# Patient Record
Sex: FEMALE | Race: BLACK OR AFRICAN AMERICAN | Employment: UNEMPLOYED | URBAN - METROPOLITAN AREA
[De-identification: names, ages, dates, MRNs, and addresses within clinical notes are randomized per-mention and may not be internally consistent; named-entity substitution may affect disease eponyms.]

---

## 2017-02-08 ENCOUNTER — OP HISTORICAL/CONVERTED ENCOUNTER (OUTPATIENT)
Dept: OTHER | Age: 47
End: 2017-02-08

## 2020-06-04 ENCOUNTER — ED HISTORICAL/CONVERTED ENCOUNTER (OUTPATIENT)
Dept: OTHER | Age: 50
End: 2020-06-04

## 2020-06-04 ENCOUNTER — APPOINTMENT (OUTPATIENT)
Dept: GENERAL RADIOLOGY | Age: 50
DRG: 003 | End: 2020-06-04
Attending: EMERGENCY MEDICINE
Payer: COMMERCIAL

## 2020-06-04 ENCOUNTER — APPOINTMENT (OUTPATIENT)
Dept: CT IMAGING | Age: 50
DRG: 003 | End: 2020-06-04
Attending: EMERGENCY MEDICINE
Payer: COMMERCIAL

## 2020-06-04 ENCOUNTER — HOSPITAL ENCOUNTER (INPATIENT)
Age: 50
LOS: 36 days | Discharge: NURSING, REHAB OR LONG-TERM CARE | DRG: 003 | End: 2020-07-10
Attending: EMERGENCY MEDICINE | Admitting: INTERNAL MEDICINE
Payer: COMMERCIAL

## 2020-06-04 DIAGNOSIS — R09.02 CEREBRAL EDEMA DUE TO ANOXIA (HCC): ICD-10-CM

## 2020-06-04 DIAGNOSIS — R53.81 DEBILITY: ICD-10-CM

## 2020-06-04 DIAGNOSIS — I67.1 MIDDLE CEREBRAL ARTERY ANEURYSM: ICD-10-CM

## 2020-06-04 DIAGNOSIS — R56.9 SEIZURE (HCC): ICD-10-CM

## 2020-06-04 DIAGNOSIS — Z71.89 ADVANCED CARE PLANNING/COUNSELING DISCUSSION: ICD-10-CM

## 2020-06-04 DIAGNOSIS — I67.848 CEREBRAL VASOSPASM: ICD-10-CM

## 2020-06-04 DIAGNOSIS — G93.6 CEREBRAL EDEMA DUE TO ANOXIA (HCC): ICD-10-CM

## 2020-06-04 DIAGNOSIS — Z71.89 GOALS OF CARE, COUNSELING/DISCUSSION: ICD-10-CM

## 2020-06-04 DIAGNOSIS — H49.02 LEFT OCULOMOTOR NERVE PALSY: ICD-10-CM

## 2020-06-04 DIAGNOSIS — I60.9 SAH (SUBARACHNOID HEMORRHAGE) (HCC): Primary | ICD-10-CM

## 2020-06-04 DIAGNOSIS — I67.1 ANTERIOR COMMUNICATING ARTERY ANEURYSM: ICD-10-CM

## 2020-06-04 DIAGNOSIS — I60.12: ICD-10-CM

## 2020-06-04 LAB
ALBUMIN SERPL-MCNC: 3.2 G/DL (ref 3.5–5)
ALBUMIN/GLOB SERPL: 0.8 {RATIO} (ref 1.1–2.2)
ALP SERPL-CCNC: 75 U/L (ref 45–117)
ALT SERPL-CCNC: 39 U/L (ref 12–78)
ANION GAP SERPL CALC-SCNC: 8 MMOL/L (ref 5–15)
ARTERIAL PATENCY WRIST A: YES
AST SERPL-CCNC: 58 U/L (ref 15–37)
BASE DEFICIT BLD-SCNC: 2 MMOL/L
BASOPHILS # BLD: 0 K/UL (ref 0–0.1)
BASOPHILS NFR BLD: 0 % (ref 0–1)
BDY SITE: ABNORMAL
BILIRUB SERPL-MCNC: 0.4 MG/DL (ref 0.2–1)
BUN SERPL-MCNC: 12 MG/DL (ref 6–20)
BUN/CREAT SERPL: 13 (ref 12–20)
CA-I BLD-SCNC: 1.22 MMOL/L (ref 1.12–1.32)
CALCIUM SERPL-MCNC: 8.4 MG/DL (ref 8.5–10.1)
CHLORIDE SERPL-SCNC: 107 MMOL/L (ref 97–108)
CO2 SERPL-SCNC: 23 MMOL/L (ref 21–32)
COMMENT, HOLDF: NORMAL
COVID-19 RAPID TEST, COVR: NOT DETECTED
CREAT SERPL-MCNC: 0.9 MG/DL (ref 0.55–1.02)
DIFFERENTIAL METHOD BLD: ABNORMAL
EOSINOPHIL # BLD: 0 K/UL (ref 0–0.4)
EOSINOPHIL NFR BLD: 0 % (ref 0–7)
ERYTHROCYTE [DISTWIDTH] IN BLOOD BY AUTOMATED COUNT: 19 % (ref 11.5–14.5)
GAS FLOW.O2 O2 DELIVERY SYS: ABNORMAL L/MIN
GAS FLOW.O2 SETTING OXYMISER: 16 BPM
GLOBULIN SER CALC-MCNC: 3.8 G/DL (ref 2–4)
GLUCOSE SERPL-MCNC: 218 MG/DL (ref 65–100)
HCO3 BLD-SCNC: 24.6 MMOL/L (ref 22–26)
HCT VFR BLD AUTO: 34.3 % (ref 35–47)
HGB BLD-MCNC: 9.7 G/DL (ref 11.5–16)
IMM GRANULOCYTES # BLD AUTO: 0 K/UL (ref 0–0.04)
IMM GRANULOCYTES NFR BLD AUTO: 0 % (ref 0–0.5)
LYMPHOCYTES # BLD: 1.4 K/UL (ref 0.8–3.5)
LYMPHOCYTES NFR BLD: 15 % (ref 12–49)
MCH RBC QN AUTO: 19.5 PG (ref 26–34)
MCHC RBC AUTO-ENTMCNC: 28.3 G/DL (ref 30–36.5)
MCV RBC AUTO: 68.9 FL (ref 80–99)
MONOCYTES # BLD: 0.6 K/UL (ref 0–1)
MONOCYTES NFR BLD: 6 % (ref 5–13)
NEUTS SEG # BLD: 7.6 K/UL (ref 1.8–8)
NEUTS SEG NFR BLD: 79 % (ref 32–75)
NRBC # BLD: 0 K/UL (ref 0–0.01)
NRBC BLD-RTO: 0 PER 100 WBC
O2/TOTAL GAS SETTING VFR VENT: 100 %
PCO2 BLD: 51.3 MMHG (ref 35–45)
PEEP RESPIRATORY: 5 CMH2O
PH BLD: 7.29 [PH] (ref 7.35–7.45)
PIP ISTAT,IPIP: 32
PLATELET # BLD AUTO: 381 K/UL (ref 150–400)
PMV BLD AUTO: 9.7 FL (ref 8.9–12.9)
PO2 BLD: 91 MMHG (ref 80–100)
POTASSIUM SERPL-SCNC: 3.4 MMOL/L (ref 3.5–5.1)
PROT SERPL-MCNC: 7 G/DL (ref 6.4–8.2)
RBC # BLD AUTO: 4.98 M/UL (ref 3.8–5.2)
RBC MORPH BLD: ABNORMAL
SAMPLES BEING HELD,HOLD: NORMAL
SAO2 % BLD: 96 % (ref 92–97)
SODIUM SERPL-SCNC: 138 MMOL/L (ref 136–145)
SOURCE, COVRS: NORMAL
SPECIMEN SOURCE, FCOV2M: NORMAL
SPECIMEN TYPE: ABNORMAL
TOTAL RESP. RATE, ITRR: 17
VENTILATION MODE VENT: ABNORMAL
VOLUME CONTROL IVLC: YES
VT SETTING VENT: 420 ML
WBC # BLD AUTO: 9.6 K/UL (ref 3.6–11)

## 2020-06-04 PROCEDURE — 94762 N-INVAS EAR/PLS OXIMTRY CONT: CPT

## 2020-06-04 PROCEDURE — 70496 CT ANGIOGRAPHY HEAD: CPT

## 2020-06-04 PROCEDURE — 80053 COMPREHEN METABOLIC PANEL: CPT

## 2020-06-04 PROCEDURE — 74011636320 HC RX REV CODE- 636/320: Performed by: RADIOLOGY

## 2020-06-04 PROCEDURE — 74011000258 HC RX REV CODE- 258: Performed by: RADIOLOGY

## 2020-06-04 PROCEDURE — 36600 WITHDRAWAL OF ARTERIAL BLOOD: CPT

## 2020-06-04 PROCEDURE — 36415 COLL VENOUS BLD VENIPUNCTURE: CPT

## 2020-06-04 PROCEDURE — 93005 ELECTROCARDIOGRAM TRACING: CPT

## 2020-06-04 PROCEDURE — 99284 EMERGENCY DEPT VISIT MOD MDM: CPT

## 2020-06-04 PROCEDURE — 70450 CT HEAD/BRAIN W/O DYE: CPT

## 2020-06-04 PROCEDURE — 65610000006 HC RM INTENSIVE CARE

## 2020-06-04 PROCEDURE — 85025 COMPLETE CBC W/AUTO DIFF WBC: CPT

## 2020-06-04 PROCEDURE — 74011000258 HC RX REV CODE- 258: Performed by: NURSE PRACTITIONER

## 2020-06-04 PROCEDURE — 77030005513 HC CATH URETH FOL11 MDII -B

## 2020-06-04 PROCEDURE — 71045 X-RAY EXAM CHEST 1 VIEW: CPT

## 2020-06-04 PROCEDURE — 74011000250 HC RX REV CODE- 250: Performed by: NURSE PRACTITIONER

## 2020-06-04 PROCEDURE — 82803 BLOOD GASES ANY COMBINATION: CPT

## 2020-06-04 PROCEDURE — 74011250636 HC RX REV CODE- 250/636: Performed by: NURSE PRACTITIONER

## 2020-06-04 PROCEDURE — 85576 BLOOD PLATELET AGGREGATION: CPT

## 2020-06-04 PROCEDURE — 5A1955Z RESPIRATORY VENTILATION, GREATER THAN 96 CONSECUTIVE HOURS: ICD-10-PCS | Performed by: INTERNAL MEDICINE

## 2020-06-04 PROCEDURE — 84484 ASSAY OF TROPONIN QUANT: CPT

## 2020-06-04 PROCEDURE — 74011250637 HC RX REV CODE- 250/637: Performed by: NURSE PRACTITIONER

## 2020-06-04 PROCEDURE — 87635 SARS-COV-2 COVID-19 AMP PRB: CPT

## 2020-06-04 PROCEDURE — 74018 RADEX ABDOMEN 1 VIEW: CPT

## 2020-06-04 RX ORDER — NIMODIPINE 30 MG/1
60 CAPSULE, LIQUID FILLED ORAL EVERY 4 HOURS
Status: DISCONTINUED | OUTPATIENT
Start: 2020-06-04 | End: 2020-06-04

## 2020-06-04 RX ORDER — ONDANSETRON 2 MG/ML
4 INJECTION INTRAMUSCULAR; INTRAVENOUS
Status: DISCONTINUED | OUTPATIENT
Start: 2020-06-04 | End: 2020-07-10 | Stop reason: HOSPADM

## 2020-06-04 RX ORDER — CHLORHEXIDINE GLUCONATE 0.12 MG/ML
15 RINSE ORAL EVERY 12 HOURS
Status: DISCONTINUED | OUTPATIENT
Start: 2020-06-04 | End: 2020-06-18

## 2020-06-04 RX ORDER — SODIUM CHLORIDE 0.9 % (FLUSH) 0.9 %
10 SYRINGE (ML) INJECTION
Status: COMPLETED | OUTPATIENT
Start: 2020-06-04 | End: 2020-06-04

## 2020-06-04 RX ORDER — MANNITOL 20 G/100ML
25 INJECTION, SOLUTION INTRAVENOUS ONCE
Status: DISCONTINUED | OUTPATIENT
Start: 2020-06-04 | End: 2020-06-04

## 2020-06-04 RX ORDER — SODIUM CHLORIDE, SODIUM LACTATE, POTASSIUM CHLORIDE, CALCIUM CHLORIDE 600; 310; 30; 20 MG/100ML; MG/100ML; MG/100ML; MG/100ML
50 INJECTION, SOLUTION INTRAVENOUS CONTINUOUS
Status: DISCONTINUED | OUTPATIENT
Start: 2020-06-04 | End: 2020-06-07

## 2020-06-04 RX ORDER — PROPOFOL 10 MG/ML
0-50 VIAL (ML) INTRAVENOUS
Status: DISCONTINUED | OUTPATIENT
Start: 2020-06-04 | End: 2020-06-15

## 2020-06-04 RX ORDER — LABETALOL HYDROCHLORIDE 5 MG/ML
20 INJECTION, SOLUTION INTRAVENOUS
Status: DISCONTINUED | OUTPATIENT
Start: 2020-06-04 | End: 2020-06-06

## 2020-06-04 RX ORDER — DOCUSATE SODIUM 50 MG/5ML
100 LIQUID ORAL
Status: DISCONTINUED | OUTPATIENT
Start: 2020-06-04 | End: 2020-07-10 | Stop reason: HOSPADM

## 2020-06-04 RX ADMIN — SODIUM CHLORIDE 100 ML: 900 INJECTION, SOLUTION INTRAVENOUS at 20:16

## 2020-06-04 RX ADMIN — SODIUM CHLORIDE, SODIUM LACTATE, POTASSIUM CHLORIDE, AND CALCIUM CHLORIDE 100 ML/HR: 600; 310; 30; 20 INJECTION, SOLUTION INTRAVENOUS at 22:01

## 2020-06-04 RX ADMIN — SODIUM CHLORIDE 5 MG/HR: 900 INJECTION, SOLUTION INTRAVENOUS at 22:12

## 2020-06-04 RX ADMIN — PROPOFOL INJECTABLE EMULSION 25 MCG/KG/MIN: 10 INJECTION, EMULSION INTRAVENOUS at 22:06

## 2020-06-04 RX ADMIN — LABETALOL HYDROCHLORIDE 20 MG: 5 INJECTION INTRAVENOUS at 23:38

## 2020-06-04 RX ADMIN — NIMODIPINE 60 MG: 30 SOLUTION ORAL at 23:34

## 2020-06-04 RX ADMIN — Medication 10 ML: at 20:16

## 2020-06-04 RX ADMIN — IOPAMIDOL 100 ML: 755 INJECTION, SOLUTION INTRAVENOUS at 20:16

## 2020-06-04 RX ADMIN — SODIUM CHLORIDE 1000 MG: 900 INJECTION, SOLUTION INTRAVENOUS at 23:48

## 2020-06-05 ENCOUNTER — ANESTHESIA (OUTPATIENT)
Dept: INTERVENTIONAL RADIOLOGY/VASCULAR | Age: 50
DRG: 003 | End: 2020-06-05
Payer: COMMERCIAL

## 2020-06-05 ENCOUNTER — APPOINTMENT (OUTPATIENT)
Dept: INTERVENTIONAL RADIOLOGY/VASCULAR | Age: 50
DRG: 003 | End: 2020-06-05
Attending: RADIOLOGY
Payer: COMMERCIAL

## 2020-06-05 ENCOUNTER — APPOINTMENT (OUTPATIENT)
Dept: CT IMAGING | Age: 50
DRG: 003 | End: 2020-06-05
Attending: NEUROLOGICAL SURGERY
Payer: COMMERCIAL

## 2020-06-05 ENCOUNTER — APPOINTMENT (OUTPATIENT)
Dept: NON INVASIVE DIAGNOSTICS | Age: 50
DRG: 003 | End: 2020-06-05
Attending: NURSE PRACTITIONER
Payer: COMMERCIAL

## 2020-06-05 ENCOUNTER — ANESTHESIA EVENT (OUTPATIENT)
Dept: INTERVENTIONAL RADIOLOGY/VASCULAR | Age: 50
DRG: 003 | End: 2020-06-05
Payer: COMMERCIAL

## 2020-06-05 LAB
ALBUMIN SERPL-MCNC: 2.9 G/DL (ref 3.5–5)
ALBUMIN/GLOB SERPL: 0.8 {RATIO} (ref 1.1–2.2)
ALP SERPL-CCNC: 65 U/L (ref 45–117)
ALT SERPL-CCNC: 32 U/L (ref 12–78)
ANION GAP SERPL CALC-SCNC: 11 MMOL/L (ref 5–15)
ARTERIAL PATENCY WRIST A: ABNORMAL
ASPIRIN TEST, ASPIRN: 518 ARU
AST SERPL-CCNC: 29 U/L (ref 15–37)
ATRIAL RATE: 75 BPM
BASE DEFICIT BLD-SCNC: 3 MMOL/L
BASOPHILS # BLD: 0 K/UL (ref 0–0.1)
BASOPHILS NFR BLD: 0 % (ref 0–1)
BDY SITE: ABNORMAL
BILIRUB SERPL-MCNC: 0.3 MG/DL (ref 0.2–1)
BUN SERPL-MCNC: 11 MG/DL (ref 6–20)
BUN/CREAT SERPL: 14 (ref 12–20)
CA-I BLD-SCNC: 1.19 MMOL/L (ref 1.12–1.32)
CALCIUM SERPL-MCNC: 8 MG/DL (ref 8.5–10.1)
CALCULATED P AXIS, ECG09: 62 DEGREES
CALCULATED R AXIS, ECG10: 9 DEGREES
CALCULATED T AXIS, ECG11: 86 DEGREES
CHLORIDE SERPL-SCNC: 109 MMOL/L (ref 97–108)
CHOLEST SERPL-MCNC: 90 MG/DL
CO2 SERPL-SCNC: 20 MMOL/L (ref 21–32)
CREAT SERPL-MCNC: 0.81 MG/DL (ref 0.55–1.02)
DIAGNOSIS, 93000: NORMAL
DIFFERENTIAL METHOD BLD: ABNORMAL
EOSINOPHIL # BLD: 0 K/UL (ref 0–0.4)
EOSINOPHIL NFR BLD: 0 % (ref 0–7)
ERYTHROCYTE [DISTWIDTH] IN BLOOD BY AUTOMATED COUNT: 18.4 % (ref 11.5–14.5)
EST. AVERAGE GLUCOSE BLD GHB EST-MCNC: 117 MG/DL
GAS FLOW.O2 O2 DELIVERY SYS: ABNORMAL L/MIN
GAS FLOW.O2 SETTING OXYMISER: 18 BPM
GLOBULIN SER CALC-MCNC: 3.6 G/DL (ref 2–4)
GLUCOSE BLD STRIP.AUTO-MCNC: 129 MG/DL (ref 65–100)
GLUCOSE BLD STRIP.AUTO-MCNC: 132 MG/DL (ref 65–100)
GLUCOSE BLD STRIP.AUTO-MCNC: 168 MG/DL (ref 65–100)
GLUCOSE SERPL-MCNC: 156 MG/DL (ref 65–100)
HBA1C MFR BLD: 5.7 % (ref 4–5.6)
HCO3 BLD-SCNC: 22.2 MMOL/L (ref 22–26)
HCT VFR BLD AUTO: 31.6 % (ref 35–47)
HDLC SERPL-MCNC: 64 MG/DL
HDLC SERPL: 1.4 {RATIO} (ref 0–5)
HGB BLD-MCNC: 9.2 G/DL (ref 11.5–16)
IMM GRANULOCYTES # BLD AUTO: 0 K/UL (ref 0–0.04)
IMM GRANULOCYTES NFR BLD AUTO: 0 % (ref 0–0.5)
LDLC SERPL CALC-MCNC: 7.8 MG/DL (ref 0–100)
LIPID PROFILE,FLP: NORMAL
LYMPHOCYTES # BLD: 0.9 K/UL (ref 0.8–3.5)
LYMPHOCYTES NFR BLD: 7 % (ref 12–49)
MAGNESIUM SERPL-MCNC: 1.8 MG/DL (ref 1.6–2.4)
MCH RBC QN AUTO: 19.7 PG (ref 26–34)
MCHC RBC AUTO-ENTMCNC: 29.1 G/DL (ref 30–36.5)
MCV RBC AUTO: 67.7 FL (ref 80–99)
MONOCYTES # BLD: 0.9 K/UL (ref 0–1)
MONOCYTES NFR BLD: 7 % (ref 5–13)
NEUTS SEG # BLD: 11.7 K/UL (ref 1.8–8)
NEUTS SEG NFR BLD: 86 % (ref 32–75)
NRBC # BLD: 0 K/UL (ref 0–0.01)
NRBC BLD-RTO: 0 PER 100 WBC
O2/TOTAL GAS SETTING VFR VENT: 80 %
P-R INTERVAL, ECG05: 172 MS
PCO2 BLD: 37.4 MMHG (ref 35–45)
PEEP RESPIRATORY: 5 CMH2O
PH BLD: 7.38 [PH] (ref 7.35–7.45)
PHOSPHATE SERPL-MCNC: 2 MG/DL (ref 2.6–4.7)
PIP ISTAT,IPIP: 31
PLATELET # BLD AUTO: 302 K/UL (ref 150–400)
PLATELET COMMENTS,PCOM: ABNORMAL
PMV BLD AUTO: 9.7 FL (ref 8.9–12.9)
PO2 BLD: 145 MMHG (ref 80–100)
POTASSIUM SERPL-SCNC: 3.4 MMOL/L (ref 3.5–5.1)
PROT SERPL-MCNC: 6.5 G/DL (ref 6.4–8.2)
Q-T INTERVAL, ECG07: 476 MS
QRS DURATION, ECG06: 102 MS
QTC CALCULATION (BEZET), ECG08: 531 MS
RBC # BLD AUTO: 4.67 M/UL (ref 3.8–5.2)
RBC MORPH BLD: ABNORMAL
SAO2 % BLD: 99 % (ref 92–97)
SARS-COV-2, COV2: NOT DETECTED
SERVICE CMNT-IMP: ABNORMAL
SODIUM SERPL-SCNC: 140 MMOL/L (ref 136–145)
SPECIMEN SOURCE, FCOV2M: NORMAL
SPECIMEN TYPE: ABNORMAL
TOTAL RESP. RATE, ITRR: 18
TRIGL SERPL-MCNC: 91 MG/DL (ref ?–150)
TROPONIN I SERPL-MCNC: 0.76 NG/ML
VENTILATION MODE VENT: ABNORMAL
VENTRICULAR RATE, ECG03: 75 BPM
VLDLC SERPL CALC-MCNC: 18.2 MG/DL
VT SETTING VENT: 450 ML
WBC # BLD AUTO: 13.5 K/UL (ref 3.6–11)

## 2020-06-05 PROCEDURE — 75894 X-RAYS TRANSCATH THERAPY: CPT

## 2020-06-05 PROCEDURE — 94003 VENT MGMT INPAT SUBQ DAY: CPT

## 2020-06-05 PROCEDURE — 76060000035 HC ANESTHESIA 2 TO 2.5 HR

## 2020-06-05 PROCEDURE — C1887 CATHETER, GUIDING: HCPCS

## 2020-06-05 PROCEDURE — 36415 COLL VENOUS BLD VENIPUNCTURE: CPT

## 2020-06-05 PROCEDURE — 74011250636 HC RX REV CODE- 250/636: Performed by: RADIOLOGY

## 2020-06-05 PROCEDURE — 82962 GLUCOSE BLOOD TEST: CPT

## 2020-06-05 PROCEDURE — B31R1ZZ FLUOROSCOPY OF INTRACRANIAL ARTERIES USING LOW OSMOLAR CONTRAST: ICD-10-PCS | Performed by: RADIOLOGY

## 2020-06-05 PROCEDURE — 85025 COMPLETE CBC W/AUTO DIFF WBC: CPT

## 2020-06-05 PROCEDURE — 80061 LIPID PANEL: CPT

## 2020-06-05 PROCEDURE — 84100 ASSAY OF PHOSPHORUS: CPT

## 2020-06-05 PROCEDURE — 77030035304 HC CATH ANGI BENCHMARK PENU -G

## 2020-06-05 PROCEDURE — 03VG3DZ RESTRICTION OF INTRACRANIAL ARTERY WITH INTRALUMINAL DEVICE, PERCUTANEOUS APPROACH: ICD-10-PCS | Performed by: RADIOLOGY

## 2020-06-05 PROCEDURE — 82803 BLOOD GASES ANY COMBINATION: CPT

## 2020-06-05 PROCEDURE — 03HY32Z INSERTION OF MONITORING DEVICE INTO UPPER ARTERY, PERCUTANEOUS APPROACH: ICD-10-PCS | Performed by: NURSE PRACTITIONER

## 2020-06-05 PROCEDURE — C1769 GUIDE WIRE: HCPCS

## 2020-06-05 PROCEDURE — 74011000258 HC RX REV CODE- 258: Performed by: NURSE PRACTITIONER

## 2020-06-05 PROCEDURE — 3E0G76Z INTRODUCTION OF NUTRITIONAL SUBSTANCE INTO UPPER GI, VIA NATURAL OR ARTIFICIAL OPENING: ICD-10-PCS | Performed by: INTERNAL MEDICINE

## 2020-06-05 PROCEDURE — 74011250636 HC RX REV CODE- 250/636: Performed by: NURSE PRACTITIONER

## 2020-06-05 PROCEDURE — 77030029286 HC COIL EMB DTCH ULT STRY -H

## 2020-06-05 PROCEDURE — 77030008638 HC TU CONN COOK -A

## 2020-06-05 PROCEDURE — 74011250637 HC RX REV CODE- 250/637: Performed by: NURSE PRACTITIONER

## 2020-06-05 PROCEDURE — 77030021532 HC CATH ANGI DX IMPRS MRTM -B

## 2020-06-05 PROCEDURE — 70450 CT HEAD/BRAIN W/O DYE: CPT

## 2020-06-05 PROCEDURE — 74011636320 HC RX REV CODE- 636/320: Performed by: RADIOLOGY

## 2020-06-05 PROCEDURE — C1894 INTRO/SHEATH, NON-LASER: HCPCS

## 2020-06-05 PROCEDURE — 74011250637 HC RX REV CODE- 250/637: Performed by: RADIOLOGY

## 2020-06-05 PROCEDURE — 74011000250 HC RX REV CODE- 250: Performed by: NURSE PRACTITIONER

## 2020-06-05 PROCEDURE — 77030008584 HC TOOL GDWRE DEV TERU -A

## 2020-06-05 PROCEDURE — 77030029288 HC HNDL INZONE DTCH STRY -C

## 2020-06-05 PROCEDURE — C1760 CLOSURE DEV, VASC: HCPCS

## 2020-06-05 PROCEDURE — 77030040361 HC SLV COMPR DVT MDII -B

## 2020-06-05 PROCEDURE — 83036 HEMOGLOBIN GLYCOSYLATED A1C: CPT

## 2020-06-05 PROCEDURE — 83735 ASSAY OF MAGNESIUM: CPT

## 2020-06-05 PROCEDURE — 74011636637 HC RX REV CODE- 636/637: Performed by: NURSE PRACTITIONER

## 2020-06-05 PROCEDURE — 80053 COMPREHEN METABOLIC PANEL: CPT

## 2020-06-05 PROCEDURE — 77030012468 HC VLV BLEEDBK CNTRL ABBT -B

## 2020-06-05 PROCEDURE — 65610000006 HC RM INTENSIVE CARE

## 2020-06-05 RX ORDER — MAGNESIUM SULFATE 100 %
4 CRYSTALS MISCELLANEOUS AS NEEDED
Status: DISCONTINUED | OUTPATIENT
Start: 2020-06-05 | End: 2020-07-10 | Stop reason: HOSPADM

## 2020-06-05 RX ORDER — DOCUSATE SODIUM 50 MG/5ML
100 LIQUID ORAL 2 TIMES DAILY
Status: DISCONTINUED | OUTPATIENT
Start: 2020-06-05 | End: 2020-06-20

## 2020-06-05 RX ORDER — ALBUTEROL SULFATE 0.83 MG/ML
2.5 SOLUTION RESPIRATORY (INHALATION)
Status: DISCONTINUED | OUTPATIENT
Start: 2020-06-05 | End: 2020-07-10 | Stop reason: HOSPADM

## 2020-06-05 RX ORDER — FENTANYL CITRATE 50 UG/ML
50-100 INJECTION, SOLUTION INTRAMUSCULAR; INTRAVENOUS
Status: DISCONTINUED | OUTPATIENT
Start: 2020-06-05 | End: 2020-06-18

## 2020-06-05 RX ORDER — MAGNESIUM SULFATE 1 G/100ML
1 INJECTION INTRAVENOUS ONCE
Status: COMPLETED | OUTPATIENT
Start: 2020-06-05 | End: 2020-06-05

## 2020-06-05 RX ORDER — HEPARIN SODIUM 1000 [USP'U]/ML
INJECTION, SOLUTION INTRAVENOUS; SUBCUTANEOUS AS NEEDED
Status: DISCONTINUED | OUTPATIENT
Start: 2020-06-05 | End: 2020-06-05 | Stop reason: HOSPADM

## 2020-06-05 RX ORDER — FACIAL-BODY WIPES
10 EACH TOPICAL DAILY PRN
Status: DISCONTINUED | OUTPATIENT
Start: 2020-06-05 | End: 2020-07-10 | Stop reason: HOSPADM

## 2020-06-05 RX ORDER — LIDOCAINE HYDROCHLORIDE 20 MG/ML
INJECTION, SOLUTION INFILTRATION; PERINEURAL
Status: DISPENSED
Start: 2020-06-05 | End: 2020-06-05

## 2020-06-05 RX ORDER — ROCURONIUM BROMIDE 10 MG/ML
INJECTION, SOLUTION INTRAVENOUS AS NEEDED
Status: DISCONTINUED | OUTPATIENT
Start: 2020-06-05 | End: 2020-06-05 | Stop reason: HOSPADM

## 2020-06-05 RX ORDER — LIDOCAINE HYDROCHLORIDE 20 MG/ML
20 INJECTION, SOLUTION INFILTRATION; PERINEURAL ONCE
Status: DISCONTINUED | OUTPATIENT
Start: 2020-06-05 | End: 2020-06-05 | Stop reason: HOSPADM

## 2020-06-05 RX ORDER — INSULIN LISPRO 100 [IU]/ML
INJECTION, SOLUTION INTRAVENOUS; SUBCUTANEOUS EVERY 6 HOURS
Status: DISCONTINUED | OUTPATIENT
Start: 2020-06-05 | End: 2020-06-15

## 2020-06-05 RX ORDER — ASPIRIN 300 MG/1
SUPPOSITORY RECTAL
Status: DISPENSED
Start: 2020-06-05 | End: 2020-06-05

## 2020-06-05 RX ORDER — ASPIRIN 300 MG/1
600 SUPPOSITORY RECTAL DAILY
Status: DISCONTINUED | OUTPATIENT
Start: 2020-06-05 | End: 2020-06-05

## 2020-06-05 RX ORDER — SODIUM CHLORIDE 9 MG/ML
INJECTION, SOLUTION INTRAVENOUS
Status: DISCONTINUED | OUTPATIENT
Start: 2020-06-05 | End: 2020-06-05 | Stop reason: HOSPADM

## 2020-06-05 RX ORDER — DEXTROSE MONOHYDRATE 100 MG/ML
0-250 INJECTION, SOLUTION INTRAVENOUS AS NEEDED
Status: DISCONTINUED | OUTPATIENT
Start: 2020-06-05 | End: 2020-07-10 | Stop reason: HOSPADM

## 2020-06-05 RX ORDER — VERAPAMIL HYDROCHLORIDE 2.5 MG/ML
INJECTION, SOLUTION INTRAVENOUS
Status: DISCONTINUED
Start: 2020-06-05 | End: 2020-06-05 | Stop reason: WASHOUT

## 2020-06-05 RX ORDER — SODIUM,POTASSIUM PHOSPHATES 280-250MG
1 POWDER IN PACKET (EA) ORAL 2 TIMES DAILY
Status: COMPLETED | OUTPATIENT
Start: 2020-06-05 | End: 2020-06-05

## 2020-06-05 RX ORDER — SODIUM CHLORIDE, SODIUM LACTATE, POTASSIUM CHLORIDE, CALCIUM CHLORIDE 600; 310; 30; 20 MG/100ML; MG/100ML; MG/100ML; MG/100ML
INJECTION, SOLUTION INTRAVENOUS
Status: DISCONTINUED | OUTPATIENT
Start: 2020-06-05 | End: 2020-06-05

## 2020-06-05 RX ORDER — POTASSIUM CHLORIDE 7.45 MG/ML
10 INJECTION INTRAVENOUS
Status: DISCONTINUED | OUTPATIENT
Start: 2020-06-05 | End: 2020-06-05

## 2020-06-05 RX ORDER — SODIUM CHLORIDE 0.9 % (FLUSH) 0.9 %
SYRINGE (ML) INJECTION
Status: COMPLETED
Start: 2020-06-05 | End: 2020-06-05

## 2020-06-05 RX ORDER — VERAPAMIL HYDROCHLORIDE 2.5 MG/ML
20-40 INJECTION, SOLUTION INTRAVENOUS ONCE
Status: DISCONTINUED | OUTPATIENT
Start: 2020-06-05 | End: 2020-06-05

## 2020-06-05 RX ADMIN — IOPAMIDOL 190 ML: 612 INJECTION, SOLUTION INTRAVENOUS at 10:32

## 2020-06-05 RX ADMIN — PROPOFOL INJECTABLE EMULSION 45 MCG/KG/MIN: 10 INJECTION, EMULSION INTRAVENOUS at 11:50

## 2020-06-05 RX ADMIN — MAGNESIUM SULFATE HEPTAHYDRATE 1 G: 1 INJECTION, SOLUTION INTRAVENOUS at 11:55

## 2020-06-05 RX ADMIN — CHLORHEXIDINE GLUCONATE 15 ML: 0.12 RINSE ORAL at 20:46

## 2020-06-05 RX ADMIN — SODIUM CHLORIDE, SODIUM LACTATE, POTASSIUM CHLORIDE, AND CALCIUM CHLORIDE 100 ML/HR: 600; 310; 30; 20 INJECTION, SOLUTION INTRAVENOUS at 11:30

## 2020-06-05 RX ADMIN — SODIUM CHLORIDE 1000 MG: 900 INJECTION, SOLUTION INTRAVENOUS at 23:03

## 2020-06-05 RX ADMIN — SODIUM CHLORIDE 7.5 MG/HR: 900 INJECTION, SOLUTION INTRAVENOUS at 15:40

## 2020-06-05 RX ADMIN — FAMOTIDINE 20 MG: 10 INJECTION, SOLUTION INTRAVENOUS at 20:38

## 2020-06-05 RX ADMIN — SODIUM CHLORIDE 10 MG/HR: 900 INJECTION, SOLUTION INTRAVENOUS at 23:59

## 2020-06-05 RX ADMIN — ROCURONIUM BROMIDE 20 MG: 10 INJECTION, SOLUTION INTRAVENOUS at 09:20

## 2020-06-05 RX ADMIN — LABETALOL HYDROCHLORIDE 20 MG: 5 INJECTION INTRAVENOUS at 22:38

## 2020-06-05 RX ADMIN — HEPARIN SODIUM 2000 UNITS: 1000 INJECTION, SOLUTION INTRAVENOUS; SUBCUTANEOUS at 09:48

## 2020-06-05 RX ADMIN — INSULIN LISPRO 2 UNITS: 100 INJECTION, SOLUTION INTRAVENOUS; SUBCUTANEOUS at 06:02

## 2020-06-05 RX ADMIN — Medication 40 ML: at 16:00

## 2020-06-05 RX ADMIN — LABETALOL HYDROCHLORIDE 20 MG: 5 INJECTION INTRAVENOUS at 03:29

## 2020-06-05 RX ADMIN — ROCURONIUM BROMIDE 30 MG: 10 INJECTION, SOLUTION INTRAVENOUS at 08:57

## 2020-06-05 RX ADMIN — SODIUM CHLORIDE 1000 MG: 900 INJECTION, SOLUTION INTRAVENOUS at 11:55

## 2020-06-05 RX ADMIN — SODIUM CHLORIDE 10 MG/HR: 900 INJECTION, SOLUTION INTRAVENOUS at 10:00

## 2020-06-05 RX ADMIN — SODIUM CHLORIDE 10 MG/HR: 900 INJECTION, SOLUTION INTRAVENOUS at 12:45

## 2020-06-05 RX ADMIN — Medication 2 DROP: at 15:39

## 2020-06-05 RX ADMIN — HEPARIN SODIUM 4000 UNITS: 1000 INJECTION INTRAVENOUS; SUBCUTANEOUS at 07:15

## 2020-06-05 RX ADMIN — SODIUM CHLORIDE 10 MG/HR: 900 INJECTION, SOLUTION INTRAVENOUS at 18:59

## 2020-06-05 RX ADMIN — PROPOFOL INJECTABLE EMULSION 35 MCG/KG/MIN: 10 INJECTION, EMULSION INTRAVENOUS at 15:39

## 2020-06-05 RX ADMIN — FENTANYL CITRATE 50 MCG: 50 INJECTION INTRAMUSCULAR; INTRAVENOUS at 19:21

## 2020-06-05 RX ADMIN — POTASSIUM & SODIUM PHOSPHATES POWDER PACK 280-160-250 MG 1 PACKET: 280-160-250 PACK at 17:18

## 2020-06-05 RX ADMIN — SODIUM CHLORIDE 10 MG/HR: 900 INJECTION, SOLUTION INTRAVENOUS at 21:15

## 2020-06-05 RX ADMIN — PROPOFOL INJECTABLE EMULSION 15 MCG/KG/MIN: 10 INJECTION, EMULSION INTRAVENOUS at 03:17

## 2020-06-05 RX ADMIN — SODIUM CHLORIDE, SODIUM LACTATE, POTASSIUM CHLORIDE, AND CALCIUM CHLORIDE 100 ML/HR: 600; 310; 30; 20 INJECTION, SOLUTION INTRAVENOUS at 21:51

## 2020-06-05 RX ADMIN — ASPIRIN 600 MG: 300 SUPPOSITORY RECTAL at 08:49

## 2020-06-05 RX ADMIN — CHLORHEXIDINE GLUCONATE 15 ML: 0.12 RINSE ORAL at 01:00

## 2020-06-05 RX ADMIN — SODIUM CHLORIDE 10 MG/HR: 900 INJECTION, SOLUTION INTRAVENOUS at 04:21

## 2020-06-05 RX ADMIN — NIMODIPINE 60 MG: 30 SOLUTION ORAL at 04:20

## 2020-06-05 RX ADMIN — FAMOTIDINE 20 MG: 10 INJECTION, SOLUTION INTRAVENOUS at 11:56

## 2020-06-05 RX ADMIN — NIMODIPINE 60 MG: 30 SOLUTION ORAL at 20:14

## 2020-06-05 RX ADMIN — POTASSIUM BICARBONATE 40 MEQ: 782 TABLET, EFFERVESCENT ORAL at 05:52

## 2020-06-05 RX ADMIN — LABETALOL HYDROCHLORIDE 20 MG: 5 INJECTION INTRAVENOUS at 01:21

## 2020-06-05 RX ADMIN — DOCUSATE SODIUM 100 MG: 50 LIQUID ORAL at 17:18

## 2020-06-05 RX ADMIN — NIMODIPINE 60 MG: 30 SOLUTION ORAL at 16:01

## 2020-06-05 RX ADMIN — SODIUM CHLORIDE: 9 INJECTION, SOLUTION INTRAVENOUS at 08:38

## 2020-06-05 RX ADMIN — CHLORHEXIDINE GLUCONATE 15 ML: 0.12 RINSE ORAL at 12:00

## 2020-06-05 RX ADMIN — SODIUM CHLORIDE 10 MG/HR: 900 INJECTION, SOLUTION INTRAVENOUS at 01:28

## 2020-06-05 RX ADMIN — NIMODIPINE 60 MG: 30 SOLUTION ORAL at 11:56

## 2020-06-05 RX ADMIN — PROPOFOL INJECTABLE EMULSION 35 MCG/KG/MIN: 10 INJECTION, EMULSION INTRAVENOUS at 20:09

## 2020-06-05 RX ADMIN — Medication 2 DROP: at 22:40

## 2020-06-05 RX ADMIN — POTASSIUM & SODIUM PHOSPHATES POWDER PACK 280-160-250 MG 1 PACKET: 280-160-250 PACK at 11:57

## 2020-06-05 NOTE — PROGRESS NOTES
Neurointerventional Surgery Progress Note  Silvia Aguirre NP  Cell: [phone]          Admit Date: 6/4/2020        Daily Progress Note: 6/5/2020   LOS: 1 day      POD:* No surgery found *  S/P:  Cerebral angiogram with coil embolization of left MCA bifurcation aneurysm     ** Delayed Presentation ** PB Day 5     Interval History/Subjective:     Patient intubated and sedated. No acute events overnight. Angio today for coil embolization of left MCA bifurcation aneurysm. COVID negative X 2. 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                              Additional, patient has 5X3 right MCA bifurcation aneurysm with very wide neck which will               need to be treated with stents vs. clipping              - Day 1/21 of Nimotop to prevent delayed cerebral ischemia               - holding daily baby aspirin until decision regarding EVD is made               - LR at 100 ml/hr to maintain euvolemia              - start daily TCDs tomorrow 6/6               - Strict I&O              - ECHO pending              - q 1 neuro checks              - SBP goal less than 140 ( still has unsecured aneurysms) ,                                          Cardene/Hydralazine/Labetalol PRN              - PT/OT/SLP evals when appropriate              - repeat head CT in the am to evaluate for hydro               - NSGY following     2.) Seizure              - Due to #1, no prior hx of seizure              - Keppra 1000 mg BID              - Seizure precautions - Ativan PRN              - Propofol while intubated    3.) Acute hypoxic respiratory failure in the setting of likely neurogenic pulmonary edema              - pulmonary infiltrates noted in bilateral lower lobes on CT              - COVID 19 test negative x 2             - wean vent settings as able, currently on 70% FiO2 with PEEP 5              - avoid diuresis if possible d/t risk of vasospasm             - intensivist managing     4.) Electrolyte Derangements               - Potassium, magnesium, phosphorus all low today               - replace as needed               - intensivist managing                      Plan d/w Dr. Berenice Avila, Lacey Espinoza Neurosurgery NP, bedside RN, patients mother      Admission Summary:     Vladimir Taylor is a 48 y.o. 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 On arrival to ED, pt was unresponsive and noted to be seizing. A stat CT of her head was performed there, which showed extensive SAH. Pt was then intubated emergently. She was also extremely hypertensive with SBPs in 240s. She was given Keppra and Ativan, and placed on cardene drip for BP control. NSGY and NIS were then consulted and pt was transferred to [facility] for higher level of care. En route, paramedics administered 7.5 mg of Versed, 200mcg of Fentanyl, and 10 mg of labetalol for BP control. On arrival, CTA of her head and neck was obtained, which showed a 4.4 x 4.1 mm right MCA trifurcation aneurysm, a 2.2 x 3.1 mm left MCA trifurcation aneurysm, and possible < 2mm ACOM aneurysms. Repeat CT head of her head showed stable SAH, no evidence of hydrocephalus. Of note, CTA head/neck also showing pulmonary infiltrates suspicious for COVID-19 infection, so was placed on contact precautions with test pending. 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  History obtained from pt's mother. Mother reports pt does not smoke, does not drink alcohol or use street drugs. Mother reports pt began complaining of a headache on 5/31/20, has been taking BC Powder (aspirin) for the headache. She also reports that the pt has a hx of HTN and is supposed to be on BP medications but recently stopped taking her medications. Unable to perform meaningful ROS due to intubation.      Current Facility-Administered Medications   Medication Dose Route Frequency Provider Last Rate Last Dose    glucose chewable tablet 16 g  4 Tab Oral PRN Rodolph Roque, NP-C        glucagon (GLUCAGEN) injection 1 mg  1 mg IntraMUSCular PRN Rodolph Roque, NP-C        dextrose 10% infusion 0-250 mL  0-250 mL IntraVENous PRN Rodolph Roque, NP-C        insulin lispro (HUMALOG) injection   SubCUTAneous Q6H Winnie RIVAS, NP-C   Stopped at 06/05/20 1200    albuterol (PROVENTIL VENTOLIN) nebulizer solution 2.5 mg  2.5 mg Nebulization Q6H PRN Rodolph Roque, NP-C        potassium, sodium phosphates (NEUTRA-PHOS) packet 1 Packet  1 Packet Oral BID Rodolph Roque, NP-C   1 Packet at 06/05/20 1157    lidocaine (XYLOCAINE) 20 mg/mL (2 %) injection             aspirin (ASA) 300 mg suppository             heparin 4000 units/1000 mL (4 units/mL) in NS 4000 units/1000ml NS infusion **FOR ANGIO USE ONLY**             famotidine (PF) (PEPCID) 20 mg in 0.9% sodium chloride 10 mL injection  20 mg IntraVENous Q12H Harvey Go, ACNP   20 mg at 06/05/20 1156    fentaNYL citrate (PF) injection  mcg   mcg IntraVENous Q1H PRN Harvey Go, ACNP        polyvinyl alcohol-povidone (NATURAL TEARS) 0.5-0.6 % ophthalmic solution 2 Drop  2 Drop Both Eyes Q8H Harvey Go, ACNP   2 Drop at 06/05/20 1539    docusate (COLACE) 50 mg/5 mL oral liquid 100 mg  100 mg Oral BID Harvey Go, ACNP        bisacodyL (DULCOLAX) suppository 10 mg  10 mg Rectal DAILY PRN Harvey Go, ACNP  levETIRAcetam (KEPPRA) 1,000 mg in 0.9% sodium chloride 100 mL IVPB  1,000 mg IntraVENous Q12H Eva Domínguez, NP   1,000 mg at 20 1155    lactated Ringers infusion  100 mL/hr IntraVENous CONTINUOUS Eva Domínguez Backbone,  mL/hr at 20 1130 100 mL/hr at 20 1130    niMODipine (NYMALIZE) 6 mg/mL oral solution 60 mg  60 mg Per NG tube Q4H Eva Domínguez Backbone, NP   60 mg at 20 1601    niCARdipine (CARDENE) 25 mg in 0.9% sodium chloride 250 mL infusion  0-15 mg/hr IntraVENous TITRATE Eva Domínguez, NP 75 mL/hr at 20 1540 7.5 mg/hr at 20 1540    labetaloL (NORMODYNE;TRANDATE) injection 20 mg  20 mg IntraVENous Q2H PRN Eva Domínguez, NP   20 mg at 20 0329    propofol (DIPRIVAN) 10 mg/mL infusion  0-50 mcg/kg/min IntraVENous TITRATE Eva Domínguez, NP 21.2 mL/hr at 20 1539 35 mcg/kg/min at 20 1539    chlorhexidine (ORAL CARE KIT) 0.12 % mouthwash 15 mL  15 mL Oral Q12H FABIAN Lane   15 mL at 20 1200    ondansetron (ZOFRAN) injection 4 mg  4 mg IntraVENous Q4H PRN FABIAN Mendez        docusate (COLACE) 50 mg/5 mL oral liquid 100 mg  100 mg Oral BID PRN FABIAN Lane            No Known Allergies    Review of Systems:  Review of systems not obtained due to patient factors.       Objective:     Vital signs  Temp (24hrs), Av.9 °F (36.6 °C), Min:97.5 °F (36.4 °C), Max:98.7 °F (37.1 °C)   701 - 1900  In: 2324.7 [I.V.:2224.7]  Out: 625 [Urine:625]  1901 -  07  In: 2005.6 [I.V.:1915.6]  Out: 100 [Urine:100]    Visit Vitals  /63 (BP 1 Location: Right arm, BP Patient Position: At rest)   Pulse 77   Temp 98.1 °F (36.7 °C)   Resp 26   Ht 5' 4\" (1.626 m)   Wt 228 lb 11.2 oz (103.7 kg)   SpO2 96%   BMI 39.26 kg/m²      O2 Device: Endotracheal tube, Ventilator   Vitals:    20 1530 20 1545 20 1547 20 1600   BP:    125/63   Pulse: 77 77  77   Resp: 25  Temp:    98.1 °F (36.7 °C)   SpO2: 97% 96% 100% 96%   Weight:       Height:          Physical Exam:  GENERAL: Ill appearing, morbidly obese adult AA female  SKIN: Warm, dry, color appropriate for ethnicity. Right groin puncture site is C/D/I with no hematoma , bruising or bleeding noted  NEURO: Intubated and sedated. Withdrawals to pain in all four extremity. Pupils 2 mm bilaterally, sluggishly reactive. No blink to threat. Gaze is equal and midline. Face symmetric.+cough +gag. Imaging:    CT Head WO 6/5/20 0826     IMPRESSION:   1. Diffuse subarachnoid hemorrhage most concentrated in the basal cisterns and  left sylvian fissure. 2.  Interval ventricular enlargement suspicious for developing hydrocephalus.     CTA Head 6/4/20 2017     IMPRESSION:  1. Diffuse subarachnoid hemorrhage in the basilar cisterns and sylvian  fissures. 2.  Bilateral MCA bifurcation aneurysms. 3.  Infundibular origins to the posterior communicating arteries bilaterally. 4.  Symmetric moderately severe airspace disease in the upper lobes bilaterally  which may represent edema or sequela of aspiration. CT Head WO Contrast 6/4/2020 2012    IMPRESSION: Extensive subarachnoid hemorrhage concerning for leaking/ruptured  aneurysm.        24 hour results:    Recent Results (from the past 24 hour(s))   POC EG7    Collection Time: 06/04/20  8:44 PM   Result Value Ref Range    Calcium, ionized (POC) 1.22 1.12 - 1.32 mmol/L    FIO2 (POC) 100 %    pH (POC) 7.289 (L) 7.35 - 7.45      pCO2 (POC) 51.3 (H) 35.0 - 45.0 MMHG    pO2 (POC) 91 80 - 100 MMHG    HCO3 (POC) 24.6 22 - 26 MMOL/L    Base deficit (POC) 2 mmol/L    sO2 (POC) 96 92 - 97 %    Site LEFT RADIAL      Device: VENT      Mode ASSIST CONTROL      Tidal volume 420 ml    Set Rate 16 bpm    PEEP/CPAP (POC) 5 cmH2O    PIP (POC) 32      Allens test (POC) YES      Specimen type (POC) ARTERIAL      Total resp.  rate 17      Volume control YES     EKG, 12 LEAD, INITIAL    Collection Time: 06/04/20  8:51 PM   Result Value Ref Range    Ventricular Rate 75 BPM    Atrial Rate 75 BPM    P-R Interval 172 ms    QRS Duration 102 ms    Q-T Interval 476 ms    QTC Calculation (Bezet) 531 ms    Calculated P Axis 62 degrees    Calculated R Axis 9 degrees    Calculated T Axis 86 degrees    Diagnosis       Normal sinus rhythm  Prolonged QT  No previous ECGs available  Confirmed by Griselda Mu MD. ([id number]) on 6/5/2020 10:55:26 AM     CBC WITH AUTOMATED DIFF    Collection Time: 06/04/20  8:57 PM   Result Value Ref Range    WBC 9.6 3.6 - 11.0 K/uL    RBC 4.98 3.80 - 5.20 M/uL    HGB 9.7 (L) 11.5 - 16.0 g/dL    HCT 34.3 (L) 35.0 - 47.0 %    MCV 68.9 (L) 80.0 - 99.0 FL    MCH 19.5 (L) 26.0 - 34.0 PG    MCHC 28.3 (L) 30.0 - 36.5 g/dL    RDW 19.0 (H) 11.5 - 14.5 %    PLATELET 671 981 - 901 K/uL    MPV 9.7 8.9 - 12.9 FL    NRBC 0.0 0  WBC    ABSOLUTE NRBC 0.00 0.00 - 0.01 K/uL    NEUTROPHILS 79 (H) 32 - 75 %    LYMPHOCYTES 15 12 - 49 %    MONOCYTES 6 5 - 13 %    EOSINOPHILS 0 0 - 7 %    BASOPHILS 0 0 - 1 %    IMMATURE GRANULOCYTES 0 0.0 - 0.5 %    ABS. NEUTROPHILS 7.6 1.8 - 8.0 K/UL    ABS. LYMPHOCYTES 1.4 0.8 - 3.5 K/UL    ABS. MONOCYTES 0.6 0.0 - 1.0 K/UL    ABS. EOSINOPHILS 0.0 0.0 - 0.4 K/UL    ABS. BASOPHILS 0.0 0.0 - 0.1 K/UL    ABS. IMM.  GRANS. 0.0 0.00 - 0.04 K/UL    DF SMEAR SCANNED      RBC COMMENTS ANISOCYTOSIS  1+        RBC COMMENTS HYPOCHROMIA  3+        RBC COMMENTS MICROCYTOSIS  2+       METABOLIC PANEL, COMPREHENSIVE    Collection Time: 06/04/20  8:57 PM   Result Value Ref Range    Sodium 138 136 - 145 mmol/L    Potassium 3.4 (L) 3.5 - 5.1 mmol/L    Chloride 107 97 - 108 mmol/L    CO2 23 21 - 32 mmol/L    Anion gap 8 5 - 15 mmol/L    Glucose 218 (H) 65 - 100 mg/dL    BUN 12 6 - 20 MG/DL    Creatinine 0.90 0.55 - 1.02 MG/DL    BUN/Creatinine ratio 13 12 - 20      GFR est AA >60 >60 ml/min/1.73m2    GFR est non-AA >60 >60 ml/min/1.73m2    Calcium 8.4 (L) 8.5 - 10.1 MG/DL    Bilirubin, total 0.4 0.2 - 1.0 MG/DL    ALT (SGPT) 39 12 - 78 U/L    AST (SGOT) 58 (H) 15 - 37 U/L    Alk. phosphatase 75 45 - 117 U/L    Protein, total 7.0 6.4 - 8.2 g/dL    Albumin 3.2 (L) 3.5 - 5.0 g/dL    Globulin 3.8 2.0 - 4.0 g/dL    A-G Ratio 0.8 (L) 1.1 - 2.2     SAMPLES BEING HELD    Collection Time: 06/04/20  8:58 PM   Result Value Ref Range    SAMPLES BEING HELD 1BLU 1RED     COMMENT        Add-on orders for these samples will be processed based on acceptable specimen integrity and analyte stability, which may vary by analyte. SARS-COV-2, PCR    Collection Time: 06/04/20  9:17 PM   Result Value Ref Range    Specimen source Nasopharyngeal      SARS-CoV-2 Not detected NOTD     SARS-COV-2    Collection Time: 06/04/20  9:22 PM   Result Value Ref Range    Specimen source Nasopharyngeal      Specimen source Nasopharyngeal      COVID-19 rapid test Not detected NOTD     ASPIRIN TEST    Collection Time: 06/04/20 11:30 PM   Result Value Ref Range    Aspirin test 518 ARU   TROPONIN I    Collection Time: 06/04/20 11:30 PM   Result Value Ref Range    Troponin-I, Qt. 0.76 (H) <0.05 ng/mL   CBC WITH AUTOMATED DIFF    Collection Time: 06/05/20  3:34 AM   Result Value Ref Range    WBC 13.5 (H) 3.6 - 11.0 K/uL    RBC 4.67 3.80 - 5.20 M/uL    HGB 9.2 (L) 11.5 - 16.0 g/dL    HCT 31.6 (L) 35.0 - 47.0 %    MCV 67.7 (L) 80.0 - 99.0 FL    MCH 19.7 (L) 26.0 - 34.0 PG    MCHC 29.1 (L) 30.0 - 36.5 g/dL    RDW 18.4 (H) 11.5 - 14.5 %    PLATELET 495 192 - 329 K/uL    MPV 9.7 8.9 - 12.9 FL    NRBC 0.0 0  WBC    ABSOLUTE NRBC 0.00 0.00 - 0.01 K/uL    NEUTROPHILS 86 (H) 32 - 75 %    LYMPHOCYTES 7 (L) 12 - 49 %    MONOCYTES 7 5 - 13 %    EOSINOPHILS 0 0 - 7 %    BASOPHILS 0 0 - 1 %    IMMATURE GRANULOCYTES 0 0.0 - 0.5 %    ABS. NEUTROPHILS 11.7 (H) 1.8 - 8.0 K/UL    ABS. LYMPHOCYTES 0.9 0.8 - 3.5 K/UL    ABS. MONOCYTES 0.9 0.0 - 1.0 K/UL    ABS. EOSINOPHILS 0.0 0.0 - 0.4 K/UL    ABS. BASOPHILS 0.0 0.0 - 0.1 K/UL    ABS. IMM.  GRANS. 0.0 0.00 - 0.04 K/UL    DF SMEAR SCANNED      PLATELET COMMENTS Large Platelets      RBC COMMENTS POLYCHROMASIA  1+        RBC COMMENTS ANISOCYTOSIS  1+        RBC COMMENTS JAJA CELLS  PRESENT       METABOLIC PANEL, COMPREHENSIVE    Collection Time: 06/05/20  3:34 AM   Result Value Ref Range    Sodium 140 136 - 145 mmol/L    Potassium 3.4 (L) 3.5 - 5.1 mmol/L    Chloride 109 (H) 97 - 108 mmol/L    CO2 20 (L) 21 - 32 mmol/L    Anion gap 11 5 - 15 mmol/L    Glucose 156 (H) 65 - 100 mg/dL    BUN 11 6 - 20 MG/DL    Creatinine 0.81 0.55 - 1.02 MG/DL    BUN/Creatinine ratio 14 12 - 20      GFR est AA >60 >60 ml/min/1.73m2    GFR est non-AA >60 >60 ml/min/1.73m2    Calcium 8.0 (L) 8.5 - 10.1 MG/DL    Bilirubin, total 0.3 0.2 - 1.0 MG/DL    ALT (SGPT) 32 12 - 78 U/L    AST (SGOT) 29 15 - 37 U/L    Alk.  phosphatase 65 45 - 117 U/L    Protein, total 6.5 6.4 - 8.2 g/dL    Albumin 2.9 (L) 3.5 - 5.0 g/dL    Globulin 3.6 2.0 - 4.0 g/dL    A-G Ratio 0.8 (L) 1.1 - 2.2     MAGNESIUM    Collection Time: 06/05/20  3:34 AM   Result Value Ref Range    Magnesium 1.8 1.6 - 2.4 mg/dL   PHOSPHORUS    Collection Time: 06/05/20  3:34 AM   Result Value Ref Range    Phosphorus 2.0 (L) 2.6 - 4.7 MG/DL   HEMOGLOBIN A1C WITH EAG    Collection Time: 06/05/20  3:34 AM   Result Value Ref Range    Hemoglobin A1c 5.7 (H) 4.0 - 5.6 %    Est. average glucose 117 mg/dL   LIPID PANEL    Collection Time: 06/05/20  3:34 AM   Result Value Ref Range    LIPID PROFILE          Cholesterol, total 90 <200 MG/DL    Triglyceride 91 <150 MG/DL    HDL Cholesterol 64 MG/DL    LDL, calculated 7.8 0 - 100 MG/DL    VLDL, calculated 18.2 MG/DL    CHOL/HDL Ratio 1.4 0.0 - 5.0     POC EG7    Collection Time: 06/05/20  4:46 AM   Result Value Ref Range    Calcium, ionized (POC) 1.19 1.12 - 1.32 mmol/L    FIO2 (POC) 80 %    pH (POC) 7.382 7.35 - 7.45      pCO2 (POC) 37.4 35.0 - 45.0 MMHG    pO2 (POC) 145 (H) 80 - 100 MMHG    HCO3 (POC) 22.2 22 - 26 MMOL/L    Base deficit (POC) 3 mmol/L    sO2 (POC) 99 (H) 92 - 97 %    Site DRAWN FROM ARTERIAL LINE      Device: VENT      Mode ASSIST CONTROL      Tidal volume 450 ml    Set Rate 18 bpm    PEEP/CPAP (POC) 5 cmH2O    PIP (POC) 31      Allens test (POC) N/A      Specimen type (POC) ARTERIAL      Total resp.  rate 18     GLUCOSE, POC    Collection Time: 06/05/20  5:59 AM   Result Value Ref Range    Glucose (POC) 168 (H) 65 - 100 mg/dL    Performed by Eddie Stephens, POC    Collection Time: 06/05/20 11:53 AM   Result Value Ref Range    Glucose (POC) 129 (H) 65 - 100 mg/dL    Performed by Ailyn Sanders NP

## 2020-06-05 NOTE — ANESTHESIA PREPROCEDURE EVALUATION
Relevant Problems   No relevant active problems       Anesthetic History   No history of anesthetic complications            Review of Systems / Medical History  Patient summary reviewed, nursing notes reviewed and pertinent labs reviewed    Pulmonary  Within defined limits                 Neuro/Psych   Within defined limits    CVA       Cardiovascular  Within defined limits  Hypertension                   GI/Hepatic/Renal  Within defined limits              Endo/Other  Within defined limits           Other Findings              Physical Exam    Airway          Intubated   Cardiovascular               Dental         Pulmonary  Breath sounds clear to auscultation               Abdominal         Other Findings            Anesthetic Plan    ASA: 4, emergent  Anesthesia type: general          Induction: Intravenous  Anesthetic plan and risks discussed with: Patient

## 2020-06-05 NOTE — PROGRESS NOTES
[facility]    ICU TEAM Progress Note    Name: Vladimir Taylor   : 1970   MRN: [id number]   Date: 2020        Subjective:     Reason for ICU Admission:   SAH, vent management    Patient is a 77-year-old female who presented to the ED as a transfer from 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 At the [facility] a CT was done and she was found to have a [facility]  and transferred to [facility] for further neuro evaluation after being intubated by sending facility. Repeat CTH/CTA here showed an extensive subarachnoid hemorrhage concerning for leaking/ruptured aneurysm without hydrocephalus. 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 Both neurosurgery and neuro IR consulted and she was taken to IR for coiling of a Left MCA aneurysm this am by Dr. Berenice Avila. Plan for Rt aneurysmal clipping at a later time. Remains at risk for hydrocephalus, continue to re-eval for hydrocephalus. Remains intubated at this time. Overnight Events: To IR this am for coiling of left MCA aneurysm, remains stable on vent, requiring nicardipine for BP control. POD:* No surgery found *    S/P: Day 0 for L MCA aneurysm coiling    Objective:   Vital Signs:  Visit Vitals  BP (!) 124/94   Pulse 73   Temp 97.7 °F (36.5 °C)   Resp 26   Wt 103.7 kg (228 lb 11.2 oz)   SpO2 98%      O2 Device: Endotracheal tube, Ventilator Temp (24hrs), Av.9 °F (36.6 °C), Min:97.5 °F (36.4 °C), Max:98.7 °F (37.1 °C)         Intake/Output:     Intake/Output Summary (Last 24 hours) at 2020 1200  Last data filed at 2020 1100  Gross per 24 hour   Intake 2405.62 ml   Output 400 ml   Net 2005.62 ml       Physical Exam:    General:  Sedated and on the ventilator. No acute distress. Eyes:  Sclera anicteric. Pupils equal, round, reactive to light.  Eyes midline   Mouth/Throat: Orotracheal tube in place., OGT in place   Neck: Supple. Lungs:   Clear/coarse to auscultation bilaterally, good effort. Vent supported with equal chest rise/ fall   Cardiovascular:  Regular rate and rhythm, no murmur, click, rub, or gallop. Abdomen:   Soft, obese, non-tender, bowel sounds normal, non-distended. Extremities: No cyanosis or edema. Skin: No acute rash or lesions. Lymph Nodes: Cervical and supraclavicular normal.   Musculoskeletal:  No swelling or deformity. Lines/Devices:  Intact, no erythema, drainage, or tenderness. Psychiatric: Sedated and appears comfortable on ventilator. T/L/D  Tubes: ETT and Orogastric Tube  Lines: Peripheral IV arterial line  Drains: Martinez Catheter    LABS AND  DATA: Personally reviewed  Recent Labs     06/05/20 0334 06/04/20 2057   WBC 13.5* 9.6   HGB 9.2* 9.7*   HCT 31.6* 34.3*    381     Recent Labs     06/05/20 0334 06/04/20 2057    138   K 3.4* 3.4*   * 107   CO2 20* 23   BUN 11 12   CREA 0.81 0.90   * 218*   CA 8.0* 8.4*   MG 1.8  --    PHOS 2.0*  --      Recent Labs     06/05/20 0334 06/04/20 2057   AP 65 75   TP 6.5 7.0   ALB 2.9* 3.2*   GLOB 3.6 3.8     No results for input(s): INR, PTP, APTT, INREXT in the last 72 hours. Recent Labs     06/05/20  0446 06/04/20 2044   PHI 7.382 7.289*   PCO2I 37.4 51.3*   PO2I 145* 91   FIO2I 80 100     Recent Labs     06/04/20  2330   TROIQ 0.76*     Ventilator Settings:  Mode Rate Tidal Volume Pressure FiO2 PEEP   Pressure control, Assist control   450 ml    100 % 5 cm H20     Peak airway pressure: 31 cm H2O    Minute ventilation: 9.38 l/min        MEDS: Reviewed    Chest X-Ray:  CXR Results  (Last 48 hours)               06/04/20 2047  XR CHEST PORT Final result    Impression:  IMPRESSION:        Diffuse bilateral airspace disease more suggestive of pulmonary edema than   diffuse pneumonia. Clinical correlation needed. Marked gastric distention. NG   tube not present.        Narrative:  EXAM: XR CHEST PORT       INDICATION: Chest Pain       COMPARISON: None. FINDINGS: A portable AP radiograph of the chest was obtained at 2036 hours   hours. The patient is on a cardiac monitor. The cardiomediastinal silhouette is   normal. The endotracheal tube terminates 2.5 cm above the matt. Diffuse   bilateral airspace disease is asymmetric to the left. The left base is obscured. The stomach is markedly distended. ABCDEF Bundle/Checklist Completed:  YES-See Plan    SPECIAL EQUIPMENT  None    Active Problem List:     Problem List  Date Reviewed: 6/4/2020          Codes Class    SAH (subarachnoid hemorrhage) ([facility] [address] ICD-10-CM: I60.9  ICD-9-CM: 430         Subarachnoid hemorrhage from middle cerebral artery aneurysm, left (HCC) ICD-10-CM: Q47.51  ICD-9-CM: 200         RIGHT middle cerebral artery aneurysm ICD-10-CM: I67.1  ICD-9-CM: 437.3         Cerebral vasospasm ICD-10-CM: I67.848  ICD-9-CM: 435.9         Anterior communicating artery aneurysm ICD-10-CM: I67.1  ICD-9-CM: 437.3             ICU Assessment/ Comprehensive Plan of Care:       NEURO  1. Subarrachnoid hemorrhage 2/2 ruptured aneurym  2. Right MCA trifurcation aneurysm  3. Left MCA trifurcation aneurysm s/p coiling  -Keep euvolemic, eunatremic, euglycemic  -SBP goal < 140  -Nimotipine 60mg via OGT Q4hr  -Neuro IR following, Dr. Fatimah Chew  -Neurosurgery following-Dr. Noah Aguilar, Rt MCA will need clipping at later time  -Hold ASA and AC until plan for EVD per Dr. Rizwana Daniels note  -Check TCDs   -Q1hr Neuro checks  -Keppra 1GM IV Q12h  -Check CT Head in am    Pain Medications: Fentanyl  Target RASS: -2 - Light Sedation - Briefly awakens to voice (eyes open & contact <10 sec)  Sedation Medications: Propofol, change to precedex  CAM-ICU:  FABIAN  Restraints: Soft wrist restraints    CARDIAC  1.  Hypertension   Cardiac Gtts: Nicardipine (Cardene)  SBP Goal of: < 140 mmHg  MAP Goal of: > 65 mmHg  Transfusion Trigger (Hgb): <7 g/dL    RESPIRATORY  1.Pulmonary infiltrates, probable pulmonary edema  -Provide conservative fluid management to prevent vasospams  -Hold off for diuresis now  -Remains intubated, titrate down Fio2 as tolerated to maintain sats    Respiratory Goals: Chlorhexidine   Optimize PEEP/Ventilation/Oxygenation  Goal Tidal Volume 6 cc/kg based on IBW  Aim for lung protective ventilation  Head of bed > 30 degrees  Plan to Extubate: attempt to provide weaning trials in am  SPO2 Goal: > 92%  Pulmonary toilet: NA   DVT Prophylaxis (if no, list reason): SCD's or Sequential Compression Device     RENAL  1. No acute kidney injury  -Stable BUN/creat 11/0.81  -Maintain euvolemia  -Strict I/O    2. Hypokalemia  Potassium 3.4, replace to keep K > 4  Neutra phos packets ordered today    3. Hypomagnesemia  -Mag 1.8 this am, replace 1GM IV x1  -Goal to keep Mag > 2    4. Hypophosphatemia  -Neutra phos as above    Martinez Catheter Present: Yes  IVFs: Lactate ringers @ 100ml/hr    GASTROINTESTINAL  1. No acute concerns    GI Prophylaxis: Pepcid (famotidine)   Nutrition: Yes Dietary consulted  Bowel Movement: Yes  Bowel Regimen: Docusate (Colace) and Bisacodyl (Dulcolax)    HEMATOLOGIC  1. Mild anemia, unknown origin  -Keep Hgb > 7.0  -No evidence of blood loss    DVT Prophylaxis (if no, list reason): SCD's or Sequential Compression Device     ID  1. No concerns for infection  -Trend WBC/ fevers  -Check procalcitonin    2. NEG COVID  06/04 NEG x2    ENDOCRINE  1. Hyperglycemia  -SSI Q6hr  -HgbA1c 5.7    Glycemic Control - Insulin: Yes    MUSCULOSKELETAL  1. Risk for muscle deconditioning  Mobility: Fair and Bedrest  PT/OT: Not appropraite at this time, re-eval at extubation     DISPOSITION/COMMUNICATION  Discussed Plan of Care/Code Status: Full Code  Stay in ICU    CRITICAL CARE CONSULTANT NOTE  I had a face to face encounter with the patient, reviewed and interpreted patient data including clinical events, labs, images, vital signs, I/O's, and examined patient.   I have discussed the case and the plan and management of the patient's care with the consulting services, the bedside nurses and the respiratory therapist.      NOTE OF PERSONAL INVOLVEMENT IN CARE    I participated in the decision-making and personally managed or directed the management of the following life and organ supporting interventions that required my frequent assessment to treat or prevent imminent deterioration. I personally spent 50 minutes of critical care time. This is time spent at this critically ill patient's bedside actively involved in patient care as well as the coordination of care and discussions with the patient's family. This does not include any procedural time which has been billed separately.     Alis Osorio [facility]  Intensivist Nurse Practitioner  [facility] Critical [facility]  6/5/2020

## 2020-06-05 NOTE — ED NOTES
Patient to ER room. Placed on Droplet plus precautions. Patient transferred to facility's ventilator.

## 2020-06-05 NOTE — ED NOTES
2212: Patient pressured spiked to 625'N systolic. Cardene drip started per order. 2220: Xray at bedside to verify NG placement. Attempted to call report to ICU.

## 2020-06-05 NOTE — ROUTINE PROCESS
TRANSFER - OUT REPORT: 
 
Verbal report given to [facility] (name) on Mohsen Vaughn  being transferred to ICU(unit) for routine progression of care Report consisted of patients Situation, Background, Assessment and  
Recommendations(SBAR). Information from the following report(s) SBAR, ED Summary, Intake/Output, MAR and Recent Results was reviewed with the receiving nurse. Lines:  
Peripheral IV 06/04/20 (Active) Site Assessment Clean, dry, & intact 6/4/2020  9:30 PM  
Phlebitis Assessment 0 6/4/2020  9:30 PM  
Infiltration Assessment 0 6/4/2020  9:30 PM  
Dressing Status Clean, dry, & intact 6/4/2020  9:30 PM  
   
Peripheral IV 06/04/20 Right Hand (Active) Site Assessment Clean, dry, & intact 6/4/2020  9:30 PM  
Phlebitis Assessment 0 6/4/2020  9:30 PM  
Infiltration Assessment 0 6/4/2020  9:30 PM  
Dressing Status Clean, dry, & intact 6/4/2020  9:30 PM  
   
Peripheral IV 06/04/20 Left Forearm (Active) Site Assessment Clean, dry, & intact 6/4/2020  9:31 PM  
Phlebitis Assessment 0 6/4/2020  9:31 PM  
Infiltration Assessment 0 6/4/2020  9:31 PM  
Dressing Status Clean, dry, & intact 6/4/2020  9:31 PM  
  
 
Opportunity for questions and clarification was provided. Patient transported with: 
 Monitor Registered Nurse

## 2020-06-05 NOTE — OP NOTES
NEUROINTERVENTIONAL SURGERY POST-PROCEDURE NOTE    PROCEDURE:  Transarterial embolization, CNS:  Ruptured left MCA bifurcation aneurysm  Cerebral angiogram  US guided arterial acces  Angioseal right femoral    VESSEL(S) STUDIED:  1. LCCA  2. LICA + 3D  3. RCCA  4. ALFONSO + 3D  5. LVA VESSEL(S) TREATED:  1. Left MCA aneurysm embolization      PRELIMINARY REPORT & DISPOSITION:     2.9 x 2.4 mm left MCA birfurcation aneurysm (prox superior division M2 origin) coiled primarily. Irregular anterior excrescence, probable source of SAH. Intracranial implants are MRI compatible. 5 x 3 mm right MCA bifurcation aneurysm with very wide neck incorporating both M2s. Cannot be treated endovascular without stents. Small M3 branch laying on the dome. No other aneurysms. RVA not injected but appears normal on CTA. No spasm. Patient with pulmonary instability at the beginning of the case. COMPLICATIONS:  None immediate    FOLLOW-UP:  ICU - pulmonary status labile. Euvolemia - day 5 so high risk for spasm and deterioration if she becomes hypovolemic. Spasm watch DATE OF SERVICE:  6/5/2020 10:36 AM     ATTENDING SURGEON(S):  MD Hardeep Torres MD    ANESTHESIA:   General    MEDICATIONS:   See nursing record  300 mg ASA OH  2000U heparin IV  190 cc Isovue 200    PUNCTURE SITE:  Right common femoral artery. Arteriotomy closed with 6F Angioseal.  Right leg straight x 2 hours 12:30PM.  HOB 15-30 degrees please (mild hydro).

## 2020-06-05 NOTE — PROGRESS NOTES
TRANSFER - IN REPORT:    Verbal report received from [facility] (name) on Loralee August  being received from ED (unit) for change in patient condition(SAH)      Report consisted of patients Situation, Background, Assessment and   Recommendations(SBAR). Information from the following report(s) SBAR, Kardex, ED Summary, Intake/Output, MAR, Accordion, Recent Results, Med Rec Status, Cardiac Rhythm NSR and Alarm Parameters  was reviewed with the receiving nurse. Opportunity for questions and clarification was provided. Assessment completed upon patients arrival to unit and care assumed. Shift Summary: Patient sedated and intubated. Q 1 hour neuro checks. Withdraws on all extremities to pain. SBP goal < 140. Remains on Propofol 15 and Cardene 10. Labetalol PRN.       Primary Nurse Evgeny Murdock and Nohemi Medina, RN performed a dual skin assessment on this patient No impairment noted  Sriram score is 15

## 2020-06-05 NOTE — ANESTHESIA POSTPROCEDURE EVALUATION
* No procedures listed *.    general    Anesthesia Post Evaluation        Patient location during evaluation: PACU  Patient participation: complete - patient participated  Level of consciousness: awake and alert  Pain management: adequate  Airway patency: patent  Anesthetic complications: no  Cardiovascular status: acceptable  Respiratory status: acceptable  Hydration status: acceptable  Comments: I have seen and evaluated the patient and is ready for discharge. Shirley Crawford MD    Post anesthesia nausea and vomiting:  none      INITIAL Post-op Vital signs:   Vitals Value Taken Time   /74 6/5/2020 12:00 PM   Temp 36.6 °C (97.8 °F) 6/5/2020 12:00 PM   Pulse 77 6/5/2020 12:10 PM   Resp 25 6/5/2020 12:10 PM   SpO2 99 % 6/5/2020 12:10 PM   Vitals shown include unvalidated device data.

## 2020-06-05 NOTE — H&P
[facility]    ICU Team H&P    Name: Kenneth Grayson   : 1970   MRN: [id number]   Date: 2020      Subjective:   Progress Note: 2020      Patient is asked to be seen by Dr. Karyn Coles for [facility] necessitating the need for possible ICU care. Reason for ICU Admission: SAH, Vent management    HPI: Patient is a 71-year-old female who presented to the ED as a transfer from [facility]. 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 She was then transferred to [facility] for further neuro evaluation. Patient was intubated at [facility] prior to transfer. Upon arrival to [facility] patient went for CT and CTA of the head. Findings showed an extensive subarachnoid hemorrhage concerning for leaking/ruptured aneurysm. No hydrocephalus. 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 Both neurosurgery and neuro IR consulted. Plan for possible intervention in the a.m. Patient to remain intubated and transferred to ICU for continued support. Keep euvolemic and sedated. Goal blood pressure less than 140 mmHg. Cardene as needed. Patient to be sedated with propofol. Will place a arterial line for close monitoring of blood pressures. Per neuro NP who spoke with patient's mother patient started complaining of a headache on 2020 and has been taking BC powder for the headache. She has a history of hypertension but was not taking her blood pressure medications recently. Per mother's report patient does not smoke drink alcohol or use street drugs. On the CTA of her head and neck it showed pulmonary infiltrates suspicious for COVID-19 infection so patient was tested in the ED which is now pending and placed on droplet plus precautions.     POD:* No surgery found *    S/P: NA    Active Problem List: Patient Active Problem List   Diagnosis Code   [facility] (subarachnoid hemorrhage) (HCC) I60.9    Subarachnoid hemorrhage from middle cerebral artery aneurysm, left (HCC) I60.12    RIGHT middle cerebral artery aneurysm I67.1    Cerebral vasospasm I67.848    Anterior communicating artery aneurysm I67.1     Problem List  Date Reviewed: 2020          Codes Class    SAH (subarachnoid hemorrhage) ([facility] [address] ICD-10-CM: I60.9  ICD-9-CM: 430         Subarachnoid hemorrhage from middle cerebral artery aneurysm, left (HCC) ICD-10-CM: Q33.92  ICD-9-CM: 200         RIGHT middle cerebral artery aneurysm ICD-10-CM: I67.1  ICD-9-CM: 437.3         Cerebral vasospasm ICD-10-CM: I67.848  ICD-9-CM: 435.9         Anterior communicating artery aneurysm ICD-10-CM: I67.1  ICD-9-CM: 437.3             Past Medical History:      has a past medical history of HTN (hypertension). Past Surgical History:      has no past surgical history on file. Home Medications:     Prior to Admission medications    Not on File       Allergies/Social/Family History:     No Known Allergies   Social History     Tobacco Use    Smoking status: Never Smoker    Smokeless tobacco: Never Used   Substance Use Topics    Alcohol use: Not Currently      No family history on file. Review of Systems:     Review of systems not obtained due to patient factors. Intubated    Objective:   Vital Signs:  Visit Vitals  /78 (BP 1 Location: Left arm, BP Patient Position: At rest)   Pulse 82   Temp 97.6 °F (36.4 °C)   Resp 21   Wt 100.8 kg (222 lb 3.6 oz)   SpO2 100%      O2 Device: Endotracheal tube Temp (24hrs), Av.6 °F (36.4 °C), Min:97.5 °F (36.4 °C), Max:97.6 °F (36.4 °C)           Intake/Output:   No intake or output data in the 24 hours ending 20 5760    Physical Exam:  General:  appears stated age, Intubated and sedated  Eye:  conjunctivae/corneas clear. PERRL, EOM's intact.    Neurologic: Pupils are round and reactive bilaterally, withdraws to pain in upper and lower extremities. Lymphatic:  Cervical, supraclavicular, and axillary nodes normal.   Neck:  normal and no erythema or exudates noted. Lungs:  Diminished bilaterally, equal chest excursion  Heart:  regular rate and rhythm, S1, S2 normal, no murmur, click, rub or gallop  Abdomen:  soft, non-tender. Bowel sounds normal. No masses,  no organomegaly  Cardiovascular:  Regular rate and rhythm, S1S2 present, without murmur or extra heart sounds, pedal pulses normal and no edema  Skin:  Normal. and no rash or abnormalities    LABS AND  DATA: Personally reviewed  Recent Labs     06/04/20 2057   WBC 9.6   HGB 9.7*   HCT 34.3*        Recent Labs     06/04/20 2057      K 3.4*      CO2 23   BUN 12   CREA 0.90   *   CA 8.4*     Recent Labs     06/04/20 2057   AP 75   TP 7.0   ALB 3.2*   GLOB 3.8     No results for input(s): INR, PTP, APTT, INREXT, INREXT in the last 72 hours. Recent Labs     06/04/20 2044   PHI 7.289*   PCO2I 51.3*   PO2I 91   FIO2I 100     No results for input(s): CPK, CKMB, TROIQ, BNPP in the last 72 hours. Hemodynamics:   PAP:   CO:     Wedge:   CI:     CVP:    SVR:       PVR:       Ventilator Settings:  Mode Rate Tidal Volume Pressure FiO2 PEEP       420 ml    80 % 5 cm H20     Peak airway pressure: 31 cm H2O    Minute ventilation: 7.5 l/min        MEDS: Reviewed    Chest X-Ray: personally reviewed and report checked      Assessment:     ICU Problems:  Subarachnoid hemorrhage  Acute respiratory failure secondary to the above patient intubated on mechanical ventilation  Hypertension  Obesity      ICU Comprehensive Plan of Care:   Plans for this Shift:   1. Continue mechanical ventilation. Repeat ABG in a.m. rate increased to 18.  2. Sedate patient with propofol  3. To keep euvolemic. Monitor I's and O's closely. 4. Place Martinez catheter  5. Will place arterial line  6. Neurosurgery and neuro IR consulted.   Planning possible intervention in the a.m.  7. Goal blood pressure less than 140 mmHg  8. Cardene PRN for the above  9. PRN Zofran  10. Multidisciplinary Rounds Completed:  No    ABCDEF Bundle/Checklist  Pain Medications: Fentanyl  Target RASS: -3 - Moderate Sedation - Movement or eye opening to voice (no eye contact)  Sedation Medications: Propofol  CAM-ICU:  NA  Mobility: Bedrest  PT/OT: Will consult once appropriate   Restraints: Soft wrist restraints  Discussed Plan of Care (goals of care): Yes  Addressed Code Status: Full Code    CARDIOVASCULAR  Cardiac Gtts: Nicardipine (Cardene)  SBP Goal of: < 140 mmHg  MAP Goal of: > 65 mmHg  Transfusion Trigger (Hgb): <7 g/dL    RESPIRATORY  Vent Goals:   Chlorhexidine   Optimize PEEP/Ventilation/Oxygenation  Goal Tidal Volume 6 cc/kg based on IBW  Aim for lung protective ventilation  Head of bed > 30 degrees  DVT Prophylaxis (if no, list reason): SCD's or Sequential Compression Device   SPO2 Goal: > 92%  Pulmonary toilet: Duo-Nebs     GI/  Martinez Catheter Present: Yes  GI Prophylaxis: Not at this time   Nutrition: No Possible surgery in am  IVFs:  LR @ 100 ml/hr  Bowel Movement: Pending  Bowel Regimen: Docusate (Colace) PRN  Insulin: NA    ANTIBIOTICS  Antibiotics:  None    T/L/D  Tubes: ETT and Nasogastric Tube  Lines: Peripheral IV and Arterial Line  Drains: Martinez Catheter    SPECIAL EQUIPMENT  None    DISPOSITION  Stay in ICU    CRITICAL CARE CONSULTANT NOTE  I had a face to face encounter with the patient, reviewed and interpreted patient data including clinical events, labs, images, vital signs, I/O's, and examined patient. I have discussed the case and the plan and management of the patient's care with the consulting services, the bedside nurses and the respiratory therapist.      NOTE OF PERSONAL INVOLVEMENT IN CARE   This patient has a high probability of imminent, clinically significant deterioration, which requires the highest level of preparedness to intervene urgently.  I participated in the decision-making and personally managed or directed the management of the following life and organ supporting interventions that required my frequent assessment to treat or prevent imminent deterioration. I personally spent 45 minutes of critical care time. This is time spent at this critically ill patient's bedside actively involved in patient care as well as the coordination of care and discussions with the patient's family. This does not include any procedural time which has been billed separately.       Kaia Jade [facility]     [address] [facility]

## 2020-06-05 NOTE — PROGRESS NOTES
Withdraws all extremities overnight. No other change  CT shows minimal hydro. Reviewed with Dr. Eduard Schmid. OK to proceed to angio.

## 2020-06-05 NOTE — PROGRESS NOTES
Bedside, Verbal and Written shift change report given to Fani Garibay RN (oncoming nurse) by Crystal Orellana RN and Arnie Leyva RN (offgoing nurse). Report included the following information SBAR, Kardex, OR Summary, Procedure Summary, Intake/Output, MAR, Accordion, Recent Results, Med Rec Status, Cardiac Rhythm NSR and Alarm Parameters . 0445: U/O low, approximately 50 mL/hr. Patient I/O is positive. Received orders to decrease LR to 50 mL/hr.    0700: R pupil 3 L pupil 2. RUE no movement to any stimulus. Paged and spoke to Dr. Billie Alvarado and intensivist.    0800: Withdraws on all extremities. Shift Summary: Patient intubated and sedated on Propofol. Q 1 hour neuro checks. Remains on Cardene and PRN Labetalol for SBP goal of <140.

## 2020-06-05 NOTE — CONSULTS
Films reviewed and spoke with dr Harmeet Pradhan. Plan for angio tomorrow am. No hydrocephalus at this point. Repeat ct in am on way to angio.   Surgical intervention as needed

## 2020-06-05 NOTE — ED TRIAGE NOTES
Patient arrives via helicopter from [facility].  Patient was Mario Barrett found down at her place of employment with seizure like activity. Patient was found to have a brain bleed at [facility]. Patient received 7.5mg of Versed and 200mcg of Fentanyl and 10mg of labetalol en route. Patient arrives intubated with pinpoint pupils.   Patient directly to CT upon arrival.

## 2020-06-05 NOTE — ED PROVIDER NOTES
HPI the patient is a transfer from [facility] with a subarachnoid hemorrhage. She was intubated in the emergency department there and was flown here to be evaluated by neuro interventional and neurosurgery. On arrival here the patient was met by Dr. Olaf Cabrera and was immediately taken to CT for a CTA of the head and neck. No past medical history on file. No past surgical history on file. No family history on file. Social History     Socioeconomic History    Marital status: Not on file     Spouse name: Not on file    Number of children: Not on file    Years of education: Not on file    Highest education level: Not on file   Occupational History    Not on file   Social Needs    Financial resource strain: Not on file    Food insecurity     Worry: Not on file     Inability: Not on file    Transportation needs     Medical: Not on file     Non-medical: Not on file   Tobacco Use    Smoking status: Not on file   Substance and Sexual Activity    Alcohol use: Not on file    Drug use: Not on file    Sexual activity: Not on file   Lifestyle    Physical activity     Days per week: Not on file     Minutes per session: Not on file    Stress: Not on file   Relationships    Social connections     Talks on phone: Not on file     Gets together: Not on file     Attends Rastafari service: Not on file     Active member of club or organization: Not on file     Attends meetings of clubs or organizations: Not on file     Relationship status: Not on file    Intimate partner violence     Fear of current or ex partner: Not on file     Emotionally abused: Not on file     Physically abused: Not on file     Forced sexual activity: Not on file   Other Topics Concern    Not on file   Social History Narrative    Not on file         ALLERGIES: Patient has no allergy information on record. Review of Systems   Unable to perform ROS: Intubated       There were no vitals filed for this visit.          Physical Exam  Constitutional:       General: She is not in acute distress. Appearance: She is well-developed. She is ill-appearing. Comments: intubated   HENT:      Head: Normocephalic. Neck:      Musculoskeletal: Normal range of motion. Cardiovascular:      Rate and Rhythm: Normal rate. Heart sounds: No murmur. Pulmonary:      Breath sounds: Normal breath sounds. Abdominal:      Palpations: Abdomen is soft. Skin:     General: Skin is warm and dry. Capillary Refill: Capillary refill takes less than 2 seconds.    Neurological:      Comments: Patient is intubated and unresponsive          MDM  Number of Diagnoses or Management Options  SAH (subarachnoid hemorrhage) ([facility] [address]):      Amount and/or Complexity of Data Reviewed  Clinical lab tests: ordered and reviewed  Obtain history from someone other than the patient: yes  Review and summarize past medical records: yes  Discuss the patient with other providers: yes  Independent visualization of images, tracings, or specimens: yes    Risk of Complications, Morbidity, and/or Mortality  Presenting problems: high  Diagnostic procedures: high  Management options: high    Critical Care  Total time providing critical care: (45 min)         Procedures

## 2020-06-05 NOTE — PROGRESS NOTES
1230- SARS-COV2 negative, droplet plus precautions discontinued per Shital Mooney NP.    1240- Belongings given to patient's mother (Rasheeda Wisdom). Belongings include, one cell phone, one wallet with credit card & $37, one drivers license.

## 2020-06-05 NOTE — PROGRESS NOTES
Unable to assess pt neuro status. Pt transferred to unit intubated and sedated.   Anesthesia assumed care of pt from ICU team

## 2020-06-05 NOTE — PROGRESS NOTES
Neurosurgery    Reviewed 3pm CT head with Dr. Sandrine Alcaraz. Ventricle size looks about the same. Will cont to monitor at this point. Recommend repeating CT head in am. Discussed with Dr. Laura Garcia and Jai Bustamante.      Vish Esparza NP

## 2020-06-05 NOTE — PROGRESS NOTES
Pt seen and examined. Intubated and heavily sedated. Will follow for hydrocephalus. Right mca will need to be clipped but would not due urgently.   Would prefer to hold asa and other blood thinners until need for evd is decided

## 2020-06-05 NOTE — ED NOTES
Patient transported with RN and RT on cardiac monitor x3 and ventilator. VSS in route to ICU. Cardene drip and propofol continue to infuse.

## 2020-06-05 NOTE — PROCEDURES
[facility]      Procedure Note - Arterial Access:   Performed by FABIAN Lino . Immediately prior to the procedure, the patient was reevaluated and found suitable for the planned procedure and any planned medications. Immediately prior to the procedure a time out was called to verify the correct patient, procedure, equipment, staff, and marking as appropriate. Insertion Date: 006/05/2020 Time: 0130   Procedure Location:  ICU. Condition: Emergency. Consent:  YES. Method: Seldinger technique. Site Prep: ChloraPrep and Sterile draping. Procedure: Arterial Catheter Insertion in Left, Radial Artery   Catheter inserted into a new site. Number of Attempts:  1  Indication: Monitoring and Blood Drawing. Complication None. Performed By:performed the above procedure myself. The procedure was tolerated well.     Efe Parker AGAP-BC     [facility]

## 2020-06-05 NOTE — ACP (ADVANCE CARE PLANNING)
Advance Care Planning     Advance Care Planning Activator (Inpatient)  Conversation Note      Date of ACP Conversation: 06/05/20     Conversation Conducted with:  Patient's mother Vika Richards [phone]  ACP Activator: Kena Flor RN    *When Decision Maker makes decisions on behalf of the incapacitated patient: Decision Maker is asked to consider and make decisions based on patient values, known preferences, or best interests. Health Care Decision Maker:    Current Designated Health Care Decision Maker:   Primary Decision Maker: Lauri Mensah - Mother, Hannah Flores - [phone]  (If there is a [address] named in the [address] Makers\" box in the ACP activity, but it is not visible above, be sure to open that field and then select the health care decision maker relationship (ie \"primary\") in the blank space to the right of the name.) Validate  this information as still accurate & up-to-date; edit VSoft 8 field as needed.)    Note: Assess and validate information in current ACP documents, as indicated. If no Decision Maker listed above or available through scanned documents, then:    INote: If the relationship of these Decision-Makers to the patient does NOT follow your state's Next of Kin hierarchy, recommend that patient complete ACP document that meets state-specific requirements to allow them to act on the patient's behalf when appropriate. Care Preferences    Ventilation: \"If you were in your present state of health and suddenly became very ill and were unable to breathe on your own, what would your preference be about the use of a ventilator (breathing machine) if it were available to you? \"  Yes    Resuscitation  \"CPR works best to restart the heart when there is a sudden event, like a heart attack, in someone who is otherwise healthy.  Unfortunately, CPR does not typically restart the heart for people who have serious health conditions or who are very sick. \"    \"In the event your heart stopped as a result of an underlying serious health condition, would you want attempts to be made to restart your heart Yes  [] Yes  [] No   Educated Patient / Decision Maker regarding differences between Advance Directives and portable DNR orders.     Length of ACP Conversation in minutes:  10 min    Conversation Outcomes:  [x] ACP discussion completed  [] Existing advance directive reviewed with patient; no changes to patient's previously recorded wishes     [] New Advance Directive completed   [] Portable Do Not Resuscitate prepared for Provider review and signature  [] POLST/POST/MOLST/MOST prepared for Provider review and signature      Follow-up plan:    [x] Schedule follow-up conversation to continue planning  [] Referred individual to Provider for additional questions/concerns   [] Advised patient/agent/surrogate to review completed ACP document and update if needed with changes in condition, patient preferences or care setting     [] This note routed to one or more involved healthcare providers

## 2020-06-05 NOTE — PROGRESS NOTES
0730: Bedside and Verbal shift change report given to Елена Russell RN (oncoming nurse) by Silvia Cunningham RN (offgoing nurse). Report included the following information SBAR, Kardex, Procedure Summary, Intake/Output, Recent Results, Med Rec Status, Cardiac Rhythm NSR, Alarm Parameters  and Dual Neuro Assessment. 0800: Patient en route to CT and angio with this RN. Patient connected to transport monitor and transferred with the assistance of tech, RT and a second RN. 1045: Arrived at angio to  patient. First two neuro/vascular checks completed en route (1045 and 1100). Vital signs stable during transfer. 1115: Patient returned to ICU room 1 post procedure with this RN, second RN, RT, and tech. Patient transported on transport monitor. 1500: Patient transported to CT for a timed head CT. MD Olivarez at bedside following CT. Results reviewed. No new orders at this time. MD Olivarez plans to call patient's mother, Camille Dunn, regarding CT and to give an update. 1900: Attempted to call patient's mother to give an update as she had been asking NP about visitor's policy. Did not answer. Shift Summary: Patient withdraws in all four extremities. Pupils equil and reactive. SBP kept below 140 with cardene gtt. Bolus tube feeds initiated at 1800.     1930: Bedside and Verbal shift change report given to Silvia Cunningham RN (oncoming nurse) by Елена Russell RN (offgoing nurse). Report included the following information SBAR, Kardex, Procedure Summary, Intake/Output, Recent Results, Med Rec Status, Cardiac Rhythm NSR, Alarm Parameters  and Dual Neuro Assessment.

## 2020-06-05 NOTE — PROGRESS NOTES
MEERA  Patient taken to [facility] ED after co workers found patient with seizure like activity. CT/CTA: Extensive SAH. Transferred to [facility] for Neurosurgery evaluation. RUR: 11 %  Plan: Patient to IR today for coiling procedure. RPlan for utilizing home health:   Patient will likely need rehab       PCP: First and Last name:  Dr Karen Ndiaye   Name of Practice:    Are you a current patient: Yes/No: Yes   Approximate date of last visit: Unknown   Can you participate in a virtual visit with your PCP: Unsure                    Current Advanced Directive/Advance Care Plan: Not on file                        Patient currently to Angio for coiling procedure. Care manager spoke with patient's mother Mayo Kelley [phone] to introduce self and explain role. Per mother patient is independent works full time at [employer]. Patient lives with her 15 yo son and a friend. She has no previous home health or equipment needs. Per mother she will be at the hospital later today and bring patient's insurance card. Care management will follow transitions of care. Opal Grimes RN,CRM  Care Management Interventions  PCP Verified by CM: Yes(Dr Mondragon Sic)  MyChart Signup: No  Discharge Durable Medical Equipment: No  Physical Therapy Consult: No  Occupational Therapy Consult: No  Speech Therapy Consult: No  Current Support Network: Own Home(Mother Mayo Kelley [phone])                 Faxed copy of patient's insurance card to patient registration.    Opal Grimes RN,CRM

## 2020-06-05 NOTE — CONSULTS
47 yo female found down seizing at work. Posturing at [facility] before transfer. Seizure treated with Keppra and ativan. Sedation in route. 's, responded to labetalol, not cardene. Severe HA since Sunday, taking \"BC powders\"  HTN but recently stopped taking meds  [employer] grade 5, An 3/4  CT on arrival stable compared to [employer]. Diffuse SAH, asymmetric into left sylvian fissure. No hydro  CTA shows 4.5 mm left MCA bifurcation, 2.6 mm right MCA bifurcation and possible < 2mm ACOM aneurysms  Extensive lung infiltrates. Works at group home. No known exposures. No fever/cough per mother. Never smoked. Stabilize this evening in ICU. Pulm per ICU team.    [facility] protocol. Minimal left distal MCA spasm vs motion. ASA platelet testing please    Plan angio in AM.  Coil vs clips. Discussed with Dr. Jason Guerrier.       Mother Piotr Timmons ([phone] cell, [phone] home)    Full consult to follow (Lack)    Bekah Santana  [phone]

## 2020-06-05 NOTE — PROGRESS NOTES
NUTRITION COMPLETE ASSESSMENT    RECOMMENDATIONS:   Discontinue bowel regimen if develops loose stools on Nimodipine     Interventions/Plan:   Food/Nutrient Delivery:   Initiate enteral nutrition      200 ml Vital AF 1.2 q 4 hr x 4 feedings with 3 packets Prosource daily and 50 ml water flush before and after each feeding    Assessment:   Reason for Assessment: Provider Consult-Tube Feeding management    Diet: NPO  Nutritionally Significant Medications: [x] Reviewed & Includes: Coalce, correction scale insulin, Nimodipine, Neutra Phos, LR @ 100 ml/hr, Diprivan    Subjective: Pt visited in room  Patient had good appetite PTA per mom (via phone). Objective:  Ms Staci Grove is a 48year old female admitted with SAH. PMHx: HTN. Noted: SAH d/t ruptured aneurysm; Intubated at OSH prior to transfer.  Cerebral angiogram and left MCA aneurysm embolization; (R) MCA needs to be clipped (not urgent). Patient appears well-nourished; no nutritional concerns PTA per pt's mother. Discussed plan to start enteral nutrition with mom. Potassium and phosphorus BNL-Neutra Phos ordered. Continue to monitor lytes and replace prn. A1c very slightly elevated; may be prediabetic. Diprivan ordered for sedation and at current rate of 24.2 ml/hr will provide ~640 lipid calories per day. Suggested tube feedin ml Vital AF 1.2 q 4 hr x 4 feedings with 3 packets Prosource daily and 50 ml water flush before and after each feeding. This will provide 800 ml, 1140 calories (1780 including Diprivan), 105 gm protein and 1230 ml free water (tube feeding/flush) per day to meet estimated needs. Bowel regimen ordered but suspect may need to discontinue. Nimodipine typically causes diarrhea d/t high sorbitol content.      Estimated Nutrition Needs:   Kcals/day: 6780 Kcals/day  Protein: 108 g(2g/kg IBW)  Fluid: (1 ml/kcal)  Based On: [place] (2003b)  Weight Used: Actual wt(101 kg)    Pt expected to meet estimated nutrient needs:  [x]   Yes via tube feeding     []  No  [] Unable to predict at this time  Nutrition Diagnosis:   1. Inadequate oral intake related to AMS/SAH as evidenced by NPO d/t intubation for airway protection. Goals: Tolerate tube feeding at goal in next 1-2 days. Monitoring & Evaluation:    - Enteral/parenteral nutrition intake   - Glucose profile, Weight/weight change, CV-pulmonary, Electrolyte and renal profile, GI     Previous Nutrition Goals Met: N/A  Previous Recommendations: N/A    Education & Discharge Needs:   [x] None Identified   [] Identified and addressed    [x] Participated in care plan, discharge planning, and/or interdisciplinary rounds        Cultural, Nondenominational and ethnic food preferences identified:  None    Skin Integrity: [x]Intact  []Other  Edema: [x]None []Other  Last BM: PTA  Food Allergies: [x]None []Other    Anthropometrics:    Weight Loss Metrics 6/5/2020 6/4/2020   Today's Wt 228 lb 11.2 oz -   BMI - 39.26 kg/m2      Last 3 Recorded Weights in this Encounter    06/04/20 2100 06/05/20 0100 06/05/20 1254   Weight: 100.8 kg (222 lb 3.6 oz) 103.7 kg (228 lb 11.2 oz) 103.7 kg (228 lb 11.2 oz)      Weight Source: Bed  Height: 5' 4\" (162.6 cm), Height Source: Stated by family member  Body mass index is 39.26 kg/m².      IBW : 54.4 kg (120 lb), % IBW (Calculated): 190.58 %   ,      Labs:    Lab Results   Component Value Date/Time    Sodium 140 06/05/2020 03:34 AM    Potassium 3.4 (L) 06/05/2020 03:34 AM    Chloride 109 (H) 06/05/2020 03:34 AM    CO2 20 (L) 06/05/2020 03:34 AM    Glucose 156 (H) 06/05/2020 03:34 AM    BUN 11 06/05/2020 03:34 AM    Creatinine 0.81 06/05/2020 03:34 AM    Calcium 8.0 (L) 06/05/2020 03:34 AM    Magnesium 1.8 06/05/2020 03:34 AM    Phosphorus 2.0 (L) 06/05/2020 03:34 AM    Albumin 2.9 (L) 06/05/2020 03:34 AM     Lab Results   Component Value Date/Time    Hemoglobin A1c 5.7 (H) 06/05/2020 03:34 AM     Lab Results   Component Value Date/Time    Glucose (POC) 129 (H) 06/05/2020 11:53 AM      Lab Results   Component Value Date/Time    ALT (SGPT) 32 06/05/2020 03:34 AM    Alk.  phosphatase 65 06/05/2020 03:34 AM    Bilirubin, total 0.3 06/05/2020 03:34 AM        Yudi Mack RD [facility]

## 2020-06-05 NOTE — ED NOTES
NG tube inserted. Waiting on placement verification prior to administering Nimodipine. Attempted to call report to floor. Will call back.

## 2020-06-05 NOTE — PROGRESS NOTES
Procedure complete, coils placed to left MCA, CRNA report off to ICU team.  Pt remains intubated and sedated, unable to assess.

## 2020-06-05 NOTE — CONSULTS
Neurointerventional [address] [place] Sand[place], NP  Neurocritical Care Practitioner  [phone]    Patient: Xavi Valdez MRN: [id number]  SSN: [id number]    YOB: 1970  Age: 48 y.o. Sex: female      Chief Complaint: Unresponsive    Subjective:      Xavi Valdez is a 48 y.o. 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 On arrival to ED, pt was unresponsive and noted to be seizing. A stat CT of her head was performed there, which showed extensive SAH. Pt was then intubated emergently. She was also extremely hypertensive with SBPs in 240s. She was given Keppra and Ativan, and placed on cardene drip for BP control. NSGY and NIS were then consulted and pt was transferred to [facility] for higher level of care. En route, paramedics administered 7.5 mg of Versed, 200mcg of Fentanyl, and 10 mg of labetalol for BP control. On arrival, CTA of her head and neck was obtained, which showed a 4.4 x 4.1 mm right MCA trifurcation aneurysm, a 2.2 x 3.1 mm left MCA trifurcation aneurysm, and possible < 2mm ACOM aneurysms. Repeat CT head of her head showed stable SAH, no evidence of hydrocephalus. Of note, CTA head/neck also showing pulmonary infiltrates suspicious for COVID-19 infection, so was placed on contact precautions with test pending. 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 History obtained from pt's mother. Mother reports pt does not smoke, does not drink alcohol or use street drugs. Mother reports pt began complaining of a headache on 5/31/20, has been taking BC Powder (aspirin) for the headache. She also reports that the pt has a hx of HTN and is supposed to be on BP medications but recently stopped taking her medications. Unable to perform meaningful ROS due to intubation.      Past Medical History:   Diagnosis Date    HTN (hypertension)      No family history on file. Social History     Tobacco Use   Smoking Status Never Smoker   Smokeless Tobacco Never Used     Social History     Substance and Sexual Activity   Alcohol Use Not Currently     Social History     Substance and Sexual Activity   Drug Use Never     No Known Allergies    Review of Systems:  Review of systems not obtained due to patient factors. Objective:     Vitals:    06/04/20 2017   BP: 136/47   Pulse: 66   Resp: 14   SpO2: 96%      Physical Exam:  GENERAL: Ill appearing adult AA female  SKIN: Warm, dry, color appropriate for ethnicity. NEURO: Intubated, no current sedation running. Unresponsive to deep painful stimuli. Pupils 2 mm bilaterally, sluggishly reactive. No blink to threat. Disconjugate gaze present. Face symmetric. No withdraw to painful stimuli in all 4 ext. +cough +gag. Labs:  No results found for: WBC, WBCLT, HGBPOC, HGB, HGBP, HCTPOC, HCT, PHCT, RBCH, PLT, MCV, HGBEXT, HCTEXT, PLTEXT   No results found for: NA, K, CL, CO2, AGAP, GLU, BUN, CREA, BUCR, GFRAA, GFRNA, CA, GFRAA  No results found for: CPK, RCK1, RCK2, RCK3, RCK4, CKMB, CKNDX, CKND1, TROPT, TROIQ, BNPP, BNP    Imaging:  CT Results (maximum last 3): Results from [place] encounter on 06/04/20   CT HEAD WO CONT    Narrative EXAM: CT HEAD WO CONT    INDICATION: CVA    COMPARISON: None. CONTRAST: None. TECHNIQUE: Unenhanced CT of the head was performed using 5 mm images. Brain and  bone windows were generated. Coronal and sagittal reformats. CT dose reduction  was achieved through use of a standardized protocol tailored for this  examination and automatic exposure control for dose modulation. FINDINGS: There is extensive subarachnoid hemorrhage, slightly worse on the  left. The ventricles and sulci are normal in size, shape and configuration. .  There is no significant white matter disease. There is no parenchymal or  subdural hemorrhage or mass effect.  The basilar cisterns are open. No CT  evidence of acute infarct. The bone windows demonstrate no abnormalities. The visualized portions of the  paranasal sinuses and mastoid air cells are clear. Impression IMPRESSION: Extensive subarachnoid hemorrhage concerning for leaking/ruptured  aneurysm. NOTE: Known subarachnoid hemorrhage patient transfer from outside hospital who  is currently receiving CTA evaluation. Findings were relayed to Dr. Eugenio Nunez. CTA CODE NEURO HEAD AND NECK W CONT    Narrative *PRELIMINARY REPORT    4.4 x 4.1 mm right MCA trifurcation aneurysm and 2.2 x 3.1 mm left MCA  trifurcation aneurysm. Prominent infundibula at the origins of the posterior  communicate arteries bilaterally. Biapical airspace infiltrates suspicious for  pulmonary edema. Endotracheal tube in place. Nurse practitioner Lack was informed of results by myself. Preliminary report was provided by Dr. Humberto Dumont, the on-call radiologist, at  20:35    Final report to follow.     *END PRELIMINARY REPORT*     Assessment:     Hospital Problems  Never Reviewed          Codes Class Noted POA    [facility] ICD-10-CM: I60.9  ICD-9-CM: 929  6/4/2020 Unknown            Plan:     1.) SAH due to ruptured cerebral aneurysm, [employer] 5, An Grade 3/4   - Plans for cerebral angiogram tomorrow am by Dr. Jeana Blizzard and possible clipping OR with Dr. Raheel Pond   - Day 0/21 of Nimotop to prevent delayed cerebral ischemia   - LR at 100 ml/hr to maintain euvolemia   - Strict I&O   - ECHO pending   - q 1 neuro checks   - SBP goal less than 140,  Cardene/Hydralazine/Labetalol PRN   - PT/OT/SLP evals when appropriate   - Repeat CT head in am prior to angio   - NSGY following    2.) Seizure   - Due to #1, no prior hx of seizure   - Keppra 1000 mg BID   - Seizure precautions   - Ativan PRN   - Propofol while intubated    I have discussed the diagnosis and the intended plan as seen in the above orders with Dr. Ariana Shaffer NP Sindy Polanco and Dr. Carissa Tidwell. Thank you for this consult and participating in the care of this patient.   Signed By: Shaw Delgadillo NP     June 4, 2020

## 2020-06-06 ENCOUNTER — APPOINTMENT (OUTPATIENT)
Dept: VASCULAR SURGERY | Age: 50
DRG: 003 | End: 2020-06-06
Attending: NURSE PRACTITIONER
Payer: COMMERCIAL

## 2020-06-06 ENCOUNTER — APPOINTMENT (OUTPATIENT)
Dept: GENERAL RADIOLOGY | Age: 50
DRG: 003 | End: 2020-06-06
Attending: NURSE PRACTITIONER
Payer: COMMERCIAL

## 2020-06-06 ENCOUNTER — APPOINTMENT (OUTPATIENT)
Dept: CT IMAGING | Age: 50
DRG: 003 | End: 2020-06-06
Attending: NURSE PRACTITIONER
Payer: COMMERCIAL

## 2020-06-06 ENCOUNTER — APPOINTMENT (OUTPATIENT)
Dept: MRI IMAGING | Age: 50
DRG: 003 | End: 2020-06-06
Attending: NURSE PRACTITIONER
Payer: COMMERCIAL

## 2020-06-06 ENCOUNTER — APPOINTMENT (OUTPATIENT)
Dept: NON INVASIVE DIAGNOSTICS | Age: 50
DRG: 003 | End: 2020-06-06
Attending: NURSE PRACTITIONER
Payer: COMMERCIAL

## 2020-06-06 LAB
ANION GAP SERPL CALC-SCNC: 11 MMOL/L (ref 5–15)
ARTERIAL PATENCY WRIST A: YES
AV VELOCITY RATIO: 0.71
BASE DEFICIT BLD-SCNC: 8 MMOL/L
BASOPHILS # BLD: 0 K/UL (ref 0–0.1)
BASOPHILS NFR BLD: 0 % (ref 0–1)
BDY SITE: ABNORMAL
BUN SERPL-MCNC: 14 MG/DL (ref 6–20)
BUN/CREAT SERPL: 15 (ref 12–20)
CA-I BLD-SCNC: 1.14 MMOL/L (ref 1.12–1.32)
CALCIUM SERPL-MCNC: 8.1 MG/DL (ref 8.5–10.1)
CHLORIDE SERPL-SCNC: 113 MMOL/L (ref 97–108)
CO2 SERPL-SCNC: 18 MMOL/L (ref 21–32)
CREAT SERPL-MCNC: 0.93 MG/DL (ref 0.55–1.02)
DIFFERENTIAL METHOD BLD: ABNORMAL
ECHO AO ROOT DIAM: 2.68 CM
ECHO AV AREA PEAK VELOCITY: 1.9 CM2
ECHO AV PEAK GRADIENT: 16.2 MMHG
ECHO AV PEAK VELOCITY: 201.42 CM/S
ECHO EST RA PRESSURE: 5 MMHG
ECHO LA AREA 4C: 25 CM2
ECHO LA MAJOR AXIS: 4.17 CM
ECHO LA TO AORTIC ROOT RATIO: 1.55
ECHO LA VOL 2C: 75.49 ML (ref 22–52)
ECHO LA VOL 4C: 79.55 ML (ref 22–52)
ECHO LA VOL BP: 85.55 ML (ref 22–52)
ECHO LA VOL/BSA BIPLANE: 41.37 ML/M2 (ref 16–28)
ECHO LA VOLUME INDEX A2C: 36.5 ML/M2 (ref 16–28)
ECHO LA VOLUME INDEX A4C: 38.47 ML/M2 (ref 16–28)
ECHO LV E' LATERAL VELOCITY: 8.81 CM/S
ECHO LV E' SEPTAL VELOCITY: 8.01 CM/S
ECHO LV INTERNAL DIMENSION DIASTOLIC: 4.49 CM (ref 3.9–5.3)
ECHO LV INTERNAL DIMENSION SYSTOLIC: 3.07 CM
ECHO LV IVSD: 1.21 CM (ref 0.6–0.9)
ECHO LV MASS 2D: 238.3 G (ref 67–162)
ECHO LV MASS INDEX 2D: 115.2 G/M2 (ref 43–95)
ECHO LV POSTERIOR WALL DIASTOLIC: 1.23 CM (ref 0.6–0.9)
ECHO LVOT DIAM: 1.84 CM
ECHO LVOT PEAK GRADIENT: 8.2 MMHG
ECHO LVOT PEAK VELOCITY: 143.47 CM/S
ECHO MV A VELOCITY: 90.02 CM/S
ECHO MV AREA PHT: 4.3 CM2
ECHO MV E DECELERATION TIME (DT): 175.7 MS
ECHO MV E VELOCITY: 100.16 CM/S
ECHO MV E/A RATIO: 1.11
ECHO MV E/E' LATERAL: 11.37
ECHO MV E/E' RATIO (AVERAGED): 11.94
ECHO MV E/E' SEPTAL: 12.5
ECHO MV PRESSURE HALF TIME (PHT): 51 MS
ECHO PULMONARY ARTERY SYSTOLIC PRESSURE (PASP): 32.4 MMHG
ECHO PV MAX VELOCITY: 89.11 CM/S
ECHO PV PEAK GRADIENT: 3.2 MMHG
ECHO RIGHT VENTRICULAR SYSTOLIC PRESSURE (RVSP): 32.4 MMHG
ECHO RV INTERNAL DIMENSION: 3.91 CM
ECHO RV TAPSE: 2.14 CM (ref 1.5–2)
ECHO TV REGURGITANT MAX VELOCITY: 261.54 CM/S
ECHO TV REGURGITANT PEAK GRADIENT: 27.4 MMHG
EOSINOPHIL # BLD: 0 K/UL (ref 0–0.4)
EOSINOPHIL NFR BLD: 0 % (ref 0–7)
ERYTHROCYTE [DISTWIDTH] IN BLOOD BY AUTOMATED COUNT: 18.7 % (ref 11.5–14.5)
GAS FLOW.O2 O2 DELIVERY SYS: ABNORMAL L/MIN
GAS FLOW.O2 SETTING OXYMISER: 16 BPM
GLUCOSE BLD STRIP.AUTO-MCNC: 132 MG/DL (ref 65–100)
GLUCOSE BLD STRIP.AUTO-MCNC: 132 MG/DL (ref 65–100)
GLUCOSE BLD STRIP.AUTO-MCNC: 149 MG/DL (ref 65–100)
GLUCOSE BLD STRIP.AUTO-MCNC: 152 MG/DL (ref 65–100)
GLUCOSE BLD STRIP.AUTO-MCNC: 173 MG/DL (ref 65–100)
GLUCOSE SERPL-MCNC: 158 MG/DL (ref 65–100)
HCG UR QL: NEGATIVE
HCO3 BLD-SCNC: 17.7 MMOL/L (ref 22–26)
HCT VFR BLD AUTO: 26.6 % (ref 35–47)
HGB BLD-MCNC: 7.7 G/DL (ref 11.5–16)
IMM GRANULOCYTES # BLD AUTO: 0.1 K/UL (ref 0–0.04)
IMM GRANULOCYTES NFR BLD AUTO: 1 % (ref 0–0.5)
INSPIRATION.DURATION SETTING TIME VENT: 1.25 SEC
IRON SATN MFR SERPL: 3 % (ref 20–50)
IRON SERPL-MCNC: 10 UG/DL (ref 35–150)
LVFS 2D: 31.56 %
LYMPHOCYTES # BLD: 0.9 K/UL (ref 0.8–3.5)
LYMPHOCYTES NFR BLD: 8 % (ref 12–49)
MAGNESIUM SERPL-MCNC: 2.3 MG/DL (ref 1.6–2.4)
MCH RBC QN AUTO: 19.8 PG (ref 26–34)
MCHC RBC AUTO-ENTMCNC: 28.9 G/DL (ref 30–36.5)
MCV RBC AUTO: 68.6 FL (ref 80–99)
MONOCYTES # BLD: 0.8 K/UL (ref 0–1)
MONOCYTES NFR BLD: 7 % (ref 5–13)
MV DEC SLOPE: 5.7
NEUTS SEG # BLD: 9 K/UL (ref 1.8–8)
NEUTS SEG NFR BLD: 84 % (ref 32–75)
NRBC # BLD: 0 K/UL (ref 0–0.01)
NRBC BLD-RTO: 0 PER 100 WBC
O2/TOTAL GAS SETTING VFR VENT: 100 %
PCO2 BLD: 31.9 MMHG (ref 35–45)
PEEP RESPIRATORY: 12 CMH2O
PH BLD: 7.35 [PH] (ref 7.35–7.45)
PHOSPHATE SERPL-MCNC: 2.7 MG/DL (ref 2.6–4.7)
PLATELET # BLD AUTO: 252 K/UL (ref 150–400)
PLATELET COMMENTS,PCOM: ABNORMAL
PMV BLD AUTO: 9.9 FL (ref 8.9–12.9)
PO2 BLD: 115 MMHG (ref 80–100)
POTASSIUM SERPL-SCNC: 3.6 MMOL/L (ref 3.5–5.1)
PRESSURE CONTROL, IPC: YES
PROCALCITONIN SERPL-MCNC: 0.49 NG/ML
RBC # BLD AUTO: 3.88 M/UL (ref 3.8–5.2)
RBC MORPH BLD: ABNORMAL
SAO2 % BLD: 98 % (ref 92–97)
SERVICE CMNT-IMP: ABNORMAL
SODIUM SERPL-SCNC: 142 MMOL/L (ref 136–145)
SPECIMEN TYPE: ABNORMAL
TIBC SERPL-MCNC: 376 UG/DL (ref 250–450)
TOTAL RESP. RATE, ITRR: 22
TROPONIN I SERPL-MCNC: 0.45 NG/ML
TROPONIN I SERPL-MCNC: 0.5 NG/ML
TROPONIN I SERPL-MCNC: 0.56 NG/ML
VENTILATION MODE VENT: ABNORMAL
WBC # BLD AUTO: 10.8 K/UL (ref 3.6–11)

## 2020-06-06 PROCEDURE — 74011636637 HC RX REV CODE- 636/637: Performed by: NURSE PRACTITIONER

## 2020-06-06 PROCEDURE — 93886 INTRACRANIAL COMPLETE STUDY: CPT

## 2020-06-06 PROCEDURE — 94003 VENT MGMT INPAT SUBQ DAY: CPT

## 2020-06-06 PROCEDURE — 85025 COMPLETE CBC W/AUTO DIFF WBC: CPT

## 2020-06-06 PROCEDURE — 70551 MRI BRAIN STEM W/O DYE: CPT

## 2020-06-06 PROCEDURE — 36600 WITHDRAWAL OF ARTERIAL BLOOD: CPT

## 2020-06-06 PROCEDURE — 65610000006 HC RM INTENSIVE CARE

## 2020-06-06 PROCEDURE — 74011250636 HC RX REV CODE- 250/636: Performed by: NURSE PRACTITIONER

## 2020-06-06 PROCEDURE — 74011000250 HC RX REV CODE- 250: Performed by: NURSE PRACTITIONER

## 2020-06-06 PROCEDURE — 74011000258 HC RX REV CODE- 258: Performed by: RADIOLOGY

## 2020-06-06 PROCEDURE — 71045 X-RAY EXAM CHEST 1 VIEW: CPT

## 2020-06-06 PROCEDURE — 84484 ASSAY OF TROPONIN QUANT: CPT

## 2020-06-06 PROCEDURE — 84145 PROCALCITONIN (PCT): CPT

## 2020-06-06 PROCEDURE — 74011636320 HC RX REV CODE- 636/320: Performed by: RADIOLOGY

## 2020-06-06 PROCEDURE — 70450 CT HEAD/BRAIN W/O DYE: CPT

## 2020-06-06 PROCEDURE — 74011250637 HC RX REV CODE- 250/637: Performed by: NURSE PRACTITIONER

## 2020-06-06 PROCEDURE — C9254 INJECTION, LACOSAMIDE: HCPCS | Performed by: NURSE PRACTITIONER

## 2020-06-06 PROCEDURE — 93306 TTE W/DOPPLER COMPLETE: CPT

## 2020-06-06 PROCEDURE — 82803 BLOOD GASES ANY COMBINATION: CPT

## 2020-06-06 PROCEDURE — 81025 URINE PREGNANCY TEST: CPT

## 2020-06-06 PROCEDURE — 95816 EEG AWAKE AND DROWSY: CPT | Performed by: NURSE PRACTITIONER

## 2020-06-06 PROCEDURE — 83540 ASSAY OF IRON: CPT

## 2020-06-06 PROCEDURE — 74011250636 HC RX REV CODE- 250/636: Performed by: INTERNAL MEDICINE

## 2020-06-06 PROCEDURE — 74011000258 HC RX REV CODE- 258: Performed by: NURSE PRACTITIONER

## 2020-06-06 PROCEDURE — 82962 GLUCOSE BLOOD TEST: CPT

## 2020-06-06 PROCEDURE — 94640 AIRWAY INHALATION TREATMENT: CPT

## 2020-06-06 PROCEDURE — 74011250636 HC RX REV CODE- 250/636

## 2020-06-06 PROCEDURE — 84100 ASSAY OF PHOSPHORUS: CPT

## 2020-06-06 PROCEDURE — 94664 DEMO&/EVAL PT USE INHALER: CPT

## 2020-06-06 PROCEDURE — 02HV33Z INSERTION OF INFUSION DEVICE INTO SUPERIOR VENA CAVA, PERCUTANEOUS APPROACH: ICD-10-PCS | Performed by: NURSE PRACTITIONER

## 2020-06-06 PROCEDURE — 83735 ASSAY OF MAGNESIUM: CPT

## 2020-06-06 PROCEDURE — 36415 COLL VENOUS BLD VENIPUNCTURE: CPT

## 2020-06-06 PROCEDURE — 87635 SARS-COV-2 COVID-19 AMP PRB: CPT

## 2020-06-06 PROCEDURE — 70496 CT ANGIOGRAPHY HEAD: CPT

## 2020-06-06 PROCEDURE — 77030013140 HC MSK NEB VYRM -A

## 2020-06-06 PROCEDURE — 93005 ELECTROCARDIOGRAM TRACING: CPT

## 2020-06-06 PROCEDURE — 80048 BASIC METABOLIC PNL TOTAL CA: CPT

## 2020-06-06 RX ORDER — LABETALOL HYDROCHLORIDE 5 MG/ML
20 INJECTION, SOLUTION INTRAVENOUS
Status: DISCONTINUED | OUTPATIENT
Start: 2020-06-06 | End: 2020-06-07

## 2020-06-06 RX ORDER — MANNITOL 20 G/100ML
25 INJECTION, SOLUTION INTRAVENOUS ONCE
Status: COMPLETED | OUTPATIENT
Start: 2020-06-06 | End: 2020-06-06

## 2020-06-06 RX ORDER — POTASSIUM CHLORIDE 7.45 MG/ML
10 INJECTION INTRAVENOUS
Status: COMPLETED | OUTPATIENT
Start: 2020-06-06 | End: 2020-06-06

## 2020-06-06 RX ORDER — FUROSEMIDE 10 MG/ML
INJECTION INTRAMUSCULAR; INTRAVENOUS
Status: DISPENSED
Start: 2020-06-06 | End: 2020-06-06

## 2020-06-06 RX ORDER — VANCOMYCIN 2 GRAM/500 ML IN 0.9 % SODIUM CHLORIDE INTRAVENOUS
2000 ONCE
Status: COMPLETED | OUTPATIENT
Start: 2020-06-06 | End: 2020-06-06

## 2020-06-06 RX ORDER — MIDAZOLAM HYDROCHLORIDE 1 MG/ML
2 INJECTION, SOLUTION INTRAMUSCULAR; INTRAVENOUS ONCE
Status: COMPLETED | OUTPATIENT
Start: 2020-06-06 | End: 2020-06-06

## 2020-06-06 RX ORDER — MIDAZOLAM HYDROCHLORIDE 1 MG/ML
INJECTION, SOLUTION INTRAMUSCULAR; INTRAVENOUS
Status: COMPLETED
Start: 2020-06-06 | End: 2020-06-06

## 2020-06-06 RX ORDER — FUROSEMIDE 10 MG/ML
60 INJECTION INTRAMUSCULAR; INTRAVENOUS ONCE
Status: DISCONTINUED | OUTPATIENT
Start: 2020-06-06 | End: 2020-06-06

## 2020-06-06 RX ORDER — ASPIRIN 300 MG/1
300 SUPPOSITORY RECTAL DAILY
Status: DISCONTINUED | OUTPATIENT
Start: 2020-06-06 | End: 2020-06-10

## 2020-06-06 RX ORDER — FUROSEMIDE 10 MG/ML
40 INJECTION INTRAMUSCULAR; INTRAVENOUS ONCE
Status: COMPLETED | OUTPATIENT
Start: 2020-06-06 | End: 2020-06-06

## 2020-06-06 RX ORDER — MANNITOL 20 G/100ML
25 INJECTION, SOLUTION INTRAVENOUS EVERY 6 HOURS
Status: DISCONTINUED | OUTPATIENT
Start: 2020-06-06 | End: 2020-06-06

## 2020-06-06 RX ORDER — IPRATROPIUM BROMIDE AND ALBUTEROL SULFATE 2.5; .5 MG/3ML; MG/3ML
3 SOLUTION RESPIRATORY (INHALATION)
Status: DISCONTINUED | OUTPATIENT
Start: 2020-06-06 | End: 2020-06-24

## 2020-06-06 RX ORDER — VANCOMYCIN 1.75 GRAM/500 ML IN 0.9 % SODIUM CHLORIDE INTRAVENOUS
1750
Status: DISCONTINUED | OUTPATIENT
Start: 2020-06-07 | End: 2020-06-08

## 2020-06-06 RX ORDER — SODIUM CHLORIDE 0.9 % (FLUSH) 0.9 %
10 SYRINGE (ML) INJECTION
Status: COMPLETED | OUTPATIENT
Start: 2020-06-06 | End: 2020-06-06

## 2020-06-06 RX ADMIN — NIMODIPINE 60 MG: 30 SOLUTION ORAL at 04:07

## 2020-06-06 RX ADMIN — NIMODIPINE 60 MG: 30 SOLUTION ORAL at 20:35

## 2020-06-06 RX ADMIN — NIMODIPINE 60 MG: 30 SOLUTION ORAL at 09:14

## 2020-06-06 RX ADMIN — SODIUM CHLORIDE 10 MG/HR: 900 INJECTION, SOLUTION INTRAVENOUS at 08:08

## 2020-06-06 RX ADMIN — Medication 10 ML: at 08:39

## 2020-06-06 RX ADMIN — INSULIN LISPRO 2 UNITS: 100 INJECTION, SOLUTION INTRAVENOUS; SUBCUTANEOUS at 06:41

## 2020-06-06 RX ADMIN — ASPIRIN 300 MG: 300 SUPPOSITORY RECTAL at 19:16

## 2020-06-06 RX ADMIN — SODIUM CHLORIDE 7.5 MG/HR: 900 INJECTION, SOLUTION INTRAVENOUS at 10:41

## 2020-06-06 RX ADMIN — Medication 2 DROP: at 15:52

## 2020-06-06 RX ADMIN — Medication 2 DROP: at 06:30

## 2020-06-06 RX ADMIN — DEXMEDETOMIDINE HYDROCHLORIDE 0.2 MCG/KG/HR: 100 INJECTION, SOLUTION, CONCENTRATE INTRAVENOUS at 09:20

## 2020-06-06 RX ADMIN — LABETALOL HYDROCHLORIDE 20 MG: 5 INJECTION INTRAVENOUS at 14:31

## 2020-06-06 RX ADMIN — POTASSIUM CHLORIDE 10 MEQ: 7.46 INJECTION, SOLUTION INTRAVENOUS at 09:17

## 2020-06-06 RX ADMIN — FAMOTIDINE 20 MG: 10 INJECTION, SOLUTION INTRAVENOUS at 21:17

## 2020-06-06 RX ADMIN — LABETALOL HYDROCHLORIDE 20 MG: 5 INJECTION INTRAVENOUS at 07:02

## 2020-06-06 RX ADMIN — MIDAZOLAM HYDROCHLORIDE 2 MG: 1 INJECTION, SOLUTION INTRAMUSCULAR; INTRAVENOUS at 15:49

## 2020-06-06 RX ADMIN — INSULIN LISPRO 2 UNITS: 100 INJECTION, SOLUTION INTRAVENOUS; SUBCUTANEOUS at 12:55

## 2020-06-06 RX ADMIN — FENTANYL CITRATE 100 MCG: 50 INJECTION INTRAMUSCULAR; INTRAVENOUS at 16:03

## 2020-06-06 RX ADMIN — DOCUSATE SODIUM 100 MG: 50 LIQUID ORAL at 17:48

## 2020-06-06 RX ADMIN — DOCUSATE SODIUM 100 MG: 50 LIQUID ORAL at 09:19

## 2020-06-06 RX ADMIN — NIMODIPINE 60 MG: 30 SOLUTION ORAL at 12:30

## 2020-06-06 RX ADMIN — SODIUM CHLORIDE 100 MG: 900 INJECTION, SOLUTION INTRAVENOUS at 21:10

## 2020-06-06 RX ADMIN — FAMOTIDINE 20 MG: 10 INJECTION, SOLUTION INTRAVENOUS at 09:12

## 2020-06-06 RX ADMIN — PROPOFOL INJECTABLE EMULSION 25 MCG/KG/MIN: 10 INJECTION, EMULSION INTRAVENOUS at 21:09

## 2020-06-06 RX ADMIN — CHLORHEXIDINE GLUCONATE 15 ML: 0.12 RINSE ORAL at 09:19

## 2020-06-06 RX ADMIN — NIMODIPINE 60 MG: 30 SOLUTION ORAL at 17:47

## 2020-06-06 RX ADMIN — PROPOFOL INJECTABLE EMULSION 25 MCG/KG/MIN: 10 INJECTION, EMULSION INTRAVENOUS at 16:09

## 2020-06-06 RX ADMIN — PIPERACILLIN AND TAZOBACTAM 3.38 G: 3; .375 INJECTION, POWDER, LYOPHILIZED, FOR SOLUTION INTRAVENOUS at 21:13

## 2020-06-06 RX ADMIN — NIMODIPINE 60 MG: 30 SOLUTION ORAL at 23:13

## 2020-06-06 RX ADMIN — PROPOFOL INJECTABLE EMULSION 25 MCG/KG/MIN: 10 INJECTION, EMULSION INTRAVENOUS at 06:29

## 2020-06-06 RX ADMIN — SODIUM CHLORIDE 7.5 MG/HR: 900 INJECTION, SOLUTION INTRAVENOUS at 05:09

## 2020-06-06 RX ADMIN — SODIUM CHLORIDE, SODIUM LACTATE, POTASSIUM CHLORIDE, AND CALCIUM CHLORIDE 50 ML/HR: 600; 310; 30; 20 INJECTION, SOLUTION INTRAVENOUS at 09:32

## 2020-06-06 RX ADMIN — NIMODIPINE 60 MG: 30 SOLUTION ORAL at 00:02

## 2020-06-06 RX ADMIN — IOPAMIDOL 100 ML: 755 INJECTION, SOLUTION INTRAVENOUS at 08:40

## 2020-06-06 RX ADMIN — PROPOFOL INJECTABLE EMULSION 30 MCG/KG/MIN: 10 INJECTION, EMULSION INTRAVENOUS at 00:38

## 2020-06-06 RX ADMIN — CHLORHEXIDINE GLUCONATE 15 ML: 0.12 RINSE ORAL at 21:29

## 2020-06-06 RX ADMIN — IPRATROPIUM BROMIDE AND ALBUTEROL SULFATE 3 ML: .5; 3 SOLUTION RESPIRATORY (INHALATION) at 14:44

## 2020-06-06 RX ADMIN — FENTANYL CITRATE 100 MCG: 50 INJECTION INTRAMUSCULAR; INTRAVENOUS at 11:05

## 2020-06-06 RX ADMIN — FUROSEMIDE 40 MG: 10 INJECTION, SOLUTION INTRAMUSCULAR; INTRAVENOUS at 21:21

## 2020-06-06 RX ADMIN — SODIUM CHLORIDE 1000 MG: 900 INJECTION, SOLUTION INTRAVENOUS at 10:45

## 2020-06-06 RX ADMIN — INSULIN LISPRO 2 UNITS: 100 INJECTION, SOLUTION INTRAVENOUS; SUBCUTANEOUS at 23:22

## 2020-06-06 RX ADMIN — POTASSIUM CHLORIDE 10 MEQ: 7.46 INJECTION, SOLUTION INTRAVENOUS at 06:42

## 2020-06-06 RX ADMIN — Medication 2 DROP: at 21:29

## 2020-06-06 RX ADMIN — MIDAZOLAM HYDROCHLORIDE 2 MG: 2 INJECTION, SOLUTION INTRAMUSCULAR; INTRAVENOUS at 15:49

## 2020-06-06 RX ADMIN — PIPERACILLIN AND TAZOBACTAM 3.38 G: 3; .375 INJECTION, POWDER, LYOPHILIZED, FOR SOLUTION INTRAVENOUS at 12:43

## 2020-06-06 RX ADMIN — IPRATROPIUM BROMIDE AND ALBUTEROL SULFATE 3 ML: .5; 3 SOLUTION RESPIRATORY (INHALATION) at 09:00

## 2020-06-06 RX ADMIN — SODIUM CHLORIDE 100 ML: 900 INJECTION, SOLUTION INTRAVENOUS at 08:40

## 2020-06-06 RX ADMIN — MANNITOL 25 G: 20 INJECTION, SOLUTION INTRAVENOUS at 11:49

## 2020-06-06 RX ADMIN — VANCOMYCIN HYDROCHLORIDE 2000 MG: 10 INJECTION, POWDER, LYOPHILIZED, FOR SOLUTION INTRAVENOUS at 12:42

## 2020-06-06 RX ADMIN — SODIUM CHLORIDE 1000 MG: 900 INJECTION, SOLUTION INTRAVENOUS at 23:17

## 2020-06-06 RX ADMIN — SODIUM CHLORIDE 10 MG/HR: 900 INJECTION, SOLUTION INTRAVENOUS at 03:06

## 2020-06-06 NOTE — PROGRESS NOTES
Pt transported to CT at 0230. Ambu bag/mask at bedside. Pt returned to room and placed back on vent. Vent settings adjusted due to desat during the trip back. Pt is no longer in respiratory distress. RN at bedside. MD notified.

## 2020-06-06 NOTE — PROGRESS NOTES
Pharmacist Note - Vancomycin Dosing    Consult provided for this 48 y.o. female for indication of Aspiration PNA. Antibiotic regimen(s): Vancomycin and Zosyn  Patient on vancomycin PTA? NO     Recent Labs     20  0422 204 20   WBC 10.8 13.5* 9.6   CREA 0.93 0.81 0.90   BUN 14 11 12     Frequency of BMP: Daily  Height: 162.6 cm  Weight: 103.4 kg  Est CrCl: 84.8 ml/min; Temp (24hrs), Av.5 °F (36.9 °C), Min:97.8 °F (36.6 °C), Max:99.1 °F (37.3 °C)    Goal trough = 15 - 20 mcg/mL    Therapy will be initiated with a loading dose of 2000 mg IV x 1 to be followed by a maintenance dose of 1750 mg IV every 16 hours. Pharmacy to follow patient daily and order levels / make dose adjustments as appropriate.

## 2020-06-06 NOTE — PROCEDURES
[address]  EEG    Name:  Pili Felix  MR#:  [id number]  :  1970  ACCOUNT #:  [de-identified]  DATE OF SERVICE:  2020      REQUESTING PROVIDER:  Skyler Brewer NP    HISTORY:  The patient is a 30-year-old female with subarachnoid hemorrhage related to a ruptured aneurysm who is being evaluated for seizure-like activity. DESCRIPTION:  This is an 18-channel EEG performed on intubated and sedated patient. The dominant posterior background rhythm consists of medium voltage rhythms in the 6-7 Hz frequency range out of the posterior head region. Frontal head regions show slower delta rhythms which at times becomes rhythmic for brief periods. An occasional sharp wave discharge was also seen in the left frontal area. Drowsiness and sleep architecture were not seen. Photic stimulation and hyperventilation were not performed. EEG SUMMARY:  1. Abnormal EEG due to slowing of the background rhythms with intermittent rhythmic delta activity seen in both frontal head regions. 2.  Occasional sharp wave discharge in the left frontal area. CLINICAL INTERPRETATION:  This EEG is suggestive of mild-to-moderate generalized encephalopathic process, nonspecific in type. In addition, there was occasional epileptiform disturbance seen in the left frontal area which may represent a partial onset seizure focus. Please correlate with imaging studies.         Alda Vásquez MD      [id number]  D:  2020 14:12  T:  2020 14:51  JOB #:  [id number]

## 2020-06-06 NOTE — PROGRESS NOTES
0730: Bedside shift change report given to Mirtha Brandon RN (oncoming nurse) by Deedee Mendez RN (offgoing nurse). Report included the following information SBAR, Kardex, Intake/Output, MAR, Accordion and Recent Results. 0800: Primary Nurse Francheska White and Yessi Keen RN performed a dual skin assessment on this patient No impairment noted  Sriram score is 13    8002: Pupillary changes this morning at shift change: L 3, R 2, sluggish. Withdraws in all four extremities. FLAKITA Avery notified. Order received for STAT head CTA. CT notified. 0845: Pt returned from [id number]: STAT EEG called to technician. 1130:   -L pupil fixed, oval, 5mm; R pupil fixed, round, 2mm. Pt withdraws all four extremities. Talon Flores MD and Jeralene Holstein, NP at bedside. Order received for mannitol.   -Precedex and Cardene held d/t bradycardia. -O2 dropped to 80s. FiO2 increased to 100%. ETT suction. -EKG completed per order. Shows junctional rhythm. Shital Mooney NP aware. 1420: MRI and head CT completed. 1610: Propofol restarted d/t hypoxia and decreased lung compliance. Versed and PRN fentanyl administered per Shital Mooney NP order. Per Shital Mooney NP, continue with sedation. 1640: Arterial line replaced and L femoral line placed. 1930: Bedside shift change report given to Barry Dobbs RN (oncoming nurse) by Mirtha Brandon RN (offgoing nurse). Report included the following information SBAR, Kardex, Intake/Output, MAR, Accordion and Recent Results.

## 2020-06-06 NOTE — PROGRESS NOTES
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 At the [facility] a CT was done and she was found to have a [facility]  and transferred to [facility] for further neuro evaluation after being intubated by sending facility. Repeat CTH/CTA here showed an extensive subarachnoid hemorrhage concerning for leaking/ruptured aneurysm without hydrocephalus. 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 Both neurosurgery and neuro IR consulted and she was taken to IR for coiling of a Left MCA aneurysm this am by Dr. Brian Broderick. Plan for Rt aneurysmal clipping at a later time. Remains at risk for hydrocephalus, continue to re-eval for hydrocephalus. Remains intubated at this time. Overnight Events: Stable overnight, AM CT appears improved with less blood and no hydrocephalus. This am RN reported pupillary changes with Left eye 4mm sluggish, Rt eye 3mm sluggish, reactive bilaterally. Will change to precedex gtt for better neuro exam. WD x4 with RUE weaker. Increased PEEP this am from 5 to 8, still on 70% Fio2. Will recheck PCXR. Hemodynamics stable. BP < 140. Hgb down to 7.7 from 9.2, likely dilutional. K. 3.6, will replace for goal > 4.     POD:* No surgery found *    S/P: Day 0 for L MCA aneurysm coiling    Objective:   Vital Signs:  Visit Vitals  /59   Pulse 81   Temp 99.1 °F (37.3 °C)   Resp 24   Ht 5' 4\" (1.626 m)   Wt 107.6 kg (237 lb 3.2 oz)   SpO2 99%   BMI 40.72 kg/m²      O2 Device: Endotracheal tube, Ventilator Temp (24hrs), Av.4 °F (36.9 °C), Min:97.7 °F (36.5 °C), Max:99.1 °F (37.3 °C)         Intake/Output:     Intake/Output Summary (Last 24 hours) at 6/6/2020 0753  Last data filed at 6/6/2020 0700  Gross per 24 hour   Intake 6447.04 ml   Output 1425 ml   Net 5022.04 ml       Physical Exam:    General:  Sedated and on the ventilator. No acute distress. Eyes:  Sclera anicteric, + scleral edema, Pupils equal, round, reactive to light. Eyes midline, Right eye 3 mm sluggish, left eye 4mm sluggish   Mouth/Throat: Orotracheal tube in place., OGT in place   Neck: Supple. Lungs:   coarse to auscultation bilaterally, Ins. whz, good effort. Vent supported with equal chest rise/ fall   Cardiovascular:  Regular rate and rhythm, no murmur, click, rub, or gallop. Abdomen:   Soft, obese, non-tender, bowel sounds normal, non-distended. Extremities: No cyanosis or generalized edema. Skin: No acute rash or lesions. Lymph Nodes: Cervical and supraclavicular normal.   Musculoskeletal:  No swelling or deformity. WD x 4 ext, RUE weaker   Lines/Devices:  Intact, no erythema, drainage, or tenderness. Psychiatric: Sedated and appears comfortable on ventilator. T/L/D  Tubes: ETT and Orogastric Tube  Lines: Peripheral IV arterial line  Drains: Martinez Catheter    LABS AND  DATA: Personally reviewed  Recent Labs     06/06/20 0422 06/05/20 0334   WBC 10.8 13.5*   HGB 7.7* 9.2*   HCT 26.6* 31.6*    302     Recent Labs     06/06/20  0422 06/05/20  0334    140   K 3.6 3.4*   * 109*   CO2 18* 20*   BUN 14 11   CREA 0.93 0.81   * 156*   CA 8.1* 8.0*   MG 2.3 1.8   PHOS 2.7 2.0*     Recent Labs     06/05/20  0334 06/04/20 2057   AP 65 75   TP 6.5 7.0   ALB 2.9* 3.2*   GLOB 3.6 3.8     No results for input(s): INR, PTP, APTT, INREXT, INREXT in the last 72 hours.    Recent Labs     06/05/20  0446 06/04/20 2044   PHI 7.382 7.289*   PCO2I 37.4 51.3*   PO2I 145* 91   FIO2I 80 100     Recent Labs     06/04/20  2330   TROIQ 0.76*     Ventilator Settings:  Mode Rate Tidal Volume Pressure FiO2 PEEP   Assist control, Volume control   450 ml 70 % 8 cm H20(increased due to desat)     Peak airway pressure: 36 cm H2O    Minute ventilation: 9.83 l/min        MEDS: Reviewed    Chest X-Ray:  CXR Results  (Last 48 hours)               06/04/20 2047  XR CHEST PORT Final result    Impression:  IMPRESSION:        Diffuse bilateral airspace disease more suggestive of pulmonary edema than   diffuse pneumonia. Clinical correlation needed. Marked gastric distention. NG   tube not present. Narrative:  EXAM: XR CHEST PORT       INDICATION: Chest Pain       COMPARISON: None. FINDINGS: A portable AP radiograph of the chest was obtained at 2036 hours   hours. The patient is on a cardiac monitor. The cardiomediastinal silhouette is   normal. The endotracheal tube terminates 2.5 cm above the matt. Diffuse   bilateral airspace disease is asymmetric to the left. The left base is obscured. The stomach is markedly distended. ABCDEF Bundle/Checklist Completed:  YES-See Plan    SPECIAL EQUIPMENT  None    Active Problem List:     Problem List  Date Reviewed: 6/4/2020          Codes Class    SAH (subarachnoid hemorrhage) ([facility] ICD-10-CM: I60.9  ICD-9-CM: 430         Subarachnoid hemorrhage from middle cerebral artery aneurysm, left (HCC) ICD-10-CM: P49.06  ICD-9-CM: 200         RIGHT middle cerebral artery aneurysm ICD-10-CM: I67.1  ICD-9-CM: 437.3         Cerebral vasospasm ICD-10-CM: I67.848  ICD-9-CM: 435.9         Anterior communicating artery aneurysm ICD-10-CM: I67.1  ICD-9-CM: 437.3             ICU Assessment/ Comprehensive Plan of Care:       NEURO  1. Subarrachnoid hemorrhage 2/2 ruptured aneurym  2. Right MCA trifurcation aneurysm  3. Left MCA trifurcation aneurysm s/p coiling  -Keep euvolemic, eunatremic, euglycemic  -SBP goal < 140  -Nimotipine 60mg via OGT Q4hr  -Neuro IR following, Dr. José Serve  -Neurosurgery following-Dr. Kelsey Jones, Rt MCA will need clipping at later time  -Hold ASA and AC until plan for EVD per Dr. Kiana Forman note  -Check TCDs   -Q1hr Neuro checks  -Keppra 1GM IV Q12h  - CT Head this am appears improved    Pain Medications: Fentanyl  Target RASS: -2 - Light Sedation - Briefly awakens to voice (eyes open & contact <10 sec)  Sedation Medications: Propofol, change to precedex  CAM-ICU:  FABIAN  Restraints: Soft wrist restraints    CARDIAC  1. Hypertension   -Nicardipine gtt at 10mg/hr this am.  -ECHO pending    Cardiac Gtts: Nicardipine (Cardene)  SBP Goal of: < 140 mmHg  MAP Goal of: > 65 mmHg  Transfusion Trigger (Hgb): <7 g/dL    RESPIRATORY  1.Pulmonary infiltrates, probable pulmonary edema  -Provide conservative fluid management to prevent vasospams  -Hold off for diuresis now  -Remains intubated, titrate down Fio2 as tolerated to maintain sats  -Check PCXR  -Increasing PEEP overnight    Respiratory Goals: Chlorhexidine   Optimize PEEP/Ventilation/Oxygenation  Goal Tidal Volume 6 cc/kg based on IBW  Aim for lung protective ventilation  Head of bed > 30 degrees  Plan to Extubate: attempt to provide weaning trials in am  SPO2 Goal: > 92%  Pulmonary toilet: NA   DVT Prophylaxis (if no, list reason): SCD's or Sequential Compression Device     RENAL  1. No acute kidney injury  -Stable BUN/creat 14/0.93  -Maintain euvolemia, IVF decreased this am for decreasing UO  -Strict I/O    2. Hypokalemia  Potassium 3.6, replace to keep K > 4    Martinez Catheter Present: Yes  IVFs: Lactate ringers @ 50 ml/hr    GASTROINTESTINAL  1. No acute concerns    GI Prophylaxis: Pepcid (famotidine)   Nutrition: Yes Dietary consulted  Bowel Movement: Yes  Bowel Regimen: Docusate (Colace) and Bisacodyl (Dulcolax)    HEMATOLOGIC  1. Microcytic, hypochromic anemia, unknown origin  -Keep Hgb > 7.0  -No evidence of blood loss  -Check Iron profile    DVT Prophylaxis (if no, list reason): SCD's or Sequential Compression Device     ID  1. No concerns for infection  -Trend WBC/ fevers  -Check procalcitonin    2. NEG COVID  06/04 NEG x2    ENDOCRINE  1.  Hyperglycemia  -SSI Q6hr  -HgbA1c 5.7    Glycemic Control - Insulin: Yes    MUSCULOSKELETAL  1. Risk for muscle deconditioning  Mobility: Fair and Bedrest  PT/OT: Not appropraite at this time, re-eval at extubation     DISPOSITION/COMMUNICATION  Discussed Plan of Care/Code Status: Full Code  Stay in ICU    CRITICAL CARE CONSULTANT NOTE  I had a face to face encounter with the patient, reviewed and interpreted patient data including clinical events, labs, images, vital signs, I/O's, and examined patient. I have discussed the case and the plan and management of the patient's care with the consulting services, the bedside nurses and the respiratory therapist.      NOTE OF PERSONAL INVOLVEMENT IN CARE    I participated in the decision-making and personally managed or directed the management of the following life and organ supporting interventions that required my frequent assessment to treat or prevent imminent deterioration. I personally spent 50 minutes of critical care time. This is time spent at this critically ill patient's bedside actively involved in patient care as well as the coordination of care and discussions with the patient's family. This does not include any procedural time which has been billed separately.     USAMA Joseph-BC  Intensivist Nurse Practitioner  [facility]  6/6/2020

## 2020-06-06 NOTE — PROGRESS NOTES
Neurointerventional Surgery Progress Note    Patient: Geoff Jovel MRN: [id number]  SSN: [id number]    YOB: 1970  Age: 48 y.o. Sex: female      Admit Date: 6/4/2020    LOS: 2 days     Subjective:      Intubated, off sedation  No spontaneous movement      Objective:     Patient Vitals for the past 24 hrs:   Temp Pulse Resp BP SpO2   06/06/20 1425  83 21 186/62 99 %   06/06/20 1417  80 18  99 %   06/06/20 1300  69 19 167/65 99 %   06/06/20 1232  67 21  98 %   06/06/20 1230  68  160/75    06/06/20 1200 100 °F (37.8 °C) (!) 58 19 144/64 96 %   06/06/20 1150  (!) 54 18  90 %   06/06/20 1118  (!) 57 20  91 %   06/06/20 1113  (!) 57  109/57    06/06/20 1100  64 27 132/56 92 %   06/06/20 1041  66  134/69    06/06/20 1000  69 30 137/62 94 %   06/06/20 0949    146/65    06/06/20 0933    141/64    06/06/20 0912  76  130/54    06/06/20 0900  77 27 132/56 100 %   06/06/20 0851  74 30  96 %   06/06/20 0808  67  123/60    06/06/20 0800 98.7 °F (37.1 °C) 68 22 123/60 98 %   06/06/20 0700  81 24 140/59 99 %   06/06/20 0600  83 25 146/65 100 %   06/06/20 0500  76 23 136/65 97 %   06/06/20 0400 99.1 °F (37.3 °C) 72 18 134/65 97 %   06/06/20 0300  70 26 131/64 94 %   06/06/20 0200  66 23 123/56 100 %   06/06/20 0100  69 23 118/57 100 %   06/06/20 0000 98.7 °F (37.1 °C) 74 21 128/62 99 %   06/05/20 2334  74 22  100 %   06/05/20 2300  72 20 124/60 100 %   06/05/20 2200  86 21 142/64 100 %   06/05/20 2100  85 23 132/58 97 %   06/05/20 2000 98.7 °F (37.1 °C) 85 25 138/71 100 %   06/05/20 1905  81 23  99 %   06/05/20 1830  80 27  99 %   06/05/20 1800  74 28 131/68 99 %   06/05/20 1700  75 26 122/64 97 %   06/05/20 1600 98.1 °F (36.7 °C) 77 26 125/63 96 %   06/05/20 1547   26  100 %   06/05/20 1545  77 26  96 %   06/05/20 1530  77 25  97 %   06/05/20 1445  79 25  98 %        Intake and Output:  Current Shift: 06/06 0701 - 06/06 1900  In: 1386.2 [I.V.:1001.2]  Out: 303 [Urine:445]  Last three shifts: 06/04 1901 - 06/06 0700  In: 8452.7 [I.V.:7452.7]  Out: 8432 [Urine:1525]    Neurological Exam:   Left pupil 5 mm, ovoid, sluggishly reactive; right pupil 3 mm, sluggishly reactive  Left eye midline, right eye slightly down and to the right  Cough/gag +, corneal +  Withdraws all fours to painful stimulus, UE>LE       Lab/Data Review: All lab results for the last 24 hours reviewed. 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 Day 1 post coiling of left MCA aneurysm, H/H 5, An 3  47 yo female found down seizing at work on 6/4/20. Taken to  [facility] where she was evaluated and intubated. Diffuse SAH w/IVH noted. Pt posturing before transfer. Seizure treated with Keppra and ativan and pt sedated for transport. At that time,  SBP in the 200's, responsive to labetelol but not cardene. Mother states pt had severe headache since May 31t and she had bee self treating with BC powders. Pt has history of HTN but was not taking her medication. Significant events/findings:    1. Cerebral angiogram on 6/5/20 showing bilateral MCA aneurysms, right slightly larger than left. However, the [facility] was asymmetric to the left, and the left MCA aneurysm was coiled by Dr. Brian Broderick. Of note, there was no evidence of vasospasm at the time of the coiling. 2.  Clinical exam has been poor during hospitalization. Exam after coiling showed withdrawal to painful stimulus, pupils equal and sluggishly reactive, with cough and gag noted. Early this morning, pt was noted to have unequal pupils, R>L, and was no longer withdrawing RUE.   CT scan showed no hydrocephalus and no new SAH. After turning off sedation this morning, pt was withdrawing to pain in all fours, UEs more briskly than LEs. However, her pupils were now unequal, R>L and sluggishly responsive, and right eye was pointed down and laterally. In the early afternoon, pt went into a junctional rhythm with concern for brainstem dysfunction. Pt given mannitol,25g. Stat EEG showed focal sharp wave discharge in left frontal area but pt was not actively seizing. EEG was also suggestive of mild to moderate generalized encephalopathic process. 3. Imaging:  Pt's CT scan from 6/4 showed diffuse SAH worst in the left sylvian fissure with IVH and no ventricular enlargement. Scans from 6/5 showed less SAH, and otherwise was unchanged. Scan from 2:30 am on 6/6/20 showed minimal increase in frontal horn size, no new hemorrhage, and evolving left frontal cortical edema/infarct as evidenced by loss of G-W junction. Other subtle areas of GW loss in the [address] also seen. CTA done on 6/6/ at 8:20 am showed no evidence of vasospasm, secured left MCA aneurysm, unchanged right MCA aneurysm. TCDs done at 9:25 am showed no evidence of vasospasm. MRI done at 1:35 pm shows extensive cortical infarction involving left MCA, bilateral PANCHITO, right MCA middle insula and perirolandic region, and left parieto-occipital region. There are punctate areas of restricted diffusion in right centrum semiovale and right cerebellar hemisphere. This imaging picture is consistent with hypoxic-ischemic encephalopathy (HIE). Discussed case with Dr. Zach Gleason from neurology. Will plan on continuous EEG in the a.m., and added second antiepileptic. Some of the MRI findings may be secondary to seizures. CT scan in the a.m. Discontinue all sedation for best neurological evaluation. Will check echo for EF. Have discussed gravity of situation with the patient's mother. Shon Dewitt M.D.   [facility]ian/[facility]        Signed By: Solo Shrestha MD     June 6, 2020

## 2020-06-06 NOTE — PROGRESS NOTES
Neurointerventional Surgery Progress Note  Meli Ortiz NP   Neurocritical Care Nurse Practitioner  [phone]          Admit Date: 6/4/2020        Daily Progress Note: 6/6/2020   LOS: 2 days      S/P:  Cerebral angiogram with coil embolization of ruptured left MCA bifurcation aneurysm     ** Delayed Presentation ** PB Day 6    Interval History/Subjective:   Patient with neuro changes this morning consisting of no withdrawal of pain to right arm and pupillary changes. Propofol was stopped and patient was then switched to Precedex for a better neuro exam. CTA of the Head was completed post neuro changes which showed slightly increased IVH and SAH, however no CT without contrast images were obtained. This slight increase could be enhanced with contrast. Stat CT of Head ordered as well as stat MRI of Brain. Case discussed with Dr. Cammy Mejía. Mannitol ordered for concerns for increased ICP. On my exam, patient withdrawing to pain BUE > BLE. No command following. Anisocoria noted on exam with Left pupil 4 mm and R pupil 3 mm, sluggish response. Right pupil deviated downwad. Patient also went into a junctional rhythm briefly after evaluating the patient. EEG ordered stat to rule out subclinical seizures. Unable to perform a ROS due to intubation and sedation. 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 clipping - Continue Nimotop Day 2 of 21 to prevent delayed cerebral ischemia              - Maintenance IV Fluids on hold due to worsening edema on CXR this morning per Intensivist (CXR showing persistent although increased interstitial and parenchymal opacities, particularly in the right perihilar region). Patient +880 ml of fluid this morning. +7800 ml since admission              - TCDs daily started today, no evidence of spasm on TCDs today              - Strict I&O's              - ECHO shows EF 60-65%, mild concentric hypertrophy, mildly dilated left atrium              - every hour neuro checks              - change SBP goal to 140-180 to increase CPP due to concern for increased ICP clinically, Cardene/Fabien/Hydralazine/Labetalol PRN              - PT/OT/SLP evals when appropriate              - CTA of Head this AM showed no evidence of vasospasm              - stat CT of Head WO and MRI of Brain ordered to assess for any interval changes and assess for any underlying ischemia. Repeat CT of Head showed stable SAH and IVH. Left MCA territory infarction with smaller right MCA territory infarction. Sulcal effacement/gyral edema left MCA territory left frontal, temporal and parietal lobes better appreciated on recent MR. Sulcal effacement at the right insular cortex. Intraventricular and subarachnoid hemorrhage not significantly changed.              - NSGY following              - NIS following      2.) Seizure              - Due to #1, no prior hx of seizure              - Continue Keppra 1000 mg BID for seizure PPX              - Seizure precautions              - stat EEG ordered due to neuro changes, assess for any subclinical seizures, EEG read by Neurology which showed an abnormal EEG due to slowing of the background rhythms with intermittent rhythmic delta activity seen in both frontal head regions. Occasional sharp wave discharges in the left frontal area.  EEG is suggestive of mild-to-moderate generalized encephalopathic process, nonspecific in type. In addition, there was occasional epileptiform disturbance seen in the left frontal area which may represent a partial onset seizure focus.              - Neurology consulted for additional recommendations on seizure management               - start Vimpat 100 mg BID for additional seizure ppx per Neurology recommendation               3.) Acute hypoxic respiratory failure in the setting of likely neurogenic pulmonary edema              - Symmetric moderately severe airspace disease in the upper lobes bilaterally  which may represent edema or sequela of aspiration seen on CTA. CXR on 6/4 showed Diffuse bilateral airspace disease more suggestive of pulmonary edema than diffuse pneumonia. Repeat CXR on 6/6/2020 showed persistent although increased interstitial and parenchymal opacities  particularly in the right perihilar region.             - COVID 19 test negative x 1             - vent settings per Intensivist              - wean sedation as tolerated              - patient may potentially need to be diuresed              - Intensivist managing     4.) Multiple Acute Ischemic Infarctions              - MRI completed and shows a large left MCA territory ischemia/infarction,  left temporal, parietal and frontal lobes, cortically based, no superimposed  increased parenchymal hemorrhage or midline shift. Right and left PANCHITO territory  cortical diffusion restriction consistent with infarction. Infarction in the right insular cortex and right temporal lobe.  Scattered small infarctions right and left corona radiata centrum semiovale, right cerebellum                - Imaging pattern is consistent with Hypoxic-Ischemic Encephalopathy (HIE)                - start aspirin 300 MO daily for stroke prevention                - ECHO as stated above                - Lipid panel on 6/5, LDL 7.8, goal < 70, statin not indicated                - Hgb A1C ok at 5.7                - Neurology consulted for further evaluation     5.) Cerebral edema                 - sulcal effacement/gyral edema in left MCA territory, left frontal, temporal, and parietal lobes. No significant shift or transtentorial herniation                 - 25 grams of Mannitol given one time initially prior to imaging due to concern for increased ICP clinically                 - continue to monitor clinically                 Plan d/w Dr. Clement Pereyra, Irvin Hays (NP Intensivist), Dr. Eli Hitchcock and RN. Dr. Clement Pereyra will update patient's mother. Admission Summary:     Yeni Osei is a 48 y.o. 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 On arrival to ED, pt was unresponsive and noted to be seizing. A stat CT of her head was performed there, which showed extensive SAH. Pt was then intubated emergently. She was also extremely hypertensive with SBPs in 240s. She was given Keppra and Ativan, and placed on cardene drip for BP control. NSGY and NIS were then consulted and the patient was transferred to [facility] for higher level of care. En route, paramedics administered 7.5 mg of Versed, 200mcg of Fentanyl, and 10 mg of labetalol for BP control. On arrival, CTA of her head and neck was obtained, which showed a 4.4 x 4.1 mm right MCA trifurcation aneurysm and  a 2.2 x 3.1 mm left MCA trifurcation aneurysm. Of note, CTA head/neck also showed pulmonary infiltrates suspicious for COVID-19 infection and the patient has been tested for COVID which was negative. 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 Mother reported pt does not smoke, does not drink alcohol or use street drugs. The mother also reported that the patient began complaining of a headache on 5/31/2020 and has been taking BC Powder (aspirin) for the headache.  She also reported that the pt has a hx of HTN and is supposed to be on BP medications, but recently stopped taking her medications. The patient underwent a cerebral angiogram on 6/5 by Dr. Lafrances Eisenmenger for coil embolization of a ruptured left MCA bifurcation aneurysm.     Current Facility-Administered Medications   Medication Dose Route Frequency Provider Last Rate Last Dose    albuterol-ipratropium (DUO-NEB) 2.5 MG-0.5 MG/3 ML  3 mL Nebulization Q6H RT [facility], ACNP   3 mL at 06/06/20 1444    [Held by provider] furosemide (LASIX) injection 60 mg  60 mg IntraVENous ONCE Maryuri Ash ACNP        furosemide (LASIX) 10 mg/mL injection             piperacillin-tazobactam (ZOSYN) 3.375 g in 0.9% sodium chloride (MBP/ADV) 100 mL  3.375 g IntraVENous Q8H [facility], ACNP 25 mL/hr at 06/06/20 1243 3.375 g at 06/06/20 1243    Vancomycin Per Pharmacy Dosing   Other Rx Dosing/Monitoring Javier Orellana DO        [START ON 6/7/2020] vancomycin (VANCOCIN) 1750 mg in  ml infusion  1,750 mg IntraVENous Q16H Javier Orellana DO        PHENYLephrine (ELKIN-SYNEPHRINE) 30 mg in 0.9% sodium chloride 250 mL infusion   mcg/min IntraVENous TITRATE Anaid Avery NP        glucose chewable tablet 16 g  4 Tab Oral PRN Ar Nava NP-JOVANY        glucagon (GLUCAGEN) injection 1 mg  1 mg IntraMUSCular PRN Ar Nava NP-JOVANY        dextrose 10% infusion 0-250 mL  0-250 mL IntraVENous PRN Ar Nava NP-JOVANY        insulin lispro (HUMALOG) injection   SubCUTAneous Q6H FABIAN Owens   2 Units at 06/06/20 1255    albuterol (PROVENTIL VENTOLIN) nebulizer solution 2.5 mg  2.5 mg Nebulization Q6H PRN Ar Nava NP-JOVANY        famotidine (PF) (PEPCID) 20 mg in 0.9% sodium chloride 10 mL injection  20 mg IntraVENous Q12H [facility], ACNP   20 mg at 06/06/20 0912    fentaNYL citrate (PF) injection  mcg   mcg IntraVENous Q1H PRN Hilda Bazan, ACNP   100 mcg at 06/06/20 1102    polyvinyl alcohol-povidone (NATURAL TEARS) 0.5-0.6 % ophthalmic solution 2 Drop  2 Drop Both Eyes Q8H Opal People, ACNP   2 Drop at 20 0630    docusate (COLACE) 50 mg/5 mL oral liquid 100 mg  100 mg Oral BID Opal People, ACNP   100 mg at 20 5099    bisacodyL (DULCOLAX) suppository 10 mg  10 mg Rectal DAILY PRN Opal People, ACNP        levETIRAcetam (KEPPRA) 1,000 mg in 0.9% sodium chloride 100 mL IVPB  1,000 mg IntraVENous Q12H Ana Domínguez NP   1,000 mg at 20 1045    [Held by provider] lactated Ringers infusion  50 mL/hr IntraVENous CONTINUOUS Linward Pew R, NP-C   Stopped at 20 1137    niMODipine (NYMALIZE) 6 mg/mL oral solution 60 mg  60 mg Per NG tube Q4H Ana Domínguez NP   60 mg at 20 1230    niCARdipine (CARDENE) 25 mg in 0.9% sodium chloride 250 mL infusion  0-15 mg/hr IntraVENous TITRATE Ana Domínguez NP   Stopped at 20 1134    labetaloL (NORMODYNE;TRANDATE) injection 20 mg  20 mg IntraVENous Q2H PRN Ana Domínguez NP   20 mg at 20 1431    propofol (DIPRIVAN) 10 mg/mL infusion  0-50 mcg/kg/min IntraVENous TITRATE Ana Domínguez NP   Stopped at 20 0920    chlorhexidine (ORAL CARE KIT) 0.12 % mouthwash 15 mL  15 mL Oral Q12H Linward Pew R, NP-C   15 mL at 20 0919    ondansetron (ZOFRAN) injection 4 mg  4 mg IntraVENous Q4H PRN FABIAN Bae        docusate (COLACE) 50 mg/5 mL oral liquid 100 mg  100 mg Oral BID PRN Linward Pew R, NP-C            No Known Allergies    Review of Systems:  Review of systems not obtained due to patient factors. Objective:     Vital signs  Temp (24hrs), Av.9 °F (37.2 °C), Min:98.1 °F (36.7 °C), Max:100 °F (37.8 °C)    07 -  1900  In: 1386.2 [I.V.:1001.2]  Out: 346 [Urine:445]  1901 -  0700  In: 8452.7 [I.V.:7452.7]  Out: 5684 [Urine:1525]    Visit Vitals  /62 (BP 1 Location: Right arm, BP Patient Position:  At rest)   Pulse 83   Temp 100 °F (37.8 °C)   Resp 21   Ht 5' 4\" (1.626 m)   Wt 228 lb (103.4 kg)   SpO2 98%   BMI 39.14 kg/m²      O2 Device: Endotracheal tube   Vitals:    06/06/20 1300 06/06/20 1417 06/06/20 1425 06/06/20 1444   BP: 167/65  186/62    Pulse: 69 80 83    Resp: 19 18 21    Temp:       SpO2: 99% 99% 99% 98%   Weight:       Height:          Physical Exam:  GENERAL: NAD, intubated and sedated on low dose precedex   EYE: conjunctivae/corneas clear. Left pupil 4 mm sluggish, Right pupil 3 m sluggish, Right eye deviates downward slightly, scleral edema noted bilaterally   LUNG: lungs coarse bilaterally   HEART: regular rate and rhythm, S1, S2 normal, no murmur, click, rub or gallop  EXTREMITIES:  extremities normal, atraumatic, no cyanosis, non-pitting peripheral edema, distal pulses 2+ bilaterally   SKIN: Skin warm to touch. Right groin site clean, dry, and intact. No hematoma, bruising, or bleeding noted. NEUROLOGIC: Intubated and sedated on Precedex. Eyes do not open to stimulus. No command following. Left pupil 4 mm sluggish, Right pupil 3 mm sluggish, Right eye deviates downward slightly. No tracking with eyes. + cough/gag, + corneal reflex. Negative Doll's eye. No blink to threat. Withdraws to painful stimuli (BUE > BLE). Toes mute bilaterally. No involuntary movements. Gait deferred. Unable to assess language, sensation, and coordination. Imaging:  MRI of Brain on 6/6/2020 shows  IMPRESSION:   Imaging findings consistent with large left MCA territory ischemia/infarction,  left temporal, parietal and frontal lobes, cortically based, no superimposed  increased parenchymal hemorrhage or midline shift. Right and left PANCHITO territory  cortical diffusion restriction consistent with infarction. Infarction in the right insular cortex and right temporal lobe. Scattered small  infarctions right and left corona radiata centrum semiovale, right cerebellum.   Allowing for differences in technique likely stable subarachnoid and  intraventricular hemorrhage. CT of Head on 6/6/2020 at 1411 shows  IMPRESSION:   Left MCA territory infarction with smaller right MCA territory infarction.     Stable subarachnoid and intraventricular hemorrhage. CTA of Head on 6/6/2020 at 0903 shows  IMPRESSION:  Slightly increased intraventricular and subarachnoid hemorrhage compared to the  prior exam.     Status post coiling left MCA bifurcation aneurysm. No significant residual  aneurysm filling. CT of Head on 6/62020 at 0239 shows  IMPRESSION:  Slightly diminished subarachnoid and intraventricular hemorrhage. Stable ventricular system. CT of Head WO on 6/5/2020 at 1512 shows  IMPRESSION: Stable acute subarachnoid and intraventricular hemorrhage. Stable  ventricular system. CT Head WO 6/5/20 0826     IMPRESSION:   1. Diffuse subarachnoid hemorrhage most concentrated in the basal cisterns and  left sylvian fissure. 2.  Interval ventricular enlargement suspicious for developing hydrocephalus.     CTA Head 6/4/20 2017     IMPRESSION:  1. Diffuse subarachnoid hemorrhage in the basilar cisterns and sylvian  fissures. 2.  Bilateral MCA bifurcation aneurysms. 3.  Infundibular origins to the posterior communicating arteries bilaterally. 4.  Symmetric moderately severe airspace disease in the upper lobes bilaterally  which may represent edema or sequela of aspiration.     CT Head WO Contrast 6/4/2020 2012    IMPRESSION: Extensive subarachnoid hemorrhage concerning for leaking/ruptured  aneurysm.        24 hour results:    Recent Results (from the past 24 hour(s))   GLUCOSE, POC    Collection Time: 06/05/20  5:03 PM   Result Value Ref Range    Glucose (POC) 132 (H) 65 - 100 mg/dL    Performed by [address] [place] [address], POC    Collection Time: 06/06/20 12:07 AM   Result Value Ref Range    Glucose (POC) 132 (H) 65 - 100 mg/dL    Performed by [facility] JOSE RAMON    IRON PROFILE    Collection Time: 06/06/20  4:21 AM   Result Value Ref Range Iron 10 (L) 35 - 150 ug/dL    TIBC 376 250 - 450 ug/dL    Iron % saturation 3 (L) 20 - 50 %   CBC WITH AUTOMATED DIFF    Collection Time: 06/06/20  4:22 AM   Result Value Ref Range    WBC 10.8 3.6 - 11.0 K/uL    RBC 3.88 3.80 - 5.20 M/uL    HGB 7.7 (L) 11.5 - 16.0 g/dL    HCT 26.6 (L) 35.0 - 47.0 %    MCV 68.6 (L) 80.0 - 99.0 FL    MCH 19.8 (L) 26.0 - 34.0 PG    MCHC 28.9 (L) 30.0 - 36.5 g/dL    RDW 18.7 (H) 11.5 - 14.5 %    PLATELET 062 090 - 087 K/uL    MPV 9.9 8.9 - 12.9 FL    NRBC 0.0 0  WBC    ABSOLUTE NRBC 0.00 0.00 - 0.01 K/uL    NEUTROPHILS 84 (H) 32 - 75 %    LYMPHOCYTES 8 (L) 12 - 49 %    MONOCYTES 7 5 - 13 %    EOSINOPHILS 0 0 - 7 %    BASOPHILS 0 0 - 1 %    IMMATURE GRANULOCYTES 1 (H) 0.0 - 0.5 %    ABS. NEUTROPHILS 9.0 (H) 1.8 - 8.0 K/UL    ABS. LYMPHOCYTES 0.9 0.8 - 3.5 K/UL    ABS. MONOCYTES 0.8 0.0 - 1.0 K/UL    ABS. EOSINOPHILS 0.0 0.0 - 0.4 K/UL    ABS. BASOPHILS 0.0 0.0 - 0.1 K/UL    ABS. IMM.  GRANS. 0.1 (H) 0.00 - 0.04 K/UL    DF SMEAR SCANNED      PLATELET COMMENTS Large Platelets      RBC COMMENTS MICROCYTOSIS  2+        RBC COMMENTS HYPOCHROMIA  3+        RBC COMMENTS ANISOCYTOSIS  1+        RBC COMMENTS OVALOCYTES  1+        RBC COMMENTS SCHISTOCYTES  1+       METABOLIC PANEL, BASIC    Collection Time: 06/06/20  4:22 AM   Result Value Ref Range    Sodium 142 136 - 145 mmol/L    Potassium 3.6 3.5 - 5.1 mmol/L    Chloride 113 (H) 97 - 108 mmol/L    CO2 18 (L) 21 - 32 mmol/L    Anion gap 11 5 - 15 mmol/L    Glucose 158 (H) 65 - 100 mg/dL    BUN 14 6 - 20 MG/DL    Creatinine 0.93 0.55 - 1.02 MG/DL    BUN/Creatinine ratio 15 12 - 20      GFR est AA >60 >60 ml/min/1.73m2    GFR est non-AA >60 >60 ml/min/1.73m2    Calcium 8.1 (L) 8.5 - 10.1 MG/DL   MAGNESIUM    Collection Time: 06/06/20  4:22 AM   Result Value Ref Range    Magnesium 2.3 1.6 - 2.4 mg/dL   PHOSPHORUS    Collection Time: 06/06/20  4:22 AM   Result Value Ref Range    Phosphorus 2.7 2.6 - 4.7 MG/DL   GLUCOSE, POC    Collection Time: 06/06/20  6:36 AM   Result Value Ref Range    Glucose (POC) 149 (H) 65 - 100 mg/dL    Performed by Sera Watson    TROPONIN I    Collection Time: 06/06/20  9:37 AM   Result Value Ref Range    Troponin-I, Qt. 0.45 (H) <0.05 ng/mL   ECHO ADULT COMPLETE    Collection Time: 06/06/20  9:49 AM   Result Value Ref Range    LA Volume 85.55 22 - 52 mL    LV E' Lateral Velocity 8.81 cm/s    LV E' Septal Velocity 8.01 cm/s    Tapse 2.14 (A) 1.5 - 2.0 cm    Ao Root D 2.68 cm    Aortic Valve Systolic Peak Velocity 998.69 cm/s    Aortic Valve Area by Continuity of Peak Velocity 1.9 cm2    AoV PG 16.2 mmHg    LVIDd 4.49 3.9 - 5.3 cm    LVPWd 1.23 (A) 0.6 - 0.9 cm    LVIDs 3.07 cm    IVSd 1.21 (A) 0.6 - 0.9 cm    LVOT d 1.84 cm    LVOT Peak Velocity 143.47 cm/s    LVOT Peak Gradient 8.2 mmHg    MVA (PHT) 4.3 cm2    MV A Angel 90.02 cm/s    MV E Angel 100.16 cm/s    MV E/A 1.10     Left Atrium to Aortic Root Ratio 1.55     RVIDd 3.91 cm    LA Vol 4C 79.55 (A) 22 - 52 mL    LA Vol 2C 75.49 (A) 22 - 52 mL    LA Area 4C 25.0 cm2    LV Mass .3 (A) 67 - 162 g    LV Mass AL Index 115.2 43 - 95 g/m2    E/E' lateral 11.37     E/E' septal 12.50     RVSP 32.4 mmHg    E/E' ratio (averaged) 11.94     Est. RA Pressure 5.0 mmHg    Mitral Valve E Wave Deceleration Time 175.7 ms    Mitral Valve Pressure Half-time 51.0 ms    Left Atrium Major Axis 4.17 cm    Triscuspid Valve Regurgitation Peak Gradient 27.4 mmHg    Pulmonic Valve Max Velocity 89.11 cm/s    TR Max Velocity 261.54 cm/s    LA Vol Index 41.37 16 - 28 ml/m2    PASP 32.4 mmHg    LA Vol Index 36.50 16 - 28 ml/m2    LA Vol Index 38.47 16 - 28 ml/m2    Left Ventricular Fractional Shortening by 2D [id number] %    Mitral Valve Deceleration Rains [id number]     AV Velocity Ratio 0.71     PV peak gradient 3.2 mmHg   TCD INTRACRANIAL ARTERIES COMPLETE    Collection Time: 06/06/20 10:13 AM   Result Value Ref Range    Right [place]ard Ratio 2.4     Right MCA 1  cm/s    Right MCA 1 EDV 29 cm/s    Right MCA 1 Mean Velocity 56 cm/s    Right PANCHITO PSV 91 cm/s    Right PANCHITO EDV 25 cm/s    Right PANCHITO Mean Velocity 47 cm/s    Right ICA PSV 78 cm/s    Right ICA EDV 19 cm/s    Right ICA Mean Velocity 39 cm/s    Right PCA 1 PSV 80 cm/s    Right PCA 1 EDV 19 cm/s    Right PCA 1 Mean Velocity 39 cm/s    Right External ICA PSV 48 cm/s    Right External ICA EDV 11 cm/s    Right External ICA Mean Velocity 23 cm/s    Left MCA 1  cm/s    Left MCA 1 EDV 30 cm/s    Left MCA 1 Mean Velocity 65 cm/s    Left PANCHITO PSV 95 cm/s    Left PANCHITO EDV 23 cm/s    Left PANCHITO Mean Velocity 47 cm/s    Left ICA PSV 95 cm/s    Left External ICA PSV 81 cm/s    Left ICA EDV 30 cm/s    Left External ICA EDV 25 cm/s    Left ICA Mean Velocity 52 cm/s    Left External ICA Mean Velocity 44 cm/s    Left PCA 1  cm/s    Left PCA 1 EDV 27 cm/s    Left PCA 1 Mean Velocity 52 cm/s    Basilar Artery PSV 51 cm/s    Basilar Artery EDV 14 cm/s    Basilar Artery Mean Angel 26 cm/s    Right Vertebral Mean Velocity 24 cm/s    Left Vertebral Mean Velocity 23 cm/s    Right Vertebral PSV 45 cm/s    Right Vertebral EDV 14 cm/s    Left Vertebral PSV 43 cm/s    Left Vertebral EDV 13 cm/s   EKG, 12 LEAD, INITIAL    Collection Time: 06/06/20 11:24 AM   Result Value Ref Range    Ventricular Rate 55 BPM    Atrial Rate 52 BPM    QRS Duration 102 ms    Q-T Interval 492 ms    QTC Calculation (Bezet) 470 ms    Calculated R Axis 19 degrees    Calculated T Axis 76 degrees    Diagnosis       Junctional rhythm  When compared with ECG of 04-JUN-2020 20:51,  Junctional rhythm has replaced Sinus rhythm  QT has shortened     TROPONIN I    Collection Time: 06/06/20 12:07 PM   Result Value Ref Range    Troponin-I, Qt. 0.50 (H) <0.05 ng/mL   PROCALCITONIN    Collection Time: 06/06/20 12:07 PM   Result Value Ref Range    Procalcitonin 0.49 ng/mL   GLUCOSE, POC    Collection Time: 06/06/20 12:23 PM   Result Value Ref Range    Glucose (POC) 173 (H) 65 - 100 mg/dL Performed by Suhail Cruz    POC EG7    Collection Time: 06/06/20 12:25 PM   Result Value Ref Range    Calcium, ionized (POC) 1.14 1.12 - 1.32 mmol/L    FIO2 (POC) 100 %    pH (POC) 7.351 7.35 - 7.45      pCO2 (POC) 31.9 (L) 35.0 - 45.0 MMHG    pO2 (POC) 115 (H) 80 - 100 MMHG    HCO3 (POC) 17.7 (L) 22 - 26 MMOL/L    Base deficit (POC) 8 mmol/L    sO2 (POC) 98 (H) 92 - 97 %    Site RIGHT RADIAL      Device: VENT      Mode ASSIST CONTROL      Set Rate 16 bpm    PEEP/CPAP (POC) 12 cmH2O    Allens test (POC) YES      Inspiratory Time 1.25 sec    Specimen type (POC) ARTERIAL      Total resp.  rate 22      Pressure control YES          Yanet Rollins, NP

## 2020-06-06 NOTE — CONSULTS
Consult dictated. 79-year-old admitted after she was found down seizing, has extensive subarachnoid hemorrhage, related to left MCA aneurysm rupture, status post coiling. MRI shows extensive areas of cortical edema/restricted diffusion in both hemispheres. It is unclear if this is related to subarachnoid hemorrhage itself, prolonged seizure activity or hypoxemia. Continue with supportive care. Add Vimpat to Keppra. Try to lighten sedation.    Dakota Hollins MD

## 2020-06-06 NOTE — PROGRESS NOTES
Addendum:  Patient going to MRI after CT Head showing increased 1 [address] blood present once stable. Dr. Cruzito Horner aware of neuro changes and recent CT head changes. Spot EEG in progress. Mannitol 25GM IV Q6hr x 4     Desaturating on current vent support. Will check PCXR. Zosyn/ vanco started for possible pneumonia, Mannitol given in lieu of furosemide given acute change in pupil size Left 5mm ovoid and RT 2mm round. Vent requirements increased and settings changed to P 12 Fio2 100% on PC 16 PC 20 Ti 1.25 with approx. 1:2 ratio. Will check ABG 20 mins after vent changes. Bedside monitor shows possible Accelerated junctional rhythm, no P waves noted in multiple leads, rhythm regular. Checking EKG with confirmed Junctional rhythm.  Will check troponin Q6hr x 3 given am level 0.45    DARREN Ross-BC

## 2020-06-06 NOTE — PROCEDURES
[facility]      Procedure Note - Arterial Access:   Performed by DARREN Adamson . Immediately prior to the procedure, the patient was reevaluated and found suitable for the planned procedure and any planned medications. Immediately prior to the procedure a time out was called to verify the correct patient, procedure, equipment, staff, and marking as appropriate. Insertion Date: 06/06/2020 Time:16:45   Procedure Location:  ICU. Condition: Emergency. Consent:  NO.     Method: Seldinger technique. Site Prep: ChloraPrep. Procedure: Arterial Catheter Insertion in Left, Brachial Artery   Catheter inserted into a new site. Number of Attempts:  01 Indication: Monitoring and Blood Drawing. Complication None. Performed By:performed the above procedure myself. The procedure was tolerated well.

## 2020-06-06 NOTE — PROGRESS NOTES
Asked to re eval by dr Alondra Russo, re: some loss of grey/white junction differentiation on new CT. They will treat with some mannitol but need to be cautious not to dehydrate her. CT shows no acute hydrocephalus, no new hemorrhage, so will hold off on placing EVD unless there is desire to monitor ICP. Deferring care to Neuro IR team as she has been coiled with a remaining opposite side MCA aneurysm present.  Will follow as needed

## 2020-06-07 ENCOUNTER — APPOINTMENT (OUTPATIENT)
Dept: VASCULAR SURGERY | Age: 50
DRG: 003 | End: 2020-06-07
Attending: NURSE PRACTITIONER
Payer: COMMERCIAL

## 2020-06-07 ENCOUNTER — APPOINTMENT (OUTPATIENT)
Dept: CT IMAGING | Age: 50
DRG: 003 | End: 2020-06-07
Attending: NURSE PRACTITIONER
Payer: COMMERCIAL

## 2020-06-07 LAB
ANION GAP SERPL CALC-SCNC: 10 MMOL/L (ref 5–15)
ATRIAL RATE: 52 BPM
BACTERIA SPEC CULT: NORMAL
BACTERIA SPEC CULT: NORMAL
BASOPHILS # BLD: 0 K/UL (ref 0–0.1)
BASOPHILS NFR BLD: 0 % (ref 0–1)
BUN SERPL-MCNC: 21 MG/DL (ref 6–20)
BUN/CREAT SERPL: 20 (ref 12–20)
CALCIUM SERPL-MCNC: 7.6 MG/DL (ref 8.5–10.1)
CALCULATED R AXIS, ECG10: 19 DEGREES
CALCULATED T AXIS, ECG11: 76 DEGREES
CHLORIDE SERPL-SCNC: 113 MMOL/L (ref 97–108)
CO2 SERPL-SCNC: 19 MMOL/L (ref 21–32)
CREAT SERPL-MCNC: 1.04 MG/DL (ref 0.55–1.02)
CRP SERPL-MCNC: 47.9 MG/DL (ref 0–0.6)
D DIMER PPP FEU-MCNC: 8.63 MG/L FEU (ref 0–0.65)
DIAGNOSIS, 93000: NORMAL
DIFFERENTIAL METHOD BLD: ABNORMAL
EOSINOPHIL # BLD: 0 K/UL (ref 0–0.4)
EOSINOPHIL NFR BLD: 0 % (ref 0–7)
ERYTHROCYTE [DISTWIDTH] IN BLOOD BY AUTOMATED COUNT: 19.1 % (ref 11.5–14.5)
FERRITIN SERPL-MCNC: 30 NG/ML (ref 8–252)
FERRITIN SERPL-MCNC: 30 NG/ML (ref 8–252)
GLUCOSE BLD STRIP.AUTO-MCNC: 120 MG/DL (ref 65–100)
GLUCOSE BLD STRIP.AUTO-MCNC: 152 MG/DL (ref 65–100)
GLUCOSE BLD STRIP.AUTO-MCNC: 169 MG/DL (ref 65–100)
GLUCOSE BLD STRIP.AUTO-MCNC: 178 MG/DL (ref 65–100)
GLUCOSE SERPL-MCNC: 165 MG/DL (ref 65–100)
HCT VFR BLD AUTO: 24 % (ref 35–47)
HCT VFR BLD AUTO: 26.3 % (ref 35–47)
HGB BLD-MCNC: 6.9 G/DL (ref 11.5–16)
HGB BLD-MCNC: 7.7 G/DL (ref 11.5–16)
IMM GRANULOCYTES # BLD AUTO: 0.1 K/UL (ref 0–0.04)
IMM GRANULOCYTES NFR BLD AUTO: 1 % (ref 0–0.5)
LDH SERPL L TO P-CCNC: 211 U/L (ref 81–246)
LYMPHOCYTES # BLD: 1 K/UL (ref 0.8–3.5)
LYMPHOCYTES NFR BLD: 11 % (ref 12–49)
MAGNESIUM SERPL-MCNC: 2.3 MG/DL (ref 1.6–2.4)
MCH RBC QN AUTO: 19.9 PG (ref 26–34)
MCHC RBC AUTO-ENTMCNC: 28.8 G/DL (ref 30–36.5)
MCV RBC AUTO: 69.4 FL (ref 80–99)
MONOCYTES # BLD: 0.6 K/UL (ref 0–1)
MONOCYTES NFR BLD: 7 % (ref 5–13)
NEUTS SEG # BLD: 7.4 K/UL (ref 1.8–8)
NEUTS SEG NFR BLD: 81 % (ref 32–75)
NRBC # BLD: 0.02 K/UL (ref 0–0.01)
NRBC BLD-RTO: 0.2 PER 100 WBC
PHOSPHATE SERPL-MCNC: 3.7 MG/DL (ref 2.6–4.7)
PLATELET # BLD AUTO: 226 K/UL (ref 150–400)
PMV BLD AUTO: 10.3 FL (ref 8.9–12.9)
POTASSIUM SERPL-SCNC: 3.6 MMOL/L (ref 3.5–5.1)
Q-T INTERVAL, ECG07: 492 MS
QRS DURATION, ECG06: 102 MS
QTC CALCULATION (BEZET), ECG08: 470 MS
RBC # BLD AUTO: 3.46 M/UL (ref 3.8–5.2)
RBC MORPH BLD: ABNORMAL
RBC MORPH BLD: ABNORMAL
SARS-COV-2, COV2: NOT DETECTED
SERVICE CMNT-IMP: ABNORMAL
SERVICE CMNT-IMP: NORMAL
SODIUM SERPL-SCNC: 142 MMOL/L (ref 136–145)
SPECIMEN SOURCE, FCOV2M: NORMAL
TROPONIN I SERPL-MCNC: 0.41 NG/ML
VENTRICULAR RATE, ECG03: 55 BPM
WBC # BLD AUTO: 9.1 K/UL (ref 3.6–11)

## 2020-06-07 PROCEDURE — 70450 CT HEAD/BRAIN W/O DYE: CPT

## 2020-06-07 PROCEDURE — 74011250637 HC RX REV CODE- 250/637: Performed by: NURSE PRACTITIONER

## 2020-06-07 PROCEDURE — 30233N1 TRANSFUSION OF NONAUTOLOGOUS RED BLOOD CELLS INTO PERIPHERAL VEIN, PERCUTANEOUS APPROACH: ICD-10-PCS | Performed by: INTERNAL MEDICINE

## 2020-06-07 PROCEDURE — 36430 TRANSFUSION BLD/BLD COMPNT: CPT

## 2020-06-07 PROCEDURE — 86923 COMPATIBILITY TEST ELECTRIC: CPT

## 2020-06-07 PROCEDURE — 74011000258 HC RX REV CODE- 258: Performed by: NURSE PRACTITIONER

## 2020-06-07 PROCEDURE — 74011250636 HC RX REV CODE- 250/636: Performed by: NURSE PRACTITIONER

## 2020-06-07 PROCEDURE — 80048 BASIC METABOLIC PNL TOTAL CA: CPT

## 2020-06-07 PROCEDURE — 74011000250 HC RX REV CODE- 250: Performed by: NURSE PRACTITIONER

## 2020-06-07 PROCEDURE — 74011250636 HC RX REV CODE- 250/636: Performed by: INTERNAL MEDICINE

## 2020-06-07 PROCEDURE — 82962 GLUCOSE BLOOD TEST: CPT

## 2020-06-07 PROCEDURE — 86900 BLOOD TYPING SEROLOGIC ABO: CPT

## 2020-06-07 PROCEDURE — 74011636637 HC RX REV CODE- 636/637: Performed by: NURSE PRACTITIONER

## 2020-06-07 PROCEDURE — C9254 INJECTION, LACOSAMIDE: HCPCS | Performed by: NURSE PRACTITIONER

## 2020-06-07 PROCEDURE — 74011636320 HC RX REV CODE- 636/320: Performed by: RADIOLOGY

## 2020-06-07 PROCEDURE — 77030018798 HC PMP KT ENTRL FED COVD -A

## 2020-06-07 PROCEDURE — 36415 COLL VENOUS BLD VENIPUNCTURE: CPT

## 2020-06-07 PROCEDURE — 83615 LACTATE (LD) (LDH) ENZYME: CPT

## 2020-06-07 PROCEDURE — 86140 C-REACTIVE PROTEIN: CPT

## 2020-06-07 PROCEDURE — 94640 AIRWAY INHALATION TREATMENT: CPT

## 2020-06-07 PROCEDURE — 74011000258 HC RX REV CODE- 258: Performed by: RADIOLOGY

## 2020-06-07 PROCEDURE — 94003 VENT MGMT INPAT SUBQ DAY: CPT

## 2020-06-07 PROCEDURE — 95714 VEEG EA 12-26 HR UNMNTR: CPT | Performed by: PSYCHIATRY & NEUROLOGY

## 2020-06-07 PROCEDURE — 84484 ASSAY OF TROPONIN QUANT: CPT

## 2020-06-07 PROCEDURE — 93886 INTRACRANIAL COMPLETE STUDY: CPT

## 2020-06-07 PROCEDURE — 85018 HEMOGLOBIN: CPT

## 2020-06-07 PROCEDURE — 71275 CT ANGIOGRAPHY CHEST: CPT

## 2020-06-07 PROCEDURE — 65610000006 HC RM INTENSIVE CARE

## 2020-06-07 PROCEDURE — 84100 ASSAY OF PHOSPHORUS: CPT

## 2020-06-07 PROCEDURE — P9047 ALBUMIN (HUMAN), 25%, 50ML: HCPCS | Performed by: NURSE PRACTITIONER

## 2020-06-07 PROCEDURE — 85025 COMPLETE CBC W/AUTO DIFF WBC: CPT

## 2020-06-07 PROCEDURE — 83735 ASSAY OF MAGNESIUM: CPT

## 2020-06-07 PROCEDURE — 85379 FIBRIN DEGRADATION QUANT: CPT

## 2020-06-07 PROCEDURE — P9016 RBC LEUKOCYTES REDUCED: HCPCS

## 2020-06-07 PROCEDURE — 82728 ASSAY OF FERRITIN: CPT

## 2020-06-07 RX ORDER — ALBUMIN HUMAN 250 G/1000ML
12.5 SOLUTION INTRAVENOUS ONCE
Status: COMPLETED | OUTPATIENT
Start: 2020-06-07 | End: 2020-06-07

## 2020-06-07 RX ORDER — LABETALOL HYDROCHLORIDE 5 MG/ML
20 INJECTION, SOLUTION INTRAVENOUS
Status: DISCONTINUED | OUTPATIENT
Start: 2020-06-07 | End: 2020-06-10

## 2020-06-07 RX ORDER — FUROSEMIDE 10 MG/ML
20 INJECTION INTRAMUSCULAR; INTRAVENOUS EVERY 12 HOURS
Status: DISCONTINUED | OUTPATIENT
Start: 2020-06-07 | End: 2020-06-08

## 2020-06-07 RX ORDER — SODIUM CHLORIDE 0.9 % (FLUSH) 0.9 %
10 SYRINGE (ML) INJECTION
Status: COMPLETED | OUTPATIENT
Start: 2020-06-07 | End: 2020-06-07

## 2020-06-07 RX ORDER — FUROSEMIDE 10 MG/ML
40 INJECTION INTRAMUSCULAR; INTRAVENOUS EVERY 12 HOURS
Status: DISCONTINUED | OUTPATIENT
Start: 2020-06-07 | End: 2020-06-07

## 2020-06-07 RX ORDER — SODIUM CHLORIDE 9 MG/ML
250 INJECTION, SOLUTION INTRAVENOUS AS NEEDED
Status: DISCONTINUED | OUTPATIENT
Start: 2020-06-07 | End: 2020-06-21

## 2020-06-07 RX ADMIN — SODIUM CHLORIDE 15 MG/HR: 900 INJECTION, SOLUTION INTRAVENOUS at 23:54

## 2020-06-07 RX ADMIN — FENTANYL CITRATE 100 MCG: 50 INJECTION INTRAMUSCULAR; INTRAVENOUS at 17:09

## 2020-06-07 RX ADMIN — NIMODIPINE 60 MG: 60 SOLUTION ORAL at 22:38

## 2020-06-07 RX ADMIN — PROPOFOL INJECTABLE EMULSION 25 MCG/KG/MIN: 10 INJECTION, EMULSION INTRAVENOUS at 03:33

## 2020-06-07 RX ADMIN — PIPERACILLIN AND TAZOBACTAM 3.38 G: 3; .375 INJECTION, POWDER, LYOPHILIZED, FOR SOLUTION INTRAVENOUS at 20:48

## 2020-06-07 RX ADMIN — LABETALOL HYDROCHLORIDE 20 MG: 5 INJECTION INTRAVENOUS at 10:05

## 2020-06-07 RX ADMIN — INSULIN LISPRO 2 UNITS: 100 INJECTION, SOLUTION INTRAVENOUS; SUBCUTANEOUS at 23:28

## 2020-06-07 RX ADMIN — NIMODIPINE 60 MG: 30 SOLUTION ORAL at 05:49

## 2020-06-07 RX ADMIN — IPRATROPIUM BROMIDE AND ALBUTEROL SULFATE 3 ML: .5; 3 SOLUTION RESPIRATORY (INHALATION) at 15:24

## 2020-06-07 RX ADMIN — INSULIN LISPRO 2 UNITS: 100 INJECTION, SOLUTION INTRAVENOUS; SUBCUTANEOUS at 17:54

## 2020-06-07 RX ADMIN — FUROSEMIDE 20 MG: 10 INJECTION, SOLUTION INTRAMUSCULAR; INTRAVENOUS at 20:49

## 2020-06-07 RX ADMIN — VANCOMYCIN HYDROCHLORIDE 1750 MG: 10 INJECTION, POWDER, LYOPHILIZED, FOR SOLUTION INTRAVENOUS at 01:23

## 2020-06-07 RX ADMIN — SODIUM CHLORIDE 7.5 MG/HR: 900 INJECTION, SOLUTION INTRAVENOUS at 21:07

## 2020-06-07 RX ADMIN — Medication 2 DROP: at 21:02

## 2020-06-07 RX ADMIN — IPRATROPIUM BROMIDE AND ALBUTEROL SULFATE 3 ML: .5; 3 SOLUTION RESPIRATORY (INHALATION) at 19:56

## 2020-06-07 RX ADMIN — FUROSEMIDE 40 MG: 10 INJECTION, SOLUTION INTRAMUSCULAR; INTRAVENOUS at 09:23

## 2020-06-07 RX ADMIN — FAMOTIDINE 20 MG: 10 INJECTION, SOLUTION INTRAVENOUS at 09:27

## 2020-06-07 RX ADMIN — VANCOMYCIN HYDROCHLORIDE 1750 MG: 10 INJECTION, POWDER, LYOPHILIZED, FOR SOLUTION INTRAVENOUS at 17:54

## 2020-06-07 RX ADMIN — POTASSIUM BICARBONATE 20 MEQ: 782 TABLET, EFFERVESCENT ORAL at 09:48

## 2020-06-07 RX ADMIN — FENTANYL CITRATE 100 MCG: 50 INJECTION INTRAMUSCULAR; INTRAVENOUS at 03:46

## 2020-06-07 RX ADMIN — FENTANYL CITRATE 100 MCG: 50 INJECTION INTRAMUSCULAR; INTRAVENOUS at 20:53

## 2020-06-07 RX ADMIN — FENTANYL CITRATE 100 MCG: 50 INJECTION INTRAMUSCULAR; INTRAVENOUS at 22:56

## 2020-06-07 RX ADMIN — SODIUM CHLORIDE 100 MG: 900 INJECTION, SOLUTION INTRAVENOUS at 18:07

## 2020-06-07 RX ADMIN — SODIUM CHLORIDE 1000 MG: 900 INJECTION, SOLUTION INTRAVENOUS at 22:38

## 2020-06-07 RX ADMIN — LABETALOL HYDROCHLORIDE 20 MG: 5 INJECTION INTRAVENOUS at 05:35

## 2020-06-07 RX ADMIN — IPRATROPIUM BROMIDE AND ALBUTEROL SULFATE 3 ML: .5; 3 SOLUTION RESPIRATORY (INHALATION) at 07:48

## 2020-06-07 RX ADMIN — NIMODIPINE 60 MG: 60 SOLUTION ORAL at 09:47

## 2020-06-07 RX ADMIN — PIPERACILLIN AND TAZOBACTAM 3.38 G: 3; .375 INJECTION, POWDER, LYOPHILIZED, FOR SOLUTION INTRAVENOUS at 03:33

## 2020-06-07 RX ADMIN — NIMODIPINE 60 MG: 60 SOLUTION ORAL at 15:07

## 2020-06-07 RX ADMIN — NIMODIPINE 60 MG: 60 SOLUTION ORAL at 17:54

## 2020-06-07 RX ADMIN — SODIUM CHLORIDE 100 ML: 900 INJECTION, SOLUTION INTRAVENOUS at 10:55

## 2020-06-07 RX ADMIN — SODIUM CHLORIDE 5 MG/HR: 900 INJECTION, SOLUTION INTRAVENOUS at 17:27

## 2020-06-07 RX ADMIN — PROPOFOL INJECTABLE EMULSION 30 MCG/KG/MIN: 10 INJECTION, EMULSION INTRAVENOUS at 07:09

## 2020-06-07 RX ADMIN — CHLORHEXIDINE GLUCONATE 15 ML: 0.12 RINSE ORAL at 09:47

## 2020-06-07 RX ADMIN — ALBUMIN (HUMAN) 12.5 G: 0.25 INJECTION, SOLUTION INTRAVENOUS at 14:32

## 2020-06-07 RX ADMIN — SODIUM CHLORIDE 5 MG/HR: 900 INJECTION, SOLUTION INTRAVENOUS at 14:58

## 2020-06-07 RX ADMIN — IOPAMIDOL 85 ML: 755 INJECTION, SOLUTION INTRAVENOUS at 10:54

## 2020-06-07 RX ADMIN — PROPOFOL INJECTABLE EMULSION 35 MCG/KG/MIN: 10 INJECTION, EMULSION INTRAVENOUS at 12:35

## 2020-06-07 RX ADMIN — FAMOTIDINE 20 MG: 10 INJECTION, SOLUTION INTRAVENOUS at 20:49

## 2020-06-07 RX ADMIN — FENTANYL CITRATE 100 MCG: 50 INJECTION INTRAMUSCULAR; INTRAVENOUS at 23:49

## 2020-06-07 RX ADMIN — SODIUM CHLORIDE 1000 MG: 900 INJECTION, SOLUTION INTRAVENOUS at 11:17

## 2020-06-07 RX ADMIN — Medication 10 ML: at 10:54

## 2020-06-07 RX ADMIN — INSULIN LISPRO 2 UNITS: 100 INJECTION, SOLUTION INTRAVENOUS; SUBCUTANEOUS at 05:48

## 2020-06-07 RX ADMIN — IPRATROPIUM BROMIDE AND ALBUTEROL SULFATE 3 ML: .5; 3 SOLUTION RESPIRATORY (INHALATION) at 02:14

## 2020-06-07 RX ADMIN — DOCUSATE SODIUM 100 MG: 50 LIQUID ORAL at 09:48

## 2020-06-07 RX ADMIN — Medication 2 DROP: at 05:46

## 2020-06-07 RX ADMIN — ASPIRIN 300 MG: 300 SUPPOSITORY RECTAL at 09:48

## 2020-06-07 RX ADMIN — FENTANYL CITRATE 100 MCG: 50 INJECTION INTRAMUSCULAR; INTRAVENOUS at 12:41

## 2020-06-07 RX ADMIN — Medication 2 DROP: at 13:24

## 2020-06-07 RX ADMIN — CHLORHEXIDINE GLUCONATE 15 ML: 0.12 RINSE ORAL at 21:08

## 2020-06-07 RX ADMIN — FENTANYL CITRATE 100 MCG: 50 INJECTION INTRAMUSCULAR; INTRAVENOUS at 18:54

## 2020-06-07 RX ADMIN — SODIUM CHLORIDE 100 MG: 900 INJECTION, SOLUTION INTRAVENOUS at 07:06

## 2020-06-07 RX ADMIN — NIMODIPINE 60 MG: 30 SOLUTION ORAL at 01:23

## 2020-06-07 RX ADMIN — PIPERACILLIN AND TAZOBACTAM 3.38 G: 3; .375 INJECTION, POWDER, LYOPHILIZED, FOR SOLUTION INTRAVENOUS at 11:26

## 2020-06-07 NOTE — PROGRESS NOTES
Spiritual Care Assessment/Progress Note  [facility]      NAME: Yon Wang      MRN: [id number]  AGE: 48 y.o. SEX: female  Confucianist Affiliation: Latter-day   Language: English     6/7/2020     Total Time (in minutes): 11     Spiritual Assessment begun in [address] [facility] through conversation with:         []Patient        [x] Family    [] Friend(s)        Reason for Consult: Initial visit     Spiritual beliefs: (Please include comment if needed)     [x] Identifies with a vane tradition:         [] Supported by a vane community:            [] Claims no spiritual orientation:           [] Seeking spiritual identity:                [] Adheres to an individual form of spirituality:           [] Not able to assess:                           Identified resources for coping:      [x] Prayer                               [] Music                  [] Guided Imagery     [x] Family/friends                 [] Pet visits     [] Devotional reading                         [] Unknown     [] Other:                                               Interventions offered during this visit: (See comments for more details)          Family/Friend(s):  Affirmation of emotions/emotional suffering, Affirmation of vane, Normalization of emotional/spiritual concerns, Prayer (assurance of)     Plan of Care:     [x] Support spiritual and/or cultural needs    [] Support AMD and/or advance care planning process      [] Support grieving process   [] Coordinate Rites and/or Rituals    [] Coordination with community clergy   [] No spiritual needs identified at this time   [] Detailed Plan of Care below (See Comments)  [] Make referral to Music Therapy  [] Make referral to Pet Therapy     [] Make referral to Addiction services  [] Make referral to [facility]  [] Make referral to Spiritual Care Partner  [] No future visits requested        [x] Follow up visits as needed     Comments:  visit as pt's mother Amelie English was in the ICU waiting area. She shared that she was able to visit pt a little yesterday and was just waiting until she was able to go back and visit with pt today.  talked with pt's RN and walked pt's mother back to ICU room. Let her know of  support and availability.  provided pastoral listening, support and assurance of prayer. Please contact [phone] [facility] for further support.  follow up as needed.      [address] TEVIN BuschDiv, MACE   [phone])

## 2020-06-07 NOTE — PROGRESS NOTES
0730: Bedside shift change report given to Fela Simmons RN (oncoming nurse) by Glory Monroe RN (offgoing nurse). Report included the following information Intake/Output, MAR, Accordion and Recent Results. 5983: Blood transfusion started. 1419: HR dropped to low 50s, sinus. Other VSS. Mily Starks NP notified. No new orders received. 1520: Propofol paused. Pt moving R leg and head spontaneously. Pt's mother at the bedside. 1710: Eyes opening spontaneously. Continues to move R leg and head spontaneously. PRN fentanyl given for comfort. Pt remains off sedation. RT weaning vent settings as tolerable to pt.      1930: Bedside shift change report given to [address], RN (oncoming nurse) by Fela Simmons RN (offgoing nurse). Report included the following information SBAR, Kardex, Intake/Output, MAR, Accordion and Recent Results.

## 2020-06-07 NOTE — CONSULTS
[address]    Name:  Melba Yoder  MR#:  [id number]  :  1970  ACCOUNT #:  [de-identified]  DATE OF SERVICE:  2020    REQUESTING PHYSICIAN:  Dr. Bowen Chamberlain. REASON FOR EVALUATION:  Stroke on MRI. HISTORY:  The patient is a 28-year-old [facility] American female with past medical history of hypertension. 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 She was taken to [facility] where she had a CT brain, and was found to have subarachnoid hemorrhage. She was transferred to [facility] and had CTA of the head, which was concerning for a leaking/ruptured aneurysm. 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 The patient remained intubated and was admitted to the ICU. Neurosurgery has also been on the case, but her CT brain has remained stable without any evidence of hydrocephalus. She had MRI scan done earlier today, which showed extensive areas of restricted diffusion in the cortex of both cerebral hemispheres, more prominently on the left. EEG showed focal epileptiform disturbance in the left frontal region. There is concern that she may have been seizing for an extended period of time or may have been hypoxic during the event. PAST MEDICAL HISTORY:  As mentioned above. HOME MEDICATIONS:  None. ALLERGIES:  NONE. SOCIAL HISTORY:  No history of smoking or alcohol use. REVIEW OF SYSTEMS:  Difficult to obtain. PHYSICAL EXAMINATION:  GENERAL:  On examination, the patient is intubated and sedated. She recently received fentanyl and Versed for central line placement and has been on propofol drip. NEUROLOGIC:  Pupils are 3-4 mm and do not appear to be reactive. No cough or gag reflex was noted. All extremities are flaccid. Toes are mute. Sensory, cerebellar, gait exam were deferred.   HEART: Regular rate and rhythm. CHEST:  Clear. ABDOMEN:  Soft, nontender. Positive bowel sounds. EXTREMITIES:  No edema. LABORATORY DATA:  CBC with WBC 10.8, hemoglobin 7.7, hematocrit 26.6, platelets 845. Chemistry:  Sodium 142, potassium 3.6, BUN 14, creatinine 0.93. CT scan of the brain from earlier today showed areas of infarction, with stable subarachnoid and intraventricular hemorrhage. There is no midline shift or transtentorial herniation. MRI scan of the brain shows extensive areas of restricted diffusion/ischemia/infarction in the left temporal, parietal, and frontal lobes. This is cortically based. Similar findings are noted in the insular cortex and right temporal lobe and in both PANCHITO territories. EEG showed focal epileptiform disturbance in the left frontal area, with intermittent rhythmic delta activity in both frontal head regions. Transcranial Dopplers did not reveal any vasospasm. Echocardiogram shows mildly dilated left atrium without any atrial septal defect. Ejection fraction is 60% to 65%. ASSESSMENT AND PLAN:  A 80-year-old female with history of hypertension, who was admitted after she was found down seizing. She has sustained extensive subarachnoid and intraventricular hemorrhage related to left middle cerebral artery aneurysm rupture, which has been coiled. MRI brain shows extensive areas of cortical edema/restricted diffusion in both hemispheres. It is unclear if this is related to subarachnoid hemorrhage itself, prolonged seizure activity, hypoxemia, or cardio-embolism. Continue with supportive care. We will add Vimpat to Keppra. Try to lighten sedation so as to obtain neurological assessment. Overall prognosis is guarded and case was discussed at length with Dr. Kia Good and the patient's mother, Ms. Rosa Franz. We will follow along. Thank you for this consultation.       Zac Mcdaniel MD      [id number]  D:  06/06/2020 17:31  T:  06/06/2020 21:39  JOB #:  [id number]

## 2020-06-07 NOTE — PROGRESS NOTES
Problem: Ventilator Management  Goal: *Absence of infection signs and symptoms  Outcome: Progressing Towards Goal     Problem: Falls - Risk of  Goal: *Absence of Falls  Description: Document Beverley Fall Risk and appropriate interventions in the flowsheet. Outcome: Progressing Towards Goal  Note: Fall Risk Interventions:       Mentation Interventions: Evaluate medications/consider consulting pharmacy, More frequent rounding, Room close to nurse's station, Update white board    Medication Interventions: Bed/chair exit alarm, Evaluate medications/consider consulting pharmacy    Elimination Interventions: Call light in reach, Toileting schedule/hourly rounds    History of Falls Interventions: Consult care management for discharge planning, Evaluate medications/consider consulting pharmacy, Investigate reason for fall, Room close to nurse's station, Vital signs minimum Q4HRs X 24 hrs (comment for end date)         Problem: Pressure Injury - Risk of  Goal: *Prevention of pressure injury  Description: Document Sriram Scale and appropriate interventions in the flowsheet. Outcome: Progressing Towards Goal  Note: Pressure Injury Interventions:  Sensory Interventions: Assess need for specialty bed, Assess changes in LOC, Avoid rigorous massage over bony prominences, Check visual cues for pain, Discuss PT/OT consult with provider, Float heels, Keep linens dry and wrinkle-free, Maintain/enhance activity level, Minimize linen layers, Monitor skin under medical devices, Pad between skin to skin, Pressure redistribution bed/mattress (bed type), Turn and reposition approx.  every two hours (pillows and wedges if needed), Use 30-degree side-lying position    Moisture Interventions: Absorbent underpads, Apply protective barrier, creams and emollients, Assess need for specialty bed, Check for incontinence Q2 hours and as needed, Internal/External fecal devices, Limit adult briefs, Maintain skin hydration (lotion/cream), Minimize layers    Activity Interventions: Assess need for specialty bed, Pressure redistribution bed/mattress(bed type), PT/OT evaluation    Mobility Interventions: Assess need for specialty bed, Float heels, HOB 30 degrees or less, Pressure redistribution bed/mattress (bed type), PT/OT evaluation, Turn and reposition approx.  every two hours(pillow and wedges)    Nutrition Interventions: Document food/fluid/supplement intake    Friction and Shear Interventions: Apply protective barrier, creams and emollients, Feet elevated on foot rest, Foam dressings/transparent film/skin sealants, HOB 30 degrees or less, Minimize layers, Transferring/repositioning devices                Problem: Nutrition Deficit  Goal: *Optimize nutritional status  Outcome: Progressing Towards Goal     Problem: Hemorrhagic Stroke: Admission Day (0-3 hours)  Goal: Off Pathway (Use only if patient is Off Pathway)  Outcome: Progressing Towards Goal  Goal: Activity/Safety  Outcome: Progressing Towards Goal  Goal: Consults, if ordered  Outcome: Progressing Towards Goal  Goal: Diagnostic Test/Procedures  Outcome: Progressing Towards Goal  Goal: Nutrition/Diet  Outcome: Progressing Towards Goal  Goal: Medications  Outcome: Progressing Towards Goal  Goal: Respiratory  Outcome: Progressing Towards Goal  Goal: Treatments/Interventions/Procedures  Outcome: Progressing Towards Goal  Goal: Psychosocial  Outcome: Progressing Towards Goal  Goal: *Establish/diagnose type of stroke  Outcome: Progressing Towards Goal  Goal: *Patient maintains clear airway/free of aspiration  Outcome: Progressing Towards Goal  Goal: *Hemodynamically stable  Outcome: Progressing Towards Goal     Problem: Hemorrhagic Stroke:  3-24 hours  Goal: Off Pathway (Use only if patient is Off Pathway)  Outcome: Progressing Towards Goal  Goal: Activity/Safety  Outcome: Progressing Towards Goal  Goal: Consults, if ordered  Outcome: Progressing Towards Goal  Goal: Diagnostic Test/Procedures  Outcome: Progressing Towards Goal  Goal: Nutrition/Diet  Outcome: Progressing Towards Goal  Goal: Medications  Outcome: Progressing Towards Goal  Goal: Respiratory  Outcome: Progressing Towards Goal  Goal: Treatments/Interventions/Procedures  Outcome: Progressing Towards Goal  Goal: Psychosocial  Outcome: Progressing Towards Goal  Goal: *Hemodynamically stable  Outcome: Progressing Towards Goal  Goal: *Verbalizes anxiety and depression are reduced or absent  Outcome: Progressing Towards Goal  Goal: *Absence of aspiration  Outcome: Progressing Towards Goal  Goal: *Absence of signs and symptoms of DVT  Outcome: Progressing Towards Goal  Goal: *Optimal pain control at patient's stated goal  Outcome: Progressing Towards Goal  Goal: *Tolerating diet  Outcome: Progressing Towards Goal  Goal: *Progressive mobility and function (eg: ADL's)  Outcome: Progressing Towards Goal  Goal: *Rehabilitation readiness  Outcome: Progressing Towards Goal     Problem: Hemorrhagic Stroke: Day 2  Goal: Off Pathway (Use only if patient is Off Pathway)  Outcome: Progressing Towards Goal  Goal: Activity/Safety  Outcome: Progressing Towards Goal  Goal: Consults, if ordered  Outcome: Progressing Towards Goal  Goal: Diagnostic Test/Procedures  Outcome: Progressing Towards Goal  Goal: Nutrition/Diet  Outcome: Progressing Towards Goal  Goal: Medications  Outcome: Progressing Towards Goal  Goal: Respiratory  Outcome: Progressing Towards Goal  Goal: Treatments/Interventions/Procedures  Outcome: Progressing Towards Goal  Goal: Psychosocial  Outcome: Progressing Towards Goal  Goal: *Hemodynamically stable  Outcome: Progressing Towards Goal  Goal: *Verbalizes anxiety and depression are reduced or absent  Outcome: Progressing Towards Goal  Goal: *Absence of aspiration  Outcome: Progressing Towards Goal  Goal: *Absence of signs and symptoms of DVT  Outcome: Progressing Towards Goal  Goal: *Optimal pain control at patient's stated goal  Outcome: Progressing Towards Goal  Goal: *Tolerating diet  Outcome: Progressing Towards Goal  Goal: *Progressive mobility and function  Outcome: Progressing Towards Goal  Goal: *Rehabilitation readiness  Outcome: Progressing Towards Goal     Problem: Hemorrhagic Stroke: Day 3  Goal: Off Pathway (Use only if patient is Off Pathway)  Outcome: Progressing Towards Goal  Goal: Activity/Safety  Outcome: Progressing Towards Goal  Goal: Consults, if ordered  Outcome: Progressing Towards Goal  Goal: Diagnostic Test/Procedures  Outcome: Progressing Towards Goal  Goal: Nutrition/Diet  Outcome: Progressing Towards Goal  Goal: Medications  Outcome: Progressing Towards Goal  Goal: Respiratory  Outcome: Progressing Towards Goal  Goal: Treatments/Interventions/Procedures  Outcome: Progressing Towards Goal  Goal: Psychosocial  Outcome: Progressing Towards Goal  Goal: *Hemodynamically stable  Outcome: Progressing Towards Goal  Goal: *Verbalizes anxiety and depression are reduced or absent  Outcome: Progressing Towards Goal  Goal: *Absence of aspiration  Outcome: Progressing Towards Goal  Goal: *Absence of signs and symptoms of DVT  Outcome: Progressing Towards Goal  Goal: *Optimal pain control at patient's stated goal  Outcome: Progressing Towards Goal  Goal: *Tolerating diet  Outcome: Progressing Towards Goal  Goal: *Progressive mobility and function  Outcome: Progressing Towards Goal  Goal: *Rehabilitation readiness  Outcome: Progressing Towards Goal     Problem: Hemorrhagic Stroke: Day 4  Goal: Off Pathway (Use only if patient is Off Pathway)  Outcome: Progressing Towards Goal  Goal: Activity/Safety  Outcome: Progressing Towards Goal  Goal: Consults, if ordered  Outcome: Progressing Towards Goal  Goal: Diagnostic Test/Procedures  Outcome: Progressing Towards Goal  Goal: Nutrition/Diet  Outcome: Progressing Towards Goal  Goal: Medications  Outcome: Progressing Towards Goal  Goal: Respiratory  Outcome: Progressing Towards Goal  Goal: Treatments/Interventions/Procedures  Outcome: Progressing Towards Goal  Goal: Psychosocial  Outcome: Progressing Towards Goal  Goal: *Hemodynamically stable  Outcome: Progressing Towards Goal  Goal: *Verbalizes anxiety and depression are reduced or absent  Outcome: Progressing Towards Goal  Goal: *Absence of aspiration  Outcome: Progressing Towards Goal  Goal: *Absence of signs and symptoms of DVT  Outcome: Progressing Towards Goal  Goal: *Optimal pain control at patient's stated goal  Outcome: Progressing Towards Goal  Goal: *Tolerating diet  Outcome: Progressing Towards Goal  Goal: *Progressive mobility and function  Outcome: Progressing Towards Goal  Goal: *Rehabilitation readiness  Outcome: Progressing Towards Goal     Problem: Hemorrhagic Stroke: Day 5 through Discharge  Goal: Off Pathway (Use only if patient is Off Pathway)  Outcome: Progressing Towards Goal  Goal: Activity/Safety  Outcome: Progressing Towards Goal  Goal: Consults, if ordered  Outcome: Progressing Towards Goal  Goal: Diagnostic Test/Procedures  Outcome: Progressing Towards Goal  Goal: Nutrition/Diet  Outcome: Progressing Towards Goal  Goal: Medications  Outcome: Progressing Towards Goal  Goal: Respiratory  Outcome: Progressing Towards Goal  Goal: Treatments/Interventions/Procedures  Outcome: Progressing Towards Goal  Goal: Psychosocial  Outcome: Progressing Towards Goal  Goal: *Hemodynamically stable  Outcome: Progressing Towards Goal  Goal: *Verbalizes anxiety and depression are reduced or absent  Outcome: Progressing Towards Goal  Goal: *Absence of aspiration  Outcome: Progressing Towards Goal  Goal: *Absence of signs and symptoms of DVT  Outcome: Progressing Towards Goal  Goal: *Optimal pain control at patient's stated goal  Outcome: Progressing Towards Goal  Goal: *Tolerating diet  Outcome: Progressing Towards Goal  Goal: *Progressive mobility and function  Outcome: Progressing Towards Goal  Goal: *Rehabilitation readiness  Outcome: Progressing Towards Goal     Problem: Hemorrhagic Stroke: Discharge Outcomes  Goal: *Verbalizes anxiety and depression are reduced or absent  Outcome: Progressing Towards Goal  Goal: *Verbalize understanding of risk factor modification(Stroke Metric)  Outcome: Progressing Towards Goal  Goal: *Optimal pain control at patient's stated goal  Outcome: Progressing Towards Goal  Goal: *Hemodynamically stable  Outcome: Progressing Towards Goal  Goal: *Absence of aspiration pneumonia  Outcome: Progressing Towards Goal  Goal: *Aware of needed dietary changes  Outcome: Progressing Towards Goal  Goal: *Verbalizes understanding and describes medication purposes and frequencies  Outcome: Progressing Towards Goal  Goal: *Tolerating diet  Outcome: Progressing Towards Goal  Goal: *Absence of signs and symptoms of DVT  Outcome: Progressing Towards Goal  Goal: *Absence of aspiration  Outcome: Progressing Towards Goal  Goal: *Progressive mobility and function  Outcome: Progressing Towards Goal  Goal: *Home safety concerns addressed  Outcome: Progressing Towards Goal     Problem: Ventilator Management  Goal: *Adequate oxygenation and ventilation  Outcome: Not Progressing Towards Goal  Goal: *Patient maintains clear airway/free of aspiration  Outcome: Not Progressing Towards Goal  Goal: *Normal spontaneous ventilation  Outcome: Not Progressing Towards Goal     Problem: Patient Education: Go to Patient Education Activity  Goal: Patient/Family Education  Outcome: Not Progressing Towards Goal     Problem: Patient Education: Go to Patient Education Activity  Goal: Patient/Family Education  Outcome: Not Progressing Towards Goal     Problem: Patient Education: Go to Patient Education Activity  Goal: Patient/Family Education  Outcome: Not Progressing Towards Goal     Problem: Patient Education: Go to Patient Education Activity  Goal: Patient/Family Education  Outcome: Not Progressing Towards Goal     Problem: Hemorrhagic Stroke:  3-24 hours  Goal: Discharge Planning  Outcome: Not Progressing Towards Goal     Problem: Non-Violent Restraints  Goal: *Removal from restraints as soon as assessed to be safe  Outcome: Resolved/Met  Goal: *No harm/injury to patient while restraints in use  Outcome: Resolved/Met  Goal: *Patient's dignity will be maintained  Outcome: Resolved/Met  Goal: *Patient Specific Goal (EDIT GOAL, INSERT TEXT)  Outcome: Resolved/Met  Goal: Non-violent Restaints:Standard Interventions  Outcome: Resolved/Met  Goal: Non-violent Restraints:Patient Interventions  Outcome: Resolved/Met  Goal: Patient/Family Education  Outcome: Resolved/Met

## 2020-06-07 NOTE — PROGRESS NOTES
Neurointerventional Surgery Progress Note  Marco A Oliver NP   Neurocritical Care Nurse Practitioner  [phone]          Admit Date: 6/4/2020        Daily Progress Note: 6/7/2020   LOS: 3 days      S/P:  POD 2 Cerebral angiogram with coil embolization of ruptured left MCA bifurcation aneurysm on 6/5/2020    ** Delayed Presentation ** PB Day 7    Interval History/Subjective:   Patient tested again for COVID-19 in the setting of multiple acute strokes and abnormal chest x-ray findings. Second COVID-19 test came nack negative. Patient remains intubated and sedated on Propofol. Propofol stopped for exam. Patient not following commands, eyes do not open to stimulus. Withdraws to pain on lower legs and LUE. No movement to pain on RUE. She is being diuresed with lasix due to pulmonary edema. She received one unit of PRBCs for low hemoglobin of 6.9. Unable to perform a ROS due to intubation and sedation. 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 Clipping             - Suspect RUE paralysis most likely related to acute infarction in left MCA territory in combination with sedation              - Continue [place] Day 3 of 21 to prevent delayed cerebral ischemia              - Maintenance IV Fluids on hold due to worsening edema on CXR on 6/6 per Intensivist (CXR showing persistent although increased interstitial and parenchymal opacities, particularly in the right perihilar region). Patient +8,200L since admission. On scheduled Lasix per Intensivist.              - TCDs daily, no evidence of spasm on TCDs yesterday, TCDs today show possible left MCA, PANCHITO, and terminal ICA spasm. Consistent with left PCA vasospasm. No evidence of right hemispheric vasospasm. Will check CTA of Head in AM to assess for vasospasm and correlate with TCD findings. - Strict I&O's              - ECHO shows EF 60-65%, mild concentric hypertrophy, mildly dilated left atrium              - continue every hour neuro checks              - SBP goal 120-180 Cardene/Fabien/Hydralazine/Labetalol PRN              - PT/OT/SLP evals when appropriate              - CTA of Head on 6/6 showed no evidence of vasospasm              -  Repeat CT of Head today showed no further progression of SAH and it has slightly improved. Subtle areas of gray-white differentiation loss throughout the cerebral  hemispheres correlating with areas of restricted diffusion on previous CT compatible with ischemic changes.              - NSGY following              - NIS following      2.) Seizure              - Due to #1, no prior hx of seizure              - Continue Keppra 1000 mg BID for seizure PPX              - continue Vimpat 100 mg BID              - Seizure precautions              -  EEG on 6/6 showed an abnormal EEG due to slowing of the background rhythms with intermittent rhythmic delta activity seen in both frontal head regions. Occasional sharp wave discharges in the left frontal area. EEG is suggestive of mild-to-moderate generalized encephalopathic process, nonspecific in type.   In addition, there was occasional epileptiform disturbance seen in the left frontal area which may represent a partial onset seizure focus.              - start continuous 24 hour EEG today              - Neurology following                3.) Acute hypoxic respiratory failure in the setting of likely neurogenic pulmonary edema              - Symmetric moderately severe airspace disease in the upper lobes bilaterally  which may represent edema or sequela of aspiration seen on CTA. CXR on 6/4 showed Diffuse bilateral airspace disease more suggestive of pulmonary edema than diffuse pneumonia. Repeat CXR on 6/6/2020 showed persistent although increased interstitial and parenchymal opacities  particularly in the right perihilar region.            - CTA of Chest today negative for PE, showed dependent atelectasis with diffuse groundglass opacifications bilaterally             - patient on scheduled Lasix per Intensivist, albumin also ordered for intravascular volume expansion             - COVID 19 test negative x 2             - vent settings per Intensivist              - wean sedation as tolerated              - patient may potentially need to be diuresed              - Intensivist managing     4.) Multiple Acute Ischemic Infarctions              - MRI completed and shows a large left MCA territory ischemia/infarction,  left temporal, parietal and frontal lobes, cortically based, no superimposed  increased parenchymal hemorrhage or midline shift. Right and left PANCHITO territory  cortical diffusion restriction consistent with infarction. Infarction in the right insular cortex and right temporal lobe. Scattered small infarctions right and left corona radiata centrum semiovale, right cerebellum                - Imaging pattern is consistent with Hypoxic-Ischemic Encephalopathy (HIE)                - Continue aspirin 300 IA daily for stroke prevention                - ECHO as stated above                - Lipid panel on 6/5, LDL 7.8, goal < 70, statin not indicated                - Hgb A1C ok at 5.7                - Neurology following    5.  Possible Pneumonia                - started on broad spectrum antibiotics per Intensivist                - CTA of chest as stated above                - Intensivist following     6. Anemia                 - no evidence of blood loss                 - anemia etiology work up ordered per Intensivist                  - Goal Hgb >7.0                 - Intensivist following    Activity: Bed rest  DVT ppx: SCDs  Disposition: TBD    Plan d/w Dr. Adelaida Jaime, Iliana Rob (NP Intensivist), Dr. Mcihael Keen and RN. Admission Summary:     Marion Corona is a 48 y.o. 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 On arrival to ED, pt was unresponsive and noted to be seizing. A stat CT of her head was performed there, which showed extensive SAH. Pt was then intubated emergently. She was also extremely hypertensive with SBPs in 240s. She was given Keppra and Ativan, and placed on cardene drip for BP control. NSGY and NIS were then consulted and the patient was transferred to [facility] for higher level of care. En route, paramedics administered 7.5 mg of Versed, 200mcg of Fentanyl, and 10 mg of labetalol for BP control. On arrival, CTA of her head and neck was obtained, which showed a 4.4 x 4.1 mm right MCA trifurcation aneurysm and  a 2.2 x 3.1 mm left MCA trifurcation aneurysm. Of note, CTA head/neck also showed pulmonary infiltrates suspicious for COVID-19 infection and the patient has been tested for COVID which was negative. 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 Mother reported pt does not smoke, does not drink alcohol or use street drugs. The mother also reported that the patient began complaining of a headache on 5/31/2020 and has been taking BC Powder (aspirin) for the headache. She also reported that the pt has a hx of HTN and is supposed to be on BP medications, but recently stopped taking her medications.  The patient underwent a cerebral angiogram on 6/5 by Dr. Katerin Silverman for coil embolization of a ruptured left MCA bifurcation aneurysm. Unable to obtain a ROS due intubation and sedation.   Current Facility-Administered Medications   Medication Dose Route Frequency Provider Last Rate Last Dose    0.9% sodium chloride infusion 250 mL  250 mL IntraVENous PRN FABIAN Clark        furosemide (LASIX) injection 40 mg  40 mg IntraVENous Q12H Julito Hoop, ACNP   40 mg at 06/07/20 4396    albuterol-ipratropium (DUO-NEB) 2.5 MG-0.5 MG/3 ML  3 mL Nebulization Q6H RT Julito Hoop, ACNP   3 mL at 06/07/20 0748    piperacillin-tazobactam (ZOSYN) 3.375 g in 0.9% sodium chloride (MBP/ADV) 100 mL  3.375 g IntraVENous Q8H Julito Hoop, ACNP 25 mL/hr at 06/07/20 1126 3.375 g at 06/07/20 1126    Vancomycin Per Pharmacy Dosing   Other Rx Dosing/Monitoring Michele Opitz, DO        vancomycin (VANCOCIN) 1750 mg in  ml infusion  1,750 mg IntraVENous Q16H Michele Opitz,  mL/hr at 06/07/20 0123 1,750 mg at 06/07/20 0123    PHENYLephrine (ELKIN-SYNEPHRINE) 30 mg in 0.9% sodium chloride 250 mL infusion   mcg/min IntraVENous TITRATE Fatuma Avery NP   Stopped at 06/06/20 1300    labetaloL (NORMODYNE;TRANDATE) injection 20 mg  20 mg IntraVENous Q4H PRN Fatuma Avery NP   20 mg at 06/07/20 1005    niCARdipine (CARDENE) 25 mg in 0.9% sodium chloride 250 mL infusion  0-15 mg/hr IntraVENous TITRATE Fatuma Avery NP   Stopped at 06/06/20 1612    lacosamide (VIMPAT) 100 mg in 0.9% sodium chloride 100 mL IVPB  100 mg IntraVENous Q12H Fatuma Avery NP   100 mg at 06/07/20 0706    aspirin (ASA) suppository 300 mg  300 mg Rectal DAILY Fatuma Avery NP   300 mg at 06/07/20 0948    niMODipine (NYMALIZE) 60 mg/20 mL oral solution 60 mg  60 mg Per NG tube Q4H Antonina Domínguez NP   60 mg at 06/07/20 0947    glucose chewable tablet 16 g  4 Tab Oral PRN Marcos Ballesteros, NP-C        glucagon (GLUCAGEN) injection 1 mg  1 mg IntraMUSCular PRN Marcos Ballesteros, NP-C  dextrose 10% infusion 0-250 mL  0-250 mL IntraVENous PRN FABIAN Godfrey        insulin lispro (HUMALOG) injection   SubCUTAneous Q6H FABIAN Galaviz   Stopped at 20 1200    albuterol (PROVENTIL VENTOLIN) nebulizer solution 2.5 mg  2.5 mg Nebulization Q6H PRN FABIAN Godfrey        famotidine (PF) (PEPCID) 20 mg in 0.9% sodium chloride 10 mL injection  20 mg IntraVENous Q12H Mohamud Areas, ACNP   20 mg at 20 4662    fentaNYL citrate (PF) injection  mcg   mcg IntraVENous Q1H PRN Mohamud Areas, ACNP   100 mcg at 20 1241    polyvinyl alcohol-povidone (NATURAL TEARS) 0.5-0.6 % ophthalmic solution 2 Drop  2 Drop Both Eyes Q8H Mohamud Areas, ACNP   2 Drop at 20 1324    docusate (COLACE) 50 mg/5 mL oral liquid 100 mg  100 mg Oral BID Mohamud Areas, ACNP   100 mg at 20 2395    bisacodyL (DULCOLAX) suppository 10 mg  10 mg Rectal DAILY PRN Mohamud Areas, ACNP        levETIRAcetam (KEPPRA) 1,000 mg in 0.9% sodium chloride 100 mL IVPB  1,000 mg IntraVENous Q12H Andrea Domínguez NP   1,000 mg at 20 1117    propofol (DIPRIVAN) 10 mg/mL infusion  0-50 mcg/kg/min IntraVENous TITRATE Andrea Domínguez NP   Stopped at 20 1343    chlorhexidine (ORAL CARE KIT) 0.12 % mouthwash 15 mL  15 mL Oral Q12H FABIAN Galaviz   15 mL at 20 0947    ondansetron (ZOFRAN) injection 4 mg  4 mg IntraVENous Q4H PRN FABIAN Godfrey        docusate (COLACE) 50 mg/5 mL oral liquid 100 mg  100 mg Oral BID PRN FABIAN Galaviz            No Known Allergies    Review of Systems:  Review of systems not obtained due to patient factors.       Objective:     Vital signs  Temp (24hrs), Av.6 °F (37 °C), Min:97.8 °F (36.6 °C), Max:99.2 °F (37.3 °C)   701 - 1900  In: 975.3 [I.V.:438.2]  Out: 0114 [Urine:1560]  1901 -  07  In: 7401.6 [I.V.:5566.6]  Out: 2700 [Urine:2700]    Visit Vitals  /81   Pulse 69   Temp 97.8 °F (36.6 °C)   Resp 20   Ht 5' 4\" (1.626 m)   Wt 234 lb 2.1 oz (106.2 kg)   SpO2 100%   BMI 40.19 kg/m²      O2 Device: Endotracheal tube   Vitals:    06/07/20 1140 06/07/20 1142 06/07/20 1200 06/07/20 1233   BP:  174/80 (!) 173/94 184/81   Pulse: 70 70 69 69   Resp: 20 20 20 20   Temp:  97.9 °F (36.6 °C) 97.8 °F (36.6 °C) 97.8 °F (36.6 °C)   SpO2: 100% 100% 100% 100%   Weight:       Height:          Physical Exam:  GENERAL: NAD, intubated and sedated on propofol. Propofol stopped for exam  EYE: conjunctivae/corneas clear. PERRL 3 mm bilaterally, sluggish response. Eyes midline. Scleral edema noted bilaterally   LUNG: lungs coarse bilaterally   HEART: regular rate and rhythm, S1, S2 normal, no murmur, click, rub or gallop  EXTREMITIES:  extremities normal, atraumatic, no cyanosis, non-pitting peripheral edema, distal pulses 2+ bilaterally   SKIN: Skin warm to touch. Right groin site clean, dry, and intact. No hematoma, bruising, or bleeding noted. NEUROLOGIC: Intubated and sedated on Propofol. Eyes do not open to stimulus. No command following. PERRL 3 mm bilaterally, sluggish response. Eyes midline. No tracking with eyes. No blink to threat. Withdraws to painful stimuli on LUE, LLE, RLE. No withdrawal to pain on RUE. Toes mute bilaterally. No involuntary movements. Gait deferred. Unable to assess language, sensation, and coordination. Imaging:  CTA of Chest on 6/7/2020 shows  IMPRESSION:  1. No acute pulmonary embolus  2. Dependent atelectasis with diffuse groundglass opacifications bilaterally. CT of Head on 6/7/2020 shows  IMPRESSION:   1. No further progression of subarachnoid hemorrhage. The overall degree of  subarachnoid hemorrhage is slightly improved. 2. Subtle areas of gray-white differentiation loss throughout the cerebral  hemispheres correlating with areas of restricted diffusion on previous CT  compatible with ischemic changes.     MRI of Brain on 6/6/2020 shows  IMPRESSION:   Imaging findings consistent with large left MCA territory ischemia/infarction,  left temporal, parietal and frontal lobes, cortically based, no superimposed  increased parenchymal hemorrhage or midline shift. Right and left PANCHITO territory  cortical diffusion restriction consistent with infarction. Infarction in the right insular cortex and right temporal lobe. Scattered small  infarctions right and left corona radiata centrum semiovale, right cerebellum. Allowing for differences in technique likely stable subarachnoid and  intraventricular hemorrhage. CT of Head on 6/6/2020 at 1411 shows  IMPRESSION:   Left MCA territory infarction with smaller right MCA territory infarction.     Stable subarachnoid and intraventricular hemorrhage. CTA of Head on 6/6/2020 at 0903 shows  IMPRESSION:  Slightly increased intraventricular and subarachnoid hemorrhage compared to the  prior exam.     Status post coiling left MCA bifurcation aneurysm. No significant residual  aneurysm filling. CT of Head on 6/62020 at 0239 shows  IMPRESSION:  Slightly diminished subarachnoid and intraventricular hemorrhage. Stable ventricular system. CT of Head WO on 6/5/2020 at 1512 shows  IMPRESSION: Stable acute subarachnoid and intraventricular hemorrhage. Stable  ventricular system. CT Head WO 6/5/20 0826     IMPRESSION:   1. Diffuse subarachnoid hemorrhage most concentrated in the basal cisterns and  left sylvian fissure. 2.  Interval ventricular enlargement suspicious for developing hydrocephalus.     CTA Head 6/4/20 2017     IMPRESSION:  1. Diffuse subarachnoid hemorrhage in the basilar cisterns and sylvian  fissures. 2.  Bilateral MCA bifurcation aneurysms. 3.  Infundibular origins to the posterior communicating arteries bilaterally. 4.  Symmetric moderately severe airspace disease in the upper lobes bilaterally  which may represent edema or sequela of aspiration.     CT Head WO Contrast 6/4/2020 2012    IMPRESSION: Extensive subarachnoid hemorrhage concerning for leaking/ruptured  aneurysm.        24 hour results:    Recent Results (from the past 24 hour(s))   HCG URINE, QL    Collection Time: 06/06/20  4:50 PM   Result Value Ref Range    HCG urine, QL Negative NEG     GLUCOSE, POC    Collection Time: 06/06/20  5:52 PM   Result Value Ref Range    Glucose (POC) 132 (H) 65 - 100 mg/dL    Performed by [facility]    TROPONIN I    Collection Time: 06/06/20  5:55 PM   Result Value Ref Range    Troponin-I, Qt. 0.56 (H) <0.05 ng/mL   SARS-COV-2    Collection Time: 06/06/20  9:15 PM   Result Value Ref Range    Specimen source Nasopharyngeal      SARS-CoV-2 Not detected NOTD     GLUCOSE, POC    Collection Time: 06/06/20 11:21 PM   Result Value Ref Range    Glucose (POC) 152 (H) 65 - 100 mg/dL    Performed by Nasrin Ramirez    TROPONIN I    Collection Time: 06/07/20  1:19 AM   Result Value Ref Range    Troponin-I, Qt. 0.41 (H) <0.05 ng/mL   GLUCOSE, POC    Collection Time: 06/07/20  5:40 AM   Result Value Ref Range    Glucose (POC) 178 (H) 65 - 100 mg/dL    Performed by [employer]    METABOLIC PANEL, BASIC    Collection Time: 06/07/20  5:41 AM   Result Value Ref Range    Sodium 142 136 - 145 mmol/L    Potassium 3.6 3.5 - 5.1 mmol/L    Chloride 113 (H) 97 - 108 mmol/L    CO2 19 (L) 21 - 32 mmol/L    Anion gap 10 5 - 15 mmol/L    Glucose 165 (H) 65 - 100 mg/dL    BUN 21 (H) 6 - 20 MG/DL    Creatinine 1.04 (H) 0.55 - 1.02 MG/DL    BUN/Creatinine ratio 20 12 - 20      GFR est AA >60 >60 ml/min/1.73m2    GFR est non-AA 56 (L) >60 ml/min/1.73m2    Calcium 7.6 (L) 8.5 - 10.1 MG/DL   MAGNESIUM    Collection Time: 06/07/20  5:41 AM   Result Value Ref Range    Magnesium 2.3 1.6 - 2.4 mg/dL   PHOSPHORUS    Collection Time: 06/07/20  5:41 AM   Result Value Ref Range    Phosphorus 3.7 2.6 - 4.7 MG/DL   CBC WITH AUTOMATED DIFF    Collection Time: 06/07/20  5:45 AM   Result Value Ref Range    WBC 9.1 3.6 - 11.0 K/uL RBC 3.46 (L) 3.80 - 5.20 M/uL    HGB 6.9 (L) 11.5 - 16.0 g/dL    HCT 24.0 (L) 35.0 - 47.0 %    MCV 69.4 (L) 80.0 - 99.0 FL    MCH 19.9 (L) 26.0 - 34.0 PG    MCHC 28.8 (L) 30.0 - 36.5 g/dL    RDW 19.1 (H) 11.5 - 14.5 %    PLATELET 088 892 - 490 K/uL    MPV 10.3 8.9 - 12.9 FL    NRBC 0.2 (H) 0  WBC    ABSOLUTE NRBC 0.02 (H) 0.00 - 0.01 K/uL    NEUTROPHILS 81 (H) 32 - 75 %    LYMPHOCYTES 11 (L) 12 - 49 %    MONOCYTES 7 5 - 13 %    EOSINOPHILS 0 0 - 7 %    BASOPHILS 0 0 - 1 %    IMMATURE GRANULOCYTES 1 (H) 0.0 - 0.5 %    ABS. NEUTROPHILS 7.4 1.8 - 8.0 K/UL    ABS. LYMPHOCYTES 1.0 0.8 - 3.5 K/UL    ABS. MONOCYTES 0.6 0.0 - 1.0 K/UL    ABS. EOSINOPHILS 0.0 0.0 - 0.4 K/UL    ABS. BASOPHILS 0.0 0.0 - 0.1 K/UL    ABS. IMM.  GRANS. 0.1 (H) 0.00 - 0.04 K/UL    DF SMEAR SCANNED      RBC COMMENTS MICROCYTOSIS  2+        RBC COMMENTS HYPOCHROMIA  2+       TYPE + CROSSMATCH    Collection Time: 06/07/20  7:17 AM   Result Value Ref Range    Crossmatch Expiration 06/10/2020     ABO/Rh(D) O POSITIVE     Antibody screen NEG     Unit number [id number]     Blood component type RC LR,1     Unit division 00     Status of unit ISSUED     Crossmatch result Compatible    GLUCOSE, POC    Collection Time: 06/07/20 11:15 AM   Result Value Ref Range    Glucose (POC) 120 (H) 65 - 100 mg/dL    Performed by Luma Avila NP

## 2020-06-07 NOTE — PROGRESS NOTES
Neurology Progress Note  Nic Hu NP  Neurocritical Care Nurse Practitioner  [phone]    Admit Date: 6/4/2020   LOS: 3 days      Daily Progress Note: 6/7/2020    POD:* No surgery found *    S/P: Cerebral angiogram with coil embolization of the left MCA aneurysm by Dr. Dharmesh Stevens on 6/5/20. HPI: Brent Acuna is a 77-year-old female with a past medical history of HTN. According to patient's mother, the patient began having a headache on 5/31/20 and had been taking BC Powder with aspirin in it for the headache. She recently stopped taking her BP medications. According to her mother she does not smoke, drink alcohol or use recreational drugs. The patient was brought to the [facility] ER via EMS on 6/4/420  after her coworkers found her unresponsive outside of her place of work. 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 On arrival to the ER she was found to be seizing. A stat CT of her head was performed which showed extensive subarachnoid hemorrhage. She was extremely hypertensive in the 240's and was given labetalol and started on a Cardene drip. She was emergently intubated and given Keppra and Ativan as well. Neurosurgery and Neurointerventional Surgery were consulted and the patient was transferred to the [facility] for a higher level of care. 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 Extensive lung infiltrates were also noted. Patient was taken for cerebral angiogram the morning of 06/05/20 and coil embolization of the left MCA aneurysm was completed. The right MCA aneurysm had a very wide neck incorporating both M2s. It could not be treated endovascularly without stents. May need to be clipped by Neurosurgery. Subjective:   No acute neuro changes reported overnight. Patient is withdrawing to pain x 4 though weaker response with RUE. MRI was completed yesterday showing large left MCA territory infarction, as well as multiple scattered infarctions in the bilateral hemispheres and right cerebellum. No Known Allergies    Review of Systems:  Review of systems not obtained due to patient factors. Objective:   Vital signs  Temp (24hrs), Av.2 °F (37.3 °C), Min:98.7 °F (37.1 °C), Max:100 °F (37.8 °C)   1901 -  0700  In: 625.5 [I.V.:385.5]  Out: 700 [Urine:700]  701 -  190  In: 8669.6 [I.V.:7184.6]  Out: 2220 [Urine:2220]  Visit Vitals  /67 (BP 1 Location: Left arm, BP Patient Position: At rest)   Pulse 78   Temp 99.1 °F (37.3 °C)   Resp 20   Ht 5' 4\" (1.626 m)   Wt 103.4 kg (228 lb)   SpO2 97%   BMI 39.14 kg/m²      O2 Device: Endotracheal tube, Ventilator   Vitals:    20 2330 20 2345 20 0000 20 0054   BP:   154/67    Pulse: 82 77 77 78   Resp: 20 20 20 20   Temp:   99.1 °F (37.3 °C)    SpO2: 100% 97% 97% 97%   Weight:       Height:            Physical Exam:  GENERAL: Sedated with propofol, intubated and ventilated. SKIN: Warm, dry, color appropriate for ethnicity. Right groin site soft, with no odor, bleeding or drainage noted. Mild ecchymosis present. Pedal pulses positive bilaterally. Neurologic Exam:  Mental Status:  Sedated, intubated on ventilator. No command following. No eye opening. Language:    Unable to assess due to sedation and intubation. Cranial Nerves:   Pupils 3 mm, equal, round and sluggishly reactive to light. Blink to threat bilaterally. Motor:         Bulk and tone normal.      No involuntary movements. Sensation:    Withdraws to pain x 4 extremities. Reflexes:    [employer]. Corneal reflexes present. Coordination & Gait: Unable to assess due to patient factors.      Labs:  Lab Results   Component Value Date/Time    WBC 10.8 2020 04:22 AM    HGB 7.7 (L) 2020 04:22 AM    HCT 26.6 (L) 2020 04:22 AM    PLATELET 252 06/06/2020 04:22 AM    MCV 68.6 (L) 06/06/2020 04:22 AM     Lab Results   Component Value Date/Time    Sodium 142 06/06/2020 04:22 AM    Potassium 3.6 06/06/2020 04:22 AM    Chloride 113 (H) 06/06/2020 04:22 AM    CO2 18 (L) 06/06/2020 04:22 AM    Anion gap 11 06/06/2020 04:22 AM    Glucose 158 (H) 06/06/2020 04:22 AM    BUN 14 06/06/2020 04:22 AM    Creatinine 0.93 06/06/2020 04:22 AM    BUN/Creatinine ratio 15 06/06/2020 04:22 AM    GFR est AA >60 06/06/2020 04:22 AM    GFR est non-AA >60 06/06/2020 04:22 AM    Calcium 8.1 (L) 06/06/2020 04:22 AM       Imaging:  MRI completed 6/06/20 at 13:57 - Impression: Imaging findings consistent with large left MCA territory ischemia/infarction, left temporal, parietal and frontal lobes, cortically based, no superimposed  increased parenchymal hemorrhage or midline shift. Right and left PANCHITO territory  cortical diffusion restriction consistent with infarction. Infarction in the right insular cortex and right temporal lobe. Scattered small  infarctions right and left corona radiata centrum semiovale, right cerebellum.     Allowing for differences in technique likely stable subarachnoid and  intraventricular hemorrhage. CT Results (maximum last 3): Results from [place] encounter on 06/04/20   CT HEAD WO CONT    Narrative EXAM: CT HEAD WO CONT  Pelvis: Neuro changes, SAH. INDICATION: neuro changes in the setting of [address]    COMPARISON: 6/6/2020 at 2:29 AM.    CONTRAST: None. TECHNIQUE: Unenhanced CT of the head was performed using 5 mm images. Brain and  bone windows were generated. Coronal and sagittal reformats. CT dose reduction  was achieved through use of a standardized protocol tailored for this  examination and automatic exposure control for dose modulation. FINDINGS:  Sulcal effacement/gyral edema left MCA territory left frontal, temporal and  parietal lobes better appreciated on recent MR. Sulcal effacement at the right  insular cortex. Intraventricular and subarachnoid hemorrhage not significantly  changed. Prior left MCA coiling. . Fourth ventricular hemorrhage unchanged. No  significant midline shift or transtentorial herniation. . . The bone windows  demonstrate no abnormalities. The visualized portions of the paranasal sinuses  and mastoid air cells are clear. Impression IMPRESSION:   Left MCA territory infarction with smaller right MCA territory infarction. Stable subarachnoid and intraventricular hemorrhage. CTA HEAD    Narrative EXAM:  CTA HEAD  History: Pupillary changes  INDICATION:   Pupillary changes    COMPARISON:  6/6/2020    CONTRAST: mL of [id number]. TECHNIQUE:   Multislice helical CT angiography of the head was performed during uneventful  rapid bolus intravenous administration of contrast. Coronal and sagittal  reformations and MIP images and 3D shaded surface display renderings were  generated. CT dose reduction was achieved through use of a standardized  protocol tailored for this examination and automatic exposure control for dose  modulation. FINDINGS:  Subarachnoid hemorrhage filling the basilar cisterns extending into the sylvian  fissures bilaterally left greater than right. Slight increased. Intraventricular  hemorrhage slightly increased. Right cavernous internal carotid artery: Patent    Left cavernous internal carotid artery: Patent    Anterior cerebral arteries: Patent    Anterior communicating artery: Infundibular origin to the right anterior  communicating artery. Right middle cerebral artery: 4 x 4 millimeter anteriorly projecting right MCA  bifurcation aneurysm. 2-2 59. Mild multifocal stenoses M2 segments on the right  more distally. Left middle cerebral artery: Status post endovascular coiling left MCA  bifurcation aneurysm. No significant residual filling is demonstrated.     Posterior communicating arteries: Infundibular origin to the posterior  communicating arteries bilaterally    Posterior cerebral arteries: Intracranial atherosclerosis affecting the right P1  segment of the posterior cerebral artery with mild stenosis. Basilar artery: Patent    Distal vertebral arteries: Patent          Impression IMPRESSION:  Slightly increased intraventricular and subarachnoid hemorrhage compared to the  prior exam.    Status post coiling left MCA bifurcation aneurysm. No significant residual  aneurysm filling. CT HEAD WO CONT    Narrative EXAM: CT HEAD WO CONT  Clinical history: Evaluate for hydrocephalus  INDICATION: Eval for hydrocephalus    COMPARISON: 6/5/2020. CONTRAST: None. TECHNIQUE: Unenhanced CT of the head was performed using 5 mm images. Brain and  bone windows were generated. Coronal and sagittal reformats. CT dose reduction  was achieved through use of a standardized protocol tailored for this  examination and automatic exposure control for dose modulation. FINDINGS:  There are coils in the expected location of the distal left MCA. There is  interest no extensive persistent acute subarachnoid hemorrhage overlying both  cerebral hemispheres in the basilar cisterns and in the ventricular system. Ventricular system is stable. There is no herniation. The mastoid air cells are  well pneumatized. ET tube and NG tube. Impression IMPRESSION:    Slightly diminished subarachnoid and intraventricular hemorrhage. Stable ventricular system. 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 S/P cerebral angiogram with coil embolization of left MCA bifurcation aneurysm on 6/5.  Patient also has 5X3 right MCA bifurcation aneurysm with very wide neck which will  need to be treated with stents vs. clipping             - Day 3/21 of Nimotop to prevent delayed cerebral ischemia              - LR at 100 ml/hr to maintain euvolemia              - Strict I&O              - ECHO with EF of 60-65%. No regional wall motion abnormality or atrial septal defect. Mildly  dilated left atrium.               - q 1 neuro checks   -Daily TCD's              - PT/OT/SLP evals when appropriate   -SBP goal 140-180 to prevent cerebral vasospasm. ELKIN/Cardene prn. -CT head afternoon of 6/6 showed stable SAH/IVH. - NSGY following    2.) Multiple scattered infarcts per MRI. MRI completed and shows a large left MCA territory ischemia/infarction, left temporal, parietal and frontal lobes, cortically based, no superimposed  increased parenchymal hemorrhage or midline shift. Right and left PANCHITO territory  cortical diffusion restriction consistent with infarction. Infarction in the right insular cortex and right temporal lobe. Scattered small infarctions right and left corona radiata centrum semiovale, right cerebellum    - Imaging pattern is consistent with Hypoxic-Ischemic Encephalopathy (HIE)   -Was started on aspirin 300 MT yesterday for stroke prevention   -SBP goal 140-180.      - Lipid panel on 6/5, LDL 7.8, goal < 70, statin not indicated                - Hgb A1C ok at 5.7     3.) Seizure              - Due to [facility], no prior hx of seizure              - Continue Keppra 1000 mg BID, Vimpat 100 BID              - Seizure precautions              - Ativan PRN for seizures greater than 2 minutes. - Propofol while intubated    4.) Cerebral edema                 - sulcal effacement/gyral edema in left MCA territory, left frontal, temporal, and parietal  lobes.  No significant shift or transtentorial herniation                 - 25 grams of Mannitol given one time initially prior to imaging due to concern for increased ICP clinically    Plan discussed with Dr. Ya Huizar, NP Intensivist, the primary RN. Will await further recommendations from Dr. Nancy Aldrich.      Murray Coffey NP  Neurocritical Care Nurse Practitioner

## 2020-06-07 NOTE — PROGRESS NOTES
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 At the [facility] a CT was done and she was found to have a [facility]  and transferred to [facility] for further neuro evaluation after being intubated by sending facility. Repeat CTH/CTA here showed an extensive subarachnoid hemorrhage concerning for leaking/ruptured aneurysm without hydrocephalus. 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 Both neurosurgery and neuro IR consulted and she was taken to IR for coiling of a Left MCA aneurysm this am by Dr. Brian Broderick. Plan for Rt aneurysmal clipping at a later time. Remains at risk for hydrocephalus, continue to re-eval for hydrocephalus. Remains intubated at this time. Overnight Events: Stable overnight, remains sedated on Propofol infusion. Remains on high Peep 14 and 100% Fio2. Will check CT chest w/ contrast to eval for PE given Head CT pending as well. Potassium replaced this am for K 3.6, 1 units of PRBC for Hgb 6.9. No evidence of bleeding assessed. +8.5 liters NET positive with +2-3 generalized edema, will start 40mg IV Furosemide Q12. Pending repeat COVID test #2.     POD:* No surgery found *    S/P: Day 2 for L MCA aneurysm coiling    Objective:   Vital Signs:  Visit Vitals  /68   Pulse 69   Temp 99.1 °F (37.3 °C)   Resp 20   Ht 5' 4\" (1.626 m)   Wt 106.2 kg (234 lb 2.1 oz)   SpO2 96%   BMI 40.19 kg/m²      O2 Device: Endotracheal tube Temp (24hrs), Av.3 °F (37.4 °C), Min:99 °F (37.2 °C), Max:100 °F (37.8 °C)         Intake/Output:     Intake/Output Summary (Last 24 hours) at 2020 0817  Last data filed at 2020 3092  Gross per 24 hour   Intake 3704.33 ml   Output 2195 ml   Net 1509.33 ml       Physical Exam:    General:  Sedated and on the ventilator. No acute distress. Eyes:  Sclera anicteric, + scleral edema, Pupils equal, round, reactive to light. Eyes midline, Right eye 2 mm sluggish, left eye 2.5mm sluggish   Mouth/Throat: Orotracheal tube in place., OGT in place   Neck: Supple. Lungs:   coarse to auscultation bilaterally, Ins. whz, good effort. Vent supported with equal chest rise/ fall   Cardiovascular:  Regular rate and rhythm, no murmur, click, rub, or gallop. Abdomen:   Soft, obese, non-tender, bowel sounds normal, non-distended. Extremities: No cyanosis, + 2-3 generalized edema. Skin: No acute rash or lesions. Lymph Nodes: Cervical and supraclavicular normal.   Musculoskeletal:  No swelling or deformity. WD x 4 ext   Lines/Devices:  Intact, no erythema, drainage, or tenderness. Psychiatric: Sedated and appears comfortable on ventilator. T/L/D  Tubes: ETT and Orogastric Tube  Lines: Peripheral IV arterial line  Drains: Martinez Catheter    LABS AND  DATA: Personally reviewed  Recent Labs     06/07/20  0545 06/06/20  0422   WBC 9.1 10.8   HGB 6.9* 7.7*   HCT 24.0* 26.6*    252     Recent Labs     06/07/20  0541 06/06/20  0422    142   K 3.6 3.6   * 113*   CO2 19* 18*   BUN 21* 14   CREA 1.04* 0.93   * 158*   CA 7.6* 8.1*   MG 2.3 2.3   PHOS 3.7 2.7     Recent Labs     06/05/20  0334 06/04/20 2057   AP 65 75   TP 6.5 7.0   ALB 2.9* 3.2*   GLOB 3.6 3.8     No results for input(s): INR, PTP, APTT, INREXT, INREXT in the last 72 hours.    Recent Labs     06/06/20  1225 06/05/20  0446   PHI 7.351 7.382   PCO2I 31.9* 37.4   PO2I 115* 145*   FIO2I 100 80     Recent Labs     06/07/20  0119 06/06/20  1755   TROIQ 0.41* 0.56*     Ventilator Settings:  Mode Rate Tidal Volume Pressure FiO2 PEEP   Assist control, Pressure control   450 ml    100 % 14 cm H20     Peak airway pressure: 39 cm H2O    Minute ventilation: 9.23 l/min        MEDS: Reviewed    Chest X-Ray:  CXR Results  (Last 48 hours)               06/06/20 1039  XR CHEST PORT Final result    Impression:  IMPRESSION:   Persistent although increased interstitial and parenchymal opacities   particularly in the right perihilar region. Narrative:  PORTABLE CHEST RADIOGRAPH/S: 6/6/2020 10:39 AM       Clinical history: Rule out PNA   INDICATION:   Rule out PNA   COMPARISON: 6/4/2020       FINDINGS:   AP portable upright view of the chest demonstrates a stable  cardiopericardial   silhouette. The lungs are adequately expanded. Persistent interstitial and   parenchymal opacities increased in the right perihilar region. . Visualized lines   and tubes are stable in appearance. . Patient is on a cardiac monitor. 06/06/20 MRI:  IMPRESSION:   Imaging findings consistent with large left MCA territory ischemia/infarction,  left temporal, parietal and frontal lobes, cortically based, no superimposed  increased parenchymal hemorrhage or midline shift. Right and left PANCHITO territory  cortical diffusion restriction consistent with infarction. Infarction in the right insular cortex and right temporal lobe. Scattered small  infarctions right and left corona radiata centrum semiovale, right cerebellum.     Allowing for differences in technique likely stable subarachnoid and  intraventricular hemorrhage.     06/06/20 ECHO:  Result status: Final result   · Normal cavity size and systolic function (ejection fraction normal). Mild concentric hypertrophy. Estimated left ventricular ejection fraction is 60 - 65%. No regional wall motion abnormality noted. · Mildly dilated left atrium.           ABCDEF Bundle/Checklist Completed:  YES-See Plan    SPECIAL EQUIPMENT  None    Active Problem List:     Problem List  Date Reviewed: 6/4/2020          Codes Class    [facility]) [facility] ICD-10-CM: I60.9  ICD-9-CM: 499 Subarachnoid hemorrhage from middle cerebral artery aneurysm, left (HCC) ICD-10-CM: Q86.66  ICD-9-CM: 430         RIGHT middle cerebral artery aneurysm ICD-10-CM: I67.1  ICD-9-CM: 437.3         Cerebral vasospasm ICD-10-CM: I67.848  ICD-9-CM: 435.9         Anterior communicating artery aneurysm ICD-10-CM: I67.1  ICD-9-CM: 437.3             ICU Assessment/ Comprehensive Plan of Care:       NEURO  1. Subarrachnoid hemorrhage 2/2 ruptured aneurym  2. Right MCA trifurcation aneurysm  3. Left MCA trifurcation aneurysm s/p coiling  4. Multiple scattered infarcts per MRI, consistent with hypoxic event  5. Seizure   -Keep euvolemic, eunatremic, euglycemic   -SBP goal 140-180 to prevent cerebral vasospasm. ELKIN/Cardene prn.   -Nimotipine 60mg via OGT Q4hr  -Neuro IR following, Dr. Mnauel Nino  -Neurosurgery following-Dr. Joseph Ellis, Rt MCA will need clipping at later time  -Hold [facility] until plan for EVD per Dr. Millie Abdul note  -ASA 81mg po daily per neuro IR 06/06  -Check TCDs   -Q1hr Neuro checks  -Keppra 1GM IV Q12h  - CT Head this am   - Continue Keppra 1000 mg BID, Vimpat 100 BID    Pain Medications: Fentanyl  Target RASS: -2 - Light Sedation - Briefly awakens to voice (eyes open & contact <10 sec)  Sedation Medications: Propofol, intolerant of precedex ( Junctional bradycardia)  CAM-ICU:  FABIAN  Restraints: Soft wrist restraints    CARDIAC  1. Hypertension   -Nicardipine gtt at 10mg/hr this am.  -ECHO pending    Cardiac Gtts: Nicardipine (Cardene)  SBP Goal of: < 140 mmHg  MAP Goal of: > 65 mmHg  Transfusion Trigger (Hgb): <7 g/dL    RESPIRATORY  1. Acute severe hypoxic respiratory failure  -Provide conservative fluid management to prevent vasospams  -Hold off for diuresis now  -Remains intubated, titrate down Fio2 as tolerated to maintain sats  -Check PCXR in am  -Currently on PEEP 14 Fio2 100%  -Recheching COVID test, first NEG  -Will get CT Chest to r/o PE today, unfortunately cannot anticoagulate if +.    2. Pulmonary edema  -Furosemide 40mg IV BID  -DC IVF    3. Possible RUL pneumonia  - Broad spectrum antibiotics  -Check procalcitonin   -Supportive care      Respiratory Goals: Chlorhexidine   Optimize PEEP/Ventilation/Oxygenation  Goal Tidal Volume 6 cc/kg based on IBW  Aim for lung protective ventilation  Head of bed > 30 degrees  Plan to Extubate: attempt to provide weaning trials in am  SPO2 Goal: > 92%  Pulmonary toilet: NA   DVT Prophylaxis (if no, list reason): SCD's or Sequential Compression Device     RENAL  1. No acute kidney injury  -Stable BUN/creat   -Maintain euvolemia, IVF decreased this am for decreasing UO  -Strict I/O  -Furosemide 40mg IV Q12hr    2. Hypokalemia  Potassium 3.6, replace to keep K > 4  -Potassium 40mEq replaced this am    Martinez Catheter Present: Yes  IVFs: SL    GASTROINTESTINAL  1. No acute concerns    GI Prophylaxis: Pepcid (famotidine)   Nutrition: Yes Dietary consulted  Bowel Movement: Yes  Bowel Regimen: Docusate (Colace) and Bisacodyl (Dulcolax)    HEMATOLOGIC  1. Microcytic, hypochromic anemia, unknown origin  -Keep Hgb > 7.0  -No evidence of blood loss  -Check Iron profile, Iron low, TIBC WNL, check ferritin  -Transfuse 1 unit this am for Hgb 6.9    DVT Prophylaxis (if no, list reason): SCD's or Sequential Compression Device , ASA    ID  1. Possible RUL pneumonia on PCXR  -Trend WBC/ fevers  -Check procalcitonin 0.49  Atbx Zosyn, Vancomycin    2. NEG COVID  06/04 NEG x2  06/06 Retest for COVID 2/2 High oxygen demands    ENDOCRINE  1. Hyperglycemia  -SSI Q6hr  -HgbA1c 5.7    Glycemic Control - Insulin: Yes    MUSCULOSKELETAL  1.  Risk for physical deconditioning deconditioning  Mobility: Fair and Bedrest  PT/OT: Not appropraite at this time, re-eval at extubation     DISPOSITION/COMMUNICATION  Discussed Plan of Care/Code Status: Full Code  Stay in ICU    CRITICAL CARE CONSULTANT NOTE  I had a face to face encounter with the patient, reviewed and interpreted patient data including clinical events, labs, images, vital signs, I/O's, and examined patient. I have discussed the case and the plan and management of the patient's care with the consulting services, the bedside nurses and the respiratory therapist.      NOTE OF PERSONAL INVOLVEMENT IN CARE    I participated in the decision-making and personally managed or directed the management of the following life and organ supporting interventions that required my frequent assessment to treat or prevent imminent deterioration. I personally spent 50 minutes of critical care time. This is time spent at this critically ill patient's bedside actively involved in patient care as well as the coordination of care and discussions with the patient's family. This does not include any procedural time which has been billed separately.     Irvin Hays, AGACNP-BC  Intensivist Nurse Practitioner  [facility]  6/7/2020

## 2020-06-07 NOTE — PROGRESS NOTES
Notified PERFECTO Dang NP of the pt's H/H results and new orders noted. No signs of active bleeding noted or any instability with the pt's hemodynamics. No neuro changes noted. Will continue to monitor.

## 2020-06-08 ENCOUNTER — APPOINTMENT (OUTPATIENT)
Dept: VASCULAR SURGERY | Age: 50
DRG: 003 | End: 2020-06-08
Attending: NURSE PRACTITIONER
Payer: COMMERCIAL

## 2020-06-08 ENCOUNTER — APPOINTMENT (OUTPATIENT)
Dept: CT IMAGING | Age: 50
DRG: 003 | End: 2020-06-08
Attending: EMERGENCY MEDICINE
Payer: COMMERCIAL

## 2020-06-08 LAB
ALBUMIN SERPL-MCNC: 2.4 G/DL (ref 3.5–5)
ALBUMIN/GLOB SERPL: 0.6 {RATIO} (ref 1.1–2.2)
ALP SERPL-CCNC: 71 U/L (ref 45–117)
ALT SERPL-CCNC: 24 U/L (ref 12–78)
ANION GAP SERPL CALC-SCNC: 10 MMOL/L (ref 5–15)
ANION GAP SERPL CALC-SCNC: 5 MMOL/L (ref 5–15)
ANION GAP SERPL CALC-SCNC: 6 MMOL/L (ref 5–15)
ANION GAP SERPL CALC-SCNC: 6 MMOL/L (ref 5–15)
ANION GAP SERPL CALC-SCNC: 7 MMOL/L (ref 5–15)
ARTERIAL PATENCY WRIST A: ABNORMAL
AST SERPL-CCNC: 29 U/L (ref 15–37)
BASE DEFICIT BLD-SCNC: 2 MMOL/L
BASILAR ARTERY EDV: 14 CM/S
BASILAR ARTERY EDV: 38 CM/S
BASILAR ARTERY MEAN VEL: 26 CM/S
BASILAR ARTERY MEAN VEL: 58 CM/S
BASILAR ARTERY PSV: 51 CM/S
BASILAR ARTERY PSV: 98 CM/S
BASOPHILS # BLD: 0 K/UL (ref 0–0.1)
BASOPHILS NFR BLD: 0 % (ref 0–1)
BDY SITE: ABNORMAL
BILIRUB SERPL-MCNC: 0.4 MG/DL (ref 0.2–1)
BUN SERPL-MCNC: 22 MG/DL (ref 6–20)
BUN SERPL-MCNC: 23 MG/DL (ref 6–20)
BUN SERPL-MCNC: 24 MG/DL (ref 6–20)
BUN SERPL-MCNC: 24 MG/DL (ref 6–20)
BUN/CREAT SERPL: 25 (ref 12–20)
BUN/CREAT SERPL: 26 (ref 12–20)
BUN/CREAT SERPL: 27 (ref 12–20)
BUN/CREAT SERPL: 30 (ref 12–20)
BUN/CREAT SERPL: 30 (ref 12–20)
CA-I BLD-SCNC: 1.17 MMOL/L (ref 1.12–1.32)
CALCIUM SERPL-MCNC: 7.7 MG/DL (ref 8.5–10.1)
CALCIUM SERPL-MCNC: 7.8 MG/DL (ref 8.5–10.1)
CALCIUM SERPL-MCNC: 7.9 MG/DL (ref 8.5–10.1)
CALCIUM SERPL-MCNC: 7.9 MG/DL (ref 8.5–10.1)
CALCIUM SERPL-MCNC: 8 MG/DL (ref 8.5–10.1)
CALCIUM SERPL-MCNC: 8.1 MG/DL (ref 8.5–10.1)
CALCIUM SERPL-MCNC: 8.2 MG/DL (ref 8.5–10.1)
CHLORIDE SERPL-SCNC: 115 MMOL/L (ref 97–108)
CHLORIDE SERPL-SCNC: 115 MMOL/L (ref 97–108)
CHLORIDE SERPL-SCNC: 119 MMOL/L (ref 97–108)
CHLORIDE SERPL-SCNC: 120 MMOL/L (ref 97–108)
CHLORIDE SERPL-SCNC: 122 MMOL/L (ref 97–108)
CHLORIDE SERPL-SCNC: 123 MMOL/L (ref 97–108)
CHLORIDE SERPL-SCNC: 124 MMOL/L (ref 97–108)
CO2 SERPL-SCNC: 21 MMOL/L (ref 21–32)
CO2 SERPL-SCNC: 22 MMOL/L (ref 21–32)
CO2 SERPL-SCNC: 23 MMOL/L (ref 21–32)
CO2 SERPL-SCNC: 24 MMOL/L (ref 21–32)
CREAT SERPL-MCNC: 0.76 MG/DL (ref 0.55–1.02)
CREAT SERPL-MCNC: 0.8 MG/DL (ref 0.55–1.02)
CREAT SERPL-MCNC: 0.82 MG/DL (ref 0.55–1.02)
CREAT SERPL-MCNC: 0.85 MG/DL (ref 0.55–1.02)
CREAT SERPL-MCNC: 0.86 MG/DL (ref 0.55–1.02)
CREAT SERPL-MCNC: 0.87 MG/DL (ref 0.55–1.02)
CREAT SERPL-MCNC: 0.96 MG/DL (ref 0.55–1.02)
DATE LAST DOSE: NORMAL
DIFFERENTIAL METHOD BLD: ABNORMAL
EOSINOPHIL # BLD: 0 K/UL (ref 0–0.4)
EOSINOPHIL NFR BLD: 0 % (ref 0–7)
ERYTHROCYTE [DISTWIDTH] IN BLOOD BY AUTOMATED COUNT: 20.8 % (ref 11.5–14.5)
GAS FLOW.O2 O2 DELIVERY SYS: ABNORMAL L/MIN
GAS FLOW.O2 SETTING OXYMISER: 20 BPM
GLOBULIN SER CALC-MCNC: 3.8 G/DL (ref 2–4)
GLUCOSE BLD STRIP.AUTO-MCNC: 115 MG/DL (ref 65–100)
GLUCOSE BLD STRIP.AUTO-MCNC: 121 MG/DL (ref 65–100)
GLUCOSE BLD STRIP.AUTO-MCNC: 137 MG/DL (ref 65–100)
GLUCOSE SERPL-MCNC: 112 MG/DL (ref 65–100)
GLUCOSE SERPL-MCNC: 113 MG/DL (ref 65–100)
GLUCOSE SERPL-MCNC: 114 MG/DL (ref 65–100)
GLUCOSE SERPL-MCNC: 119 MG/DL (ref 65–100)
GLUCOSE SERPL-MCNC: 148 MG/DL (ref 65–100)
GLUCOSE SERPL-MCNC: 156 MG/DL (ref 65–100)
GLUCOSE SERPL-MCNC: 164 MG/DL (ref 65–100)
HCO3 BLD-SCNC: 22.6 MMOL/L (ref 22–26)
HCT VFR BLD AUTO: 28.3 % (ref 35–47)
HGB BLD-MCNC: 8 G/DL (ref 11.5–16)
IMM GRANULOCYTES # BLD AUTO: 0.1 K/UL (ref 0–0.04)
IMM GRANULOCYTES NFR BLD AUTO: 1 % (ref 0–0.5)
INR PPP: 1.1 (ref 0.9–1.1)
INSPIRATION.DURATION SETTING TIME VENT: 1.2 SEC
LEFT ACA EDV: 23 CM/S
LEFT ACA EDV: 61 CM/S
LEFT ACA MEAN VEL: 47 CM/S
LEFT ACA MEAN VEL: 91 CM/S
LEFT ACA PSV: 150 CM/S
LEFT ACA PSV: 95 CM/S
LEFT EX ICA EDV: 18 CM/S
LEFT EX ICA EDV: 25 CM/S
LEFT EX ICA MEAN VEL: 29 CM/S
LEFT EX ICA MEAN VEL: 44 CM/S
LEFT EX ICA PSV: 51 CM/S
LEFT EX ICA PSV: 81 CM/S
LEFT ICA EDV: 30 CM/S
LEFT ICA EDV: 54 CM/S
LEFT ICA MEAN VEL: 52 CM/S
LEFT ICA MEAN VEL: 84 CM/S
LEFT ICA PSV: 145 CM/S
LEFT ICA PSV: 95 CM/S
LEFT LINDEGAARD RATIO: 1.5
LEFT LINDEGAARD RATIO: 3.6
LEFT MCA 1 EDV: 30 CM/S
LEFT MCA 1 EDV: 66 CM/S
LEFT MCA 1 MEAN VEL: 105 CM/S
LEFT MCA 1 MEAN VEL: 65 CM/S
LEFT MCA 1 PSV: 136 CM/S
LEFT MCA 1 PSV: 183 CM/S
LEFT PCA 1 EDV: 27 CM/S
LEFT PCA 1 EDV: 58 CM/S
LEFT PCA 1 MEAN VEL: 52 CM/S
LEFT PCA 1 MEAN VEL: 89 CM/S
LEFT PCA 1 PSV: 101 CM/S
LEFT PCA 1 PSV: 152 CM/S
LEFT VERTEBRAL EDV TCD: 13 CM/S
LEFT VERTEBRAL EDV TCD: 15 CM/S
LEFT VERTEBRAL MEAN VEL: 23 CM/S
LEFT VERTEBRAL MEAN VEL: 26 CM/S
LEFT VERTEBRAL PSV TCD: 43 CM/S
LEFT VERTEBRAL PSV TCD: 47 CM/S
LYMPHOCYTES # BLD: 1 K/UL (ref 0.8–3.5)
LYMPHOCYTES NFR BLD: 7 % (ref 12–49)
MAGNESIUM SERPL-MCNC: 2.1 MG/DL (ref 1.6–2.4)
MCH RBC QN AUTO: 20.4 PG (ref 26–34)
MCHC RBC AUTO-ENTMCNC: 28.3 G/DL (ref 30–36.5)
MCV RBC AUTO: 72 FL (ref 80–99)
MONOCYTES # BLD: 1.2 K/UL (ref 0–1)
MONOCYTES NFR BLD: 9 % (ref 5–13)
NEUTS SEG # BLD: 11.3 K/UL (ref 1.8–8)
NEUTS SEG NFR BLD: 83 % (ref 32–75)
NRBC # BLD: 0.02 K/UL (ref 0–0.01)
NRBC BLD-RTO: 0.1 PER 100 WBC
O2/TOTAL GAS SETTING VFR VENT: 0.4 %
OSMOLALITY SERPL: NORMAL MOSM/KG H2O
OSMOLALITY SERPL: NORMAL MOSM/KG H2O
PCO2 BLD: 33.8 MMHG (ref 35–45)
PEEP RESPIRATORY: 8 CMH2O
PH BLD: 7.43 [PH] (ref 7.35–7.45)
PIP ISTAT,IPIP: 33
PLATELET # BLD AUTO: 241 K/UL (ref 150–400)
PLATELET COMMENTS,PCOM: ABNORMAL
PMV BLD AUTO: 10 FL (ref 8.9–12.9)
PO2 BLD: 93 MMHG (ref 80–100)
POTASSIUM SERPL-SCNC: 3.4 MMOL/L (ref 3.5–5.1)
POTASSIUM SERPL-SCNC: 3.6 MMOL/L (ref 3.5–5.1)
POTASSIUM SERPL-SCNC: 3.7 MMOL/L (ref 3.5–5.1)
POTASSIUM SERPL-SCNC: 3.7 MMOL/L (ref 3.5–5.1)
POTASSIUM SERPL-SCNC: 3.8 MMOL/L (ref 3.5–5.1)
POTASSIUM SERPL-SCNC: 3.9 MMOL/L (ref 3.5–5.1)
POTASSIUM SERPL-SCNC: 3.9 MMOL/L (ref 3.5–5.1)
PRESSURE CONTROL, IPC: YES
PROCALCITONIN SERPL-MCNC: 0.16 NG/ML
PROT SERPL-MCNC: 6.2 G/DL (ref 6.4–8.2)
PROTHROMBIN TIME: 11 SEC (ref 9–11.1)
RBC # BLD AUTO: 3.93 M/UL (ref 3.8–5.2)
RBC MORPH BLD: ABNORMAL
RBC MORPH BLD: ABNORMAL
REPORTED DOSE,DOSE: NORMAL UNITS
REPORTED DOSE/TIME,TMG: NORMAL
RIGHT ACA EDV: 25 CM/S
RIGHT ACA EDV: 51 CM/S
RIGHT ACA MEAN VEL: 47 CM/S
RIGHT ACA MEAN VEL: 80 CM/S
RIGHT ACA PSV: 138 CM/S
RIGHT ACA PSV: 91 CM/S
RIGHT EX ICA EDV: 11 CM/S
RIGHT EX ICA EDV: 29 CM/S
RIGHT EX ICA MEAN VEL: 23 CM/S
RIGHT EX ICA MEAN VEL: 46 CM/S
RIGHT EX ICA PSV: 48 CM/S
RIGHT EX ICA PSV: 79 CM/S
RIGHT ICA EDV: 19 CM/S
RIGHT ICA EDV: 32 CM/S
RIGHT ICA MEAN VEL: 39 CM/S
RIGHT ICA MEAN VEL: 54 CM/S
RIGHT ICA PSV: 78 CM/S
RIGHT ICA PSV: 98 CM/S
RIGHT LINDEGAARD RATIO: 1.4
RIGHT LINDEGAARD RATIO: 2.4
RIGHT MCA 1 EDV: 29 CM/S
RIGHT MCA 1 EDV: 38 CM/S
RIGHT MCA 1 MEAN VEL: 56 CM/S
RIGHT MCA 1 MEAN VEL: 63 CM/S
RIGHT MCA 1 PSV: 111 CM/S
RIGHT MCA 1 PSV: 114 CM/S
RIGHT PCA 1 EDV: 19 CM/S
RIGHT PCA 1 EDV: 27 CM/S
RIGHT PCA 1 MEAN VEL: 39 CM/S
RIGHT PCA 1 MEAN VEL: 45 CM/S
RIGHT PCA 1 PSV: 80 CM/S
RIGHT PCA 1 PSV: 81 CM/S
RIGHT VERTEBRAL EDV TCD: 14 CM/S
RIGHT VERTEBRAL EDV TCD: 24 CM/S
RIGHT VERTEBRAL MEAN VEL: 24 CM/S
RIGHT VERTEBRAL MEAN VEL: 37 CM/S
RIGHT VERTEBRAL PSV TCD: 45 CM/S
RIGHT VERTEBRAL PSV TCD: 63 CM/S
SAO2 % BLD: 98 % (ref 92–97)
SERVICE CMNT-IMP: ABNORMAL
SODIUM SERPL-SCNC: 144 MMOL/L (ref 136–145)
SODIUM SERPL-SCNC: 148 MMOL/L (ref 136–145)
SODIUM SERPL-SCNC: 148 MMOL/L (ref 136–145)
SODIUM SERPL-SCNC: 150 MMOL/L (ref 136–145)
SODIUM SERPL-SCNC: 150 MMOL/L (ref 136–145)
SODIUM SERPL-SCNC: 152 MMOL/L (ref 136–145)
SODIUM SERPL-SCNC: 153 MMOL/L (ref 136–145)
SPECIMEN TYPE: ABNORMAL
TOTAL RESP. RATE, ITRR: 21
VANCOMYCIN TROUGH SERPL-MCNC: 9.5 UG/ML (ref 5–10)
VENTILATION MODE VENT: ABNORMAL
VT SETTING VENT: 486 ML
WBC # BLD AUTO: 13.6 K/UL (ref 3.6–11)

## 2020-06-08 PROCEDURE — 65610000006 HC RM INTENSIVE CARE

## 2020-06-08 PROCEDURE — 80202 ASSAY OF VANCOMYCIN: CPT

## 2020-06-08 PROCEDURE — 82803 BLOOD GASES ANY COMBINATION: CPT

## 2020-06-08 PROCEDURE — 76010000379 HC BRONCHOSCOPY DIAG/THERAPEUTIC

## 2020-06-08 PROCEDURE — 70496 CT ANGIOGRAPHY HEAD: CPT

## 2020-06-08 PROCEDURE — 74011000250 HC RX REV CODE- 250: Performed by: NURSE PRACTITIONER

## 2020-06-08 PROCEDURE — 82962 GLUCOSE BLOOD TEST: CPT

## 2020-06-08 PROCEDURE — 74011250636 HC RX REV CODE- 250/636: Performed by: INTERNAL MEDICINE

## 2020-06-08 PROCEDURE — 74011000258 HC RX REV CODE- 258: Performed by: NURSE PRACTITIONER

## 2020-06-08 PROCEDURE — 83930 ASSAY OF BLOOD OSMOLALITY: CPT

## 2020-06-08 PROCEDURE — 80048 BASIC METABOLIC PNL TOTAL CA: CPT

## 2020-06-08 PROCEDURE — 94003 VENT MGMT INPAT SUBQ DAY: CPT

## 2020-06-08 PROCEDURE — 70450 CT HEAD/BRAIN W/O DYE: CPT

## 2020-06-08 PROCEDURE — 74011250636 HC RX REV CODE- 250/636: Performed by: NURSE PRACTITIONER

## 2020-06-08 PROCEDURE — 74011250637 HC RX REV CODE- 250/637: Performed by: NURSE PRACTITIONER

## 2020-06-08 PROCEDURE — 80053 COMPREHEN METABOLIC PANEL: CPT

## 2020-06-08 PROCEDURE — 84145 PROCALCITONIN (PCT): CPT

## 2020-06-08 PROCEDURE — 74011258636 HC RX REV CODE- 258/636: Performed by: NURSE PRACTITIONER

## 2020-06-08 PROCEDURE — C9254 INJECTION, LACOSAMIDE: HCPCS | Performed by: NURSE PRACTITIONER

## 2020-06-08 PROCEDURE — 85610 PROTHROMBIN TIME: CPT

## 2020-06-08 PROCEDURE — 74011636320 HC RX REV CODE- 636/320: Performed by: RADIOLOGY

## 2020-06-08 PROCEDURE — 36591 DRAW BLOOD OFF VENOUS DEVICE: CPT

## 2020-06-08 PROCEDURE — 94640 AIRWAY INHALATION TREATMENT: CPT

## 2020-06-08 PROCEDURE — 93886 INTRACRANIAL COMPLETE STUDY: CPT

## 2020-06-08 PROCEDURE — 83735 ASSAY OF MAGNESIUM: CPT

## 2020-06-08 PROCEDURE — 85025 COMPLETE CBC W/AUTO DIFF WBC: CPT

## 2020-06-08 PROCEDURE — 0BJ08ZZ INSPECTION OF TRACHEOBRONCHIAL TREE, VIA NATURAL OR ARTIFICIAL OPENING ENDOSCOPIC: ICD-10-PCS | Performed by: INTERNAL MEDICINE

## 2020-06-08 PROCEDURE — 0042T CT CODE NEURO PERF W CBF: CPT

## 2020-06-08 PROCEDURE — 77030020291 HC FLEXSEAL FMS BMS -C

## 2020-06-08 RX ORDER — AMLODIPINE BESYLATE 5 MG/1
5 TABLET ORAL DAILY
Status: DISCONTINUED | OUTPATIENT
Start: 2020-06-09 | End: 2020-06-20

## 2020-06-08 RX ORDER — SODIUM CHLORIDE 0.9 % (FLUSH) 0.9 %
10 SYRINGE (ML) INJECTION
Status: DISPENSED | OUTPATIENT
Start: 2020-06-08 | End: 2020-06-08

## 2020-06-08 RX ORDER — GUAIFENESIN 100 MG/5ML
100 SOLUTION ORAL EVERY 6 HOURS
Status: DISCONTINUED | OUTPATIENT
Start: 2020-06-08 | End: 2020-06-10

## 2020-06-08 RX ORDER — 3% SODIUM CHLORIDE 3 G/100ML
30 INJECTION, SOLUTION INTRAVENOUS CONTINUOUS
Status: DISCONTINUED | OUTPATIENT
Start: 2020-06-08 | End: 2020-06-08

## 2020-06-08 RX ORDER — ACETAMINOPHEN 10 MG/ML
1000 INJECTION, SOLUTION INTRAVENOUS ONCE
Status: COMPLETED | OUTPATIENT
Start: 2020-06-08 | End: 2020-06-08

## 2020-06-08 RX ORDER — SODIUM CHLORIDE 3 G/100ML
250 INJECTION, SOLUTION INTRAVENOUS ONCE
Status: COMPLETED | OUTPATIENT
Start: 2020-06-08 | End: 2020-06-08

## 2020-06-08 RX ORDER — MANNITOL 20 G/100ML
25 INJECTION, SOLUTION INTRAVENOUS EVERY 6 HOURS
Status: DISCONTINUED | OUTPATIENT
Start: 2020-06-08 | End: 2020-06-08

## 2020-06-08 RX ORDER — 3% SODIUM CHLORIDE 3 G/100ML
30 INJECTION, SOLUTION INTRAVENOUS CONTINUOUS
Status: DISCONTINUED | OUTPATIENT
Start: 2020-06-08 | End: 2020-06-10

## 2020-06-08 RX ORDER — VANCOMYCIN 1.75 GRAM/500 ML IN 0.9 % SODIUM CHLORIDE INTRAVENOUS
1750 EVERY 12 HOURS
Status: DISCONTINUED | OUTPATIENT
Start: 2020-06-08 | End: 2020-06-08

## 2020-06-08 RX ADMIN — IPRATROPIUM BROMIDE AND ALBUTEROL SULFATE 3 ML: .5; 3 SOLUTION RESPIRATORY (INHALATION) at 07:07

## 2020-06-08 RX ADMIN — SODIUM CHLORIDE 30 ML/HR: 3 INJECTION, SOLUTION INTRAVENOUS at 16:48

## 2020-06-08 RX ADMIN — POTASSIUM BICARBONATE 20 MEQ: 782 TABLET, EFFERVESCENT ORAL at 08:28

## 2020-06-08 RX ADMIN — NIMODIPINE 60 MG: 60 SOLUTION ORAL at 15:22

## 2020-06-08 RX ADMIN — GUAIFENESIN 100 MG: 100 SOLUTION ORAL at 15:00

## 2020-06-08 RX ADMIN — NIMODIPINE 60 MG: 60 SOLUTION ORAL at 10:05

## 2020-06-08 RX ADMIN — PROPOFOL INJECTABLE EMULSION 25 MCG/KG/MIN: 10 INJECTION, EMULSION INTRAVENOUS at 06:46

## 2020-06-08 RX ADMIN — Medication 2 DROP: at 21:11

## 2020-06-08 RX ADMIN — MANNITOL 25 G: 20 INJECTION, SOLUTION INTRAVENOUS at 02:30

## 2020-06-08 RX ADMIN — IPRATROPIUM BROMIDE AND ALBUTEROL SULFATE 3 ML: .5; 3 SOLUTION RESPIRATORY (INHALATION) at 19:34

## 2020-06-08 RX ADMIN — POTASSIUM BICARBONATE 40 MEQ: 782 TABLET, EFFERVESCENT ORAL at 10:05

## 2020-06-08 RX ADMIN — GUAIFENESIN 100 MG: 100 SOLUTION ORAL at 02:14

## 2020-06-08 RX ADMIN — SODIUM CHLORIDE 15 MG/HR: 9 INJECTION, SOLUTION INTRAVENOUS at 05:44

## 2020-06-08 RX ADMIN — NIMODIPINE 60 MG: 60 SOLUTION ORAL at 18:53

## 2020-06-08 RX ADMIN — PIPERACILLIN AND TAZOBACTAM 3.38 G: 3; .375 INJECTION, POWDER, LYOPHILIZED, FOR SOLUTION INTRAVENOUS at 12:22

## 2020-06-08 RX ADMIN — ASPIRIN 300 MG: 300 SUPPOSITORY RECTAL at 08:28

## 2020-06-08 RX ADMIN — SODIUM CHLORIDE 1000 MG: 900 INJECTION, SOLUTION INTRAVENOUS at 22:28

## 2020-06-08 RX ADMIN — GUAIFENESIN 100 MG: 100 SOLUTION ORAL at 08:28

## 2020-06-08 RX ADMIN — ACETAMINOPHEN 1000 MG: 10 INJECTION, SOLUTION INTRAVENOUS at 20:48

## 2020-06-08 RX ADMIN — SODIUM CHLORIDE 15 MG/HR: 9 INJECTION, SOLUTION INTRAVENOUS at 13:17

## 2020-06-08 RX ADMIN — Medication 2 DROP: at 06:45

## 2020-06-08 RX ADMIN — PIPERACILLIN AND TAZOBACTAM 3.38 G: 3; .375 INJECTION, POWDER, LYOPHILIZED, FOR SOLUTION INTRAVENOUS at 04:22

## 2020-06-08 RX ADMIN — SODIUM CHLORIDE 250 ML: 3 INJECTION, SOLUTION INTRAVENOUS at 11:18

## 2020-06-08 RX ADMIN — NIMODIPINE 60 MG: 60 SOLUTION ORAL at 02:02

## 2020-06-08 RX ADMIN — FENTANYL CITRATE 100 MCG: 50 INJECTION INTRAMUSCULAR; INTRAVENOUS at 20:27

## 2020-06-08 RX ADMIN — SODIUM CHLORIDE 100 MG: 900 INJECTION, SOLUTION INTRAVENOUS at 06:08

## 2020-06-08 RX ADMIN — FENTANYL CITRATE 100 MCG: 50 INJECTION INTRAMUSCULAR; INTRAVENOUS at 18:07

## 2020-06-08 RX ADMIN — IPRATROPIUM BROMIDE AND ALBUTEROL SULFATE 3 ML: .5; 3 SOLUTION RESPIRATORY (INHALATION) at 03:01

## 2020-06-08 RX ADMIN — GUAIFENESIN 100 MG: 100 SOLUTION ORAL at 20:50

## 2020-06-08 RX ADMIN — SODIUM CHLORIDE 15 MG/HR: 900 INJECTION, SOLUTION INTRAVENOUS at 03:32

## 2020-06-08 RX ADMIN — PROPOFOL INJECTABLE EMULSION 30 MCG/KG/MIN: 10 INJECTION, EMULSION INTRAVENOUS at 18:51

## 2020-06-08 RX ADMIN — CHLORHEXIDINE GLUCONATE 15 ML: 0.12 RINSE ORAL at 08:30

## 2020-06-08 RX ADMIN — PROPOFOL INJECTABLE EMULSION 20 MCG/KG/MIN: 10 INJECTION, EMULSION INTRAVENOUS at 02:23

## 2020-06-08 RX ADMIN — SODIUM CHLORIDE 15 MG/HR: 9 INJECTION, SOLUTION INTRAVENOUS at 16:35

## 2020-06-08 RX ADMIN — SODIUM CHLORIDE 1000 MG: 900 INJECTION, SOLUTION INTRAVENOUS at 11:00

## 2020-06-08 RX ADMIN — FENTANYL CITRATE 100 MCG: 50 INJECTION INTRAMUSCULAR; INTRAVENOUS at 01:06

## 2020-06-08 RX ADMIN — IPRATROPIUM BROMIDE AND ALBUTEROL SULFATE 3 ML: .5; 3 SOLUTION RESPIRATORY (INHALATION) at 13:53

## 2020-06-08 RX ADMIN — FAMOTIDINE 20 MG: 10 INJECTION, SOLUTION INTRAVENOUS at 20:28

## 2020-06-08 RX ADMIN — Medication 2 DROP: at 15:22

## 2020-06-08 RX ADMIN — VANCOMYCIN HYDROCHLORIDE 1750 MG: 10 INJECTION, POWDER, LYOPHILIZED, FOR SOLUTION INTRAVENOUS at 10:00

## 2020-06-08 RX ADMIN — FENTANYL CITRATE 100 MCG: 50 INJECTION INTRAMUSCULAR; INTRAVENOUS at 06:46

## 2020-06-08 RX ADMIN — MANNITOL 25 G: 20 INJECTION, SOLUTION INTRAVENOUS at 08:31

## 2020-06-08 RX ADMIN — PIPERACILLIN AND TAZOBACTAM 3.38 G: 3; .375 INJECTION, POWDER, LYOPHILIZED, FOR SOLUTION INTRAVENOUS at 22:48

## 2020-06-08 RX ADMIN — SODIUM CHLORIDE 100 MG: 900 INJECTION, SOLUTION INTRAVENOUS at 18:53

## 2020-06-08 RX ADMIN — IOPAMIDOL 100 ML: 755 INJECTION, SOLUTION INTRAVENOUS at 01:00

## 2020-06-08 RX ADMIN — CHLORHEXIDINE GLUCONATE 15 ML: 0.12 RINSE ORAL at 21:00

## 2020-06-08 RX ADMIN — NIMODIPINE 60 MG: 60 SOLUTION ORAL at 22:50

## 2020-06-08 RX ADMIN — NIMODIPINE 60 MG: 60 SOLUTION ORAL at 06:41

## 2020-06-08 RX ADMIN — SODIUM CHLORIDE 15 MG/HR: 9 INJECTION, SOLUTION INTRAVENOUS at 08:59

## 2020-06-08 RX ADMIN — FAMOTIDINE 20 MG: 10 INJECTION, SOLUTION INTRAVENOUS at 08:30

## 2020-06-08 RX ADMIN — PROPOFOL INJECTABLE EMULSION 30 MCG/KG/MIN: 10 INJECTION, EMULSION INTRAVENOUS at 13:38

## 2020-06-08 RX ADMIN — SODIUM CHLORIDE 15 MG/HR: 9 INJECTION, SOLUTION INTRAVENOUS at 20:03

## 2020-06-08 RX ADMIN — SODIUM CHLORIDE 15 MG/HR: 900 INJECTION, SOLUTION INTRAVENOUS at 01:45

## 2020-06-08 NOTE — CDMP QUERY
Pt admitted with SAH due to ruptured cerebral aneurysm, noted to have pulmonary edema. If possible, please document in the progress notes and discharge summary if you are evaluating and/or treating any of the following:  Acute pulmonary edema  Chronic pulmonary edema  Other, please specify  Clinically unable to determine The medical record reflects the following: 
   Risk Factors: SAH, respiratory failure Clinical Indicators: PN with Acute hypoxic respiratory failure in the setting of likely neurogenic pulmonary edema- started on Lasix 40 mg IV twice daily Treatment: Lasix 40 mg IV, respiratory support, ICU monitoring Thank you, Rocio Aguilar RN 
[facility] 
[phone]

## 2020-06-08 NOTE — PROGRESS NOTES
Neurocritical Care Code Stroke Documentation    Symptoms:   left pupil enlarged and fixed. Less responsive   Last Known Well: 18   Medical hx: Active Problems:    SAH (subarachnoid hemorrhage) ([address] (2020)      Subarachnoid hemorrhage from middle cerebral artery aneurysm, left (HCC) (2020)      RIGHT middle cerebral artery aneurysm (2020)      Cerebral vasospasm (2020)      Anterior communicating artery aneurysm (2020)       Anticoagulation: aspirin   VAN: Positive   NIHSS:   1a-LOC:3    1b-Month/Age:2    1c-Open/Close Hand:2    2-Best Gaze:0    3-Visual Fields:0    4-Facial Palsy:0    5a-Left Arm:4    5b-Right Arm:4    6a-Left Le    6b-Right Le    7-Limb Ataxia:0    8-Sensory:1    9-Best Language:3    10-Dysarthria:U    11-Extinction/Inattention:0  TOTAL SCORE:27   Imaging:   CT-Global edema    CTA-No LVO. No spasm    CTP-No perfusion defect  Scans read by Dr. Elsie Rai in [facility]   Plan:   TPA Candidate: NO    Mechanical thrombectomy Candidate: NO  Will give 25 g of Mannitol q 6 hours x 3. Restart sedation. Ensure HOB is elevated to 30 degrees. SBP goal is 120-180.        Discussed with: Dr. Trina Soares, Dr. Chirag Jones [place] [id number] [address] [facility] Sukumar Garcia NP  Neurocritical Care Nurse Practitioner  [phone]

## 2020-06-08 NOTE — PROGRESS NOTES
Neurointerventional Surgery Progress Note  Aneta Wetzel NP   Neurocritical Care Nurse Practitioner  [phone]          Admit Date: 6/4/2020        Daily Progress Note: 6/8/2020   LOS: 4 days      S/P:  POD 3 Cerebral angiogram with coil embolization of ruptured left MCA bifurcation aneurysm on 6/5/2020    ** Delayed Presentation ** PB Day 8    Interval History/Subjective:   Patient had an acute event overnight where her Left pupil became fixed and dilated and patient was less responsive. Code stroke was called. CT of Head revealed global edema. She received a dose of mannitol. CTA did not show any significant vasospasm or LVO. No perfusion abnormality on CTP. Patient remains intubated and sedated on Propofol. Propofol was restarted due to patient become agitated with increased blood pressure. Patient not following commands, eyes do not open to stimulus. She will move her head at time. Withdraws to pain on lower legs. Minimal withdrawal to pain on upper extremities. Left pupil remains fixed and dilated. Plan to start 3% saline this morning for cerebral edema. Unable to perform a ROS due to intubation and sedation. 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 Clipping              - Continue Nimotop Day 4 of 21 to prevent delayed cerebral ischemia              - Maintenance IV Fluids on hold due to worsening edema on CXR on 6/6 per Intensivist (CXR showing persistent although increased interstitial and parenchymal opacities, particularly in the right perihilar region). Patient has been diuresed with lasix. She is +10L since admission.              - TCDs daily, TCDs today show possible right PANCHITO spasm, left PCA spasm, possible left MCA and PANCHITO spasm. Intracranial flow is resistive, consistent with increasing intracranial pressure. CTA of Head completed earlier this morning showed no evidence of significant spasm              - Strict I&O's              - ECHO shows EF 60-65%, mild concentric hypertrophy, mildly dilated left atrium              - continue every hour neuro checks              - Keep SBP goal <200 Cardene/Fabien/Hydralazine/Labetalol PRN              - PT/OT/SLP evals when appropriate              -  Repeat CT of Head today showed no significant change.               - NSGY following              - NIS following      2.) Seizure              - Due to #1, no prior hx of seizure              - Continue Keppra 1000 mg BID for seizure PPX              - continue Vimpat 100 mg BID              - Seizure precautions              -  EEG on 6/6 showed an abnormal EEG due to slowing of the background rhythms with intermittent rhythmic delta activity seen in both frontal head regions. Occasional sharp wave discharges in the left frontal area. EEG is suggestive of mild-to-moderate generalized encephalopathic process, nonspecific in type. In addition, there was occasional epileptiform disturbance seen in the left frontal area which may represent a partial onset seizure focus.              - completed 24 hour EEG today which shows moderate to severe generalized encephalopathic process, nonspecific in type. This may be related to underlying structural brain injury or effects of sedation. No convincingly lateralizing or epileptiform features were noted. No seizure was recorded.               - Neurology following 3.) Cerebral Edema              - received a dose of Mannitol overnight, start 3% saline today. Give 250 ml bolus of 3% now, then check sodium level after. Start 3% saline infusion at 30 ml/hr. Check BMP every 2 hours. Sodium goal 150-160.                - Neurosurgery informed and no EVD recommended at this time per Neurosurgery    4.) Acute hypoxic respiratory failure in the setting of likely neurogenic pulmonary edema              - Symmetric moderately severe airspace disease in the upper lobes bilaterally  which may represent edema or sequela of aspiration seen on CTA. CXR on 6/4 showed Diffuse bilateral airspace disease more suggestive of pulmonary edema than diffuse pneumonia. Repeat CXR on 6/6/2020 showed persistent although increased interstitial and parenchymal opacities  particularly in the right perihilar region.            - CTA of Chest negative for PE, showed dependent atelectasis with diffuse groundglass opacifications bilaterally             - patient diuresed with Lasix             - COVID 19 test negative x 2             - vent settings per Intensivist              - wean sedation as tolerated              - patient may potentially need to be diuresed              - Intensivist managing     5.) Multiple Acute Ischemic Infarctions              - MRI completed and shows a large left MCA territory ischemia/infarction,  left temporal, parietal and frontal lobes, cortically based, no superimposed  increased parenchymal hemorrhage or midline shift. Right and left PANCHITO territory  cortical diffusion restriction consistent with infarction. Infarction in the right insular cortex and right temporal lobe.  Scattered small infarctions right and left corona radiata centrum semiovale, right cerebellum                - Imaging pattern is consistent with Hypoxic-Ischemic Encephalopathy (HIE)                - Continue aspirin 300 [place] daily for stroke prevention                - ECHO as stated above - Lipid panel on 6/5, LDL 7.8, goal < 70, statin not indicated                - Hgb A1C ok at 5.7                - Neurology following    6.) Possible Pneumonia                - started on broad spectrum antibiotics per Intensivist                - CTA of chest as stated above                - Intensivist following     7.) Anemia                - Hgb 8.0 improving                 - no evidence of blood loss                 - anemia etiology work up ordered per Intensivist                  - Goal Hgb >7.0                 - Intensivist following    Activity: Bed rest  DVT ppx: SCDs  Disposition: TBD    Plan d/w Dr. Jeana Blizzard, Irvin Hays (NP Intensivist), Dr. Shae Tucker, and patient's mother at bedside. Admission Summary:     Yeni Osei is a 48 y.o. 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 On arrival to ED, pt was unresponsive and noted to be seizing. A stat CT of her head was performed there, which showed extensive SAH. Pt was then intubated emergently. She was also extremely hypertensive with SBPs in 240s. She was given Keppra and Ativan, and placed on cardene drip for BP control. NSGY and NIS were then consulted and the patient was transferred to [facility] for higher level of care. En route, paramedics administered 7.5 mg of Versed, 200mcg of Fentanyl, and 10 mg of labetalol for BP control. On arrival, CTA of her head and neck was obtained, which showed a 4.4 x 4.1 mm right MCA trifurcation aneurysm and  a 2.2 x 3.1 mm left MCA trifurcation aneurysm. Of note, CTA head/neck also showed pulmonary infiltrates suspicious for COVID-19 infection and the patient has been tested for COVID which was negative. 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 Mother reported pt does not smoke, does not drink alcohol or use street drugs.  The mother also reported that the patient began complaining of a headache on 5/31/2020 and has been taking BC Powder (aspirin) for the headache. She also reported that the pt has a hx of HTN and is supposed to be on BP medications, but recently stopped taking her medications. The patient underwent a cerebral angiogram on 6/5 by Dr. Violet Sawyer for coil embolization of a ruptured left MCA bifurcation aneurysm. Unable to obtain a ROS due intubation and sedation.   Current Facility-Administered Medications   Medication Dose Route Frequency Provider Last Rate Last Dose    guaiFENesin (ROBITUSSIN) 100 mg/5 mL oral liquid 100 mg  100 mg Oral Q6H Loni Ramey NP-C   100 mg at 06/08/20 0828    niCARdipine (CARDENE) 50 mg in 0.9% sodium chloride 100 mL infusion  0-15 mg/hr IntraVENous TITRATE FABIAN Avalos 30 mL/hr at 06/08/20 1317 15 mg/hr at 06/08/20 1317    [START ON 6/9/2020] amLODIPine (NORVASC) tablet 5 mg  5 mg Per NG tube DAILY DARREN Spencer        3% sodium chloride (*HIGH ALERT*CONCENTRATED IV*) infusion  30 mL/hr IntraVENous CONTINUOUS Leo Avery NP        0.9% sodium chloride infusion 250 mL  250 mL IntraVENous PRN FABIAN Ambrocio        PHENYLephrine (ELKIN-SYNEPHRINE) 30 mg in 0.9% sodium chloride 250 mL infusion   mcg/min IntraVENous TITRATE Leo Avery NP   Stopped at 06/07/20 1600    labetaloL (NORMODYNE;TRANDATE) injection 20 mg  20 mg IntraVENous Q4H PRN Leo Avery NP        albuterol-ipratropium (DUO-NEB) 2.5 MG-0.5 MG/3 ML  3 mL Nebulization Q6H RT Connor Luevano ACNP   3 mL at 06/08/20 1353    piperacillin-tazobactam (ZOSYN) 3.375 g in 0.9% sodium chloride (MBP/ADV) 100 mL  3.375 g IntraVENous Q8H Connor Luevano ACNP 25 mL/hr at 06/08/20 1222 3.375 g at 06/08/20 1222    lacosamide (VIMPAT) 100 mg in 0.9% sodium chloride 100 mL IVPB  100 mg IntraVENous Q12H Leo Avery NP   100 mg at 06/08/20 0608    aspirin (ASA) suppository 300 mg  300 mg Rectal DAILY GreenvilleFatuma, NP   300 mg at 06/08/20 0828    niMODipine (NYMALIZE) 60 mg/20 mL oral solution 60 mg  60 mg Per NG tube Q4H Antonina Domínguez NP   60 mg at 06/08/20 1005    glucose chewable tablet 16 g  4 Tab Oral PRN Marcos Spare, NP-C        glucagon (GLUCAGEN) injection 1 mg  1 mg IntraMUSCular PRN Marcos Spare, NP-C        dextrose 10% infusion 0-250 mL  0-250 mL IntraVENous PRN Marcos Spare, NP-C        insulin lispro (HUMALOG) injection   SubCUTAneous Q6H Glee Kiss R, NP-C   Stopped at 06/08/20 0600    albuterol (PROVENTIL VENTOLIN) nebulizer solution 2.5 mg  2.5 mg Nebulization Q6H PRN Marcos Spare, NP-C        famotidine (PF) (PEPCID) 20 mg in 0.9% sodium chloride 10 mL injection  20 mg IntraVENous Q12H Julito Hoop, ACNP   20 mg at 06/08/20 0830    fentaNYL citrate (PF) injection  mcg   mcg IntraVENous Q1H PRN Julito Hoop, ACNP   100 mcg at 06/08/20 8465    polyvinyl alcohol-povidone (NATURAL TEARS) 0.5-0.6 % ophthalmic solution 2 Drop  2 Drop Both Eyes Q8H Julito Hoop, ACNP   2 Drop at 06/08/20 0645    docusate (COLACE) 50 mg/5 mL oral liquid 100 mg  100 mg Oral BID Julito Hoop, ACNP   Stopped at 06/07/20 1800    bisacodyL (DULCOLAX) suppository 10 mg  10 mg Rectal DAILY PRN Julito Hoop, ACNP        levETIRAcetam (KEPPRA) 1,000 mg in 0.9% sodium chloride 100 mL IVPB  1,000 mg IntraVENous Q12H Antonina Domínguez NP   1,000 mg at 06/08/20 1100    propofol (DIPRIVAN) 10 mg/mL infusion  0-50 mcg/kg/min IntraVENous TITRATE Antonina Domínguez NP 18.1 mL/hr at 06/08/20 1338 30 mcg/kg/min at 06/08/20 1338    chlorhexidine (ORAL CARE KIT) 0.12 % mouthwash 15 mL  15 mL Oral Q12H FABIAN Herrmann   15 mL at 06/08/20 0830    ondansetron (ZOFRAN) injection 4 mg  4 mg IntraVENous Q4H PRN FABIAN Clark        docusate (COLACE) 50 mg/5 mL oral liquid 100 mg  100 mg Oral BID HAZELN Mikie Srinivasan R, NP-C            No Known Allergies    Review of Systems:  Review of systems not obtained due to patient factors. Objective:     Vital signs  Temp (24hrs), Av.3 °F (37.4 °C), Min:97.8 °F (36.6 °C), Max:99.9 °F (37.7 °C)   701 -  190  In: 1721.8 [I.V.:1401.8]  Out: 900 [Urine:900]  1901 -  07  In: 6423.7 [I.V.:4286.6]  Out: 9818 [Urine:5755]    Visit Vitals  /66   Pulse 98   Temp 99.9 °F (37.7 °C)   Resp 27   Ht 5' 4\" (1.626 m)   Wt 246 lb 7.6 oz (111.8 kg)   SpO2 100%   BMI 42.31 kg/m²      O2 Device: Ventilator   Vitals:    20 1330 20 1345 20 1353 20 1400   BP:       Pulse: 87 91  98   Resp:    Temp:       SpO2: 98% 99% 98% 100%   Weight:       Height:          Physical Exam:  GENERAL: NAD, intubated and sedated on propofol. EYE: conjunctivae/corneas clear. Right pupil 3 mm, brisk response to light. Left pupil 5 mm fixed and dilated. Eyes midline. Scleral edema noted bilaterally   LUNG: lungs coarse bilaterally   HEART: regular rate and rhythm, S1, S2 normal, no murmur, click, rub or gallop  EXTREMITIES:  extremities normal, atraumatic, no cyanosis, 3+ pitting peripheral edema, distal pulses 2+ bilaterally   SKIN: Skin warm to touch. Right groin site clean, dry, and intact. No hematoma, bruising, or bleeding noted. NEUROLOGIC: Intubated and sedated on Propofol. Eyes do not open to stimulus. No command following. Moves head intermittently. Right pupil 3 mm, brisk response to light. Left pupil 5 mm fixed and dilated. Eyes midline. No tracking with eyes. No blink to threat. Withdraws to painful stimuli on all extremities BLE >BUE. Toes mute bilaterally. No involuntary movements. Gait deferred. Unable to assess language, sensation, and coordination.      Imaging:    CT of Head on 2020 shows  IMPRESSION:   Global edema, otherwise no significant change    CTA of Head and Neck on 2020 per radiology shows  Impression:  Mild to moderate vasospasm in the left middle cerebral artery status post  bifurcation aneurysm coil embolization. Stable 4 mm right MCA aneurysm. Endotracheal tube terminates just above the matt. 1 cm low-density left thyroid nodule. Mildly enlarged left axillary lymph node measuring 11 mm in short axis  Patchy bilateral airspace disease. Nonspecific asymmetric enhancement of the right superior ophthalmic vein   (However, no significant vasospasm was seen when personally reviewed by NIS)    CT Perfusion on 6/8/2020 shows  IMPRESSION:  No significant cerebral perfusion abnormality    CTA of Chest on 6/7/2020 shows  IMPRESSION:  1. No acute pulmonary embolus  2. Dependent atelectasis with diffuse groundglass opacifications bilaterally. CT of Head on 6/7/2020 shows  IMPRESSION:   1. No further progression of subarachnoid hemorrhage. The overall degree of  subarachnoid hemorrhage is slightly improved. 2. Subtle areas of gray-white differentiation loss throughout the cerebral  hemispheres correlating with areas of restricted diffusion on previous CT  compatible with ischemic changes. MRI of Brain on 6/6/2020 shows  IMPRESSION:   Imaging findings consistent with large left MCA territory ischemia/infarction,  left temporal, parietal and frontal lobes, cortically based, no superimposed  increased parenchymal hemorrhage or midline shift. Right and left PANCHITO territory  cortical diffusion restriction consistent with infarction. Infarction in the right insular cortex and right temporal lobe. Scattered small  infarctions right and left corona radiata centrum semiovale, right cerebellum. Allowing for differences in technique likely stable subarachnoid and  intraventricular hemorrhage. CT of Head on 6/6/2020 at 1411 shows  IMPRESSION:   Left MCA territory infarction with smaller right MCA territory infarction.     Stable subarachnoid and intraventricular hemorrhage.     CTA of Head on 6/6/2020 at [phone] shows  IMPRESSION:  Slightly increased intraventricular and subarachnoid hemorrhage compared to the  prior exam.     Status post coiling left MCA bifurcation aneurysm. No significant residual  aneurysm filling. CT of Head on 6/62020 at 0239 shows  IMPRESSION:  Slightly diminished subarachnoid and intraventricular hemorrhage. Stable ventricular system. CT of Head WO on 6/5/2020 at 1512 shows  IMPRESSION: Stable acute subarachnoid and intraventricular hemorrhage. Stable  ventricular system. CT Head WO 6/5/20 0826     IMPRESSION:   1. Diffuse subarachnoid hemorrhage most concentrated in the basal cisterns and  left sylvian fissure. 2.  Interval ventricular enlargement suspicious for developing hydrocephalus.     CTA Head 6/4/20 2017     IMPRESSION:  1. Diffuse subarachnoid hemorrhage in the basilar cisterns and sylvian  fissures. 2.  Bilateral MCA bifurcation aneurysms. 3.  Infundibular origins to the posterior communicating arteries bilaterally. 4.  Symmetric moderately severe airspace disease in the upper lobes bilaterally  which may represent edema or sequela of aspiration.     CT Head WO Contrast 6/4/2020 2012    IMPRESSION: Extensive subarachnoid hemorrhage concerning for leaking/ruptured  aneurysm.        24 hour results:    Recent Results (from the past 24 hour(s))   HGB & HCT    Collection Time: 06/07/20  2:37 PM   Result Value Ref Range    HGB 7.7 (L) 11.5 - 16.0 g/dL    HCT 26.3 (L) 35.0 - 47.0 %   LD    Collection Time: 06/07/20  2:37 PM   Result Value Ref Range     81 - 246 U/L   C REACTIVE PROTEIN, QT    Collection Time: 06/07/20  2:37 PM   Result Value Ref Range    C-Reactive protein 47.90 (H) 0.00 - 0.60 mg/dL   FERRITIN    Collection Time: 06/07/20  2:37 PM   Result Value Ref Range    Ferritin 30 8 - 252 NG/ML   D DIMER    Collection Time: 06/07/20  2:37 PM   Result Value Ref Range    D-dimer 8.63 (H) 0.00 - 0.65 mg/L FEU   TCD INTRACRANIAL ARTERIES COMPLETE    Collection Time: 06/07/20  2:57 PM   Result Value Ref Range    Right Lindegaard Ratio 1.4     Right MCA 1  cm/s    Right MCA 1 EDV 38 cm/s    Right MCA 1 Mean Velocity 63 cm/s    Right PANCHITO  cm/s    Right PANCHITO EDV 51 cm/s    Right PANCHITO Mean Velocity 80 cm/s    Right ICA PSV 98 cm/s    Right ICA EDV 32 cm/s    Right ICA Mean Velocity 54 cm/s    Right PCA 1 PSV 81 cm/s    Right PCA 1 EDV 27 cm/s    Right PCA 1 Mean Velocity 45 cm/s    Right External ICA PSV 79 cm/s    Right External ICA EDV 29 cm/s    Right External ICA Mean Velocity 46 cm/s    Left MCA 1  cm/s    Left MCA 1 EDV 66 cm/s    Left MCA 1 Mean Velocity 105 cm/s    Left PANCHITO  cm/s    Left PANCHITO EDV 61 cm/s    Left PANCHITO Mean Velocity 91 cm/s    Left ICA  cm/s    Left External ICA PSV 51 cm/s    Left ICA EDV 54 cm/s    Left External ICA EDV 18 cm/s    Left ICA Mean Velocity 84 cm/s    Left External ICA Mean Velocity 29 cm/s    Left PCA 1  cm/s    Left PCA 1 EDV 58 cm/s    Left PCA 1 Mean Velocity 89 cm/s    Left Lindegaard Ratio 3.6     Basilar Artery PSV 98 cm/s    Basilar Artery EDV 38 cm/s    Basilar Artery Mean Angel 58 cm/s    Right Vertebral Mean Velocity 37 cm/s    Left Vertebral Mean Velocity 26 cm/s    Right Vertebral PSV 63 cm/s    Right Vertebral EDV 24 cm/s    Left Vertebral PSV 47 cm/s    Left Vertebral EDV 15 cm/s   GLUCOSE, POC    Collection Time: 06/07/20  5:49 PM   Result Value Ref Range    Glucose (POC) 169 (H) 65 - 100 mg/dL    Performed by Frantz Orellana, POC    Collection Time: 06/07/20 11:23 PM   Result Value Ref Range    Glucose (POC) 152 (H) 65 - 100 mg/dL    Performed by Amber Millan    CBC WITH AUTOMATED DIFF    Collection Time: 06/08/20 12:44 AM   Result Value Ref Range    WBC 13.6 (H) 3.6 - 11.0 K/uL    RBC 3.93 3.80 - 5.20 M/uL    HGB 8.0 (L) 11.5 - 16.0 g/dL    HCT 28.3 (L) 35.0 - 47.0 %    MCV 72.0 (L) 80.0 - 99.0 FL    MCH 20.4 (L) 26.0 - 34.0 PG    MCHC 28.3 (L) 30.0 - 36.5 g/dL    RDW 20. 8 (H) 11.5 - 14.5 %    PLATELET 056 489 - 315 K/uL    MPV 10.0 8.9 - 12.9 FL    NRBC 0.1 (H) 0  WBC    ABSOLUTE NRBC 0.02 (H) 0.00 - 0.01 K/uL    NEUTROPHILS 83 (H) 32 - 75 %    LYMPHOCYTES 7 (L) 12 - 49 %    MONOCYTES 9 5 - 13 %    EOSINOPHILS 0 0 - 7 %    BASOPHILS 0 0 - 1 %    IMMATURE GRANULOCYTES 1 (H) 0.0 - 0.5 %    ABS. NEUTROPHILS 11.3 (H) 1.8 - 8.0 K/UL    ABS. LYMPHOCYTES 1.0 0.8 - 3.5 K/UL    ABS. MONOCYTES 1.2 (H) 0.0 - 1.0 K/UL    ABS. EOSINOPHILS 0.0 0.0 - 0.4 K/UL    ABS. BASOPHILS 0.0 0.0 - 0.1 K/UL    ABS. IMM.  GRANS. 0.1 (H) 0.00 - 0.04 K/UL    DF SMEAR SCANNED      PLATELET COMMENTS Large Platelets      RBC COMMENTS ANISOCYTOSIS  2+        RBC COMMENTS OVALOCYTES  PRESENT       METABOLIC PANEL, BASIC    Collection Time: 06/08/20 12:44 AM   Result Value Ref Range    Sodium 144 136 - 145 mmol/L    Potassium 3.7 3.5 - 5.1 mmol/L    Chloride 115 (H) 97 - 108 mmol/L    CO2 22 21 - 32 mmol/L    Anion gap 7 5 - 15 mmol/L    Glucose 156 (H) 65 - 100 mg/dL    BUN 24 (H) 6 - 20 MG/DL    Creatinine 0.96 0.55 - 1.02 MG/DL    BUN/Creatinine ratio 25 (H) 12 - 20      GFR est AA >60 >60 ml/min/1.73m2    GFR est non-AA >60 >60 ml/min/1.73m2    Calcium 7.8 (L) 8.5 - 10.1 MG/DL   PROTHROMBIN TIME + INR    Collection Time: 06/08/20 12:44 AM   Result Value Ref Range    INR 1.1 0.9 - 1.1      Prothrombin time 11.0 9.0 - 11.1 sec   GLUCOSE, POC    Collection Time: 06/08/20  6:43 AM   Result Value Ref Range    Glucose (POC) 115 (H) 65 - 100 mg/dL    Performed by [facility], BASIC    Collection Time: 06/08/20  6:48 AM   Result Value Ref Range    Sodium 148 (H) 136 - 145 mmol/L    Potassium 3.4 (L) 3.5 - 5.1 mmol/L    Chloride 115 (H) 97 - 108 mmol/L    CO2 23 21 - 32 mmol/L    Anion gap 10 5 - 15 mmol/L    Glucose 112 (H) 65 - 100 mg/dL    BUN 23 (H) 6 - 20 MG/DL    Creatinine 0.86 0.55 - 1.02 MG/DL    BUN/Creatinine ratio 27 (H) 12 - 20      GFR est AA >60 >60 ml/min/1.73m2    GFR est non-AA >60 >60 ml/min/1.73m2    Calcium 7.9 (L) 8.5 - 10.1 MG/DL   MAGNESIUM    Collection Time: 06/08/20  6:48 AM   Result Value Ref Range    Magnesium 2.1 1.6 - 2.4 mg/dL   TCD INTRACRANIAL ARTERIES COMPLETE    Collection Time: 06/08/20 10:06 AM   Result Value Ref Range    Right Lindegaard Ratio 1.8     Right MCA 1  cm/s    Right MCA 1 EDV 31 cm/s    Right MCA 1 Mean Velocity 67 cm/s    Right MCA 1 Pulsatility Index 1.6     Right PANCHITO  cm/s    Right PANCHITO EDV 66 cm/s    Right PANCHITO Mean Velocity 104 cm/s    Right ICA PSV 87 cm/s    Right ICA EDV 23 cm/s    Right ICA Mean Velocity 44 cm/s    Right PCA 1 PSV 95 cm/s    Right PCA 1 EDV 27 cm/s    Right PCA 1 Mean Velocity 50 cm/s    Right External ICA PSV 77 cm/s    Right External ICA EDV 18 cm/s    Right External ICA Mean Velocity 38 cm/s    Left MCA 1  cm/s    Left MCA 1 EDV 54 cm/s    Left MCA 1 Mean Velocity 104 cm/s    Left MCA 1 Pulsatility Index 1.4     Left PANCHITO  cm/s    Left PANCHITO EDV 58 cm/s    Left PANCHITO Mean Velocity 97 cm/s    Left ICA  cm/s    Left External ICA PSV 67 cm/s    Left ICA EDV 38 cm/s    Left External ICA EDV 15 cm/s    Left ICA Mean Velocity 80 cm/s    Left External ICA Mean Velocity 32 cm/s    Left PCA 1  cm/s    Left PCA 1 EDV 55 cm/s    Left PCA 1 Mean Velocity 91 cm/s    Left Lindegaard Ratio 3.3     Basilar Artery PSV 85 cm/s    Basilar Artery EDV 27 cm/s    Basilar Artery Mean Angel 46 cm/s    Right Vertebral Mean Velocity 28 cm/s    Right Vertebral PSV 53 cm/s    Right Vertebral EDV 15 cm/s    Left Vertebral PSV 73 cm/s    Left Vertebral EDV 13 cm/s   VANCOMYCIN, TROUGH    Collection Time: 06/08/20 10:15 AM   Result Value Ref Range    Vancomycin,trough 9.5 5.0 - 10.0 ug/mL    Reported dose date: NOT PROVIDED      Reported dose time: NOT PROVIDED      Reported dose: NOT PROVIDED UNITS   METABOLIC PANEL, COMPREHENSIVE    Collection Time: 06/08/20 10:49 AM   Result Value Ref Range    Sodium 148 (H) 136 - 145 mmol/L    Potassium 3.9 3.5 - 5.1 mmol/L    Chloride 119 (H) 97 - 108 mmol/L    CO2 23 21 - 32 mmol/L    Anion gap 6 5 - 15 mmol/L    Glucose 148 (H) 65 - 100 mg/dL    BUN 23 (H) 6 - 20 MG/DL    Creatinine 0.87 0.55 - 1.02 MG/DL    BUN/Creatinine ratio 26 (H) 12 - 20      GFR est AA >60 >60 ml/min/1.73m2    GFR est non-AA >60 >60 ml/min/1.73m2    Calcium 8.1 (L) 8.5 - 10.1 MG/DL    Bilirubin, total 0.4 0.2 - 1.0 MG/DL    ALT (SGPT) 24 12 - 78 U/L    AST (SGOT) 29 15 - 37 U/L    Alk.  phosphatase 71 45 - 117 U/L    Protein, total 6.2 (L) 6.4 - 8.2 g/dL    Albumin 2.4 (L) 3.5 - 5.0 g/dL    Globulin 3.8 2.0 - 4.0 g/dL    A-G Ratio 0.6 (L) 1.1 - 2.2     GLUCOSE, POC    Collection Time: 06/08/20 11:50 AM   Result Value Ref Range    Glucose (POC) 121 (H) 65 - 100 mg/dL    Performed by Marcos Shah    PROCALCITONIN    Collection Time: 06/08/20 12:11 PM   Result Value Ref Range    Procalcitonin 9.64 ng/mL   METABOLIC PANEL, BASIC    Collection Time: 06/08/20  1:21 PM   Result Value Ref Range    Sodium 150 (H) 136 - 145 mmol/L    Potassium 3.8 3.5 - 5.1 mmol/L    Chloride 120 (H) 97 - 108 mmol/L    CO2 24 21 - 32 mmol/L    Anion gap 6 5 - 15 mmol/L    Glucose 114 (H) 65 - 100 mg/dL    BUN 22 (H) 6 - 20 MG/DL    Creatinine 0.82 0.55 - 1.02 MG/DL    BUN/Creatinine ratio 27 (H) 12 - 20      GFR est AA >60 >60 ml/min/1.73m2    GFR est non-AA >60 >60 ml/min/1.73m2    Calcium 7.9 (L) 8.5 - 10.1 MG/DL   POC EG7    Collection Time: 06/08/20  2:02 PM   Result Value Ref Range    Calcium, ionized (POC) 1.17 1.12 - 1.32 mmol/L    FIO2 (POC) 0.40 %    pH (POC) 7.433 7.35 - 7.45      pCO2 (POC) 33.8 (L) 35.0 - 45.0 MMHG    pO2 (POC) 93 80 - 100 MMHG    HCO3 (POC) 22.6 22 - 26 MMOL/L    Base deficit (POC) 2 mmol/L    sO2 (POC) 98 (H) 92 - 97 %    Site DRAWN FROM ARTERIAL LINE      Device: VENT      Mode ASSIST CONTROL      Tidal volume 486 ml    Set Rate 20 bpm    PEEP/CPAP (POC) 8 cmH2O    PIP (POC) 33      Allens test (POC) N/A Inspiratory Time 1.20 sec    Specimen type (POC) ARTERIAL      Total resp.  rate 21      Pressure control YES          Christina Ricci NP

## 2020-06-08 NOTE — PROGRESS NOTES
Ventricles remain stable on cta. No radiographic signs of developing hydrocephalus. I think risks of evd at this point outweigh potential benefits. Will follow.

## 2020-06-08 NOTE — PROGRESS NOTES
1930: SBAR received from Maintenance Assistant. 2000: Patient assessed. Drip rates verified. Neuro check completed with offgoing nurse. Bolus tube feed given - will wait two hours to give nimodipine. 2050: Patient grimacing, biting ETT - will give PRN fentanyl. 2345: Patient agitated and biting tube - SBP in 200s. Prn fentanyl given and increased cardene. 2330: Bedside and Verbal shift change report given to [facility] Yamel Monzon  (oncoming nurse) by Magi Fall (offgoing nurse). Report included the following information SBAR, Kardex, Intake/Output, MAR and Recent Results.

## 2020-06-08 NOTE — PROGRESS NOTES
RN responds to Code S, Rm. 1 in ICU. Patient has SAH. Currently intubated. Patient given IV fentanyl at 2350 on 6/7. LKW 0001. Patient newly presenting with Left eye fixed pupil 4-5mm. No longer reactive. Patient left side not withdrawing to noxious stimuli. Madai Norton, FLAKITA assessing and managing patient. NIH = 27. NeuroIR Dr. Rosalio Westbrook contacted. Declines teleneurology at this time. Patient is not a TPA candidate due to [facility]. Code Neuro CT/CTA/CT Perfusion ordered. NSTU RN will follow if any additional assistance needed.

## 2020-06-08 NOTE — PROGRESS NOTES
Pharmacist Note - Vancomycin Dosing  Therapy day 3  Indication: PNA  Current regimen: 1750mg q16h    A Trough Level resulted at 9.5 mcg/mL (@1015) which was obtained 16.25 hrs post-dose. Goal trough: 15 - 20 mcg/mL     Plan: Change to 1750 mg IV q12h . Pharmacy will continue to monitor this patient daily for changes in clinical status and renal function.

## 2020-06-08 NOTE — PROGRESS NOTES
0730: Bedside and Verbal shift change report given to Zuleima Horta RN (oncoming nurse) by Jordana Estrada RN (offgoing nurse). Report included the following information SBAR, Kardex, ED Summary, Intake/Output, MAR, Accordion, Recent Results, Med Rec Status, Cardiac Rhythm NSR and Alarm Parameters Dual Neuro Assessment.

## 2020-06-08 NOTE — PROCEDURES
[address]   EEG    Name:  Latha Burt  MR#:  [id number]  :  1970  ACCOUNT #:  [de-identified]  DATE OF SERVICE:  2020      REQUESTING PHYSICIAN:  Jenni Banegas MD    HISTORY:  The patient is a 51-year-old female who is being evaluated for altered mental status. She has subarachnoid hemorrhage on her imaging studies. DESCRIPTION:  This is a prolonged EEG with video monitoring performed on 2020. The recording starts at 04:22 p.m. and continues until 07:26 a.m. on 2020. The dominant posterior background rhythm consists of medium voltage rhythms in the 5-6 Hz frequency range out of the posterior head region. There are slower delta frequencies seen in the frontal areas which at times appear rhythmic intermittently. Drowsiness is characterized by slowing and vertex waves in the central head regions. Clinically, the patient remains intubated and unresponsive throughout the recording. She was on sedation. EKG artifact was noted frequently. SUMMARY:  Abnormal EEG due to moderate slowing of the background rhythms with frontally dominant intermittent rhythmic delta activity. CLINICAL INTERPRETATION:  This EEG shows moderate to severe generalized encephalopathic process, nonspecific in type. This may be related to underlying structural brain injury or effects of sedation. No convincingly lateralizing or epileptiform features were noted. No seizure was recorded.         Kait Dave MD      [id number]  D:  2020 11:11  T:  2020 12:42  JOB #:  [id number]

## 2020-06-08 NOTE — PROGRESS NOTES
Neurology Progress Note  Mai Malagon NP  Neurocritical Care Nurse Practitioner  [phone]    Admit Date: 6/4/2020   LOS: 4 days      Daily Progress Note: 6/8/2020    S/P: Cerebral angiogram with coil embolization of the left MCA aneurysm by Dr. Ekaterina Solano on 6/5/20. Juliet Toledo is a 51-year-old female with a past medical history of HTN. According to patient's mother, the patient began having a headache on 5/31/20 and had been taking BC Powder with aspirin in it for the headache. She recently stopped taking her BP medications. According to her mother she does not smoke, drink alcohol or use recreational drugs. The patient was brought to the [facility] ER via EMS on 6/4/420  after her coworkers found her unresponsive outside of her place of work. 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 On arrival to the ER she was found to be seizing. A stat CT of her head was performed which showed extensive subarachnoid hemorrhage. She was extremely hypertensive in the 240's and was given labetalol and started on a Cardene drip. She was emergently intubated and given Keppra and Ativan as well. Neurosurgery and Neurointerventional Surgery were consulted and the patient was transferred to the [facility] for a higher level of care. On arrival to [facility] a CTA of her head and neck was obtained which showed a 4.4 x 4.1 mm right MCA trifurcation aneurysm and a 2.2 x 3.1 mm left MCA trifurcation aneurysm and possible less than 2 mm ACOM aneurysms. Extensive lung infiltrates were also noted. Patient was taken for cerebral angiogram the morning of 06/05/20 and coil embolization of the left MCA aneurysm was completed. The right MCA aneurysm had a very wide neck incorporating both M2s. It could not be treated endovascularly without stents. May need to be clipped by Neurosurgery. Subjective:     Code S called on patient last night due to left pupil enlargement and fixation.  Pupils went from a 2 on the right and 3 on the left reactive to a 2 on the right and a 5 fixed on the left. Patient had been withdrawing x 4 (intermittently withdrawing RUE) and became less responsive on the left upper extremity. Patient had had an episode of HTN with SBP in the 220's before the neuro changes were noted. She remains on the Cardene gtt. Patient taken for CT head which showed no significant change. However, Dr. Veronica Shen in [facility] felt patient had global edema. She stated that there was no significant vasospasm. There was no perfusion defect. Patient was started on Mannitol 25 g q 6 hours. SBP goals remain 120-180. Patient has been off of sedation due to continuous EEG. EEG had to be discontinued for now due to going to CT. Will have it restarted in the am. Restarted sedation to help decrease intracranial pressure. Also, patient's BP elevates significantly when being suctioned off of sedation. No Known Allergies    Review of Systems:  Review of systems not obtained due to patient factors. Objective:   Vital signs  Temp (24hrs), Av.5 °F (36.9 °C), Min:97.8 °F (36.6 °C), Max:99.8 °F (37.7 °C)   1901 -  0700  In: 958.3 [I.V.:508.3]  Out: 1450 [Urine:1450]   07 - 1900  In: 6544.9 [I.V.:4132.8]  Out: 4344 [Urine:4225]  Visit Vitals  /67   Pulse 91   Temp 99.8 °F (37.7 °C)   Resp 20   Ht 5' 4\" (1.626 m)   Wt 106.2 kg (234 lb 2.1 oz)   SpO2 100%   BMI 40.19 kg/m²      O2 Device: Endotracheal tube, Ventilator   Vitals:    20 0200 20 0300 20 0301 20 0302   BP:       Pulse: 96 96  91   Resp:    Temp:       SpO2: 100%  98% 100%   Weight:       Height:          Physical Exam:  GENERAL: Sedated with propofol, intubated and ventilated. SKIN: Warm, dry, color appropriate for ethnicity. Right groin site soft, with no odor, bleeding or drainage noted. Mild ecchymosis present. Pedal pulses positive bilaterally.      Neurologic Exam:  Mental Status:             Sedated, intubated on ventilator.  No command following. No eye opening.      Language:                   Unable to assess due to sedation and intubation.      Cranial Nerves:          Right pupil is 2 mm and mildly reactive. Left pupil 5 mm and nonreactive. Blinks to threat bilaterally.                                         Motor:                                                                Bulk and tone normal.                                       No involuntary movements.     Sensation:                   Withdraws to pain in bilateral lower extremities but no withdrawal in bilateral upper extremities. (Had withdrawn x 4 at 2100 last night for me).    Reflexes:                     Mute Babinskis. Corneal reflexes present. Cough and gag reflexes present.      Coordination & Gait:   Unable to assess due to patient factors. Labs:  Lab Results   Component Value Date/Time    WBC 13.6 (H) 2020 12:44 AM    HGB 8.0 (L) 2020 12:44 AM    HCT 28.3 (L) 2020 12:44 AM    PLATELET 218  12:44 AM    MCV 72.0 (L) 2020 12:44 AM     Lab Results   Component Value Date/Time    Sodium 144 2020 12:44 AM    Potassium 3.7 2020 12:44 AM    Chloride 115 (H) 2020 12:44 AM    CO2 22 2020 12:44 AM    Anion gap 7 2020 12:44 AM    Glucose 156 (H) 2020 12:44 AM    BUN 24 (H) 2020 12:44 AM    Creatinine 0.96 2020 12:44 AM    BUN/Creatinine ratio 25 (H) 2020 12:44 AM    GFR est AA >60 2020 12:44 AM    GFR est non-AA >60 2020 12:44 AM    Calcium 7.8 (L) 2020 12:44 AM       Imagin/8/20 CT head 1:08  There is mild ventricular dilatation which is stable. There is no significant  white matter disease. The pattern of subarachnoid hemorrhage status post  aneurysm clipping is not significantly changed. There is loss of gray-white  matter differentiation which is not significantly changed.  No CT evidence of  acute infarct.     The bone windows demonstrate no abnormalities. There is mucosal thickening of  the sphenoid and ethmoid sinuses.     IIMPRESSION:   No significant change. 6/8/20 CT Perfusion Scan of Brain: Shows no perfusion abnormality. 6/8/20 CTA head and neck     CT examination of the head and neck demonstrates spasm of the left MCA but no  significant spasm of the remaining vessels.     (Of note studies were examined [facility] who noted Global edema in the brain and no significant vasospasm),     CT Results (maximum last 3): Results from [place] encounter on 06/04/20   CTA CODE NEURO HEAD AND NECK W CONT    Narrative *PRELIMINARY REPORT*    CT examination of the head and neck demonstrates spasm of the left MCA but no  significant spasm of the remaining vessels. Preliminary report was provided by Dr. Karishma Araujo, the on-call radiologist, at  2:56 AM    Final report to follow. *END PRELIMINARY REPORT*               CT CODE NEURO PERF W CBF    Narrative *PRELIMINARY REPORT*    CT perfusion demonstrates no perfusion abnormality. Preliminary report was provided by Dr. Karishma Araujo, the on-call radiologist, at  2:54 AM    Final report to follow. *END PRELIMINARY REPORT*               CT CODE NEURO HEAD WO CONTRAST    Narrative EXAM: CT CODE NEURO HEAD WO CONTRAST    INDICATION: Change in neuro status, SAH seizure, unresponsive    COMPARISON: June 7. CONTRAST: None. TECHNIQUE: Unenhanced CT of the head was performed using 5 mm images. Brain and  bone windows were generated. Coronal and sagittal reformats. CT dose reduction  was achieved through use of a standardized protocol tailored for this  examination and automatic exposure control for dose modulation. FINDINGS:  There is mild ventricular dilatation which is stable. There is no significant  white matter disease. The pattern of subarachnoid hemorrhage status post  aneurysm clipping is not significantly changed.  There is loss of gray-white  matter differentiation which is not significantly changed. No CT evidence of  acute infarct. The bone windows demonstrate no abnormalities. There is mucosal thickening of  the sphenoid and ethmoid sinuses. Impression IMPRESSION:   No significant change. 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 S/P cerebral angiogram with coil embolization of left MCA bifurcation aneurysm on 6/5. Patient also has 5X3 right MCA bifurcation aneurysm with very wide neck which will  need to be treated with stents vs. clipping             - Day 4/21 of Nimotop to prevent delayed cerebral ischemia              - Maintain euvolemia. Fluids held at present due to fluid overload.              - Strict I&O              - ECHO with EF of 60-65%. No regional wall motion abnormality or atrial septal  defect. Mildly  dilated left atrium.             - q 1 neuro checks              -Daily TCD's              - PT/OT/SLP evals when appropriate              -SBP goal 120-180 to prevent cerebral vasospasm. ELKIN/Cardene prn. -CT head 6/8 am showed stable SAH/IVH, Global edema. CTA with no  significant vasospasm.                - NSGY also following     2.) Multiple scattered infarcts per MRI. MRI completed and shows a large left MCA territory ischemia/infarction, left temporal, parietal and frontal lobes, cortically based, no superimposed increased parenchymal hemorrhage or midline shift. Right and left PANCHITO territory cortical diffusion restriction consistent with infarction. Infarction in the right insular cortex and right temporal lobe.  Scattered small infarctions right and left corona radiata centrum semiovale, right cerebellum               - Imaging pattern is consistent with Hypoxic-Ischemic Encephalopathy (HIE)              -Was started on aspirin 300 FL yesterday for stroke prevention              -SBP goal 120-180.                 - Lipid panel on 6/5, LDL 7.8, goal < 70, statin not indicated                - Hgb A1C ok at 5.7     3. ) Seizure              - Due to [facility], no prior hx of seizure              - Continue Keppra 1000 mg BID, Vimpat 100 BID              - Seizure precautions              - Ativan PRN for seizures greater than 2 minutes.               - Propofol while intubated   -Patient was on continuous EEG but had to be discontinued around MN due to  Code S and patient going to CT scan. Hopefully can restart monitoring this am.      4.) Cerebral edema                 - Global edema on CT this am per Dr. Bernardino Childs. - Mannitol 25 g q 6 hours x 3 doses. If no improvement consider changing to  3% Saline gtt. - Elevate HOB 30%   -Sedation restarted.          Plan discussed with Dr. Junaid Yoder, Dr. Bernardino Childs, the Intensivist Team and the primary RN. The patient verbalized understanding of the current plan of care and is in agreement. Will await further recommendations by Dr. Junaid Yoder.      Phillip Borges NP  Neurocritical Care Nurse Practitioner

## 2020-06-08 NOTE — PROGRESS NOTES
Orders obtained and verified for PICC placement. Order for PICC verified. Consent obtained from patient's mother  after risk,benefits and procedure explained and questions answered. 1045:  Arrived to patients room to evaluate site for placement. Bilateral upper extremities evaluated; pt very edematous. Spoke with Barber Luna NP regarding appropriateness for PICC placement at this time.   Instructed to hold at this time and reevaluate in am.

## 2020-06-08 NOTE — PROGRESS NOTES
Patient inline suction catheter meets resistance. MD and NP has been made aware. Patient is able to maintain VT at this time    I lavage patient a few times, was not able to get back any sputum.

## 2020-06-08 NOTE — PROCEDURES
[facility]      Procedure Note - Arterial Access:   Performed by FABIAN Bassett . Immediately prior to the procedure, the patient was reevaluated and found suitable for the planned procedure and any planned medications. Immediately prior to the procedure a time out was called to verify the correct patient, procedure, equipment, staff, and marking as appropriate. Insertion Date: 06/08/20 Time: 0610   Procedure Location:  ICU. Condition: Emergency. Consent:  No, Placed emergently for hemodynamic monitoring, loss of previous line, patient with neuro changes, SAH and unsecured aneurysm. Method: Seldinger technique. Site Prep: ChloraPrep and Sterile draping. Procedure: Arterial Catheter Insertion in Left, Radial Artery   Catheter inserted into a new site. Number of Attempts:  2 Indication: Monitoring. Complication None. Performed By:performed the above procedure myself. The procedure was tolerated well.     Iker Dinh AGACNP-BC     [address] [facility]

## 2020-06-08 NOTE — PROGRESS NOTES
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 At the [facility] a CT was done and she was found to have a [facility]  and transferred to [facility] for further neuro evaluation after being intubated by sending facility. Repeat CTH/CTA here showed an extensive subarachnoid hemorrhage concerning for leaking/ruptured aneurysm without hydrocephalus. 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 Both neurosurgery and neuro IR consulted and she was taken to IR for coiling of a Left MCA aneurysm this am by Dr. Reynaldo Vasquez. Plan for Rt aneurysmal clipping at a later time. Remains at risk for hydrocephalus, continue to re-eval for hydrocephalus. Remains intubated at this time. Overnight Events: Overnight patient with pupillary changes with left pupil 5mm fixed and dilated. Code S called on patient and patient went to [address] [place] [address] with evidence of increased cerebral edema and mild to moderate L MCA vasospasm post aneurysm coil embolization. The aneurysm was stable to the right MCA. Dr. Elsie Rai was consulted and a dose of mannitol was given. She was restarted on Propofol infusion. This am, Neurosurgery was called to re-eval for EVD placement for ICP monitoring. A 3 % saline gtt is being started with Q2hr BMP. Patient is +4 anasarca and required arterial line replacement due to dislodgement. Unable to place PICC today 2/2 Fluid overload and peripheral edema. Hgb stable, K replaced 60mEq for K 3.4.      POD:* No surgery found *    S/P: Day 2 for L MCA aneurysm coiling    Objective:   Vital Signs:  Visit Vitals  /57   Pulse 90   Temp 99.9 °F (37.7 °C)   Resp 21   Ht 5' 4\" (1.626 m)   Wt 111.8 kg (246 lb 7.6 oz)   SpO2 99%   BMI 42.31 kg/m²      O2 Device: Endotracheal tube, Ventilator Temp (24hrs), Av.7 °F (37.1 °C), Min:97.8 °F (36.6 °C), Max:99.9 °F (37.7 °C)         Intake/Output:     Intake/Output Summary (Last 24 hours) at 2020 1126  Last data filed at 2020 1100  Gross per 24 hour   Intake 4700.52 ml   Output 3205 ml   Net 1495.52 ml       Physical Exam:    General:  Sedated and on the ventilator. No acute distress. Eyes:  Sclera anicteric, + scleral edema, Pupils equal, round, reactive to light. Eyes midline, Right eye 2 mm sluggish, left eye 5 mm irreg. NR   Mouth/Throat: Orotracheal tube in place., OGT in place   Neck: Supple. Lungs:   Bronchial/ coarse to auscultation bilaterally,  good effort. Vent supported with equal chest rise/ fall, lower oxygenation support requirements   Cardiovascular:  Regular rate and rhythm, no murmur, click, rub, or gallop. Abdomen:   Soft, obese, non-tender, bowel sounds normal, non-distended. Extremities: No cyanosis, +3-4 generalized edema. Skin: No acute rash or lesions. Lymph Nodes: Cervical and supraclavicular normal.   Musculoskeletal:  +3-4 swelling no deformity. WD x 3 ext, no WD to RUE   Lines/Devices:  Intact, no erythema, drainage, or tenderness.    Psychiatric: Sedated and appears comfortable on ventilator, moves head spontaneously     T/L/D  Tubes: ETT and Orogastric Tube  Lines: Peripheral IV arterial line , Femoral CVL   Drains: Martinez Catheter    LABS AND  DATA: Personally reviewed  Recent Labs     20  0044 20  1437 20  0545   WBC 13.6*  --  9.1   HGB 8.0* 7.7* 6.9*   HCT 28.3* 26.3* 24.0*     --  226     Recent Labs     20  0648 20  0044 20  0541 20  0422   * 144 142 142   K 3.4* 3.7 3.6 3.6   * 115* 113* 113*   CO2 23 22 19* 18*   BUN 23* 24* 21* 14   CREA 0.86 0.96 1.04* 0.93   * 156* 165* 158*   CA 7.9* 7.8* 7.6* 8.1*   MG 2.1  --  2.3 2.3   PHOS  --   --  3.7 2.7     No results for input(s): AP, TBIL, TP, ALB, GLOB, AML, LPSE in the last 72 hours. No lab exists for component: SGOT, GPT, AMYP  Recent Labs     06/08/20  0044   INR 1.1   PTP 11.0      Recent Labs     06/06/20  1225   PHI 7.351   PCO2I 31.9*   PO2I 115*   FIO2I 100     Recent Labs     06/07/20  0119 06/06/20  1755   TROIQ 0.41* 0.56*     Ventilator Settings:  Mode Rate Tidal Volume Pressure FiO2 PEEP   Assist control, Pressure control   450 ml    50 % 8 cm H20     Peak airway pressure: 32 cm H2O    Minute ventilation: 11.4 l/min        MEDS: Reviewed    Chest X-Ray:  CXR Results  (Last 48 hours)    None        06/08/20 CTH/ CTA Brain:  IMPRESSION  Impression:     Mild to moderate vasospasm in the left middle cerebral artery status post  bifurcation aneurysm coil embolization. Stable 4 mm right MCA aneurysm.     Endotracheal tube terminates just above the matt.     1 cm low-density left thyroid nodule.     Mildly enlarged left axillary lymph node measuring 11 mm in short axis.     Patchy bilateral airspace disease.     Nonspecific asymmetric enhancement of the right superior ophthalmic vein        06/06/20 MRI:  IMPRESSION:   Imaging findings consistent with large left MCA territory ischemia/infarction,  left temporal, parietal and frontal lobes, cortically based, no superimposed  increased parenchymal hemorrhage or midline shift. Right and left PANCHITO territory  cortical diffusion restriction consistent with infarction. Infarction in the right insular cortex and right temporal lobe. Scattered small  infarctions right and left corona radiata centrum semiovale, right cerebellum.     Allowing for differences in technique likely stable subarachnoid and  intraventricular hemorrhage.     06/06/20 ECHO:  Result status: Final result   · Normal cavity size and systolic function (ejection fraction normal). Mild concentric hypertrophy.  Estimated left ventricular ejection fraction is 60 - 65%. No regional wall motion abnormality noted. · Mildly dilated left atrium. ABCDEF Bundle/Checklist Completed:  YES-See Plan    SPECIAL EQUIPMENT  None    Active Problem List:     Problem List  Date Reviewed: 6/4/2020          Codes Class    SAH (subarachnoid hemorrhage) ([facility] [address] ICD-10-CM: I60.9  ICD-9-CM: 430         Subarachnoid hemorrhage from middle cerebral artery aneurysm, left (HCC) ICD-10-CM: F20.29  ICD-9-CM: 200         RIGHT middle cerebral artery aneurysm ICD-10-CM: I67.1  ICD-9-CM: 437.3         Cerebral vasospasm ICD-10-CM: I67.848  ICD-9-CM: 435.9         Anterior communicating artery aneurysm ICD-10-CM: I67.1  ICD-9-CM: 437.3             ICU Assessment/ Comprehensive Plan of Care:       NEURO  1. Subarrachnoid hemorrhage 2/2 ruptured aneurym  2. Right MCA trifurcation aneurysm  3. Left MCA trifurcation aneurysm s/p coiling  4. Multiple scattered infarcts per MRI, consistent with hypoxic event  5. Seizure   -Keep euvolemic, eunatremic, euglycemic   -SBP goal 120-180 to prevent cerebral vasospasm. ELKIN/Cardene prn.   -Nimotipine 60mg via OGT Q4hr  -Neuro IR following, Dr. Rios Marker  -Neurosurgery following-Dr. Gamal Bray, Rt MCA will need clipping at later time  -ASA 81mg po daily per neuro IR 06/06  -Check TCDs   -Q1hr Neuro checks  - CT Head this am   - Continue Keppra 1000 mg BID, Vimpat 100 BID  -Neurology following for seizure work up  -cEEG in place    Pain Medications: Fentanyl  Target RASS: -2 - Light Sedation - Briefly awakens to voice (eyes open & contact <10 sec)  Sedation Medications: Propofol, intolerant of precedex (Junctional bradycardia)  CAM-ICU:  FABIAN  Restraints: Soft wrist restraints    CARDIAC  1.  Hypertension   -Nicardipine gtt at 15 mg/hr this am.  Start amlodipine 5 mg via NGT today to help decrease nicardipine demands  -ECHO pending, completed 06/08    Cardiac Gtts: Nicardipine (Cardene)  SBP Goal of: 120-180  MAP Goal of: > 65 mmHg  Transfusion Trigger (Hgb): <7 g/dL    RESPIRATORY  1. Acute severe hypoxic respiratory failure, improving  -Provide conservative fluid management to prevent vasospasms  -Hold off for diuresis now  -Remains intubated, titrate down Fio2 as tolerated to maintain sats  -Check PCXR in am  -Currently on PEEP 8 Fio2 40%  -COVID NEG x2  - CT Chest NEG PE 06/07    2. Pulmonary edema, improving  -Minimal PEEP, Fio2 today  -Furosemide 40mg IV BID--> DCd 2/2 vasospasms and mannitol. Now sodium elevated and 3% gtt in use. -DC IVF    3. Possible RUL pneumonia  - Broad spectrum antibiotics  -Check procalcitonin, trend x 3    -Supportive care  -Unable to obtain sputum 2/2 no secretions prior to antibiotics  -MRSA NEG, stop Vanco 06/08    Respiratory Goals: Chlorhexidine   Optimize PEEP/Ventilation/Oxygenation  Goal Tidal Volume 6 cc/kg based on IBW  Aim for lung protective ventilation  Head of bed > 30 degrees  Plan to Extubate: attempt to provide weaning trials in am  SPO2 Goal: > 92%  Pulmonary toilet: NA   DVT Prophylaxis (if no, list reason): SCD's or Sequential Compression Device     RENAL  1. No acute kidney injury  -Stable BUN/creat   -Maintain euvolemia,NET + 9.7 liters since admit  -Strict I/O  -Bumex 1mg IV Q12h    2. Hypokalemia  Potassium 3.6, replace to keep K > 4  -Potassium 40mEq replaced this am    Martinez Catheter Present: Yes  IVFs: SL    GASTROINTESTINAL  1. No acute concerns    GI Prophylaxis: Pepcid (famotidine)   Nutrition: Yes Dietary consulted  Bowel Movement: Yes  Bowel Regimen: Docusate (Colace) and Bisacodyl (Dulcolax)    HEMATOLOGIC  1. Microcytic, hypochromic anemia, unknown origin  -Keep Hgb > 7.0  -No evidence of blood loss  -Check Iron profile, (Iron low 10, TIBC WNL), ferritin (30)  -Transfuse 1 unit 06/07 for Hgb 6.9    DVT Prophylaxis (if no, list reason): SCD's or Sequential Compression Device , ASA    ID  1.  Possible RUL pneumonia on PCXR  -Trend WBC/ fevers  -Check procalcitonin 0.49  Atbx Zosyn  DC Vanco MRSA NEG 06/08    2. NEG COVID  06/04 NEG x2  06/06 Retest for COVID 2/2 High oxygen demands    ENDOCRINE  1. Hyperglycemia  -SSI Q6hr  -HgbA1c 5.7    Glycemic Control - Insulin: Yes    MUSCULOSKELETAL  1. Risk for physical deconditioning deconditioning  Mobility: Fair and Bedrest  PT/OT: Not appropraite at this time, re-eval at extubation     DISPOSITION/COMMUNICATION  Discussed Plan of Care/Code Status: Full Code  Stay in ICU    CRITICAL CARE CONSULTANT NOTE  I had a face to face encounter with the patient, reviewed and interpreted patient data including clinical events, labs, images, vital signs, I/O's, and examined patient. I have discussed the case and the plan and management of the patient's care with the consulting services, the bedside nurses and the respiratory therapist.      NOTE OF PERSONAL INVOLVEMENT IN CARE    I participated in the decision-making and personally managed or directed the management of the following life and organ supporting interventions that required my frequent assessment to treat or prevent imminent deterioration. I personally spent 55 minutes of critical care time. This is time spent at this critically ill patient's bedside actively involved in patient care as well as the coordination of care and discussions with the patient's family. This does not include any procedural time which has been billed separately.     USAMA Kowalski-BC  Intensivist Nurse Practitioner  [facility]  6/8/2020

## 2020-06-08 NOTE — PROGRESS NOTES
Critical Care Update    Neuro exam on patient with changes. Pupils have been unequal over the last day however the size difference is now larger. Right pupil 2mm left 5mm, both non reactive. Patient does withdraw to pain in bilateral extremities and does have a cough and a gag. Neurology notified and assessed as well. Called Code stroke and patient to go to CT for CT head, CTA head, and CT perfusion.     Nataliia Martin [facility]-BC     [id number] [employer]

## 2020-06-08 NOTE — PROGRESS NOTES
Opened eyes spontaneously yesterday  Events reviewed overnight  Left pupil 5 mm minimally responsive, Right 2 mm   25 g mannitol after scan this morning, no change  CT shows increased cortical edema and more sulcal effacement  Minimal left MCA vasospasm, no perfusion deficits  Na 146  3% bolus now, goal Na 155  Neurosurg Romi Andrews) contacted for possible ICP monitoring  Discussed with Dr. Tiny Serra

## 2020-06-08 NOTE — PROGRESS NOTES
MEERA  Patient taken to [facility] ED after co workers found patient with seizure like activity. CT/CTA: Extensive SAH. Transferred to [facility] for Neurosurgery evaluation. Patient is s/p coiling procedure  RUR: 11 %  Plan: Pending therapy notes, will likely need rehab. Patient remains in ICU. Code S called r/t pupil changes CT shows increased cortical edema and more sulcal effacement  Minimal left MCA vasospasm, no perfusion deficits. Care management  Is continuing to follow.  Emani Hernandez RN,CRM

## 2020-06-08 NOTE — PROCEDURES
[facility]  Bronchoscopy    Procedure: Therapeutic bronchoscopy. Indication: Mucus Plugging    Consent/Treatment: Implied consent invoked d/t emergent situation. Timeout verified the correct patient and correct procedure. Anesthesia:   Patient on ventilator and receiving  see MAR      Procedure Details:   -- The bronchoscope was introduced through an endotracheal tube. -- ETT was subtotal occluded with hardened secretions. Washed, irrigated. Forceps used to clear large concretions. Therapeutic suctioning in the area. -- The trachea and matt were completely inspected and were found to be normal after clearance of obstructions/secretions. -- The right-sided endobronchial anatomy was completely inspected and was found to be normal after clearance of obstructions/secretions. -- The left-sided endobronchial anatomy was completely inspected and was found to be normal after clearance of obstructions/secretions.      Specimens:   none    Complications: none    Estimated Blood Loss: Minimal    Rome Lagunas DO

## 2020-06-09 ENCOUNTER — APPOINTMENT (OUTPATIENT)
Dept: CT IMAGING | Age: 50
DRG: 003 | End: 2020-06-09
Attending: RADIOLOGY
Payer: COMMERCIAL

## 2020-06-09 ENCOUNTER — APPOINTMENT (OUTPATIENT)
Dept: INTERVENTIONAL RADIOLOGY/VASCULAR | Age: 50
DRG: 003 | End: 2020-06-09
Attending: RADIOLOGY
Payer: COMMERCIAL

## 2020-06-09 ENCOUNTER — APPOINTMENT (OUTPATIENT)
Dept: VASCULAR SURGERY | Age: 50
DRG: 003 | End: 2020-06-09
Attending: NURSE PRACTITIONER
Payer: COMMERCIAL

## 2020-06-09 ENCOUNTER — APPOINTMENT (OUTPATIENT)
Dept: MRI IMAGING | Age: 50
DRG: 003 | End: 2020-06-09
Attending: PSYCHIATRY & NEUROLOGY
Payer: COMMERCIAL

## 2020-06-09 ENCOUNTER — ANESTHESIA EVENT (OUTPATIENT)
Dept: INTERVENTIONAL RADIOLOGY/VASCULAR | Age: 50
DRG: 003 | End: 2020-06-09
Payer: COMMERCIAL

## 2020-06-09 ENCOUNTER — APPOINTMENT (OUTPATIENT)
Dept: CT IMAGING | Age: 50
DRG: 003 | End: 2020-06-09
Attending: NURSE PRACTITIONER
Payer: COMMERCIAL

## 2020-06-09 ENCOUNTER — APPOINTMENT (OUTPATIENT)
Dept: CT IMAGING | Age: 50
DRG: 003 | End: 2020-06-09
Attending: NEUROLOGICAL SURGERY
Payer: COMMERCIAL

## 2020-06-09 ENCOUNTER — ANESTHESIA (OUTPATIENT)
Dept: INTERVENTIONAL RADIOLOGY/VASCULAR | Age: 50
DRG: 003 | End: 2020-06-09
Payer: COMMERCIAL

## 2020-06-09 LAB
ANION GAP SERPL CALC-SCNC: 7 MMOL/L (ref 5–15)
ANION GAP SERPL CALC-SCNC: 8 MMOL/L (ref 5–15)
ANION GAP SERPL CALC-SCNC: 8 MMOL/L (ref 5–15)
ANION GAP SERPL CALC-SCNC: 9 MMOL/L (ref 5–15)
ARTERIAL PATENCY WRIST A: ABNORMAL
ARTERIAL PATENCY WRIST A: ABNORMAL
BASE DEFICIT BLD-SCNC: 4 MMOL/L
BASE EXCESS BLD CALC-SCNC: 0 MMOL/L
BASILAR ARTERY EDV: 27 CM/S
BASILAR ARTERY MEAN VEL: 46 CM/S
BASILAR ARTERY PSV: 85 CM/S
BASOPHILS # BLD: 0 K/UL (ref 0–0.1)
BASOPHILS NFR BLD: 0 % (ref 0–1)
BDY SITE: ABNORMAL
BDY SITE: ABNORMAL
BUN SERPL-MCNC: 18 MG/DL (ref 6–20)
BUN SERPL-MCNC: 19 MG/DL (ref 6–20)
BUN SERPL-MCNC: 20 MG/DL (ref 6–20)
BUN SERPL-MCNC: 20 MG/DL (ref 6–20)
BUN SERPL-MCNC: 21 MG/DL (ref 6–20)
BUN SERPL-MCNC: 21 MG/DL (ref 6–20)
BUN SERPL-MCNC: 22 MG/DL (ref 6–20)
BUN/CREAT SERPL: 22 (ref 12–20)
BUN/CREAT SERPL: 23 (ref 12–20)
BUN/CREAT SERPL: 25 (ref 12–20)
BUN/CREAT SERPL: 26 (ref 12–20)
BUN/CREAT SERPL: 26 (ref 12–20)
BUN/CREAT SERPL: 29 (ref 12–20)
BUN/CREAT SERPL: 29 (ref 12–20)
CA-I BLD-SCNC: 1.15 MMOL/L (ref 1.12–1.32)
CA-I BLD-SCNC: 1.17 MMOL/L (ref 1.12–1.32)
CALCIUM SERPL-MCNC: 7.6 MG/DL (ref 8.5–10.1)
CALCIUM SERPL-MCNC: 7.6 MG/DL (ref 8.5–10.1)
CALCIUM SERPL-MCNC: 7.8 MG/DL (ref 8.5–10.1)
CALCIUM SERPL-MCNC: 7.9 MG/DL (ref 8.5–10.1)
CALCIUM SERPL-MCNC: 8 MG/DL (ref 8.5–10.1)
CALCIUM SERPL-MCNC: 8.2 MG/DL (ref 8.5–10.1)
CALCIUM SERPL-MCNC: 8.3 MG/DL (ref 8.5–10.1)
CHLORIDE SERPL-SCNC: 123 MMOL/L (ref 97–108)
CHLORIDE SERPL-SCNC: 123 MMOL/L (ref 97–108)
CHLORIDE SERPL-SCNC: 124 MMOL/L (ref 97–108)
CHLORIDE SERPL-SCNC: 126 MMOL/L (ref 97–108)
CHLORIDE SERPL-SCNC: 126 MMOL/L (ref 97–108)
CO2 SERPL-SCNC: 21 MMOL/L (ref 21–32)
CO2 SERPL-SCNC: 22 MMOL/L (ref 21–32)
CO2 SERPL-SCNC: 23 MMOL/L (ref 21–32)
CREAT SERPL-MCNC: 0.73 MG/DL (ref 0.55–1.02)
CREAT SERPL-MCNC: 0.76 MG/DL (ref 0.55–1.02)
CREAT SERPL-MCNC: 0.78 MG/DL (ref 0.55–1.02)
CREAT SERPL-MCNC: 0.81 MG/DL (ref 0.55–1.02)
CREAT SERPL-MCNC: 0.81 MG/DL (ref 0.55–1.02)
CREAT SERPL-MCNC: 0.83 MG/DL (ref 0.55–1.02)
CREAT SERPL-MCNC: 0.83 MG/DL (ref 0.55–1.02)
DIFFERENTIAL METHOD BLD: ABNORMAL
EOSINOPHIL # BLD: 0.1 K/UL (ref 0–0.4)
EOSINOPHIL NFR BLD: 1 % (ref 0–7)
ERYTHROCYTE [DISTWIDTH] IN BLOOD BY AUTOMATED COUNT: 20.9 % (ref 11.5–14.5)
GAS FLOW.O2 O2 DELIVERY SYS: ABNORMAL L/MIN
GAS FLOW.O2 O2 DELIVERY SYS: ABNORMAL L/MIN
GAS FLOW.O2 SETTING OXYMISER: 16 BPM
GAS FLOW.O2 SETTING OXYMISER: 20 BPM
GLUCOSE BLD STRIP.AUTO-MCNC: 110 MG/DL (ref 65–100)
GLUCOSE BLD STRIP.AUTO-MCNC: 112 MG/DL (ref 65–100)
GLUCOSE BLD STRIP.AUTO-MCNC: 133 MG/DL (ref 65–100)
GLUCOSE BLD STRIP.AUTO-MCNC: 147 MG/DL (ref 65–100)
GLUCOSE SERPL-MCNC: 115 MG/DL (ref 65–100)
GLUCOSE SERPL-MCNC: 119 MG/DL (ref 65–100)
GLUCOSE SERPL-MCNC: 119 MG/DL (ref 65–100)
GLUCOSE SERPL-MCNC: 122 MG/DL (ref 65–100)
GLUCOSE SERPL-MCNC: 125 MG/DL (ref 65–100)
GLUCOSE SERPL-MCNC: 138 MG/DL (ref 65–100)
GLUCOSE SERPL-MCNC: 142 MG/DL (ref 65–100)
HCO3 BLD-SCNC: 19.8 MMOL/L (ref 22–26)
HCO3 BLD-SCNC: 23 MMOL/L (ref 22–26)
HCT VFR BLD AUTO: 25.4 % (ref 35–47)
HGB BLD-MCNC: 7.4 G/DL (ref 11.5–16)
IMM GRANULOCYTES # BLD AUTO: 0.1 K/UL (ref 0–0.04)
IMM GRANULOCYTES NFR BLD AUTO: 1 % (ref 0–0.5)
INSPIRATION.DURATION SETTING TIME VENT: 1.2 SEC
LEFT ACA EDV: 58 CM/S
LEFT ACA MEAN VEL: 97 CM/S
LEFT ACA PSV: 176 CM/S
LEFT EX ICA EDV: 15 CM/S
LEFT EX ICA MEAN VEL: 32 CM/S
LEFT EX ICA PSV: 67 CM/S
LEFT ICA EDV: 38 CM/S
LEFT ICA MEAN VEL: 80 CM/S
LEFT ICA PSV: 165 CM/S
LEFT LINDEGAARD RATIO: 3.3
LEFT MCA 1 EDV: 54 CM/S
LEFT MCA 1 MEAN VEL: 104 CM/S
LEFT MCA 1 PSV: 203 CM/S
LEFT MCA 1 PULSATILITY INDEX: 1.4
LEFT PCA 1 EDV: 55 CM/S
LEFT PCA 1 MEAN VEL: 91 CM/S
LEFT PCA 1 PSV: 164 CM/S
LEFT VERTEBRAL EDV TCD: 13 CM/S
LEFT VERTEBRAL PSV TCD: 73 CM/S
LYMPHOCYTES # BLD: 1.1 K/UL (ref 0.8–3.5)
LYMPHOCYTES NFR BLD: 13 % (ref 12–49)
Lab: NORMAL
Lab: NORMAL
MCH RBC QN AUTO: 20.6 PG (ref 26–34)
MCHC RBC AUTO-ENTMCNC: 29.1 G/DL (ref 30–36.5)
MCV RBC AUTO: 70.8 FL (ref 80–99)
MONOCYTES # BLD: 1 K/UL (ref 0–1)
MONOCYTES NFR BLD: 11 % (ref 5–13)
NEUTS SEG # BLD: 6.4 K/UL (ref 1.8–8)
NEUTS SEG NFR BLD: 74 % (ref 32–75)
NRBC # BLD: 0.03 K/UL (ref 0–0.01)
NRBC BLD-RTO: 0.3 PER 100 WBC
O2/TOTAL GAS SETTING VFR VENT: 35 %
O2/TOTAL GAS SETTING VFR VENT: 35 %
PCO2 BLD: 26.6 MMHG (ref 35–45)
PCO2 BLD: 28.7 MMHG (ref 35–45)
PEEP RESPIRATORY: 8 CMH2O
PEEP RESPIRATORY: 8 CMH2O
PH BLD: 7.48 [PH] (ref 7.35–7.45)
PH BLD: 7.51 [PH] (ref 7.35–7.45)
PLATELET # BLD AUTO: 231 K/UL (ref 150–400)
PMV BLD AUTO: 10.3 FL (ref 8.9–12.9)
PO2 BLD: 69 MMHG (ref 80–100)
PO2 BLD: 76 MMHG (ref 80–100)
POTASSIUM SERPL-SCNC: 3.4 MMOL/L (ref 3.5–5.1)
POTASSIUM SERPL-SCNC: 3.5 MMOL/L (ref 3.5–5.1)
POTASSIUM SERPL-SCNC: 3.6 MMOL/L (ref 3.5–5.1)
POTASSIUM SERPL-SCNC: 3.7 MMOL/L (ref 3.5–5.1)
POTASSIUM SERPL-SCNC: 3.7 MMOL/L (ref 3.5–5.1)
PROCALCITONIN SERPL-MCNC: 0.06 NG/ML
RBC # BLD AUTO: 3.59 M/UL (ref 3.8–5.2)
RBC MORPH BLD: ABNORMAL
REFERENCE LAB,REFLB: NORMAL
REFERENCE LAB,REFLB: NORMAL
RIGHT ACA EDV: 66 CM/S
RIGHT ACA MEAN VEL: 104 CM/S
RIGHT ACA PSV: 180 CM/S
RIGHT EX ICA EDV: 18 CM/S
RIGHT EX ICA MEAN VEL: 38 CM/S
RIGHT EX ICA PSV: 77 CM/S
RIGHT ICA EDV: 23 CM/S
RIGHT ICA MEAN VEL: 44 CM/S
RIGHT ICA PSV: 87 CM/S
RIGHT LINDEGAARD RATIO: 1.8
RIGHT MCA 1 EDV: 31 CM/S
RIGHT MCA 1 MEAN VEL: 67 CM/S
RIGHT MCA 1 PSV: 140 CM/S
RIGHT MCA 1 PULSATILITY INDEX: 1.6
RIGHT PCA 1 EDV: 27 CM/S
RIGHT PCA 1 MEAN VEL: 50 CM/S
RIGHT PCA 1 PSV: 95 CM/S
RIGHT VERTEBRAL EDV TCD: 15 CM/S
RIGHT VERTEBRAL MEAN VEL: 28 CM/S
RIGHT VERTEBRAL PSV TCD: 53 CM/S
SAO2 % BLD: 95 % (ref 92–97)
SAO2 % BLD: 97 % (ref 92–97)
SERVICE CMNT-IMP: ABNORMAL
SODIUM SERPL-SCNC: 153 MMOL/L (ref 136–145)
SODIUM SERPL-SCNC: 153 MMOL/L (ref 136–145)
SODIUM SERPL-SCNC: 154 MMOL/L (ref 136–145)
SODIUM SERPL-SCNC: 155 MMOL/L (ref 136–145)
SODIUM SERPL-SCNC: 156 MMOL/L (ref 136–145)
SPECIMEN TYPE: ABNORMAL
SPECIMEN TYPE: ABNORMAL
TEST DESCRIPTION:,ATST: NORMAL
TEST DESCRIPTION:,ATST: NORMAL
VENTILATION MODE VENT: ABNORMAL
VENTILATION MODE VENT: ABNORMAL
VT SETTING VENT: 450 ML
VT SETTING VENT: 450 ML
WBC # BLD AUTO: 8.7 K/UL (ref 3.6–11)

## 2020-06-09 PROCEDURE — 94640 AIRWAY INHALATION TREATMENT: CPT

## 2020-06-09 PROCEDURE — 80048 BASIC METABOLIC PNL TOTAL CA: CPT

## 2020-06-09 PROCEDURE — 77030012468 HC VLV BLEEDBK CNTRL ABBT -B

## 2020-06-09 PROCEDURE — 74011250636 HC RX REV CODE- 250/636

## 2020-06-09 PROCEDURE — 74011000250 HC RX REV CODE- 250

## 2020-06-09 PROCEDURE — 77030005402 HC CATH RAD ART LN KT TELE -B

## 2020-06-09 PROCEDURE — 85025 COMPLETE CBC W/AUTO DIFF WBC: CPT

## 2020-06-09 PROCEDURE — 74011250637 HC RX REV CODE- 250/637: Performed by: NURSE PRACTITIONER

## 2020-06-09 PROCEDURE — 0042T CT PERF W CBF: CPT

## 2020-06-09 PROCEDURE — 77030013797 HC KT TRNSDUC PRSSR EDWD -A

## 2020-06-09 PROCEDURE — 77030008638 HC TU CONN COOK -A

## 2020-06-09 PROCEDURE — 36415 COLL VENOUS BLD VENIPUNCTURE: CPT

## 2020-06-09 PROCEDURE — 65610000006 HC RM INTENSIVE CARE

## 2020-06-09 PROCEDURE — 77030008584 HC TOOL GDWRE DEV TERU -A

## 2020-06-09 PROCEDURE — 74011000258 HC RX REV CODE- 258: Performed by: NURSE PRACTITIONER

## 2020-06-09 PROCEDURE — 70496 CT ANGIOGRAPHY HEAD: CPT

## 2020-06-09 PROCEDURE — C1769 GUIDE WIRE: HCPCS

## 2020-06-09 PROCEDURE — C9254 INJECTION, LACOSAMIDE: HCPCS | Performed by: NURSE PRACTITIONER

## 2020-06-09 PROCEDURE — C1894 INTRO/SHEATH, NON-LASER: HCPCS

## 2020-06-09 PROCEDURE — 74011250636 HC RX REV CODE- 250/636: Performed by: INTERNAL MEDICINE

## 2020-06-09 PROCEDURE — 77030020340 HC CATH ANGI NEURN PENU -F

## 2020-06-09 PROCEDURE — 76937 US GUIDE VASCULAR ACCESS: CPT

## 2020-06-09 PROCEDURE — 74011250637 HC RX REV CODE- 250/637: Performed by: ANESTHESIOLOGY

## 2020-06-09 PROCEDURE — 74011250636 HC RX REV CODE- 250/636: Performed by: NURSE PRACTITIONER

## 2020-06-09 PROCEDURE — C2628 CATHETER, OCCLUSION: HCPCS

## 2020-06-09 PROCEDURE — 74011636637 HC RX REV CODE- 636/637: Performed by: NURSE PRACTITIONER

## 2020-06-09 PROCEDURE — C1760 CLOSURE DEV, VASC: HCPCS

## 2020-06-09 PROCEDURE — 74011000250 HC RX REV CODE- 250: Performed by: RADIOLOGY

## 2020-06-09 PROCEDURE — 76060000034 HC ANESTHESIA 1.5 TO 2 HR

## 2020-06-09 PROCEDURE — 70450 CT HEAD/BRAIN W/O DYE: CPT

## 2020-06-09 PROCEDURE — 74011000250 HC RX REV CODE- 250: Performed by: NURSE PRACTITIONER

## 2020-06-09 PROCEDURE — 77030021532 HC CATH ANGI DX IMPRS MRTM -B

## 2020-06-09 PROCEDURE — 93886 INTRACRANIAL COMPLETE STUDY: CPT

## 2020-06-09 PROCEDURE — 74011636320 HC RX REV CODE- 636/320

## 2020-06-09 PROCEDURE — 82803 BLOOD GASES ANY COMBINATION: CPT

## 2020-06-09 PROCEDURE — 03HY32Z INSERTION OF MONITORING DEVICE INTO UPPER ARTERY, PERCUTANEOUS APPROACH: ICD-10-PCS | Performed by: NURSE PRACTITIONER

## 2020-06-09 PROCEDURE — C1887 CATHETER, GUIDING: HCPCS

## 2020-06-09 PROCEDURE — 94003 VENT MGMT INPAT SUBQ DAY: CPT

## 2020-06-09 PROCEDURE — 77030002996 HC SUT SLK J&J -A

## 2020-06-09 PROCEDURE — 82962 GLUCOSE BLOOD TEST: CPT

## 2020-06-09 PROCEDURE — 84145 PROCALCITONIN (PCT): CPT

## 2020-06-09 PROCEDURE — 74011250636 HC RX REV CODE- 250/636: Performed by: RADIOLOGY

## 2020-06-09 RX ORDER — VERAPAMIL HYDROCHLORIDE 2.5 MG/ML
INJECTION, SOLUTION INTRAVENOUS
Status: COMPLETED
Start: 2020-06-09 | End: 2020-06-09

## 2020-06-09 RX ORDER — ACETAMINOPHEN 10 MG/ML
1000 INJECTION, SOLUTION INTRAVENOUS ONCE
Status: COMPLETED | OUTPATIENT
Start: 2020-06-09 | End: 2020-06-10

## 2020-06-09 RX ORDER — ROCURONIUM BROMIDE 10 MG/ML
INJECTION, SOLUTION INTRAVENOUS AS NEEDED
Status: DISCONTINUED | OUTPATIENT
Start: 2020-06-09 | End: 2020-06-09 | Stop reason: HOSPADM

## 2020-06-09 RX ORDER — LIDOCAINE HYDROCHLORIDE 20 MG/ML
20 INJECTION, SOLUTION INFILTRATION; PERINEURAL ONCE
Status: DISCONTINUED | OUTPATIENT
Start: 2020-06-09 | End: 2020-06-09 | Stop reason: HOSPADM

## 2020-06-09 RX ORDER — HEPARIN SODIUM 1000 [USP'U]/ML
INJECTION, SOLUTION INTRAVENOUS; SUBCUTANEOUS AS NEEDED
Status: DISCONTINUED | OUTPATIENT
Start: 2020-06-09 | End: 2020-06-09 | Stop reason: HOSPADM

## 2020-06-09 RX ORDER — LIDOCAINE HYDROCHLORIDE 20 MG/ML
INJECTION, SOLUTION EPIDURAL; INFILTRATION; INTRACAUDAL; PERINEURAL
Status: COMPLETED
Start: 2020-06-09 | End: 2020-06-09

## 2020-06-09 RX ORDER — VERAPAMIL HYDROCHLORIDE 2.5 MG/ML
40 INJECTION, SOLUTION INTRAVENOUS
Status: DISCONTINUED | OUTPATIENT
Start: 2020-06-09 | End: 2020-06-09

## 2020-06-09 RX ADMIN — ROCURONIUM BROMIDE 50 MG: 10 INJECTION, SOLUTION INTRAVENOUS at 13:59

## 2020-06-09 RX ADMIN — PROPOFOL INJECTABLE EMULSION 30 MCG/KG/MIN: 10 INJECTION, EMULSION INTRAVENOUS at 19:00

## 2020-06-09 RX ADMIN — GUAIFENESIN 100 MG: 100 SOLUTION ORAL at 20:22

## 2020-06-09 RX ADMIN — HEPARIN SODIUM 4000 UNITS: 1000 INJECTION, SOLUTION INTRAVENOUS; SUBCUTANEOUS at 14:32

## 2020-06-09 RX ADMIN — POTASSIUM BICARBONATE 40 MEQ: 782 TABLET, EFFERVESCENT ORAL at 11:08

## 2020-06-09 RX ADMIN — SODIUM CHLORIDE 12.5 MG/HR: 9 INJECTION, SOLUTION INTRAVENOUS at 11:52

## 2020-06-09 RX ADMIN — NIMODIPINE 60 MG: 60 SOLUTION ORAL at 11:08

## 2020-06-09 RX ADMIN — PROPOFOL INJECTABLE EMULSION 30 MCG/KG/MIN: 10 INJECTION, EMULSION INTRAVENOUS at 05:42

## 2020-06-09 RX ADMIN — SODIUM CHLORIDE 10 MG/HR: 9 INJECTION, SOLUTION INTRAVENOUS at 08:04

## 2020-06-09 RX ADMIN — IOPAMIDOL 143 ML: 612 INJECTION, SOLUTION INTRAVENOUS at 15:29

## 2020-06-09 RX ADMIN — NIMODIPINE 60 MG: 30 SOLUTION ORAL at 17:58

## 2020-06-09 RX ADMIN — PIPERACILLIN AND TAZOBACTAM 3.38 G: 3; .375 INJECTION, POWDER, LYOPHILIZED, FOR SOLUTION INTRAVENOUS at 19:01

## 2020-06-09 RX ADMIN — NIMODIPINE 60 MG: 60 SOLUTION ORAL at 02:59

## 2020-06-09 RX ADMIN — HEPARIN SODIUM 4000 UNITS: 1000 INJECTION INTRAVENOUS; SUBCUTANEOUS at 14:38

## 2020-06-09 RX ADMIN — VERAPAMIL HYDROCHLORIDE 10 MG: 2.5 INJECTION, SOLUTION INTRAVENOUS at 15:16

## 2020-06-09 RX ADMIN — PHENYLEPHRINE HYDROCHLORIDE 10 MCG/MIN: 10 INJECTION INTRAVENOUS at 22:11

## 2020-06-09 RX ADMIN — ASPIRIN 300 MG: 300 SUPPOSITORY RECTAL at 08:17

## 2020-06-09 RX ADMIN — SODIUM CHLORIDE 1000 MG: 900 INJECTION, SOLUTION INTRAVENOUS at 11:08

## 2020-06-09 RX ADMIN — ACETAMINOPHEN 1000 MG: 10 INJECTION, SOLUTION INTRAVENOUS at 20:28

## 2020-06-09 RX ADMIN — Medication 2 DROP: at 21:05

## 2020-06-09 RX ADMIN — HEPARIN SODIUM 4000 UNITS: 1000 INJECTION INTRAVENOUS; SUBCUTANEOUS at 14:40

## 2020-06-09 RX ADMIN — NIMODIPINE 60 MG: 30 SOLUTION ORAL at 22:05

## 2020-06-09 RX ADMIN — Medication 2 DROP: at 18:05

## 2020-06-09 RX ADMIN — SODIUM CHLORIDE 40 ML/HR: 3 INJECTION, SOLUTION INTRAVENOUS at 23:05

## 2020-06-09 RX ADMIN — LIDOCAINE HYDROCHLORIDE 10 ML: 20 INJECTION, SOLUTION INTRAVENOUS at 14:37

## 2020-06-09 RX ADMIN — GUAIFENESIN 100 MG: 100 SOLUTION ORAL at 08:17

## 2020-06-09 RX ADMIN — VERAPAMIL HYDROCHLORIDE 10 MG: 2.5 INJECTION, SOLUTION INTRAVENOUS at 15:25

## 2020-06-09 RX ADMIN — FAMOTIDINE 20 MG: 10 INJECTION, SOLUTION INTRAVENOUS at 08:17

## 2020-06-09 RX ADMIN — NIMODIPINE 60 MG: 60 SOLUTION ORAL at 07:01

## 2020-06-09 RX ADMIN — HEPARIN SODIUM 4000 UNITS: 1000 INJECTION INTRAVENOUS; SUBCUTANEOUS at 14:39

## 2020-06-09 RX ADMIN — LABETALOL HYDROCHLORIDE 20 MG: 5 INJECTION INTRAVENOUS at 04:19

## 2020-06-09 RX ADMIN — SODIUM CHLORIDE 12.5 MG/HR: 9 INJECTION, SOLUTION INTRAVENOUS at 09:19

## 2020-06-09 RX ADMIN — PIPERACILLIN AND TAZOBACTAM 3.38 G: 3; .375 INJECTION, POWDER, LYOPHILIZED, FOR SOLUTION INTRAVENOUS at 06:59

## 2020-06-09 RX ADMIN — HEPARIN SODIUM 4000 UNITS: 1000 INJECTION INTRAVENOUS; SUBCUTANEOUS at 14:41

## 2020-06-09 RX ADMIN — AMLODIPINE BESYLATE 5 MG: 5 TABLET ORAL at 08:17

## 2020-06-09 RX ADMIN — FENTANYL CITRATE 100 MCG: 50 INJECTION INTRAMUSCULAR; INTRAVENOUS at 04:28

## 2020-06-09 RX ADMIN — SODIUM CHLORIDE 1000 MG: 900 INJECTION, SOLUTION INTRAVENOUS at 23:02

## 2020-06-09 RX ADMIN — FAMOTIDINE 20 MG: 10 INJECTION, SOLUTION INTRAVENOUS at 20:31

## 2020-06-09 RX ADMIN — INSULIN LISPRO 2 UNITS: 100 INJECTION, SOLUTION INTRAVENOUS; SUBCUTANEOUS at 05:50

## 2020-06-09 RX ADMIN — CHLORHEXIDINE GLUCONATE 15 ML: 0.12 RINSE ORAL at 08:06

## 2020-06-09 RX ADMIN — Medication 2 DROP: at 05:51

## 2020-06-09 RX ADMIN — IPRATROPIUM BROMIDE AND ALBUTEROL SULFATE 3 ML: .5; 3 SOLUTION RESPIRATORY (INHALATION) at 01:35

## 2020-06-09 RX ADMIN — IPRATROPIUM BROMIDE AND ALBUTEROL SULFATE 3 ML: .5; 3 SOLUTION RESPIRATORY (INHALATION) at 07:08

## 2020-06-09 RX ADMIN — ACETAMINOPHEN 650 MG: 650 SUSPENSION ORAL at 05:16

## 2020-06-09 RX ADMIN — GUAIFENESIN 100 MG: 100 SOLUTION ORAL at 02:59

## 2020-06-09 RX ADMIN — LABETALOL HYDROCHLORIDE 20 MG: 5 INJECTION INTRAVENOUS at 19:59

## 2020-06-09 RX ADMIN — HEPARIN SODIUM 4000 UNITS: 1000 INJECTION INTRAVENOUS; SUBCUTANEOUS at 14:20

## 2020-06-09 RX ADMIN — CHLORHEXIDINE GLUCONATE 15 ML: 0.12 RINSE ORAL at 20:22

## 2020-06-09 RX ADMIN — PROPOFOL INJECTABLE EMULSION 30 MCG/KG/MIN: 10 INJECTION, EMULSION INTRAVENOUS at 10:59

## 2020-06-09 RX ADMIN — SODIUM CHLORIDE 10 MG/HR: 9 INJECTION, SOLUTION INTRAVENOUS at 04:40

## 2020-06-09 RX ADMIN — SODIUM CHLORIDE 100 MG: 900 INJECTION, SOLUTION INTRAVENOUS at 19:18

## 2020-06-09 RX ADMIN — SODIUM CHLORIDE 100 MG: 900 INJECTION, SOLUTION INTRAVENOUS at 06:00

## 2020-06-09 RX ADMIN — IPRATROPIUM BROMIDE AND ALBUTEROL SULFATE 3 ML: .5; 3 SOLUTION RESPIRATORY (INHALATION) at 20:15

## 2020-06-09 RX ADMIN — PHENYLEPHRINE HYDROCHLORIDE 30 MCG/MIN: 10 INJECTION INTRAVENOUS at 17:52

## 2020-06-09 RX ADMIN — PROPOFOL INJECTABLE EMULSION 30 MCG/KG/MIN: 10 INJECTION, EMULSION INTRAVENOUS at 01:23

## 2020-06-09 RX ADMIN — IOPAMIDOL 120 ML: 755 INJECTION, SOLUTION INTRAVENOUS at 13:36

## 2020-06-09 NOTE — ROUTINE PROCESS
TRANSFER - OUT REPORT: 
 
Verbal report given to Marcos(name) on [employer]  being transferred to ICU bed #1(unit) for routine progression of care Report consisted of patients Situation, Background, Assessment and  
Recommendations(SBAR). Information from the following report(s) SBAR, Procedure Summary and MAR was reviewed with the receiving nurse. Lines:  
Triple Lumen 06/06/20 Left;Proximal Femoral (Active) Central Line Being Utilized Yes 6/9/2020 12:00 PM  
Criteria for Appropriate Use Limited/no vessel suitable for conventional peripheral access 6/9/2020 12:00 PM  
Site Assessment Clean, dry, & intact 6/9/2020 12:00 PM  
Infiltration Assessment 0 6/9/2020 12:00 PM  
Affected Extremity/Extremities Color distal to insertion site pink (or appropriate for race) 6/9/2020 12:00 PM  
Date of Last Dressing Change 06/07/20 6/9/2020 12:00 PM  
Dressing Status Clean, dry, & intact 6/9/2020 12:00 PM  
Dressing Type Disk with Chlorhexadine gluconate (CHG) 6/9/2020 12:00 PM  
Action Taken Open ports on tubing capped 6/9/2020 12:00 PM  
Proximal Hub Color/Line Status White; Infusing 6/9/2020 12:00 PM  
Positive Blood Return (Medial Site) Yes 6/9/2020  4:00 AM  
Medial Hub Color/Line Status Blue; Infusing 6/9/2020 12:00 PM  
Positive Blood Return (Lateral Site) Yes 6/9/2020  4:00 AM  
Distal Hub Color/Line Status Brown; Infusing 6/9/2020 12:00 PM  
Positive Blood Return (Site #3) Yes 6/9/2020  4:00 AM  
Alcohol Cap Used Yes 6/9/2020 12:00 PM  
   
Arterial Line 06/06/20 Right Other (comment) (Active) Site Assessment Clean, dry, & intact 6/9/2020 12:00 PM  
Dressing Status Clean, dry, & intact 6/9/2020 12:00 PM  
Dressing Type Disk with Chlorhexadine gluconate (CHG) 6/9/2020 12:00 PM  
Line Status Intact and in place 6/9/2020 12:00 PM  
Treatment Arm board on;Zeroed or re-zeroed 6/9/2020 12:00 PM  
Affected Extremity/Extremities Color distal to insertion site pink (or appropriate for race) 6/9/2020 12:00 PM  
  
 
Opportunity for questions and clarification was provided.    
 
Patient transported with: 
 Registered Nurse x 2, respiratory therapy, Dr.Loy david and Jennyfer Lane NP

## 2020-06-09 NOTE — ANESTHESIA PREPROCEDURE EVALUATION
Relevant Problems   No relevant active problems       Anesthetic History   No history of anesthetic complications            Review of Systems / Medical History  Patient summary reviewed, nursing notes reviewed and pertinent labs reviewed    Pulmonary  Within defined limits                 Neuro/Psych   Within defined limits    CVA      Comments: SAH secondary ruptured MCA aneurysm Cardiovascular  Within defined limits  Hypertension                   GI/Hepatic/Renal  Within defined limits              Endo/Other  Within defined limits           Other Findings              Physical Exam    Airway  Mallampati: II  TM Distance: > 6 cm  Neck ROM: normal range of motion   Mouth opening: Normal  Intubated   Cardiovascular  Regular rate and rhythm,  S1 and S2 normal,  no murmur, click, rub, or gallop             Dental  No notable dental hx       Pulmonary  Breath sounds clear to auscultation               Abdominal  GI exam deferred       Other Findings            Anesthetic Plan    ASA: 4, emergent  Anesthesia type: general    Monitoring Plan: Arterial line      Induction: Intravenous    Per radiologist

## 2020-06-09 NOTE — PROGRESS NOTES
Neurology Progress Note  Vale Jay NP  Neurocritical Care Nurse Practitioner  [phone]    Admit Date: 6/4/2020   LOS: 5 days      Daily Progress Note: 6/9/2020    S/P: Cerebral angiogram with coil embolization of the left MCA aneurysm by Dr. Marlena Contreras on 6/5/20. HPI:  Lauren Diaz is a 55-year-old female with a past medical history of HTN. According to patient's mother, the patient began having a headache on 5/31/20 and had been taking BC Powder with aspirin in it for the headache. She recently stopped taking her BP medications. According to her mother she does not smoke, drink alcohol or use recreational drugs. The patient was brought to the [facility] ER via EMS on 6/4/420  after her coworkers found her unresponsive outside of her place of work. 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 On arrival to the ER she was found to be seizing. A stat CT of her head was performed which showed extensive subarachnoid hemorrhage. She was extremely hypertensive in the 240's and was given labetalol and started on a Cardene drip. She was emergently intubated and given Keppra and Ativan as well. Neurosurgery and Neurointerventional Surgery were consulted and the patient was transferred to the [facility] for a higher level of care. On arrival to [facility] a CTA of her head and neck was obtained which showed a 4.4 x 4.1 mm right MCA trifurcation aneurysm and a 2.2 x 3.1 mm left MCA trifurcation aneurysm and possible less than 2 mm ACOM aneurysms. Extensive lung infiltrates were also noted. Patient was taken for cerebral angiogram the morning of 06/05/20 and coil embolization of the left MCA aneurysm was completed. The right MCA aneurysm had a very wide neck incorporating both M2s. It could not be treated endovascularly without stents. May need to be clipped by Neurosurgery.   Subjective:     Patient with no acute neuro events or significant changes overnight.  She is sedated to help her brain rest. She still has mild withdrawal x 4. Left pupil still 5 mm fixed. Right pupil 2 mm reactive. Patient's blood pressure is goal is <319 systolic. She has been maintaining primarily in the 170s on the cardene gtt. She still remains on the 3% sodium gtt. Last sodium level was 153. No Known Allergies    Review of Systems:  Review of systems not obtained due to patient factors. Objective:   Vital signs  Temp (24hrs), Av °F (37.8 °C), Min:98.4 °F (36.9 °C), Max:102.6 °F (39.2 °C)   1901 - 700  In: 753.1 [I.V.:643.1]  Out: 450 [Urine:450]  701 - 1900  In: 7580.8 [I.V.:5003.7]  Out: 6105 [Urine:6105]  Visit Vitals  /81   Pulse 85   Temp (!) 100.9 °F (38.3 °C)   Resp 20   Ht 5' 4\" (1.626 m)   Wt 111.8 kg (246 lb 7.6 oz)   SpO2 98%   BMI 42.31 kg/m²      O2 Device: Endotracheal tube, Ventilator   Vitals:    20 2306 20 0000 20 0001 20 0018   BP: 129/69   170/81   Pulse:   85    Resp:   20    Temp:  (!) 100.9 °F (38.3 °C)     SpO2:   98%    Weight:       Height:          Physical Exam:  GENERAL: Sedated with propofol, intubated and ventilated.   SKIN: Warm, dry, color appropriate for ethnicity. Right groin site soft, with no odor, bleeding or drainage noted. Mild ecchymosis present. Pedal pulses positive bilaterally.      Neurologic Exam:  Mental Status:             Sedated, intubated on ventilator. No command following. No eye opening.      Language:                   Unable to assess due to sedation and intubation.      Cranial Nerves:          Right pupil is 2 mm and mildly reactive. Left pupil 5 mm and nonreactive.                                       Blinks to threat bilaterally.        Motor:                                                                Bulk and tone normal.                                       No involuntary movements.     Sensation:                   Withdraws to pain in bilateral lower extremities but no withdrawal in bilateral upper extremities.  (Had withdrawn x 4 at 2100 last night for me).       Reflexes:                     Mute Bab[place]. Corneal reflexes present. Cough and gag reflexes present.      Coordination & Gait:   Unable to assess due to patient factors.      Labs:  Lab Results   Component Value Date/Time    WBC 13.6 (H) 2020 12:44 AM    HGB 8.0 (L) 2020 12:44 AM    HCT 28.3 (L) 2020 12:44 AM    PLATELET 648  12:44 AM    MCV 72.0 (L) 2020 12:44 AM     Lab Results   Component Value Date/Time    Sodium 153 (H) 2020 10:44 PM    Potassium 3.6 2020 10:44 PM    Chloride 123 (H) 2020 10:44 PM    CO2 23 2020 10:44 PM    Anion gap 7 2020 10:44 PM    Glucose 113 (H) 2020 10:44 PM    BUN 23 (H) 2020 10:44 PM    Creatinine 0.76 2020 10:44 PM    BUN/Creatinine ratio 30 (H) 2020 10:44 PM    GFR est AA >60 2020 10:44 PM    GFR est non-AA >60 2020 10:44 PM    Calcium 7.7 (L) 2020 10:44 PM       Imagin/8/20 CT head 1:08  There is mild ventricular dilatation which is stable. There is no significant  white matter disease. The pattern of subarachnoid hemorrhage status post  aneurysm clipping is not significantly changed. There is loss of gray-white  matter differentiation which is not significantly changed. No CT evidence of  acute infarct.     The bone windows demonstrate no abnormalities. There is mucosal thickening of  the sphenoid and ethmoid sinuses.     IIMPRESSION:   No significant change.     20 CT Perfusion Scan of Brain: Shows no perfusion abnormality.      20 CTA head and neck     CT examination of the head and neck demonstrates spasm of the left MCA but no  significant spasm of the remaining vessels.     (Of note studies were examined by Dr. Wing Syed in [facility] who noted Global edema in the brain and no significant vasospasm),      CT Results (maximum last 3):   Results from [place] encounter on 20   CTA CODE NEURO HEAD AND NECK W CONT Narrative *PRELIMINARY REPORT*    CT examination of the head and neck demonstrates spasm of the left MCA but no  significant spasm of the remaining vessels. Preliminary report was provided by Dr. Fito Hernandez, the on-call radiologist, at  2:56 AM    Final report to follow. *END PRELIMINARY REPORT*    CLINICAL HISTORY: Subarachnoid hemorrhage    EXAMINATION:  CT ANGIOGRAPHY HEAD AND NECK     COMPARISON: CT head 6/8/2020, CTA head 6/6/2020    TECHNIQUE:  Following the uneventful administration of iodinated contrast  material, axial CT angiography of the head and neck was performed. Delayed axial  images through the head were also obtained. Coronal and sagittal reconstructions  were obtained. Manual postprocessing of images was performed. 3-D  Sagittal  maximal intensity projection images were obtained. 3-D Coronal maximal  intensity projections were obtained. CT dose reduction was achieved through use  of a standardized protocol tailored for this examination and automatic exposure  control for dose modulation. FINDINGS:    Delayed contrast-enhanced head CT:    Diffuse subarachnoid hemorrhage is relatively unchanged. No midline shift. Stable mild prominence of the ventricles. No regional loss of gray-white matter  differentiation. Mild bilateral subcortical and periventricular areas of patchy  low attenuation is nonspecific but likely related to changes of chronic small  vessel ischemic disease. Sequelae of left MCA aneurysm coil embolization. CTA NECK:    Great vessels: Patent    Right subclavian artery: Patent    Left subclavian artery: Patent    Right common carotid artery: Patent    Left common carotid artery: Patent    Cervical right internal carotid artery: Patent with no significant stenosis by  NASCET criteria. Cervical left internal carotid artery: Patent with no significant stenosis by  NASCET criteria.     Right vertebral artery: Patent    Left vertebral artery: Patent    Endotracheal tube terminates just above the matt. 1 cm low-density left thyroid nodule. Mildly enlarged left axillary lymph node measuring 11 mm in short axis. Patchy bilateral airspace disease. Nonspecific asymmetric enhancement of the right superior ophthalmic vein    CTA HEAD:    Right cavernous internal carotid artery: Patent    Left cavernous internal carotid artery: Patent    Anterior cerebral arteries: Patent    Anterior communicating artery: Patent    Right middle cerebral artery: Patent with 4 mm bifurcation aneurysm    Left middle cerebral artery: Patent with mild to moderate vasospasm in the  distal M1 segment. Sequelae of bifurcation aneurysm coil embolization is seen. Posterior communicating arteries: Patent with unchanged 3 to 4 mm left-sided  infundibulum versus aneurysm    Posterior cerebral arteries: Patent with mild bilateral vasospasm    Basilar artery: Patent    Distal vertebral arteries: Patent      Impression Impression:    Mild to moderate vasospasm in the left middle cerebral artery status post  bifurcation aneurysm coil embolization. Stable 4 mm right MCA aneurysm. Endotracheal tube terminates just above the matt. 1 cm low-density left thyroid nodule. Mildly enlarged left axillary lymph node measuring 11 mm in short axis. Patchy bilateral airspace disease. Nonspecific asymmetric enhancement of the right superior ophthalmic vein             CT CODE NEURO PERF W CBF    Narrative *PRELIMINARY REPORT*    CT perfusion demonstrates no perfusion abnormality. Preliminary report was provided by Dr. Houston Rodríguez, the on-call radiologist, at  2:54 AM    Final report to follow. *END PRELIMINARY REPORT*    CT dose reduction was achieved through use of a standardized protocol tailored  for this examination and automatic exposure control for dose modulation.     CT perfusion analysis was performed using CT with contrast administration,  including postprocessing of parametric maps with the determination of cerebral  blood flow, cerebral blood volume, and mean transit time. FINDINGS:    No significant cerebral perfusion abnormality. Impression IMPRESSION:    No significant cerebral perfusion abnormality           CT CODE NEURO HEAD WO CONTRAST    Narrative EXAM: CT CODE NEURO HEAD WO CONTRAST    INDICATION: Change in neuro status, SAH seizure, unresponsive    COMPARISON: June 7. CONTRAST: None. TECHNIQUE: Unenhanced CT of the head was performed using 5 mm images. Brain and  bone windows were generated. Coronal and sagittal reformats. CT dose reduction  was achieved through use of a standardized protocol tailored for this  examination and automatic exposure control for dose modulation. FINDINGS:  There is mild ventricular dilatation which is stable. There is no significant  white matter disease. The pattern of subarachnoid hemorrhage status post  aneurysm clipping is not significantly changed. There is loss of gray-white  matter differentiation which is not significantly changed. No CT evidence of  acute infarct. The bone windows demonstrate no abnormalities. There is mucosal thickening of  the sphenoid and ethmoid sinuses. Impression IMPRESSION:   No significant change. 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 S/P cerebral angiogram with coil embolization of left MCA bifurcation aneurysm on 6/5.  Patient also has 5X3 right MCA bifurcation aneurysm with very wide neck which will  need to be treated with stents vs. clipping             - Day 4/21 of Nimotop to prevent delayed cerebral ischemia              - Maintain euvolemia. Fluids held at present due to fluid overload.              - Strict I&O              - ECHO with EF of 60-65%. No regional wall motion abnormality or atrial septal  defect. Mildly  dilated left atrium.             - q 1 neuro checks              -Daily TCD's, TCD completed 6/8 showed Possible mild right PANCHITO vasospasm. The MCA, terminal ICA, PCA, and vertebral are patent and without evidence of vasospasm. MCA pulsatility index has increased from 1.2 yesterday to 1.6 today, indicating increasing flow resistance and intracranial pressure. Possible mild left MCA and PANCHITO vasospasm. PCA velocity is consistent with vasospasm. The terminal ICA and vertebral are patent and without evidence of vasospasm. MCA pulsatility index has increased from 1.1 yesterday to 1.4 today, indicating increasing flow resistance and intracranial pressure.              - PT/OT/SLP evals when appropriate              -SBP goal less than 200 to prevent cerebral vasospasm. ELKIN/Cardene prn.               -CT head 6/8 am showed stable SAH/IVH, Global edema. CTA Mild to moderate vasospasm in the left middle cerebral artery status post bifurcation aneurysm coil embolization. Stable 4 mm right MCA aneurysm.               - NSGY also following     2.) Multiple scattered infarcts per MRI. MRI completed and shows a large left MCA territory ischemia/infarction, left temporal, parietal and frontal lobes, cortically based, no superimposed increased parenchymal hemorrhage or midline shift. Right and left PANCHITO territory cortical diffusion restriction consistent with infarction. Infarction in the right insular cortex and right temporal lobe.  Scattered small infarctions right and left corona radiata centrum semiovale, right cerebellum               - Imaging pattern is consistent with Hypoxic-Ischemic Encephalopathy (HIE)              -Aspirin 300 KS for stroke prevention              -SBP goal less than 200.                 - Lipid panel on 6/5, LDL 7.8, goal < 70, statin not indicated                - Hgb A1C ok at 5.7     3. ) Seizure              - Due to SAH, no prior hx of seizure              - Continue Keppra 1000 mg BID, Vimpat 100 BID              - Seizure precautions              - Ativan PRN for seizures greater than 2 minutes.               - Propofol while intubated              -Patient was on continuous EEG but had to be discontinued around MN yesterday due to Code S and patient going to CT scan. This EEG showed moderate to severe generalized encephalopathic process, nonspecific in type. This may be related to underlying structural brain injury or effects of sedation. No convincingly lateralizing or epileptiform features were noted. No seizure was recorded.       4.) Cerebral edema                 - Global edema on CTyesterday am per Dr. Ileana Price. - Mannitol changed to 3% sodium chloride gtt going at 30cc/hour with goal sodium of 150-160              - Elevate HOB 30%              -Continue sedation.       Plan discussed with Dr. Leidy Leon, Dr. Cindi Briones, the Intensivist Team and the primary RN. The patient verbalized understanding of the current plan of care and is in agreement. Will await further recommendations by Dr. Leidy Leon.       Vimal Torres NP  Neurocritical Care Nurse Practitioner

## 2020-06-09 NOTE — PROGRESS NOTES
TRANSFER - IN REPORT:    Verbal report received from Marcos(name) on Kailyn Esqueda  being received from ICU(unit) for ordered procedure( Vasospasm tx)      Report consisted of patients Situation, Background, Assessment and   Recommendations(SBAR). Information from the following report(s) SBAR was reviewed with the receiving nurse. Opportunity for questions and clarification was provided. Assessment completed upon patients arrival to unit and care assumed.      CRNA, [place] to manage airway , meds and vitals

## 2020-06-09 NOTE — PROGRESS NOTES
Primary Nurse Jace Mayes RN and Dallas Eastman RN performed a dual skin assessment on this patient No impairment noted  Sriram score is 12

## 2020-06-09 NOTE — ADVANCED PRACTICE NURSE
Patient seen and examined this morning. She remains intubated and sedated. Examined on sedation this morning. Per staff patient becomes tachypneic and restless when sedation is paused. Left pupil remains 5 mm and fixed. Right pupil 2 mm reactive. Withdrawal in all four to noxious stimuli. SBP goal < 200, cardene gtt running now. She is currently on 3% normal saline --> sodium this morning at 0809 is 154. ABG shows partially compensated primary respiratory alkalosis with pH of 7.48, CO2 26, HCO3 20, pO2 69. Plan:   - increase 3% to 40 ml/hr , recheck BMP in 2 hours ( not a candidate for EVD at this time)  - consider bicarb , will discuss with intensivist   - MRI per neurology today to r/o additional areas of ischemia or cerebral cortical injury   - continue Keppra and Vimpat for seizure prophylaxis     Full note to follow     CHINO SamsP

## 2020-06-09 NOTE — PROGRESS NOTES
1930: Bedside and Verbal shift change report given to [address] (oncoming nurse) by Judi Silva (offgoing nurse). Report included the following information SBAR, Kardex, Procedure Summary, Intake/Output, MAR, Recent Results, Cardiac Rhythm NSR, Alarm Parameters  and Dual Neuro Assessment. 2000: Dual neuro check completed with Valdemar Pichardo RN. Pt is intubated and sedated on Propofol gtt. Pt does not open eyes to stimulus and does not follow commands. Pt does not withdraw in BUE to painful stimuli, instead she turns head side to side and withdraws in BLE. Pt withdraws in BLE to painful stimuli. Temp 102.6, reported to [facility] NP, received orders for one dose of IV Tylenol now and Q4H PRN PO Tylenol, ice packs applied to axilla and groin. 2035: Called pharmacy to discuss Zosyn compatibility; pt's drips are not compatible. Pharmacist recommended placing new line. Vannie Cabot, NP. NP to evaluate pt and place line. This RN will administer Zosyn when able and retime as necessary. 2130: Dr. Remy Maguire in room evaluating pt. Decreased pressure setting from 24 to 20.    2150: Dr. Remy Maguire in room placing midline. 0030: Dr. Remy Maguire in room adjust vent settings based on ABG. Changed vent to V/C+ and decreased RR from 20 to 16.    0220: Transported to CT with RN, RT, PCT, on monitor. 0240: Returned to ICU.    0500: Temp 101.4, PRN Tylenol administered. 0730: Bedside and Verbal shift change report given to Judi Silva (oncoming nurse) by Celia Rodriguez RN (offgoing nurse). Report included the following information SBAR, Kardex, Procedure Summary, Intake/Output, MAR, Recent Results, Cardiac Rhythm NSR, Alarm Parameters  and Dual Neuro Assessment.

## 2020-06-09 NOTE — OP NOTES
NEUROINTERVENTIONAL SURGERY POST-PROCEDURE NOTE    PROCEDURE:  Right PANCHITO A1 angioplasty for vasospasm  Transarterial infusion of nonlytic agent, CNS:  LICA, ALFONSO   Cerebral angiography  US guided arterial access  Angioseal:  Right femoral    VESSEL(S) STUDIED:  1. LCCA  2. LICA  3. RCCA  4. ALFONSO  5. LVA VESSEL(S) TREATED:  1. Right PANCHITO A1 angioplasty  2. ALFONSO verapamil infusion (10 mg)  3. LICA verapamil infusion (10 mg)        PRELIMINARY REPORT & DISPOSITION:     Severe right A1 vasospasm resolved after angioplasty and ALFONSO verapamil infusion. Left A1 moderate spasm, more focal distally. Responded IA verapamil. Left A1 could not be accessed with a microwire in a timely fashion due to tortuosity in this patient with possible increased ICP. 10 mg verapamil administered, no flow delay after. Stable coiled left MCA aneurysm. Unsecure right MCA aneurysm is unchanged. COMPLICATIONS:  None immediate    FOLLOW-UP:  ICU  MRI to eval structure/3rd nerve. DATE OF SERVICE:  6/9/2020 3:32 PM     ATTENDING SURGEON(S):  MD Binh Sahu MD      ANESTHESIA:   General    MEDICATIONS:   See nursing record  4000U heparin IV  20 mg IA verapamil. PUNCTURE SITE:  Right common femoral artery. Arteriotomy closed with 6F Angioseal.  Right leg straight if possible until 15:30 (2 hours). HOB 30 degrees.

## 2020-06-09 NOTE — PROGRESS NOTES
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 At the [facility] a CT was done and she was found to have a [facility]  and transferred to [facility] for further neuro evaluation after being intubated by sending facility. Repeat CTH/CTA here showed an extensive subarachnoid hemorrhage concerning for leaking/ruptured aneurysm without hydrocephalus. 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 Both neurosurgery and neuro IR consulted and she was taken to IR for coiling of a Left MCA aneurysm this am by Dr. Billie Alvarado. Plan for Rt aneurysmal clipping at a later time. Remains at risk for hydrocephalus, continue to re-eval for hydrocephalus. Remains intubated at this time. Overnight Events:  2020  Overnight patient with pupillary changes with left pupil 5mm fixed and dilated. Code S called on patient and patient went to [facility] with evidence of increased cerebral edema and mild to moderate L MCA vasospasm post aneurysm coil embolization. The aneurysm was stable to the right MCA. Dr. Cuba Asencio was consulted and a dose of mannitol was given. She was restarted on Propofol infusion. This am, Neurosurgery was called to re-eval for EVD placement for ICP monitoring. A 3 % saline gtt is being started with Q2hr BMP. Patient is +4 anasarca and required arterial line replacement due to dislodgement. Unable to place PICC today 2/2 Fluid overload and peripheral edema. 2020  Remains on 3%, increased secretions  Severe right A1 vasospasm resolved after angioplasty and ALFONSO verapamil infusion.     POD:* No surgery found *    S/P: Day 2 for L MCA aneurysm coiling    Objective:   Vital Signs:  Visit Vitals  /62   Pulse 98   Temp 100.3 °F (37.9 °C)   Resp 23   Ht 5' 4\" (1.626 m)   Wt 108.6 kg (239 lb 6.7 oz)   SpO2 97%   BMI 41.10 kg/m²      O2 Device: Ventilator, Endotracheal tube Temp (24hrs), Av.2 °F (37.9 °C), Min:98.1 °F (36.7 °C), Max:102.6 °F (39.2 °C)         Intake/Output:     Intake/Output Summary (Last 24 hours) at 2020 1018  Last data filed at 2020 0900  Gross per 24 hour   Intake 4541 ml   Output 3975 ml   Net 566 ml       Physical Exam:    General:  Sedated and on the ventilator. No acute distress. Eyes:  Sclera anicteric, + scleral edema, Pupils equal, round, reactive to light. Eyes midline, Right eye 2 mm sluggish, left eye 5 mm irreg. NR   Mouth/Throat: Orotracheal tube in place., OGT in place   Neck: Supple. Lungs:   Bronchial/ coarse to auscultation bilaterally,  good effort. Vent supported with equal chest rise/ fall, lower oxygenation support requirements   Cardiovascular:  Regular rate and rhythm, no murmur, click, rub, or gallop. Abdomen:   Soft, obese, non-tender, bowel sounds normal, non-distended. Extremities: No cyanosis, +3-4 generalized edema. Skin: No acute rash or lesions. Lymph Nodes: Cervical and supraclavicular normal.   Musculoskeletal:  +3-4 swelling no deformity. WD x 3 ext, no WD to RUE   Lines/Devices:  Intact, no erythema, drainage, or tenderness.    Psychiatric: Sedated and appears comfortable on ventilator, moves head spontaneously     T/L/D  Tubes: ETT and Orogastric Tube  Lines: Peripheral IV arterial line , Femoral CVL   Drains: Martinez Catheter    LABS AND  DATA: Personally reviewed  Recent Labs     20  0402 20  0044   WBC 8.7 13.6*   HGB 7.4* 8.0*   HCT 25.4* 28.3*    241     Recent Labs     20  0809 20  0402  20  0648  20  0541   * 153*   < > 148*   < > 142   K 3.4* 3.5   < > 3.4*   < > 3.6   * 123*   < > 115*   < > 113*   CO2 22 23   < > 23   < > 19*   BUN 21* 20   < > 23*   < > 21*   CREA 0.81 0.81   < > 0.86   < > 1.04*   * 142*   < > 112*   < > 165*   CA 7.9* 8.2*   < > 7.9*   < > 7.6*   MG  --   --   --  2.1  --  2.3   PHOS  --   --   --   --   --  3.7    < > = values in this interval not displayed. Recent Labs     06/08/20  1049   AP 71   TP 6.2*   ALB 2.4*   GLOB 3.8     Recent Labs     06/08/20  0044   INR 1.1   PTP 11.0      Recent Labs     06/09/20  0724 06/09/20  0014   PHI 7.481* 7.511*   PCO2I 26.6* 28.7*   PO2I 69* 76*   FIO2I 35 35     Recent Labs     06/07/20  0119 06/06/20  1755   TROIQ 0.41* 0.56*     Ventilator Settings:  Mode Rate Tidal Volume Pressure FiO2 PEEP   VC+   450 ml    35 % 24 cm H20     Peak airway pressure: 31 cm H2O    Minute ventilation: 11.4 l/min        MEDS: Reviewed    Chest X-Ray:  CXR Results  (Last 48 hours)    None        06/08/20 CTH/ CTA Brain:  IMPRESSION  Impression:     Mild to moderate vasospasm in the left middle cerebral artery status post  bifurcation aneurysm coil embolization. Stable 4 mm right MCA aneurysm.     Endotracheal tube terminates just above the matt.     1 cm low-density left thyroid nodule.     Mildly enlarged left axillary lymph node measuring 11 mm in short axis.     Patchy bilateral airspace disease.     Nonspecific asymmetric enhancement of the right superior ophthalmic vein        06/06/20 MRI:  IMPRESSION:   Imaging findings consistent with large left MCA territory ischemia/infarction,  left temporal, parietal and frontal lobes, cortically based, no superimposed  increased parenchymal hemorrhage or midline shift. Right and left PANCHITO territory  cortical diffusion restriction consistent with infarction. Infarction in the right insular cortex and right temporal lobe.  Scattered small  infarctions right and left corona radiata centrum semiovale, right cerebellum.     Allowing for differences in technique likely stable subarachnoid and  intraventricular hemorrhage.     06/06/20 ECHO:  Result status: Final result   · Normal cavity size and systolic function (ejection fraction normal). Mild concentric hypertrophy. Estimated left ventricular ejection fraction is 60 - 65%. No regional wall motion abnormality noted. · Mildly dilated left atrium. ABCDEF Bundle/Checklist Completed:  YES-See Plan    SPECIAL EQUIPMENT  None    Active Problem List:     Problem List  Date Reviewed: 6/4/2020          Codes Class    SAH (subarachnoid hemorrhage) ([address] ICD-10-CM: I60.9  ICD-9-CM: 430         Subarachnoid hemorrhage from middle cerebral artery aneurysm, left (HCC) ICD-10-CM: I60.12  ICD-9-CM: 200         RIGHT middle cerebral artery aneurysm ICD-10-CM: I67.1  ICD-9-CM: 437.3         Cerebral vasospasm ICD-10-CM: I67.848  ICD-9-CM: 435.9         Anterior communicating artery aneurysm ICD-10-CM: I67.1  ICD-9-CM: 437.3             ICU Assessment/ Comprehensive Plan of Care:       Subarrachnoid hemorrhage 2/2 ruptured aneurym  Right MCA trifurcation aneurysm  Left MCA trifurcation aneurysm s/p coiling  Multiple scattered infarcts per MRI, consistent with hypoxic event  Seizure   Severe right A1 vasospasm resolved after angioplasty and ALFONSO verapamil infusion (6/9/2020)    -MRI pending   -SBP goal < 200 mmHg to prevent cerebral vasospasm.  ELKIN/Cardene prn.   --120 mmHg  -Nimotipine 60 mg via OGT Q4hr  -Neuro IR following, Dr. Ty Garcia  -Neurosurgery following-Dr. Franco Oakes, Rt MCA will need clipping at later time  -Neurology following, Dr. Peter Gonzalez  -ASA 81mg  -TCDs   -Q1hr Neuro checks  -Keppra 1000 mg BID, Vimpat 100 BID  -Propofol for sedation    Acute severe hypoxic respiratory failure, improving  -Provide conservative fluid management to prevent vasospasms  -Hold off for diuresis now  -Remains intubated, titrate down Fio2 as tolerated to maintain sats  -Currently on PEEP 8 Fio2 40%  -COVID NEG x2  -CT Chest NEG PE 06/07    CAP  -Zosyn   -Supportive care  -Unable to obtain sputum 2/2 no secretions prior to antibiotics  -MRSA NEG, stop Vanco 06/08  Chlorhexidine   Optimize PEEP/Ventilation/Oxygenation  Goal Tidal Volume 6 cc/kg based on IBW  Aim for lung protective ventilation  Head of bed > 30 degrees  Plan to Extubate: attempt to provide weaning trials in am  SPO2 Goal: > 92%    DVT Prophylaxis (if no, list reason): SCD's or Sequential Compression Device   GI Prophylaxis: Pepcid (famotidine)   Nutrition: Yes TFs  Bowel Movement: Yes  Bowel Regimen: Docusate (Colace) and Bisacodyl (Dulcolax)  DVT Prophylaxis (if no, list reason): SCD's or Sequential Compression Device , ASA  Mobility: Fair and Bedrest  PT/OT: Not appropraite at this time, re-eval at extubation     DISPOSITION/COMMUNICATION  Discussed Plan of Care/Code Status: Full Code  Stay in ICU    CRITICAL CARE CONSULTANT NOTE  I had a face to face encounter with the patient, reviewed and interpreted patient data including clinical events, labs, images, vital signs, I/O's, and examined patient. I have discussed the case and the plan and management of the patient's care with the consulting services, the bedside nurses and the respiratory therapist.      NOTE OF PERSONAL INVOLVEMENT IN CARE   I participated in the decision-making and personally managed or directed the management of the following life and organ supporting interventions that required my frequent assessment to treat or prevent imminent deterioration. I personally spent 80 minutes of critical care time. This is time spent at this critically ill patient's bedside actively involved in patient care as well as the coordination of care and discussions with the patient's family. This does not include any procedural time which has been billed separately.     Robb Acevedo Dr  6/9/2020

## 2020-06-09 NOTE — PROGRESS NOTES
0730: Bedside and Verbal shift change report given to Sendy No RN (oncoming nurse) by Kade Layton RN (offgoing nurse). Report included the following information SBAR, Kardex, ED Summary, Intake/Output, MAR, Accordion, Recent Results, Med Rec Status, Cardiac Rhythm NSR, Alarm Parameters  and Dual Neuro Assessment.

## 2020-06-09 NOTE — PROGRESS NOTES
Repeat sodium level 152. Continue with 3% saline. Sodium goal 155 per Dr. Laura Garcia. Left pupil still remains fixed and dilated 5 mm. Right pupil 3 mm and reactive to light. Head CT without contrast ordered in AM. Night shift Neuro NP will re-evaluate and further manage overnight.     Trina Landis NP  Neurocritical Care Nurse Practitioner  [phone]

## 2020-06-09 NOTE — ADVANCED PRACTICE NURSE
Patient taken down for stat CTA/CTP head after TCDs showed increasing velocities in the right and left PANCHITO as well as terminal ICA and PCA bilaterally. CTA confirms severe vasospasm, particularly in the bilateral ACAs. Patient will need to come to angio urgently for IA verapamil treatment and possible balloon angioplasty. Discussed with patients mother (Omar) Keerthi Chen to obtain informed consent via telephone. Patient to have MRI hopefully after angiogram completed.      Kamar Herrera, AGAFRANCISCO JP  NIS

## 2020-06-09 NOTE — PROGRESS NOTES
CTA/P shows severe bilateral PANCHITO vasospasm with flow limitation. This is new compared to previous studies. Will take to angio for IA verapamil and possible angioplasty.

## 2020-06-09 NOTE — PROGRESS NOTES
Neurointerventional Surgery Progress Note  Carter Medina NP   Neurocritical Care Nurse Practitioner  [phone]          Admit Date: 6/4/2020        Daily Progress Note: 6/9/2020   LOS: 5 days      S/P:  POD 4 Cerebral angiogram with coil embolization of ruptured left MCA bifurcation aneurysm on 6/5/2020    ** Delayed Presentation ** PB Day 9    Interval History/Subjective:     No acute events overnight. Remains intubated and sedated. Per staff patient becomes tachypneic and restless when sedation is paused. Left pupil remains 5 mm and fixed. Right pupil 2 mm reactive. Withdrawal in four to noxious stimuli. TCDs today show increasing velocities in the right and left PANCHITO as well as terminal ICA and PCA bilaterally. CTA confirms severe vasospasm, particularly in the bilateral ACAs. CTA/P shows severe bilateral PANCHITO vasospasm with flow limitation. She will go to angio for IA verapamil and possible angioplasty. Plan:   - increase 3% to 40 ml/hr , recheck BMP in 2 hours ( not a candidate for EVD at this time)  - consider bicarb , will discuss with intensivist   - MRI per neurology today to r/o additional areas of ischemia or cerebral cortical injury   - continue Keppra and Vimpat for seizure prophylaxis   - MAP goal today 100-110    Unable to perform a ROS due to intubation and sedation. 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 Clipping              - Continue Nimotop Day 5 of 21 to prevent delayed cerebral ischemia              - Maintenance IV Fluids on hold due to worsening edema on CXR on 6/6 per Intensivist (CXR showing persistent although increased interstitial and parenchymal opacities, particularly in the right perihilar region). Patient has been diuresed with lasix. She is +10L since admission.              - TCDs daily, TCDs today show possible right PANCHITO spasm, left PCA spasm, possible left MCA and PANCHITO spasm. Intracranial flow is resistive, consistent with increasing intracranial pressure. CTA of Head completed earlier this morning showed no evidence of significant spasm              - Strict I&O's              - ECHO shows EF 60-65%, mild concentric hypertrophy, mildly dilated left atrium              - continue every hour neuro checks              - Keep SBP goal <200, MAP goal 100-110 Cardene/Fabien/Hydralazine/Labetalol PRN              - PT/OT/SLP evals when appropriate              -  Repeat CT of Head today showed no significant change.               - NSGY following              - NIS following      2.) Seizure              - Due to #1, no prior hx of seizure              - Continue Keppra 1000 mg BID for seizure PPX              - continue Vimpat 100 mg BID              - Seizure precautions              -  EEG on 6/6 showed occasional epileptiform disturbance seen in the left frontal area which may represent a partial onset seizure focus.              - completed 24 hour EEG today which shows No convincingly lateralizing or epileptiform features were noted. No seizure was recorded.               - Neurology following                3.) Cerebral Edema              - received a dose of Mannitol overnight, start 3% saline 6/8.               - continue 3% sodium gtt at 40 cc/hr , sodium goal 155-160, discussed with intensivist and nursing               - continue q4 hour BMP checks     4.) Acute hypoxic respiratory failure in the setting of likely neurogenic pulmonary edema               - patient diuresed with Lasix             - COVID 19 test negative x 2             - vent settings per Intensivist              - wean sedation as tolerated              - patient may potentially need to be diuresed              - Intensivist managing     5.) Multiple Acute Ischemic Infarctions                - Imaging pattern is consistent with Hypoxic-Ischemic Encephalopathy (HIE)                - Continue aspirin 300 AL daily for stroke prevention                - ECHO as stated above                - Lipid panel on 6/5, LDL 7.8, goal < 70, statin not indicated                - Hgb A1C ok at 5.7                - Neurology following          Activity: Bed rest  DVT ppx: SCDs  Disposition: TBD    Plan d/w Dr. Ariel Zhou, Dr. Ivon Gandhi ( intensivist) , Dr. Charles Chan, RN, and patient's mother at bedside. Admission Summary:     Samir Steen is a 48 y.o. 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 On arrival to ED, pt was unresponsive and noted to be seizing. A stat CT of her head was performed there, which showed extensive SAH. Pt was then intubated emergently. She was also extremely hypertensive with SBPs in 240s. She was given Keppra and Ativan, and placed on cardene drip for BP control. NSGY and NIS were then consulted and the patient was transferred to [facility] for higher level of care. En route, paramedics administered 7.5 mg of Versed, 200mcg of Fentanyl, and 10 mg of labetalol for BP control. On arrival, CTA of her head and neck was obtained, which showed a 4.4 x 4.1 mm right MCA trifurcation aneurysm and  a 2.2 x 3.1 mm left MCA trifurcation aneurysm.  Of note, CTA head/neck also showed pulmonary infiltrates suspicious for COVID-19 infection and the patient has been tested for COVID which was negative. 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 Mother reported pt does not smoke, does not drink alcohol or use street drugs. The mother also reported that the patient began complaining of a headache on 5/31/2020 and has been taking BC Powder (aspirin) for the headache. She also reported that the pt has a hx of HTN and is supposed to be on BP medications, but recently stopped taking her medications. The patient underwent a cerebral angiogram on 6/5 by Dr. Gloria Espino for coil embolization of a ruptured left MCA bifurcation aneurysm. Unable to obtain a ROS due intubation and sedation.   Current Facility-Administered Medications   Medication Dose Route Frequency Provider Last Rate Last Dose    iopamidoL (ISOVUE-370) 76 % injection             verapamiL (ISOPTIN) 2.5 mg/mL injection 40 mg  40 mg IntraarTERial Rad Multiple Zia Farfan MD   10 mg at 06/09/20 1525    niMODipine (NYMALIZE) 6 mg/mL oral solution 60 mg  60 mg Per NG tube Q4H Alanis Domínguez NP        guaiFENesin (ROBITUSSIN) 100 mg/5 mL oral liquid 100 mg  100 mg Oral Q6H Loni Ramey NP-C   100 mg at 06/09/20 0817    niCARdipine (CARDENE) 50 mg in 0.9% sodium chloride 100 mL infusion  0-15 mg/hr IntraVENous TITRATE Lulu RIVAS NP-C 30 mL/hr at 06/09/20 1548 15 mg/hr at 06/09/20 1548    amLODIPine (NORVASC) tablet 5 mg  5 mg Per NG tube DAILY Lavone Nissen, ACNP   5 mg at 06/09/20 0018    3% sodium chloride (*HIGH ALERT*CONCENTRATED IV*) infusion  40 mL/hr IntraVENous CONTINUOUS Orvil FLAKITA Norris 40 mL/hr at 06/09/20 1354 40 mL/hr at 06/09/20 1354    acetaminophen (TYLENOL) solution 650 mg  650 mg Per NG tube Q4H PRN Yimi Templeton NP   650 mg at 06/09/20 0516    0.9% sodium chloride infusion 250 mL  250 mL IntraVENous PRN FABIAN Head        PHENYLephrine (ELKIN-SYNEPHRINE) 30 mg in 0.9% sodium chloride 250 mL infusion   mcg/min IntraVENous TITRATE Sue Pineda NP   Stopped at 06/07/20 1600    labetaloL (NORMODYNE;TRANDATE) injection 20 mg  20 mg IntraVENous Q4H PRN Alejandra Avery NP   20 mg at 06/09/20 0419    albuterol-ipratropium (DUO-NEB) 2.5 MG-0.5 MG/3 ML  3 mL Nebulization Q6H RT DARREN Tobar   Stopped at 06/09/20 1400    piperacillin-tazobactam (ZOSYN) 3.375 g in 0.9% sodium chloride (MBP/ADV) 100 mL  3.375 g IntraVENous Q8H AYDEE TobarP 25 mL/hr at 06/09/20 0659 3.375 g at 06/09/20 0659    lacosamide (VIMPAT) 100 mg in 0.9% sodium chloride 100 mL IVPB  100 mg IntraVENous Q12H Alejandra Avery NP   100 mg at 06/09/20 0600    aspirin (ASA) suppository 300 mg  300 mg Rectal DAILY Alejandra Avery NP   300 mg at 06/09/20 0817    glucose chewable tablet 16 g  4 Tab Oral PRN FABIAN Meza        glucagon (GLUCAGEN) injection 1 mg  1 mg IntraMUSCular PRN FABIAN Meza        dextrose 10% infusion 0-250 mL  0-250 mL IntraVENous PRN FABIAN Meza        insulin lispro (HUMALOG) injection   SubCUTAneous Q6H FABIAN Germain   Stopped at 06/09/20 1200    albuterol (PROVENTIL VENTOLIN) nebulizer solution 2.5 mg  2.5 mg Nebulization Q6H PRN FABIAN Meza        famotidine (PF) (PEPCID) 20 mg in 0.9% sodium chloride 10 mL injection  20 mg IntraVENous Q12H AYDEE TobarP   20 mg at 06/09/20 3899    fentaNYL citrate (PF) injection  mcg   mcg IntraVENous Q1H PRN AYDEE TobarP   100 mcg at 06/09/20 0575    polyvinyl alcohol-povidone (NATURAL TEARS) 0.5-0.6 % ophthalmic solution 2 Drop  2 Drop Both Eyes Q8H AYDEE TobarP   2 Drop at 06/09/20 0551    docusate (COLACE) 50 mg/5 mL oral liquid 100 mg  100 mg Oral BID DARREN Tobar   Stopped at 06/07/20 1800    bisacodyL (DULCOLAX) suppository 10 mg  10 mg Rectal DAILY PRN DARREN Tobar        levETIRAcetam (KEPPRA) 1,000 mg in 0.9% sodium chloride 100 mL IVPB  1,000 mg IntraVENous Q12H Antonina Domínguez NP   1,000 mg at 20 1108    propofol (DIPRIVAN) 10 mg/mL infusion  0-50 mcg/kg/min IntraVENous TITRATE Antonina Domínguez NP 18.1 mL/hr at 20 1539 30 mcg/kg/min at 20 1539    chlorhexidine (ORAL CARE KIT) 0.12 % mouthwash 15 mL  15 mL Oral Q12H FABIAN Herrmann   15 mL at 20 0806    ondansetron (ZOFRAN) injection 4 mg  4 mg IntraVENous Q4H PRN FABIAN Clark        docusate (COLACE) 50 mg/5 mL oral liquid 100 mg  100 mg Oral BID PRN FABIAN Herrmann            No Known Allergies    Review of Systems:  Review of systems not obtained due to patient factors. Objective:     Vital signs  Temp (24hrs), Av.2 °F (37.9 °C), Min:97.7 °F (36.5 °C), Max:102.6 °F (39.2 °C)    07 -  1900  In: 453.7 [I.V.:453.7]  Out: 1150 [Urine:1000; Drains:150]  1901 -  0700  In: 7118.2 [I.V.:5013.2]  Out: 5326 [Urine:6150]    Visit Vitals  /87 (BP Patient Position: Reverse Trendelenburg)   Pulse 77   Temp 97.7 °F (36.5 °C)   Resp 18   Ht 5' 4\" (1.626 m)   Wt 239 lb 6.7 oz (108.6 kg)   SpO2 91%   BMI 41.10 kg/m²      O2 Device: Endotracheal tube   Vitals:    20 1545 20 1551 20 1600 20 1635   BP: (!) 208/84 (!) 204/82 111/87    Pulse: 92 82 77    Resp: 16 16 16 18   Temp:  97.7 °F (36.5 °C)     SpO2: 100% 93% 91%    Weight:       Height:          Physical Exam:  GENERAL: NAD, intubated and sedated on propofol. EYE: conjunctivae/corneas clear. Right pupil 3 mm, brisk response to light. Left pupil 5 mm fixed and dilated. Eyes midline. Scleral edema noted bilaterally   LUNG: lungs coarse bilaterally   HEART: regular rate and rhythm, S1, S2 normal, no murmur, click, rub or gallop  EXTREMITIES:  extremities normal, atraumatic, no cyanosis, 3+ pitting peripheral edema, distal pulses 2+ bilaterally   SKIN: Skin warm to touch. Right groin site clean, dry, and intact.  No hematoma, bruising, or bleeding noted. NEUROLOGIC: Intubated and sedated on Propofol. Eyes do not open to stimulus. No command following. Moves head intermittently. Right pupil 3 mm, brisk response to light. Left pupil 5 mm fixed and dilated. Eyes midline. No tracking with eyes. No blink to threat. Withdraws to painful stimuli on all extremities BLE >BUE. Toes mute bilaterally. No involuntary movements. Gait deferred. Unable to assess language, sensation, and coordination. Imaging:    CT of Head on 6/8/2020 shows  IMPRESSION:   Global edema, otherwise no significant change    CTA of Head and Neck on 6/8/2020 per radiology shows  Impression:  Mild to moderate vasospasm in the left middle cerebral artery status post  bifurcation aneurysm coil embolization. Stable 4 mm right MCA aneurysm. Endotracheal tube terminates just above the matt. 1 cm low-density left thyroid nodule. Mildly enlarged left axillary lymph node measuring 11 mm in short axis  Patchy bilateral airspace disease. Nonspecific asymmetric enhancement of the right superior ophthalmic vein   (However, no significant vasospasm was seen when personally reviewed by NIS)    CT Perfusion on 6/8/2020 shows  IMPRESSION:  No significant cerebral perfusion abnormality    CTA of Chest on 6/7/2020 shows  IMPRESSION:  1. No acute pulmonary embolus  2. Dependent atelectasis with diffuse groundglass opacifications bilaterally. CT of Head on 6/7/2020 shows  IMPRESSION:   1. No further progression of subarachnoid hemorrhage. The overall degree of  subarachnoid hemorrhage is slightly improved. 2. Subtle areas of gray-white differentiation loss throughout the cerebral  hemispheres correlating with areas of restricted diffusion on previous CT  compatible with ischemic changes.     MRI of Brain on 6/6/2020 shows  IMPRESSION:   Imaging findings consistent with large left MCA territory ischemia/infarction,  left temporal, parietal and frontal lobes, cortically based, no superimposed  increased parenchymal hemorrhage or midline shift. Right and left PANCHITO territory  cortical diffusion restriction consistent with infarction. Infarction in the right insular cortex and right temporal lobe. Scattered small  infarctions right and left corona radiata centrum semiovale, right cerebellum. Allowing for differences in technique likely stable subarachnoid and  intraventricular hemorrhage. CT of Head on 6/6/2020 at 1411 shows  IMPRESSION:   Left MCA territory infarction with smaller right MCA territory infarction.     Stable subarachnoid and intraventricular hemorrhage. CTA of Head on 6/6/2020 at 0903 shows  IMPRESSION:  Slightly increased intraventricular and subarachnoid hemorrhage compared to the  prior exam.     Status post coiling left MCA bifurcation aneurysm. No significant residual  aneurysm filling. CT of Head on 6/62020 at 0239 shows  IMPRESSION:  Slightly diminished subarachnoid and intraventricular hemorrhage. Stable ventricular system. CT of Head WO on 6/5/2020 at 1512 shows  IMPRESSION: Stable acute subarachnoid and intraventricular hemorrhage. Stable  ventricular system. CT Head WO 6/5/20 0826     IMPRESSION:   1. Diffuse subarachnoid hemorrhage most concentrated in the basal cisterns and  left sylvian fissure. 2.  Interval ventricular enlargement suspicious for developing hydrocephalus.     CTA Head 6/4/20 2017     IMPRESSION:  1. Diffuse subarachnoid hemorrhage in the basilar cisterns and sylvian  fissures. 2.  Bilateral MCA bifurcation aneurysms. 3.  Infundibular origins to the posterior communicating arteries bilaterally. 4.  Symmetric moderately severe airspace disease in the upper lobes bilaterally  which may represent edema or sequela of aspiration.     CT Head WO Contrast 6/4/2020 2012    IMPRESSION: Extensive subarachnoid hemorrhage concerning for leaking/ruptured  aneurysm.        24 hour results:    Recent Results (from the past 24 hour(s)) GLUCOSE, POC    Collection Time: 06/08/20  6:01 PM   Result Value Ref Range    Glucose (POC) 137 (H) 65 - 100 mg/dL    Performed by Ethan Ly, BASIC    Collection Time: 06/08/20  6:13 PM   Result Value Ref Range    Sodium 152 (H) 136 - 145 mmol/L    Potassium 3.7 3.5 - 5.1 mmol/L    Chloride 122 (H) 97 - 108 mmol/L    CO2 23 21 - 32 mmol/L    Anion gap 7 5 - 15 mmol/L    Glucose 119 (H) 65 - 100 mg/dL    BUN 23 (H) 6 - 20 MG/DL    Creatinine 0.85 0.55 - 1.02 MG/DL    BUN/Creatinine ratio 27 (H) 12 - 20      GFR est AA >60 >60 ml/min/1.73m2    GFR est non-AA >60 >60 ml/min/1.73m2    Calcium 8.2 (L) 8.5 - 78.7 MG/DL   METABOLIC PANEL, BASIC    Collection Time: 06/08/20  8:17 PM   Result Value Ref Range    Sodium 150 (H) 136 - 145 mmol/L    Potassium 3.9 3.5 - 5.1 mmol/L    Chloride 124 (H) 97 - 108 mmol/L    CO2 21 21 - 32 mmol/L    Anion gap 5 5 - 15 mmol/L    Glucose 164 (H) 65 - 100 mg/dL    BUN 24 (H) 6 - 20 MG/DL    Creatinine 0.80 0.55 - 1.02 MG/DL    BUN/Creatinine ratio 30 (H) 12 - 20      GFR est AA >60 >60 ml/min/1.73m2    GFR est non-AA >60 >60 ml/min/1.73m2    Calcium 8.0 (L) 8.5 - 86.9 MG/DL   METABOLIC PANEL, BASIC    Collection Time: 06/08/20 10:44 PM   Result Value Ref Range    Sodium 153 (H) 136 - 145 mmol/L    Potassium 3.6 3.5 - 5.1 mmol/L    Chloride 123 (H) 97 - 108 mmol/L    CO2 23 21 - 32 mmol/L    Anion gap 7 5 - 15 mmol/L    Glucose 113 (H) 65 - 100 mg/dL    BUN 23 (H) 6 - 20 MG/DL    Creatinine 0.76 0.55 - 1.02 MG/DL    BUN/Creatinine ratio 30 (H) 12 - 20      GFR est AA >60 >60 ml/min/1.73m2    GFR est non-AA >60 >60 ml/min/1.73m2    Calcium 7.7 (L) 8.5 - 10.1 MG/DL   GLUCOSE, POC    Collection Time: 06/09/20 12:07 AM   Result Value Ref Range    Glucose (POC) 112 (H) 65 - 100 mg/dL    Performed by Lashawn Presley    POC EG7    Collection Time: 06/09/20 12:14 AM   Result Value Ref Range    Calcium, ionized (POC) 1.15 1.12 - 1.32 mmol/L    FIO2 (POC) 35 %    pH (POC) 7.511 (H) 7.35 - 7.45      pCO2 (POC) 28.7 (L) 35.0 - 45.0 MMHG    pO2 (POC) 76 (L) 80 - 100 MMHG    HCO3 (POC) 23.0 22 - 26 MMOL/L    Base excess (POC) 0 mmol/L    sO2 (POC) 97 92 - 97 %    Site DRAWN FROM ARTERIAL LINE      Device: VENT      Mode ASSIST CONTROL      Tidal volume 450 ml    Set Rate 20 bpm    PEEP/CPAP (POC) 8 cmH2O    Allens test (POC) N/A      Specimen type (POC) ARTERIAL     METABOLIC PANEL, BASIC    Collection Time: 06/09/20 12:24 AM   Result Value Ref Range    Sodium 154 (H) 136 - 145 mmol/L    Potassium 3.6 3.5 - 5.1 mmol/L    Chloride 124 (H) 97 - 108 mmol/L    CO2 21 21 - 32 mmol/L    Anion gap 9 5 - 15 mmol/L    Glucose 119 (H) 65 - 100 mg/dL    BUN 22 (H) 6 - 20 MG/DL    Creatinine 0.76 0.55 - 1.02 MG/DL    BUN/Creatinine ratio 29 (H) 12 - 20      GFR est AA >60 >60 ml/min/1.73m2    GFR est non-AA >60 >60 ml/min/1.73m2    Calcium 7.8 (L) 8.5 - 65.2 MG/DL   METABOLIC PANEL, BASIC    Collection Time: 06/09/20  2:05 AM   Result Value Ref Range    Sodium 153 (H) 136 - 145 mmol/L    Potassium 3.7 3.5 - 5.1 mmol/L    Chloride 123 (H) 97 - 108 mmol/L    CO2 22 21 - 32 mmol/L    Anion gap 8 5 - 15 mmol/L    Glucose 119 (H) 65 - 100 mg/dL    BUN 21 (H) 6 - 20 MG/DL    Creatinine 0.73 0.55 - 1.02 MG/DL    BUN/Creatinine ratio 29 (H) 12 - 20      GFR est AA >60 >60 ml/min/1.73m2    GFR est non-AA >60 >60 ml/min/1.73m2    Calcium 8.0 (L) 8.5 - 10.1 MG/DL   CBC WITH AUTOMATED DIFF    Collection Time: 06/09/20  4:02 AM   Result Value Ref Range    WBC 8.7 3.6 - 11.0 K/uL    RBC 3.59 (L) 3.80 - 5.20 M/uL    HGB 7.4 (L) 11.5 - 16.0 g/dL    HCT 25.4 (L) 35.0 - 47.0 %    MCV 70.8 (L) 80.0 - 99.0 FL    MCH 20.6 (L) 26.0 - 34.0 PG    MCHC 29.1 (L) 30.0 - 36.5 g/dL    RDW 20.9 (H) 11.5 - 14.5 %    PLATELET 279 461 - 710 K/uL    MPV 10.3 8.9 - 12.9 FL    NRBC 0.3 (H) 0  WBC    ABSOLUTE NRBC 0.03 (H) 0.00 - 0.01 K/uL    NEUTROPHILS 74 32 - 75 %    LYMPHOCYTES 13 12 - 49 %    MONOCYTES 11 5 - 13 % EOSINOPHILS 1 0 - 7 %    BASOPHILS 0 0 - 1 %    IMMATURE GRANULOCYTES 1 (H) 0.0 - 0.5 %    ABS. NEUTROPHILS 6.4 1.8 - 8.0 K/UL    ABS. LYMPHOCYTES 1.1 0.8 - 3.5 K/UL    ABS. MONOCYTES 1.0 0.0 - 1.0 K/UL    ABS. EOSINOPHILS 0.1 0.0 - 0.4 K/UL    ABS. BASOPHILS 0.0 0.0 - 0.1 K/UL    ABS. IMM.  GRANS. 0.1 (H) 0.00 - 0.04 K/UL    DF SMEAR SCANNED      RBC COMMENTS OVALOCYTES  PRESENT        RBC COMMENTS POLYCHROMASIA  1+        RBC COMMENTS ANISOCYTOSIS  2+        RBC COMMENTS MICROCYTOSIS  2+       METABOLIC PANEL, BASIC    Collection Time: 06/09/20  4:02 AM   Result Value Ref Range    Sodium 153 (H) 136 - 145 mmol/L    Potassium 3.5 3.5 - 5.1 mmol/L    Chloride 123 (H) 97 - 108 mmol/L    CO2 23 21 - 32 mmol/L    Anion gap 7 5 - 15 mmol/L    Glucose 142 (H) 65 - 100 mg/dL    BUN 20 6 - 20 MG/DL    Creatinine 0.81 0.55 - 1.02 MG/DL    BUN/Creatinine ratio 25 (H) 12 - 20      GFR est AA >60 >60 ml/min/1.73m2    GFR est non-AA >60 >60 ml/min/1.73m2    Calcium 8.2 (L) 8.5 - 10.1 MG/DL   PROCALCITONIN    Collection Time: 06/09/20  4:02 AM   Result Value Ref Range    Procalcitonin 0.06 ng/mL   GLUCOSE, POC    Collection Time: 06/09/20  5:44 AM   Result Value Ref Range    Glucose (POC) 147 (H) 65 - 100 mg/dL    Performed by Teja Vazquez    POC EG7    Collection Time: 06/09/20  7:24 AM   Result Value Ref Range    Calcium, ionized (POC) 1.17 1.12 - 1.32 mmol/L    FIO2 (POC) 35 %    pH (POC) 7.481 (H) 7.35 - 7.45      pCO2 (POC) 26.6 (L) 35.0 - 45.0 MMHG    pO2 (POC) 69 (L) 80 - 100 MMHG    HCO3 (POC) 19.8 (L) 22 - 26 MMOL/L    Base deficit (POC) 4 mmol/L    sO2 (POC) 95 92 - 97 %    Site DRAWN FROM ARTERIAL LINE      Device: VENT      Mode ASSIST CONTROL      Tidal volume 450 ml    Set Rate 16 bpm    PEEP/CPAP (POC) 8 cmH2O    Allens test (POC) N/A      Inspiratory Time 1.2 sec    Specimen type (POC) ARTERIAL     METABOLIC PANEL, BASIC    Collection Time: 06/09/20  8:09 AM   Result Value Ref Range    Sodium 154 (H) 136 - 145 mmol/L    Potassium 3.4 (L) 3.5 - 5.1 mmol/L    Chloride 124 (H) 97 - 108 mmol/L    CO2 22 21 - 32 mmol/L    Anion gap 8 5 - 15 mmol/L    Glucose 125 (H) 65 - 100 mg/dL    BUN 21 (H) 6 - 20 MG/DL    Creatinine 0.81 0.55 - 1.02 MG/DL    BUN/Creatinine ratio 26 (H) 12 - 20      GFR est AA >60 >60 ml/min/1.73m2    GFR est non-AA >60 >60 ml/min/1.73m2    Calcium 7.9 (L) 8.5 - 10.1 MG/DL   TCD INTRACRANIAL ARTERIES COMPLETE    Collection Time: 06/09/20  9:51 AM   Result Value Ref Range    Right Lindegaard Ratio 2.9     Right MCA 1  cm/s    Right MCA 1 EDV 40 cm/s    Right MCA 1 Mean Velocity 91 cm/s    Right MCA 1 Pulsatility Index 1.7     Right PANCHITO  cm/s    Right PANCHITO EDV 92 cm/s    Right PANCHITO Mean Velocity 148 cm/s    Right ICA  cm/s    Right ICA EDV 32 cm/s    Right ICA Mean Velocity 64 cm/s    Right PCA 1  cm/s    Right PCA 1 EDV 34 cm/s    Right PCA 1 Mean Velocity 66 cm/s    Right External ICA PSV 64 cm/s    Right External ICA EDV 15 cm/s    Right External ICA Mean Velocity 31 cm/s    Left MCA 1  cm/s    Left MCA 1 EDV 73 cm/s    Left MCA 1 Mean Velocity 132 cm/s    Left MCA 1 Pulsatility Index 1.3     Left PANCHITO  cm/s    Left PANCHITO EDV 92 cm/s    Left PANCHITO Mean Velocity 146 cm/s    Left ICA  cm/s    Left External ICA PSV 76 cm/s    Left ICA EDV 62 cm/s    Left External ICA EDV 18 cm/s    Left ICA Mean Velocity 111 cm/s    Left External ICA Mean Velocity 37 cm/s    Left PCA 1  cm/s    Left PCA 1 EDV 64 cm/s    Left PCA 1 Mean Velocity 116 cm/s    Left Lindegaard Ratio 3.7     Basilar Artery  cm/s    Basilar Artery EDV 32 cm/s    Basilar Artery Mean Angel 59 cm/s    Right Vertebral Mean Velocity 30 cm/s    Left Vertebral Mean Velocity 27 cm/s    Right Vertebral PSV 56 cm/s    Right Vertebral EDV 17 cm/s    Left Vertebral PSV 50 cm/s    Left Vertebral EDV 16 cm/s   GLUCOSE, POC    Collection Time: 06/09/20 11:51 AM   Result Value Ref Range    Glucose (POC) 133 (H) 65 - 100 mg/dL    Performed by VIRGIE Gudino    Collection Time: 06/09/20 12:18 PM   Result Value Ref Range    Sodium 155 (H) 136 - 145 mmol/L    Potassium 3.7 3.5 - 5.1 mmol/L    Chloride 126 (H) 97 - 108 mmol/L    CO2 22 21 - 32 mmol/L    Anion gap 7 5 - 15 mmol/L    Glucose 138 (H) 65 - 100 mg/dL    BUN 20 6 - 20 MG/DL    Creatinine 0.78 0.55 - 1.02 MG/DL    BUN/Creatinine ratio 26 (H) 12 - 20      GFR est AA >60 >60 ml/min/1.73m2    GFR est non-AA >60 >60 ml/min/1.73m2    Calcium 8.3 (L) 8.5 - 10.1 MG/DL        Jenna Briscoe NP

## 2020-06-09 NOTE — PROCEDURES
[facility]: Critical Care Medicine  Midline Procedure Note With Ultrasound Guidance    Indication: Inadequate venous access    Midline Bundle:  Full sterile barrier precautions used. Pt placed in supine position. 7-Step Sterility Protocol followed. (cap, mask sterile gown, sterile gloves, sterile towels, hand hygiene, 2% chlorhexidine for cutaneous antisepsis)    Torniquet applied to arm. Using ultrasound guidance,   Right Basilic vein cannulated x 1 attempt(s) utilizing the modified Seldinger technique. Guidewire advanced with forward motion into target vein. Position of guidewire confirmed in vein using ultrasound. 20g x 10cm Power Glide Pro Midline Catheter was threaded over guidewire without difficulty. No immediate complications encountered. Good blood return aspirated on single port. Catheter secured & Biopatch applied. Sterile Tegaderm placed.       Procedure completed by MD Karly [facility]ant [facility]     [address] [facility]

## 2020-06-10 ENCOUNTER — APPOINTMENT (OUTPATIENT)
Dept: MRI IMAGING | Age: 50
DRG: 003 | End: 2020-06-10
Attending: NURSE PRACTITIONER
Payer: COMMERCIAL

## 2020-06-10 ENCOUNTER — APPOINTMENT (OUTPATIENT)
Dept: VASCULAR SURGERY | Age: 50
DRG: 003 | End: 2020-06-10
Attending: NURSE PRACTITIONER
Payer: COMMERCIAL

## 2020-06-10 ENCOUNTER — ANESTHESIA (OUTPATIENT)
Dept: INTERVENTIONAL RADIOLOGY/VASCULAR | Age: 50
DRG: 003 | End: 2020-06-10
Payer: COMMERCIAL

## 2020-06-10 ENCOUNTER — APPOINTMENT (OUTPATIENT)
Dept: INTERVENTIONAL RADIOLOGY/VASCULAR | Age: 50
DRG: 003 | End: 2020-06-10
Attending: RADIOLOGY
Payer: COMMERCIAL

## 2020-06-10 ENCOUNTER — ANESTHESIA EVENT (OUTPATIENT)
Dept: INTERVENTIONAL RADIOLOGY/VASCULAR | Age: 50
DRG: 003 | End: 2020-06-10
Payer: COMMERCIAL

## 2020-06-10 ENCOUNTER — APPOINTMENT (OUTPATIENT)
Dept: GENERAL RADIOLOGY | Age: 50
DRG: 003 | End: 2020-06-10
Attending: ANESTHESIOLOGY
Payer: COMMERCIAL

## 2020-06-10 LAB
ABO + RH BLD: NORMAL
ANION GAP SERPL CALC-SCNC: 10 MMOL/L (ref 5–15)
ANION GAP SERPL CALC-SCNC: 6 MMOL/L (ref 5–15)
ANION GAP SERPL CALC-SCNC: 7 MMOL/L (ref 5–15)
BASOPHILS # BLD: 0 K/UL (ref 0–0.1)
BASOPHILS NFR BLD: 0 % (ref 0–1)
BLD PROD TYP BPU: NORMAL
BLD PROD TYP BPU: NORMAL
BLOOD GROUP ANTIBODIES SERPL: NORMAL
BPU ID: NORMAL
BPU ID: NORMAL
BUN SERPL-MCNC: 15 MG/DL (ref 6–20)
BUN SERPL-MCNC: 16 MG/DL (ref 6–20)
BUN SERPL-MCNC: 18 MG/DL (ref 6–20)
BUN SERPL-MCNC: 20 MG/DL (ref 6–20)
BUN SERPL-MCNC: 22 MG/DL (ref 6–20)
BUN/CREAT SERPL: 23 (ref 12–20)
BUN/CREAT SERPL: 25 (ref 12–20)
BUN/CREAT SERPL: 26 (ref 12–20)
BUN/CREAT SERPL: 27 (ref 12–20)
BUN/CREAT SERPL: 29 (ref 12–20)
CALCIUM SERPL-MCNC: 7.5 MG/DL (ref 8.5–10.1)
CALCIUM SERPL-MCNC: 7.6 MG/DL (ref 8.5–10.1)
CALCIUM SERPL-MCNC: 7.6 MG/DL (ref 8.5–10.1)
CALCIUM SERPL-MCNC: 7.7 MG/DL (ref 8.5–10.1)
CALCIUM SERPL-MCNC: 7.7 MG/DL (ref 8.5–10.1)
CHLORIDE SERPL-SCNC: 125 MMOL/L (ref 97–108)
CHLORIDE SERPL-SCNC: 125 MMOL/L (ref 97–108)
CHLORIDE SERPL-SCNC: 126 MMOL/L (ref 97–108)
CHLORIDE SERPL-SCNC: 127 MMOL/L (ref 97–108)
CHLORIDE SERPL-SCNC: 130 MMOL/L (ref 97–108)
CO2 SERPL-SCNC: 20 MMOL/L (ref 21–32)
CO2 SERPL-SCNC: 21 MMOL/L (ref 21–32)
CO2 SERPL-SCNC: 21 MMOL/L (ref 21–32)
CO2 SERPL-SCNC: 22 MMOL/L (ref 21–32)
CO2 SERPL-SCNC: 23 MMOL/L (ref 21–32)
CREAT SERPL-MCNC: 0.64 MG/DL (ref 0.55–1.02)
CREAT SERPL-MCNC: 0.66 MG/DL (ref 0.55–1.02)
CREAT SERPL-MCNC: 0.68 MG/DL (ref 0.55–1.02)
CREAT SERPL-MCNC: 0.69 MG/DL (ref 0.55–1.02)
CREAT SERPL-MCNC: 0.81 MG/DL (ref 0.55–1.02)
CROSSMATCH RESULT,%XM: NORMAL
CROSSMATCH RESULT,%XM: NORMAL
CRP SERPL-MCNC: 8.29 MG/DL (ref 0–0.6)
D DIMER PPP FEU-MCNC: 10.21 MG/L FEU (ref 0–0.65)
DIFFERENTIAL METHOD BLD: ABNORMAL
EOSINOPHIL # BLD: 0.1 K/UL (ref 0–0.4)
EOSINOPHIL NFR BLD: 1 % (ref 0–7)
ERYTHROCYTE [DISTWIDTH] IN BLOOD BY AUTOMATED COUNT: 21.5 % (ref 11.5–14.5)
ERYTHROCYTE [DISTWIDTH] IN BLOOD BY AUTOMATED COUNT: 23.7 % (ref 11.5–14.5)
FERRITIN SERPL-MCNC: 49 NG/ML (ref 8–252)
GLUCOSE BLD STRIP.AUTO-MCNC: 121 MG/DL (ref 65–100)
GLUCOSE BLD STRIP.AUTO-MCNC: 123 MG/DL (ref 65–100)
GLUCOSE BLD STRIP.AUTO-MCNC: 124 MG/DL (ref 65–100)
GLUCOSE BLD STRIP.AUTO-MCNC: 130 MG/DL (ref 65–100)
GLUCOSE BLD STRIP.AUTO-MCNC: 137 MG/DL (ref 65–100)
GLUCOSE SERPL-MCNC: 121 MG/DL (ref 65–100)
GLUCOSE SERPL-MCNC: 123 MG/DL (ref 65–100)
GLUCOSE SERPL-MCNC: 125 MG/DL (ref 65–100)
GLUCOSE SERPL-MCNC: 127 MG/DL (ref 65–100)
GLUCOSE SERPL-MCNC: 137 MG/DL (ref 65–100)
HCT VFR BLD AUTO: 24.8 % (ref 35–47)
HCT VFR BLD AUTO: 27.4 % (ref 35–47)
HGB BLD-MCNC: 7 G/DL (ref 11.5–16)
HGB BLD-MCNC: 8 G/DL (ref 11.5–16)
IMM GRANULOCYTES # BLD AUTO: 0.1 K/UL (ref 0–0.04)
IMM GRANULOCYTES NFR BLD AUTO: 1 % (ref 0–0.5)
LACTATE SERPL-SCNC: 1 MMOL/L (ref 0.4–2)
LDH SERPL L TO P-CCNC: 292 U/L (ref 81–246)
LYMPHOCYTES # BLD: 1.2 K/UL (ref 0.8–3.5)
LYMPHOCYTES NFR BLD: 18 % (ref 12–49)
MCH RBC QN AUTO: 20.3 PG (ref 26–34)
MCH RBC QN AUTO: 21.6 PG (ref 26–34)
MCHC RBC AUTO-ENTMCNC: 28.2 G/DL (ref 30–36.5)
MCHC RBC AUTO-ENTMCNC: 29.2 G/DL (ref 30–36.5)
MCV RBC AUTO: 72.1 FL (ref 80–99)
MCV RBC AUTO: 73.9 FL (ref 80–99)
MONOCYTES # BLD: 0.9 K/UL (ref 0–1)
MONOCYTES NFR BLD: 13 % (ref 5–13)
NEUTS SEG # BLD: 4.4 K/UL (ref 1.8–8)
NEUTS SEG NFR BLD: 67 % (ref 32–75)
NRBC # BLD: 0.04 K/UL (ref 0–0.01)
NRBC # BLD: 0.04 K/UL (ref 0–0.01)
NRBC BLD-RTO: 0.5 PER 100 WBC
NRBC BLD-RTO: 0.6 PER 100 WBC
PLATELET # BLD AUTO: 225 K/UL (ref 150–400)
PLATELET # BLD AUTO: 238 K/UL (ref 150–400)
PLATELET COMMENTS,PCOM: ABNORMAL
PMV BLD AUTO: 10.3 FL (ref 8.9–12.9)
PMV BLD AUTO: 10.5 FL (ref 8.9–12.9)
POTASSIUM SERPL-SCNC: 3.6 MMOL/L (ref 3.5–5.1)
POTASSIUM SERPL-SCNC: 3.6 MMOL/L (ref 3.5–5.1)
POTASSIUM SERPL-SCNC: 3.7 MMOL/L (ref 3.5–5.1)
PROCALCITONIN SERPL-MCNC: 0.07 NG/ML
PROCALCITONIN SERPL-MCNC: <0.05 NG/ML
RBC # BLD AUTO: 3.44 M/UL (ref 3.8–5.2)
RBC # BLD AUTO: 3.71 M/UL (ref 3.8–5.2)
RBC MORPH BLD: ABNORMAL
SERVICE CMNT-IMP: ABNORMAL
SODIUM SERPL-SCNC: 153 MMOL/L (ref 136–145)
SODIUM SERPL-SCNC: 154 MMOL/L (ref 136–145)
SODIUM SERPL-SCNC: 155 MMOL/L (ref 136–145)
SODIUM SERPL-SCNC: 156 MMOL/L (ref 136–145)
SODIUM SERPL-SCNC: 157 MMOL/L (ref 136–145)
SPECIMEN EXP DATE BLD: NORMAL
STATUS OF UNIT,%ST: NORMAL
STATUS OF UNIT,%ST: NORMAL
UNIT DIVISION, %UDIV: 0
UNIT DIVISION, %UDIV: 0
WBC # BLD AUTO: 6.7 K/UL (ref 3.6–11)
WBC # BLD AUTO: 7.9 K/UL (ref 3.6–11)

## 2020-06-10 PROCEDURE — 77030035304 HC CATH ANGI BENCHMARK PENU -G

## 2020-06-10 PROCEDURE — 74011000258 HC RX REV CODE- 258: Performed by: NURSE PRACTITIONER

## 2020-06-10 PROCEDURE — 80048 BASIC METABOLIC PNL TOTAL CA: CPT

## 2020-06-10 PROCEDURE — C1769 GUIDE WIRE: HCPCS

## 2020-06-10 PROCEDURE — 36224 PLACE CATH CAROTD ART: CPT

## 2020-06-10 PROCEDURE — P9016 RBC LEUKOCYTES REDUCED: HCPCS

## 2020-06-10 PROCEDURE — 77030004565 HC CATH ANGI DX TMPO CARD -B

## 2020-06-10 PROCEDURE — 74011250637 HC RX REV CODE- 250/637: Performed by: NURSE PRACTITIONER

## 2020-06-10 PROCEDURE — 82728 ASSAY OF FERRITIN: CPT

## 2020-06-10 PROCEDURE — 83605 ASSAY OF LACTIC ACID: CPT

## 2020-06-10 PROCEDURE — C1894 INTRO/SHEATH, NON-LASER: HCPCS

## 2020-06-10 PROCEDURE — C2628 CATHETER, OCCLUSION: HCPCS

## 2020-06-10 PROCEDURE — 74011250636 HC RX REV CODE- 250/636: Performed by: NURSE PRACTITIONER

## 2020-06-10 PROCEDURE — 83615 LACTATE (LD) (LDH) ENZYME: CPT

## 2020-06-10 PROCEDURE — 74011636320 HC RX REV CODE- 636/320: Performed by: RADIOLOGY

## 2020-06-10 PROCEDURE — 77030020340 HC CATH ANGI NEURN PENU -F

## 2020-06-10 PROCEDURE — 36415 COLL VENOUS BLD VENIPUNCTURE: CPT

## 2020-06-10 PROCEDURE — 85027 COMPLETE CBC AUTOMATED: CPT

## 2020-06-10 PROCEDURE — 74011000250 HC RX REV CODE- 250: Performed by: NURSE PRACTITIONER

## 2020-06-10 PROCEDURE — 84145 PROCALCITONIN (PCT): CPT

## 2020-06-10 PROCEDURE — 74011000250 HC RX REV CODE- 250: Performed by: RADIOLOGY

## 2020-06-10 PROCEDURE — 93886 INTRACRANIAL COMPLETE STUDY: CPT

## 2020-06-10 PROCEDURE — 74011000250 HC RX REV CODE- 250

## 2020-06-10 PROCEDURE — 74011250637 HC RX REV CODE- 250/637: Performed by: PSYCHIATRY & NEUROLOGY

## 2020-06-10 PROCEDURE — 74011250636 HC RX REV CODE- 250/636: Performed by: ANESTHESIOLOGY

## 2020-06-10 PROCEDURE — 02HV33Z INSERTION OF INFUSION DEVICE INTO SUPERIOR VENA CAVA, PERCUTANEOUS APPROACH: ICD-10-PCS | Performed by: ANESTHESIOLOGY

## 2020-06-10 PROCEDURE — 76060000036 HC ANESTHESIA 2.5 TO 3 HR

## 2020-06-10 PROCEDURE — 71045 X-RAY EXAM CHEST 1 VIEW: CPT

## 2020-06-10 PROCEDURE — 74011250637 HC RX REV CODE- 250/637: Performed by: ANESTHESIOLOGY

## 2020-06-10 PROCEDURE — 65610000006 HC RM INTENSIVE CARE

## 2020-06-10 PROCEDURE — 94640 AIRWAY INHALATION TREATMENT: CPT

## 2020-06-10 PROCEDURE — 94003 VENT MGMT INPAT SUBQ DAY: CPT

## 2020-06-10 PROCEDURE — 36591 DRAW BLOOD OFF VENOUS DEVICE: CPT

## 2020-06-10 PROCEDURE — 85379 FIBRIN DEGRADATION QUANT: CPT

## 2020-06-10 PROCEDURE — C9254 INJECTION, LACOSAMIDE: HCPCS | Performed by: NURSE PRACTITIONER

## 2020-06-10 PROCEDURE — 86140 C-REACTIVE PROTEIN: CPT

## 2020-06-10 PROCEDURE — 86923 COMPATIBILITY TEST ELECTRIC: CPT

## 2020-06-10 PROCEDURE — 77030008584 HC TOOL GDWRE DEV TERU -A

## 2020-06-10 PROCEDURE — 36430 TRANSFUSION BLD/BLD COMPNT: CPT

## 2020-06-10 PROCEDURE — 70551 MRI BRAIN STEM W/O DYE: CPT

## 2020-06-10 PROCEDURE — C1751 CATH, INF, PER/CENT/MIDLINE: HCPCS

## 2020-06-10 PROCEDURE — 037G3ZZ DILATION OF INTRACRANIAL ARTERY, PERCUTANEOUS APPROACH: ICD-10-PCS | Performed by: RADIOLOGY

## 2020-06-10 PROCEDURE — 77030012468 HC VLV BLEEDBK CNTRL ABBT -B

## 2020-06-10 PROCEDURE — 77030021532 HC CATH ANGI DX IMPRS MRTM -B

## 2020-06-10 PROCEDURE — 85025 COMPLETE CBC W/AUTO DIFF WBC: CPT

## 2020-06-10 PROCEDURE — 86900 BLOOD TYPING SEROLOGIC ABO: CPT

## 2020-06-10 PROCEDURE — 77030008638 HC TU CONN COOK -A

## 2020-06-10 PROCEDURE — 82962 GLUCOSE BLOOD TEST: CPT

## 2020-06-10 RX ORDER — SODIUM CHLORIDE, SODIUM LACTATE, POTASSIUM CHLORIDE, CALCIUM CHLORIDE 600; 310; 30; 20 MG/100ML; MG/100ML; MG/100ML; MG/100ML
1000 INJECTION, SOLUTION INTRAVENOUS ONCE
Status: COMPLETED | OUTPATIENT
Start: 2020-06-10 | End: 2020-06-10

## 2020-06-10 RX ORDER — ROCURONIUM BROMIDE 10 MG/ML
INJECTION, SOLUTION INTRAVENOUS AS NEEDED
Status: DISCONTINUED | OUTPATIENT
Start: 2020-06-10 | End: 2020-06-10 | Stop reason: HOSPADM

## 2020-06-10 RX ORDER — SODIUM CHLORIDE 9 MG/ML
250 INJECTION, SOLUTION INTRAVENOUS AS NEEDED
Status: DISCONTINUED | OUTPATIENT
Start: 2020-06-10 | End: 2020-06-21

## 2020-06-10 RX ORDER — ENOXAPARIN SODIUM 100 MG/ML
40 INJECTION SUBCUTANEOUS EVERY 24 HOURS
Status: DISCONTINUED | OUTPATIENT
Start: 2020-06-10 | End: 2020-07-10 | Stop reason: HOSPADM

## 2020-06-10 RX ORDER — HEPARIN SODIUM 1000 [USP'U]/ML
INJECTION, SOLUTION INTRAVENOUS; SUBCUTANEOUS AS NEEDED
Status: DISCONTINUED | OUTPATIENT
Start: 2020-06-10 | End: 2020-06-10 | Stop reason: HOSPADM

## 2020-06-10 RX ORDER — LIDOCAINE HYDROCHLORIDE 20 MG/ML
20 INJECTION, SOLUTION INFILTRATION; PERINEURAL ONCE
Status: DISCONTINUED | OUTPATIENT
Start: 2020-06-10 | End: 2020-06-10 | Stop reason: HOSPADM

## 2020-06-10 RX ORDER — FAMOTIDINE 40 MG/5ML
20 POWDER, FOR SUSPENSION ORAL EVERY 12 HOURS
Status: DISCONTINUED | OUTPATIENT
Start: 2020-06-10 | End: 2020-06-18

## 2020-06-10 RX ORDER — LABETALOL HYDROCHLORIDE 5 MG/ML
20 INJECTION, SOLUTION INTRAVENOUS
Status: DISCONTINUED | OUTPATIENT
Start: 2020-06-10 | End: 2020-06-20

## 2020-06-10 RX ORDER — GUAIFENESIN 100 MG/5ML
81 LIQUID (ML) ORAL DAILY
Status: DISCONTINUED | OUTPATIENT
Start: 2020-06-10 | End: 2020-07-10 | Stop reason: HOSPADM

## 2020-06-10 RX ORDER — VERAPAMIL HYDROCHLORIDE 2.5 MG/ML
INJECTION, SOLUTION INTRAVENOUS
Status: COMPLETED
Start: 2020-06-10 | End: 2020-06-10

## 2020-06-10 RX ORDER — HYDRALAZINE HYDROCHLORIDE 20 MG/ML
INJECTION INTRAMUSCULAR; INTRAVENOUS
Status: DISPENSED
Start: 2020-06-10 | End: 2020-06-11

## 2020-06-10 RX ORDER — EPHEDRINE SULFATE/0.9% NACL/PF 50 MG/5 ML
SYRINGE (ML) INTRAVENOUS
Status: DISPENSED
Start: 2020-06-10 | End: 2020-06-11

## 2020-06-10 RX ORDER — 3% SODIUM CHLORIDE 3 G/100ML
10 INJECTION, SOLUTION INTRAVENOUS CONTINUOUS
Status: DISPENSED | OUTPATIENT
Start: 2020-06-10 | End: 2020-06-12

## 2020-06-10 RX ORDER — VERAPAMIL HYDROCHLORIDE 2.5 MG/ML
20 INJECTION, SOLUTION INTRAVENOUS
Status: DISCONTINUED | OUTPATIENT
Start: 2020-06-10 | End: 2020-06-10

## 2020-06-10 RX ORDER — PROPOFOL 10 MG/ML
INJECTION, EMULSION INTRAVENOUS AS NEEDED
Status: DISCONTINUED | OUTPATIENT
Start: 2020-06-10 | End: 2020-06-10 | Stop reason: HOSPADM

## 2020-06-10 RX ORDER — LACOSAMIDE 50 MG/1
100 TABLET ORAL EVERY 12 HOURS
Status: DISCONTINUED | OUTPATIENT
Start: 2020-06-10 | End: 2020-06-17

## 2020-06-10 RX ORDER — LIDOCAINE HYDROCHLORIDE 20 MG/ML
INJECTION, SOLUTION EPIDURAL; INFILTRATION; INTRACAUDAL; PERINEURAL
Status: COMPLETED
Start: 2020-06-10 | End: 2020-06-10

## 2020-06-10 RX ORDER — GLYCOPYRROLATE 0.2 MG/ML
0.2 INJECTION INTRAMUSCULAR; INTRAVENOUS
Status: DISCONTINUED | OUTPATIENT
Start: 2020-06-10 | End: 2020-07-10 | Stop reason: HOSPADM

## 2020-06-10 RX ADMIN — Medication 2 DROP: at 06:22

## 2020-06-10 RX ADMIN — LABETALOL HYDROCHLORIDE 20 MG: 5 INJECTION INTRAVENOUS at 00:16

## 2020-06-10 RX ADMIN — ENOXAPARIN SODIUM 40 MG: 40 INJECTION SUBCUTANEOUS at 17:20

## 2020-06-10 RX ADMIN — PROPOFOL 50 MG: 10 INJECTION, EMULSION INTRAVENOUS at 14:36

## 2020-06-10 RX ADMIN — NIMODIPINE 60 MG: 30 SOLUTION ORAL at 06:51

## 2020-06-10 RX ADMIN — LIDOCAINE HYDROCHLORIDE 5 ML: 20 INJECTION, SOLUTION INTRAVENOUS at 16:06

## 2020-06-10 RX ADMIN — SODIUM CHLORIDE 100 MG: 900 INJECTION, SOLUTION INTRAVENOUS at 06:43

## 2020-06-10 RX ADMIN — ACETAMINOPHEN 650 MG: 650 SUSPENSION ORAL at 17:31

## 2020-06-10 RX ADMIN — ROCURONIUM BROMIDE 50 MG: 10 INJECTION, SOLUTION INTRAVENOUS at 15:50

## 2020-06-10 RX ADMIN — PIPERACILLIN AND TAZOBACTAM 3.38 G: 3; .375 INJECTION, POWDER, LYOPHILIZED, FOR SOLUTION INTRAVENOUS at 20:16

## 2020-06-10 RX ADMIN — NIMODIPINE 60 MG: 30 SOLUTION ORAL at 21:52

## 2020-06-10 RX ADMIN — VERAPAMIL HYDROCHLORIDE 20 MG: 2.5 INJECTION, SOLUTION INTRAVENOUS at 15:48

## 2020-06-10 RX ADMIN — FAMOTIDINE 20 MG: 40 POWDER, FOR SUSPENSION ORAL at 20:17

## 2020-06-10 RX ADMIN — IOPAMIDOL 168 ML: 612 INJECTION, SOLUTION INTRAVENOUS at 16:06

## 2020-06-10 RX ADMIN — ASPIRIN 81 MG CHEWABLE TABLET 81 MG: 81 TABLET CHEWABLE at 11:10

## 2020-06-10 RX ADMIN — NIMODIPINE 60 MG: 30 SOLUTION ORAL at 13:57

## 2020-06-10 RX ADMIN — Medication 2 DROP: at 21:54

## 2020-06-10 RX ADMIN — LABETALOL HYDROCHLORIDE 20 MG: 5 INJECTION INTRAVENOUS at 22:29

## 2020-06-10 RX ADMIN — SODIUM CHLORIDE 30 ML/HR: 3 INJECTION, SOLUTION INTRAVENOUS at 12:47

## 2020-06-10 RX ADMIN — CHLORHEXIDINE GLUCONATE 15 ML: 0.12 RINSE ORAL at 20:17

## 2020-06-10 RX ADMIN — AMLODIPINE BESYLATE 5 MG: 5 TABLET ORAL at 08:53

## 2020-06-10 RX ADMIN — LACOSAMIDE 100 MG: 50 TABLET, FILM COATED ORAL at 20:16

## 2020-06-10 RX ADMIN — PHENYLEPHRINE HYDROCHLORIDE 60 MCG/MIN: 10 INJECTION INTRAVENOUS at 16:47

## 2020-06-10 RX ADMIN — ROCURONIUM BROMIDE 50 MG: 10 INJECTION, SOLUTION INTRAVENOUS at 13:48

## 2020-06-10 RX ADMIN — PROPOFOL INJECTABLE EMULSION 30 MCG/KG/MIN: 10 INJECTION, EMULSION INTRAVENOUS at 16:54

## 2020-06-10 RX ADMIN — NIMODIPINE 60 MG: 30 SOLUTION ORAL at 18:00

## 2020-06-10 RX ADMIN — SODIUM CHLORIDE 1000 MG: 900 INJECTION, SOLUTION INTRAVENOUS at 11:22

## 2020-06-10 RX ADMIN — Medication 2 DROP: at 17:51

## 2020-06-10 RX ADMIN — ROCURONIUM BROMIDE 50 MG: 10 INJECTION, SOLUTION INTRAVENOUS at 14:15

## 2020-06-10 RX ADMIN — LEVETIRACETAM 1000 MG: 100 SOLUTION ORAL at 20:16

## 2020-06-10 RX ADMIN — PROPOFOL INJECTABLE EMULSION 30 MCG/KG/MIN: 10 INJECTION, EMULSION INTRAVENOUS at 00:57

## 2020-06-10 RX ADMIN — IPRATROPIUM BROMIDE AND ALBUTEROL SULFATE 3 ML: .5; 3 SOLUTION RESPIRATORY (INHALATION) at 06:36

## 2020-06-10 RX ADMIN — GUAIFENESIN 100 MG: 100 SOLUTION ORAL at 08:49

## 2020-06-10 RX ADMIN — SODIUM CHLORIDE 1000 ML: 900 INJECTION, SOLUTION INTRAVENOUS at 21:50

## 2020-06-10 RX ADMIN — FAMOTIDINE 20 MG: 10 INJECTION, SOLUTION INTRAVENOUS at 08:47

## 2020-06-10 RX ADMIN — LABETALOL HYDROCHLORIDE 20 MG: 5 INJECTION INTRAVENOUS at 20:50

## 2020-06-10 RX ADMIN — PIPERACILLIN AND TAZOBACTAM 3.38 G: 3; .375 INJECTION, POWDER, LYOPHILIZED, FOR SOLUTION INTRAVENOUS at 11:28

## 2020-06-10 RX ADMIN — SODIUM CHLORIDE 5 MG/HR: 9 INJECTION, SOLUTION INTRAVENOUS at 22:12

## 2020-06-10 RX ADMIN — NIMODIPINE 60 MG: 30 SOLUTION ORAL at 04:27

## 2020-06-10 RX ADMIN — LABETALOL HYDROCHLORIDE 20 MG: 5 INJECTION INTRAVENOUS at 03:23

## 2020-06-10 RX ADMIN — NIMODIPINE 60 MG: 30 SOLUTION ORAL at 11:11

## 2020-06-10 RX ADMIN — SODIUM CHLORIDE 5 MG/HR: 9 INJECTION, SOLUTION INTRAVENOUS at 03:20

## 2020-06-10 RX ADMIN — ACETAMINOPHEN 650 MG: 650 SUSPENSION ORAL at 04:14

## 2020-06-10 RX ADMIN — PROPOFOL INJECTABLE EMULSION 30 MCG/KG/MIN: 10 INJECTION, EMULSION INTRAVENOUS at 11:05

## 2020-06-10 RX ADMIN — SODIUM CHLORIDE, SODIUM LACTATE, POTASSIUM CHLORIDE, AND CALCIUM CHLORIDE 1000 ML: 600; 310; 30; 20 INJECTION, SOLUTION INTRAVENOUS at 12:54

## 2020-06-10 RX ADMIN — HEPARIN SODIUM 3000 UNITS: 1000 INJECTION, SOLUTION INTRAVENOUS; SUBCUTANEOUS at 14:26

## 2020-06-10 RX ADMIN — FENTANYL CITRATE 100 MCG: 50 INJECTION INTRAMUSCULAR; INTRAVENOUS at 01:26

## 2020-06-10 RX ADMIN — VERAPAMIL HYDROCHLORIDE 20 MG: 2.5 INJECTION, SOLUTION INTRAVENOUS at 15:58

## 2020-06-10 RX ADMIN — HEPARIN SODIUM 2000 UNITS: 1000 INJECTION, SOLUTION INTRAVENOUS; SUBCUTANEOUS at 14:19

## 2020-06-10 RX ADMIN — CHLORHEXIDINE GLUCONATE 15 ML: 0.12 RINSE ORAL at 08:55

## 2020-06-10 RX ADMIN — IPRATROPIUM BROMIDE AND ALBUTEROL SULFATE 3 ML: .5; 3 SOLUTION RESPIRATORY (INHALATION) at 19:55

## 2020-06-10 RX ADMIN — POTASSIUM BICARBONATE 20 MEQ: 782 TABLET, EFFERVESCENT ORAL at 06:42

## 2020-06-10 RX ADMIN — PIPERACILLIN AND TAZOBACTAM 3.38 G: 3; .375 INJECTION, POWDER, LYOPHILIZED, FOR SOLUTION INTRAVENOUS at 04:00

## 2020-06-10 RX ADMIN — ACETAMINOPHEN 650 MG: 650 SUSPENSION ORAL at 08:53

## 2020-06-10 RX ADMIN — IPRATROPIUM BROMIDE AND ALBUTEROL SULFATE 3 ML: .5; 3 SOLUTION RESPIRATORY (INHALATION) at 13:16

## 2020-06-10 RX ADMIN — PROPOFOL INJECTABLE EMULSION 30 MCG/KG/MIN: 10 INJECTION, EMULSION INTRAVENOUS at 05:19

## 2020-06-10 RX ADMIN — GUAIFENESIN 100 MG: 100 SOLUTION ORAL at 04:14

## 2020-06-10 NOTE — PROGRESS NOTES
0730: Bedside shift change report given to Samanta Albright RN (oncoming nurse) by Dyan Crawford RN (offgoing nurse). Report included the following information SBAR, Kardex, ED Summary, Procedure Summary, Intake/Output, MAR, Accordion, Recent Results and Dual Neuro Assessment. 1100: TCDs at bedside  1330: Patient down to Angio for verapamil.

## 2020-06-10 NOTE — PROGRESS NOTES
Neurology Progress Note  Rosa Elena Mendez NP  Neurocritical Care Nurse Practitioner  [phone]    Admit Date: 6/4/2020   LOS: 6 days      Daily Progress Note: 6/10/2020    S/P: Cerebral angiogram with coil embolization of the left MCA aneurysm by Dr. Beryle Bob on 6/5/20. Tye Roland is a 48 y.o. 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 On arrival to ED, pt was unresponsive and noted to be seizing. A stat CT of her head was performed there, which showed extensive SAH. Pt was then intubated emergently. She was also extremely hypertensive with SBPs in 240s. She was given Keppra and Ativan, and placed on cardene drip for BP control. NSGY and NIS were then consulted and pt was transferred to [facility] for higher level of care. On arrival, CTA of her head and neck was obtained, which showed a 4.4 x 4.1 mm right MCA trifurcation aneurysm, a 2.2 x 3.1 mm left MCA trifurcation aneurysm, and possible < 2mm ACOM aneurysms. Repeat CT head of her head showed stable SAH, no evidence of hydrocephalus. COVID-19 infection testing was negative. History obtained from pt's mother, who reports pt does not smoke, does not drink alcohol or use street drugs. Mother reports pt began complaining of a headache on 5/31/20, has been taking BC Powder (aspirin) for the headache. She also reports that the pt has a hx of HTN and is supposed to be on BP medications but recently stopped taking her medications. Patient was taken for cerebral angiogram and coil embolization of the left MCA aneurysm by Dr. Beryle Bob 6/5/20. The right MCA aneurysm had a very wide neck incorporating both M2s and not be treated endovascularly without stents. May need to be clipped by Neurosurgery.     Subjective:     Patient with no acute neuro events overnight. A-line placed by intensivist for BP management.      No Known Allergies    Review of Systems:  Review of systems not obtained due to patient factors. Objective:   Vital signs  Temp (24hrs), Av.3 °F (37.9 °C), Min:97.7 °F (36.5 °C), Max:103.2 °F (39.6 °C)   1901 - 06/10 0700  In: 821.6 [I.V.:741.6]  Out: 8815 [Urine:1575; Drains:1000]  701 - 1900  In: 6321.2 [I.V.:4116.2]  Out:  [Urine:4825; Drains:150]  Visit Vitals  /89   Pulse 80   Temp 99.1 °F (37.3 °C)   Resp 18   Ht 5' 4\" (1.626 m)   Wt 239 lb 6.7 oz (108.6 kg)   SpO2 100%   BMI 41.10 kg/m²      O2 Device: Endotracheal tube, Ventilator   Vitals:    20 2300 06/10/20 0000 06/10/20 0016 06/10/20 0029   BP: 151/69 159/74 189/89    Pulse: 77 77  80   Resp: 18 17  18   Temp: (!) 101.2 °F (38.4 °C) 99.1 °F (37.3 °C)     SpO2: 99% 100%  100%   Weight:       Height:          Physical Exam:  GENERAL: Sedated with propofol, intubated and ventilated.   SKIN: Warm, dry, color appropriate for ethnicity. Right groin site soft, with no odor, bleeding or drainage noted. Mild ecchymosis present.   NEURO: Intubated with propofol 30mcg/kg/min running. Unresponsive to deep painful stimuli. Left pupil 5 mm and fixed, right pupil 2 mm sluggishly reactive. Blink to threat. Disconjugate gaze present. Face symmetric.  Weak withdraw to painful stimuli in all 4 ext. +cough +gag.      Labs:  Lab Results   Component Value Date/Time    WBC 8.7 2020 04:02 AM    HGB 7.4 (L) 2020 04:02 AM    HCT 25.4 (L) 2020 04:02 AM    PLATELET 503  04:02 AM    MCV 70.8 (L) 2020 04:02 AM     Lab Results   Component Value Date/Time    Sodium 156 (H) 2020 10:19 PM    Potassium 3.6 2020 10:19 PM    Chloride 126 (H) 2020 10:19 PM    CO2 23 2020 10:19 PM    Anion gap 7 2020 10:19 PM    Glucose 115 (H) 2020 10:19 PM    BUN 19 2020 10:19 PM    Creatinine 0.83 2020 10:19 PM    BUN/Creatinine ratio 23 (H) 2020 10:19 PM    GFR est AA >60 2020 10:19 PM    GFR est non-AA >60 06/09/2020 10:19 PM    Calcium 7.6 (L) 06/09/2020 10:19 PM     Imaging:  MRI Results (most recent):  Results from Hospital Encounter encounter on 06/04/20   MRI BRAIN WO CONT    Narrative EXAM: MRI BRAIN WO CONT  Clinical history: Hypoxic ischemic encephalopathy. INDICATION: assess for underlying ischemia, concern for hypoxic ischemic  encephalopathy, focal neuro changes    COMPARISON: CTA 6/6/2020, CTA 6/4/2020. CONTRAST: None. TECHNIQUE:    Multiplanar multisequence acquisition without contrast of the brain. FINDINGS:  Diffuse subarachnoid and hemorrhage. Intraventricular hemorrhage as well. There  are small scattered foci of acute infarction in the corona radiata and centrum  semiovale. There are diffuse areas of left temporal, left frontal and left  parietal cortical diffusion restriction with right insular cortex right temporal  and posterior right frontal/parietal cortical diffusion restriction abnormality  as well. Small acute foci of acute infarction in the right cerebellum, left  vermis. Mild cortical ischemia in the right and left PANCHITO territories. Minimal  ischemia cortically based left occipital lobe. The orbital contents are within  normal limits. No significant osseous or scalp lesions are identified. Impression IMPRESSION:   Imaging findings consistent with large left MCA territory ischemia/infarction,  left temporal, parietal and frontal lobes, cortically based, no superimposed  increased parenchymal hemorrhage or midline shift. Right and left PANCHITO territory  cortical diffusion restriction consistent with infarction. Infarction in the right insular cortex and right temporal lobe. Scattered small  infarctions right and left corona radiata centrum semiovale, right cerebellum. Allowing for differences in technique likely stable subarachnoid and  intraventricular hemorrhage. The findings were called to Dr. Clement Pereyra on 6/6/2020 at 2:10 PM by Dr. Perfecto Leahy.   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    - s/p cerebral angiogram with coil embolization of left MCA bifurcation aneurysm on 6/5 by Dr. Emily Hernández   - Day 6/21 of [facility] to prevent delayed cerebral ischemia              - Strict I&O, maintain euvolemnia              - ECHO with EF of 60-65%. No regional wall motion abnormality or atrial septal  defect. Mildly  dilated left atrium.             - q 1 neuro checks   - Cont daily TCD's per NIS              - PT/OT/SLP evals when appropriate   - NSGY also following, unsecured right MCA aneurysm may need clipping     2.) Multiple scattered cerebral infarcts    - As seen on MRI brain, repeat MRI this morning pending              - Aspirin 300 HI for stroke prevention   - Lipid panel showing LDL 7.8, at goal < 70, statin not indicated   - Hgb A1C ok at 5.7   - SBP goals per NIS     3.) Seizure              - Due to #1, no prior hx of seizure              - Continue Keppra 1000 mg BID, Vimpat 100 BID              - Seizure precautions              - Ativan PRN for seizures greater than 2 minutes.               - Propofol while intubated   - EEG 6/8/20 showed moderate to severe generalized encephalopathic process, nonspecific in type. This may be related to underlying structural brain injury or effects of sedation. No convincingly lateralizing or epileptiform features were noted. No seizure was recorded. 4.) Cerebral edema   - Due to #1, #2   - 3% NA gtt running at 40cc/hour with goal sodium of 155-160              - HOB 30 degrese    Plan discussed with Dr. Azeb Bonilla, further recommendations to follow.      Adrienne Domínguez, NP  Neurocritical Care Nurse Practitioner

## 2020-06-10 NOTE — OP NOTES
NEUROINTERVENTIONAL SURGERY POST-PROCEDURE NOTE    PROCEDURE:  Left ICA/MCA angioplasty  Intra-arterial infusion of non-lytic agent, CNS:  LICA and ALFONSO  Cerebral angiogram  US guided access  Angioseal    VESSEL(S) STUDIED:  1. LCCA  2. LICA  3. RCCA  4. ALFONSO  5. LVA VESSEL(S) TREATED:  1. Left distal ICA proximal M1 angioplasty  2. LICA verapamil (20 mg)  3. ALFONSO verapamil (20 mg)      PRELIMINARY REPORT & DISPOSITION:     Severe left PANCHITO A1 spasm. Attempted angioplasty but balloons would not advance into the stenosis because of unfavorable geometry. Angioplasty of moderate LICA spasm into the left M1 to maximize collateral flow. 20 mg verapamil resulted in some moderate improvement in PANCHITO flow. Right A1 spasm markedly improved after plasty yesterday. Moderate proximal A2 spasm responded to IA verapamil. No significant posterior circulation spasm. Stable coiled left MCA bifurcation aneurysm. Stable unsecure right MCA aneurysm. COMPLICATIONS:  None immediate    FOLLOW-UP:  High risk for deterioration due to vasospasm. MAP goal 100-110, SBP <200 DATE OF SERVICE:  6/10/2020 4:14 PM     ATTENDING SURGEON(S):  MD Valeria Patel MD    ANESTHESIA:   General    MEDICATIONS:   See nursing record  40 mg IA verapamil  5000U heparin IV    PUNCTURE SITE:  Right common femoral artery. Arteriotomy closed with 6F Angioseal.  Right leg straight x 2 hours.   HOB 30 degrees please

## 2020-06-10 NOTE — PROCEDURES
[facility]      Procedure Note - Arterial Access:   Performed by Ceci Lugo MD .    Immediately prior to the procedure, the patient was reevaluated and found suitable for the planned procedure and any planned medications. Immediately prior to the procedure a time out was called to verify the correct patient, procedure, equipment, staff, and marking as appropriate. Insertion Date: 6/9/2020 Time:2200   Procedure Location:  ICU. Condition: elective  Consent:  YES. Method: Seldinger technique. Site Prep: ChloraPrep and Sterile draping. Procedure: Arterial Catheter Insertion in Left, Radial Artery   Catheter inserted into a new site. Number of Attempts:  1 Indication: Monitoring. Complication None. Performed By:performed the above procedure myself. The procedure was tolerated well.

## 2020-06-10 NOTE — PROGRESS NOTES
Pt arrives via hospital bed from ICU with staff to angio department received by anesthesia for management of ventilator and IV medications. Patient is scheduled today  for Diagnostic cerebral angiogram with possible IA verapamil infusion and possible balloon angioplasty, groin closure device procedure by Dr. José Kidd. All assessments completed and consent reviewed as time out completed with all staff present.

## 2020-06-10 NOTE — ADVANCED PRACTICE NURSE
Patient seen and examined this morning. Per Dr. Marcial Pena, MRI shows heavy amount of blood on left CN3 likely causing fixed pupil on the right. No structural signs of increased ICP. She is anemic this morning of hgb of 7.0 --> this is not an acute drop, she has been down trending throughout hospitalization. She will receive PRBCs today per intensivist.     Exam is unchanged, patient remains intubated and sedated. 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 Withdrawal in all four to noxious stimuli. Sodium at goal this morning (156), goal currently 155-160, chloride 127. ABG this morning shows pH 7.48, pCO2 26.6, Po2 69, HCO3 19.8 indicated again partially compensated primary respiratory alkalosis. D Dimer and CRP elevated 10/8.29 however CRP much lower than on the 7th (48). Plan:     - wean 3% to off, 10 cc per day, will lower to 30 cc/hr today , continue checking BMP with sodium goal of 150-155  - Lovenox today if able   - TCDs and to angio if needed for IA Verapamil for vasospasm  - continue with MAP goal 100-110 , -200  - wean sedation as able     Full note to follow     USAMA Tinoco  NIS

## 2020-06-10 NOTE — ROUTINE PROCESS
TRANSFER - OUT REPORT: 
 
Bedside Verbal report given to PHILIP MANZO RN(name) on Karmen Livers  being transferred to ICU 1(unit) for routine progression of care Report consisted of patients Situation, Background, Assessment and  
Recommendations(SBAR). Information from the following report(s) SBAR, Procedure Summary and Dual Neuro Assessment was reviewed with the receiving nurse as well as groin puncture site assessment right and peripheral pulses pedal 2+, next assessment due @ 16:45. Lines:  
Triple Lumen 06/06/20 Left;Proximal Femoral (Active) Central Line Being Utilized Yes 6/10/2020 12:00 PM  
Criteria for Appropriate Use Limited/no vessel suitable for conventional peripheral access 6/10/2020 12:00 PM  
Site Assessment Clean, dry, & intact 6/10/2020 12:00 PM  
Infiltration Assessment 0 6/10/2020 12:00 PM  
Affected Extremity/Extremities Color distal to insertion site pink (or appropriate for race) 6/10/2020 12:00 PM  
Date of Last Dressing Change 06/06/20 6/10/2020 12:00 PM  
Dressing Status Clean, dry, & intact 6/10/2020 12:00 PM  
Dressing Type Disk with Chlorhexadine gluconate (CHG); Transparent;Tape 6/10/2020 12:00 PM  
Action Taken Open ports on tubing capped 6/10/2020 12:00 PM  
Proximal Hub Color/Line Status White; Infusing 6/10/2020 12:00 PM  
Positive Blood Return (Medial Site) Yes 6/10/2020 12:00 PM  
Medial Hub Color/Line Status Blue; Infusing 6/10/2020 12:00 PM  
Positive Blood Return (Lateral Site) Yes 6/10/2020 12:00 PM  
Distal Hub Color/Line Status Brown; Infusing 6/10/2020 12:00 PM  
Positive Blood Return (Site #3) Yes 6/10/2020 12:00 PM  
Alcohol Cap Used Yes 6/10/2020 12:00 PM  
   
Quad Lumen Quad IJ 06/10/20 Anterior; Left Neck (Active) Arterial Line 06/09/20 Left Radial artery (Active) Site Assessment Clean, dry, & intact 6/10/2020 12:00 PM  
Dressing Status Clean, dry, & intact 6/10/2020 12:00 PM  
 Dressing Type Disk with Chlorhexadine gluconate (CHG); Transparent;Tape 6/10/2020 12:00 PM  
Line Status Intact and in place 6/10/2020 12:00 PM  
Treatment Arm board on 6/10/2020 12:00 PM  
Affected Extremity/Extremities Color distal to insertion site pink (or appropriate for race); Pulses palpable;Range of motion performed 6/10/2020 12:00 PM  
  
 
Opportunity for questions and clarification was provided. Patient transported with: 
 Monitor O2 @ 60% Fi 02 per RT via ETT vented liters Registered Nurse and MILAGRO Pugh

## 2020-06-10 NOTE — ANESTHESIA PREPROCEDURE EVALUATION
Relevant Problems   No relevant active problems       Anesthetic History   No history of anesthetic complications            Review of Systems / Medical History  Patient summary reviewed, nursing notes reviewed and pertinent labs reviewed    Pulmonary  Within defined limits                 Neuro/Psych   Within defined limits    CVA      Comments: SAH secondary ruptured MCA aneurysm  Vasospasm Cardiovascular  Within defined limits  Hypertension                   GI/Hepatic/Renal  Within defined limits              Endo/Other  Within defined limits           Other Findings              Physical Exam    Airway  Mallampati: II  TM Distance: > 6 cm  Neck ROM: normal range of motion   Mouth opening: Normal  Intubated   Cardiovascular  Regular rate and rhythm,  S1 and S2 normal,  no murmur, click, rub, or gallop             Dental  No notable dental hx       Pulmonary  Breath sounds clear to auscultation               Abdominal  GI exam deferred       Other Findings            Anesthetic Plan    ASA: 4, emergent  Anesthesia type: general    Monitoring Plan: Arterial line      Induction: Intravenous    Per radiologist

## 2020-06-10 NOTE — ANESTHESIA POSTPROCEDURE EVALUATION
Post-Anesthesia Evaluation and Assessment    Patient: Isaiah Brown MRN: [id number]  SSN: [id number]    YOB: 1970  Age: 48 y.o. Sex: female      I have evaluated the patient and they are stable and ready for discharge from the PACU. Cardiovascular Function/Vital Signs  Visit Vitals  BP (!) 213/83   Pulse 78   Temp (!) 39.2 °C (102.5 °F)   Resp 20   Ht 5' 4\" (1.626 m)   Wt 109.1 kg (240 lb 8.4 oz)   SpO2 99%   BMI 41.29 kg/m²       Patient is status post * No anesthesia type entered * anesthesia for * No procedures listed *. Nausea/Vomiting: None    Postoperative hydration reviewed and adequate. Pain:  Pain Scale 1: Adult Nonverbal Pain Scale (06/10/20 1645)  Pain Intensity 1: 0 (06/10/20 1645)   Managed    Neurological Status:   Neuro  Neurologic State: Eyes open to pain(right eye only) (06/10/20 1645)  Orientation Level: Unable to verbalize (06/10/20 1645)  Cognition: No command following (06/10/20 1645)  Speech: Intubated (06/10/20 1645)  Assessment L Pupil: Fixed (06/10/20 1645)  Size L Pupil (mm): 5 (06/10/20 1645)  Assessment R Pupil: Brisk (06/10/20 1645)  Size R Pupil (mm): 3 (06/10/20 1645)  LUE Motor Response: No movement to any stimulus (06/10/20 1645)  LLE Motor Response: No movement to any stimulus (06/10/20 1645)  RUE Motor Response: No movement to any stimulus (06/10/20 1645)  RLE Motor Response: No movement to any stimulus (06/10/20 1645)   At baseline    Mental Status, Level of Consciousness: Alert and  oriented to person, place, and time    Pulmonary Status:   O2 Device: Endotracheal tube (06/10/20 1645)   Adequate oxygenation and airway patent    Complications related to anesthesia: None    Post-anesthesia assessment completed.  No concerns    Signed By: Sunni Noble MD     Jessa 10, 2020              * No procedures listed *.    general    <BSHSIANPOST>    INITIAL Post-op Vital signs:   Vitals Value Taken Time   BP     Temp     Pulse 78 6/10/2020  5:34 PM   Resp 23 6/10/2020  5:34 PM   SpO2 100 % 6/10/2020  5:34 PM   Vitals shown include unvalidated device data.

## 2020-06-10 NOTE — PROGRESS NOTES
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 At the [facility] a CT was done and she was found to have a [facility]  and transferred to [facility] for further neuro evaluation after being intubated by sending facility. Repeat CTH/CTA here showed an extensive subarachnoid hemorrhage concerning for leaking/ruptured aneurysm without hydrocephalus. 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 Both neurosurgery and neuro IR consulted and she was taken to IR for coiling of a Left MCA aneurysm this am by Dr. Olaf Cabrera. Plan for Rt aneurysmal clipping at a later time. Remains at risk for hydrocephalus, continue to re-eval for hydrocephalus. Remains intubated at this time. Overnight Events:  2020  Overnight patient with pupillary changes with left pupil 5mm fixed and dilated. Code S called on patient and patient went to [facility] with evidence of increased cerebral edema and mild to moderate L MCA vasospasm post aneurysm coil embolization. The aneurysm was stable to the right MCA. Dr. Nisha Payton was consulted and a dose of mannitol was given. She was restarted on Propofol infusion. This am, Neurosurgery was called to re-eval for EVD placement for ICP monitoring. A 3 % saline gtt is being started with Q2hr BMP. Patient is +4 anasarca and required arterial line replacement due to dislodgement. Unable to place PICC today 2/2 Fluid overload and peripheral edema. 2020  Remains on 3%, increased secretions  Severe right A1 vasospasm resolved after angioplasty and ALFONSO verapamil infusion.     6/10/2020  MRI showing clot at midbrain/3rd nerve junction  1 PRBC ordred    POD:* No surgery found *    S/P: Day 2 for L MCA aneurysm coiling    Objective:   Vital Signs:  Visit Vitals  BP (!) 203/86   Pulse 77   Temp (!) 101.4 °F (38.6 °C)   Resp 23   Ht 5' 4\" (1.626 m)   Wt 108.6 kg (239 lb 6.7 oz)   SpO2 100%   BMI 41.10 kg/m²      O2 Device: Endotracheal tube Temp (24hrs), Av.8 °F (38.2 °C), Min:97.7 °F (36.5 °C), Max:103.2 °F (39.6 °C)         Intake/Output:     Intake/Output Summary (Last 24 hours) at 6/10/2020 0929  Last data filed at 6/10/2020 0800  Gross per 24 hour   Intake 3318.49 ml   Output 5600 ml   Net -2281.51 ml       Physical Exam:    General:  Sedated and on the ventilator. No acute distress. Eyes:  Sclera anicteric, + scleral edema, Pupils equal, round, reactive to light. Eyes midline, Right eye 2 mm sluggish, left eye 5 mm irreg. NR   Mouth/Throat: Orotracheal tube in place., OGT in place   Neck: Supple. Lungs:   Bronchial/ coarse to auscultation bilaterally,  good effort. Vent supported with equal chest rise/ fall, lower oxygenation support requirements   Cardiovascular:  Regular rate and rhythm, no murmur, click, rub, or gallop. Abdomen:   Soft, obese, non-tender, bowel sounds normal, non-distended. Extremities: No cyanosis, +3-4 generalized edema. Skin: No acute rash or lesions. Lymph Nodes: Cervical and supraclavicular normal.   Musculoskeletal:  +3-4 swelling no deformity. WD x 3 ext, no WD to RUE   Lines/Devices:  Intact, no erythema, drainage, or tenderness.    Psychiatric: Sedated and appears comfortable on ventilator, moves head spontaneously     T/L/D  Tubes: ETT and Orogastric Tube  Lines: Peripheral IV arterial line , Femoral CVL   Drains: Martinez Catheter    LABS AND  DATA: Personally reviewed  Recent Labs     06/10/20  0334 20  0402   WBC 6.7 8.7   HGB 7.0* 7.4*   HCT 24.8* 25.4*    231     Recent Labs     06/10/20  0831 06/10/20  0334  20  0648   * 157*   < > 148*   K 3.7 3.7   < > 3.4*   CL 127* 130*   < > 115*   CO2 22 21   < > 23   BUN 20 22*   < > 23*   CREA 0.69 0.81   < > 0.86   * 123*   < > 112*   CA 7.6* 7.7*   < > 7.9*   MG  --   --   --  2.1    < > = values in this interval not displayed. Recent Labs     06/08/20  1049   AP 71   TP 6.2*   ALB 2.4*   GLOB 3.8     Recent Labs     06/08/20  0044   INR 1.1   PTP 11.0      Recent Labs     06/09/20  0724 06/09/20  0014   PHI 7.481* 7.511*   PCO2I 26.6* 28.7*   PO2I 69* 76*   FIO2I 35 35     No results for input(s): CPK, CKMB, TROIQ, BNPP in the last 72 hours. Ventilator Settings:  Mode Rate Tidal Volume Pressure FiO2 PEEP   Assist control   450 ml    35 % 8 cm H20     Peak airway pressure: 27 cm H2O    Minute ventilation: 8.05 l/min        MEDS: Reviewed    Chest X-Ray:  CXR Results  (Last 48 hours)    None        06/08/20 CTH/ CTA Brain:  IMPRESSION  Impression:     Mild to moderate vasospasm in the left middle cerebral artery status post  bifurcation aneurysm coil embolization. Stable 4 mm right MCA aneurysm.     Endotracheal tube terminates just above the matt.     1 cm low-density left thyroid nodule.     Mildly enlarged left axillary lymph node measuring 11 mm in short axis.     Patchy bilateral airspace disease.     Nonspecific asymmetric enhancement of the right superior ophthalmic vein        06/06/20 MRI:  IMPRESSION:   Imaging findings consistent with large left MCA territory ischemia/infarction,  left temporal, parietal and frontal lobes, cortically based, no superimposed  increased parenchymal hemorrhage or midline shift. Right and left PANCHITO territory  cortical diffusion restriction consistent with infarction. Infarction in the right insular cortex and right temporal lobe.  Scattered small  infarctions right and left corona radiata centrum semiovale, right cerebellum.     Allowing for differences in technique likely stable subarachnoid and  intraventricular hemorrhage.     06/06/20 ECHO:  Result status: Final result · Normal cavity size and systolic function (ejection fraction normal). Mild concentric hypertrophy. Estimated left ventricular ejection fraction is 60 - 65%. No regional wall motion abnormality noted. · Mildly dilated left atrium. ABCDEF Bundle/Checklist Completed:  YES-See Plan    SPECIAL EQUIPMENT  None    Active Problem List:     Problem List  Date Reviewed: 6/4/2020          Codes Class    SAH (subarachnoid hemorrhage) ([facility] [address] ICD-10-CM: I60.9  ICD-9-CM: 430         Subarachnoid hemorrhage from middle cerebral artery aneurysm, left (HCC) ICD-10-CM: I60.12  ICD-9-CM: 200         RIGHT middle cerebral artery aneurysm ICD-10-CM: I67.1  ICD-9-CM: 437.3         Cerebral vasospasm ICD-10-CM: I67.848  ICD-9-CM: 435.9         Anterior communicating artery aneurysm ICD-10-CM: I67.1  ICD-9-CM: 437.3             ICU Assessment/ Comprehensive Plan of Care:       Subarrachnoid hemorrhage [facility])   -Secondary to ruptured aneurym  Right MCA trifurcation aneurysm   -Unsecured  Left MCA trifurcation aneurysm    -S/p coiling  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  ELKIN/Cardene prn.   --120 mmHg  -Nimotipine 60 mg via OGT Q4hr  -Neuro IR following, Dr. Emily Hernández  -Neurosurgery following - Dr. Norma Henderson, Rt MCA will need clipping at later time  -Neurology following, Dr. Azeb Bonilla  -ASA 81mg  -TCDs   -Q1hr Neuro checks  -Keppra 1000 mg BID, Vimpat 100 BID  -Propofol for sedation  -Will Add Lovenox if H/H stable after transfusion    Acute severe hypoxic respiratory failure, improving  CAP  -Lactate & Procal Now - normal  -Continue Zosyn (end date 6/13/2020)  -Provide conservative fluid management to prevent vasospasms  -COVID NEG x2  -CT Chest NEG PE 06/07  Chlorhexidine   Optimize PEEP/Ventilation/Oxygenation  Goal Tidal Volume 6 cc/kg based on IBW  Aim for lung protective ventilation  Head of bed > 30 degrees  Plan to Extubate: attempt to provide weaning trials in am  SPO2 Goal: > 92%    DVT Prophylaxis (if no, list reason): SCD's or Sequential Compression Device   GI Prophylaxis: Pepcid (famotidine)   Nutrition: Yes TFs  Bowel Movement: Yes  Bowel Regimen: Docusate (Colace) and Bisacodyl (Dulcolax)  DVT Prophylaxis (if no, list reason): SCD's or Sequential Compression Device , ASA  Mobility: Fair and Bedrest  PT/OT: Not appropraite at this time, re-eval at extubation     DISPOSITION/COMMUNICATION  Discussed Plan of Care/Code Status: Full Code  Stay in ICU    CRITICAL CARE CONSULTANT NOTE  I had a face to face encounter with the patient, reviewed and interpreted patient data including clinical events, labs, images, vital signs, I/O's, and examined patient. I have discussed the case and the plan and management of the patient's care with the consulting services, the bedside nurses and the respiratory therapist.      NOTE OF PERSONAL INVOLVEMENT IN CARE   I participated in the decision-making and personally managed or directed the management of the following life and organ supporting interventions that required my frequent assessment to treat or prevent imminent deterioration. I personally spent 75 minutes of critical care time. This is time spent at this critically ill patient's bedside actively involved in patient care as well as the coordination of care and discussions with the patient's family. This does not include any procedural time which has been billed separately.     Colin Garcia, [address] [facility] Critical Care  6/10/2020

## 2020-06-10 NOTE — PROCEDURES
[facility]      Procedure Note - Central Venous Access:   Performed by Aarti Mercado DO    Obtained informed Consent. Immediately prior to the procedure, the patient was reevaluated and found suitable for the planned procedure and any planned medications. Immediately prior to the procedure a time out was called to verify the correct patient, procedure, equipment, staff, and marking as appropriate. The site was prepped with ChloraPrep. Using Seldinger technique a Quad Lumen was placed in the Left, Internal Jugular Vein via direct cannulation with 1 number of attempts for Monitoring, Blood Drawing and IV Access. Ultrasound Guidance was utilized. There was good blood return. The following complications were encountered: None. A follow-up chest x-ray was ordered post procedure. The procedure was tolerated well.

## 2020-06-10 NOTE — ANESTHESIA POSTPROCEDURE EVALUATION
Post-Anesthesia Evaluation and Assessment    Patient: Naida Spence MRN: [id number]  SSN: [id number]    YOB: 1970  Age: 48 y.o. Sex: female       Cardiovascular Function/Vital Signs  Visit Vitals  /87   Pulse 82   Temp (!) 2.9 °C (37.3 °F)   Resp 16   Ht 5' 4\" (1.626 m)   Wt 109.1 kg (240 lb 8.4 oz)   SpO2 98%   BMI 41.29 kg/m²       Patient is status post * No anesthesia type entered * anesthesia for * No procedures listed *. Nausea/Vomiting: None    Postoperative hydration reviewed and adequate. Pain:  Pain Scale 1: Adult Nonverbal Pain Scale (06/10/20 1357)  Pain Intensity 1: 0 (06/10/20 1357)   Managed    Neurological Status:   Neuro  Neurologic State: Anesthetized (06/10/20 1630)  Orientation Level: Unable to verbalize (06/10/20 1630)  Cognition: Unable to assess (comment) (06/10/20 1630)  Speech: Intubated (06/10/20 1630)  Assessment L Pupil: Round (06/10/20 1630)  Size L Pupil (mm): 5 (06/10/20 1630)  Assessment R Pupil: Round (06/10/20 1630)  Size R Pupil (mm): 3 (06/10/20 1630)  LUE Motor Response: No movement to any stimulus (06/10/20 1630)  LLE Motor Response: No movement to any stimulus (06/10/20 1630)  RUE Motor Response: No movement to any stimulus (06/10/20 1630)  RLE Motor Response: No movement to any stimulus (06/10/20 1630)   At baseline    Mental Status and Level of Consciousness: Alert and oriented to person, place, and time    Pulmonary Status:   O2 Device: Endotracheal tube (06/10/20 1630)   Adequate oxygenation and airway patent    Complications related to anesthesia: None    Post-anesthesia assessment completed. No concerns    Signed By: Edgardo Moran MD     Jessa 10, 2020              * No procedures listed *.    general    <BSHSIANPOST>    INITIAL Post-op Vital signs:   Vitals Value Taken Time   BP     Temp     Pulse 82 6/10/2020  4:58 PM   Resp 19 6/10/2020  4:58 PM   SpO2 98 % 6/10/2020  4:58 PM   Vitals shown include unvalidated device data.

## 2020-06-10 NOTE — PROGRESS NOTES
1930: Bedside and Verbal shift change report given to [address] (oncoming nurse) by Mohamud Castle (offgoing nurse). Report included the following information SBAR, Kardex, Procedure Summary, Intake/Output, MAR, Cardiac Rhythm NSR, Alarm Parameters  and Dual Neuro Assessment. 2200: Dr. Shahzad Dawkins at bedside placing a-line. 2211: Fabien gtt restarted. Received orders from Dr. Shahzad Dawkins to titrate 5-15 mcg every 2-5 minutes. 0134: Transported pt to [facility] with RN, RT, PCT, on monitor    0315: Returned to ICU.    0630: Received orders for potassium replacement and 1 unit PRBCs. Type and screen sent. Shift summary: Titrated Fabien gtt and Cardene gtt to keep SBP<200 and -120. PRN Labetalol administered 3x and Fentanyl administered 1x. Pt remained sedated on Propofol; opened L eye spontaneously to painful stimulus. L pupil 5 and fixed, R pupil 3 and reactive. Intermittently withdraws in all four extremities.

## 2020-06-10 NOTE — PROGRESS NOTES
Neurointerventional Surgery Progress Note  Karmen Anderson NP   Neurocritical Care Nurse Practitioner  [phone]          Admit Date: 6/4/2020        Daily Progress Note: 6/10/2020   LOS: 6 days      S/P:  POD 5 Cerebral angiogram with coil embolization of ruptured left MCA bifurcation aneurysm on 6/5/2020    ** Delayed Presentation ** PB Day 10    Interval History/Subjective:     No acute events overnight. Remains intubated and sedated. Opening right eye spontaneously and moving head around. Left pupil remains 5 mm and fixed. Right pupil 2 mm reactive. Withdrawal in four to noxious stimuli. She has continued to spike fevers overnight, as high as 103.2. Left femoral line removed this AM by intensivist and Quad lumen placed in left IJ. She is currently on Zosyn. No BC pending? Per Dr. Brian Broderick, MRI shows heavy amount of blood on left CN3 likely causing fixed pupil on the right. No structural signs of increased ICP. She is anemic this morning of hgb of 7.0 --> this is not an acute drop, she has been down trending throughout hospitalization. TCDs today show increasing velocities in the left PANCHITO . She will go to angio for IA verapamil and possible angioplasty. Informed consent obtained from her mother. She remains net negative for I/O in lat 24 hours (-1,179). Will give 1L LR bolus slowly as to avoid any pulmonary edema. Will obtain NICOM     Plan:   -wean 3% to 30 ml/hr , recheck BMP q4 hours, Sodium goal 150-155   - continue Keppra and Vimpat for seizure prophylaxis   - Continue with MAP goal today 100-110  - angio for likely IA verapamil infusion     Unable to perform a ROS due to intubation and sedation. 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 Clipping              - Continue Nimotop Day 6 of 21 to prevent delayed cerebral ischemia              - Maintenance IV Fluids on hold due to worsening edema on CXR on 6/6 per Intensivist ,               will consider restarting after IVC US and NICOM              - TCDs daily              - Strict I&O's              - ECHO shows EF 60-65%, mild concentric hypertrophy, mildly dilated left atrium              - continue every hour neuro checks              - Keep SBP goal 160-200, MAP goal 100-110 Cardene/Fabien/Hydralazine/Labetalol PRN    2.) Seizure              - Due to #1, no prior hx of seizure              - Continue Keppra 1000 mg BID for seizure PPX              - continue Vimpat 100 mg BID              - Seizure precautions              -  EEG on 6/6 showed occasional epileptiform disturbance seen in the left frontal area which may represent a partial onset seizure focus.              - completed 24 hour EEG today which shows No convincingly lateralizing or epileptiform features were noted. No seizure was recorded.               - Neurology following                3.) Cerebral Edema              - received a dose of Mannitol overnight, start 3% saline 6/8.               - wean 3% sodium gtt at 30 cc/hr , sodium goal 150-155, discussed with intensivist and               nursing               - continue q4 hour BMP checks     4.) Acute hypoxic respiratory failure in the setting of likely neurogenic pulmonary edema               - patient diuresed with Lasix initially              - COVID 19 test negative x 2             - vent settings per Intensivist - now on 35% FiO2             - wean sedation as tolerated              - intensivist managing     5.) Multiple Acute Ischemic Infarctions                - Imaging pattern is consistent with Hypoxic-Ischemic Encephalopathy (HIE)                - Continue aspirin 300 CA daily for stroke prevention                - ECHO as stated above                - Lipid panel on 6/5, LDL 7.8, goal < 70, statin not indicated                - Hgb A1C ok at 5.7                - Neurology following          Activity: Bed rest  DVT ppx: SCDs- can consider starting Lovenox based on Hgb/platelet trends  Disposition: TBD    Plan d/w Dr. Violet Sawyer, Dr. Zach Rodriguez ( intensivist) , Dr. Lilia Reid, RN, and patient's mother      Admission Summary:     Presley Perez is a 48 y.o. 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 On arrival to ED, pt was unresponsive and noted to be seizing. A stat CT of her head was performed there, which showed extensive SAH. Pt was then intubated emergently. She was also extremely hypertensive with SBPs in 240s. She was given Keppra and Ativan, and placed on cardene drip for BP control. NSGY and NIS were then consulted and the patient was transferred to [address] [place] [address] for higher level of care. En route, paramedics administered 7.5 mg of Versed, 200mcg of Fentanyl, and 10 mg of labetalol for BP control. On arrival, CTA of her head and neck was obtained, which showed a 4.4 x 4.1 mm right MCA trifurcation aneurysm and  a 2.2 x 3.1 mm left MCA trifurcation aneurysm. Of note, CTA head/neck also showed pulmonary infiltrates suspicious for COVID-19 infection and the patient has been tested for COVID which was negative. 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 Mother reported pt does not smoke, does not drink alcohol or use street drugs.  The mother also reported that the patient began complaining of a headache on 5/31/2020 and has been taking BC Powder (aspirin) for the headache. She also reported that the pt has a hx of HTN and is supposed to be on BP medications, but recently stopped taking her medications. The patient underwent a cerebral angiogram on 6/5 by Dr. Pamela Holman for coil embolization of a ruptured left MCA bifurcation aneurysm. Unable to obtain a ROS due intubation and sedation.   Current Facility-Administered Medications   Medication Dose Route Frequency Provider Last Rate Last Dose    labetaloL (NORMODYNE;TRANDATE) injection 20 mg  20 mg IntraVENous Q2H PRN Tniy Domínguez NP   20 mg at 06/10/20 0323    0.9% sodium chloride infusion 250 mL  250 mL IntraVENous PRN FABIAN Parrish        aspirin chewable tablet 81 mg  81 mg Per NG tube DAILY Jacquelin Faith MD   81 mg at 06/10/20 1110    3% sodium chloride (*HIGH ALERT*CONCENTRATED IV*) infusion  30 mL/hr IntraVENous CONTINUOUS Leon Berry DO 30 mL/hr at 06/10/20 1247 30 mL/hr at 06/10/20 1247    lidocaine (XYLOCAINE) 20 mg/mL (2 %) injection 400 mg  20 mL SubCUTAneous ONCE Xiang Donis MD        iopamidoL (ISOVUE 300) 61 % contrast injection 50 mL  50 mL IntraCATHeter RAD ONCE Xiang Donis MD        iopamidoL (ISOVUE 300) 61 % contrast injection 50 mL  50 mL IntraCATHeter RAD ONCE Xiang Donis MD        iopamidoL (ISOVUE 300) 61 % contrast injection 50 mL  50 mL IntraCATHeter RAD ONCE Xiang Donis MD        heparin 4000 units/1000 mL (4 units/mL) in NS infusion **FOR ANGIO USE ONLY**   IntraVENous RAD PRN Xiang Donis MD        verapamiL (ISOPTIN) 2.5 mg/mL injection             niMODipine (NYMALIZE) 6 mg/mL oral solution 60 mg  60 mg Per NG tube Q4H Tiny Domínguez NP   60 mg at 06/10/20 1111    niCARdipine (CARDENE) 50 mg in 0.9% sodium chloride 100 mL infusion  0-15 mg/hr IntraVENous TITRATE FABIAN Parrish   Stopped at 06/10/20 1215    [Held by provider] amLODIPine (NORVASC) tablet 5 mg  5 mg Per NG tube DAILY Veleta Natividad, ACNP   5 mg at 06/10/20 0853    acetaminophen (TYLENOL) solution 650 mg  650 mg Per NG tube Q4H PRN Judge Sandhoff, NP   650 mg at 06/10/20 0853    0.9% sodium chloride infusion 250 mL  250 mL IntraVENous PRN Soumya Cameron NP-JOVANY        PHENYLephrine (ELKIN-SYNEPHRINE) 30 mg in 0.9% sodium chloride 250 mL infusion   mcg/min IntraVENous TITRATE Raman Avery NP 5 mL/hr at 06/10/20 1215 10 mcg/min at 06/10/20 1215    albuterol-ipratropium (DUO-NEB) 2.5 MG-0.5 MG/3 ML  3 mL Nebulization Q6H RT Veleta Natividad, ACNP   3 mL at 06/10/20 0636    piperacillin-tazobactam (ZOSYN) 3.375 g in 0.9% sodium chloride (MBP/ADV) 100 mL  3.375 g IntraVENous Q8H Veleta Natividad, ACNP 25 mL/hr at 06/10/20 1128 3.375 g at 06/10/20 1128    lacosamide (VIMPAT) 100 mg in 0.9% sodium chloride 100 mL IVPB  100 mg IntraVENous Q12H Raman Avery NP   100 mg at 06/10/20 1406    glucose chewable tablet 16 g  4 Tab Oral PRN Soumya Cameron NP-C        glucagon (GLUCAGEN) injection 1 mg  1 mg IntraMUSCular PRN Soumya Cameron NP-JOVANY        dextrose 10% infusion 0-250 mL  0-250 mL IntraVENous PRN Soumya Cameron NP-JOVANY        insulin lispro (HUMALOG) injection   SubCUTAneous Q6H Amber RIVAS NP-C   Stopped at 06/09/20 1200    albuterol (PROVENTIL VENTOLIN) nebulizer solution 2.5 mg  2.5 mg Nebulization Q6H PRN Soumya Cameron NP-C        famotidine (PF) (PEPCID) 20 mg in 0.9% sodium chloride 10 mL injection  20 mg IntraVENous Q12H Veleta Natividad, ACNP   20 mg at 06/10/20 0847    fentaNYL citrate (PF) injection  mcg   mcg IntraVENous Q1H PRN Veleta Natividad, ACNP   100 mcg at 06/10/20 0126    polyvinyl alcohol-povidone (NATURAL TEARS) 0.5-0.6 % ophthalmic solution 2 Drop  2 Drop Both Eyes Q8H Veleta Natividad, ACNP   2 Drop at 06/10/20 0622    docusate (COLACE) 50 mg/5 mL oral liquid 100 mg  100 mg Oral BID Antoine Fortune, ACNP   Stopped at 20 1800    bisacodyL (DULCOLAX) suppository 10 mg  10 mg Rectal DAILY PRN Antoine Fortune, ACNP        levETIRAcetam (KEPPRA) 1,000 mg in 0.9% sodium chloride 100 mL IVPB  1,000 mg IntraVENous Q12H Lauri Domínguez NP   1,000 mg at 06/10/20 1122    propofol (DIPRIVAN) 10 mg/mL infusion  0-50 mcg/kg/min IntraVENous TITRATE Lauri Domínguez NP 18.1 mL/hr at 06/10/20 1105 30 mcg/kg/min at 06/10/20 1105    chlorhexidine (ORAL CARE KIT) 0.12 % mouthwash 15 mL  15 mL Oral Q12H FABIAN Mcghee   15 mL at 06/10/20 0855    ondansetron (ZOFRAN) injection 4 mg  4 mg IntraVENous Q4H PRN FABIAN Parrish        docusate (COLACE) 50 mg/5 mL oral liquid 100 mg  100 mg Oral BID PRN FABIAN Mcghee            No Known Allergies    Review of Systems:  Review of systems not obtained due to patient factors. Objective:     Vital signs  Temp (24hrs), Av.7 °F (38.2 °C), Min:97.7 °F (36.5 °C), Max:103.2 °F (39.6 °C)   06/10 0701 - 06/10 1900  In: 1790.7 [I.V.:1469.4]  Out: 3264 [Urine:1275; Drains:200]  1901 - 06/10 07  In: 5633.3 [I.V.:3838.3]  Out: 7325 [Urine:6125; Drains:1200]    Visit Vitals  BP (!) 215/99 (BP 1 Location: Left arm, BP Patient Position: At rest)   Pulse 79   Temp (!) 101.2 °F (38.4 °C)   Resp 18   Ht 5' 4\" (1.626 m)   Wt 239 lb 6.7 oz (108.6 kg)   SpO2 100%   BMI 41.10 kg/m²      O2 Device: Endotracheal tube   Vitals:    06/10/20 1100 06/10/20 1200 06/10/20 1225 06/10/20 1300   BP: (!) 204/82 (!) 215/99     Pulse: 77 83  79   Resp: 18 17 19 18   Temp:  (!) 101.2 °F (38.4 °C)     SpO2: 100% 100%  100%   Weight:       Height:          Physical Exam:  GENERAL: NAD, intubated and sedated on propofol. EYE: conjunctivae/corneas clear. Right pupil 3 mm, brisk response to light. Left pupil 5 mm fixed and dilated. Eyes midline.  Scleral edema noted bilaterally   LUNG: lungs coarse bilaterally   HEART: regular rate and rhythm, S1, S2 normal, no murmur, click, rub or gallop  EXTREMITIES:  extremities normal, atraumatic, no cyanosis, 3+ pitting peripheral edema, distal pulses 2+ bilaterally   SKIN: Skin warm to touch. Right groin site clean, dry, and intact. No hematoma, bruising, or bleeding noted. NEUROLOGIC: Intubated and sedated on Propofol. Eyes do not open to stimulus. No command following. Moves head intermittently. Right pupil 3 mm, brisk response to light. Left pupil 5 mm fixed and dilated. Eyes midline. No tracking with eyes. No blink to threat. Withdraws to painful stimuli on all extremities BLE >BUE. Toes mute bilaterally. No involuntary movements. Gait deferred. Unable to assess language, sensation, and coordination. Imaging:    CT of Head on 6/8/2020 shows  IMPRESSION:   Global edema, otherwise no significant change    CTA of Head and Neck on 6/8/2020 per radiology shows  Impression:  Mild to moderate vasospasm in the left middle cerebral artery status post  bifurcation aneurysm coil embolization. Stable 4 mm right MCA aneurysm. Endotracheal tube terminates just above the matt. 1 cm low-density left thyroid nodule. Mildly enlarged left axillary lymph node measuring 11 mm in short axis  Patchy bilateral airspace disease. Nonspecific asymmetric enhancement of the right superior ophthalmic vein   (However, no significant vasospasm was seen when personally reviewed by NIS)    CT Perfusion on 6/8/2020 shows  IMPRESSION:  No significant cerebral perfusion abnormality    CTA of Chest on 6/7/2020 shows  IMPRESSION:  1. No acute pulmonary embolus  2. Dependent atelectasis with diffuse groundglass opacifications bilaterally. CT of Head on 6/7/2020 shows  IMPRESSION:   1. No further progression of subarachnoid hemorrhage. The overall degree of  subarachnoid hemorrhage is slightly improved.   2. Subtle areas of gray-white differentiation loss throughout the cerebral  hemispheres correlating with areas of restricted diffusion on previous CT  compatible with ischemic changes. MRI of Brain on 6/6/2020 shows  IMPRESSION:   Imaging findings consistent with large left MCA territory ischemia/infarction,  left temporal, parietal and frontal lobes, cortically based, no superimposed  increased parenchymal hemorrhage or midline shift. Right and left PANCHITO territory  cortical diffusion restriction consistent with infarction. Infarction in the right insular cortex and right temporal lobe. Scattered small  infarctions right and left corona radiata centrum semiovale, right cerebellum. Allowing for differences in technique likely stable subarachnoid and  intraventricular hemorrhage. CT of Head on 6/6/2020 at 1411 shows  IMPRESSION:   Left MCA territory infarction with smaller right MCA territory infarction.     Stable subarachnoid and intraventricular hemorrhage. CTA of Head on 6/6/2020 at 0903 shows  IMPRESSION:  Slightly increased intraventricular and subarachnoid hemorrhage compared to the  prior exam.     Status post coiling left MCA bifurcation aneurysm. No significant residual  aneurysm filling. CT of Head on 6/62020 at 0239 shows  IMPRESSION:  Slightly diminished subarachnoid and intraventricular hemorrhage. Stable ventricular system. CT of Head WO on 6/5/2020 at 1512 shows  IMPRESSION: Stable acute subarachnoid and intraventricular hemorrhage. Stable  ventricular system. CT Head WO 6/5/20 0826     IMPRESSION:   1. Diffuse subarachnoid hemorrhage most concentrated in the basal cisterns and  left sylvian fissure. 2.  Interval ventricular enlargement suspicious for developing hydrocephalus.     CTA Head 6/4/20 2017     IMPRESSION:  1. Diffuse subarachnoid hemorrhage in the basilar cisterns and sylvian  fissures. 2.  Bilateral MCA bifurcation aneurysms. 3.  Infundibular origins to the posterior communicating arteries bilaterally.   4.  Symmetric moderately severe airspace disease in the upper lobes bilaterally  which may represent edema or sequela of aspiration.     CT Head WO Contrast 6/4/2020 2012    IMPRESSION: Extensive subarachnoid hemorrhage concerning for leaking/ruptured  aneurysm.        24 hour results:    Recent Results (from the past 24 hour(s))   GLUCOSE, POC    Collection Time: 06/09/20  6:03 PM   Result Value Ref Range    Glucose (POC) 110 (H) 65 - 100 mg/dL    Performed by Ethan Ly, BASIC    Collection Time: 06/09/20  6:29 PM   Result Value Ref Range    Sodium 154 (H) 136 - 145 mmol/L    Potassium 3.6 3.5 - 5.1 mmol/L    Chloride 124 (H) 97 - 108 mmol/L    CO2 23 21 - 32 mmol/L    Anion gap 7 5 - 15 mmol/L    Glucose 122 (H) 65 - 100 mg/dL    BUN 18 6 - 20 MG/DL    Creatinine 0.83 0.55 - 1.02 MG/DL    BUN/Creatinine ratio 22 (H) 12 - 20      GFR est AA >60 >60 ml/min/1.73m2    GFR est non-AA >60 >60 ml/min/1.73m2    Calcium 7.6 (L) 8.5 - 33.1 MG/DL   METABOLIC PANEL, BASIC    Collection Time: 06/09/20 10:19 PM   Result Value Ref Range    Sodium 156 (H) 136 - 145 mmol/L    Potassium 3.6 3.5 - 5.1 mmol/L    Chloride 126 (H) 97 - 108 mmol/L    CO2 23 21 - 32 mmol/L    Anion gap 7 5 - 15 mmol/L    Glucose 115 (H) 65 - 100 mg/dL    BUN 19 6 - 20 MG/DL    Creatinine 0.83 0.55 - 1.02 MG/DL    BUN/Creatinine ratio 23 (H) 12 - 20      GFR est AA >60 >60 ml/min/1.73m2    GFR est non-AA >60 >60 ml/min/1.73m2    Calcium 7.6 (L) 8.5 - 10.1 MG/DL   GLUCOSE, POC    Collection Time: 06/10/20 12:19 AM   Result Value Ref Range    Glucose (POC) 121 (H) 65 - 100 mg/dL    Performed by Garrison RDZ    Collection Time: 06/10/20  3:34 AM   Result Value Ref Range     (H) 81 - 246 U/L   FERRITIN    Collection Time: 06/10/20  3:34 AM   Result Value Ref Range    Ferritin 49 8 - 252 NG/ML   D DIMER    Collection Time: 06/10/20  3:34 AM   Result Value Ref Range    D-dimer 10.21 (H) 0.00 - 0.65 mg/L FEU   C REACTIVE PROTEIN, QT    Collection Time: 06/10/20  3:34 AM   Result Value Ref Range C-Reactive protein 8.29 (H) 0.00 - 8.66 mg/dL   METABOLIC PANEL, BASIC    Collection Time: 06/10/20  3:34 AM   Result Value Ref Range    Sodium 157 (H) 136 - 145 mmol/L    Potassium 3.7 3.5 - 5.1 mmol/L    Chloride 130 (H) 97 - 108 mmol/L    CO2 21 21 - 32 mmol/L    Anion gap 6 5 - 15 mmol/L    Glucose 123 (H) 65 - 100 mg/dL    BUN 22 (H) 6 - 20 MG/DL    Creatinine 0.81 0.55 - 1.02 MG/DL    BUN/Creatinine ratio 27 (H) 12 - 20      GFR est AA >60 >60 ml/min/1.73m2    GFR est non-AA >60 >60 ml/min/1.73m2    Calcium 7.7 (L) 8.5 - 10.1 MG/DL   PROCALCITONIN    Collection Time: 06/10/20  3:34 AM   Result Value Ref Range    Procalcitonin <0.05 ng/mL   CBC WITH AUTOMATED DIFF    Collection Time: 06/10/20  3:34 AM   Result Value Ref Range    WBC 6.7 3.6 - 11.0 K/uL    RBC 3.44 (L) 3.80 - 5.20 M/uL    HGB 7.0 (L) 11.5 - 16.0 g/dL    HCT 24.8 (L) 35.0 - 47.0 %    MCV 72.1 (L) 80.0 - 99.0 FL    MCH 20.3 (L) 26.0 - 34.0 PG    MCHC 28.2 (L) 30.0 - 36.5 g/dL    RDW 21.5 (H) 11.5 - 14.5 %    PLATELET 497 758 - 934 K/uL    MPV 10.3 8.9 - 12.9 FL    NRBC 0.6 (H) 0  WBC    ABSOLUTE NRBC 0.04 (H) 0.00 - 0.01 K/uL    NEUTROPHILS 67 32 - 75 %    LYMPHOCYTES 18 12 - 49 %    MONOCYTES 13 5 - 13 %    EOSINOPHILS 1 0 - 7 %    BASOPHILS 0 0 - 1 %    IMMATURE GRANULOCYTES 1 (H) 0.0 - 0.5 %    ABS. NEUTROPHILS 4.4 1.8 - 8.0 K/UL    ABS. LYMPHOCYTES 1.2 0.8 - 3.5 K/UL    ABS. MONOCYTES 0.9 0.0 - 1.0 K/UL    ABS. EOSINOPHILS 0.1 0.0 - 0.4 K/UL    ABS. BASOPHILS 0.0 0.0 - 0.1 K/UL    ABS. IMM.  GRANS. 0.1 (H) 0.00 - 0.04 K/UL    DF SMEAR SCANNED      PLATELET COMMENTS Large Platelets      RBC COMMENTS ANISOCYTOSIS  2+        RBC COMMENTS MICROCYTOSIS  1+        RBC COMMENTS HYPOCHROMIA  3+        RBC COMMENTS POLYCHROMASIA  1+        RBC COMMENTS OVALOCYTES  1+       GLUCOSE, POC    Collection Time: 06/10/20  6:24 AM   Result Value Ref Range    Glucose (POC) 123 (H) 65 - 100 mg/dL    Performed by Jeannie Camilo    TYPE + CROSSMATCH Collection Time: 06/10/20  6:41 AM   Result Value Ref Range    Crossmatch Expiration 06/13/2020     ABO/Rh(D) O POSITIVE     Antibody screen NEG     Unit number [id number]     Blood component type RC LR     Unit division 00     Status of unit ISSUED     Crossmatch result Compatible    METABOLIC PANEL, BASIC    Collection Time: 06/10/20  8:31 AM   Result Value Ref Range    Sodium 156 (H) 136 - 145 mmol/L    Potassium 3.7 3.5 - 5.1 mmol/L    Chloride 127 (H) 97 - 108 mmol/L    CO2 22 21 - 32 mmol/L    Anion gap 7 5 - 15 mmol/L    Glucose 127 (H) 65 - 100 mg/dL    BUN 20 6 - 20 MG/DL    Creatinine 0.69 0.55 - 1.02 MG/DL    BUN/Creatinine ratio 29 (H) 12 - 20      GFR est AA >60 >60 ml/min/1.73m2    GFR est non-AA >60 >60 ml/min/1.73m2    Calcium 7.6 (L) 8.5 - 10.1 MG/DL   LACTIC ACID    Collection Time: 06/10/20  8:31 AM   Result Value Ref Range    Lactic acid 1.0 0.4 - 2.0 MMOL/L   PROCALCITONIN    Collection Time: 06/10/20  8:31 AM   Result Value Ref Range    Procalcitonin 0.07 ng/mL   GLUCOSE, POC    Collection Time: 06/10/20 11:53 AM   Result Value Ref Range    Glucose (POC) 137 (H) 65 - 100 mg/dL    Performed by Ernie Sida    TCD INTRACRANIAL ARTERIES COMPLETE    Collection Time: 06/10/20 12:10 PM   Result Value Ref Range    Right Lind[place]ard Ratio 2.3     Right MCA 1  cm/s    Right MCA 1 EDV 61 cm/s    Right MCA 1 Mean Velocity 109 cm/s    Right PANCHITO  cm/s    Right PANCHITO EDV 64 cm/s    Right PANCHITO Mean Velocity 109 cm/s    Right ICA  cm/s    Right ICA EDV 40 cm/s    Right ICA Mean Velocity 67 cm/s    Right PCA 1  cm/s    Right PCA 1 EDV 35 cm/s    Right PCA 1 Mean Velocity 60 cm/s    Right External ICA PSV 93 cm/s    Right External ICA EDV 26 cm/s    Right External ICA Mean Velocity 48 cm/s    Left MCA 1  cm/s    Left MCA 1 EDV 87 cm/s    Left MCA 1 Mean Velocity 134 cm/s    Left PANCHITO  cm/s    Left PANCHITO  cm/s    Left PANCHITO Mean Velocity 161 cm/s    Left ICA  cm/s    Left External ICA PSV 83 cm/s    Left ICA EDV 87 cm/s    Left External ICA EDV 36 cm/s    Left ICA Mean Velocity 131 cm/s    Left External ICA Mean Velocity 52 cm/s    Left PCA 1  cm/s    Left PCA 1 EDV 79 cm/s    Left PCA 1 Mean Velocity 123 cm/s    Left Lindegaard Ratio 2.6     Basilar Artery PSV 76 cm/s    Basilar Artery EDV 25 cm/s    Basilar Artery Mean Angel 42 cm/s    Right Vertebral Mean Velocity 40 cm/s    Left Vertebral Mean Velocity 44 cm/s    Right Vertebral PSV 67 cm/s    Right Vertebral EDV 27 cm/s    Left Vertebral PSV 79 cm/s    Left Vertebral EDV 27 cm/s        Gregorio Almonte NP

## 2020-06-10 NOTE — PROGRESS NOTES
MRI reviewed. 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 Ventricular system is normal with no transependymal CSF flow. No structural signs of increased ICP. Patchy regions of diffusion restriction are essentially unchanged and remain consistent with HIE +/- excitotoxic injury in this patient with unknown time down seizing in the field. Prognostication will be clinical as she recovers and emerges from sedation. Severe PANCHITO spasm recognized early yesterday does not appear to have caused additional infarct (s/p interventions). Discussed with Jason Hitchcock and Raheel Pond. Will start slow 3% wean today. DVT prophylaxis per Dr. Santa Duran after transfusion. Lovenox ok from neuro standpoint.      Jenni Carmona  [phone]

## 2020-06-11 ENCOUNTER — APPOINTMENT (OUTPATIENT)
Dept: INTERVENTIONAL RADIOLOGY/VASCULAR | Age: 50
DRG: 003 | End: 2020-06-11
Attending: RADIOLOGY
Payer: COMMERCIAL

## 2020-06-11 ENCOUNTER — ANESTHESIA (OUTPATIENT)
Dept: INTERVENTIONAL RADIOLOGY/VASCULAR | Age: 50
DRG: 003 | End: 2020-06-11
Payer: COMMERCIAL

## 2020-06-11 ENCOUNTER — ANESTHESIA EVENT (OUTPATIENT)
Dept: INTERVENTIONAL RADIOLOGY/VASCULAR | Age: 50
DRG: 003 | End: 2020-06-11
Payer: COMMERCIAL

## 2020-06-11 ENCOUNTER — APPOINTMENT (OUTPATIENT)
Dept: VASCULAR SURGERY | Age: 50
DRG: 003 | End: 2020-06-11
Attending: NURSE PRACTITIONER
Payer: COMMERCIAL

## 2020-06-11 LAB
ANION GAP SERPL CALC-SCNC: 3 MMOL/L (ref 5–15)
ANION GAP SERPL CALC-SCNC: 4 MMOL/L (ref 5–15)
ANION GAP SERPL CALC-SCNC: 5 MMOL/L (ref 5–15)
ANION GAP SERPL CALC-SCNC: 6 MMOL/L (ref 5–15)
ANION GAP SERPL CALC-SCNC: 6 MMOL/L (ref 5–15)
ASPIRIN TEST, ASPIRN: 461 ARU
BASILAR ARTERY EDV: 25 CM/S
BASILAR ARTERY EDV: 32 CM/S
BASILAR ARTERY MEAN VEL: 42 CM/S
BASILAR ARTERY MEAN VEL: 59 CM/S
BASILAR ARTERY PSV: 112 CM/S
BASILAR ARTERY PSV: 76 CM/S
BASOPHILS # BLD: 0 K/UL (ref 0–0.1)
BASOPHILS NFR BLD: 0 % (ref 0–1)
BUN SERPL-MCNC: 14 MG/DL (ref 6–20)
BUN SERPL-MCNC: 14 MG/DL (ref 6–20)
BUN SERPL-MCNC: 16 MG/DL (ref 6–20)
BUN/CREAT SERPL: 19 (ref 12–20)
BUN/CREAT SERPL: 23 (ref 12–20)
BUN/CREAT SERPL: 24 (ref 12–20)
BUN/CREAT SERPL: 25 (ref 12–20)
BUN/CREAT SERPL: 25 (ref 12–20)
CALCIUM SERPL-MCNC: 7.1 MG/DL (ref 8.5–10.1)
CALCIUM SERPL-MCNC: 7.3 MG/DL (ref 8.5–10.1)
CALCIUM SERPL-MCNC: 7.5 MG/DL (ref 8.5–10.1)
CALCIUM SERPL-MCNC: 7.5 MG/DL (ref 8.5–10.1)
CALCIUM SERPL-MCNC: 7.6 MG/DL (ref 8.5–10.1)
CHLORIDE SERPL-SCNC: 124 MMOL/L (ref 97–108)
CHLORIDE SERPL-SCNC: 126 MMOL/L (ref 97–108)
CHLORIDE SERPL-SCNC: 126 MMOL/L (ref 97–108)
CHLORIDE SERPL-SCNC: 127 MMOL/L (ref 97–108)
CHLORIDE SERPL-SCNC: 127 MMOL/L (ref 97–108)
CO2 SERPL-SCNC: 21 MMOL/L (ref 21–32)
CO2 SERPL-SCNC: 22 MMOL/L (ref 21–32)
CO2 SERPL-SCNC: 23 MMOL/L (ref 21–32)
CREAT SERPL-MCNC: 0.61 MG/DL (ref 0.55–1.02)
CREAT SERPL-MCNC: 0.63 MG/DL (ref 0.55–1.02)
CREAT SERPL-MCNC: 0.64 MG/DL (ref 0.55–1.02)
CREAT SERPL-MCNC: 0.67 MG/DL (ref 0.55–1.02)
CREAT SERPL-MCNC: 0.72 MG/DL (ref 0.55–1.02)
DIFFERENTIAL METHOD BLD: ABNORMAL
EOSINOPHIL # BLD: 0.1 K/UL (ref 0–0.4)
EOSINOPHIL NFR BLD: 1 % (ref 0–7)
ERYTHROCYTE [DISTWIDTH] IN BLOOD BY AUTOMATED COUNT: 23.2 % (ref 11.5–14.5)
GLUCOSE BLD STRIP.AUTO-MCNC: 111 MG/DL (ref 65–100)
GLUCOSE BLD STRIP.AUTO-MCNC: 135 MG/DL (ref 65–100)
GLUCOSE BLD STRIP.AUTO-MCNC: 141 MG/DL (ref 65–100)
GLUCOSE BLD STRIP.AUTO-MCNC: 96 MG/DL (ref 65–100)
GLUCOSE SERPL-MCNC: 113 MG/DL (ref 65–100)
GLUCOSE SERPL-MCNC: 123 MG/DL (ref 65–100)
GLUCOSE SERPL-MCNC: 123 MG/DL (ref 65–100)
GLUCOSE SERPL-MCNC: 135 MG/DL (ref 65–100)
GLUCOSE SERPL-MCNC: 136 MG/DL (ref 65–100)
HCT VFR BLD AUTO: 25.2 % (ref 35–47)
HGB BLD-MCNC: 7.3 G/DL (ref 11.5–16)
IMM GRANULOCYTES # BLD AUTO: 0.1 K/UL (ref 0–0.04)
IMM GRANULOCYTES NFR BLD AUTO: 2 % (ref 0–0.5)
LEFT ACA EDV: 118 CM/S
LEFT ACA EDV: 135 CM/S
LEFT ACA EDV: 92 CM/S
LEFT ACA MEAN VEL: 146 CM/S
LEFT ACA MEAN VEL: 161 CM/S
LEFT ACA MEAN VEL: 183 CM/S
LEFT ACA PSV: 247 CM/S
LEFT ACA PSV: 253 CM/S
LEFT ACA PSV: 279 CM/S
LEFT EX ICA EDV: 13 CM/S
LEFT EX ICA EDV: 18 CM/S
LEFT EX ICA EDV: 36 CM/S
LEFT EX ICA MEAN VEL: 21 CM/S
LEFT EX ICA MEAN VEL: 37 CM/S
LEFT EX ICA MEAN VEL: 52 CM/S
LEFT EX ICA PSV: 38 CM/S
LEFT EX ICA PSV: 76 CM/S
LEFT EX ICA PSV: 83 CM/S
LEFT ICA EDV: 44 CM/S
LEFT ICA EDV: 62 CM/S
LEFT ICA EDV: 87 CM/S
LEFT ICA MEAN VEL: 111 CM/S
LEFT ICA MEAN VEL: 131 CM/S
LEFT ICA MEAN VEL: 69 CM/S
LEFT ICA PSV: 120 CM/S
LEFT ICA PSV: 210 CM/S
LEFT ICA PSV: 218 CM/S
LEFT LINDEGAARD RATIO: 2.6
LEFT LINDEGAARD RATIO: 3.7
LEFT LINDEGAARD RATIO: 5.9
LEFT MCA 1 EDV: 73 CM/S
LEFT MCA 1 EDV: 82 CM/S
LEFT MCA 1 EDV: 87 CM/S
LEFT MCA 1 MEAN VEL: 124 CM/S
LEFT MCA 1 MEAN VEL: 132 CM/S
LEFT MCA 1 MEAN VEL: 134 CM/S
LEFT MCA 1 PSV: 209 CM/S
LEFT MCA 1 PSV: 228 CM/S
LEFT MCA 1 PSV: 250 CM/S
LEFT MCA 1 PULSATILITY INDEX: 1.3
LEFT PCA 1 EDV: 64 CM/S
LEFT PCA 1 EDV: 79 CM/S
LEFT PCA 1 EDV: 89 CM/S
LEFT PCA 1 MEAN VEL: 116 CM/S
LEFT PCA 1 MEAN VEL: 123 CM/S
LEFT PCA 1 MEAN VEL: 135 CM/S
LEFT PCA 1 PSV: 210 CM/S
LEFT PCA 1 PSV: 221 CM/S
LEFT PCA 1 PSV: 228 CM/S
LEFT VERTEBRAL EDV TCD: 16 CM/S
LEFT VERTEBRAL EDV TCD: 27 CM/S
LEFT VERTEBRAL MEAN VEL: 27 CM/S
LEFT VERTEBRAL MEAN VEL: 44 CM/S
LEFT VERTEBRAL PSV TCD: 50 CM/S
LEFT VERTEBRAL PSV TCD: 79 CM/S
LYMPHOCYTES # BLD: 1.3 K/UL (ref 0.8–3.5)
LYMPHOCYTES NFR BLD: 20 % (ref 12–49)
MCH RBC QN AUTO: 21.5 PG (ref 26–34)
MCHC RBC AUTO-ENTMCNC: 29 G/DL (ref 30–36.5)
MCV RBC AUTO: 74.3 FL (ref 80–99)
MONOCYTES # BLD: 0.7 K/UL (ref 0–1)
MONOCYTES NFR BLD: 10 % (ref 5–13)
NEUTS SEG # BLD: 4.3 K/UL (ref 1.8–8)
NEUTS SEG NFR BLD: 67 % (ref 32–75)
NRBC # BLD: 0.07 K/UL (ref 0–0.01)
NRBC BLD-RTO: 1.1 PER 100 WBC
PLATELET # BLD AUTO: 191 K/UL (ref 150–400)
PMV BLD AUTO: 10 FL (ref 8.9–12.9)
POTASSIUM SERPL-SCNC: 3.4 MMOL/L (ref 3.5–5.1)
POTASSIUM SERPL-SCNC: 3.5 MMOL/L (ref 3.5–5.1)
POTASSIUM SERPL-SCNC: 3.6 MMOL/L (ref 3.5–5.1)
POTASSIUM SERPL-SCNC: 3.7 MMOL/L (ref 3.5–5.1)
POTASSIUM SERPL-SCNC: 3.9 MMOL/L (ref 3.5–5.1)
PROCALCITONIN SERPL-MCNC: <0.05 NG/ML
RBC # BLD AUTO: 3.39 M/UL (ref 3.8–5.2)
RBC MORPH BLD: ABNORMAL
RIGHT ACA EDV: 64 CM/S
RIGHT ACA EDV: 82 CM/S
RIGHT ACA EDV: 92 CM/S
RIGHT ACA MEAN VEL: 109 CM/S
RIGHT ACA MEAN VEL: 116 CM/S
RIGHT ACA MEAN VEL: 148 CM/S
RIGHT ACA PSV: 185 CM/S
RIGHT ACA PSV: 199 CM/S
RIGHT ACA PSV: 259 CM/S
RIGHT EX ICA EDV: 15 CM/S
RIGHT EX ICA EDV: 23 CM/S
RIGHT EX ICA EDV: 26 CM/S
RIGHT EX ICA MEAN VEL: 31 CM/S
RIGHT EX ICA MEAN VEL: 37 CM/S
RIGHT EX ICA MEAN VEL: 48 CM/S
RIGHT EX ICA PSV: 64 CM/S
RIGHT EX ICA PSV: 64 CM/S
RIGHT EX ICA PSV: 93 CM/S
RIGHT ICA EDV: 32 CM/S
RIGHT ICA EDV: 37 CM/S
RIGHT ICA EDV: 40 CM/S
RIGHT ICA MEAN VEL: 59 CM/S
RIGHT ICA MEAN VEL: 64 CM/S
RIGHT ICA MEAN VEL: 67 CM/S
RIGHT ICA PSV: 103 CM/S
RIGHT ICA PSV: 121 CM/S
RIGHT ICA PSV: 127 CM/S
RIGHT LINDEGAARD RATIO: 2.3
RIGHT LINDEGAARD RATIO: 2.6
RIGHT LINDEGAARD RATIO: 2.9
RIGHT MCA 1 EDV: 40 CM/S
RIGHT MCA 1 EDV: 61 CM/S
RIGHT MCA 1 EDV: 62 CM/S
RIGHT MCA 1 MEAN VEL: 109 CM/S
RIGHT MCA 1 MEAN VEL: 91 CM/S
RIGHT MCA 1 MEAN VEL: 96 CM/S
RIGHT MCA 1 PSV: 164 CM/S
RIGHT MCA 1 PSV: 193 CM/S
RIGHT MCA 1 PSV: 205 CM/S
RIGHT MCA 1 PULSATILITY INDEX: 1.7
RIGHT PCA 1 EDV: 29 CM/S
RIGHT PCA 1 EDV: 34 CM/S
RIGHT PCA 1 EDV: 35 CM/S
RIGHT PCA 1 MEAN VEL: 47 CM/S
RIGHT PCA 1 MEAN VEL: 60 CM/S
RIGHT PCA 1 MEAN VEL: 66 CM/S
RIGHT PCA 1 PSV: 109 CM/S
RIGHT PCA 1 PSV: 130 CM/S
RIGHT PCA 1 PSV: 82 CM/S
RIGHT VERTEBRAL EDV TCD: 17 CM/S
RIGHT VERTEBRAL EDV TCD: 27 CM/S
RIGHT VERTEBRAL MEAN VEL: 30 CM/S
RIGHT VERTEBRAL MEAN VEL: 40 CM/S
RIGHT VERTEBRAL PSV TCD: 56 CM/S
RIGHT VERTEBRAL PSV TCD: 67 CM/S
SERVICE CMNT-IMP: ABNORMAL
SERVICE CMNT-IMP: NORMAL
SODIUM SERPL-SCNC: 152 MMOL/L (ref 136–145)
SODIUM SERPL-SCNC: 152 MMOL/L (ref 136–145)
SODIUM SERPL-SCNC: 153 MMOL/L (ref 136–145)
SODIUM SERPL-SCNC: 153 MMOL/L (ref 136–145)
SODIUM SERPL-SCNC: 156 MMOL/L (ref 136–145)
WBC # BLD AUTO: 6.5 K/UL (ref 3.6–11)

## 2020-06-11 PROCEDURE — 74011250636 HC RX REV CODE- 250/636: Performed by: NURSE PRACTITIONER

## 2020-06-11 PROCEDURE — 65610000006 HC RM INTENSIVE CARE

## 2020-06-11 PROCEDURE — C1769 GUIDE WIRE: HCPCS

## 2020-06-11 PROCEDURE — 74011000250 HC RX REV CODE- 250: Performed by: NURSE PRACTITIONER

## 2020-06-11 PROCEDURE — 77030012468 HC VLV BLEEDBK CNTRL ABBT -B

## 2020-06-11 PROCEDURE — 74011000258 HC RX REV CODE- 258: Performed by: NURSE PRACTITIONER

## 2020-06-11 PROCEDURE — 94640 AIRWAY INHALATION TREATMENT: CPT

## 2020-06-11 PROCEDURE — 77030008638 HC TU CONN COOK -A

## 2020-06-11 PROCEDURE — 74011000250 HC RX REV CODE- 250: Performed by: ANESTHESIOLOGY

## 2020-06-11 PROCEDURE — 85025 COMPLETE CBC W/AUTO DIFF WBC: CPT

## 2020-06-11 PROCEDURE — 74011000258 HC RX REV CODE- 258: Performed by: ANESTHESIOLOGY

## 2020-06-11 PROCEDURE — 77030008584 HC TOOL GDWRE DEV TERU -A

## 2020-06-11 PROCEDURE — 74011250637 HC RX REV CODE- 250/637: Performed by: NURSE PRACTITIONER

## 2020-06-11 PROCEDURE — C1894 INTRO/SHEATH, NON-LASER: HCPCS

## 2020-06-11 PROCEDURE — 80048 BASIC METABOLIC PNL TOTAL CA: CPT

## 2020-06-11 PROCEDURE — 93886 INTRACRANIAL COMPLETE STUDY: CPT

## 2020-06-11 PROCEDURE — 74011000250 HC RX REV CODE- 250

## 2020-06-11 PROCEDURE — 85576 BLOOD PLATELET AGGREGATION: CPT

## 2020-06-11 PROCEDURE — 84145 PROCALCITONIN (PCT): CPT

## 2020-06-11 PROCEDURE — 74011250637 HC RX REV CODE- 250/637: Performed by: ANESTHESIOLOGY

## 2020-06-11 PROCEDURE — 77030021532 HC CATH ANGI DX IMPRS MRTM -B

## 2020-06-11 PROCEDURE — 74011000250 HC RX REV CODE- 250: Performed by: RADIOLOGY

## 2020-06-11 PROCEDURE — C1760 CLOSURE DEV, VASC: HCPCS

## 2020-06-11 PROCEDURE — 36224 PLACE CATH CAROTD ART: CPT

## 2020-06-11 PROCEDURE — 76060000034 HC ANESTHESIA 1.5 TO 2 HR

## 2020-06-11 PROCEDURE — 74011250637 HC RX REV CODE- 250/637: Performed by: PSYCHIATRY & NEUROLOGY

## 2020-06-11 PROCEDURE — 94003 VENT MGMT INPAT SUBQ DAY: CPT

## 2020-06-11 PROCEDURE — C1788 PORT, INDWELLING, IMP: HCPCS

## 2020-06-11 PROCEDURE — 74011250636 HC RX REV CODE- 250/636: Performed by: ANESTHESIOLOGY

## 2020-06-11 PROCEDURE — 82962 GLUCOSE BLOOD TEST: CPT

## 2020-06-11 PROCEDURE — 74011636637 HC RX REV CODE- 636/637: Performed by: NURSE PRACTITIONER

## 2020-06-11 PROCEDURE — 36591 DRAW BLOOD OFF VENOUS DEVICE: CPT

## 2020-06-11 RX ORDER — ROCURONIUM BROMIDE 10 MG/ML
INJECTION, SOLUTION INTRAVENOUS AS NEEDED
Status: DISCONTINUED | OUTPATIENT
Start: 2020-06-11 | End: 2020-06-11 | Stop reason: HOSPADM

## 2020-06-11 RX ORDER — VERAPAMIL HYDROCHLORIDE 2.5 MG/ML
20 INJECTION, SOLUTION INTRAVENOUS ONCE
Status: COMPLETED | OUTPATIENT
Start: 2020-06-11 | End: 2020-06-11

## 2020-06-11 RX ORDER — VERAPAMIL HYDROCHLORIDE 2.5 MG/ML
INJECTION, SOLUTION INTRAVENOUS
Status: COMPLETED
Start: 2020-06-11 | End: 2020-06-11

## 2020-06-11 RX ORDER — SODIUM CHLORIDE, SODIUM LACTATE, POTASSIUM CHLORIDE, CALCIUM CHLORIDE 600; 310; 30; 20 MG/100ML; MG/100ML; MG/100ML; MG/100ML
150 INJECTION, SOLUTION INTRAVENOUS CONTINUOUS
Status: DISCONTINUED | OUTPATIENT
Start: 2020-06-11 | End: 2020-06-14

## 2020-06-11 RX ORDER — PHENYLEPHRINE HCL IN 0.9% NACL 0.4MG/10ML
SYRINGE (ML) INTRAVENOUS AS NEEDED
Status: DISCONTINUED | OUTPATIENT
Start: 2020-06-11 | End: 2020-06-11 | Stop reason: HOSPADM

## 2020-06-11 RX ORDER — LIDOCAINE HYDROCHLORIDE 20 MG/ML
20 INJECTION, SOLUTION INFILTRATION; PERINEURAL ONCE
Status: DISCONTINUED | OUTPATIENT
Start: 2020-06-11 | End: 2020-06-11 | Stop reason: HOSPADM

## 2020-06-11 RX ADMIN — HEPARIN SODIUM 4000 UNITS: 1000 INJECTION INTRAVENOUS; SUBCUTANEOUS at 12:46

## 2020-06-11 RX ADMIN — FENTANYL CITRATE 50 MCG: 50 INJECTION INTRAMUSCULAR; INTRAVENOUS at 22:15

## 2020-06-11 RX ADMIN — Medication 120 MCG: at 12:48

## 2020-06-11 RX ADMIN — POTASSIUM BICARBONATE 40 MEQ: 782 TABLET, EFFERVESCENT ORAL at 18:26

## 2020-06-11 RX ADMIN — INSULIN LISPRO 2 UNITS: 100 INJECTION, SOLUTION INTRAVENOUS; SUBCUTANEOUS at 05:30

## 2020-06-11 RX ADMIN — NIMODIPINE 60 MG: 30 SOLUTION ORAL at 22:15

## 2020-06-11 RX ADMIN — VERAPAMIL HYDROCHLORIDE 20 MG: 2.5 INJECTION, SOLUTION INTRAVENOUS at 12:51

## 2020-06-11 RX ADMIN — DEXMEDETOMIDINE HYDROCHLORIDE 0.4 MCG/KG/HR: 100 INJECTION, SOLUTION, CONCENTRATE INTRAVENOUS at 18:30

## 2020-06-11 RX ADMIN — LABETALOL HYDROCHLORIDE 20 MG: 5 INJECTION INTRAVENOUS at 08:14

## 2020-06-11 RX ADMIN — LEVETIRACETAM 1000 MG: 100 SOLUTION ORAL at 08:18

## 2020-06-11 RX ADMIN — SODIUM CHLORIDE, SODIUM LACTATE, POTASSIUM CHLORIDE, AND CALCIUM CHLORIDE 100 ML/HR: 600; 310; 30; 20 INJECTION, SOLUTION INTRAVENOUS at 10:56

## 2020-06-11 RX ADMIN — IPRATROPIUM BROMIDE AND ALBUTEROL SULFATE 3 ML: .5; 3 SOLUTION RESPIRATORY (INHALATION) at 07:23

## 2020-06-11 RX ADMIN — Medication 120 MCG: at 12:52

## 2020-06-11 RX ADMIN — LACOSAMIDE 100 MG: 50 TABLET, FILM COATED ORAL at 21:07

## 2020-06-11 RX ADMIN — Medication 80 MCG: at 12:34

## 2020-06-11 RX ADMIN — SODIUM CHLORIDE 2 MG/HR: 9 INJECTION, SOLUTION INTRAVENOUS at 23:08

## 2020-06-11 RX ADMIN — ACETAMINOPHEN 650 MG: 650 SUSPENSION ORAL at 04:11

## 2020-06-11 RX ADMIN — Medication 80 MCG: at 12:37

## 2020-06-11 RX ADMIN — FENTANYL CITRATE 50 MCG: 50 INJECTION INTRAMUSCULAR; INTRAVENOUS at 05:57

## 2020-06-11 RX ADMIN — ROCURONIUM BROMIDE 20 MG: 10 INJECTION, SOLUTION INTRAVENOUS at 12:28

## 2020-06-11 RX ADMIN — LABETALOL HYDROCHLORIDE 20 MG: 5 INJECTION INTRAVENOUS at 18:36

## 2020-06-11 RX ADMIN — FAMOTIDINE 20 MG: 40 POWDER, FOR SUSPENSION ORAL at 08:18

## 2020-06-11 RX ADMIN — Medication 120 MCG: at 12:39

## 2020-06-11 RX ADMIN — SODIUM CHLORIDE 1000 ML: 900 INJECTION, SOLUTION INTRAVENOUS at 07:17

## 2020-06-11 RX ADMIN — ACETAMINOPHEN 650 MG: 650 SUSPENSION ORAL at 22:14

## 2020-06-11 RX ADMIN — Medication 80 MCG: at 12:40

## 2020-06-11 RX ADMIN — SODIUM CHLORIDE, SODIUM LACTATE, POTASSIUM CHLORIDE, AND CALCIUM CHLORIDE 100 ML/HR: 600; 310; 30; 20 INJECTION, SOLUTION INTRAVENOUS at 20:45

## 2020-06-11 RX ADMIN — HEPARIN SODIUM 4000 UNITS: 1000 INJECTION INTRAVENOUS; SUBCUTANEOUS at 12:44

## 2020-06-11 RX ADMIN — HEPARIN SODIUM 4000 UNITS: 1000 INJECTION INTRAVENOUS; SUBCUTANEOUS at 12:47

## 2020-06-11 RX ADMIN — ENOXAPARIN SODIUM 40 MG: 40 INJECTION SUBCUTANEOUS at 13:59

## 2020-06-11 RX ADMIN — LACOSAMIDE 100 MG: 50 TABLET, FILM COATED ORAL at 08:19

## 2020-06-11 RX ADMIN — ROCURONIUM BROMIDE 50 MG: 10 INJECTION, SOLUTION INTRAVENOUS at 12:07

## 2020-06-11 RX ADMIN — PIPERACILLIN AND TAZOBACTAM 3.38 G: 3; .375 INJECTION, POWDER, LYOPHILIZED, FOR SOLUTION INTRAVENOUS at 03:18

## 2020-06-11 RX ADMIN — NIMODIPINE 60 MG: 30 SOLUTION ORAL at 18:27

## 2020-06-11 RX ADMIN — NIMODIPINE 60 MG: 30 SOLUTION ORAL at 09:54

## 2020-06-11 RX ADMIN — ASPIRIN 81 MG CHEWABLE TABLET 81 MG: 81 TABLET CHEWABLE at 08:18

## 2020-06-11 RX ADMIN — Medication 80 MCG: at 12:22

## 2020-06-11 RX ADMIN — IPRATROPIUM BROMIDE AND ALBUTEROL SULFATE 3 ML: .5; 3 SOLUTION RESPIRATORY (INHALATION) at 20:59

## 2020-06-11 RX ADMIN — HEPARIN SODIUM 4000 UNITS: 1000 INJECTION INTRAVENOUS; SUBCUTANEOUS at 12:49

## 2020-06-11 RX ADMIN — Medication 2 DROP: at 13:49

## 2020-06-11 RX ADMIN — SODIUM CHLORIDE 5 MG/HR: 9 INJECTION, SOLUTION INTRAVENOUS at 08:36

## 2020-06-11 RX ADMIN — LEVETIRACETAM 1000 MG: 100 SOLUTION ORAL at 21:08

## 2020-06-11 RX ADMIN — PIPERACILLIN AND TAZOBACTAM 3.38 G: 3; .375 INJECTION, POWDER, LYOPHILIZED, FOR SOLUTION INTRAVENOUS at 11:06

## 2020-06-11 RX ADMIN — Medication 80 MCG: at 12:28

## 2020-06-11 RX ADMIN — ROCURONIUM BROMIDE 20 MG: 10 INJECTION, SOLUTION INTRAVENOUS at 13:08

## 2020-06-11 RX ADMIN — Medication 80 MCG: at 12:46

## 2020-06-11 RX ADMIN — CHLORHEXIDINE GLUCONATE 15 ML: 0.12 RINSE ORAL at 09:00

## 2020-06-11 RX ADMIN — PIPERACILLIN AND TAZOBACTAM 3.38 G: 3; .375 INJECTION, POWDER, LYOPHILIZED, FOR SOLUTION INTRAVENOUS at 18:26

## 2020-06-11 RX ADMIN — Medication 120 MCG: at 12:49

## 2020-06-11 RX ADMIN — HEPARIN SODIUM 4000 UNITS: 1000 INJECTION INTRAVENOUS; SUBCUTANEOUS at 12:45

## 2020-06-11 RX ADMIN — NIMODIPINE 60 MG: 30 SOLUTION ORAL at 13:59

## 2020-06-11 RX ADMIN — FAMOTIDINE 20 MG: 40 POWDER, FOR SUSPENSION ORAL at 21:07

## 2020-06-11 RX ADMIN — NIMODIPINE 60 MG: 30 SOLUTION ORAL at 02:07

## 2020-06-11 RX ADMIN — PROPOFOL INJECTABLE EMULSION 10 MCG/KG/MIN: 10 INJECTION, EMULSION INTRAVENOUS at 04:13

## 2020-06-11 RX ADMIN — LABETALOL HYDROCHLORIDE 20 MG: 5 INJECTION INTRAVENOUS at 15:38

## 2020-06-11 RX ADMIN — Medication 80 MCG: at 12:50

## 2020-06-11 RX ADMIN — CHLORHEXIDINE GLUCONATE 15 ML: 0.12 RINSE ORAL at 21:22

## 2020-06-11 RX ADMIN — NIMODIPINE 60 MG: 30 SOLUTION ORAL at 06:34

## 2020-06-11 RX ADMIN — POTASSIUM BICARBONATE 40 MEQ: 782 TABLET, EFFERVESCENT ORAL at 08:19

## 2020-06-11 RX ADMIN — SODIUM CHLORIDE 30 ML/HR: 3 INJECTION, SOLUTION INTRAVENOUS at 06:00

## 2020-06-11 RX ADMIN — IPRATROPIUM BROMIDE AND ALBUTEROL SULFATE 3 ML: .5; 3 SOLUTION RESPIRATORY (INHALATION) at 13:55

## 2020-06-11 RX ADMIN — VERAPAMIL HYDROCHLORIDE 20 MG: 2.5 INJECTION, SOLUTION INTRAVENOUS at 13:01

## 2020-06-11 RX ADMIN — Medication 2 DROP: at 05:09

## 2020-06-11 RX ADMIN — Medication 80 MCG: at 12:31

## 2020-06-11 NOTE — PROGRESS NOTES
1930: Bedside and Verbal shift change report given to [address] (oncoming nurse) by McIntosh RN (offgoing nurse). Report included the following information SBAR, Kardex, Procedure Summary, Intake/Output, MAR, Recent Results, Cardiac Rhythm NSR, Alarm Parameters  and Dual Neuro Assessment. 2100: Lack NP at bedside evaluating pt and speaking with Dr. Shira Salmon via phone. Received orders to use Cheetah monitor and evaluate fluid status; per chart patient is -1.6 L. Received orders for 1 L NS bolus. 0715: 1 L NS bolus started per Dr. Yuvonne Opitz order. Shift summary: Pt remained sedated on Propofol gtt @ 10 mcg this shift. PRN Fentanyl administered 1x. Pt opens R spontaneously and to stimulus; L eyelid drooping and does not open spontaneously. R pupil 3 and brisk, L pupil 5 and sluggish. No command following. Intermittently withdraws in all four extremities. Titrated Cardene gtt and Fabien gtt to keep -110.     0730: Bedside and Verbal shift change report given to [address] (oncoming nurse) by Kade Layton RN (offgoing nurse). Report included the following information SBAR, Kardex, Intake/Output, MAR, Recent Results, Cardiac Rhythm NSR, Alarm Parameters  and Dual Neuro Assessment.

## 2020-06-11 NOTE — PROGRESS NOTES
[facility]    ICU TEAM Progress Note    Name: Anton May   : 1970   MRN: [id number]   Date: 2020        Subjective:     Reason for ICU Admission:   SAH, vent management    Patient is a 70-year-old female who presented to the ED as a transfer from 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 At the [facility] a CT was done and she was found to have a [facility]  and transferred to [facility] for further neuro evaluation after being intubated by sending facility. Repeat CTH/CTA here showed an extensive subarachnoid hemorrhage concerning for leaking/ruptured aneurysm without hydrocephalus. 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 Both neurosurgery and neuro IR consulted and she was taken to IR for coiling of a Left MCA aneurysm this am by Dr. Billie Alvarado. Plan for Rt aneurysmal clipping at a later time. Remains at risk for hydrocephalus, continue to re-eval for hydrocephalus. Remains intubated at this time. Overnight Events:  2020  Overnight patient with pupillary changes with left pupil 5mm fixed and dilated. Code S called on patient and patient went to [facility] with evidence of increased cerebral edema and mild to moderate L MCA vasospasm post aneurysm coil embolization. The aneurysm was stable to the right MCA. Dr. Cuba Asencio was consulted and a dose of mannitol was given. She was restarted on Propofol infusion. This am, Neurosurgery was called to re-eval for EVD placement for ICP monitoring. A 3 % saline gtt is being started with Q2hr BMP. Patient is +4 anasarca and required arterial line replacement due to dislodgement. Unable to place PICC today 2/2 Fluid overload and peripheral edema. 2020  Remains on 3%, increased secretions  Severe right A1 vasospasm resolved after angioplasty and ALFONSO verapamil infusion.     6/10/2020  MRI showing clot at midbrain/3rd nerve junction  1 PRBC ordred    2020  1 L of NS given by Neuro IR    POD:* No surgery found *    S/P: Day 2 for L MCA aneurysm coiling    Objective:   Vital Signs:  Visit Vitals  BP (!) 173/96   Pulse 77   Temp 98.4 °F (36.9 °C)   Resp 26   Ht 5' 4\" (1.626 m)   Wt 108.5 kg (239 lb 3.2 oz)   SpO2 100%   BMI 41.06 kg/m²      O2 Device: Endotracheal tube, Ventilator Temp (24hrs), Av.5 °F (33.6 °C), Min:37.3 °F (2.9 °C), Max:102.5 °F (39.2 °C)         Intake/Output:     Intake/Output Summary (Last 24 hours) at 2020 1110  Last data filed at 2020 1100  Gross per 24 hour   Intake 5833.45 ml   Output 5625 ml   Net 208.45 ml       Physical Exam:    General:  Sedated and on the ventilator. No acute distress. Eyes:  Sclera anicteric, + scleral edema, Pupils equal, round, reactive to light. Eyes midline, Right eye 2 mm sluggish, left eye 5 mm irreg. NR   Mouth/Throat: Orotracheal tube in place., OGT in place   Neck: Supple. Lungs:   Bronchial/ coarse to auscultation bilaterally,  good effort. Vent supported with equal chest rise/ fall, lower oxygenation support requirements   Cardiovascular:  Regular rate and rhythm, no murmur, click, rub, or gallop. Abdomen:   Soft, obese, non-tender, bowel sounds normal, non-distended. Extremities: No cyanosis, +3-4 generalized edema. Skin: No acute rash or lesions. Lymph Nodes: Cervical and supraclavicular normal.   Musculoskeletal:  +3-4 swelling no deformity. WD x 3 ext, no WD to RUE   Lines/Devices:  Intact, no erythema, drainage, or tenderness.    Psychiatric: Sedated and appears comfortable on ventilator, moves head spontaneously     T/L/D  Tubes: ETT and Orogastric Tube  Lines: Peripheral IV arterial line , Femoral CVL   Drains: Martinez Catheter    LABS AND  DATA: Personally reviewed  Recent Labs     20  0513 06/10/20  1256   WBC 6.5 7.9   HGB 7.3* 8.0*   HCT 25.2* 27.4*    238     Recent Labs     20  0807 20  0513   * 152*   K 3.5 3.4*   * 124*   CO2 23 23   BUN 16 14   CREA 0.67 0.72   * 136*   CA 7.1* 7.3*     No results for input(s): AP, TBIL, TP, ALB, GLOB, AML, LPSE in the last 72 hours. No lab exists for component: SGOT, GPT, AMYP  No results for input(s): INR, PTP, APTT, INREXT, INREXT in the last 72 hours. Recent Labs     06/09/20  0724 06/09/20  0014   PHI 7.481* 7.511*   PCO2I 26.6* 28.7*   PO2I 69* 76*   FIO2I 35 35     No results for input(s): CPK, CKMB, TROIQ, BNPP in the last 72 hours. Ventilator Settings:  Mode Rate Tidal Volume Pressure FiO2 PEEP   Spontaneous   450 ml  5 cm H2O 35 % 5 cm H20     Peak airway pressure: 15 cm H2O    Minute ventilation: 16.2 l/min        MEDS: Reviewed    Chest X-Ray:  CXR Results  (Last 48 hours)               06/10/20 1249  XR CHEST PORT Final result    Impression:  IMPRESSION:   1. Satisfactory CVL placement. 2. Residual right lower lung airspace disease. Narrative:  EXAM: Portable CXR. 1244 hours. COMPARISON: 6/6/2020. INDICATION: new central line Right IJ       FINDINGS:   Left IJ CVL has been placed with tip at the superior cavoatrial junction without   pneumothorax. ET tube remains satisfactory. NG tube remains in the stomach. Lungs show improved aeration with diminished airspace disease with some residual   in the right lower lung. Heart size is top normal. There is no pulmonary edema. There is no apparent pleural effusion. 06/08/20 CTH/ CTA Brain:  IMPRESSION  Impression:     Mild to moderate vasospasm in the left middle cerebral artery status post  bifurcation aneurysm coil embolization.   Stable 4 mm right MCA aneurysm.     Endotracheal tube terminates just above the matt.     1 cm low-density left thyroid nodule.     Mildly enlarged left axillary lymph node measuring 11 mm in short axis.     Patchy bilateral airspace disease.     Nonspecific asymmetric enhancement of the right superior ophthalmic vein        06/06/20 MRI:  IMPRESSION:   Imaging findings consistent with large left MCA territory ischemia/infarction,  left temporal, parietal and frontal lobes, cortically based, no superimposed  increased parenchymal hemorrhage or midline shift. Right and left PANCHITO territory  cortical diffusion restriction consistent with infarction. Infarction in the right insular cortex and right temporal lobe. Scattered small  infarctions right and left corona radiata centrum semiovale, right cerebellum.     Allowing for differences in technique likely stable subarachnoid and  intraventricular hemorrhage.     06/06/20 ECHO:  Result status: Final result   · Normal cavity size and systolic function (ejection fraction normal). Mild concentric hypertrophy. Estimated left ventricular ejection fraction is 60 - 65%. No regional wall motion abnormality noted. · Mildly dilated left atrium.           ABCDEF Bundle/Checklist Completed:  YES-See Plan    SPECIAL EQUIPMENT  None    Active Problem List:     Problem List  Date Reviewed: 6/10/2020          Codes Class    SAH (subarachnoid hemorrhage) ([facility] [address] ICD-10-CM: I60.9  ICD-9-CM: 430         Subarachnoid hemorrhage from middle cerebral artery aneurysm, left (HCC) ICD-10-CM: I60.12  ICD-9-CM: 200         RIGHT middle cerebral artery aneurysm ICD-10-CM: I67.1  ICD-9-CM: 437.3         Cerebral vasospasm ICD-10-CM: I67.848  ICD-9-CM: 435.9         Anterior communicating artery aneurysm ICD-10-CM: I67.1  ICD-9-CM: 437.3             ICU Assessment/ Comprehensive Plan of Care:       Subarrachnoid hemorrhage [facility])  Right MCA trifurcation aneurysm (unsecured)  Left MCA trifurcation aneurysm (s/p coiling)  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 Cindi Briones  -Neurosurgery following - Dr. Sona Jose, Rt MCA will need clipping at later time  -Neurology following, Dr. Leidy Leon  -ASA 81mg  -TCDs   -Q1hr Neuro checks  -Keppra 1000 mg BID, Vimpat 100 BID  -Propofol for sedation  -Continue Lovenox     Acute severe hypoxic respiratory failure  Neurogenic pulmonary edema  CAP  -Lactate & Procal Now - normal  -No cultures needed currently (lactate normal, wbc, procal WNL)  -Continue Zosyn (end date 6/13/2020)  -Provide conservative fluid management to prevent vasospasms  -COVID NEG x2  -CT Chest NEG PE 06/07  Chlorhexidine   Optimize PEEP/Ventilation/Oxygenation  Goal Tidal Volume 6 cc/kg based on IBW  Aim for lung protective ventilation  Head of bed > 30 degrees  Plan to Extubate: attempt to provide weaning trials in am  SPO2 Goal: > 92%    DVT Prophylaxis (if no, list reason): SCD's or Sequential Compression Device   GI Prophylaxis: Pepcid (famotidine)   Nutrition: Yes TFs  Bowel Movement: Yes  Bowel Regimen: Docusate (Colace) and Bisacodyl (Dulcolax)  DVT Prophylaxis (if no, list reason): SCD's or Sequential Compression Device , ASA  Mobility: Fair and Bedrest  PT/OT: Not appropraite at this time, re-eval at extubation     DISPOSITION/COMMUNICATION  Discussed Plan of Care/Code Status: Full Code  Stay in ICU    CRITICAL CARE CONSULTANT NOTE  I had a face to face encounter with the patient, reviewed and interpreted patient data including clinical events, labs, images, vital signs, I/O's, and examined patient. I have discussed the case and the plan and management of the patient's care with the consulting services, the bedside nurses and the respiratory therapist.      NOTE OF PERSONAL INVOLVEMENT IN CARE   I participated in the decision-making and personally managed or directed the management of the following life and organ supporting interventions that required my frequent assessment to treat or prevent imminent deterioration.     I personally spent 85 minutes of critical care time. This is time spent at this critically ill patient's bedside actively involved in patient care as well as the coordination of care and discussions with the patient's family. This does not include any procedural time which has been billed separately.     Lili Eddy [address] [place] [address]   6/11/2020

## 2020-06-11 NOTE — PROGRESS NOTES
Neurointerventional Surgery Progress Note  Katty Leigh NP   Neurocritical Care Nurse Practitioner  [phone]          Admit Date: 6/4/2020        Daily Progress Note: 6/11/2020   LOS: 7 days      S/P:  POD 6 Cerebral angiogram with coil embolization of ruptured left MCA bifurcation aneurysm on 6/5/2020    ** Delayed Presentation ** PB Day 11    Interval History/Subjective:     No acute events overnight. Remains intubated and sedated. Opening right eye spontaneously and moving head around- regards and raises eyebrows. Left pupil remains 5 mm and fixed. Right pupil 2 mm reactive. Moving right arm and leg spontaneously, moving left arm and leg to stimuli. No command following. Remains on Zosyn for fevers    TCDs today show increasing velocities in the left PANCHITO . She will go to angio for IA verapamil and possible angioplasty. Informed consent obtained from her mother. She remains net positive 1.3 L today. Received a 1L bolus overnight. NICOM 10% overnight. IVC shows no collapse on ultrasound today. Plan:   -wean 3% to 20 ml/hr , recheck BMP q6 hours, Sodium goal 145-150  - continue Keppra and Vimpat for seizure prophylaxis   - Continue with MAP goal today 100-120  - angio for likely IA verapamil infusion and possible angioplasty    Discussed plan of care with patients mPOA and mother, Caty Cortes. Unable to perform a ROS due to intubation. 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 Clipping              - Continue Nimotop Day 7 of 21 to prevent delayed cerebral ischemia              - Maintenance IV Fluids on hold due to worsening edema on CXR on 6/6 per Intensivist ,               will consider restarting after IVC US and NICOM              - TCDs daily              - Strict I&O's              - ECHO shows EF 60-65%, mild concentric hypertrophy, mildly dilated left atrium              - continue every hour neuro checks              - Keep SBP goal <200, MAP goal 100-110 Cardene/Fabien/Hydralazine/Labetalol PRN    2.) Seizure              - Due to #1, no prior hx of seizure              - Continue Keppra 1000 mg BID for seizure PPX              - continue Vimpat 100 mg BID              - Seizure precautions              -  EEG on 6/6 showed occasional epileptiform disturbance seen in the left frontal area which may represent a partial onset seizure focus.              - completed 24 hour EEG today which shows No convincingly lateralizing or epileptiform features were noted. No seizure was recorded.               - Neurology following                3.) Cerebral Edema              - received a dose of Mannitol overnight, start 3% saline 6/8.               - wean 3% sodium gtt at 30 cc/hr , sodium goal 150-155, discussed with intensivist and               nursing               - continue q4 hour BMP checks     4.) Acute hypoxic respiratory failure in the setting of likely neurogenic pulmonary edema               - patient diuresed with Lasix initially              - COVID 19 test negative x 2             - vent settings per Intensivist - now on 35% FiO2- trialing on SBT today             - sedation off              - intensivist managing     5.) Multiple Acute Ischemic Infarctions                - Imaging pattern is consistent with Hypoxic-Ischemic Encephalopathy (HIE)                - Continue aspirin 300 VT daily for stroke prevention                - ECHO as stated above                - Lipid panel on 6/5, LDL 7.8, goal < 70, statin not indicated                - Hgb A1C ok at 5.7                - Neurology following          Activity: Bed rest  DVT ppx: SCDs- Lovenox  Disposition: TBD    Plan d/w Dr. Raiza Viera, Dr. Katya Caraballo ( intensivist) ,  RN, and patient's mother      Admission Summary:     Edison Huitron is a 48 y.o. 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 On arrival to ED, pt was unresponsive and noted to be seizing. A stat CT of her head was performed there, which showed extensive SAH. Pt was then intubated emergently. She was also extremely hypertensive with SBPs in 240s. She was given Keppra and Ativan, and placed on cardene drip for BP control. NSGY and NIS were then consulted and the patient was transferred to [facility] for higher level of care. En route, paramedics administered 7.5 mg of Versed, 200mcg of Fentanyl, and 10 mg of labetalol for BP control. On arrival, CTA of her head and neck was obtained, which showed a 4.4 x 4.1 mm right MCA trifurcation aneurysm and  a 2.2 x 3.1 mm left MCA trifurcation aneurysm. Of note, CTA head/neck also showed pulmonary infiltrates suspicious for COVID-19 infection and the patient has been tested for COVID which was negative. 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 Mother reported pt does not smoke, does not drink alcohol or use street drugs. The mother also reported that the patient began complaining of a headache on 5/31/2020 and has been taking BC Powder (aspirin) for the headache. She also reported that the pt has a hx of HTN and is supposed to be on BP medications, but recently stopped taking her medications.  The patient underwent a cerebral angiogram on 6/5 by Dr. Raiza Viera for coil embolization of a ruptured left MCA bifurcation aneurysm. Unable to obtain a ROS due intubation and sedation.   Current Facility-Administered Medications   Medication Dose Route Frequency Provider Last Rate Last Dose    potassium bicarb-citric acid (EFFER-K) tablet 40 mEq  40 mEq Oral BID Leon Berry DO   40 mEq at 06/11/20 0819    lactated Ringers infusion  100 mL/hr IntraVENous CONTINUOUS Leon Berry  mL/hr at 06/11/20 1056 100 mL/hr at 06/11/20 1056    labetaloL (NORMODYNE;TRANDATE) injection 20 mg  20 mg IntraVENous Q2H PRN Adrienne Domínguez NP   20 mg at 06/11/20 3234    0.9% sodium chloride infusion 250 mL  250 mL IntraVENous PRN FABIAN Sanchez        aspirin chewable tablet 81 mg  81 mg Per NG tube DAILY Maicol Christensen MD   81 mg at 06/11/20 0818    3% sodium chloride (*HIGH ALERT*CONCENTRATED IV*) infusion  20 mL/hr IntraVENous CONTINUOUS Milla Schmidt NP 20 mL/hr at 06/11/20 0838 20 mL/hr at 06/11/20 0838    enoxaparin (LOVENOX) injection 40 mg  40 mg SubCUTAneous Q24H Leon Berry DO   40 mg at 06/10/20 1720    famotidine (PEPCID) 40 mg/5 mL (8 mg/mL) oral suspension 20 mg  20 mg Per NG tube Q12H Leon Berry DO   20 mg at 06/11/20 0818    lacosamide (VIMPAT) tablet 100 mg  100 mg Per NG tube Q12H Leon Berry DO   100 mg at 06/11/20 0819    levETIRAcetam (KEPPRA) oral solution 1,000 mg  1,000 mg Per NG tube Q12H Leon Berry DO   1,000 mg at 06/11/20 0818    glycopyrrolate (ROBINUL) injection 0.2 mg  0.2 mg IntraMUSCular TID PRN Sarah Saleh NP        niMODipine (NYMALIZE) 6 mg/mL oral solution 60 mg  60 mg Per NG tube Q4H Adrienne Domínguez NP   60 mg at 06/11/20 0954    niCARdipine (CARDENE) 50 mg in 0.9% sodium chloride 100 mL infusion  0-15 mg/hr IntraVENous TITRATE [facility] R, NP-C 10 mL/hr at 06/11/20 1106 5 mg/hr at 06/11/20 1106    [Held by provider] amLODIPine (NORVASC) tablet 5 mg  5 mg Per NG tube DAILY Jody Pedroza, Glorious Melony, ACNP   5 mg at 06/10/20 0853    acetaminophen (TYLENOL) solution 650 mg  650 mg Per NG tube Q4H PRN José Manuel Knight NP   650 mg at 06/11/20 0411    0.9% sodium chloride infusion 250 mL  250 mL IntraVENous PRN FABIAN Mendez        PHENYLephrine (ELKIN-SYNEPHRINE) 30 mg in 0.9% sodium chloride 250 mL infusion   mcg/min IntraVENous TITRATE Elizabeth Avery NP   Stopped at 06/11/20 0703    albuterol-ipratropium (DUO-NEB) 2.5 MG-0.5 MG/3 ML  3 mL Nebulization Q6H RT Felicia Ogren, ACNP   3 mL at 06/11/20 0723    piperacillin-tazobactam (ZOSYN) 3.375 g in 0.9% sodium chloride (MBP/ADV) 100 mL  3.375 g IntraVENous Q8H Felicia Ogren, ACNP 25 mL/hr at 06/11/20 1106 3.375 g at 06/11/20 1106    glucose chewable tablet 16 g  4 Tab Oral PRN FABIAN Mendez        glucagon (GLUCAGEN) injection 1 mg  1 mg IntraMUSCular PRN FABIAN Mendez        dextrose 10% infusion 0-250 mL  0-250 mL IntraVENous PRN FABIAN Mendez        insulin lispro (HUMALOG) injection   SubCUTAneous Q6H FABIAN Lane   2 Units at 06/11/20 0530    albuterol (PROVENTIL VENTOLIN) nebulizer solution 2.5 mg  2.5 mg Nebulization Q6H PRN FABAIN Mendez        fentaNYL citrate (PF) injection  mcg   mcg IntraVENous Q1H PRN Felicia Ogren, ACNP   50 mcg at 06/11/20 0557    polyvinyl alcohol-povidone (NATURAL TEARS) 0.5-0.6 % ophthalmic solution 2 Drop  2 Drop Both Eyes Q8H Felicia Ogren, ACNP   2 Drop at 06/11/20 9559    docusate (COLACE) 50 mg/5 mL oral liquid 100 mg  100 mg Oral BID Felicia Ogren, ACNP   Stopped at 06/07/20 1800    bisacodyL (DULCOLAX) suppository 10 mg  10 mg Rectal DAILY PRN Felicia Ogren, ACNP        propofol (DIPRIVAN) 10 mg/mL infusion  0-50 mcg/kg/min IntraVENous TITRATE Mirna Domínguez Glitierrase, NP 9.1 mL/hr at 06/11/20 1051 15 mcg/kg/min at 06/11/20 1051    chlorhexidine (ORAL CARE KIT) 0.12 % mouthwash 15 mL 15 mL Oral Q12H Amie RIVAS NP-C   15 mL at 20 0900    ondansetron (ZOFRAN) injection 4 mg  4 mg IntraVENous Q4H PRN FABIAN Funk        docusate (COLACE) 50 mg/5 mL oral liquid 100 mg  100 mg Oral BID PRN Amie RIVAS NP-JOVANY            No Known Allergies    Review of Systems:  Review of systems not obtained due to patient factors. Objective:     Vital signs  Temp (24hrs), Av.5 °F (33.6 °C), Min:37.3 °F (2.9 °C), Max:102.5 °F (39.2 °C)    07 -  190  In: 1679.9 [I.V.:1139.9]  Out: 50 [Drains:50]  1901 -  0700  In: 6862.1 [I.V.:5130.8]  Out: 77877 [SSCDI:7025; Drains:1500]    Visit Vitals  BP (!) 173/96   Pulse 77   Temp 98.4 °F (36.9 °C)   Resp 26   Ht 5' 4\" (1.626 m)   Wt 239 lb 3.2 oz (108.5 kg)   SpO2 100%   BMI 41.06 kg/m²      O2 Device: Endotracheal tube, Ventilator   Vitals:    20 1030 20 1045 20 1100 20 1106   BP:    (!) 173/96   Pulse: 83 81 78 77   Resp: 24 24 26    Temp:       SpO2: 99% 100% 100%    Weight:       Height:          Physical Exam:  GENERAL: NAD, intubated and sedated on propofol. EYE: conjunctivae/corneas clear. Right pupil 3 mm, brisk response to light. Left pupil 5 mm fixed and dilated. Eyes midline. Scleral edema noted bilaterally   LUNG: lungs coarse bilaterally   HEART: regular rate and rhythm, S1, S2 normal, no murmur, click, rub or gallop  EXTREMITIES:  extremities normal, atraumatic, no cyanosis, 3+ pitting peripheral edema, distal pulses 2+ bilaterally   SKIN: Skin warm to touch. Right groin site clean, dry, and intact. No hematoma, bruising, or bleeding noted. NEUROLOGIC: Intubated and sedated on Propofol. Eyes do not open to stimulus. No command following. Moves head intermittently. Right pupil 3 mm, brisk response to light. Left pupil 5 mm fixed and dilated. Eyes midline. No tracking with eyes. No blink to threat. Withdraws to painful stimuli on all extremities BLE >BUE.  Toes mute bilaterally. No involuntary movements. Gait deferred. Unable to assess language, sensation, and coordination. Imaging:    CT of Head on 6/8/2020 shows  IMPRESSION:   Global edema, otherwise no significant change    CTA of Head and Neck on 6/8/2020 per radiology shows  Impression:  Mild to moderate vasospasm in the left middle cerebral artery status post  bifurcation aneurysm coil embolization. Stable 4 mm right MCA aneurysm. Endotracheal tube terminates just above the matt. 1 cm low-density left thyroid nodule. Mildly enlarged left axillary lymph node measuring 11 mm in short axis  Patchy bilateral airspace disease. Nonspecific asymmetric enhancement of the right superior ophthalmic vein   (However, no significant vasospasm was seen when personally reviewed by NIS)    CT Perfusion on 6/8/2020 shows  IMPRESSION:  No significant cerebral perfusion abnormality    CTA of Chest on 6/7/2020 shows  IMPRESSION:  1. No acute pulmonary embolus  2. Dependent atelectasis with diffuse groundglass opacifications bilaterally. CT of Head on 6/7/2020 shows  IMPRESSION:   1. No further progression of subarachnoid hemorrhage. The overall degree of  subarachnoid hemorrhage is slightly improved. 2. Subtle areas of gray-white differentiation loss throughout the cerebral  hemispheres correlating with areas of restricted diffusion on previous CT  compatible with ischemic changes. MRI of Brain on 6/6/2020 shows  IMPRESSION:   Imaging findings consistent with large left MCA territory ischemia/infarction,  left temporal, parietal and frontal lobes, cortically based, no superimposed  increased parenchymal hemorrhage or midline shift. Right and left PANCHITO territory  cortical diffusion restriction consistent with infarction. Infarction in the right insular cortex and right temporal lobe. Scattered small  infarctions right and left corona radiata centrum semiovale, right cerebellum.   Allowing for differences in technique likely stable subarachnoid and  intraventricular hemorrhage. CT of Head on 6/6/2020 at 1411 shows  IMPRESSION:   Left MCA territory infarction with smaller right MCA territory infarction.     Stable subarachnoid and intraventricular hemorrhage. CTA of Head on 6/6/2020 at 0903 shows  IMPRESSION:  Slightly increased intraventricular and subarachnoid hemorrhage compared to the  prior exam.     Status post coiling left MCA bifurcation aneurysm. No significant residual  aneurysm filling. CT of Head on 6/62020 at 0239 shows  IMPRESSION:  Slightly diminished subarachnoid and intraventricular hemorrhage. Stable ventricular system. CT of Head WO on 6/5/2020 at 1512 shows  IMPRESSION: Stable acute subarachnoid and intraventricular hemorrhage. Stable  ventricular system. CT Head WO 6/5/20 0826     IMPRESSION:   1. Diffuse subarachnoid hemorrhage most concentrated in the basal cisterns and  left sylvian fissure. 2.  Interval ventricular enlargement suspicious for developing hydrocephalus.     CTA Head 6/4/20 2017     IMPRESSION:  1. Diffuse subarachnoid hemorrhage in the basilar cisterns and sylvian  fissures. 2.  Bilateral MCA bifurcation aneurysms. 3.  Infundibular origins to the posterior communicating arteries bilaterally. 4.  Symmetric moderately severe airspace disease in the upper lobes bilaterally  which may represent edema or sequela of aspiration.     CT Head WO Contrast 6/4/2020 2012    IMPRESSION: Extensive subarachnoid hemorrhage concerning for leaking/ruptured  aneurysm.        24 hour results:    Recent Results (from the past 24 hour(s))   GLUCOSE, POC    Collection Time: 06/10/20 11:53 AM   Result Value Ref Range    Glucose (POC) 137 (H) 65 - 100 mg/dL    Performed by Maite Martínez    TCD INTRACRANIAL ARTERIES COMPLETE    Collection Time: 06/10/20 12:10 PM   Result Value Ref Range    Right Lindegaard Ratio 2.3     Right MCA 1  cm/s    Right MCA 1 EDV 61 cm/s    Right MCA 1 Mean Velocity 109 cm/s    Right PANCHITO  cm/s    Right PANCHITO EDV 64 cm/s    Right PANCHITO Mean Velocity 109 cm/s    Right ICA  cm/s    Right ICA EDV 40 cm/s    Right ICA Mean Velocity 67 cm/s    Right PCA 1  cm/s    Right PCA 1 EDV 35 cm/s    Right PCA 1 Mean Velocity 60 cm/s    Right External ICA PSV 93 cm/s    Right External ICA EDV 26 cm/s    Right External ICA Mean Velocity 48 cm/s    Left MCA 1  cm/s    Left MCA 1 EDV 87 cm/s    Left MCA 1 Mean Velocity 134 cm/s    Left PANCHITO  cm/s    Left PANCHITO  cm/s    Left PANCHITO Mean Velocity 161 cm/s    Left ICA  cm/s    Left External ICA PSV 83 cm/s    Left ICA EDV 87 cm/s    Left External ICA EDV 36 cm/s    Left ICA Mean Velocity 131 cm/s    Left External ICA Mean Velocity 52 cm/s    Left PCA 1  cm/s    Left PCA 1 EDV 79 cm/s    Left PCA 1 Mean Velocity 123 cm/s    Left Lindegaard Ratio 2.6     Basilar Artery PSV 76 cm/s    Basilar Artery EDV 25 cm/s    Basilar Artery Mean Angel 42 cm/s    Right Vertebral Mean Velocity 40 cm/s    Left Vertebral Mean Velocity 44 cm/s    Right Vertebral PSV 67 cm/s    Right Vertebral EDV 27 cm/s    Left Vertebral PSV 79 cm/s    Left Vertebral EDV 27 cm/s   METABOLIC PANEL, BASIC    Collection Time: 06/10/20 12:56 PM   Result Value Ref Range    Sodium 154 (H) 136 - 145 mmol/L    Potassium 3.6 3.5 - 5.1 mmol/L    Chloride 125 (H) 97 - 108 mmol/L    CO2 23 21 - 32 mmol/L    Anion gap 6 5 - 15 mmol/L    Glucose 121 (H) 65 - 100 mg/dL    BUN 18 6 - 20 MG/DL    Creatinine 0.68 0.55 - 1.02 MG/DL    BUN/Creatinine ratio 26 (H) 12 - 20      GFR est AA >60 >60 ml/min/1.73m2    GFR est non-AA >60 >60 ml/min/1.73m2    Calcium 7.5 (L) 8.5 - 10.1 MG/DL   CBC W/O DIFF    Collection Time: 06/10/20 12:56 PM   Result Value Ref Range    WBC 7.9 3.6 - 11.0 K/uL    RBC 3.71 (L) 3.80 - 5.20 M/uL    HGB 8.0 (L) 11.5 - 16.0 g/dL    HCT 27.4 (L) 35.0 - 47.0 %    MCV 73.9 (L) 80.0 - 99.0 FL    MCH 21.6 (L) 26.0 - 34.0 PG    MCHC 29.2 (L) 30.0 - 36.5 g/dL    RDW 23.7 (H) 11.5 - 14.5 %    PLATELET 695 930 - 232 K/uL    MPV 10.5 8.9 - 12.9 FL    NRBC 0.5 (H) 0  WBC    ABSOLUTE NRBC 0.04 (H) 0.00 - 0.01 K/uL   GLUCOSE, POC    Collection Time: 06/10/20  5:59 PM   Result Value Ref Range    Glucose (POC) 130 (H) 65 - 100 mg/dL    Performed by Virginia Mack 10, BASIC    Collection Time: 06/10/20  6:31 PM   Result Value Ref Range    Sodium 155 (H) 136 - 145 mmol/L    Potassium 3.6 3.5 - 5.1 mmol/L    Chloride 125 (H) 97 - 108 mmol/L    CO2 20 (L) 21 - 32 mmol/L    Anion gap 10 5 - 15 mmol/L    Glucose 137 (H) 65 - 100 mg/dL    BUN 15 6 - 20 MG/DL    Creatinine 0.66 0.55 - 1.02 MG/DL    BUN/Creatinine ratio 23 (H) 12 - 20      GFR est AA >60 >60 ml/min/1.73m2    GFR est non-AA >60 >60 ml/min/1.73m2    Calcium 7.6 (L) 8.5 - 37.6 MG/DL   METABOLIC PANEL, BASIC    Collection Time: 06/10/20 10:05 PM   Result Value Ref Range    Sodium 153 (H) 136 - 145 mmol/L    Potassium 3.7 3.5 - 5.1 mmol/L    Chloride 126 (H) 97 - 108 mmol/L    CO2 21 21 - 32 mmol/L    Anion gap 6 5 - 15 mmol/L    Glucose 125 (H) 65 - 100 mg/dL    BUN 16 6 - 20 MG/DL    Creatinine 0.64 0.55 - 1.02 MG/DL    BUN/Creatinine ratio 25 (H) 12 - 20      GFR est AA >60 >60 ml/min/1.73m2    GFR est non-AA >60 >60 ml/min/1.73m2    Calcium 7.7 (L) 8.5 - 10.1 MG/DL   GLUCOSE, POC    Collection Time: 06/10/20 11:38 PM   Result Value Ref Range    Glucose (POC) 124 (H) 65 - 100 mg/dL    Performed by Jeannie Camilo    METABOLIC PANEL, BASIC    Collection Time: 06/11/20  2:03 AM   Result Value Ref Range    Sodium 156 (H) 136 - 145 mmol/L    Potassium 3.6 3.5 - 5.1 mmol/L    Chloride 127 (H) 97 - 108 mmol/L    CO2 23 21 - 32 mmol/L    Anion gap 6 5 - 15 mmol/L    Glucose 123 (H) 65 - 100 mg/dL    BUN 14 6 - 20 MG/DL    Creatinine 0.61 0.55 - 1.02 MG/DL    BUN/Creatinine ratio 23 (H) 12 - 20      GFR est AA >60 >60 ml/min/1.73m2    GFR est non-AA >60 >60 ml/min/1.73m2    Calcium 7.6 (L) 8.5 - 10.1 MG/DL   PROCALCITONIN    Collection Time: 06/11/20  5:13 AM   Result Value Ref Range    Procalcitonin <0.05 ng/mL   CBC WITH AUTOMATED DIFF    Collection Time: 06/11/20  5:13 AM   Result Value Ref Range    WBC 6.5 3.6 - 11.0 K/uL    RBC 3.39 (L) 3.80 - 5.20 M/uL    HGB 7.3 (L) 11.5 - 16.0 g/dL    HCT 25.2 (L) 35.0 - 47.0 %    MCV 74.3 (L) 80.0 - 99.0 FL    MCH 21.5 (L) 26.0 - 34.0 PG    MCHC 29.0 (L) 30.0 - 36.5 g/dL    RDW 23.2 (H) 11.5 - 14.5 %    PLATELET 911 304 - 211 K/uL    MPV 10.0 8.9 - 12.9 FL    NRBC 1.1 (H) 0  WBC    ABSOLUTE NRBC 0.07 (H) 0.00 - 0.01 K/uL    NEUTROPHILS 67 32 - 75 %    LYMPHOCYTES 20 12 - 49 %    MONOCYTES 10 5 - 13 %    EOSINOPHILS 1 0 - 7 %    BASOPHILS 0 0 - 1 %    IMMATURE GRANULOCYTES 2 (H) 0.0 - 0.5 %    ABS. NEUTROPHILS 4.3 1.8 - 8.0 K/UL    ABS. LYMPHOCYTES 1.3 0.8 - 3.5 K/UL    ABS. MONOCYTES 0.7 0.0 - 1.0 K/UL    ABS. EOSINOPHILS 0.1 0.0 - 0.4 K/UL    ABS. BASOPHILS 0.0 0.0 - 0.1 K/UL    ABS. IMM.  GRANS. 0.1 (H) 0.00 - 0.04 K/UL    DF SMEAR SCANNED      RBC COMMENTS ANISOCYTOSIS  3+        RBC COMMENTS MICROCYTOSIS  1+        RBC COMMENTS HYPOCHROMIA  2+        RBC COMMENTS OVALOCYTES  1+       METABOLIC PANEL, BASIC    Collection Time: 06/11/20  5:13 AM   Result Value Ref Range    Sodium 152 (H) 136 - 145 mmol/L    Potassium 3.4 (L) 3.5 - 5.1 mmol/L    Chloride 124 (H) 97 - 108 mmol/L    CO2 23 21 - 32 mmol/L    Anion gap 5 5 - 15 mmol/L    Glucose 136 (H) 65 - 100 mg/dL    BUN 14 6 - 20 MG/DL    Creatinine 0.72 0.55 - 1.02 MG/DL    BUN/Creatinine ratio 19 12 - 20      GFR est AA >60 >60 ml/min/1.73m2    GFR est non-AA >60 >60 ml/min/1.73m2    Calcium 7.3 (L) 8.5 - 10.1 MG/DL   ASPIRIN TEST    Collection Time: 06/11/20  5:13 AM   Result Value Ref Range    Aspirin test 461 ARU   GLUCOSE, POC    Collection Time: 06/11/20  5:19 AM   Result Value Ref Range    Glucose (POC) 141 (H) 65 - 100 mg/dL    Performed by VIRGIE Gusman    Collection Time: 06/11/20  8:07 AM   Result Value Ref Range    Sodium 152 (H) 136 - 145 mmol/L    Potassium 3.5 3.5 - 5.1 mmol/L    Chloride 126 (H) 97 - 108 mmol/L    CO2 23 21 - 32 mmol/L    Anion gap 3 (L) 5 - 15 mmol/L    Glucose 123 (H) 65 - 100 mg/dL    BUN 16 6 - 20 MG/DL    Creatinine 0.67 0.55 - 1.02 MG/DL    BUN/Creatinine ratio 24 (H) 12 - 20      GFR est AA >60 >60 ml/min/1.73m2    GFR est non-AA >60 >60 ml/min/1.73m2    Calcium 7.1 (L) 8.5 - 10.1 MG/DL   TCD INTRACRANIAL ARTERIES COMPLETE    Collection Time: 06/11/20 10:03 AM   Result Value Ref Range    Right Lindegaard Ratio 2.6     Right MCA 1  cm/s    Right MCA 1 EDV 62 cm/s    Right MCA 1 Mean Velocity 96 cm/s    Right PANCHITO  cm/s    Right PANCHITO EDV 82 cm/s    Right PANCHITO Mean Velocity 116 cm/s    Right ICA  cm/s    Right ICA EDV 37 cm/s    Right ICA Mean Velocity 59 cm/s    Right PCA 1 PSV 82 cm/s    Right PCA 1 EDV 29 cm/s    Right PCA 1 Mean Velocity 47 cm/s    Right External ICA PSV 64 cm/s    Right External ICA EDV 23 cm/s    Right External ICA Mean Velocity 37 cm/s    Left MCA 1  cm/s    Left MCA 1 EDV 82 cm/s    Left MCA 1 Mean Velocity 124 cm/s    Left PANCHITO  cm/s    Left PANCHITO  cm/s    Left PANCHITO Mean Velocity 183 cm/s    Left ICA  cm/s    Left External ICA PSV 38 cm/s    Left ICA EDV 44 cm/s    Left External ICA EDV 13 cm/s    Left ICA Mean Velocity 69 cm/s    Left External ICA Mean Velocity 21 cm/s    Left PCA 1  cm/s    Left PCA 1 EDV 89 cm/s    Left PCA 1 Mean Velocity 135 cm/s    Left Lindegaard Ratio 5.9         Justine Ortiz, NP

## 2020-06-11 NOTE — OP NOTES
NEUROINTERVENTIONAL SURGERY POST-PROCEDURE NOTE    PROCEDURE:  Transarterial infusion of non-lytic agent, CNS (verapamil)  Cerebral angiography  US guided arterial access:  Left femoral artery  Angioseal:  Left femoral artery    VESSEL(S) STUDIED:  1. LCCA  2. LICA  3. ALFONSO  4. LVA VESSEL(S) TREATED:  1. LICA (20 mg verapamil)  2. ALFONSO (10 mg verapamil)  3. LVA (10 mg verapamil)      PRELIMINARY REPORT & DISPOSITION:     Overall improved today. Left PANCHITO and left PCA most severely affected by spasm but responding better to verapamil      Left MCA and ICA spasm improved (mild). Plan to wean/extubate are ok from my standpoint if possible. COMPLICATIONS:    None immediate    FOLLOW-UP:  -633 (systolic < 119) DATE OF SERVICE:  6/11/2020 1:16 PM     ATTENDING SURGEON(S):  Marcia Holden MD      ANESTHESIA:   General    MEDICATIONS:   See nursing record  40 mg IA verapamil  2000U heparin IV  90 cc Isovue 300 contrast    PUNCTURE SITE:  LEFT common femoral artery. Arteriotomy closed with 6F Angioseal. Left leg straight x 2 hours. HOB >30 degrees.

## 2020-06-11 NOTE — PROGRESS NOTES
Neurocritical Care Brief Progress Note:    Rounded on pt post angio. Pt now opens her right eye spontaneously. Bilateral groin arteriotomy sites assessed, are soft palpation, dressings are C/D/I, with no active bleeding or drainage noted at this time. Pt on ASA 81 mg daily   - Check ARU in am    I/O record reviewed, noted to be net negative 1L fluids   - 1000 ml IVF bolus to maintain euvolemia    D/w Dr. Lis Barajas.     Rasheeda Glaser NP  Neurocritical Care Nurse Practitioner  [phone]

## 2020-06-11 NOTE — PROGRESS NOTES
MEERA  Patient taken to [facility] ED after co workers found patient with seizure like activity. CT/CTA: Extensive SAH. Transferred to [facility] for Neurosurgery evaluation. Patient is s/p coiling procedure  RUR: 15 %  Plan: Pending therapy notes, will likely need rehab. Patient remains in ICU vented on IV Cardene, Fabien and diprivan. Patient is s/p left ICA/MCA angioplasty. Care management is continuing to follow.  Shanell Concepcion RN,CRM

## 2020-06-11 NOTE — PROGRESS NOTES
TRANSFER - IN REPORT:    Verbal report received from Tere Yu RN(name) on Mirna Gear  being received from ICU(unit) for ordered procedure      Report consisted of patients Situation, Background, Assessment and   Recommendations(SBAR). Information from the following report(s) SBAR, Kardex, Intake/Output, MAR and Recent Results was reviewed with the receiving nurse. Opportunity for questions and clarification was provided. Assessment completed upon patients arrival to unit and care assumed.

## 2020-06-11 NOTE — PROGRESS NOTES
Pt to angio unit accompanied by ICU-RN Sophy Deluca), Dr Aarti Steele and MILAGRO Hobson. Pt arrives intubated, rectal tube, ART line, Martinez cath lines in place. Anesthesia to assume care , maintain airway, medications, pt status. Pt VSS at time of transfer. Pt moved to angio table without incident.

## 2020-06-11 NOTE — PROGRESS NOTES
Neurology Progress Note  Dallas Fair, NP  Neurocritical Care Nurse Practitioner  [phone]    Admit Date: 6/4/2020   LOS: 7 days      Daily Progress Note: 6/11/2020    S/P: Cerebral angiogram with coil embolization of the left MCA aneurysm by Dr. Blanca Degroot on 6/5/20. Jessica Roberts is a 48 y.o. 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 On arrival to ED, pt was unresponsive and noted to be seizing. A stat CT of her head was performed there, which showed extensive SAH. Pt was then intubated emergently. She was also extremely hypertensive with SBPs in 240s. She was given Keppra and Ativan, and placed on cardene drip for BP control. NSGY and NIS were then consulted and pt was transferred to [facility] for higher level of care. On arrival, CTA of her head and neck was obtained, which showed a 4.4 x 4.1 mm right MCA trifurcation aneurysm, a 2.2 x 3.1 mm left MCA trifurcation aneurysm, and possible < 2mm ACOM aneurysms. Repeat CT head of her head showed stable SAH, no evidence of hydrocephalus. COVID-19 infection testing was negative. History obtained from pt's mother, who reports pt does not smoke, does not drink alcohol or use street drugs. Mother reports pt began complaining of a headache on 5/31/20, has been taking BC Powder (aspirin) for the headache. She also reports that the pt has a hx of HTN and is supposed to be on BP medications but recently stopped taking her medications. Patient was taken for cerebral angiogram and coil embolization of the left MCA aneurysm by Dr. Blanca Degroot 6/5/20. The right MCA aneurysm had a very wide neck incorporating both M2s and not be treated endovascularly without stents. May need to be clipped by Neurosurgery.     Subjective:     Patient with no acute neuro events overnight.  Pt received 1000 ml IVF overnight due to net negative fluid status,  assessed with NICOM SVI (10.1%) post bolus. No Known Allergies    Review of Systems:  Review of systems not obtained due to patient factors. Objective:   Vital signs  Temp (24hrs), Av °F (33.9 °C), Min:37.3 °F (2.9 °C), Max:102.5 °F (39.2 °C)   06/10 1901 -  0700  In: 1442.8 [I.V.:1322.8]  Out: 975 [Urine:875; Drains:100]  701 - 06/10 1900  In: 6160.8 [I.V.:4309.5]  Out: 9125 [Urine:7625; Drains:1500]  Visit Vitals  /79   Pulse 76   Temp 100.3 °F (37.9 °C)   Resp 19   Ht 5' 4\" (1.626 m)   Wt 240 lb 8.4 oz (109.1 kg)   SpO2 100%   BMI 41.29 kg/m²      O2 Device: Endotracheal tube, Ventilator   Vitals:    06/10/20 2300 20 0000 20 0056 20 0100   BP: 177/76 191/86  195/79   Pulse:  76 77 76   Resp:  16 16 19   Temp:  100.3 °F (37.9 °C)     SpO2:  100% 100% 100%   Weight:       Height:          Physical Exam:  GENERAL: Intubated and sedated, NAD   SKIN: Warm, dry, color appropriate for ethnicity. Bilateral groin arteriotomy sites are soft, dressings are c/d/i, with no odor, bleeding or drainage noted.    NEURO: Intubated with propofol 10mcg/kg/min running. Right eye opens spontaneously with stimuli. Left pupil 5 mm and fixed, right pupil 2 mm sluggishly reactive. Blinks to threat. Disconjugate gaze present. Face symmetric.  Weak withdraw to painful stimuli in all 4 ext. +cough +gag.      Labs:  Lab Results   Component Value Date/Time    WBC 7.9 06/10/2020 12:56 PM    HGB 8.0 (L) 06/10/2020 12:56 PM    HCT 27.4 (L) 06/10/2020 12:56 PM    PLATELET 321  12:56 PM    MCV 73.9 (L) 06/10/2020 12:56 PM     Lab Results   Component Value Date/Time    Sodium 153 (H) 06/10/2020 10:05 PM    Potassium 3.7 06/10/2020 10:05 PM    Chloride 126 (H) 06/10/2020 10:05 PM    CO2 21 06/10/2020 10:05 PM    Anion gap 6 06/10/2020 10:05 PM    Glucose 125 (H) 06/10/2020 10:05 PM    BUN 16 06/10/2020 10:05 PM    Creatinine 0.64 06/10/2020 10:05 PM    BUN/Creatinine ratio 25 (H) 06/10/2020 10:05 PM    GFR est AA >60 06/10/2020 10:05 PM    GFR est non-AA >60 06/10/2020 10:05 PM    Calcium 7.7 (L) 06/10/2020 10:05 PM     Imaging:  MRI Results (most recent):  Results from [facility] encounter on 06/04/20   MRI BRAIN WO CONT    Narrative *PRELIMINARY REPOR*  MRI examination of the brain demonstrates multiple areas of restricted diffusion  acetabulum a study from June 6. Hemorrhage in the left sylvian fissure is again  seen. There is hemorrhage in the basilar cisterns. Preliminary report was provided by Dr. Sudha Elizabeth, the on-call radiologist, at  3:57 AM  Final report to follow. *END PRELIMINARY REPORT*    *FINAL REPORT BELOW*  EXAM:  MRI BRAIN WO CONT  INDICATION:  Cerebral edema, left 3rd nerve, coronal T2s please  TECHNIQUE:   Sagittal T1, axial FLAIR, T2,T1 and gradient echo images as well as coronal T2  weighted images and axial diffusion weighted images of the head were obtained. COMPARISON:  CTA head 6/9/20, MRI 6/6/20  FINDINGS:  Multiple areas of abnormal restricted diffusion associated T2 hyperintensity  involving left frontal, insular, temporal and parietal lobes. Punctate focus  left occipital lobe. Also again noted is bilateral parasagittal anterior  inferior frontal cortex involvement as well as also previously demonstrated  right insular and temporal lobe involvement. Foci in centrum semiovale. Punctate  foci of restricted diffusion right cerebellum. Again demonstrated is subarachnoid hemorrhage in the left sylvian fissure and  left suprasellar cistern. Some intraventricular blood without associated  hydrocephalus. Other than asymmetric increased subarachnoid blood in the left  suprasellar cistern, no finding to explain source of left 3rd nerve palsy. Increasing fluid and mucosal thickening sphenoid and paranasal sinuses. Also  effusions right greater than left mastoid/middle ear. Other of the skull base  are unremarkable.       Impression IMPRESSION:  1. Multiple areas of cortical acute ischemic injury as well as some subcortical  white matter areas and right cerebellar involvement similar to the prior MRI  6/6/20.  2. Subarachnoid hemorrhage and intraventricular blood again demonstrated without  hydrocephalus. 3. Increasing fluid paranasal sinuses and right greater than left mastoid/middle  ear cavities. 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    - s/p cerebral angiogram with coil embolization of left MCA bifurcation aneurysm on 6/5 by Dr. Valentine Holliday   - Day 7/21 of Nimotop to prevent delayed cerebral ischemia   - Cerebral vasospasm management per NIS, IA verapamil tx on 6/9/20 & 6/10/20               - Strict I&O, maintain euvolemnia              - ECHO with EF of 60-65%              - q 1 neuro checks   - Cont daily TCD's per NIS              - PT/OT/SLP evals when appropriate   - NSGY also following, unsecured right MCA aneurysm may need clipping     2.) Multiple scattered cerebral infarcts    - As seen on MRI brain              - Aspirin 300 MT for stroke prevention   - Lipid panel showing LDL 7.8, at goal < 70, statin not indicated   - Hgb A1C ok at 5.7   - SBP goals per NIS     3.) Seizure              - Due to #1, no prior hx of seizure              - Continue Keppra 1000 mg BID, Vimpat 100 BID              - Seizure precautions              - Ativan PRN for seizures greater than 2 minutes               - Propofol while intubated   - EEG 6/8/20 showed moderate to severe generalized encephalopathic process, nonspecific in type. This may be related to underlying structural brain injury or effects of sedation.  No convincingly lateralizing or epileptiform features were noted. No seizure was recorded. 4.) Cerebral edema   - Due to #1, #2   - 3% NA gtt weaned to 30cc/hour with goal sodium of 150-155              - HOB 30 degrees    Plan discussed with Dr. Rubén Dang and Dr. Pankaj Parra.      Odilon Webster, FLAKITA  Neurocritical Care Nurse Practitioner No

## 2020-06-11 NOTE — PROGRESS NOTES
Problem: Falls - Risk of  Goal: *Absence of Falls  Description: Document Vicente Castrejon Fall Risk and appropriate interventions in the flowsheet. Outcome: Progressing Towards Goal  Note: Fall Risk Interventions:       Mentation Interventions: Adequate sleep, hydration, pain control, Door open when patient unattended, Evaluate medications/consider consulting pharmacy, Reorient patient, More frequent rounding, Increase mobility, Room close to nurse's station    Medication Interventions: Evaluate medications/consider consulting pharmacy    Elimination Interventions: Toileting schedule/hourly rounds    History of Falls Interventions: Door open when patient unattended, Evaluate medications/consider consulting pharmacy, Investigate reason for fall, Room close to nurse's station         Problem: Patient Education: Go to Patient Education Activity  Goal: Patient/Family Education  Outcome: Progressing Towards Goal     Problem: Pressure Injury - Risk of  Goal: *Prevention of pressure injury  Description: Document Sriram Scale and appropriate interventions in the flowsheet. Outcome: Progressing Towards Goal  Note: Pressure Injury Interventions:  Sensory Interventions: Assess changes in LOC, Assess need for specialty bed, Float heels, Keep linens dry and wrinkle-free, Maintain/enhance activity level, Minimize linen layers, Monitor skin under medical devices, Pressure redistribution bed/mattress (bed type), Turn and reposition approx.  every two hours (pillows and wedges if needed)    Moisture Interventions: Absorbent underpads, Apply protective barrier, creams and emollients, Assess need for specialty bed, Check for incontinence Q2 hours and as needed, Internal/External urinary devices, Internal/External fecal devices, Maintain skin hydration (lotion/cream), Minimize layers    Activity Interventions: Assess need for specialty bed, Pressure redistribution bed/mattress(bed type)    Mobility Interventions: Assess need for specialty bed, Float heels, HOB 30 degrees or less, Pressure redistribution bed/mattress (bed type), Turn and reposition approx.  every two hours(pillow and wedges)    Nutrition Interventions: Document food/fluid/supplement intake    Friction and Shear Interventions: Apply protective barrier, creams and emollients, HOB 30 degrees or less, Lift sheet, Lift team/patient mobility team, Minimize layers                Problem: Patient Education: Go to Patient Education Activity  Goal: Patient/Family Education  Outcome: Progressing Towards Goal     Problem: Hemorrhagic Stroke: Day 5 through Discharge  Goal: Activity/Safety  Outcome: Progressing Towards Goal  Goal: Consults, if ordered  Outcome: Progressing Towards Goal  Goal: Diagnostic Test/Procedures  Outcome: Progressing Towards Goal  Goal: Nutrition/Diet  Outcome: Progressing Towards Goal  Goal: Medications  Outcome: Progressing Towards Goal  Goal: Respiratory  Outcome: Progressing Towards Goal  Goal: Treatments/Interventions/Procedures  Outcome: Progressing Towards Goal  Goal: Psychosocial  Outcome: Progressing Towards Goal  Goal: *Hemodynamically stable  Outcome: Progressing Towards Goal  Goal: *Absence of aspiration  Outcome: Progressing Towards Goal  Goal: *Absence of signs and symptoms of DVT  Outcome: Progressing Towards Goal  Goal: *Tolerating diet  Outcome: Progressing Towards Goal  Goal: *Progressive mobility and function  Outcome: Progressing Towards Goal

## 2020-06-11 NOTE — ANESTHESIA POSTPROCEDURE EVALUATION
* No procedures listed *.    general    Anesthesia Post Evaluation      Multimodal analgesia: multimodal analgesia used between 6 hours prior to anesthesia start to PACU discharge  Patient location during evaluation: PACU  Patient participation: complete - patient participated  Level of consciousness: awake  Pain score: 2  Pain management: adequate  Airway patency: patent  Anesthetic complications: no  Cardiovascular status: acceptable  Respiratory status: acceptable  Hydration status: acceptable  Comments: I have evaluated the patient and meets criteria for discharge from PACU. Shelia Zuleta MD  Post anesthesia nausea and vomiting:  controlled      INITIAL Post-op Vital signs:   Vitals Value Taken Time   BP     Temp     Pulse 75 6/11/2020  3:14 PM   Resp 21 6/11/2020  3:14 PM   SpO2 100 % 6/11/2020  3:14 PM   Vitals shown include unvalidated device data.

## 2020-06-11 NOTE — PROGRESS NOTES
TRANSFER - OUT REPORT:    Verbal report given to Franci Gaitan RN(name) on Loralee August  being transferred to ICU-1(unit) for routine progression of care       Report consisted of patients Situation, Background, Assessment and   Recommendations(SBAR). Information from the following report(s) SBAR, Kardex, Procedure Summary, Intake/Output, MAR and Recent Results was reviewed with the receiving nurse. Lines:   Quad Lumen Quad IJ 06/10/20 Anterior; Left Neck (Active)   Central Line Being Utilized Yes 6/11/2020  8:00 AM   Criteria for Appropriate Use Hemodynamically unstable, requiring monitoring lines, vasopressors, or volume resuscitation 6/11/2020  8:00 AM   Site Assessment Clean, dry, & intact 6/11/2020  8:00 AM   Infiltration Assessment 0 6/11/2020  8:00 AM   Affected Extremity/Extremities Color distal to insertion site pink (or appropriate for race) 6/11/2020  8:00 AM   Date of Last Dressing Change 06/10/20 6/11/2020  8:00 AM   Dressing Status Clean, dry, & intact 6/11/2020  8:00 AM   Dressing Type Disk with Chlorhexadine gluconate (CHG) 6/11/2020  8:00 AM   Action Taken Open ports on tubing capped 6/11/2020  8:00 AM   Proximal Hub Color/Line Status White; Infusing 6/11/2020  8:00 AM   Positive Blood Return (Medial Site) Yes 6/11/2020  4:00 AM   Medial 1 Hub Color/Line Status Gelene Gerardo; Infusing 6/11/2020  8:00 AM   Positive Blood Return (Lateral Site) Yes 6/11/2020  4:00 AM   Medial 2 Hub Color/Line Status Blue; Infusing 6/11/2020  8:00 AM   Positive Blood Return (Site #3) Yes 6/11/2020  4:00 AM   Distal Hub Color/Line Status Brown; Infusing 6/11/2020  8:00 AM   Positive Blood Return (Site #4) Yes 6/11/2020  4:00 AM   Alcohol Cap Used Yes 6/11/2020  8:00 AM       Arterial Line 06/09/20 Left Radial artery (Active)   Site Assessment Clean, dry, & intact 6/11/2020  8:00 AM   Dressing Status Clean, dry, & intact 6/11/2020  8:00 AM   Dressing Type Disk with Chlorhexadine gluconate (CHG) 6/11/2020  8:00 AM   Line Status Intact and in place 6/11/2020  8:00 AM   Treatment Arm board on;Zeroed or re-zeroed 6/11/2020  8:00 AM   Affected Extremity/Extremities Color distal to insertion site pink (or appropriate for race) 6/11/2020  8:00 AM        Opportunity for questions and clarification was provided.       Patient transported with:   Registered Nurse, CRNA and Angio tech

## 2020-06-11 NOTE — ANESTHESIA PREPROCEDURE EVALUATION
Relevant Problems   No relevant active problems       Anesthetic History   No history of anesthetic complications            Review of Systems / Medical History  Patient summary reviewed, nursing notes reviewed and pertinent labs reviewed    Pulmonary  Within defined limits                 Neuro/Psych   Within defined limits    CVA       Cardiovascular  Within defined limits  Hypertension                   GI/Hepatic/Renal  Within defined limits              Endo/Other  Within defined limits      Morbid obesity     Other Findings              Physical Exam    Airway  Mallampati: II  TM Distance: > 6 cm  Neck ROM: normal range of motion   Mouth opening: Normal  Intubated   Cardiovascular  Regular rate and rhythm,  S1 and S2 normal,  no murmur, click, rub, or gallop             Dental  No notable dental hx       Pulmonary  Breath sounds clear to auscultation               Abdominal  GI exam deferred       Other Findings            Anesthetic Plan    ASA: 4  Anesthesia type: general    Monitoring Plan: Arterial line      Induction: Intravenous  Anesthetic plan and risks discussed with: Patient

## 2020-06-12 ENCOUNTER — APPOINTMENT (OUTPATIENT)
Dept: VASCULAR SURGERY | Age: 50
DRG: 003 | End: 2020-06-12
Attending: NURSE PRACTITIONER
Payer: COMMERCIAL

## 2020-06-12 PROBLEM — I67.1 ANTERIOR COMMUNICATING ARTERY ANEURYSM: Status: RESOLVED | Noted: 2020-06-04 | Resolved: 2020-06-12

## 2020-06-12 LAB
ANION GAP SERPL CALC-SCNC: 5 MMOL/L (ref 5–15)
ANION GAP SERPL CALC-SCNC: 5 MMOL/L (ref 5–15)
ANION GAP SERPL CALC-SCNC: 7 MMOL/L (ref 5–15)
ANION GAP SERPL CALC-SCNC: 8 MMOL/L (ref 5–15)
APPEARANCE FLD: ABNORMAL
ARTERIAL PATENCY WRIST A: ABNORMAL
BASE EXCESS BLD CALC-SCNC: 0 MMOL/L
BASOPHILS # BLD: 0 K/UL (ref 0–0.1)
BASOPHILS NFR BLD: 0 % (ref 0–1)
BDY SITE: ABNORMAL
BUN SERPL-MCNC: 14 MG/DL (ref 6–20)
BUN SERPL-MCNC: 15 MG/DL (ref 6–20)
BUN SERPL-MCNC: 16 MG/DL (ref 6–20)
BUN SERPL-MCNC: 22 MG/DL (ref 6–20)
BUN/CREAT SERPL: 24 (ref 12–20)
BUN/CREAT SERPL: 25 (ref 12–20)
BUN/CREAT SERPL: 27 (ref 12–20)
BUN/CREAT SERPL: 27 (ref 12–20)
CA-I BLD-SCNC: 1.12 MMOL/L (ref 1.12–1.32)
CALCIUM SERPL-MCNC: 7.6 MG/DL (ref 8.5–10.1)
CALCIUM SERPL-MCNC: 7.8 MG/DL (ref 8.5–10.1)
CALCIUM SERPL-MCNC: 7.9 MG/DL (ref 8.5–10.1)
CALCIUM SERPL-MCNC: 7.9 MG/DL (ref 8.5–10.1)
CHLORIDE SERPL-SCNC: 117 MMOL/L (ref 97–108)
CHLORIDE SERPL-SCNC: 119 MMOL/L (ref 97–108)
CHLORIDE SERPL-SCNC: 121 MMOL/L (ref 97–108)
CHLORIDE SERPL-SCNC: 122 MMOL/L (ref 97–108)
CO2 SERPL-SCNC: 21 MMOL/L (ref 21–32)
CO2 SERPL-SCNC: 22 MMOL/L (ref 21–32)
COLOR FLD: ABNORMAL
CREAT SERPL-MCNC: 0.56 MG/DL (ref 0.55–1.02)
CREAT SERPL-MCNC: 0.57 MG/DL (ref 0.55–1.02)
CREAT SERPL-MCNC: 0.59 MG/DL (ref 0.55–1.02)
CREAT SERPL-MCNC: 0.9 MG/DL (ref 0.55–1.02)
DIFFERENTIAL METHOD BLD: ABNORMAL
EOSINOPHIL # BLD: 0.1 K/UL (ref 0–0.4)
EOSINOPHIL NFR BLD: 2 % (ref 0–7)
ERYTHROCYTE [DISTWIDTH] IN BLOOD BY AUTOMATED COUNT: 23.9 % (ref 11.5–14.5)
GAS FLOW.O2 O2 DELIVERY SYS: ABNORMAL L/MIN
GAS FLOW.O2 SETTING OXYMISER: 16 BPM
GLUCOSE BLD STRIP.AUTO-MCNC: 107 MG/DL (ref 65–100)
GLUCOSE BLD STRIP.AUTO-MCNC: 111 MG/DL (ref 65–100)
GLUCOSE BLD STRIP.AUTO-MCNC: 115 MG/DL (ref 65–100)
GLUCOSE BLD STRIP.AUTO-MCNC: 130 MG/DL (ref 65–100)
GLUCOSE SERPL-MCNC: 117 MG/DL (ref 65–100)
GLUCOSE SERPL-MCNC: 120 MG/DL (ref 65–100)
GLUCOSE SERPL-MCNC: 123 MG/DL (ref 65–100)
GLUCOSE SERPL-MCNC: 123 MG/DL (ref 65–100)
HCO3 BLD-SCNC: 23.2 MMOL/L (ref 22–26)
HCT VFR BLD AUTO: 24.4 % (ref 35–47)
HGB BLD-MCNC: 7.1 G/DL (ref 11.5–16)
IMM GRANULOCYTES # BLD AUTO: 0.1 K/UL (ref 0–0.04)
IMM GRANULOCYTES NFR BLD AUTO: 2 % (ref 0–0.5)
LYMPHOCYTES # BLD: 1.3 K/UL (ref 0.8–3.5)
LYMPHOCYTES NFR BLD: 18 % (ref 12–49)
MAGNESIUM SERPL-MCNC: 2.4 MG/DL (ref 1.6–2.4)
MCH RBC QN AUTO: 21.6 PG (ref 26–34)
MCHC RBC AUTO-ENTMCNC: 29.1 G/DL (ref 30–36.5)
MCV RBC AUTO: 74.2 FL (ref 80–99)
MONOCYTES # BLD: 0.6 K/UL (ref 0–1)
MONOCYTES NFR BLD: 8 % (ref 5–13)
MONOS+MACROS NFR FLD: 1 %
NEUTROPHILS NFR FLD: 99 %
NEUTS SEG # BLD: 5.3 K/UL (ref 1.8–8)
NEUTS SEG NFR BLD: 70 % (ref 32–75)
NRBC # BLD: 0.06 K/UL (ref 0–0.01)
NRBC BLD-RTO: 0.8 PER 100 WBC
NUC CELL # FLD: 3425 /CU MM
O2/TOTAL GAS SETTING VFR VENT: 35 %
PCO2 BLD: 28.8 MMHG (ref 35–45)
PEEP RESPIRATORY: 5 CMH2O
PH BLD: 7.51 [PH] (ref 7.35–7.45)
PHOSPHATE SERPL-MCNC: 2.8 MG/DL (ref 2.6–4.7)
PLATELET # BLD AUTO: 215 K/UL (ref 150–400)
PLATELET COMMENTS,PCOM: ABNORMAL
PMV BLD AUTO: 10.8 FL (ref 8.9–12.9)
PO2 BLD: 80 MMHG (ref 80–100)
POTASSIUM SERPL-SCNC: 3.3 MMOL/L (ref 3.5–5.1)
POTASSIUM SERPL-SCNC: 3.7 MMOL/L (ref 3.5–5.1)
POTASSIUM SERPL-SCNC: 3.8 MMOL/L (ref 3.5–5.1)
POTASSIUM SERPL-SCNC: 3.8 MMOL/L (ref 3.5–5.1)
PROCALCITONIN SERPL-MCNC: <0.05 NG/ML
RBC # BLD AUTO: 3.29 M/UL (ref 3.8–5.2)
RBC # FLD: >100 /CU MM
RBC MORPH BLD: ABNORMAL
SAO2 % BLD: 97 % (ref 92–97)
SERVICE CMNT-IMP: ABNORMAL
SODIUM SERPL-SCNC: 146 MMOL/L (ref 136–145)
SODIUM SERPL-SCNC: 146 MMOL/L (ref 136–145)
SODIUM SERPL-SCNC: 148 MMOL/L (ref 136–145)
SODIUM SERPL-SCNC: 149 MMOL/L (ref 136–145)
SPECIMEN SOURCE FLD: ABNORMAL
SPECIMEN TYPE: ABNORMAL
VENTILATION MODE VENT: ABNORMAL
VOLUME CONTROL PLUS IVLCP: YES
VT SETTING VENT: 400 ML
WBC # BLD AUTO: 7.4 K/UL (ref 3.6–11)

## 2020-06-12 PROCEDURE — 74011000250 HC RX REV CODE- 250: Performed by: ANESTHESIOLOGY

## 2020-06-12 PROCEDURE — 74011250637 HC RX REV CODE- 250/637: Performed by: NURSE PRACTITIONER

## 2020-06-12 PROCEDURE — 74011000250 HC RX REV CODE- 250: Performed by: NURSE PRACTITIONER

## 2020-06-12 PROCEDURE — 36600 WITHDRAWAL OF ARTERIAL BLOOD: CPT

## 2020-06-12 PROCEDURE — 83735 ASSAY OF MAGNESIUM: CPT

## 2020-06-12 PROCEDURE — 74011250636 HC RX REV CODE- 250/636: Performed by: ANESTHESIOLOGY

## 2020-06-12 PROCEDURE — 74011000258 HC RX REV CODE- 258: Performed by: NURSE PRACTITIONER

## 2020-06-12 PROCEDURE — P9045 ALBUMIN (HUMAN), 5%, 250 ML: HCPCS | Performed by: NURSE PRACTITIONER

## 2020-06-12 PROCEDURE — 84100 ASSAY OF PHOSPHORUS: CPT

## 2020-06-12 PROCEDURE — 84145 PROCALCITONIN (PCT): CPT

## 2020-06-12 PROCEDURE — 93886 INTRACRANIAL COMPLETE STUDY: CPT

## 2020-06-12 PROCEDURE — 74011250636 HC RX REV CODE- 250/636: Performed by: NURSE PRACTITIONER

## 2020-06-12 PROCEDURE — 82962 GLUCOSE BLOOD TEST: CPT

## 2020-06-12 PROCEDURE — 76010000379 HC BRONCHOSCOPY DIAG/THERAPEUTIC

## 2020-06-12 PROCEDURE — 80048 BASIC METABOLIC PNL TOTAL CA: CPT

## 2020-06-12 PROCEDURE — 77030018798 HC PMP KT ENTRL FED COVD -A

## 2020-06-12 PROCEDURE — 85025 COMPLETE CBC W/AUTO DIFF WBC: CPT

## 2020-06-12 PROCEDURE — 94640 AIRWAY INHALATION TREATMENT: CPT

## 2020-06-12 PROCEDURE — 94003 VENT MGMT INPAT SUBQ DAY: CPT

## 2020-06-12 PROCEDURE — 87186 SC STD MICRODIL/AGAR DIL: CPT

## 2020-06-12 PROCEDURE — 82803 BLOOD GASES ANY COMBINATION: CPT

## 2020-06-12 PROCEDURE — 87116 MYCOBACTERIA CULTURE: CPT

## 2020-06-12 PROCEDURE — 36592 COLLECT BLOOD FROM PICC: CPT

## 2020-06-12 PROCEDURE — 36415 COLL VENOUS BLD VENIPUNCTURE: CPT

## 2020-06-12 PROCEDURE — 74011000250 HC RX REV CODE- 250

## 2020-06-12 PROCEDURE — 65610000006 HC RM INTENSIVE CARE

## 2020-06-12 PROCEDURE — 74011250637 HC RX REV CODE- 250/637: Performed by: PSYCHIATRY & NEUROLOGY

## 2020-06-12 PROCEDURE — 89050 BODY FLUID CELL COUNT: CPT

## 2020-06-12 PROCEDURE — 87070 CULTURE OTHR SPECIMN AEROBIC: CPT

## 2020-06-12 PROCEDURE — 74011000258 HC RX REV CODE- 258: Performed by: ANESTHESIOLOGY

## 2020-06-12 PROCEDURE — 87077 CULTURE AEROBIC IDENTIFY: CPT

## 2020-06-12 PROCEDURE — 74011250637 HC RX REV CODE- 250/637: Performed by: ANESTHESIOLOGY

## 2020-06-12 RX ORDER — ROCURONIUM BROMIDE 10 MG/ML
50 INJECTION, SOLUTION INTRAVENOUS
Status: COMPLETED | OUTPATIENT
Start: 2020-06-12 | End: 2020-06-12

## 2020-06-12 RX ORDER — 3% SODIUM CHLORIDE 3 G/100ML
10 INJECTION, SOLUTION INTRAVENOUS CONTINUOUS
Status: DISPENSED | OUTPATIENT
Start: 2020-06-12 | End: 2020-06-13

## 2020-06-12 RX ORDER — ETOMIDATE 2 MG/ML
20 INJECTION INTRAVENOUS ONCE
Status: COMPLETED | OUTPATIENT
Start: 2020-06-12 | End: 2020-06-12

## 2020-06-12 RX ORDER — POTASSIUM CHLORIDE 29.8 MG/ML
20 INJECTION INTRAVENOUS
Status: COMPLETED | OUTPATIENT
Start: 2020-06-12 | End: 2020-06-13

## 2020-06-12 RX ORDER — ALBUMIN HUMAN 50 G/1000ML
25 SOLUTION INTRAVENOUS ONCE
Status: COMPLETED | OUTPATIENT
Start: 2020-06-12 | End: 2020-06-12

## 2020-06-12 RX ORDER — BALSAM PERU/CASTOR OIL
OINTMENT (GRAM) TOPICAL 2 TIMES DAILY
Status: DISCONTINUED | OUTPATIENT
Start: 2020-06-12 | End: 2020-07-10 | Stop reason: HOSPADM

## 2020-06-12 RX ORDER — ROCURONIUM BROMIDE 10 MG/ML
INJECTION, SOLUTION INTRAVENOUS
Status: COMPLETED
Start: 2020-06-12 | End: 2020-06-12

## 2020-06-12 RX ADMIN — LACOSAMIDE 100 MG: 50 TABLET, FILM COATED ORAL at 08:51

## 2020-06-12 RX ADMIN — NIMODIPINE 60 MG: 30 SOLUTION ORAL at 09:03

## 2020-06-12 RX ADMIN — CASTOR OIL AND BALSAM, PERU: 788; 87 OINTMENT TOPICAL at 17:56

## 2020-06-12 RX ADMIN — Medication 2 DROP: at 13:10

## 2020-06-12 RX ADMIN — ACETAMINOPHEN 650 MG: 650 SUSPENSION ORAL at 15:09

## 2020-06-12 RX ADMIN — DEXMEDETOMIDINE HYDROCHLORIDE 1.1 MCG/KG/HR: 100 INJECTION, SOLUTION, CONCENTRATE INTRAVENOUS at 18:08

## 2020-06-12 RX ADMIN — ALBUMIN (HUMAN) 25 G: 12.5 INJECTION, SOLUTION INTRAVENOUS at 22:03

## 2020-06-12 RX ADMIN — NIMODIPINE 60 MG: 30 SOLUTION ORAL at 22:05

## 2020-06-12 RX ADMIN — SODIUM CHLORIDE 8 MG/HR: 9 INJECTION, SOLUTION INTRAVENOUS at 05:21

## 2020-06-12 RX ADMIN — FAMOTIDINE 20 MG: 40 POWDER, FOR SUSPENSION ORAL at 21:02

## 2020-06-12 RX ADMIN — NIMODIPINE 60 MG: 30 SOLUTION ORAL at 19:02

## 2020-06-12 RX ADMIN — SODIUM CHLORIDE, SODIUM LACTATE, POTASSIUM CHLORIDE, AND CALCIUM CHLORIDE 100 ML/HR: 600; 310; 30; 20 INJECTION, SOLUTION INTRAVENOUS at 07:01

## 2020-06-12 RX ADMIN — LACOSAMIDE 100 MG: 50 TABLET, FILM COATED ORAL at 21:02

## 2020-06-12 RX ADMIN — FAMOTIDINE 20 MG: 40 POWDER, FOR SUSPENSION ORAL at 08:53

## 2020-06-12 RX ADMIN — DOCUSATE SODIUM 100 MG: 50 LIQUID ORAL at 08:52

## 2020-06-12 RX ADMIN — ASPIRIN 81 MG CHEWABLE TABLET 81 MG: 81 TABLET CHEWABLE at 08:52

## 2020-06-12 RX ADMIN — CHLORHEXIDINE GLUCONATE 15 ML: 0.12 RINSE ORAL at 08:56

## 2020-06-12 RX ADMIN — SODIUM CHLORIDE 20 ML/HR: 3 INJECTION, SOLUTION INTRAVENOUS at 07:02

## 2020-06-12 RX ADMIN — ENOXAPARIN SODIUM 40 MG: 40 INJECTION SUBCUTANEOUS at 13:10

## 2020-06-12 RX ADMIN — IPRATROPIUM BROMIDE AND ALBUTEROL SULFATE 3 ML: .5; 3 SOLUTION RESPIRATORY (INHALATION) at 19:18

## 2020-06-12 RX ADMIN — CALCIUM GLUCONATE 2 G: 98 INJECTION, SOLUTION INTRAVENOUS at 23:18

## 2020-06-12 RX ADMIN — LABETALOL HYDROCHLORIDE 20 MG: 5 INJECTION INTRAVENOUS at 14:53

## 2020-06-12 RX ADMIN — NIMODIPINE 60 MG: 30 SOLUTION ORAL at 13:10

## 2020-06-12 RX ADMIN — PIPERACILLIN AND TAZOBACTAM 3.38 G: 3; .375 INJECTION, POWDER, LYOPHILIZED, FOR SOLUTION INTRAVENOUS at 19:02

## 2020-06-12 RX ADMIN — SODIUM CHLORIDE, SODIUM LACTATE, POTASSIUM CHLORIDE, AND CALCIUM CHLORIDE 100 ML/HR: 600; 310; 30; 20 INJECTION, SOLUTION INTRAVENOUS at 23:00

## 2020-06-12 RX ADMIN — Medication 2 DROP: at 22:08

## 2020-06-12 RX ADMIN — DEXMEDETOMIDINE HYDROCHLORIDE 0.2 MCG/KG/HR: 100 INJECTION, SOLUTION, CONCENTRATE INTRAVENOUS at 15:09

## 2020-06-12 RX ADMIN — ETOMIDATE 20 MG: 2 INJECTION INTRAVENOUS at 14:53

## 2020-06-12 RX ADMIN — CHLORHEXIDINE GLUCONATE 15 ML: 0.12 RINSE ORAL at 21:02

## 2020-06-12 RX ADMIN — POTASSIUM CHLORIDE 20 MEQ: 400 INJECTION, SOLUTION INTRAVENOUS at 23:00

## 2020-06-12 RX ADMIN — PIPERACILLIN AND TAZOBACTAM 3.38 G: 3; .375 INJECTION, POWDER, LYOPHILIZED, FOR SOLUTION INTRAVENOUS at 02:49

## 2020-06-12 RX ADMIN — SODIUM CHLORIDE, SODIUM LACTATE, POTASSIUM CHLORIDE, AND CALCIUM CHLORIDE 100 ML/HR: 600; 310; 30; 20 INJECTION, SOLUTION INTRAVENOUS at 14:32

## 2020-06-12 RX ADMIN — ROCURONIUM BROMIDE 50 MG: 10 INJECTION, SOLUTION INTRAVENOUS at 14:56

## 2020-06-12 RX ADMIN — LABETALOL HYDROCHLORIDE 20 MG: 5 INJECTION INTRAVENOUS at 05:29

## 2020-06-12 RX ADMIN — NIMODIPINE 60 MG: 30 SOLUTION ORAL at 05:36

## 2020-06-12 RX ADMIN — DOCUSATE SODIUM 100 MG: 50 LIQUID ORAL at 19:01

## 2020-06-12 RX ADMIN — LEVETIRACETAM 1000 MG: 100 SOLUTION ORAL at 21:02

## 2020-06-12 RX ADMIN — FENTANYL CITRATE 50 MCG: 50 INJECTION INTRAMUSCULAR; INTRAVENOUS at 01:53

## 2020-06-12 RX ADMIN — ROCURONIUM BROMIDE 50 MG: 50 INJECTION INTRAVENOUS at 14:56

## 2020-06-12 RX ADMIN — NIMODIPINE 60 MG: 30 SOLUTION ORAL at 01:55

## 2020-06-12 RX ADMIN — SODIUM CHLORIDE 6 MG/HR: 9 INJECTION, SOLUTION INTRAVENOUS at 05:02

## 2020-06-12 RX ADMIN — LEVETIRACETAM 1000 MG: 100 SOLUTION ORAL at 08:53

## 2020-06-12 RX ADMIN — SODIUM CHLORIDE, SODIUM LACTATE, POTASSIUM CHLORIDE, AND CALCIUM CHLORIDE 500 ML: 600; 310; 30; 20 INJECTION, SOLUTION INTRAVENOUS at 12:00

## 2020-06-12 RX ADMIN — IPRATROPIUM BROMIDE AND ALBUTEROL SULFATE 3 ML: .5; 3 SOLUTION RESPIRATORY (INHALATION) at 07:19

## 2020-06-12 RX ADMIN — CASTOR OIL AND BALSAM, PERU: 788; 87 OINTMENT TOPICAL at 08:53

## 2020-06-12 RX ADMIN — PIPERACILLIN AND TAZOBACTAM 3.38 G: 3; .375 INJECTION, POWDER, LYOPHILIZED, FOR SOLUTION INTRAVENOUS at 11:48

## 2020-06-12 NOTE — PROGRESS NOTES
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 At the [facility] a CT was done and she was found to have a [address]  and transferred to [facility] for further neuro evaluation after being intubated by sending facility. Repeat CTH/CTA here showed an extensive subarachnoid hemorrhage concerning for leaking/ruptured aneurysm without hydrocephalus. 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 Both neurosurgery and neuro IR consulted and she was taken to IR for coiling of a Left MCA aneurysm this am by Dr. Ariel Zhou. Plan for Rt aneurysmal clipping at a later time. Remains at risk for hydrocephalus, continue to re-eval for hydrocephalus. Remains intubated at this time. Overnight Events:  2020  Overnight patient with pupillary changes with left pupil 5mm fixed and dilated. Code S called on patient and patient went to [facility] with evidence of increased cerebral edema and mild to moderate L MCA vasospasm post aneurysm coil embolization. The aneurysm was stable to the right MCA. Dr. Eloise Webster was consulted and a dose of mannitol was given. She was restarted on Propofol infusion. This am, Neurosurgery was called to re-eval for EVD placement for ICP monitoring. A 3 % saline gtt is being started with Q2hr BMP. Patient is +4 anasarca and required arterial line replacement due to dislodgement. Unable to place PICC today 2/2 Fluid overload and peripheral edema. 2020  Remains on 3%, increased secretions  Severe right A1 vasospasm resolved after angioplasty and ALFONSO verapamil infusion.     6/10/2020  MRI showing clot at midbrain/3rd nerve junction  1 PRBC ordred    2020  1 L of NS given by Neuro IR    2020  Remains on vent    POD:* No surgery found *    S/P: Day 2 for L MCA aneurysm coiling    Objective:   Vital Signs:  Visit Vitals  /78   Pulse 69   Temp 99.9 °F (37.7 °C)   Resp 23   Ht 5' 4\" (1.626 m)   Wt 109.7 kg (241 lb 13.5 oz)   SpO2 100%   BMI 41.51 kg/m²      O2 Device: Ventilator Temp (24hrs), Av.6 °F (37.6 °C), Min:99.2 °F (37.3 °C), Max:100.1 °F (37.8 °C)         Intake/Output:     Intake/Output Summary (Last 24 hours) at 2020 1050  Last data filed at 2020 0800  Gross per 24 hour   Intake 3625.83 ml   Output 6275 ml   Net -2649.17 ml       Physical Exam:    General:  Sedated and on the ventilator. No acute distress. Eyes:  Sclera anicteric, + scleral edema, Pupils equal, round, reactive to light. Eyes midline, Right eye 2 mm sluggish, left eye 5 mm irreg. NR   Mouth/Throat: Orotracheal tube in place., OGT in place   Neck: Supple. Lungs:   Bronchial/ coarse to auscultation bilaterally,  good effort. Vent supported with equal chest rise/ fall, lower oxygenation support requirements   Cardiovascular:  Regular rate and rhythm, no murmur, click, rub, or gallop. Abdomen:   Soft, obese, non-tender, bowel sounds normal, non-distended. Extremities: No cyanosis, +3-4 generalized edema. Skin: No acute rash or lesions. Lymph Nodes: Cervical and supraclavicular normal.   Musculoskeletal:  +3-4 swelling no deformity. WD x 3 ext, no WD to RUE   Lines/Devices:  Intact, no erythema, drainage, or tenderness.    Psychiatric: Sedated and appears comfortable on ventilator, moves head spontaneously     T/L/D  Tubes: ETT and Orogastric Tube  Lines: Peripheral IV arterial line , Femoral CVL   Drains: Martinez Catheter    LABS AND  DATA: Personally reviewed  Recent Labs     20  0333 20  0513   WBC 7.4 6.5   HGB 7.1* 7.3*   HCT 24.4* 25.2*    191     Recent Labs     20  0333 20  2202   * 153* K 3.8 3.9   * 126*   CO2 21 21   BUN 16 16   CREA 0.59 0.63   * 135*   CA 7.6* 7.5*     No results for input(s): AP, TBIL, TP, ALB, GLOB, AML, LPSE in the last 72 hours. No lab exists for component: SGOT, GPT, AMYP  No results for input(s): INR, PTP, APTT, INREXT, INREXT in the last 72 hours. Recent Labs     06/12/20  0802   PHI 7.514*   PCO2I 28.8*   PO2I 80   FIO2I 35     No results for input(s): CPK, CKMB, TROIQ, BNPP in the last 72 hours. Ventilator Settings:  Mode Rate Tidal Volume Pressure FiO2 PEEP   Spontaneous   400 ml  5 cm H2O 35 % 5 cm H20     Peak airway pressure: 24 cm H2O    Minute ventilation: 8.26 l/min        MEDS: Reviewed    Chest X-Ray:  CXR Results  (Last 48 hours)               06/10/20 1249  XR CHEST PORT Final result    Impression:  IMPRESSION:   1. Satisfactory CVL placement. 2. Residual right lower lung airspace disease. Narrative:  EXAM: Portable CXR. 1244 hours. COMPARISON: 6/6/2020. INDICATION: new central line Right IJ       FINDINGS:   Left IJ CVL has been placed with tip at the superior cavoatrial junction without   pneumothorax. ET tube remains satisfactory. NG tube remains in the stomach. Lungs show improved aeration with diminished airspace disease with some residual   in the right lower lung. Heart size is top normal. There is no pulmonary edema. There is no apparent pleural effusion. 06/08/20 CTH/ CTA Brain:  IMPRESSION  Impression:     Mild to moderate vasospasm in the left middle cerebral artery status post  bifurcation aneurysm coil embolization.   Stable 4 mm right MCA aneurysm.     Endotracheal tube terminates just above the matt.     1 cm low-density left thyroid nodule.     Mildly enlarged left axillary lymph node measuring 11 mm in short axis.     Patchy bilateral airspace disease.     Nonspecific asymmetric enhancement of the right superior ophthalmic vein        06/06/20 MRI:  IMPRESSION:   Imaging findings consistent with large left MCA territory ischemia/infarction,  left temporal, parietal and frontal lobes, cortically based, no superimposed  increased parenchymal hemorrhage or midline shift. Right and left PANCHITO territory  cortical diffusion restriction consistent with infarction. Infarction in the right insular cortex and right temporal lobe. Scattered small  infarctions right and left corona radiata centrum semiovale, right cerebellum.     Allowing for differences in technique likely stable subarachnoid and  intraventricular hemorrhage.     06/06/20 ECHO:  Result status: Final result   · Normal cavity size and systolic function (ejection fraction normal). Mild concentric hypertrophy. Estimated left ventricular ejection fraction is 60 - 65%. No regional wall motion abnormality noted. · Mildly dilated left atrium.           ABCDEF Bundle/Checklist Completed:  YES-See Plan    SPECIAL EQUIPMENT  None    Active Problem List:     Problem List  Date Reviewed: 6/10/2020          Codes Class    SAH (subarachnoid hemorrhage) ([facility] [address] ICD-10-CM: I60.9  ICD-9-CM: 430         Subarachnoid hemorrhage from middle cerebral artery aneurysm, left (HCC) ICD-10-CM: I60.12  ICD-9-CM: 430         RIGHT middle cerebral artery aneurysm ICD-10-CM: I67.1  ICD-9-CM: 437.3         Cerebral vasospasm ICD-10-CM: I67.848  ICD-9-CM: 435.9         Anterior communicating artery aneurysm ICD-10-CM: I67.1  ICD-9-CM: 437.3             ICU Assessment/ Comprehensive Plan of Care:     Subarrachnoid hemorrhage [facility])  Right MCA trifurcation aneurysm (unsecured)  Left MCA trifurcation aneurysm (s/p coiling)  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 Washington Purchase  --120 mmHg  -Nimotipine 60 mg via OGT Q4hr  -Neuro IR following,  Sher  -Neurosurgery following - Dr. Gomez Nine, Rt MCA will need clipping at later time  -Neurology following, Dr. Marisol Rubio  -ASA 81mg  -TCDs   -Q1hr Neuro checks  -Keppra 1000 mg BID, Vimpat 100 BID  -Precedex for sedation   -Continue Lovenox     Acute severe hypoxic respiratory failure  Neurogenic pulmonary edema  CAP  -Lactate & Procal Now - normal  -No cultures needed currently (lactate normal, wbc, procal WNL)  -Continue Zosyn (end date 6/13/2020)  -Plan for bronchoscopy today  -Provide conservative fluid management to prevent vasospasms  -COVID NEG x2  -CT Chest NEG PE 06/07  Chlorhexidine   Optimize PEEP/Ventilation/Oxygenation  Goal Tidal Volume 6 cc/kg based on IBW  Aim for lung protective ventilation  Head of bed > 30 degrees  Plan to Extubate: attempt to provide weaning trials in am  SPO2 Goal: > 92%    DVT Prophylaxis (if no, list reason): SCD's or Sequential Compression Device   GI Prophylaxis: Pepcid (famotidine)   Nutrition: Yes TFs  Bowel Movement: Yes  Bowel Regimen: Docusate (Colace) and Bisacodyl (Dulcolax)  DVT Prophylaxis (if no, list reason): SCD's or Sequential Compression Device , ASA  Mobility: Fair and Bedrest  PT/OT: Not appropraite at this time, re-eval at extubation     DISPOSITION/COMMUNICATION  Discussed Plan of Care/Code Status: Full Code  Stay in ICU    CRITICAL CARE CONSULTANT NOTE  I had a face to face encounter with the patient, reviewed and interpreted patient data including clinical events, labs, images, vital signs, I/O's, and examined patient. I have discussed the case and the plan and management of the patient's care with the consulting services, the bedside nurses and the respiratory therapist.      NOTE OF PERSONAL INVOLVEMENT IN CARE   I participated in the decision-making and personally managed or directed the management of the following life and organ supporting interventions that required my frequent assessment to treat or prevent imminent deterioration.     I personally spent 75 minutes of critical care time. This is time spent at this critically ill patient's bedside actively involved in patient care as well as the coordination of care and discussions with the patient's family. This does not include any procedural time which has been billed separately.     Debbie Lawrence, [address] [place] [address]   6/12/2020

## 2020-06-12 NOTE — PROGRESS NOTES
Bedside shift change report given to Rachel Naqvi (oncoming nurse) by Abran Kidd  (offgoing nurse). Report included the following information SBAR, Kardex, Intake/Output, Cardiac Rhythm NSR and Dual Neuro Assessment.     0800: Neuro exam: opens eyes spontaneously; BUE flaccid; BLE moves spontaneously; Pupils: L-2 brisk; R-3 sluggish    0830: Patient placed on SBT this am-tolerated well    1440: Pts mother Patrice Loomis visited and was updated regarding pts condition and plans for procedure and extubation     1445-Bronch with Dr. Santa Duran; swelling noted, will not extubate today; pt started on Precedex (see MAR) to wean down and stop this evening

## 2020-06-12 NOTE — PROGRESS NOTES
8322-3679: Sedation stopped. Pt slowed to awaken after sedation stopped. Spontaneous moving RLE and spontaneous moving LLE. No movement noted BUE with painful stimuli or spontaneous. L pupil is reactive and sluggish 3-4. R pupil is 2-3 and brisk. No command follow. No tracking. Pt spontaneously opens R eye. Palsy noted L eye.     6326-1401: One time dose of Tylenol and Fentanyl given to help with restlessness. Pt restarted on Cardene to help maintain MAP parameters of 100-110. Pt resting better after pain medication given. Still arousable to stimuli. 0400: During neuro check, patient more consistent in tracking. Lack NP at bedside and confirmed improvement in neuro status. 0600 Shift summary:  Noted pt tracking more consistantly. Continues to move RLE spontaneously, LLE weak and spontaneous. No movement in BUE noted. Pupils remain unchanged, no command following. Pt removed from all sedatives during night and tolerated well. Supplemented with PRN Fentanyl x2. Cardene titrated through night and one dose Labetalol given to maintain -110.

## 2020-06-12 NOTE — PROGRESS NOTES
NUTRITION COMPLETE ASSESSMENT    RECOMMENDATIONS:   Discontinue Colace     It tube feeding held during day for procedures-please give overnight @ 2200 and/or 0200  Interventions/Plan:   Food/Nutrient Delivery: Moderate rate, concentration, composition, and schedule:    360 ml Vital AF 1.2 q 4 hr x 4 feedings per day and 70 ml water flush before and after each feeding       Assessment:   Reason for Assessment: Reassessment    Tube Feedin ml Vital F 1.2 x 4 feedings per day with 1 packet Prosource tid and 50 ml water flush before and after each feeding  Diet: NPO  Nutritionally Significant Medications: [x] Reviewed & Includes: Colace, correction scale insulin, Nimodipine, 3% NS @ 10 ml/hr, LR @ 100 ml/hr    Subjective: Staff Interviewed    Objective:  Ms Craig Sullivan is a 48year old female admitted with SAH. PMHx: HTN. Noted: SAH d/t ruptured aneurysm; Intubated at OSH prior to transfer. S/P Cerebral angiogram x 5 and coiling (L) MCA aneurysm; (R) MCA needs to be clipped (not urgent);  severe vasospasm; acute hypoxic respiratory failure-neurogenic pulmonary edema; HIE per MRI; cerebral edema. Ms Craig Sullivan has tolerated intermittent tube feedings well; however does not always receive 4 feedings per day d/t feeding being held for procedures. Recommend \"making up\" missed feedings overnight since pt only receives 4 feeds total per day. Since patient no longer on Diprivan will need to adjust tube feeding to meet energy needs. New goal: 360 ml Vital AF 1.2 q 4 hr x 4 feedings per day and 70 ml water flush before and after each feeding. This will provide 1440 ml, 1730 calories, 108 gm protein and 1730 ml free water (tube feeding/flush) per day to meet % estimated energy and protein needs, respectively. Flexiseal in place d/t loose stools (related to Nimodipine). Recommend discontinuing stool softener. 3% NS ordered for cerebral edema; sodium at goal today (145-150 mg/dl). Weight stable.     Estimated Nutrition Needs:   Kcals/day: 1915 Kcals/day  Protein: 108 g(2g/kg IBW)  Fluid: (1 ml/kcal)  Based On: [employer] (2003b)  Weight Used: Actual wt(101 kg)    Pt expected to meet estimated nutrient needs:  [x]   Yes     []  No  [] Unable to predict at this time  Nutrition Diagnosis:   1. Inadequate oral intake related to AMS/SAH as evidenced by NPO d/t intubation for airway protection. 2.   related to   as evidenced by      3.   related to   as evidenced by      Goals:     Tube feeding to meet at least 85% estimated needs x 5-7 days. Monitoring & Evaluation:    - Enteral/parenteral nutrition intake   - Glucose profile, Weight/weight change, CV-pulmonary, Electrolyte and renal profile, GI     Previous Nutrition Goals Met: Yes  Previous Recommendations: Yes    Education & Discharge Needs:   [x] None Identified   [] Identified and addressed    [x] Participated in care plan, discharge planning, and/or interdisciplinary rounds        Cultural, Taoist and ethnic food preferences identified:  None    Skin Integrity: []Intact  [x] surgical incision  Edema: []None [x] 3+ generalized, 2+ NP B/L upper and lower extremities  Last BM: 6/12  Food Allergies: [x]None []Other    Anthropometrics:    Weight Loss Metrics 6/12/2020 6/4/2020   Today's Wt 241 lb 13.5 oz -   BMI - 41.51 kg/m2      Last 3 Recorded Weights in this Encounter    06/11/20 0229 06/12/20 0520 06/12/20 1058   Weight: 108.5 kg (239 lb 3.2 oz) 109.7 kg (241 lb 13.5 oz) 109.7 kg (241 lb 13.5 oz)      Weight Source: Bed  Height: 5' 4\" (162.6 cm), Height Source: Stated by family member  Body mass index is 41.51 kg/m².      IBW : 54.4 kg (120 lb), % IBW (Calculated): 201.54 %   ,      Labs:    Lab Results   Component Value Date/Time    Sodium 148 (H) 06/12/2020 03:33 AM    Potassium 3.8 06/12/2020 03:33 AM    Chloride 122 (H) 06/12/2020 03:33 AM    CO2 21 06/12/2020 03:33 AM    Glucose 117 (H) 06/12/2020 03:33 AM    BUN 16 06/12/2020 03:33 AM    Creatinine 0.59 06/12/2020 03:33 AM    Calcium 7.6 (L) 06/12/2020 03:33 AM    Magnesium 2.1 06/08/2020 06:48 AM    Phosphorus 3.7 06/07/2020 05:41 AM    Albumin 2.4 (L) 06/08/2020 10:49 AM     Lab Results   Component Value Date/Time    Hemoglobin A1c 5.7 (H) 06/05/2020 03:34 AM     Lab Results   Component Value Date/Time    Glucose (POC) 115 (H) 06/12/2020 05:39 AM      Lab Results   Component Value Date/Time    ALT (SGPT) 24 06/08/2020 10:49 AM    Alk.  phosphatase 71 06/08/2020 10:49 AM    Bilirubin, total 0.4 06/08/2020 10:49 AM        Robert Pritchard RD [facility]

## 2020-06-12 NOTE — PROCEDURES
[facility]  Bronchoscopy    Procedure: Diagnostic bronchoscopy. Indication: Mucus Plugging and Pneumonia    Consent/Treatment: Informed consent was obtained from the  patient after risks, benefits and alternatives were explained. Timeout verified the correct patient and correct procedure. Anesthesia:   Patient on ventilator and receiving  Etomidate, Rocuronium    Moderate ( conscious ) sedation was administered by the endoscopy nurse and supervised by the endoscopist. The following parameters were monitored: oxygen saturation, heart rate, blood pressure, respiratory rate, EKG, adequacy of pulmonary ventilation and response to care. Total physician intraservice time was 45 min. Procedure Details:   -- The bronchoscope was introduced through an endotracheal tube. -- The vocal cords were found to be normal.  -- The trachea and matt were completely inspected and were found to be normal.  -- The right-sided endobronchial anatomy was completely inspected and were found to be normal.  -- The left-sided endobronchial anatomy was completely inspected and LLL/JOAQUIN submucosal hemorrhage at the base of the LLL & JOAQUIN posteriorly. Mild to moderate secretions - thick, tan.      Specimens:   Bronchial washings were sent for  microbiology, cytology, AFB smear and culture and fungal culture    Rapid On-Site Evaluation: A preliminary diagnosis of Pneumonia    Complications: none    Estimated Blood Loss: Minimal    Leon Berry DO

## 2020-06-12 NOTE — PROGRESS NOTES
Neurointerventional Surgery Progress Note  Karmen Anderson NP   Neurocritical Care Nurse Practitioner  [phone]          Admit Date: 6/4/2020        Daily Progress Note: 6/12/2020   LOS: 8 days      S/P:  POD 7 Cerebral angiogram with coil embolization of ruptured left MCA bifurcation aneurysm on 6/5/2020    ** Delayed Presentation ** PB Day 12    Interval History/Subjective:     No acute events overnight. Remains intubated- no sedation. Opening right eye spontaneously and moving head around- regards and raises eyebrows. Left pupil remains 5 mm and fixed. Right pupil 2 mm reactive. Moving right arm and leg spontaneously, moving left arm and leg to stimuli. No command following. Remains on Zosyn for fevers    TCDs today show  velocities in the left PANCHITO . She is net negative on I's and O's today -1.4 however US of IVC shows almost no collapse and NICOM read is 9.4% this morning. Will give 500 cc bolus over two hours for now and continue to watch closely. Plan:   -wean 3% to 10 ml/hr , recheck BMP q6 hours, Sodium goal 140-145  - continue Keppra and Vimpat for seizure prophylaxis   - Continue with MAP goal today 100-120  - will not plan to go to angio today , TCDs overall mildly improved and patient remains clinically stable     Unable to perform a ROS due to intubation. 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 Clipping              - Continue Nimotop Day 8 of 21 to prevent delayed cerebral ischemia              - Maintenance IV Fluids on hold due to worsening edema on CXR on 6/6 per Intensivist ,               will consider restarting after IVC US and NICOM              - TCDs daily              - Strict I&O's              - ECHO shows EF 60-65%, mild concentric hypertrophy, mildly dilated left atrium              - continue every hour neuro checks              - Keep SBP goal <200, MAP goal 100-120 Cardene/Fabien/Hydralazine/Labetalol PRN    2.) Seizure              - Due to #1, no prior hx of seizure              - Continue Keppra 1000 mg BID for seizure PPX              - continue Vimpat 100 mg BID              - Seizure precautions              -  EEG on 6/6 showed occasional epileptiform disturbance seen in the left frontal area which may represent a partial onset seizure focus.              - completed 24 hour EEG today which shows No convincingly lateralizing or epileptiform features were noted. No seizure was recorded.               - Neurology following                3.) Cerebral Edema              - received a dose of Mannitol overnight, start 3% saline 6/8.               - wean 3% sodium gtt at 30 cc/hr , sodium goal 150-155, discussed with intensivist and               nursing               - continue q4 hour BMP checks     4.) Acute hypoxic respiratory failure in the setting of likely neurogenic pulmonary edema               - patient diuresed with Lasix initially              - COVID 19 test negative x 2             - vent settings per Intensivist - now on 35% FiO2- trialing on SBT today             - sedation off              - intensivist managing     5.) Multiple Acute Ischemic Infarctions                - Imaging pattern is consistent with Hypoxic-Ischemic Encephalopathy (HIE)                - Continue aspirin 81 mg daily for stroke prevention                - ECHO as stated above - Lipid panel on 6/5, LDL 7.8, goal < 70, statin not indicated                - Hgb A1C ok at 5.7                - Neurology following          Activity: Bed rest  DVT ppx: SCDs- Lovenox  Disposition: TBD    Plan d/w Dr. Alissa Arellano, Dr. Cheo Merino ( intensivist) ,  RN, and patient's mother      Admission Summary:     Hassell Hatchet is a 48 y.o. 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 On arrival to ED, pt was unresponsive and noted to be seizing. A stat CT of her head was performed there, which showed extensive SAH. Pt was then intubated emergently. She was also extremely hypertensive with SBPs in 240s. She was given Keppra and Ativan, and placed on cardene drip for BP control. NSGY and NIS were then consulted and the patient was transferred to [facility] for higher level of care. En route, paramedics administered 7.5 mg of Versed, 200mcg of Fentanyl, and 10 mg of labetalol for BP control. On arrival, CTA of her head and neck was obtained, which showed a 4.4 x 4.1 mm right MCA trifurcation aneurysm and  a 2.2 x 3.1 mm left MCA trifurcation aneurysm. Of note, CTA head/neck also showed pulmonary infiltrates suspicious for COVID-19 infection and the patient has been tested for COVID which was negative. 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 Mother reported pt does not smoke, does not drink alcohol or use street drugs. The mother also reported that the patient began complaining of a headache on 5/31/2020 and has been taking BC Powder (aspirin) for the headache. She also reported that the pt has a hx of HTN and is supposed to be on BP medications, but recently stopped taking her medications. The patient underwent a cerebral angiogram on 6/5 by Dr. Alissa Arellano for coil embolization of a ruptured left MCA bifurcation aneurysm.     Unable to obtain a ROS due intubation and sedation.   Current Facility-Administered Medications   Medication Dose Route Frequency Provider Last Rate Last Dose    balsam peru-castor oiL (VENELEX) ointment   Topical BID Leon Berry DO        lactated Ringers infusion  100 mL/hr IntraVENous CONTINUOUS Leon Berry  mL/hr at 06/12/20 0701 100 mL/hr at 06/12/20 0701    dexmedeTOMidine (PRECEDEX) 400 mcg in 0.9% sodium chloride 100 mL infusion  0.2-1.4 mcg/kg/hr IntraVENous TITRATE Leon Berry DO   Stopped at 06/11/20 2042    labetaloL (NORMODYNE;TRANDATE) injection 20 mg  20 mg IntraVENous Q2H PRN Trudi Domínguez NP   20 mg at 06/12/20 0529    0.9% sodium chloride infusion 250 mL  250 mL IntraVENous PRN FABIAN Crouch        aspirin chewable tablet 81 mg  81 mg Per NG tube DAILY Essence Decker MD   81 mg at 06/12/20 0852    3% sodium chloride (*HIGH ALERT*CONCENTRATED IV*) infusion  10 mL/hr IntraVENous CONTINUOUS Leon Berry DO 20 mL/hr at 06/12/20 0824 20 mL/hr at 06/12/20 0824    enoxaparin (LOVENOX) injection 40 mg  40 mg SubCUTAneous Q24H Leon Berry DO   40 mg at 06/11/20 1359    famotidine (PEPCID) 40 mg/5 mL (8 mg/mL) oral suspension 20 mg  20 mg Per NG tube Q12H Leon Berry DO   20 mg at 06/12/20 0853    lacosamide (VIMPAT) tablet 100 mg  100 mg Per NG tube Q12H Leon Berry DO   100 mg at 06/12/20 0851    levETIRAcetam (KEPPRA) oral solution 1,000 mg  1,000 mg Per NG tube Q12H Leon Berry DO   1,000 mg at 06/12/20 0853    glycopyrrolate (ROBINUL) injection 0.2 mg  0.2 mg IntraMUSCular TID PRN Madonna Caban NP        niMODipine (NYMALIZE) 6 mg/mL oral solution 60 mg  60 mg Per NG tube Q4H Trudi Domínguez NP   60 mg at 06/12/20 0903    niCARdipine (CARDENE) 50 mg in 0.9% sodium chloride 100 mL infusion  0-15 mg/hr IntraVENous TITRATE FABIAN Crouch   Stopped at 06/12/20 0734    [Held by provider] amLODIPine ([address]) tablet 5 mg  5 mg Per NG tube DAILY Figueroaoldine AYDEE HatfieldP   5 mg at 06/10/20 9138    acetaminophen (TYLENOL) solution 650 mg  650 mg Per NG tube Q4H PRN David Mejia NP   650 mg at 06/11/20 2214    0.9% sodium chloride infusion 250 mL  250 mL IntraVENous PRN FABIAN Jurado        PHENYLephrine (ELKIN-SYNEPHRINE) 30 mg in 0.9% sodium chloride 250 mL infusion   mcg/min IntraVENous TITRATE Tila Avery NP   Stopped at 06/11/20 2131    albuterol-ipratropium (DUO-NEB) 2.5 MG-0.5 MG/3 ML  3 mL Nebulization Q6H RT Figueroaoldine Liu ACNP   3 mL at 06/12/20 0719    piperacillin-tazobactam (ZOSYN) 3.375 g in 0.9% sodium chloride (MBP/ADV) 100 mL  3.375 g IntraVENous Q8H AYDEE OcampoP 25 mL/hr at 06/12/20 0249 3.375 g at 06/12/20 0249    glucose chewable tablet 16 g  4 Tab Oral PRN Wendie Hernandez NP-JOVANY        glucagon (GLUCAGEN) injection 1 mg  1 mg IntraMUSCular PRN FABIAN Jurado        dextrose 10% infusion 0-250 mL  0-250 mL IntraVENous PRN Wendie Hernandez NP-JOVANY        insulin lispro (HUMALOG) injection   SubCUTAneous Q6H Miah RIVAS NP-C   Stopped at 06/11/20 1200    albuterol (PROVENTIL VENTOLIN) nebulizer solution 2.5 mg  2.5 mg Nebulization Q6H PRN FABIAN Jurado        fentaNYL citrate (PF) injection  mcg   mcg IntraVENous Q1H PRN Figueroaoldine Liu ACNP   50 mcg at 06/12/20 0153    polyvinyl alcohol-povidone (NATURAL TEARS) 0.5-0.6 % ophthalmic solution 2 Drop  2 Drop Both Eyes Q8H Figueroaoldine Liu ACNP   Stopped at 06/11/20 2200    docusate (COLACE) 50 mg/5 mL oral liquid 100 mg  100 mg Oral BID Willieine Liu ACNP   100 mg at 06/12/20 3069    bisacodyL (DULCOLAX) suppository 10 mg  10 mg Rectal DAILY PRN DARREN Ocampo        propofol (DIPRIVAN) 10 mg/mL infusion  0-50 mcg/kg/min IntraVENous TITRATE David Domínguez, FLAKITA   Stopped at 06/11/20 2042    chlorhexidine (ORAL CARE KIT) 0.12 % mouthwash 15 mL  15 mL Oral Q12H FABIAN Mccauley   15 mL at 20 0856    ondansetron (ZOFRAN) injection 4 mg  4 mg IntraVENous Q4H PRN FABIAN Leonard        docusate (COLACE) 50 mg/5 mL oral liquid 100 mg  100 mg Oral BID PRN FABIAN Mccauley            No Known Allergies    Review of Systems:  Review of systems not obtained due to patient factors. Objective:     Vital signs  Temp (24hrs), Av.6 °F (37.6 °C), Min:99.2 °F (37.3 °C), Max:100.1 °F (37.8 °C)    07 -  1900  In: 772.1 [I.V.:622.1]  Out: 3076 [Urine:2225]  06/10 1901 -  0700  In: 7063.5 [I.V.:5693.5]  Out: 7100 [Urine:6200; Drains:900]    Visit Vitals  /75   Pulse 69   Temp 99.9 °F (37.7 °C)   Resp 25   Ht 5' 4\" (1.626 m)   Wt 241 lb 13.5 oz (109.7 kg)   SpO2 100%   BMI 41.51 kg/m²      O2 Device: Ventilator   Vitals:    20 0800 20 0900 20 1000 20 1058   BP: 172/82 166/78 163/75    Pulse: 65 69 69    Resp: 19 23 25    Temp: 99.9 °F (37.7 °C)      SpO2: 100% 100% 100%    Weight:    241 lb 13.5 oz (109.7 kg)   Height:    5' 4\" (1.626 m)      Physical Exam:  GENERAL: NAD, intubated and sedated on propofol. EYE: conjunctivae/corneas clear. Right pupil 3 mm, brisk response to light. Left pupil 5 mm fixed and dilated. Eyes midline. Scleral edema noted bilaterally   LUNG: lungs coarse bilaterally   HEART: regular rate and rhythm, S1, S2 normal, no murmur, click, rub or gallop  EXTREMITIES:  extremities normal, atraumatic, no cyanosis, 3+ pitting peripheral edema, distal pulses 2+ bilaterally   SKIN: Skin warm to touch. Right groin site clean, dry, and intact. No hematoma, bruising, or bleeding noted. NEUROLOGIC: Intubated and sedated on Propofol. Eyes do not open to stimulus. No command following. Moves head intermittently. Right pupil 3 mm, brisk response to light. Left pupil 5 mm fixed and dilated. Eyes midline. No tracking with eyes. No blink to threat.  Moving bilateral upper and lower extremities spontaneously. No involuntary movements. Gait deferred. Unable to assess language, sensation, and coordination. Imaging:    CT of Head on 6/8/2020 shows  IMPRESSION:   Global edema, otherwise no significant change    CTA of Head and Neck on 6/8/2020 per radiology shows  Impression:  Mild to moderate vasospasm in the left middle cerebral artery status post  bifurcation aneurysm coil embolization. Stable 4 mm right MCA aneurysm. Endotracheal tube terminates just above the matt. 1 cm low-density left thyroid nodule. Mildly enlarged left axillary lymph node measuring 11 mm in short axis  Patchy bilateral airspace disease. Nonspecific asymmetric enhancement of the right superior ophthalmic vein   (However, no significant vasospasm was seen when personally reviewed by NIS)    CT Perfusion on 6/8/2020 shows  IMPRESSION:  No significant cerebral perfusion abnormality    CTA of Chest on 6/7/2020 shows  IMPRESSION:  1. No acute pulmonary embolus  2. Dependent atelectasis with diffuse groundglass opacifications bilaterally. CT of Head on 6/7/2020 shows  IMPRESSION:   1. No further progression of subarachnoid hemorrhage. The overall degree of  subarachnoid hemorrhage is slightly improved. 2. Subtle areas of gray-white differentiation loss throughout the cerebral  hemispheres correlating with areas of restricted diffusion on previous CT  compatible with ischemic changes. MRI of Brain on 6/6/2020 shows  IMPRESSION:   Imaging findings consistent with large left MCA territory ischemia/infarction,  left temporal, parietal and frontal lobes, cortically based, no superimposed  increased parenchymal hemorrhage or midline shift. Right and left PANCHITO territory  cortical diffusion restriction consistent with infarction. Infarction in the right insular cortex and right temporal lobe. Scattered small  infarctions right and left corona radiata centrum semiovale, right cerebellum.   Allowing for differences in technique likely stable subarachnoid and  intraventricular hemorrhage. CT of Head on 6/6/2020 at 1411 shows  IMPRESSION:   Left MCA territory infarction with smaller right MCA territory infarction.     Stable subarachnoid and intraventricular hemorrhage. CTA of Head on 6/6/2020 at 0903 shows  IMPRESSION:  Slightly increased intraventricular and subarachnoid hemorrhage compared to the  prior exam.     Status post coiling left MCA bifurcation aneurysm. No significant residual  aneurysm filling. CT of Head on 6/62020 at 0239 shows  IMPRESSION:  Slightly diminished subarachnoid and intraventricular hemorrhage. Stable ventricular system. CT of Head WO on 6/5/2020 at 1512 shows  IMPRESSION: Stable acute subarachnoid and intraventricular hemorrhage. Stable  ventricular system. CT Head WO 6/5/20 0826     IMPRESSION:   1. Diffuse subarachnoid hemorrhage most concentrated in the basal cisterns and  left sylvian fissure. 2.  Interval ventricular enlargement suspicious for developing hydrocephalus.     CTA Head 6/4/20 2017     IMPRESSION:  1. Diffuse subarachnoid hemorrhage in the basilar cisterns and sylvian  fissures. 2.  Bilateral MCA bifurcation aneurysms. 3.  Infundibular origins to the posterior communicating arteries bilaterally. 4.  Symmetric moderately severe airspace disease in the upper lobes bilaterally  which may represent edema or sequela of aspiration.     CT Head WO Contrast 6/4/2020 2012    IMPRESSION: Extensive subarachnoid hemorrhage concerning for leaking/ruptured  aneurysm.        24 hour results:    Recent Results (from the past 24 hour(s))   GLUCOSE, POC    Collection Time: 06/11/20  1:52 PM   Result Value Ref Range    Glucose (POC) 111 (H) 65 - 100 mg/dL    Performed by Ethan Ly BASIC    Collection Time: 06/11/20  2:27 PM   Result Value Ref Range    Sodium 153 (H) 136 - 145 mmol/L    Potassium 3.7 3.5 - 5.1 mmol/L    Chloride 127 (H) 97 - 108 mmol/L    CO2 22 21 - 32 mmol/L    Anion gap 4 (L) 5 - 15 mmol/L    Glucose 113 (H) 65 - 100 mg/dL    BUN 16 6 - 20 MG/DL    Creatinine 0.64 0.55 - 1.02 MG/DL    BUN/Creatinine ratio 25 (H) 12 - 20      GFR est AA >60 >60 ml/min/1.73m2    GFR est non-AA >60 >60 ml/min/1.73m2    Calcium 7.5 (L) 8.5 - 10.1 MG/DL   GLUCOSE, POC    Collection Time: 06/11/20  5:48 PM   Result Value Ref Range    Glucose (POC) 96 65 - 100 mg/dL    Performed by Ethan Ly [facility]    Collection Time: 06/11/20 10:02 PM   Result Value Ref Range    Sodium 153 (H) 136 - 145 mmol/L    Potassium 3.9 3.5 - 5.1 mmol/L    Chloride 126 (H) 97 - 108 mmol/L    CO2 21 21 - 32 mmol/L    Anion gap 6 5 - 15 mmol/L    Glucose 135 (H) 65 - 100 mg/dL    BUN 16 6 - 20 MG/DL    Creatinine 0.63 0.55 - 1.02 MG/DL    BUN/Creatinine ratio 25 (H) 12 - 20      GFR est AA >60 >60 ml/min/1.73m2    GFR est non-AA >60 >60 ml/min/1.73m2    Calcium 7.5 (L) 8.5 - 10.1 MG/DL   GLUCOSE, POC    Collection Time: 06/11/20 11:22 PM   Result Value Ref Range    Glucose (POC) 135 (H) 65 - 100 mg/dL    Performed by Adriana Souza Esposito) [address] [place] [address]    PROCALCITONIN    Collection Time: 06/12/20  3:33 AM   Result Value Ref Range    Procalcitonin <0.05 ng/mL   CBC WITH AUTOMATED DIFF    Collection Time: 06/12/20  3:33 AM   Result Value Ref Range    WBC 7.4 3.6 - 11.0 K/uL    RBC 3.29 (L) 3.80 - 5.20 M/uL    HGB 7.1 (L) 11.5 - 16.0 g/dL    HCT 24.4 (L) 35.0 - 47.0 %    MCV 74.2 (L) 80.0 - 99.0 FL    MCH 21.6 (L) 26.0 - 34.0 PG    MCHC 29.1 (L) 30.0 - 36.5 g/dL    RDW 23.9 (H) 11.5 - 14.5 %    PLATELET 165 282 - 399 K/uL    MPV 10.8 8.9 - 12.9 FL    NRBC 0.8 (H) 0  WBC    ABSOLUTE NRBC 0.06 (H) 0.00 - 0.01 K/uL    NEUTROPHILS 70 32 - 75 %    LYMPHOCYTES 18 12 - 49 %    MONOCYTES 8 5 - 13 %    EOSINOPHILS 2 0 - 7 %    BASOPHILS 0 0 - 1 %    IMMATURE GRANULOCYTES 2 (H) 0.0 - 0.5 %    ABS. NEUTROPHILS 5.3 1.8 - 8.0 K/UL    ABS. LYMPHOCYTES 1.3 0.8 - 3.5 K/UL    ABS. MONOCYTES 0.6 0.0 - 1.0 K/UL    ABS. EOSINOPHILS 0.1 0.0 - 0.4 K/UL    ABS. BASOPHILS 0.0 0.0 - 0.1 K/UL    ABS. IMM.  GRANS. 0.1 (H) 0.00 - 0.04 K/UL    DF SMEAR SCANNED      PLATELET COMMENTS Large Platelets      RBC COMMENTS ANISOCYTOSIS  2+        RBC COMMENTS MICROCYTOSIS  1+        RBC COMMENTS HYPOCHROMIA  2+        RBC COMMENTS OVALOCYTES  1+       METABOLIC PANEL, BASIC    Collection Time: 06/12/20  3:33 AM   Result Value Ref Range    Sodium 148 (H) 136 - 145 mmol/L    Potassium 3.8 3.5 - 5.1 mmol/L    Chloride 122 (H) 97 - 108 mmol/L    CO2 21 21 - 32 mmol/L    Anion gap 5 5 - 15 mmol/L    Glucose 117 (H) 65 - 100 mg/dL    BUN 16 6 - 20 MG/DL    Creatinine 0.59 0.55 - 1.02 MG/DL    BUN/Creatinine ratio 27 (H) 12 - 20      GFR est AA >60 >60 ml/min/1.73m2    GFR est non-AA >60 >60 ml/min/1.73m2    Calcium 7.6 (L) 8.5 - 10.1 MG/DL   GLUCOSE, POC    Collection Time: 06/12/20  5:39 AM   Result Value Ref Range    Glucose (POC) 115 (H) 65 - 100 mg/dL    Performed by Micheal Bonilla [facility]) [address] [place] [address]    POC EG7    Collection Time: 06/12/20  8:02 AM   Result Value Ref Range    Calcium, ionized (POC) 1.12 1.12 - 1.32 mmol/L    FIO2 (POC) 35 %    pH (POC) 7.514 (H) 7.35 - 7.45      pCO2 (POC) 28.8 (L) 35.0 - 45.0 MMHG    pO2 (POC) 80 80 - 100 MMHG    HCO3 (POC) 23.2 22 - 26 MMOL/L    Base excess (POC) 0 mmol/L    sO2 (POC) 97 92 - 97 %    Site DRAWN FROM ARTERIAL LINE      Device: VENT      Mode ASSIST CONTROL      Tidal volume 400 ml    Set Rate 16 bpm    PEEP/CPAP (POC) 5 cmH2O    Allens test (POC) N/A      Specimen type (POC) ARTERIAL      Volume control plus YES     TCD INTRACRANIAL ARTERIES COMPLETE    Collection Time: 06/12/20  9:45 AM   Result Value Ref Range    Right Lindegaard Ratio 2     Right MCA 1  cm/s    Right MCA 1 EDV 40 cm/s    Right MCA 1 Mean Velocity 74 cm/s    Right PANCHITO  cm/s    Right PANCHITO EDV 74 cm/s    Right PANCHITO Mean Velocity 111 cm/s    Right ICA PSV 88 cm/s    Right ICA EDV 31 cm/s Right ICA Mean Velocity 50 cm/s    Right PCA 1 PSV 96 cm/s    Right PCA 1 EDV 32 cm/s    Right PCA 1 Mean Velocity 53 cm/s    Right External ICA PSV 65 cm/s    Right External ICA EDV 23 cm/s    Right External ICA Mean Velocity 37 cm/s    Left MCA 1  cm/s    Left MCA 1 EDV 82 cm/s    Left MCA 1 Mean Velocity 129 cm/s    Left PANCHITO  cm/s    Left PANCHITO  cm/s    Left PANCHITO Mean Velocity 176 cm/s    Left ICA  cm/s    Left External ICA PSV 45 cm/s    Left ICA EDV 45 cm/s    Left External ICA EDV 15 cm/s    Left ICA Mean Velocity 65 cm/s    Left External ICA Mean Velocity 25 cm/s    Left PCA 1  cm/s    Left PCA 1 EDV 76 cm/s    Left PCA 1 Mean Velocity 122 cm/s    Left Lindegaard Ratio 5.2     Basilar Artery PSV 83 cm/s    Basilar Artery EDV 30 cm/s    Basilar Artery Mean Angel 48 cm/s    Right Vertebral Mean Velocity 35 cm/s    Left Vertebral Mean Velocity 26 cm/s    Right Vertebral PSV 61 cm/s    Right Vertebral EDV 22 cm/s    Left Vertebral PSV 43 cm/s    Left Vertebral EDV 18 cm/s        Marian Cisse NP

## 2020-06-12 NOTE — PROGRESS NOTES
Problem: Falls - Risk of  Goal: *Absence of Falls  Description: Document Linda Denny Fall Risk and appropriate interventions in the flowsheet. Outcome: Progressing Towards Goal  Note: Fall Risk Interventions:       Mentation Interventions: Adequate sleep, hydration, pain control    Medication Interventions: Evaluate medications/consider consulting pharmacy    Elimination Interventions: Toileting schedule/hourly rounds    History of Falls Interventions: Door open when patient unattended         Problem: Pressure Injury - Risk of  Goal: *Prevention of pressure injury  Description: Document Sriram Scale and appropriate interventions in the flowsheet. Outcome: Progressing Towards Goal  Note: Pressure Injury Interventions:  Sensory Interventions: Assess changes in LOC, Assess need for specialty bed, Float heels, Keep linens dry and wrinkle-free, Minimize linen layers, Turn and reposition approx. every two hours (pillows and wedges if needed)    Moisture Interventions: Absorbent underpads, Apply protective barrier, creams and emollients, Check for incontinence Q2 hours and as needed, Internal/External fecal devices, Internal/External urinary devices    Activity Interventions: Pressure redistribution bed/mattress(bed type)    Mobility Interventions: Assess need for specialty bed, Pressure redistribution bed/mattress (bed type), Float heels, Turn and reposition approx.  every two hours(pillow and wedges)    Nutrition Interventions: Document food/fluid/supplement intake    Friction and Shear Interventions: Lift sheet, Minimize layers                Problem: Patient Education: Go to Patient Education Activity  Goal: Patient/Family Education  Outcome: Progressing Towards Goal     Problem: Nutrition Deficit  Goal: *Optimize nutritional status  Outcome: Progressing Towards Goal     Problem: Patient Education: Go to Patient Education Activity  Goal: Patient/Family Education  Outcome: Progressing Towards Goal     Problem: Hemorrhagic Stroke: Day 5 through Discharge  Goal: Nutrition/Diet  Outcome: Progressing Towards Goal  Goal: Medications  Outcome: Progressing Towards Goal  Goal: Respiratory  Outcome: Progressing Towards Goal  Goal: Treatments/Interventions/Procedures  Outcome: Progressing Towards Goal  Goal: *Absence of aspiration  Outcome: Progressing Towards Goal  Goal: *Absence of signs and symptoms of DVT  Outcome: Progressing Towards Goal

## 2020-06-12 NOTE — PROGRESS NOTES
Neurology Progress Note  Norbert Scott NP  Neurocritical Care Nurse Practitioner  [phone]    Admit Date: 6/4/2020   LOS: 8 days      Daily Progress Note: 6/12/2020    S/P: Cerebral angiogram with coil embolization of the left MCA aneurysm by Dr. Ty Garcia on 6/5/20. Elsie Barbour is a 48 y.o. 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 On arrival to ED, pt was unresponsive and noted to be seizing. A stat CT of her head was performed there, which showed extensive SAH. Pt was then intubated emergently. She was also extremely hypertensive with SBPs in 240s. She was given Keppra and Ativan, and placed on cardene drip for BP control. NSGY and NIS were then consulted and pt was transferred to [facility] for higher level of care. On arrival, CTA of her head and neck was obtained, which showed a 4.4 x 4.1 mm right MCA trifurcation aneurysm, a 2.2 x 3.1 mm left MCA trifurcation aneurysm, and possible < 2mm ACOM aneurysms. Repeat CT head of her head showed stable SAH, no evidence of hydrocephalus. COVID-19 infection testing was negative. History obtained from pt's mother, who reports pt does not smoke, does not drink alcohol or use street drugs. Mother reports pt began complaining of a headache on 5/31/20, has been taking BC Powder (aspirin) for the headache. She also reports that the pt has a hx of HTN and is supposed to be on BP medications but recently stopped taking her medications. Patient was taken for cerebral angiogram and coil embolization of the left MCA aneurysm by Dr. Ty Garcia 6/5/20. The right MCA aneurysm had a very wide neck incorporating both M2s and not be treated endovascularly without stents. May need to be clipped by Neurosurgery.     Subjective:     Patient with no acute neuro events overnight. Pt rassessed with NICOM SVI 14.8%, indicating she is fluid responsive.  I/O record reviewed and she is currently euvolemic so no fluid bolus given. No Known Allergies    Review of Systems:  Review of systems not obtained due to patient factors. Objective:   Vital signs  Temp (24hrs), Av.7 °F (37.6 °C), Min:98.4 °F (36.9 °C), Max:101.2 °F (38.4 °C)   1901 -  0700  In: 1021.1 [I.V.:671.1]  Out: 700 [Urine:700]  06/10 07 -  190  In: 7646.4 [I.V.:6005.1]  Out: 2589 [Urine:8325; Drains:1000]  Visit Vitals  /77   Pulse 65   Temp 99.2 °F (37.3 °C)   Resp 21   Ht 5' 4\" (1.626 m)   Wt 239 lb 3.2 oz (108.5 kg)   SpO2 99%   BMI 41.06 kg/m²      O2 Device: Ventilator   Vitals:    20 0000 20 0006 20 0015 20 0030   BP: 163/77      Pulse: 66 66 66 65   Resp: 16 16 23 21   Temp: 99.2 °F (37.3 °C)      SpO2: 100% 100% 99% 99%   Weight:       Height:          Physical Exam:  GENERAL: Intubated, NAD, calm   SKIN: Warm, dry, color appropriate for ethnicity. Bilateral groin arteriotomy sites are soft, dressings are c/d/i, with no odor, bleeding or drainage noted.    NEURO: Intubated with sedation off. Right eye opens spontaneously with stimuli. Left pupil 3 mm and sluggishly reactive, right pupil 2 mm briskly reactive. Blinks to threat. Disconjugate gaze present. Face symmetric. Weak withdraw to painful stimuli in right arm, does not withdraw to pain in left arm. Moves bilateral legs spontaneously.  +cough +gag.      Labs:  Lab Results   Component Value Date/Time    WBC 6.5 2020 05:13 AM    HGB 7.3 (L) 2020 05:13 AM    HCT 25.2 (L) 2020 05:13 AM    PLATELET 569  05:13 AM    MCV 74.3 (L) 2020 05:13 AM     Lab Results   Component Value Date/Time    Sodium 153 (H) 2020 10:02 PM    Potassium 3.9 2020 10:02 PM    Chloride 126 (H) 2020 10:02 PM    CO2 21 2020 10:02 PM    Anion gap 6 2020 10:02 PM    Glucose 135 (H) 2020 10:02 PM    BUN 16 2020 10:02 PM    Creatinine 0.63 2020 10:02 PM BUN/Creatinine ratio 25 (H) 06/11/2020 10:02 PM    GFR est AA >60 06/11/2020 10:02 PM    GFR est non-AA >60 06/11/2020 10:02 PM    Calcium 7.5 (L) 06/11/2020 10:02 PM     Imaging:  MRI Results (most recent):  Results from [place] encounter on 06/04/20   MRI BRAIN WO CONT    Narrative *PRELIMINARY REPOR*  MRI examination of the brain demonstrates multiple areas of restricted diffusion  acetabulum a study from June 6. Hemorrhage in the left sylvian fissure is again  seen. There is hemorrhage in the basilar cisterns. Preliminary report was provided by Dr. Chauncey Sheppard, the on-call radiologist, at  3:57 AM  Final report to follow. *END PRELIMINARY REPORT*    *FINAL REPORT BELOW*  EXAM:  MRI BRAIN WO CONT  INDICATION:  Cerebral edema, left 3rd nerve, coronal T2s please  TECHNIQUE:   Sagittal T1, axial FLAIR, T2,T1 and gradient echo images as well as coronal T2  weighted images and axial diffusion weighted images of the head were obtained. COMPARISON:  CTA head 6/9/20, MRI 6/6/20  FINDINGS:  Multiple areas of abnormal restricted diffusion associated T2 hyperintensity  involving left frontal, insular, temporal and parietal lobes. Punctate focus  left occipital lobe. Also again noted is bilateral parasagittal anterior  inferior frontal cortex involvement as well as also previously demonstrated  right insular and temporal lobe involvement. Foci in centrum semiovale. Punctate  foci of restricted diffusion right cerebellum. Again demonstrated is subarachnoid hemorrhage in the left sylvian fissure and  left suprasellar cistern. Some intraventricular blood without associated  hydrocephalus. Other than asymmetric increased subarachnoid blood in the left  suprasellar cistern, no finding to explain source of left 3rd nerve palsy. Increasing fluid and mucosal thickening sphenoid and paranasal sinuses. Also  effusions right greater than left mastoid/middle ear. Other of the skull base  are unremarkable.       Impression IMPRESSION:  1. Multiple areas of cortical acute ischemic injury as well as some subcortical  white matter areas and right cerebellar involvement similar to the prior MRI  6/6/20.  2. Subarachnoid hemorrhage and intraventricular blood again demonstrated without  hydrocephalus. 3. Increasing fluid paranasal sinuses and right greater than left mastoid/middle  ear cavities. 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    - s/p cerebral angiogram with coil embolization of left MCA bifurcation aneurysm on 6/5 by Dr. Ila Pride prevent delayed cerebral ischemia   - Cerebral vasospasm management per NIS, IA verapamil tx on 6/9/20 & 6/10/20               - Strict I&O, maintain euvolemnia              - ECHO with EF of 60-65%              - q 1 neuro checks   - Cont daily TCD's per NIS              - PT/OT/SLP evals when appropriate   - NSGY also following, unsecured right MCA aneurysm may need clipping     2.) Multiple scattered cerebral infarcts    - As seen on MRI brain              - Aspirin 81 mg daily for stroke prevention   - Lipid panel showing LDL 7.8, at goal < 70, statin not indicated   - Hgb A1C ok at 5.7   - SBP goals per NIS     3.) Seizure              - Due to #1, no prior hx of seizure              - Continue Keppra 1000 mg BID, Vimpat 100 BID              - Seizure precautions              - Ativan PRN for seizures greater than 2 minutes               - Propofol while intubated   - EEG 6/8/20 showed moderate to severe generalized encephalopathic process, nonspecific in type.  This may be related to underlying structural brain injury or effects of sedation. No convincingly lateralizing or epileptiform features were noted. No seizure was recorded. 4.) Cerebral edema   - Due to #1, #2   - 3% NA gtt weaned to 20cc/hour with goal sodium of 145-150              - HOB 30 degrees    Plan discussed with Dr. Ibeth Mejia and Dr. Berenice Avila.      Florence Shannon NP  Neurocritical Care Nurse Practitioner

## 2020-06-13 ENCOUNTER — ANESTHESIA (OUTPATIENT)
Dept: CT IMAGING | Age: 50
DRG: 003 | End: 2020-06-13
Payer: COMMERCIAL

## 2020-06-13 ENCOUNTER — ANESTHESIA EVENT (OUTPATIENT)
Dept: CT IMAGING | Age: 50
DRG: 003 | End: 2020-06-13
Payer: COMMERCIAL

## 2020-06-13 ENCOUNTER — ANESTHESIA (OUTPATIENT)
Dept: INTERVENTIONAL RADIOLOGY/VASCULAR | Age: 50
DRG: 003 | End: 2020-06-13
Payer: COMMERCIAL

## 2020-06-13 ENCOUNTER — APPOINTMENT (OUTPATIENT)
Dept: CT IMAGING | Age: 50
DRG: 003 | End: 2020-06-13
Attending: RADIOLOGY
Payer: COMMERCIAL

## 2020-06-13 ENCOUNTER — APPOINTMENT (OUTPATIENT)
Dept: INTERVENTIONAL RADIOLOGY/VASCULAR | Age: 50
DRG: 003 | End: 2020-06-13
Attending: NURSE PRACTITIONER
Payer: COMMERCIAL

## 2020-06-13 ENCOUNTER — APPOINTMENT (OUTPATIENT)
Dept: VASCULAR SURGERY | Age: 50
DRG: 003 | End: 2020-06-13
Attending: NURSE PRACTITIONER
Payer: COMMERCIAL

## 2020-06-13 ENCOUNTER — ANESTHESIA EVENT (OUTPATIENT)
Dept: INTERVENTIONAL RADIOLOGY/VASCULAR | Age: 50
DRG: 003 | End: 2020-06-13
Payer: COMMERCIAL

## 2020-06-13 LAB
ALBUMIN SERPL-MCNC: 2.4 G/DL (ref 3.5–5)
ANION GAP SERPL CALC-SCNC: 6 MMOL/L (ref 5–15)
ANION GAP SERPL CALC-SCNC: 6 MMOL/L (ref 5–15)
ANION GAP SERPL CALC-SCNC: 7 MMOL/L (ref 5–15)
ANION GAP SERPL CALC-SCNC: 8 MMOL/L (ref 5–15)
BASOPHILS # BLD: 0 K/UL (ref 0–0.1)
BASOPHILS NFR BLD: 0 % (ref 0–1)
BUN SERPL-MCNC: 13 MG/DL (ref 6–20)
BUN SERPL-MCNC: 14 MG/DL (ref 6–20)
BUN SERPL-MCNC: 15 MG/DL (ref 6–20)
BUN SERPL-MCNC: 19 MG/DL (ref 6–20)
BUN/CREAT SERPL: 22 (ref 12–20)
BUN/CREAT SERPL: 25 (ref 12–20)
BUN/CREAT SERPL: 25 (ref 12–20)
BUN/CREAT SERPL: 31 (ref 12–20)
CALCIUM SERPL-MCNC: 7.6 MG/DL (ref 8.5–10.1)
CALCIUM SERPL-MCNC: 8.1 MG/DL (ref 8.5–10.1)
CALCIUM SERPL-MCNC: 8.2 MG/DL (ref 8.5–10.1)
CALCIUM SERPL-MCNC: 8.3 MG/DL (ref 8.5–10.1)
CHLORIDE SERPL-SCNC: 111 MMOL/L (ref 97–108)
CHLORIDE SERPL-SCNC: 114 MMOL/L (ref 97–108)
CHLORIDE SERPL-SCNC: 115 MMOL/L (ref 97–108)
CHLORIDE SERPL-SCNC: 118 MMOL/L (ref 97–108)
CO2 SERPL-SCNC: 20 MMOL/L (ref 21–32)
CO2 SERPL-SCNC: 21 MMOL/L (ref 21–32)
CO2 SERPL-SCNC: 22 MMOL/L (ref 21–32)
CO2 SERPL-SCNC: 22 MMOL/L (ref 21–32)
CREAT SERPL-MCNC: 0.55 MG/DL (ref 0.55–1.02)
CREAT SERPL-MCNC: 0.59 MG/DL (ref 0.55–1.02)
CREAT SERPL-MCNC: 0.59 MG/DL (ref 0.55–1.02)
CREAT SERPL-MCNC: 0.61 MG/DL (ref 0.55–1.02)
CRP SERPL-MCNC: 3.49 MG/DL (ref 0–0.6)
D DIMER PPP FEU-MCNC: 6.4 MG/L FEU (ref 0–0.65)
DIFFERENTIAL METHOD BLD: ABNORMAL
EOSINOPHIL # BLD: 0 K/UL (ref 0–0.4)
EOSINOPHIL NFR BLD: 0 % (ref 0–7)
ERYTHROCYTE [DISTWIDTH] IN BLOOD BY AUTOMATED COUNT: 23.6 % (ref 11.5–14.5)
FERRITIN SERPL-MCNC: 44 NG/ML (ref 26–388)
GLUCOSE BLD STRIP.AUTO-MCNC: 113 MG/DL (ref 65–100)
GLUCOSE BLD STRIP.AUTO-MCNC: 126 MG/DL (ref 65–100)
GLUCOSE BLD STRIP.AUTO-MCNC: 129 MG/DL (ref 65–100)
GLUCOSE BLD STRIP.AUTO-MCNC: 97 MG/DL (ref 65–100)
GLUCOSE SERPL-MCNC: 104 MG/DL (ref 65–100)
GLUCOSE SERPL-MCNC: 116 MG/DL (ref 65–100)
GLUCOSE SERPL-MCNC: 119 MG/DL (ref 65–100)
GLUCOSE SERPL-MCNC: 122 MG/DL (ref 65–100)
HCT VFR BLD AUTO: 23.4 % (ref 35–47)
HGB BLD-MCNC: 6.9 G/DL (ref 11.5–16)
IMM GRANULOCYTES # BLD AUTO: 0 K/UL
IMM GRANULOCYTES NFR BLD AUTO: 0 %
LDH SERPL L TO P-CCNC: 611 U/L (ref 81–246)
LYMPHOCYTES # BLD: 1.4 K/UL (ref 0.8–3.5)
LYMPHOCYTES NFR BLD: 18 % (ref 12–49)
MAGNESIUM SERPL-MCNC: 2.3 MG/DL (ref 1.6–2.4)
MCH RBC QN AUTO: 21.9 PG (ref 26–34)
MCHC RBC AUTO-ENTMCNC: 29.5 G/DL (ref 30–36.5)
MCV RBC AUTO: 74.3 FL (ref 80–99)
MONOCYTES # BLD: 0.4 K/UL (ref 0–1)
MONOCYTES NFR BLD: 5 % (ref 5–13)
NEUTS BAND NFR BLD MANUAL: 2 % (ref 0–6)
NEUTS SEG # BLD: 6.1 K/UL (ref 1.8–8)
NEUTS SEG NFR BLD: 75 % (ref 32–75)
NRBC # BLD: 0.11 K/UL (ref 0–0.01)
NRBC BLD-RTO: 1.4 PER 100 WBC
PHOSPHATE SERPL-MCNC: 2.3 MG/DL (ref 2.6–4.7)
PLATELET # BLD AUTO: 203 K/UL (ref 150–400)
PMV BLD AUTO: 10.8 FL (ref 8.9–12.9)
POTASSIUM SERPL-SCNC: 3.4 MMOL/L (ref 3.5–5.1)
POTASSIUM SERPL-SCNC: 3.7 MMOL/L (ref 3.5–5.1)
POTASSIUM SERPL-SCNC: 3.9 MMOL/L (ref 3.5–5.1)
POTASSIUM SERPL-SCNC: 3.9 MMOL/L (ref 3.5–5.1)
PROCALCITONIN SERPL-MCNC: 0.05 NG/ML
RBC # BLD AUTO: 3.15 M/UL (ref 3.8–5.2)
RBC MORPH BLD: ABNORMAL
SERVICE CMNT-IMP: ABNORMAL
SERVICE CMNT-IMP: NORMAL
SODIUM SERPL-SCNC: 140 MMOL/L (ref 136–145)
SODIUM SERPL-SCNC: 142 MMOL/L (ref 136–145)
SODIUM SERPL-SCNC: 142 MMOL/L (ref 136–145)
SODIUM SERPL-SCNC: 146 MMOL/L (ref 136–145)
WBC # BLD AUTO: 7.9 K/UL (ref 3.6–11)

## 2020-06-13 PROCEDURE — 74011000250 HC RX REV CODE- 250: Performed by: RADIOLOGY

## 2020-06-13 PROCEDURE — 77030018798 HC PMP KT ENTRL FED COVD -A

## 2020-06-13 PROCEDURE — 87186 SC STD MICRODIL/AGAR DIL: CPT

## 2020-06-13 PROCEDURE — 74011636320 HC RX REV CODE- 636/320

## 2020-06-13 PROCEDURE — 74011250637 HC RX REV CODE- 250/637: Performed by: NURSE PRACTITIONER

## 2020-06-13 PROCEDURE — C1894 INTRO/SHEATH, NON-LASER: HCPCS

## 2020-06-13 PROCEDURE — 74011250637 HC RX REV CODE- 250/637: Performed by: ANESTHESIOLOGY

## 2020-06-13 PROCEDURE — C1769 GUIDE WIRE: HCPCS

## 2020-06-13 PROCEDURE — 82040 ASSAY OF SERUM ALBUMIN: CPT

## 2020-06-13 PROCEDURE — 70450 CT HEAD/BRAIN W/O DYE: CPT

## 2020-06-13 PROCEDURE — 74011000258 HC RX REV CODE- 258: Performed by: NURSE PRACTITIONER

## 2020-06-13 PROCEDURE — 74011000250 HC RX REV CODE- 250: Performed by: NURSE PRACTITIONER

## 2020-06-13 PROCEDURE — 36430 TRANSFUSION BLD/BLD COMPNT: CPT

## 2020-06-13 PROCEDURE — 94640 AIRWAY INHALATION TREATMENT: CPT

## 2020-06-13 PROCEDURE — 76060000035 HC ANESTHESIA 2 TO 2.5 HR

## 2020-06-13 PROCEDURE — 77030008584 HC TOOL GDWRE DEV TERU -A

## 2020-06-13 PROCEDURE — 77030012468 HC VLV BLEEDBK CNTRL ABBT -B

## 2020-06-13 PROCEDURE — P9016 RBC LEUKOCYTES REDUCED: HCPCS

## 2020-06-13 PROCEDURE — 77030021532 HC CATH ANGI DX IMPRS MRTM -B

## 2020-06-13 PROCEDURE — 80048 BASIC METABOLIC PNL TOTAL CA: CPT

## 2020-06-13 PROCEDURE — 87070 CULTURE OTHR SPECIMN AEROBIC: CPT

## 2020-06-13 PROCEDURE — 84145 PROCALCITONIN (PCT): CPT

## 2020-06-13 PROCEDURE — 36224 PLACE CATH CAROTD ART: CPT

## 2020-06-13 PROCEDURE — 74011250636 HC RX REV CODE- 250/636

## 2020-06-13 PROCEDURE — 70496 CT ANGIOGRAPHY HEAD: CPT

## 2020-06-13 PROCEDURE — 87040 BLOOD CULTURE FOR BACTERIA: CPT

## 2020-06-13 PROCEDURE — 65610000006 HC RM INTENSIVE CARE

## 2020-06-13 PROCEDURE — 85379 FIBRIN DEGRADATION QUANT: CPT

## 2020-06-13 PROCEDURE — C1760 CLOSURE DEV, VASC: HCPCS

## 2020-06-13 PROCEDURE — 74011250636 HC RX REV CODE- 250/636: Performed by: NURSE PRACTITIONER

## 2020-06-13 PROCEDURE — C1887 CATHETER, GUIDING: HCPCS

## 2020-06-13 PROCEDURE — 85025 COMPLETE CBC W/AUTO DIFF WBC: CPT

## 2020-06-13 PROCEDURE — 93886 INTRACRANIAL COMPLETE STUDY: CPT

## 2020-06-13 PROCEDURE — 77030008638 HC TU CONN COOK -A

## 2020-06-13 PROCEDURE — 74011000250 HC RX REV CODE- 250

## 2020-06-13 PROCEDURE — 74011636320 HC RX REV CODE- 636/320: Performed by: RADIOLOGY

## 2020-06-13 PROCEDURE — 87077 CULTURE AEROBIC IDENTIFY: CPT

## 2020-06-13 PROCEDURE — 74011250637 HC RX REV CODE- 250/637: Performed by: PHYSICIAN ASSISTANT

## 2020-06-13 PROCEDURE — 83735 ASSAY OF MAGNESIUM: CPT

## 2020-06-13 PROCEDURE — 0042T CT PERF W CBF: CPT

## 2020-06-13 PROCEDURE — 74011250636 HC RX REV CODE- 250/636: Performed by: RADIOLOGY

## 2020-06-13 PROCEDURE — 36415 COLL VENOUS BLD VENIPUNCTURE: CPT

## 2020-06-13 PROCEDURE — 82728 ASSAY OF FERRITIN: CPT

## 2020-06-13 PROCEDURE — 74011250636 HC RX REV CODE- 250/636: Performed by: ANESTHESIOLOGY

## 2020-06-13 PROCEDURE — 36592 COLLECT BLOOD FROM PICC: CPT

## 2020-06-13 PROCEDURE — 94003 VENT MGMT INPAT SUBQ DAY: CPT

## 2020-06-13 PROCEDURE — 83615 LACTATE (LD) (LDH) ENZYME: CPT

## 2020-06-13 PROCEDURE — 86140 C-REACTIVE PROTEIN: CPT

## 2020-06-13 PROCEDURE — 74011250637 HC RX REV CODE- 250/637: Performed by: PSYCHIATRY & NEUROLOGY

## 2020-06-13 PROCEDURE — 74011000258 HC RX REV CODE- 258: Performed by: INTERNAL MEDICINE

## 2020-06-13 PROCEDURE — 84100 ASSAY OF PHOSPHORUS: CPT

## 2020-06-13 PROCEDURE — 82962 GLUCOSE BLOOD TEST: CPT

## 2020-06-13 PROCEDURE — 94664 DEMO&/EVAL PT USE INHALER: CPT

## 2020-06-13 RX ORDER — VERAPAMIL HYDROCHLORIDE 2.5 MG/ML
60 INJECTION, SOLUTION INTRAVENOUS
Status: DISCONTINUED | OUTPATIENT
Start: 2020-06-13 | End: 2020-06-13

## 2020-06-13 RX ORDER — 3% SODIUM CHLORIDE 3 G/100ML
10 INJECTION, SOLUTION INTRAVENOUS CONTINUOUS
Status: DISCONTINUED | OUTPATIENT
Start: 2020-06-13 | End: 2020-06-14

## 2020-06-13 RX ORDER — SODIUM CHLORIDE, SODIUM LACTATE, POTASSIUM CHLORIDE, CALCIUM CHLORIDE 600; 310; 30; 20 MG/100ML; MG/100ML; MG/100ML; MG/100ML
INJECTION, SOLUTION INTRAVENOUS
Status: DISCONTINUED | OUTPATIENT
Start: 2020-06-13 | End: 2020-06-13 | Stop reason: HOSPADM

## 2020-06-13 RX ORDER — SODIUM CHLORIDE 9 MG/ML
250 INJECTION, SOLUTION INTRAVENOUS AS NEEDED
Status: DISCONTINUED | OUTPATIENT
Start: 2020-06-13 | End: 2020-06-21

## 2020-06-13 RX ORDER — LIDOCAINE HYDROCHLORIDE AND EPINEPHRINE 10; 10 MG/ML; UG/ML
1.5 INJECTION, SOLUTION INFILTRATION; PERINEURAL ONCE
Status: COMPLETED | OUTPATIENT
Start: 2020-06-13 | End: 2020-06-13

## 2020-06-13 RX ORDER — SODIUM CHLORIDE 0.9 % (FLUSH) 0.9 %
10 SYRINGE (ML) INJECTION
Status: ACTIVE | OUTPATIENT
Start: 2020-06-13 | End: 2020-06-13

## 2020-06-13 RX ORDER — LIDOCAINE HYDROCHLORIDE 20 MG/ML
20 INJECTION, SOLUTION INFILTRATION; PERINEURAL ONCE
Status: COMPLETED | OUTPATIENT
Start: 2020-06-13 | End: 2020-06-13

## 2020-06-13 RX ORDER — 3% SODIUM CHLORIDE 3 G/100ML
10 INJECTION, SOLUTION INTRAVENOUS CONTINUOUS
Status: DISCONTINUED | OUTPATIENT
Start: 2020-06-13 | End: 2020-06-13

## 2020-06-13 RX ORDER — POTASSIUM CHLORIDE 29.8 MG/ML
20 INJECTION INTRAVENOUS
Status: COMPLETED | OUTPATIENT
Start: 2020-06-13 | End: 2020-06-14

## 2020-06-13 RX ORDER — SODIUM CHLORIDE 9 MG/ML
50 INJECTION, SOLUTION INTRAVENOUS CONTINUOUS
Status: DISCONTINUED | OUTPATIENT
Start: 2020-06-13 | End: 2020-06-13 | Stop reason: HOSPADM

## 2020-06-13 RX ORDER — VERAPAMIL HYDROCHLORIDE 2.5 MG/ML
INJECTION, SOLUTION INTRAVENOUS
Status: COMPLETED
Start: 2020-06-13 | End: 2020-06-13

## 2020-06-13 RX ORDER — PHENYLEPHRINE HCL IN 0.9% NACL 0.4MG/10ML
SYRINGE (ML) INTRAVENOUS AS NEEDED
Status: DISCONTINUED | OUTPATIENT
Start: 2020-06-13 | End: 2020-06-13 | Stop reason: HOSPADM

## 2020-06-13 RX ORDER — HEPARIN SODIUM 1000 [USP'U]/ML
INJECTION, SOLUTION INTRAVENOUS; SUBCUTANEOUS
Status: COMPLETED
Start: 2020-06-13 | End: 2020-06-13

## 2020-06-13 RX ORDER — ROCURONIUM BROMIDE 10 MG/ML
INJECTION, SOLUTION INTRAVENOUS AS NEEDED
Status: DISCONTINUED | OUTPATIENT
Start: 2020-06-13 | End: 2020-06-13 | Stop reason: HOSPADM

## 2020-06-13 RX ORDER — LIDOCAINE HYDROCHLORIDE 20 MG/ML
INJECTION, SOLUTION INFILTRATION; PERINEURAL
Status: COMPLETED
Start: 2020-06-13 | End: 2020-06-13

## 2020-06-13 RX ORDER — LIDOCAINE HYDROCHLORIDE AND EPINEPHRINE 10; 10 MG/ML; UG/ML
INJECTION, SOLUTION INFILTRATION; PERINEURAL
Status: COMPLETED
Start: 2020-06-13 | End: 2020-06-13

## 2020-06-13 RX ORDER — HEPARIN SODIUM 1000 [USP'U]/ML
INJECTION, SOLUTION INTRAVENOUS; SUBCUTANEOUS AS NEEDED
Status: DISCONTINUED | OUTPATIENT
Start: 2020-06-13 | End: 2020-06-13 | Stop reason: HOSPADM

## 2020-06-13 RX ORDER — HEPARIN SODIUM 1000 [USP'U]/ML
10000 INJECTION, SOLUTION INTRAVENOUS; SUBCUTANEOUS
Status: DISCONTINUED | OUTPATIENT
Start: 2020-06-13 | End: 2020-06-13 | Stop reason: HOSPADM

## 2020-06-13 RX ORDER — FLUDROCORTISONE ACETATE 0.1 MG/1
0.1 TABLET ORAL 2 TIMES DAILY
Status: DISCONTINUED | OUTPATIENT
Start: 2020-06-13 | End: 2020-06-15

## 2020-06-13 RX ADMIN — Medication 120 MCG: at 12:37

## 2020-06-13 RX ADMIN — NIMODIPINE 60 MG: 30 SOLUTION ORAL at 18:34

## 2020-06-13 RX ADMIN — LABETALOL HYDROCHLORIDE 20 MG: 5 INJECTION INTRAVENOUS at 06:59

## 2020-06-13 RX ADMIN — DOCUSATE SODIUM 100 MG: 50 LIQUID ORAL at 09:18

## 2020-06-13 RX ADMIN — LACOSAMIDE 100 MG: 50 TABLET, FILM COATED ORAL at 20:21

## 2020-06-13 RX ADMIN — FAMOTIDINE 20 MG: 40 POWDER, FOR SUSPENSION ORAL at 09:18

## 2020-06-13 RX ADMIN — HEPARIN SODIUM 4000 UNITS: 1000 INJECTION INTRAVENOUS; SUBCUTANEOUS at 13:11

## 2020-06-13 RX ADMIN — PIPERACILLIN AND TAZOBACTAM 3.38 G: 3; .375 INJECTION, POWDER, LYOPHILIZED, FOR SOLUTION INTRAVENOUS at 03:28

## 2020-06-13 RX ADMIN — HEPARIN SODIUM 4000 UNITS: 1000 INJECTION INTRAVENOUS; SUBCUTANEOUS at 12:55

## 2020-06-13 RX ADMIN — NIMODIPINE 60 MG: 30 SOLUTION ORAL at 09:27

## 2020-06-13 RX ADMIN — VERAPAMIL HYDROCHLORIDE 20 MG: 2.5 INJECTION, SOLUTION INTRAVENOUS at 13:01

## 2020-06-13 RX ADMIN — LACOSAMIDE 100 MG: 50 TABLET, FILM COATED ORAL at 09:18

## 2020-06-13 RX ADMIN — NIMODIPINE 60 MG: 30 SOLUTION ORAL at 06:10

## 2020-06-13 RX ADMIN — POTASSIUM CHLORIDE 20 MEQ: 400 INJECTION, SOLUTION INTRAVENOUS at 22:57

## 2020-06-13 RX ADMIN — IOPAMIDOL 120 ML: 755 INJECTION, SOLUTION INTRAVENOUS at 10:57

## 2020-06-13 RX ADMIN — SODIUM CHLORIDE, SODIUM LACTATE, POTASSIUM CHLORIDE, AND CALCIUM CHLORIDE 100 ML/HR: 600; 310; 30; 20 INJECTION, SOLUTION INTRAVENOUS at 09:25

## 2020-06-13 RX ADMIN — SODIUM CHLORIDE, SODIUM LACTATE, POTASSIUM CHLORIDE, CALCIUM CHLORIDE: 600; 310; 30; 20 INJECTION, SOLUTION INTRAVENOUS at 12:05

## 2020-06-13 RX ADMIN — IOPAMIDOL 75 ML: 612 INJECTION, SOLUTION INTRAVENOUS at 13:15

## 2020-06-13 RX ADMIN — SODIUM CHLORIDE 1000 ML: 900 INJECTION, SOLUTION INTRAVENOUS at 20:17

## 2020-06-13 RX ADMIN — IPRATROPIUM BROMIDE AND ALBUTEROL SULFATE 3 ML: .5; 3 SOLUTION RESPIRATORY (INHALATION) at 03:58

## 2020-06-13 RX ADMIN — IPRATROPIUM BROMIDE AND ALBUTEROL SULFATE 3 ML: .5; 3 SOLUTION RESPIRATORY (INHALATION) at 07:21

## 2020-06-13 RX ADMIN — SODIUM CHLORIDE 5 MG/HR: 9 INJECTION, SOLUTION INTRAVENOUS at 11:36

## 2020-06-13 RX ADMIN — SODIUM CHLORIDE 10 ML/HR: 3 INJECTION, SOLUTION INTRAVENOUS at 20:28

## 2020-06-13 RX ADMIN — FLUDROCORTISONE ACETATE 0.1 MG: 0.1 TABLET ORAL at 21:55

## 2020-06-13 RX ADMIN — LEVETIRACETAM 1000 MG: 100 SOLUTION ORAL at 20:21

## 2020-06-13 RX ADMIN — Medication 2 DROP: at 14:29

## 2020-06-13 RX ADMIN — SODIUM CHLORIDE 1000 ML: 900 INJECTION, SOLUTION INTRAVENOUS at 18:16

## 2020-06-13 RX ADMIN — CASTOR OIL AND BALSAM, PERU: 788; 87 OINTMENT TOPICAL at 17:38

## 2020-06-13 RX ADMIN — HEPARIN SODIUM 2000 UNITS: 1000 INJECTION, SOLUTION INTRAVENOUS; SUBCUTANEOUS at 12:52

## 2020-06-13 RX ADMIN — VERAPAMIL HYDROCHLORIDE 10 MG: 2.5 INJECTION, SOLUTION INTRAVENOUS at 12:53

## 2020-06-13 RX ADMIN — NIMODIPINE 60 MG: 30 SOLUTION ORAL at 21:55

## 2020-06-13 RX ADMIN — LIDOCAINE HYDROCHLORIDE 10 ML: 20 INJECTION, SOLUTION INFILTRATION; PERINEURAL at 13:10

## 2020-06-13 RX ADMIN — SODIUM CHLORIDE 100 ML: 900 INJECTION, SOLUTION INTRAVENOUS at 10:57

## 2020-06-13 RX ADMIN — LIDOCAINE HYDROCHLORIDE AND EPINEPHRINE 10 ML: 10; 10 INJECTION, SOLUTION INFILTRATION; PERINEURAL at 13:12

## 2020-06-13 RX ADMIN — LEVETIRACETAM 1000 MG: 100 SOLUTION ORAL at 09:18

## 2020-06-13 RX ADMIN — POTASSIUM CHLORIDE 20 MEQ: 400 INJECTION, SOLUTION INTRAVENOUS at 00:59

## 2020-06-13 RX ADMIN — HEPARIN SODIUM 4000 UNITS: 1000 INJECTION INTRAVENOUS; SUBCUTANEOUS at 13:14

## 2020-06-13 RX ADMIN — ROCURONIUM BROMIDE 30 MG: 10 INJECTION, SOLUTION INTRAVENOUS at 13:31

## 2020-06-13 RX ADMIN — Medication 2 DROP: at 22:04

## 2020-06-13 RX ADMIN — Medication 120 MCG: at 13:09

## 2020-06-13 RX ADMIN — CHLORHEXIDINE GLUCONATE 15 ML: 0.12 RINSE ORAL at 20:21

## 2020-06-13 RX ADMIN — SODIUM CHLORIDE 10 ML/HR: 3 INJECTION, SOLUTION INTRAVENOUS at 09:15

## 2020-06-13 RX ADMIN — ASPIRIN 81 MG CHEWABLE TABLET 81 MG: 81 TABLET CHEWABLE at 09:18

## 2020-06-13 RX ADMIN — IPRATROPIUM BROMIDE AND ALBUTEROL SULFATE 3 ML: .5; 3 SOLUTION RESPIRATORY (INHALATION) at 19:14

## 2020-06-13 RX ADMIN — SODIUM CHLORIDE 50 ML/HR: 900 INJECTION, SOLUTION INTRAVENOUS at 12:53

## 2020-06-13 RX ADMIN — NIMODIPINE 60 MG: 30 SOLUTION ORAL at 01:52

## 2020-06-13 RX ADMIN — CASTOR OIL AND BALSAM, PERU: 788; 87 OINTMENT TOPICAL at 09:18

## 2020-06-13 RX ADMIN — IPRATROPIUM BROMIDE AND ALBUTEROL SULFATE 3 ML: .5; 3 SOLUTION RESPIRATORY (INHALATION) at 15:58

## 2020-06-13 RX ADMIN — PIPERACILLIN AND TAZOBACTAM 3.38 G: 3; .375 INJECTION, POWDER, LYOPHILIZED, FOR SOLUTION INTRAVENOUS at 11:29

## 2020-06-13 RX ADMIN — Medication 2 DROP: at 06:10

## 2020-06-13 RX ADMIN — NIMODIPINE 60 MG: 30 SOLUTION ORAL at 14:23

## 2020-06-13 RX ADMIN — ROCURONIUM BROMIDE 30 MG: 10 INJECTION, SOLUTION INTRAVENOUS at 12:30

## 2020-06-13 RX ADMIN — VERAPAMIL HYDROCHLORIDE 10 MG: 2.5 INJECTION, SOLUTION INTRAVENOUS at 13:06

## 2020-06-13 RX ADMIN — CHLORHEXIDINE GLUCONATE 15 ML: 0.12 RINSE ORAL at 09:26

## 2020-06-13 RX ADMIN — SODIUM CHLORIDE: 900 INJECTION, SOLUTION INTRAVENOUS at 06:24

## 2020-06-13 RX ADMIN — HEPARIN SODIUM 4000 UNITS: 1000 INJECTION INTRAVENOUS; SUBCUTANEOUS at 13:05

## 2020-06-13 RX ADMIN — FAMOTIDINE 20 MG: 40 POWDER, FOR SUSPENSION ORAL at 20:21

## 2020-06-13 RX ADMIN — LIDOCAINE HYDROCHLORIDE,EPINEPHRINE BITARTRATE 10 ML: 10; .01 INJECTION, SOLUTION INFILTRATION; PERINEURAL at 13:12

## 2020-06-13 RX ADMIN — ENOXAPARIN SODIUM 40 MG: 40 INJECTION SUBCUTANEOUS at 14:36

## 2020-06-13 RX ADMIN — POTASSIUM CHLORIDE 20 MEQ: 400 INJECTION, SOLUTION INTRAVENOUS at 23:58

## 2020-06-13 RX ADMIN — PHENYLEPHRINE HYDROCHLORIDE 10 MCG/MIN: 10 INJECTION INTRAVENOUS at 14:16

## 2020-06-13 RX ADMIN — SODIUM CHLORIDE, SODIUM LACTATE, POTASSIUM CHLORIDE, AND CALCIUM CHLORIDE 100 ML/HR: 600; 310; 30; 20 INJECTION, SOLUTION INTRAVENOUS at 18:22

## 2020-06-13 NOTE — OP NOTES
NEUROINTERVENTIONAL SURGERY POST-PROCEDURE NOTE    PROCEDURE:  Transarterial infusion of non-lytic agent:  LICA and ALFONSO for vasospasm  Cerebral angiogram  US guided arterial access  Angioseal:  Left femoral artery    VESSEL(S) STUDIED:  1. ALFONSO  2. LVA  3. LCCA  4. LICA VESSEL(S) TREATED:  1. ALFONSO (10 mg verapamil)  2. LVA (10 mg verapamil)  3. LICA (20 mg verapamil)      PRELIMINARY REPORT & DISPOSITION:     Moderate to severe left PANCHITO A1, moderate left distal MCA, severe left PCA/P1, moderate focal right PANCHITO spasm. Responded well to IA injections of verapamil. COMPLICATIONS:  None immediate    FOLLOW-UP:  -130, only treat SBP > 200  Euvolemia per ICU DATE OF SERVICE:  6/13/2020 1:17 PM     ATTENDING SURGEON(S):  Allen Edwards MD      ANESTHESIA:   General    MEDICATIONS:   See nursing record  75 mL Isovue 300 contrast  2000U heparin IV  2% lido without and with epi local    PUNCTURE SITE:  LEFT common femoral artery. Arteriotomy closed with 6F Angioseal.  Left leg straight x 2 hours. HOB 30 degrees.

## 2020-06-13 NOTE — PROGRESS NOTES
1930: Bedside and Verbal shift change report given to Omayra RN (oncoming nurse) by Brigette France and Phuong TRACY (offgoing nurse). Report included the following information SBAR, Kardex, OR Summary, Procedure Summary, Intake/Output, MAR, Recent Results, Cardiac Rhythm NSR-merlyn, Alarm Parameters  and Dual Neuro Assessment. 2000: Pt still lightly sedated from 50 mg rocuronium and 20 mg etomidate given at [phone] for bronch. Pt being weaned off precedex, bradycardic in the high 50s. Pt withdraw BLE and no movement in BUE, L pupil 3 mm and R pupil 2 mm, generalized edema. 2130: Stooped precedex. 2200: MAP in the low 90s, notified Derryl Rough NP, orders received for jaz if needed and albumin, given albumin. 2245: K 3.3, notified Derryl Rough NP, new orders received. 0430:Primary Nurse Suma Herring and Toshia Encinas, RN performed a dual skin assessment on this patient Impairment noted- see wound doc flow sheet  Sriram score is 13.    0500: Hgb 6.9, notified Derryl Rough NP, orders received to transfuse 1 unit of PRBCs, consent in chart. 0530: Started blood transfusion. 0700: SBP in 200s, admin labetalol. 0730: Bedside and Verbal shift change report given to Joey RN (oncoming nurse) by Mohinder Christie RN (offgoing nurse). Report included the following information SBAR, Kardex, Procedure Summary, Intake/Output, MAR, Recent Results, Cardiac Rhythm NSR, Alarm Parameters  and Dual Neuro Assessment.

## 2020-06-13 NOTE — PROGRESS NOTES
Neurointerventional Surgery Progress Note  Trina Landis NP   Neurocritical Care Nurse Practitioner  [phone]          Admit Date: 6/4/2020        Daily Progress Note: 6/13/2020   LOS: 9 days      S/P:  POD 8 Cerebral angiogram with coil embolization of ruptured left MCA bifurcation aneurysm on 6/5/2020    ** Delayed Presentation ** PB Day 13    Interval History/Subjective:   Patient received 1 unit of PRBCs overnight for low hemoglobin of 6.9 overnight. She remains intubated and has been off of sedation since 2100 last night per RN report. She is opening her eyes. No command following noted. Left eye ptosis noted. Moves RLE spontaneously and withdraws to pain on extremities. Stat CT/CTA/CTP ordered this morning to reassess for cerebral vasospasm. CTA shows left A1 vasospasm and mild spasm of the right PANCHITO, MCAs, and possibly distal vertebrals. CTP show decreased blood flow throughout the left cerebral hemisphere. Planning to take patient back to angio today for continued vasospasm treatment. Unable to perform a ROS due to intubation and altered mental status. Assessment & Plan:      Active Problems:    SAH (subarachnoid hemorrhage) ([address] (6/4/2020)      Subarachnoid hemorrhage from middle cerebral artery aneurysm, left ([address] (6/4/2020)      RIGHT middle cerebral artery aneurysm (6/4/2020)      Cerebral vasospasm (6/4/2020)      1.) SAH due to ruptured cerebral aneurysm, [place] 5, An Grade 3/4              - s/p cerebral angiogram with coil embolization of left MCA bifurcation aneurysm on 6/5       Additionally, patient has 5X3 right MCA bifurcation aneurysm with very wide neck which will               need to be treated with stents vs. Clipping              - Continue Nimotop Day 9 of 21 to prevent delayed cerebral ischemia              - continue maintenance IVF fluids with LR at 100 ml/hr               - TCDs daily, TCDs today show mild right PANCHITO and possible MCA spasm, probable severe left PANCHITO spasm, moderate PCA spasm, and mild MCA spasm. CTA Head today shows  left A1 vasospasm and mild spasm of the right PANCHITO, MCAs, and possibly distal vertebrals. - Strict I&O's              - Assess NICOM prn for fluid responsiveness               - ECHO shows EF 60-65%, mild concentric hypertrophy, mildly dilated left atrium              - continue every hour neuro checks              - MAP goal 100-120, Keep SBP <200 Cardene/Fabien/Hydralazine/Labetalol PRN              - PT/OT/SLP evals when appropriate              - Repeat CT of Head today shows questionable left inferior cerebellar infarction, new from 6/10, multiple evolving subacute infarctions throughout the cerebrum. Subacute SAH, improved hydrocephalus compared to 6/6.              - NSGY following              - NIS following      2.) Seizure              - Due to #1, no prior hx of seizure              - Continue Keppra 1000 mg BID for seizure PPX              - continue Vimpat 100 mg BID              - Seizure precautions              -  EEG on 6/6 showed an abnormal EEG due to slowing of the background rhythms with intermittent rhythmic delta activity seen in both frontal head regions. Occasional sharp wave discharges in the left frontal area. EEG is suggestive of mild-to-moderate generalized encephalopathic process, nonspecific in type. In addition, there was occasional epileptiform disturbance seen in the left frontal area which may represent a partial onset seizure focus.              - completed 24 hour EEG on 6/8 which shows moderate to severe generalized encephalopathic process, nonspecific in type. This may be related to underlying structural brain injury or effects of sedation. No convincingly lateralizing or epileptiform features were noted. No seizure was recorded. - Neurology following                3.) Cerebral Edema              - received a dose of Mannitol overnight, started 3% saline on 6/8.               - weaning 3% saline, currently at 10 ml/hr              - continue every 4 hour BMP checks      4.) Acute hypoxic respiratory failure in the setting of likely neurogenic pulmonary edema              - Symmetric moderately severe airspace disease in the upper lobes bilaterally  which may represent edema or sequela of aspiration seen on CTA. CXR on 6/4 showed Diffuse bilateral airspace disease more suggestive of pulmonary edema than diffuse pneumonia. Repeat CXR on 6/6/2020 showed persistent although increased interstitial and parenchymal opacities  particularly in the right perihilar region. CXR on 6/10 showed residual right lower lung airspace disease            - CTA of Chest negative for PE, showed dependent atelectasis with diffuse groundglass opacifications bilaterally             - patient diuresed with Lasix initially              - COVID 19 test negative x 2             - vent settings per Intensivist, SAT/SBT daily per Intensivist              - sedation remains off             - Intensivist managing     5.) Multiple Acute Ischemic Infarctions              - MRI completed and shows a large left MCA territory ischemia/infarction,  left temporal, parietal and frontal lobes, cortically based, no superimposed  increased parenchymal hemorrhage or midline shift. Right and left PANCHITO territory  cortical diffusion restriction consistent with infarction. Infarction in the right insular cortex and right temporal lobe. Scattered small infarctions right and left corona radiata centrum semiovale, right cerebellum                - CT of Head today shows questionable left inferior cerebellar infarction, new from 6/10, multiple evolving subacute infarctions throughout the cerebrum. Subacute SAH, improved hydrocephalus compared to 6/6.                 - Imaging pattern is consistent with Hypoxic-Ischemic Encephalopathy (HIE)                - Continue aspirin 81 mg daily for stroke prevention                 - ECHO as stated above                - Lipid panel on 6/5, LDL 7.8, goal < 70, statin not indicated                - Hgb A1C ok at 5.7                - Neurology following    6.) Cerebral Vasospasm                 - s/p cerebral angiogram for right PANCHITO A1 angioplasty for vasospasm, transarterial infusion of verapamil on 6/9,  S/p angiogram on 6/10 for left ICA/MCA angioplasty and transarterial infusion of verapamil for vasospasm treatment, s/p angiogram on 6/11 for transarterial infusion of verapamil for vasospasm treatment                  - planning for cerebral angiogram today for further spasm treatment                  - plans as stated in #1   7.) Fever                 - intermittent fevers, normal WBC                - on Zosyn since 6/6, CXR shows residual right lower lung airspace disease               - possibly febrile in the setting of SAH, paired blood cultures sent today, resp culture 6/12   NGTD                - CTA of chest 6/7 negative for PE                 - Intensivist following     8.) Anemia                - Hgb 6.9, received 1 unit of PRBCs                  - no active signs of blood loss                  - Goal Hgb >7.0                 - Monitor CBC                 - Intensivist following    Activity: Bed rest  DVT ppx: SCDs, Lovenox  Disposition: TBD    Plan d/w Dr. Peggy Hardwick, Dr. Zeynep Guerrero, and RN. Planning for angiogram this morning for vasospasm treatment. Called and spoke with the patient's mother and written informed consent was obtained for the procedure. Patient's mother is aware of the risks, benefits, and alternatives. Admission Summary:     Geoff Jovel is a 48 y.o. 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 On arrival to ED, pt was unresponsive and noted to be seizing. A stat CT of her head was performed there, which showed extensive SAH.  Pt was then intubated emergently. She was also extremely hypertensive with SBPs in 240s. She was given Keppra and Ativan, and placed on cardene drip for BP control. NSGY and NIS were then consulted and the patient was transferred [facility] for higher level of care. En route, paramedics administered 7.5 mg of Versed, 200mcg of Fentanyl, and 10 mg of labetalol for BP control. On arrival, CTA of her head and neck was obtained, which showed a 4.4 x 4.1 mm right MCA trifurcation aneurysm and  a 2.2 x 3.1 mm left MCA trifurcation aneurysm. Of note, CTA head/neck also showed pulmonary infiltrates suspicious for COVID-19 infection and the patient has been tested for COVID which was negative. 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 Mother reported pt does not smoke, does not drink alcohol or use street drugs. The mother also reported that the patient began complaining of a headache on 5/31/2020 and has been taking BC Powder (aspirin) for the headache. She also reported that the pt has a hx of HTN and is supposed to be on BP medications, but recently stopped taking her medications. The patient underwent a cerebral angiogram on 6/5 by Dr. Ariel Zhou for coil embolization of a ruptured left MCA bifurcation aneurysm.         Current Facility-Administered Medications   Medication Dose Route Frequency Provider Last Rate Last Dose    0.9% sodium chloride infusion 250 mL  250 mL IntraVENous PRN Carlos Brown, NP        sodium chloride 0.9 % bolus infusion 100 mL  100 mL IntraVENous RAD ONCE Mary Anne Ventura MD        iopamidoL (ISOVUE-370) 76 % injection 100 mL  100 mL IntraVENous RAD ONCE Jenel Sever, MD        iopamidoL (ISOVUE-370) 76 % injection 50 mL  50 mL IntraVENous RAD ONCE Mary Anne Ventura MD        sodium chloride (NS) flush 10 mL  10 mL IntraVENous RAD ONCE Mary Anne Ventura MD        sodium chloride 0.9 % bolus infusion 100 mL  100 mL IntraVENous RAD Zabrina Andersen MD  sodium chloride (NS) flush 10 mL  10 mL IntraVENous RAD Izella Sails, MD        balsam peru-castor oiL (VENELEX) ointment   Topical BID Leon Berry DO        3% sodium chloride (*HIGH ALERT*CONCENTRATED IV*) infusion  10 mL/hr IntraVENous CONTINUOUS Leon Berry DO 10 mL/hr at 06/13/20 0915 10 mL/hr at 06/13/20 0915    PHENYLephrine (ELKIN-SYNEPHRINE) 30 mg in 0.9% sodium chloride 250 mL infusion   mcg/min IntraVENous TITRATE Liv Redding NP   Stopped at 06/12/20 2200    lactated Ringers infusion  100 mL/hr IntraVENous CONTINUOUS Leon Berry  mL/hr at 06/13/20 0925 100 mL/hr at 06/13/20 0925    dexmedeTOMidine (PRECEDEX) 400 mcg in 0.9% sodium chloride 100 mL infusion  0.2-1.4 mcg/kg/hr IntraVENous TITRATE Leon Berry DO   Stopped at 06/12/20 2132    labetaloL (NORMODYNE;TRANDATE) injection 20 mg  20 mg IntraVENous Q2H PRN Lisa Domínguez NP   20 mg at 06/13/20 0659    0.9% sodium chloride infusion 250 mL  250 mL IntraVENous PRN FABIAN Rodriguez        aspirin chewable tablet 81 mg  81 mg Per NG tube DAILY Bertin Bassett MD   81 mg at 06/13/20 0918    enoxaparin (LOVENOX) injection 40 mg  40 mg SubCUTAneous Q24H Leon Berry DO   40 mg at 06/12/20 1310    famotidine (PEPCID) 40 mg/5 mL (8 mg/mL) oral suspension 20 mg  20 mg Per NG tube Q12H Leon Berry DO   20 mg at 06/13/20 0918    lacosamide (VIMPAT) tablet 100 mg  100 mg Per NG tube Q12H Leon Berry DO   100 mg at 06/13/20 0918    levETIRAcetam (KEPPRA) oral solution 1,000 mg  1,000 mg Per NG tube Q12H Leon Berry DO   1,000 mg at 06/13/20 0918    glycopyrrolate (ROBINUL) injection 0.2 mg  0.2 mg IntraMUSCular TID PRN Liv FLAKITA Redding        niMODipine (NYMALIZE) 6 mg/mL oral solution 60 mg  60 mg Per NG tube Q4H Lisa Domínguez NP   60 mg at 06/13/20 0927    niCARdipine (CARDENE) 50 mg in 0.9% sodium chloride 100 mL infusion  0-15 mg/hr IntraVENous TITRATE Dante Mcnally R, NP-C 10 mL/hr at 06/13/20 1003 5 mg/hr at 06/13/20 1003    [Held by provider] amLODIPine (NORVASC) tablet 5 mg  5 mg Per NG tube DAILY Erle Simpler, ACNP   5 mg at 06/10/20 2353    acetaminophen (TYLENOL) solution 650 mg  650 mg Per NG tube Q4H PRN Millicent Keaton NP   650 mg at 06/12/20 1509    0.9% sodium chloride infusion 250 mL  250 mL IntraVENous PRN Dorothye Mew, NP-C        albuterol-ipratropium (DUO-NEB) 2.5 MG-0.5 MG/3 ML  3 mL Nebulization Q6H RT Erle Simpler, ACNP   3 mL at 06/13/20 0721    piperacillin-tazobactam (ZOSYN) 3.375 g in 0.9% sodium chloride (MBP/ADV) 100 mL  3.375 g IntraVENous Q8H Erle Simpler, ACNP 25 mL/hr at 06/13/20 0328 3.375 g at 06/13/20 0328    glucose chewable tablet 16 g  4 Tab Oral PRN Dorothye Mew, NP-C        glucagon (GLUCAGEN) injection 1 mg  1 mg IntraMUSCular PRN Vilmae Mew, NP-C        dextrose 10% infusion 0-250 mL  0-250 mL IntraVENous PRN Dorothye Mew, NP-C        insulin lispro (HUMALOG) injection   SubCUTAneous Q6H Dante Mcnally R, NP-C   Stopped at 06/11/20 1200    albuterol (PROVENTIL VENTOLIN) nebulizer solution 2.5 mg  2.5 mg Nebulization Q6H PRN Dorothye Mew, NP-C        fentaNYL citrate (PF) injection  mcg   mcg IntraVENous Q1H PRN Erle Simpler, ACNP   50 mcg at 06/12/20 0153    polyvinyl alcohol-povidone (NATURAL TEARS) 0.5-0.6 % ophthalmic solution 2 Drop  2 Drop Both Eyes Q8H Erle Simpler, ACNP   2 Drop at 06/13/20 4504    docusate (COLACE) 50 mg/5 mL oral liquid 100 mg  100 mg Oral BID Erle Simpler, ACNP   100 mg at 06/13/20 2520    bisacodyL (DULCOLAX) suppository 10 mg  10 mg Rectal DAILY PRN Erle Simpler, ACNP        propofol (DIPRIVAN) 10 mg/mL infusion  0-50 mcg/kg/min IntraVENous TITRATE Tabitha Domínguez NP   Stopped at 06/11/20 2042    chlorhexidine (ORAL CARE KIT) 0.12 % mouthwash 15 mL  15 mL Oral Q12H FABIAN Espinal   15 mL at 20 0182    ondansetron (ZOFRAN) injection 4 mg  4 mg IntraVENous Q4H PRN FABIAN Hammond        docusate (COLACE) 50 mg/5 mL oral liquid 100 mg  100 mg Oral BID PRN FABIAN Hammond            No Known Allergies    Review of Systems:  Review of systems not obtained due to patient factors. Objective:     Vital signs  Temp (24hrs), Av.9 °F (37.7 °C), Min:98.7 °F (37.1 °C), Max:101.2 °F (38.4 °C)    07 -  1900  In: 1030.2 [I.V.:530.2]  Out: 900 [Urine:900]  1901 -  0700  In: 8040.9 [I.V.:6140.9]  Out: 8900 [Urine:7850; Drains:1050]    Visit Vitals  BP (!) 209/97 (BP 1 Location: Left arm, BP Patient Position: At rest)   Pulse 63   Temp 99.6 °F (37.6 °C)   Resp 19   Ht 5' 4\" (1.626 m)   Wt 243 lb 9.7 oz (110.5 kg)   SpO2 100%   BMI 41.82 kg/m²      O2 Device: Endotracheal tube, Ventilator   Vitals:    20 0724 20 0726 20 0800 20 0900   BP:   (!) 209/97    Pulse:   63 63   Resp: 19  22 19   Temp:   99.6 °F (37.6 °C)    SpO2: 100% 100% 100% 100%   Weight:       Height:          Physical Exam:  GENERAL: NAD, intubated  EYE: conjunctivae/corneas clear. Right pupil 3 mm, brisk response to light. Left pupil 4 mm, sluggish response to light. Left eye ptosis. LUNG: lungs coars, rhonchi bilaterally   HEART: regular rate and rhythm, S1, S2 normal, no murmur, click, rub or gallop  EXTREMITIES:  extremities normal, atraumatic, no cyanosis, 3+ pitting peripheral edema, distal pulses 2+ bilaterally   SKIN: Skin warm to touch. Right and left groin site clean, dry, and intact. No hematoma, bruising, or bleeding noted. NEUROLOGIC: Intubated. Eyes will open spontaneously. No command following . Right pupil 3 mm, brisk response to light. Left pupil 4 mm sluggish response to light. Left eye ptosis noted. No focusing with eyes or tracking with eyes on command. No blink to threat. Withdraws to painful stimuli on all extremities. Intermittent spontaneous movement on RLE. No involuntary movements. Gait deferred. Unable to assess language, sensation, and coordination. Imaging:  CT of Head on 6/13/2020 at 1103 shows  IMPRESSION:   1. Questionable left inferior cerebellar infarction, new from 6/10/2020.  2. Multiple, evolving, subacute infarctions throughout the cerebrum. 3. Subacute subarachnoid hemorrhage. Improved hydrocephalus compared to  6/6/2020    CTA of Head on 6/13/2020 at 1103 shows  IMPRESSION:   1. Left A1 vasospasm. 2. More mild spasm of the right PANCHITO, MCAs, and possibly the distal vertebrals. 3. Right M1 terminus aneurysm. 4. Large infundibula/small aneurysms of the bilateral P-comm origins. CT Perfusion on 6/13/2020 at 1103 shows  IMPRESSION:  1. Penumbra surrounding infarctions in most of the superior left MCA territory. 2. Bilateral PANCHITO infarctions. 3. Questionable left cerebellar infarction is inferior to the perfusion scan. CTA of Head on 6/9/2020 at 1350 shows  Progressive vasospasm involving the anterior cerebral arteries with minimal  associated decreased perfusion to the frontal lobes. No other significant  Change  . CT Perfusion on 6/9/2020 at 1336 shows  Impression:  CT perfusion brain: Very minimal decreased perfusion to the frontal lobes. CT of Head on 6/9/2020 at 0234 shows  IMPRESSION:   No significant change.       CT of Head on 6/8/2020 shows  IMPRESSION:   Global edema, otherwise no significant change    CTA of Head and Neck on 6/8/2020 per radiology shows  Impression:  Mild to moderate vasospasm in the left middle cerebral artery status post  bifurcation aneurysm coil embolization. Stable 4 mm right MCA aneurysm. Endotracheal tube terminates just above the matt. 1 cm low-density left thyroid nodule. Mildly enlarged left axillary lymph node measuring 11 mm in short axis  Patchy bilateral airspace disease.   Nonspecific asymmetric enhancement of the right superior ophthalmic vein   (However, no significant vasospasm was seen when personally reviewed by NIS)    CT Perfusion on 6/8/2020 shows  IMPRESSION:  No significant cerebral perfusion abnormality    CTA of Chest on 6/7/2020 shows  IMPRESSION:  1. No acute pulmonary embolus  2. Dependent atelectasis with diffuse groundglass opacifications bilaterally. CT of Head on 6/7/2020 shows  IMPRESSION:   1. No further progression of subarachnoid hemorrhage. The overall degree of  subarachnoid hemorrhage is slightly improved. 2. Subtle areas of gray-white differentiation loss throughout the cerebral  hemispheres correlating with areas of restricted diffusion on previous CT  compatible with ischemic changes. MRI of Brain on 6/6/2020 shows  IMPRESSION:   Imaging findings consistent with large left MCA territory ischemia/infarction,  left temporal, parietal and frontal lobes, cortically based, no superimposed  increased parenchymal hemorrhage or midline shift. Right and left PANCHITO territory  cortical diffusion restriction consistent with infarction. Infarction in the right insular cortex and right temporal lobe. Scattered small  infarctions right and left corona radiata centrum semiovale, right cerebellum. Allowing for differences in technique likely stable subarachnoid and  intraventricular hemorrhage. CT of Head on 6/6/2020 at 1411 shows  IMPRESSION:   Left MCA territory infarction with smaller right MCA territory infarction.     Stable subarachnoid and intraventricular hemorrhage. CTA of Head on 6/6/2020 at 0903 shows  IMPRESSION:  Slightly increased intraventricular and subarachnoid hemorrhage compared to the  prior exam.     Status post coiling left MCA bifurcation aneurysm. No significant residual  aneurysm filling. CT of Head on 6/62020 at 0239 shows  IMPRESSION:  Slightly diminished subarachnoid and intraventricular hemorrhage. Stable ventricular system.       CT of Head WO on 6/5/2020 at 1512 shows  IMPRESSION: Stable acute subarachnoid and intraventricular hemorrhage. Stable  ventricular system. CT Head WO 6/5/20 0826     IMPRESSION:   1. Diffuse subarachnoid hemorrhage most concentrated in the basal cisterns and  left sylvian fissure. 2.  Interval ventricular enlargement suspicious for developing hydrocephalus.     CTA Head 6/4/20 2017     IMPRESSION:  1. Diffuse subarachnoid hemorrhage in the basilar cisterns and sylvian  fissures. 2.  Bilateral MCA bifurcation aneurysms. 3.  Infundibular origins to the posterior communicating arteries bilaterally. 4.  Symmetric moderately severe airspace disease in the upper lobes bilaterally  which may represent edema or sequela of aspiration.     CT Head WO Contrast 6/4/2020 2012    IMPRESSION: Extensive subarachnoid hemorrhage concerning for leaking/ruptured  aneurysm.        24 hour results:    Recent Results (from the past 24 hour(s))   METABOLIC PANEL, BASIC    Collection Time: 06/12/20 10:35 AM   Result Value Ref Range    Sodium 149 (H) 136 - 145 mmol/L    Potassium 3.7 3.5 - 5.1 mmol/L    Chloride 121 (H) 97 - 108 mmol/L    CO2 21 21 - 32 mmol/L    Anion gap 7 5 - 15 mmol/L    Glucose 120 (H) 65 - 100 mg/dL    BUN 15 6 - 20 MG/DL    Creatinine 0.56 0.55 - 1.02 MG/DL    BUN/Creatinine ratio 27 (H) 12 - 20      GFR est AA >60 >60 ml/min/1.73m2    GFR est non-AA >60 >60 ml/min/1.73m2    Calcium 7.9 (L) 8.5 - 10.1 MG/DL   GLUCOSE, POC    Collection Time: 06/12/20 11:44 AM   Result Value Ref Range    Glucose (POC) 107 (H) 65 - 100 mg/dL    Performed by Carol Wahl, BODY FLUID    Collection Time: 06/12/20  3:35 PM   Result Value Ref Range    BODY FLUID TYPE BRONCHIAL LAVAGE      FLUID COLOR PINK      FLUID APPEARANCE TURBID      FLUID RBC CT. >100 (H) 0 /cu mm    FLUID NUCLEATED CELLS 3,425 /cu mm    FLD NEUTROPHILS 99 (A) NRRE %    FLD MONO/MACROPHAGES 1 (A) NRRE %   CULTURE, RESPIRATORY/SPUTUM/BRONCH W GRAM STAIN    Collection Time: 06/12/20  3:35 PM   Result Value Ref Range    Special Requests: NO SPECIAL REQUESTS      GRAM STAIN OCCASIONAL WBCS SEEN      GRAM STAIN RARE EPITHELIAL CELLS SEEN      GRAM STAIN NO ORGANISMS SEEN      Culture result: PENDING    METABOLIC PANEL, BASIC    Collection Time: 06/12/20  4:02 PM   Result Value Ref Range    Sodium 146 (H) 136 - 145 mmol/L    Potassium 3.8 3.5 - 5.1 mmol/L    Chloride 119 (H) 97 - 108 mmol/L    CO2 22 21 - 32 mmol/L    Anion gap 5 5 - 15 mmol/L    Glucose 123 (H) 65 - 100 mg/dL    BUN 14 6 - 20 MG/DL    Creatinine 0.57 0.55 - 1.02 MG/DL    BUN/Creatinine ratio 25 (H) 12 - 20      GFR est AA >60 >60 ml/min/1.73m2    GFR est non-AA >60 >60 ml/min/1.73m2    Calcium 7.9 (L) 8.5 - 10.1 MG/DL   GLUCOSE, POC    Collection Time: 06/12/20  5:53 PM   Result Value Ref Range    Glucose (POC) 130 (H) 65 - 100 mg/dL    Performed by [facility]    METABOLIC PANEL, BASIC    Collection Time: 06/12/20  9:48 PM   Result Value Ref Range    Sodium 146 (H) 136 - 145 mmol/L    Potassium 3.3 (L) 3.5 - 5.1 mmol/L    Chloride 117 (H) 97 - 108 mmol/L    CO2 21 21 - 32 mmol/L    Anion gap 8 5 - 15 mmol/L    Glucose 123 (H) 65 - 100 mg/dL    BUN 22 (H) 6 - 20 MG/DL    Creatinine 0.90 0.55 - 1.02 MG/DL    BUN/Creatinine ratio 24 (H) 12 - 20      GFR est AA >60 >60 ml/min/1.73m2    GFR est non-AA >60 >60 ml/min/1.73m2    Calcium 7.8 (L) 8.5 - 10.1 MG/DL   MAGNESIUM    Collection Time: 06/12/20  9:48 PM   Result Value Ref Range    Magnesium 2.4 1.6 - 2.4 mg/dL   PHOSPHORUS    Collection Time: 06/12/20  9:48 PM   Result Value Ref Range    Phosphorus 2.8 2.6 - 4.7 MG/DL   GLUCOSE, POC    Collection Time: 06/12/20 11:16 PM   Result Value Ref Range    Glucose (POC) 111 (H) 65 - 100 mg/dL    Performed by Jacqueline SHAW    PROCALCITONIN    Collection Time: 06/13/20  3:37 AM   Result Value Ref Range    Procalcitonin 0.05 ng/mL   CBC WITH AUTOMATED DIFF    Collection Time: 06/13/20  3:37 AM   Result Value Ref Range    WBC 7.9 3.6 - 11.0 K/uL    RBC 3.15 (L) 3.80 - 5.20 M/uL    HGB 6.9 (L) 11.5 - 16.0 g/dL    HCT 23.4 (L) 35.0 - 47.0 %    MCV 74.3 (L) 80.0 - 99.0 FL    MCH 21.9 (L) 26.0 - 34.0 PG    MCHC 29.5 (L) 30.0 - 36.5 g/dL    RDW 23.6 (H) 11.5 - 14.5 %    PLATELET 524 014 - 750 K/uL    MPV 10.8 8.9 - 12.9 FL    NRBC 1.4 (H) 0  WBC    ABSOLUTE NRBC 0.11 (H) 0.00 - 0.01 K/uL    NEUTROPHILS 75 32 - 75 %    BAND NEUTROPHILS 2 0 - 6 %    LYMPHOCYTES 18 12 - 49 %    MONOCYTES 5 5 - 13 %    EOSINOPHILS 0 0 - 7 %    BASOPHILS 0 0 - 1 %    IMMATURE GRANULOCYTES 0 %    ABS. NEUTROPHILS 6.1 1.8 - 8.0 K/UL    ABS. LYMPHOCYTES 1.4 0.8 - 3.5 K/UL    ABS. MONOCYTES 0.4 0.0 - 1.0 K/UL    ABS. EOSINOPHILS 0.0 0.0 - 0.4 K/UL    ABS. BASOPHILS 0.0 0.0 - 0.1 K/UL    ABS. IMM.  GRANS. 0.0 K/UL    DF MANUAL      RBC COMMENTS ANISOCYTOSIS  3+        RBC COMMENTS HYPOCHROMIA  2+        RBC COMMENTS MICROCYTOSIS  1+        RBC COMMENTS OVAL[employer]TES  PRESENT       METABOLIC PANEL, BASIC    Collection Time: 06/13/20  3:37 AM   Result Value Ref Range    Sodium 146 (H) 136 - 145 mmol/L    Potassium 3.7 3.5 - 5.1 mmol/L    Chloride 118 (H) 97 - 108 mmol/L    CO2 22 21 - 32 mmol/L    Anion gap 6 5 - 15 mmol/L    Glucose 122 (H) 65 - 100 mg/dL    BUN 19 6 - 20 MG/DL    Creatinine 0.61 0.55 - 1.02 MG/DL    BUN/Creatinine ratio 31 (H) 12 - 20      GFR est AA >60 >60 ml/min/1.73m2    GFR est non-AA >60 >60 ml/min/1.73m2    Calcium 8.1 (L) 8.5 - 10.1 MG/DL   ALBUMIN    Collection Time: 06/13/20  3:37 AM   Result Value Ref Range    Albumin 2.4 (L) 3.5 - 5.0 g/dL   MAGNESIUM    Collection Time: 06/13/20  3:37 AM   Result Value Ref Range    Magnesium 2.3 1.6 - 2.4 mg/dL   PHOSPHORUS    Collection Time: 06/13/20  3:37 AM   Result Value Ref Range    Phosphorus 2.3 (L) 2.6 - 4.7 MG/DL   GLUCOSE, POC    Collection Time: 06/13/20  5:53 AM   Result Value Ref Range    Glucose (POC) 129 (H) 65 - 100 mg/dL    Performed by [address] [place]    TCD INTRACRANIAL ARTERIES COMPLETE    Collection Time: 06/13/20  9:31 AM Result Value Ref Range    Right Lindegaard Ratio 2.4     Right MCA 1  cm/s    Right MCA 1 EDV 53 cm/s    Right MCA 1 Mean Velocity 92 cm/s    Right PANCHITO  cm/s    Right PANCHITO EDV 72 cm/s    Right PANCHITO Mean Velocity 108 cm/s    Right ICA PSV 85 cm/s    Right ICA EDV 23 cm/s    Right ICA Mean Velocity 44 cm/s    Right PCA 1  cm/s    Right PCA 1 EDV 34 cm/s    Right PCA 1 Mean Velocity 59 cm/s    Right External ICA PSV 71 cm/s    Right External ICA EDV 23 cm/s    Right External ICA Mean Velocity 39 cm/s    Left MCA 1  cm/s    Left MCA 1 EDV 90 cm/s    Left MCA 1 Mean Velocity 137 cm/s    Left PANCHITO  cm/s    Left PANCHITO  cm/s    Left PANCHITO Mean Velocity 196 cm/s    Left ICA  cm/s    Left External ICA PSV 62 cm/s    Left ICA EDV 35 cm/s    Left External ICA EDV 23 cm/s    Left ICA Mean Velocity 58 cm/s    Left External ICA Mean Velocity 36 cm/s    Left PCA 1  cm/s    Left PCA 1 EDV 75 cm/s    Left PCA 1 Mean Velocity 119 cm/s    Left Lindegaard Ratio 3.8     Basilar Artery  cm/s    Basilar Artery EDV 43 cm/s    Basilar Artery Mean Angel 73 cm/s    Right Vertebral Mean Velocity 32 cm/s    Left Vertebral Mean Velocity 24 cm/s    Right Vertebral PSV 59 cm/s    Right Vertebral EDV 19 cm/s    Left Vertebral PSV 43 cm/s    Left Vertebral EDV 14 cm/s        Magalie Vincent, FLAKITA

## 2020-06-13 NOTE — PROGRESS NOTES
NIS Brief Progress Note    Patient is s/p cerebral angiogram today for IA verapamil for vasospasm treatment. She remains intubated and off sedation. Eyes will open spontaneously. Tracks with right eye. Left eye with cranial nerve palsy. Left eye ptosis. No command following. Pupil 4 mm brisk to light, Right pupil 3 mm brisk to light. Mild blink to threat on left, no blink to threat on right. Moves RLE and LUE spontaneously. Withdraws to pain on all extremities. Unable to assess speech and coordination. LEFT groin site clean, dry, and intact. No hematoma, bruising, or bleeding noted. Distal pulses 2+ bilaterally. 3% saline was stopped around 1415 today, however, we would like to continue for another 24 hours. Sodium this morning at 0945 was 142. Continue 3% saline at 10 ml/hr. Check BMP stat and then every 4 hours. Sodium goal 140-145. Ideally would like serum sodium to stay within 140-145 range for at least 24 hours and then 3% saline can be weaned off. Patient is -2 liters on I's and O's. Discussed with Dr. Jeffrey Santiago (Erika Montoya) and he reported that the patient is dry based arterial line waveform variability. 1L NS bolus was ordered and being infused at this time. Will have Neuro NP on night shift assess NICOM after 1L bolus given for fluid responsiveness. MAP goal changed to 110-130 today, Keep SBP <200. Discussed with Dr. Jeffrey Santiago, Dr. Juany Holland, and and RN.     Helen Desai NP  Neurocritical Care Nurse Practitioner  [phone]

## 2020-06-13 NOTE — PROGRESS NOTES
SMITA Arora assessed at bedside with oncoming neuro DELROY Matt Pereira. 32.8 % change in SVI with PLR obtained indicating patient is fluid responsive. Patient received 1,000 ml bolus at 1815. Would ideally like patient +500 ml ahead in the setting of vasospasm. Additional 1L of NS bolus given to maintain euvolemia. Continue Maintenance IVF at 100 ml/hr. Repeat  before restarting 3% saline. Monitor BMP every 4 hours for now with Na goal 140-145. Discussed with Dr. Barry Salcido, NP, and RN.     Arnel Junior NP  Neurocritical Care Nurse Practitioner  [phone]

## 2020-06-13 NOTE — ROUTINE PROCESS
TRANSFER - OUT REPORT: 
 
Verbal report given to Se Holland RN(name) on Sachin Joseph  being transferred back to ICU#1(unit) for routine progression of care Report consisted of patients Situation, Background, Assessment and  
Recommendations(SBAR). Information from the following report(s) SBAR, Procedure Summary, Intake/Output and MAR was reviewed with the receiving nurse. Lines:  
Quad Lumen Quad IJ 06/10/20 Anterior; Left Neck (Active) Central Line Being Utilized Yes 6/13/2020  8:00 AM  
Criteria for Appropriate Use Hemodynamically unstable, requiring monitoring lines, vasopressors, or volume resuscitation 6/13/2020  8:00 AM  
Site Assessment Clean, dry, & intact 6/13/2020  8:00 AM  
Infiltration Assessment 0 6/13/2020  8:00 AM  
Affected Extremity/Extremities Color distal to insertion site pink (or appropriate for race) 6/13/2020  8:00 AM  
Date of Last Dressing Change 06/11/20 6/13/2020  8:00 AM  
Dressing Status Clean, dry, & intact 6/13/2020  8:00 AM  
Dressing Type Disk with Chlorhexadine gluconate (CHG) 6/13/2020  8:00 AM  
Action Taken Open ports on tubing capped 6/13/2020  8:00 AM  
Proximal Hub Color/Line Status White; Infusing 6/13/2020  8:00 AM  
Positive Blood Return (Medial Site) Yes 6/13/2020  8:00 AM  
Medial 1 Hub Color/Line Status Lajean Hilding; Infusing 6/13/2020  8:00 AM  
Positive Blood Return (Lateral Site) Yes 6/13/2020  8:00 AM  
Medial 2 Hub Color/Line Status Blue; Infusing 6/13/2020  8:00 AM  
Positive Blood Return (Site #3) Yes 6/13/2020  8:00 AM  
Distal Hub Color/Line Status Brown; Infusing 6/13/2020  8:00 AM  
Positive Blood Return (Site #4) Yes 6/13/2020  8:00 AM  
Alcohol Cap Used Yes 6/13/2020  8:00 AM  
   
Arterial Line 06/09/20 Left Radial artery (Active) Site Assessment Clean, dry, & intact 6/13/2020  8:00 AM  
Dressing Status Clean, dry, & intact 6/13/2020  8:00 AM  
Dressing Type Disk with Chlorhexadine gluconate (CHG) 6/13/2020  8:00 AM  
 Line Status Intact and in place 6/13/2020  8:00 AM  
Treatment Arm board on 6/13/2020  8:00 AM  
Affected Extremity/Extremities Color distal to insertion site pink (or appropriate for race) 6/13/2020  8:00 AM  
  
 
Opportunity for questions and clarification was provided. Patient transported with: 
 Registered Nurse, monitored, respiratory, CRNA Baylee Whyte, and ICU RN's Beverly Green

## 2020-06-13 NOTE — ANESTHESIA PREPROCEDURE EVALUATION
Relevant Problems   No relevant active problems     Relevant Problems   No relevant active problems       Anesthetic History   No history of anesthetic complications            Review of Systems / Medical History  Patient summary reviewed, nursing notes reviewed and pertinent labs reviewed    Pulmonary  Within defined limits                 Neuro/Psych   Within defined limits    CVA       Cardiovascular  Within defined limits  Hypertension                   GI/Hepatic/Renal  Within defined limits              Endo/Other  Within defined limits      Morbid obesity     Other Findings              Physical Exam    Airway  Mallampati: II  TM Distance: > 6 cm  Neck ROM: normal range of motion   Mouth opening: Normal  Intubated   Cardiovascular  Regular rate and rhythm,  S1 and S2 normal,  no murmur, click, rub, or gallop             Dental  No notable dental hx       Pulmonary  Breath sounds clear to auscultation               Abdominal  GI exam deferred       Other Findings            Anesthetic Plan    ASA: 4  Anesthesia type: general    Monitoring Plan: Arterial line      Induction: Intravenous  Anesthetic plan and risks discussed with: Patient          Relevant Problems   No relevant active problems       Anesthetic History   No history of anesthetic complications            Review of Systems / Medical History  Patient summary reviewed, nursing notes reviewed and pertinent labs reviewed    Pulmonary  Within defined limits                 Neuro/Psych   Within defined limits    CVA       Cardiovascular  Within defined limits  Hypertension                   GI/Hepatic/Renal  Within defined limits              Endo/Other  Within defined limits      Morbid obesity     Other Findings              Physical Exam    Airway  Mallampati: II  TM Distance: > 6 cm  Neck ROM: normal range of motion   Mouth opening: Normal  Intubated   Cardiovascular  Regular rate and rhythm,  S1 and S2 normal,  no murmur, click, rub, or gallop             Dental  No notable dental hx       Pulmonary  Breath sounds clear to auscultation               Abdominal  GI exam deferred       Other Findings            Anesthetic Plan    ASA: 4, emergent  Anesthesia type: general    Monitoring Plan: Arterial line      Induction: Intravenous  Anesthetic plan and risks discussed with: Patient and Healthcare power of               Anesthetic History   No history of anesthetic complications            Review of Systems / Medical History  Patient summary reviewed, nursing notes reviewed and pertinent labs reviewed    Pulmonary  Within defined limits                 Neuro/Psych   Within defined limits    CVA       Cardiovascular  Within defined limits  Hypertension                   GI/Hepatic/Renal  Within defined limits              Endo/Other  Within defined limits           Other Findings              Physical Exam    Airway  Mallampati: II  TM Distance: > 6 cm  Neck ROM: normal range of motion   Mouth opening: Normal  Intubated   Cardiovascular  Regular rate and rhythm,  S1 and S2 normal,  no murmur, click, rub, or gallop             Dental  No notable dental hx       Pulmonary  Breath sounds clear to auscultation               Abdominal  GI exam deferred       Other Findings            Anesthetic Plan    ASA: 4, emergent  Anesthesia type: general          Induction: Intravenous  Anesthetic plan and risks discussed with: Healthcare power of

## 2020-06-13 NOTE — PROGRESS NOTES
Angiogram procedure completed for vasospasm treatment. Post-angio orders placed. Called and updated patient's mother Ana Maria Burns. She stated she would be in soon to see patient.      Asha Moreno NP  Neurocritical Care Nurse Practitioner  [phone]

## 2020-06-13 NOTE — ANESTHESIA POSTPROCEDURE EVALUATION
* No procedures listed *.    general    Anesthesia Post Evaluation        Patient location during evaluation: PACU  Patient participation: complete - patient participated  Level of consciousness: awake and alert  Pain management: adequate  Airway patency: patent  Anesthetic complications: no  Cardiovascular status: acceptable  Respiratory status: acceptable  Hydration status: acceptable  Comments: I have seen and evaluated the patient and is ready for discharge. Tariq Steward MD    Post anesthesia nausea and vomiting:  none      INITIAL Post-op Vital signs:   Vitals Value Taken Time   /92 6/13/2020  3:15 PM   Temp     Pulse 61 6/13/2020  3:22 PM   Resp 20 6/13/2020  3:22 PM   SpO2 99 % 6/13/2020  3:22 PM   Vitals shown include unvalidated device data.

## 2020-06-13 NOTE — PROGRESS NOTES
Pt arrives via ICU bed to angio department accompanied by ICU RN, Joey for vasospasm tx procedure. All assessments completed and consent was reviewed. Education given was regarding procedure, anesthesia sedation, post-procedure care and  management/follow-up. Opportunity for questions was provided and all questions and concerns were addressed.

## 2020-06-13 NOTE — ROUTINE PROCESS
Primary Nurse Eliu Jones RN and Elijah Castle RN performed a dual skin assessment on this patient Impairment noted- see wound doc flow sheet Sriram score is 12

## 2020-06-13 NOTE — PROGRESS NOTES
Bedside and Verbal shift change report given to Joey RN (oncoming nurse) by Judy Richter RN (offgoing nurse). Report included the following information SBAR.     6799: Dr. Domonique Lozoya at bedside, placed pt on PS 10/5 . Plan to continue this for 3hrs TID.     0800: pt intubated on ventilator, on no sedation, does not follow commands, appears to track with right eye, right pupil round and brisk & left eye round and sluggish (this is not new change per dual neuro assessment), no movement in LUE, withdrawals/sponataneous X3 RUE/RLE/LLE, Q1hr neurochecks, SBP goal <200, MAP goal 100-120.     0845: TCDs at bedside    1000: Dr. Estefanía Cheatham orders new- MAP goal 110-130 (treat SBP > 200 by arterial line)    1009: Off floor to CT with RN, RT, PCT. 1027: Pt back from CT. Plan for verapamil in 1 hr, per Dr. Estefanía Cheatham    [facility]: Orders by Bennie to hold 1200 tube feed for angio procedure. 1155: Pt off floor to angio with RN, RT, transport tech. Bedside shift change report given to Rinku Marshall (oncoming nurse) by Lana Stockton (offgoing nurse). Report included the following information SBAR.

## 2020-06-13 NOTE — PROGRESS NOTES
Off sedation, opening eyes, ?regarding, JOLLY intermittently, maybe starting to localize  Left pupil 4 mm minimally reactive/minimal movement, right 2 mm reactive, blinks to threat and moves across midline  TCDs reviewed. Left PANCHITO accelerated c/w with yesterday suggesting severe spasm  CTA/P  Possible angio depending on results. MAP goal 110-130 (treat SBP > 200 by arterial line)  Na 146, plan to d/c 3% today.   Will review CT first  NP to alisha  Euvolemia per intensivist    KUMAR

## 2020-06-13 NOTE — PROGRESS NOTES
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 At the [facility] a CT was done and she was found to have a [facility]  and transferred to [facility] for further neuro evaluation after being intubated by sending facility. Repeat CTH/CTA here showed an extensive subarachnoid hemorrhage concerning for leaking/ruptured aneurysm without hydrocephalus. 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 Both neurosurgery and neuro IR consulted and she was taken to IR for coiling of a Left MCA aneurysm this am by Dr. Pamela Holman. Plan for Rt aneurysmal clipping at a later time. Remains at risk for hydrocephalus, continue to re-eval for hydrocephalus. Remains intubated at this time. Overnight Events:  2020  Overnight patient with pupillary changes with left pupil 5mm fixed and dilated. Code S called on patient and patient went to [facility] with evidence of increased cerebral edema and mild to moderate L MCA vasospasm post aneurysm coil embolization. The aneurysm was stable to the right MCA. Dr. Bernardino Childs was consulted and a dose of mannitol was given. She was restarted on Propofol infusion. This am, Neurosurgery was called to re-eval for EVD placement for ICP monitoring. A 3 % saline gtt is being started with Q2hr BMP. Patient is +4 anasarca and required arterial line replacement due to dislodgement. Unable to place PICC today 2/2 Fluid overload and peripheral edema. 2020  Remains on 3%, increased secretions  Severe right A1 vasospasm resolved after angioplasty and ALFONSO verapamil infusion.     6/10/2020  MRI showing clot at midbrain/3rd nerve junction  1 PRBC ordred    2020  1 L of NS given by Neuro IR    2020  Remains on vent    2020  Continue on MV, rate of 26 on pressure support 10/5, does not follow commands     POD:* No surgery found *    S/P: Day 2 for L MCA aneurysm coiling    Objective:   Vital Signs:  Visit Vitals  BP (!) 209/97 (BP 1 Location: Left arm, BP Patient Position: At rest)   Pulse 63   Temp 99.6 °F (37.6 °C)   Resp 22   Ht 5' 4\" (1.626 m)   Wt 110.5 kg (243 lb 9.7 oz)   SpO2 100%   BMI 41.82 kg/m²      O2 Device: Endotracheal tube, Ventilator Temp (24hrs), Av.9 °F (37.7 °C), Min:98.7 °F (37.1 °C), Max:101.2 °F (38.4 °C)         Intake/Output:     Intake/Output Summary (Last 24 hours) at 2020 0813  Last data filed at 2020 0700  Gross per 24 hour   Intake 5725.76 ml   Output 5775 ml   Net -49.24 ml       Physical Exam:    General:  Off sedation, on mechanical ventilation. Eyes:  Sclera anicteric, + scleral edema, Pupils equal, round, reactive to light. Eyes midline, Right eye 2 mm sluggish, left eye 5 mm irreg. NR, tracks with R eye to voice    Mouth/Throat: Orotracheal tube in place., OGT in place   Neck: Supple. Lungs:   Bronchial/ coarse to auscultation bilaterally   Cardiovascular:  NSR   Abdomen:   Soft, obese, non-tender, bowel sounds normal, non-distended. Extremities: No cyanosis, +3-4 generalized edema. Skin: No acute rash or lesions. Musculoskeletal:  +3-4 swelling no deformity. WD x 3 ext, no WD to RUE   Lines/Devices:  Intact, no erythema, drainage, or tenderness.    Psychiatric: Off sedation, minimally responsive, open R eye and tracks, does not withdraw with L Arm, withdraw to pain on the LLE     T/L/D  Tubes: ETT and Orogastric Tube  Lines: Peripheral IV arterial line , Femoral CVL   Drains: Martinez Catheter    LABS AND  DATA: Personally reviewed  Recent Labs     20  3791 20  0333   WBC 7.9 7.4   HGB 6.9* 7.1*   HCT 23.4* 24.4*    215     Recent Labs     20  0337 06/12/20  2148   * 146*   K 3.7 3.3*   * 117*   CO2 22 21   BUN 19 22*   CREA 0.61 0.90   * 123*   CA 8.1* 7.8*   MG 2.3 2.4   PHOS 2.3* 2.8     Recent Labs     06/13/20  0337   ALB 2.4*     No results for input(s): INR, PTP, APTT, INREXT, INREXT in the last 72 hours. Recent Labs     06/12/20  0802   PHI 7.514*   PCO2I 28.8*   PO2I 80   FIO2I 35     No results for input(s): CPK, CKMB, TROIQ, BNPP in the last 72 hours. Ventilator Settings:  Mode Rate Tidal Volume Pressure FiO2 PEEP   VC+   400 ml  5 cm H2O 35 % 5 cm H20     Peak airway pressure: 27 cm H2O    Minute ventilation: 8.9 l/min        MEDS: Reviewed    Chest X-Ray:  CXR Results  (Last 48 hours)    None        06/08/20 CTH/ CTA Brain:  IMPRESSION  Impression:     Mild to moderate vasospasm in the left middle cerebral artery status post  bifurcation aneurysm coil embolization. Stable 4 mm right MCA aneurysm.     Endotracheal tube terminates just above the matt.     1 cm low-density left thyroid nodule.     Mildly enlarged left axillary lymph node measuring 11 mm in short axis.     Patchy bilateral airspace disease.     Nonspecific asymmetric enhancement of the right superior ophthalmic vein        06/06/20 MRI:  IMPRESSION:   Imaging findings consistent with large left MCA territory ischemia/infarction,  left temporal, parietal and frontal lobes, cortically based, no superimposed  increased parenchymal hemorrhage or midline shift. Right and left PANCHITO territory  cortical diffusion restriction consistent with infarction. Infarction in the right insular cortex and right temporal lobe. Scattered small  infarctions right and left corona radiata centrum semiovale, right cerebellum.     Allowing for differences in technique likely stable subarachnoid and  intraventricular hemorrhage.     06/06/20 ECHO:  Result status: Final result   · Normal cavity size and systolic function (ejection fraction normal). Mild concentric hypertrophy.  Estimated left ventricular ejection fraction is 60 - 65%. No regional wall motion abnormality noted. · Mildly dilated left atrium.           ABCDEF Bundle/Checklist Completed:  YES-See Plan    SPECIAL EQUIPMENT  None    Active Problem List:     Problem List  Date Reviewed: 6/10/2020          Codes Class    SAH (subarachnoid hemorrhage) ([facility] [address] ICD-10-CM: I60.9  ICD-9-CM: 430         Subarachnoid hemorrhage from middle cerebral artery aneurysm, left (HCC) ICD-10-CM: I60.12  ICD-9-CM: 200         RIGHT middle cerebral artery aneurysm ICD-10-CM: I67.1  ICD-9-CM: 437.3         Cerebral vasospasm ICD-10-CM: I67.848  ICD-9-CM: 435.9             ICU Assessment/ Comprehensive Plan of Care:     Subarrachnoid hemorrhage [facility])  Right MCA trifurcation aneurysm (unsecured)  Left MCA trifurcation aneurysm (s/p coiling)  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 Beryle Bob, off 6/13/2020   --120 mmHg  -Nimotipine 60 mg via OGT Q4hr  -Neuro IR following, Dr. Beryle Bob  -Neurosurgery following - Dr. Tomas Robledo, Rt MCA will need clipping at later time  -Neurology following, Dr. Ilana Arroyo  -ASA 81mg  -TCDs   -Q1hr Neuro checks  -Keppra 1000 mg BID, Vimpat 100 BID  -Precedex for sedation   -Continue Lovenox   -Angio 6/13/2020     Acute severe hypoxic respiratory failure  Neurogenic pulmonary edema  CAP  -Lactate & Procal Now - normal  -No cultures needed currently (lactate normal, wbc, procal WNL)  -Continue Zosyn (end date 6/13/2020)  -Plan for bronchoscopy today  -Provide conservative fluid management to prevent vasospasms  -COVID NEG x2  -CT Chest NEG PE 06/07  Chlorhexidine   Optimize PEEP/Ventilation/Oxygenation  Goal Tidal Volume 6 cc/kg based on IBW  Aim for lung protective ventilation  Head of bed > 30 degrees  Plan to Extubate: attempt to provide weaning trials in am  SPO2 Goal: > 92%    DVT Prophylaxis (if no, list reason): SCD's or Sequential Compression Device   GI Prophylaxis: Pepcid (famotidine)   Nutrition: Yes TFs  Bowel Movement: Yes  Bowel Regimen: Docusate (Colace) and Bisacodyl (Dulcolax)  DVT Prophylaxis (if no, list reason): Lovenox , ASA  Mobility: Fair and Bedrest  PT/OT: Not appropraite at this time, re-eval at extubation     DISPOSITION/COMMUNICATION  Discussed Plan of Care/Code Status: Full Code  Stay in ICU    CRITICAL CARE CONSULTANT NOTE  I had a face to face encounter with the patient, reviewed and interpreted patient data including clinical events, labs, images, vital signs, I/O's, and examined patient. I have discussed the case and the plan and management of the patient's care with the consulting services, the bedside nurses and the respiratory therapist.      NOTE OF PERSONAL INVOLVEMENT IN CARE   I participated in the decision-making and personally managed or directed the management of the following life and organ supporting interventions that required my frequent assessment to treat or prevent imminent deterioration. I personally spent 50 minutes of critical care time. This is time spent at this critically ill patient's bedside actively involved in patient care as well as the coordination of care and discussions with the patient's family. This does not include any procedural time which has been billed separately.     Janes Gracia MD   [address] [place] [address],[address]  6/13/2020

## 2020-06-13 NOTE — PROGRESS NOTES
Neurology Progress Note    Patient ID:  Brent Acuna  [id number]  48 y.o.  1970    Subjective: This morning TCD's demonstrated concern for increasing velocities across anterior cerebral artery for which patient is undergoing stat repeat CTA with possible transport to the angios suite after that. Otherwise patient has not shown any meaningful progress after having been off sedation since 9 PM last night. No events concerning for seizure reported.   Current Facility-Administered Medications   Medication Dose Route Frequency    0.9% sodium chloride infusion 250 mL  250 mL IntraVENous PRN    sodium chloride 0.9 % bolus infusion 100 mL  100 mL IntraVENous RAD ONCE    iopamidoL (ISOVUE-370) 76 % injection 50 mL  50 mL IntraVENous RAD ONCE    sodium chloride (NS) flush 10 mL  10 mL IntraVENous RAD ONCE    sodium chloride (NS) flush 10 mL  10 mL IntraVENous RAD ONCE    lidocaine (XYLOCAINE) 20 mg/mL (2 %) injection 400 mg  20 mL IntraDERMal ONCE    lidocaine-EPINEPHrine (XYLOCAINE) 1 %-1:100,000 injection 15 mg  1.5 mL IntraDERMal ONCE    iopamidoL (ISOVUE-M 300) 61 % contrast solution 200 mL  200 mL IntraVENous RAD ONCE    heparin 4000 units/1000 mL (4 units/mL) in NS infusion **FOR ANGIO USE ONLY**   IntraVENous RAD PRN    balsam peru-castor oiL (VENELEX) ointment   Topical BID    3% sodium chloride (*HIGH ALERT*CONCENTRATED IV*) infusion  10 mL/hr IntraVENous CONTINUOUS    PHENYLephrine (ELKIN-SYNEPHRINE) 30 mg in 0.9% sodium chloride 250 mL infusion   mcg/min IntraVENous TITRATE    lactated Ringers infusion  100 mL/hr IntraVENous CONTINUOUS    dexmedeTOMidine (PRECEDEX) 400 mcg in 0.9% sodium chloride 100 mL infusion  0.2-1.4 mcg/kg/hr IntraVENous TITRATE    labetaloL (NORMODYNE;TRANDATE) injection 20 mg  20 mg IntraVENous Q2H PRN    0.9% sodium chloride infusion 250 mL  250 mL IntraVENous PRN    aspirin chewable tablet 81 mg  81 mg Per NG tube DAILY    enoxaparin (LOVENOX) injection 40 mg  40 mg SubCUTAneous Q24H    famotidine (PEPCID) 40 mg/5 mL (8 mg/mL) oral suspension 20 mg  20 mg Per NG tube Q12H    lacosamide (VIMPAT) tablet 100 mg  100 mg Per NG tube Q12H    levETIRAcetam (KEPPRA) oral solution 1,000 mg  1,000 mg Per NG tube Q12H    glycopyrrolate (ROBINUL) injection 0.2 mg  0.2 mg IntraMUSCular TID PRN    niMODipine (NYMALIZE) 6 mg/mL oral solution 60 mg  60 mg Per NG tube Q4H    niCARdipine (CARDENE) 50 mg in 0.9% sodium chloride 100 mL infusion  0-15 mg/hr IntraVENous TITRATE    [Held by provider] amLODIPine (NORVASC) tablet 5 mg  5 mg Per NG tube DAILY    acetaminophen (TYLENOL) solution 650 mg  650 mg Per NG tube Q4H PRN    0.9% sodium chloride infusion 250 mL  250 mL IntraVENous PRN    albuterol-ipratropium (DUO-NEB) 2.5 MG-0.5 MG/3 ML  3 mL Nebulization Q6H RT    piperacillin-tazobactam (ZOSYN) 3.375 g in 0.9% sodium chloride (MBP/ADV) 100 mL  3.375 g IntraVENous Q8H    glucose chewable tablet 16 g  4 Tab Oral PRN    glucagon (GLUCAGEN) injection 1 mg  1 mg IntraMUSCular PRN    dextrose 10% infusion 0-250 mL  0-250 mL IntraVENous PRN    insulin lispro (HUMALOG) injection   SubCUTAneous Q6H    albuterol (PROVENTIL VENTOLIN) nebulizer solution 2.5 mg  2.5 mg Nebulization Q6H PRN    fentaNYL citrate (PF) injection  mcg   mcg IntraVENous Q1H PRN    polyvinyl alcohol-povidone (NATURAL TEARS) 0.5-0.6 % ophthalmic solution 2 Drop  2 Drop Both Eyes Q8H    docusate (COLACE) 50 mg/5 mL oral liquid 100 mg  100 mg Oral BID    bisacodyL (DULCOLAX) suppository 10 mg  10 mg Rectal DAILY PRN    propofol (DIPRIVAN) 10 mg/mL infusion  0-50 mcg/kg/min IntraVENous TITRATE    chlorhexidine (ORAL CARE KIT) 0.12 % mouthwash 15 mL  15 mL Oral Q12H    ondansetron (ZOFRAN) injection 4 mg  4 mg IntraVENous Q4H PRN    docusate (COLACE) 50 mg/5 mL oral liquid 100 mg  100 mg Oral BID PRN          Objective:     Patient Vitals for the past 8 hrs:   BP Temp Pulse Resp SpO2 Weight 06/13/20 1130 175/87  70 22 99 %    06/13/20 1125    26 100 %    06/13/20 1110   75 25 99 %    06/13/20 1109 (!) 176/97    99 %    06/13/20 1000 (!) 197/105  67 29 100 %    06/13/20 0900   63 19 100 %    06/13/20 0800 (!) 209/97 99.6 °F (37.6 °C) 63 22 100 %    06/13/20 0747    22 100 %    06/13/20 0726     100 %    06/13/20 0724    19 100 %    06/13/20 0700 (!) 220/102 99.6 °F (37.6 °C) 78 21 100 %    06/13/20 0630 193/85 98.7 °F (37.1 °C) 79 21 99 %    06/13/20 0600  99.6 °F (37.6 °C) 70 20 100 %    06/13/20 0545  99.7 °F (37.6 °C) 77 23 100 %    06/13/20 0530  100 °F (37.8 °C) 76 19 100 %    06/13/20 0515   69 20 100 %    06/13/20 0505   69 21 100 %    06/13/20 0400 166/70 (!) 101.1 °F (38.4 °C) 71 16 100 % 110.5 kg (243 lb 9.7 oz)       06/13 0701 - 06/13 1900  In: 1213.8 [I.V.:713.8]  Out: 7128 [Urine:1350]  06/11 1901 - 06/13 0700  In: 8040.9 [I.V.:6140.9]  Out: 65 [Urine:7850; Drains:1050]    Lab Review   Recent Results (from the past 24 hour(s))   CELL COUNT, BODY FLUID    Collection Time: 06/12/20  3:35 PM   Result Value Ref Range    BODY FLUID TYPE BRONCHIAL LAVAGE      FLUID COLOR PINK      FLUID APPEARANCE TURBID      FLUID RBC CT. >100 (H) 0 /cu mm    FLUID NUCLEATED CELLS 3,425 /cu mm    FLD NEUTROPHILS 99 (A) NRRE %    FLD MONO/MACROPHAGES 1 (A) NRRE %   CULTURE, RESPIRATORY/SPUTUM/BRONCH W GRAM STAIN    Collection Time: 06/12/20  3:35 PM   Result Value Ref Range    Special Requests: NO SPECIAL REQUESTS      GRAM STAIN OCCASIONAL WBCS SEEN      GRAM STAIN RARE EPITHELIAL CELLS SEEN      GRAM STAIN NO ORGANISMS SEEN      Culture result: PENDING    METABOLIC PANEL, BASIC    Collection Time: 06/12/20  4:02 PM   Result Value Ref Range    Sodium 146 (H) 136 - 145 mmol/L    Potassium 3.8 3.5 - 5.1 mmol/L    Chloride 119 (H) 97 - 108 mmol/L    CO2 22 21 - 32 mmol/L    Anion gap 5 5 - 15 mmol/L    Glucose 123 (H) 65 - 100 mg/dL    BUN 14 6 - 20 MG/DL Creatinine 0.57 0.55 - 1.02 MG/DL    BUN/Creatinine ratio 25 (H) 12 - 20      GFR est AA >60 >60 ml/min/1.73m2    GFR est non-AA >60 >60 ml/min/1.73m2    Calcium 7.9 (L) 8.5 - 10.1 MG/DL   GLUCOSE, POC    Collection Time: 06/12/20  5:53 PM   Result Value Ref Range    Glucose (POC) 130 (H) 65 - 100 mg/dL    [facility]    METABOLIC PANEL, BASIC    Collection Time: 06/12/20  9:48 PM   Result Value Ref Range    Sodium 146 (H) 136 - 145 mmol/L    Potassium 3.3 (L) 3.5 - 5.1 mmol/L    Chloride 117 (H) 97 - 108 mmol/L    CO2 21 21 - 32 mmol/L    Anion gap 8 5 - 15 mmol/L    Glucose 123 (H) 65 - 100 mg/dL    BUN 22 (H) 6 - 20 MG/DL    Creatinine 0.90 0.55 - 1.02 MG/DL    BUN/Creatinine ratio 24 (H) 12 - 20      GFR est AA >60 >60 ml/min/1.73m2    GFR est non-AA >60 >60 ml/min/1.73m2    Calcium 7.8 (L) 8.5 - 10.1 MG/DL   MAGNESIUM    Collection Time: 06/12/20  9:48 PM   Result Value Ref Range    Magnesium 2.4 1.6 - 2.4 mg/dL   PHOSPHORUS    Collection Time: 06/12/20  9:48 PM   Result Value Ref Range    Phosphorus 2.8 2.6 - 4.7 MG/DL   GLUCOSE, POC    Collection Time: 06/12/20 11:16 PM   Result Value Ref Range    Glucose (POC) 111 (H) 65 - 100 mg/dL    Performed by Jacqueline SHAW    PROCALCITONIN    Collection Time: 06/13/20  3:37 AM   Result Value Ref Range    Procalcitonin 0.05 ng/mL   CBC WITH AUTOMATED DIFF    Collection Time: 06/13/20  3:37 AM   Result Value Ref Range    WBC 7.9 3.6 - 11.0 K/uL    RBC 3.15 (L) 3.80 - 5.20 M/uL    HGB 6.9 (L) 11.5 - 16.0 g/dL    HCT 23.4 (L) 35.0 - 47.0 %    MCV 74.3 (L) 80.0 - 99.0 FL    MCH 21.9 (L) 26.0 - 34.0 PG    MCHC 29.5 (L) 30.0 - 36.5 g/dL    RDW 23.6 (H) 11.5 - 14.5 %    PLATELET 909 270 - 878 K/uL    MPV 10.8 8.9 - 12.9 FL    NRBC 1.4 (H) 0  WBC    ABSOLUTE NRBC 0.11 (H) 0.00 - 0.01 K/uL    NEUTROPHILS 75 32 - 75 %    BAND NEUTROPHILS 2 0 - 6 %    LYMPHOCYTES 18 12 - 49 %    MONOCYTES 5 5 - 13 %    EOSINOPHILS 0 0 - 7 %    BASOPHILS 0 0 - 1 % IMMATURE GRANULOCYTES 0 %    ABS. NEUTROPHILS 6.1 1.8 - 8.0 K/UL    ABS. LYMPHOCYTES 1.4 0.8 - 3.5 K/UL    ABS. MONOCYTES 0.4 0.0 - 1.0 K/UL    ABS. EOSINOPHILS 0.0 0.0 - 0.4 K/UL    ABS. BASOPHILS 0.0 0.0 - 0.1 K/UL    ABS. IMM.  GRANS. 0.0 K/UL    DF MANUAL      RBC COMMENTS ANISOCYTOSIS  3+        RBC COMMENTS HYPOCHROMIA  2+        RBC COMMENTS MICROCYTOSIS  1+        RBC COMMENTS OVALOCYTES  PRESENT       METABOLIC PANEL, BASIC    Collection Time: 06/13/20  3:37 AM   Result Value Ref Range    Sodium 146 (H) 136 - 145 mmol/L    Potassium 3.7 3.5 - 5.1 mmol/L    Chloride 118 (H) 97 - 108 mmol/L    CO2 22 21 - 32 mmol/L    Anion gap 6 5 - 15 mmol/L    Glucose 122 (H) 65 - 100 mg/dL    BUN 19 6 - 20 MG/DL    Creatinine 0.61 0.55 - 1.02 MG/DL    BUN/Creatinine ratio 31 (H) 12 - 20      GFR est AA >60 >60 ml/min/1.73m2    GFR est non-AA >60 >60 ml/min/1.73m2    Calcium 8.1 (L) 8.5 - 10.1 MG/DL   ALBUMIN    Collection Time: 06/13/20  3:37 AM   Result Value Ref Range    Albumin 2.4 (L) 3.5 - 5.0 g/dL   MAGNESIUM    Collection Time: 06/13/20  3:37 AM   Result Value Ref Range    Magnesium 2.3 1.6 - 2.4 mg/dL   PHOSPHORUS    Collection Time: 06/13/20  3:37 AM   Result Value Ref Range    Phosphorus 2.3 (L) 2.6 - 4.7 MG/DL   GLUCOSE, POC    Collection Time: 06/13/20  5:53 AM   Result Value Ref Range    Glucose (POC) 129 (H) 65 - 100 mg/dL    Performed by Jacqueline SHAW    TCD INTRACRANIAL ARTERIES COMPLETE    Collection Time: 06/13/20  9:31 AM   Result Value Ref Range    Right Lindegaard Ratio 2.4     Right MCA 1  cm/s    Right MCA 1 EDV 53 cm/s    Right MCA 1 Mean Velocity 92 cm/s    Right PANCHITO  cm/s    Right PANCHITO EDV 72 cm/s    Right PANCHITO Mean Velocity 108 cm/s    Right ICA PSV 85 cm/s    Right ICA EDV 23 cm/s    Right ICA Mean Velocity 44 cm/s    Right PCA 1  cm/s    Right PCA 1 EDV 34 cm/s    Right PCA 1 Mean Velocity 59 cm/s    Right External ICA PSV 71 cm/s    Right External ICA EDV 23 cm/s    Right External ICA Mean Velocity 39 cm/s    Left MCA 1  cm/s    Left MCA 1 EDV 90 cm/s    Left MCA 1 Mean Velocity 137 cm/s    Left PANCHITO  cm/s    Left PANCHITO  cm/s    Left PANCHITO Mean Velocity 196 cm/s    Left ICA  cm/s    Left External ICA PSV 62 cm/s    Left ICA EDV 35 cm/s    Left External ICA EDV 23 cm/s    Left ICA Mean Velocity 58 cm/s    Left External ICA Mean Velocity 36 cm/s    Left PCA 1  cm/s    Left PCA 1 EDV 75 cm/s    Left PCA 1 Mean Velocity 119 cm/s    Left Lindegaard Ratio 3.8     Basilar Artery  cm/s    Basilar Artery EDV 43 cm/s    Basilar Artery Mean Angel 73 cm/s    Right Vertebral Mean Velocity 32 cm/s    Left Vertebral Mean Velocity 24 cm/s    Right Vertebral PSV 59 cm/s    Right Vertebral EDV 19 cm/s    Left Vertebral PSV 43 cm/s    Left Vertebral EDV 14 cm/s   METABOLIC PANEL, BASIC    Collection Time: 06/13/20  9:45 AM   Result Value Ref Range    Sodium 142 136 - 145 mmol/L    Potassium 3.9 3.5 - 5.1 mmol/L    Chloride 115 (H) 97 - 108 mmol/L    CO2 20 (L) 21 - 32 mmol/L    Anion gap 7 5 - 15 mmol/L    Glucose 119 (H) 65 - 100 mg/dL    BUN 15 6 - 20 MG/DL    Creatinine 0.59 0.55 - 1.02 MG/DL    BUN/Creatinine ratio 25 (H) 12 - 20      GFR est AA >60 >60 ml/min/1.73m2    GFR est non-AA >60 >60 ml/min/1.73m2    Calcium 8.3 (L) 8.5 - 10.1 MG/DL   GLUCOSE, POC    Collection Time: 06/13/20 11:45 AM   Result Value Ref Range    Glucose (POC) 97 65 - 100 mg/dL    Performed by Omar Cao        Physical examination:    Middle-age female laying in bed intubated off sedation (since 9 PM last night) HEENT is unremarkable other than instrumentation, cardiovascular, pulmonary abdominal exams are deferred. Extremities are warm/dry. Neurologically patient does not respond to spoken voice though she does appear to open right eyes spontaneously. Does not follow commands.   Regarding cranial nerves, and the sclerae is again noted with the left pupil 4 to 5 mm and reactive in the right 2 to 3 mm with superimposed roving eye movements noted. Cranial exam is otherwise deferred. Motorically patient has trace withdrawal in right arm and leg no clear spontaneous/volitional movements and no definitive responses to noxious stimuli on the left. Remainder of examination is deferred.       Assessment:     Nayan Carranza is an unfortunate 48year old female currently admitted to the NICU for ongoing management of SAH, complicated by MRI suggestive of hypoxic ischemic injuries    Plan:   Diffuse cortical ischemic injury:  Appears likely secondary to either prolonged seizure in setting of acute SAH +/- effects of SAH directly  Patient has made subtle progress with course remaining tenuous overall  On aspirin per NIS as well as nimodipine for vasospam  Has undergone routine EEG focal area of cortical irritability noted, repeat 24 hour was without ongoing periodic discharges, seizures  Remains on 1000mg levetiracetem BID, lacosamide 100mg BID  Hemodynamics per ICU, currently being evaluated regarding need for repeat angio  Will be available as needed for input on prognosis otherwise will not follow regularly    Please call with questions/concerns        Signed:  Adan Erickson MD  6/13/2020  11:58 AM

## 2020-06-13 NOTE — ANESTHESIA PREPROCEDURE EVALUATION
Relevant Problems   No relevant active problems       Anesthetic History   No history of anesthetic complications            Review of Systems / Medical History  Patient summary reviewed, nursing notes reviewed and pertinent labs reviewed    Pulmonary  Within defined limits                 Neuro/Psych   Within defined limits    CVA       Cardiovascular  Within defined limits  Hypertension                   GI/Hepatic/Renal  Within defined limits              Endo/Other  Within defined limits      Morbid obesity     Other Findings              Physical Exam    Airway  Mallampati: II  TM Distance: > 6 cm  Neck ROM: normal range of motion   Mouth opening: Normal  Intubated   Cardiovascular  Regular rate and rhythm,  S1 and S2 normal,  no murmur, click, rub, or gallop             Dental  No notable dental hx       Pulmonary  Breath sounds clear to auscultation               Abdominal  GI exam deferred       Other Findings            Anesthetic Plan    ASA: 4, emergent  Anesthesia type: general    Monitoring Plan: Arterial line      Induction: Intravenous  Anesthetic plan and risks discussed with: Patient and Healthcare power of

## 2020-06-14 ENCOUNTER — APPOINTMENT (OUTPATIENT)
Dept: VASCULAR SURGERY | Age: 50
DRG: 003 | End: 2020-06-14
Attending: NURSE PRACTITIONER
Payer: COMMERCIAL

## 2020-06-14 LAB
ABO + RH BLD: NORMAL
ALBUMIN SERPL-MCNC: 2.4 G/DL (ref 3.5–5)
ALBUMIN SERPL-MCNC: 2.5 G/DL (ref 3.5–5)
ALBUMIN/GLOB SERPL: 0.6 {RATIO} (ref 1.1–2.2)
ALP SERPL-CCNC: 74 U/L (ref 45–117)
ALT SERPL-CCNC: 38 U/L (ref 12–78)
ANION GAP SERPL CALC-SCNC: 6 MMOL/L (ref 5–15)
ANION GAP SERPL CALC-SCNC: 7 MMOL/L (ref 5–15)
AST SERPL-CCNC: 42 U/L (ref 15–37)
BACTERIA SPEC CULT: ABNORMAL
BACTERIA SPEC CULT: ABNORMAL
BASOPHILS # BLD: 0 K/UL (ref 0–0.1)
BASOPHILS NFR BLD: 0 % (ref 0–1)
BILIRUB SERPL-MCNC: 0.4 MG/DL (ref 0.2–1)
BLD PROD TYP BPU: NORMAL
BLD PROD TYP BPU: NORMAL
BLOOD GROUP ANTIBODIES SERPL: NORMAL
BPU ID: NORMAL
BPU ID: NORMAL
BUN SERPL-MCNC: 11 MG/DL (ref 6–20)
BUN SERPL-MCNC: 12 MG/DL (ref 6–20)
BUN SERPL-MCNC: 12 MG/DL (ref 6–20)
BUN SERPL-MCNC: 13 MG/DL (ref 6–20)
BUN SERPL-MCNC: 13 MG/DL (ref 6–20)
BUN SERPL-MCNC: 14 MG/DL (ref 6–20)
BUN/CREAT SERPL: 19 (ref 12–20)
BUN/CREAT SERPL: 20 (ref 12–20)
BUN/CREAT SERPL: 21 (ref 12–20)
BUN/CREAT SERPL: 22 (ref 12–20)
BUN/CREAT SERPL: 28 (ref 12–20)
BUN/CREAT SERPL: 30 (ref 12–20)
CALCIUM SERPL-MCNC: 7.6 MG/DL (ref 8.5–10.1)
CALCIUM SERPL-MCNC: 7.8 MG/DL (ref 8.5–10.1)
CALCIUM SERPL-MCNC: 7.8 MG/DL (ref 8.5–10.1)
CALCIUM SERPL-MCNC: 7.9 MG/DL (ref 8.5–10.1)
CALCIUM SERPL-MCNC: 8 MG/DL (ref 8.5–10.1)
CALCIUM SERPL-MCNC: 8 MG/DL (ref 8.5–10.1)
CHLORIDE SERPL-SCNC: 110 MMOL/L (ref 97–108)
CHLORIDE SERPL-SCNC: 111 MMOL/L (ref 97–108)
CHLORIDE SERPL-SCNC: 111 MMOL/L (ref 97–108)
CHLORIDE SERPL-SCNC: 112 MMOL/L (ref 97–108)
CHLORIDE SERPL-SCNC: 114 MMOL/L (ref 97–108)
CHLORIDE SERPL-SCNC: 114 MMOL/L (ref 97–108)
CO2 SERPL-SCNC: 21 MMOL/L (ref 21–32)
CO2 SERPL-SCNC: 21 MMOL/L (ref 21–32)
CO2 SERPL-SCNC: 22 MMOL/L (ref 21–32)
CO2 SERPL-SCNC: 23 MMOL/L (ref 21–32)
CREAT SERPL-MCNC: 0.44 MG/DL (ref 0.55–1.02)
CREAT SERPL-MCNC: 0.5 MG/DL (ref 0.55–1.02)
CREAT SERPL-MCNC: 0.58 MG/DL (ref 0.55–1.02)
CREAT SERPL-MCNC: 0.58 MG/DL (ref 0.55–1.02)
CREAT SERPL-MCNC: 0.59 MG/DL (ref 0.55–1.02)
CREAT SERPL-MCNC: 0.59 MG/DL (ref 0.55–1.02)
CROSSMATCH RESULT,%XM: NORMAL
CROSSMATCH RESULT,%XM: NORMAL
DIFFERENTIAL METHOD BLD: ABNORMAL
EOSINOPHIL # BLD: 0 K/UL (ref 0–0.4)
EOSINOPHIL NFR BLD: 0 % (ref 0–7)
ERYTHROCYTE [DISTWIDTH] IN BLOOD BY AUTOMATED COUNT: 24.3 % (ref 11.5–14.5)
GLOBULIN SER CALC-MCNC: 3.8 G/DL (ref 2–4)
GLUCOSE BLD STRIP.AUTO-MCNC: 109 MG/DL (ref 65–100)
GLUCOSE BLD STRIP.AUTO-MCNC: 114 MG/DL (ref 65–100)
GLUCOSE BLD STRIP.AUTO-MCNC: 118 MG/DL (ref 65–100)
GLUCOSE BLD STRIP.AUTO-MCNC: 121 MG/DL (ref 65–100)
GLUCOSE SERPL-MCNC: 109 MG/DL (ref 65–100)
GLUCOSE SERPL-MCNC: 109 MG/DL (ref 65–100)
GLUCOSE SERPL-MCNC: 110 MG/DL (ref 65–100)
GLUCOSE SERPL-MCNC: 112 MG/DL (ref 65–100)
GLUCOSE SERPL-MCNC: 114 MG/DL (ref 65–100)
GLUCOSE SERPL-MCNC: 131 MG/DL (ref 65–100)
GRAM STN SPEC: ABNORMAL
HCT VFR BLD AUTO: 26.9 % (ref 35–47)
HGB BLD-MCNC: 8 G/DL (ref 11.5–16)
IMM GRANULOCYTES # BLD AUTO: 0 K/UL
IMM GRANULOCYTES NFR BLD AUTO: 0 %
LYMPHOCYTES # BLD: 1.4 K/UL (ref 0.8–3.5)
LYMPHOCYTES NFR BLD: 15 % (ref 12–49)
MAGNESIUM SERPL-MCNC: 2.2 MG/DL (ref 1.6–2.4)
MCH RBC QN AUTO: 22.5 PG (ref 26–34)
MCHC RBC AUTO-ENTMCNC: 29.7 G/DL (ref 30–36.5)
MCV RBC AUTO: 75.8 FL (ref 80–99)
MONOCYTES # BLD: 0.7 K/UL (ref 0–1)
MONOCYTES NFR BLD: 8 % (ref 5–13)
NEUTS BAND NFR BLD MANUAL: 1 % (ref 0–6)
NEUTS SEG # BLD: 7.1 K/UL (ref 1.8–8)
NEUTS SEG NFR BLD: 76 % (ref 32–75)
NRBC # BLD: 0.05 K/UL (ref 0–0.01)
NRBC BLD-RTO: 0.5 PER 100 WBC
PHOSPHATE SERPL-MCNC: 2.1 MG/DL (ref 2.6–4.7)
PLATELET # BLD AUTO: 216 K/UL (ref 150–400)
PMV BLD AUTO: 10.5 FL (ref 8.9–12.9)
POTASSIUM SERPL-SCNC: 3.6 MMOL/L (ref 3.5–5.1)
POTASSIUM SERPL-SCNC: 3.7 MMOL/L (ref 3.5–5.1)
POTASSIUM SERPL-SCNC: 3.8 MMOL/L (ref 3.5–5.1)
POTASSIUM SERPL-SCNC: 4.1 MMOL/L (ref 3.5–5.1)
PROCALCITONIN SERPL-MCNC: 0.05 NG/ML
PROT SERPL-MCNC: 6.2 G/DL (ref 6.4–8.2)
RBC # BLD AUTO: 3.55 M/UL (ref 3.8–5.2)
RBC MORPH BLD: ABNORMAL
SERVICE CMNT-IMP: ABNORMAL
SODIUM SERPL-SCNC: 139 MMOL/L (ref 136–145)
SODIUM SERPL-SCNC: 139 MMOL/L (ref 136–145)
SODIUM SERPL-SCNC: 140 MMOL/L (ref 136–145)
SODIUM SERPL-SCNC: 140 MMOL/L (ref 136–145)
SODIUM SERPL-SCNC: 142 MMOL/L (ref 136–145)
SODIUM SERPL-SCNC: 142 MMOL/L (ref 136–145)
SPECIMEN EXP DATE BLD: NORMAL
STATUS OF UNIT,%ST: NORMAL
STATUS OF UNIT,%ST: NORMAL
UNIT DIVISION, %UDIV: 0
UNIT DIVISION, %UDIV: 0
WBC # BLD AUTO: 9.2 K/UL (ref 3.6–11)

## 2020-06-14 PROCEDURE — 94640 AIRWAY INHALATION TREATMENT: CPT

## 2020-06-14 PROCEDURE — 83735 ASSAY OF MAGNESIUM: CPT

## 2020-06-14 PROCEDURE — 74011250636 HC RX REV CODE- 250/636: Performed by: PHYSICIAN ASSISTANT

## 2020-06-14 PROCEDURE — 80048 BASIC METABOLIC PNL TOTAL CA: CPT

## 2020-06-14 PROCEDURE — 80053 COMPREHEN METABOLIC PANEL: CPT

## 2020-06-14 PROCEDURE — 74011250637 HC RX REV CODE- 250/637: Performed by: ANESTHESIOLOGY

## 2020-06-14 PROCEDURE — 74011250636 HC RX REV CODE- 250/636: Performed by: NURSE PRACTITIONER

## 2020-06-14 PROCEDURE — 82962 GLUCOSE BLOOD TEST: CPT

## 2020-06-14 PROCEDURE — 74011250637 HC RX REV CODE- 250/637: Performed by: NURSE PRACTITIONER

## 2020-06-14 PROCEDURE — 82040 ASSAY OF SERUM ALBUMIN: CPT

## 2020-06-14 PROCEDURE — 74011250636 HC RX REV CODE- 250/636: Performed by: ANESTHESIOLOGY

## 2020-06-14 PROCEDURE — 36592 COLLECT BLOOD FROM PICC: CPT

## 2020-06-14 PROCEDURE — 84145 PROCALCITONIN (PCT): CPT

## 2020-06-14 PROCEDURE — 36573 INSJ PICC RS&I 5 YR+: CPT | Performed by: INTERNAL MEDICINE

## 2020-06-14 PROCEDURE — 65610000006 HC RM INTENSIVE CARE

## 2020-06-14 PROCEDURE — 85025 COMPLETE CBC W/AUTO DIFF WBC: CPT

## 2020-06-14 PROCEDURE — 84100 ASSAY OF PHOSPHORUS: CPT

## 2020-06-14 PROCEDURE — 74011250637 HC RX REV CODE- 250/637: Performed by: PHYSICIAN ASSISTANT

## 2020-06-14 PROCEDURE — 94664 DEMO&/EVAL PT USE INHALER: CPT

## 2020-06-14 PROCEDURE — 36415 COLL VENOUS BLD VENIPUNCTURE: CPT

## 2020-06-14 PROCEDURE — 36600 WITHDRAWAL OF ARTERIAL BLOOD: CPT

## 2020-06-14 PROCEDURE — 74011000258 HC RX REV CODE- 258: Performed by: NURSE PRACTITIONER

## 2020-06-14 PROCEDURE — 94003 VENT MGMT INPAT SUBQ DAY: CPT

## 2020-06-14 PROCEDURE — 74011250637 HC RX REV CODE- 250/637: Performed by: PSYCHIATRY & NEUROLOGY

## 2020-06-14 PROCEDURE — 93886 INTRACRANIAL COMPLETE STUDY: CPT

## 2020-06-14 PROCEDURE — 74011000250 HC RX REV CODE- 250: Performed by: NURSE PRACTITIONER

## 2020-06-14 RX ORDER — POTASSIUM CHLORIDE 29.8 MG/ML
20 INJECTION INTRAVENOUS
Status: DISCONTINUED | OUTPATIENT
Start: 2020-06-14 | End: 2020-06-14

## 2020-06-14 RX ORDER — SODIUM CHLORIDE 9 MG/ML
200 INJECTION, SOLUTION INTRAVENOUS CONTINUOUS
Status: DISCONTINUED | OUTPATIENT
Start: 2020-06-14 | End: 2020-06-16

## 2020-06-14 RX ORDER — POTASSIUM CHLORIDE 29.8 MG/ML
20 INJECTION INTRAVENOUS
Status: COMPLETED | OUTPATIENT
Start: 2020-06-14 | End: 2020-06-14

## 2020-06-14 RX ADMIN — ENOXAPARIN SODIUM 40 MG: 40 INJECTION SUBCUTANEOUS at 14:27

## 2020-06-14 RX ADMIN — LACOSAMIDE 100 MG: 50 TABLET, FILM COATED ORAL at 08:51

## 2020-06-14 RX ADMIN — CHLORHEXIDINE GLUCONATE 15 ML: 0.12 RINSE ORAL at 21:00

## 2020-06-14 RX ADMIN — CASTOR OIL AND BALSAM, PERU: 788; 87 OINTMENT TOPICAL at 08:46

## 2020-06-14 RX ADMIN — NIMODIPINE 60 MG: 30 SOLUTION ORAL at 05:53

## 2020-06-14 RX ADMIN — SODIUM CHLORIDE, SODIUM LACTATE, POTASSIUM CHLORIDE, AND CALCIUM CHLORIDE 150 ML/HR: 600; 310; 30; 20 INJECTION, SOLUTION INTRAVENOUS at 08:12

## 2020-06-14 RX ADMIN — FAMOTIDINE 20 MG: 40 POWDER, FOR SUSPENSION ORAL at 20:52

## 2020-06-14 RX ADMIN — NIMODIPINE 60 MG: 30 SOLUTION ORAL at 17:53

## 2020-06-14 RX ADMIN — ACETAMINOPHEN 650 MG: 650 SUSPENSION ORAL at 09:13

## 2020-06-14 RX ADMIN — Medication 2 DROP: at 05:54

## 2020-06-14 RX ADMIN — NIMODIPINE 60 MG: 30 SOLUTION ORAL at 10:51

## 2020-06-14 RX ADMIN — SODIUM CHLORIDE 125 ML/HR: 900 INJECTION, SOLUTION INTRAVENOUS at 10:43

## 2020-06-14 RX ADMIN — IPRATROPIUM BROMIDE AND ALBUTEROL SULFATE 3 ML: .5; 3 SOLUTION RESPIRATORY (INHALATION) at 02:48

## 2020-06-14 RX ADMIN — ACETAMINOPHEN 650 MG: 650 SUSPENSION ORAL at 14:30

## 2020-06-14 RX ADMIN — SODIUM CHLORIDE, SODIUM LACTATE, POTASSIUM CHLORIDE, AND CALCIUM CHLORIDE 150 ML/HR: 600; 310; 30; 20 INJECTION, SOLUTION INTRAVENOUS at 01:59

## 2020-06-14 RX ADMIN — ASPIRIN 81 MG CHEWABLE TABLET 81 MG: 81 TABLET CHEWABLE at 08:51

## 2020-06-14 RX ADMIN — CASTOR OIL AND BALSAM, PERU: 788; 87 OINTMENT TOPICAL at 17:04

## 2020-06-14 RX ADMIN — LEVETIRACETAM 1000 MG: 100 SOLUTION ORAL at 08:50

## 2020-06-14 RX ADMIN — Medication 2 DROP: at 14:34

## 2020-06-14 RX ADMIN — FAMOTIDINE 20 MG: 40 POWDER, FOR SUSPENSION ORAL at 08:51

## 2020-06-14 RX ADMIN — IPRATROPIUM BROMIDE AND ALBUTEROL SULFATE 3 ML: .5; 3 SOLUTION RESPIRATORY (INHALATION) at 13:29

## 2020-06-14 RX ADMIN — IPRATROPIUM BROMIDE AND ALBUTEROL SULFATE 3 ML: .5; 3 SOLUTION RESPIRATORY (INHALATION) at 06:55

## 2020-06-14 RX ADMIN — IPRATROPIUM BROMIDE AND ALBUTEROL SULFATE 3 ML: .5; 3 SOLUTION RESPIRATORY (INHALATION) at 19:16

## 2020-06-14 RX ADMIN — Medication 2 DROP: at 22:03

## 2020-06-14 RX ADMIN — LACOSAMIDE 100 MG: 50 TABLET, FILM COATED ORAL at 20:52

## 2020-06-14 RX ADMIN — POTASSIUM CHLORIDE 20 MEQ: 400 INJECTION, SOLUTION INTRAVENOUS at 19:04

## 2020-06-14 RX ADMIN — CHLORHEXIDINE GLUCONATE 15 ML: 0.12 RINSE ORAL at 08:46

## 2020-06-14 RX ADMIN — SODIUM CHLORIDE 125 ML/HR: 900 INJECTION, SOLUTION INTRAVENOUS at 17:41

## 2020-06-14 RX ADMIN — FLUDROCORTISONE ACETATE 0.1 MG: 0.1 TABLET ORAL at 17:44

## 2020-06-14 RX ADMIN — POTASSIUM CHLORIDE 20 MEQ: 400 INJECTION, SOLUTION INTRAVENOUS at 17:41

## 2020-06-14 RX ADMIN — NIMODIPINE 60 MG: 30 SOLUTION ORAL at 02:01

## 2020-06-14 RX ADMIN — FLUDROCORTISONE ACETATE 0.1 MG: 0.1 TABLET ORAL at 08:51

## 2020-06-14 RX ADMIN — SODIUM CHLORIDE 5 MG/HR: 9 INJECTION, SOLUTION INTRAVENOUS at 10:45

## 2020-06-14 RX ADMIN — IPRATROPIUM BROMIDE AND ALBUTEROL SULFATE 3 ML: .5; 3 SOLUTION RESPIRATORY (INHALATION) at 08:03

## 2020-06-14 RX ADMIN — SODIUM PHOSPHATE, MONOBASIC, MONOHYDRATE: 276; 142 INJECTION, SOLUTION INTRAVENOUS at 12:08

## 2020-06-14 RX ADMIN — LEVETIRACETAM 1000 MG: 100 SOLUTION ORAL at 20:52

## 2020-06-14 RX ADMIN — NIMODIPINE 60 MG: 30 SOLUTION ORAL at 14:28

## 2020-06-14 RX ADMIN — NIMODIPINE 60 MG: 30 SOLUTION ORAL at 22:02

## 2020-06-14 RX ADMIN — POTASSIUM CHLORIDE 20 MEQ: 400 INJECTION, SOLUTION INTRAVENOUS at 01:06

## 2020-06-14 NOTE — PROGRESS NOTES
Ulysses ORDOÑEZ assessed and results were a 21.3% change in SVI with PLR indicating patient is fluid responsive. Patient is currently positive 1400 ml at this time. Continue with current management to maintain euvolemia. Potassium being replaced per electrolyte protocol (K 3.7). Sodium stable at 142.     1730: Called and updated patient's [facility] on plan of care for today. All questions answered.       Gilbert Holden, FLAKITA  Neurocritical Care Nurse Practitioner  [phone]

## 2020-06-14 NOTE — PROGRESS NOTES
Neurointerventional Surgery Progress Note  Ely Vásquez NP   Neurocritical Care Nurse Practitioner  [phone]          Admit Date: 6/4/2020        Daily Progress Note: 6/14/2020   LOS: 10 days      S/P:  POD 9 Cerebral angiogram with coil embolization of ruptured left MCA bifurcation aneurysm on 6/5/2020    ** Delayed Presentation ** PB Day 14    Interval History/Subjective:   Patient is more alert and tracking with eyes today. She was started on Florinef yesterday. No acute events overnight. She remains intubated and off sedation. Vasopressor support has been weaned off. TCDs to be completed today at the bedside. Unable to perform a ROS due to intubation and altered mental status. Assessment & Plan:      Active Problems:    SAH (subarachnoid hemorrhage) ([address] (6/4/2020)      Subarachnoid hemorrhage from middle cerebral artery aneurysm, left ([address] (6/4/2020)      RIGHT middle cerebral artery aneurysm (6/4/2020)      Cerebral vasospasm (6/4/2020)      1.) SAH due to ruptured cerebral aneurysm, [place] [address] 5, An Grade 3/4              - s/p cerebral angiogram with coil embolization of left MCA bifurcation aneurysm on 6/5       Additionally, patient has 5X3 right MCA bifurcation aneurysm with very wide neck which will               need to be treated with stents vs. Clipping              - Continue Nimotop Day 10 of 21 to prevent delayed cerebral ischemia              - switch maintenance fluids to NS at 125 ml/hr to maintain euvolemia              - Strict I's and O's, +700 ml this morning               - TCDs daily, TCDs today show improvement in vasospasm compared to yesterday (possible mild left MCA, moderate left PANCHITO, and mild to moderate left PCA spasm)               - Assess NICOM prn for fluid responsiveness               - ECHO shows EF 60-65%, mild concentric hypertrophy, mildly dilated left atrium              - continue every hour neuro checks              - continue Florinef 0.1 mg BID (started 6/13) for prevention of cerebral salt wasting in the setting of SAH, aid in increasing plasma volume              - Liberalize MAP goal 100-130. Keep SBP <200 Cardene/Fabien/Hydralazine/Labetalol PRN              - PT/OT/SLP evals when appropriate              - Repeat CT of Head on 6/13 shows questionable left inferior cerebellar infarction, new from 6/10, multiple evolving subacute infarctions throughout the cerebrum. Subacute SAH, improved hydrocephalus compared to 6/6.              - NSGY following              - NIS following      2.) Seizure              - Due to #1, no prior hx of seizure              - Continue Keppra 1000 mg BID               - continue Vimpat 100 mg BID              - Seizure precautions              -  EEG on 6/6 showed an abnormal EEG due to slowing of the background rhythms with intermittent rhythmic delta activity seen in both frontal head regions. Occasional sharp wave discharges in the left frontal area. EEG is suggestive of mild-to-moderate generalized encephalopathic process, nonspecific in type. In addition, there was occasional epileptiform disturbance seen in the left frontal area which may represent a partial onset seizure focus.              - completed 24 hour EEG on 6/8 which shows moderate to severe generalized encephalopathic process, nonspecific in type. This may be related to underlying structural brain injury or effects of sedation. No convincingly lateralizing or epileptiform features were noted. No seizure was recorded. - Neurology following peripherally               3.) Cerebral Edema (resolved)              - received one dose of mannitol, started 3% saline on 6/8.               - Discontinue 3% saline today, trend BMP in 4 hours to ensure sodium remains stable                4.) Acute hypoxic respiratory failure in the setting of likely neurogenic pulmonary edema              - Symmetric moderately severe airspace disease in the upper lobes bilaterally  which may represent edema or sequela of aspiration seen on CTA. CXR on 6/4 showed Diffuse bilateral airspace disease more suggestive of pulmonary edema than diffuse pneumonia. Repeat CXR on 6/6/2020 showed persistent although increased interstitial and parenchymal opacities  particularly in the right perihilar region. CXR on 6/10 showed residual right lower lung airspace disease            - CTA of Chest negative for PE, showed dependent atelectasis with diffuse groundglass opacifications bilaterally             - patient diuresed with Lasix initially              - COVID 19 test negative x 2             - vent settings per Intensivist, SAT/SBT daily per Intensivist              - sedation remains off             - Intensivist managing     5.) Multiple Acute Ischemic Infarctions              - MRI completed and shows a large left MCA territory ischemia/infarction,  left temporal, parietal and frontal lobes, cortically based, no superimposed  increased parenchymal hemorrhage or midline shift. Right and left PANCHITO territory  cortical diffusion restriction consistent with infarction. Infarction in the right insular cortex and right temporal lobe. Scattered small infarctions right and left corona radiata centrum semiovale, right cerebellum                - CT of Head 6/13 shows questionable left inferior cerebellar infarction, new from 6/10, multiple evolving subacute infarctions throughout the cerebrum. Subacute SAH, improved hydrocephalus compared to 6/6.                 - Imaging pattern is consistent with Hypoxic-Ischemic Encephalopathy (HIE)                - Continue aspirin 81 mg daily for stroke prevention                 - ECHO as stated above                - Lipid panel on 6/5, LDL 7.8, goal < 70, statin not indicated                - Hgb A1C ok at 5.7                - Neurology following peripherally     6.) Cerebral Vasospasm                 - s/p cerebral angiogram for right PANCHITO A1 angioplasty for vasospasm, transarterial infusion of verapamil on 6/9,  S/p angiogram on 6/10 for left ICA/MCA angioplasty and transarterial infusion of verapamil for vasospasm treatment, s/p angiogram on 6/11 and 6/13 for transarterial infusion of verapamil for vasospasm treatment                 - Strict I's and O's, +700 ml this morning                   - plans as stated in #1   7.) Fever                 - fevers have improved , normal WBC                - completed course of Zosyn, CXR shows residual right lower lung airspace disease               - possibly febrile in the setting of SAH, paired blood cultures NGTD, resp culture 6/13 NGTD                - CTA of chest 6/7 negative for PE                 - Intensivist following     8.) Anemia (improved)                - Hgb 8.0, received 1 unit of PRBCs on 6/13                  - no active signs of blood loss                  - Goal Hgb >7.0                 - Monitor CBC                 - Intensivist following    9.) Hypophosphatemia                 - Phos 2.1                 - Na Phos ordered IV one time per protocol, patient placed on electrolyte protocol                 - monitor Phosphorus    Activity: Bed rest  DVT ppx: SCDs, Lovenox  Disposition: TBD    Plan d/w Dr. Emily Hernández, Dr. Magdalena Elliott, and RN. Admission Summary:     Alejandro Silverman is a 48 y.o. 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 On arrival to ED, pt was unresponsive and noted to be seizing. A stat CT of her head was performed there, which showed extensive SAH. Pt was then intubated emergently. She was also extremely hypertensive with SBPs in 240s. She was given Keppra and Ativan, and placed on cardene drip for BP control. NSGY and NIS were then consulted and the patient was [facility] for higher level of care.  En route, paramedics administered 7.5 mg of Versed, 200mcg of Fentanyl, and 10 mg of labetalol for BP control. On arrival, CTA of her head and neck was obtained, which showed a 4.4 x 4.1 mm right MCA trifurcation aneurysm and  a 2.2 x 3.1 mm left MCA trifurcation aneurysm. Of note, CTA head/neck also showed pulmonary infiltrates suspicious for COVID-19 infection and the patient has been tested for COVID which was negative. 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 Mother reported pt does not smoke, does not drink alcohol or use street drugs. The mother also reported that the patient began complaining of a headache on 5/31/2020 and has been taking BC Powder (aspirin) for the headache. She also reported that the pt has a hx of HTN and is supposed to be on BP medications, but recently stopped taking her medications. The patient underwent a cerebral angiogram on 6/5 by Dr. Odalis Villatoro for coil embolization of a ruptured left MCA bifurcation aneurysm.         Current Facility-Administered Medications   Medication Dose Route Frequency Provider Last Rate Last Dose    ELECTROLYTE REPLACEMENT PROTOCOL - Potassium and Magnesium  1 Each Other PRN Laquita Avery NP        ELECTROLYTE REPLACEMENT PROTOCOL - Phosphorus  1 Each Other PRN Laquita Avery NP        0.9% sodium chloride infusion  125 mL/hr IntraVENous CONTINUOUS Laquita Avery  mL/hr at 06/14/20 1043 125 mL/hr at 06/14/20 1043    PHENYLephrine (ELKIN-SYNEPHRINE) 30 mg in 0.9% sodium chloride 250 mL infusion   mcg/min IntraVENous TITRATE Laquita Avery NP        niCARdipine (CARDENE) 50 mg in 0.9% sodium chloride 100 mL infusion  0-15 mg/hr IntraVENous TITRATE Laquita Avery NP 10 mL/hr at 06/14/20 1045 5 mg/hr at 06/14/20 1045    0.9% sodium chloride infusion 250 mL  250 mL IntraVENous PRN Diana Ion, NP        fludrocortisone (FLORINEF) tablet 0.1 mg  0.1 mg Oral BID ALE Harris   0.1 mg at 06/14/20 0851    balsam peru-castor oiL (VENELEX) ointment   Topical BID Leon Berry DO        dexmedeTOMidine (PRECEDEX) 400 mcg in 0.9% sodium chloride 100 mL infusion  0.2-1.4 mcg/kg/hr IntraVENous TITRATE Leon Berry DO   Stopped at 06/12/20 2132    labetaloL (NORMODYNE;TRANDATE) injection 20 mg  20 mg IntraVENous Q2H PRN Bharat Domínguez, FLAKITA   20 mg at 06/13/20 0659    0.9% sodium chloride infusion 250 mL  250 mL IntraVENous PRN Eder Herman, NP-C        aspirin chewable tablet 81 mg  81 mg Per NG tube DAILY Huy Liang MD   81 mg at 06/14/20 0851    enoxaparin (LOVENOX) injection 40 mg  40 mg SubCUTAneous Q24H Leon Berry DO   40 mg at 06/13/20 1436    famotidine (PEPCID) 40 mg/5 mL (8 mg/mL) oral suspension 20 mg  20 mg Per NG tube Q12H Leon Berry DO   20 mg at 06/14/20 0851    lacosamide (VIMPAT) tablet 100 mg  100 mg Per NG tube Q12H Leon Berry DO   100 mg at 06/14/20 0851    levETIRAcetam (KEPPRA) oral solution 1,000 mg  1,000 mg Per NG tube Q12H Leon eBrry DO   1,000 mg at 06/14/20 0850    glycopyrrolate (ROBINUL) injection 0.2 mg  0.2 mg IntraMUSCular TID PRN Sofi Cameron NP        niMODipine (NYMALIZE) 6 mg/mL oral solution 60 mg  60 mg Per NG tube Q4H Bharat Domínguez, FLAKITA   60 mg at 06/14/20 1051    [Held by provider] amLODIPine (NORVASC) tablet 5 mg  5 mg Per NG tube DAILY DARREN Pimentel   5 mg at 06/10/20 0853    acetaminophen (TYLENOL) solution 650 mg  650 mg Per NG tube Q4H PRN Millicent Pugh NP   650 mg at 06/14/20 0913    0.9% sodium chloride infusion 250 mL  250 mL IntraVENous PRN Eder Herman NP-C        albuterol-ipratropium (DUO-NEB) 2.5 MG-0.5 MG/3 ML  3 mL Nebulization Q6H RT Erle Simpler, ACNP   3 mL at 06/14/20 6437    glucose chewable tablet 16 g  4 Tab Oral PRN Dorothye Mew, NP-C        glucagon (GLUCAGEN) injection 1 mg  1 mg IntraMUSCular PRN Dorothye Mew, NP-C        dextrose 10% infusion 0-250 mL 0-250 mL IntraVENous PRN Adriel Showpaloma, NP-C        insulin lispro (HUMALOG) injection   SubCUTAneous Q6H [facility] R, NP-C   Stopped at 20 1200    albuterol (PROVENTIL VENTOLIN) nebulizer solution 2.5 mg  2.5 mg Nebulization Q6H PRN Adrielmartina Carranza, NP-C        fentaNYL citrate (PF) injection  mcg   mcg IntraVENous Q1H PRN Felipe Mutter, ACNP   50 mcg at 20 0153    polyvinyl alcohol-povidone (NATURAL TEARS) 0.5-0.6 % ophthalmic solution 2 Drop  2 Drop Both Eyes Q8H Felipe Mutter, ACNP   2 Drop at 20 0554    docusate (COLACE) 50 mg/5 mL oral liquid 100 mg  100 mg Oral BID Felipe Mutter, ACNP   Stopped at 20 1800    bisacodyL (DULCOLAX) suppository 10 mg  10 mg Rectal DAILY PRN Felipe Mutter, ACNP        propofol (DIPRIVAN) 10 mg/mL infusion  0-50 mcg/kg/min IntraVENous TITRATE Isaac Domínguez NP   Stopped at 20 2042    chlorhexidine (ORAL CARE KIT) 0.12 % mouthwash 15 mL  15 mL Oral Q12H [facility] EVELYN, NP-C   15 mL at 20 0846    ondansetron (ZOFRAN) injection 4 mg  4 mg IntraVENous Q4H PRN Adriel Showpaloma, NP-C        docusate (COLACE) 50 mg/5 mL oral liquid 100 mg  100 mg Oral BID PRN [facility] R, NP-C            No Known Allergies    Review of Systems:  Review of systems not obtained due to patient factors.       Objective:     Vital signs  Temp (24hrs), Av.6 °F (37.6 °C), Min:99 °F (37.2 °C), Max:100.4 °F (38 °C)    07 - 0  In: 940 [I.V.:320]  Out: 225 [Urine:225]   1901 -  0700  In: 73244.3 [I.V.:9055.5]  Out: 06759 [Urine:9160; Drains:875]    Visit Vitals  /87   Pulse 66   Temp 99.7 °F (37.6 °C)   Resp 25   Ht 5' 4\" (1.626 m)   Wt 239 lb 3.2 oz (108.5 kg)   SpO2 100%   BMI 41.06 kg/m²      O2 Device: Endotracheal tube, Ventilator   Vitals:    20 0900 20 1000 20 1045 20 1051   BP: (!) 199/92 192/80 (!) 210/91 181/87   Pulse: 68 67 64 66   Resp: 19 25     Temp:       SpO2: 100% 100%     Weight:       Height:          Physical Exam:  GENERAL: NAD, intubated, alert   EYE: conjunctivae/corneas clear. Right pupil 3 mm, brisk response to light. Left pupil 4 mm, sluggish response to light. Left eye ptosis. LUNG: lungs coarse bilaterally   HEART: regular rate and rhythm, S1, S2 normal, no murmur, click, rub or gallop  EXTREMITIES:  extremities normal, atraumatic, no cyanosis, 3+ pitting peripheral edema, distal pulses 2+ bilaterally   SKIN: Skin warm to touch. Right and left groin site clean, dry, and intact. No hematoma, bruising, or bleeding noted. NEUROLOGIC: Intubated. Alert. Eyes will open spontaneously. No command following. Right pupil 3 mm, brisk response to light. Left pupil 4 mm sluggish response to light. Left eye ptosis noted. Focuses and tracks with eyes, with the exception of inability to track to the right with right eye. Blinks to threat on left. No blink to threat on right. Moves all extremities spontaneously. No involuntary movements. Gait deferred. Unable to assess language, sensation, and coordination. Imaging:  CT of Head on 6/13/2020 at 1103 shows  IMPRESSION:   1. Questionable left inferior cerebellar infarction, new from 6/10/2020.  2. Multiple, evolving, subacute infarctions throughout the cerebrum. 3. Subacute subarachnoid hemorrhage. Improved hydrocephalus compared to  6/6/2020    CTA of Head on 6/13/2020 at 1103 shows  IMPRESSION:   1. Left A1 vasospasm. 2. More mild spasm of the right PANCHITO, MCAs, and possibly the distal vertebrals. 3. Right M1 terminus aneurysm. 4. Large infundibula/small aneurysms of the bilateral P-comm origins. CT Perfusion on 6/13/2020 at 1103 shows  IMPRESSION:  1. Penumbra surrounding infarctions in most of the superior left MCA territory. 2. Bilateral PANCHITO infarctions. 3. Questionable left cerebellar infarction is inferior to the perfusion scan.     CTA of Head on 6/9/2020 at 1350 shows  Progressive vasospasm involving the anterior cerebral arteries with minimal  associated decreased perfusion to the frontal lobes. No other significant  Change  . CT Perfusion on 6/9/2020 at 1336 shows  Impression:  CT perfusion brain: Very minimal decreased perfusion to the frontal lobes. CT of Head on 6/9/2020 at 0234 shows  IMPRESSION:   No significant change.       CT of Head on 6/8/2020 shows  IMPRESSION:   Global edema, otherwise no significant change    CTA of Head and Neck on 6/8/2020 per radiology shows  Impression:  Mild to moderate vasospasm in the left middle cerebral artery status post  bifurcation aneurysm coil embolization. Stable 4 mm right MCA aneurysm. Endotracheal tube terminates just above the matt. 1 cm low-density left thyroid nodule. Mildly enlarged left axillary lymph node measuring 11 mm in short axis  Patchy bilateral airspace disease. Nonspecific asymmetric enhancement of the right superior ophthalmic vein   (However, no significant vasospasm was seen when personally reviewed by NIS)    CT Perfusion on 6/8/2020 shows  IMPRESSION:  No significant cerebral perfusion abnormality    CTA of Chest on 6/7/2020 shows  IMPRESSION:  1. No acute pulmonary embolus  2. Dependent atelectasis with diffuse groundglass opacifications bilaterally. CT of Head on 6/7/2020 shows  IMPRESSION:   1. No further progression of subarachnoid hemorrhage. The overall degree of  subarachnoid hemorrhage is slightly improved. 2. Subtle areas of gray-white differentiation loss throughout the cerebral  hemispheres correlating with areas of restricted diffusion on previous CT  compatible with ischemic changes. MRI of Brain on 6/6/2020 shows  IMPRESSION:   Imaging findings consistent with large left MCA territory ischemia/infarction,  left temporal, parietal and frontal lobes, cortically based, no superimposed  increased parenchymal hemorrhage or midline shift.  Right and left PANCHITO territory  cortical diffusion restriction consistent with infarction. Infarction in the right insular cortex and right temporal lobe. Scattered small  infarctions right and left corona radiata centrum semiovale, right cerebellum. Allowing for differences in technique likely stable subarachnoid and  intraventricular hemorrhage. CT of Head on 6/6/2020 at 1411 shows  IMPRESSION:   Left MCA territory infarction with smaller right MCA territory infarction.     Stable subarachnoid and intraventricular hemorrhage. CTA of Head on 6/6/2020 at 0903 shows  IMPRESSION:  Slightly increased intraventricular and subarachnoid hemorrhage compared to the  prior exam.     Status post coiling left MCA bifurcation aneurysm. No significant residual  aneurysm filling. CT of Head on 6/62020 at 0239 shows  IMPRESSION:  Slightly diminished subarachnoid and intraventricular hemorrhage. Stable ventricular system. CT of Head WO on 6/5/2020 at 1512 shows  IMPRESSION: Stable acute subarachnoid and intraventricular hemorrhage. Stable  ventricular system. CT Head WO 6/5/20 0826     IMPRESSION:   1. Diffuse subarachnoid hemorrhage most concentrated in the basal cisterns and  left sylvian fissure. 2.  Interval ventricular enlargement suspicious for developing hydrocephalus.     CTA Head 6/4/20 2017     IMPRESSION:  1. Diffuse subarachnoid hemorrhage in the basilar cisterns and sylvian  fissures. 2.  Bilateral MCA bifurcation aneurysms. 3.  Infundibular origins to the posterior communicating arteries bilaterally. 4.  Symmetric moderately severe airspace disease in the upper lobes bilaterally  which may represent edema or sequela of aspiration.     CT Head WO Contrast 6/4/2020 2012    IMPRESSION: Extensive subarachnoid hemorrhage concerning for leaking/ruptured  aneurysm.        24 hour results:    Recent Results (from the past 24 hour(s))   GLUCOSE, POC    Collection Time: 06/13/20 11:45 AM   Result Value Ref Range    Glucose (POC) 97 65 - 100 mg/dL    Performed by Sharan Ear    LD    Collection Time: 06/13/20  5:43 PM   Result Value Ref Range     (H) 81 - 246 U/L   C REACTIVE PROTEIN, QT    Collection Time: 06/13/20  5:43 PM   Result Value Ref Range    C-Reactive protein 3.49 (H) 0.00 - 0.60 mg/dL   FERRITIN    Collection Time: 06/13/20  5:43 PM   Result Value Ref Range    Ferritin 44 26 - 388 NG/ML   D DIMER    Collection Time: 06/13/20  5:43 PM   Result Value Ref Range    D-dimer 6.40 (H) 0.00 - 0.65 mg/L FEU   CULTURE, RESPIRATORY/SPUTUM/BRONCH W GRAM STAIN    Collection Time: 06/13/20  5:45 PM   Result Value Ref Range    Special Requests: NO SPECIAL REQUESTS      GRAM STAIN 2+ WBCS SEEN      GRAM STAIN RARE EPITHELIAL CELLS SEEN      GRAM STAIN NO ORGANISMS SEEN      Culture result: PENDING    METABOLIC PANEL, BASIC    Collection Time: 06/13/20  5:45 PM   Result Value Ref Range    Sodium 140 136 - 145 mmol/L    Potassium 3.9 3.5 - 5.1 mmol/L    Chloride 111 (H) 97 - 108 mmol/L    CO2 21 21 - 32 mmol/L    Anion gap 8 5 - 15 mmol/L    Glucose 104 (H) 65 - 100 mg/dL    BUN 13 6 - 20 MG/DL    Creatinine 0.59 0.55 - 1.02 MG/DL    BUN/Creatinine ratio 22 (H) 12 - 20      GFR est AA >60 >60 ml/min/1.73m2    GFR est non-AA >60 >60 ml/min/1.73m2    Calcium 8.2 (L) 8.5 - 10.1 MG/DL   GLUCOSE, POC    Collection Time: 06/13/20  5:50 PM   Result Value Ref Range    Glucose (POC) 113 (H) 65 - 100 mg/dL    Performed by [facility]    METABOLIC PANEL, BASIC    Collection Time: 06/13/20  9:47 PM   Result Value Ref Range    Sodium 142 136 - 145 mmol/L    Potassium 3.4 (L) 3.5 - 5.1 mmol/L    Chloride 114 (H) 97 - 108 mmol/L    CO2 22 21 - 32 mmol/L    Anion gap 6 5 - 15 mmol/L    Glucose 116 (H) 65 - 100 mg/dL    BUN 14 6 - 20 MG/DL    Creatinine 0.55 0.55 - 1.02 MG/DL    BUN/Creatinine ratio 25 (H) 12 - 20      GFR est AA >60 >60 ml/min/1.73m2    GFR est non-AA >60 >60 ml/min/1.73m2    Calcium 7.6 (L) 8.5 - 10.1 MG/DL   GLUCOSE, POC    Collection Time: 06/13/20 11:09 PM Result Value Ref Range    Glucose (POC) 126 (H) 65 - 100 mg/dL    Performed by [facility]    METABOLIC PANEL, BASIC    Collection Time: 06/14/20  2:10 AM   Result Value Ref Range    Sodium 140 136 - 145 mmol/L    Potassium 4.1 3.5 - 5.1 mmol/L    Chloride 112 (H) 97 - 108 mmol/L    CO2 22 21 - 32 mmol/L    Anion gap 6 5 - 15 mmol/L    Glucose 109 (H) 65 - 100 mg/dL    BUN 13 6 - 20 MG/DL    Creatinine 0.59 0.55 - 1.02 MG/DL    BUN/Creatinine ratio 22 (H) 12 - 20      GFR est AA >60 >60 ml/min/1.73m2    GFR est non-AA >60 >60 ml/min/1.73m2    Calcium 8.0 (L) 8.5 - 10.1 MG/DL   PROCALCITONIN    Collection Time: 06/14/20  4:04 AM   Result Value Ref Range    Procalcitonin 0.05 ng/mL   ALBUMIN    Collection Time: 06/14/20  4:04 AM   Result Value Ref Range    Albumin 2.5 (L) 3.5 - 5.0 g/dL   MAGNESIUM    Collection Time: 06/14/20  4:04 AM   Result Value Ref Range    Magnesium 2.2 1.6 - 2.4 mg/dL   PHOSPHORUS    Collection Time: 06/14/20  4:04 AM   Result Value Ref Range    Phosphorus 2.1 (L) 2.6 - 4.7 MG/DL   CBC WITH AUTOMATED DIFF    Collection Time: 06/14/20  4:04 AM   Result Value Ref Range    WBC 9.2 3.6 - 11.0 K/uL    RBC 3.55 (L) 3.80 - 5.20 M/uL    HGB 8.0 (L) 11.5 - 16.0 g/dL    HCT 26.9 (L) 35.0 - 47.0 %    MCV 75.8 (L) 80.0 - 99.0 FL    MCH 22.5 (L) 26.0 - 34.0 PG    MCHC 29.7 (L) 30.0 - 36.5 g/dL    RDW 24.3 (H) 11.5 - 14.5 %    PLATELET 153 676 - 984 K/uL    MPV 10.5 8.9 - 12.9 FL    NRBC 0.5 (H) 0  WBC    ABSOLUTE NRBC 0.05 (H) 0.00 - 0.01 K/uL    NEUTROPHILS 76 (H) 32 - 75 %    BAND NEUTROPHILS 1 0 - 6 %    LYMPHOCYTES 15 12 - 49 %    MONOCYTES 8 5 - 13 %    EOSINOPHILS 0 0 - 7 %    BASOPHILS 0 0 - 1 %    IMMATURE GRANULOCYTES 0 %    ABS. NEUTROPHILS 7.1 1.8 - 8.0 K/UL    ABS. LYMPHOCYTES 1.4 0.8 - 3.5 K/UL    ABS. MONOCYTES 0.7 0.0 - 1.0 K/UL    ABS. EOSINOPHILS 0.0 0.0 - 0.4 K/UL    ABS. BASOPHILS 0.0 0.0 - 0.1 K/UL    ABS. IMM.  GRANS. 0.0 K/UL    DF MANUAL      RBC COMMENTS ANISOCYTOSIS  3+ RBC COMMENTS HYPOCHROMIA  2+        RBC COMMENTS MICROCYTOSIS  1+        RBC COMMENTS POLYCHROMASIA  1+        RBC COMMENTS OVALOCYTES  PRESENT       METABOLIC PANEL, COMPREHENSIVE    Collection Time: 06/14/20  4:05 AM   Result Value Ref Range    Sodium 140 136 - 145 mmol/L    Potassium 3.8 3.5 - 5.1 mmol/L    Chloride 111 (H) 97 - 108 mmol/L    CO2 22 21 - 32 mmol/L    Anion gap 7 5 - 15 mmol/L    Glucose 109 (H) 65 - 100 mg/dL    BUN 12 6 - 20 MG/DL    Creatinine 0.59 0.55 - 1.02 MG/DL    BUN/Creatinine ratio 20 12 - 20      GFR est AA >60 >60 ml/min/1.73m2    GFR est non-AA >60 >60 ml/min/1.73m2    Calcium 7.9 (L) 8.5 - 10.1 MG/DL    Bilirubin, total 0.4 0.2 - 1.0 MG/DL    ALT (SGPT) 38 12 - 78 U/L    AST (SGOT) 42 (H) 15 - 37 U/L    Alk.  phosphatase 74 45 - 117 U/L    Protein, total 6.2 (L) 6.4 - 8.2 g/dL    Albumin 2.4 (L) 3.5 - 5.0 g/dL    Globulin 3.8 2.0 - 4.0 g/dL    A-G Ratio 0.6 (L) 1.1 - 2.2     GLUCOSE, POC    Collection Time: 06/14/20  5:02 AM   Result Value Ref Range    Glucose (POC) 109 (H) 65 - 100 mg/dL    Performed by Jacqueline SHAW    METABOLIC PANEL, BASIC    Collection Time: 06/14/20  6:10 AM   Result Value Ref Range    Sodium 139 136 - 145 mmol/L    Potassium 3.7 3.5 - 5.1 mmol/L    Chloride 110 (H) 97 - 108 mmol/L    CO2 23 21 - 32 mmol/L    Anion gap 6 5 - 15 mmol/L    Glucose 110 (H) 65 - 100 mg/dL    BUN 11 6 - 20 MG/DL    Creatinine 0.58 0.55 - 1.02 MG/DL    BUN/Creatinine ratio 19 12 - 20      GFR est AA >60 >60 ml/min/1.73m2    GFR est non-AA >60 >60 ml/min/1.73m2    Calcium 7.8 (L) 8.5 - 12.4 MG/DL   METABOLIC PANEL, BASIC    Collection Time: 06/14/20 10:22 AM   Result Value Ref Range    Sodium 139 136 - 145 mmol/L    Potassium 3.7 3.5 - 5.1 mmol/L    Chloride 111 (H) 97 - 108 mmol/L    CO2 22 21 - 32 mmol/L    Anion gap 6 5 - 15 mmol/L    Glucose 131 (H) 65 - 100 mg/dL    BUN 12 6 - 20 MG/DL    Creatinine 0.58 0.55 - 1.02 MG/DL    BUN/Creatinine ratio 21 (H) 12 - 20      GFR est AA >60 >60 ml/min/1.73m2    GFR est non-AA >60 >60 ml/min/1.73m2    Calcium 7.8 (L) 8.5 - 10.1 MG/DL   TCD INTRACRANIAL ARTERIES COMPLETE    Collection Time: 06/14/20 10:50 AM   Result Value Ref Range    Right MCA 1  cm/s    Right MCA 1 EDV 38 cm/s    Right PANCHITO  cm/s    Right PANCHITO EDV 52 cm/s    Right ICA PSV 91 cm/s    Right ICA EDV 28 cm/s    Right PCA 1 PSV 91 cm/s    Right PCA 1 EDV 27 cm/s    Right External ICA PSV 78 cm/s    Right External ICA EDV 21 cm/s    Left MCA 1  cm/s    Left MCA 1 EDV 73 cm/s    Left PANCHITO  cm/s    Left PANCHITO  cm/s    Left ICA  cm/s    Left External ICA PSV 78 cm/s    Left ICA EDV 41 cm/s    Left External ICA EDV 27 cm/s    Left PCA 1  cm/s    Left PCA 1 EDV 56 cm/s    Basilar Artery PSV 54 cm/s    Basilar Artery EDV 17 cm/s    Right Vertebral PSV 68 cm/s    Right Vertebral EDV 27 cm/s    Left Vertebral PSV 43 cm/s    Left Vertebral EDV 12 cm/s    Right Lindegaard Ratio 1.7     Right MCA 1 Mean Velocity 67 cm/s    Right PANCHITO Mean Velocity 84 cm/s    Right ICA Mean Velocity 49 cm/s    Right PCA 1 Mean Velocity 48 cm/s    Right External ICA Mean Velocity 40 cm/s    Left MCA 1 Mean Velocity 110 cm/s    Left PANCHITO Mean Velocity 168 cm/s    Left ICA Mean Velocity 69 cm/s    Left External ICA Mean Velocity 44 cm/s    Left PCA 1 Mean Velocity 87 cm/s    Left Lindegaard Ratio 2.5     Basilar Artery Mean Angel 29 cm/s    Right Vertebral Mean Velocity 41 cm/s    Left Vertebral Mean Velocity 22 cm/s        [facility], NP

## 2020-06-14 NOTE — ROUTINE PROCESS
Bedside and Verbal shift change report given to Omayra RN (oncoming nurse) by Katy Clark (offgoing nurse). Report included the following information SBAR, Kardex, Recent Results, Cardiac Rhythm SR and Dual Neuro Assessment.

## 2020-06-14 NOTE — PROGRESS NOTES
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 At the [facility] a CT was done and she was found to have a [facility]  and transferred to [facility] for further neuro evaluation after being intubated by sending facility. Repeat CTH/CTA here showed an extensive subarachnoid hemorrhage concerning for leaking/ruptured aneurysm without hydrocephalus. 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 Both neurosurgery and neuro IR consulted and she was taken to IR for coiling of a Left MCA aneurysm this am by Dr. Violet Sawyer. Plan for Rt aneurysmal clipping at a later time. Remains at risk for hydrocephalus, continue to re-eval for hydrocephalus. Remains intubated at this time. Overnight Events:  2020  Overnight patient with pupillary changes with left pupil 5mm fixed and dilated. Code S called on patient and patient went to [facility] with evidence of increased cerebral edema and mild to moderate L MCA vasospasm post aneurysm coil embolization. The aneurysm was stable to the right MCA. Dr. Maxx Blackman was consulted and a dose of mannitol was given. She was restarted on Propofol infusion. This am, Neurosurgery was called to re-eval for EVD placement for ICP monitoring. A 3 % saline gtt is being started with Q2hr BMP. Patient is +4 anasarca and required arterial line replacement due to dislodgement. Unable to place PICC today 2/2 Fluid overload and peripheral edema. 2020  Remains on 3%, increased secretions  Severe right A1 vasospasm resolved after angioplasty and ALFONSO verapamil infusion.     6/10/2020  MRI showing clot at midbrain/3rd nerve junction  1 PRBC ordred    2020  1 L of NS given by Neuro IR    2020  Remains on vent    2020  Continue on MV, rate of 26 on pressure support 10/5, does not follow commands     2020  Patient was taken down to angio on  for verapamil injection for vasospasm, this AM open eyes, track, does not follow commands, more active    POD:* No surgery found *    S/P: Day 2 for L MCA aneurysm coiling    Objective:   Vital Signs:  Visit Vitals  /88   Pulse 72   Temp 99.7 °F (37.6 °C)   Resp 26   Ht 5' 4\" (1.626 m)   Wt 108.5 kg (239 lb 3.2 oz)   SpO2 99%   BMI 41.06 kg/m²      O2 Device: Endotracheal tube, Ventilator Temp (24hrs), Av.6 °F (37.6 °C), Min:99 °F (37.2 °C), Max:100.4 °F (38 °C)         Intake/Output:     Intake/Output Summary (Last 24 hours) at 2020 0832  Last data filed at 2020 0800  Gross per 24 hour   Intake 7350.97 ml   Output 6785 ml   Net 565.97 ml       Physical Exam:    General:  Off sedation, on mechanical ventilation. Eyes:  Sclera anicteric, Pupils equal, round, reactive to light. Unable to open L eye    Mouth/Throat: Orotracheal tube in place., OGT in place   Neck: Supple. Lungs:   Bronchial/ coarse to auscultation bilaterally   Cardiovascular:  NSR   Abdomen:   Soft, obese, non-tender, bowel sounds normal, non-distended. Extremities: No cyanosis, 2+ edema    Skin: No acute rash or lesions. Musculoskeletal:  no deformity   Lines/Devices:  Intact, no erythema, drainage, or tenderness.    Neuro: Off sedation, minimally responsive, open R eye and tracks,  , moving all extremities R>L     T/L/D  Tubes: ETT and Orogastric Tube  Lines: Peripheral IV arterial line , Femoral CVL   Drains: Martinez Catheter    LABS AND  DATA: Personally reviewed  Recent Labs     20  0404 20  0337   WBC 9.2 7.9   HGB 8.0* 6.9*   HCT 26.9* 23.4*    203     Recent Labs     20  0610 20  0405 20  0404  20  0337    140  --    < > 146*   K 3.7 3.8  --    < > 3.7   * 111*  --    < > 118*   CO2 23 22  --    < > 22   BUN 11 12  --    < > 19   CREA 0.58 0.59  --    < > 0.61   * 109*  --    < > 122*   CA 7.8* 7.9*  --    < > 8.1*   MG  --   --  2.2  --  2.3   PHOS  --   --  2.1*  --  2.3*    < > = values in this interval not displayed. Recent Labs     06/14/20  0405 06/14/20  0404   AP 74  --    TP 6.2*  --    ALB 2.4* 2.5*   GLOB 3.8  --      No results for input(s): INR, PTP, APTT, INREXT, INREXT in the last 72 hours. Recent Labs     06/12/20  0802   PHI 7.514*   PCO2I 28.8*   PO2I 80   FIO2I 35     No results for input(s): CPK, CKMB, TROIQ, BNPP in the last 72 hours. Ventilator Settings:  Mode Rate Tidal Volume Pressure FiO2 PEEP   Spontaneous   400 ml  5 cm H2O 35 % 5 cm H20     Peak airway pressure: 12 cm H2O    Minute ventilation: 10 l/min        MEDS: Reviewed    Chest X-Ray:  CXR Results  (Last 48 hours)    None        06/08/20 CTH/ CTA Brain:  IMPRESSION  Impression:     Mild to moderate vasospasm in the left middle cerebral artery status post  bifurcation aneurysm coil embolization. Stable 4 mm right MCA aneurysm.     Endotracheal tube terminates just above the matt.     1 cm low-density left thyroid nodule.     Mildly enlarged left axillary lymph node measuring 11 mm in short axis.     Patchy bilateral airspace disease.     Nonspecific asymmetric enhancement of the right superior ophthalmic vein        06/06/20 MRI:  IMPRESSION:   Imaging findings consistent with large left MCA territory ischemia/infarction,  left temporal, parietal and frontal lobes, cortically based, no superimposed  increased parenchymal hemorrhage or midline shift. Right and left PANCHITO territory  cortical diffusion restriction consistent with infarction. Infarction in the right insular cortex and right temporal lobe.  Scattered small  infarctions right and left corona radiata centrum semiovale, right cerebellum.     Allowing for differences in technique likely stable subarachnoid and  intraventricular hemorrhage.     06/06/20 ECHO:  Result status: Final result   · Normal cavity size and systolic function (ejection fraction normal). Mild concentric hypertrophy. Estimated left ventricular ejection fraction is 60 - 65%. No regional wall motion abnormality noted. · Mildly dilated left atrium.           ABCDEF Bundle/Checklist Completed:  YES-See Plan    SPECIAL EQUIPMENT  None    Active Problem List:     Problem List  Date Reviewed: 6/10/2020          Codes Class    SAH (subarachnoid hemorrhage) ([facility] [address] ICD-10-CM: I60.9  ICD-9-CM: 430         Subarachnoid hemorrhage from middle cerebral artery aneurysm, left (HCC) ICD-10-CM: I60.12  ICD-9-CM: 200         RIGHT middle cerebral artery aneurysm ICD-10-CM: I67.1  ICD-9-CM: 437.3         Cerebral vasospasm ICD-10-CM: I67.848  ICD-9-CM: 435.9             ICU Assessment/ Comprehensive Plan of Care:     Subarrachnoid hemorrhage [facility])  Right MCA trifurcation aneurysm (unsecured)  Left MCA trifurcation aneurysm (s/p coiling)  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 Renita Felton   --130 mmHg SBP<200  -Nimotipine 60 mg via OGT Q4hr  -Neuro IR following, Dr. Renita Felton  -Neurosurgery following - Dr. Samir Renteria, Rt MCA will need clipping at later time  -Neurology following, Dr. Oliva Mcgill  -ASA 81mg  -TCDs   -Q1hr Neuro checks  -Keppra 1000 mg BID, Vimpat 100 BID  -Precedex for sedation   -Continue Lovenox   -Angio 6/13/2020     Acute severe hypoxic respiratory failure  Neurogenic pulmonary edema  CAP  -Lactate & Procal normal  -No cultures needed currently (lactate normal, wbc, procal WNL)  -completed Zosyn (end date 6/13/2020)  -euvolemic fluid management to prevent vasospasms  -COVID NEG x2  -CT Chest NEG PE 06/07  - pressure support daily 3 hrs TID   Chlorhexidine   Optimize PEEP/Ventilation/Oxygenation  Goal Tidal Volume 6 cc/kg based on IBW  Aim for lung protective ventilation  Head of bed > 30 degrees  Plan to Extubate: attempt to provide weaning trials in am  SPO2 Goal: > 92%    DVT Prophylaxis (if no, list reason): SCD's or Sequential Compression Device   GI Prophylaxis: Pepcid (famotidine)   Nutrition: Yes TFs  Bowel Movement: Yes  Bowel Regimen: Docusate (Colace) and Bisacodyl (Dulcolax)  DVT Prophylaxis (if no, list reason): Lovenox , ASA  Mobility: Fair and Bedrest  PT/OT: Not appropraite at this time, re-eval at extubation     DISPOSITION/COMMUNICATION  Discussed Plan of Care/Code Status: Full Code  Stay in ICU    CRITICAL CARE CONSULTANT NOTE  I had a face to face encounter with the patient, reviewed and interpreted patient data including clinical events, labs, images, vital signs, I/O's, and examined patient. I have discussed the case and the plan and management of the patient's care with the consulting services, the bedside nurses and the respiratory therapist.      NOTE OF PERSONAL INVOLVEMENT IN CARE   I participated in the decision-making and personally managed or directed the management of the following life and organ supporting interventions that required my frequent assessment to treat or prevent imminent deterioration. I personally spent 45 minutes of critical care time. This is time spent at this critically ill patient's bedside actively involved in patient care as well as the coordination of care and discussions with the patient's family. This does not include any procedural time which has been billed separately.     Jose Austin MD   [address] [place] [address],[facility] 1  6/14/2020

## 2020-06-14 NOTE — PROGRESS NOTES
Per discussion with Dr. Jeana Blizzard: increased LR to 150 cc/hr; added florinef 0.1 mg bid; continue Na+ goal 140-145, 3% at 10 cc/hr. Discussed with Intensivist NP and RN.     Wanda Lee PA-C  Neurointerventional Surgery/Neurology Team  06/13/20 8:45 PM

## 2020-06-14 NOTE — PROGRESS NOTES
1930: Bedside and Verbal shift change report given to [facility] (oncoming nurse) by Ariane Capellan RN (offgoing nurse). Report included the following information SBAR, Kardex, OR Summary, Procedure Summary, Intake/Output, MAR, Recent Results, Cardiac Rhythm NSR, Alarm Parameters  and Dual Neuro Assessment. [place] AGUILERA at bedside, new orders received to increase LR to 150 mL/hr, 1L NS bolus, and added florinef 0.1 mg BID. Also 3% Sodium Chloride was restarted at 10 mL/hr.    0410: Stopped jaz. 0730: Bedside and Verbal shift change report given to [facility] (oncoming nurse) by Ariane Capellan RN (offgoing nurse). Report included the following information SBAR, Kardex, Procedure Summary, Intake/Output, MAR, Recent Results, Cardiac Rhythm NSR, Alarm Parameters  and Dual Neuro Assessment.

## 2020-06-14 NOTE — PROGRESS NOTES
Evaluated upper extremities bilaterally for appropriateness to place PICC. Patient remains edematous. Left upper extremity great than right. Patient has quad. lumen central line left neck and midline right AC. Continue to hold on PICC placement. Simran Kulkarni RN aware of evaluation.

## 2020-06-15 ENCOUNTER — APPOINTMENT (OUTPATIENT)
Dept: VASCULAR SURGERY | Age: 50
DRG: 003 | End: 2020-06-15
Attending: NURSE PRACTITIONER
Payer: COMMERCIAL

## 2020-06-15 PROBLEM — J96.90 RESPIRATORY FAILURE (HCC): Status: ACTIVE | Noted: 2020-06-15

## 2020-06-15 LAB
ALBUMIN SERPL-MCNC: 2.3 G/DL (ref 3.5–5)
ALBUMIN/GLOB SERPL: 0.6 {RATIO} (ref 1.1–2.2)
ALP SERPL-CCNC: 68 U/L (ref 45–117)
ALT SERPL-CCNC: 34 U/L (ref 12–78)
ANION GAP SERPL CALC-SCNC: 6 MMOL/L (ref 5–15)
ANION GAP SERPL CALC-SCNC: 6 MMOL/L (ref 5–15)
ANION GAP SERPL CALC-SCNC: 7 MMOL/L (ref 5–15)
ANION GAP SERPL CALC-SCNC: 8 MMOL/L (ref 5–15)
AST SERPL-CCNC: 32 U/L (ref 15–37)
BASILAR ARTERY EDV: 17 CM/S
BASILAR ARTERY EDV: 28 CM/S
BASILAR ARTERY EDV: 30 CM/S
BASILAR ARTERY EDV: 43 CM/S
BASILAR ARTERY MEAN VEL: 29 CM/S
BASILAR ARTERY MEAN VEL: 46 CM/S
BASILAR ARTERY MEAN VEL: 48 CM/S
BASILAR ARTERY MEAN VEL: 73 CM/S
BASILAR ARTERY PSV: 133 CM/S
BASILAR ARTERY PSV: 54 CM/S
BASILAR ARTERY PSV: 82 CM/S
BASILAR ARTERY PSV: 83 CM/S
BASOPHILS # BLD: 0 K/UL (ref 0–0.1)
BASOPHILS NFR BLD: 0 % (ref 0–1)
BILIRUB SERPL-MCNC: 0.3 MG/DL (ref 0.2–1)
BUN SERPL-MCNC: 10 MG/DL (ref 6–20)
BUN SERPL-MCNC: 12 MG/DL (ref 6–20)
BUN SERPL-MCNC: 13 MG/DL (ref 6–20)
BUN SERPL-MCNC: 15 MG/DL (ref 6–20)
BUN/CREAT SERPL: 22 (ref 12–20)
BUN/CREAT SERPL: 24 (ref 12–20)
BUN/CREAT SERPL: 28 (ref 12–20)
BUN/CREAT SERPL: 33 (ref 12–20)
CALCIUM SERPL-MCNC: 7.7 MG/DL (ref 8.5–10.1)
CALCIUM SERPL-MCNC: 7.9 MG/DL (ref 8.5–10.1)
CALCIUM SERPL-MCNC: 7.9 MG/DL (ref 8.5–10.1)
CALCIUM SERPL-MCNC: 8.1 MG/DL (ref 8.5–10.1)
CHLORIDE SERPL-SCNC: 113 MMOL/L (ref 97–108)
CHLORIDE SERPL-SCNC: 114 MMOL/L (ref 97–108)
CO2 SERPL-SCNC: 20 MMOL/L (ref 21–32)
CO2 SERPL-SCNC: 20 MMOL/L (ref 21–32)
CO2 SERPL-SCNC: 21 MMOL/L (ref 21–32)
CO2 SERPL-SCNC: 22 MMOL/L (ref 21–32)
COMMENT, HOLDF: NORMAL
CREAT SERPL-MCNC: 0.45 MG/DL (ref 0.55–1.02)
CREAT SERPL-MCNC: 0.46 MG/DL (ref 0.55–1.02)
CREAT SERPL-MCNC: 0.47 MG/DL (ref 0.55–1.02)
CREAT SERPL-MCNC: 0.49 MG/DL (ref 0.55–1.02)
DIFFERENTIAL METHOD BLD: ABNORMAL
EOSINOPHIL # BLD: 0.2 K/UL (ref 0–0.4)
EOSINOPHIL NFR BLD: 2 % (ref 0–7)
ERYTHROCYTE [DISTWIDTH] IN BLOOD BY AUTOMATED COUNT: 25.3 % (ref 11.5–14.5)
GLOBULIN SER CALC-MCNC: 3.7 G/DL (ref 2–4)
GLUCOSE SERPL-MCNC: 106 MG/DL (ref 65–100)
GLUCOSE SERPL-MCNC: 108 MG/DL (ref 65–100)
GLUCOSE SERPL-MCNC: 120 MG/DL (ref 65–100)
GLUCOSE SERPL-MCNC: 122 MG/DL (ref 65–100)
HCT VFR BLD AUTO: 27.7 % (ref 35–47)
HGB BLD-MCNC: 8.3 G/DL (ref 11.5–16)
IMM GRANULOCYTES # BLD AUTO: 0.2 K/UL (ref 0–0.04)
IMM GRANULOCYTES NFR BLD AUTO: 2 % (ref 0–0.5)
LEFT ACA EDV: 114 CM/S
LEFT ACA EDV: 128 CM/S
LEFT ACA EDV: 140 CM/S
LEFT ACA EDV: 87 CM/S
LEFT ACA MEAN VEL: 137 CM/S
LEFT ACA MEAN VEL: 168 CM/S
LEFT ACA MEAN VEL: 176 CM/S
LEFT ACA MEAN VEL: 196 CM/S
LEFT ACA PSV: 236 CM/S
LEFT ACA PSV: 272 CM/S
LEFT ACA PSV: 275 CM/S
LEFT ACA PSV: 309 CM/S
LEFT EX ICA EDV: 15 CM/S
LEFT EX ICA EDV: 18 CM/S
LEFT EX ICA EDV: 23 CM/S
LEFT EX ICA EDV: 27 CM/S
LEFT EX ICA MEAN VEL: 25 CM/S
LEFT EX ICA MEAN VEL: 30 CM/S
LEFT EX ICA MEAN VEL: 36 CM/S
LEFT EX ICA MEAN VEL: 44 CM/S
LEFT EX ICA PSV: 45 CM/S
LEFT EX ICA PSV: 55 CM/S
LEFT EX ICA PSV: 62 CM/S
LEFT EX ICA PSV: 78 CM/S
LEFT ICA EDV: 35 CM/S
LEFT ICA EDV: 35 CM/S
LEFT ICA EDV: 41 CM/S
LEFT ICA EDV: 45 CM/S
LEFT ICA MEAN VEL: 57 CM/S
LEFT ICA MEAN VEL: 58 CM/S
LEFT ICA MEAN VEL: 65 CM/S
LEFT ICA MEAN VEL: 69 CM/S
LEFT ICA PSV: 102 CM/S
LEFT ICA PSV: 105 CM/S
LEFT ICA PSV: 106 CM/S
LEFT ICA PSV: 124 CM/S
LEFT LINDEGAARD RATIO: 2.5
LEFT LINDEGAARD RATIO: 2.7
LEFT LINDEGAARD RATIO: 3.8
LEFT LINDEGAARD RATIO: 5.2
LEFT MCA 1 EDV: 48 CM/S
LEFT MCA 1 EDV: 73 CM/S
LEFT MCA 1 EDV: 82 CM/S
LEFT MCA 1 EDV: 90 CM/S
LEFT MCA 1 MEAN VEL: 110 CM/S
LEFT MCA 1 MEAN VEL: 129 CM/S
LEFT MCA 1 MEAN VEL: 137 CM/S
LEFT MCA 1 MEAN VEL: 80 CM/S
LEFT MCA 1 PSV: 143 CM/S
LEFT MCA 1 PSV: 185 CM/S
LEFT MCA 1 PSV: 224 CM/S
LEFT MCA 1 PSV: 231 CM/S
LEFT PCA 1 EDV: 48 CM/S
LEFT PCA 1 EDV: 56 CM/S
LEFT PCA 1 EDV: 75 CM/S
LEFT PCA 1 EDV: 76 CM/S
LEFT PCA 1 MEAN VEL: 119 CM/S
LEFT PCA 1 MEAN VEL: 122 CM/S
LEFT PCA 1 MEAN VEL: 83 CM/S
LEFT PCA 1 MEAN VEL: 87 CM/S
LEFT PCA 1 PSV: 148 CM/S
LEFT PCA 1 PSV: 152 CM/S
LEFT PCA 1 PSV: 207 CM/S
LEFT PCA 1 PSV: 213 CM/S
LEFT VERTEBRAL EDV TCD: 12 CM/S
LEFT VERTEBRAL EDV TCD: 14 CM/S
LEFT VERTEBRAL EDV TCD: 18 CM/S
LEFT VERTEBRAL EDV TCD: 18 CM/S
LEFT VERTEBRAL MEAN VEL: 22 CM/S
LEFT VERTEBRAL MEAN VEL: 24 CM/S
LEFT VERTEBRAL MEAN VEL: 26 CM/S
LEFT VERTEBRAL MEAN VEL: 32 CM/S
LEFT VERTEBRAL PSV TCD: 43 CM/S
LEFT VERTEBRAL PSV TCD: 61 CM/S
LYMPHOCYTES # BLD: 1.2 K/UL (ref 0.8–3.5)
LYMPHOCYTES NFR BLD: 14 % (ref 12–49)
MAGNESIUM SERPL-MCNC: 2.1 MG/DL (ref 1.6–2.4)
MCH RBC QN AUTO: 22.9 PG (ref 26–34)
MCHC RBC AUTO-ENTMCNC: 30 G/DL (ref 30–36.5)
MCV RBC AUTO: 76.3 FL (ref 80–99)
MONOCYTES # BLD: 0.8 K/UL (ref 0–1)
MONOCYTES NFR BLD: 9 % (ref 5–13)
NEUTS SEG # BLD: 6.2 K/UL (ref 1.8–8)
NEUTS SEG NFR BLD: 73 % (ref 32–75)
NRBC # BLD: 0.03 K/UL (ref 0–0.01)
NRBC BLD-RTO: 0.4 PER 100 WBC
OSMOLALITY SERPL: 296 MOSM/KG H2O
OSMOLALITY UR: 322 MOSM/KG H2O
PHOSPHATE SERPL-MCNC: 2 MG/DL (ref 2.6–4.7)
PLATELET # BLD AUTO: 227 K/UL (ref 150–400)
PLATELET COMMENTS,PCOM: ABNORMAL
PMV BLD AUTO: 10.9 FL (ref 8.9–12.9)
POTASSIUM SERPL-SCNC: 3.7 MMOL/L (ref 3.5–5.1)
POTASSIUM SERPL-SCNC: 3.9 MMOL/L (ref 3.5–5.1)
PROCALCITONIN SERPL-MCNC: 0.07 NG/ML
PROT SERPL-MCNC: 6 G/DL (ref 6.4–8.2)
RBC # BLD AUTO: 3.63 M/UL (ref 3.8–5.2)
RBC MORPH BLD: ABNORMAL
RIGHT ACA EDV: 43 CM/S
RIGHT ACA EDV: 52 CM/S
RIGHT ACA EDV: 72 CM/S
RIGHT ACA EDV: 74 CM/S
RIGHT ACA MEAN VEL: 108 CM/S
RIGHT ACA MEAN VEL: 111 CM/S
RIGHT ACA MEAN VEL: 74 CM/S
RIGHT ACA MEAN VEL: 84 CM/S
RIGHT ACA PSV: 135 CM/S
RIGHT ACA PSV: 149 CM/S
RIGHT ACA PSV: 180 CM/S
RIGHT ACA PSV: 184 CM/S
RIGHT EX ICA EDV: 15 CM/S
RIGHT EX ICA EDV: 21 CM/S
RIGHT EX ICA EDV: 23 CM/S
RIGHT EX ICA EDV: 23 CM/S
RIGHT EX ICA MEAN VEL: 29 CM/S
RIGHT EX ICA MEAN VEL: 37 CM/S
RIGHT EX ICA MEAN VEL: 39 CM/S
RIGHT EX ICA MEAN VEL: 40 CM/S
RIGHT EX ICA PSV: 58 CM/S
RIGHT EX ICA PSV: 65 CM/S
RIGHT EX ICA PSV: 71 CM/S
RIGHT EX ICA PSV: 78 CM/S
RIGHT ICA EDV: 22 CM/S
RIGHT ICA EDV: 23 CM/S
RIGHT ICA EDV: 28 CM/S
RIGHT ICA EDV: 31 CM/S
RIGHT ICA MEAN VEL: 41 CM/S
RIGHT ICA MEAN VEL: 44 CM/S
RIGHT ICA MEAN VEL: 49 CM/S
RIGHT ICA MEAN VEL: 50 CM/S
RIGHT ICA PSV: 80 CM/S
RIGHT ICA PSV: 85 CM/S
RIGHT ICA PSV: 88 CM/S
RIGHT ICA PSV: 91 CM/S
RIGHT LINDEGAARD RATIO: 1.7
RIGHT LINDEGAARD RATIO: 1.9
RIGHT LINDEGAARD RATIO: 2
RIGHT LINDEGAARD RATIO: 2.4
RIGHT MCA 1 EDV: 28 CM/S
RIGHT MCA 1 EDV: 38 CM/S
RIGHT MCA 1 EDV: 40 CM/S
RIGHT MCA 1 EDV: 53 CM/S
RIGHT MCA 1 MEAN VEL: 56 CM/S
RIGHT MCA 1 MEAN VEL: 67 CM/S
RIGHT MCA 1 MEAN VEL: 74 CM/S
RIGHT MCA 1 MEAN VEL: 92 CM/S
RIGHT MCA 1 PSV: 112 CM/S
RIGHT MCA 1 PSV: 125 CM/S
RIGHT MCA 1 PSV: 142 CM/S
RIGHT MCA 1 PSV: 172 CM/S
RIGHT PCA 1 EDV: 24 CM/S
RIGHT PCA 1 EDV: 27 CM/S
RIGHT PCA 1 EDV: 32 CM/S
RIGHT PCA 1 EDV: 34 CM/S
RIGHT PCA 1 MEAN VEL: 43 CM/S
RIGHT PCA 1 MEAN VEL: 48 CM/S
RIGHT PCA 1 MEAN VEL: 53 CM/S
RIGHT PCA 1 MEAN VEL: 59 CM/S
RIGHT PCA 1 PSV: 109 CM/S
RIGHT PCA 1 PSV: 80 CM/S
RIGHT PCA 1 PSV: 91 CM/S
RIGHT PCA 1 PSV: 96 CM/S
RIGHT VERTEBRAL EDV TCD: 14 CM/S
RIGHT VERTEBRAL EDV TCD: 19 CM/S
RIGHT VERTEBRAL EDV TCD: 22 CM/S
RIGHT VERTEBRAL EDV TCD: 27 CM/S
RIGHT VERTEBRAL MEAN VEL: 27 CM/S
RIGHT VERTEBRAL MEAN VEL: 32 CM/S
RIGHT VERTEBRAL MEAN VEL: 35 CM/S
RIGHT VERTEBRAL MEAN VEL: 41 CM/S
RIGHT VERTEBRAL PSV TCD: 53 CM/S
RIGHT VERTEBRAL PSV TCD: 59 CM/S
RIGHT VERTEBRAL PSV TCD: 61 CM/S
RIGHT VERTEBRAL PSV TCD: 68 CM/S
SAMPLES BEING HELD,HOLD: NORMAL
SODIUM SERPL-SCNC: 141 MMOL/L (ref 136–145)
SODIUM SERPL-SCNC: 142 MMOL/L (ref 136–145)
SODIUM UR-SCNC: 114 MMOL/L
SP GR UR REFRACTOMETRY: 1.01 (ref 1–1.03)
WBC # BLD AUTO: 8.6 K/UL (ref 3.6–11)

## 2020-06-15 PROCEDURE — 77030037378 HC BRONCHOSCOPE DISP TRAN -D

## 2020-06-15 PROCEDURE — 84300 ASSAY OF URINE SODIUM: CPT

## 2020-06-15 PROCEDURE — C1894 INTRO/SHEATH, NON-LASER: HCPCS

## 2020-06-15 PROCEDURE — 74011000250 HC RX REV CODE- 250: Performed by: NURSE PRACTITIONER

## 2020-06-15 PROCEDURE — 83935 ASSAY OF URINE OSMOLALITY: CPT

## 2020-06-15 PROCEDURE — 65610000006 HC RM INTENSIVE CARE

## 2020-06-15 PROCEDURE — 36415 COLL VENOUS BLD VENIPUNCTURE: CPT

## 2020-06-15 PROCEDURE — 84100 ASSAY OF PHOSPHORUS: CPT

## 2020-06-15 PROCEDURE — 74011250636 HC RX REV CODE- 250/636: Performed by: NURSE PRACTITIONER

## 2020-06-15 PROCEDURE — 74011250636 HC RX REV CODE- 250/636: Performed by: INTERNAL MEDICINE

## 2020-06-15 PROCEDURE — 74011250637 HC RX REV CODE- 250/637: Performed by: NURSE PRACTITIONER

## 2020-06-15 PROCEDURE — 36600 WITHDRAWAL OF ARTERIAL BLOOD: CPT

## 2020-06-15 PROCEDURE — 77030008683 HC TU ET CUF COVD -A

## 2020-06-15 PROCEDURE — 74011000258 HC RX REV CODE- 258: Performed by: NURSE PRACTITIONER

## 2020-06-15 PROCEDURE — 74011250637 HC RX REV CODE- 250/637: Performed by: PHYSICIAN ASSISTANT

## 2020-06-15 PROCEDURE — 83930 ASSAY OF BLOOD OSMOLALITY: CPT

## 2020-06-15 PROCEDURE — 81002 URINALYSIS NONAUTO W/O SCOPE: CPT

## 2020-06-15 PROCEDURE — 85025 COMPLETE CBC W/AUTO DIFF WBC: CPT

## 2020-06-15 PROCEDURE — 74011000250 HC RX REV CODE- 250: Performed by: INTERNAL MEDICINE

## 2020-06-15 PROCEDURE — 93886 INTRACRANIAL COMPLETE STUDY: CPT

## 2020-06-15 PROCEDURE — 80048 BASIC METABOLIC PNL TOTAL CA: CPT

## 2020-06-15 PROCEDURE — 83735 ASSAY OF MAGNESIUM: CPT

## 2020-06-15 PROCEDURE — 74011250637 HC RX REV CODE- 250/637: Performed by: ANESTHESIOLOGY

## 2020-06-15 PROCEDURE — 77030008793 HC TU TRACH CUF COVD -B

## 2020-06-15 PROCEDURE — 84145 PROCALCITONIN (PCT): CPT

## 2020-06-15 PROCEDURE — 87040 BLOOD CULTURE FOR BACTERIA: CPT

## 2020-06-15 PROCEDURE — 94003 VENT MGMT INPAT SUBQ DAY: CPT

## 2020-06-15 PROCEDURE — 74011250637 HC RX REV CODE- 250/637: Performed by: PSYCHIATRY & NEUROLOGY

## 2020-06-15 PROCEDURE — 77030018836 HC SOL IRR NACL ICUM -A

## 2020-06-15 PROCEDURE — 94640 AIRWAY INHALATION TREATMENT: CPT

## 2020-06-15 PROCEDURE — 80053 COMPREHEN METABOLIC PANEL: CPT

## 2020-06-15 PROCEDURE — 74011250636 HC RX REV CODE- 250/636: Performed by: ANESTHESIOLOGY

## 2020-06-15 RX ORDER — VANCOMYCIN 2 GRAM/500 ML IN 0.9 % SODIUM CHLORIDE INTRAVENOUS
2000 ONCE
Status: COMPLETED | OUTPATIENT
Start: 2020-06-15 | End: 2020-06-15

## 2020-06-15 RX ORDER — FLUDROCORTISONE ACETATE 0.1 MG/1
0.2 TABLET ORAL 2 TIMES DAILY
Status: DISCONTINUED | OUTPATIENT
Start: 2020-06-15 | End: 2020-06-18

## 2020-06-15 RX ORDER — ROCURONIUM BROMIDE 10 MG/ML
50 INJECTION, SOLUTION INTRAVENOUS
Status: ACTIVE | OUTPATIENT
Start: 2020-06-15 | End: 2020-06-15

## 2020-06-15 RX ORDER — ETOMIDATE 2 MG/ML
20 INJECTION INTRAVENOUS ONCE
Status: COMPLETED | OUTPATIENT
Start: 2020-06-16 | End: 2020-06-16

## 2020-06-15 RX ORDER — FENTANYL CITRATE 50 UG/ML
100 INJECTION, SOLUTION INTRAMUSCULAR; INTRAVENOUS ONCE
Status: COMPLETED | OUTPATIENT
Start: 2020-06-16 | End: 2020-06-16

## 2020-06-15 RX ORDER — POTASSIUM CHLORIDE 29.8 MG/ML
20 INJECTION INTRAVENOUS
Status: COMPLETED | OUTPATIENT
Start: 2020-06-15 | End: 2020-06-15

## 2020-06-15 RX ORDER — VANCOMYCIN 1.75 GRAM/500 ML IN 0.9 % SODIUM CHLORIDE INTRAVENOUS
1750 EVERY 12 HOURS
Status: DISCONTINUED | OUTPATIENT
Start: 2020-06-16 | End: 2020-06-16

## 2020-06-15 RX ADMIN — DOCUSATE SODIUM 100 MG: 50 LIQUID ORAL at 18:00

## 2020-06-15 RX ADMIN — FAMOTIDINE 20 MG: 40 POWDER, FOR SUSPENSION ORAL at 08:38

## 2020-06-15 RX ADMIN — LABETALOL HYDROCHLORIDE 20 MG: 5 INJECTION INTRAVENOUS at 07:09

## 2020-06-15 RX ADMIN — IPRATROPIUM BROMIDE AND ALBUTEROL SULFATE 3 ML: .5; 3 SOLUTION RESPIRATORY (INHALATION) at 20:12

## 2020-06-15 RX ADMIN — CHLORHEXIDINE GLUCONATE 15 ML: 0.12 RINSE ORAL at 21:10

## 2020-06-15 RX ADMIN — NIMODIPINE 60 MG: 30 SOLUTION ORAL at 10:47

## 2020-06-15 RX ADMIN — NIMODIPINE 60 MG: 30 SOLUTION ORAL at 05:42

## 2020-06-15 RX ADMIN — LEVETIRACETAM 1000 MG: 100 SOLUTION ORAL at 08:38

## 2020-06-15 RX ADMIN — SODIUM CHLORIDE 4 MG/HR: 9 INJECTION, SOLUTION INTRAVENOUS at 06:40

## 2020-06-15 RX ADMIN — FENTANYL CITRATE 100 MCG: 50 INJECTION INTRAMUSCULAR; INTRAVENOUS at 18:29

## 2020-06-15 RX ADMIN — FENTANYL CITRATE 50 MCG: 50 INJECTION INTRAMUSCULAR; INTRAVENOUS at 09:03

## 2020-06-15 RX ADMIN — SODIUM PHOSPHATE, MONOBASIC, MONOHYDRATE: 276; 142 INJECTION, SOLUTION INTRAVENOUS at 05:54

## 2020-06-15 RX ADMIN — FLUDROCORTISONE ACETATE 0.2 MG: 0.1 TABLET ORAL at 18:29

## 2020-06-15 RX ADMIN — FLUDROCORTISONE ACETATE 0.1 MG: 0.1 TABLET ORAL at 08:39

## 2020-06-15 RX ADMIN — SODIUM CHLORIDE 6 MG/HR: 9 INJECTION, SOLUTION INTRAVENOUS at 21:09

## 2020-06-15 RX ADMIN — ACETAMINOPHEN 650 MG: 650 SUSPENSION ORAL at 21:01

## 2020-06-15 RX ADMIN — SODIUM CHLORIDE 500 ML: 900 INJECTION, SOLUTION INTRAVENOUS at 15:31

## 2020-06-15 RX ADMIN — NIMODIPINE 60 MG: 30 SOLUTION ORAL at 02:06

## 2020-06-15 RX ADMIN — NIMODIPINE 60 MG: 30 SOLUTION ORAL at 14:33

## 2020-06-15 RX ADMIN — IPRATROPIUM BROMIDE AND ALBUTEROL SULFATE 3 ML: .5; 3 SOLUTION RESPIRATORY (INHALATION) at 01:22

## 2020-06-15 RX ADMIN — POTASSIUM CHLORIDE 20 MEQ: 400 INJECTION, SOLUTION INTRAVENOUS at 02:05

## 2020-06-15 RX ADMIN — NIMODIPINE 60 MG: 30 SOLUTION ORAL at 22:33

## 2020-06-15 RX ADMIN — FAMOTIDINE 20 MG: 40 POWDER, FOR SUSPENSION ORAL at 21:01

## 2020-06-15 RX ADMIN — NIMODIPINE 60 MG: 30 SOLUTION ORAL at 18:28

## 2020-06-15 RX ADMIN — Medication 2 DROP: at 22:33

## 2020-06-15 RX ADMIN — LACOSAMIDE 100 MG: 50 TABLET, FILM COATED ORAL at 08:39

## 2020-06-15 RX ADMIN — CHLORHEXIDINE GLUCONATE 15 ML: 0.12 RINSE ORAL at 08:40

## 2020-06-15 RX ADMIN — POTASSIUM CHLORIDE 20 MEQ: 400 INJECTION, SOLUTION INTRAVENOUS at 01:02

## 2020-06-15 RX ADMIN — IPRATROPIUM BROMIDE AND ALBUTEROL SULFATE 3 ML: .5; 3 SOLUTION RESPIRATORY (INHALATION) at 07:28

## 2020-06-15 RX ADMIN — Medication 2 DROP: at 05:42

## 2020-06-15 RX ADMIN — CASTOR OIL AND BALSAM, PERU: 788; 87 OINTMENT TOPICAL at 18:29

## 2020-06-15 RX ADMIN — LACOSAMIDE 100 MG: 50 TABLET, FILM COATED ORAL at 21:01

## 2020-06-15 RX ADMIN — SODIUM CHLORIDE 125 ML/HR: 900 INJECTION, SOLUTION INTRAVENOUS at 16:54

## 2020-06-15 RX ADMIN — CASTOR OIL AND BALSAM, PERU: 788; 87 OINTMENT TOPICAL at 08:40

## 2020-06-15 RX ADMIN — LEVETIRACETAM 1000 MG: 100 SOLUTION ORAL at 21:01

## 2020-06-15 RX ADMIN — AMLODIPINE BESYLATE 5 MG: 5 TABLET ORAL at 10:58

## 2020-06-15 RX ADMIN — Medication 2 DROP: at 15:30

## 2020-06-15 RX ADMIN — ASPIRIN 81 MG CHEWABLE TABLET 81 MG: 81 TABLET CHEWABLE at 08:39

## 2020-06-15 RX ADMIN — SODIUM CHLORIDE 125 ML/HR: 900 INJECTION, SOLUTION INTRAVENOUS at 09:36

## 2020-06-15 RX ADMIN — ENOXAPARIN SODIUM 40 MG: 40 INJECTION SUBCUTANEOUS at 14:20

## 2020-06-15 RX ADMIN — SODIUM CHLORIDE 6 MG/HR: 9 INJECTION, SOLUTION INTRAVENOUS at 12:37

## 2020-06-15 RX ADMIN — SODIUM CHLORIDE 6 MG/HR: 9 INJECTION, SOLUTION INTRAVENOUS at 12:57

## 2020-06-15 RX ADMIN — VANCOMYCIN HYDROCHLORIDE 2000 MG: 10 INJECTION, POWDER, LYOPHILIZED, FOR SOLUTION INTRAVENOUS at 17:47

## 2020-06-15 RX ADMIN — IPRATROPIUM BROMIDE AND ALBUTEROL SULFATE 3 ML: .5; 3 SOLUTION RESPIRATORY (INHALATION) at 13:19

## 2020-06-15 NOTE — PROGRESS NOTES
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       Will need ALS transport    Eddye Min, MSW

## 2020-06-15 NOTE — PROGRESS NOTES
[facility]    ICU TEAM Progress Note    Name: Vladimir Taylor   : 1970   MRN: [id number]   Date: 6/15/2020      Assessment/Plan:       1. SAH  a. 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 Has Right MCA aneurysm with wide neck that will prob need stenting vs clipping  c. Has hydrocephalus - improving  d. Continue Nimotop day   e. Continue TCD per TATE - s/p angio for persistent vasospasms   f. Continue hourly neurochecks  g. Continue Florinef per TATE  h. Continue IVF with goal of euvolemia  i. BP goal 100-130   2. Seizures  a. Secondary to the above  b. No seizure identified on most recent EEG but encephalopathy  c. Continue Keppra 1000mg BID and Vimpat 100mg BID  3. Respiratory Failure  a. Likely secondary to neurogenic pulmonary edema - completed abx course for possible superimposed PNA  b. On minimal vent settings doing well on PS trials   c. Unable to extubate due to mental status  d. Will consult CTS for Trach placement  4. Ischemic Strokes  a. Patient with multiple ischemic infarcts throughout cerebrum suggestive of ischemic event  b. Most Recent CTA with cerebellar infarct left inferior      Subjective:   Progress Note: 6/15/2020      Reason for ICU Admission:     47 yo female with HTN who presented with AMS and seizure like activity. She was found with extensive SAH secondary to left MCA bifurcation ruptured aneurysm - s/p coiling. Since patient has been with vasospasm s/p IA verapamil . While she has been more responsive, she's still not following commands and brain imaging suggest extensive strokes. Overnight Events:     No major events overnight. Doing well on PS trials.        Active Problem List:     Problem List  Date Reviewed: 6/10/2020          Codes Class    SAH (subarachnoid hemorrhage) ([facility]) ICD-10-CM: I60.9  ICD-9-CM: 430         Subarachnoid hemorrhage from middle cerebral artery aneurysm, left (HCC) ICD-10-CM: I60.12  ICD-9-CM: 430 RIGHT middle cerebral artery aneurysm ICD-10-CM: I67.1  ICD-9-CM: 437.3         Cerebral vasospasm ICD-10-CM: I67.848  ICD-9-CM: 435.9               Past Medical History:      has a past medical history of HTN (hypertension). Past Surgical History:      has no past surgical history on file. Home Medications:     Prior to Admission medications    Not on File       Allergies/Social/Family History:     No Known Allergies   Social History     Tobacco Use    Smoking status: Never Smoker    Smokeless tobacco: Never Used   Substance Use Topics    Alcohol use: Not Currently      History reviewed. No pertinent family history. Review of Systems:     A comprehensive review of systems was negative except for that written in the HPI.     Objective:   Vital Signs:  Visit Vitals  /63   Pulse 60   Temp 99.1 °F (37.3 °C)   Resp 18   Ht 5' 4\" (1.626 m)   Wt 107 kg (235 lb 14.3 oz)   SpO2 100%   BMI 40.49 kg/m²      O2 Device: Endotracheal tube, Ventilator   Temp (24hrs), Av.6 °F (37.6 °C), Min:99.1 °F (37.3 °C), Max:100.3 °F (37.9 °C)           Intake/Output:     Intake/Output Summary (Last 24 hours) at 6/15/2020 0847  Last data filed at 6/15/2020 0700  Gross per 24 hour   Intake 5195.93 ml   Output 8225 ml   Net -3029.07 ml       Physical Exam:    General: Ill appearing, NAD  HENT: Atraumatic, ET tube in place  Cardio: RRR  Respiratory: Coarse breath sounds BL  GI: Soft not tender abdomen  Extremities: +ve trace edema  Neuro: Opens right eye and tracks - doesn't follow commands      LABS AND  DATA: Personally reviewed  Recent Labs     06/15/20  0407 20  0404   WBC 8.6 9.2   HGB 8.3* 8.0*   HCT 27.7* 26.9*    216     Recent Labs     06/15/20  0407 20  2358  20  0404    141   < >  --    K 3.9 3.7   < >  --    * 113*   < >  --    CO2 21 22   < >  --    BUN 10 13   < >  --    CREA 0.46* 0.47*   < >  --    * 108*   < >  --    CA 7.9* 7.7*   < >  --    MG 2.1  --   --  2.2 PHOS 2.0*  --   --  2.1*    < > = values in this interval not displayed. Recent Labs     06/15/20  0407 06/14/20  0405   AP 68 74   TP 6.0* 6.2*   ALB 2.3* 2.4*   GLOB 3.7 3.8     No results for input(s): INR, PTP, APTT, INREXT, INREXT in the last 72 hours. No results for input(s): PHI, PCO2I, PO2I, FIO2I in the last 72 hours. No results for input(s): CPK, CKMB, TROIQ, BNPP in the last 72 hours. Hemodynamics:   PAP:   CO:     Wedge:   CI:     CVP:    SVR:       PVR:       Ventilator Settings:  Mode Rate Tidal Volume Pressure FiO2 PEEP   Spontaneous   400 ml  10 cm H2O 30 % 5 cm H20     Peak airway pressure: 24 cm H2O    Minute ventilation: 8.12 l/min        MEDS: Reviewed    Chest X-Ray:  CXR Results  (Last 48 hours)    None        ECHO:  EF 60-65%      Multidisciplinary Rounds Completed:  Pending    ABCDEF Bundle/Checklist  Pain Medications: Fentanyl  Target RASS: 0 - Alert & Calm - Spontaneously pays attention to caregiver  Sedation Medications: None  CAM-ICU:  Positive  Mobility: Poor  PT/OT: PT consulted and on board and OT consulted and on board   Restraints: None needed at this time  Discussed Plan of Care (goals of care):  Yes  Addressed Code Status: Full Code    CARDIOVASCULAR  Cardiac Gtts: Nicardipine (Cardene)  SBP Goal of: > 100 mmHg and < 180 mmHg  MAP Goal of: 65-85 mmHg  Transfusion Trigger (Hgb): <8 g/dL    RESPIRATORY  Vent Goals:   Chlorhexidine   Optimize PEEP/Ventilation/Oxygenation  Goal Tidal Volume 6 cc/kg based on IBW  Aim for lung protective ventilation  Head of bed > 30 degrees  DVT Prophylaxis (if no, list reason): SCD's or Sequential Compression Device and Lovenox   SPO2 Goal: > 92%  Pulmonary toilet: Duo-Nebs     GI/  Martinez Catheter Present: Yes  GI Prophylaxis: Pepcid (famotidine)   Nutrition: Yes   IVFs: NSS  Bowel Movement: Yes  Bowel Regimen: Docusate (Colace)  Insulin: ISS    ANTIBIOTICS  Antibiotics:  None    T/L/D  Tubes: ETT and Nasogastric Tube  Lines: Peripheral IV, Arterial Line and Central Line  Drains: Martinez Catheter    SPECIAL EQUIPMENT  None    DISPOSITION  Stay in ICU    CRITICAL CARE CONSULTANT NOTE  I had a face to face encounter with the patient, reviewed and interpreted patient data including clinical events, labs, images, vital signs, I/O's, and examined patient. I have discussed the case and the plan and management of the patient's care with the consulting services, the bedside nurses and the respiratory therapist.      NOTE OF PERSONAL INVOLVEMENT IN CARE   This patient has a high probability of imminent, clinically significant deterioration, which requires the highest level of preparedness to intervene urgently. I participated in the decision-making and personally managed or directed the management of the following life and organ supporting interventions that required my frequent assessment to treat or prevent imminent deterioration. I personally spent 45 minutes of critical care time. This is time spent at this critically ill patient's bedside actively involved in patient care as well as the coordination of care and discussions with the patient's family. This does not include any procedural time which has been billed separately.     Yomaira Lr MD  [address] [employer]  6/15/2020

## 2020-06-15 NOTE — PROGRESS NOTES
Neurointerventional Surgery Progress Note  Arnel Junior NP   Neurocritical Care Nurse Practitioner  [phone]          Admit Date: 6/4/2020        Daily Progress Note: 6/15/2020   LOS: 11 days      S/P:  POD 9 Cerebral angiogram with coil embolization of ruptured left MCA bifurcation aneurysm on 6/5/2020    ** Delayed Presentation ** PB Day 15    Interval History/Subjective:   Patient had polyuria overnight. Urine studies sent overnight. Patient is alert and tracking with eyes. She remains intubated. No command following. She is scheduled for a trachestomy tomorrow per RN. She remains off sedation. She is on cardene to maintain BP in specified parameters. TCDs completed today showing an improvement in vasospasm. Unable to perform a ROS due to intubation and altered mental status. Assessment & Plan: Active Problems:    SAH (subarachnoid hemorrhage) ([address] (6/4/2020)      Subarachnoid hemorrhage from middle cerebral artery aneurysm, left ([address] (6/4/2020)      RIGHT middle cerebral artery aneurysm (6/4/2020)      Cerebral vasospasm (6/4/2020)      Respiratory failure ([address] (6/15/2020)      1.) SAH due to ruptured cerebral aneurysm, [place] [address], An Grade 3/4              - s/p cerebral angiogram with coil embolization of left MCA bifurcation aneurysm on 6/5       Additionally, patient has 5X3 right MCA bifurcation aneurysm with very wide neck which will               need to be treated with stents vs. Clipping              - Continue Nimotop Day 11 of 21 to prevent delayed cerebral ischemia              - continue maintenance fluids at NS at 125 ml/hr to maintain euvolemia              - Strict I's and O's, +600 ml on I's and O's              - TCDs daily, TCDs today show improvement in vasospasm. Left PANCHITO continues to show evidence of vasospasm, probably moderate.  Velocity measurements indicate improvement compared to yesterday's exam. Left PCA continues to show evidence of vasospasm, probably mild to moderate, similar to yesterday's exam. No evidence of right hemispheric vasospasm.              - Assess NICOM prn for fluid responsiveness, NICOM assessed at 1430 today and results were 28.2% change in SVI with PLR indicating patient is fluid responsive.               - given polyuria, will give additional 500 ml bolus of NS to maintain euvolemia and keep patient ahead in volume status in the setting of vasospasm              - ECHO shows EF 60-65%, mild concentric hypertrophy, mildly dilated left atrium              - continue every hour neuro checks              - increase Florinef to 0.2 mg BID (initially started 6/13) for prevention of cerebral salt wasting in the setting of SAH, aid in increasing plasma volume              - Continue MAP goal 100-130. Keep SBP <200 Cardene/Fabien/Hydralazine/Labetalol PRN              - PT/OT/SLP evals when appropriate              - Repeat CT of Head on 6/13 shows questionable left inferior cerebellar infarction, new from 6/10, multiple evolving subacute infarctions throughout the cerebrum. Subacute SAH, improved hydrocephalus compared to 6/6.              - NSGY following              - NIS following      2.) Seizure              - Due to #1, no prior hx of seizure              - Continue Keppra 1000 mg BID               - continue Vimpat 100 mg BID              - Seizure precautions              -  EEG on 6/6 showed an abnormal EEG due to slowing of the background rhythms with intermittent rhythmic delta activity seen in both frontal head regions. Occasional sharp wave discharges in the left frontal area. EEG is suggestive of mild-to-moderate generalized encephalopathic process, nonspecific in type. In addition, there was occasional epileptiform disturbance seen in the left frontal area which may represent a partial onset seizure focus.              - completed 24 hour EEG on 6/8 which shows moderate to severe generalized encephalopathic process, nonspecific in type.   This may be related to underlying structural brain injury or effects of sedation. No convincingly lateralizing or epileptiform features were noted. No seizure was recorded. - Neurology following peripherally               3.) Cerebral Edema (resolved)              - received one dose of mannitol, started 3% saline on 6/8, discontinued 6/14              - serum sodium remains stable                4.) Acute hypoxic respiratory failure in the setting of likely neurogenic pulmonary edema              - Symmetric moderately severe airspace disease in the upper lobes bilaterally  which may represent edema or sequela of aspiration seen on CTA. CXR on 6/4 showed Diffuse bilateral airspace disease more suggestive of pulmonary edema than diffuse pneumonia. Repeat CXR on 6/6/2020 showed persistent although increased interstitial and parenchymal opacities  particularly in the right perihilar region. CXR on 6/10 showed residual right lower lung airspace disease            - CTA of Chest negative for PE, showed dependent atelectasis with diffuse groundglass opacifications bilaterally             - patient diuresed with Lasix initially              - COVID 19 test negative x 2             - vent settings per Intensivist, SAT/SBT daily per Intensivist              - sedation remains off             - Intensivist managing     5.) Multiple Acute Ischemic Infarctions              - MRI completed and shows a large left MCA territory ischemia/infarction,  left temporal, parietal and frontal lobes, cortically based, no superimposed  increased parenchymal hemorrhage or midline shift. Right and left PANCHITO territory  cortical diffusion restriction consistent with infarction. Infarction in the right insular cortex and right temporal lobe.  Scattered small infarctions right and left corona radiata centrum semiovale, right cerebellum                - CT of Head 6/13 shows questionable left inferior cerebellar infarction, new from 6/10, multiple evolving subacute infarctions throughout the cerebrum. Subacute SAH, improved hydrocephalus compared to 6/6. - Imaging pattern is consistent with Hypoxic-Ischemic Encephalopathy (HIE)                - Continue aspirin 81 mg daily for stroke prevention                 - ECHO as stated above                - Lipid panel on 6/5, LDL 7.8, goal < 70, statin not indicated                - Hgb A1C ok at 5.7                - Neurology following peripherally     6.) Cerebral Vasospasm                 - s/p cerebral angiogram for right PANCHITO A1 angioplasty for vasospasm, transarterial infusion of verapamil on 6/9,  S/p angiogram on 6/10 for left ICA/MCA angioplasty and transarterial infusion of verapamil for vasospasm treatment, s/p angiogram on 6/11 and 6/13 for transarterial infusion of verapamil for vasospasm treatment                 - Strict I's and O's                  - plans as stated in #1   7.) Fever                 - fevers have improved , normal WBC                - completed course of Zosyn, CXR shows residual right lower lung airspace disease               - possibly febrile in the setting of SAH, paired blood cultures NGTD, resp culture 6/13 NGTD                - CTA of chest 6/7 negative for PE                 - Intensivist following     8.) Anemia (improved)                - Hgb 8.3, received 1 unit of PRBCs on 6/13                  - no active signs of blood loss                  - Goal Hgb >7.0                 - Monitor CBC                 - Intensivist following    9.) Hypophosphatemia                 - Phos 2.0                 - Na Phos IV replacement given earlier this morning                 - repeat Phos level in AM    10.) Polyuria                  - serum sodium stable at 141                  - urine studies sent overnight, serum osmolality 296, urine osmolality 322, specific gravity normal at 1.015.  Urine random sodium at 114                  - increased Florinef to 0.2 mg BID today for prevention of cerebral salt wasting, 500 ml bolus given as stated above                   - monitor BMP every 12 hours    Activity: Bed rest  DVT ppx: SCDs, Lovenox  Disposition: TBD    Plan d/w Dr. Fatimah Chew, Dr. Vicki Gonzalez, and RN. Admission Summary:     Codi Harmon is a 48 y.o. 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 On arrival to ED, pt was unresponsive and noted to be seizing. A stat CT of her head was performed there, which showed extensive SAH. Pt was then intubated emergently. She was also extremely hypertensive with SBPs in 240s. She was given Keppra and Ativan, and placed on cardene drip for BP control. NSGY and NIS were then consulted and the patient was transferred to [facility] for higher level of care. En route, paramedics administered 7.5 mg of Versed, 200mcg of Fentanyl, and 10 mg of labetalol for BP control. On arrival, CTA of her head and neck was obtained, which showed a 4.4 x 4.1 mm right MCA trifurcation aneurysm and  a 2.2 x 3.1 mm left MCA trifurcation aneurysm. Of note, CTA head/neck also showed pulmonary infiltrates suspicious for COVID-19 infection and the patient has been tested for COVID which was negative. 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 Mother reported pt does not smoke, does not drink alcohol or use street drugs. The mother also reported that the patient began complaining of a headache on 5/31/2020 and has been taking BC Powder (aspirin) for the headache. She also reported that the pt has a hx of HTN and is supposed to be on BP medications, but recently stopped taking her medications. The patient underwent a cerebral angiogram on 6/5 by Dr. Fatimah Chew for coil embolization of a ruptured left MCA bifurcation aneurysm.     Current Facility-Administered Medications   Medication Dose Route Frequency Provider Last Rate Last Dose    [START ON 6/16/2020] etomidate (AMIDATE) 2 mg/mL injection 20 mg  20 mg IntraVENous ONCE [place], [place]        rocuronium injection 50 mg  50 mg IntraVENous NOW [place], [place]        [START ON 6/16/2020] fentaNYL citrate (PF) injection 100 mcg  100 mcg IntraVENous ONCE [place], [place]        fludrocortisone (FLORINEF) tablet 0.2 mg  0.2 mg Oral BID Petrona Avery NP        sodium chloride 0.9 % bolus infusion 500 mL  500 mL IntraVENous ONCE Petrona Avery  mL/hr at 06/15/20 1531 500 mL at 06/15/20 1531    ELECTROLYTE REPLACEMENT PROTOCOL - Potassium and Magnesium  1 Each Other PRN Petrona Avery NP        ELECTROLYTE REPLACEMENT PROTOCOL - Phosphorus  1 Each Other PRN Petrona Avery NP        0.9% sodium chloride infusion  125 mL/hr IntraVENous CONTINUOUS Petrona Avery  mL/hr at 06/15/20 0936 125 mL/hr at 06/15/20 0936    PHENYLephrine (ELKIN-SYNEPHRINE) 30 mg in 0.9% sodium chloride 250 mL infusion   mcg/min IntraVENous TITRATE Petrona Avery NP        niCARdipine (CARDENE) 50 mg in 0.9% sodium chloride 100 mL infusion  0-15 mg/hr IntraVENous TITRATE Petrona Avery NP 12 mL/hr at 06/15/20 1257 6 mg/hr at 06/15/20 1257    0.9% sodium chloride infusion 250 mL  250 mL IntraVENous PRN Tasha Rivera NP        balsam peru-castor oiL (VENELEX) ointment   Topical BID Leon Berry DO        dexmedeTOMidine (PRECEDEX) 400 mcg in 0.9% sodium chloride 100 mL infusion  0.2-1.4 mcg/kg/hr IntraVENous TITRATE Leon Berry DO   Stopped at 06/12/20 2132    labetaloL (NORMODYNE;TRANDATE) injection 20 mg  20 mg IntraVENous Q2H PRN Will Domínguez NP   20 mg at 06/15/20 0709    0.9% sodium chloride infusion 250 mL  250 mL IntraVENous PRN Elwanda Catching, NP-C        aspirin chewable tablet 81 mg  81 mg Per NG tube DAILY Jailene Martinez MD   81 mg at 06/15/20 0839    enoxaparin (LOVENOX) injection 40 mg  40 mg SubCUTAneous Q24H Leon Berry, DO   40 mg at 06/15/20 1420    famotidine (PEPCID) 40 mg/5 mL (8 mg/mL) oral suspension 20 mg  20 mg Per NG tube Q12H Leon Berry DO   20 mg at 06/15/20 0838    lacosamide (VIMPAT) tablet 100 mg  100 mg Per NG tube Q12H Leon Berry DO   100 mg at 06/15/20 0839    levETIRAcetam (KEPPRA) oral solution 1,000 mg  1,000 mg Per NG tube Q12H Leon Berry, DO   1,000 mg at 06/15/20 0838    glycopyrrolate (ROBINUL) injection 0.2 mg  0.2 mg IntraMUSCular TID PRN Elva Aburto NP        niMODipine (NYMALIZE) 6 mg/mL oral solution 60 mg  60 mg Per NG tube Q4H Johnnie Domínguez NP   60 mg at 06/15/20 1433    amLODIPine (NORVASC) tablet 5 mg  5 mg Per NG tube DAILY Harvey Go, ACNP   5 mg at 06/15/20 1058    acetaminophen (TYLENOL) solution 650 mg  650 mg Per NG tube Q4H PRN Ar Mike NP   650 mg at 06/14/20 1430    0.9% sodium chloride infusion 250 mL  250 mL IntraVENous PRN Rodolph Roque, NP-C        albuterol-ipratropium (DUO-NEB) 2.5 MG-0.5 MG/3 ML  3 mL Nebulization Q6H RT Harvey Go ACNP   3 mL at 06/15/20 1319    glucose chewable tablet 16 g  4 Tab Oral PRN Rodolph Roque, NP-C        glucagon (GLUCAGEN) injection 1 mg  1 mg IntraMUSCular PRN Rodolph Roque, NP-C        dextrose 10% infusion 0-250 mL  0-250 mL IntraVENous PRN Rodolph Roque, NP-C        albuterol (PROVENTIL VENTOLIN) nebulizer solution 2.5 mg  2.5 mg Nebulization Q6H PRN Winnie RIVAS NP-C        fentaNYL citrate (PF) injection  mcg   mcg IntraVENous Q1H PRN Harvey Go, ACNP   50 mcg at 06/15/20 3794    polyvinyl alcohol-povidone (NATURAL TEARS) 0.5-0.6 % ophthalmic solution 2 Drop  2 Drop Both Eyes Q8H Harvey Go, ACNP   2 Drop at 06/15/20 1530    docusate (COLACE) 50 mg/5 mL oral liquid 100 mg  100 mg Oral BID Harvey Go, ACNP   Stopped at 06/13/20 1800    bisacodyL (DULCOLAX) suppository 10 mg  10 mg Rectal DAILY PRN Felicia Ogren, ACNP        chlorhexidine (ORAL CARE KIT) 0.12 % mouthwash 15 mL  15 mL Oral Q12H FABIAN Lane   15 mL at 06/15/20 0840    ondansetron (ZOFRAN) injection 4 mg  4 mg IntraVENous Q4H PRN FABIAN Mendez        docusate (COLACE) 50 mg/5 mL oral liquid 100 mg  100 mg Oral BID PRN FABIAN Lane            No Known Allergies    Review of Systems:  Review of systems not obtained due to patient factors. Objective:     Vital signs  Temp (24hrs), Av.4 °F (37.4 °C), Min:99.1 °F (37.3 °C), Max:99.6 °F (37.6 °C)   06/15 07 - 06/15 1900  In: 2229.7 [I.V.:1104.7]  Out: 5141 [Urine:1475; Drains:100]  1901 - 06/15 0700  In: 07797.5 [I.V.:6867.7]  Out: 10021 [Urine:89770; Drains:375]    Visit Vitals  /59   Pulse 62   Temp 99.5 °F (37.5 °C)   Resp 22   Ht 5' 4\" (1.626 m)   Wt 235 lb 14.3 oz (107 kg)   SpO2 98%   BMI 40.49 kg/m²      O2 Device: Endotracheal tube, Ventilator   Vitals:    06/15/20 1300 06/15/20 1319 06/15/20 1400 06/15/20 1538   BP: 154/62  155/59    Pulse: 63  65 62   Resp: 18  (!) 31 22   Temp:       SpO2: 100% 100% 100% 98%   Weight:       Height:          Physical Exam:  GENERAL: NAD, intubated, alert   EYE: conjunctivae/corneas clear. Right pupil 3 mm, brisk response to light. Left pupil 4 mm, sluggish response to light. Keeps left eye closed   LUNG: lungs coarse bilaterally    HEART: regular rate and rhythm, S1, S2 normal, no murmur, click, rub or gallop  EXTREMITIES:  extremities normal, atraumatic, no cyanosis, 2+ pitting peripheral edema, distal pulses 2+ bilaterally   SKIN: Skin warm to touch. Right and left groin site clean, dry, and intact. No hematoma, bruising, or bleeding noted. NEUROLOGIC: Intubated. Alert. Eyes will open spontaneously. No command following. Right pupil 3 mm, brisk response to light. Left pupil 4 mm sluggish response to light. Left eye ptosis noted.  Focuses and tracks with eyes, with the exception of inability to track to the right with left eye. Moves all extremities spontaneously. No involuntary movements. Gait deferred. Unable to assess language, sensation, and coordination. Imaging:  CT of Head on 6/13/2020 at 1103 shows  IMPRESSION:   1. Questionable left inferior cerebellar infarction, new from 6/10/2020.  2. Multiple, evolving, subacute infarctions throughout the cerebrum. 3. Subacute subarachnoid hemorrhage. Improved hydrocephalus compared to  6/6/2020    CTA of Head on 6/13/2020 at 1103 shows  IMPRESSION:   1. Left A1 vasospasm. 2. More mild spasm of the right PANCHITO, MCAs, and possibly the distal vertebrals. 3. Right M1 terminus aneurysm. 4. Large infundibula/small aneurysms of the bilateral P-comm origins. CT Perfusion on 6/13/2020 at 1103 shows  IMPRESSION:  1. Penumbra surrounding infarctions in most of the superior left MCA territory. 2. Bilateral PANCHITO infarctions. 3. Questionable left cerebellar infarction is inferior to the perfusion scan. CTA of Head on 6/9/2020 at 1350 shows  Progressive vasospasm involving the anterior cerebral arteries with minimal  associated decreased perfusion to the frontal lobes. No other significant  Change  . CT Perfusion on 6/9/2020 at 1336 shows  Impression:  CT perfusion brain: Very minimal decreased perfusion to the frontal lobes. CT of Head on 6/9/2020 at 0234 shows  IMPRESSION:   No significant change.       CT of Head on 6/8/2020 shows  IMPRESSION:   Global edema, otherwise no significant change    CTA of Head and Neck on 6/8/2020 per radiology shows  Impression:  Mild to moderate vasospasm in the left middle cerebral artery status post  bifurcation aneurysm coil embolization. Stable 4 mm right MCA aneurysm. Endotracheal tube terminates just above the matt. 1 cm low-density left thyroid nodule.   Mildly enlarged left axillary lymph node measuring 11 mm in short axis  Patchy bilateral airspace disease. Nonspecific asymmetric enhancement of the right superior ophthalmic vein   (However, no significant vasospasm was seen when personally reviewed by NIS)    CT Perfusion on 6/8/2020 shows  IMPRESSION:  No significant cerebral perfusion abnormality    CTA of Chest on 6/7/2020 shows  IMPRESSION:  1. No acute pulmonary embolus  2. Dependent atelectasis with diffuse groundglass opacifications bilaterally. CT of Head on 6/7/2020 shows  IMPRESSION:   1. No further progression of subarachnoid hemorrhage. The overall degree of  subarachnoid hemorrhage is slightly improved. 2. Subtle areas of gray-white differentiation loss throughout the cerebral  hemispheres correlating with areas of restricted diffusion on previous CT  compatible with ischemic changes. MRI of Brain on 6/6/2020 shows  IMPRESSION:   Imaging findings consistent with large left MCA territory ischemia/infarction,  left temporal, parietal and frontal lobes, cortically based, no superimposed  increased parenchymal hemorrhage or midline shift. Right and left PANCHITO territory  cortical diffusion restriction consistent with infarction. Infarction in the right insular cortex and right temporal lobe. Scattered small  infarctions right and left corona radiata centrum semiovale, right cerebellum. Allowing for differences in technique likely stable subarachnoid and  intraventricular hemorrhage. CT of Head on 6/6/2020 at 1411 shows  IMPRESSION:   Left MCA territory infarction with smaller right MCA territory infarction.     Stable subarachnoid and intraventricular hemorrhage. CTA of Head on 6/6/2020 at 0903 shows  IMPRESSION:  Slightly increased intraventricular and subarachnoid hemorrhage compared to the  prior exam.     Status post coiling left MCA bifurcation aneurysm. No significant residual  aneurysm filling. CT of Head on 6/62020 at 0239 shows  IMPRESSION:  Slightly diminished subarachnoid and intraventricular hemorrhage.    Stable ventricular system. CT of Head WO on 6/5/2020 at 1512 shows  IMPRESSION: Stable acute subarachnoid and intraventricular hemorrhage. Stable  ventricular system. CT Head WO 6/5/20 0826     IMPRESSION:   1. Diffuse subarachnoid hemorrhage most concentrated in the basal cisterns and  left sylvian fissure. 2.  Interval ventricular enlargement suspicious for developing hydrocephalus.     CTA Head 6/4/20 2017     IMPRESSION:  1. Diffuse subarachnoid hemorrhage in the basilar cisterns and sylvian  fissures. 2.  Bilateral MCA bifurcation aneurysms. 3.  Infundibular origins to the posterior communicating arteries bilaterally. 4.  Symmetric moderately severe airspace disease in the upper lobes bilaterally  which may represent edema or sequela of aspiration.     CT Head WO Contrast 6/4/2020 2012    IMPRESSION: Extensive subarachnoid hemorrhage concerning for leaking/ruptured  aneurysm.        24 hour results:    Recent Results (from the past 24 hour(s))   METABOLIC PANEL, BASIC    Collection Time: 06/14/20  4:01 PM   Result Value Ref Range    Sodium 142 136 - 145 mmol/L    Potassium 3.7 3.5 - 5.1 mmol/L    Chloride 114 (H) 97 - 108 mmol/L    CO2 21 21 - 32 mmol/L    Anion gap 7 5 - 15 mmol/L    Glucose 114 (H) 65 - 100 mg/dL    BUN 14 6 - 20 MG/DL    Creatinine 0.50 (L) 0.55 - 1.02 MG/DL    BUN/Creatinine ratio 28 (H) 12 - 20      GFR est AA >60 >60 ml/min/1.73m2    GFR est non-AA >60 >60 ml/min/1.73m2    Calcium 8.0 (L) 8.5 - 10.1 MG/DL   GLUCOSE, POC    Collection Time: 06/14/20  5:07 PM   Result Value Ref Range    Glucose (POC) 118 (H) 65 - 100 mg/dL    Performed by [address] [place] [address], BASIC    Collection Time: 06/14/20  8:18 PM   Result Value Ref Range    Sodium 142 136 - 145 mmol/L    Potassium 3.6 3.5 - 5.1 mmol/L    Chloride 114 (H) 97 - 108 mmol/L    CO2 21 21 - 32 mmol/L    Anion gap 7 5 - 15 mmol/L    Glucose 112 (H) 65 - 100 mg/dL    BUN 13 6 - 20 MG/DL    Creatinine 0.44 (L) 0.55 - 1.02 MG/DL    BUN/Creatinine ratio 30 (H) 12 - 20      GFR est AA >60 >60 ml/min/1.73m2    GFR est non-AA >60 >60 ml/min/1.73m2    Calcium 7.6 (L) 8.5 - 10.1 MG/DL   GLUCOSE, POC    Collection Time: 06/14/20 11:22 PM   Result Value Ref Range    Glucose (POC) 121 (H) 65 - 100 mg/dL    Performed by [address] [place] [address] , BASIC    Collection Time: 06/14/20 11:58 PM   Result Value Ref Range    Sodium 141 136 - 145 mmol/L    Potassium 3.7 3.5 - 5.1 mmol/L    Chloride 113 (H) 97 - 108 mmol/L    CO2 22 21 - 32 mmol/L    Anion gap 6 5 - 15 mmol/L    Glucose 108 (H) 65 - 100 mg/dL    BUN 13 6 - 20 MG/DL    Creatinine 0.47 (L) 0.55 - 1.02 MG/DL    BUN/Creatinine ratio 28 (H) 12 - 20      GFR est AA >60 >60 ml/min/1.73m2    GFR est non-AA >60 >60 ml/min/1.73m2    Calcium 7.7 (L) 8.5 - 10.1 MG/DL   PROCALCITONIN    Collection Time: 06/15/20  4:07 AM   Result Value Ref Range    Procalcitonin 0.07 ng/mL   MAGNESIUM    Collection Time: 06/15/20  4:07 AM   Result Value Ref Range    Magnesium 2.1 1.6 - 2.4 mg/dL   PHOSPHORUS    Collection Time: 06/15/20  4:07 AM   Result Value Ref Range    Phosphorus 2.0 (L) 2.6 - 4.7 MG/DL   CBC WITH AUTOMATED DIFF    Collection Time: 06/15/20  4:07 AM   Result Value Ref Range    WBC 8.6 3.6 - 11.0 K/uL    RBC 3.63 (L) 3.80 - 5.20 M/uL    HGB 8.3 (L) 11.5 - 16.0 g/dL    HCT 27.7 (L) 35.0 - 47.0 %    MCV 76.3 (L) 80.0 - 99.0 FL    MCH 22.9 (L) 26.0 - 34.0 PG    MCHC 30.0 30.0 - 36.5 g/dL    RDW 25.3 (H) 11.5 - 14.5 %    PLATELET 668 544 - 321 K/uL    MPV 10.9 8.9 - 12.9 FL    NRBC 0.4 (H) 0  WBC    ABSOLUTE NRBC 0.03 (H) 0.00 - 0.01 K/uL    NEUTROPHILS 73 32 - 75 %    LYMPHOCYTES 14 12 - 49 %    MONOCYTES 9 5 - 13 %    EOSINOPHILS 2 0 - 7 %    BASOPHILS 0 0 - 1 %    IMMATURE GRANULOCYTES 2 (H) 0.0 - 0.5 %    ABS. NEUTROPHILS 6.2 1.8 - 8.0 K/UL    ABS. LYMPHOCYTES 1.2 0.8 - 3.5 K/UL    ABS. MONOCYTES 0.8 0.0 - 1.0 K/UL    ABS. EOSINOPHILS 0.2 0.0 - 0.4 K/UL    ABS.  BASOPHILS 0.0 0.0 - 0.1 K/UL    ABS. IMM. GRANS. 0.2 (H) 0.00 - 0.04 K/UL    DF SMEAR SCANNED      PLATELET COMMENTS Large Platelets      RBC COMMENTS HYPOCHROMIA  2+        RBC COMMENTS ANISOCYTOSIS  3+        RBC COMMENTS MICROCYTOSIS  1+        RBC COMMENTS POLYCHROMASIA  1+        RBC COMMENTS OVALOCYTES  1+       METABOLIC PANEL, COMPREHENSIVE    Collection Time: 06/15/20  4:07 AM   Result Value Ref Range    Sodium 141 136 - 145 mmol/L    Potassium 3.9 3.5 - 5.1 mmol/L    Chloride 114 (H) 97 - 108 mmol/L    CO2 21 21 - 32 mmol/L    Anion gap 6 5 - 15 mmol/L    Glucose 106 (H) 65 - 100 mg/dL    BUN 10 6 - 20 MG/DL    Creatinine 0.46 (L) 0.55 - 1.02 MG/DL    BUN/Creatinine ratio 22 (H) 12 - 20      GFR est AA >60 >60 ml/min/1.73m2    GFR est non-AA >60 >60 ml/min/1.73m2    Calcium 7.9 (L) 8.5 - 10.1 MG/DL    Bilirubin, total 0.3 0.2 - 1.0 MG/DL    ALT (SGPT) 34 12 - 78 U/L    AST (SGOT) 32 15 - 37 U/L    Alk. phosphatase 68 45 - 117 U/L    Protein, total 6.0 (L) 6.4 - 8.2 g/dL    Albumin 2.3 (L) 3.5 - 5.0 g/dL    Globulin 3.7 2.0 - 4.0 g/dL    A-G Ratio 0.6 (L) 1.1 - 2.2     SAMPLES BEING HELD    Collection Time: 06/15/20  4:07 AM   Result Value Ref Range    SAMPLES BEING HELD 1PST     COMMENT        Add-on orders for these samples will be processed based on acceptable specimen integrity and analyte stability, which may vary by analyte.    OSMOLALITY, SERUM/PLASMA    Collection Time: 06/15/20  5:45 AM   Result Value Ref Range    Osmolality, serum/plasma 296 mOsm/kg H2O   OSMOLALITY, UR    Collection Time: 06/15/20  5:45 AM   Result Value Ref Range    Osmolality,urine 322 MOSM/kg H2O   SPECIFIC GRAVITY, UR    Collection Time: 06/15/20  5:45 AM   Result Value Ref Range    Specific gravity 1.015 1.003 - 5.710     METABOLIC PANEL, BASIC    Collection Time: 06/15/20 11:17 AM   Result Value Ref Range    Sodium 141 136 - 145 mmol/L    Potassium 3.9 3.5 - 5.1 mmol/L    Chloride 114 (H) 97 - 108 mmol/L    CO2 20 (L) 21 - 32 mmol/L Anion gap 7 5 - 15 mmol/L    Glucose 122 (H) 65 - 100 mg/dL    BUN 12 6 - 20 MG/DL    Creatinine 0.49 (L) 0.55 - 1.02 MG/DL    BUN/Creatinine ratio 24 (H) 12 - 20      GFR est AA >60 >60 ml/min/1.73m2    GFR est non-AA >60 >60 ml/min/1.73m2    Calcium 7.9 (L) 8.5 - 10.1 MG/DL   SODIUM, UR, RANDOM    Collection Time: 06/15/20 11:17 AM   Result Value Ref Range    Sodium,urine random 114 MMOL/L   TCD INTRACRANIAL ARTERIES COMPLETE    Collection Time: 06/15/20 11:25 AM   Result Value Ref Range    Right Lindegaard Ratio 1.9     Right MCA 1  cm/s    Right MCA 1 EDV 28 cm/s    Right MCA 1 Mean Velocity 56 cm/s    Right PANCHITO  cm/s    Right PANCHITO EDV 43 cm/s    Right PANCHITO Mean Velocity 74 cm/s    Right ICA PSV 80 cm/s    Right ICA EDV 22 cm/s    Right ICA Mean Velocity 41 cm/s    Right PCA 1 PSV 80 cm/s    Right PCA 1 EDV 24 cm/s    Right PCA 1 Mean Velocity 43 cm/s    Right External ICA PSV 58 cm/s    Right External ICA EDV 15 cm/s    Right External ICA Mean Velocity 29 cm/s    Left MCA 1  cm/s    Left MCA 1 EDV 48 cm/s    Left MCA 1 Mean Velocity 80 cm/s    Left PANCHITO  cm/s    Left PANCHITO EDV 87 cm/s    Left PANCHITO Mean Velocity 137 cm/s    Left ICA  cm/s    Left External ICA PSV 55 cm/s    Left ICA EDV 35 cm/s    Left External ICA EDV 18 cm/s    Left ICA Mean Velocity 57 cm/s    Left External ICA Mean Velocity 30 cm/s    Left PCA 1  cm/s    Left PCA 1 EDV 48 cm/s    Left PCA 1 Mean Velocity 83 cm/s    Left Lindegaard Ratio 2.7     Basilar Artery PSV 82 cm/s    Basilar Artery EDV 28 cm/s    Basilar Artery Mean Angel 46 cm/s    Right Vertebral Mean Velocity 27 cm/s    Left Vertebral Mean Velocity 32 cm/s    Right Vertebral PSV 53 cm/s    Right Vertebral EDV 14 cm/s    Left Vertebral PSV 61 cm/s    Left Vertebral EDV 18 cm/s        Skyler Brewer NP

## 2020-06-15 NOTE — PROGRESS NOTES
TCDs suggest spasm is improved  Dumped fluid overnight. Serum sodium normal so far. IV US and Nicom suggested hypovolemia. On florinef. At risk of spasm relapse and deterioration if I/O negative. Bolus running now. Trach planned at bedside tomorrow. Avoid hypotension.   Keep MAP > 100-130 during procedure  Discussed neurological progress and treatment plans with mother (Ramiro Navarro) at bedside    [facility] [[email or web address]]  [phone] no known mental health issues.

## 2020-06-15 NOTE — PROGRESS NOTES
Pharmacist Note - Vancomycin Dosing    Consult provided for this 48 y.o. female for indication of GPC in blood culture. Antibiotic regimen(s): Vanc      Recent Labs     06/15/20  1544 06/15/20  1117 06/15/20  0407  20  0404  20  0337   WBC  --   --  8.6  --  9.2  --  7.9   CREA 0.45* 0.49* 0.46*   < >  --    < > 0.61   BUN 15 12 10   < >  --    < > 19    < > = values in this interval not displayed. Frequency of BMP: q12hr  Height: 162.6 cm  Weight: 107 kg  Est CrCl: >100 ml/min; UO: >1 ml/kg/hr  Temp (24hrs), Av.4 °F (37.4 °C), Min:99.1 °F (37.3 °C), Max:99.6 °F (37.6 °C)    Cultures:   blood - GPC in clusters in , pending   sputum - light klebsiella; light normal yumi, pending      Goal trough = 15 - 20 mcg/mL    Therapy will be initiated with a loading dose of 2000 mg IV x 1 to be followed by a maintenance dose of 1750 mg IV every 12 hours. Pharmacy to follow patient daily and order levels / make dose adjustments as appropriate.

## 2020-06-15 NOTE — CONSULTS
Thoracic Surgery Consultation    Admit Date: 6/4/2020  Reason for Consultation: Tracheostomy placement    HPI:  Vladimir Taylor is a 48 y.o. female with PMH as noted below who we are asked to see in Thoracic Surgery consultation for Tracheostomy placement. Patient is currently intubated & sedated, therefore history obtained from chart review. 49 yo female with HTN who presented to [facility] from place of employment where she was found down, with AMS and seizure like activity. CT performed that showed brain bleed. She was then transferred to [address] [place] [address] on 6/4/2020 for further neuro evaluation. Patient was intubated at [facility] prior to transfer. Upon arrival to [facility] patient went for CT and CTA of the head. Findings showed an extensive subarachnoid hemorrhage concerning for leaking/ruptured aneurysm. No hydrocephalus. 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 While she has been more responsive, she's still not following commands and brain imaging suggest extensive strokes. Unable to extubated patient although on minima vent settings due to mental status. Patient Active Problem List    Diagnosis Date Noted    Respiratory failure ([address] 06/15/2020    SAH (subarachnoid hemorrhage) ([facility] 06/04/2020    Subarachnoid hemorrhage from middle cerebral artery aneurysm, left ([address] 06/04/2020    RIGHT middle cerebral artery aneurysm 06/04/2020    Cerebral vasospasm 06/04/2020     Past Medical History:   Diagnosis Date    HTN (hypertension)       History reviewed. No pertinent surgical history. Social History     Tobacco Use    Smoking status: Never Smoker    Smokeless tobacco: Never Used   Substance Use Topics    Alcohol use: Not Currently      History reviewed. No pertinent family history.    Prior to Admission medications    Not on File     No Known Allergies       Subjective:     Review of Systems:    Review of systems not obtained due to patient factors. Objective:     Blood pressure 195/83, pulse 66, temperature 99.5 °F (37.5 °C), resp. rate 22, height 5' 4\" (1.626 m), weight 235 lb 14.3 oz (107 kg), SpO2 100 %.   Recent Results (from the past 24 hour(s))   GLUCOSE, POC    Collection Time: 06/14/20 11:27 AM   Result Value Ref Range    Glucose (POC) 114 (H) 65 - 100 mg/dL    Performed by [employer], BASIC    Collection Time: 06/14/20  4:01 PM   Result Value Ref Range    Sodium 142 136 - 145 mmol/L    Potassium 3.7 3.5 - 5.1 mmol/L    Chloride 114 (H) 97 - 108 mmol/L    CO2 21 21 - 32 mmol/L    Anion gap 7 5 - 15 mmol/L    Glucose 114 (H) 65 - 100 mg/dL    BUN 14 6 - 20 MG/DL    Creatinine 0.50 (L) 0.55 - 1.02 MG/DL    BUN/Creatinine ratio 28 (H) 12 - 20      GFR est AA >60 >60 ml/min/1.73m2    GFR est non-AA >60 >60 ml/min/1.73m2    Calcium 8.0 (L) 8.5 - 10.1 MG/DL   GLUCOSE, POC    Collection Time: 06/14/20  5:07 PM   Result Value Ref Range    Glucose (POC) 118 (H) 65 - 100 mg/dL    Performed by [employer], BASIC    Collection Time: 06/14/20  8:18 PM   Result Value Ref Range    Sodium 142 136 - 145 mmol/L    Potassium 3.6 3.5 - 5.1 mmol/L    Chloride 114 (H) 97 - 108 mmol/L    CO2 21 21 - 32 mmol/L    Anion gap 7 5 - 15 mmol/L    Glucose 112 (H) 65 - 100 mg/dL    BUN 13 6 - 20 MG/DL    Creatinine 0.44 (L) 0.55 - 1.02 MG/DL    BUN/Creatinine ratio 30 (H) 12 - 20      GFR est AA >60 >60 ml/min/1.73m2    GFR est non-AA >60 >60 ml/min/1.73m2    Calcium 7.6 (L) 8.5 - 10.1 MG/DL   GLUCOSE, POC    Collection Time: 06/14/20 11:22 PM   Result Value Ref Range    Glucose (POC) 121 (H) 65 - 100 mg/dL    Performed by [address] [place] [address] , BASIC    Collection Time: 06/14/20 11:58 PM   Result Value Ref Range    Sodium 141 136 - 145 mmol/L    Potassium 3.7 3.5 - 5.1 mmol/L    Chloride 113 (H) 97 - 108 mmol/L    CO2 22 21 - 32 mmol/L    Anion gap 6 5 - 15 mmol/L    Glucose 108 (H) 65 - 100 mg/dL    BUN 13 6 - 20 MG/DL    Creatinine 0.47 (L) 0.55 - 1.02 MG/DL    BUN/Creatinine ratio 28 (H) 12 - 20      GFR est AA >60 >60 ml/min/1.73m2    GFR est non-AA >60 >60 ml/min/1.73m2    Calcium 7.7 (L) 8.5 - 10.1 MG/DL   PROCALCITONIN    Collection Time: 06/15/20  4:07 AM   Result Value Ref Range    Procalcitonin 0.07 ng/mL   MAGNESIUM    Collection Time: 06/15/20  4:07 AM   Result Value Ref Range    Magnesium 2.1 1.6 - 2.4 mg/dL   PHOSPHORUS    Collection Time: 06/15/20  4:07 AM   Result Value Ref Range    Phosphorus 2.0 (L) 2.6 - 4.7 MG/DL   CBC WITH AUTOMATED DIFF    Collection Time: 06/15/20  4:07 AM   Result Value Ref Range    WBC 8.6 3.6 - 11.0 K/uL    RBC 3.63 (L) 3.80 - 5.20 M/uL    HGB 8.3 (L) 11.5 - 16.0 g/dL    HCT 27.7 (L) 35.0 - 47.0 %    MCV 76.3 (L) 80.0 - 99.0 FL    MCH 22.9 (L) 26.0 - 34.0 PG    MCHC 30.0 30.0 - 36.5 g/dL    RDW 25.3 (H) 11.5 - 14.5 %    PLATELET 019 184 - 806 K/uL    MPV 10.9 8.9 - 12.9 FL    NRBC 0.4 (H) 0  WBC    ABSOLUTE NRBC 0.03 (H) 0.00 - 0.01 K/uL    NEUTROPHILS 73 32 - 75 %    LYMPHOCYTES 14 12 - 49 %    MONOCYTES 9 5 - 13 %    EOSINOPHILS 2 0 - 7 %    BASOPHILS 0 0 - 1 %    IMMATURE GRANULOCYTES 2 (H) 0.0 - 0.5 %    ABS. NEUTROPHILS 6.2 1.8 - 8.0 K/UL    ABS. LYMPHOCYTES 1.2 0.8 - 3.5 K/UL    ABS. MONOCYTES 0.8 0.0 - 1.0 K/UL    ABS. EOSINOPHILS 0.2 0.0 - 0.4 K/UL    ABS. BASOPHILS 0.0 0.0 - 0.1 K/UL    ABS. IMM.  GRANS. 0.2 (H) 0.00 - 0.04 K/UL    DF SMEAR SCANNED      PLATELET COMMENTS Large Platelets      RBC COMMENTS HYPOCHROMIA  2+        RBC COMMENTS ANISOCYTOSIS  3+        RBC COMMENTS MICROCYTOSIS  1+        RBC COMMENTS POLYCHROMASIA  1+        RBC COMMENTS OVALOCYTES  1+       METABOLIC PANEL, COMPREHENSIVE    Collection Time: 06/15/20  4:07 AM   Result Value Ref Range    Sodium 141 136 - 145 mmol/L    Potassium 3.9 3.5 - 5.1 mmol/L    Chloride 114 (H) 97 - 108 mmol/L    CO2 21 21 - 32 mmol/L    Anion gap 6 5 - 15 mmol/L    Glucose 106 (H) 65 - 100 mg/dL    BUN 10 6 - 20 MG/DL    Creatinine 0.46 (L) 0.55 - 1.02 MG/DL    BUN/Creatinine ratio 22 (H) 12 - 20      GFR est AA >60 >60 ml/min/1.73m2    GFR est non-AA >60 >60 ml/min/1.73m2    Calcium 7.9 (L) 8.5 - 10.1 MG/DL    Bilirubin, total 0.3 0.2 - 1.0 MG/DL    ALT (SGPT) 34 12 - 78 U/L    AST (SGOT) 32 15 - 37 U/L    Alk. phosphatase 68 45 - 117 U/L    Protein, total 6.0 (L) 6.4 - 8.2 g/dL    Albumin 2.3 (L) 3.5 - 5.0 g/dL    Globulin 3.7 2.0 - 4.0 g/dL    A-G Ratio 0.6 (L) 1.1 - 2.2     SAMPLES BEING HELD    Collection Time: 06/15/20  4:07 AM   Result Value Ref Range    SAMPLES BEING HELD 1PST     COMMENT        Add-on orders for these samples will be processed based on acceptable specimen integrity and analyte stability, which may vary by analyte. OSMOLALITY, SERUM/PLASMA    Collection Time: 06/15/20  5:45 AM   Result Value Ref Range    Osmolality, serum/plasma 296 mOsm/kg H2O   OSMOLALITY, UR    Collection Time: 06/15/20  5:45 AM   Result Value Ref Range    Osmolality,urine 322 MOSM/kg H2O   SPECIFIC GRAVITY, UR    Collection Time: 06/15/20  5:45 AM   Result Value Ref Range    Specific gravity 1.015 1.003 - 1.030       _____________________  Physical Exam:     General:  Intubated, sedated, appears stated age. Eyes:   right eye opens, asymmetric pupils. Throat: Lips, mucosa, and tongue normal.   Neck: Supple, symmetrical, trachea midline. Lungs:   Dec BS bilaterally. Heart:  Regular rate and rhythm. Abdomen:   Soft, non-tender. +BS   Extremities: Extremities normal, atraumatic, no cyanosis or edema. Skin: Skin w/d/i.            Assessment:   Active Problems:    SAH (subarachnoid hemorrhage) ([facility] [address] (6/4/2020)      Subarachnoid hemorrhage from middle cerebral artery aneurysm, left ([facility] [address] (6/4/2020)      RIGHT middle cerebral artery aneurysm (6/4/2020)      Cerebral vasospasm (6/4/2020)      Respiratory failure ([facility] (6/15/2020)          Plan:     Plan for Tracheostomy [place] 6/16/2020 at 7am  Will order medications  Obtain consent from Mother  Hold tube feeds    Thank you for including us in the care of your patient.     Signed By: ALE Chavez     Jessa 15, 2020

## 2020-06-15 NOTE — PROGRESS NOTES
Bedside and Verbal shift change report given to Debi Lantigua  (oncoming nurse) by Jordana Chavez (offgoing nurse). Report included the following information Intake/Output and Cardiac Rhythm NSR. Bedside and Verbal shift change report given to Luana  (oncoming nurse) by Debi Lantigua  (offgoing nurse). Report included the following information SBAR, Intake/Output and Cardiac Rhythm NSR.

## 2020-06-15 NOTE — PROGRESS NOTES
Bedside, Verbal and Written shift change report given to Cary (oncoming nurse) by Marcus Gil (offgoing nurse). Report included the following information SBAR, Kardex, MAR and Recent Results. 0500-Urine output over the last hour 850 cc. NP notified- stated to change BMP to every 6 hours. Implementing NP order. SHIFT SUMMARY- Pt neuro status unchanged throughout the night. Pt JOLLY spontaneously, Pupils unequal- Left pupil 4mm and sluggish, Right pupil 3mm and brisk. No command following. Pt is on cardene gtt to keep -130 and to keep SBP less than 200. Bolus feeds given per order. Large amount of urine output, NP aware- labs sent per order.

## 2020-06-16 ENCOUNTER — APPOINTMENT (OUTPATIENT)
Dept: VASCULAR SURGERY | Age: 50
DRG: 003 | End: 2020-06-16
Attending: NURSE PRACTITIONER
Payer: COMMERCIAL

## 2020-06-16 LAB
ANION GAP SERPL CALC-SCNC: 6 MMOL/L (ref 5–15)
ANION GAP SERPL CALC-SCNC: 8 MMOL/L (ref 5–15)
ATRIAL RATE: 69 BPM
BASILAR ARTERY EDV: 25 CM/S
BASILAR ARTERY MEAN VEL: 40 CM/S
BASILAR ARTERY PSV: 71 CM/S
BASOPHILS # BLD: 0 K/UL (ref 0–0.1)
BASOPHILS NFR BLD: 0 % (ref 0–1)
BUN SERPL-MCNC: 11 MG/DL (ref 6–20)
BUN SERPL-MCNC: 12 MG/DL (ref 6–20)
BUN/CREAT SERPL: 22 (ref 12–20)
BUN/CREAT SERPL: 27 (ref 12–20)
CALCIUM SERPL-MCNC: 7.9 MG/DL (ref 8.5–10.1)
CALCIUM SERPL-MCNC: 8 MG/DL (ref 8.5–10.1)
CALCULATED P AXIS, ECG09: 57 DEGREES
CALCULATED R AXIS, ECG10: 3 DEGREES
CALCULATED T AXIS, ECG11: 64 DEGREES
CHLORIDE SERPL-SCNC: 112 MMOL/L (ref 97–108)
CHLORIDE SERPL-SCNC: 114 MMOL/L (ref 97–108)
CO2 SERPL-SCNC: 22 MMOL/L (ref 21–32)
CO2 SERPL-SCNC: 23 MMOL/L (ref 21–32)
CREAT SERPL-MCNC: 0.44 MG/DL (ref 0.55–1.02)
CREAT SERPL-MCNC: 0.5 MG/DL (ref 0.55–1.02)
DIAGNOSIS, 93000: NORMAL
DIFFERENTIAL METHOD BLD: ABNORMAL
EOSINOPHIL # BLD: 0.2 K/UL (ref 0–0.4)
EOSINOPHIL NFR BLD: 2 % (ref 0–7)
ERYTHROCYTE [DISTWIDTH] IN BLOOD BY AUTOMATED COUNT: 25.8 % (ref 11.5–14.5)
GLUCOSE SERPL-MCNC: 112 MG/DL (ref 65–100)
GLUCOSE SERPL-MCNC: 126 MG/DL (ref 65–100)
HCT VFR BLD AUTO: 26.2 % (ref 35–47)
HGB BLD-MCNC: 7.9 G/DL (ref 11.5–16)
IMM GRANULOCYTES # BLD AUTO: 0.1 K/UL (ref 0–0.04)
IMM GRANULOCYTES NFR BLD AUTO: 1 % (ref 0–0.5)
LEFT ACA EDV: 73 CM/S
LEFT ACA MEAN VEL: 110 CM/S
LEFT ACA PSV: 183 CM/S
LEFT EX ICA EDV: 24 CM/S
LEFT EX ICA MEAN VEL: 37 CM/S
LEFT EX ICA PSV: 64 CM/S
LEFT ICA EDV: 43 CM/S
LEFT ICA MEAN VEL: 65 CM/S
LEFT ICA PSV: 110 CM/S
LEFT LINDEGAARD RATIO: 1.8
LEFT MCA 1 EDV: 44 CM/S
LEFT MCA 1 MEAN VEL: 65 CM/S
LEFT MCA 1 PSV: 108 CM/S
LEFT PCA 1 EDV: 46 CM/S
LEFT PCA 1 MEAN VEL: 73 CM/S
LEFT PCA 1 PSV: 127 CM/S
LEFT VERTEBRAL EDV TCD: 17 CM/S
LEFT VERTEBRAL MEAN VEL: 28 CM/S
LEFT VERTEBRAL PSV TCD: 50 CM/S
LYMPHOCYTES # BLD: 0.7 K/UL (ref 0.8–3.5)
LYMPHOCYTES NFR BLD: 8 % (ref 12–49)
MAGNESIUM SERPL-MCNC: 2.1 MG/DL (ref 1.6–2.4)
MCH RBC QN AUTO: 22.8 PG (ref 26–34)
MCHC RBC AUTO-ENTMCNC: 30.2 G/DL (ref 30–36.5)
MCV RBC AUTO: 75.7 FL (ref 80–99)
MONOCYTES # BLD: 0.3 K/UL (ref 0–1)
MONOCYTES NFR BLD: 3 % (ref 5–13)
NEUTS SEG # BLD: 7.2 K/UL (ref 1.8–8)
NEUTS SEG NFR BLD: 86 % (ref 32–75)
NRBC # BLD: 0.02 K/UL (ref 0–0.01)
NRBC BLD-RTO: 0.2 PER 100 WBC
P-R INTERVAL, ECG05: 150 MS
PHOSPHATE SERPL-MCNC: 2.6 MG/DL (ref 2.6–4.7)
PLATELET # BLD AUTO: 214 K/UL (ref 150–400)
PLATELET COMMENTS,PCOM: ABNORMAL
PMV BLD AUTO: 11 FL (ref 8.9–12.9)
POTASSIUM SERPL-SCNC: 3.5 MMOL/L (ref 3.5–5.1)
POTASSIUM SERPL-SCNC: 3.6 MMOL/L (ref 3.5–5.1)
PROCALCITONIN SERPL-MCNC: <0.05 NG/ML
Q-T INTERVAL, ECG07: 420 MS
QRS DURATION, ECG06: 90 MS
QTC CALCULATION (BEZET), ECG08: 450 MS
RBC # BLD AUTO: 3.46 M/UL (ref 3.8–5.2)
RBC MORPH BLD: ABNORMAL
RIGHT ACA EDV: 51 CM/S
RIGHT ACA MEAN VEL: 77 CM/S
RIGHT ACA PSV: 130 CM/S
RIGHT EX ICA EDV: 21 CM/S
RIGHT EX ICA MEAN VEL: 36 CM/S
RIGHT EX ICA PSV: 65 CM/S
RIGHT ICA EDV: 29 CM/S
RIGHT ICA MEAN VEL: 48 CM/S
RIGHT ICA PSV: 85 CM/S
RIGHT LINDEGAARD RATIO: 1.6
RIGHT MCA 1 EDV: 29 CM/S
RIGHT MCA 1 MEAN VEL: 58 CM/S
RIGHT MCA 1 PSV: 116 CM/S
RIGHT PCA 1 EDV: 20 CM/S
RIGHT PCA 1 MEAN VEL: 37 CM/S
RIGHT PCA 1 PSV: 71 CM/S
RIGHT VERTEBRAL EDV TCD: 19 CM/S
RIGHT VERTEBRAL MEAN VEL: 31 CM/S
RIGHT VERTEBRAL PSV TCD: 56 CM/S
SODIUM SERPL-SCNC: 142 MMOL/L (ref 136–145)
SODIUM SERPL-SCNC: 143 MMOL/L (ref 136–145)
VENTRICULAR RATE, ECG03: 69 BPM
WBC # BLD AUTO: 8.5 K/UL (ref 3.6–11)

## 2020-06-16 PROCEDURE — 74011250636 HC RX REV CODE- 250/636: Performed by: PHYSICIAN ASSISTANT

## 2020-06-16 PROCEDURE — 74011250637 HC RX REV CODE- 250/637: Performed by: NURSE PRACTITIONER

## 2020-06-16 PROCEDURE — 74011250636 HC RX REV CODE- 250/636: Performed by: NURSE PRACTITIONER

## 2020-06-16 PROCEDURE — 74011250636 HC RX REV CODE- 250/636: Performed by: ANESTHESIOLOGY

## 2020-06-16 PROCEDURE — 85025 COMPLETE CBC W/AUTO DIFF WBC: CPT

## 2020-06-16 PROCEDURE — 76010000379 HC BRONCHOSCOPY DIAG/THERAPEUTIC

## 2020-06-16 PROCEDURE — 80048 BASIC METABOLIC PNL TOTAL CA: CPT

## 2020-06-16 PROCEDURE — 84145 PROCALCITONIN (PCT): CPT

## 2020-06-16 PROCEDURE — 74011250637 HC RX REV CODE- 250/637: Performed by: ANESTHESIOLOGY

## 2020-06-16 PROCEDURE — 93005 ELECTROCARDIOGRAM TRACING: CPT

## 2020-06-16 PROCEDURE — 93886 INTRACRANIAL COMPLETE STUDY: CPT

## 2020-06-16 PROCEDURE — 0CJS8ZZ INSPECTION OF LARYNX, VIA NATURAL OR ARTIFICIAL OPENING ENDOSCOPIC: ICD-10-PCS | Performed by: THORACIC SURGERY (CARDIOTHORACIC VASCULAR SURGERY)

## 2020-06-16 PROCEDURE — 74011250637 HC RX REV CODE- 250/637: Performed by: PSYCHIATRY & NEUROLOGY

## 2020-06-16 PROCEDURE — 65610000006 HC RM INTENSIVE CARE

## 2020-06-16 PROCEDURE — 74011000250 HC RX REV CODE- 250: Performed by: NURSE PRACTITIONER

## 2020-06-16 PROCEDURE — 74011250636 HC RX REV CODE- 250/636: Performed by: INTERNAL MEDICINE

## 2020-06-16 PROCEDURE — 77030018798 HC PMP KT ENTRL FED COVD -A

## 2020-06-16 PROCEDURE — 94640 AIRWAY INHALATION TREATMENT: CPT

## 2020-06-16 PROCEDURE — 36415 COLL VENOUS BLD VENIPUNCTURE: CPT

## 2020-06-16 PROCEDURE — 94003 VENT MGMT INPAT SUBQ DAY: CPT

## 2020-06-16 PROCEDURE — 74011000250 HC RX REV CODE- 250: Performed by: THORACIC SURGERY (CARDIOTHORACIC VASCULAR SURGERY)

## 2020-06-16 PROCEDURE — 84100 ASSAY OF PHOSPHORUS: CPT

## 2020-06-16 PROCEDURE — 0B113F4 BYPASS TRACHEA TO CUTANEOUS WITH TRACHEOSTOMY DEVICE, PERCUTANEOUS APPROACH: ICD-10-PCS | Performed by: THORACIC SURGERY (CARDIOTHORACIC VASCULAR SURGERY)

## 2020-06-16 PROCEDURE — 83735 ASSAY OF MAGNESIUM: CPT

## 2020-06-16 PROCEDURE — 74011000250 HC RX REV CODE- 250: Performed by: PHYSICIAN ASSISTANT

## 2020-06-16 RX ORDER — SODIUM CHLORIDE 0.9 % (FLUSH) 0.9 %
SYRINGE (ML) INJECTION
Status: COMPLETED
Start: 2020-06-16 | End: 2020-06-16

## 2020-06-16 RX ORDER — ROCURONIUM BROMIDE 10 MG/ML
INJECTION, SOLUTION INTRAVENOUS
Status: DISPENSED
Start: 2020-06-16 | End: 2020-06-16

## 2020-06-16 RX ORDER — POTASSIUM CHLORIDE 29.8 MG/ML
20 INJECTION INTRAVENOUS ONCE
Status: COMPLETED | OUTPATIENT
Start: 2020-06-16 | End: 2020-06-16

## 2020-06-16 RX ORDER — SODIUM CHLORIDE, SODIUM LACTATE, POTASSIUM CHLORIDE, CALCIUM CHLORIDE 600; 310; 30; 20 MG/100ML; MG/100ML; MG/100ML; MG/100ML
75 INJECTION, SOLUTION INTRAVENOUS CONTINUOUS
Status: DISCONTINUED | OUTPATIENT
Start: 2020-06-16 | End: 2020-06-25

## 2020-06-16 RX ORDER — ROCURONIUM BROMIDE 10 MG/ML
0.6 INJECTION, SOLUTION INTRAVENOUS
Status: COMPLETED | OUTPATIENT
Start: 2020-06-16 | End: 2020-06-16

## 2020-06-16 RX ADMIN — NIMODIPINE 60 MG: 30 SOLUTION ORAL at 01:28

## 2020-06-16 RX ADMIN — Medication 2 DROP: at 05:38

## 2020-06-16 RX ADMIN — POTASSIUM CHLORIDE 20 MEQ: 400 INJECTION, SOLUTION INTRAVENOUS at 17:44

## 2020-06-16 RX ADMIN — AMLODIPINE BESYLATE 5 MG: 5 TABLET ORAL at 10:00

## 2020-06-16 RX ADMIN — NIMODIPINE 60 MG: 30 SOLUTION ORAL at 22:13

## 2020-06-16 RX ADMIN — SODIUM CHLORIDE 125 ML/HR: 900 INJECTION, SOLUTION INTRAVENOUS at 10:01

## 2020-06-16 RX ADMIN — SODIUM CHLORIDE, SODIUM LACTATE, POTASSIUM CHLORIDE, AND CALCIUM CHLORIDE 200 ML/HR: 600; 310; 30; 20 INJECTION, SOLUTION INTRAVENOUS at 22:13

## 2020-06-16 RX ADMIN — ASPIRIN 81 MG CHEWABLE TABLET 81 MG: 81 TABLET CHEWABLE at 10:00

## 2020-06-16 RX ADMIN — VANCOMYCIN HYDROCHLORIDE 1750 MG: 10 INJECTION, POWDER, LYOPHILIZED, FOR SOLUTION INTRAVENOUS at 05:37

## 2020-06-16 RX ADMIN — IPRATROPIUM BROMIDE AND ALBUTEROL SULFATE 3 ML: .5; 3 SOLUTION RESPIRATORY (INHALATION) at 19:11

## 2020-06-16 RX ADMIN — Medication 2 DROP: at 22:14

## 2020-06-16 RX ADMIN — SODIUM CHLORIDE 125 ML/HR: 900 INJECTION, SOLUTION INTRAVENOUS at 17:36

## 2020-06-16 RX ADMIN — NIMODIPINE 60 MG: 30 SOLUTION ORAL at 05:38

## 2020-06-16 RX ADMIN — LACOSAMIDE 100 MG: 50 TABLET, FILM COATED ORAL at 10:00

## 2020-06-16 RX ADMIN — CHLORHEXIDINE GLUCONATE 15 ML: 0.12 RINSE ORAL at 20:22

## 2020-06-16 RX ADMIN — CHLORHEXIDINE GLUCONATE 15 ML: 0.12 RINSE ORAL at 10:01

## 2020-06-16 RX ADMIN — Medication 10 ML: at 14:44

## 2020-06-16 RX ADMIN — ETOMIDATE 20 MG: 2 INJECTION INTRAVENOUS at 07:04

## 2020-06-16 RX ADMIN — FLUDROCORTISONE ACETATE 0.2 MG: 0.1 TABLET ORAL at 17:35

## 2020-06-16 RX ADMIN — FAMOTIDINE 20 MG: 40 POWDER, FOR SUSPENSION ORAL at 20:22

## 2020-06-16 RX ADMIN — LABETALOL HYDROCHLORIDE 20 MG: 5 INJECTION INTRAVENOUS at 07:45

## 2020-06-16 RX ADMIN — Medication 2 DROP: at 14:44

## 2020-06-16 RX ADMIN — POTASSIUM CHLORIDE 20 MEQ: 400 INJECTION, SOLUTION INTRAVENOUS at 18:51

## 2020-06-16 RX ADMIN — IPRATROPIUM BROMIDE AND ALBUTEROL SULFATE 3 ML: .5; 3 SOLUTION RESPIRATORY (INHALATION) at 13:28

## 2020-06-16 RX ADMIN — NIMODIPINE 60 MG: 30 SOLUTION ORAL at 10:01

## 2020-06-16 RX ADMIN — SODIUM CHLORIDE 125 ML/HR: 900 INJECTION, SOLUTION INTRAVENOUS at 01:25

## 2020-06-16 RX ADMIN — SODIUM CHLORIDE 1000 ML: 900 INJECTION, SOLUTION INTRAVENOUS at 17:37

## 2020-06-16 RX ADMIN — POTASSIUM CHLORIDE 20 MEQ: 400 INJECTION, SOLUTION INTRAVENOUS at 11:24

## 2020-06-16 RX ADMIN — CASTOR OIL AND BALSAM, PERU: 788; 87 OINTMENT TOPICAL at 17:36

## 2020-06-16 RX ADMIN — NIMODIPINE 60 MG: 30 SOLUTION ORAL at 14:43

## 2020-06-16 RX ADMIN — FENTANYL CITRATE 100 MCG: 50 INJECTION, SOLUTION INTRAMUSCULAR; INTRAVENOUS at 07:06

## 2020-06-16 RX ADMIN — IPRATROPIUM BROMIDE AND ALBUTEROL SULFATE 3 ML: .5; 3 SOLUTION RESPIRATORY (INHALATION) at 08:41

## 2020-06-16 RX ADMIN — FLUDROCORTISONE ACETATE 0.2 MG: 0.1 TABLET ORAL at 10:00

## 2020-06-16 RX ADMIN — FENTANYL CITRATE 50 MCG: 50 INJECTION INTRAMUSCULAR; INTRAVENOUS at 14:42

## 2020-06-16 RX ADMIN — ROCURONIUM BROMIDE 50 MG: 50 INJECTION INTRAVENOUS at 07:05

## 2020-06-16 RX ADMIN — NIMODIPINE 60 MG: 30 SOLUTION ORAL at 17:34

## 2020-06-16 RX ADMIN — LEVETIRACETAM 1000 MG: 100 SOLUTION ORAL at 10:00

## 2020-06-16 RX ADMIN — LACOSAMIDE 100 MG: 50 TABLET, FILM COATED ORAL at 20:22

## 2020-06-16 RX ADMIN — POTASSIUM CHLORIDE 20 MEQ: 400 INJECTION, SOLUTION INTRAVENOUS at 12:46

## 2020-06-16 RX ADMIN — LEVETIRACETAM 1000 MG: 100 SOLUTION ORAL at 20:22

## 2020-06-16 RX ADMIN — IPRATROPIUM BROMIDE AND ALBUTEROL SULFATE 3 ML: .5; 3 SOLUTION RESPIRATORY (INHALATION) at 02:03

## 2020-06-16 RX ADMIN — CASTOR OIL AND BALSAM, PERU: 788; 87 OINTMENT TOPICAL at 10:02

## 2020-06-16 RX ADMIN — ENOXAPARIN SODIUM 40 MG: 40 INJECTION SUBCUTANEOUS at 14:41

## 2020-06-16 RX ADMIN — FAMOTIDINE 20 MG: 40 POWDER, FOR SUSPENSION ORAL at 10:00

## 2020-06-16 NOTE — PROGRESS NOTES
[facility]    ICU TEAM Progress Note    Name: Humphrey Alva   : 1970   MRN: [id number]   Date: 2020      Assessment/Plan:       1. SAH  a. 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 Has Right MCA aneurysm with wide neck that will prob need stenting vs clipping  c. Has hydrocephalus - improving  d. Continue Nimotop day   e. Continue TCD per TATE - s/p angio for persistent vasospasms   i. Improved vasospasm per most recent TCD - repeat pending  ii. Continue MAP goal 100-130  f. Continue hourly neurochecks  g. Continue Florinef per TATE  i. Increased to 0.2mg BID due to increased urine output  h. Continue IVF with goal of euvolemia  i. NS 125cc/hr  2. Seizures  a. Secondary to the above  b. No seizure identified on most recent EEG but encephalopathy  i. No recurrent seizures witnessed  c. Continue Keppra 1000mg BID and Vimpat 100mg BID  3. Respiratory Failure  a. Likely secondary to neurogenic pulmonary edema - completed abx course for possible superimposed PNA  b. On minimal vent settings doing well on PS trials   i. S/P Trach placement today () - plan for ATC as tolerated   4. Ischemic Strokes  a. Patient with multiple ischemic infarcts throughout cerebrum suggestive of ischemic event  b. Most Recent CTA with cerebellar infarct left inferior  c. PT/OT/Speech when appropriate      Subjective:   Progress Note: 2020      Reason for ICU Admission:     49 yo female with HTN who presented with AMS and seizure like activity. She was found with extensive SAH secondary to left MCA bifurcation ruptured aneurysm - s/p coiling. Since patient has been with vasospasm s/p IA verapamil . While she has been more responsive, she's still not following commands and brain imaging suggest extensive strokes. Overnight Events:     No major events overnight.  Trach placed this AM.       Active Problem List:     Problem List  Date Reviewed: 6/10/2020          Codes Class Respiratory failure (HCC) ICD-10-CM: J96.90  ICD-9-CM: 518.81         Hypoxic ischemic encephalopathy (HIE), unspecified severity ICD-10-CM: P91.60  ICD-9-CM: 768.70         SAH (subarachnoid hemorrhage) (HCC) ICD-10-CM: I60.9  ICD-9-CM: 430         Subarachnoid hemorrhage from middle cerebral artery aneurysm, left (HCC) ICD-10-CM: Q09.81  ICD-9-CM: 430         RIGHT middle cerebral artery aneurysm ICD-10-CM: I67.1  ICD-9-CM: 437.3         Cerebral vasospasm ICD-10-CM: H20.951  ICD-9-CM: 435.9               Past Medical History:      has a past medical history of HTN (hypertension). Past Surgical History:      has no past surgical history on file. Home Medications:     Prior to Admission medications    Not on File       Allergies/Social/Family History:     No Known Allergies   Social History     Tobacco Use    Smoking status: Never Smoker    Smokeless tobacco: Never Used   Substance Use Topics    Alcohol use: Not Currently      History reviewed. No pertinent family history. Review of Systems:     A comprehensive review of systems was negative except for that written in the HPI.     Objective:   Vital Signs:  Visit Vitals  /76   Pulse 68   Temp 100 °F (37.8 °C)   Resp 25   Ht 5' 4\" (1.626 m)   Wt 107 kg (235 lb 14.3 oz)   SpO2 100%   BMI 40.49 kg/m²      O2 Device: Tracheostomy, Ventilator   Temp (24hrs), Av.8 °F (37.7 °C), Min:99 °F (37.2 °C), Max:101.2 °F (38.4 °C)           Intake/Output:     Intake/Output Summary (Last 24 hours) at 2020 0854  Last data filed at 2020 0600  Gross per 24 hour   Intake 5174.86 ml   Output 5200 ml   Net -25.14 ml       Physical Exam:    General: Ill appearing, NAD  HENT: Atraumatic, Tracheostomy tube in place  Cardio: RRR  Respiratory: Coarse breath sounds BL  GI: Soft not tender abdomen  Extremities: +ve trace edema  Neuro: Opens right eye and tracks - doesn't follow commands      LABS AND  DATA: Personally reviewed  Recent Labs     20  1795 06/15/20  0407   WBC 8.5 8.6   HGB 7.9* 8.3*   HCT 26.2* 27.7*    227     Recent Labs     06/16/20  0414 06/15/20  1544  06/15/20  0407    142   < > 141   K 3.5 3.9   < > 3.9   * 114*   < > 114*   CO2 22 20*   < > 21   BUN 11 15   < > 10   CREA 0.50* 0.45*   < > 0.46*   * 120*   < > 106*   CA 8.0* 8.1*   < > 7.9*   MG 2.1  --   --  2.1   PHOS 2.6  --   --  2.0*    < > = values in this interval not displayed. Recent Labs     06/15/20  0407 06/14/20  0405   AP 68 74   TP 6.0* 6.2*   ALB 2.3* 2.4*   GLOB 3.7 3.8     No results for input(s): INR, PTP, APTT, INREXT, INREXT, INREXT in the last 72 hours. No results for input(s): PHI, PCO2I, PO2I, FIO2I in the last 72 hours. No results for input(s): CPK, CKMB, TROIQ, BNPP in the last 72 hours. Hemodynamics:   PAP:   CO:     Wedge:   CI:     CVP:    SVR:       PVR:       Ventilator Settings:  Mode Rate Tidal Volume Pressure FiO2 PEEP   Assist control, SIMV   400 ml  10 cm H2O 70 % 5 cm H20     Peak airway pressure: 21 cm H2O    Minute ventilation: 10.4 l/min        MEDS: Reviewed    Chest X-Ray:  CXR Results  (Last 48 hours)    None        ECHO:  EF 60-65%      Multidisciplinary Rounds Completed:  Pending    ABCDEF Bundle/Checklist  Pain Medications: Fentanyl  Target RASS: 0 - Alert & Calm - Spontaneously pays attention to caregiver  Sedation Medications: None  CAM-ICU:  Positive  Mobility: Poor  PT/OT: PT consulted and on board and OT consulted and on board   Restraints: None needed at this time  Discussed Plan of Care (goals of care):  Yes  Addressed Code Status: Full Code    CARDIOVASCULAR  Cardiac Gtts: None  SBP Goal of: > 100 mmHg and < 180 mmHg  MAP Goal of: 100-130  Transfusion Trigger (Hgb): <8 g/dL    RESPIRATORY  Vent Goals:   Chlorhexidine   Optimize PEEP/Ventilation/Oxygenation  Goal Tidal Volume 6 cc/kg based on IBW  Aim for lung protective ventilation  Head of bed > 30 degrees  DVT Prophylaxis (if no, list reason): SCD's or Sequential Compression Device and Lovenox   SPO2 Goal: > 92%  Pulmonary toilet: Duo-Nebs     GI/  Martinez Catheter Present: Yes  GI Prophylaxis: Pepcid (famotidine)   Nutrition: Yes   IVFs: NSS  Bowel Movement: Yes  Bowel Regimen: Docusate (Colace)  Insulin: ISS    ANTIBIOTICS  Antibiotics:  None    T/L/D  Tubes: Tracheostomy and Nasogastric Tube  Lines: Peripheral IV, Arterial Line and Central Line  Drains: Martinez Catheter    SPECIAL EQUIPMENT  None    DISPOSITION  Stay in ICU    CRITICAL CARE CONSULTANT NOTE  I had a face to face encounter with the patient, reviewed and interpreted patient data including clinical events, labs, images, vital signs, I/O's, and examined patient. I have discussed the case and the plan and management of the patient's care with the consulting services, the bedside nurses and the respiratory therapist.      NOTE OF PERSONAL INVOLVEMENT IN CARE   This patient has a high probability of imminent, clinically significant deterioration, which requires the highest level of preparedness to intervene urgently. I participated in the decision-making and personally managed or directed the management of the following life and organ supporting interventions that required my frequent assessment to treat or prevent imminent deterioration. I personally spent 35 minutes of critical care time. This is time spent at this critically ill patient's bedside actively involved in patient care as well as the coordination of care and discussions with the patient's family. This does not include any procedural time which has been billed separately.     Randa Islas MD  [phone]  6/16/2020

## 2020-06-16 NOTE — PROGRESS NOTES
Neurointerventional Surgery Progress Note  Edward Long NP   Neurocritical Care Nurse Practitioner  [phone]          Admit Date: 6/4/2020        Daily Progress Note: 6/16/2020   LOS: 12 days      S/P:  POD 10 Cerebral angiogram with coil embolization of ruptured left MCA bifurcation aneurysm on 6/5/2020    ** Delayed Presentation ** PB Day 16    Interval History/Subjective:   Patient with continued polyuria, 5L urine out in the last 24 hours. Patient is alert and tracking with eyes. Bedside tracheostomy placed today , saturations dropped to 20s during procedure requiring bagging. Quick recovery per reports. Neuro exam remains virtually unchanged. No command following. She remains off sedation. She is on cardene to maintain BP in specified parameters. TCDs completed today showing an improvement in vasospasm again. Unable to perform a ROS due to intubation and altered mental status. Assessment & Plan:      Active Problems:    SAH (subarachnoid hemorrhage) ([address] (6/4/2020)      Subarachnoid hemorrhage from middle cerebral artery aneurysm, left ([address] (6/4/2020)      RIGHT middle cerebral artery aneurysm (6/4/2020)      Cerebral vasospasm (6/4/2020)      Respiratory failure ([address] (6/15/2020)      Hypoxic ischemic encephalopathy (HIE), unspecified severity (6/15/2020)      1.) SAH due to ruptured cerebral aneurysm, [place] 5, An Grade 3/4              - s/p cerebral angiogram with coil embolization of left MCA bifurcation aneurysm on 6/5       Additionally, patient has 5X3 right MCA bifurcation aneurysm with very wide neck which will               need to be treated with stents vs. Clipping              - Continue Nimotop Day 12 of 21 to prevent delayed cerebral ischemia              - continue maintenance fluids at NS at 125 ml/hr to maintain euvolemia              - Strict I's and O's              - TCDs daily, TCDs today show improvement in vasospasm              - Assess NICOM prn for fluid responsiveness, NICOM assessed at 1430 today and results were 28.2% change in SVI with PLR indicating patient is fluid responsive.               - ECHO shows EF 60-65%, mild concentric hypertrophy, mildly dilated left atrium              - continue every hour neuro checks              - Florinef to 0.2 mg BID (initially started 6/13) for prevention of cerebral salt wasting in the setting of SAH, aid in increasing plasma volume              - Continue MAP goal 100-130. Keep SBP <200 Cardene/Fabien/Hydralazine/Labetalol PRN              - PT/OT/SLP evals when appropriate    2.) Seizure              - Due to #1, no prior hx of seizure              - Continue Keppra 1000 mg BID               - continue Vimpat 100 mg BID              - Seizure precautions              -  EEG on 6/6 showed an abnormal EEG due to slowing of the background rhythms with intermittent rhythmic delta activity seen in both frontal head regions. Occasional sharp wave discharges in the left frontal area. EEG is suggestive of mild-to-moderate generalized encephalopathic process, nonspecific in type. In addition, there was occasional epileptiform disturbance seen in the left frontal area which may represent a partial onset seizure focus.              - Neurology following peripherally               3.) Cerebral Edema (resolved)              - received one dose of mannitol, started 3% saline on 6/8, discontinued 6/14              - serum sodium remains stable                4.) Acute hypoxic respiratory failure in the setting of likely neurogenic pulmonary edema              - Symmetric moderately severe airspace disease in the upper lobes bilaterally  which may represent edema or sequela of aspiration seen on CTA. CXR on 6/4 showed Diffuse bilateral airspace disease more suggestive of pulmonary edema than diffuse pneumonia.  Repeat CXR on 6/6/2020 showed persistent although increased interstitial and parenchymal opacities  particularly in the right perihilar region. CXR on 6/10 showed residual right lower lung airspace disease            - CTA of Chest negative for PE, showed dependent atelectasis with diffuse groundglass opacifications bilaterally             - now on trach collar , #6 Arun              - Intensivist managing     5.) Multiple Acute Ischemic Infarctions              - MRI completed and shows a large left MCA territory ischemia/infarction,  left temporal, parietal and frontal lobes, cortically based, no superimposed  increased parenchymal hemorrhage or midline shift. Right and left PANCHITO territory  cortical diffusion restriction consistent with infarction. Infarction in the right insular cortex and right temporal lobe.  Scattered small infarctions right and left corona radiata centrum semiovale, right cerebellum                 - Imaging pattern is consistent with Hypoxic-Ischemic Encephalopathy (HIE)                - Continue aspirin 81 mg daily for stroke prevention           6.) Cerebral Vasospasm                 - s/p cerebral angiogram for right PANCHITO A1 angioplasty for vasospasm, transarterial infusion of verapamil on 6/9,  S/p angiogram on 6/10 for left ICA/MCA angioplasty and transarterial infusion of verapamil for vasospasm treatment, s/p angiogram on 6/11 and 6/13 for transarterial infusion of verapamil for vasospasm treatment                 - Strict I's and O's                  - plans as stated in #1     7.) Fever                 - fevers have improved , normal WBC                - completed course of Zosyn, CXR shows residual right lower lung airspace disease               - possibly febrile in the setting of SAH, paired blood cultures NGTD, resp culture 6/13 NGTD                - CTA of chest 6/7 negative for PE                 - Intensivist following     8.) Polyuria                  - serum sodium stable at 142                  - urine studies sent overnight, serum osmolality 296, urine osmolality 322, specific gravity normal at 1.015. Urine random sodium at 114                  - increased Florinef to 0.2 mg BID for prevention of cerebral salt wasting                  - monitor BMP every 12 hours    Activity: Bed rest  DVT ppx: SCDs, Lovenox  Disposition: TBD    Plan d/w Dr. Marcial Pena, Dr. Dary Snyder, and RN. Admission Summary:     Sandra Bangura is a 48 y.o. 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 On arrival to ED, pt was unresponsive and noted to be seizing. A stat CT of her head was performed there, which showed extensive SAH. Pt was then intubated emergently. She was also extremely hypertensive with SBPs in 240s. She was given Keppra and Ativan, and placed on cardene drip for BP control. NSGY and NIS were then consulted and the patient was transferred to [facility] for higher level of care. En route, paramedics administered 7.5 mg of Versed, 200mcg of Fentanyl, and 10 mg of labetalol for BP control. On arrival, CTA of her head and neck was obtained, which showed a 4.4 x 4.1 mm right MCA trifurcation aneurysm and  a 2.2 x 3.1 mm left MCA trifurcation aneurysm. Of note, CTA head/neck also showed pulmonary infiltrates suspicious for COVID-19 infection and the patient has been tested for COVID which was negative. 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 Mother reported pt does not smoke, does not drink alcohol or use street drugs. The mother also reported that the patient began complaining of a headache on 5/31/2020 and has been taking BC Powder (aspirin) for the headache. She also reported that the pt has a hx of HTN and is supposed to be on BP medications, but recently stopped taking her medications. The patient underwent a cerebral angiogram on 6/5 by Dr. Marcial Pena for coil embolization of a ruptured left MCA bifurcation aneurysm.     Current Facility-Administered Medications   Medication Dose Route Frequency Provider Last Rate Last Dose    rocuronium 10 mg/mL injection             fludrocortisone (FLORINEF) tablet 0.2 mg  0.2 mg Oral BID Jannette Avery NP   0.2 mg at 06/16/20 1000    ELECTROLYTE REPLACEMENT PROTOCOL - Potassium and Magnesium  1 Each Other PRN Jannette Avery NP        ELECTROLYTE REPLACEMENT PROTOCOL - Phosphorus  1 Each Other PRN Jannette Avery NP        0.9% sodium chloride infusion  125 mL/hr IntraVENous CONTINUOUS Jannette Avery  mL/hr at 06/16/20 1001 125 mL/hr at 06/16/20 1001    PHENYLephrine (ELKIN-SYNEPHRINE) 30 mg in 0.9% sodium chloride 250 mL infusion   mcg/min IntraVENous TITRATE Jannette Avery NP        niCARdipine (CARDENE) 50 mg in 0.9% sodium chloride 100 mL infusion  0-15 mg/hr IntraVENous TITRATE Jannette Avery NP 5 mL/hr at 06/16/20 0747 2.5 mg/hr at 06/16/20 0747    0.9% sodium chloride infusion 250 mL  250 mL IntraVENous PRN Joey Dominguez NP        balsam peru-castor oiL (VENELEX) ointment   Topical BID Leon Berry DO        dexmedeTOMidine (PRECEDEX) 400 mcg in 0.9% sodium chloride 100 mL infusion  0.2-1.4 mcg/kg/hr IntraVENous TITRATE Leon Berry DO   Stopped at 06/12/20 2132    labetaloL (NORMODYNE;TRANDATE) injection 20 mg  20 mg IntraVENous Q2H PRN Marques Domínguez NP   20 mg at 06/16/20 0745    0.9% sodium chloride infusion 250 mL  250 mL IntraVENous PRN FABIAN Regan        aspirin chewable tablet 81 mg  81 mg Per NG tube DAILY Sultana Hernandez MD   81 mg at 06/16/20 1000    enoxaparin (LOVENOX) injection 40 mg  40 mg SubCUTAneous Q24H Leon Berry DO   40 mg at 06/15/20 1420    famotidine (PEPCID) 40 mg/5 mL (8 mg/mL) oral suspension 20 mg  20 mg Per NG tube Q12H Leon Berry DO   20 mg at 06/16/20 1000    lacosamide (VIMPAT) tablet 100 mg  100 mg Per NG tube Q12H Leon Berry DO   100 mg at 06/16/20 1000    levETIRAcetam (KEPPRA) oral solution 1,000 mg  1,000 mg Per NG tube Q12H Leon Berry DO   1,000 mg at 06/16/20 1000    glycopyrrolate (ROBINUL) injection 0.2 mg  0.2 mg IntraMUSCular TID PRN Diego Shrestha NP        niMODipine (NYMALIZE) 6 mg/mL oral solution 60 mg  60 mg Per NG tube Q4H Vielka Domínguez NP   60 mg at 06/16/20 1001    amLODIPine (NORVASC) tablet 5 mg  5 mg Per NG tube DAILY Luetta Likes, ACNP   5 mg at 06/16/20 1000    acetaminophen (TYLENOL) solution 650 mg  650 mg Per NG tube Q4H PRN Josh Knight NP   650 mg at 06/15/20 2101    0.9% sodium chloride infusion 250 mL  250 mL IntraVENous PRN [facility], NP-C        albuterol-ipratropium (DUO-NEB) 2.5 MG-0.5 MG/3 ML  3 mL Nebulization Q6H RT Luetta Likes, ACNP   3 mL at 06/16/20 1328    glucose chewable tablet 16 g  4 Tab Oral PRN [facility], NP-C        glucagon (GLUCAGEN) injection 1 mg  1 mg IntraMUSCular PRN [facility], NP-C        dextrose 10% infusion 0-250 mL  0-250 mL IntraVENous PRN [facility], NP-C        albuterol (PROVENTIL VENTOLIN) nebulizer solution 2.5 mg  2.5 mg Nebulization Q6H PRN Angle Costello, NP-C        fentaNYL citrate (PF) injection  mcg   mcg IntraVENous Q1H PRN Luetta Likes, ACNP   100 mcg at 06/15/20 1829    polyvinyl alcohol-povidone (NATURAL TEARS) 0.5-0.6 % ophthalmic solution 2 Drop  2 Drop Both Eyes Q8H Luetta Likes, ACNP   2 Drop at 06/16/20 5517    docusate (COLACE) 50 mg/5 mL oral liquid 100 mg  100 mg Oral BID Luetta Likes, ACNP   Stopped at 06/16/20 0900    bisacodyL (DULCOLAX) suppository 10 mg  10 mg Rectal DAILY PRN Luetta Likes, ACNP        chlorhexidine (ORAL CARE KIT) 0.12 % mouthwash 15 mL  15 mL Oral Q12H FABIAN Constantino   15 mL at 06/16/20 1001    ondansetron (ZOFRAN) injection 4 mg  4 mg IntraVENous Q4H PRN FABIAN lCinton        docusate (COLACE) 50 mg/5 mL oral liquid 100 mg  100 mg Oral BID PRN Anlge Costello, NP-C            No Known Allergies    Review of Systems:  Review of systems not obtained due to patient factors. Objective:     Vital signs  Temp (24hrs), Av.9 °F (37.7 °C), Min:99 °F (37.2 °C), Max:101.2 °F (38.4 °C)    07 - 1900  In: 1504.6 [I.V.:644.6]  Out: 9205 [Urine:1750]  1901 -  0700  In: 8266.6 [I.V.:6066.6]  Out: 89053 [Urine:90312; Drains:300]    Visit Vitals  /71 (BP 1 Location: Left arm, BP Patient Position: At rest)   Pulse 80   Temp 99.3 °F (37.4 °C)   Resp (!) 34   Ht 5' 4\" (1.626 m)   Wt 226 lb 13.7 oz (102.9 kg)   SpO2 100%   BMI 38.94 kg/m²    O2 Flow Rate (L/min): 13 l/min O2 Device: Tracheostomy, Tracheal collar   Vitals:    20 1200 20 1210 20 1300 20 1328   BP: 170/71      Pulse: 68  80    Resp: 19  (!) 34    Temp: 99.3 °F (37.4 °C)      SpO2: 100% 100% 100% 100%   Weight:       Height:          Physical Exam:  GENERAL: NAD, intubated, alert   EYE: conjunctivae/corneas clear. Right pupil 3 mm, brisk response to light. Left pupil 4 mm, sluggish response to light. Keeps left eye closed   LUNG: lungs coarse bilaterally    HEART: regular rate and rhythm, S1, S2 normal, no murmur, click, rub or gallop  EXTREMITIES:  extremities normal, atraumatic, no cyanosis, 2+ pitting peripheral edema, distal pulses 2+ bilaterally   SKIN: Skin warm to touch. Right and left groin site clean, dry, and intact. No hematoma, bruising, or bleeding noted. NEUROLOGIC: Intubated. Alert. Eyes will open spontaneously. No command following. Right pupil 3 mm, brisk response to light. Left pupil 4 mm sluggish response to light. Left eye ptosis noted. Focuses and tracks with eyes, with the exception of inability to track to the right with left eye. Moves all extremities spontaneously. No involuntary movements. Gait deferred. Unable to assess language, sensation, and coordination.      Imaging:  CT of Head on 6/13/2020 at 1103 shows  IMPRESSION:   1. Questionable left inferior cerebellar infarction, new from 6/10/2020.  2. Multiple, evolving, subacute infarctions throughout the cerebrum. 3. Subacute subarachnoid hemorrhage. Improved hydrocephalus compared to  6/6/2020    CTA of Head on 6/13/2020 at 1103 shows  IMPRESSION:   1. Left A1 vasospasm. 2. More mild spasm of the right PANCHITO, MCAs, and possibly the distal vertebrals. 3. Right M1 terminus aneurysm. 4. Large infundibula/small aneurysms of the bilateral P-comm origins. CT Perfusion on 6/13/2020 at 1103 shows  IMPRESSION:  1. Penumbra surrounding infarctions in most of the superior left MCA territory. 2. Bilateral PANCHITO infarctions. 3. Questionable left cerebellar infarction is inferior to the perfusion scan. CTA of Head on 6/9/2020 at 1350 shows  Progressive vasospasm involving the anterior cerebral arteries with minimal  associated decreased perfusion to the frontal lobes. No other significant  Change  . CT Perfusion on 6/9/2020 at 1336 shows  Impression:  CT perfusion brain: Very minimal decreased perfusion to the frontal lobes. CT of Head on 6/9/2020 at 0234 shows  IMPRESSION:   No significant change.       CT of Head on 6/8/2020 shows  IMPRESSION:   Global edema, otherwise no significant change    CTA of Head and Neck on 6/8/2020 per radiology shows  Impression:  Mild to moderate vasospasm in the left middle cerebral artery status post  bifurcation aneurysm coil embolization. Stable 4 mm right MCA aneurysm. Endotracheal tube terminates just above the matt. 1 cm low-density left thyroid nodule. Mildly enlarged left axillary lymph node measuring 11 mm in short axis  Patchy bilateral airspace disease.   Nonspecific asymmetric enhancement of the right superior ophthalmic vein   (However, no significant vasospasm was seen when personally reviewed by NIS)    CT Perfusion on 6/8/2020 shows  IMPRESSION:  No significant cerebral perfusion abnormality    CTA of Chest on 6/7/2020 shows  IMPRESSION:  1. No acute pulmonary embolus  2. Dependent atelectasis with diffuse groundglass opacifications bilaterally. CT of Head on 6/7/2020 shows  IMPRESSION:   1. No further progression of subarachnoid hemorrhage. The overall degree of  subarachnoid hemorrhage is slightly improved. 2. Subtle areas of gray-white differentiation loss throughout the cerebral  hemispheres correlating with areas of restricted diffusion on previous CT  compatible with ischemic changes. MRI of Brain on 6/6/2020 shows  IMPRESSION:   Imaging findings consistent with large left MCA territory ischemia/infarction,  left temporal, parietal and frontal lobes, cortically based, no superimposed  increased parenchymal hemorrhage or midline shift. Right and left PANCHITO territory  cortical diffusion restriction consistent with infarction. Infarction in the right insular cortex and right temporal lobe. Scattered small  infarctions right and left corona radiata centrum semiovale, right cerebellum. Allowing for differences in technique likely stable subarachnoid and  intraventricular hemorrhage. CT of Head on 6/6/2020 at 1411 shows  IMPRESSION:   Left MCA territory infarction with smaller right MCA territory infarction.     Stable subarachnoid and intraventricular hemorrhage. CTA of Head on 6/6/2020 at 0903 shows  IMPRESSION:  Slightly increased intraventricular and subarachnoid hemorrhage compared to the  prior exam.     Status post coiling left MCA bifurcation aneurysm. No significant residual  aneurysm filling. CT of Head on 6/62020 at 0239 shows  IMPRESSION:  Slightly diminished subarachnoid and intraventricular hemorrhage. Stable ventricular system. CT of Head WO on 6/5/2020 at 1512 shows  IMPRESSION: Stable acute subarachnoid and intraventricular hemorrhage. Stable  ventricular system. CT Head WO 6/5/20 0826     IMPRESSION:   1.   Diffuse subarachnoid hemorrhage most concentrated in the basal cisterns and  left sylvian fissure. 2.  Interval ventricular enlargement suspicious for developing hydrocephalus.     CTA Head 6/4/20 2017     IMPRESSION:  1. Diffuse subarachnoid hemorrhage in the basilar cisterns and sylvian  fissures. 2.  Bilateral MCA bifurcation aneurysms. 3.  Infundibular origins to the posterior communicating arteries bilaterally. 4.  Symmetric moderately severe airspace disease in the upper lobes bilaterally  which may represent edema or sequela of aspiration.     CT Head WO Contrast 6/4/2020 2012    IMPRESSION: Extensive subarachnoid hemorrhage concerning for leaking/ruptured  aneurysm.        24 hour results:    Recent Results (from the past 24 hour(s))   METABOLIC PANEL, BASIC    Collection Time: 06/15/20  3:44 PM   Result Value Ref Range    Sodium 142 136 - 145 mmol/L    Potassium 3.9 3.5 - 5.1 mmol/L    Chloride 114 (H) 97 - 108 mmol/L    CO2 20 (L) 21 - 32 mmol/L    Anion gap 8 5 - 15 mmol/L    Glucose 120 (H) 65 - 100 mg/dL    BUN 15 6 - 20 MG/DL    Creatinine 0.45 (L) 0.55 - 1.02 MG/DL    BUN/Creatinine ratio 33 (H) 12 - 20      GFR est AA >60 >60 ml/min/1.73m2    GFR est non-AA >60 >60 ml/min/1.73m2    Calcium 8.1 (L) 8.5 - 10.1 MG/DL   CULTURE, BLOOD, PAIRED    Collection Time: 06/15/20 11:06 PM   Result Value Ref Range    Special Requests: NO SPECIAL REQUESTS      Culture result: NO GROWTH AFTER 6 HOURS     EKG, 12 LEAD, INITIAL    Collection Time: 06/16/20 12:14 AM   Result Value Ref Range    Ventricular Rate 69 BPM    Atrial Rate 69 BPM    P-R Interval 150 ms    QRS Duration 90 ms    Q-T Interval 420 ms    QTC Calculation (Bezet) 450 ms    Calculated P Axis 57 degrees    Calculated R Axis 3 degrees    Calculated T Axis 64 degrees    Diagnosis       Normal sinus rhythm  Possible Anterior infarct , age undetermined  When compared with ECG of 06-JUN-2020 11:24,  Sinus rhythm has replaced Junctional rhythm     PROCALCITONIN    Collection Time: 06/16/20 4:14 AM   Result Value Ref Range    Procalcitonin <0.05 ng/mL   MAGNESIUM    Collection Time: 06/16/20  4:14 AM   Result Value Ref Range    Magnesium 2.1 1.6 - 2.4 mg/dL   PHOSPHORUS    Collection Time: 06/16/20  4:14 AM   Result Value Ref Range    Phosphorus 2.6 2.6 - 4.7 MG/DL   CBC WITH AUTOMATED DIFF    Collection Time: 06/16/20  4:14 AM   Result Value Ref Range    WBC 8.5 3.6 - 11.0 K/uL    RBC 3.46 (L) 3.80 - 5.20 M/uL    HGB 7.9 (L) 11.5 - 16.0 g/dL    HCT 26.2 (L) 35.0 - 47.0 %    MCV 75.7 (L) 80.0 - 99.0 FL    MCH 22.8 (L) 26.0 - 34.0 PG    MCHC 30.2 30.0 - 36.5 g/dL    RDW 25.8 (H) 11.5 - 14.5 %    PLATELET 891 252 - 531 K/uL    MPV 11.0 8.9 - 12.9 FL    NRBC 0.2 (H) 0  WBC    ABSOLUTE NRBC 0.02 (H) 0.00 - 0.01 K/uL    NEUTROPHILS 86 (H) 32 - 75 %    LYMPHOCYTES 8 (L) 12 - 49 %    MONOCYTES 3 (L) 5 - 13 %    EOSINOPHILS 2 0 - 7 %    BASOPHILS 0 0 - 1 %    IMMATURE GRANULOCYTES 1 (H) 0.0 - 0.5 %    ABS. NEUTROPHILS 7.2 1.8 - 8.0 K/UL    ABS. LYMPHOCYTES 0.7 (L) 0.8 - 3.5 K/UL    ABS. MONOCYTES 0.3 0.0 - 1.0 K/UL    ABS. EOSINOPHILS 0.2 0.0 - 0.4 K/UL    ABS. BASOPHILS 0.0 0.0 - 0.1 K/UL    ABS. IMM.  GRANS. 0.1 (H) 0.00 - 0.04 K/UL    DF SMEAR SCANNED      PLATELET COMMENTS Large Platelets      RBC COMMENTS ANISOCYTOSIS  3+        RBC COMMENTS MICROCYTOSIS  1+        RBC COMMENTS POLYCHROMASIA  1+        RBC COMMENTS MACROCYTOSIS  PRESENT        RBC COMMENTS HYPOCHROMIA  2+       METABOLIC PANEL, BASIC    Collection Time: 06/16/20  4:14 AM   Result Value Ref Range    Sodium 142 136 - 145 mmol/L    Potassium 3.5 3.5 - 5.1 mmol/L    Chloride 112 (H) 97 - 108 mmol/L    CO2 22 21 - 32 mmol/L    Anion gap 8 5 - 15 mmol/L    Glucose 112 (H) 65 - 100 mg/dL    BUN 11 6 - 20 MG/DL    Creatinine 0.50 (L) 0.55 - 1.02 MG/DL    BUN/Creatinine ratio 22 (H) 12 - 20      GFR est AA >60 >60 ml/min/1.73m2    GFR est non-AA >60 >60 ml/min/1.73m2    Calcium 8.0 (L) 8.5 - 10.1 MG/DL   TCD INTRACRANIAL ARTERIES COMPLETE Collection Time: 06/16/20  9:35 AM   Result Value Ref Range    Right [place]ard Ratio 1.6     Right MCA 1  cm/s    Right MCA 1 EDV 29 cm/s    Right MCA 1 Mean Velocity 58 cm/s    Right PANCHITO  cm/s    Right PANCHITO EDV 51 cm/s    Right PANCHITO Mean Velocity 77 cm/s    Right ICA PSV 85 cm/s    Right ICA EDV 29 cm/s    Right ICA Mean Velocity 48 cm/s    Right PCA 1 PSV 71 cm/s    Right PCA 1 EDV 20 cm/s    Right PCA 1 Mean Velocity 37 cm/s    Right External ICA PSV 65 cm/s    Right External ICA EDV 21 cm/s    Right External ICA Mean Velocity 36 cm/s    Left MCA 1  cm/s    Left MCA 1 EDV 44 cm/s    Left MCA 1 Mean Velocity 65 cm/s    Left PANCHITO  cm/s    Left PANCHITO EDV 73 cm/s    Left PANCHITO Mean Velocity 110 cm/s    Left ICA  cm/s    Left External ICA PSV 64 cm/s    Left ICA EDV 43 cm/s    Left External ICA EDV 24 cm/s    Left ICA Mean Velocity 65 cm/s    Left External ICA Mean Velocity 37 cm/s    Left PCA 1  cm/s    Left PCA 1 EDV 46 cm/s    Left PCA 1 Mean Velocity 73 cm/s    Left Lindegaard Ratio 1.8     Basilar Artery PSV 71 cm/s    Basilar Artery EDV 25 cm/s    Basilar Artery Mean Angel 40 cm/s    Right Vertebral Mean Velocity 31 cm/s    Left Vertebral Mean Velocity 28 cm/s    Right Vertebral PSV 56 cm/s    Right Vertebral EDV 19 cm/s    Left Vertebral PSV 50 cm/s    Left Vertebral EDV 17 cm/s        Leann Stevens, NP

## 2020-06-16 NOTE — OP NOTES
[address]   OPERATIVE REPORT    Name:  Kylie Jang  MR#:  [id number]  :  1970  ACCOUNT #:  [de-identified]  DATE OF SERVICE:  2020    CLINICAL SERVICE:  Thoracic surgery. ATTENDING SURGEON:  Margie Ramirez MD    OPERATIONS PERFORMED:  1. Percutaneous dilation of tracheostomy. 2.  Flexible laryngoscopy. PREOPERATIVE DIAGNOSES:  1. Acute hypoxic respiratory failure. 2.  Malignant hypertension. 3.  Cerebrovascular accident. POSTOPERATIVE DIAGNOSES:  1. Acute hypoxic respiratory failure. 2.  Malignant hypertension. 3.  Cerebrovascular accident. FIRST ASSISTANT:  ALE Bueno    SPECIMEN SENT:  None. DRAINS AND TUBES:  A 6-Azeri Shiley was left within the anterior neck. ANESTHESIA:  100 mcg of IV fentanyl, 50 mg of IV rocuronium, 20 mg of IV etomidate. ESTIMATED BLOOD LOSS:  For this case was 50 mL. INDICATIONS FOR PROCEDURE:  The patient is a 44-year-old lady with malignant hypertension who was found unresponsive, was admitted to the ICU and found to have a very large subarachnoid hemorrhage secondary to a stroke. Her mental status has not improved. She is unable to be weaned from the vent after a prolonged period of intubation. After meeting with the family, the ICU team requested me for tracheostomy. PROCEDURE IN DETAIL:  After informed consent was obtained and placed on the chart, the patient was placed supine in her ICU bed. The neck was fully extended. A time-out was performed. The patient's neck and anterior chest were prepped and draped in a sterile fashion. A flexible bronchoscopy was performed. The distal trachea and matt were normal in appearance. The right and left tracheobronchial tree were normal in appearance with minimal amounts of mucus to be suctioned. The ET tube was withdrawn to 17 cm. Under bronchoscopic vision, a finder needle was placed into the anterior trachea.   Of note, the patient's systolic blood pressure spiked to 270 during this first portion of the procedure. Approximately 10 mL of 1% lidocaine was injected. An introducer needle was then placed under bronchoscopic guidance into the anterior trachea. The guidewire was then fed down the right mainstem bronchus. At this point, the patient's pressure was persistently in 270 despite Cardene and IV labetalol. This caused the bleeding which obscured the visualization of the surgical field. Approximately 1.5 cm vertical incision was made around the guidewire. There was excessive bleeding from the skin edges. The patient began to experience period of desaturations. A 14-Tamazight dilator was then passed over the guidewire. The blue Rhino dilator was then passed over the guidewire. The patient's saturations continued to fall during this portion of the procedure, which was prolonged due to obscured vision from the bleeding. The blue Rhino dilator had to be passed second time. The 6-Tamazight Shiley tracheostomy was then passed over a 26-Tamazight dilator over the guidewire under bronchoscopic guidance. Once in place, the guidewire and the dilator were withdrawn. The patient's saturations had bottomed down to 20s and therefore we placed it directly onto the ventilator and manually bagged her up very quickly to 100%. We then continued to bag her at 100% for a couple of minutes until she is fully recovered. We then performed a bronchoscopy through the tracheostomy tube. There was not excessive rundown of blood within the trachea. The matt was easily visualized. She was then reconnected to the vent and had good vent mechanics. The skin edges were not oozing once the tracheostomy was in place. The flanges were sutured to the anterior neck. The cuff was inflated. All surgical counts were correct x2 at the end of this case. COMPLICATIONS:  There were no immediate complications identified during this case.     Dr. Leni Mojica was present and scrubbed throughout the entire procedure. PA Merrick Snellen was instrumental to the completion of this operation as she performed the bronchoscopic portion of the surgery. IMPLANTS:  None.       Primo Tavera MD      [id number]  D:  06/16/2020 7:41  T:  06/16/2020 13:05  JOB #:  [id number]

## 2020-06-16 NOTE — PROGRESS NOTES
06/16/20 1210   Oxygen Therapy   O2 Sat (%) 100 %   Pulse via Oximetry 75 beats per minute   O2 Device Tracheostomy;Tracheal collar   O2 Flow Rate (L/min) 13 l/min   FIO2 (%) 100 %   Trach collar initiated at this time per MD.   RN Notified. Will continue to monitor pt.

## 2020-06-16 NOTE — PROGRESS NOTES
1930. Bedside and Verbal shift change report given to Clementina Soulier., RN (oncoming nurse) by Micky Silva RN (offgoing nurse). Report included the following information SBAR, Kardex, OR Summary, Procedure Summary, Intake/Output, MAR, Accordion, Recent Results, Med Rec Status, Cardiac Rhythm NSR/SB, Alarm Parameters  and Quality Measures. 4361. RT at bedside to prepare for bedside tracheostomy procedure. 3048. Time out completed prior to procedure. 20mg Etomidate, 50mg Rocuronium, & 100mcg of fentanyl given. Procedure Summary. Pt O2 sat dropped to 20's during procedure, RT at bedside to bag pt, O2 sat returned to 100%. #6 Shiley placed. Emergency equipment, obturator, #6 & #4 Shiley at bedside. 0730. Bedside and Verbal shift change report given to Micky Silva RN (oncoming nurse) by Clementina Soulier., RN (offgoing nurse). Report included the following information SBAR, Kardex, ED Summary, OR Summary, Procedure Summary, Intake/Output, MAR, Accordion, Recent Results, Med Rec Status, Cardiac Rhythm NSR and Quality Measures.

## 2020-06-16 NOTE — ADVANCED PRACTICE NURSE
Brief Rounding Note:     Patient seen and examined this AM. Sats with drop into 20's during bedside tracheostomy this morning requiring bagging. Recovered and #6 shiley in place. 5L urine out in past 24 hours. Sodium remains stable at 142. Ultrasound, NICOM, and I's and O's reflect Euvolemia thus far today. Continue to watch closely. TCDs show continued improvement in spasm of Left PANCHITO and left PCA, mean flow velocity is 110. Right and Left pupils responding to light now. No command following.     - Continue Nimotop/Florinef/Daily TCDS  - MAP goal 100-130   - treating SBP > 200     Full note to follow     TEVIN Braxton Mask, AGACNP

## 2020-06-16 NOTE — PROGRESS NOTES
Neurocritical Care Brief Progress Note:    HPI: Vladimir Taylor is a 59-year-old female with a past medical history of HTN. According to patient's mother, the patient began having a headache on 5/31/20 and had been taking BC Powder with aspirin in it for the headache. She recently stopped taking her BP medications. According to her mother she does not smoke, drink alcohol or use recreational drugs. The patient was brought to the [facility] ER via EMS on 6/4/420  after her coworkers found her unresponsive outside of her place of work. 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 On arrival to the ER she was found to be seizing. A stat CT of her head was performed which showed extensive subarachnoid hemorrhage. She was extremely hypertensive in the 240's and was given labetalol and started on a Cardene drip. She was emergently intubated and given Keppra and Ativan as well. Neurosurgery and Neurointerventional Surgery were consulted and the patient was transferred to the [facility] for a higher level of care. 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 Extensive lung infiltrates were also noted. Patient was taken for cerebral angiogram the morning of 06/05/20 and coil embolization of the left MCA aneurysm was completed. The right MCA aneurysm had a very wide neck incorporating both M2s. It could not be treated endovascularly without stents.  May need to be clipped by Neurosurgery.     Procedures: 06/05/20 and coil embolization of the left MCA aneurysm was completeds/p cerebral angiogram for right PANCHITO A1 angioplasty for vasospasm, transarterial infusion of verapamil on 6/9,  S/p angiogram on 6/10 for left ICA/MCA angioplasty and transarterial infusion of verapamil for vasospasm treatment, s/p angiogram on 6/11 and 6/13 for transarterial infusion of verapamil for vasospasm treatment. Physical Exam:  Mariia Jungling and alert, intubated and ventilated. No sedation.    SKIN: Warm, dry, color appropriate for ethnicity. Pedal pulses positive bilaterally. Swelling noted in left arm. No edema noted in other extremities.      Neurologic Exam:  Mental Status:            Intubated on ventilator. No command following. Opens eyes to voice, still no command following. EOMI.    Language:                   Unable to assess due to intubation.      Cranial Nerves:          Right pupil is 3 mm and brisk. Left pupil 4mm and brisk.                                       Blinks to threat bilaterally.        Motor:                                                                Bulk and tone normal.                                       No involuntary movements.     Sensation:                   Withdraws to pain x 4.     Reflexes:                     Mute Babinskis.      Coordination & Gait:   Unable to assess due to patient factors.     PLAN: SAH due to ruptured cerebral aneurysm, Quinones Sales 5, An Grade 3/4   s/p cerebral angiogram with coil embolization of left MCA bifurcation aneurysm on 6/5. Additionally, patient has 5X3 right MCA bifurcation aneurysm with very wide neck which will need to be treated with stents vs. clipping  -No changes in current plan  -Strict I/Os. IVFs /h  -Florinef 0.2 mg bid  -Daily TCDs  -Nimotop Day 11/21  -Continue seizure ppx with Kepra 1000 bid, Vimpat 100 bid  -MAP goal 100-130. Treat SBP greater than 200.      Marcus Goss NP  Neurocritical Care Nurse Practitioner  [phone]

## 2020-06-16 NOTE — BRIEF OP NOTE
Brief Postoperative Note    Patient: Mirna Lai  YOB: 1970  MRN: [id number]    Date of Procedure: June 16 2020    Pre-Op Diagnosis: 1: Acute hypoxic respiratory failure  2: Malignant hypertension  3: CVA    Post-Op Diagnosis: Same as preoperative diagnosis.           1: Percutaneous Dilational Tracheostomy  2; Flexible Bronchoscopy    Surgical Assistant: Jonah AGUILERA    Anesthesia: 100mcg IV Fentanyl  50mg IV Rocuronium  20mg IV Etomidate     Estimated Blood Loss (mL): less than 50     Complications: None    Specimens: none    Implants: none  Drains:   6Fr Marlenyley        Findings: 6Fr Marlenyley well placed    Condition: Critical    Disposition: to remain in icu    Electronically Signed by Azalea Sesay MD on 6/16/2020 at 7:33 AM

## 2020-06-17 ENCOUNTER — APPOINTMENT (OUTPATIENT)
Dept: CT IMAGING | Age: 50
DRG: 003 | End: 2020-06-17
Attending: NURSE PRACTITIONER
Payer: COMMERCIAL

## 2020-06-17 ENCOUNTER — APPOINTMENT (OUTPATIENT)
Dept: VASCULAR SURGERY | Age: 50
DRG: 003 | End: 2020-06-17
Attending: INTERNAL MEDICINE
Payer: COMMERCIAL

## 2020-06-17 ENCOUNTER — APPOINTMENT (OUTPATIENT)
Dept: VASCULAR SURGERY | Age: 50
DRG: 003 | End: 2020-06-17
Attending: NURSE PRACTITIONER
Payer: COMMERCIAL

## 2020-06-17 LAB
ALBUMIN SERPL-MCNC: 2.2 G/DL (ref 3.5–5)
ALBUMIN/GLOB SERPL: 0.6 {RATIO} (ref 1.1–2.2)
ALP SERPL-CCNC: 83 U/L (ref 45–117)
ALT SERPL-CCNC: 38 U/L (ref 12–78)
ANION GAP SERPL CALC-SCNC: 3 MMOL/L (ref 5–15)
ANION GAP SERPL CALC-SCNC: 6 MMOL/L (ref 5–15)
AST SERPL-CCNC: 26 U/L (ref 15–37)
BACTERIA SPEC CULT: ABNORMAL
BASILAR ARTERY EDV: 30 CM/S
BASILAR ARTERY MEAN VEL: 42 CM/S
BASILAR ARTERY PSV: 67 CM/S
BASOPHILS # BLD: 0 K/UL (ref 0–0.1)
BASOPHILS NFR BLD: 0 % (ref 0–1)
BILIRUB SERPL-MCNC: 0.3 MG/DL (ref 0.2–1)
BUN SERPL-MCNC: 11 MG/DL (ref 6–20)
BUN SERPL-MCNC: 12 MG/DL (ref 6–20)
BUN/CREAT SERPL: 24 (ref 12–20)
BUN/CREAT SERPL: 28 (ref 12–20)
CALCIUM SERPL-MCNC: 8 MG/DL (ref 8.5–10.1)
CALCIUM SERPL-MCNC: 8.4 MG/DL (ref 8.5–10.1)
CHLORIDE SERPL-SCNC: 109 MMOL/L (ref 97–108)
CHLORIDE SERPL-SCNC: 110 MMOL/L (ref 97–108)
CO2 SERPL-SCNC: 25 MMOL/L (ref 21–32)
CO2 SERPL-SCNC: 28 MMOL/L (ref 21–32)
CREAT SERPL-MCNC: 0.39 MG/DL (ref 0.55–1.02)
CREAT SERPL-MCNC: 0.49 MG/DL (ref 0.55–1.02)
DIFFERENTIAL METHOD BLD: ABNORMAL
EOSINOPHIL # BLD: 0.2 K/UL (ref 0–0.4)
EOSINOPHIL NFR BLD: 3 % (ref 0–7)
ERYTHROCYTE [DISTWIDTH] IN BLOOD BY AUTOMATED COUNT: 26.1 % (ref 11.5–14.5)
GLOBULIN SER CALC-MCNC: 3.8 G/DL (ref 2–4)
GLUCOSE SERPL-MCNC: 156 MG/DL (ref 65–100)
GLUCOSE SERPL-MCNC: 98 MG/DL (ref 65–100)
GRAM STN SPEC: ABNORMAL
HCT VFR BLD AUTO: 26.1 % (ref 35–47)
HGB BLD-MCNC: 7.5 G/DL (ref 11.5–16)
IMM GRANULOCYTES # BLD AUTO: 0.1 K/UL (ref 0–0.04)
IMM GRANULOCYTES NFR BLD AUTO: 1 % (ref 0–0.5)
LEFT ACA EDV: 78 CM/S
LEFT ACA MEAN VEL: 119 CM/S
LEFT ACA PSV: 201 CM/S
LEFT EX ICA EDV: 27 CM/S
LEFT EX ICA MEAN VEL: 46 CM/S
LEFT EX ICA PSV: 85 CM/S
LEFT ICA EDV: 45 CM/S
LEFT ICA MEAN VEL: 70 CM/S
LEFT ICA PSV: 120 CM/S
LEFT LINDEGAARD RATIO: 1.8
LEFT MCA 1 EDV: 53 CM/S
LEFT MCA 1 MEAN VEL: 84 CM/S
LEFT MCA 1 PSV: 147 CM/S
LEFT PCA 1 EDV: 65 CM/S
LEFT PCA 1 MEAN VEL: 96 CM/S
LEFT PCA 1 PSV: 159 CM/S
LEFT VERTEBRAL EDV TCD: 18 CM/S
LEFT VERTEBRAL MEAN VEL: 26 CM/S
LEFT VERTEBRAL PSV TCD: 43 CM/S
LYMPHOCYTES # BLD: 0.7 K/UL (ref 0.8–3.5)
LYMPHOCYTES NFR BLD: 11 % (ref 12–49)
MAGNESIUM SERPL-MCNC: 2.2 MG/DL (ref 1.6–2.4)
MCH RBC QN AUTO: 22.6 PG (ref 26–34)
MCHC RBC AUTO-ENTMCNC: 28.7 G/DL (ref 30–36.5)
MCV RBC AUTO: 78.6 FL (ref 80–99)
MONOCYTES # BLD: 0.5 K/UL (ref 0–1)
MONOCYTES NFR BLD: 8 % (ref 5–13)
NEUTS SEG # BLD: 4.7 K/UL (ref 1.8–8)
NEUTS SEG NFR BLD: 77 % (ref 32–75)
NRBC # BLD: 0 K/UL (ref 0–0.01)
NRBC BLD-RTO: 0 PER 100 WBC
PHOSPHATE SERPL-MCNC: 2.4 MG/DL (ref 2.6–4.7)
PLATELET # BLD AUTO: 249 K/UL (ref 150–400)
PMV BLD AUTO: 11.2 FL (ref 8.9–12.9)
POTASSIUM SERPL-SCNC: 3.5 MMOL/L (ref 3.5–5.1)
POTASSIUM SERPL-SCNC: 3.7 MMOL/L (ref 3.5–5.1)
PROCALCITONIN SERPL-MCNC: 0.15 NG/ML
PROT SERPL-MCNC: 6 G/DL (ref 6.4–8.2)
RBC # BLD AUTO: 3.32 M/UL (ref 3.8–5.2)
RBC MORPH BLD: ABNORMAL
RIGHT ACA EDV: 62 CM/S
RIGHT ACA MEAN VEL: 89 CM/S
RIGHT ACA PSV: 142 CM/S
RIGHT EX ICA EDV: 25 CM/S
RIGHT EX ICA MEAN VEL: 43 CM/S
RIGHT EX ICA PSV: 79 CM/S
RIGHT ICA EDV: 21 CM/S
RIGHT ICA MEAN VEL: 36 CM/S
RIGHT ICA PSV: 67 CM/S
RIGHT LINDEGAARD RATIO: 1.7
RIGHT MCA 1 EDV: 40 CM/S
RIGHT MCA 1 MEAN VEL: 71 CM/S
RIGHT MCA 1 PSV: 133 CM/S
RIGHT PCA 1 EDV: 32 CM/S
RIGHT PCA 1 MEAN VEL: 55 CM/S
RIGHT PCA 1 PSV: 101 CM/S
RIGHT VERTEBRAL EDV TCD: 18 CM/S
RIGHT VERTEBRAL MEAN VEL: 29 CM/S
RIGHT VERTEBRAL PSV TCD: 52 CM/S
SERVICE CMNT-IMP: ABNORMAL
SODIUM SERPL-SCNC: 140 MMOL/L (ref 136–145)
SODIUM SERPL-SCNC: 141 MMOL/L (ref 136–145)
WBC # BLD AUTO: 6.2 K/UL (ref 3.6–11)

## 2020-06-17 PROCEDURE — 80235 DRUG ASSAY LACOSAMIDE: CPT

## 2020-06-17 PROCEDURE — 80053 COMPREHEN METABOLIC PANEL: CPT

## 2020-06-17 PROCEDURE — 84100 ASSAY OF PHOSPHORUS: CPT

## 2020-06-17 PROCEDURE — 85025 COMPLETE CBC W/AUTO DIFF WBC: CPT

## 2020-06-17 PROCEDURE — 84145 PROCALCITONIN (PCT): CPT

## 2020-06-17 PROCEDURE — 65610000006 HC RM INTENSIVE CARE

## 2020-06-17 PROCEDURE — 94640 AIRWAY INHALATION TREATMENT: CPT

## 2020-06-17 PROCEDURE — 83735 ASSAY OF MAGNESIUM: CPT

## 2020-06-17 PROCEDURE — 74011250637 HC RX REV CODE- 250/637: Performed by: NURSE PRACTITIONER

## 2020-06-17 PROCEDURE — 74011250636 HC RX REV CODE- 250/636: Performed by: INTERNAL MEDICINE

## 2020-06-17 PROCEDURE — 92610 EVALUATE SWALLOWING FUNCTION: CPT

## 2020-06-17 PROCEDURE — 74011250637 HC RX REV CODE- 250/637: Performed by: PSYCHIATRY & NEUROLOGY

## 2020-06-17 PROCEDURE — 93886 INTRACRANIAL COMPLETE STUDY: CPT

## 2020-06-17 PROCEDURE — 74011250636 HC RX REV CODE- 250/636: Performed by: NURSE PRACTITIONER

## 2020-06-17 PROCEDURE — 74011000250 HC RX REV CODE- 250: Performed by: NURSE PRACTITIONER

## 2020-06-17 PROCEDURE — 80177 DRUG SCRN QUAN LEVETIRACETAM: CPT

## 2020-06-17 PROCEDURE — 93971 EXTREMITY STUDY: CPT

## 2020-06-17 PROCEDURE — 92524 BEHAVRAL QUALIT ANALYS VOICE: CPT

## 2020-06-17 PROCEDURE — 74011250636 HC RX REV CODE- 250/636: Performed by: ANESTHESIOLOGY

## 2020-06-17 PROCEDURE — 77010033678 HC OXYGEN DAILY

## 2020-06-17 PROCEDURE — 36415 COLL VENOUS BLD VENIPUNCTURE: CPT

## 2020-06-17 PROCEDURE — 74011250637 HC RX REV CODE- 250/637: Performed by: ANESTHESIOLOGY

## 2020-06-17 PROCEDURE — 74011000250 HC RX REV CODE- 250: Performed by: INTERNAL MEDICINE

## 2020-06-17 PROCEDURE — 95816 EEG AWAKE AND DROWSY: CPT | Performed by: NURSE PRACTITIONER

## 2020-06-17 PROCEDURE — 70450 CT HEAD/BRAIN W/O DYE: CPT

## 2020-06-17 PROCEDURE — 74011000258 HC RX REV CODE- 258: Performed by: NURSE PRACTITIONER

## 2020-06-17 RX ORDER — LACOSAMIDE 50 MG/1
150 TABLET ORAL EVERY 12 HOURS
Status: DISCONTINUED | OUTPATIENT
Start: 2020-06-17 | End: 2020-07-10 | Stop reason: HOSPADM

## 2020-06-17 RX ADMIN — NIMODIPINE 60 MG: 30 SOLUTION ORAL at 23:21

## 2020-06-17 RX ADMIN — FAMOTIDINE 20 MG: 40 POWDER, FOR SUSPENSION ORAL at 08:39

## 2020-06-17 RX ADMIN — CASTOR OIL AND BALSAM, PERU: 788; 87 OINTMENT TOPICAL at 18:45

## 2020-06-17 RX ADMIN — LACOSAMIDE 100 MG: 50 TABLET, FILM COATED ORAL at 08:38

## 2020-06-17 RX ADMIN — AMLODIPINE BESYLATE 5 MG: 5 TABLET ORAL at 08:38

## 2020-06-17 RX ADMIN — SODIUM CHLORIDE, SODIUM LACTATE, POTASSIUM CHLORIDE, AND CALCIUM CHLORIDE 200 ML/HR: 600; 310; 30; 20 INJECTION, SOLUTION INTRAVENOUS at 09:17

## 2020-06-17 RX ADMIN — LEVETIRACETAM 500 MG: 100 INJECTION, SOLUTION INTRAVENOUS at 05:38

## 2020-06-17 RX ADMIN — NIMODIPINE 60 MG: 30 SOLUTION ORAL at 14:37

## 2020-06-17 RX ADMIN — LABETALOL HYDROCHLORIDE 20 MG: 5 INJECTION INTRAVENOUS at 03:37

## 2020-06-17 RX ADMIN — IPRATROPIUM BROMIDE AND ALBUTEROL SULFATE 3 ML: .5; 3 SOLUTION RESPIRATORY (INHALATION) at 07:11

## 2020-06-17 RX ADMIN — LABETALOL HYDROCHLORIDE 20 MG: 5 INJECTION INTRAVENOUS at 07:25

## 2020-06-17 RX ADMIN — FENTANYL CITRATE 50 MCG: 50 INJECTION INTRAMUSCULAR; INTRAVENOUS at 23:21

## 2020-06-17 RX ADMIN — IPRATROPIUM BROMIDE AND ALBUTEROL SULFATE 3 ML: .5; 3 SOLUTION RESPIRATORY (INHALATION) at 01:04

## 2020-06-17 RX ADMIN — ENOXAPARIN SODIUM 40 MG: 40 INJECTION SUBCUTANEOUS at 14:36

## 2020-06-17 RX ADMIN — Medication 2 DROP: at 05:43

## 2020-06-17 RX ADMIN — FLUDROCORTISONE ACETATE 0.2 MG: 0.1 TABLET ORAL at 17:20

## 2020-06-17 RX ADMIN — LABETALOL HYDROCHLORIDE 20 MG: 5 INJECTION INTRAVENOUS at 18:17

## 2020-06-17 RX ADMIN — LACOSAMIDE 150 MG: 50 TABLET, FILM COATED ORAL at 20:39

## 2020-06-17 RX ADMIN — SODIUM CHLORIDE, SODIUM LACTATE, POTASSIUM CHLORIDE, AND CALCIUM CHLORIDE 200 ML/HR: 600; 310; 30; 20 INJECTION, SOLUTION INTRAVENOUS at 14:42

## 2020-06-17 RX ADMIN — SODIUM CHLORIDE, SODIUM LACTATE, POTASSIUM CHLORIDE, AND CALCIUM CHLORIDE 200 ML/HR: 600; 310; 30; 20 INJECTION, SOLUTION INTRAVENOUS at 03:21

## 2020-06-17 RX ADMIN — Medication 2 DROP: at 14:37

## 2020-06-17 RX ADMIN — IPRATROPIUM BROMIDE AND ALBUTEROL SULFATE 3 ML: .5; 3 SOLUTION RESPIRATORY (INHALATION) at 20:00

## 2020-06-17 RX ADMIN — FENTANYL CITRATE 50 MCG: 50 INJECTION INTRAMUSCULAR; INTRAVENOUS at 21:03

## 2020-06-17 RX ADMIN — FLUDROCORTISONE ACETATE 0.2 MG: 0.1 TABLET ORAL at 08:38

## 2020-06-17 RX ADMIN — NIMODIPINE 60 MG: 30 SOLUTION ORAL at 06:00

## 2020-06-17 RX ADMIN — Medication 2 DROP: at 23:21

## 2020-06-17 RX ADMIN — CHLORHEXIDINE GLUCONATE 15 ML: 0.12 RINSE ORAL at 20:40

## 2020-06-17 RX ADMIN — LEVETIRACETAM 1000 MG: 100 SOLUTION ORAL at 08:39

## 2020-06-17 RX ADMIN — NIMODIPINE 60 MG: 30 SOLUTION ORAL at 11:04

## 2020-06-17 RX ADMIN — CHLORHEXIDINE GLUCONATE 15 ML: 0.12 RINSE ORAL at 08:39

## 2020-06-17 RX ADMIN — SODIUM CHLORIDE, SODIUM LACTATE, POTASSIUM CHLORIDE, AND CALCIUM CHLORIDE 200 ML/HR: 600; 310; 30; 20 INJECTION, SOLUTION INTRAVENOUS at 20:07

## 2020-06-17 RX ADMIN — LEVETIRACETAM 1000 MG: 100 SOLUTION ORAL at 20:39

## 2020-06-17 RX ADMIN — ASPIRIN 81 MG CHEWABLE TABLET 81 MG: 81 TABLET CHEWABLE at 08:39

## 2020-06-17 RX ADMIN — SODIUM CHLORIDE: 900 INJECTION, SOLUTION INTRAVENOUS at 09:17

## 2020-06-17 RX ADMIN — LABETALOL HYDROCHLORIDE 20 MG: 5 INJECTION INTRAVENOUS at 20:39

## 2020-06-17 RX ADMIN — CASTOR OIL AND BALSAM, PERU: 788; 87 OINTMENT TOPICAL at 08:39

## 2020-06-17 RX ADMIN — NIMODIPINE 60 MG: 30 SOLUTION ORAL at 03:21

## 2020-06-17 RX ADMIN — NIMODIPINE 60 MG: 30 SOLUTION ORAL at 18:21

## 2020-06-17 RX ADMIN — FAMOTIDINE 20 MG: 40 POWDER, FOR SUSPENSION ORAL at 20:39

## 2020-06-17 RX ADMIN — IPRATROPIUM BROMIDE AND ALBUTEROL SULFATE 3 ML: .5; 3 SOLUTION RESPIRATORY (INHALATION) at 13:37

## 2020-06-17 RX ADMIN — LABETALOL HYDROCHLORIDE 20 MG: 5 INJECTION INTRAVENOUS at 12:13

## 2020-06-17 NOTE — PROGRESS NOTES
Neurology Progress Note    Patient ID:  Lexi Goncalves  [id number]  48 y.o.  1970    Subjective: This morning, Ms. Verna Nageotte is reported to have suffered a transient episode of oral automatisms, after which she underwent head CT which was stable as well as EEG. At time of evaluation, no staff at bedside with no further adventitiout movements noted.     Current Facility-Administered Medications   Medication Dose Route Frequency    potassium phosphate 15 mmol in 0.9% sodium chloride 250 mL infusion   IntraVENous ONCE    lacosamide (VIMPAT) tablet 150 mg  150 mg Per NG tube Q12H    lactated Ringers infusion  200 mL/hr IntraVENous CONTINUOUS    fludrocortisone (FLORINEF) tablet 0.2 mg  0.2 mg Oral BID    ELECTROLYTE REPLACEMENT PROTOCOL - Potassium and Magnesium  1 Each Other PRN    ELECTROLYTE REPLACEMENT PROTOCOL - Phosphorus  1 Each Other PRN    PHENYLephrine (ELKIN-SYNEPHRINE) 30 mg in 0.9% sodium chloride 250 mL infusion   mcg/min IntraVENous TITRATE    0.9% sodium chloride infusion 250 mL  250 mL IntraVENous PRN    balsam peru-castor oiL (VENELEX) ointment   Topical BID    labetaloL (NORMODYNE;TRANDATE) injection 20 mg  20 mg IntraVENous Q2H PRN    0.9% sodium chloride infusion 250 mL  250 mL IntraVENous PRN    aspirin chewable tablet 81 mg  81 mg Per NG tube DAILY    enoxaparin (LOVENOX) injection 40 mg  40 mg SubCUTAneous Q24H    famotidine (PEPCID) 40 mg/5 mL (8 mg/mL) oral suspension 20 mg  20 mg Per NG tube Q12H    levETIRAcetam (KEPPRA) oral solution 1,000 mg  1,000 mg Per NG tube Q12H    glycopyrrolate (ROBINUL) injection 0.2 mg  0.2 mg IntraMUSCular TID PRN    niMODipine (NYMALIZE) 6 mg/mL oral solution 60 mg  60 mg Per NG tube Q4H    amLODIPine (NORVASC) tablet 5 mg  5 mg Per NG tube DAILY    acetaminophen (TYLENOL) solution 650 mg  650 mg Per NG tube Q4H PRN    0.9% sodium chloride infusion 250 mL  250 mL IntraVENous PRN    albuterol-ipratropium (DUO-NEB) 2.5 MG-0.5 MG/3 ML  3 mL Nebulization Q6H RT    glucose chewable tablet 16 g  4 Tab Oral PRN    glucagon (GLUCAGEN) injection 1 mg  1 mg IntraMUSCular PRN    dextrose 10% infusion 0-250 mL  0-250 mL IntraVENous PRN    albuterol (PROVENTIL VENTOLIN) nebulizer solution 2.5 mg  2.5 mg Nebulization Q6H PRN    fentaNYL citrate (PF) injection  mcg   mcg IntraVENous Q1H PRN    polyvinyl alcohol-povidone (NATURAL TEARS) 0.5-0.6 % ophthalmic solution 2 Drop  2 Drop Both Eyes Q8H    docusate (COLACE) 50 mg/5 mL oral liquid 100 mg  100 mg Oral BID    bisacodyL (DULCOLAX) suppository 10 mg  10 mg Rectal DAILY PRN    chlorhexidine (ORAL CARE KIT) 0.12 % mouthwash 15 mL  15 mL Oral Q12H    ondansetron (ZOFRAN) injection 4 mg  4 mg IntraVENous Q4H PRN    docusate (COLACE) 50 mg/5 mL oral liquid 100 mg  100 mg Oral BID PRN          Objective:     Patient Vitals for the past 8 hrs:   BP Temp Pulse Resp SpO2 Weight   06/17/20 1100 176/80  62 21 100 %    06/17/20 1000 169/88  63 27 100 %    06/17/20 0900 156/79  69 23 100 % 102 kg (224 lb 13.9 oz)   06/17/20 0800 163/76 99.4 °F (37.4 °C) 75 27 100 %    06/17/20 0711     100 %    06/17/20 0600 (!) 182/91  72 (!) 33 100 %    06/17/20 0500 162/86  64 13 100 %    06/17/20 0400 125/57 98.9 °F (37.2 °C) 74 16 100 %        06/17 0701 - 06/17 1900  In: -   Out: 600 [Urine:400; Drains:200]  06/15 1901 - 06/17 0700  In: 8755.4 [I.V.:7035.4]  Out: 9600 [Urine:9500; Drains:100]    Lab Review   Recent Results (from the past 24 hour(s))   METABOLIC PANEL, BASIC    Collection Time: 06/16/20  3:45 PM   Result Value Ref Range    Sodium 143 136 - 145 mmol/L    Potassium 3.6 3.5 - 5.1 mmol/L    Chloride 114 (H) 97 - 108 mmol/L    CO2 23 21 - 32 mmol/L    Anion gap 6 5 - 15 mmol/L    Glucose 126 (H) 65 - 100 mg/dL    BUN 12 6 - 20 MG/DL    Creatinine 0.44 (L) 0.55 - 1.02 MG/DL    BUN/Creatinine ratio 27 (H) 12 - 20      GFR est AA >60 >60 ml/min/1.73m2    GFR est non-AA >60 >60 ml/min/1.73m2    Calcium 7.9 (L) 8.5 - 10.1 MG/DL   PROCALCITONIN    Collection Time: 06/17/20  5:14 AM   Result Value Ref Range    Procalcitonin 0.15 ng/mL   MAGNESIUM    Collection Time: 06/17/20  5:14 AM   Result Value Ref Range    Magnesium 2.2 1.6 - 2.4 mg/dL   PHOSPHORUS    Collection Time: 06/17/20  5:14 AM   Result Value Ref Range    Phosphorus 2.4 (L) 2.6 - 4.7 MG/DL   METABOLIC PANEL, COMPREHENSIVE    Collection Time: 06/17/20  5:14 AM   Result Value Ref Range    Sodium 141 136 - 145 mmol/L    Potassium 3.5 3.5 - 5.1 mmol/L    Chloride 110 (H) 97 - 108 mmol/L    CO2 25 21 - 32 mmol/L    Anion gap 6 5 - 15 mmol/L    Glucose 156 (H) 65 - 100 mg/dL    BUN 12 6 - 20 MG/DL    Creatinine 0.49 (L) 0.55 - 1.02 MG/DL    BUN/Creatinine ratio 24 (H) 12 - 20      GFR est AA >60 >60 ml/min/1.73m2    GFR est non-AA >60 >60 ml/min/1.73m2    Calcium 8.0 (L) 8.5 - 10.1 MG/DL    Bilirubin, total 0.3 0.2 - 1.0 MG/DL    ALT (SGPT) 38 12 - 78 U/L    AST (SGOT) 26 15 - 37 U/L    Alk. phosphatase 83 45 - 117 U/L    Protein, total 6.0 (L) 6.4 - 8.2 g/dL    Albumin 2.2 (L) 3.5 - 5.0 g/dL    Globulin 3.8 2.0 - 4.0 g/dL    A-G Ratio 0.6 (L) 1.1 - 2.2     CBC WITH AUTOMATED DIFF    Collection Time: 06/17/20  5:14 AM   Result Value Ref Range    WBC 6.2 3.6 - 11.0 K/uL    RBC 3.32 (L) 3.80 - 5.20 M/uL    HGB 7.5 (L) 11.5 - 16.0 g/dL    HCT 26.1 (L) 35.0 - 47.0 %    MCV 78.6 (L) 80.0 - 99.0 FL    MCH 22.6 (L) 26.0 - 34.0 PG    MCHC 28.7 (L) 30.0 - 36.5 g/dL    RDW 26.1 (H) 11.5 - 14.5 %    PLATELET 792 305 - 449 K/uL    MPV 11.2 8.9 - 12.9 FL    NRBC 0.0 0  WBC    ABSOLUTE NRBC 0.00 0.00 - 0.01 K/uL    NEUTROPHILS 77 (H) 32 - 75 %    LYMPHOCYTES 11 (L) 12 - 49 %    MONOCYTES 8 5 - 13 %    EOSINOPHILS 3 0 - 7 %    BASOPHILS 0 0 - 1 %    IMMATURE GRANULOCYTES 1 (H) 0.0 - 0.5 %    ABS. NEUTROPHILS 4.7 1.8 - 8.0 K/UL    ABS. LYMPHOCYTES 0.7 (L) 0.8 - 3.5 K/UL    ABS. MONOCYTES 0.5 0.0 - 1.0 K/UL    ABS.  EOSINOPHILS 0.2 0.0 - 0.4 K/UL ABS. BASOPHILS 0.0 0.0 - 0.1 K/UL    ABS. IMM. GRANS. 0.1 (H) 0.00 - 0.04 K/UL    DF SMEAR SCANNED      RBC COMMENTS ANISOCYTOSIS  3+        RBC COMMENTS MICROCYTOSIS  1+        RBC COMMENTS SCHISTOCYTES  1+        RBC COMMENTS HYPOCHROMIA  2+       TCD INTRACRANIAL ARTERIES COMPLETE    Collection Time: 06/17/20 10:51 AM   Result Value Ref Range    Right Lindegaard Ratio 1.7     Right MCA 1  cm/s    Right MCA 1 EDV 40 cm/s    Right MCA 1 Mean Velocity 71 cm/s    Right PANCHITO  cm/s    Right PANCHITO EDV 62 cm/s    Right PANCHITO Mean Velocity 89 cm/s    Right ICA PSV 67 cm/s    Right ICA EDV 21 cm/s    Right ICA Mean Velocity 36 cm/s    Right PCA 1  cm/s    Right PCA 1 EDV 32 cm/s    Right PCA 1 Mean Velocity 55 cm/s    Right External ICA PSV 79 cm/s    Right External ICA EDV 25 cm/s    Right External ICA Mean Velocity 43 cm/s    Left MCA 1  cm/s    Left MCA 1 EDV 53 cm/s    Left MCA 1 Mean Velocity 84 cm/s    Left PANCHITO  cm/s    Left PANCHITO EDV 78 cm/s    Left PANCHITO Mean Velocity 119 cm/s    Left ICA  cm/s    Left External ICA PSV 85 cm/s    Left ICA EDV 45 cm/s    Left External ICA EDV 27 cm/s    Left ICA Mean Velocity 70 cm/s    Left External ICA Mean Velocity 46 cm/s    Left PCA 1  cm/s    Left PCA 1 EDV 65 cm/s    Left PCA 1 Mean Velocity 96 cm/s    Left Lindegaard Ratio 1.8     Basilar Artery PSV 67 cm/s    Basilar Artery EDV 30 cm/s    Basilar Artery Mean Angel 42 cm/s    Right Vertebral Mean Velocity 29 cm/s    Left Vertebral Mean Velocity 26 cm/s    Right Vertebral PSV 52 cm/s    Right Vertebral EDV 18 cm/s    Left Vertebral PSV 43 cm/s    Left Vertebral EDV 18 cm/s     Physical examination:  Middle-aged woman appearing younger than stated age. HEENT is largely unremarkable other than left eye ptosis. Neck demonstrates interval placement of tracehostomy. Cardiovascular, pulmonary and abdominal examinations are deferred. Extremities are wam/dry.  Neurologically, patient will open right eye in response to voice, wiggle right foot but otherwise in non-interactive. Does not appreciably move left arm/leg to command, moves distal right upper/lower extremity to command. Remainder of examination deferred. Assessment:     Lauren Diaz is a 48year old female currently admitted to the NICU for ongoing management of ruptured left MCA bifurcation aneurysm s/p coiling (11 days ago) with Neurology evaluation requested for episode of oral automatisms    Plan:   Breakthrough seizure:   To this point, patient has not had any definitive seizure other than at present at time of [facility]  Routine EEG previously completed demonstrated focal area of cortical irritability, subsequent 24 EEG was unremarkable  EEG today largely uninterruptable, without evidence for rampant hyperexcitability or seizure  Regarding management going forward, lacosamide has been increased to 150mg BID  However, in balancing treatment of isolated, self-limited seizure and effects of medications would not be inclined to treat every possible seizure going forward  Thus, if recurrent oral automatisms were noted sporadically over next days to weeks, would increase lacosmide to 200mg BID  However, would not be inclined to change for another one to two isolated events  Similarly unless patient suffers persistent cognitive decline or demonstrates autonomic dysfunction (including fluctuant pupillary changes) would not obtain EEG for isolated event (and would order continuous monitoring at that time)  Likewise, unless patient has persistent seizure > 3 minutes or unless significant hemodynamic compromise is noted before then, would not treat with benzodiazepines     Neurology will be available as needed, please call with questions/concerns      Signed:  Nilo Redman MD  6/17/2020  11:47 AM

## 2020-06-17 NOTE — PROGRESS NOTES
0730: Bedside and Verbal shift change report given to Sunitha Sanchez RN (oncoming nurse) by Dev Stallworth RN (offgoing nurse). Report included the following information SBAR, Kardex, Intake/Output, MAR, Recent Results, Cardiac Rhythm NSR, Alarm Parameters  and Dual Neuro Assessment. Shift Summary: Orders to maintain -130 & SBP <200. PRN Labetalol given x 2. Patient alert, tracks & focuses, no command following for this RN, moving extremities x4. NGT with Bolus feeds per orders. FlexiSeal output 225. Martinez. Patient with tracheostomy on Trach collar entire shift, FiO2 100%. 1930: Bedside and Verbal shift change report given to Dev Stallworth RN (oncoming nurse) by Sunitha Sanchez RN (offgoing nurse). Report included the following information SBAR, Kardex, Procedure Summary, Intake/Output, MAR, Recent Results, Cardiac Rhythm NSR, Alarm Parameters  and Dual Neuro Assessment.

## 2020-06-17 NOTE — PROCEDURES
[facility]  1970  [id number]    Date of Procedure: 6/17/2020    Procedure: Arterial line placement     Indication: Direct BP monitoring on Vasoactive infusions      Replacement of existing arterial line. Consent Verified. Left arm Rahat's test was performed and normal. Left wrist was then prepped in sterile manner and area cleaned with 2% chlorhexidine wash. 3 cc of 1% lidocaine given in the skin and soft tissue at the site. Using an Arrow 20 gauge catheter over 25 guage introducer needle with integral wire guide, the left radial artery was cannulated using ultrasound guidance without difficulty. Good pulsatile blood flow obtained. Arterial line tubing was then connected with crisp arterial waveform noted on the bedside monitor. Catheter secured & Biopatch applied. Sterile Tegaderm placed.      DARREN Buckley  June 17, 2020  6:36 AM

## 2020-06-17 NOTE — PROGRESS NOTES
Neurocritical Care Brief Progress Note:    HPI: Sejal Moreno is a 59-year-old female with a past medical history of HTN. According to patient's mother, the patient began having a headache on 5/31/20 and had been taking BC Powder with aspirin in it for the headache. She recently stopped taking her BP medications. According to her mother she does not smoke, drink alcohol or use recreational drugs. The patient was brought to the [facility] ER via EMS on 6/4/420  after her coworkers found her unresponsive outside of her place of work. 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 On arrival to the ER she was found to be seizing. A stat CT of her head was performed which showed extensive subarachnoid hemorrhage. She was extremely hypertensive in the 240's and was given labetalol and started on a Cardene drip. She was emergently intubated and given Keppra and Ativan as well. Neurosurgery and Neurointerventional Surgery were consulted and the patient was transferred to the [facility] for a higher level of care. 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 Extensive lung infiltrates were also noted. Patient was taken for cerebral angiogram the morning of 06/05/20 and coil embolization of the left MCA aneurysm was completed. The right MCA aneurysm had a very wide neck incorporating both M2s. It could not be treated endovascularly without stents.  May need to be clipped by Neurosurgery.     Procedures: 06/05/20 and coil embolization of the left MCA aneurysm was completeds/p cerebral angiogram for right PNACHITO A1 angioplasty for vasospasm, transarterial infusion of verapamil on 6/9,  S/p angiogram on 6/10 for left ICA/MCA angioplasty and transarterial infusion of verapamil for vasospasm treatment, s/p angiogram on 6/11 and 6/13 for transarterial infusion of verapamil for vasospasm treatment. Tracheostomy placement today 6/16 by Dr. Rain Strange in thoracic surgery. Physical Exam:  GENERAL: Awake and alert, sating 100% on trach collar. No sedation.  RR 36  SKIN: Warm, dry, color appropriate for ethnicity. Pedal pulses positive bilaterally. Swelling noted in left arm. A-line was removed earlier today from that arm. No pitting edema noted in other extremities.      Neurologic Exam:  Mental Status:          Awake and alert. No command following. Still no command following. EOMI. Follows examiner with eyes.     Language:                   Mute.      Cranial Nerves:          Right pupil is 3 mm and brisk. Left pupil 4mm and brisk.                                       Blinks to threat bilaterally.        Motor:                                                                Bulk and tone normal.                                       No involuntary movements. Occasional spontaneous movement noted with lifting bilateral arms off of the bed and left foot off of the bed.      Sensation:                   Withdraws to pain x 4.      Reflexes:                     Mute Babinskis.      Coordination & Gait:   Unable to assess due to patient factors.     PLAN: SAH due to ruptured cerebral aneurysm, Quinones Sales 5, An Grade 3/4. PBD 16   s/p cerebral angiogram with coil embolization of left MCA bifurcation aneurysm on 6/5. Additionally, patient has 5X3 right MCA bifurcation aneurysm with very wide neck which will need to be treated with stents vs. clipping  -Strict I/Os. IVFs Changed NS to LR due to chloride of 114. Increased to 200 ml/hr due to negative volume status. - Continue Florinef 0.2 mg bid for prevention of cerebral salt wasting in the setting of SAH, aid in increasing plasma volume. Urine studies sent 6/15 showed serum osmolality 296, urine osmolality 322, specific gravity normal at 1.015. Urine random sodium at 114  -Daily TCDs.  TCDs completed today showing an improvement in vasospasm again.  -Nimotop Day 12/21  -Continue seizure ppx with Kepra 1000 bid, Vimpat 100 bid  -MAP goal 100-130. Treat SBP greater than 200.   -Tachypnea - patient on trach collar sating 100%. May need to placed back on the ventilator tonight due to elevated RR. Discussed with Intensivist.     Addendum: 6/17/20 5:07 am. Nurse alerted me that patient was having unusual lip smacking behavior that she has not had before. This was intermittent on and off for several minutes. This may be consistent with partial seizure. Patient still tracked examiner with her eyes before and directly after episodes. She does not follow commands at baseline. Still spontaneously moving extremities. Her EEG performed on 6/4 did show occasional epileptiform disturbance seen in the left frontal area which may represent a partial onset seizure focus, CTH on 6/13 did show evolving subacute infarctions in the left temporal, bilateral frontal, bilateral median frontal lobes, and left paramedian parietal lobe are evolving. May just need to adjustments of AEDs. -Keppra, Vimpat levels ordered  -Keppra 500 mg IV now. Continue Keppra and Vimpat bid.   -CT Head this am  -EEG  -Will have Dr. Brittni Lugo in Neurology look at her this am as well. May need to have continuous EEG placed again. Discussed plan with Dr. Domonique Lozoya and Eufemia Sanchez NP Intensivists.      Deanne Vora NP  Neurocritical Care Nurse Practitioner  [phone]

## 2020-06-17 NOTE — PROGRESS NOTES
Thoracic Surgery Simple Progress Note    Admit Date: 2020  POD: * No surgery found *      Procedure:  Percutaneous tracheostomy 2020     Subjective:         Review of Systems:    Unable to obtain     Objective:     Blood pressure (!) 182/91, pulse 72, temperature 98.9 °F (37.2 °C), resp. rate (!) 33, height 5' 4\" (1.626 m), weight 226 lb 13.7 oz (102.9 kg), SpO2 100 %. Temp (24hrs), Av.4 °F (37.4 °C), Min:98.9 °F (37.2 °C), Max:99.8 °F (37.7 °C)        Hemodynamics    PAP    CO    CI    No intake/output data recorded. 06/15 1901 -  0700  In: 8755.4 [I.V.:7035.4]  Out: 9600 [Urine:9500; Drains:100]    EXAM:  GENERAL: VSS, awake, and restless  HEART:  regular rate and rhythm  LUNG: clear to auscultation bilaterally, Trach in place, small to moderate amount of oozing noted. On trach collar  NEURO: Unable to assess, EEG in process   EXTREMITIES:No evidence of DVT seen on physical exam.  GI/: Abd soft, nonterder with + bowel sounds.  Martinez in place    Labs:  Recent Results (from the past 24 hour(s))   TCD INTRACRANIAL ARTERIES COMPLETE    Collection Time: 20  9:35 AM   Result Value Ref Range    Right Lindegaard Ratio 1.6     Right MCA 1  cm/s    Right MCA 1 EDV 29 cm/s    Right MCA 1 Mean Velocity 58 cm/s    Right PANCHITO  cm/s    Right PANCHITO EDV 51 cm/s    Right PANCHITO Mean Velocity 77 cm/s    Right ICA PSV 85 cm/s    Right ICA EDV 29 cm/s    Right ICA Mean Velocity 48 cm/s    Right PCA 1 PSV 71 cm/s    Right PCA 1 EDV 20 cm/s    Right PCA 1 Mean Velocity 37 cm/s    Right External ICA PSV 65 cm/s    Right External ICA EDV 21 cm/s    Right External ICA Mean Velocity 36 cm/s    Left MCA 1  cm/s    Left MCA 1 EDV 44 cm/s    Left MCA 1 Mean Velocity 65 cm/s    Left PANCHITO  cm/s    Left PANCHITO EDV 73 cm/s    Left PANCHITO Mean Velocity 110 cm/s    Left ICA  cm/s    Left External ICA PSV 64 cm/s    Left ICA EDV 43 cm/s    Left External ICA EDV 24 cm/s    Left ICA Mean Velocity 65 cm/s Left External ICA Mean Velocity 37 cm/s    Left PCA 1  cm/s    Left PCA 1 EDV 46 cm/s    Left PCA 1 Mean Velocity 73 cm/s    Left Lindegaard Ratio 1.8     Basilar Artery PSV 71 cm/s    Basilar Artery EDV 25 cm/s    Basilar Artery Mean Angel 40 cm/s    Right Vertebral Mean Velocity 31 cm/s    Left Vertebral Mean Velocity 28 cm/s    Right Vertebral PSV 56 cm/s    Right Vertebral EDV 19 cm/s    Left Vertebral PSV 50 cm/s    Left Vertebral EDV 17 cm/s   METABOLIC PANEL, BASIC    Collection Time: 06/16/20  3:45 PM   Result Value Ref Range    Sodium 143 136 - 145 mmol/L    Potassium 3.6 3.5 - 5.1 mmol/L    Chloride 114 (H) 97 - 108 mmol/L    CO2 23 21 - 32 mmol/L    Anion gap 6 5 - 15 mmol/L    Glucose 126 (H) 65 - 100 mg/dL    BUN 12 6 - 20 MG/DL    Creatinine 0.44 (L) 0.55 - 1.02 MG/DL    BUN/Creatinine ratio 27 (H) 12 - 20      GFR est AA >60 >60 ml/min/1.73m2    GFR est non-AA >60 >60 ml/min/1.73m2    Calcium 7.9 (L) 8.5 - 10.1 MG/DL   PROCALCITONIN    Collection Time: 06/17/20  5:14 AM   Result Value Ref Range    Procalcitonin 0.15 ng/mL   MAGNESIUM    Collection Time: 06/17/20  5:14 AM   Result Value Ref Range    Magnesium 2.2 1.6 - 2.4 mg/dL   PHOSPHORUS    Collection Time: 06/17/20  5:14 AM   Result Value Ref Range    Phosphorus 2.4 (L) 2.6 - 4.7 MG/DL   METABOLIC PANEL, COMPREHENSIVE    Collection Time: 06/17/20  5:14 AM   Result Value Ref Range    Sodium 141 136 - 145 mmol/L    Potassium 3.5 3.5 - 5.1 mmol/L    Chloride 110 (H) 97 - 108 mmol/L    CO2 25 21 - 32 mmol/L    Anion gap 6 5 - 15 mmol/L    Glucose 156 (H) 65 - 100 mg/dL    BUN 12 6 - 20 MG/DL    Creatinine 0.49 (L) 0.55 - 1.02 MG/DL    BUN/Creatinine ratio 24 (H) 12 - 20      GFR est AA >60 >60 ml/min/1.73m2    GFR est non-AA >60 >60 ml/min/1.73m2    Calcium 8.0 (L) 8.5 - 10.1 MG/DL    Bilirubin, total 0.3 0.2 - 1.0 MG/DL    ALT (SGPT) 38 12 - 78 U/L    AST (SGOT) 26 15 - 37 U/L    Alk.  phosphatase 83 45 - 117 U/L    Protein, total 6.0 (L) 6.4 - 8.2 g/dL    Albumin 2.2 (L) 3.5 - 5.0 g/dL    Globulin 3.8 2.0 - 4.0 g/dL    A-G Ratio 0.6 (L) 1.1 - 2.2     CBC WITH AUTOMATED DIFF    Collection Time: 06/17/20  5:14 AM   Result Value Ref Range    WBC 6.2 3.6 - 11.0 K/uL    RBC 3.32 (L) 3.80 - 5.20 M/uL    HGB 7.5 (L) 11.5 - 16.0 g/dL    HCT 26.1 (L) 35.0 - 47.0 %    MCV 78.6 (L) 80.0 - 99.0 FL    MCH 22.6 (L) 26.0 - 34.0 PG    MCHC 28.7 (L) 30.0 - 36.5 g/dL    RDW 26.1 (H) 11.5 - 14.5 %    PLATELET 382 659 - 863 K/uL    MPV 11.2 8.9 - 12.9 FL    NRBC 0.0 0  WBC    ABSOLUTE NRBC 0.00 0.00 - 0.01 K/uL    NEUTROPHILS 77 (H) 32 - 75 %    LYMPHOCYTES 11 (L) 12 - 49 %    MONOCYTES 8 5 - 13 %    EOSINOPHILS 3 0 - 7 %    BASOPHILS 0 0 - 1 %    IMMATURE GRANULOCYTES 1 (H) 0.0 - 0.5 %    ABS. NEUTROPHILS 4.7 1.8 - 8.0 K/UL    ABS. LYMPHOCYTES 0.7 (L) 0.8 - 3.5 K/UL    ABS. MONOCYTES 0.5 0.0 - 1.0 K/UL    ABS. EOSINOPHILS 0.2 0.0 - 0.4 K/UL    ABS. BASOPHILS 0.0 0.0 - 0.1 K/UL    ABS. IMM. GRANS. 0.1 (H) 0.00 - 0.04 K/UL    DF SMEAR SCANNED      RBC COMMENTS ANISOCYTOSIS  3+        RBC COMMENTS MICROCYTOSIS  1+        RBC COMMENTS SCHISTOCYTES  1+        RBC COMMENTS HYPOCHROMIA  2+           Assessment:   No evidence of DVT.   Active Problems:    SAH (subarachnoid hemorrhage) ([facility] [address] (6/4/2020)      Subarachnoid hemorrhage from middle cerebral artery aneurysm, left ([facility] [place] [address] (6/4/2020)      RIGHT middle cerebral artery aneurysm (6/4/2020)      Cerebral vasospasm (6/4/2020)      Respiratory failure ([address] (6/15/2020)      Hypoxic ischemic encephalopathy (HIE), unspecified severity (6/15/2020)        Plan/Recommendations:   Continue supportive care  Trach sutures out next week on 6/23/20    See orders    Signed By: Adi VYAS

## 2020-06-17 NOTE — PROGRESS NOTES
Neurointerventional Surgery Progress Note  Lexa Valles NP   Neurocritical Care Nurse Practitioner  [phone]          Admit Date: 6/4/2020        Daily Progress Note: 6/17/2020   LOS: 13 days      S/P:  POD 11 Cerebral angiogram with coil embolization of ruptured left MCA bifurcation aneurysm on 6/5/2020    ** Delayed Presentation ** PB Day 17    Interval History/Subjective:   Patient with continued polyuria - LR increased to 200/hr . Serum sodium remains stable today. Patient is alert and tracking with eyes. Continues on trach collar with some tachypnea. New a-line placed overnight. Patient with some reported seizure activity overnight, lip smacking which was new. EEG stat performed this AM. Head CT obtained which is stable. Neurology notified and involved. Unable to perform a ROS due to intubation and altered mental status. Assessment & Plan:      Active Problems:    SAH (subarachnoid hemorrhage) ([address] (6/4/2020)      Subarachnoid hemorrhage from middle cerebral artery aneurysm, left ([address] (6/4/2020)      RIGHT middle cerebral artery aneurysm (6/4/2020)      Cerebral vasospasm (6/4/2020)      Respiratory failure ([address] (6/15/2020)      Hypoxic ischemic encephalopathy (HIE), unspecified severity (6/15/2020)      1.) SAH due to ruptured cerebral aneurysm, [place] [address] 5, An Grade 3/4              - s/p cerebral angiogram with coil embolization of left MCA bifurcation aneurysm on 6/5       Additionally, patient has 5X3 right MCA bifurcation aneurysm with very wide neck which will               need to be treated with stents vs. Clipping              - Continue Nimotop Day 13 of 21 to prevent delayed cerebral ischemia              - continue maintenance fluids at NS at 125 ml/hr to maintain euvolemia              - Strict I's and O's              - TCDs daily, TCDs today again show improvement in vasospasm              - ECHO shows EF 60-65%, mild concentric hypertrophy, mildly dilated left atrium - continue every hour neuro checks              - Florinef to 0.2 mg BID (initially started 6/13) for prevention of cerebral salt wasting in the setting of SAH, aid in increasing plasma volume              - Continue MAP goal 100-130. Keep SBP <200 Cardene/Fabien/Hydralazine/Labetalol PRN              - PT/OT/SLP evals when appropriate    2.) Seizure              - Due to #1, no prior hx of seizure              - Continue Keppra 1000 mg BID               - continue Vimpat increased to 150 mg BID 6/17/2020              - Seizure precautions              - repeat EEG 6/17 d/t seizure like activity overnight, awaiting results              -  EEG on 6/6 showed an abnormal EEG due to slowing of the background rhythms with intermittent rhythmic delta activity seen in both frontal head regions. Occasional sharp wave discharges in the left frontal area. EEG is suggestive of mild-to-moderate generalized encephalopathic process, nonspecific in type.   In addition, there was occasional epileptiform disturbance seen in the left frontal area which may represent a partial onset seizure focus.              - Neurology following                3.) Cerebral Edema (resolved)              - received one dose of mannitol, started 3% saline on 6/8, discontinued 6/14              - serum sodium remains stable                4.) Acute hypoxic respiratory failure in the setting of likely neurogenic pulmonary edema- resolved              - now on trach collar , #6 Arun              - Intensivist managing     5.) Multiple Acute Ischemic Infarctions                - Imaging pattern is consistent with Hypoxic-Ischemic Encephalopathy (HIE)                - Continue aspirin 81 mg daily for stroke prevention           6.) Cerebral Vasospasm                 - s/p cerebral angiogram for right PANCHITO A1 angioplasty for vasospasm, transarterial infusion of verapamil on 6/9,  S/p angiogram on 6/10 for left ICA/MCA angioplasty and transarterial infusion of verapamil for vasospasm treatment, s/p angiogram on 6/11 and 6/13 for transarterial infusion of verapamil for vasospasm treatment                 - Strict I's and O's                  - plans as stated in #1     7.) Fever                 - fevers continue to spike intermittently , normal WBC                - completed course of Zosyn, CXR shows residual right lower lung airspace disease               - possibly febrile in the setting of SAH, paired blood cultures NGTD               - respiratory cultures growing Klebsiella Pneumonia and Citrobacter Koseria 6/17                - CTA of chest 6/7 negative for PE                 - Intensivist following     8.) Polyuria                  - serum sodium stable at 141                  - urine studies show serum osmolality 296, urine osmolality 322, specific gravity normal at 1.015. Urine random sodium at 114                  - Florinef to 0.2 mg BID for prevention of cerebral salt wasting                  - monitor BMP every 12 hours    Activity: Bed rest  DVT ppx: SCDs, Lovenox  Disposition: TBD    Plan d/w Dr. Katerin Silverman, Dr. Gerson Becerra, and RN. Admission Summary:     Marion Corona is a 48 y.o. 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 On arrival to ED, pt was unresponsive and noted to be seizing. A stat CT of her head was performed there, which showed extensive SAH. Pt was then intubated emergently. She was also extremely hypertensive with SBPs in 240s. She was given Keppra and Ativan, and placed on cardene drip for BP control. NSGY and NIS were then consulted and the patient was transferred to [facility] for higher level of care. En route, paramedics administered 7.5 mg of Versed, 200mcg of Fentanyl, and 10 mg of labetalol for BP control.  On arrival, CTA of her head and neck was obtained, which showed a 4.4 x 4.1 mm right MCA trifurcation aneurysm and  a 2.2 x 3.1 mm left MCA trifurcation aneurysm. Of note, CTA head/neck also showed pulmonary infiltrates suspicious for COVID-19 infection and the patient has been tested for COVID which was negative. 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 Mother reported pt does not smoke, does not drink alcohol or use street drugs. The mother also reported that the patient began complaining of a headache on 5/31/2020 and has been taking BC Powder (aspirin) for the headache. She also reported that the pt has a hx of HTN and is supposed to be on BP medications, but recently stopped taking her medications. The patient underwent a cerebral angiogram on 6/5 by Dr. Katerin Silverman for coil embolization of a ruptured left MCA bifurcation aneurysm.     Current Facility-Administered Medications   Medication Dose Route Frequency Provider Last Rate Last Dose    potassium phosphate 15 mmol in 0.9% sodium chloride 250 mL infusion   IntraVENous ONCE Cuauhtemoc Doty MD        lacosamide (VIMPAT) tablet 150 mg  150 mg Per NG tube Q12H Morgan Palma MD        lactated Ringers infusion  200 mL/hr IntraVENous CONTINUOUS Judge Sandhoff,  mL/hr at 06/17/20 0917 200 mL/hr at 06/17/20 0917    fludrocortisone (FLORINEF) tablet 0.2 mg  0.2 mg Oral BID Raman Avery NP   0.2 mg at 06/17/20 0838    ELECTROLYTE REPLACEMENT PROTOCOL - Potassium and Magnesium  1 Each Other PRN Raman Avery NP        ELECTROLYTE REPLACEMENT PROTOCOL - Phosphorus  1 Each Other PRN Raman Avery NP        PHENYLephrine (ELKIN-SYNEPHRINE) 30 mg in 0.9% sodium chloride 250 mL infusion   mcg/min IntraVENous TITRATE Raman Avery NP        0.9% sodium chloride infusion 250 mL  250 mL IntraVENous PRN Dwyane Ports, NP        balsam peru-castor oiL (VENELEX) ointment   Topical BID Leon Berry DO        labetaloL (NORMODYNE;TRANDATE) injection 20 mg  20 mg IntraVENous Q2H PRN Lukasz Domínguez NP   20 mg at 06/17/20 0725    0.9% sodium chloride infusion 250 mL  250 mL IntraVENous PRN FABIAN Adorno        aspirin chewable tablet 81 mg  81 mg Per NG tube DAILY Indu Sierra MD   81 mg at 06/17/20 0839    enoxaparin (LOVENOX) injection 40 mg  40 mg SubCUTAneous Q24H Leon Berry DO   40 mg at 06/16/20 1441    famotidine (PEPCID) 40 mg/5 mL (8 mg/mL) oral suspension 20 mg  20 mg Per NG tube Q12H Leon Berry DO   20 mg at 06/17/20 0839    levETIRAcetam (KEPPRA) oral solution 1,000 mg  1,000 mg Per NG tube Q12H Leon Berry DO   1,000 mg at 06/17/20 0839    glycopyrrolate (ROBINUL) injection 0.2 mg  0.2 mg IntraMUSCular TID PRN Barry Parekh NP        niMODipine (NYMALIZE) 6 mg/mL oral solution 60 mg  60 mg Per NG tube Q4H Lukasz Domínguez NP   60 mg at 06/17/20 1104    amLODIPine (NORVASC) tablet 5 mg  5 mg Per NG tube DAILY Hilda CressonDARREN davis   5 mg at 06/17/20 3121    acetaminophen (TYLENOL) solution 650 mg  650 mg Per NG tube Q4H PRN Shannan Austin NP   650 mg at 06/15/20 2101    0.9% sodium chloride infusion 250 mL  250 mL IntraVENous PRN FABIAN Adorno        albuterol-ipratropium (DUO-NEB) 2.5 MG-0.5 MG/3 ML  3 mL Nebulization Q6H RT [facility], ACNP   3 mL at 06/17/20 8673    glucose chewable tablet 16 g  4 Tab Oral PRN FABIAN Adorno        glucagon (GLUCAGEN) injection 1 mg  1 mg IntraMUSCular PRN FABIAN Adorno        dextrose 10% infusion 0-250 mL  0-250 mL IntraVENous PRN FABIAN Adorno        albuterol (PROVENTIL VENTOLIN) nebulizer solution 2.5 mg  2.5 mg Nebulization Q6H PRN FABIAN Adorno        fentaNYL citrate (PF) injection  mcg   mcg IntraVENous Q1H PRN DARREN Fisher   50 mcg at 06/16/20 1442    polyvinyl alcohol-povidone (NATURAL TEARS) 0.5-0.6 % ophthalmic solution 2 Drop  2 Drop Both Eyes Q8H Mohamud Areas, ACNP   2 Drop at 20 0543    docusate (COLACE) 50 mg/5 mL oral liquid 100 mg  100 mg Oral BID Mohamud Areas, ACNP   Stopped at 20 0900    bisacodyL (DULCOLAX) suppository 10 mg  10 mg Rectal DAILY PRN Mohamud Areas, ACNP        chlorhexidine (ORAL CARE KIT) 0.12 % mouthwash 15 mL  15 mL Oral Q12H FABIAN Galaviz   15 mL at 20 0839    ondansetron (ZOFRAN) injection 4 mg  4 mg IntraVENous Q4H PRN FABIAN Godfrey        docusate (COLACE) 50 mg/5 mL oral liquid 100 mg  100 mg Oral BID PRN FABIAN Galaviz            No Known Allergies    Review of Systems:  Review of systems not obtained due to patient factors. Objective:     Vital signs  Temp (24hrs), Av.4 °F (37.4 °C), Min:98.9 °F (37.2 °C), Max:99.8 °F (37.7 °C)    07 -  190  In: -   Out: 600 [Urine:400; Drains:200]  06/15 1901 -  0700  In: 8755.4 [I.V.:7035.4]  Out: 9529 [Urine:9500; Drains:100]    Visit Vitals  /79   Pulse 69   Temp 99.4 °F (37.4 °C)   Resp 23   Ht 5' 4\" (1.626 m)   Wt 224 lb 13.9 oz (102 kg)   SpO2 100%   BMI 38.60 kg/m²    O2 Flow Rate (L/min): 13 l/min O2 Device: Tracheal collar   Vitals:    20 0600 20 0711 20 0800 20 0900   BP: (!) 182/91  163/76 156/79   Pulse: 72  75 69   Resp: (!) 33  27 23   Temp:   99.4 °F (37.4 °C)    SpO2: 100% 100% 100% 100%   Weight:    224 lb 13.9 oz (102 kg)   Height:          Physical Exam:  GENERAL: NAD,trach collar,  alert   EYE: conjunctivae/corneas clear. Right pupil 3 mm, brisk response to light. Left pupil 4 mm, sluggish response to light. Keeps left eye closed   LUNG: lungs coarse bilaterally    HEART: regular rate and rhythm, S1, S2 normal, no murmur, click, rub or gallop  EXTREMITIES:  extremities normal, atraumatic, no cyanosis, 2+ pitting peripheral edema, distal pulses 2+ bilaterally   SKIN: Skin warm to touch. Right and left groin site clean, dry, and intact.  No hematoma, bruising, or bleeding noted. NEUROLOGIC: Intubated. Alert. Eyes will open spontaneously. No command following. Right pupil 3 mm, brisk response to light. Left pupil 4 mm sluggish response to light. Left eye ptosis noted. Focuses and tracks with eyes, with the exception of inability to track to the right with left eye. Moves all extremities spontaneously. No involuntary movements. Gait deferred. Unable to assess language, sensation, and coordination. Imaging:    CT Head on 6/17/20 shows:     IMPRESSION:     1. Persistence of the previously described subtle areas of subarachnoid  hemorrhage. 2. Persistence of the previously described left inferior cerebellar infarct. 3. Presence of small lacunar infarcts bilaterally. 4. No evidence of hydrocephalus. 5. Presence of an aneurysm coil in the region of the left middle cerebral  artery. 6. Evidence of sphenoid sinus disease.       CT of Head on 6/13/2020 at 1103 shows  IMPRESSION:   1. Questionable left inferior cerebellar infarction, new from 6/10/2020.  2. Multiple, evolving, subacute infarctions throughout the cerebrum. 3. Subacute subarachnoid hemorrhage. Improved hydrocephalus compared to  6/6/2020    CTA of Head on 6/13/2020 at 1103 shows  IMPRESSION:   1. Left A1 vasospasm. 2. More mild spasm of the right PANCHITO, MCAs, and possibly the distal vertebrals. 3. Right M1 terminus aneurysm. 4. Large infundibula/small aneurysms of the bilateral P-comm origins. CT Perfusion on 6/13/2020 at 1103 shows  IMPRESSION:  1. Penumbra surrounding infarctions in most of the superior left MCA territory. 2. Bilateral PANCHITO infarctions. 3. Questionable left cerebellar infarction is inferior to the perfusion scan. CTA of Head on 6/9/2020 at 1350 shows  Progressive vasospasm involving the anterior cerebral arteries with minimal  associated decreased perfusion to the frontal lobes. No other significant  Change  .   CT Perfusion on 6/9/2020 at 1336 shows  Impression:  CT perfusion brain: Very minimal decreased perfusion to the frontal lobes. CT of Head on 6/9/2020 at 0234 shows  IMPRESSION:   No significant change.       CT of Head on 6/8/2020 shows  IMPRESSION:   Global edema, otherwise no significant change    CTA of Head and Neck on 6/8/2020 per radiology shows  Impression:  Mild to moderate vasospasm in the left middle cerebral artery status post  bifurcation aneurysm coil embolization. Stable 4 mm right MCA aneurysm. Endotracheal tube terminates just above the matt. 1 cm low-density left thyroid nodule. Mildly enlarged left axillary lymph node measuring 11 mm in short axis  Patchy bilateral airspace disease. Nonspecific asymmetric enhancement of the right superior ophthalmic vein   (However, no significant vasospasm was seen when personally reviewed by NIS)    CT Perfusion on 6/8/2020 shows  IMPRESSION:  No significant cerebral perfusion abnormality    CTA of Chest on 6/7/2020 shows  IMPRESSION:  1. No acute pulmonary embolus  2. Dependent atelectasis with diffuse groundglass opacifications bilaterally. CT of Head on 6/7/2020 shows  IMPRESSION:   1. No further progression of subarachnoid hemorrhage. The overall degree of  subarachnoid hemorrhage is slightly improved. 2. Subtle areas of gray-white differentiation loss throughout the cerebral  hemispheres correlating with areas of restricted diffusion on previous CT  compatible with ischemic changes. MRI of Brain on 6/6/2020 shows  IMPRESSION:   Imaging findings consistent with large left MCA territory ischemia/infarction,  left temporal, parietal and frontal lobes, cortically based, no superimposed  increased parenchymal hemorrhage or midline shift. Right and left PANCHITO territory  cortical diffusion restriction consistent with infarction. Infarction in the right insular cortex and right temporal lobe.  Scattered small  infarctions right and left corona radiata centrum semiovale, right cerebellum. Allowing for differences in technique likely stable subarachnoid and  intraventricular hemorrhage. CT of Head on 6/6/2020 at 1411 shows  IMPRESSION:   Left MCA territory infarction with smaller right MCA territory infarction.     Stable subarachnoid and intraventricular hemorrhage. CTA of Head on 6/6/2020 at 0903 shows  IMPRESSION:  Slightly increased intraventricular and subarachnoid hemorrhage compared to the  prior exam.     Status post coiling left MCA bifurcation aneurysm. No significant residual  aneurysm filling. CT of Head on 6/62020 at 0239 shows  IMPRESSION:  Slightly diminished subarachnoid and intraventricular hemorrhage. Stable ventricular system. CT of Head WO on 6/5/2020 at 1512 shows  IMPRESSION: Stable acute subarachnoid and intraventricular hemorrhage. Stable  ventricular system. CT Head WO 6/5/20 0826     IMPRESSION:   1. Diffuse subarachnoid hemorrhage most concentrated in the basal cisterns and  left sylvian fissure. 2.  Interval ventricular enlargement suspicious for developing hydrocephalus.     CTA Head 6/4/20 2017     IMPRESSION:  1. Diffuse subarachnoid hemorrhage in the basilar cisterns and sylvian  fissures. 2.  Bilateral MCA bifurcation aneurysms. 3.  Infundibular origins to the posterior communicating arteries bilaterally. 4.  Symmetric moderately severe airspace disease in the upper lobes bilaterally  which may represent edema or sequela of aspiration.     CT Head WO Contrast 6/4/2020 2012    IMPRESSION: Extensive subarachnoid hemorrhage concerning for leaking/ruptured  aneurysm.        24 hour results:    Recent Results (from the past 24 hour(s))   METABOLIC PANEL, BASIC    Collection Time: 06/16/20  3:45 PM   Result Value Ref Range    Sodium 143 136 - 145 mmol/L    Potassium 3.6 3.5 - 5.1 mmol/L    Chloride 114 (H) 97 - 108 mmol/L    CO2 23 21 - 32 mmol/L    Anion gap 6 5 - 15 mmol/L    Glucose 126 (H) 65 - 100 mg/dL    BUN 12 6 - 20 MG/DL Creatinine 0.44 (L) 0.55 - 1.02 MG/DL    BUN/Creatinine ratio 27 (H) 12 - 20      GFR est AA >60 >60 ml/min/1.73m2    GFR est non-AA >60 >60 ml/min/1.73m2    Calcium 7.9 (L) 8.5 - 10.1 MG/DL   PROCALCITONIN    Collection Time: 06/17/20  5:14 AM   Result Value Ref Range    Procalcitonin 0.15 ng/mL   MAGNESIUM    Collection Time: 06/17/20  5:14 AM   Result Value Ref Range    Magnesium 2.2 1.6 - 2.4 mg/dL   PHOSPHORUS    Collection Time: 06/17/20  5:14 AM   Result Value Ref Range    Phosphorus 2.4 (L) 2.6 - 4.7 MG/DL   METABOLIC PANEL, COMPREHENSIVE    Collection Time: 06/17/20  5:14 AM   Result Value Ref Range    Sodium 141 136 - 145 mmol/L    Potassium 3.5 3.5 - 5.1 mmol/L    Chloride 110 (H) 97 - 108 mmol/L    CO2 25 21 - 32 mmol/L    Anion gap 6 5 - 15 mmol/L    Glucose 156 (H) 65 - 100 mg/dL    BUN 12 6 - 20 MG/DL    Creatinine 0.49 (L) 0.55 - 1.02 MG/DL    BUN/Creatinine ratio 24 (H) 12 - 20      GFR est AA >60 >60 ml/min/1.73m2    GFR est non-AA >60 >60 ml/min/1.73m2    Calcium 8.0 (L) 8.5 - 10.1 MG/DL    Bilirubin, total 0.3 0.2 - 1.0 MG/DL    ALT (SGPT) 38 12 - 78 U/L    AST (SGOT) 26 15 - 37 U/L    Alk. phosphatase 83 45 - 117 U/L    Protein, total 6.0 (L) 6.4 - 8.2 g/dL    Albumin 2.2 (L) 3.5 - 5.0 g/dL    Globulin 3.8 2.0 - 4.0 g/dL    A-G Ratio 0.6 (L) 1.1 - 2.2     CBC WITH AUTOMATED DIFF    Collection Time: 06/17/20  5:14 AM   Result Value Ref Range    WBC 6.2 3.6 - 11.0 K/uL    RBC 3.32 (L) 3.80 - 5.20 M/uL    HGB 7.5 (L) 11.5 - 16.0 g/dL    HCT 26.1 (L) 35.0 - 47.0 %    MCV 78.6 (L) 80.0 - 99.0 FL    MCH 22.6 (L) 26.0 - 34.0 PG    MCHC 28.7 (L) 30.0 - 36.5 g/dL    RDW 26.1 (H) 11.5 - 14.5 %    PLATELET 641 845 - 118 K/uL    MPV 11.2 8.9 - 12.9 FL    NRBC 0.0 0  WBC    ABSOLUTE NRBC 0.00 0.00 - 0.01 K/uL    NEUTROPHILS 77 (H) 32 - 75 %    LYMPHOCYTES 11 (L) 12 - 49 %    MONOCYTES 8 5 - 13 %    EOSINOPHILS 3 0 - 7 %    BASOPHILS 0 0 - 1 %    IMMATURE GRANULOCYTES 1 (H) 0.0 - 0.5 %    ABS. NEUTROPHILS 4.7 1.8 - 8.0 K/UL    ABS. LYMPHOCYTES 0.7 (L) 0.8 - 3.5 K/UL    ABS. MONOCYTES 0.5 0.0 - 1.0 K/UL    ABS. EOSINOPHILS 0.2 0.0 - 0.4 K/UL    ABS. BASOPHILS 0.0 0.0 - 0.1 K/UL    ABS. IMM.  GRANS. 0.1 (H) 0.00 - 0.04 K/UL    DF SMEAR SCANNED      RBC COMMENTS ANISOCYTOSIS  3+        RBC COMMENTS MICROCYTOSIS  1+        RBC COMMENTS SCHISTOCYTES  1+        RBC COMMENTS HYPOCHROMIA  2+            Margy Singh, NP

## 2020-06-17 NOTE — PROGRESS NOTES
SLP Contact Note    SLP evaluation complete. No PMV tolerance on this date, and no oral manipulation of ice chip. However, will continue to follow for PMV tolerance and diet initiation.       Thank you,  TEVIN EdwardsEd, [id number] [facility]  Speech-Language Pathologist

## 2020-06-17 NOTE — PROGRESS NOTES
[facility]    ICU TEAM Progress Note    Name: Aldair Benítez   : 1970   MRN: [id number]   Date: 2020      Assessment/Plan:       1. SAH  a. 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 Has Right MCA aneurysm with wide neck that will prob need stenting vs clipping  c. Hydrocephalus is improved  d. Continue Nimotop day   e. Continue TCD per TATE - s/p angio for persistent vasospasms   i. Improved vasospasm per most recent TCD - repeat pending  ii. Continue MAP goal 100-130  f. Continue hourly neurochecks  g. Continue Florinef per TATE  i. Increased to 0.2mg BID due to increased urine output  h. Continue IVF with goal of euvolemia  i. LR 200cc/hr  2. Seizures  a. Secondary to the above  b. No seizure identified on most recent EEG but encephalopathy  c. Repeat EEG pending due to seizure like activity this AM.   d. Continue Keppra 1000mg BID and Vimpat 100mg BID  e. Neurology following - repeat head CT stable  3. Respiratory Failure  a. Likely secondary to neurogenic pulmonary edema - completed abx course for possible superimposed PNA  b. S/P Trach placement ()  c. Continue ATC - start CPT  4. Ischemic Strokes  a. Patient with multiple ischemic infarcts throughout cerebrum suggestive of ischemic event  b. Note also ischemic cerebellar infarct left inferior  c. PT/OT/Speech      Subjective:   Progress Note: 2020      Reason for ICU Admission:     47 yo female with HTN who presented with AMS and seizure like activity. She was found with extensive SAH secondary to left MCA bifurcation ruptured aneurysm - s/p coiling. Since patient has been with vasospasm s/p IA verapamil . While she has been more responsive, she's still not following commands and brain imaging suggest extensive strokes. Overnight Events:     Post trach placement on ATC doing well. Seizure like activity this AM with lip smacking - repat CTH stable. EEG pending.  Following commands this AM. Active Problem List:     Problem List  Date Reviewed: 6/10/2020          Codes Class    Respiratory failure ([facility] [address] ICD-10-CM: J96.90  ICD-9-CM: 518.81         Hypoxic ischemic encephalopathy (HIE), unspecified severity ICD-10-CM: P91.60  ICD-9-CM: 768.70         SAH (subarachnoid hemorrhage) (HCC) ICD-10-CM: I60.9  ICD-9-CM: 430         Subarachnoid hemorrhage from middle cerebral artery aneurysm, left (HCC) ICD-10-CM: P58.33  ICD-9-CM: 430         RIGHT middle cerebral artery aneurysm ICD-10-CM: I67.1  ICD-9-CM: 437.3         Cerebral vasospasm ICD-10-CM: J39.304  ICD-9-CM: 435.9               Past Medical History:      has a past medical history of HTN (hypertension). Past Surgical History:      has no past surgical history on file. Home Medications:     Prior to Admission medications    Not on File       Allergies/Social/Family History:     No Known Allergies   Social History     Tobacco Use    Smoking status: Never Smoker    Smokeless tobacco: Never Used   Substance Use Topics    Alcohol use: Not Currently      History reviewed. No pertinent family history. Review of Systems:     A comprehensive review of systems was negative except for that written in the HPI.     Objective:   Vital Signs:  Visit Vitals  /76 (BP 1 Location: Right arm, BP Patient Position: At rest)   Pulse 75   Temp 99.4 °F (37.4 °C)   Resp 27   Ht 5' 4\" (1.626 m)   Wt 102.9 kg (226 lb 13.7 oz)   SpO2 100%   BMI 38.94 kg/m²    O2 Flow Rate (L/min): 13 l/min O2 Device: Tracheal collar   Temp (24hrs), Av.4 °F (37.4 °C), Min:98.9 °F (37.2 °C), Max:99.8 °F (37.7 °C)           Intake/Output:     Intake/Output Summary (Last 24 hours) at 2020 0904  Last data filed at 2020 0845  Gross per 24 hour   Intake 6274.17 ml   Output 7775 ml   Net -1500.83 ml       Physical Exam:    General: NAD  HENT: Atraumatic, Tracheostomy tube in place  Cardio: RRR  Respiratory: Coarse breath sounds BL R>L  GI: Soft not tender abdomen  Extremities: +ve trace edema  Neuro: Tracks to commands and moves right lower extremity to command      LABS AND  DATA: Personally reviewed  Recent Labs     06/17/20  0514 06/16/20  0414   WBC 6.2 8.5   HGB 7.5* 7.9*   HCT 26.1* 26.2*    214     Recent Labs     06/17/20  0514 06/16/20  1545 06/16/20  0414    143 142   K 3.5 3.6 3.5   * 114* 112*   CO2 25 23 22   BUN 12 12 11   CREA 0.49* 0.44* 0.50*   * 126* 112*   CA 8.0* 7.9* 8.0*   MG 2.2  --  2.1   PHOS 2.4*  --  2.6     Recent Labs     06/17/20  0514 06/15/20  0407   AP 83 68   TP 6.0* 6.0*   ALB 2.2* 2.3*   GLOB 3.8 3.7     No results for input(s): INR, PTP, APTT, INREXT, INREXT, INREXT in the last 72 hours. No results for input(s): PHI, PCO2I, PO2I, FIO2I in the last 72 hours. No results for input(s): CPK, CKMB, TROIQ, BNPP in the last 72 hours. Hemodynamics:   PAP:   CO:     Wedge:   CI:     CVP:    SVR:       PVR:       Ventilator Settings:  Mode Rate Tidal Volume Pressure FiO2 PEEP   Assist control, SIMV   400 ml  10 cm H2O 100 % 5 cm H20     Peak airway pressure: 22 cm H2O    Minute ventilation: 8.79 l/min        MEDS: Reviewed    Chest X-Ray:  CXR Results  (Last 48 hours)    None        ECHO:  EF 60-65%      Multidisciplinary Rounds Completed:  Pending    ABCDEF Bundle/Checklist  Pain Medications: Fentanyl  Target RASS: 0 - Alert & Calm - Spontaneously pays attention to caregiver  Sedation Medications: None  CAM-ICU:  Positive  Mobility: Poor  PT/OT: PT consulted and on board and OT consulted and on board   Restraints: None needed at this time  Discussed Plan of Care (goals of care):  Yes  Addressed Code Status: Full Code    CARDIOVASCULAR  Cardiac Gtts: None  SBP Goal of: > 100 mmHg and < 180 mmHg  MAP Goal of: 100-130  Transfusion Trigger (Hgb): <8 g/dL    RESPIRATORY  Vent Goals:   Chlorhexidine   Aggressive bronchopulmonary hygiene  DVT Prophylaxis (if no, list reason): SCD's or Sequential Compression Device and Lovenox   SPO2 Goal: > 92%  Pulmonary toilet: Duo-Nebs     GI/  Martinez Catheter Present: Yes  GI Prophylaxis: Pepcid (famotidine)   Nutrition: Yes   IVFs: NSS  Bowel Movement: Yes  Bowel Regimen: Docusate (Colace)  Insulin: ISS    ANTIBIOTICS  Antibiotics:  None    T/L/D  Tubes: Tracheostomy and Nasogastric Tube  Lines: Peripheral IV, Arterial Line and Central Line  Drains: Martinez Catheter    SPECIAL EQUIPMENT  None    DISPOSITION  Stay in ICU    CRITICAL CARE CONSULTANT NOTE  I had a face to face encounter with the patient, reviewed and interpreted patient data including clinical events, labs, images, vital signs, I/O's, and examined patient. I have discussed the case and the plan and management of the patient's care with the consulting services, the bedside nurses and the respiratory therapist.      NOTE OF PERSONAL INVOLVEMENT IN CARE   This patient has a high probability of imminent, clinically significant deterioration, which requires the highest level of preparedness to intervene urgently. I participated in the decision-making and personally managed or directed the management of the following life and organ supporting interventions that required my frequent assessment to treat or prevent imminent deterioration. I personally spent 30 minutes of critical care time. This is time spent at this critically ill patient's bedside actively involved in patient care as well as the coordination of care and discussions with the patient's family. This does not include any procedural time which has been billed separately.     Yomaira Lr MD  [address]  6/17/2020

## 2020-06-17 NOTE — PROGRESS NOTES
Bedside and Verbal shift change report given to Adeel Carrasquillo  (oncoming nurse) by Lucia Cintron (offgoing nurse). Report included the following information SBAR, Kardex, Intake/Output, MAR and Cardiac Rhythm NSR       Primary Nurse Tiff Long RN and ROSSANA Craft performed a dual skin assessment on this patient No impairment noted  Sriram score is 13  Heels up on pillows with each turn. SCD bilaterally in place. Foam dsrg to sacrum    Shift Summary   Noted left arm very swollen Dr Goyo Thyaer made aware. Assises site. Sanjuana removed arm up on two pillows. + pulses  Noted through out   Pt continue to have high output at interval [place] NP made aware    NS Liter bolus given as ordered, K replaced per-protocol. Patient tolerating 100% trach collar. Suction as needed. Mom visit. Bedside and Verbal shift change report given to Luana (oncoming nurse) by Adeel Carrasquillo  (offgoing nurse). Report included the following information Intake/Output, Recent Results and Cardiac Rhythm NSR.

## 2020-06-17 NOTE — PROGRESS NOTES
1930. Bedside and Verbal shift change report given to Michelle Virgen RN (oncoming nurse) by Matti Gomez RN (offgoing nurse). Report included the following information SBAR, Kardex, ED Summary, OR Summary, Procedure Summary, Intake/Output, MAR, Accordion, Recent Results, Med Rec Status, Cardiac Rhythm NSR and Quality Measures    0500. Pt making involuntary mouth movements. Shorty Kang NP notified. Orders placed from stat EEG, CT, and a one time dose of Keppra. Lacosamide and keppra labs order. 0530. Marcos from EEG paged. Sergey Santillan expected to arrive before 0700.     0600. Left radial ART line placed by [address], NP.    5023. Pt transported to CT with two RN's & emergency equipment. 0715. Marcos at bedside to administer EEG. 0730. Bedside and Verbal shift change report given to Lorella Runner, RN (oncoming nurse) by Ileana Garcia RN (offgoing nurse). Report included the following information SBAR, Kardex, ED Summary, OR Summary, Procedure Summary, Intake/Output, MAR, Accordion, Recent Results, Med Rec Status, Cardiac Rhythm NSR, Quality Measures and Dual Neuro Assessment.

## 2020-06-17 NOTE — PROGRESS NOTES
Problem: Dysphagia (Adult)  Goal: *Acute Goals and Plan of Care (Insert Text)  Description: Speech Therapy Goals  Initiated 6/17/2020    1. Patient will participate in swallow re-evaluation within 7 days. Outcome: Progressing Towards Goal     Problem: Voice Impaired (Adult)  Goal: *Acute Goals and Plan of Care (Insert Text)  Description: Speech Therapy Goals  Initiated 6/17/2020    1. Patient will tolerate trials of digital occlusion to work towards [employer] placement within 7 days. Outcome: Progressing Towards Goal     SPEECH LANGUAGE PATHOLOGY BEDSIDE SWALLOW/PMV EVALUATION  Patient: Mohsen Vaughn (54 y.o. female)  Date: 6/17/2020  Primary Diagnosis: SA[facility] [I60.9]        Precautions: aspiration, fall      ASSESSMENT :  Based on the objective data described below, the patient presents with severe oropharyngeal dysphagia characterized by limited bolus manipulation and absent swallow initiation. Likely 2/2 to prolonged NPO status and impaired mentation. However, will continue to attempt PO for patient readiness for MBS. Furthermore, pt unable to tolerate PMV on this date, with RR increasing into the 40s with brief PMV placement, and significant back pressure appreciated upon doffing PMV at this time. Could be 2/2 to trach size +/- secretions as trach was just recently placed. When pt not requiring any ventilator support, please consider trach downsize to a #4 cuffless trach. Patient will benefit from skilled intervention to address the above impairments. Patients rehabilitation potential is considered to be Fair     PLAN :  Recommendations and Planned Interventions:  --NPO  --vigilant oral care  Speaking Valve Placement:  SLP only  Recommended Speaking Valve Wearing Schedule:  [x] As tolerated  Frequency/Duration: Patient will be followed by speech-language pathology 4 times a week to address goals. Discharge Recommendations:  To Be Determined     SUBJECTIVE:   Patient smiled. OBJECTIVE:   Cognitive and Communication Status:  Neurologic State: Alert  Orientation Level: Unable to verbalize  Cognition: No command following           Tracheostomy:        Airway Clearance  Suction: Trach  Suction Device: Suction catheter  Suction Catheter Size: 14 Fr  Sputum Method Obtained: Tracheal  Sputum Amount: Large  Sputum Color/Odor: Bloody; Tan  Sputum Consistency: Thick     PMV Trial   Vocal Quality: Other (comment)(none noted)  Oxygen Therapy  O2 Sat (%): 100 %  Pulse via Oximetry: 71 beats per minute  O2 Device: Tracheostomy;Tracheal collar  FIO2 (%): 100 %  Airway Clearance  Suction: Trach  Suction Device: Suction catheter  Suction Catheter Size: 14 Fr  Sputum Method Obtained: Tracheal  Sputum Amount: Large  Sputum Color/Odor: Bloody; Tan  Sputum Consistency: Thick  Oral Assessment:  Oral Assessment  Labial: Other (comment)(unable to follow commands to participate)  P.O. Trials:  Patient Position: upright in bed  Vocal quality prior to P.O.: Other (comment)(none noted)  Consistency Presented: Ice chips  How Presented: SLP-fed/presented;Spoon     Bolus Acceptance: Impaired  Bolus Formation/Control: Impaired     Propulsion: Absent     Initiation of Swallow: Absent  Laryngeal Elevation: Absent                   Oral Phase Severity: Severe  Pharyngeal Phase Severity : Other (comment)(unable to assess)    NOMS:  The NOMS functional outcome measure was used to quantify this patient's level of swallowing impairment. Based on the NOMS, the patient was determined to be at level 1 for swallow function         NOMS Swallowing Levels:  Level 1 (CN): NPO  Level 2 (CM): NPO but takes consistency in therapy  Level 3 (CL): Takes less than 50% of nutrition p.o. and continues with nonoral feedings; and/or safe with mod cues; and/or max diet restriction  Level 4 (CK):  Safe swallow but needs mod cues; and/or mod diet restriction; and/or still requires some nonoral feeding/supplements  Level 5 (CJ): Safe swallow with min diet restriction; and/or needs min cues  Level 6 (CI): Independent with p.o.; rare cues; usually self cues; may need to avoid some foods or needs extra time  Level 7 ([facility]): Independent for all p.o.  KEVIN. (2003). National Outcomes Measurement System (NOMS): Adult Speech-Language Pathology User's Guide. The NOMS functional outcome measure was used to quantify this patient's level of voice  impairment. Based on the NOMS, the patient was determined to be at level 1 for voice function. NOMS Voice:  Level 1 (CN): Unable to use voice to communicate. Needs AAC. Level 2 (CM): Voice not functional most of time. Level 3 (CL): Voice functional but distracting & interferes with communication. Participation in activities is limited most of time. Level 4 (CK): Voice is functional and sometimes distracting. High vocal demand consistently impacted. Level 5 (CJ): Voice occasionally sounds normal with self monitoring; high vocal demand occasionally impacted. Level 6 (CI): Voice sounds normal across most situations. High vocal demand rarely impacted. Level 7 ([facility]): Voice is normal and self monitoring is effective but only occasionally needed. KEVIN. (2003). National Outcomes Measurement System (NOMS): Adult Speech-Language Pathology User's Guide. Pain:  Pain Scale 1: Adult Nonverbal Pain Scale  Pain Intensity 1: 3       After treatment:   Call bell within reach and Nursing notified    COMMUNICATION/EDUCATION:     The patient's plan of care including recommendations, planned interventions, and recommended diet changes were discussed with: Registered nurse. Education was provided to patient, family, and staff regarding speaking valve placement, wearing schedule, safety precautions including cuff deflation and removal when sleeping, and care/cleaning guidelines. Patient is unable to participate in goal setting and plan of care.     Thank you for this referral.  Tana Lugo, SLP  Time Calculation: 20 mins

## 2020-06-18 ENCOUNTER — APPOINTMENT (OUTPATIENT)
Dept: CT IMAGING | Age: 50
DRG: 003 | End: 2020-06-18
Attending: RADIOLOGY
Payer: COMMERCIAL

## 2020-06-18 ENCOUNTER — APPOINTMENT (OUTPATIENT)
Dept: VASCULAR SURGERY | Age: 50
DRG: 003 | End: 2020-06-18
Attending: NURSE PRACTITIONER
Payer: COMMERCIAL

## 2020-06-18 LAB
ALBUMIN SERPL-MCNC: 2.4 G/DL (ref 3.5–5)
ALBUMIN/GLOB SERPL: 0.6 {RATIO} (ref 1.1–2.2)
ALP SERPL-CCNC: 72 U/L (ref 45–117)
ALT SERPL-CCNC: 32 U/L (ref 12–78)
ANION GAP SERPL CALC-SCNC: 3 MMOL/L (ref 5–15)
ANION GAP SERPL CALC-SCNC: 6 MMOL/L (ref 5–15)
AST SERPL-CCNC: 20 U/L (ref 15–37)
BACTERIA SPEC CULT: ABNORMAL
BACTERIA SPEC CULT: ABNORMAL
BASILAR ARTERY EDV: 30 CM/S
BASILAR ARTERY MEAN VEL: 56 CM/S
BASILAR ARTERY PSV: 108 CM/S
BASOPHILS # BLD: 0 K/UL (ref 0–0.1)
BASOPHILS NFR BLD: 0 % (ref 0–1)
BILIRUB SERPL-MCNC: 0.3 MG/DL (ref 0.2–1)
BUN SERPL-MCNC: 14 MG/DL (ref 6–20)
BUN SERPL-MCNC: 15 MG/DL (ref 6–20)
BUN/CREAT SERPL: 33 (ref 12–20)
BUN/CREAT SERPL: 33 (ref 12–20)
CALCIUM SERPL-MCNC: 8.8 MG/DL (ref 8.5–10.1)
CALCIUM SERPL-MCNC: 8.9 MG/DL (ref 8.5–10.1)
CHLORIDE SERPL-SCNC: 108 MMOL/L (ref 97–108)
CHLORIDE SERPL-SCNC: 109 MMOL/L (ref 97–108)
CO2 SERPL-SCNC: 27 MMOL/L (ref 21–32)
CO2 SERPL-SCNC: 29 MMOL/L (ref 21–32)
CREAT SERPL-MCNC: 0.43 MG/DL (ref 0.55–1.02)
CREAT SERPL-MCNC: 0.46 MG/DL (ref 0.55–1.02)
DIFFERENTIAL METHOD BLD: ABNORMAL
EOSINOPHIL # BLD: 0.2 K/UL (ref 0–0.4)
EOSINOPHIL NFR BLD: 3 % (ref 0–7)
ERYTHROCYTE [DISTWIDTH] IN BLOOD BY AUTOMATED COUNT: 26.3 % (ref 11.5–14.5)
GLOBULIN SER CALC-MCNC: 3.9 G/DL (ref 2–4)
GLUCOSE SERPL-MCNC: 133 MG/DL (ref 65–100)
GLUCOSE SERPL-MCNC: 140 MG/DL (ref 65–100)
HCT VFR BLD AUTO: 24.5 % (ref 35–47)
HGB BLD-MCNC: 7.1 G/DL (ref 11.5–16)
IMM GRANULOCYTES # BLD AUTO: 0.1 K/UL (ref 0–0.04)
IMM GRANULOCYTES NFR BLD AUTO: 1 % (ref 0–0.5)
LEFT ACA EDV: 60 CM/S
LEFT ACA MEAN VEL: 99 CM/S
LEFT ACA PSV: 178 CM/S
LEFT EX ICA EDV: 26 CM/S
LEFT EX ICA MEAN VEL: 40 CM/S
LEFT EX ICA PSV: 69 CM/S
LEFT ICA EDV: 42 CM/S
LEFT ICA MEAN VEL: 68 CM/S
LEFT ICA PSV: 120 CM/S
LEFT LINDEGAARD RATIO: 1.6
LEFT MCA 1 EDV: 39 CM/S
LEFT MCA 1 MEAN VEL: 62 CM/S
LEFT MCA 1 PSV: 107 CM/S
LEFT PCA 1 EDV: 49 CM/S
LEFT PCA 1 MEAN VEL: 81 CM/S
LEFT PCA 1 PSV: 144 CM/S
LEFT VERTEBRAL EDV TCD: 22 CM/S
LEFT VERTEBRAL MEAN VEL: 36 CM/S
LEFT VERTEBRAL PSV TCD: 64 CM/S
LEVETIRACETAM SERPL-MCNC: 8.2 UG/ML (ref 10–40)
LYMPHOCYTES # BLD: 1.1 K/UL (ref 0.8–3.5)
LYMPHOCYTES NFR BLD: 15 % (ref 12–49)
MAGNESIUM SERPL-MCNC: 2.2 MG/DL (ref 1.6–2.4)
MCH RBC QN AUTO: 22.9 PG (ref 26–34)
MCHC RBC AUTO-ENTMCNC: 29 G/DL (ref 30–36.5)
MCV RBC AUTO: 79 FL (ref 80–99)
MONOCYTES # BLD: 0.7 K/UL (ref 0–1)
MONOCYTES NFR BLD: 10 % (ref 5–13)
NEUTS SEG # BLD: 5 K/UL (ref 1.8–8)
NEUTS SEG NFR BLD: 71 % (ref 32–75)
NRBC # BLD: 0 K/UL (ref 0–0.01)
NRBC BLD-RTO: 0 PER 100 WBC
PHOSPHATE SERPL-MCNC: 3.2 MG/DL (ref 2.6–4.7)
PLATELET # BLD AUTO: 263 K/UL (ref 150–400)
PLATELET COMMENTS,PCOM: ABNORMAL
PMV BLD AUTO: 10.9 FL (ref 8.9–12.9)
POTASSIUM SERPL-SCNC: 3.6 MMOL/L (ref 3.5–5.1)
POTASSIUM SERPL-SCNC: 3.6 MMOL/L (ref 3.5–5.1)
PROCALCITONIN SERPL-MCNC: 0.09 NG/ML
PROT SERPL-MCNC: 6.3 G/DL (ref 6.4–8.2)
RBC # BLD AUTO: 3.1 M/UL (ref 3.8–5.2)
RBC MORPH BLD: ABNORMAL
RIGHT ACA EDV: 48 CM/S
RIGHT ACA MEAN VEL: 79 CM/S
RIGHT ACA PSV: 140 CM/S
RIGHT EX ICA EDV: 24 CM/S
RIGHT EX ICA MEAN VEL: 40 CM/S
RIGHT EX ICA PSV: 72 CM/S
RIGHT ICA EDV: 27 CM/S
RIGHT ICA MEAN VEL: 48 CM/S
RIGHT ICA PSV: 90 CM/S
RIGHT LINDEGAARD RATIO: 1.9
RIGHT MCA 1 EDV: 39 CM/S
RIGHT MCA 1 MEAN VEL: 75 CM/S
RIGHT MCA 1 PSV: 146 CM/S
RIGHT PCA 1 EDV: 32 CM/S
RIGHT PCA 1 MEAN VEL: 52 CM/S
RIGHT PCA 1 PSV: 93 CM/S
RIGHT VERTEBRAL EDV TCD: 24 CM/S
RIGHT VERTEBRAL MEAN VEL: 42 CM/S
RIGHT VERTEBRAL PSV TCD: 79 CM/S
SERVICE CMNT-IMP: ABNORMAL
SODIUM SERPL-SCNC: 140 MMOL/L (ref 136–145)
SODIUM SERPL-SCNC: 142 MMOL/L (ref 136–145)
WBC # BLD AUTO: 7.1 K/UL (ref 3.6–11)

## 2020-06-18 PROCEDURE — 74011250636 HC RX REV CODE- 250/636: Performed by: NURSE PRACTITIONER

## 2020-06-18 PROCEDURE — 76937 US GUIDE VASCULAR ACCESS: CPT

## 2020-06-18 PROCEDURE — 74011250637 HC RX REV CODE- 250/637: Performed by: NURSE PRACTITIONER

## 2020-06-18 PROCEDURE — 94640 AIRWAY INHALATION TREATMENT: CPT

## 2020-06-18 PROCEDURE — 94003 VENT MGMT INPAT SUBQ DAY: CPT

## 2020-06-18 PROCEDURE — 70496 CT ANGIOGRAPHY HEAD: CPT

## 2020-06-18 PROCEDURE — 74011250637 HC RX REV CODE- 250/637: Performed by: PSYCHIATRY & NEUROLOGY

## 2020-06-18 PROCEDURE — 83735 ASSAY OF MAGNESIUM: CPT

## 2020-06-18 PROCEDURE — 84145 PROCALCITONIN (PCT): CPT

## 2020-06-18 PROCEDURE — 74011250637 HC RX REV CODE- 250/637: Performed by: ANESTHESIOLOGY

## 2020-06-18 PROCEDURE — 74011250637 HC RX REV CODE- 250/637: Performed by: INTERNAL MEDICINE

## 2020-06-18 PROCEDURE — 93886 INTRACRANIAL COMPLETE STUDY: CPT

## 2020-06-18 PROCEDURE — 65610000006 HC RM INTENSIVE CARE

## 2020-06-18 PROCEDURE — 74011000250 HC RX REV CODE- 250: Performed by: NURSE PRACTITIONER

## 2020-06-18 PROCEDURE — 74011000258 HC RX REV CODE- 258: Performed by: RADIOLOGY

## 2020-06-18 PROCEDURE — 70450 CT HEAD/BRAIN W/O DYE: CPT

## 2020-06-18 PROCEDURE — 80053 COMPREHEN METABOLIC PANEL: CPT

## 2020-06-18 PROCEDURE — 36591 DRAW BLOOD OFF VENOUS DEVICE: CPT

## 2020-06-18 PROCEDURE — 74011250636 HC RX REV CODE- 250/636: Performed by: INTERNAL MEDICINE

## 2020-06-18 PROCEDURE — 74011250636 HC RX REV CODE- 250/636: Performed by: ANESTHESIOLOGY

## 2020-06-18 PROCEDURE — C1751 CATH, INF, PER/CENT/MIDLINE: HCPCS

## 2020-06-18 PROCEDURE — 84100 ASSAY OF PHOSPHORUS: CPT

## 2020-06-18 PROCEDURE — 94762 N-INVAS EAR/PLS OXIMTRY CONT: CPT

## 2020-06-18 PROCEDURE — 74011636320 HC RX REV CODE- 636/320: Performed by: RADIOLOGY

## 2020-06-18 PROCEDURE — 77030020365 HC SOL INJ SOD CL 0.9% 50ML

## 2020-06-18 PROCEDURE — 74011000250 HC RX REV CODE- 250: Performed by: INTERNAL MEDICINE

## 2020-06-18 PROCEDURE — 85025 COMPLETE CBC W/AUTO DIFF WBC: CPT

## 2020-06-18 RX ORDER — BACITRACIN 500 UNIT/G
1 PACKET (EA) TOPICAL ONCE
Status: COMPLETED | OUTPATIENT
Start: 2020-06-18 | End: 2020-06-18

## 2020-06-18 RX ORDER — FLUDROCORTISONE ACETATE 0.1 MG/1
0.1 TABLET ORAL 2 TIMES DAILY
Status: DISCONTINUED | OUTPATIENT
Start: 2020-06-18 | End: 2020-06-22

## 2020-06-18 RX ORDER — SODIUM CHLORIDE 0.9 % (FLUSH) 0.9 %
10 SYRINGE (ML) INJECTION
Status: COMPLETED | OUTPATIENT
Start: 2020-06-18 | End: 2020-06-18

## 2020-06-18 RX ORDER — MORPHINE SULFATE 2 MG/ML
2-4 INJECTION, SOLUTION INTRAMUSCULAR; INTRAVENOUS
Status: DISCONTINUED | OUTPATIENT
Start: 2020-06-18 | End: 2020-06-18

## 2020-06-18 RX ORDER — LORAZEPAM 2 MG/ML
2 INJECTION INTRAMUSCULAR ONCE
Status: COMPLETED | OUTPATIENT
Start: 2020-06-18 | End: 2020-06-18

## 2020-06-18 RX ADMIN — FLUDROCORTISONE ACETATE 0.2 MG: 0.1 TABLET ORAL at 08:36

## 2020-06-18 RX ADMIN — MORPHINE SULFATE 2 MG: 2 INJECTION, SOLUTION INTRAMUSCULAR; INTRAVENOUS at 03:04

## 2020-06-18 RX ADMIN — FAMOTIDINE 20 MG: 40 POWDER, FOR SUSPENSION ORAL at 08:36

## 2020-06-18 RX ADMIN — NIMODIPINE 60 MG: 30 SOLUTION ORAL at 03:05

## 2020-06-18 RX ADMIN — NIMODIPINE 60 MG: 30 SOLUTION ORAL at 10:31

## 2020-06-18 RX ADMIN — POTASSIUM BICARBONATE 40 MEQ: 782 TABLET, EFFERVESCENT ORAL at 18:00

## 2020-06-18 RX ADMIN — BACITRACIN 1 PACKET: 500 OINTMENT TOPICAL at 22:00

## 2020-06-18 RX ADMIN — SODIUM CHLORIDE 100 ML: 900 INJECTION, SOLUTION INTRAVENOUS at 17:35

## 2020-06-18 RX ADMIN — SODIUM CHLORIDE, SODIUM LACTATE, POTASSIUM CHLORIDE, AND CALCIUM CHLORIDE 150 ML/HR: 600; 310; 30; 20 INJECTION, SOLUTION INTRAVENOUS at 21:56

## 2020-06-18 RX ADMIN — NIMODIPINE 60 MG: 30 SOLUTION ORAL at 21:59

## 2020-06-18 RX ADMIN — LEVETIRACETAM 1000 MG: 100 SOLUTION ORAL at 20:44

## 2020-06-18 RX ADMIN — LORAZEPAM 2 MG: 2 INJECTION INTRAMUSCULAR; INTRAVENOUS at 18:13

## 2020-06-18 RX ADMIN — Medication 2 DROP: at 22:03

## 2020-06-18 RX ADMIN — NIMODIPINE 60 MG: 30 SOLUTION ORAL at 18:04

## 2020-06-18 RX ADMIN — NIMODIPINE 60 MG: 30 SOLUTION ORAL at 14:16

## 2020-06-18 RX ADMIN — Medication 2 DROP: at 05:42

## 2020-06-18 RX ADMIN — IPRATROPIUM BROMIDE AND ALBUTEROL SULFATE 3 ML: .5; 3 SOLUTION RESPIRATORY (INHALATION) at 19:19

## 2020-06-18 RX ADMIN — SODIUM CHLORIDE, SODIUM LACTATE, POTASSIUM CHLORIDE, AND CALCIUM CHLORIDE 200 ML/HR: 600; 310; 30; 20 INJECTION, SOLUTION INTRAVENOUS at 05:46

## 2020-06-18 RX ADMIN — SODIUM CHLORIDE, SODIUM LACTATE, POTASSIUM CHLORIDE, AND CALCIUM CHLORIDE 200 ML/HR: 600; 310; 30; 20 INJECTION, SOLUTION INTRAVENOUS at 01:04

## 2020-06-18 RX ADMIN — IPRATROPIUM BROMIDE AND ALBUTEROL SULFATE 3 ML: .5; 3 SOLUTION RESPIRATORY (INHALATION) at 23:36

## 2020-06-18 RX ADMIN — LEVETIRACETAM 1000 MG: 100 SOLUTION ORAL at 08:36

## 2020-06-18 RX ADMIN — AMLODIPINE BESYLATE 5 MG: 5 TABLET ORAL at 08:36

## 2020-06-18 RX ADMIN — CHLORHEXIDINE GLUCONATE 15 ML: 0.12 RINSE ORAL at 09:00

## 2020-06-18 RX ADMIN — CASTOR OIL AND BALSAM, PERU: 788; 87 OINTMENT TOPICAL at 09:00

## 2020-06-18 RX ADMIN — ENOXAPARIN SODIUM 40 MG: 40 INJECTION SUBCUTANEOUS at 14:15

## 2020-06-18 RX ADMIN — IPRATROPIUM BROMIDE AND ALBUTEROL SULFATE 3 ML: .5; 3 SOLUTION RESPIRATORY (INHALATION) at 07:02

## 2020-06-18 RX ADMIN — IPRATROPIUM BROMIDE AND ALBUTEROL SULFATE 3 ML: .5; 3 SOLUTION RESPIRATORY (INHALATION) at 00:53

## 2020-06-18 RX ADMIN — LACOSAMIDE 150 MG: 50 TABLET, FILM COATED ORAL at 20:44

## 2020-06-18 RX ADMIN — NIMODIPINE 60 MG: 30 SOLUTION ORAL at 05:42

## 2020-06-18 RX ADMIN — CASTOR OIL AND BALSAM, PERU: 788; 87 OINTMENT TOPICAL at 18:00

## 2020-06-18 RX ADMIN — FLUDROCORTISONE ACETATE 0.1 MG: 0.1 TABLET ORAL at 18:00

## 2020-06-18 RX ADMIN — Medication 2 DROP: at 14:17

## 2020-06-18 RX ADMIN — LABETALOL HYDROCHLORIDE 20 MG: 5 INJECTION INTRAVENOUS at 14:07

## 2020-06-18 RX ADMIN — FENTANYL CITRATE 50 MCG: 50 INJECTION INTRAMUSCULAR; INTRAVENOUS at 01:42

## 2020-06-18 RX ADMIN — ASPIRIN 81 MG CHEWABLE TABLET 81 MG: 81 TABLET CHEWABLE at 08:36

## 2020-06-18 RX ADMIN — LABETALOL HYDROCHLORIDE 20 MG: 5 INJECTION INTRAVENOUS at 08:58

## 2020-06-18 RX ADMIN — LACOSAMIDE 150 MG: 50 TABLET, FILM COATED ORAL at 08:36

## 2020-06-18 RX ADMIN — IPRATROPIUM BROMIDE AND ALBUTEROL SULFATE 3 ML: .5; 3 SOLUTION RESPIRATORY (INHALATION) at 14:37

## 2020-06-18 RX ADMIN — IOPAMIDOL 100 ML: 755 INJECTION, SOLUTION INTRAVENOUS at 17:35

## 2020-06-18 RX ADMIN — Medication 10 ML: at 17:35

## 2020-06-18 RX ADMIN — SODIUM CHLORIDE, SODIUM LACTATE, POTASSIUM CHLORIDE, AND CALCIUM CHLORIDE 150 ML/HR: 600; 310; 30; 20 INJECTION, SOLUTION INTRAVENOUS at 13:41

## 2020-06-18 NOTE — PROGRESS NOTES
Pulled aside to assess patient by bedside RN after noticing patient was having periods of apnea associated with a-line tracing changes and then drops in SpO2. Between episodes patient able to open right eye and track as her usual. Discussed with Dr. Marcial Pena and opted to re-scan patient's head (CTA) and place back on mechanical ventilation. Will re-check lytes given frequent pac. Will continue to monitor and cancel transfer and reassume \"ownership\" of patient. Hospitalist NP notified.

## 2020-06-18 NOTE — ADVANCED PRACTICE NURSE
Called by nursing staff regarding patients change in breathing. Reporting periods of apnea associated with drops in SpO2. Patient was placed back on mechanical ventilation. Taken down for STAT head CT/CTA/CTP. No acute findings seen on imaging. No significant vasospasm, hemorrhage, no signs of increased intracranial pressure on imaging. Will defer further workup to intensivist. Patient stable now on the vent. Discussed with patients mother at bedside along with DR. Olivarez.      Smita Tran, [facility]  [place]

## 2020-06-18 NOTE — PROGRESS NOTES
Care Management:    Transition of Care Plan:     · RUR: 17%  · Disposition: [facility] vs inpatient rehab  · Patient on nimodipine(Nymalize) for vasospasm. She will likely need to stay in hospital to complete the 21 day course. Today is day 14. Attended multidisciplinary rounds. Patient will likely need to remain in hospital for course of nimodipine. Today is day 14 of 21 days. Patient may need to transfer to [address] [place] [address] verses inpatient rehab at that time. Patient will likely need PEG. Per previous , referral has been sent to [address] [place] [address]. This CM called Johnathonnash Rothman ([phone]), liaison with [facility]. They accepted her and will follow her until she is medically ready. She had not reached out to family yet as the previous CM had asked her to wait. She will hold on talking to family until [facility] asked her to speak with them.      JUDE Manzano

## 2020-06-18 NOTE — ROUTINE PROCESS
Primary Nurse Yao Min RN and Walt Peralta RN performed a dual skin assessment on this patient Impairment noted- see wound doc flow sheet Sriram score is 13

## 2020-06-18 NOTE — PROGRESS NOTES
Requested by nurse, Kade Layton, to provide pastoral support to mother of Ms Jessica Segura in [place]. Patient's mother was in patient's room when  arrived; patient had been taken for CT. Mother was little tearful. Provided active listening and compassionate presence as mother shared about the changes noted in patient's condition. Provided safe space as she attempted to process her feelings of fear and concern. Acknowledge her concerns and offered words of support. With her permission, had prayer on behalf of patient and family. Mother stated that patient was [facility] and they both were strong believers in the power of prayer. Continued to provide supporivet presence as the physician updated mother on CT findings and POC. Assured mother of continue of continued prayers on their behalf and of ongoing  availability for support. : Rev. Radha Almaraz.  Cordell Koch; [facility], to contact [phone] call: [phone]

## 2020-06-18 NOTE — PROGRESS NOTES
Neurocritical Care Brief Progress Note:    HPI: Lauren Diaz is a 59-year-old female with a past medical history of HTN. According to patient's mother, the patient began having a headache on 5/31/20 and had been taking BC Powder with aspirin in it for the headache. She recently stopped taking her BP medications. According to her mother she does not smoke, drink alcohol or use recreational drugs. The patient was brought to the [facility] ER via EMS on 6/4/420  after her coworkers found her unresponsive outside of her place of work. 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 On arrival to the ER she was found to be seizing. A stat CT of her head was performed which showed extensive subarachnoid hemorrhage. She was extremely hypertensive in the 240's and was given labetalol and started on a Cardene drip. She was emergently intubated and given Keppra and Ativan as well. Neurosurgery and Neurointerventional Surgery were consulted and the patient was transferred to the [facility] for a higher level of care. On arrival to [facility] a CTA of her head and neck was obtained which showed a 4.4 x 4.1 mm right MCA trifurcation aneurysm and a 2.2 x 3.1 mm left MCA trifurcation aneurysm and possible less than 2 mm ACOM aneurysms. Extensive lung infiltrates were also noted. Patient was taken for cerebral angiogram the morning of 06/05/20 and coil embolization of the left MCA aneurysm was completed. The right MCA aneurysm had a very wide neck incorporating both M2s. It could not be treated endovascularly without stents.  May need to be clipped by Neurosurgery.     Procedures: 06/05/20 and coil embolization of the left MCA aneurysm was completeds/p cerebral angiogram for right PANCHITO A1 angioplasty for vasospasm, transarterial infusion of verapamil on 6/9,  S/p angiogram on 6/10 for left ICA/MCA angioplasty and transarterial infusion of verapamil for vasospasm treatment, s/p angiogram on 6/11 and 6/13 for transarterial infusion of verapamil for vasospasm treatment. Tracheostomy placement today 6/16 by Dr. Raleigh Abreu in thoracic surgery. Physical Exam:  GENERAL: Awake and alert, sating 100% on trach collar. No sedation.    SKIN: Warm, dry, color appropriate for ethnicity. Pedal pulses positive bilaterally. Swelling noted in left arm. A-line was removed yesterday from that arm. No pitting edema noted in other extremities.      Neurologic Exam:  Mental Status:          Awake and alert. No command following. Still no command following. EOMI. Follows examiner with eyes.     Language:                   Mute.      Cranial Nerves:          Right pupil is 4 mm and brisk. Left pupil 5 mm and brisk. Left eye ptosis.                                     Blinks to threat bilaterally.        Motor:                                                                Bulk and tone normal.                                       No involuntary movements. Spontaneous movement noted in all four extremities.     Sensation:                   Withdraws to pain x 4.      Reflexes:                     Negative Babinskis.      Coordination & Gait:   Unable to assess due to patient factors.     PLAN: SAH due to ruptured cerebral aneurysm, Quinones Sales 5, An Grade 3/4. PBD 16   s/p cerebral angiogram with coil embolization of left MCA bifurcation aneurysm on 6/5. Additionally, patient has 5X3 right MCA bifurcation aneurysm with very wide neck which will need to be treated with stents vs. clipping  -Strict I/Os. IVFs Changed NS to LR due to chloride of 114. Increased to 200 ml/hr due to negative volume status. - Continue Florinef 0.2 mg bid for prevention of cerebral salt wasting in the setting of SAH, aid in increasing plasma volume. Urine studies sent 6/15 showed serum osmolality 296, urine osmolality 322, specific gravity normal at 1.015. Urine random sodium at 114  -Daily TCDs.  TCDs completed today showing an improvement in vasospasm again.  -Nimotop Day 13/21  -Continue seizure ppx with Kepra 1000 bid, Vimpat  Increased to 150 bid due to focal seizure-like activity this morning. -EEG this morning was a technically limited study but showed no evidence of seizures or ongoing epileptiform discharges. -MAP goal 100-130. Treat SBP greater than 200. Addendum: 6/18 06:30. IVFs - LR decreased to150 ml/h due to positive fluid status. No significant overnight events noted.      Michelle Cherry NP  Neurocritical Care Nurse Practitioner  [phone]

## 2020-06-18 NOTE — PROGRESS NOTES
[facility]    ICU TEAM Progress Note    Name: Kenneth Grayson   : 1970   MRN: [id number]   Date: 2020      Assessment/Plan:       1. SAH  a. 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 Has Right MCA aneurysm with wide neck that will prob need stenting vs clipping  c. Hydrocephalus is improved  d. Continue Nimotop day   e. Continue TCD per TATE - s/p angio for persistent vasospasms   i. Improved vasospasm per most recent TCD   ii. Continue MAP goal 100-130  f. Continue hourly neurochecks  g. Continue Florinef per TATE  i. Increased to 0.2mg BID due to increased urine output  h. Continue IVF with goal of euvolemia  i. LR 150cc/hr  2. Seizures  a. Secondary to the above  b. No seizure identified on most recent EEG but encephalopathy  c. Repeat EEG pending due to seizure like activity this AM.   d. Continue Keppra 1000mg BID and Vimpat 100mg BID  i. No changes recommended unless persistence of automatism  e. Neurology following - repeat head CT stable  3. Respiratory Failure  a. Likely secondary to neurogenic pulmonary edema - completed abx course for possible superimposed PNA  b. S/P Trach placement ()  c. Continue ATC and CPT  4. Ischemic Strokes  a. Patient with multiple ischemic infarcts throughout cerebrum suggestive of ischemic event  b. Note also ischemic cerebellar infarct left inferior  c. PT/OT/Speech      Subjective:   Progress Note: 2020      Reason for ICU Admission:     49 yo female with HTN who presented with AMS and seizure like activity. She was found with extensive SAH secondary to left MCA bifurcation ruptured aneurysm - s/p coiling. Since patient has been with vasospasm s/p IA verapamil . While she has been more responsive, she's still not following commands and brain imaging suggest extensive strokes. Overnight Events:     No significant events overnight. No further seizure like activity noted.  Somewhat less responsive this AM Active Problem List:     Problem List  Date Reviewed: 6/10/2020          Codes Class    Respiratory failure ([facility] [address] ICD-10-CM: J96.90  ICD-9-CM: 518.81         Hypoxic ischemic encephalopathy (HIE), unspecified severity ICD-10-CM: P91.60  ICD-9-CM: 768.70         SAH (subarachnoid hemorrhage) (HCC) ICD-10-CM: I60.9  ICD-9-CM: 430         Subarachnoid hemorrhage from middle cerebral artery aneurysm, left (HCC) ICD-10-CM: B33.22  ICD-9-CM: 430         RIGHT middle cerebral artery aneurysm ICD-10-CM: I67.1  ICD-9-CM: 437.3         Cerebral vasospasm ICD-10-CM: T48.352  ICD-9-CM: 435.9               Past Medical History:      has a past medical history of HTN (hypertension). Past Surgical History:      has no past surgical history on file. Home Medications:     Prior to Admission medications    Not on File       Allergies/Social/Family History:     No Known Allergies   Social History     Tobacco Use    Smoking status: Never Smoker    Smokeless tobacco: Never Used   Substance Use Topics    Alcohol use: Not Currently      History reviewed. No pertinent family history. Review of Systems:     A comprehensive review of systems was negative except for that written in the HPI.     Objective:   Vital Signs:  Visit Vitals  BP (!) 191/91   Pulse 62   Temp 98.9 °F (37.2 °C)   Resp 21   Ht 5' 4\" (1.626 m)   Wt 102 kg (224 lb 13.9 oz)   SpO2 100%   BMI 38.60 kg/m²    O2 Flow Rate (L/min): 10 l/min O2 Device: Tracheal collar   Temp (24hrs), Av.9 °F (37.2 °C), Min:98.7 °F (37.1 °C), Max:99.3 °F (37.4 °C)           Intake/Output:     Intake/Output Summary (Last 24 hours) at 2020 0850  Last data filed at 2020 0700  Gross per 24 hour   Intake 5981.67 ml   Output 3425 ml   Net 2556.67 ml       Physical Exam:    General: NAD  HENT: Atraumatic, Tracheostomy tube in place  Cardio: RRR  Respiratory: Coarse breath sounds BL  GI: Soft not tender abdomen  Extremities: +ve trace edema  Neuro: Opens right eye and tracks      LABS AND  DATA: Personally reviewed  Recent Labs     06/18/20  0525 06/17/20  0514   WBC 7.1 6.2   HGB 7.1* 7.5*   HCT 24.5* 26.1*    249     Recent Labs     06/18/20  0525 06/17/20  1803 06/17/20  0514    140 141   K 3.6 3.7 3.5   * 109* 110*   CO2 27 28 25   BUN 15 11 12   CREA 0.46* 0.39* 0.49*   * 98 156*   CA 8.9 8.4* 8.0*   MG 2.2  --  2.2   PHOS 3.2  --  2.4*     Recent Labs     06/17/20  0514   AP 83   TP 6.0*   ALB 2.2*   GLOB 3.8     No results for input(s): INR, PTP, APTT, INREXT, INREXT, INREXT in the last 72 hours. No results for input(s): PHI, PCO2I, PO2I, FIO2I in the last 72 hours. No results for input(s): CPK, CKMB, TROIQ, BNPP in the last 72 hours. Hemodynamics:   PAP:   CO:     Wedge:   CI:     CVP:    SVR:       PVR:       Ventilator Settings:  Mode Rate Tidal Volume Pressure FiO2 PEEP   Assist control, SIMV   400 ml  10 cm H2O 40 % 5 cm H20     Peak airway pressure: 22 cm H2O    Minute ventilation: 8.79 l/min        MEDS: Reviewed    Chest X-Ray:  CXR Results  (Last 48 hours)    None        ECHO:  EF 60-65%      Multidisciplinary Rounds Completed:  Pending    ABCDEF Bundle/Checklist  Pain Medications: Acetaminophen  Target RASS: N/A  Sedation Medications: None  CAM-ICU:  Positive  Mobility: Poor  PT/OT: PT consulted and on board and OT consulted and on board   Restraints: None needed at this time  Discussed Plan of Care (goals of care):  Yes  Addressed Code Status: Full Code    CARDIOVASCULAR  Cardiac Gtts: None  SBP Goal of: > 100 mmHg and < 180 mmHg  MAP Goal of: 100-130  Transfusion Trigger (Hgb): <8 g/dL    RESPIRATORY  Vent Goals:   Chlorhexidine   Aggressive bronchopulmonary hygiene  DVT Prophylaxis (if no, list reason): SCD's or Sequential Compression Device and Lovenox   SPO2 Goal: > 92%  Pulmonary toilet: Duo-Nebs     GI/  Martinez Catheter Present: Yes  GI Prophylaxis: Not at this time   Nutrition: Yes   IVFs: NSS  Bowel Movement: Yes  Bowel Regimen: Docusate (Colace)  Insulin: ISS    ANTIBIOTICS  Antibiotics:  None    T/L/D  Tubes: Tracheostomy and Nasogastric Tube  Lines: Peripheral IV, Arterial Line and Central Line  Drains: Martinez Catheter    SPECIAL EQUIPMENT  None    DISPOSITION  Stay in ICU    CRITICAL CARE CONSULTANT NOTE  I had a face to face encounter with the patient, reviewed and interpreted patient data including clinical events, labs, images, vital signs, I/O's, and examined patient. I have discussed the case and the plan and management of the patient's care with the consulting services, the bedside nurses and the respiratory therapist.      NOTE OF PERSONAL INVOLVEMENT IN CARE   This patient has a high probability of imminent, clinically significant deterioration, which requires the highest level of preparedness to intervene urgently. I participated in the decision-making and personally managed or directed the management of the following life and organ supporting interventions that required my frequent assessment to treat or prevent imminent deterioration. I personally spent 30 minutes of critical care time. This is time spent at this critically ill patient's bedside actively involved in patient care as well as the coordination of care and discussions with the patient's family. This does not include any procedural time which has been billed separately.     Holly Hammer MD  [employer]  6/18/2020

## 2020-06-18 NOTE — PROCEDURES
PICC Placement Note    PRE-PROCEDURE VERIFICATION  Correct Procedure: yes  Correct Site:  yes  Temperature: Temp: 99.9 °F (37.7 °C), Temperature Source: Temp Source: Axillary  Recent Labs     06/18/20  0525   BUN 15   CREA 0.46*      WBC 7.1       Allergies: Patient has no known allergies. Education materials, including PICC Booklet and written information regarding central catheter related bloodstream infection and prevention given to patient. See Patient Education activity for further details. PROCEDURE DETAIL  A triple lumen power injectable PICC line was started for reliable vascular access. The following documentation is in addition to the PICC properties in the lines/airways flowsheet :  Lot #: [id number]  Xylocaine 1% used intradermally:  yes  Total Catheter Length:  37 (cm)  External Catheter Length: 0 (cm)  Circumference: 41.5 (cm)  Vein Selection for PICC: right cephalic  Central Line Bundle followed: yes  Complication Related to Insertion: none    The placement was verified by ECG technology. The PICC is on the right side and the tip overlies the superior vena cava. ECG verification documentation is on the patient's paper chart. Line is okay to use. Report to Shweta Ponce.     Hazle Alpers, AGUILARN, RN, [place]-BC   Vascular Access Team

## 2020-06-18 NOTE — CONSULTS
Hospitalist Progress Note          Angelic Lizama NP  Please call  and page for questions. Call physician on-call through the  7pm-7am    Daily Progress Note: 6/18/2020    Primary care Mariano Favre, MD    Date of admission: 6/4/2020  8:08 PM    Admission Summary and Hospital Course:      From H&P 6/4/2020:  \"Patient is a 20-year-old female who presented to the ED as a transfer from [facility]. 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 She was then transferred to [facility] for further neuro evaluation. Patient was intubated at [facility] prior to transfer. Upon arrival to [facility] patient went for CT and CTA of the head. Findings showed an extensive subarachnoid hemorrhage concerning for leaking/ruptured aneurysm. No hydrocephalus. 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 Both neurosurgery and neuro IR consulted. Plan for possible intervention in the a.m. Patient to remain intubated and transferred to ICU for continued support. Keep euvolemic and sedated. Goal blood pressure less than 140 mmHg. Cardene as needed. Patient to be sedated with propofol. Will place a arterial line for close monitoring of blood pressures. Per neuro NP who spoke with patient's mother patient started complaining of a headache on 5/31/2020 and has been taking BC powder for the headache. She has a history of hypertension but was not taking her blood pressure medications recently. Per mother's report patient does not smoke drink alcohol or use street drugs. On the CTA of her head and neck it showed pulmonary infiltrates suspicious for COVID-19 infection so patient was tested in the ED which is now pending and placed on droplet plus precautions. \"    Procedures: 06/05/20 and coil embolization of the left MCA aneurysm was completeds/p cerebral angiogram for right PANCHITO A1 angioplasty for vasospasm, transarterial infusion of verapamil on 6/9,  S/p angiogram on 6/10 for left ICA/MCA angioplasty and transarterial infusion of verapamil for vasospasm treatment, s/p angiogram on 6/11 and 6/13 for transarterial infusion of verapamil for vasospasm treatment.      Tracheostomy placement 6/16 by Dr. Rain Strange in thoracic surgery. Subjective: Today we were asked to see the patient for downgrade from ICU. Review of the chart shows elevated BP, systolic over 132 many times and RN reports A-line reading 096M systolic with a good wave form. Per Neuro IR NP and MD, patient still ok to transfer out of ICU and neurochecks can change to q2h. Upon arrival to the patient's room, RN reports that patient's mother noticed that the patient was \"breathing funny\" and that the patient appeared to be having cheyne cedeño type respirations with several seconds of apnea. The apnea correlates with A-line dampening and Sp02 drops to 80s. According to the nurse, this is a new development. ICU physician also at bedside as well as neuro IR NP.        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 Clipping              - Continue Nimotop Day 14 of 21 to prevent delayed cerebral ischemia              - continue maintenance fluids at NS at 125 ml/hr to maintain euvolemia              - Strict I's and O's              - TCDs daily, TCDs today again show improvement in vasospasm              - ECHO shows EF 60-65%, mild concentric hypertrophy, mildly dilated left atrium              - continue every hour neuro checks              -Decrease  Florinef to 0.1 mg BID (initially started 6/13) for prevention of cerebral salt wasting in the setting of SAH, aid in increasing plasma volume              - Continue MAP goal 100-130. Keep SBP <200 Cardene/Fabien/Hydralazine/Labetalol PRN              - PT/OT/SLP evals when appropriate              -Repeat head CT d/t newly having apneic episodes              -Pt to remain in ICU today     Seizure              - Due to #1, no prior hx of seizure              - Continue Keppra 1000 mg BID               - continue Vimpat increased to 150 mg BID 6/17/2020              - Seizure precautions              - repeat EEG 6/17 d/t seizure like activity overnight, awaiting results              -  EEG on 6/6 showed an abnormal EEG due to slowing of the background rhythms with intermittent rhythmic delta activity seen in both frontal head regions. Occasional sharp wave discharges in the left frontal area.  EEG is suggestive of mild-to-moderate generalized encephalopathic process, nonspecific in type.  In addition, there was occasional epileptiform disturbance seen in the left frontal area which may represent a partial onset seizure focus.              - Neurology following      Cerebral Edema (resolved)              - received one dose of mannitol, started 3% saline on 6/8, discontinued 6/14              - serum sodium remains stable                Acute hypoxic respiratory failure in the setting of likely neurogenic pulmonary edema- resolved              - now on trach collar , #6 Shiley placed 6/16             - newly having periods of apnea; pt to go on ventilator     5.) Multiple Acute Ischemic Infarctions                - Imaging pattern is consistent with Hypoxic-Ischemic Encephalopathy (HIE)                - Continue aspirin 81 mg daily for stroke prevention                 - Recommend palliative care consult for [address]          6.) Cerebral Vasospasm                 - s/p cerebral angiogram for right PANCHITO A1 angioplasty for vasospasm, transarterial infusion of verapamil on ,  S/p angiogram on 6/10 for left ICA/MCA angioplasty and transarterial infusion of verapamil for vasospasm treatment, s/p angiogram on  and  for transarterial infusion of verapamil for vasospasm treatment                 - Strict I's and O's                  - plans as stated in #1      7.) Fever                 - mostly afebrile w/ some temps in 99s                - completed course of Zosyn, CXR shows residual right lower lung airspace disease               - possibly febrile in the setting of SAH, paired blood cultures NGTD               - respiratory cultures growing Klebsiella Pneumonia and Citrobacter Koseria                 - CTA of chest  negative for PE      8.) Polyuria                  - serum sodium stable at 141                  - urine studies show serum osmolality 296, urine osmolality 322, specific gravity normal at 1.015.  Urine random sodium at 114                  - Florinef to 0.1 mg BID for prevention of cerebral salt wasting                  - monitor BMP every 12 hours     Activity: Bed rest  DVT ppx: SCDs, Lovenox  Disposition: Patient to remain in ICU           Review of Systems:     Unable to obtain ROS d/t patient mentation  Objective:   Physical Exam:     Visit Vitals  /82 (BP 1 Location: Right arm, BP Patient Position: At rest)   Pulse 72   Temp 98.4 °F (36.9 °C)   Resp 23   Ht 5' 4\" (1.626 m)   Wt 102 kg (224 lb 13.9 oz)   SpO2 95%   BMI 38.60 kg/m²    O2 Flow Rate (L/min): 10 l/min O2 Device: Tracheal collar    Temp (24hrs), Av °F (37.2 °C), Min:98.4 °F (36.9 °C), Max:99.9 °F (37.7 °C)     07 -  1900  In: 5566 [I.V.:1350]  Out: 2750 [Urine:2750]   1901 -  0700  In: 81583.8 [I.V.:8555.8]  Out: 7850 [Urine:7625; Drains:225]      General:  Opens right eye and tracks (baseline per ICU MD), obese   Lungs:   Rhonchi throughout; regular periods of apnea; trach collar   Heart:  RRR w/ ectopy, S1, S2 normal, no murmur, click, rub or gallop. Abdomen:   Soft, non-tender, non-distended. Bowel sounds normal.    Extremities: Extremities normal, atraumatic, no cyanosis or edema. Skin: Skin color, texture, turgor normal. No rashes or lesions   Neurologic: Opens right eye and pupil 3mm brisk response to light; left pupil 4mm sluggish response to light; Left eye ptosis; not following commands     Data Review:       Recent Days:  Recent Labs     06/18/20  0525 06/17/20  0514 06/16/20  0414   WBC 7.1 6.2 8.5   HGB 7.1* 7.5* 7.9*   HCT 24.5* 26.1* 26.2*    249 214     Recent Labs     06/18/20  1654 06/18/20  0525 06/17/20  1803 06/17/20  0514  06/16/20  0414    142 140 141   < > 142   K 3.6 3.6 3.7 3.5   < > 3.5    109* 109* 110*   < > 112*   CO2 29 27 28 25   < > 22   * 140* 98 156*   < > 112*   BUN 14 15 11 12   < > 11   CREA 0.43* 0.46* 0.39* 0.49*   < > 0.50*   CA 8.8 8.9 8.4* 8.0*   < > 8.0*   MG  --  2.2  --  2.2  --  2.1   PHOS  --  3.2  --  2.4*  --  2.6   ALB 2.4*  --   --  2.2*  --   --    ALT 32  --   --  38  --   --     < > = values in this interval not displayed. No results for input(s): PH, PCO2, PO2, HCO3, FIO2 in the last 72 hours.     24 Hour Results:  Recent Results (from the past 24 hour(s))   METABOLIC PANEL, BASIC    Collection Time: 06/17/20  6:03 PM   Result Value Ref Range    Sodium 140 136 - 145 mmol/L    Potassium 3.7 3.5 - 5.1 mmol/L    Chloride 109 (H) 97 - 108 mmol/L    CO2 28 21 - 32 mmol/L    Anion gap 3 (L) 5 - 15 mmol/L    Glucose 98 65 - 100 mg/dL    BUN 11 6 - 20 MG/DL    Creatinine 0.39 (L) 0.55 - 1.02 MG/DL    BUN/Creatinine ratio 28 (H) 12 - 20      GFR est AA >60 >60 ml/min/1.73m2    GFR est non-AA >60 >60 ml/min/1.73m2    Calcium 8.4 (L) 8.5 - 10.1 MG/DL   PROCALCITONIN    Collection Time: 06/18/20  5:25 AM   Result Value Ref Range    Procalcitonin 0.09 ng/mL   MAGNESIUM    Collection Time: 06/18/20  5:25 AM   Result Value Ref Range    Magnesium 2.2 1.6 - 2.4 mg/dL   PHOSPHORUS    Collection Time: 06/18/20  5:25 AM   Result Value Ref Range    Phosphorus 3.2 2.6 - 4.7 MG/DL   CBC WITH AUTOMATED DIFF    Collection Time: 06/18/20  5:25 AM   Result Value Ref Range    WBC 7.1 3.6 - 11.0 K/uL    RBC 3.10 (L) 3.80 - 5.20 M/uL    HGB 7.1 (L) 11.5 - 16.0 g/dL    HCT 24.5 (L) 35.0 - 47.0 %    MCV 79.0 (L) 80.0 - 99.0 FL    MCH 22.9 (L) 26.0 - 34.0 PG    MCHC 29.0 (L) 30.0 - 36.5 g/dL    RDW 26.3 (H) 11.5 - 14.5 %    PLATELET 295 324 - 442 K/uL    MPV 10.9 8.9 - 12.9 FL    NRBC 0.0 0  WBC    ABSOLUTE NRBC 0.00 0.00 - 0.01 K/uL    NEUTROPHILS 71 32 - 75 %    LYMPHOCYTES 15 12 - 49 %    MONOCYTES 10 5 - 13 %    EOSINOPHILS 3 0 - 7 %    BASOPHILS 0 0 - 1 %    IMMATURE GRANULOCYTES 1 (H) 0.0 - 0.5 %    ABS. NEUTROPHILS 5.0 1.8 - 8.0 K/UL    ABS. LYMPHOCYTES 1.1 0.8 - 3.5 K/UL    ABS. MONOCYTES 0.7 0.0 - 1.0 K/UL    ABS. EOSINOPHILS 0.2 0.0 - 0.4 K/UL    ABS. BASOPHILS 0.0 0.0 - 0.1 K/UL    ABS. IMM.  GRANS. 0.1 (H) 0.00 - 0.04 K/UL    DF SMEAR SCANNED      PLATELET COMMENTS Large Platelets      RBC COMMENTS ANISOCYTOSIS  3+        RBC COMMENTS HYPOCHROMIA  2+        RBC COMMENTS MICROCYTOSIS  1+        RBC COMMENTS OVALOCYTES  1+       METABOLIC PANEL, BASIC    Collection Time: 06/18/20  5:25 AM   Result Value Ref Range    Sodium 142 136 - 145 mmol/L    Potassium 3.6 3.5 - 5.1 mmol/L    Chloride 109 (H) 97 - 108 mmol/L    CO2 27 21 - 32 mmol/L    Anion gap 6 5 - 15 mmol/L    Glucose 140 (H) 65 - 100 mg/dL    BUN 15 6 - 20 MG/DL    Creatinine 0.46 (L) 0.55 - 1.02 MG/DL    BUN/Creatinine ratio 33 (H) 12 - 20      GFR est AA >60 >60 ml/min/1.73m2    GFR est non-AA >60 >60 ml/min/1.73m2    Calcium 8.9 8.5 - 10.1 MG/DL   TCD INTRACRANIAL ARTERIES COMPLETE    Collection Time: 06/18/20  9:42 AM   Result Value Ref Range    Right Lindegaard Ratio 1.9     Right MCA 1  cm/s    Right MCA 1 EDV 39 cm/s    Right MCA 1 Mean Velocity 75 cm/s    Right PANCHITO  cm/s    Right PANCHITO EDV 48 cm/s    Right PANCHITO Mean Velocity 79 cm/s    Right ICA PSV 90 cm/s    Right ICA EDV 27 cm/s    Right ICA Mean Velocity 48 cm/s    Right PCA 1 PSV 93 cm/s    Right PCA 1 EDV 32 cm/s    Right PCA 1 Mean Velocity 52 cm/s    Right External ICA PSV 72 cm/s    Right External ICA EDV 24 cm/s    Right External ICA Mean Velocity 40 cm/s    Left MCA 1  cm/s    Left MCA 1 EDV 39 cm/s    Left MCA 1 Mean Velocity 62 cm/s    Left PANCHITO  cm/s    Left PANCHITO EDV 60 cm/s    Left PANCHITO Mean Velocity 99 cm/s    Left ICA  cm/s    Left External ICA PSV 69 cm/s    Left ICA EDV 42 cm/s    Left External ICA EDV 26 cm/s    Left ICA Mean Velocity 68 cm/s    Left External ICA Mean Velocity 40 cm/s    Left PCA 1  cm/s    Left PCA 1 EDV 49 cm/s    Left PCA 1 Mean Velocity 81 cm/s    Left Lindegaard Ratio 1.6     Basilar Artery  cm/s    Basilar Artery EDV 30 cm/s    Basilar Artery Mean Angel 56 cm/s    Right Vertebral Mean Velocity 42 cm/s    Left Vertebral Mean Velocity 36 cm/s    Right Vertebral PSV 79 cm/s    Right Vertebral EDV 24 cm/s    Left Vertebral PSV 64 cm/s    Left Vertebral EDV 22 cm/s   METABOLIC PANEL, COMPREHENSIVE    Collection Time: 06/18/20  4:54 PM   Result Value Ref Range    Sodium 140 136 - 145 mmol/L    Potassium 3.6 3.5 - 5.1 mmol/L    Chloride 108 97 - 108 mmol/L    CO2 29 21 - 32 mmol/L    Anion gap 3 (L) 5 - 15 mmol/L    Glucose 133 (H) 65 - 100 mg/dL    BUN 14 6 - 20 MG/DL    Creatinine 0.43 (L) 0.55 - 1.02 MG/DL    BUN/Creatinine ratio 33 (H) 12 - 20      GFR est AA >60 >60 ml/min/1.73m2    GFR est non-AA >60 >60 ml/min/1.73m2    Calcium 8.8 8.5 - 10.1 MG/DL    Bilirubin, total 0.3 0.2 - 1.0 MG/DL    ALT (SGPT) 32 12 - 78 U/L    AST (SGOT) 20 15 - 37 U/L    Alk.  phosphatase 72 45 - 117 U/L    Protein, total 6.3 (L) 6.4 - 8.2 g/dL    Albumin 2.4 (L) 3.5 - 5.0 g/dL    Globulin 3.9 2.0 - 4.0 g/dL    A-G Ratio 0.6 (L) 1.1 - 2.2         Problem List:  Problem List as of 6/18/2020 Date Reviewed: 6/10/2020          Codes Class Noted - Resolved    [facility] ICD-10-CM: J96.90  ICD-9-CM: 518.81  6/15/2020 - Present        Hypoxic ischemic encephalopathy (HIE), unspecified severity ICD-10-CM: P91.60  ICD-9-CM: 768.70  6/15/2020 - Present        SAH (subarachnoid hemorrhage) ([facility] ICD-10-CM: I60.9  ICD-9-CM: 016  6/4/2020 - Present        Subarachnoid hemorrhage from middle cerebral artery aneurysm, left (HCC) ICD-10-CM: S17.23  ICD-9-CM: 430  6/4/2020 - Present        RIGHT middle cerebral artery aneurysm ICD-10-CM: I67.1  ICD-9-CM: 437.3  6/4/2020 - Present        Cerebral vasospasm ICD-10-CM: I67.848  ICD-9-CM: 435.9  6/4/2020 - Present        RESOLVED: Anterior communicating artery aneurysm ICD-10-CM: I67.1  ICD-9-CM: 437.3  6/4/2020 - 6/12/2020              Medications reviewed  Current Facility-Administered Medications   Medication Dose Route Frequency    fludrocortisone (FLORINEF) tablet 0.1 mg  0.1 mg Oral BID    potassium bicarb-citric acid (EFFER-K) tablet 40 mEq  40 mEq Oral BID    lacosamide (VIMPAT) tablet 150 mg  150 mg Per NG tube Q12H    lactated Ringers infusion  150 mL/hr IntraVENous CONTINUOUS    ELECTROLYTE REPLACEMENT PROTOCOL - Potassium and Magnesium  1 Each Other PRN    ELECTROLYTE REPLACEMENT PROTOCOL - Phosphorus  1 Each Other PRN    0.9% sodium chloride infusion 250 mL  250 mL IntraVENous PRN    balsam peru-castor oiL (VENELEX) ointment   Topical BID    labetaloL (NORMODYNE;TRANDATE) injection 20 mg  20 mg IntraVENous Q2H PRN    0.9% sodium chloride infusion 250 mL  250 mL IntraVENous PRN    aspirin chewable tablet 81 mg  81 mg Per NG tube DAILY    enoxaparin (LOVENOX) injection 40 mg  40 mg SubCUTAneous Q24H    levETIRAcetam (KEPPRA) oral solution 1,000 mg  1,000 mg Per NG tube Q12H    glycopyrrolate (ROBINUL) injection 0.2 mg  0.2 mg IntraMUSCular TID PRN    niMODipine (NYMALIZE) 6 mg/mL oral solution 60 mg  60 mg Per NG tube Q4H    amLODIPine (NORVASC) tablet 5 mg  5 mg Per NG tube DAILY    acetaminophen (TYLENOL) solution 650 mg  650 mg Per NG tube Q4H PRN    0.9% sodium chloride infusion 250 mL  250 mL IntraVENous PRN    albuterol-ipratropium (DUO-NEB) 2.5 MG-0.5 MG/3 ML  3 mL Nebulization Q6H RT    glucose chewable tablet 16 g  4 Tab Oral PRN    glucagon (GLUCAGEN) injection 1 mg  1 mg IntraMUSCular PRN    dextrose 10% infusion 0-250 mL  0-250 mL IntraVENous PRN    albuterol (PROVENTIL VENTOLIN) nebulizer solution 2.5 mg  2.5 mg Nebulization Q6H PRN    polyvinyl alcohol-povidone (NATURAL TEARS) 0.5-0.6 % ophthalmic solution 2 Drop  2 Drop Both Eyes Q8H    docusate (COLACE) 50 mg/5 mL oral liquid 100 mg  100 mg Oral BID    bisacodyL (DULCOLAX) suppository 10 mg  10 mg Rectal DAILY PRN    ondansetron (ZOFRAN) injection 4 mg  4 mg IntraVENous Q4H PRN    docusate (COLACE) 50 mg/5 mL oral liquid 100 mg  100 mg Oral BID PRN       Care Plan discussed with: Patient/family, nurse      Surekha Sherwood NP  Hospitalist  Providers can reach me on [employer]

## 2020-06-18 NOTE — PROGRESS NOTES
1. Notified Dr. Kylie Garsia of sporadic blood pressure reading via arterial line. Orders to d/c arterial line. 1637. Nurse called to the bedside by patient's mother stating Ayaan Palm seems to be breathing funny\" - upon assessment patient is breathing about apprx four times and then holding her breath in a cyclical fashion. Will notify MD.     [id number]. Hospitalist paged.

## 2020-06-18 NOTE — PROGRESS NOTES
SLP Contact Note    Attempted to see patient for SLP treatment. Pt with significant amount of blood coming from tracheal hub and not participative. Discussed with RN Alex Valero, appreciate her assistance, and decision was made to hold treatment for now. SLP will continue to follow.       Thank you,  TEVIN AdanEd, [id number] [facility]  Speech-Language Pathologist

## 2020-06-18 NOTE — PROGRESS NOTES
Neurointerventional Surgery Progress Note  Edward Long NP   Neurocritical Care Nurse Practitioner  [phone]          Admit Date: 6/4/2020        Daily Progress Note: 6/18/2020   LOS: 14 days      S/P:  POD 12 Cerebral angiogram with coil embolization of ruptured left MCA bifurcation aneurysm on 6/5/2020    ** Delayed Presentation ** PB Day 18    Interval History/Subjective:   Patient currently -843 for today. Positive 2.4L yesterday. LR turned down to 150/hr overnight. Serum sodium remains stable today. Patient is alert and tracking with eyes. Patient does not follow commands. Continues on trach collar with some tachypnea. TCDs with continued improvement. Liberalize BP goals to systolic [phone] [id number] Wanda Long with transfer down to [facility] for continued management. Unable to perform a ROS. Assessment & Plan:      Active Problems:    SAH (subarachnoid hemorrhage) ([address] (6/4/2020)      Subarachnoid hemorrhage from middle cerebral artery aneurysm, left ([address] (6/4/2020)      RIGHT middle cerebral artery aneurysm (6/4/2020)      Cerebral vasospasm (6/4/2020)      Respiratory failure ([address] (6/15/2020)      Hypoxic ischemic encephalopathy (HIE), unspecified severity (6/15/2020)      1.) SAH due to ruptured cerebral aneurysm, [place] 5, An Grade 3/4              - s/p cerebral angiogram with coil embolization of left MCA bifurcation aneurysm on 6/5       Additionally, patient has 5X3 right MCA bifurcation aneurysm with very wide neck which will               need to be treated with stents vs. Clipping              - Continue Nimotop Day 14 of 21 to prevent delayed cerebral ischemia              - continue maintenance fluids at NS at 125 ml/hr to maintain euvolemia              - Strict I's and O's              - TCDs daily, TCDs today again show improvement in vasospasm              - ECHO shows EF 60-65%, mild concentric hypertrophy, mildly dilated left atrium              - continue every hour neuro checks -Decrease  Florinef to 0.1 mg BID (initially started 6/13) for prevention of cerebral salt wasting in the setting of SAH, aid in increasing plasma volume              - Continue MAP goal 100-130. Keep SBP <200 Cardene/Fabien/Hydralazine/Labetalol PRN              - PT/OT/SLP evals when appropriate    2.) Seizure              - Due to #1, no prior hx of seizure              - Continue Keppra 1000 mg BID               - continue Vimpat increased to 150 mg BID 6/17/2020              - Seizure precautions              - repeat EEG 6/17 d/t seizure like activity overnight, awaiting results              -  EEG on 6/6 showed an abnormal EEG due to slowing of the background rhythms with intermittent rhythmic delta activity seen in both frontal head regions. Occasional sharp wave discharges in the left frontal area. EEG is suggestive of mild-to-moderate generalized encephalopathic process, nonspecific in type.   In addition, there was occasional epileptiform disturbance seen in the left frontal area which may represent a partial onset seizure focus.              - Neurology following                3.) Cerebral Edema (resolved)              - received one dose of mannitol, started 3% saline on 6/8, discontinued 6/14              - serum sodium remains stable                4.) Acute hypoxic respiratory failure in the setting of likely neurogenic pulmonary edema- resolved              - now on trach collar , #6 Arun              - Intensivist managing     5.) Multiple Acute Ischemic Infarctions                - Imaging pattern is consistent with Hypoxic-Ischemic Encephalopathy (HIE)                - Continue aspirin 81 mg daily for stroke prevention           6.) Cerebral Vasospasm                 - s/p cerebral angiogram for right PANCHITO A1 angioplasty for vasospasm, transarterial infusion of verapamil on 6/9,  S/p angiogram on 6/10 for left ICA/MCA angioplasty and transarterial infusion of verapamil for vasospasm treatment, s/p angiogram on 6/11 and 6/13 for transarterial infusion of verapamil for vasospasm treatment                 - Strict I's and O's                  - plans as stated in #1     7.) Fever                 - fevers continue to spike intermittently , normal WBC                - completed course of Zosyn, CXR shows residual right lower lung airspace disease               - possibly febrile in the setting of SAH, paired blood cultures NGTD               - respiratory cultures growing Klebsiella Pneumonia and Citrobacter Koseria 6/17                - CTA of chest 6/7 negative for PE                 - Intensivist following     8.) Polyuria                  - serum sodium stable at 141                  - urine studies show serum osmolality 296, urine osmolality 322, specific gravity normal at 1.015. Urine random sodium at 114                  - Florinef to 0.1 mg BID for prevention of cerebral salt wasting                  - monitor BMP every 12 hours    Activity: Bed rest  DVT ppx: SCDs, Lovenox  Disposition: TBD    Plan d/w Dr. Brian Broderick, Dr. Isaak Hart, and RN. Admission Summary:     Kailyn Esqueda is a 48 y.o. 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 On arrival to ED, pt was unresponsive and noted to be seizing. A stat CT of her head was performed there, which showed extensive SAH. Pt was then intubated emergently. She was also extremely hypertensive with SBPs in 240s. She was given Keppra and Ativan, and placed on cardene drip for BP control. NSGY and NIS were then consulted and the patient was transferred to [facility] for higher level of care. En route, paramedics administered 7.5 mg of Versed, 200mcg of Fentanyl, and 10 mg of labetalol for BP control.  On arrival, CTA of her head and neck was obtained, which showed a 4.4 x 4.1 mm right MCA trifurcation aneurysm and  a 2.2 x 3.1 mm left MCA trifurcation aneurysm. Of note, CTA head/neck also showed pulmonary infiltrates suspicious for COVID-19 infection and the patient has been tested for COVID which was negative. 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 Mother reported pt does not smoke, does not drink alcohol or use street drugs. The mother also reported that the patient began complaining of a headache on 5/31/2020 and has been taking BC Powder (aspirin) for the headache. She also reported that the pt has a hx of HTN and is supposed to be on BP medications, but recently stopped taking her medications. The patient underwent a cerebral angiogram on 6/5 by Dr. Ferne Fleischer for coil embolization of a ruptured left MCA bifurcation aneurysm.     Current Facility-Administered Medications   Medication Dose Route Frequency Provider Last Rate Last Dose    lacosamide (VIMPAT) tablet 150 mg  150 mg Per NG tube Q12H Kamran Palma MD   150 mg at 06/18/20 0836    lactated Ringers infusion  150 mL/hr IntraVENous CONTINUOUS Martir Pisano  mL/hr at 06/18/20 1341 150 mL/hr at 06/18/20 1341    fludrocortisone (FLORINEF) tablet 0.2 mg  0.2 mg Oral BID Sherie Avery NP   0.2 mg at 06/18/20 0836    ELECTROLYTE REPLACEMENT PROTOCOL - Potassium and Magnesium  1 Each Other PRN Sherie Avery NP        ELECTROLYTE REPLACEMENT PROTOCOL - Phosphorus  1 Each Other PRN Sherie Avery NP        PHENYLephrine (ELKIN-SYNEPHRINE) 30 mg in 0.9% sodium chloride 250 mL infusion   mcg/min IntraVENous TITRATE Sherie Avery NP        0.9% sodium chloride infusion 250 mL  250 mL IntraVENous PRN Saima Rehman NP        balsam peru-castor oiL (VENELEX) ointment   Topical BID Leon Berry DO        labetaloL (NORMODYNE;TRANDATE) injection 20 mg  20 mg IntraVENous Q2H PRN Ian Domínguez NP   20 mg at 06/18/20 1407    0.9% sodium chloride infusion 250 mL  250 mL IntraVENous PRN FABIAN Elizabeth        aspirin chewable tablet 81 mg  81 mg Per NG tube DAILY Moshe Thurman MD   81 mg at 06/18/20 0836    enoxaparin (LOVENOX) injection 40 mg  40 mg SubCUTAneous Q24H Leon Berry DO   40 mg at 06/18/20 1415    levETIRAcetam (KEPPRA) oral solution 1,000 mg  1,000 mg Per NG tube Q12H Leon Berry DO   1,000 mg at 06/18/20 0836    glycopyrrolate (ROBINUL) injection 0.2 mg  0.2 mg IntraMUSCular TID PRN Ericka Pina NP        niMODipine (NYMALIZE) 6 mg/mL oral solution 60 mg  60 mg Per NG tube Q4H Luana Domínguez NP   60 mg at 06/18/20 1416    amLODIPine (NORVASC) tablet 5 mg  5 mg Per NG tube DAILY DARREN Estrada   5 mg at 06/18/20 0836    acetaminophen (TYLENOL) solution 650 mg  650 mg Per NG tube Q4H PRN Kvng Leal NP   650 mg at 06/15/20 2101    0.9% sodium chloride infusion 250 mL  250 mL IntraVENous PRN FABIAN Elizabeth        albuterol-ipratropium (DUO-NEB) 2.5 MG-0.5 MG/3 ML  3 mL Nebulization Q6H RT DARREN Estrada   3 mL at 06/18/20 7899    glucose chewable tablet 16 g  4 Tab Oral PRN FABIAN Elizabeth        glucagon (GLUCAGEN) injection 1 mg  1 mg IntraMUSCular PRN FABIAN Elizabeth        dextrose 10% infusion 0-250 mL  0-250 mL IntraVENous PRN FABIAN Elizabeth        albuterol (PROVENTIL VENTOLIN) nebulizer solution 2.5 mg  2.5 mg Nebulization Q6H PRN FABIAN Elizabeth        polyvinyl alcohol-povidone (NATURAL TEARS) 0.5-0.6 % ophthalmic solution 2 Drop  2 Drop Both Eyes Q8H DARREN Estrada   2 Drop at 06/18/20 1417    docusate (COLACE) 50 mg/5 mL oral liquid 100 mg  100 mg Oral BID DARREN Estrada   Stopped at 06/16/20 0900    bisacodyL (DULCOLAX) suppository 10 mg  10 mg Rectal DAILY PRN DARREN Estrada        chlorhexidine (ORAL CARE KIT) 0.12 % mouthwash 15 mL  15 mL Oral Q12H Loni Ramey, NP-C   15 mL at 06/18/20 0900    ondansetron (ZOFRAN) injection 4 mg  4 mg IntraVENous Q4H PRN Hai Hernandez NP-C        docusate (COLACE) 50 mg/5 mL oral liquid 100 mg  100 mg Oral BID PRN Chris Connelly R, NP-C            No Known Allergies    Review of Systems:  Review of systems not obtained due to patient factors. Objective:     Vital signs  Temp (24hrs), Av.1 °F (37.3 °C), Min:98.7 °F (37.1 °C), Max:99.9 °F (37.7 °C)    07 - 1900  In: 580 [I.V.:150]  Out: 4421 [Urine:1750]  1901 -  0700  In: 10740.8 [I.V.:8555.8]  Out: 7850 [Urine:7625; Drains:225]    Visit Vitals  BP (!) 203/83   Pulse 75   Temp 99.9 °F (37.7 °C)   Resp 19   Ht 5' 4\" (1.626 m)   Wt 224 lb 13.9 oz (102 kg)   SpO2 100%   BMI 38.60 kg/m²    O2 Flow Rate (L/min): 10 l/min O2 Device: Tracheal collar   Vitals:    20 1100 20 1200 20 1300 20 1400   BP: 162/74 (!) 202/73 180/76 (!) 203/83   Pulse: 69 71 69 75   Resp: 13 21 13 19   Temp:  99.9 °F (37.7 °C)     SpO2: 100% 100% 100% 100%   Weight:       Height:          Physical Exam:  GENERAL: NAD,trach collar,  alert   EYE: conjunctivae/corneas clear. Right pupil 3 mm, brisk response to light. Left pupil 4 mm, sluggish response to light. Keeps left eye closed   LUNG: lungs coarse bilaterally    HEART: regular rate and rhythm, S1, S2 normal, no murmur, click, rub or gallop  EXTREMITIES:  extremities normal, atraumatic, no cyanosis, 2+ pitting peripheral edema, distal pulses 2+ bilaterally   SKIN: Skin warm to touch. Right and left groin site clean, dry, and intact. No hematoma, bruising, or bleeding noted. NEUROLOGIC: Intubated. Alert. Eyes will open spontaneously. No command following. Right pupil 3 mm, brisk response to light. Left pupil 4 mm sluggish response to light. Left eye ptosis noted. Focuses and tracks with eyes, with the exception of inability to track to the right with left eye. Moves all extremities spontaneously. No involuntary movements. Gait deferred. Unable to assess language, sensation, and coordination. Imaging:    CT Head on 6/17/20 shows:     IMPRESSION:     1. Persistence of the previously described subtle areas of subarachnoid  hemorrhage. 2. Persistence of the previously described left inferior cerebellar infarct. 3. Presence of small lacunar infarcts bilaterally. 4. No evidence of hydrocephalus. 5. Presence of an aneurysm coil in the region of the left middle cerebral  artery. 6. Evidence of sphenoid sinus disease.       CT of Head on 6/13/2020 at 1103 shows  IMPRESSION:   1. Questionable left inferior cerebellar infarction, new from 6/10/2020.  2. Multiple, evolving, subacute infarctions throughout the cerebrum. 3. Subacute subarachnoid hemorrhage. Improved hydrocephalus compared to  6/6/2020    CTA of Head on 6/13/2020 at 1103 shows  IMPRESSION:   1. Left A1 vasospasm. 2. More mild spasm of the right PANCHITO, MCAs, and possibly the distal vertebrals. 3. Right M1 terminus aneurysm. 4. Large infundibula/small aneurysms of the bilateral P-comm origins. CT Perfusion on 6/13/2020 at 1103 shows  IMPRESSION:  1. Penumbra surrounding infarctions in most of the superior left MCA territory. 2. Bilateral PANCHITO infarctions. 3. Questionable left cerebellar infarction is inferior to the perfusion scan. CTA of Head on 6/9/2020 at 1350 shows  Progressive vasospasm involving the anterior cerebral arteries with minimal  associated decreased perfusion to the frontal lobes. No other significant  Change  . CT Perfusion on 6/9/2020 at 1336 shows  Impression:  CT perfusion brain: Very minimal decreased perfusion to the frontal lobes.     CT of Head on 6/9/2020 at 0234 shows  IMPRESSION:   No significant change.       CT of Head on 6/8/2020 shows  IMPRESSION:   Global edema, otherwise no significant change    CTA of Head and Neck on 6/8/2020 per radiology shows  Impression:  Mild to moderate vasospasm in the left middle cerebral artery status post  bifurcation aneurysm coil embolization. Stable 4 mm right MCA aneurysm. Endotracheal tube terminates just above the matt. 1 cm low-density left thyroid nodule. Mildly enlarged left axillary lymph node measuring 11 mm in short axis  Patchy bilateral airspace disease. Nonspecific asymmetric enhancement of the right superior ophthalmic vein   (However, no significant vasospasm was seen when personally reviewed by NIS)    CT Perfusion on 6/8/2020 shows  IMPRESSION:  No significant cerebral perfusion abnormality    CTA of Chest on 6/7/2020 shows  IMPRESSION:  1. No acute pulmonary embolus  2. Dependent atelectasis with diffuse groundglass opacifications bilaterally. CT of Head on 6/7/2020 shows  IMPRESSION:   1. No further progression of subarachnoid hemorrhage. The overall degree of  subarachnoid hemorrhage is slightly improved. 2. Subtle areas of gray-white differentiation loss throughout the cerebral  hemispheres correlating with areas of restricted diffusion on previous CT  compatible with ischemic changes. MRI of Brain on 6/6/2020 shows  IMPRESSION:   Imaging findings consistent with large left MCA territory ischemia/infarction,  left temporal, parietal and frontal lobes, cortically based, no superimposed  increased parenchymal hemorrhage or midline shift. Right and left PANCHITO territory  cortical diffusion restriction consistent with infarction. Infarction in the right insular cortex and right temporal lobe. Scattered small  infarctions right and left corona radiata centrum semiovale, right cerebellum. Allowing for differences in technique likely stable subarachnoid and  intraventricular hemorrhage. CT of Head on 6/6/2020 at 1411 shows  IMPRESSION:   Left MCA territory infarction with smaller right MCA territory infarction.     Stable subarachnoid and intraventricular hemorrhage.     CTA of Head on 6/6/2020 at 0903 shows  IMPRESSION:  Slightly increased intraventricular and subarachnoid hemorrhage compared to the  prior exam.     Status post coiling left MCA bifurcation aneurysm. No significant residual  aneurysm filling. CT of Head on 6/62020 at 0239 shows  IMPRESSION:  Slightly diminished subarachnoid and intraventricular hemorrhage. Stable ventricular system. CT of Head WO on 6/5/2020 at 1512 shows  IMPRESSION: Stable acute subarachnoid and intraventricular hemorrhage. Stable  ventricular system. CT Head WO 6/5/20 0826     IMPRESSION:   1. Diffuse subarachnoid hemorrhage most concentrated in the basal cisterns and  left sylvian fissure. 2.  Interval ventricular enlargement suspicious for developing hydrocephalus.     CTA Head 6/4/20 2017     IMPRESSION:  1. Diffuse subarachnoid hemorrhage in the basilar cisterns and sylvian  fissures. 2.  Bilateral MCA bifurcation aneurysms. 3.  Infundibular origins to the posterior communicating arteries bilaterally. 4.  Symmetric moderately severe airspace disease in the upper lobes bilaterally  which may represent edema or sequela of aspiration.     CT Head WO Contrast 6/4/2020 2012    IMPRESSION: Extensive subarachnoid hemorrhage concerning for leaking/ruptured  aneurysm.        24 hour results:    Recent Results (from the past 24 hour(s))   METABOLIC PANEL, BASIC    Collection Time: 06/17/20  6:03 PM   Result Value Ref Range    Sodium 140 136 - 145 mmol/L    Potassium 3.7 3.5 - 5.1 mmol/L    Chloride 109 (H) 97 - 108 mmol/L    CO2 28 21 - 32 mmol/L    Anion gap 3 (L) 5 - 15 mmol/L    Glucose 98 65 - 100 mg/dL    BUN 11 6 - 20 MG/DL    Creatinine 0.39 (L) 0.55 - 1.02 MG/DL    BUN/Creatinine ratio 28 (H) 12 - 20      GFR est AA >60 >60 ml/min/1.73m2    GFR est non-AA >60 >60 ml/min/1.73m2    Calcium 8.4 (L) 8.5 - 10.1 MG/DL   PROCALCITONIN    Collection Time: 06/18/20  5:25 AM   Result Value Ref Range    Procalcitonin 0.09 ng/mL   MAGNESIUM    Collection Time: 06/18/20  5:25 AM   Result Value Ref Range    Magnesium 2.2 1.6 - 2.4 mg/dL   PHOSPHORUS    Collection Time: 06/18/20  5:25 AM   Result Value Ref Range    Phosphorus 3.2 2.6 - 4.7 MG/DL   CBC WITH AUTOMATED DIFF    Collection Time: 06/18/20  5:25 AM   Result Value Ref Range    WBC 7.1 3.6 - 11.0 K/uL    RBC 3.10 (L) 3.80 - 5.20 M/uL    HGB 7.1 (L) 11.5 - 16.0 g/dL    HCT 24.5 (L) 35.0 - 47.0 %    MCV 79.0 (L) 80.0 - 99.0 FL    MCH 22.9 (L) 26.0 - 34.0 PG    MCHC 29.0 (L) 30.0 - 36.5 g/dL    RDW 26.3 (H) 11.5 - 14.5 %    PLATELET 745 051 - 039 K/uL    MPV 10.9 8.9 - 12.9 FL    NRBC 0.0 0  WBC    ABSOLUTE NRBC 0.00 0.00 - 0.01 K/uL    NEUTROPHILS 71 32 - 75 %    LYMPHOCYTES 15 12 - 49 %    MONOCYTES 10 5 - 13 %    EOSINOPHILS 3 0 - 7 %    BASOPHILS 0 0 - 1 %    IMMATURE GRANULOCYTES 1 (H) 0.0 - 0.5 %    ABS. NEUTROPHILS 5.0 1.8 - 8.0 K/UL    ABS. LYMPHOCYTES 1.1 0.8 - 3.5 K/UL    ABS. MONOCYTES 0.7 0.0 - 1.0 K/UL    ABS. EOSINOPHILS 0.2 0.0 - 0.4 K/UL    ABS. BASOPHILS 0.0 0.0 - 0.1 K/UL    ABS. IMM.  GRANS. 0.1 (H) 0.00 - 0.04 K/UL    DF SMEAR SCANNED      PLATELET COMMENTS Large Platelets      RBC COMMENTS ANISOCYTOSIS  3+        RBC COMMENTS HYPOCHROMIA  2+        RBC COMMENTS MICROCYTOSIS  1+        RBC COMMENTS OVALOCYTES  1+       METABOLIC PANEL, BASIC    Collection Time: 06/18/20  5:25 AM   Result Value Ref Range    Sodium 142 136 - 145 mmol/L    Potassium 3.6 3.5 - 5.1 mmol/L    Chloride 109 (H) 97 - 108 mmol/L    CO2 27 21 - 32 mmol/L    Anion gap 6 5 - 15 mmol/L    Glucose 140 (H) 65 - 100 mg/dL    BUN 15 6 - 20 MG/DL    Creatinine 0.46 (L) 0.55 - 1.02 MG/DL    BUN/Creatinine ratio 33 (H) 12 - 20      GFR est AA >60 >60 ml/min/1.73m2    GFR est non-AA >60 >60 ml/min/1.73m2    Calcium 8.9 8.5 - 10.1 MG/DL   TCD INTRACRANIAL ARTERIES COMPLETE    Collection Time: 06/18/20  9:42 AM   Result Value Ref Range    Right Lindegaard Ratio 1.9     Right MCA 1  cm/s    Right MCA 1 EDV 39 cm/s    Right MCA 1 Mean Velocity 75 cm/s    Right PANCHITO  cm/s    Right PANCHITO EDV 48 cm/s    Right PANCHITO Mean Velocity 79 cm/s    Right ICA PSV 90 cm/s    Right ICA EDV 27 cm/s    Right ICA Mean Velocity 48 cm/s    Right PCA 1 PSV 93 cm/s    Right PCA 1 EDV 32 cm/s    Right PCA 1 Mean Velocity 52 cm/s    Right External ICA PSV 72 cm/s    Right External ICA EDV 24 cm/s    Right External ICA Mean Velocity 40 cm/s    Left MCA 1  cm/s    Left MCA 1 EDV 39 cm/s    Left MCA 1 Mean Velocity 62 cm/s    Left PANCHITO  cm/s    Left PANCHITO EDV 60 cm/s    Left PANCHITO Mean Velocity 99 cm/s    Left ICA  cm/s    Left External ICA PSV 69 cm/s    Left ICA EDV 42 cm/s    Left External ICA EDV 26 cm/s    Left ICA Mean Velocity 68 cm/s    Left External ICA Mean Velocity 40 cm/s    Left PCA 1  cm/s    Left PCA 1 EDV 49 cm/s    Left PCA 1 Mean Velocity 81 cm/s    Left Lindegaard Ratio 1.6     Basilar Artery  cm/s    Basilar Artery EDV 30 cm/s    Basilar Artery Mean Angel 56 cm/s    Right Vertebral Mean Velocity 42 cm/s    Left Vertebral Mean Velocity 36 cm/s    Right Vertebral PSV 79 cm/s    Right Vertebral EDV 24 cm/s    Left Vertebral PSV 64 cm/s    Left Vertebral EDV 22 cm/s        Lana Cody NP

## 2020-06-18 NOTE — PROGRESS NOTES
Physical Therapy 6/18/2020    Chart reviewed up to date. Per chart, TCD completed and indicates \"Left PANCHITO and PCA velocity still elevated but continuing a general downward trend, consistent with decreasing vasospasm (probably mild spasm in both). \" Will defer PT evaluation and follow-up as schedule permits/as appropriate. Thank you.   Ramón Elizabeth, PT, DPT

## 2020-06-19 ENCOUNTER — APPOINTMENT (OUTPATIENT)
Dept: MRI IMAGING | Age: 50
DRG: 003 | End: 2020-06-19
Attending: INTERNAL MEDICINE
Payer: COMMERCIAL

## 2020-06-19 ENCOUNTER — APPOINTMENT (OUTPATIENT)
Dept: VASCULAR SURGERY | Age: 50
DRG: 003 | End: 2020-06-19
Attending: NURSE PRACTITIONER
Payer: COMMERCIAL

## 2020-06-19 ENCOUNTER — APPOINTMENT (OUTPATIENT)
Dept: INTERVENTIONAL RADIOLOGY/VASCULAR | Age: 50
DRG: 003 | End: 2020-06-19
Attending: INTERNAL MEDICINE
Payer: COMMERCIAL

## 2020-06-19 ENCOUNTER — APPOINTMENT (OUTPATIENT)
Dept: GENERAL RADIOLOGY | Age: 50
DRG: 003 | End: 2020-06-19
Attending: INTERNAL MEDICINE
Payer: COMMERCIAL

## 2020-06-19 LAB
ANION GAP SERPL CALC-SCNC: 6 MMOL/L (ref 5–15)
BASILAR ARTERY EDV: 18 CM/S
BASILAR ARTERY MEAN VEL: 31 CM/S
BASILAR ARTERY PSV: 57 CM/S
BASOPHILS # BLD: 0 K/UL (ref 0–0.1)
BASOPHILS NFR BLD: 0 % (ref 0–1)
BUN SERPL-MCNC: 14 MG/DL (ref 6–20)
BUN/CREAT SERPL: 26 (ref 12–20)
CALCIUM SERPL-MCNC: 8.4 MG/DL (ref 8.5–10.1)
CHLORIDE SERPL-SCNC: 105 MMOL/L (ref 97–108)
CO2 SERPL-SCNC: 29 MMOL/L (ref 21–32)
CREAT SERPL-MCNC: 0.54 MG/DL (ref 0.55–1.02)
DIFFERENTIAL METHOD BLD: ABNORMAL
EOSINOPHIL # BLD: 0.2 K/UL (ref 0–0.4)
EOSINOPHIL NFR BLD: 3 % (ref 0–7)
ERYTHROCYTE [DISTWIDTH] IN BLOOD BY AUTOMATED COUNT: 26.4 % (ref 11.5–14.5)
GLUCOSE SERPL-MCNC: 134 MG/DL (ref 65–100)
HCT VFR BLD AUTO: 23.8 % (ref 35–47)
HGB BLD-MCNC: 7.1 G/DL (ref 11.5–16)
IMM GRANULOCYTES # BLD AUTO: 0.1 K/UL (ref 0–0.04)
IMM GRANULOCYTES NFR BLD AUTO: 1 % (ref 0–0.5)
LEFT ACA EDV: 48 CM/S
LEFT ACA MEAN VEL: 85 CM/S
LEFT ACA PSV: 160 CM/S
LEFT EX ICA EDV: 20 CM/S
LEFT EX ICA MEAN VEL: 36 CM/S
LEFT EX ICA PSV: 67 CM/S
LEFT ICA EDV: 29 CM/S
LEFT ICA MEAN VEL: 50 CM/S
LEFT ICA PSV: 91 CM/S
LEFT LINDEGAARD RATIO: 1.3
LEFT MCA 1 EDV: 27 CM/S
LEFT MCA 1 MEAN VEL: 46 CM/S
LEFT MCA 1 PSV: 84 CM/S
LEFT PCA 1 EDV: 38 CM/S
LEFT PCA 1 MEAN VEL: 61 CM/S
LEFT PCA 1 PSV: 106 CM/S
LEFT VERTEBRAL EDV TCD: 12 CM/S
LEFT VERTEBRAL MEAN VEL: 20 CM/S
LEFT VERTEBRAL PSV TCD: 37 CM/S
LYMPHOCYTES # BLD: 0.5 K/UL (ref 0.8–3.5)
LYMPHOCYTES NFR BLD: 9 % (ref 12–49)
MAGNESIUM SERPL-MCNC: 2.1 MG/DL (ref 1.6–2.4)
MCH RBC QN AUTO: 23.2 PG (ref 26–34)
MCHC RBC AUTO-ENTMCNC: 29.8 G/DL (ref 30–36.5)
MCV RBC AUTO: 77.8 FL (ref 80–99)
MONOCYTES # BLD: 0.3 K/UL (ref 0–1)
MONOCYTES NFR BLD: 5 % (ref 5–13)
NEUTS SEG # BLD: 4.3 K/UL (ref 1.8–8)
NEUTS SEG NFR BLD: 82 % (ref 32–75)
NRBC # BLD: 0 K/UL (ref 0–0.01)
NRBC BLD-RTO: 0 PER 100 WBC
PHOSPHATE SERPL-MCNC: 2.8 MG/DL (ref 2.6–4.7)
PLATELET # BLD AUTO: 291 K/UL (ref 150–400)
PLATELET COMMENTS,PCOM: ABNORMAL
PMV BLD AUTO: 10.6 FL (ref 8.9–12.9)
POTASSIUM SERPL-SCNC: 3.6 MMOL/L (ref 3.5–5.1)
PROCALCITONIN SERPL-MCNC: 0.09 NG/ML
RBC # BLD AUTO: 3.06 M/UL (ref 3.8–5.2)
RBC MORPH BLD: ABNORMAL
RIGHT ACA EDV: 31 CM/S
RIGHT ACA MEAN VEL: 60 CM/S
RIGHT ACA PSV: 117 CM/S
RIGHT EX ICA EDV: 18 CM/S
RIGHT EX ICA MEAN VEL: 36 CM/S
RIGHT EX ICA PSV: 71 CM/S
RIGHT ICA EDV: 29 CM/S
RIGHT ICA MEAN VEL: 59 CM/S
RIGHT ICA PSV: 119 CM/S
RIGHT LINDEGAARD RATIO: 2
RIGHT MCA 1 EDV: 35 CM/S
RIGHT MCA 1 MEAN VEL: 72 CM/S
RIGHT MCA 1 PSV: 146 CM/S
RIGHT PCA 1 EDV: 24 CM/S
RIGHT PCA 1 MEAN VEL: 28 CM/S
RIGHT PCA 1 PSV: 80 CM/S
RIGHT VERTEBRAL EDV TCD: 12 CM/S
RIGHT VERTEBRAL MEAN VEL: 21 CM/S
RIGHT VERTEBRAL PSV TCD: 38 CM/S
SODIUM SERPL-SCNC: 140 MMOL/L (ref 136–145)
WBC # BLD AUTO: 5.4 K/UL (ref 3.6–11)

## 2020-06-19 PROCEDURE — 49440 PLACE GASTROSTOMY TUBE PERC: CPT

## 2020-06-19 PROCEDURE — 36415 COLL VENOUS BLD VENIPUNCTURE: CPT

## 2020-06-19 PROCEDURE — 74011250637 HC RX REV CODE- 250/637: Performed by: NURSE PRACTITIONER

## 2020-06-19 PROCEDURE — A9575 INJ GADOTERATE MEGLUMI 0.1ML: HCPCS | Performed by: INTERNAL MEDICINE

## 2020-06-19 PROCEDURE — 94003 VENT MGMT INPAT SUBQ DAY: CPT

## 2020-06-19 PROCEDURE — 74011250637 HC RX REV CODE- 250/637: Performed by: ANESTHESIOLOGY

## 2020-06-19 PROCEDURE — 74011250637 HC RX REV CODE- 250/637: Performed by: PSYCHIATRY & NEUROLOGY

## 2020-06-19 PROCEDURE — 70553 MRI BRAIN STEM W/O & W/DYE: CPT

## 2020-06-19 PROCEDURE — 84145 PROCALCITONIN (PCT): CPT

## 2020-06-19 PROCEDURE — 83735 ASSAY OF MAGNESIUM: CPT

## 2020-06-19 PROCEDURE — 74011250636 HC RX REV CODE- 250/636: Performed by: INTERNAL MEDICINE

## 2020-06-19 PROCEDURE — 94640 AIRWAY INHALATION TREATMENT: CPT

## 2020-06-19 PROCEDURE — 71045 X-RAY EXAM CHEST 1 VIEW: CPT

## 2020-06-19 PROCEDURE — 65610000006 HC RM INTENSIVE CARE

## 2020-06-19 PROCEDURE — 74011250636 HC RX REV CODE- 250/636: Performed by: ANESTHESIOLOGY

## 2020-06-19 PROCEDURE — 85025 COMPLETE CBC W/AUTO DIFF WBC: CPT

## 2020-06-19 PROCEDURE — 74011000250 HC RX REV CODE- 250: Performed by: NURSE PRACTITIONER

## 2020-06-19 PROCEDURE — 74011000258 HC RX REV CODE- 258: Performed by: INTERNAL MEDICINE

## 2020-06-19 PROCEDURE — 84100 ASSAY OF PHOSPHORUS: CPT

## 2020-06-19 PROCEDURE — 93886 INTRACRANIAL COMPLETE STUDY: CPT

## 2020-06-19 PROCEDURE — 94762 N-INVAS EAR/PLS OXIMTRY CONT: CPT

## 2020-06-19 PROCEDURE — 74011250636 HC RX REV CODE- 250/636: Performed by: NURSE PRACTITIONER

## 2020-06-19 PROCEDURE — 74011250637 HC RX REV CODE- 250/637: Performed by: INTERNAL MEDICINE

## 2020-06-19 PROCEDURE — 74011250636 HC RX REV CODE- 250/636: Performed by: STUDENT IN AN ORGANIZED HEALTH CARE EDUCATION/TRAINING PROGRAM

## 2020-06-19 PROCEDURE — 80048 BASIC METABOLIC PNL TOTAL CA: CPT

## 2020-06-19 RX ORDER — SODIUM CHLORIDE 0.9 % (FLUSH) 0.9 %
SYRINGE (ML) INJECTION
Status: COMPLETED
Start: 2020-06-19 | End: 2020-06-19

## 2020-06-19 RX ORDER — GADOTERATE MEGLUMINE 376.9 MG/ML
20 INJECTION INTRAVENOUS
Status: COMPLETED | OUTPATIENT
Start: 2020-06-19 | End: 2020-06-19

## 2020-06-19 RX ORDER — LIDOCAINE HYDROCHLORIDE 20 MG/ML
INJECTION, SOLUTION INFILTRATION; PERINEURAL
Status: DISPENSED
Start: 2020-06-19 | End: 2020-06-20

## 2020-06-19 RX ORDER — LIDOCAINE HYDROCHLORIDE 20 MG/ML
JELLY TOPICAL AS NEEDED
Status: DISCONTINUED | OUTPATIENT
Start: 2020-06-19 | End: 2020-06-19 | Stop reason: HOSPADM

## 2020-06-19 RX ORDER — SODIUM CHLORIDE 0.9 % (FLUSH) 0.9 %
10 SYRINGE (ML) INJECTION
Status: COMPLETED | OUTPATIENT
Start: 2020-06-19 | End: 2020-06-19

## 2020-06-19 RX ORDER — FENTANYL CITRATE 50 UG/ML
INJECTION, SOLUTION INTRAMUSCULAR; INTRAVENOUS
Status: DISPENSED
Start: 2020-06-19 | End: 2020-06-20

## 2020-06-19 RX ORDER — LIDOCAINE HYDROCHLORIDE 20 MG/ML
20 INJECTION, SOLUTION INFILTRATION; PERINEURAL ONCE
Status: DISCONTINUED | OUTPATIENT
Start: 2020-06-19 | End: 2020-06-19 | Stop reason: HOSPADM

## 2020-06-19 RX ORDER — FENTANYL CITRATE 50 UG/ML
200 INJECTION, SOLUTION INTRAMUSCULAR; INTRAVENOUS
Status: DISCONTINUED | OUTPATIENT
Start: 2020-06-19 | End: 2020-06-19 | Stop reason: HOSPADM

## 2020-06-19 RX ADMIN — SODIUM CHLORIDE, SODIUM LACTATE, POTASSIUM CHLORIDE, AND CALCIUM CHLORIDE 150 ML/HR: 600; 310; 30; 20 INJECTION, SOLUTION INTRAVENOUS at 19:09

## 2020-06-19 RX ADMIN — CEFEPIME HYDROCHLORIDE 2 G: 2 INJECTION, POWDER, FOR SOLUTION INTRAVENOUS at 18:49

## 2020-06-19 RX ADMIN — Medication 2 DROP: at 21:09

## 2020-06-19 RX ADMIN — AMLODIPINE BESYLATE 5 MG: 5 TABLET ORAL at 08:21

## 2020-06-19 RX ADMIN — SODIUM CHLORIDE, POTASSIUM CHLORIDE, SODIUM LACTATE AND CALCIUM CHLORIDE 500 ML: 600; 310; 30; 20 INJECTION, SOLUTION INTRAVENOUS at 04:00

## 2020-06-19 RX ADMIN — LEVETIRACETAM 1000 MG: 100 SOLUTION ORAL at 20:54

## 2020-06-19 RX ADMIN — Medication 10 ML: at 18:08

## 2020-06-19 RX ADMIN — NIMODIPINE 60 MG: 30 SOLUTION ORAL at 14:19

## 2020-06-19 RX ADMIN — FLUDROCORTISONE ACETATE 0.1 MG: 0.1 TABLET ORAL at 18:49

## 2020-06-19 RX ADMIN — Medication 2 DROP: at 14:19

## 2020-06-19 RX ADMIN — LABETALOL HYDROCHLORIDE 20 MG: 5 INJECTION INTRAVENOUS at 08:25

## 2020-06-19 RX ADMIN — SODIUM CHLORIDE, SODIUM LACTATE, POTASSIUM CHLORIDE, AND CALCIUM CHLORIDE 150 ML/HR: 600; 310; 30; 20 INJECTION, SOLUTION INTRAVENOUS at 03:57

## 2020-06-19 RX ADMIN — POTASSIUM BICARBONATE 40 MEQ: 782 TABLET, EFFERVESCENT ORAL at 18:49

## 2020-06-19 RX ADMIN — LABETALOL HYDROCHLORIDE 20 MG: 5 INJECTION INTRAVENOUS at 20:55

## 2020-06-19 RX ADMIN — CEFEPIME HYDROCHLORIDE 2 G: 2 INJECTION, POWDER, FOR SOLUTION INTRAVENOUS at 09:43

## 2020-06-19 RX ADMIN — NIMODIPINE 60 MG: 30 SOLUTION ORAL at 09:40

## 2020-06-19 RX ADMIN — POTASSIUM BICARBONATE 40 MEQ: 782 TABLET, EFFERVESCENT ORAL at 08:20

## 2020-06-19 RX ADMIN — Medication 10 ML: at 20:54

## 2020-06-19 RX ADMIN — CASTOR OIL AND BALSAM, PERU: 788; 87 OINTMENT TOPICAL at 09:00

## 2020-06-19 RX ADMIN — FLUDROCORTISONE ACETATE 0.1 MG: 0.1 TABLET ORAL at 08:20

## 2020-06-19 RX ADMIN — NIMODIPINE 60 MG: 30 SOLUTION ORAL at 02:07

## 2020-06-19 RX ADMIN — CASTOR OIL AND BALSAM, PERU: 788; 87 OINTMENT TOPICAL at 18:49

## 2020-06-19 RX ADMIN — Medication 2 DROP: at 06:00

## 2020-06-19 RX ADMIN — ENOXAPARIN SODIUM 40 MG: 40 INJECTION SUBCUTANEOUS at 14:19

## 2020-06-19 RX ADMIN — ACETAMINOPHEN 650 MG: 650 SUSPENSION ORAL at 00:08

## 2020-06-19 RX ADMIN — IPRATROPIUM BROMIDE AND ALBUTEROL SULFATE 3 ML: .5; 3 SOLUTION RESPIRATORY (INHALATION) at 09:36

## 2020-06-19 RX ADMIN — DOCUSATE SODIUM 100 MG: 50 LIQUID ORAL at 18:49

## 2020-06-19 RX ADMIN — NIMODIPINE 60 MG: 30 SOLUTION ORAL at 06:06

## 2020-06-19 RX ADMIN — LACOSAMIDE 150 MG: 50 TABLET, FILM COATED ORAL at 20:54

## 2020-06-19 RX ADMIN — LEVETIRACETAM 1000 MG: 100 SOLUTION ORAL at 08:21

## 2020-06-19 RX ADMIN — GADOTERATE MEGLUMINE 20 ML: 376.9 INJECTION INTRAVENOUS at 18:07

## 2020-06-19 RX ADMIN — ASPIRIN 81 MG CHEWABLE TABLET 81 MG: 81 TABLET CHEWABLE at 08:20

## 2020-06-19 RX ADMIN — NIMODIPINE 60 MG: 30 SOLUTION ORAL at 22:07

## 2020-06-19 RX ADMIN — IPRATROPIUM BROMIDE AND ALBUTEROL SULFATE 3 ML: .5; 3 SOLUTION RESPIRATORY (INHALATION) at 20:11

## 2020-06-19 RX ADMIN — NIMODIPINE 60 MG: 30 SOLUTION ORAL at 18:48

## 2020-06-19 RX ADMIN — LABETALOL HYDROCHLORIDE 20 MG: 5 INJECTION INTRAVENOUS at 18:52

## 2020-06-19 RX ADMIN — IPRATROPIUM BROMIDE AND ALBUTEROL SULFATE 3 ML: .5; 3 SOLUTION RESPIRATORY (INHALATION) at 13:13

## 2020-06-19 RX ADMIN — FENTANYL CITRATE 50 MCG: 50 INJECTION INTRAMUSCULAR; INTRAVENOUS at 17:09

## 2020-06-19 RX ADMIN — LACOSAMIDE 150 MG: 50 TABLET, FILM COATED ORAL at 08:20

## 2020-06-19 NOTE — PROGRESS NOTES
Physical Therapy: Hold    Chart reviewed. Noted RN advised therapy to hold this time as patient is not following commands and unable to participate. Noted TCD this AM still improving but positive for minimal to mild vasospasm. Also noted patient with acute respiratory events yesterday afternoon and is now now intubated. Per ABCDE protocol, will work with patient when PEEP is 10.0 or less, FIO2 60% or less, and patient is following basic commands. Will follow patient peripherally.     Toni Allison, PT, DPT

## 2020-06-19 NOTE — PROGRESS NOTES
Bedside, Verbal and Written shift change report given to *** (oncoming nurse) by *** (offgoing nurse). Report included the following information {SBAR REPORTS NIVL:[id number]}.

## 2020-06-19 NOTE — PROGRESS NOTES
SLP Contact Note    Attempted to see patient PMV/swallow treatment. However, pt back on the ventilator and therefore will defer for now.       Thank you,  TEVIN FunezEd, [id number] [facility]  Speech-Language Pathologist

## 2020-06-19 NOTE — PROGRESS NOTES
NUTRITION COMPLETE ASSESSMENT    RECOMMENDATIONS:     1.  Start Banatrol today, 1/2 packet BID-- mix each 1/2 packet banatrol + 120 ml water     2. It tube feeding held during day for procedures-please give overnight @ 2200 and/or 0200    Interventions/Plan:   Food/Nutrient Delivery: Moderate rate, concentration, composition, and schedule:    360 ml Vital AF 1.2 q 4 hr x 4 feedings per day and 70 ml water flush before and after each feeding       Assessment:   Reason for Assessment: Reassessment    Tube Feeding:  See above  Diet: NPO  Nutritionally Significant Medications: [x] Reviewed & Includes: Colace, correction scale insulin, Nimodipine    Subjective: Staff Interviewed, pt discussed during morning rounds    Objective:  6/12:  Ms June Lal is a 48year old female admitted with [facility]. PMHx: HTN. Noted: SAH d/t ruptured aneurysm; Intubated at OSH prior to transfer. S/P Cerebral angiogram x 5 and coiling (L) MCA aneurysm; (R) MCA needs to be clipped (not urgent); 6/9 severe vasospasm; acute hypoxic respiratory failure-neurogenic pulmonary edema; HIE per MRI; cerebral edema. Ms June Lal has tolerated intermittent tube feedings well; however does not always receive 4 feedings per day d/t feeding being held for procedures. Recommend \"making up\" missed feedings overnight since pt only receives 4 feeds total per day. Since patient no longer on Diprivan will need to adjust tube feeding to meet energy needs. New goal: 360 ml Vital AF 1.2 q 4 hr x 4 feedings per day and 70 ml water flush before and after each feeding. This will provide 1440 ml, 1730 calories, 108 gm protein and 1730 ml free water (tube feeding/flush) per day to meet % estimated energy and protein needs, respectively. Flexiseal in place d/t loose stools (related to Nimodipine). Recommend discontinuing stool softener. 3% NS ordered for cerebral edema; sodium at goal today (145-150 mg/dl). Weight stable.     6/19:  Pt needed to go back on the vent yesterday evening d/t periods of apnea. Per discussion during rounds this morning, pt to have PEG placed this afternoon. Nursing also reports very loose stools (pt has flexi seal). Will try 1/2 packet Banatrol BID and see if this helps firm up stool at all. Pt has continued to tolerate bolus feeding schedule outlined above. Pt has been more responsive the last couple of days but still not following commands, brain imaging suggests extensive strokes. RD continues to follow. Estimated Nutrition Needs:   Kcals/day: 1915 Kcals/day  Protein: 108 g(2g/kg IBW)  Fluid: (1 ml/kcal)  Based On: [facility] (2003b)  Weight Used: Actual wt(101 kg)    Pt expected to meet estimated nutrient needs:  [x]   Yes--via tube feeding    []  No  [] Unable to predict at this time  Nutrition Diagnosis:   1. Inadequate energy intake related to discontinuation of Diprivan as evidenced by tube feeding alone meets 60% estimated energy needs. 2.  Altered GI function related to  [address], vent support as evidenced by  need for PEG placement for full nutritional support    3.   related to   as evidenced by      Goals:     Tube feeding to meet at least 85% estimated needs x 5-7 days.      Monitoring & Evaluation:    - Enteral/parenteral nutrition intake   - Glucose profile, Weight/weight change, CV-pulmonary, Electrolyte and renal profile, GI     Previous Nutrition Goals Met: Yes  Previous Recommendations: Yes    Education & Discharge Needs:   [x] None Identified   [] Identified and addressed    [x] Participated in care plan, discharge planning, and/or interdisciplinary rounds        Cultural, Hinduism and ethnic food preferences identified:  None    Skin Integrity: []Intact  [x] surgical incision  Edema: []None [x]  2+ in all extremeties  Last BM: 6/18--watery  Food Allergies: [x]None []Other    Anthropometrics:    Weight Loss Metrics 6/19/2020 6/4/2020   Today's Wt 237 lb 7 oz -   BMI - 40.76 kg/m2      Last 3 Recorded Weights in this Encounter    06/16/20 0900 06/17/20 0900 06/19/20 0600   Weight: 102.9 kg (226 lb 13.7 oz) 102 kg (224 lb 13.9 oz) 107.7 kg (237 lb 7 oz)      Weight Source: Bed  Height: 5' 4\" (162.6 cm), Height Source: Stated by family member  Body mass index is 40.76 kg/m². IBW : 54.4 kg (120 lb), % IBW (Calculated): 201.54 %   ,      Labs:    Lab Results   Component Value Date/Time    Sodium 140 06/19/2020 03:45 AM    Potassium 3.6 06/19/2020 03:45 AM    Chloride 105 06/19/2020 03:45 AM    CO2 29 06/19/2020 03:45 AM    Glucose 134 (H) 06/19/2020 03:45 AM    BUN 14 06/19/2020 03:45 AM    Creatinine 0.54 (L) 06/19/2020 03:45 AM    Calcium 8.4 (L) 06/19/2020 03:45 AM    Magnesium 2.1 06/19/2020 03:45 AM    Phosphorus 2.8 06/19/2020 03:45 AM    Albumin 2.4 (L) 06/18/2020 04:54 PM     Lab Results   Component Value Date/Time    Hemoglobin A1c 5.7 (H) 06/05/2020 03:34 AM     Lab Results   Component Value Date/Time    Glucose (POC) 115 (H) 06/12/2020 05:39 AM      Lab Results   Component Value Date/Time    ALT (SGPT) 32 06/18/2020 04:54 PM    Alk.  phosphatase 72 06/18/2020 04:54 PM    Bilirubin, total 0.3 06/18/2020 04:54 PM        Yeni Espinoza RD, CSP  C: [phone]

## 2020-06-19 NOTE — PROGRESS NOTES
1930: Bedside and Verbal shift change report given to Omayra RN (oncoming nurse) by Awa Garcia RN (offgoing nurse). Report included the following information SBAR, Kardex, Intake/Output, Recent Results, Cardiac Rhythm NSR and Alarm Parameters . 2145: Removed Quad lumen IJ.    0000: Tempt 102.5, applied ice packs, admin tylenol 650 mg    0400: BP MAP 93, notified Steven BOGGS, orders received for  mL bouls. 0515: Notified Pamela Feliz NP about MAP of 94 after bolus given, no new orders, continue to monitor. Shift Summary: Pt rested majority of night, tolerated vent well, A line not correlating well with cuff pressures, also leaking. Pt moving extremities more frequently. LR at 150 mL/hr.    .730: Bedside and Verbal shift change report given to -Rn (oncoming nurse) by Lakshmi Flynn RN (offgoing nurse). Report included the following information SBAR, Kardex, Intake/Output, MAR, Recent Results, Cardiac Rhythm NSR and Alarm Parameters .

## 2020-06-19 NOTE — PROGRESS NOTES
[facility]    ICU TEAM Progress Note    Name: Claria Hatchet   : 1970   MRN: [id number]   Date: 2020      Assessment/Plan:       1. SAH  a. 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 Has Right MCA aneurysm with wide neck that will prob need stenting vs clipping  c. Hydrocephalus is improved  d. Continue Nimotop day 15/21  e. Continue TCD per TATE - s/p angio for persistent vasospasms   i. Improved vasospasm per most recent TCD   ii. Liberalized MAP goal per TATE  f. Continue frequent neurochecks  g. Continue Florinef per TATE 0.1mg BID   h. Continue IVF with goal of euvolemia  i. LR 150cc/hr  2. Seizures  a. Secondary to the above  b. No seizure identified on most recent EEG but encephalopathy  c. Repeat EEG pending due to seizure like activity this AM.   d. Continue Keppra 1000mg BID and Vimpat 100mg BID  i. No changes recommended unless persistence of automatisms  e. Neurology following - repeat head CT stable  3. Respiratory Failure  a. Likely secondary to neurogenic pulmonary edema  i. Now febrile overnight with possible developing infiltrate in lower base - previous culture with citrobacter and klebsiella - will start Cefepime x 7 days  b. S/P Trach placement ()  c. Apneic episodes on trach collar yesterday - will get MRI brain to further assess potential new neuro insult. CTA negative  4. Ischemic Strokes  a. Patient with multiple ischemic infarcts throughout cerebrum suggestive of ischemic event  b. Note also ischemic cerebellar infarct left inferior  c. PT/OT/Speech      Subjective:   Progress Note: 2020      Reason for ICU Admission:     49 yo female with HTN who presented with AMS and seizure like activity. She was found with extensive SAH secondary to left MCA bifurcation ruptured aneurysm - s/p coiling. Since patient has been with vasospasm s/p IA verapamil .  While she has been more responsive, she's still not following commands and brain imaging suggest extensive strokes. Overnight Events:     No events overnight. See  interval PN. Active Problem List:     Problem List  Date Reviewed: 6/10/2020          Codes Class    Respiratory failure ([facility] [address] ICD-10-CM: J96.90  ICD-9-CM: 518.81         Hypoxic ischemic encephalopathy (HIE), unspecified severity ICD-10-CM: P91.60  ICD-9-CM: 768.70         SAH (subarachnoid hemorrhage) (HCC) ICD-10-CM: I60.9  ICD-9-CM: 430         Subarachnoid hemorrhage from middle cerebral artery aneurysm, left (HCC) ICD-10-CM: W70.16  ICD-9-CM: 430         RIGHT middle cerebral artery aneurysm ICD-10-CM: I67.1  ICD-9-CM: 437.3         Cerebral vasospasm ICD-10-CM: H80.883  ICD-9-CM: 435.9               Past Medical History:      has a past medical history of HTN (hypertension). Past Surgical History:      has no past surgical history on file. Home Medications:     Prior to Admission medications    Not on File       Allergies/Social/Family History:     No Known Allergies   Social History     Tobacco Use    Smoking status: Never Smoker    Smokeless tobacco: Never Used   Substance Use Topics    Alcohol use: Not Currently      History reviewed. No pertinent family history. Review of Systems:     A comprehensive review of systems was negative except for that written in the HPI.     Objective:   Vital Signs:  Visit Vitals  BP (!) 202/78   Pulse 74   Temp 99.9 °F (37.7 °C)   Resp 16   Ht 5' 4\" (1.626 m)   Wt 107.7 kg (237 lb 7 oz)   SpO2 100%   BMI 40.76 kg/m²    O2 Flow Rate (L/min): 10 l/min O2 Device: Tracheostomy   Temp (24hrs), Av.2 °F (37.9 °C), Min:98.4 °F (36.9 °C), Max:102.5 °F (39.2 °C)           Intake/Output:     Intake/Output Summary (Last 24 hours) at 2020 0859  Last data filed at 2020 1081  Gross per 24 hour   Intake 5540 ml   Output 6110 ml   Net -570 ml       Physical Exam:    General: NAD  HENT: Atraumatic, Tracheostomy tube in place  Cardio: RRR  Respiratory: Coarse breath sounds BL  GI: Soft not tender abdomen  Extremities: +ve trace edema  Neuro: Opens right eye and tracks but doesn't follow. Withdraws all four extremities. LABS AND  DATA: Personally reviewed  Recent Labs     06/19/20  0345 06/18/20  0525   WBC 5.4 7.1   HGB 7.1* 7.1*   HCT 23.8* 24.5*    263     Recent Labs     06/19/20  0345 06/18/20  1654 06/18/20  0525    140 142   K 3.6 3.6 3.6    108 109*   CO2 29 29 27   BUN 14 14 15   CREA 0.54* 0.43* 0.46*   * 133* 140*   CA 8.4* 8.8 8.9   MG 2.1  --  2.2   PHOS 2.8  --  3.2     Recent Labs     06/18/20  1654 06/17/20  0514   AP 72 83   TP 6.3* 6.0*   ALB 2.4* 2.2*   GLOB 3.9 3.8     No results for input(s): INR, PTP, APTT, INREXT, INREXT, INREXT in the last 72 hours. No results for input(s): PHI, PCO2I, PO2I, FIO2I in the last 72 hours. No results for input(s): CPK, CKMB, TROIQ, BNPP in the last 72 hours. Hemodynamics:   PAP:   CO:     Wedge:   CI:     CVP:    SVR:       PVR:       Ventilator Settings:  Mode Rate Tidal Volume Pressure FiO2 PEEP   Assist control, VC+   400 ml  10 cm H2O 35 % 5 cm H20     Peak airway pressure: 25 cm H2O    Minute ventilation: 9.21 l/min        MEDS: Reviewed    Chest X-Ray:  CXR Results  (Last 48 hours)               06/19/20 0814  XR CHEST PORT Final result    Impression:  IMPRESSION: Vague left lower lobe airspace process, suggesting subsegmental   atelectasis or pneumonia. Interval exchange of endotracheal tube for   tracheostomy       Narrative:  EXAM: XR CHEST PORT       INDICATION: Rule out PNA       COMPARISON: 6/10/2020       FINDINGS: A portable AP radiograph of the chest was obtained at 0 811 hours. The   patient is on a cardiac monitor. The NG tube extends below the hemidiaphragm. The PICC line extends to the cavoatrial junction. Tracheostomy tube has been   placed. The vague airspace process is present at the left base.                ECHO:  EF 60-65%      Multidisciplinary Rounds Completed: Pending    ABCDEF Bundle/Checklist  Pain Medications: Acetaminophen  Target RASS: N/A  Sedation Medications: None  CAM-ICU:  Positive  Mobility: Poor  PT/OT: PT consulted and on board and OT consulted and on board   Restraints: None needed at this time  Discussed Plan of Care (goals of care): Yes  Addressed Code Status: Full Code    CARDIOVASCULAR  Cardiac Gtts: None  SBP Goal of: > 100 mmHg and < 180 mmHg  MAP Goal of: 100-130  Transfusion Trigger (Hgb): <8 g/dL    RESPIRATORY  Vent Goals:   Chlorhexidine   Optimize PEEP/Ventilation/Oxygenation  Goal Tidal Volume 6 cc/kg based on IBW  Aim for lung protective ventilation  Head of bed > 30 degrees  Aggressive bronchopulmonary hygiene  DVT Prophylaxis (if no, list reason): SCD's or Sequential Compression Device and Lovenox   SPO2 Goal: > 92%  Pulmonary toilet: Duo-Nebs     GI/  Martinez Catheter Present: Yes  GI Prophylaxis: Not at this time   Nutrition: Yes   IVFs: NSS  Bowel Movement: Yes  Bowel Regimen: Docusate (Colace)  Insulin: ISS    ANTIBIOTICS  Antibiotics:  None    T/L/D  Tubes: Tracheostomy and Nasogastric Tube  Lines: Peripheral IV, Arterial Line and Central Line  Drains: Martinez Catheter    SPECIAL EQUIPMENT  None    DISPOSITION  Stay in ICU    CRITICAL CARE CONSULTANT NOTE  I had a face to face encounter with the patient, reviewed and interpreted patient data including clinical events, labs, images, vital signs, I/O's, and examined patient. I have discussed the case and the plan and management of the patient's care with the consulting services, the bedside nurses and the respiratory therapist.      NOTE OF PERSONAL INVOLVEMENT IN CARE   This patient has a high probability of imminent, clinically significant deterioration, which requires the highest level of preparedness to intervene urgently.  I participated in the decision-making and personally managed or directed the management of the following life and organ supporting interventions that required my frequent assessment to treat or prevent imminent deterioration. I personally spent 35 minutes of critical care time. This is time spent at this critically ill patient's bedside actively involved in patient care as well as the coordination of care and discussions with the patient's family. This does not include any procedural time which has been billed separately.     Randall Ray MD  [address]  6/19/2020

## 2020-06-19 NOTE — PROGRESS NOTES
Neurointerventional Surgery Progress Note  Marian Cisse NP   Neurocritical Care Nurse Practitioner  [phone]          Admit Date: 6/4/2020        Daily Progress Note: 6/19/2020   LOS: 15 days      S/P:  POD 13 Cerebral angiogram with coil embolization of ruptured left MCA bifurcation aneurysm on 6/5/2020    ** Delayed Presentation ** PB Day 19    Interval History/Subjective:     Patient with acute respiratory events yesterday afternoon , began with some apneic episodes on trach collar requiring her to be put back on the ventilator. Spikes in fever overnight, intensivist started Cefepime to cover Citrobacter with no developing infiltrate in lower lung bases. MRI for today to rule out any new neuro insults, per intensivist.     + 500 cc for the last 24 hours. Keep fluids at current rate. IR consult for GI tube placement. TCDs pending for today. Unable to perform a ROS. Assessment & Plan:      Active Problems:    SAH (subarachnoid hemorrhage) ([address] (6/4/2020)      Subarachnoid hemorrhage from middle cerebral artery aneurysm, left ([address] (6/4/2020)      RIGHT middle cerebral artery aneurysm (6/4/2020)      Cerebral vasospasm (6/4/2020)      Respiratory failure ([address] (6/15/2020)      Hypoxic ischemic encephalopathy (HIE), unspecified severity (6/15/2020)      1.) SAH due to ruptured cerebral aneurysm, [place] 5, An Grade 3/4              - s/p cerebral angiogram with coil embolization of left MCA bifurcation aneurysm on 6/5       Additionally, patient has 5X3 right MCA bifurcation aneurysm with very wide neck which will               need to be treated with stents vs. Clipping              - Continue Nimotop Day 15 of 21 to prevent delayed cerebral ischemia              - continue maintenance fluids at NS at 125 ml/hr to maintain euvolemia              - Strict I's and O's              - TCDs daily, TCDs today again show improvement in vasospasm              - ECHO shows EF 60-65%, mild concentric hypertrophy, mildly dilated left atrium              - continue every hour neuro checks              -Decrease  Florinef to 0.1 mg BID (initially started 6/13) for prevention of cerebral salt wasting in the setting of SAH, aid in increasing plasma volume              - Continue MAP goal 100-130. Keep SBP <200 Cardene/Fabien/Hydralazine/Labetalol PRN              - PT/OT/SLP evals when appropriate    2.) Seizure              - Due to #1, no prior hx of seizure              - Continue Keppra 1000 mg BID               - continue Vimpat increased to 150 mg BID 6/17/2020              - Seizure precautions              - repeat EEG 6/17 d/t seizure like activity overnight, awaiting results              -  EEG on 6/6 showed an abnormal EEG due to slowing of the background rhythms with intermittent rhythmic delta activity seen in both frontal head regions. Occasional sharp wave discharges in the left frontal area. EEG is suggestive of mild-to-moderate generalized encephalopathic process, nonspecific in type.   In addition, there was occasional epileptiform disturbance seen in the left frontal area which may represent a partial onset seizure focus.              - Neurology following                3.) Cerebral Edema (resolved)              - received one dose of mannitol, started 3% saline on 6/8, discontinued 6/14              - serum sodium remains stable                4.) Acute hypoxic respiratory failure in the setting of likely neurogenic pulmonary edema- resolved              - back on ventilator after some apneic episodes on trach collar yesterday , #6 [place]              - chest XR on 6/19 shows possible developing B/L infiltrates + febrile overnight, started              cefepime to cover citrobacter              - Intensivist managing     5.) Multiple Acute Ischemic Infarctions                - Imaging pattern is consistent with Hypoxic-Ischemic Encephalopathy (HIE)                - Continue aspirin 81 mg daily for stroke prevention           6.) Cerebral Vasospasm                 - s/p cerebral angiogram for right PANCHITO A1 angioplasty for vasospasm, transarterial infusion of verapamil on 6/9,  S/p angiogram on 6/10 for left ICA/MCA angioplasty and transarterial infusion of verapamil for vasospasm treatment, s/p angiogram on 6/11 and 6/13 for transarterial infusion of verapamil for vasospasm treatment                 - Strict I's and O's                  - plans as stated in #1     7.) Fever                 - fevers continue to spike intermittently , normal WBC                - completed course of Zosyn, CXR shows residual right lower lung airspace disease               - possibly febrile in the setting of SAH, paired blood cultures NGTD               - respiratory cultures growing Klebsiella Pneumonia and Citrobacter [place] 6/17                - CTA of chest 6/7 negative for PE                 - Cefepime started 6/19 to cover Citrobacter                 - Intensivist following     8.) Polyuria                  - serum sodium stable at 141                  - urine studies show serum osmolality 296, urine osmolality 322, specific gravity normal at 1.015. Urine random sodium at 114                  - Florinef to 0.1 mg BID for prevention of cerebral salt wasting                  - monitor BMP every 12 hours    Activity: Bed rest  DVT ppx: SCDs, Lovenox  Disposition: TBD    Plan d/w Dr. Leslie Gonzales, Dr. David Mayer, and RN. Admission Summary:     Sejal Moreno is a 48 y.o. 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 On arrival to ED, pt was unresponsive and noted to be seizing. A stat CT of her head was performed there, which showed extensive SAH. Pt was then intubated emergently. She was also extremely hypertensive with SBPs in 240s.  She was given Keppra and Ativan, and placed on cardene drip for BP control. NSGY and NIS were then consulted and the patient was transferred to [facility] for higher level of care. En route, paramedics administered 7.5 mg of Versed, 200mcg of Fentanyl, and 10 mg of labetalol for BP control. On arrival, CTA of her head and neck was obtained, which showed a 4.4 x 4.1 mm right MCA trifurcation aneurysm and  a 2.2 x 3.1 mm left MCA trifurcation aneurysm. Of note, CTA head/neck also showed pulmonary infiltrates suspicious for COVID-19 infection and the patient has been tested for COVID which was negative. 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 Mother reported pt does not smoke, does not drink alcohol or use street drugs. The mother also reported that the patient began complaining of a headache on 5/31/2020 and has been taking BC Powder (aspirin) for the headache. She also reported that the pt has a hx of HTN and is supposed to be on BP medications, but recently stopped taking her medications. The patient underwent a cerebral angiogram on 6/5 by Dr. Violet Sawyer for coil embolization of a ruptured left MCA bifurcation aneurysm.     Current Facility-Administered Medications   Medication Dose Route Frequency Provider Last Rate Last Dose    cefepime (MAXIPIME) 2 g in 0.9% sodium chloride (MBP/ADV) 100 mL  2 g IntraVENous Q8H Priya Carmona  mL/hr at 06/19/20 0943 2 g at 06/19/20 0943    fludrocortisone (FLORINEF) tablet 0.1 mg  0.1 mg Oral BID Venancio CORRALES NP   0.1 mg at 06/19/20 0820    potassium bicarb-citric acid (EFFER-K) tablet 40 mEq  40 mEq Oral BID Priya Carmona MD   40 mEq at 06/19/20 0820    lacosamide (VIMPAT) tablet 150 mg  150 mg Per NG tube Q12H Sally Palma MD   150 mg at 06/19/20 0820    lactated Ringers infusion  150 mL/hr IntraVENous CONTINUOUS Kobi Grossman  mL/hr at 06/19/20 0357 150 mL/hr at 06/19/20 0357    ELECTROLYTE REPLACEMENT PROTOCOL - Potassium and Magnesium  1 Each Other PRN Hannah Avery NP        ELECTROLYTE REPLACEMENT PROTOCOL - Phosphorus  1 Each Other PRN Hannah Avery NP        0.9% sodium chloride infusion 250 mL  250 mL IntraVENous PRN Humaira Marks NP        balsam peru-castor oiL (VENELEX) ointment   Topical BID Leon Berry DO        labetaloL (NORMODYNE;TRANDATE) injection 20 mg  20 mg IntraVENous Q2H PRN Litzy Domínguez NP   20 mg at 06/19/20 0825    0.9% sodium chloride infusion 250 mL  250 mL IntraVENous PRN Romana Racer, NP-C        aspirin chewable tablet 81 mg  81 mg Per NG tube DAILY Deon Price MD   81 mg at 06/19/20 0820    enoxaparin (LOVENOX) injection 40 mg  40 mg SubCUTAneous Q24H Leon Berry DO   40 mg at 06/18/20 1415    levETIRAcetam (KEPPRA) oral solution 1,000 mg  1,000 mg Per NG tube Q12H Leon Berry DO   1,000 mg at 06/19/20 0196    glycopyrrolate (ROBINUL) injection 0.2 mg  0.2 mg IntraMUSCular TID PRN Humaira Marks NP        niMODipine (NYMALIZE) 6 mg/mL oral solution 60 mg  60 mg Per NG tube Q4H Litzy Domínguez NP   60 mg at 06/19/20 0940    amLODIPine (NORVASC) tablet 5 mg  5 mg Per NG tube DAILY [facility]   5 mg at 06/19/20 5194    acetaminophen (TYLENOL) solution 650 mg  650 mg Per NG tube Q4H PRN Jade Berumen NP   650 mg at 06/19/20 0008    0.9% sodium chloride infusion 250 mL  250 mL IntraVENous PRN Romana Bernard, NP-C        albuterol-ipratropium (DUO-NEB) 2.5 MG-0.5 MG/3 ML  3 mL Nebulization Q6H RT Itz Hinds, ACNP   3 mL at 06/19/20 3283    glucose chewable tablet 16 g  4 Tab Oral PRN Romana Racer, NP-C        glucagon (GLUCAGEN) injection 1 mg  1 mg IntraMUSCular PRN Romana Racer, NP-C        dextrose 10% infusion 0-250 mL  0-250 mL IntraVENous PRN Romana Racer, NP-C        albuterol (PROVENTIL VENTOLIN) nebulizer solution 2.5 mg  2.5 mg Nebulization Q6H PRN Romana Racer, NP-C        polyvinyl alcohol-povidone (NATURAL TEARS) 0.5-0.6 % ophthalmic solution 2 Drop  2 Drop Both Eyes Q8H Fristevieie Camilla, ACNP   2 Drop at 20 0600    docusate (COLACE) 50 mg/5 mL oral liquid 100 mg  100 mg Oral BID Fristeviesheryl Camilla, ACNP   Stopped at 20 0900    bisacodyL (DULCOLAX) suppository 10 mg  10 mg Rectal DAILY PRN Friasad Andrewsry, ACNP        [facility]ansetron [facility]) injection 4 mg  4 mg IntraVENous Q4H PRN FABIAN Crouch        docusate (COLACE) 50 mg/5 mL oral liquid 100 mg  100 mg Oral BID PRN FABIAN Gupta            No Known Allergies    Review of Systems:  Review of systems not obtained due to patient factors. Objective:     Vital signs  Temp (24hrs), Av.2 °F (37.9 °C), Min:98.4 °F (36.9 °C), Max:102.5 °F (39.2 °C)    07 - 1900  In: 270 [I.V.:150]  Out: 300 [Urine:300]  1901 -  0700  In: 8921.7 [I.V.:6291.7]  Out: 8745 [Urine:7760]    Visit Vitals  /66   Pulse 69   Temp 99.9 °F (37.7 °C)   Resp 19   Ht 5' 4\" (1.626 m)   Wt 237 lb 7 oz (107.7 kg)   SpO2 100%   BMI 40.76 kg/m²    O2 Flow Rate (L/min): 10 l/min O2 Device: Tracheostomy   Vitals:    20 0800 20 0900 20 0936 20 1000   BP: (!) 202/78 168/69  171/66   Pulse: 74 68 71 69   Resp: 16 (!)  19   Temp: 99.9 °F (37.7 °C)      SpO2: 100% 100% 100% 100%   Weight:       Height:          Physical Exam:  GENERAL: NAD,trach collar,  alert   EYE: conjunctivae/corneas clear. Right pupil 3 mm, brisk response to light. Left pupil 4 mm, sluggish response to light. Keeps left eye closed   LUNG: lungs coarse bilaterally    HEART: regular rate and rhythm, S1, S2 normal, no murmur, click, rub or gallop  EXTREMITIES:  extremities normal, atraumatic, no cyanosis, 2+ pitting peripheral edema, distal pulses 2+ bilaterally   SKIN: Skin warm to touch. Right and left groin site clean, dry, and intact. No hematoma, bruising, or bleeding noted. NEUROLOGIC: Intubated. Alert. Eyes will open spontaneously. No command following. Right pupil 3 mm, brisk response to light. Left pupil 4 mm sluggish response to light. Left eye ptosis noted. Focuses and tracks with eyes, with the exception of inability to track to the right with left eye. Moves all extremities spontaneously. No involuntary movements. Gait deferred. Unable to assess language, sensation, and coordination. Imaging:    CTA/CTP 6/18/2020     IMPRESSION:  1. No acute large vessel occlusion. 2. Recent left MCA bifurcation aneurysm coiling, with interval improvement in  now mild vasospasm involving the bilateral anterior, left middle, vertebral  basilar and bilateral posterior cerebral arteries as discussed in detail above. 3. Unchanged right MCA bifurcation aneurysm.       CT Head on 6/17/20 shows:     IMPRESSION:     1. Persistence of the previously described subtle areas of subarachnoid  hemorrhage. 2. Persistence of the previously described left inferior cerebellar infarct. 3. Presence of small lacunar infarcts bilaterally. 4. No evidence of hydrocephalus. 5. Presence of an aneurysm coil in the region of the left middle cerebral  artery. 6. Evidence of sphenoid sinus disease.       CT of Head on 6/13/2020 at 1103 shows  IMPRESSION:   1. Questionable left inferior cerebellar infarction, new from 6/10/2020.  2. Multiple, evolving, subacute infarctions throughout the cerebrum. 3. Subacute subarachnoid hemorrhage. Improved hydrocephalus compared to  6/6/2020    CTA of Head on 6/13/2020 at 1103 shows  IMPRESSION:   1. Left A1 vasospasm. 2. More mild spasm of the right PANCHITO, MCAs, and possibly the distal vertebrals. 3. Right M1 terminus aneurysm. 4. Large infundibula/small aneurysms of the bilateral P-comm origins. CT Perfusion on 6/13/2020 at 1103 shows  IMPRESSION:  1. Penumbra surrounding infarctions in most of the superior left MCA territory. 2. Bilateral PANCHITO infarctions.   3. Questionable left cerebellar infarction is inferior to the perfusion scan. CTA of Head on 6/9/2020 at 1350 shows  Progressive vasospasm involving the anterior cerebral arteries with minimal  associated decreased perfusion to the frontal lobes. No other significant  Change  . CT Perfusion on 6/9/2020 at 1336 shows  Impression:  CT perfusion brain: Very minimal decreased perfusion to the frontal lobes. CT of Head on 6/9/2020 at 0234 shows  IMPRESSION:   No significant change.       CT of Head on 6/8/2020 shows  IMPRESSION:   Global edema, otherwise no significant change    CTA of Head and Neck on 6/8/2020 per radiology shows  Impression:  Mild to moderate vasospasm in the left middle cerebral artery status post  bifurcation aneurysm coil embolization. Stable 4 mm right MCA aneurysm. Endotracheal tube terminates just above the matt. 1 cm low-density left thyroid nodule. Mildly enlarged left axillary lymph node measuring 11 mm in short axis  Patchy bilateral airspace disease. Nonspecific asymmetric enhancement of the right superior ophthalmic vein   (However, no significant vasospasm was seen when personally reviewed by NIS)    CT Perfusion on 6/8/2020 shows  IMPRESSION:  No significant cerebral perfusion abnormality    CTA of Chest on 6/7/2020 shows  IMPRESSION:  1. No acute pulmonary embolus  2. Dependent atelectasis with diffuse groundglass opacifications bilaterally. CT of Head on 6/7/2020 shows  IMPRESSION:   1. No further progression of subarachnoid hemorrhage. The overall degree of  subarachnoid hemorrhage is slightly improved. 2. Subtle areas of gray-white differentiation loss throughout the cerebral  hemispheres correlating with areas of restricted diffusion on previous CT  compatible with ischemic changes.     MRI of Brain on 6/6/2020 shows  IMPRESSION:   Imaging findings consistent with large left MCA territory ischemia/infarction,  left temporal, parietal and frontal lobes, cortically based, no superimposed  increased parenchymal hemorrhage or midline shift. Right and left PANCHITO territory  cortical diffusion restriction consistent with infarction. Infarction in the right insular cortex and right temporal lobe. Scattered small  infarctions right and left corona radiata centrum semiovale, right cerebellum. Allowing for differences in technique likely stable subarachnoid and  intraventricular hemorrhage. CT of Head on 6/6/2020 at 1411 shows  IMPRESSION:   Left MCA territory infarction with smaller right MCA territory infarction.     Stable subarachnoid and intraventricular hemorrhage. CTA of Head on 6/6/2020 at 0903 shows  IMPRESSION:  Slightly increased intraventricular and subarachnoid hemorrhage compared to the  prior exam.     Status post coiling left MCA bifurcation aneurysm. No significant residual  aneurysm filling. CT of Head on 6/62020 at 0239 shows  IMPRESSION:  Slightly diminished subarachnoid and intraventricular hemorrhage. Stable ventricular system. CT of Head WO on 6/5/2020 at 1512 shows  IMPRESSION: Stable acute subarachnoid and intraventricular hemorrhage. Stable  ventricular system. CT Head WO 6/5/20 0826     IMPRESSION:   1. Diffuse subarachnoid hemorrhage most concentrated in the basal cisterns and  left sylvian fissure. 2.  Interval ventricular enlargement suspicious for developing hydrocephalus.     CTA Head 6/4/20 2017     IMPRESSION:  1. Diffuse subarachnoid hemorrhage in the basilar cisterns and sylvian  fissures. 2.  Bilateral MCA bifurcation aneurysms. 3.  Infundibular origins to the posterior communicating arteries bilaterally. 4.  Symmetric moderately severe airspace disease in the upper lobes bilaterally  which may represent edema or sequela of aspiration.     CT Head WO Contrast 6/4/2020 2012    IMPRESSION: Extensive subarachnoid hemorrhage concerning for leaking/ruptured  aneurysm.        24 hour results:    Recent Results (from the past 24 hour(s))   METABOLIC PANEL, COMPREHENSIVE    Collection Time: 06/18/20  4:54 PM   Result Value Ref Range    Sodium 140 136 - 145 mmol/L    Potassium 3.6 3.5 - 5.1 mmol/L    Chloride 108 97 - 108 mmol/L    CO2 29 21 - 32 mmol/L    Anion gap 3 (L) 5 - 15 mmol/L    Glucose 133 (H) 65 - 100 mg/dL    BUN 14 6 - 20 MG/DL    Creatinine 0.43 (L) 0.55 - 1.02 MG/DL    BUN/Creatinine ratio 33 (H) 12 - 20      GFR est AA >60 >60 ml/min/1.73m2    GFR est non-AA >60 >60 ml/min/1.73m2    Calcium 8.8 8.5 - 10.1 MG/DL    Bilirubin, total 0.3 0.2 - 1.0 MG/DL    ALT (SGPT) 32 12 - 78 U/L    AST (SGOT) 20 15 - 37 U/L    Alk. phosphatase 72 45 - 117 U/L    Protein, total 6.3 (L) 6.4 - 8.2 g/dL    Albumin 2.4 (L) 3.5 - 5.0 g/dL    Globulin 3.9 2.0 - 4.0 g/dL    A-G Ratio 0.6 (L) 1.1 - 2.2     PROCALCITONIN    Collection Time: 06/19/20  3:45 AM   Result Value Ref Range    Procalcitonin 0.09 ng/mL   MAGNESIUM    Collection Time: 06/19/20  3:45 AM   Result Value Ref Range    Magnesium 2.1 1.6 - 2.4 mg/dL   PHOSPHORUS    Collection Time: 06/19/20  3:45 AM   Result Value Ref Range    Phosphorus 2.8 2.6 - 4.7 MG/DL   CBC WITH AUTOMATED DIFF    Collection Time: 06/19/20  3:45 AM   Result Value Ref Range    WBC 5.4 3.6 - 11.0 K/uL    RBC 3.06 (L) 3.80 - 5.20 M/uL    HGB 7.1 (L) 11.5 - 16.0 g/dL    HCT 23.8 (L) 35.0 - 47.0 %    MCV 77.8 (L) 80.0 - 99.0 FL    MCH 23.2 (L) 26.0 - 34.0 PG    MCHC 29.8 (L) 30.0 - 36.5 g/dL    RDW 26.4 (H) 11.5 - 14.5 %    PLATELET 543 244 - 747 K/uL    MPV 10.6 8.9 - 12.9 FL    NRBC 0.0 0  WBC    ABSOLUTE NRBC 0.00 0.00 - 0.01 K/uL    NEUTROPHILS 82 (H) 32 - 75 %    LYMPHOCYTES 9 (L) 12 - 49 %    MONOCYTES 5 5 - 13 %    EOSINOPHILS 3 0 - 7 %    BASOPHILS 0 0 - 1 %    IMMATURE GRANULOCYTES 1 (H) 0.0 - 0.5 %    ABS. NEUTROPHILS 4.3 1.8 - 8.0 K/UL    ABS. LYMPHOCYTES 0.5 (L) 0.8 - 3.5 K/UL    ABS. MONOCYTES 0.3 0.0 - 1.0 K/UL    ABS. EOSINOPHILS 0.2 0.0 - 0.4 K/UL    ABS. BASOPHILS 0.0 0.0 - 0.1 K/UL    ABS. IMM.  GRANS. 0.1 (H) 0.00 - 0.04 K/UL    DF SMEAR SCANNED      PLATELET COMMENTS Large Platelets      RBC COMMENTS SPHEROCYTES  PRESENT        RBC COMMENTS ANISOCYTOSIS  2+        RBC COMMENTS OVALOCYTES  PRESENT        RBC COMMENTS HYPOCHROMIA  1+        RBC COMMENTS POLYCHROMASIA  1+       METABOLIC PANEL, BASIC    Collection Time: 06/19/20  3:45 AM   Result Value Ref Range    Sodium 140 136 - 145 mmol/L    Potassium 3.6 3.5 - 5.1 mmol/L    Chloride 105 97 - 108 mmol/L    CO2 29 21 - 32 mmol/L    Anion gap 6 5 - 15 mmol/L    Glucose 134 (H) 65 - 100 mg/dL    BUN 14 6 - 20 MG/DL    Creatinine 0.54 (L) 0.55 - 1.02 MG/DL    BUN/Creatinine ratio 26 (H) 12 - 20      GFR est AA >60 >60 ml/min/1.73m2    GFR est non-AA >60 >60 ml/min/1.73m2    Calcium 8.4 (L) 8.5 - 10.1 MG/DL        [place] Crumbly, NP

## 2020-06-19 NOTE — PROGRESS NOTES
TRANSFER - OUT REPORT:    Verbal report given to Amna Don RN(name) on Yeni Osei  being transferred to [facility](unit) for ordered procedure       Report consisted of patients Situation, Background, Assessment and   Recommendations(SBAR). Information from the following report(s) SBAR, Procedure Summary and MAR was reviewed with the receiving nurse. Lines:   PICC Triple Lumen 32/85/45 Right;Cephalic (Active)   Central Line Being Utilized Yes 6/19/2020  4:00 PM   Criteria for Appropriate Use Hemodynamically unstable, requiring monitoring lines, vasopressors, or volume resuscitation 6/19/2020  4:00 PM   Site Assessment Clean, dry, & intact 6/19/2020  4:00 PM   Phlebitis Assessment 0 6/19/2020  4:00 PM   Infiltration Assessment 0 6/19/2020  4:00 PM   Arm Circumference (cm) 41.5 cm 6/18/2020  3:30 PM   Date of Last Dressing Change 06/18/20 6/19/2020  4:00 PM   Dressing Status Clean, dry, & intact 6/19/2020  4:00 PM   External Catheter Length (cm) 0 centimeters 6/18/2020  3:30 PM   Dressing Type Disk with Chlorhexadine gluconate (CHG) 6/19/2020  4:00 PM   Action Taken Open ports on tubing capped 6/19/2020  4:00 PM   Hub Color/Line Status Gray 6/19/2020  4:00 PM   Positive Blood Return (Site #1) Yes 6/19/2020  4:00 PM   Hub Color/Line Status Red 6/19/2020  4:00 PM   Positive Blood Return (Site #2) Yes 6/19/2020  4:00 PM   Hub Color/Line Status White 6/19/2020  4:00 PM   Positive Blood Return (Site #3) Yes 6/19/2020  4:00 PM   Alcohol Cap Used Yes 6/19/2020  4:00 PM        Opportunity for questions and clarification was provided.       Patient transported with:   Monitor  O2 @ ventilator liters  Registered Nurse

## 2020-06-19 NOTE — PROGRESS NOTES
Neurocritical Care Brief Progress Note:  Rounded on pt. Pt resting comfortably on trach collar. Arterial line with poor waveform and leaking. No correlating with cuff. I/O record reviewed: Negative 400 ml fluid balance   - 500 ml LR bolus ordered     Physical Exam:  Gen: NAD, calm, on trach collar  Neuro: Right eye opens to voice. Tracks with right eye. Left eye ptosis. Does not follows commands. No speech. Pupils unequal, right 4 mm, left 5 mm. Blinks to threat. Face symmetric. Withdraws to pain in all 4 ext. Skin: Warm, dry, color appropriate for ethnicity. D/w Intensivist NP Ellie Obrien.      Imani Greene NP  Neurocritical Care Nurse Practitioner  [phone]

## 2020-06-19 NOTE — PROGRESS NOTES
Occupational Therapist: hold    Chart reviewed, consulted with RN, who advised OT to hold this time as patient is not following commands and unable to participate. Noted TCD this AM still improving but positive for minimal to mild vasospasm. Also noted patient with acute respiratory events yesterday afternoon and is now now intubated. Per ABCDE protocol, will work with patient when PEEP is 10.0 or less, FIO2 60% or less, and patient is following basic commands. Will follow patient peripherally.     Amelie Easley OTR/L

## 2020-06-20 ENCOUNTER — APPOINTMENT (OUTPATIENT)
Dept: VASCULAR SURGERY | Age: 50
DRG: 003 | End: 2020-06-20
Attending: NURSE PRACTITIONER
Payer: COMMERCIAL

## 2020-06-20 LAB
ALBUMIN SERPL-MCNC: 2.3 G/DL (ref 3.5–5)
ALBUMIN/GLOB SERPL: 0.6 {RATIO} (ref 1.1–2.2)
ALP SERPL-CCNC: 64 U/L (ref 45–117)
ALT SERPL-CCNC: 23 U/L (ref 12–78)
ANION GAP SERPL CALC-SCNC: 5 MMOL/L (ref 5–15)
ANION GAP SERPL CALC-SCNC: 7 MMOL/L (ref 5–15)
AST SERPL-CCNC: 17 U/L (ref 15–37)
BACTERIA SPEC CULT: NORMAL
BASILAR ARTERY EDV: 21 CM/S
BASILAR ARTERY MEAN VEL: 33 CM/S
BASILAR ARTERY PSV: 58 CM/S
BASOPHILS # BLD: 0 K/UL (ref 0–0.1)
BASOPHILS NFR BLD: 0 % (ref 0–1)
BILIRUB SERPL-MCNC: 0.4 MG/DL (ref 0.2–1)
BUN SERPL-MCNC: 12 MG/DL (ref 6–20)
BUN SERPL-MCNC: 16 MG/DL (ref 6–20)
BUN/CREAT SERPL: 24 (ref 12–20)
BUN/CREAT SERPL: 33 (ref 12–20)
CALCIUM SERPL-MCNC: 8.6 MG/DL (ref 8.5–10.1)
CALCIUM SERPL-MCNC: 8.9 MG/DL (ref 8.5–10.1)
CHLORIDE SERPL-SCNC: 106 MMOL/L (ref 97–108)
CHLORIDE SERPL-SCNC: 108 MMOL/L (ref 97–108)
CO2 SERPL-SCNC: 27 MMOL/L (ref 21–32)
CO2 SERPL-SCNC: 29 MMOL/L (ref 21–32)
CREAT SERPL-MCNC: 0.48 MG/DL (ref 0.55–1.02)
CREAT SERPL-MCNC: 0.51 MG/DL (ref 0.55–1.02)
DIFFERENTIAL METHOD BLD: ABNORMAL
EOSINOPHIL # BLD: 0.2 K/UL (ref 0–0.4)
EOSINOPHIL NFR BLD: 4 % (ref 0–7)
ERYTHROCYTE [DISTWIDTH] IN BLOOD BY AUTOMATED COUNT: 25.9 % (ref 11.5–14.5)
GLOBULIN SER CALC-MCNC: 3.8 G/DL (ref 2–4)
GLUCOSE SERPL-MCNC: 112 MG/DL (ref 65–100)
GLUCOSE SERPL-MCNC: 125 MG/DL (ref 65–100)
HCT VFR BLD AUTO: 22.5 % (ref 35–47)
HGB BLD-MCNC: 6.6 G/DL (ref 11.5–16)
IMM GRANULOCYTES # BLD AUTO: 0.1 K/UL (ref 0–0.04)
IMM GRANULOCYTES NFR BLD AUTO: 1 % (ref 0–0.5)
LEFT ACA EDV: 42 CM/S
LEFT ACA MEAN VEL: 78 CM/S
LEFT ACA PSV: 149 CM/S
LEFT EX ICA EDV: 11 CM/S
LEFT EX ICA MEAN VEL: 24 CM/S
LEFT EX ICA PSV: 51 CM/S
LEFT ICA EDV: 18 CM/S
LEFT ICA MEAN VEL: 40 CM/S
LEFT ICA PSV: 83 CM/S
LEFT LINDEGAARD RATIO: 2.5
LEFT MCA 1 EDV: 34 CM/S
LEFT MCA 1 MEAN VEL: 59 CM/S
LEFT MCA 1 PSV: 108 CM/S
LEFT PCA 1 EDV: 40 CM/S
LEFT PCA 1 MEAN VEL: 67 CM/S
LEFT PCA 1 PSV: 120 CM/S
LEFT VERTEBRAL EDV TCD: 13 CM/S
LEFT VERTEBRAL MEAN VEL: 21 CM/S
LEFT VERTEBRAL PSV TCD: 38 CM/S
LYMPHOCYTES # BLD: 0.9 K/UL (ref 0.8–3.5)
LYMPHOCYTES NFR BLD: 18 % (ref 12–49)
MAGNESIUM SERPL-MCNC: 2.4 MG/DL (ref 1.6–2.4)
MCH RBC QN AUTO: 22.9 PG (ref 26–34)
MCHC RBC AUTO-ENTMCNC: 29.3 G/DL (ref 30–36.5)
MCV RBC AUTO: 78.1 FL (ref 80–99)
MONOCYTES # BLD: 0.5 K/UL (ref 0–1)
MONOCYTES NFR BLD: 9 % (ref 5–13)
NEUTS SEG # BLD: 3.3 K/UL (ref 1.8–8)
NEUTS SEG NFR BLD: 68 % (ref 32–75)
NRBC # BLD: 0 K/UL (ref 0–0.01)
NRBC BLD-RTO: 0 PER 100 WBC
PHOSPHATE SERPL-MCNC: 3.5 MG/DL (ref 2.6–4.7)
PLATELET # BLD AUTO: 305 K/UL (ref 150–400)
PMV BLD AUTO: 10.4 FL (ref 8.9–12.9)
POTASSIUM SERPL-SCNC: 3.6 MMOL/L (ref 3.5–5.1)
POTASSIUM SERPL-SCNC: 3.9 MMOL/L (ref 3.5–5.1)
PROCALCITONIN SERPL-MCNC: 0.06 NG/ML
PROT SERPL-MCNC: 6.1 G/DL (ref 6.4–8.2)
RBC # BLD AUTO: 2.88 M/UL (ref 3.8–5.2)
RBC MORPH BLD: ABNORMAL
RIGHT ACA EDV: 32 CM/S
RIGHT ACA MEAN VEL: 57 CM/S
RIGHT ACA PSV: 108 CM/S
RIGHT EX ICA EDV: 17 CM/S
RIGHT EX ICA MEAN VEL: 33 CM/S
RIGHT EX ICA PSV: 66 CM/S
RIGHT ICA EDV: 23 CM/S
RIGHT ICA MEAN VEL: 45 CM/S
RIGHT ICA PSV: 88 CM/S
RIGHT LINDEGAARD RATIO: 1.8
RIGHT MCA 1 EDV: 27 CM/S
RIGHT MCA 1 MEAN VEL: 59 CM/S
RIGHT MCA 1 PSV: 122 CM/S
RIGHT PCA 1 EDV: 27 CM/S
RIGHT PCA 1 MEAN VEL: 47 CM/S
RIGHT PCA 1 PSV: 86 CM/S
RIGHT VERTEBRAL EDV TCD: 13 CM/S
RIGHT VERTEBRAL MEAN VEL: 22 CM/S
RIGHT VERTEBRAL PSV TCD: 40 CM/S
SERVICE CMNT-IMP: NORMAL
SODIUM SERPL-SCNC: 140 MMOL/L (ref 136–145)
SODIUM SERPL-SCNC: 142 MMOL/L (ref 136–145)
WBC # BLD AUTO: 5 K/UL (ref 3.6–11)

## 2020-06-20 PROCEDURE — 65610000006 HC RM INTENSIVE CARE

## 2020-06-20 PROCEDURE — 84100 ASSAY OF PHOSPHORUS: CPT

## 2020-06-20 PROCEDURE — 84145 PROCALCITONIN (PCT): CPT

## 2020-06-20 PROCEDURE — 74011250636 HC RX REV CODE- 250/636: Performed by: ANESTHESIOLOGY

## 2020-06-20 PROCEDURE — 74011250636 HC RX REV CODE- 250/636: Performed by: NURSE PRACTITIONER

## 2020-06-20 PROCEDURE — 74011250636 HC RX REV CODE- 250/636

## 2020-06-20 PROCEDURE — 93886 INTRACRANIAL COMPLETE STUDY: CPT

## 2020-06-20 PROCEDURE — 80053 COMPREHEN METABOLIC PANEL: CPT

## 2020-06-20 PROCEDURE — 74011000250 HC RX REV CODE- 250: Performed by: NURSE PRACTITIONER

## 2020-06-20 PROCEDURE — 74011000250 HC RX REV CODE- 250: Performed by: INTERNAL MEDICINE

## 2020-06-20 PROCEDURE — 80177 DRUG SCRN QUAN LEVETIRACETAM: CPT

## 2020-06-20 PROCEDURE — 36430 TRANSFUSION BLD/BLD COMPNT: CPT

## 2020-06-20 PROCEDURE — 86923 COMPATIBILITY TEST ELECTRIC: CPT

## 2020-06-20 PROCEDURE — 85025 COMPLETE CBC W/AUTO DIFF WBC: CPT

## 2020-06-20 PROCEDURE — 74011250637 HC RX REV CODE- 250/637: Performed by: NURSE PRACTITIONER

## 2020-06-20 PROCEDURE — 36415 COLL VENOUS BLD VENIPUNCTURE: CPT

## 2020-06-20 PROCEDURE — P9016 RBC LEUKOCYTES REDUCED: HCPCS

## 2020-06-20 PROCEDURE — 74011250637 HC RX REV CODE- 250/637: Performed by: INTERNAL MEDICINE

## 2020-06-20 PROCEDURE — 94003 VENT MGMT INPAT SUBQ DAY: CPT

## 2020-06-20 PROCEDURE — 94640 AIRWAY INHALATION TREATMENT: CPT

## 2020-06-20 PROCEDURE — 74011250637 HC RX REV CODE- 250/637: Performed by: PSYCHIATRY & NEUROLOGY

## 2020-06-20 PROCEDURE — 74011000258 HC RX REV CODE- 258: Performed by: INTERNAL MEDICINE

## 2020-06-20 PROCEDURE — P9040 RBC LEUKOREDUCED IRRADIATED: HCPCS

## 2020-06-20 PROCEDURE — 83735 ASSAY OF MAGNESIUM: CPT

## 2020-06-20 PROCEDURE — 74011250637 HC RX REV CODE- 250/637: Performed by: ANESTHESIOLOGY

## 2020-06-20 PROCEDURE — 74011250636 HC RX REV CODE- 250/636: Performed by: INTERNAL MEDICINE

## 2020-06-20 PROCEDURE — 86900 BLOOD TYPING SEROLOGIC ABO: CPT

## 2020-06-20 RX ORDER — FENTANYL CITRATE 50 UG/ML
25 INJECTION, SOLUTION INTRAMUSCULAR; INTRAVENOUS
Status: DISCONTINUED | OUTPATIENT
Start: 2020-06-20 | End: 2020-06-22

## 2020-06-20 RX ORDER — LORAZEPAM 2 MG/ML
2 INJECTION INTRAMUSCULAR ONCE
Status: COMPLETED | OUTPATIENT
Start: 2020-06-20 | End: 2020-06-20

## 2020-06-20 RX ORDER — SODIUM CHLORIDE 9 MG/ML
250 INJECTION, SOLUTION INTRAVENOUS AS NEEDED
Status: DISCONTINUED | OUTPATIENT
Start: 2020-06-20 | End: 2020-06-21

## 2020-06-20 RX ORDER — LABETALOL HYDROCHLORIDE 5 MG/ML
20 INJECTION, SOLUTION INTRAVENOUS
Status: DISCONTINUED | OUTPATIENT
Start: 2020-06-20 | End: 2020-06-21

## 2020-06-20 RX ORDER — LORAZEPAM 2 MG/ML
INJECTION INTRAMUSCULAR
Status: COMPLETED
Start: 2020-06-20 | End: 2020-06-20

## 2020-06-20 RX ORDER — AMLODIPINE BESYLATE 5 MG/1
10 TABLET ORAL DAILY
Status: DISCONTINUED | OUTPATIENT
Start: 2020-06-21 | End: 2020-07-08

## 2020-06-20 RX ORDER — AMLODIPINE BESYLATE 5 MG/1
5 TABLET ORAL ONCE
Status: COMPLETED | OUTPATIENT
Start: 2020-06-20 | End: 2020-06-20

## 2020-06-20 RX ORDER — CARVEDILOL 12.5 MG/1
12.5 TABLET ORAL 2 TIMES DAILY WITH MEALS
Status: DISCONTINUED | OUTPATIENT
Start: 2020-06-20 | End: 2020-06-21

## 2020-06-20 RX ORDER — LABETALOL HYDROCHLORIDE 5 MG/ML
20 INJECTION, SOLUTION INTRAVENOUS
Status: DISCONTINUED | OUTPATIENT
Start: 2020-06-20 | End: 2020-06-20

## 2020-06-20 RX ORDER — FENTANYL CITRATE 50 UG/ML
INJECTION, SOLUTION INTRAMUSCULAR; INTRAVENOUS
Status: DISPENSED
Start: 2020-06-20 | End: 2020-06-21

## 2020-06-20 RX ORDER — FENTANYL CITRATE 50 UG/ML
25 INJECTION, SOLUTION INTRAMUSCULAR; INTRAVENOUS ONCE
Status: COMPLETED | OUTPATIENT
Start: 2020-06-20 | End: 2020-06-20

## 2020-06-20 RX ADMIN — NIMODIPINE 60 MG: 30 SOLUTION ORAL at 05:43

## 2020-06-20 RX ADMIN — CEFEPIME HYDROCHLORIDE 2 G: 2 INJECTION, POWDER, FOR SOLUTION INTRAVENOUS at 17:35

## 2020-06-20 RX ADMIN — CASTOR OIL AND BALSAM, PERU: 788; 87 OINTMENT TOPICAL at 09:13

## 2020-06-20 RX ADMIN — LEVETIRACETAM 1000 MG: 100 SOLUTION ORAL at 09:12

## 2020-06-20 RX ADMIN — NIMODIPINE 60 MG: 30 SOLUTION ORAL at 17:35

## 2020-06-20 RX ADMIN — NIMODIPINE 60 MG: 30 SOLUTION ORAL at 13:59

## 2020-06-20 RX ADMIN — CASTOR OIL AND BALSAM, PERU: 788; 87 OINTMENT TOPICAL at 17:35

## 2020-06-20 RX ADMIN — Medication 2 DROP: at 05:43

## 2020-06-20 RX ADMIN — CARVEDILOL 12.5 MG: 12.5 TABLET, FILM COATED ORAL at 17:34

## 2020-06-20 RX ADMIN — Medication 2 DROP: at 22:50

## 2020-06-20 RX ADMIN — CEFEPIME HYDROCHLORIDE 2 G: 2 INJECTION, POWDER, FOR SOLUTION INTRAVENOUS at 09:11

## 2020-06-20 RX ADMIN — SODIUM CHLORIDE, SODIUM LACTATE, POTASSIUM CHLORIDE, AND CALCIUM CHLORIDE 75 ML/HR: 600; 310; 30; 20 INJECTION, SOLUTION INTRAVENOUS at 09:10

## 2020-06-20 RX ADMIN — ASPIRIN 81 MG CHEWABLE TABLET 81 MG: 81 TABLET CHEWABLE at 09:12

## 2020-06-20 RX ADMIN — IPRATROPIUM BROMIDE AND ALBUTEROL SULFATE 3 ML: .5; 3 SOLUTION RESPIRATORY (INHALATION) at 13:18

## 2020-06-20 RX ADMIN — LABETALOL HYDROCHLORIDE 20 MG: 5 INJECTION INTRAVENOUS at 16:26

## 2020-06-20 RX ADMIN — POTASSIUM BICARBONATE 40 MEQ: 782 TABLET, EFFERVESCENT ORAL at 09:11

## 2020-06-20 RX ADMIN — FLUDROCORTISONE ACETATE 0.1 MG: 0.1 TABLET ORAL at 09:12

## 2020-06-20 RX ADMIN — LEVETIRACETAM 1000 MG: 100 SOLUTION ORAL at 20:45

## 2020-06-20 RX ADMIN — CEFEPIME HYDROCHLORIDE 2 G: 2 INJECTION, POWDER, FOR SOLUTION INTRAVENOUS at 03:47

## 2020-06-20 RX ADMIN — ENOXAPARIN SODIUM 40 MG: 40 INJECTION SUBCUTANEOUS at 13:59

## 2020-06-20 RX ADMIN — LABETALOL HYDROCHLORIDE 20 MG: 5 INJECTION INTRAVENOUS at 20:16

## 2020-06-20 RX ADMIN — POTASSIUM BICARBONATE 40 MEQ: 782 TABLET, EFFERVESCENT ORAL at 17:34

## 2020-06-20 RX ADMIN — NIMODIPINE 60 MG: 30 SOLUTION ORAL at 22:47

## 2020-06-20 RX ADMIN — Medication 2 DROP: at 14:00

## 2020-06-20 RX ADMIN — LORAZEPAM 2 MG: 2 INJECTION INTRAMUSCULAR at 20:16

## 2020-06-20 RX ADMIN — AMLODIPINE BESYLATE 5 MG: 5 TABLET ORAL at 15:48

## 2020-06-20 RX ADMIN — AMLODIPINE BESYLATE 5 MG: 5 TABLET ORAL at 09:12

## 2020-06-20 RX ADMIN — NIMODIPINE 60 MG: 30 SOLUTION ORAL at 10:22

## 2020-06-20 RX ADMIN — IPRATROPIUM BROMIDE AND ALBUTEROL SULFATE 3 ML: .5; 3 SOLUTION RESPIRATORY (INHALATION) at 02:15

## 2020-06-20 RX ADMIN — LORAZEPAM 2 MG: 2 INJECTION INTRAMUSCULAR; INTRAVENOUS at 20:16

## 2020-06-20 RX ADMIN — LABETALOL HYDROCHLORIDE 20 MG: 5 INJECTION INTRAVENOUS at 13:24

## 2020-06-20 RX ADMIN — LACOSAMIDE 150 MG: 50 TABLET, FILM COATED ORAL at 09:12

## 2020-06-20 RX ADMIN — FENTANYL CITRATE 25 MCG: 50 INJECTION, SOLUTION INTRAMUSCULAR; INTRAVENOUS at 20:56

## 2020-06-20 RX ADMIN — NIMODIPINE 60 MG: 30 SOLUTION ORAL at 01:30

## 2020-06-20 RX ADMIN — IPRATROPIUM BROMIDE AND ALBUTEROL SULFATE 3 ML: .5; 3 SOLUTION RESPIRATORY (INHALATION) at 20:30

## 2020-06-20 RX ADMIN — LACOSAMIDE 150 MG: 50 TABLET, FILM COATED ORAL at 20:45

## 2020-06-20 RX ADMIN — IPRATROPIUM BROMIDE AND ALBUTEROL SULFATE 3 ML: .5; 3 SOLUTION RESPIRATORY (INHALATION) at 07:27

## 2020-06-20 RX ADMIN — FLUDROCORTISONE ACETATE 0.1 MG: 0.1 TABLET ORAL at 17:34

## 2020-06-20 RX ADMIN — LABETALOL HYDROCHLORIDE 20 MG: 5 INJECTION INTRAVENOUS at 18:16

## 2020-06-20 NOTE — PROGRESS NOTES
Keppra level slightly low at 8.2, will repeat Keppra level today. Discussed with Dr. Isaak Hart, Dr. Brayden López, and RN.    0613: Updated patient's mother at bedside on plan of care today from a neuro standpoint. All questions answered. Mother informed me that patient does have a history of sleep apnea. I [facility], so that he was aware.     Lyn Miller NP  Neurocritical Care Nurse Practitioner  [phone]

## 2020-06-20 NOTE — PROGRESS NOTES
Neurointerventional Surgery Progress Note  Abhinav Mansfield NP   Neurocritical Care Nurse Practitioner  [phone]          Admit Date: 6/4/2020        Daily Progress Note: 6/20/2020   LOS: 16 days      S/P:  POD 14 Cerebral angiogram with coil embolization of ruptured left MCA bifurcation aneurysm on 6/5/2020    ** Delayed Presentation ** PB Day 20    Interval History/Subjective:   Patient with tracheostomy and on ventilator. Eyes open spontaneously, no command following. Hgb dropped to 6.6 this morning and patient is being transfused 1 unit of PRBCs today. Fevers have improved. She is moving all extremities. TCDs pending today. Unable to perform a ROS due AMS. Assessment & Plan: Active Problems:    SAH (subarachnoid hemorrhage) ([address] (6/4/2020)      Subarachnoid hemorrhage from middle cerebral artery aneurysm, left (HCC) (6/4/2020)      RIGHT middle cerebral artery aneurysm (6/4/2020)      Cerebral vasospasm (6/4/2020)      Respiratory failure ([address] (6/15/2020)      Hypoxic ischemic encephalopathy (HIE), unspecified severity (6/15/2020)      1.) SAH due to ruptured cerebral aneurysm, [place] 5, An Grade 3/4              - s/p cerebral angiogram with coil embolization of left MCA bifurcation aneurysm on 6/5       Additionally, patient has 5X3 right MCA bifurcation aneurysm with very wide neck which will               need to be treated with stents vs. Clipping              - Continue Nimotop Day 16 of 21 to prevent delayed cerebral ischemia              - continue maintenance fluids of LR at 75 ml/hr ml/hr maintain euvolemia              - Strict I's and O's, +2800 liters overnight              - TCDs daily, TCDs yesterday showed improvement in vasospasm, TCDs today showed continued possible left PCA vasospasm, no evidence of vasospasm in the remaining intracranial vessels bilaterally.  Continue to monitor clinically               - ECHO shows EF 60-65%, mild concentric hypertrophy, mildly dilated left atrium              - ok with neuro checks every 2 hours during the day and every 4 hours at night               - continue Florinef to 0.1 mg BID (initially started 6/13) for prevention of cerebral salt wasting in the setting of SAH, aid in increasing plasma volume              - Liberalize SBP goal to 120-180 Cardene/Fabien/Hydralazine/Labetalol PRN              - PT/OT/SLP evals when appropriate    2.) Seizure              - Due to #1, no prior hx of seizure              - Continue Keppra 1000 mg BID               - continue Vimpat ( increased to 150 mg BID 6/17/2020 due to ? Seizures- transient episode of oral automatisms)              - Seizure precautions              - repeat EEG 6/17 d/t seizure like activity overnight, EEG showed a technically limited study which renders interpretation difficult. Within these confines, no evidence for seizures or ongoing epileptiform discharges are noted. -  EEG on 6/6 showed an abnormal EEG due to slowing of the background rhythms with intermittent rhythmic delta activity seen in both frontal head regions. Occasional sharp wave discharges in the left frontal area. EEG is suggestive of mild-to-moderate generalized encephalopathic process, nonspecific in type. In addition, there was occasional epileptiform disturbance seen in the left frontal area which may represent a partial onset seizure focus.              - Repeat MRI of Brain on 6/19 showed persistent subarachnoid hemorrhage in the left sylvian fissure. Patchy areas of enhancement as described. Some of these are associated with  foci of diffusion restriction and likely represent subacute ischemia. The leptomeningeal enhancement predominantly in the left frontal and parietal lobes is likely related to subarachnoid hemorrhage and meningeal irritation. Infection is not entirely excluded but thought less likely.               - Neurology following as needed                3.) Cerebral Edema (resolved)              - received one dose of mannitol, started 3% saline on 6/8, discontinued 6/14              - serum sodium remains stable                4.) Acute hypoxic respiratory failure in the setting of likely neurogenic pulmonary edema- resolved              - on vent support due to apneic episodes              - chest XR on 6/19 Vague left lower lobe airspace process, suggesting subsegmental  atelectasis or pneumonia. - on Cefepime per Intensivist              - Intensivist managing     5.) Multiple Acute Ischemic Infarctions                - Imaging pattern is consistent with Hypoxic-Ischemic Encephalopathy (HIE)                - Continue aspirin 81 mg daily for stroke prevention           6.) Cerebral Vasospasm                 - s/p cerebral angiogram for right PANCHITO A1 angioplasty for vasospasm, transarterial infusion of verapamil on 6/9,  S/p angiogram on 6/10 for left ICA/MCA angioplasty and transarterial infusion of verapamil for vasospasm treatment, s/p angiogram on 6/11 and 6/13 for transarterial infusion of verapamil for vasospasm treatment                 - Strict I's and O's                 - TCDs today as stated above in #1                  - plans as stated in #1     7.) Fever --> improved                 - afebrile overnight                - completed course of Zosyn               - possibly febrile in the setting of SAH, paired blood cultures NGTD on 6/15               - respiratory cultures growing Klebsiella Pneumonia and Citrobacter Len Segal 6/17                - CTA of chest 6/7 negative for PE                 - Cefepime started on 6/19 to cover Citrobacter                 - Intensivist following     8.) Polyuria                  - serum sodium stable at 140                  - urine studies showed serum osmolality 296, urine osmolality 322, specific gravity normal at 1.015.  Urine random sodium at 114                  - Continue Florinef to 0.1 mg BID for prevention of cerebral salt wasting                  - monitor BMP every 12 hours    Activity: Bed rest  DVT ppx: SCDs, Lovenox  Disposition: TBD    Plan d/w Dr. Brayden López, Dr. Isaak Hart, and RN. Admission Summary:     Kailyn Esqueda is a 48 y.o. 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 On arrival to ED, pt was unresponsive and noted to be seizing. A stat CT of her head was performed there, which showed extensive SAH. Pt was then intubated emergently. She was also extremely hypertensive with SBPs in 240s. She was given Keppra and Ativan, and placed on cardene drip for BP control. NSGY and NIS were then consulted and the patient was transferred to [facility] for higher level of care. En route, paramedics administered 7.5 mg of Versed, 200mcg of Fentanyl, and 10 mg of labetalol for BP control. On arrival, CTA of her head and neck was obtained, which showed a 4.4 x 4.1 mm right MCA trifurcation aneurysm and  a 2.2 x 3.1 mm left MCA trifurcation aneurysm. Of note, CTA head/neck also showed pulmonary infiltrates suspicious for COVID-19 infection and the patient has been tested for COVID which was negative. 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 Mother reported pt does not smoke, does not drink alcohol or use street drugs. The mother also reported that the patient began complaining of a headache on 5/31/2020 and has been taking BC Powder (aspirin) for the headache. She also reported that the pt has a hx of HTN and is supposed to be on BP medications, but recently stopped taking her medications. The patient underwent a cerebral angiogram on 6/5 by Dr. Brian Broderick for coil embolization of a ruptured left MCA bifurcation aneurysm.     Current Facility-Administered Medications   Medication Dose Route Frequency Provider Last Rate Last Dose    0.9% sodium chloride infusion 250 mL  250 mL IntraVENous PRN Colleen Doty MD        cefepime (MAXIPIME) 2 g in 0.9% sodium chloride (MBP/ADV) 100 mL  2 g IntraVENous Q8H Javiercari Wiggins  mL/hr at 06/20/20 0911 2 g at 06/20/20 0911    fludrocortisone (FLORINEF) tablet 0.1 mg  0.1 mg Oral BID Jose Alejandro CORRALES NP   0.1 mg at 06/20/20 0912    potassium bicarb-citric acid (EFFER-K) tablet 40 mEq  40 mEq Oral BID Javier MD Feliciano   40 mEq at 06/20/20 0911    lacosamide (VIMPAT) tablet 150 mg  150 mg Per NG tube Q12H Onesimo Palma MD   150 mg at 06/20/20 0912    lactated Ringers infusion  75 mL/hr IntraVENous CONTINUOUS Javiercari Wiggins MD 75 mL/hr at 06/20/20 0911 75 mL/hr at 06/20/20 0911    ELECTROLYTE REPLACEMENT PROTOCOL - Potassium and Magnesium  1 Each Other PRN Hernando Avery NP        ELECTROLYTE REPLACEMENT PROTOCOL - Phosphorus  1 Each Other PRN Hernando Avery NP        0.9% sodium chloride infusion 250 mL  250 mL IntraVENous PRN Liv Redding NP        balsam peru-castor oiL (VENELEX) ointment   Topical BID Leon Berry DO        labetaloL (NORMODYNE;TRANDATE) injection 20 mg  20 mg IntraVENous Q2H PRN Lisa Domínguez NP   20 mg at 06/19/20 2055    0.9% sodium chloride infusion 250 mL  250 mL IntraVENous PRN Zac Taveras NP-JOVANY        aspirin chewable tablet 81 mg  81 mg Per NG tube DAILY Bertin Bassett MD   81 mg at 06/20/20 0912    enoxaparin (LOVENOX) injection 40 mg  40 mg SubCUTAneous Q24H Leon Berry DO   40 mg at 06/19/20 1419    levETIRAcetam (KEPPRA) oral solution 1,000 mg  1,000 mg Per NG tube Q12H Leon Berry DO   1,000 mg at 06/20/20 0912    glycopyrrolate (ROBINUL) injection 0.2 mg  0.2 mg IntraMUSCular TID PRN Liv FLAKITA Redding        niMODipine (NYMALIZE) 6 mg/mL oral solution 60 mg  60 mg Per NG tube Q4H Lisa Domínguez NP   60 mg at 06/20/20 1022    amLODIPine (NORVASC) tablet 5 mg 5 mg Per NG tube DAILY Julito Hoop, ACNP   5 mg at 20 0912    acetaminophen (TYLENOL) solution 650 mg  650 mg Per NG tube Q4H PRN Marsha Kelly NP   650 mg at 20 0008    0.9% sodium chloride infusion 250 mL  250 mL IntraVENous PRN Marcos Spare, NP-C        albuterol-ipratropium (DUO-NEB) 2.5 MG-0.5 MG/3 ML  3 mL Nebulization Q6H RT Julito Hoop, ACNP   3 mL at 20 9680    glucose chewable tablet 16 g  4 Tab Oral PRN Marcos Spare, NP-C        glucagon (GLUCAGEN) injection 1 mg  1 mg IntraMUSCular PRN Marcos Spare, NP-C        dextrose 10% infusion 0-250 mL  0-250 mL IntraVENous PRN Marcos Spare, NP-C        albuterol (PROVENTIL VENTOLIN) nebulizer solution 2.5 mg  2.5 mg Nebulization Q6H PRN Marcos Spare, NP-C        polyvinyl alcohol-povidone (NATURAL TEARS) 0.5-0.6 % ophthalmic solution 2 Drop  2 Drop Both Eyes Q8H Julito Hoop, ACNP   2 Drop at 20 0543    bisacodyL (DULCOLAX) suppository 10 mg  10 mg Rectal DAILY PRN Julito Hoop, ACNP        ondansetron [facility]) injection 4 mg  4 mg IntraVENous Q4H PRN Marcos Spare, NP-C        docusate (COLACE) 50 mg/5 mL oral liquid 100 mg  100 mg Oral BID PRN Grisel Kiss R, NP-C            No Known Allergies    Review of Systems:  Review of systems not obtained due to patient factors. Objective:     Vital signs  Temp (24hrs), Av.9 °F (37.2 °C), Min:98.5 °F (36.9 °C), Max:100.2 °F (37.9 °C)   No intake/output data recorded.    1901 -  0700  In: 8460 [I.V.:5850]  Out: 4885 [Urine:4885]    Visit Vitals  /87   Pulse 81   Temp 98.5 °F (36.9 °C)   Resp 22   Ht 5' 4\" (1.626 m)   Wt 237 lb 7 oz (107.7 kg)   SpO2 92%   BMI 40.76 kg/m²    O2 Flow Rate (L/min): 10 l/min O2 Device: Ventilator, Tracheostomy, Heated   Vitals:    20 0728 20 0800 20 0900 20 1000   BP:  196/73 186/81 180/87   Pulse: 81 73 74 81   Resp: 22 19 20 22   Temp:  98.5 °F (36.9 °C)     SpO2: 96% 97% 95% 92%   Weight:       Height:          Physical Exam:  GENERAL: NAD, trach with ventilator, alert   EYE: conjunctivae/corneas clear. Right pupil 3 mm, brisk response to light. Left pupil 4 mm, sluggish response to light. Keeps left eye closed   LUNG: lungs clear, diminished bilaterally    HEART: regular rate and rhythm, S1, S2 normal, no murmur, click, rub or gallop  EXTREMITIES:  extremities normal, atraumatic, no cyanosis, 3+ pitting peripheral edema, distal pulses 2+ bilaterally   SKIN: Skin warm to touch. Right and left groin site clean, dry, and intact. No hematoma, bruising, or bleeding noted. NEUROLOGIC: Alert. Trach with ventilator. Eyes will open spontaneously. No command following. Right pupil 3 mm, brisk response to light. Left pupil 4 mm sluggish response to light. Left eye remains closed. Focuses and tracks with eyes, with the exception of inability to track to the right with left eye. Moves all extremities spontaneously. No involuntary movements. Gait deferred. Unable to assess language, sensation, and coordination. Imaging:    MRI of Brain on 6/19/2020 shows  IMPRESSION:   1. Persistent subarachnoid hemorrhage in the left sylvian fissure. 2.  Patchy areas of enhancement as described. Some of these are associated with  foci of diffusion restriction and likely represent subacute ischemia. The  leptomeningeal enhancement predominantly in the left frontal and parietal lobes  is likely related to subarachnoid hemorrhage and meningeal irritation. Infection  is not entirely excluded but thought less likely. CTA/CTP 6/18/2020     IMPRESSION:  1. No acute large vessel occlusion. 2. Recent left MCA bifurcation aneurysm coiling, with interval improvement in  now mild vasospasm involving the bilateral anterior, left middle, vertebral  basilar and bilateral posterior cerebral arteries as discussed in detail above.   3. Unchanged right MCA bifurcation aneurysm.       CT Head on 6/17/20 shows:     IMPRESSION:     1. Persistence of the previously described subtle areas of subarachnoid  hemorrhage. 2. Persistence of the previously described left inferior cerebellar infarct. 3. Presence of small lacunar infarcts bilaterally. 4. No evidence of hydrocephalus. 5. Presence of an aneurysm coil in the region of the left middle cerebral  artery. 6. Evidence of sphenoid sinus disease.       CT of Head on 6/13/2020 at 1103 shows  IMPRESSION:   1. Questionable left inferior cerebellar infarction, new from 6/10/2020.  2. Multiple, evolving, subacute infarctions throughout the cerebrum. 3. Subacute subarachnoid hemorrhage. Improved hydrocephalus compared to  6/6/2020    CTA of Head on 6/13/2020 at 1103 shows  IMPRESSION:   1. Left A1 vasospasm. 2. More mild spasm of the right PANCHITO, MCAs, and possibly the distal vertebrals. 3. Right M1 terminus aneurysm. 4. Large infundibula/small aneurysms of the bilateral P-comm origins. CT Perfusion on 6/13/2020 at 1103 shows  IMPRESSION:  1. Penumbra surrounding infarctions in most of the superior left MCA territory. 2. Bilateral PANCHITO infarctions. 3. Questionable left cerebellar infarction is inferior to the perfusion scan. MRI of Brain on 6/9/2020 at 0311 AM shows  IMPRESSION:  1. Multiple areas of cortical acute ischemic injury as well as some subcortical  white matter areas and right cerebellar involvement similar to the prior MRI  6/6/20.  2. Subarachnoid hemorrhage and intraventricular blood again demonstrated without  hydrocephalus. 3. Increasing fluid paranasal sinuses and right greater than left mastoid/middle  ear cavities. CTA of Head on 6/9/2020 at 1350 shows  Progressive vasospasm involving the anterior cerebral arteries with minimal  associated decreased perfusion to the frontal lobes. No other significant  Change  .   CT Perfusion on 6/9/2020 at 1336 shows  Impression:  CT perfusion brain: Very minimal decreased perfusion to the frontal lobes. CT of Head on 6/9/2020 at 0234 shows  IMPRESSION:   No significant change.     CT of Head on 6/8/2020 shows  IMPRESSION:   Global edema, otherwise no significant change    CTA of Head and Neck on 6/8/2020 per radiology shows  Impression:  Mild to moderate vasospasm in the left middle cerebral artery status post  bifurcation aneurysm coil embolization. Stable 4 mm right MCA aneurysm. Endotracheal tube terminates just above the matt. 1 cm low-density left thyroid nodule. Mildly enlarged left axillary lymph node measuring 11 mm in short axis  Patchy bilateral airspace disease. Nonspecific asymmetric enhancement of the right superior ophthalmic vein   (However, no significant vasospasm was seen when personally reviewed by NIS)    CT Perfusion on 6/8/2020 shows  IMPRESSION:  No significant cerebral perfusion abnormality    CTA of Chest on 6/7/2020 shows  IMPRESSION:  1. No acute pulmonary embolus  2. Dependent atelectasis with diffuse groundglass opacifications bilaterally. CT of Head on 6/7/2020 shows  IMPRESSION:   1. No further progression of subarachnoid hemorrhage. The overall degree of  subarachnoid hemorrhage is slightly improved. 2. Subtle areas of gray-white differentiation loss throughout the cerebral  hemispheres correlating with areas of restricted diffusion on previous CT  compatible with ischemic changes. MRI of Brain on 6/6/2020 shows  IMPRESSION:   Imaging findings consistent with large left MCA territory ischemia/infarction,  left temporal, parietal and frontal lobes, cortically based, no superimposed  increased parenchymal hemorrhage or midline shift. Right and left PANCHITO territory  cortical diffusion restriction consistent with infarction. Infarction in the right insular cortex and right temporal lobe. Scattered small infarctions right and left corona radiata centrum semiovale, right cerebellum.  Allowing for differences in technique likely stable subarachnoid and  intraventricular hemorrhage. CT of Head on 6/6/2020 at 1411 shows  IMPRESSION:   Left MCA territory infarction with smaller right MCA territory infarction.     Stable subarachnoid and intraventricular hemorrhage. CTA of Head on 6/6/2020 at 0903 shows  IMPRESSION:  Slightly increased intraventricular and subarachnoid hemorrhage compared to the  prior exam.     Status post coiling left MCA bifurcation aneurysm. No significant residual  aneurysm filling. CT of Head on 6/62020 at 0239 shows  IMPRESSION:  Slightly diminished subarachnoid and intraventricular hemorrhage. Stable ventricular system. CT of Head WO on 6/5/2020 at 1512 shows  IMPRESSION: Stable acute subarachnoid and intraventricular hemorrhage. Stable  ventricular system. CT Head WO 6/5/20 0826     IMPRESSION:   1. Diffuse subarachnoid hemorrhage most concentrated in the basal cisterns and  left sylvian fissure. 2.  Interval ventricular enlargement suspicious for developing hydrocephalus.     CTA Head 6/4/20 2017     IMPRESSION:  1. Diffuse subarachnoid hemorrhage in the basilar cisterns and sylvian  fissures. 2.  Bilateral MCA bifurcation aneurysms. 3.  Infundibular origins to the posterior communicating arteries bilaterally. 4.  Symmetric moderately severe airspace disease in the upper lobes bilaterally  which may represent edema or sequela of aspiration.     CT Head WO Contrast 6/4/2020 2012    IMPRESSION: Extensive subarachnoid hemorrhage concerning for leaking/ruptured  aneurysm.        24 hour results:    Recent Results (from the past 24 hour(s))   PROCALCITONIN    Collection Time: 06/20/20  6:09 AM   Result Value Ref Range    Procalcitonin 3.71 ng/mL   METABOLIC PANEL, COMPREHENSIVE    Collection Time: 06/20/20  6:09 AM   Result Value Ref Range    Sodium 140 136 - 145 mmol/L    Potassium 3.9 3.5 - 5.1 mmol/L    Chloride 106 97 - 108 mmol/L    CO2 29 21 - 32 mmol/L    Anion gap 5 5 - 15 mmol/L    Glucose 112 (H) 65 - 100 mg/dL BUN 12 6 - 20 MG/DL    Creatinine 0.51 (L) 0.55 - 1.02 MG/DL    BUN/Creatinine ratio 24 (H) 12 - 20      GFR est AA >60 >60 ml/min/1.73m2    GFR est non-AA >60 >60 ml/min/1.73m2    Calcium 8.6 8.5 - 10.1 MG/DL    Bilirubin, total 0.4 0.2 - 1.0 MG/DL    ALT (SGPT) 23 12 - 78 U/L    AST (SGOT) 17 15 - 37 U/L    Alk. phosphatase 64 45 - 117 U/L    Protein, total 6.1 (L) 6.4 - 8.2 g/dL    Albumin 2.3 (L) 3.5 - 5.0 g/dL    Globulin 3.8 2.0 - 4.0 g/dL    A-G Ratio 0.6 (L) 1.1 - 2.2     CBC WITH AUTOMATED DIFF    Collection Time: 06/20/20  6:09 AM   Result Value Ref Range    WBC 5.0 3.6 - 11.0 K/uL    RBC 2.88 (L) 3.80 - 5.20 M/uL    HGB 6.6 (L) 11.5 - 16.0 g/dL    HCT 22.5 (L) 35.0 - 47.0 %    MCV 78.1 (L) 80.0 - 99.0 FL    MCH 22.9 (L) 26.0 - 34.0 PG    MCHC 29.3 (L) 30.0 - 36.5 g/dL    RDW 25.9 (H) 11.5 - 14.5 %    PLATELET 340 765 - 497 K/uL    MPV 10.4 8.9 - 12.9 FL    NRBC 0.0 0  WBC    ABSOLUTE NRBC 0.00 0.00 - 0.01 K/uL    NEUTROPHILS 68 32 - 75 %    LYMPHOCYTES 18 12 - 49 %    MONOCYTES 9 5 - 13 %    EOSINOPHILS 4 0 - 7 %    BASOPHILS 0 0 - 1 %    IMMATURE GRANULOCYTES 1 (H) 0.0 - 0.5 %    ABS. NEUTROPHILS 3.3 1.8 - 8.0 K/UL    ABS. LYMPHOCYTES 0.9 0.8 - 3.5 K/UL    ABS. MONOCYTES 0.5 0.0 - 1.0 K/UL    ABS. EOSINOPHILS 0.2 0.0 - 0.4 K/UL    ABS. BASOPHILS 0.0 0.0 - 0.1 K/UL    ABS. IMM.  GRANS. 0.1 (H) 0.00 - 0.04 K/UL    DF SMEAR SCANNED      RBC COMMENTS HYPOCHROMIA  2+        RBC COMMENTS MICROCYTOSIS  1+        RBC COMMENTS ANISOCYTOSIS  2+       PHOSPHORUS    Collection Time: 06/20/20  6:09 AM   Result Value Ref Range    Phosphorus 3.5 2.6 - 4.7 MG/DL   MAGNESIUM    Collection Time: 06/20/20  6:09 AM   Result Value Ref Range    Magnesium 2.4 1.6 - 2.4 mg/dL   TYPE + CROSSMATCH    Collection Time: 06/20/20  9:09 AM   Result Value Ref Range    Crossmatch Expiration 06/23/2020     ABO/Rh(D) O POSITIVE     Antibody screen NEG     Unit number [id number]     Blood component type [facility] LRIR     Unit division 00     Status of unit ALLOCATED     Crossmatch result Compatible         Lyn Miller NP

## 2020-06-20 NOTE — PROGRESS NOTES
Occupational Therapy  06/20/20    Chart reviewed. Patient remains on ventilator within ABCDE protocol, however continues with no command following, noted low HGB requiring transfusion of pRBCs. Will follow up on Monday as able & appropriate with OT evaluation.      Thank you,  Pat Ignacio, OTD, OTR/L

## 2020-06-20 NOTE — PROGRESS NOTES
06/19/20 2012   Oxygen Therapy   O2 Sat (%) 99 %   Pulse via Oximetry 74 beats per minute   O2 Device Ventilator;Tracheostomy; Heated   PEEP/CPAP (cm H2O) 5 cm H20   O2 Temperature 95 °F (35 °C)   FIO2 (%) 30 %  (weaned to 24% per protocol)   Pre-Treatment   Breath Sounds Bilateral Coarse; Lower;Diminished   Chest Physiotherapy Tx   $$ CPT Subsequent Treatment   (unable-see note)     RT unable to perform CPT as ordered. Pt does not have a bed with a CPT module. Vest is unavailable. Pt is vented therefore can not perform PEP or Vibration device. Only option is Cough Assist which can be a fantastic option for trachs with thick sputum & weak cough. Please assess pt for need for cough assist machine & contact RT if needed.  Thank you

## 2020-06-20 NOTE — PROGRESS NOTES
[facility]    ICU TEAM Progress Note    Name: Kenneth Grayson   : 1970   MRN: [id number]   Date: 2020      Assessment/Plan:       1. SAH  a. 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 Has Right MCA aneurysm with wide neck that will prob need stenting vs clipping  c. Hydrocephalus is improved  d. Continue Nimotop day   e. Continue TCD per TATE - s/p angio for persistent vasospasms   i. Improved persistent Lt PCA vasospasm per most recent TCD - no right sided vasospasms  ii. Liberalized MAP goal per TATE  f. Continue frequent neurochecks  g. Continue Florinef per TATE 0.1mg BID   h. Continue IVF with goal of euvolemia  i. Decrease LR to 75cc/hr  2. Seizures  a. Secondary to the above  b. No seizure identified on most recent EEG but encephalopathy  c. Continue Keppra 1000mg BID and Vimpat 100mg BID  i. No changes recommended unless persistence of automatisms  d. Neurology following - repeat head CT stable  3. Respiratory Failure  a. Likely secondary to neurogenic pulmonary edema and possible super imposed PNA  i. Continue abx for fever and culture with citrobacter and klebsiella - Cefepime x 7 days  b. S/P Trach placement ()  c. Apneic episodes on trach collar    i. MRI brain with small foci of diffusion restriction in the bilateral centrum semiovales as well as the left parietal lobe posteriorly  d. Will continue to attempt to wean to ATC today   4. Ischemic Strokes  a. Patient with multiple ischemic infarcts throughout cerebrum suggestive of ischemic event  b. Note also ischemic cerebellar infarct left inferior  c. See MRI   d. Post trach/PEG  e. PT/OT/Speech      Subjective:   Progress Note: 2020      Reason for ICU Admission:     49 yo female with HTN who presented with AMS and seizure like activity. She was found with extensive SAH secondary to left MCA bifurcation ruptured aneurysm - s/p coiling.  Since patient has been with vasospasm s/p IA verapamil . While she has been more responsive, she's still not following commands and brain imaging suggest extensive strokes. Overnight Events:     No events overnight. Active Problem List:     Problem List  Date Reviewed: 6/10/2020          Codes Class    Respiratory failure ([facility] [address] ICD-10-CM: J96.90  ICD-9-CM: 518.81         Hypoxic ischemic encephalopathy (HIE), unspecified severity ICD-10-CM: P91.60  ICD-9-CM: 768.70         SAH (subarachnoid hemorrhage) (HCC) ICD-10-CM: I60.9  ICD-9-CM: 430         Subarachnoid hemorrhage from middle cerebral artery aneurysm, left (HCC) ICD-10-CM: Q98.91  ICD-9-CM: 430         RIGHT middle cerebral artery aneurysm ICD-10-CM: I67.1  ICD-9-CM: 437.3         Cerebral vasospasm ICD-10-CM: W45.373  ICD-9-CM: 435.9               Past Medical History:      has a past medical history of HTN (hypertension). Past Surgical History:      has a past surgical history that includes ir insert gastrostomy tube perc (2020). Home Medications:     Prior to Admission medications    Not on File       Allergies/Social/Family History:     No Known Allergies   Social History     Tobacco Use    Smoking status: Never Smoker    Smokeless tobacco: Never Used   Substance Use Topics    Alcohol use: Not Currently      History reviewed. No pertinent family history. Review of Systems:     A comprehensive review of systems was negative except for that written in the HPI.     Objective:   Vital Signs:  Visit Vitals  /80   Pulse 81   Temp 98.5 °F (36.9 °C)   Resp 22   Ht 5' 4\" (1.626 m)   Wt 107.7 kg (237 lb 7 oz)   SpO2 96%   BMI 40.76 kg/m²    O2 Flow Rate (L/min): 10 l/min O2 Device: Ventilator, Tracheostomy, Heated   Temp (24hrs), Av °F (37.2 °C), Min:98.5 °F (36.9 °C), Max:100.2 °F (37.9 °C)           Intake/Output:     Intake/Output Summary (Last 24 hours) at 2020 0905  Last data filed at 2020 0700  Gross per 24 hour   Intake 4560 ml   Output 2325 ml   Net 2235 ml       Physical Exam:    General: NAD  HENT: Atraumatic, Tracheostomy tube in place  Cardio: RRR  Respiratory: Coarse breath sounds BL  GI: Soft not tender abdomen  Extremities: +ve trace edema  Neuro: Opens right eye and tracks but doesn't follow. Withdraws all four extremities. LABS AND  DATA: Personally reviewed  Recent Labs     06/20/20  0609 06/19/20  0345   WBC 5.0 5.4   HGB 6.6* 7.1*   HCT 22.5* 23.8*    291     Recent Labs     06/20/20  0609 06/19/20  0345  06/18/20  0525    140   < > 142   K 3.9 3.6   < > 3.6    105   < > 109*   CO2 29 29   < > 27   BUN 12 14   < > 15   CREA 0.51* 0.54*   < > 0.46*   * 134*   < > 140*   CA 8.6 8.4*   < > 8.9   MG  --  2.1  --  2.2   PHOS  --  2.8  --  3.2    < > = values in this interval not displayed. Recent Labs     06/20/20  0609 06/18/20  1654   AP 64 72   TP 6.1* 6.3*   ALB 2.3* 2.4*   GLOB 3.8 3.9     No results for input(s): INR, PTP, APTT, INREXT, INREXT, INREXT in the last 72 hours. No results for input(s): PHI, PCO2I, PO2I, FIO2I in the last 72 hours. No results for input(s): CPK, CKMB, TROIQ, BNPP in the last 72 hours. Hemodynamics:   PAP:   CO:     Wedge:   CI:     CVP:    SVR:       PVR:       Ventilator Settings:  Mode Rate Tidal Volume Pressure FiO2 PEEP   Assist control   400 ml  0 cm H2O 24 % 5 cm H20     Peak airway pressure: 26 cm H2O    Minute ventilation: 9.82 l/min        MEDS: Reviewed    Chest X-Ray:  CXR Results  (Last 48 hours)               06/19/20 0814  XR CHEST PORT Final result    Impression:  IMPRESSION: Vague left lower lobe airspace process, suggesting subsegmental   atelectasis or pneumonia. Interval exchange of endotracheal tube for   tracheostomy       Narrative:  EXAM: XR CHEST PORT       INDICATION: Rule out PNA       COMPARISON: 6/10/2020       FINDINGS: A portable AP radiograph of the chest was obtained at 0 811 hours. The   patient is on a cardiac monitor.  The NG tube extends below the hemidiaphragm. The PICC line extends to the cavoatrial junction. Tracheostomy tube has been   placed. The vague airspace process is present at the left base. ECHO:  EF 60-65%      Multidisciplinary Rounds Completed:  No    ABCDEF Bundle/Checklist  Pain Medications: Acetaminophen  Target RASS: N/A  Sedation Medications: None  CAM-ICU:  Positive  Mobility: Poor  PT/OT: PT consulted and on board and OT consulted and on board   Restraints: None needed at this time  Discussed Plan of Care (goals of care): Yes  Addressed Code Status: Full Code    CARDIOVASCULAR  Cardiac Gtts: None  SBP Goal of: > 100 mmHg and < 180 mmHg  MAP Goal of: 100-130  Transfusion Trigger (Hgb): <8 g/dL    RESPIRATORY  Vent Goals:   Chlorhexidine   Optimize PEEP/Ventilation/Oxygenation  Goal Tidal Volume 6 cc/kg based on IBW  Aim for lung protective ventilation  Head of bed > 30 degrees  Aggressive bronchopulmonary hygiene  DVT Prophylaxis (if no, list reason): SCD's or Sequential Compression Device and Lovenox   SPO2 Goal: > 92%  Pulmonary toilet: Duo-Nebs     GI/  Martinez Catheter Present: Yes  GI Prophylaxis: Not at this time   Nutrition: Yes   IVFs: NSS  Bowel Movement: Yes  Bowel Regimen: Docusate (Colace)  Insulin: ISS    ANTIBIOTICS  Antibiotics:  None    T/L/D  Tubes: Tracheostomy and Nasogastric Tube  Lines: Peripheral IV and Central Line  Drains: Martinez Catheter    SPECIAL EQUIPMENT  None    DISPOSITION  Stay in ICU    CRITICAL CARE CONSULTANT NOTE  I had a face to face encounter with the patient, reviewed and interpreted patient data including clinical events, labs, images, vital signs, I/O's, and examined patient.   I have discussed the case and the plan and management of the patient's care with the consulting services, the bedside nurses and the respiratory therapist.      NOTE OF PERSONAL INVOLVEMENT IN CARE   This patient has a high probability of imminent, clinically significant deterioration, which requires the highest level of preparedness to intervene urgently. I participated in the decision-making and personally managed or directed the management of the following life and organ supporting interventions that required my frequent assessment to treat or prevent imminent deterioration. I personally spent 30 minutes of critical care time. This is time spent at this critically ill patient's bedside actively involved in patient care as well as the coordination of care and discussions with the patient's family. This does not include any procedural time which has been billed separately.     Carter Head MD  [id number] [employer]  6/20/2020

## 2020-06-21 LAB
ABO + RH BLD: NORMAL
ALBUMIN SERPL-MCNC: 2.4 G/DL (ref 3.5–5)
ALBUMIN/GLOB SERPL: 0.6 {RATIO} (ref 1.1–2.2)
ALP SERPL-CCNC: 67 U/L (ref 45–117)
ALT SERPL-CCNC: 22 U/L (ref 12–78)
ANION GAP SERPL CALC-SCNC: 6 MMOL/L (ref 5–15)
ANION GAP SERPL CALC-SCNC: 7 MMOL/L (ref 5–15)
AST SERPL-CCNC: 21 U/L (ref 15–37)
BASOPHILS # BLD: 0.1 K/UL (ref 0–0.1)
BASOPHILS NFR BLD: 1 % (ref 0–1)
BILIRUB SERPL-MCNC: 0.5 MG/DL (ref 0.2–1)
BLD PROD TYP BPU: NORMAL
BLOOD GROUP ANTIBODIES SERPL: NORMAL
BPU ID: NORMAL
BUN SERPL-MCNC: 16 MG/DL (ref 6–20)
BUN SERPL-MCNC: 18 MG/DL (ref 6–20)
BUN/CREAT SERPL: 32 (ref 12–20)
BUN/CREAT SERPL: 38 (ref 12–20)
CALCIUM SERPL-MCNC: 8.9 MG/DL (ref 8.5–10.1)
CALCIUM SERPL-MCNC: 9.1 MG/DL (ref 8.5–10.1)
CHLORIDE SERPL-SCNC: 108 MMOL/L (ref 97–108)
CHLORIDE SERPL-SCNC: 108 MMOL/L (ref 97–108)
CO2 SERPL-SCNC: 25 MMOL/L (ref 21–32)
CO2 SERPL-SCNC: 27 MMOL/L (ref 21–32)
CREAT SERPL-MCNC: 0.47 MG/DL (ref 0.55–1.02)
CREAT SERPL-MCNC: 0.5 MG/DL (ref 0.55–1.02)
CROSSMATCH RESULT,%XM: NORMAL
DIFFERENTIAL METHOD BLD: ABNORMAL
EOSINOPHIL # BLD: 0.2 K/UL (ref 0–0.4)
EOSINOPHIL NFR BLD: 4 % (ref 0–7)
ERYTHROCYTE [DISTWIDTH] IN BLOOD BY AUTOMATED COUNT: 24.9 % (ref 11.5–14.5)
GLOBULIN SER CALC-MCNC: 4.1 G/DL (ref 2–4)
GLUCOSE SERPL-MCNC: 115 MG/DL (ref 65–100)
GLUCOSE SERPL-MCNC: 145 MG/DL (ref 65–100)
HCT VFR BLD AUTO: 27.2 % (ref 35–47)
HGB BLD-MCNC: 7.9 G/DL (ref 11.5–16)
IMM GRANULOCYTES # BLD AUTO: 0.1 K/UL (ref 0–0.04)
IMM GRANULOCYTES NFR BLD AUTO: 2 % (ref 0–0.5)
LACOSAMIDE SERPL-MCNC: 2.3 UG/ML (ref 5–10)
LYMPHOCYTES # BLD: 0.9 K/UL (ref 0.8–3.5)
LYMPHOCYTES NFR BLD: 15 % (ref 12–49)
MAGNESIUM SERPL-MCNC: 2.4 MG/DL (ref 1.6–2.4)
MCH RBC QN AUTO: 22.9 PG (ref 26–34)
MCHC RBC AUTO-ENTMCNC: 29 G/DL (ref 30–36.5)
MCV RBC AUTO: 78.8 FL (ref 80–99)
MONOCYTES # BLD: 0.5 K/UL (ref 0–1)
MONOCYTES NFR BLD: 8 % (ref 5–13)
NEUTS SEG # BLD: 4.4 K/UL (ref 1.8–8)
NEUTS SEG NFR BLD: 70 % (ref 32–75)
NRBC # BLD: 0.04 K/UL (ref 0–0.01)
NRBC BLD-RTO: 0.7 PER 100 WBC
PHOSPHATE SERPL-MCNC: 2.8 MG/DL (ref 2.6–4.7)
PLATELET # BLD AUTO: 345 K/UL (ref 150–400)
PMV BLD AUTO: 10.5 FL (ref 8.9–12.9)
POTASSIUM SERPL-SCNC: 3.8 MMOL/L (ref 3.5–5.1)
POTASSIUM SERPL-SCNC: 4.2 MMOL/L (ref 3.5–5.1)
PROCALCITONIN SERPL-MCNC: 0.05 NG/ML
PROT SERPL-MCNC: 6.5 G/DL (ref 6.4–8.2)
RBC # BLD AUTO: 3.45 M/UL (ref 3.8–5.2)
RBC MORPH BLD: ABNORMAL
SODIUM SERPL-SCNC: 140 MMOL/L (ref 136–145)
SODIUM SERPL-SCNC: 141 MMOL/L (ref 136–145)
SPECIMEN EXP DATE BLD: NORMAL
STATUS OF UNIT,%ST: NORMAL
UNIT DIVISION, %UDIV: 0
WBC # BLD AUTO: 6.2 K/UL (ref 3.6–11)

## 2020-06-21 PROCEDURE — 65610000006 HC RM INTENSIVE CARE

## 2020-06-21 PROCEDURE — 74011250637 HC RX REV CODE- 250/637: Performed by: PSYCHIATRY & NEUROLOGY

## 2020-06-21 PROCEDURE — 74011250636 HC RX REV CODE- 250/636: Performed by: ANESTHESIOLOGY

## 2020-06-21 PROCEDURE — 94003 VENT MGMT INPAT SUBQ DAY: CPT

## 2020-06-21 PROCEDURE — 85025 COMPLETE CBC W/AUTO DIFF WBC: CPT

## 2020-06-21 PROCEDURE — 74011250636 HC RX REV CODE- 250/636: Performed by: INTERNAL MEDICINE

## 2020-06-21 PROCEDURE — 84145 PROCALCITONIN (PCT): CPT

## 2020-06-21 PROCEDURE — 74011000250 HC RX REV CODE- 250: Performed by: NURSE PRACTITIONER

## 2020-06-21 PROCEDURE — 74011250637 HC RX REV CODE- 250/637: Performed by: NURSE PRACTITIONER

## 2020-06-21 PROCEDURE — 80053 COMPREHEN METABOLIC PANEL: CPT

## 2020-06-21 PROCEDURE — 74011250637 HC RX REV CODE- 250/637: Performed by: ANESTHESIOLOGY

## 2020-06-21 PROCEDURE — 83735 ASSAY OF MAGNESIUM: CPT

## 2020-06-21 PROCEDURE — 74011000258 HC RX REV CODE- 258: Performed by: INTERNAL MEDICINE

## 2020-06-21 PROCEDURE — 74011250636 HC RX REV CODE- 250/636: Performed by: NURSE PRACTITIONER

## 2020-06-21 PROCEDURE — 74011250637 HC RX REV CODE- 250/637: Performed by: INTERNAL MEDICINE

## 2020-06-21 PROCEDURE — 74011000250 HC RX REV CODE- 250: Performed by: INTERNAL MEDICINE

## 2020-06-21 PROCEDURE — 36415 COLL VENOUS BLD VENIPUNCTURE: CPT

## 2020-06-21 PROCEDURE — 94640 AIRWAY INHALATION TREATMENT: CPT

## 2020-06-21 PROCEDURE — 84100 ASSAY OF PHOSPHORUS: CPT

## 2020-06-21 RX ORDER — HYDRALAZINE HYDROCHLORIDE 20 MG/ML
20 INJECTION INTRAMUSCULAR; INTRAVENOUS
Status: DISCONTINUED | OUTPATIENT
Start: 2020-06-21 | End: 2020-06-22

## 2020-06-21 RX ORDER — HYDRALAZINE HYDROCHLORIDE 20 MG/ML
20 INJECTION INTRAMUSCULAR; INTRAVENOUS
Status: DISCONTINUED | OUTPATIENT
Start: 2020-06-21 | End: 2020-06-21

## 2020-06-21 RX ORDER — HYDRALAZINE HYDROCHLORIDE 20 MG/ML
20 INJECTION INTRAMUSCULAR; INTRAVENOUS ONCE
Status: COMPLETED | OUTPATIENT
Start: 2020-06-21 | End: 2020-06-21

## 2020-06-21 RX ORDER — CARVEDILOL 12.5 MG/1
25 TABLET ORAL 2 TIMES DAILY WITH MEALS
Status: DISCONTINUED | OUTPATIENT
Start: 2020-06-21 | End: 2020-07-03

## 2020-06-21 RX ADMIN — FENTANYL CITRATE 25 MCG: 50 INJECTION, SOLUTION INTRAMUSCULAR; INTRAVENOUS at 06:11

## 2020-06-21 RX ADMIN — CEFEPIME HYDROCHLORIDE 2 G: 2 INJECTION, POWDER, FOR SOLUTION INTRAVENOUS at 09:27

## 2020-06-21 RX ADMIN — HYDRALAZINE HYDROCHLORIDE 20 MG: 20 INJECTION INTRAMUSCULAR; INTRAVENOUS at 23:54

## 2020-06-21 RX ADMIN — NIMODIPINE 60 MG: 30 SOLUTION ORAL at 18:35

## 2020-06-21 RX ADMIN — FENTANYL CITRATE 25 MCG: 50 INJECTION, SOLUTION INTRAMUSCULAR; INTRAVENOUS at 20:44

## 2020-06-21 RX ADMIN — NIMODIPINE 60 MG: 30 SOLUTION ORAL at 06:02

## 2020-06-21 RX ADMIN — ASPIRIN 81 MG CHEWABLE TABLET 81 MG: 81 TABLET CHEWABLE at 08:42

## 2020-06-21 RX ADMIN — AMLODIPINE BESYLATE 10 MG: 5 TABLET ORAL at 08:42

## 2020-06-21 RX ADMIN — LABETALOL HYDROCHLORIDE 20 MG: 5 INJECTION INTRAVENOUS at 01:41

## 2020-06-21 RX ADMIN — Medication 2 DROP: at 21:23

## 2020-06-21 RX ADMIN — NIMODIPINE 60 MG: 30 SOLUTION ORAL at 10:37

## 2020-06-21 RX ADMIN — CASTOR OIL AND BALSAM, PERU: 788; 87 OINTMENT TOPICAL at 18:35

## 2020-06-21 RX ADMIN — LABETALOL HYDROCHLORIDE 20 MG: 5 INJECTION INTRAVENOUS at 00:08

## 2020-06-21 RX ADMIN — CASTOR OIL AND BALSAM, PERU: 788; 87 OINTMENT TOPICAL at 08:43

## 2020-06-21 RX ADMIN — CEFEPIME HYDROCHLORIDE 2 G: 2 INJECTION, POWDER, FOR SOLUTION INTRAVENOUS at 18:35

## 2020-06-21 RX ADMIN — SODIUM CHLORIDE, SODIUM LACTATE, POTASSIUM CHLORIDE, AND CALCIUM CHLORIDE 100 ML/HR: 600; 310; 30; 20 INJECTION, SOLUTION INTRAVENOUS at 23:30

## 2020-06-21 RX ADMIN — FLUDROCORTISONE ACETATE 0.1 MG: 0.1 TABLET ORAL at 08:42

## 2020-06-21 RX ADMIN — FLUDROCORTISONE ACETATE 0.1 MG: 0.1 TABLET ORAL at 18:35

## 2020-06-21 RX ADMIN — Medication 2 DROP: at 14:00

## 2020-06-21 RX ADMIN — HYDRALAZINE HYDROCHLORIDE 20 MG: 20 INJECTION INTRAMUSCULAR; INTRAVENOUS at 04:20

## 2020-06-21 RX ADMIN — LEVETIRACETAM 1000 MG: 100 SOLUTION ORAL at 08:42

## 2020-06-21 RX ADMIN — SODIUM CHLORIDE, SODIUM LACTATE, POTASSIUM CHLORIDE, AND CALCIUM CHLORIDE 100 ML/HR: 600; 310; 30; 20 INJECTION, SOLUTION INTRAVENOUS at 16:52

## 2020-06-21 RX ADMIN — NIMODIPINE 60 MG: 30 SOLUTION ORAL at 01:47

## 2020-06-21 RX ADMIN — IPRATROPIUM BROMIDE AND ALBUTEROL SULFATE 3 ML: .5; 3 SOLUTION RESPIRATORY (INHALATION) at 14:15

## 2020-06-21 RX ADMIN — POTASSIUM BICARBONATE 40 MEQ: 782 TABLET, EFFERVESCENT ORAL at 18:35

## 2020-06-21 RX ADMIN — SODIUM CHLORIDE 5 MG/HR: 900 INJECTION, SOLUTION INTRAVENOUS at 20:36

## 2020-06-21 RX ADMIN — NIMODIPINE 60 MG: 30 SOLUTION ORAL at 21:23

## 2020-06-21 RX ADMIN — IPRATROPIUM BROMIDE AND ALBUTEROL SULFATE 3 ML: .5; 3 SOLUTION RESPIRATORY (INHALATION) at 19:53

## 2020-06-21 RX ADMIN — FENTANYL CITRATE 25 MCG: 50 INJECTION, SOLUTION INTRAMUSCULAR; INTRAVENOUS at 01:42

## 2020-06-21 RX ADMIN — CEFEPIME HYDROCHLORIDE 2 G: 2 INJECTION, POWDER, FOR SOLUTION INTRAVENOUS at 01:45

## 2020-06-21 RX ADMIN — Medication 2 DROP: at 06:02

## 2020-06-21 RX ADMIN — LEVETIRACETAM 1000 MG: 100 SOLUTION ORAL at 20:21

## 2020-06-21 RX ADMIN — LACOSAMIDE 150 MG: 50 TABLET, FILM COATED ORAL at 20:21

## 2020-06-21 RX ADMIN — POTASSIUM BICARBONATE 40 MEQ: 782 TABLET, EFFERVESCENT ORAL at 08:42

## 2020-06-21 RX ADMIN — LABETALOL HYDROCHLORIDE 20 MG: 5 INJECTION INTRAVENOUS at 04:52

## 2020-06-21 RX ADMIN — NIMODIPINE 60 MG: 30 SOLUTION ORAL at 14:00

## 2020-06-21 RX ADMIN — IPRATROPIUM BROMIDE AND ALBUTEROL SULFATE 3 ML: .5; 3 SOLUTION RESPIRATORY (INHALATION) at 01:17

## 2020-06-21 RX ADMIN — SODIUM CHLORIDE 15 MG/HR: 900 INJECTION, SOLUTION INTRAVENOUS at 23:31

## 2020-06-21 RX ADMIN — CARVEDILOL 25 MG: 12.5 TABLET, FILM COATED ORAL at 16:52

## 2020-06-21 RX ADMIN — HYDRALAZINE HYDROCHLORIDE 20 MG: 20 INJECTION INTRAMUSCULAR; INTRAVENOUS at 16:52

## 2020-06-21 RX ADMIN — ENOXAPARIN SODIUM 40 MG: 40 INJECTION SUBCUTANEOUS at 14:00

## 2020-06-21 RX ADMIN — LACOSAMIDE 150 MG: 50 TABLET, FILM COATED ORAL at 08:42

## 2020-06-21 RX ADMIN — IPRATROPIUM BROMIDE AND ALBUTEROL SULFATE 3 ML: .5; 3 SOLUTION RESPIRATORY (INHALATION) at 07:35

## 2020-06-21 NOTE — PROGRESS NOTES
[facility]    ICU TEAM Progress Note    Name: Hassell Hatchet   : 1970   MRN: [id number]   Date: 2020      Assessment/Plan:       1. SAH  a. 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 Has Right MCA aneurysm with wide neck that will prob need stenting vs clipping  c. Hydrocephalus is improved  d. Continue Nimotop day   e. Continue TCD per TATE - s/p angio for persistent vasospasms   i. Improved persistent Lt PCA vasospasm per most recent TCD - no right sided vasospasms  ii. Liberalized MAP goal per TATE  f. Continue frequent neurochecks  g. Continue Florinef per TATE 0.1mg BID   h. Continue IVF with goal of euvolemia  i. Increase maintenance fluids to 100cc/hr - negative overnight but still appears hypervolemic on exam.   ii. Compensate for urine losses  2. Seizures  a. Secondary to the above  b. No seizure identified on most recent EEG but encephalopathy  c. Continue Keppra 1000mg BID and Vimpat 100mg BID  i. No changes recommended unless persistence of automatisms  d. Neurology following - repeat head CT stable  3. Respiratory Failure  a. Likely secondary to neurogenic pulmonary edema and possible super imposed PNA  i. Continue abx for fever and culture with citrobacter and klebsiella - Cefepime x 7 days  b. S/P Trach placement ()  c. Apneic episodes on trach collar    i. MRI brain with small foci of diffusion restriction in the bilateral centrum semiovales as well as the left parietal lobe posteriorly  d. Will continue to attempt to wean to ATC today   4. Ischemic Strokes  a. Patient with multiple ischemic infarcts throughout cerebrum suggestive of ischemic event  b. Note also ischemic cerebellar infarct left inferior  c. See MRI   d. Post trach/PEG  e. PT/OT/Speech  5. HTN  a. Adjust antihypertensive to achieve normotension  b. Increase Coreg to 25mg BID  c.  Continue Amlodipine 10mg daily      Subjective:   Progress Note: 2020      Reason for ICU Admission:     47 yo female with HTN who presented with AMS and seizure like activity. She was found with extensive SAH secondary to left MCA bifurcation ruptured aneurysm - s/p coiling. Since patient has been with vasospasm s/p IA verapamil . While she has been more responsive, she's still not following commands and brain imaging suggest extensive strokes. Overnight Events:     No events overnight. Active Problem List:     Problem List  Date Reviewed: 6/10/2020          Codes Class    Respiratory failure ([facility] [address] ICD-10-CM: J96.90  ICD-9-CM: 518.81         Hypoxic ischemic encephalopathy (HIE), unspecified severity ICD-10-CM: P91.60  ICD-9-CM: 768.70         SAH (subarachnoid hemorrhage) (HCC) ICD-10-CM: I60.9  ICD-9-CM: 430         Subarachnoid hemorrhage from middle cerebral artery aneurysm, left (HCC) ICD-10-CM: Q56.02  ICD-9-CM: 430         RIGHT middle cerebral artery aneurysm ICD-10-CM: I67.1  ICD-9-CM: 437.3         Cerebral vasospasm ICD-10-CM: A02.779  ICD-9-CM: 435.9               Past Medical History:      has a past medical history of HTN (hypertension). Past Surgical History:      has a past surgical history that includes ir insert gastrostomy tube perc (2020). Home Medications:     Prior to Admission medications    Not on File       Allergies/Social/Family History:     No Known Allergies   Social History     Tobacco Use    Smoking status: Never Smoker    Smokeless tobacco: Never Used   Substance Use Topics    Alcohol use: Not Currently      History reviewed. No pertinent family history. Review of Systems:     A comprehensive review of systems was negative except for that written in the HPI.     Objective:   Vital Signs:  Visit Vitals  /70   Pulse 63   Temp 99 °F (37.2 °C)   Resp 21   Ht 5' 4\" (1.626 m)   Wt 102.6 kg (226 lb 3.1 oz)   SpO2 98%   BMI 38.83 kg/m²    O2 Flow Rate (L/min): 10 l/min O2 Device: Ventilator, Tracheostomy, Heated   Temp (24hrs), Av.2 °F (37.3 °C), Min:98.9 °F (37.2 °C), Max:99.8 °F (37.7 °C)           Intake/Output:     Intake/Output Summary (Last 24 hours) at 6/21/2020 8799  Last data filed at 6/21/2020 0456  Gross per 24 hour   Intake 2768.3 ml   Output 4750 ml   Net -1981.7 ml       Physical Exam:    General: NAD  HENT: Atraumatic, Tracheostomy tube in place  Cardio: RRR  Respiratory: Coarse breath sounds BL  GI: Soft not tender abdomen  Extremities: +ve edema  Neuro: Opens right eye and tracks but doesn't follow. Withdraws all four extremities. LABS AND  DATA: Personally reviewed  Recent Labs     06/21/20  0629 06/20/20  0609   WBC 6.2 5.0   HGB 7.9* 6.6*   HCT 27.2* 22.5*    305     Recent Labs     06/21/20  0629 06/20/20  1637 06/20/20  0609 06/19/20  0345    142 140 140   K 3.8 3.6 3.9 3.6    108 106 105   CO2 25 27 29 29   BUN 16 16 12 14   CREA 0.50* 0.48* 0.51* 0.54*   * 125* 112* 134*   CA 8.9 8.9 8.6 8.4*   MG  --   --  2.4 2.1   PHOS  --   --  3.5 2.8     Recent Labs     06/21/20  0629 06/20/20  0609   AP 67 64   TP 6.5 6.1*   ALB 2.4* 2.3*   GLOB 4.1* 3.8     No results for input(s): INR, PTP, APTT, INREXT, INREXT, INREXT in the last 72 hours. No results for input(s): PHI, PCO2I, PO2I, FIO2I in the last 72 hours. No results for input(s): CPK, CKMB, TROIQ, BNPP in the last 72 hours.     Hemodynamics:   PAP:   CO:     Wedge:   CI:     CVP:    SVR:       PVR:       Ventilator Settings:  Mode Rate Tidal Volume Pressure FiO2 PEEP   Spontaneous   400 ml  5 cm H2O 24 % 5 cm H20     Peak airway pressure: 11 cm H2O    Minute ventilation: 8.22 l/min        MEDS: Reviewed    Chest X-Ray:  CXR Results  (Last 48 hours)    None        ECHO:  EF 60-65%      Multidisciplinary Rounds Completed:  No    ABCDEF Bundle/Checklist  Pain Medications: Acetaminophen  Target RASS: N/A  Sedation Medications: None  CAM-ICU:  Positive  Mobility: Poor  PT/OT: PT consulted and on board and OT consulted and on board   Restraints: None needed at this time  Discussed Plan of Care (goals of care): Yes  Addressed Code Status: Full Code    CARDIOVASCULAR  Cardiac Gtts: None  SBP Goal of: > 100 mmHg and < 180 mmHg  MAP Goal of: 100-130  Transfusion Trigger (Hgb): <8 g/dL    RESPIRATORY  Vent Goals:   Chlorhexidine   Optimize PEEP/Ventilation/Oxygenation  Goal Tidal Volume 6 cc/kg based on IBW  Aim for lung protective ventilation  Head of bed > 30 degrees  Aggressive bronchopulmonary hygiene  DVT Prophylaxis (if no, list reason): SCD's or Sequential Compression Device and Lovenox   SPO2 Goal: > 92%  Pulmonary toilet: Duo-Nebs     GI/  Martinez Catheter Present: Yes  GI Prophylaxis: Not at this time   Nutrition: Yes   IVFs: NSS  Bowel Movement: Yes  Bowel Regimen: Docusate (Colace)  Insulin: ISS    ANTIBIOTICS  Antibiotics:  None    T/L/D  Tubes: Tracheostomy and Nasogastric Tube  Lines: Peripheral IV and Central Line  Drains: Martinez Catheter    SPECIAL EQUIPMENT  None    DISPOSITION  Stay in ICU    CRITICAL CARE CONSULTANT NOTE  I had a face to face encounter with the patient, reviewed and interpreted patient data including clinical events, labs, images, vital signs, I/O's, and examined patient. I have discussed the case and the plan and management of the patient's care with the consulting services, the bedside nurses and the respiratory therapist.      NOTE OF PERSONAL INVOLVEMENT IN CARE   This patient has a high probability of imminent, clinically significant deterioration, which requires the highest level of preparedness to intervene urgently. I participated in the decision-making and personally managed or directed the management of the following life and organ supporting interventions that required my frequent assessment to treat or prevent imminent deterioration. I personally spent 30 minutes of critical care time.   This is time spent at this critically ill patient's bedside actively involved in patient care as well as the coordination of care and discussions with the patient's family. This does not include any procedural time which has been billed separately.     Sandra Austin MD  [id number] [employer]  6/21/2020

## 2020-06-21 NOTE — PROGRESS NOTES
Neurointerventional Surgery Progress Note  Christina Ricci NP   Neurocritical Care Nurse Practitioner  [phone]      Admit Date: 6/4/2020        Daily Progress Note: 6/21/2020   LOS: 17 days      S/P:  POD 15 Cerebral angiogram with coil embolization of ruptured left MCA bifurcation aneurysm on 6/5/2020    ** Delayed Presentation ** PB Day 21    Interval History/Subjective:   Patient with tracheostomy and on ventilator. Eyes open spontaneously, no command following. Moves all extremities, appears to move left side more than right. Unable to perform a ROS due AMS. Assessment & Plan: Active Problems:    SAH (subarachnoid hemorrhage) ([address] (6/4/2020)      Subarachnoid hemorrhage from middle cerebral artery aneurysm, left (HCC) (6/4/2020)      RIGHT middle cerebral artery aneurysm (6/4/2020)      Cerebral vasospasm (6/4/2020)      Respiratory failure ([address] (6/15/2020)      Hypoxic ischemic encephalopathy (HIE), unspecified severity (6/15/2020)      1.) SAH due to ruptured cerebral aneurysm, [place] 5, An Grade 3/4              - s/p cerebral angiogram with coil embolization of left MCA bifurcation aneurysm on 6/5       Additionally, patient has 5X3 right MCA bifurcation aneurysm with very wide neck which will               need to be treated with stents vs. Clipping              - Continue Nimotop Day 17 of 21 to prevent delayed cerebral ischemia              - continue maintenance fluids of LR at 100 ml/hr to maintain euvolemia              - Strict I's and O's, +300 ml this morning              - TCDs yesterday showed continued possible left PCA vasospasm, no evidence of vasospasm in the remaining intracranial vessels bilaterally.  Continue to monitor clinically               - ECHO shows EF 60-65%, mild concentric hypertrophy, mildly dilated left atrium              - ok with neuro checks every 2 hours during the day and every 4 hours at night               - continue Florinef to 0.1 mg BID (initially started 6/13) for prevention of cerebral salt wasting in the setting of SAH, aid in increasing plasma volume              - continue SBP goal to 120-180 Cardene/Fabien/Hydralazine/Labetalol PRN, Intensivist added amlodipine and coreg for oral BP management               - PT/OT/SLP evmarion    2.) Seizure              - Due to #1, no prior hx of seizure              - Continue Keppra 1000 mg BID               - continue Vimpat ( increased to 150 mg BID 6/17/2020 due to ? Seizures- transient episode of oral automatisms), Keppra level subtherapeutic at 8.2 on 6/17, repeat Keppra level pending              - Seizure precautions              - repeat EEG 6/17 d/t seizure like activity overnight, EEG showed a technically limited study which renders interpretation difficult. Within these confines, no evidence for seizures or ongoing epileptiform discharges are noted. -  EEG on 6/6 showed an abnormal EEG due to slowing of the background rhythms with intermittent rhythmic delta activity seen in both frontal head regions. Occasional sharp wave discharges in the left frontal area. EEG is suggestive of mild-to-moderate generalized encephalopathic process, nonspecific in type. In addition, there was occasional epileptiform disturbance seen in the left frontal area which may represent a partial onset seizure focus.              - Repeat MRI of Brain on 6/19 showed persistent subarachnoid hemorrhage in the left sylvian fissure. Patchy areas of enhancement as described. Some of these are associated with  foci of diffusion restriction and likely represent subacute ischemia. The leptomeningeal enhancement predominantly in the left frontal and parietal lobes is likely related to subarachnoid hemorrhage and meningeal irritation. Infection is not entirely excluded but thought less likely.               - Neurology following as needed                3.) Cerebral Edema (resolved)              - received one dose of mannitol, started 3% saline on 6/8, discontinued 6/14              - serum sodium remains stable                4.) Acute hypoxic respiratory failure in the setting of likely neurogenic pulmonary edema- resolved              - on vent support due to apneic episodes              - chest XR on 6/19 Vague left lower lobe airspace process, suggesting subsegmental  atelectasis or pneumonia. - on Cefepime per Intensivist              - Intensivist managing     5.) Multiple Acute Ischemic Infarctions                - Imaging pattern is consistent with Hypoxic-Ischemic Encephalopathy (HIE)                - Continue aspirin 81 mg daily for stroke prevention           6.) Cerebral Vasospasm                 - s/p cerebral angiogram for right PANCHITO A1 angioplasty for vasospasm, transarterial infusion of verapamil on 6/9,  S/p angiogram on 6/10 for left ICA/MCA angioplasty and transarterial infusion of verapamil for vasospasm treatment, s/p angiogram on 6/11 and 6/13 for transarterial infusion of verapamil for vasospasm treatment                 - Strict I's and O's                 - TCDs repeated yesterday as stated in #1                  - plans as stated in #1     7.) Fever --> improved                 - afebrile overnight                - completed course of Zosyn               - possibly febrile in the setting of SAH, paired blood cultures NGTD on 6/15               - respiratory cultures growing Klebsiella Pneumonia and Citrobacter Romana Lighter 6/17                - CTA of chest 6/7 negative for PE                 - Cefepime started on 6/19 to cover Citrobacter                 - Intensivist following     8.) Polyuria                  - serum sodium stable at 140                  - urine studies showed serum osmolality 296, urine osmolality 322, specific gravity normal at 1.015.  Urine random sodium at 114                  - Continue Florinef to 0.1 mg BID for prevention of cerebral salt wasting                  - monitor BMP every 12 hours    Activity: Bed rest  DVT ppx: SCDs, Lovenox  Disposition: TBD    Plan d/w Dr. Juliet Hayward, Dr. David Mayer, and RN. Admission Summary:     Sejal Moreno is a 48 y.o. 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 On arrival to ED, pt was unresponsive and noted to be seizing. A stat CT of her head was performed there, which showed extensive SAH. Pt was then intubated emergently. She was also extremely hypertensive with SBPs in 240s. She was given Keppra and Ativan, and placed on cardene drip for BP control. NSGY and NIS were then consulted and the patient was transferred to [facility] for higher level of care. En route, paramedics administered 7.5 mg of Versed, 200mcg of Fentanyl, and 10 mg of labetalol for BP control. On arrival, CTA of her head and neck was obtained, which showed a 4.4 x 4.1 mm right MCA trifurcation aneurysm and  a 2.2 x 3.1 mm left MCA trifurcation aneurysm. Of note, CTA head/neck also showed pulmonary infiltrates suspicious for COVID-19 infection and the patient has been tested for COVID which was negative. 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 Mother reported pt does not smoke, does not drink alcohol or use street drugs. The mother also reported that the patient began complaining of a headache on 5/31/2020 and has been taking BC Powder (aspirin) for the headache. She also reported that the pt has a hx of HTN and is supposed to be on BP medications, but recently stopped taking her medications. The patient underwent a cerebral angiogram on 6/5 by Dr. Leslie Gonzales for coil embolization of a ruptured left MCA bifurcation aneurysm.     Current Facility-Administered Medications   Medication Dose Route Frequency Provider Last Rate Last Dose    hydrALAZINE (APRESOLINE) 20 mg/mL injection 20 mg  20 mg IntraVENous Q4H PRN Leatha Mcwilliams NP        carvediloL (COREG) tablet 25 mg  25 mg Oral BID WITH MEALS Mirtha Tidwell MD   Stopped at 06/21/20 0800    amLODIPine (NORVASC) tablet 10 mg  10 mg Per NG tube DAILY Mirtha Tidwell MD   10 mg at 06/21/20 0842    labetaloL (NORMODYNE;TRANDATE) injection 20 mg  20 mg IntraVENous Q10MIN PRN Mirtha Tidwell MD   20 mg at 06/21/20 0452    fentaNYL citrate (PF) injection 25 mcg  25 mcg IntraVENous Q4H PRN Leatha Mcwilliams NP   25 mcg at 06/21/20 6660    cefepime (MAXIPIME) 2 g in 0.9% sodium chloride (MBP/ADV) 100 mL  2 g IntraVENous Q8H Mirtha Tidwell  mL/hr at 06/21/20 0927 2 g at 06/21/20 0700    fludrocortisone (FLORINEF) tablet 0.1 mg  0.1 mg Oral BID Mahendra CORRALES NP   0.1 mg at 06/21/20 3143    potassium bicarb-citric acid (EFFER-K) tablet 40 mEq  40 mEq Oral BID Mirtha Tidwell MD   40 mEq at 06/21/20 5902    lacosamide (VIMPAT) tablet 150 mg  150 mg Per NG tube Q12H Marta Palma MD   150 mg at 06/21/20 0096    lactated Ringers infusion  100 mL/hr IntraVENous CONTINUOUS Mirtha Tidwell  mL/hr at 06/21/20 0927 100 mL/hr at 06/21/20 0927    ELECTROLYTE REPLACEMENT PROTOCOL - Potassium and Magnesium  1 Each Other PRN Omid Avery NP        ELECTROLYTE REPLACEMENT PROTOCOL - Phosphorus  1 Each Other PRN Omid Avery NP        balsam peru-castor oiL (VENELEX) ointment   Topical BID Leon Berry DO        aspirin chewable tablet 81 mg  81 mg Per NG tube DAILY Jose Lennon MD   81 mg at 06/21/20 0842    enoxaparin (LOVENOX) injection 40 mg  40 mg SubCUTAneous Q24H Leon Berry DO   40 mg at 06/20/20 1359    levETIRAcetam (KEPPRA) oral solution 1,000 mg  1,000 mg Per NG tube Q12H Leon Berry DO   1,000 mg at 06/21/20 0842    glycopyrrolate (ROBINUL) injection 0.2 mg  0.2 mg IntraMUSCular TID PRN Ricardo Almazan, FLAKITA        niMODipine (NYMALIZE) 6 mg/mL oral solution 60 mg  60 mg Per NG tube Q4H Tiny Domínguez, NP   60 mg at 20 1037    acetaminophen (TYLENOL) solution 650 mg  650 mg Per NG tube Q4H PRN Johnson Palumbo, NP   650 mg at 20 0008    albuterol-ipratropium (DUO-NEB) 2.5 MG-0.5 MG/3 ML  3 mL Nebulization Q6H RT Antoine Fortune, ACNP   3 mL at 20 8782    glucose chewable tablet 16 g  4 Tab Oral PRN Tomas Jackson NP-JOVANY        glucagon (GLUCAGEN) injection 1 mg  1 mg IntraMUSCular PRN Tomas Jackson NP-C        dextrose 10% infusion 0-250 mL  0-250 mL IntraVENous PRN Tomas Jackson NP-JOVANY        albuterol (PROVENTIL VENTOLIN) nebulizer solution 2.5 mg  2.5 mg Nebulization Q6H PRN Tomas Jackson NP-C        polyvinyl alcohol-povidone (NATURAL TEARS) 0.5-0.6 % ophthalmic solution 2 Drop  2 Drop Both Eyes Q8H Antoine Fortune, ACNP   2 Drop at 20 0602    bisacodyL (DULCOLAX) suppository 10 mg  10 mg Rectal DAILY PRN Antoine Fortune, ACNP        ondansetron [facility]) injection 4 mg  4 mg IntraVENous Q4H PRN Tomas Jackson NP-C        docusate (COLACE) 50 mg/5 mL oral liquid 100 mg  100 mg Oral BID PRN Brian RIVAS, NP-C            No Known Allergies    Review of Systems:  Review of systems not obtained due to patient factors.       Objective:     Vital signs  Temp (24hrs), Av.2 °F (37.3 °C), Min:98.8 °F (37.1 °C), Max:99.8 °F (37.7 °C)   701 - 1900  In: 1958.8 [I.V.:1338.8]  Out: 0429 [Urine:1650]  1901 -  0700  In: 6458.3 [I.V.:3712.5]  Out: 5602 [Urine:7725; Drains:400]    Visit Vitals  /69   Pulse 72   Temp 98.8 °F (37.1 °C)   Resp 28   Ht 5' 4\" (1.626 m)   Wt 226 lb 3.1 oz (102.6 kg)   SpO2 98%   BMI 38.83 kg/m²    O2 Flow Rate (L/min): 10 l/min O2 Device: Tracheostomy, Ventilator   Vitals:    20 1015 20 1100 20 1124 20 1200   BP:  162/71  161/69   Pulse: 72 67 72 72   Resp: 23 25 (!) 32 28   Temp:       SpO2: 90% 98% 98% 98%   Weight:       Height:          Physical Exam:  GENERAL: NAD, trach with ventilator, alert   EYE: conjunctivae/corneas clear. Right pupil 3 mm, brisk response to light. Left pupil 4 mm, sluggish response to light. Keeps left eye closed   LUNG: lungs coarse bilaterally   HEART: regular rate and rhythm, S1, S2 normal, no murmur, click, rub or gallop  EXTREMITIES:  extremities normal, atraumatic, no cyanosis, 2+ pitting peripheral edema, distal pulses 2+ bilaterally   SKIN: Skin warm to touch. Right and left groin site clean, dry, and intact. No hematoma, bruising, or bleeding noted. NEUROLOGIC: Alert. Trach with ventilator. Eyes will open spontaneously. No command following. Right pupil 3 mm, brisk response to light. Left pupil 4 mm sluggish response to light. Left eye remains closed. Focuses and tracks with eyes, with the exception of inability to track to the right with left eye. Moves all extremities spontaneously. Moves left side more than right. RUE withdraws to pain, intermittently moves right arm at times when stimulated. No involuntary movements. Gait deferred. Unable to assess language, sensation, and coordination. Imaging:    MRI of Brain on 6/19/2020 shows  IMPRESSION:   1. Persistent subarachnoid hemorrhage in the left sylvian fissure. 2.  Patchy areas of enhancement as described. Some of these are associated with  foci of diffusion restriction and likely represent subacute ischemia. The  leptomeningeal enhancement predominantly in the left frontal and parietal lobes  is likely related to subarachnoid hemorrhage and meningeal irritation. Infection  is not entirely excluded but thought less likely. CTA/CTP 6/18/2020     IMPRESSION:  1. No acute large vessel occlusion.   2. Recent left MCA bifurcation aneurysm coiling, with interval improvement in  now mild vasospasm involving the bilateral anterior, left middle, vertebral  basilar and bilateral posterior cerebral arteries as discussed in detail above. 3. Unchanged right MCA bifurcation aneurysm.       CT Head on 6/17/20 shows:     IMPRESSION:     1. Persistence of the previously described subtle areas of subarachnoid  hemorrhage. 2. Persistence of the previously described left inferior cerebellar infarct. 3. Presence of small lacunar infarcts bilaterally. 4. No evidence of hydrocephalus. 5. Presence of an aneurysm coil in the region of the left middle cerebral  artery. 6. Evidence of sphenoid sinus disease.       CT of Head on 6/13/2020 at 1103 shows  IMPRESSION:   1. Questionable left inferior cerebellar infarction, new from 6/10/2020.  2. Multiple, evolving, subacute infarctions throughout the cerebrum. 3. Subacute subarachnoid hemorrhage. Improved hydrocephalus compared to  6/6/2020    CTA of Head on 6/13/2020 at 1103 shows  IMPRESSION:   1. Left A1 vasospasm. 2. More mild spasm of the right PANCHITO, MCAs, and possibly the distal vertebrals. 3. Right M1 terminus aneurysm. 4. Large infundibula/small aneurysms of the bilateral P-comm origins. CT Perfusion on 6/13/2020 at 1103 shows  IMPRESSION:  1. Penumbra surrounding infarctions in most of the superior left MCA territory. 2. Bilateral PANCHITO infarctions. 3. Questionable left cerebellar infarction is inferior to the perfusion scan. MRI of Brain on 6/9/2020 at 0311 AM shows  IMPRESSION:  1. Multiple areas of cortical acute ischemic injury as well as some subcortical  white matter areas and right cerebellar involvement similar to the prior MRI  6/6/20.  2. Subarachnoid hemorrhage and intraventricular blood again demonstrated without  hydrocephalus. 3. Increasing fluid paranasal sinuses and right greater than left mastoid/middle  ear cavities. CTA of Head on 6/9/2020 at 1350 shows  Progressive vasospasm involving the anterior cerebral arteries with minimal  associated decreased perfusion to the frontal lobes. No other significant  Change  .   CT Perfusion on 6/9/2020 at 1336 shows  Impression:  CT perfusion brain: Very minimal decreased perfusion to the frontal lobes. CT of Head on 6/9/2020 at 0234 shows  IMPRESSION:   No significant change.     CT of Head on 6/8/2020 shows  IMPRESSION:   Global edema, otherwise no significant change    CTA of Head and Neck on 6/8/2020 per radiology shows  Impression:  Mild to moderate vasospasm in the left middle cerebral artery status post  bifurcation aneurysm coil embolization. Stable 4 mm right MCA aneurysm. Endotracheal tube terminates just above the matt. 1 cm low-density left thyroid nodule. Mildly enlarged left axillary lymph node measuring 11 mm in short axis  Patchy bilateral airspace disease. Nonspecific asymmetric enhancement of the right superior ophthalmic vein   (However, no significant vasospasm was seen when personally reviewed by NIS)    CT Perfusion on 6/8/2020 shows  IMPRESSION:  No significant cerebral perfusion abnormality    CTA of Chest on 6/7/2020 shows  IMPRESSION:  1. No acute pulmonary embolus  2. Dependent atelectasis with diffuse groundglass opacifications bilaterally. CT of Head on 6/7/2020 shows  IMPRESSION:   1. No further progression of subarachnoid hemorrhage. The overall degree of  subarachnoid hemorrhage is slightly improved. 2. Subtle areas of gray-white differentiation loss throughout the cerebral  hemispheres correlating with areas of restricted diffusion on previous CT  compatible with ischemic changes. MRI of Brain on 6/6/2020 shows  IMPRESSION:   Imaging findings consistent with large left MCA territory ischemia/infarction,  left temporal, parietal and frontal lobes, cortically based, no superimposed  increased parenchymal hemorrhage or midline shift. Right and left PANCHITO territory  cortical diffusion restriction consistent with infarction. Infarction in the right insular cortex and right temporal lobe.  Scattered small infarctions right and left corona radiata centrum semiovale, right cerebellum. Allowing for differences in technique likely stable subarachnoid and  intraventricular hemorrhage. CT of Head on 6/6/2020 at 1411 shows  IMPRESSION:   Left MCA territory infarction with smaller right MCA territory infarction.     Stable subarachnoid and intraventricular hemorrhage. CTA of Head on 6/6/2020 at 0903 shows  IMPRESSION:  Slightly increased intraventricular and subarachnoid hemorrhage compared to the  prior exam.     Status post coiling left MCA bifurcation aneurysm. No significant residual  aneurysm filling. CT of Head on 6/62020 at 0239 shows  IMPRESSION:  Slightly diminished subarachnoid and intraventricular hemorrhage. Stable ventricular system. CT of Head WO on 6/5/2020 at 1512 shows  IMPRESSION: Stable acute subarachnoid and intraventricular hemorrhage. Stable  ventricular system. CT Head WO 6/5/20 0826     IMPRESSION:   1. Diffuse subarachnoid hemorrhage most concentrated in the basal cisterns and  left sylvian fissure. 2.  Interval ventricular enlargement suspicious for developing hydrocephalus.     CTA Head 6/4/20 2017     IMPRESSION:  1. Diffuse subarachnoid hemorrhage in the basilar cisterns and sylvian  fissures. 2.  Bilateral MCA bifurcation aneurysms. 3.  Infundibular origins to the posterior communicating arteries bilaterally. 4.  Symmetric moderately severe airspace disease in the upper lobes bilaterally  which may represent edema or sequela of aspiration.     CT Head WO Contrast 6/4/2020 2012    IMPRESSION: Extensive subarachnoid hemorrhage concerning for leaking/ruptured  aneurysm.        24 hour results:    Recent Results (from the past 24 hour(s))   METABOLIC PANEL, BASIC    Collection Time: 06/20/20  4:37 PM   Result Value Ref Range    Sodium 142 136 - 145 mmol/L    Potassium 3.6 3.5 - 5.1 mmol/L    Chloride 108 97 - 108 mmol/L    CO2 27 21 - 32 mmol/L    Anion gap 7 5 - 15 mmol/L    Glucose 125 (H) 65 - 100 mg/dL    BUN 16 6 - 20 MG/DL    Creatinine 0.48 (L) 0.55 - 1.02 MG/DL    BUN/Creatinine ratio 33 (H) 12 - 20      GFR est AA >60 >60 ml/min/1.73m2    GFR est non-AA >60 >60 ml/min/1.73m2    Calcium 8.9 8.5 - 10.8 MG/DL   METABOLIC PANEL, COMPREHENSIVE    Collection Time: 06/21/20  6:29 AM   Result Value Ref Range    Sodium 140 136 - 145 mmol/L    Potassium 3.8 3.5 - 5.1 mmol/L    Chloride 108 97 - 108 mmol/L    CO2 25 21 - 32 mmol/L    Anion gap 7 5 - 15 mmol/L    Glucose 115 (H) 65 - 100 mg/dL    BUN 16 6 - 20 MG/DL    Creatinine 0.50 (L) 0.55 - 1.02 MG/DL    BUN/Creatinine ratio 32 (H) 12 - 20      GFR est AA >60 >60 ml/min/1.73m2    GFR est non-AA >60 >60 ml/min/1.73m2    Calcium 8.9 8.5 - 10.1 MG/DL    Bilirubin, total 0.5 0.2 - 1.0 MG/DL    ALT (SGPT) 22 12 - 78 U/L    AST (SGOT) 21 15 - 37 U/L    Alk. phosphatase 67 45 - 117 U/L    Protein, total 6.5 6.4 - 8.2 g/dL    Albumin 2.4 (L) 3.5 - 5.0 g/dL    Globulin 4.1 (H) 2.0 - 4.0 g/dL    A-G Ratio 0.6 (L) 1.1 - 2.2     CBC WITH AUTOMATED DIFF    Collection Time: 06/21/20  6:29 AM   Result Value Ref Range    WBC 6.2 3.6 - 11.0 K/uL    RBC 3.45 (L) 3.80 - 5.20 M/uL    HGB 7.9 (L) 11.5 - 16.0 g/dL    HCT 27.2 (L) 35.0 - 47.0 %    MCV 78.8 (L) 80.0 - 99.0 FL    MCH 22.9 (L) 26.0 - 34.0 PG    MCHC 29.0 (L) 30.0 - 36.5 g/dL    RDW 24.9 (H) 11.5 - 14.5 %    PLATELET 568 235 - 498 K/uL    MPV 10.5 8.9 - 12.9 FL    NRBC 0.7 (H) 0  WBC    ABSOLUTE NRBC 0.04 (H) 0.00 - 0.01 K/uL    NEUTROPHILS 70 32 - 75 %    LYMPHOCYTES 15 12 - 49 %    MONOCYTES 8 5 - 13 %    EOSINOPHILS 4 0 - 7 %    BASOPHILS 1 0 - 1 %    IMMATURE GRANULOCYTES 2 (H) 0.0 - 0.5 %    ABS. NEUTROPHILS 4.4 1.8 - 8.0 K/UL    ABS. LYMPHOCYTES 0.9 0.8 - 3.5 K/UL    ABS. MONOCYTES 0.5 0.0 - 1.0 K/UL    ABS. EOSINOPHILS 0.2 0.0 - 0.4 K/UL    ABS. BASOPHILS 0.1 0.0 - 0.1 K/UL    ABS. IMM.  GRANS. 0.1 (H) 0.00 - 0.04 K/UL    DF SMEAR SCANNED      RBC COMMENTS ANISOCYTOSIS  2+        RBC COMMENTS MICROCYTOSIS  1+        RBC COMMENTS HYPOCHROMIA  1+ RBC COMMENTS OVALOCYTES  PRESENT       PROCALCITONIN    Collection Time: 06/21/20  6:29 AM   Result Value Ref Range    Procalcitonin 0.05 ng/mL        Cecil Castro NP

## 2020-06-21 NOTE — PROGRESS NOTES
Ulysses ORDOÑEZ assessed at bedside and results were 4.6% change in SVI with PLR. indicating patient is not fluid responsive. Patient is currently negative 850 ml on I's and O's. Continue with LR at 100 ml/hr to maintain euvolemia. Patient has peripheral edema, however, it is slightly improving. Patient with persistent polyuria which I suspect patient is likely autodiuresing. Serum sodium is stable this morning. Recheck BMP now then in the AM, check magnesium and phosphorus in the setting of polyuria. Liberalize SBP goal to 100-140 (normotensive) in the setting of right MCA unsecured aneurysm per Dr. Marianela Adan. Patient is essentially out of vasospasm window. Discussed with Dr. Bianca Maravilla, and RN.     Marco A Oliver NP  Neurocritical Care Nurse Practitioner  [phone]

## 2020-06-21 NOTE — PROGRESS NOTES
06/20/20 1952   Ventilator Measurements   Resp Rate Observed 31   Vt Exhaled (Machine Breath) (ml) 445 ml   Ve Observed (l/min) 12.9 l/min   PIP Observed (cm H2O) 36 cm H2O   Plateau Pressure (cm H2O)   (unable to obtain d/t agitation)   MAP (cm H2O) 15   I:E Ratio Actual 1:1.8   Auto PEEP Observed (cm H2O) 0.6 cm H2O     Pt very agitate at time of vent assessment. Resp status appears to be fine. RT unable to assess Plat pressure & compliance d/t agitation. RN now at bedside. RT will adjust vent alarms per policy once pt is settled. Also, Trach cuff found to be >[id number]. 2mls of air pulled out of cuff but pressure remains ~[id number]. RT discussed with RN to tolerate occasional cuff leaks as irish as pt is getting her volumes. Keary Cargo may need to be larger.

## 2020-06-22 LAB
ALBUMIN SERPL-MCNC: 2.4 G/DL (ref 3.5–5)
ALBUMIN/GLOB SERPL: 0.6 {RATIO} (ref 1.1–2.2)
ALP SERPL-CCNC: 70 U/L (ref 45–117)
ALT SERPL-CCNC: 22 U/L (ref 12–78)
ANION GAP SERPL CALC-SCNC: 6 MMOL/L (ref 5–15)
AST SERPL-CCNC: 20 U/L (ref 15–37)
BASOPHILS # BLD: 0 K/UL (ref 0–0.1)
BASOPHILS NFR BLD: 0 % (ref 0–1)
BILIRUB SERPL-MCNC: 0.4 MG/DL (ref 0.2–1)
BUN SERPL-MCNC: 15 MG/DL (ref 6–20)
BUN/CREAT SERPL: 30 (ref 12–20)
CALCIUM SERPL-MCNC: 9.1 MG/DL (ref 8.5–10.1)
CHLORIDE SERPL-SCNC: 109 MMOL/L (ref 97–108)
CO2 SERPL-SCNC: 25 MMOL/L (ref 21–32)
CREAT SERPL-MCNC: 0.5 MG/DL (ref 0.55–1.02)
DIFFERENTIAL METHOD BLD: ABNORMAL
EOSINOPHIL # BLD: 0.3 K/UL (ref 0–0.4)
EOSINOPHIL NFR BLD: 4 % (ref 0–7)
ERYTHROCYTE [DISTWIDTH] IN BLOOD BY AUTOMATED COUNT: 25.3 % (ref 11.5–14.5)
GLOBULIN SER CALC-MCNC: 4.3 G/DL (ref 2–4)
GLUCOSE SERPL-MCNC: 119 MG/DL (ref 65–100)
HCT VFR BLD AUTO: 32.2 % (ref 35–47)
HGB BLD-MCNC: 9.4 G/DL (ref 11.5–16)
IMM GRANULOCYTES # BLD AUTO: 0.1 K/UL (ref 0–0.04)
IMM GRANULOCYTES NFR BLD AUTO: 1 % (ref 0–0.5)
LEVETIRACETAM SERPL-MCNC: 14.5 UG/ML (ref 10–40)
LYMPHOCYTES # BLD: 1.1 K/UL (ref 0.8–3.5)
LYMPHOCYTES NFR BLD: 14 % (ref 12–49)
MCH RBC QN AUTO: 22.9 PG (ref 26–34)
MCHC RBC AUTO-ENTMCNC: 29.2 G/DL (ref 30–36.5)
MCV RBC AUTO: 78.3 FL (ref 80–99)
MONOCYTES # BLD: 0.5 K/UL (ref 0–1)
MONOCYTES NFR BLD: 7 % (ref 5–13)
NEUTS SEG # BLD: 5.5 K/UL (ref 1.8–8)
NEUTS SEG NFR BLD: 74 % (ref 32–75)
NRBC # BLD: 0.03 K/UL (ref 0–0.01)
NRBC BLD-RTO: 0.4 PER 100 WBC
PLATELET # BLD AUTO: 434 K/UL (ref 150–400)
PLATELET COMMENTS,PCOM: ABNORMAL
PMV BLD AUTO: 10.1 FL (ref 8.9–12.9)
POTASSIUM SERPL-SCNC: 3.6 MMOL/L (ref 3.5–5.1)
PROCALCITONIN SERPL-MCNC: <0.05 NG/ML
PROT SERPL-MCNC: 6.7 G/DL (ref 6.4–8.2)
RBC # BLD AUTO: 4.11 M/UL (ref 3.8–5.2)
RBC MORPH BLD: ABNORMAL
SODIUM SERPL-SCNC: 140 MMOL/L (ref 136–145)
WBC # BLD AUTO: 7.5 K/UL (ref 3.6–11)

## 2020-06-22 PROCEDURE — 77030021668 HC NEB PREFIL KT VYRM -A

## 2020-06-22 PROCEDURE — 74011250636 HC RX REV CODE- 250/636: Performed by: INTERNAL MEDICINE

## 2020-06-22 PROCEDURE — 74011000258 HC RX REV CODE- 258: Performed by: INTERNAL MEDICINE

## 2020-06-22 PROCEDURE — 74011250637 HC RX REV CODE- 250/637: Performed by: NURSE PRACTITIONER

## 2020-06-22 PROCEDURE — 85025 COMPLETE CBC W/AUTO DIFF WBC: CPT

## 2020-06-22 PROCEDURE — 74011250636 HC RX REV CODE- 250/636: Performed by: NURSE PRACTITIONER

## 2020-06-22 PROCEDURE — 74011000250 HC RX REV CODE- 250: Performed by: NURSE PRACTITIONER

## 2020-06-22 PROCEDURE — 77010033678 HC OXYGEN DAILY

## 2020-06-22 PROCEDURE — 65610000006 HC RM INTENSIVE CARE

## 2020-06-22 PROCEDURE — 74011250637 HC RX REV CODE- 250/637: Performed by: PSYCHIATRY & NEUROLOGY

## 2020-06-22 PROCEDURE — 74011250637 HC RX REV CODE- 250/637: Performed by: INTERNAL MEDICINE

## 2020-06-22 PROCEDURE — 97161 PT EVAL LOW COMPLEX 20 MIN: CPT

## 2020-06-22 PROCEDURE — 97165 OT EVAL LOW COMPLEX 30 MIN: CPT

## 2020-06-22 PROCEDURE — 74011000258 HC RX REV CODE- 258: Performed by: NURSE PRACTITIONER

## 2020-06-22 PROCEDURE — 36415 COLL VENOUS BLD VENIPUNCTURE: CPT

## 2020-06-22 PROCEDURE — 77030018798 HC PMP KT ENTRL FED COVD -A

## 2020-06-22 PROCEDURE — 97535 SELF CARE MNGMENT TRAINING: CPT

## 2020-06-22 PROCEDURE — 84145 PROCALCITONIN (PCT): CPT

## 2020-06-22 PROCEDURE — 94664 DEMO&/EVAL PT USE INHALER: CPT

## 2020-06-22 PROCEDURE — 97530 THERAPEUTIC ACTIVITIES: CPT

## 2020-06-22 PROCEDURE — 74011250637 HC RX REV CODE- 250/637: Performed by: ANESTHESIOLOGY

## 2020-06-22 PROCEDURE — 94640 AIRWAY INHALATION TREATMENT: CPT

## 2020-06-22 PROCEDURE — 92507 TX SP LANG VOICE COMM INDIV: CPT

## 2020-06-22 PROCEDURE — 94762 N-INVAS EAR/PLS OXIMTRY CONT: CPT

## 2020-06-22 PROCEDURE — 80053 COMPREHEN METABOLIC PANEL: CPT

## 2020-06-22 PROCEDURE — 92526 ORAL FUNCTION THERAPY: CPT

## 2020-06-22 PROCEDURE — 74011250636 HC RX REV CODE- 250/636: Performed by: ANESTHESIOLOGY

## 2020-06-22 RX ORDER — HYDRALAZINE HYDROCHLORIDE 25 MG/1
25 TABLET, FILM COATED ORAL 3 TIMES DAILY
Status: DISCONTINUED | OUTPATIENT
Start: 2020-06-22 | End: 2020-06-23

## 2020-06-22 RX ORDER — HYDRALAZINE HYDROCHLORIDE 20 MG/ML
10 INJECTION INTRAMUSCULAR; INTRAVENOUS
Status: DISCONTINUED | OUTPATIENT
Start: 2020-06-22 | End: 2020-07-10 | Stop reason: HOSPADM

## 2020-06-22 RX ORDER — CHLORHEXIDINE GLUCONATE 0.12 MG/ML
15 RINSE ORAL EVERY 12 HOURS
Status: DISCONTINUED | OUTPATIENT
Start: 2020-06-22 | End: 2020-07-10 | Stop reason: HOSPADM

## 2020-06-22 RX ORDER — HYDRALAZINE HYDROCHLORIDE 20 MG/ML
10 INJECTION INTRAMUSCULAR; INTRAVENOUS
Status: DISCONTINUED | OUTPATIENT
Start: 2020-06-22 | End: 2020-06-22

## 2020-06-22 RX ORDER — FENTANYL CITRATE 50 UG/ML
50 INJECTION, SOLUTION INTRAMUSCULAR; INTRAVENOUS
Status: DISCONTINUED | OUTPATIENT
Start: 2020-06-22 | End: 2020-07-10 | Stop reason: HOSPADM

## 2020-06-22 RX ADMIN — HYDRALAZINE HYDROCHLORIDE 25 MG: 25 TABLET, FILM COATED ORAL at 21:29

## 2020-06-22 RX ADMIN — SODIUM CHLORIDE 15 MG/HR: 900 INJECTION, SOLUTION INTRAVENOUS at 04:31

## 2020-06-22 RX ADMIN — SODIUM CHLORIDE 15 MG/HR: 900 INJECTION, SOLUTION INTRAVENOUS at 06:22

## 2020-06-22 RX ADMIN — NIMODIPINE 60 MG: 30 SOLUTION ORAL at 21:56

## 2020-06-22 RX ADMIN — POTASSIUM BICARBONATE 40 MEQ: 782 TABLET, EFFERVESCENT ORAL at 17:57

## 2020-06-22 RX ADMIN — FENTANYL CITRATE 50 MCG: 50 INJECTION, SOLUTION INTRAMUSCULAR; INTRAVENOUS at 08:18

## 2020-06-22 RX ADMIN — NIMODIPINE 60 MG: 30 SOLUTION ORAL at 05:53

## 2020-06-22 RX ADMIN — IPRATROPIUM BROMIDE AND ALBUTEROL SULFATE 3 ML: .5; 3 SOLUTION RESPIRATORY (INHALATION) at 01:11

## 2020-06-22 RX ADMIN — LEVETIRACETAM 1000 MG: 100 SOLUTION ORAL at 08:06

## 2020-06-22 RX ADMIN — SODIUM CHLORIDE, SODIUM LACTATE, POTASSIUM CHLORIDE, AND CALCIUM CHLORIDE 100 ML/HR: 600; 310; 30; 20 INJECTION, SOLUTION INTRAVENOUS at 11:11

## 2020-06-22 RX ADMIN — HYDRALAZINE HYDROCHLORIDE 10 MG: 20 INJECTION INTRAMUSCULAR; INTRAVENOUS at 23:11

## 2020-06-22 RX ADMIN — NIMODIPINE 60 MG: 30 SOLUTION ORAL at 14:25

## 2020-06-22 RX ADMIN — ASPIRIN 81 MG CHEWABLE TABLET 81 MG: 81 TABLET CHEWABLE at 08:07

## 2020-06-22 RX ADMIN — ENOXAPARIN SODIUM 40 MG: 40 INJECTION SUBCUTANEOUS at 14:25

## 2020-06-22 RX ADMIN — LEVETIRACETAM 1000 MG: 100 SOLUTION ORAL at 20:49

## 2020-06-22 RX ADMIN — CARVEDILOL 25 MG: 12.5 TABLET, FILM COATED ORAL at 08:06

## 2020-06-22 RX ADMIN — CEFEPIME HYDROCHLORIDE 2 G: 2 INJECTION, POWDER, FOR SOLUTION INTRAVENOUS at 10:09

## 2020-06-22 RX ADMIN — FENTANYL CITRATE 50 MCG: 50 INJECTION, SOLUTION INTRAMUSCULAR; INTRAVENOUS at 04:03

## 2020-06-22 RX ADMIN — IPRATROPIUM BROMIDE AND ALBUTEROL SULFATE 3 ML: .5; 3 SOLUTION RESPIRATORY (INHALATION) at 19:18

## 2020-06-22 RX ADMIN — IPRATROPIUM BROMIDE AND ALBUTEROL SULFATE 3 ML: .5; 3 SOLUTION RESPIRATORY (INHALATION) at 07:11

## 2020-06-22 RX ADMIN — Medication 2 DROP: at 05:53

## 2020-06-22 RX ADMIN — LACOSAMIDE 150 MG: 50 TABLET, FILM COATED ORAL at 20:49

## 2020-06-22 RX ADMIN — CHLORHEXIDINE GLUCONATE 15 ML: 0.12 RINSE ORAL at 11:12

## 2020-06-22 RX ADMIN — LACOSAMIDE 150 MG: 50 TABLET, FILM COATED ORAL at 08:06

## 2020-06-22 RX ADMIN — CEFEPIME HYDROCHLORIDE 2 G: 2 INJECTION, POWDER, FOR SOLUTION INTRAVENOUS at 01:07

## 2020-06-22 RX ADMIN — CASTOR OIL AND BALSAM, PERU: 788; 87 OINTMENT TOPICAL at 17:56

## 2020-06-22 RX ADMIN — CARVEDILOL 25 MG: 12.5 TABLET, FILM COATED ORAL at 16:06

## 2020-06-22 RX ADMIN — FLUDROCORTISONE ACETATE 0.1 MG: 0.1 TABLET ORAL at 08:07

## 2020-06-22 RX ADMIN — NIMODIPINE 60 MG: 30 SOLUTION ORAL at 17:57

## 2020-06-22 RX ADMIN — FENTANYL CITRATE 50 MCG: 50 INJECTION, SOLUTION INTRAMUSCULAR; INTRAVENOUS at 06:23

## 2020-06-22 RX ADMIN — POTASSIUM BICARBONATE 40 MEQ: 782 TABLET, EFFERVESCENT ORAL at 08:06

## 2020-06-22 RX ADMIN — SODIUM CHLORIDE 15 MG/HR: 900 INJECTION, SOLUTION INTRAVENOUS at 10:12

## 2020-06-22 RX ADMIN — AMLODIPINE BESYLATE 10 MG: 5 TABLET ORAL at 08:06

## 2020-06-22 RX ADMIN — FENTANYL CITRATE 25 MCG: 50 INJECTION, SOLUTION INTRAMUSCULAR; INTRAVENOUS at 00:18

## 2020-06-22 RX ADMIN — NIMODIPINE 60 MG: 30 SOLUTION ORAL at 01:03

## 2020-06-22 RX ADMIN — HYDRALAZINE HYDROCHLORIDE 25 MG: 25 TABLET, FILM COATED ORAL at 10:10

## 2020-06-22 RX ADMIN — Medication 2 DROP: at 21:58

## 2020-06-22 RX ADMIN — SODIUM CHLORIDE 15 MG/HR: 9 INJECTION, SOLUTION INTRAVENOUS at 12:13

## 2020-06-22 RX ADMIN — SODIUM CHLORIDE 12.5 MG/HR: 9 INJECTION, SOLUTION INTRAVENOUS at 19:49

## 2020-06-22 RX ADMIN — IPRATROPIUM BROMIDE AND ALBUTEROL SULFATE 3 ML: .5; 3 SOLUTION RESPIRATORY (INHALATION) at 14:08

## 2020-06-22 RX ADMIN — CASTOR OIL AND BALSAM, PERU: 788; 87 OINTMENT TOPICAL at 08:07

## 2020-06-22 RX ADMIN — SODIUM CHLORIDE 12.5 MG/HR: 9 INJECTION, SOLUTION INTRAVENOUS at 15:34

## 2020-06-22 RX ADMIN — SODIUM CHLORIDE 15 MG/HR: 900 INJECTION, SOLUTION INTRAVENOUS at 01:05

## 2020-06-22 RX ADMIN — Medication 2 DROP: at 14:25

## 2020-06-22 RX ADMIN — SODIUM CHLORIDE 15 MG/HR: 900 INJECTION, SOLUTION INTRAVENOUS at 08:14

## 2020-06-22 RX ADMIN — HYDRALAZINE HYDROCHLORIDE 10 MG: 20 INJECTION INTRAMUSCULAR; INTRAVENOUS at 06:25

## 2020-06-22 RX ADMIN — SODIUM CHLORIDE 15 MG/HR: 900 INJECTION, SOLUTION INTRAVENOUS at 02:41

## 2020-06-22 RX ADMIN — CHLORHEXIDINE GLUCONATE 15 ML: 0.12 RINSE ORAL at 22:04

## 2020-06-22 RX ADMIN — NIMODIPINE 60 MG: 30 SOLUTION ORAL at 10:10

## 2020-06-22 RX ADMIN — HYDRALAZINE HYDROCHLORIDE 25 MG: 25 TABLET, FILM COATED ORAL at 16:06

## 2020-06-22 NOTE — PROGRESS NOTES
Problem: Mobility Impaired (Adult and Pediatric)  Goal: *Acute Goals and Plan of Care (Insert Text)  Description:   FUNCTIONAL STATUS PRIOR TO ADMISSION: Patient was independent and active without use of DME per chart review. HOME SUPPORT PRIOR TO ADMISSION: Details unknown - pt is not able to provide info at this time. Physical Therapy Goals  Initiated 6/22/2020  1. Patient will move from supine to sit and sit to supine , scoot up and down and roll side to side in bed with maximal assistance within 7 day(s). 2.  Patient will tolerate bed in full chair position x30min for improved tolerance to upright and pulmonary toileting within 7 days. 3.  Patient will participate in B LE AAROM/PROM program within 7 days. Outcome: Progressing Towards Goal     PHYSICAL THERAPY EVALUATION- NEURO POPULATION  Patient: Norma Agrawal (48 y.o. female)  Date: 6/22/2020  Primary Diagnosis: SAH (subarachnoid hemorrhage) ([facility] [address] [I60.9]        Precautions:          ASSESSMENT  Based on the objective data described below, the patient presents with significantly impaired functional mobility as compared to inferred baseline level 2* global weakness (L seemingly moreso than R), no command following or attempts to communicate, poor cardiopulmonary tolerance to activity, global edema, and inferred impaired balance following prolonged and medically complicated admission s/p L MCA aneurysm rupture, seizures, multiple scattered embolic infarcts, and respiratory failure requiring trach on 6/16. At baseline, she was independent. Received pt semi upright in bed, on trach collar and cleared for mobility attempts by RN. Unable to elicit any command following or form of communication despite various multi modal attempts. Did track therapist around the room and localize to voice with R eye only (disconjugate gaze when L eye lid held open). Observed spontaneous movements x4 extremities, R sided more than L.  Unable to reproduce movement to command. Slight L LE flexor withdraw with PROM but able to obtain neutral knee and ankle at this time. Further mobility attempts deferred. Anticipate prolonged and extensive rehab efforts - Recommending LTAC for vent weaning with then hopeful transition to acute IPR if command following improves. Will follow on caseload while admitted on a trial basis with goal to increase frequency if ability to actively participate improves. Current Level of Function Impacting Discharge (mobility/balance): total A; vent dependent; no command following     Functional Outcome Measure: The patient scored Total: 0/56 on the Garza Balance Assessment which is indicative of high fall risk. Other factors to consider for discharge: total A; vent dependent; indep at baseline      Patient will benefit from skilled therapy intervention to address the above noted impairments. PLAN :  Recommendations and Planned Interventions: bed mobility training, therapeutic exercises, neuromuscular re-education, patient and family training/education, and therapeutic activities      Frequency/Duration: Patient will be followed by physical therapy:  3 times a week to address goals.  - Trial basis     Recommendation for discharge: (in order for the patient to meet his/her long term goals)  LTAC for vent weaning and ongoing rehab     This discharge recommendation:  A follow-up discussion with the attending provider and/or case management is planned    IF patient discharges home will need the following DME: to be determined (TBD)         SUBJECTIVE:   Zero attempts to communicate this date    OBJECTIVE DATA SUMMARY:   HISTORY:    Past Medical History:   Diagnosis Date    HTN (hypertension)      Past Surgical History:   Procedure Laterality Date    IR INSERT GASTROSTOMY TUBE [facility]  6/19/2020       Personal factors and/or comorbidities impacting plan of care: PMHx    Home Situation  Home Environment: Private residence  [place] Residence: One story  Living Alone: No  Support Systems: Child(yuli), Family member(s)  Patient Expects to be Discharged to[de-identified] Unknown  Current DME Used/Available at Home: None    EXAMINATION/PRESENTATION/DECISION MAKING:   Critical Behavior:  Neurologic State: Alert  Orientation Level: Unable to verbalize  Cognition: No command following     Hearing: Auditory  Auditory Impairment: None  Skin:   Edema:  non pitting x4 extremities   Range Of Motion:  AROM: Grossly decreased, non-functional(spontaneous movments only)  PROM: Generally decreased, functional(limited by edema; slight L LE flexor withdraw )       Strength:    Strength: Grossly decreased, non-functional       Tone & Sensation:   Tone: Abnormal  Sensation: (unable to assess)           Coordination:  Coordination: Grossly decreased, non-functional  Vision:   Tracking: Able to track left of midline; Able to track right of midline(with R eye; limited L eye movement once lid held open)  Visual Fields: (unable to formally assess but attended to both sides)  Functional Mobility:  Bed Mobility:  Rolling:  Total assistance(inferred )    Functional Measure  Garza Balance Test:    Sitting to Standin  Standing Unsupported: 0  Sitting with Back Unsupported: 0  Standing to Sittin  Transfers: 0  Standing Unsupported with Eyes Closed: 0  Standing Unsupported with Feet Together: 0  Reach Forward with Outstretched Arm: 0   Object: 0  Turn to Look Over Shoulders: 0  Turn 360 Degrees: 0  Alternate Foot on Step/Stool: 0  Standing Unsupported One Foot in Front: 0  Stand on One Le  Total: 0/56         56=Maximum possible score;   0-20=High fall risk  21-40=Moderate fall risk   41-56=Low fall risk        Physical Therapy Evaluation Charge Determination   History Examination Presentation Decision-Making   LOW Complexity : Zero comorbidities / personal factors that will impact the outcome / POC HIGH Complexity : 4+ Standardized tests and measures addressing body structure, function, activity limitation and / or participation in recreation  LOW Complexity : Stable, uncomplicated  HIGH Complexity : FOTO score of 1- 25       Based on the above components, the patient evaluation is determined to be of the following complexity level: LOW     Pain Rating:  None apparent     Activity Tolerance:   Poor  Please refer to the flowsheet for vital signs taken during this treatment. After treatment patient left in no apparent distress:   Supine in bed, Call bell within reach, and Side rails x 3    COMMUNICATION/EDUCATION:   The patients plan of care was discussed with: Occupational therapist and Registered nurse. Patient is unable to participate in goal setting and plan of care.     Thank you for this referral.  Keith Licona, PT, DPT   Time Calculation: 11 mins

## 2020-06-22 NOTE — PROGRESS NOTES
Neurocritical Care Brief Progress Note:    HPI: Yeni Osei is a 43-year-old female with a past medical history of HTN. According to patient's mother, the patient began having a headache on 5/31/20 and had been taking BC Powder with aspirin in it for the headache. She recently stopped taking her BP medications. According to her mother she does not smoke, drink alcohol or use recreational drugs. The patient was brought to the [facility] ER via EMS on 6/4/420  after her coworkers found her unresponsive outside of her place of work. 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 On arrival to the ER she was found to be seizing. A stat CT of her head was performed which showed extensive subarachnoid hemorrhage. She was extremely hypertensive in the 240's and was given labetalol and started on a Cardene drip. She was emergently intubated and given Keppra and Ativan as well. Neurosurgery and Neurointerventional Surgery were consulted and the patient was transferred to the [facility] for a higher level of care. On arrival to [facility] a CTA of her head and neck was obtained which showed a 4.4 x 4.1 mm right MCA trifurcation aneurysm and a 2.2 x 3.1 mm left MCA trifurcation aneurysm and possible less than 2 mm ACOM aneurysms. Extensive lung infiltrates were also noted. Patient was taken for cerebral angiogram the morning of 06/05/20 and coil embolization of the left MCA aneurysm was completed. The right MCA aneurysm had a very wide neck incorporating both M2s. It could not be treated endovascularly without stents.  May need to be clipped by Neurosurgery.     Procedures: 06/05/20 and coil embolization of the left MCA aneurysm was completeds/p cerebral angiogram for right PANCHITO A1 angioplasty for vasospasm, transarterial infusion of verapamil on 6/9,  S/p angiogram on 6/10 for left ICA/MCA angioplasty and transarterial infusion of verapamil for vasospasm treatment, s/p angiogram on 6/11 and 6/13 for transarterial infusion of verapamil for vasospasm treatment. Tracheostomy placement today 6/16 by Dr. Dev Franco in thoracic surgery. Physical Exam:  GENERAL: Awake and alert, sating 100% on trach collar. No sedation.    SKIN: Warm, dry, color appropriate for ethnicity. Pedal pulses positive bilaterally. Nonpitting edema in bilateral hands. No edema noted in lower extremities.      Neurologic Exam:  Mental Status:          Awake and alert. Still no command following. EOMI. Follows examiner with eyes.     Language:                   Mute.      Cranial Nerves:          Right pupil is 3 mm and brisk. Left pupil 4mm and brisk. Left eye ptosis.                                     Blinks to threat bilaterally.        Motor:                                                                Bulk and tone normal.                                       No involuntary movements. Occasional spontaneous movement noted with lifting bilateral arms off of the bed and left foot off of the bed.      Sensation:                   Withdraws to pain x 4.      Reflexes:                     Mute Babinski;s on left, negative on right.      Coordination & Gait:   Unable to assess due to patient factors.     PLAN: SAH due to ruptured cerebral aneurysm, Quinones Sales 5, An Grade 3/4. PBD 21   s/p cerebral angiogram with coil embolization of left MCA bifurcation aneurysm on 6/5. Additionally, patient has 5X3 right MCA bifurcation aneurysm with very wide neck which will need to be treated with stents vs. Clipping  -Aspirin 81 mg q day  -Strict I/Os. IVFs LR 100ml/h  - Continue Florinef 0.1 mg bid  -Nimotop Day 17/21  -Continue seizure ppx with Kepra 1000 bid, Vimpat 150 bid  -SBP goal 100-140. Cardene gtt as needed.   -Neuro checks q 2 hours during day and q 4 at night. .   Addendum: 0400 am. Patient maxed out on Cardene and SBP in 160's. Increased hydralazine to 10 mg q 2 hours as needed. Fentanyl 50 mg q 2 hours prn as needed.      Santiago Bryan NP  Neurocritical Care Nurse Practitioner  [phone]

## 2020-06-22 NOTE — PROGRESS NOTES
SLP Contact Note    SLP treatment complete. Pt without PMV tolerance on this date, with HR dropping with brief PMV placement. Pt also with no bolus manipulation of ice chips. Therefore, recommend holding ice chips and PMV for now. May benefit from a trach downsize (at least a 6 cuffless for tolerance). Full note to follow.     Thank you,  TEVIN BoldenEd, [address] [facility]  Speech-Language Pathologist

## 2020-06-22 NOTE — PROGRESS NOTES
Problem: Dysphagia (Adult)  Goal: *Acute Goals and Plan of Care (Insert Text)  Description: Speech Therapy Goals  Initiated 6/17/2020    1. Patient will participate in swallow re-evaluation within 7 days. Outcome: Progressing Towards Goal     Problem: Voice Impaired (Adult)  Goal: *Acute Goals and Plan of Care (Insert Text)  Description: Speech Therapy Goals  Initiated 6/17/2020    1. Patient will tolerate trials of digital occlusion to work towards Vericept3 EDUonGo placement within 7 days. Outcome: Progressing Towards Goal     SPEECH LANGUAGE PATHOLOGY DYSPHAGIA TREATMENT  Patient: Ghanshyam Larson (48 y.o. female)  Date: 6/22/2020  Diagnosis: SAH (subarachnoid hemorrhage) ([facility] [address] [I60.9]   <principal problem not specified>       Precautions: n/a      ASSESSMENT:  Patient continues with no bolus manipulation and no swallow initiation. Therefore, recommend NPO with vigilant oral care and PEG tube as primary source of nutrition/hydration/medication. Furthermore, pt without tolerance of PMV again on this date with HR dropping almost immediately with PMV placement and increased work of breath. Therefore, would recommend trach downsize when medically appropriate to work toward PMV tolerance. PLAN:  Recommendations and Planned Interventions:  --NPO with alternate means of nutrition/hydration/medication  --No PMV yet  --Consider trach downsize when medically appropriate    Patient continues to benefit from skilled intervention to address the above impairments. Continue treatment per established plan of care. Speaking Valve Placement:  SLP only  Recommended Speaking Valve Wearing Schedule:  None. Discharge Recommendations: To Be Determined     SUBJECTIVE:   Patient non-verbal/non-vocal throughout session.     OBJECTIVE:   Cognitive and Communication Status:  Neurologic State: Alert  Orientation Level: Unable to verbalize  Cognition: No command following           Tracheostomy:  #6 shiley cuffed  Cuff deflated prior to SLP arrival  Oxygen Therapy  O2 Sat (%): 99 %  Pulse via Oximetry: 64 beats per minute  O2 Device: Tracheal collar  O2 Flow Rate (L/min): 9 l/min  FIO2 (%): 28 %  Dysphagia Treatment:  Tracheostomy:  Airway Clearance  Suction: Trach  Suction Device: Suction catheter  Suction Catheter Size: 14 Fr  Sputum Method Obtained: Tracheal  Sputum Amount: Small  Sputum Color/Odor: White  Sputum Consistency: Thick     PMV Trial   Vocal Quality: Other (comment)(no voicing appreciated)  Oral Assessment:     P.O. Trials:  Patient Position: upright in bed  Vocal quality prior to P.O.: Other (comment)(no voicing appreciated)  Consistency Presented: Ice chips  How Presented: Spoon     Bolus Acceptance: Impaired  Bolus Formation/Control: Impaired     Propulsion: Absent     Initiation of Swallow: Absent  Laryngeal Elevation: Absent                 Oral Phase Severity: Severe  Pharyngeal Phase Severity : Other (comment)(unable to fully assess)    Pain:  Pain Scale 1: Adult Nonverbal Pain Scale  Pain Intensity 1: 0       After treatment:   Call bell within reach and Nursing notified    COMMUNICATION/EDUCATION:     The patient's plan of care including recommendations, planned interventions, and recommended diet changes were discussed with: Registered nurse. Education was provided to patient, family, and staff regarding speaking valve placement, wearing schedule, safety precautions including cuff deflation and removal when sleeping, and care/cleaning guidelines.       MARILYN Felton  Time Calculation: 17 mins

## 2020-06-22 NOTE — PROGRESS NOTES
[facility]    ICU TEAM Progress Note    Name: Presley Perez   : 1970   MRN: [id number]   Date: 2020      Assessment/Plan:       1. SAH  a. 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 Has Right MCA aneurysm with wide neck that will prob need stenting vs clipping  c. Hydrocephalus is improved  d. Continue Nimotop day   e. NIS stopped TCDs given improvement in vasospasm  f. SBP goal currently 110-160  g. Continue frequent neurochecks  h. Florinef discontinued  i. Continue IVF with goal of euvolemia  i. Continue LR @ 75 mL/hr  2. Seizures  a. Secondary to the above  b. No seizure identified on most recent EEG but encephalopathy  c. Continue Keppra 1000mg BID and Vimpat 150mg BID  i. No changes recommended unless persistence of automatisms  d. Neurology following - repeat head CT stable  3. Respiratory Failure, improving  a. Likely secondary to neurogenic pulmonary edema  i. Antibiotics stopped given no leukocytosis, tolerating trach collar, and procalcitonin < 0.05  b. S/P Trach placement ()  c. Apneic episodes on trach collar    i. MRI brain with small foci of diffusion restriction in the bilateral centrum semiovales as well as the left parietal lobe posteriorly  d. Patient currently on trach collar- plan to continue for as long as patient can tolerate  4. Ischemic Strokes  a. Patient with multiple ischemic infarcts throughout cerebrum suggestive of ischemic event  b. Note also ischemic cerebellar infarct left inferior  c. Continue aspirin 81mg daily  d. See MRI   e. Post trach/PEG  f. PT/OT/Speech  5. HTN  a. Adjust antihypertensive to achieve normotension  b. Continue Coreg to 25mg BID  c. Continue Amlodipine 10mg daily  d. Start hydralazine 25mg TID      Subjective:   Progress Note: 2020      Reason for ICU Admission:     47 yo female with HTN who presented with AMS and seizure like activity.  She was found with extensive SAH secondary to left MCA bifurcation ruptured aneurysm - s/p coiling. Since patient has been with vasospasm s/p IA verapamil on  and . While she has been more responsive, brain imaging suggest extensive strokes. Overnight Events:     No events overnight. Active Problem List:     Problem List  Date Reviewed: 6/10/2020          Codes Class    Respiratory failure ([facility] [address] ICD-10-CM: J96.90  ICD-9-CM: 518.81         Hypoxic ischemic encephalopathy (HIE), unspecified severity ICD-10-CM: P91.60  ICD-9-CM: 768.70         SAH (subarachnoid hemorrhage) ([facility]) ICD-10-CM: I60.9  ICD-9-CM: 430         Subarachnoid hemorrhage from middle cerebral artery aneurysm, left (HCC) ICD-10-CM: A76.36  ICD-9-CM: 430         RIGHT middle cerebral artery aneurysm ICD-10-CM: I67.1  ICD-9-CM: 437.3         Cerebral vasospasm ICD-10-CM: I42.478  ICD-9-CM: 435.9               Past Medical History:      has a past medical history of HTN (hypertension). Past Surgical History:      has a past surgical history that includes ir insert gastrostomy tube perc (2020). Home Medications:     Prior to Admission medications    Not on File       Allergies/Social/Family History:     No Known Allergies   Social History     Tobacco Use    Smoking status: Never Smoker    Smokeless tobacco: Never Used   Substance Use Topics    Alcohol use: Not Currently      History reviewed. No pertinent family history. Review of Systems:     Review of systems not obtained due to patient factors.     Objective:   Vital Signs:  Visit Vitals  /65   Pulse (!) 55   Temp 98.3 °F (36.8 °C)   Resp 19   Ht 5' 4\" (1.626 m)   Wt 102.9 kg (226 lb 13.7 oz)   SpO2 96%   BMI 38.94 kg/m²    O2 Flow Rate (L/min): 10 l/min O2 Device: Tracheostomy, Tracheal collar   Temp (24hrs), Av.5 °F (36.9 °C), Min:98.3 °F (36.8 °C), Max:98.8 °F (37.1 °C)           Intake/Output:     Intake/Output Summary (Last 24 hours) at 2020 0711  Last data filed at 2020 0700  Gross per 24 hour   Intake 7427.92 ml   Output 7400 ml   Net 27.92 ml       Physical Exam:    General: NAD  HENT: Atraumatic, Tracheostomy tube in place (on trach collar)  Cardio: RRR  Respiratory: Coarse breath sounds BL  GI: Soft not tender abdomen  Extremities: +ve edema  Neuro: Opens right eye and tracks. Follows commands in LUE, spontaneous movement on LLE, withdraws in RLE, no movement in RUE (but grimaces to noxious stimuli)    LABS AND  DATA: Personally reviewed  Recent Labs     06/22/20 0407 06/21/20 0629   WBC 7.5 6.2   HGB 9.4* 7.9*   HCT 32.2* 27.2*   * 345     Recent Labs     06/22/20 0407 06/21/20 2030 06/20/20  0609    141   < > 140   K 3.6 4.2   < > 3.9   * 108   < > 106   CO2 25 27   < > 29   BUN 15 18   < > 12   CREA 0.50* 0.47*   < > 0.51*   * 145*   < > 112*   CA 9.1 9.1   < > 8.6   MG  --  2.4  --  2.4   PHOS  --  2.8  --  3.5    < > = values in this interval not displayed. Recent Labs     06/22/20 0407 06/21/20 0629   AP 70 67   TP 6.7 6.5   ALB 2.4* 2.4*   GLOB 4.3* 4.1*     No results for input(s): INR, PTP, APTT, INREXT, INREXT, INREXT in the last 72 hours. No results for input(s): PHI, PCO2I, PO2I, FIO2I in the last 72 hours. No results for input(s): CPK, CKMB, TROIQ, BNPP in the last 72 hours.     Hemodynamics:   PAP:   CO:     Wedge:   CI:     CVP:    SVR:       PVR:       Ventilator Settings:  Mode Rate Tidal Volume Pressure FiO2 PEEP   Spontaneous   400 ml  5 cm H2O 30 % 5 cm H20     Peak airway pressure: 11 cm H2O    Minute ventilation: 8.22 l/min        MEDS: Reviewed    Chest X-Ray:  CXR Results  (Last 48 hours)    None        ECHO:  EF 60-65%      Multidisciplinary Rounds Completed:  No    ABCDEF Bundle/Checklist  Pain Medications: Acetaminophen  Target RASS: N/A  Sedation Medications: None  CAM-ICU:  Positive  Mobility: Poor  PT/OT: PT consulted and on board and OT consulted and on board   Restraints: None needed at this time  Discussed Plan of Care (goals of care): Yes  Addressed Code Status: Full Code    CARDIOVASCULAR  Cardiac Gtts: None  SBP Goal of: > 100 mmHg and < 180 mmHg  MAP Goal of: 100-130  Transfusion Trigger (Hgb): <8 g/dL    RESPIRATORY  Vent Goals:   Chlorhexidine   Optimize PEEP/Ventilation/Oxygenation  Goal Tidal Volume 6 cc/kg based on IBW  Aim for lung protective ventilation  Head of bed > 30 degrees  Aggressive bronchopulmonary hygiene  DVT Prophylaxis (if no, list reason): SCD's or Sequential Compression Device and Lovenox   SPO2 Goal: > 92%  Pulmonary toilet: Duo-Nebs     GI/  Martinez Catheter Present: Yes  GI Prophylaxis: Not at this time   Nutrition: Yes   IVFs: NSS  Bowel Movement: Yes  Bowel Regimen: Docusate (Colace)  Insulin: ISS    ANTIBIOTICS  Antibiotics:  None    T/L/D  Tubes: Tracheostomy and Nasogastric Tube  Lines: Peripheral IV and Central Line  Drains: Martinez Catheter    SPECIAL EQUIPMENT  None    DISPOSITION  Stay in ICU    CRITICAL CARE CONSULTANT NOTE  I had a face to face encounter with the patient, reviewed and interpreted patient data including clinical events, labs, images, vital signs, I/O's, and examined patient. I have discussed the case and the plan and management of the patient's care with the consulting services, the bedside nurses and the respiratory therapist.      NOTE OF PERSONAL INVOLVEMENT IN CARE   This patient has a high probability of imminent, clinically significant deterioration, which requires the highest level of preparedness to intervene urgently. I participated in the decision-making and personally managed or directed the management of the following life and organ supporting interventions that required my frequent assessment to treat or prevent imminent deterioration. I personally spent 40 minutes of critical care time. This is time spent at this critically ill patient's bedside actively involved in patient care as well as the coordination of care and discussions with the patient's family.   This does not include any procedural time which has been billed separately.     Parisa Turner, NP  [employer] [employer]  6/22/2020

## 2020-06-22 NOTE — PROGRESS NOTES
0730: Bedside and Verbal shift change report given to Sendy No RN (oncoming nurse) by Rick Swift RN (offgoing nurse). Report included the following information SBAR, Kardex, ED Summary, Procedure Summary, Intake/Output, MAR, Accordion, Recent Results, Med Rec Status, Cardiac Rhythm NSR, Alarm Parameters  and Dual Neuro Assessment. Pt tolerated trach collar well    Bedside and Verbal shift change report given to Rossy Pereira RN (oncoming nurse) by Sendy No RN (offgoing nurse). Report included the following information SBAR, Kardex, ED Summary, OR Summary, Intake/Output, MAR, Accordion, Recent Results, Med Rec Status, Cardiac Rhythm NSR, Alarm Parameters  and Dual Neuro Assessment.

## 2020-06-22 NOTE — PROGRESS NOTES
Problem: Ventilator Management  Goal: *Adequate oxygenation and ventilation  Outcome: Progressing Towards Goal  Goal: *Patient maintains clear airway/free of aspiration  Outcome: Progressing Towards Goal  Goal: *Absence of infection signs and symptoms  Outcome: Progressing Towards Goal  Goal: *Normal spontaneous ventilation  Outcome: Progressing Towards Goal     Problem: Falls - Risk of  Goal: *Absence of Falls  Description: Document Beverley Fall Risk and appropriate interventions in the flowsheet. Outcome: Progressing Towards Goal  Note: Fall Risk Interventions:  Mobility Interventions: Communicate number of staff needed for ambulation/transfer    Mentation Interventions: Adequate sleep, hydration, pain control, Door open when patient unattended, Evaluate medications/consider consulting pharmacy, Familiar objects from home, Update white board, Toileting rounds, Reorient patient, More frequent rounding, Increase mobility    Medication Interventions: Evaluate medications/consider consulting pharmacy    Elimination Interventions: Toileting schedule/hourly rounds    History of Falls Interventions: Room close to nurse's station, Door open when patient unattended, Evaluate medications/consider consulting pharmacy, Consult care management for discharge planning         Problem: Pressure Injury - Risk of  Goal: *Prevention of pressure injury  Description: Document Sriram Scale and appropriate interventions in the flowsheet. Outcome: Progressing Towards Goal  Note: Pressure Injury Interventions:  Sensory Interventions: Assess changes in LOC, Avoid rigorous massage over bony prominences, Check visual cues for pain, Discuss PT/OT consult with provider, Float heels, Keep linens dry and wrinkle-free, Minimize linen layers, Monitor skin under medical devices, Maintain/enhance activity level, Pressure redistribution bed/mattress (bed type), Turn and reposition approx.  every two hours (pillows and wedges if needed)    Moisture Interventions: Absorbent underpads, Apply protective barrier, creams and emollients, Check for incontinence Q2 hours and as needed, Internal/External fecal devices, Internal/External urinary devices, Maintain skin hydration (lotion/cream), Minimize layers, Moisture barrier, Offer toileting Q_hr    Activity Interventions: Assess need for specialty bed, Pressure redistribution bed/mattress(bed type), PT/OT evaluation    Mobility Interventions: Assess need for specialty bed, Pressure redistribution bed/mattress (bed type), PT/OT evaluation, Turn and reposition approx.  every two hours(pillow and wedges), Float heels    Nutrition Interventions: Document food/fluid/supplement intake, Discuss nutritional consult with provider, Offer support with meals,snacks and hydration    Friction and Shear Interventions: Lift sheet, Lift team/patient mobility team, Minimize layers                Problem: Nutrition Deficit  Goal: *Optimize nutritional status  Outcome: Progressing Towards Goal     Problem: Hemorrhagic Stroke: Day 5 through Discharge  Goal: Activity/Safety  Outcome: Progressing Towards Goal  Goal: Consults, if ordered  Outcome: Progressing Towards Goal  Goal: Diagnostic Test/Procedures  Outcome: Progressing Towards Goal  Goal: Nutrition/Diet  Outcome: Progressing Towards Goal  Goal: Treatments/Interventions/Procedures  Outcome: Progressing Towards Goal  Goal: Psychosocial  Outcome: Progressing Towards Goal  Goal: *Hemodynamically stable  Outcome: Progressing Towards Goal  Goal: *Verbalizes anxiety and depression are reduced or absent  Outcome: Progressing Towards Goal  Goal: *Absence of aspiration  Outcome: Progressing Towards Goal  Goal: *Absence of signs and symptoms of DVT  Outcome: Progressing Towards Goal  Goal: *Optimal pain control at patient's stated goal  Outcome: Progressing Towards Goal  Goal: *Tolerating diet  Outcome: Progressing Towards Goal  Goal: *Progressive mobility and function  Outcome: Progressing Towards Goal  Goal: *Rehabilitation readiness  Outcome: Progressing Towards Goal

## 2020-06-22 NOTE — PROGRESS NOTES
Problem: Self Care Deficits Care Plan (Adult)  Goal: *Acute Goals and Plan of Care (Insert Text)  Description:   FUNCTIONAL STATUS PRIOR TO ADMISSION: Patient unable to provide history at evaluation. Per chart, patient was independent and working full-time at [employer]. HOME SUPPORT: Per chart, patient lived with her 15 y/o son and a friend. Occupational Therapy Goals  Initiated 6/22/2020  1. Patient will perform grooming with maximal assistance within 7 day(s). 2.  Patient will follow ~25% simple commands with max multimodal cuing in preparation for functional tasks within 7 days. 3.  Patient will reach for ADL object with each UE with moderate assistance in preparation for functional reach within 7 days. 4.  Patient will perform composite grasp on ADL object with each hand in preparation for functional grasp within 7 days. 5.  Patient will participate in upper extremity therapeutic exercise/activities with maximal assistance for 5 minutes within 7 day(s). Outcome: Not Progressing Towards Goal    OCCUPATIONAL THERAPY EVALUATION  Patient: Lauro Núñez (48 y.o. female)  Date: 6/22/2020  Primary Diagnosis: SAH (subarachnoid hemorrhage) ([facility] [address] [I60.9]        Precautions: fall, -160       ASSESSMENT  Based on the objective data described below, the patient presents significantly below independent functional baseline s/p admission for L ruptured MCA aneurysm s/p coiling, complicated by vasospasm, respiratory failure (now extubated, seen on trach collar), seizures, and MRI positive for multiple infarcts. Noted patient out of vasospasm window and cleared to participate in PT/OT. Formal neuro exam limited, as patient followed 0% commands despite max multimodal cuing. Wrist restraints removed for session and she spontaneously move BUE < BLE although with limited AROM, non-purposeful, and not on command.   She spontaneously attended to both sides and tracked visual stimuli with R eye to R and L, but required constant physical assistance to raise L eyelid, with minimal L eye tracking + dysconjugate gaze. Attempted functional reaching and grooming with no functional engagement despite hand-over-hand prompting. Patient's ability to participate in OT is limited at this time, will trial 3x/ week for OT. Current Level of Function Impacting Discharge (ADLs/self-care): total assistance    Functional Outcome Measure: The patient scored 0/100 on the Barthel Index outcome measure which is indicative of ~100% impairment in functional performance. Patient will benefit from skilled therapy intervention to address the above noted impairments. PLAN :  Recommendations and Planned Interventions: self care training, functional mobility training, therapeutic exercise, balance training, visual/perceptual training, therapeutic activities, cognitive retraining, endurance activities, neuromuscular re-education, patient education, home safety training, and family training/education    Frequency/Duration: Patient will be followed by occupational therapy 3 times a week to address goals. (trial period)    Recommendation for discharge: (in order for the patient to meet his/her long term goals)  To be determined: pending progress in acute setting.  Patient's ability to participate in OT is limited at this time     This discharge recommendation:  Has not yet been discussed the attending provider and/or case management         SUBJECTIVE:   Patient stated no verbalizations and no gestures    OBJECTIVE DATA SUMMARY:   HISTORY:   Past Medical History:   Diagnosis Date    HTN (hypertension)      Past Surgical History:   Procedure Laterality Date    IR INSERT GASTROSTOMY TUBE [facility]  6/19/2020       Expanded or extensive additional review of patient history:     Home Situation  Home Environment: Private residence  One/Two Story Residence: One story  Living Alone: No  Support Systems: Child(yuli), Family member(s)  Patient Expects to be Discharged to[de-identified] Unknown  Current DME Used/Available at Home: None      EXAMINATION OF PERFORMANCE DEFICITS:  Cognitive/Behavioral Status:  Neurologic State: Alert  Orientation Level: Unable to verbalize  Cognition: Unable to assess (comment); Decreased attention/concentration;Decreased command following             Skin: visible skin appears intact    Edema: mild BUE    Hearing: Auditory  Auditory Impairment: None    Vision/Perceptual:    Tracking: Able to track left of midline; Able to track right of midline(with R eye, L eye minimal tracking)              Visual Fields: (unable to formally assess but attended to both sides)                 Range of Motion:  BUE grossly decreased/ non-functional                            Strength:  BUE grossly decreased/ non-functional                   Coordination:     Fine Motor Skills-Upper: Left Impaired;Right Impaired    Gross Motor Skills-Upper: Left Impaired;Right Impaired    Tone & Sensation:  Tone: Abnormal  Sensation: Unable to assess                     ADL Assessment:  Feeding: Total assistance(NPO, tube)    Oral Facial Hygiene/Grooming: Total assistance(for wiping mouth/ washing face, attempted hand-over-hand)    Bathing: Total assistance(inferred)    Upper Body Dressing: Total assistance(inferred)    Lower Body Dressing: Total assistance(inferred)    Toileting: Total assistance(inferred)            Functional Measure:  Barthel Index:    Bathin  Bladder: 0  Bowels: 0  Groomin  Dressin  Feedin  Mobility: 0  Stairs: 0  Toilet Use: 0  Transfer (Bed to Chair and Back): 0  Total: 0/100        The Barthel ADL Index: Guidelines  1. The index should be used as a record of what a patient does, not as a record of what a patient could do. 2. The main aim is to establish degree of independence from any help, physical or verbal, however minor and for whatever reason.   3. The need for supervision renders the patient not independent. 4. A patient's performance should be established using the best available evidence. Asking the patient, friends/relatives and nurses are the usual sources, but direct observation and common sense are also important. However direct testing is not needed. 5. Usually the patient's performance over the preceding 24-48 hours is important, but occasionally longer periods will be relevant. 6. Middle categories imply that the patient supplies over 50 per cent of the effort. 7. Use of aids to be independent is allowed. Kassy Wasserman, Barthel, DDamienWDamien ([id number]). Functional evaluation: the Barthel Index. [facility] (14)2. Nico Zurita lory JASON Paniagua, Josephine Dior., Monserrat Lewis., [employer], [address] [place] [address] Ave (1999). Measuring the change indisability after inpatient rehabilitation; comparison of the responsiveness of the Barthel Index and Functional [place] Measure. Journal of Neurology, Neurosurgery, and Psychiatry, 66(4), 982-396. Faustino Freed, N.J.A, BARBIE Molina, & Duncan Moore MDamienA. (2004.) Assessment of post-stroke quality of life in cost-effectiveness studies: The usefulness of the Barthel Index and the EuroQoL-5D. Quality of Life Research, 15, 637-78         Occupational Therapy Evaluation Charge Determination   History Examination Decision-Making   LOW Complexity : Brief history review  HIGH Complexity : 5 or more performance deficits relating to physical, cognitive , or psychosocial skils that result in activity limitations and / or participation restrictions MEDIUM Complexity : Patient may present with comorbidities that affect occupational performnce.  Miniml to moderate modification of tasks or assistance (eg, physical or verbal ) with assesment(s) is necessary to enable patient to complete evaluation       Based on the above components, the patient evaluation is determined to be of the following complexity level: LOW   Pain Rating:  Patient did not indicate pain    Activity Tolerance:   VSS    After treatment patient left in no apparent distress:    Supine in bed and Call bell within reach    COMMUNICATION/EDUCATION:   The patients plan of care was discussed with: Physical therapist and Registered nurse. Patient is unable to participate in goal setting and plan of care. This patients plan of care is appropriate for delegation to [facility].     Thank you for this referral.  Ronal Cortez OT  Time Calculation: 15 mins

## 2020-06-22 NOTE — PROGRESS NOTES
MEERA  Patient taken to [facility] ED after co workers found patient with seizure like activity. CT/CTA: Extensive SAH. Transferred to [facility] for Neurosurgery evaluation. Patient is s/p coiling procedure  RUR: 18 %  S/P trach placement 6/16  Plan: Referral made to Larissa Moy, will need insurance auth. Patient remains in the ICU vented, on Cardene gtt for B/P. Awake, alert, not following commands. Day 18/21 for [place].    Sade Goss RN,CRM

## 2020-06-22 NOTE — PROGRESS NOTES
Widen pressure parameter to 110-160  Wean deborah gtt  Prefer po medical mgmt of her HTN which is long-standing  Ok to d/c florinef and start reducing fluids from neuro standpoint

## 2020-06-22 NOTE — PROGRESS NOTES
Neurointerventional Surgery Progress Note  Ely Vásquez NP   Neurocritical Care Nurse Practitioner  [phone]      Admit Date: 6/4/2020        Daily Progress Note: 6/22/2020   LOS: 18 days      S/P:  POD 16 Cerebral angiogram with coil embolization of ruptured left MCA bifurcation aneurysm on 6/5/2020    ** Delayed Presentation ** PB Day 22    Interval History/Subjective:   Patient currently on trach collar. SBP goal changed to normotensive yesterday evening  with -140. Patient required restart of nicardipine drip to keep BP within specified parameters. She is currently maxed on cardene. Third antihypertensive added this morning by Intensivist. Neuro exam is unchanged. Unable to perform a ROS due to AMS. Assessment & Plan: Active Problems:    SAH (subarachnoid hemorrhage) ([address] (6/4/2020)      Subarachnoid hemorrhage from middle cerebral artery aneurysm, left (HCC) (6/4/2020)      RIGHT middle cerebral artery aneurysm (6/4/2020)      Cerebral vasospasm (6/4/2020)      Respiratory failure ([address] (6/15/2020)      Hypoxic ischemic encephalopathy (HIE), unspecified severity (6/15/2020)      1.) SAH due to ruptured cerebral aneurysm, [place] 5, An Grade 3/4              - s/p cerebral angiogram with coil embolization of left MCA bifurcation aneurysm on 6/5       Additionally, patient has 5X3 right MCA bifurcation aneurysm with very wide neck which will               need to be treated with stents vs. Clipping              - Continue Nimotop Day 18 of 21 to prevent delayed cerebral ischemia              - change maintenance fluids to LR at 75 ml/hr to maintain euvolemia              - Strict I's and O's, patient slightly positive overnight with I's and O's              - Last TCDs on 6/20 showed continued possible left PCA vasospasm, no evidence of vasospasm in the remaining intracranial vessels bilaterally. Continue to monitor clinically.  Patient essentially out of vasospasm window - ECHO shows EF 60-65%, mild concentric hypertrophy, mildly dilated left atrium              - ok with neuro checks every 2 hours during the day and every 4 hours at night               - discontinue Florinef 0.1 mg BID today (initially started 6/13) for prevention of cerebral salt wasting in the setting of SAH               - change SBP goal to 110-160 in the setting of unsecured aneurysm in the right MCA, Cardene/Fabien/Hydralazine PRN, on amlodipine, coreg, and hydralazine (added today) for oral BP management               - PT/OT/SLP evals    2.) Seizure              - Due to #1, no prior hx of seizure              - Continue Keppra 1000 mg BID               - continue Vimpat ( increased to 150 mg BID 6/17/2020 due to ? Seizures- transient episode of oral automatisms), Keppra level subtherapeutic at 8.2 on 6/17, repeat Keppra level pending              - Seizure precautions              - repeat EEG 6/17 d/t seizure like activity overnight, EEG showed a technically limited study which renders interpretation difficult. Within these confines, no evidence for seizures or ongoing epileptiform discharges are noted. -  EEG on 6/6 showed an abnormal EEG due to slowing of the background rhythms with intermittent rhythmic delta activity seen in both frontal head regions. Occasional sharp wave discharges in the left frontal area. EEG is suggestive of mild-to-moderate generalized encephalopathic process, nonspecific in type. In addition, there was occasional epileptiform disturbance seen in the left frontal area which may represent a partial onset seizure focus.              - Repeat MRI of Brain on 6/19 showed persistent subarachnoid hemorrhage in the left sylvian fissure. Patchy areas of enhancement as described. Some of these are associated with  foci of diffusion restriction and likely represent subacute ischemia.  The leptomeningeal enhancement predominantly in the left frontal and parietal lobes is likely related to subarachnoid hemorrhage and meningeal irritation. Infection is not entirely excluded but thought less likely.               - Neurology following as needed                3.) Cerebral Edema (resolved)              - received one dose of mannitol, started 3% saline on 6/8, discontinued 6/14              - serum sodium remains stable                4.) Acute hypoxic respiratory failure in the setting of likely neurogenic pulmonary edema- resolved              - currently on trach collar, will need to be on continuous trach collar for at least 24 hours before transferring to step down              - chest XR on 6/19 Vague left lower lobe airspace process, suggesting subsegmental  atelectasis or pneumonia             - Intensivist managing     5.) Multiple Acute Ischemic Infarctions                - Imaging pattern is consistent with Hypoxic-Ischemic Encephalopathy (HIE)                - Continue aspirin 81 mg daily for stroke prevention           6.) Cerebral Vasospasm                 - s/p cerebral angiogram for right PANCHITO A1 angioplasty for vasospasm, transarterial infusion of verapamil on 6/9,  S/p angiogram on 6/10 for left ICA/MCA angioplasty and transarterial infusion of verapamil for vasospasm treatment, s/p angiogram on 6/11 and 6/13 for transarterial infusion of verapamil for vasospasm treatment                 - Strict I's and O's                 - TCDs repeated on 6/20 as stated in #1, patient essentially out of vasospasm window                  - plans as stated in #1     7.) Fever --> resolved                 - completed course of Zosyn, WBC normal                - respiratory cultures growing Klebsiella Pneumonia and Citrobacter Koseria 6/17                - CTA of chest 6/7 negative for PE                 - Cefepime started on 6/19 to cover Citrobacter per Intensivist, discontinued today as respiratory specimen is likely a contaminant per [address] following 8.) Polyuria                  - serum sodium stable at 140, electrolytes WNL                  - urine studies showed serum osmolality 296, urine osmolality 322, specific gravity normal at 1.015. Urine random sodium at 114                  - suspect patient is auto-diuresing due to recent issues with fluid overload                  - discontinue Florinef 0.1 mg BID for prevention of cerebral salt wasting                  - monitor BMP daily    9.) HTN                  - change SBP goal to 110-160                  - continue Amlodipine 10 mg daily and coreg 25 mg BID                  - add Hydralazine 25 mg TID per Intensivist                  - wean Cardene, Hydralazine PRN                   - Intensivist following       Activity: Bed rest  DVT ppx: SCDs, Lovenox  Disposition: TBD    Plan d/w Dr. Yefri Rushing, NP Intensivist, and RN. Admission Summary:     Sandra Bangura is a 48 y.o. 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 On arrival to ED, pt was unresponsive and noted to be seizing. A stat CT of her head was performed there, which showed extensive SAH. Pt was then intubated emergently. She was also extremely hypertensive with SBPs in 240s. She was given Keppra and Ativan, and placed on cardene drip for BP control. NSGY and NIS were then consulted and the patient was transferred to [facility] for higher level of care. En route, paramedics administered 7.5 mg of Versed, 200mcg of Fentanyl, and 10 mg of labetalol for BP control. On arrival, CTA of her head and neck was obtained, which showed a 4.4 x 4.1 mm right MCA trifurcation aneurysm and  a 2.2 x 3.1 mm left MCA trifurcation aneurysm. Of note, CTA head/neck also showed pulmonary infiltrates suspicious for COVID-19 infection and the patient has been tested for COVID which was negative.  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 Mother reported pt does not smoke, does not drink alcohol or use street drugs. The mother also reported that the patient began complaining of a headache on 5/31/2020 and has been taking BC Powder (aspirin) for the headache. She also reported that the pt has a hx of HTN and is supposed to be on BP medications, but recently stopped taking her medications. The patient underwent a cerebral angiogram on 6/5 by Dr. Estefanía Cheatham for coil embolization of a ruptured left MCA bifurcation aneurysm.     Current Facility-Administered Medications   Medication Dose Route Frequency Provider Last Rate Last Dose    fentaNYL citrate (PF) injection 50 mcg  50 mcg IntraVENous Q2H PRN Jonnie Phoenix, NP   50 mcg at 06/22/20 0818    hydrALAZINE (APRESOLINE) 20 mg/mL injection 10 mg  10 mg IntraVENous Q2H PRN Faiza Chandler NP   10 mg at 06/22/20 5713    hydrALAZINE (APRESOLINE) tablet 25 mg  25 mg Oral TID Katya OWEN NP   25 mg at 06/22/20 1010    chlorhexidine (ORAL CARE KIT) 0.12 % mouthwash 15 mL  15 mL Oral Q12H Socorro Sterling NP        niCARdipine (CARDENE) 50 mg in 0.9% sodium chloride 100 mL infusion  0-15 mg/hr IntraVENous TITRATE Socorro Sterling NP        carvediloL (COREG) tablet 25 mg  25 mg Oral BID WITH MEALS Michele Lezama MD   25 mg at 06/22/20 0806    niCARdipine (CARDENE) 25 mg in 0.9% sodium chloride 250 mL infusion  0-15 mg/hr IntraVENous TITRATE Socorro Sterling  mL/hr at 06/22/20 1012 15 mg/hr at 06/22/20 1012    amLODIPine (NORVASC) tablet 10 mg  10 mg Per NG tube DAILY Michele Lezama MD   10 mg at 06/22/20 0806    fludrocortisone (FLORINEF) tablet 0.1 mg  0.1 mg Oral BID Vera CORRALES NP   0.1 mg at 06/22/20 0807    potassium bicarb-citric acid (EFFER-K) tablet 40 mEq  40 mEq Oral BID Michele Lezama MD   40 mEq at 06/22/20 0806    lacosamide (VIMPAT) tablet 150 mg  150 mg Per NG tube Q12H Angi Dunn MD   150 mg at 06/22/20 9772    lactated Ringers infusion  100 mL/hr IntraVENous CONTINUOUS Lesa Huitron  mL/hr at 06/21/20 2330 100 mL/hr at 06/21/20 2330    ELECTROLYTE REPLACEMENT PROTOCOL - Potassium and Magnesium  1 Each Other PRN Cindy Avery NP        ELECTROLYTE REPLACEMENT PROTOCOL - Phosphorus  1 Each Other PRN Cindy Avery NP        balsam peru-castor oiL (VENELEX) ointment   Topical BID Leon Berry DO        aspirin chewable tablet 81 mg  81 mg Per NG tube DAILY Ritu Patahk MD   81 mg at 06/22/20 0807    enoxaparin (LOVENOX) injection 40 mg  40 mg SubCUTAneous Q24H Leon Berry DO   40 mg at 06/21/20 1400    levETIRAcetam (KEPPRA) oral solution 1,000 mg  1,000 mg Per NG tube Q12H Leon Berry DO   1,000 mg at 06/22/20 0806    glycopyrrolate (ROBINUL) injection 0.2 mg  0.2 mg IntraMUSCular TID PRN Maury Greenfield NP        niMODipine (NYMALIZE) 6 mg/mL oral solution 60 mg  60 mg Per NG tube Q4H Kellen Domínguez NP   60 mg at 06/22/20 1010    acetaminophen (TYLENOL) solution 650 mg  650 mg Per NG tube Q4H PRN Jaylen Castaneda NP   650 mg at 06/19/20 0008    albuterol-ipratropium (DUO-NEB) 2.5 MG-0.5 MG/3 ML  3 mL Nebulization Q6H RT DARREN Hassan   3 mL at 06/22/20 6029    glucose chewable tablet 16 g  4 Tab Oral PRN Matilda Chadwick, FLAKITA-C        glucagon (GLUCAGEN) injection 1 mg  1 mg IntraMUSCular PRN Matilda Chadwick NP-C        dextrose 10% infusion 0-250 mL  0-250 mL IntraVENous PRN Matilda Chadwick NP-C        albuterol (PROVENTIL VENTOLIN) nebulizer solution 2.5 mg  2.5 mg Nebulization Q6H PRN Matilda Chadwick NP-C        polyvinyl alcohol-povidone (NATURAL TEARS) 0.5-0.6 % ophthalmic solution 2 Drop  2 Drop Both Eyes Q8H DARREN Hassan   2 Drop at 06/22/20 0553    bisacodyL (DULCOLAX) suppository 10 mg  10 mg Rectal DAILY PRN DARREN Hassan        ondansetron (ZOFRAN) injection 4 mg  4 mg IntraVENous Q4H PRN FABIAN Panchal        docusate (COLACE) 50 mg/5 mL oral liquid 100 mg  100 mg Oral BID PRN Eliu RIVAS NP-C            No Known Allergies    Review of Systems:  Review of systems not obtained due to patient factors. Objective:     Vital signs  Temp (24hrs), Av.5 °F (36.9 °C), Min:98.3 °F (36.8 °C), Max:98.7 °F (37.1 °C)    07 - 1900  In: 4102 [I.V.:750]  Out: 0   1901 -  0700  In: 7427.9 [I.V.:5007.9]  Out: 7500 [Urine:7100; Drains:400]    Visit Vitals  /58   Pulse 61   Temp 98.4 °F (36.9 °C)   Resp 23   Ht 5' 4\" (1.626 m)   Wt 226 lb 13.7 oz (102.9 kg)   SpO2 98%   BMI 38.94 kg/m²    O2 Flow Rate (L/min): 9 l/min O2 Device: Tracheal collar   Vitals:    20 0945 20 1000 20 1009 20 1015   BP: 127/55 125/61 125/61 127/58   Pulse: 61 (!) 59 60 61   Resp: 15 21  23   Temp:       SpO2: 98% 98%  98%   Weight:       Height:          Physical Exam:  GENERAL: NAD, trach collar present, alert   EYE: conjunctivae/corneas clear. Right pupil 3 mm, brisk response to light. Left pupil 4 mm, brisk response to light. Keeps left eye closed   LUNG: lungs clear bilaterally   HEART: regular rate and rhythm, S1, S2 normal, no murmur, click, rub or gallop  EXTREMITIES:  extremities normal, atraumatic, no cyanosis, 3+ pitting edema in BUE, +! Pitting edema in BLE, distal pulses 2+ bilaterally   SKIN: Skin warm to touch. Right and left groin site clean, dry, and intact. No hematoma, bruising, or bleeding noted. NEUROLOGIC: Alert. Trach-collar. Non-verbal. Eyes will open spontaneously. No command following. No facial asymmetry. Right pupil 3 mm, brisk response to light. Left pupil 4 mm brisk response to light. Blinks to visual threat. Left eye remains closed. Focuses and tracks with eyes, with the exception of inability to track to the right with left eye.  Moves all extremities spontaneously/intermittently. Less movement in RUE vs. LUE. RUE withdraws to pain, intermittently moves right arm at times when stimulated. No involuntary movements. Gait deferred. Unable to assess language, sensation, and coordination. Imaging:    MRI of Brain on 6/19/2020 shows  IMPRESSION:   1. Persistent subarachnoid hemorrhage in the left sylvian fissure. 2.  Patchy areas of enhancement as described. Some of these are associated with  foci of diffusion restriction and likely represent subacute ischemia. The  leptomeningeal enhancement predominantly in the left frontal and parietal lobes  is likely related to subarachnoid hemorrhage and meningeal irritation. Infection  is not entirely excluded but thought less likely. CTA of Head 6/18/2020 at 1735 shows    IMPRESSION:  1. No acute large vessel occlusion. 2. Recent left MCA bifurcation aneurysm coiling, with interval improvement in  now mild vasospasm involving the bilateral anterior, left middle, vertebral  basilar and bilateral posterior cerebral arteries as discussed in detail above. 3. Unchanged right MCA bifurcation aneurysm.     CT of Head on 6/18/2020 at 1735 shows  IMPRESSION:      Decreased left subarachnoid hemorrhage. No new hemorrhage. Artifact versus increased conspicuity of nonacute left temporal lobe infarct. CT Head on 6/17/20 shows:     IMPRESSION:     1. Persistence of the previously described subtle areas of subarachnoid  hemorrhage. 2. Persistence of the previously described left inferior cerebellar infarct. 3. Presence of small lacunar infarcts bilaterally. 4. No evidence of hydrocephalus. 5. Presence of an aneurysm coil in the region of the left middle cerebral  artery. 6. Evidence of sphenoid sinus disease.       CT of Head on 6/13/2020 at 1103 shows  IMPRESSION:   1. Questionable left inferior cerebellar infarction, new from 6/10/2020.  2. Multiple, evolving, subacute infarctions throughout the cerebrum.   3. Subacute subarachnoid hemorrhage. Improved hydrocephalus compared to  6/6/2020    CTA of Head on 6/13/2020 at 1103 shows  IMPRESSION:   1. Left A1 vasospasm. 2. More mild spasm of the right PANCHITO, MCAs, and possibly the distal vertebrals. 3. Right M1 terminus aneurysm. 4. Large infundibula/small aneurysms of the bilateral P-comm origins. CT Perfusion on 6/13/2020 at 1103 shows  IMPRESSION:  1. Penumbra surrounding infarctions in most of the superior left MCA territory. 2. Bilateral PANCHITO infarctions. 3. Questionable left cerebellar infarction is inferior to the perfusion scan. MRI of Brain on 6/9/2020 at 0311 AM shows  IMPRESSION:  1. Multiple areas of cortical acute ischemic injury as well as some subcortical  white matter areas and right cerebellar involvement similar to the prior MRI  6/6/20.  2. Subarachnoid hemorrhage and intraventricular blood again demonstrated without  hydrocephalus. 3. Increasing fluid paranasal sinuses and right greater than left mastoid/middle  ear cavities. CTA of Head on 6/9/2020 at 1350 shows  Progressive vasospasm involving the anterior cerebral arteries with minimal  associated decreased perfusion to the frontal lobes. No other significant  Change  . CT Perfusion on 6/9/2020 at 1336 shows  Impression:  CT perfusion brain: Very minimal decreased perfusion to the frontal lobes. CT of Head on 6/9/2020 at 0234 shows  IMPRESSION:   No significant change.     CT of Head on 6/8/2020 shows  IMPRESSION:   Global edema, otherwise no significant change    CTA of Head and Neck on 6/8/2020 per radiology shows  Impression:  Mild to moderate vasospasm in the left middle cerebral artery status post  bifurcation aneurysm coil embolization. Stable 4 mm right MCA aneurysm. Endotracheal tube terminates just above the matt. 1 cm low-density left thyroid nodule. Mildly enlarged left axillary lymph node measuring 11 mm in short axis  Patchy bilateral airspace disease.   Nonspecific asymmetric enhancement of the right superior ophthalmic vein   (However, no significant vasospasm was seen when personally reviewed by NIS)    CT Perfusion on 6/8/2020 shows  IMPRESSION:  No significant cerebral perfusion abnormality    CTA of Chest on 6/7/2020 shows  IMPRESSION:  1. No acute pulmonary embolus  2. Dependent atelectasis with diffuse groundglass opacifications bilaterally. CT of Head on 6/7/2020 shows  IMPRESSION:   1. No further progression of subarachnoid hemorrhage. The overall degree of  subarachnoid hemorrhage is slightly improved. 2. Subtle areas of gray-white differentiation loss throughout the cerebral  hemispheres correlating with areas of restricted diffusion on previous CT  compatible with ischemic changes. MRI of Brain on 6/6/2020 shows  IMPRESSION:   Imaging findings consistent with large left MCA territory ischemia/infarction,  left temporal, parietal and frontal lobes, cortically based, no superimposed  increased parenchymal hemorrhage or midline shift. Right and left PANCHITO territory  cortical diffusion restriction consistent with infarction. Infarction in the right insular cortex and right temporal lobe. Scattered small infarctions right and left corona radiata centrum semiovale, right cerebellum. Allowing for differences in technique likely stable subarachnoid and  intraventricular hemorrhage. CT of Head on 6/6/2020 at 1411 shows  IMPRESSION:   Left MCA territory infarction with smaller right MCA territory infarction.     Stable subarachnoid and intraventricular hemorrhage. CTA of Head on 6/6/2020 at 0903 shows  IMPRESSION:  Slightly increased intraventricular and subarachnoid hemorrhage compared to the  prior exam.     Status post coiling left MCA bifurcation aneurysm. No significant residual  aneurysm filling. CT of Head on 6/62020 at 0239 shows  IMPRESSION:  Slightly diminished subarachnoid and intraventricular hemorrhage. Stable ventricular system.       CT of Head WO on 6/5/2020 at 1512 shows  IMPRESSION: Stable acute subarachnoid and intraventricular hemorrhage. Stable  ventricular system. CT Head WO 6/5/20 0826     IMPRESSION:   1. Diffuse subarachnoid hemorrhage most concentrated in the basal cisterns and  left sylvian fissure. 2.  Interval ventricular enlargement suspicious for developing hydrocephalus.     CTA Head 6/4/20 2017     IMPRESSION:  1. Diffuse subarachnoid hemorrhage in the basilar cisterns and sylvian  fissures. 2.  Bilateral MCA bifurcation aneurysms. 3.  Infundibular origins to the posterior communicating arteries bilaterally. 4.  Symmetric moderately severe airspace disease in the upper lobes bilaterally  which may represent edema or sequela of aspiration.     CT Head WO Contrast 6/4/2020 2012    IMPRESSION: Extensive subarachnoid hemorrhage concerning for leaking/ruptured  aneurysm.        24 hour results:    Recent Results (from the past 24 hour(s))   METABOLIC PANEL, BASIC    Collection Time: 06/21/20  8:30 PM   Result Value Ref Range    Sodium 141 136 - 145 mmol/L    Potassium 4.2 3.5 - 5.1 mmol/L    Chloride 108 97 - 108 mmol/L    CO2 27 21 - 32 mmol/L    Anion gap 6 5 - 15 mmol/L    Glucose 145 (H) 65 - 100 mg/dL    BUN 18 6 - 20 MG/DL    Creatinine 0.47 (L) 0.55 - 1.02 MG/DL    BUN/Creatinine ratio 38 (H) 12 - 20      GFR est AA >60 >60 ml/min/1.73m2    GFR est non-AA >60 >60 ml/min/1.73m2    Calcium 9.1 8.5 - 10.1 MG/DL   MAGNESIUM    Collection Time: 06/21/20  8:30 PM   Result Value Ref Range    Magnesium 2.4 1.6 - 2.4 mg/dL   PHOSPHORUS    Collection Time: 06/21/20  8:30 PM   Result Value Ref Range    Phosphorus 2.8 2.6 - 4.7 MG/DL   PROCALCITONIN    Collection Time: 06/22/20  4:07 AM   Result Value Ref Range    Procalcitonin <0.49 ng/mL   METABOLIC PANEL, COMPREHENSIVE    Collection Time: 06/22/20  4:07 AM   Result Value Ref Range    Sodium 140 136 - 145 mmol/L    Potassium 3.6 3.5 - 5.1 mmol/L    Chloride 109 (H) 97 - 108 mmol/L    CO2 25 21 - 32 mmol/L    Anion gap 6 5 - 15 mmol/L    Glucose 119 (H) 65 - 100 mg/dL    BUN 15 6 - 20 MG/DL    Creatinine 0.50 (L) 0.55 - 1.02 MG/DL    BUN/Creatinine ratio 30 (H) 12 - 20      GFR est AA >60 >60 ml/min/1.73m2    GFR est non-AA >60 >60 ml/min/1.73m2    Calcium 9.1 8.5 - 10.1 MG/DL    Bilirubin, total 0.4 0.2 - 1.0 MG/DL    ALT (SGPT) 22 12 - 78 U/L    AST (SGOT) 20 15 - 37 U/L    Alk. phosphatase 70 45 - 117 U/L    Protein, total 6.7 6.4 - 8.2 g/dL    Albumin 2.4 (L) 3.5 - 5.0 g/dL    Globulin 4.3 (H) 2.0 - 4.0 g/dL    A-G Ratio 0.6 (L) 1.1 - 2.2     CBC WITH AUTOMATED DIFF    Collection Time: 06/22/20  4:07 AM   Result Value Ref Range    WBC 7.5 3.6 - 11.0 K/uL    RBC 4.11 3.80 - 5.20 M/uL    HGB 9.4 (L) 11.5 - 16.0 g/dL    HCT 32.2 (L) 35.0 - 47.0 %    MCV 78.3 (L) 80.0 - 99.0 FL    MCH 22.9 (L) 26.0 - 34.0 PG    MCHC 29.2 (L) 30.0 - 36.5 g/dL    RDW 25.3 (H) 11.5 - 14.5 %    PLATELET 732 (H) 702 - 400 K/uL    MPV 10.1 8.9 - 12.9 FL    NRBC 0.4 (H) 0  WBC    ABSOLUTE NRBC 0.03 (H) 0.00 - 0.01 K/uL    NEUTROPHILS 74 32 - 75 %    LYMPHOCYTES 14 12 - 49 %    MONOCYTES 7 5 - 13 %    EOSINOPHILS 4 0 - 7 %    BASOPHILS 0 0 - 1 %    IMMATURE GRANULOCYTES 1 (H) 0.0 - 0.5 %    ABS. NEUTROPHILS 5.5 1.8 - 8.0 K/UL    ABS. LYMPHOCYTES 1.1 0.8 - 3.5 K/UL    ABS. MONOCYTES 0.5 0.0 - 1.0 K/UL    ABS. EOSINOPHILS 0.3 0.0 - 0.4 K/UL    ABS. BASOPHILS 0.0 0.0 - 0.1 K/UL    ABS. IMM.  GRANS. 0.1 (H) 0.00 - 0.04 K/UL    DF SMEAR SCANNED      PLATELET COMMENTS Large Platelets      RBC COMMENTS ANISOCYTOSIS  3+        RBC COMMENTS MICROCYTOSIS  1+        RBC COMMENTS OVALOCYTES  1+        RBC COMMENTS SCHISTOCYTES  1+        RBC COMMENTS POLYCHROMASIA  1+            Gilbert Holden, NP

## 2020-06-22 NOTE — PROGRESS NOTES
2000: Bedside and Verbal shift change report given to Abdirahman Avery RN (oncoming nurse) by Tigist Nunn RN (offgoing nurse). Report included the following information SBAR, Kardex, Intake/Output, MAR, Accordion, Recent Results, Cardiac Rhythm NSR, Alarm Parameters  and Dual Neuro Assessment. 2000: Orders received for Cardene gtt to keep -140. Pt is awake and alert. No command following. Aphasic. R pupil brisk 3, L pupil brisk 4. JOLLY spontaneously at times and withdraws to pain. 2036: Cardene gtt started. 0000: Pt continues to be hypertensive 160s-170s maxed on Cardene gtt. PRN hydralazine given. Neuro assessment unchanged. 0020: Pt continues to be hypertensive 160s-170s PRN fentanyl given. 0300: SBP remains above 140 despite Cardene and PRN Hydralazine and Fentanyl. Updated orders received for 10mg Hydralazine q1h PRN and 50mcg Fentanyl q2h PRN    0730: Bedside and Verbal shift change report given to Jah Gautam RN (oncoming nurse) by Abdirahman Avery RN (offgoing nurse). Report included the following information SBAR, Kardex, Intake/Output, MAR, Accordion, Recent Results, Cardiac Rhythm NSR and Alarm Parameters .

## 2020-06-23 LAB
ALBUMIN SERPL-MCNC: 2.6 G/DL (ref 3.5–5)
ALBUMIN/GLOB SERPL: 0.6 {RATIO} (ref 1.1–2.2)
ALP SERPL-CCNC: 86 U/L (ref 45–117)
ALT SERPL-CCNC: 30 U/L (ref 12–78)
ANION GAP SERPL CALC-SCNC: 6 MMOL/L (ref 5–15)
AST SERPL-CCNC: 34 U/L (ref 15–37)
BASOPHILS # BLD: 0 K/UL (ref 0–0.1)
BASOPHILS NFR BLD: 0 % (ref 0–1)
BILIRUB SERPL-MCNC: 0.3 MG/DL (ref 0.2–1)
BUN SERPL-MCNC: 19 MG/DL (ref 6–20)
BUN/CREAT SERPL: 35 (ref 12–20)
CALCIUM SERPL-MCNC: 9.1 MG/DL (ref 8.5–10.1)
CHLORIDE SERPL-SCNC: 111 MMOL/L (ref 97–108)
CO2 SERPL-SCNC: 23 MMOL/L (ref 21–32)
CREAT SERPL-MCNC: 0.55 MG/DL (ref 0.55–1.02)
DIFFERENTIAL METHOD BLD: ABNORMAL
EOSINOPHIL # BLD: 0.2 K/UL (ref 0–0.4)
EOSINOPHIL NFR BLD: 3 % (ref 0–7)
ERYTHROCYTE [DISTWIDTH] IN BLOOD BY AUTOMATED COUNT: 25.8 % (ref 11.5–14.5)
GLOBULIN SER CALC-MCNC: 4.1 G/DL (ref 2–4)
GLUCOSE SERPL-MCNC: 115 MG/DL (ref 65–100)
HCT VFR BLD AUTO: 26.6 % (ref 35–47)
HEMOCCULT STL QL: POSITIVE
HGB BLD-MCNC: 7.8 G/DL (ref 11.5–16)
IMM GRANULOCYTES # BLD AUTO: 0.1 K/UL (ref 0–0.04)
IMM GRANULOCYTES NFR BLD AUTO: 1 % (ref 0–0.5)
LYMPHOCYTES # BLD: 0.9 K/UL (ref 0.8–3.5)
LYMPHOCYTES NFR BLD: 13 % (ref 12–49)
MAGNESIUM SERPL-MCNC: 2.4 MG/DL (ref 1.6–2.4)
MCH RBC QN AUTO: 23.2 PG (ref 26–34)
MCHC RBC AUTO-ENTMCNC: 29.3 G/DL (ref 30–36.5)
MCV RBC AUTO: 79.2 FL (ref 80–99)
MONOCYTES # BLD: 0.5 K/UL (ref 0–1)
MONOCYTES NFR BLD: 7 % (ref 5–13)
NEUTS SEG # BLD: 4.9 K/UL (ref 1.8–8)
NEUTS SEG NFR BLD: 76 % (ref 32–75)
NRBC # BLD: 0.03 K/UL (ref 0–0.01)
NRBC BLD-RTO: 0.5 PER 100 WBC
PHOSPHATE SERPL-MCNC: 3 MG/DL (ref 2.6–4.7)
PLATELET # BLD AUTO: 375 K/UL (ref 150–400)
PLATELET COMMENTS,PCOM: ABNORMAL
PMV BLD AUTO: 10.3 FL (ref 8.9–12.9)
POTASSIUM SERPL-SCNC: 4.1 MMOL/L (ref 3.5–5.1)
PROCALCITONIN SERPL-MCNC: <0.05 NG/ML
PROT SERPL-MCNC: 6.7 G/DL (ref 6.4–8.2)
RBC # BLD AUTO: 3.36 M/UL (ref 3.8–5.2)
RBC MORPH BLD: ABNORMAL
SODIUM SERPL-SCNC: 140 MMOL/L (ref 136–145)
WBC # BLD AUTO: 6.6 K/UL (ref 3.6–11)

## 2020-06-23 PROCEDURE — 83735 ASSAY OF MAGNESIUM: CPT

## 2020-06-23 PROCEDURE — 74011250637 HC RX REV CODE- 250/637: Performed by: NURSE PRACTITIONER

## 2020-06-23 PROCEDURE — 94640 AIRWAY INHALATION TREATMENT: CPT

## 2020-06-23 PROCEDURE — 85025 COMPLETE CBC W/AUTO DIFF WBC: CPT

## 2020-06-23 PROCEDURE — 74011000250 HC RX REV CODE- 250: Performed by: NURSE PRACTITIONER

## 2020-06-23 PROCEDURE — 84145 PROCALCITONIN (PCT): CPT

## 2020-06-23 PROCEDURE — 77030013140 HC MSK NEB VYRM -A

## 2020-06-23 PROCEDURE — 74011000258 HC RX REV CODE- 258: Performed by: HOSPITALIST

## 2020-06-23 PROCEDURE — 77010033678 HC OXYGEN DAILY

## 2020-06-23 PROCEDURE — 84100 ASSAY OF PHOSPHORUS: CPT

## 2020-06-23 PROCEDURE — 74011250637 HC RX REV CODE- 250/637: Performed by: ANESTHESIOLOGY

## 2020-06-23 PROCEDURE — 74011000258 HC RX REV CODE- 258: Performed by: NURSE PRACTITIONER

## 2020-06-23 PROCEDURE — 77030018861

## 2020-06-23 PROCEDURE — 36415 COLL VENOUS BLD VENIPUNCTURE: CPT

## 2020-06-23 PROCEDURE — 65660000000 HC RM CCU STEPDOWN

## 2020-06-23 PROCEDURE — 74011250636 HC RX REV CODE- 250/636: Performed by: HOSPITALIST

## 2020-06-23 PROCEDURE — 74011250636 HC RX REV CODE- 250/636: Performed by: ANESTHESIOLOGY

## 2020-06-23 PROCEDURE — 74011250636 HC RX REV CODE- 250/636: Performed by: NURSE PRACTITIONER

## 2020-06-23 PROCEDURE — 74011250637 HC RX REV CODE- 250/637: Performed by: INTERNAL MEDICINE

## 2020-06-23 PROCEDURE — 92526 ORAL FUNCTION THERAPY: CPT

## 2020-06-23 PROCEDURE — 80053 COMPREHEN METABOLIC PANEL: CPT

## 2020-06-23 PROCEDURE — 77030018798 HC PMP KT ENTRL FED COVD -A

## 2020-06-23 PROCEDURE — 82272 OCCULT BLD FECES 1-3 TESTS: CPT

## 2020-06-23 PROCEDURE — 77030018836 HC SOL IRR NACL ICUM -A

## 2020-06-23 PROCEDURE — 74011250637 HC RX REV CODE- 250/637: Performed by: PSYCHIATRY & NEUROLOGY

## 2020-06-23 PROCEDURE — 92507 TX SP LANG VOICE COMM INDIV: CPT

## 2020-06-23 RX ORDER — HYDRALAZINE HYDROCHLORIDE 50 MG/1
100 TABLET, FILM COATED ORAL 3 TIMES DAILY
Status: DISCONTINUED | OUTPATIENT
Start: 2020-06-23 | End: 2020-07-10 | Stop reason: HOSPADM

## 2020-06-23 RX ORDER — HYDRALAZINE HYDROCHLORIDE 20 MG/ML
10 INJECTION INTRAMUSCULAR; INTRAVENOUS
Status: DISCONTINUED | OUTPATIENT
Start: 2020-06-23 | End: 2020-06-23

## 2020-06-23 RX ORDER — HYDRALAZINE HYDROCHLORIDE 50 MG/1
50 TABLET, FILM COATED ORAL 3 TIMES DAILY
Status: DISCONTINUED | OUTPATIENT
Start: 2020-06-23 | End: 2020-06-23

## 2020-06-23 RX ADMIN — NIMODIPINE 60 MG: 30 SOLUTION ORAL at 16:21

## 2020-06-23 RX ADMIN — LEVETIRACETAM 1000 MG: 100 SOLUTION ORAL at 08:06

## 2020-06-23 RX ADMIN — CASTOR OIL AND BALSAM, PERU: 788; 87 OINTMENT TOPICAL at 17:37

## 2020-06-23 RX ADMIN — NIMODIPINE 60 MG: 30 SOLUTION ORAL at 09:45

## 2020-06-23 RX ADMIN — NIMODIPINE 60 MG: 30 SOLUTION ORAL at 22:20

## 2020-06-23 RX ADMIN — NIMODIPINE 60 MG: 30 SOLUTION ORAL at 18:37

## 2020-06-23 RX ADMIN — POTASSIUM BICARBONATE 40 MEQ: 782 TABLET, EFFERVESCENT ORAL at 08:06

## 2020-06-23 RX ADMIN — IRON SUCROSE 200 MG: 20 INJECTION, SOLUTION INTRAVENOUS at 09:45

## 2020-06-23 RX ADMIN — HYDRALAZINE HYDROCHLORIDE 100 MG: 50 TABLET, FILM COATED ORAL at 22:19

## 2020-06-23 RX ADMIN — IPRATROPIUM BROMIDE AND ALBUTEROL SULFATE 3 ML: .5; 3 SOLUTION RESPIRATORY (INHALATION) at 21:00

## 2020-06-23 RX ADMIN — NIMODIPINE 60 MG: 30 SOLUTION ORAL at 02:01

## 2020-06-23 RX ADMIN — SODIUM CHLORIDE, SODIUM LACTATE, POTASSIUM CHLORIDE, AND CALCIUM CHLORIDE 75 ML/HR: 600; 310; 30; 20 INJECTION, SOLUTION INTRAVENOUS at 14:46

## 2020-06-23 RX ADMIN — SODIUM CHLORIDE 12.5 MG/HR: 9 INJECTION, SOLUTION INTRAVENOUS at 19:56

## 2020-06-23 RX ADMIN — GLYCOPYRROLATE 0.2 MG: 0.2 INJECTION, SOLUTION INTRAMUSCULAR; INTRAVENOUS at 20:51

## 2020-06-23 RX ADMIN — SODIUM CHLORIDE 15 MG/HR: 9 INJECTION, SOLUTION INTRAVENOUS at 00:21

## 2020-06-23 RX ADMIN — SODIUM CHLORIDE 10 MG/HR: 9 INJECTION, SOLUTION INTRAVENOUS at 16:04

## 2020-06-23 RX ADMIN — ENOXAPARIN SODIUM 40 MG: 40 INJECTION SUBCUTANEOUS at 16:02

## 2020-06-23 RX ADMIN — AMLODIPINE BESYLATE 10 MG: 5 TABLET ORAL at 08:06

## 2020-06-23 RX ADMIN — Medication 2 DROP: at 22:21

## 2020-06-23 RX ADMIN — SODIUM CHLORIDE 10 MG/HR: 9 INJECTION, SOLUTION INTRAVENOUS at 10:20

## 2020-06-23 RX ADMIN — FENTANYL CITRATE 50 MCG: 50 INJECTION, SOLUTION INTRAMUSCULAR; INTRAVENOUS at 06:21

## 2020-06-23 RX ADMIN — Medication 2 DROP: at 06:33

## 2020-06-23 RX ADMIN — LACOSAMIDE 150 MG: 50 TABLET, FILM COATED ORAL at 20:44

## 2020-06-23 RX ADMIN — SODIUM CHLORIDE 15 MG/HR: 9 INJECTION, SOLUTION INTRAVENOUS at 03:30

## 2020-06-23 RX ADMIN — IPRATROPIUM BROMIDE AND ALBUTEROL SULFATE 3 ML: .5; 3 SOLUTION RESPIRATORY (INHALATION) at 07:00

## 2020-06-23 RX ADMIN — CASTOR OIL AND BALSAM, PERU: 788; 87 OINTMENT TOPICAL at 08:07

## 2020-06-23 RX ADMIN — FENTANYL CITRATE 50 MCG: 50 INJECTION, SOLUTION INTRAMUSCULAR; INTRAVENOUS at 18:07

## 2020-06-23 RX ADMIN — FENTANYL CITRATE 50 MCG: 50 INJECTION, SOLUTION INTRAMUSCULAR; INTRAVENOUS at 22:26

## 2020-06-23 RX ADMIN — CHLORHEXIDINE GLUCONATE 15 ML: 0.12 RINSE ORAL at 22:20

## 2020-06-23 RX ADMIN — NIMODIPINE 60 MG: 30 SOLUTION ORAL at 06:21

## 2020-06-23 RX ADMIN — LEVETIRACETAM 1000 MG: 100 SOLUTION ORAL at 20:47

## 2020-06-23 RX ADMIN — IPRATROPIUM BROMIDE AND ALBUTEROL SULFATE 3 ML: .5; 3 SOLUTION RESPIRATORY (INHALATION) at 13:13

## 2020-06-23 RX ADMIN — LACOSAMIDE 150 MG: 50 TABLET, FILM COATED ORAL at 08:06

## 2020-06-23 RX ADMIN — ASPIRIN 81 MG CHEWABLE TABLET 81 MG: 81 TABLET CHEWABLE at 08:06

## 2020-06-23 RX ADMIN — SODIUM CHLORIDE 15 MG/HR: 9 INJECTION, SOLUTION INTRAVENOUS at 07:11

## 2020-06-23 RX ADMIN — Medication 2 DROP: at 16:13

## 2020-06-23 RX ADMIN — CARVEDILOL 25 MG: 12.5 TABLET, FILM COATED ORAL at 08:06

## 2020-06-23 RX ADMIN — FENTANYL CITRATE 50 MCG: 50 INJECTION, SOLUTION INTRAMUSCULAR; INTRAVENOUS at 08:23

## 2020-06-23 RX ADMIN — POTASSIUM BICARBONATE 40 MEQ: 782 TABLET, EFFERVESCENT ORAL at 17:35

## 2020-06-23 RX ADMIN — HYDRALAZINE HYDROCHLORIDE 100 MG: 50 TABLET, FILM COATED ORAL at 17:35

## 2020-06-23 RX ADMIN — CARVEDILOL 25 MG: 12.5 TABLET, FILM COATED ORAL at 17:35

## 2020-06-23 RX ADMIN — CHLORHEXIDINE GLUCONATE 15 ML: 0.12 RINSE ORAL at 08:06

## 2020-06-23 RX ADMIN — SODIUM CHLORIDE, SODIUM LACTATE, POTASSIUM CHLORIDE, AND CALCIUM CHLORIDE 75 ML/HR: 600; 310; 30; 20 INJECTION, SOLUTION INTRAVENOUS at 00:33

## 2020-06-23 RX ADMIN — HYDRALAZINE HYDROCHLORIDE 10 MG: 20 INJECTION INTRAMUSCULAR; INTRAVENOUS at 02:01

## 2020-06-23 RX ADMIN — FENTANYL CITRATE 50 MCG: 50 INJECTION, SOLUTION INTRAMUSCULAR; INTRAVENOUS at 00:33

## 2020-06-23 RX ADMIN — IPRATROPIUM BROMIDE AND ALBUTEROL SULFATE 3 ML: .5; 3 SOLUTION RESPIRATORY (INHALATION) at 01:04

## 2020-06-23 RX ADMIN — HYDRALAZINE HYDROCHLORIDE 100 MG: 50 TABLET, FILM COATED ORAL at 08:06

## 2020-06-23 RX ADMIN — HYDRALAZINE HYDROCHLORIDE 10 MG: 20 INJECTION INTRAMUSCULAR; INTRAVENOUS at 05:18

## 2020-06-23 NOTE — PROGRESS NOTES
TRANSFER - IN REPORT:    Verbal report received from [facility] (name) on Uday Aguilar  being received from ICU (unit) for routine progression of care      Report consisted of patients Situation, Background, Assessment and   Recommendations(SBAR). Information from the following report(s) SBAR, Kardex, Procedure Summary, Intake/Output, MAR, Recent Results, Cardiac Rhythm NSR and Dual Neuro Assessment was reviewed with the receiving nurse. Opportunity for questions and clarification was provided. Assessment completed upon patients arrival to unit and care assumed.

## 2020-06-23 NOTE — PROGRESS NOTES
Physical Therapy Note  06/23/2020    Chart reviewed. Attempted to see pt however currently having a bedside meeting. Will continue to follow.     Gabrielle Carson, PT, DPT

## 2020-06-23 NOTE — PROGRESS NOTES
[facility]     [facility] Transfer/Accept Summary     This [place] being [place] [address] [place]  DATE OF TRANSFER: 6/23/2020       PATIENT ID: Kailyn Esqueda  MRN: [id number]   [facility]: 1970    PRIMARY CARE PROVIDER: Sterling Chaparro MD   DATE OF ADMISSION: 6/4/2020  8:08 PM    ATTENDING PHYSICIAN: Luiz Willard MD  CONSULTATIONS:   IP CONSULT TO NEUROLOGY  IP CONSULT TO THORACIC SURGERY  IP CONSULT TO INTERVENTIONAL RADIOLOGY    PROCEDURES/SURGERIES:   * No surgery found *    REASON FOR ADMISSION: <principal problem not specified>     HOSPITAL PROBLEM LIST:  Patient Active Problem List   Diagnosis Code    [facility] I60.9    Subarachnoid hemorrhage from middle cerebral artery aneurysm, left (HCC) I60.12    RIGHT middle cerebral artery aneurysm I67.1    Cerebral vasospasm I67.848    Respiratory failure ([facility] J96.90    Hypoxic ischemic encephalopathy (HIE), unspecified severity P91.60         Brief HPI and Hospital Course:      Kailyn Esqueda is a 14-year-old female with a past medical history of HTN. According to patient's mother, the patient began having a headache on 5/31/20 and had been taking BC Powder with aspirin in it for the headache. She recently stopped taking her BP medications. According to her mother she does not smoke, drink alcohol or use recreational drugs. The patient was brought to the [facility] ER via EMS on 6/4/420  after her coworkers found her unresponsive outside of her place of work. 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 On arrival to the ER she was found to be seizing. A stat CT of her head was performed which showed extensive subarachnoid hemorrhage. She was extremely hypertensive in the 240's and was given labetalol and started on a Cardene drip. She was emergently intubated and given Keppra and Ativan as well.  Neurosurgery and Neurointerventional Surgery were consulted and the patient was transferred [facility] for a higher level of care. On arrival to [facility] a CTA of her head and neck was obtained which showed a 4.4 x 4.1 mm right MCA trifurcation aneurysm and a 2.2 x 3.1 mm left MCA trifurcation aneurysm and possible less than 2 mm ACOM aneurysms. Extensive lung infiltrates were also noted. Patient was taken for cerebral angiogram the morning of 06/05/20 and coil embolization of the left MCA aneurysm was completed. The right MCA aneurysm had a very wide neck incorporating both M2s. It could not be treated endovascularly without stents. May need to be clipped by Neurosurgery.      Procedures: 06/05/20 and coil embolization of the left MCA aneurysm was completed s/p cerebral angiogram for right PANCHITO A1 angioplasty for vasospasm, transarterial infusion of verapamil on 6/9,  S/p angiogram on 6/10 for left ICA/MCA angioplasty and transarterial infusion of verapamil for vasospasm treatment, s/p angiogram on 6/11 and 6/13 for transarterial infusion of verapamil for vasospasm treatment. Tracheostomy placement  6/16 by Dr. Risa Nguyen in thoracic surgery. PEG placement.     Assessment and Plan:    SAH due to ruptured cerebral aneurysm   -s/p cerebral angiogram with coil embolization of left MCA bifurcation aneurysm on 6/5, patient has 5X3 right MCA bifurcation aneurysm with very wide neck which will need to be treated with stents vs. Clipping  -on Nimotop, normal saline   -on cardene gtt, norvasc, hydralazine, goal -160  -seizure prophylaxis keppra  -patient open her right eye to voice, non verbal, doesn't follows command, moves right extremities spontaneously   -continue neuro check, serial BP monitoring   -improved vasospasm since 6/20  -seen by neurosurgeon   -Neurointerventional surgical service on board    Seizure secondary to above   -no seizure overnight   -continue keppra,MATEOT  -seizure precaution   -Neurologist on board    Ischemic stroke   -continue aspirin   -patient with multiple ischemic infarct   -MRI on 6/16  -continue PT/OT  -neurology on board    Respiratory failure secondary to above, pulmonary edema and possible pneumonia  -s/p trach/trach collar   -continue duo neb  -sputum cx grow klebsiella pneumonia  -chest x ray on 6/19 vague left lower lobe airspace process, suggesting subsegmental  atelectasis or pneumonia. Interval exchange of endotracheal tube for tracheostomy  -afebrile and no leukocytosis, blood cx no growth  -antibiotics stopped by intensivist  -monitor pulse ox    Dysphagia secondary to stroke and SAH  -s/p PEG tube, continue tube feeding     Iron deficiency anemia   -noted fibroid  -Hgb trending down, Hgb 7.8, monitor H/H    -iron  Level was 10, iron saturation 3, iron sucrose 200 mg iv x 3 doses   -check stool for hem ocult     Peripheral edema multifactorial  -low albumin 2.6  -Echo normal cavity size and systolic function (ejection fraction normal). Mild concentric hypertrophy. Estimated left ventricular ejection fraction is 60 - 65%. No regional wall motion abnormality noted. -If it gets worse , may hold IVF    Obesity   -diet control   -A1c 5.7    Full Code: high risk for decompensation, inpatient mortality, consult to palliative care team       Code status: Full Code   DVT prophylaxis: Lovenox     Care Plan discussed with: Patient/Family and Nurse  Anticipated Disposition: Rehab  Anticipated Discharge: more than 48 hours           PHYSICAL EXAMINATION:  Visit Vitals  /63   Pulse 71   Temp 98.5 °F (36.9 °C)   Resp 17   Ht 5' 4\" (1.626 m)   Wt 100.6 kg (221 lb 12.5 oz)   SpO2 100% Comment: Simultaneous filing. User may not have seen previous data. BMI 38.07 kg/m²       General:          Closed her eyes, not in cardiorespiratory distress  HEENT:           Doesn't open her left eye, left pupils > right, reacts to light          Neck:               Tracheostomy/Trachcollar in place   Lungs:             Clear to auscultation bilaterally. No Wheezing or Rhonchi.  No rales.  Heart:              Regular  rhythm,  No  murmur   No edema  Abdomen:       Obese, soft non-tender. Not distended. Bowel sounds normal  Extremities:     Bilateral pretibial and pedal edema   Skin:                Not pale. Not Jaundiced  No rashes   Psych:             Not agitated.   Neurologic:      Open her right eye on voice, non verbal, doesn't follow command, left hemiplegia, moves right extremities spontaneously     CODE STATUS:  X Full Code    DNR    Partial    Comfort Care     Signed:   Ran Johnson MD  Date of Service:  6/23/2020  8:05 AM

## 2020-06-23 NOTE — PROGRESS NOTES
SLP Contact Note    SLP treatment complete. Pt did have acceptance of ice chip on this date and minimal manipulation, but not full oral transport and no swallow initiation. Pt continues to be unable to tolerate PMV. Suspect that she requires trach downsize prior to [facility] placement. Full note to follow.       Thank you,  TEVIN BairdEd, [address] [facility], [place], operated by [facility]  Speech-Language Pathologist

## 2020-06-23 NOTE — ADVANCED PRACTICE NURSE
[address]  Jose 07, [address], [address]  [phone]  ____________________________________________________________    Visited patient at bedside in ICU this morning. Tracheostomy placed 1 week ago by Dr. Fariha Lee. Trach site found to be clean & dry. Using sterile instruments, 2 blue nylon sutures removed with ease. Patient tolerated the procedure well. No immediate complications.     Signed By: ALE Perez     June 23, 2020

## 2020-06-23 NOTE — PROGRESS NOTES
Accompanied Dr. Dyan Sanchez and Sesar Ramires, MSW visit with pt. Dr. Dyan Sanchez spoke with pt, who made eye contact and some head nodding to him. Unclear as to what she actually understood. Chaplains will continue to offer support.    Chaplain Wallace, MDiv, MS, [facility]  [phone] ([phone])

## 2020-06-23 NOTE — PROGRESS NOTES
1930: Bedside and Verbal shift change report given to [address] (oncoming nurse) by [address] (offgoing nurse). Report included the following information SBAR, Kardex, ED Summary, Intake/Output, MAR, Recent Results, Cardiac Rhythm SR, Alarm Parameters  and Dual Neuro Assessment. Shift summary: Pt opens eyes to voice, JOLLY spontaneously but no command following, on trach collar. PRN hydralazine given 3x to maintain SBP <160. PRN fentanyl given per [facility] for pain. Cardene at 15mg/hr. 0730: Bedside and Verbal shift change report given to [address] (oncoming nurse) by [address] (offgoing nurse). Report included the following information SBAR, Kardex, ED Summary, Intake/Output, MAR, Recent Results, Cardiac Rhythm SR and Alarm Parameters .

## 2020-06-23 NOTE — PROGRESS NOTES
Palliative Medicine  [place]: [phone])  [facility]: [phone])    Consult received, chart reviewed    Code Status: Full Code    Advance Care Planning:  Advance Care Planning 6/5/2020   Patient's Healthcare Decision Maker is: Legal Next of Kin   Confirm Advance Directive None   Patient Would Like to Complete Advance Directive Unable     Primary Decision Maker: Arthuro Fear - Mother, Sherri All - [phone]      Mohsen Vaughn is a 80-year-old female with a PMH of HTN, obesity. Pt. presented to [facility] ER via EMS on 6/4/420  after her coworkers found her unresponsive outside of her place of work. Per mother, she had recently stopped taking her BP medications. On arrival to the ER she was found to be seizing. A CT of her head showed extensive SAH due to ruptured cerebral aneurysm. She was extremely hypertensive in the 240's and was given labetalol and started on a Cardene drip. She was emergently intubated, given Keppra and Ativan. Neurosurgery and Neurointerventional Surgery were consulted and the patient was transferred to [facility]. Repeat CTH/CTA at [facility] showed an extensive subarachnoid hemorrhage concerning for leaking/ruptured aneurysm without hydrocephalus. 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 Procedures:   6/05/20 coil embolization of the left MCA aneurysm   6/9/20 cerebral angiogram for right PANCHITO A1 angioplasty for vasospasm, transarterial infusion of verapamil  6/10/20 angiogram for left ICA/MCA angioplasty and transarterial infusion of verapamil for vasospasm treatment   6/11, 6/13 angiogram for transarterial infusion of verapamil for vasospasm treatment. 6/16 Tracheostomy, PEG . Patient tx out of ICU 6/23, currently on trach collar, on Cardene gtt for B/P. Day 19/21 for Nimotop. Patient is awake, alert, not following commands, non verbal, moves right extremities spontaneously.  PT/OT following - currently Total A, no command following. SLP following - currently NPO d/t dysphagia, tube feeds    D/c Plan: Referral made to [facility], will need insurance auth.       Social: pt lives in [place] with 15 yo son and a friend; works full time at [employer], mother Ramiro Navarro is [employer]  Spiritual: Yazidi per chart  Baseline: independent ADL, iADL     Palliative care consult for care decisions in setting of SAH/high risk for decompensation. Thank you for the opportunity to be involved in the care of Ms. Jessica Segura and her family.     Saira Lee, MOSES, Supervisee in Social Work  Palliative Medicine   [phone]

## 2020-06-23 NOTE — PROGRESS NOTES
0730: Bedside and Verbal shift change report given to Benton Robertson RN (oncoming nurse) by Dorcas Delaney RN (offgoing nurse). Report included the following information SBAR, Kardex, ED Summary, OR Summary, Intake/Output, MAR, Accordion, Recent Results, Med Rec Status, Cardiac Rhythm NSR, Alarm Parameters  and Dual Neuro Assessment.

## 2020-06-23 NOTE — PROGRESS NOTES
Music Therapy Assessment  . [address] “” [place] [id number]     1970  48 y.o.  female    Patient Telephone Number: [phone] (home)   Evangelical Affiliation: [facility]   Language: English   Patient Active Problem List    Diagnosis Date Noted    Res[facility] failure ([facility] 06/15/2020    Hypoxic ischemic encephalopathy (HIE), unspecified severity 06/15/2020    SAH (subarachnoid hemorrhage) ([facility] 06/04/2020    Subarachnoid hemorrhage from middle cerebral artery aneurysm, left ([facility] 06/04/2020    RIGHT middle cerebral artery aneurysm 06/04/2020    Cerebral vasospasm 06/04/2020        Date: 6/23/2020            Total Time (in minutes): 20          [facility] 7S1 INTENSIVE CARE    Mental Status:   [  ] Alert [  ] Eluterio Agustin [  ]  Confused  [ X] Minimally responsive  [  ] Sleeping    Communication Status: [  ] Impaired Speech [ X] Nonverbal     Physical Status:   Jerae.Bue ] Dependent on others for all care; unable to move or communicate purposefully;    Music Preferences, Background: _Spiritual music CD was found at bedside;     Clinical Problem addressed: Provide comfort and spiritual support    Goal(s) met in session:  [  ] Increased relaxation   [  ] Affected breathing patterns  [  ] Decreased muscle tension   [  ] Decreased agitation  [  ] Affected heart rate    Jerae.Bue ] Increased alertness     Emotional/Psychological:  [  ] Increased self-expression   [  ] Decreased aggressive behavior   [  ] Decreased feelings of stress  [  ] Discussed healthy coping skills     [  ] Improved mood    [  ] Decreased withdrawn behavior     Social:  [  ] Decreased feelings of isolation/loneliness [  ] Positive social interaction   [  ] Provided support and/or comfort for family/friends    Spiritual:  Papa.Bue ] Spiritual support    [  ] Expressed peace  [  ] Expressed vane    [  ] Discussed beliefs    Techniques Utilized (Check all that apply):   [  ] Procedural support MT [  ] Music for relaxation [  ] Patient preferred music  [  ] Rylee analysis  [  ] Song choice  [  ] Music for validation  [  ] Entrainment  [  ] Movement to music [  ] Guided visualization  [  ] Jennie Sensing  [  ] Patient instrument playing [  ] Susan Colon writing  [  ] Femi Hartmann along   [  ] Improvisation  Pagett.Citron ] Sensory stimulation  [X] Active Listening  Pagett.Citron ] Music for spiritual support [  ] Making of CDs as gifts    Session Observations:  Kailyn Esqueda was referred to music therapy by Fatuma Valero, to provide comfort, spiritual care and sensory stimulation. Erinn made eye contact with this therapist using her right eye, with her left eye closed, with paralysis on the left side of her face and body. She was provided live singing and guitar music, and followed with eye gaze. Ms. Imelda Haro pressed her lips together several times, and moved her legs up and down- her right arm was in a restraint, and she did not seem to have purposeful movements. Songs of comfort and vane were sung, and the CD in the room was turned on at the end of the session, to provide an environment with familiar and uplifting sounds. Thank you for the opportunity to provide music therapy to Kailyn Esqueda. Music therapy will follow as able.   [place], [place], [facility]   Music [address] Certified  Spiritual Care Services  Referral- based service

## 2020-06-23 NOTE — CONSULTS
Palliative Medicine Consult  [place]: [phone] ([phone])    Patient Name: Nayan Carranza  YOB: 1970    Date of Initial Consult: 6/23/2020  Reason for Consult: Care decisions  Requesting Provider: Dr. Edith Nava  Primary Care Physician: Jossy Amanda MD     SUMMARY:   Nayan Carranza is a 48 y.o. with a past history of hypertension who was admitted on 6/4/2020 from home with a diagnosis of large subarachnoid hemorrhage secondary cerebral aneurysm, hypertensive emergency current medical issues leading to Palliative Medicine involvement include: Care decisions    Chart reviewedpatient is a 58-year-old female with a history of hypertension who apparently had stopped her medication several weeks prior to admission to the hospital.  She had increasing headache for several days and then collapsed at work. She is found to have a large subarachnoid hemorrhage on evaluation in [place]. She was transferred to [facility] for further management. 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 She also is found to have extensive lung infiltrates. She underwent cerebral angiography on the morning of 6/5 and coil embolization of the left MCA aneurysm was completed. The right MCA aneurysm was unable to be treated endovascularly. She also completed cerebral angiography right PANCHITO angioplasty for vasospasm, transarterial infusion of verapamil on 6/9. Status post angiogram on 6/10 for left ICA MCA angioplasty with transarterial infusion of verapamil for vasospasm treatment. She had trach and PEG placed on 6/16. Neurologically, patient appears to open her right eye to voice but remains nonverbal, does not follow commands, seems to move her right extremity at times spontaneously.   Patient has been evaluated for [place] but remains on Cardene drip.  Our team is been asked to see her for goals of care. Social history patient has a 15year-old son. 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 She is . Her mother lives in [place]. She does have a brother VIMAL GERMAIN who lives in [place] as well. PALLIATIVE DIAGNOSES:   1. Goals of care discussion  2. Status post subarachnoid hemorrhage secondary to aneurysm rupture with significant neurological injury  3. Advance care planning review  4. Status post trach/PEG  5. Debility       PLAN:   1. Tor Louise, palliative medicine , Vicki Carlton, and I met with patient briefly this morning. Once again, patient unable to participate in any discussion. Right eye is open and does appear to track at times but certainly not following any commands. 2. Later, Tor Louise palliative medicine  and I met with patient's mom in the room. Reviewed the role of palliative medicine and then discussed the current medical issues. Patient's mom clearly did not fully grasp the severity of her daughter's neurological injury. She has several times when they will get her up to [facility] \". She asked if she will be able to return to work and was asking about next steps as far as rehab. We had an open and honest discussion with patient's mom that we are not sure she will have significant recovery. The reality may be this is her new baseline. Obviously, we are hoping that she will show improvement in some recovery over the next several weeks to months with rehabilitation but there certainly is no guarantee. Her mom became very tearful and also very worried about what to do as far as finances for patient's son. We reviewed potential next steps which could include [facility] and then further rehabilitation from there. By the end of the discussion, she seemed to have a better understanding of potential different scenarios going forward.   She would like for us to have a discussion with patient's brother and aunt altogether. We will plan on talking via phone with everybody on Thursday at 3 PM.  Zak Mays will check in with patient's mother tomorrow. 3. Goals of carecontinue attempts at full restorative measures. Ongoing discussions about goals will need to be had. 4. Advance care planningno advanced medical directive in the chart. Patient's mother is legal next of kin and hence would serve as medical power of   5. CODE STATUS did not even address this today. Patient will remain full code  6. Symptom management no acute symptoms for us to manage. 7. Psychosocialvery difficult situation as patient is very young but with a devastating neurological injury. Also with a young family. Patient's mother clearly will need ongoing support. 8. Reviewed with Zak Mays, palliative medicine . 9. Initial consult note routed to primary continuity provider and/or primary health care team members  10. Communicated plan of care with: Palliative [facility] Team     GOALS OF CARE / TREATMENT PREFERENCES:     GOALS OF CARE:  Patient/Health Care Proxy Stated Goals: Prolong life    TREATMENT PREFERENCES:   Code Status: Full Code    Advance Care Planning:  [x] The [facility] Interdisciplinary Team has updated the ACP Navigator with Health Care Decision Maker and Patient Capacity      Primary Decision Maker: Gallito Silverman - Zeeshan Delgado - [phone]  Advance Care Planning 6/5/2020   Patient's [place] is: Legal Next of [facility] Directive None   Patient Would Like to Complete Advance Directive Unable       Medical Interventions: Full interventions     Other Instructions:   Artificially Administered Nutrition: Feeding tube long-term, if indicated     Other:    As far as possible, the palliative care team has discussed with patient / health care proxy about goals of care / treatment preferences for patient.      HISTORY:     History obtained from: Chart, mother    CHIEF COMPLAINT: Altered mental status    HPI/SUBJECTIVE:    The patient is:   [] Verbal and participatory  [x] Non-participatory due to:   Nonverbal after subarachnoid hemorrhage    Clinical Pain Assessment (nonverbal scale for severity on nonverbal patients):   Clinical Pain Assessment  Severity: 0     Activity (Movement): Lying quietly, normal position    Duration: for how long has pt been experiencing pain (e.g., 2 days, 1 month, years)  Frequency: how often pain is an issue (e.g., several times per day, once every few days, constant)     FUNCTIONAL ASSESSMENT:     Palliative Performance Scale (PPS):  PPS: 40       PSYCHOSOCIAL/SPIRITUAL SCREENING:     Palliative IDT has assessed this patient for cultural preferences / practices and a referral made as appropriate to needs (Cultural Services, Patient Advocacy, Ethics, etc.)    Any spiritual / Amish concerns:  [] Yes /  [x] No    Caregiver Burnout:  [] Yes /  [x] No /  [] No Caregiver Present      Anticipatory grief assessment:   [x] Normal  / [] Maladaptive       ESAS Anxiety:    Unable to assess  ESAS Depression:     Unable to assess     REVIEW OF SYSTEMS:     Positive and pertinent negative findings in ROS are noted above in HPI. The following systems were [x] reviewed / [] unable to be reviewed as noted in HPI  Other findings are noted below. Systems: constitutional, ears/nose/mouth/throat, respiratory, gastrointestinal, genitourinary, musculoskeletal, integumentary, neurologic, psychiatric, endocrine. Positive findings noted below.   Modified ESAS Completed by: provider   Fatigue: 3 Drowsiness: 2     Pain: 0     Nausea: 0   Anorexia: 0 Dyspnea: 0     Constipation: No     Stool Occurrence(s): 1        PHYSICAL EXAM:     From RN flowsheet:  Wt Readings from Last 3 Encounters:   06/23/20 197 lb 15.6 oz (89.8 kg)   06/19/20 237 lb 7 oz (107.7 kg)   06/10/20 239 lb 6.7 oz (108.6 kg)     Blood pressure 155/52, pulse 68, temperature 99.1 °F (37.3 °C), resp. rate 14, height 5' 4\" (1.626 m), weight 197 lb 15.6 oz (89.8 kg), SpO2 100 %. Pain Scale 1: Adult Nonverbal Pain Scale  Pain Intensity 1: 0  Pain Onset 1: yes           Pain Intervention(s) 1: Medication (see MAR)  Last bowel movement, if known:     Constitutional: Right eye open, appears to track, nonverbal  Eyes: Right eyes open, ptosis of the left eye  ENMT: no nasal discharge, moist mucous membranes, trach in place  Cardiovascular: regular rhythm, distal pulses intact  Respiratory: breathing not labored, symmetric  Gastrointestinal: soft non-tender, +bowel sounds, PEG noted  Musculoskeletal: no deformity, no tenderness to palpation  Skin: warm, dry  Neurologic: Not following commands  Psychiatric: Slightly restless  Other:       HISTORY:     Active Problems:    SAH (subarachnoid hemorrhage) ([address] (6/4/2020)      Subarachnoid hemorrhage from middle cerebral artery aneurysm, left ([address] (6/4/2020)      RIGHT middle cerebral artery aneurysm (6/4/2020)      Cerebral vasospasm (6/4/2020)      Respiratory failure ([address] (6/15/2020)      Hypoxic ischemic encephalopathy (HIE), unspecified severity (6/15/2020)      Past Medical History:   Diagnosis Date    HTN (hypertension)       Past Surgical History:   Procedure Laterality Date    IR INSERT GASTROSTOMY TUBE PERC  6/19/2020      History reviewed. No pertinent family history. History reviewed, no pertinent family history.   Social History     Tobacco Use    Smoking status: Never Smoker    Smokeless tobacco: Never Used   Substance Use Topics    Alcohol use: Not Currently     No Known Allergies   Current Facility-Administered Medications   Medication Dose Route Frequency    hydrALAZINE (APRESOLINE) tablet 100 mg  100 mg Oral TID    iron sucrose (VENOFER) 200 mg in 0.9% sodium chloride 100 mL IVPB  200 mg IntraVENous Q24H    fentaNYL citrate (PF) injection 50 mcg  50 mcg IntraVENous Q2H PRN    hydrALAZINE (APRESOLINE) 20 mg/mL injection 10 mg 10 mg IntraVENous Q2H PRN    chlorhexidine (ORAL CARE KIT) 0.12 % mouthwash 15 mL  15 mL Oral Q12H    niCARdipine (CARDENE) 50 mg in 0.9% sodium chloride 100 mL infusion  0-15 mg/hr IntraVENous TITRATE    carvediloL (COREG) tablet 25 mg  25 mg Oral BID WITH MEALS    amLODIPine (NORVASC) tablet 10 mg  10 mg Per NG tube DAILY    potassium bicarb-citric acid (EFFER-K) tablet 40 mEq  40 mEq Oral BID    lacosamide (VIMPAT) tablet 150 mg  150 mg Per NG tube Q12H    lactated Ringers infusion  75 mL/hr IntraVENous CONTINUOUS    balsam peru-castor oiL (VENELEX) ointment   Topical BID    aspirin chewable tablet 81 mg  81 mg Per NG tube DAILY    enoxaparin (LOVENOX) injection 40 mg  40 mg SubCUTAneous Q24H    levETIRAcetam (KEPPRA) oral solution 1,000 mg  1,000 mg Per NG tube Q12H    glycopyrrolate (ROBINUL) injection 0.2 mg  0.2 mg IntraMUSCular TID PRN    niMODipine (NYMALIZE) 6 mg/mL oral solution 60 mg  60 mg Per NG tube Q4H    acetaminophen (TYLENOL) solution 650 mg  650 mg Per NG tube Q4H PRN    albuterol-ipratropium (DUO-NEB) 2.5 MG-0.5 MG/3 ML  3 mL Nebulization Q6H RT    glucose chewable tablet 16 g  4 Tab Oral PRN    glucagon (GLUCAGEN) injection 1 mg  1 mg IntraMUSCular PRN    dextrose 10% infusion 0-250 mL  0-250 mL IntraVENous PRN    albuterol (PROVENTIL VENTOLIN) nebulizer solution 2.5 mg  2.5 mg Nebulization Q6H PRN    polyvinyl alcohol-povidone (NATURAL TEARS) 0.5-0.6 % ophthalmic solution 2 Drop  2 Drop Both Eyes Q8H    bisacodyL (DULCOLAX) suppository 10 mg  10 mg Rectal DAILY PRN    ondansetron (ZOFRAN) injection 4 mg  4 mg IntraVENous Q4H PRN    docusate (COLACE) 50 mg/5 mL oral liquid 100 mg  100 mg Oral BID PRN          LAB AND IMAGING FINDINGS:     Lab Results   Component Value Date/Time    WBC 6.6 06/23/2020 04:14 AM    HGB 7.8 (L) 06/23/2020 04:14 AM    PLATELET 399 15/53/7272 04:14 AM     Lab Results   Component Value Date/Time    Sodium 140 06/23/2020 04:14 AM    Potassium 4.1 06/23/2020 04:14 AM    Chloride 111 (H) 06/23/2020 04:14 AM    CO2 23 06/23/2020 04:14 AM    BUN 19 06/23/2020 04:14 AM    Creatinine 0.55 06/23/2020 04:14 AM    Calcium 9.1 06/23/2020 04:14 AM    Magnesium 2.4 06/23/2020 04:23 AM    Phosphorus 3.0 06/23/2020 04:23 AM      Lab Results   Component Value Date/Time    Alk. phosphatase 86 06/23/2020 04:14 AM    Protein, total 6.7 06/23/2020 04:14 AM    Albumin 2.6 (L) 06/23/2020 04:14 AM    Globulin 4.1 (H) 06/23/2020 04:14 AM     Lab Results   Component Value Date/Time    INR 1.1 06/08/2020 12:44 AM    Prothrombin time 11.0 06/08/2020 12:44 AM      Lab Results   Component Value Date/Time    Iron 10 (L) 06/06/2020 04:21 AM    TIBC 376 06/06/2020 04:21 AM    Iron % saturation 3 (L) 06/06/2020 04:21 AM    Ferritin 44 06/13/2020 05:43 PM      No results found for: PH, PCO2, PO2  No components found for: GLPOC   No results found for: CPK, CKMB             Total time: 70  Counseling / coordination time, spent as noted above: 65  > 50% counseling / coordination?: yes    Prolonged service was provided for  []30 min   []75 min in face to face time in the presence of the patient, spent as noted above. Time Start:   Time End:   Note: this can only be billed with 84672 (initial) or 29883 (follow up). If multiple start / stop times, list each separately.

## 2020-06-23 NOTE — PROGRESS NOTES
Neurointerventional Surgery Progress Note  Katty Leigh NP   Neurocritical Care Nurse Practitioner  [phone]      Admit Date: 6/4/2020        Daily Progress Note: 6/23/2020   LOS: 19 days      S/P:  POD 17 Cerebral angiogram with coil embolization of ruptured left MCA bifurcation aneurysm on 6/5/2020    ** Delayed Presentation ** PB Day 23    Interval History/Subjective:   Patient currently on trach collar. Widened BP parameters 110-160. Neuro exam is unchanged. Now on 10 mg/hr of Cardene. On three PO BP meds ( coreg, norvasc and hydralazine). Weaning cardene as able. Ok with transfer to [facility] with hospitalist service. Palliative care consulted today by hospitalist for goals of care discussions. Patient remains unable to vocalize or follow any commands. Unable to perform a ROS due to AMS. Assessment & Plan: Active Problems:    SAH (subarachnoid hemorrhage) ([address] (6/4/2020)      Subarachnoid hemorrhage from middle cerebral artery aneurysm, left (HCC) (6/4/2020)      RIGHT middle cerebral artery aneurysm (6/4/2020)      Cerebral vasospasm (6/4/2020)      Respiratory failure ([address] (6/15/2020)      Hypoxic ischemic encephalopathy (HIE), unspecified severity (6/15/2020)      1.) SAH due to ruptured cerebral aneurysm, [place] 5, An Grade 3/4              - s/p cerebral angiogram with coil embolization of left MCA bifurcation aneurysm on 6/5       Additionally, patient has 5X3 right MCA bifurcation aneurysm with very wide neck which will               need to be treated with stents vs. Clipping              - Continue Nimotop Day 19 of 21 to prevent delayed cerebral ischemia              - change maintenance fluids to LR at 75 ml/hr to maintain euvolemia              - Strict I's and O's, patient slightly positive overnight with I's and O's              - Last TCDs on 6/20 showed continued possible left PCA vasospasm, no evidence of vasospasm in the remaining intracranial vessels bilaterally. Continue to monitor clinically.  Patient essentially out of vasospasm window               - ECHO shows EF 60-65%, mild concentric hypertrophy, mildly dilated left atrium              - ok with neuro checks every 2 hours during the day and every 4 hours at night               - change SBP goal to 110-160 in the setting of unsecured aneurysm in the right MCA,               - PT/OT/SLP evals    2.) Seizure              - Due to #1, no prior hx of seizure              - Continue Keppra 1000 mg BID               - continue Vimpat & Keppra               - Seizure precautions     3.) Cerebral Edema (resolved)    4.) Acute hypoxic respiratory failure in the setting of likely neurogenic pulmonary edema- resolved              - currently on trach collar, tolerating well              - chest XR on 6/19 Vague left lower lobe airspace process, suggesting subsegmental  atelectasis or pneumonia             - Intensivist managing     5.) Multiple Acute Ischemic Infarctions                - Imaging pattern is consistent with Hypoxic-Ischemic Encephalopathy (HIE)                - Continue aspirin 81 mg daily for stroke prevention           6.) Cerebral Vasospasm- resolved                  - s/p cerebral angiogram for right PANCHITO A1 angioplasty for vasospasm, transarterial infusion of verapamil on 6/9,  S/p angiogram on 6/10 for left ICA/MCA angioplasty and transarterial infusion of verapamil for vasospasm treatment, s/p angiogram on 6/11 and 6/13 for transarterial infusion of verapamil for vasospasm treatment                7.) Fever --> resolved                 - completed course of Zosyn, WBC normal                - respiratory cultures growing Klebsiella Pneumonia and Citrobacter Kos[place] 6/17                - CTA of chest 6/7 negative for PE                 - Cefepime started on 6/19 to cover Citrobacter per Intensivist, discontinued today as respiratory specimen is likely a contaminant per Intensivist                - Intensivist following     8.) Polyuria                  - serum sodium stable at 140, electrolytes WNL                  - urine studies showed serum osmolality 296, urine osmolality 322, specific gravity normal at 1.015. Urine random sodium at 114                  - suspect patient is auto-diuresing due to recent issues with fluid overload                  - discontinue Florinef 0.1 mg BID for prevention of cerebral salt wasting                  - monitor BMP daily    9.) HTN                  - change SBP goal to 110-160                  - continue Amlodipine 10 mg daily and coreg 25 mg BID                  - add Hydralazine 25 mg TID per Intensivist                  - wean Cardene, Hydralazine PRN                   - Intensivist following       Activity: Bed rest  DVT ppx: SCDs, Lovenox  Disposition: TBD    Plan d/w Roly Baker, NP Intensivist, and RN. Admission Summary:     Ghanshyam Larson is a 48 y.o. 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 On arrival to ED, pt was unresponsive and noted to be seizing. A stat CT of her head was performed there, which showed extensive SAH. Pt was then intubated emergently. She was also extremely hypertensive with SBPs in 240s. She was given Keppra and Ativan, and placed on cardene drip for BP control. NSGY and NIS were then consulted and the patient was [facility] for higher level of care. En route, paramedics administered 7.5 mg of Versed, 200mcg of Fentanyl, and 10 mg of labetalol for BP control. On arrival, CTA of her head and neck was obtained, which showed a 4.4 x 4.1 mm right MCA trifurcation aneurysm and  a 2.2 x 3.1 mm left MCA trifurcation aneurysm. Of note, CTA head/neck also showed pulmonary infiltrates suspicious for COVID-19 infection and the patient has been tested for COVID which was negative.  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 Mother reported pt does not smoke, does not drink alcohol or use street drugs. The mother also reported that the patient began complaining of a headache on 5/31/2020 and has been taking BC Powder (aspirin) for the headache. She also reported that the pt has a hx of HTN and is supposed to be on BP medications, but recently stopped taking her medications. The patient underwent a cerebral angiogram on 6/5 by Dr. Lafrances Eisenmenger for coil embolization of a ruptured left MCA bifurcation aneurysm.     Current Facility-Administered Medications   Medication Dose Route Frequency Provider Last Rate Last Dose    hydrALAZINE (APRESOLINE) tablet 100 mg  100 mg Oral TID Socorro Sterling NP   100 mg at 06/23/20 0806    iron sucrose (VENOFER) 200 mg in 0.9% sodium chloride 100 mL IVPB  200 mg IntraVENous Q24H Samantha Kimble  mL/hr at 06/23/20 0945 200 mg at 06/23/20 0945    fentaNYL citrate (PF) injection 50 mcg  50 mcg IntraVENous Q2H PRN Nas Jaquez NP   50 mcg at 06/23/20 9469    hydrALAZINE (APRESOLINE) 20 mg/mL injection 10 mg  10 mg IntraVENous Q2H PRN Shannan Austin NP   10 mg at 06/23/20 0518    chlorhexidine (ORAL CARE KIT) 0.12 % mouthwash 15 mL  15 mL Oral Q12H Socorro Sterling NP   15 mL at 06/23/20 0806    niCARdipine (CARDENE) 50 mg in 0.9% sodium chloride 100 mL infusion  0-15 mg/hr IntraVENous TITRATE Anaid Avery NP 20 mL/hr at 06/23/20 1020 10 mg/hr at 06/23/20 1020    carvediloL (COREG) tablet 25 mg  25 mg Oral BID WITH MEALS Katharine Ireland MD   25 mg at 06/23/20 0806    amLODIPine (NORVASC) tablet 10 mg  10 mg Per NG tube DAILY Katharine Ireland MD   10 mg at 06/23/20 0806    potassium bicarb-citric acid (EFFER-K) tablet 40 mEq  40 mEq Oral BID Katharine Ireland MD   40 mEq at 06/23/20 0806    lacosamide (VIMPAT) tablet 150 mg  150 mg Per NG tube Q12H Errol Palma MD   150 mg at 06/23/20 8601    lactated Ringers infusion  75 mL/hr IntraVENous CONTINUOUS Mamta Avery NP 75 mL/hr at 06/23/20 0033 75 mL/hr at 06/23/20 0033    balsam peru-castor oiL (VENELEX) ointment   Topical BID Leon Berry DO        aspirin chewable tablet 81 mg  81 mg Per NG tube DAILY Huy Liang MD   81 mg at 06/23/20 0806    enoxaparin (LOVENOX) injection 40 mg  40 mg SubCUTAneous Q24H Leon Berry DO   40 mg at 06/22/20 1425    levETIRAcetam (KEPPRA) oral solution 1,000 mg  1,000 mg Per NG tube Q12H Leon Berry DO   1,000 mg at 06/23/20 0806    glycopyrrolate (ROBINUL) injection 0.2 mg  0.2 mg IntraMUSCular TID PRN Sofi Cameron NP        niMODipine (NYMALIZE) 6 mg/mL oral solution 60 mg  60 mg Per NG tube Q4H Tabitha Domínguez NP   60 mg at 06/23/20 0945    acetaminophen (TYLENOL) solution 650 mg  650 mg Per NG tube Q4H PRN Millicent Pugh NP   650 mg at 06/19/20 0008    albuterol-ipratropium (DUO-NEB) 2.5 MG-0.5 MG/3 ML  3 mL Nebulization Q6H RT Erle Simpler, ACNP   3 mL at 06/23/20 1313    glucose chewable tablet 16 g  4 Tab Oral PRN Dorothye Mew, NP-C        glucagon (GLUCAGEN) injection 1 mg  1 mg IntraMUSCular PRN Dorothye Mew, NP-C        dextrose 10% infusion 0-250 mL  0-250 mL IntraVENous PRN Dorothye Mew, NP-C        albuterol (PROVENTIL VENTOLIN) nebulizer solution 2.5 mg  2.5 mg Nebulization Q6H PRN Dorvitoe Mew, NP-C        polyvinyl alcohol-povidone (NATURAL TEARS) 0.5-0.6 % ophthalmic solution 2 Drop  2 Drop Both Eyes Q8H Erle Simpler, ACNP   2 Drop at 06/23/20 5201    bisacodyL (DULCOLAX) suppository 10 mg  10 mg Rectal DAILY PRN Erle Simpler, ACNP        ondansetron [facility]) injection 4 mg  4 mg IntraVENous Q4H PRN Eder Herman NP-C        docusate (COLACE) 50 mg/5 mL oral liquid 100 mg  100 mg Oral BID PRN Dante Mcnally R, NP-C            No Known Allergies    Review of Systems:  Review of systems not obtained due to patient factors. Objective:     Vital signs  Temp (24hrs), Av.3 °F (36.8 °C), Min:97.7 °F (36.5 °C), Max:98.9 °F (37.2 °C)   701 - 1900  In: 1735.6 [I.V.:615.6]  Out: 450 [Urine:400; Drains:50]  1901 -  0700  In: 8749.4 [I.V.:5901.4]  Out: 1693 [XFQFD:7734; Drains:450]    Visit Vitals  /63 (BP 1 Location: Left arm, BP Patient Position: At rest)   Pulse 65   Temp 98 °F (36.7 °C)   Resp 29   Ht 5' 4\" (1.626 m)   Wt 221 lb 12.5 oz (100.6 kg)   SpO2 100%   BMI 38.07 kg/m²    O2 Flow Rate (L/min): 9 l/min O2 Device: Tracheal collar   Vitals:    20 1130 20 1145 20 1200 20 1313   BP: 130/61 137/63 141/63    Pulse: 65 64 65    Resp: 22 24 29    Temp:   98 °F (36.7 °C)    SpO2: 100% 99% 95% 100%   Weight:       Height:          Physical Exam:  GENERAL: NAD, trach collar present, alert   EYE: conjunctivae/corneas clear. Right pupil 3 mm, brisk response to light. Left pupil 4 mm, brisk response to light. Keeps left eye closed   LUNG: lungs clear bilaterally   HEART: regular rate and rhythm, S1, S2 normal, no murmur, click, rub or gallop  EXTREMITIES:  extremities normal, atraumatic, no cyanosis, 3+ pitting edema in BUE, +! Pitting edema in BLE, distal pulses 2+ bilaterally   SKIN: Skin warm to touch. Right and left groin site clean, dry, and intact. No hematoma, bruising, or bleeding noted. NEUROLOGIC: Alert. Trach-collar. Non-verbal. Eyes will open spontaneously. No command following. No facial asymmetry. Right pupil 3 mm, brisk response to light. Left pupil 4 mm brisk response to light. Blinks to visual threat. Left eye remains closed. Focuses and tracks with eyes, with the exception of inability to track to the right with left eye. Moves all extremities spontaneously/intermittently. Less movement in RUE vs. LUE. RUE withdraws to pain, intermittently moves right arm at times when stimulated. No involuntary movements. Gait deferred.  Unable to assess language, sensation, and coordination. Imaging:    MRI of Brain on 6/19/2020 shows  IMPRESSION:   1. Persistent subarachnoid hemorrhage in the left sylvian fissure. 2.  Patchy areas of enhancement as described. Some of these are associated with  foci of diffusion restriction and likely represent subacute ischemia. The  leptomeningeal enhancement predominantly in the left frontal and parietal lobes  is likely related to subarachnoid hemorrhage and meningeal irritation. Infection  is not entirely excluded but thought less likely. CTA of Head 6/18/2020 at 1735 shows    IMPRESSION:  1. No acute large vessel occlusion. 2. Recent left MCA bifurcation aneurysm coiling, with interval improvement in  now mild vasospasm involving the bilateral anterior, left middle, vertebral  basilar and bilateral posterior cerebral arteries as discussed in detail above. 3. Unchanged right MCA bifurcation aneurysm.     CT of Head on 6/18/2020 at 1735 shows  IMPRESSION:      Decreased left subarachnoid hemorrhage. No new hemorrhage. Artifact versus increased conspicuity of nonacute left temporal lobe infarct. CT Head on 6/17/20 shows:     IMPRESSION:     1. Persistence of the previously described subtle areas of subarachnoid  hemorrhage. 2. Persistence of the previously described left inferior cerebellar infarct. 3. Presence of small lacunar infarcts bilaterally. 4. No evidence of hydrocephalus. 5. Presence of an aneurysm coil in the region of the left middle cerebral  artery. 6. Evidence of sphenoid sinus disease.       CT of Head on 6/13/2020 at 1103 shows  IMPRESSION:   1. Questionable left inferior cerebellar infarction, new from 6/10/2020.  2. Multiple, evolving, subacute infarctions throughout the cerebrum. 3. Subacute subarachnoid hemorrhage. Improved hydrocephalus compared to  6/6/2020    CTA of Head on 6/13/2020 at 1103 shows  IMPRESSION:   1. Left A1 vasospasm.   2. More mild spasm of the right PANCHITO, MCAs, and possibly the distal vertebrals. 3. Right M1 terminus aneurysm. 4. Large infundibula/small aneurysms of the bilateral P-comm origins. CT Perfusion on 6/13/2020 at 1103 shows  IMPRESSION:  1. Penumbra surrounding infarctions in most of the superior left MCA territory. 2. Bilateral PANCHITO infarctions. 3. Questionable left cerebellar infarction is inferior to the perfusion scan. MRI of Brain on 6/9/2020 at 0311 AM shows  IMPRESSION:  1. Multiple areas of cortical acute ischemic injury as well as some subcortical  white matter areas and right cerebellar involvement similar to the prior MRI  6/6/20.  2. Subarachnoid hemorrhage and intraventricular blood again demonstrated without  hydrocephalus. 3. Increasing fluid paranasal sinuses and right greater than left mastoid/middle  ear cavities. CTA of Head on 6/9/2020 at 1350 shows  Progressive vasospasm involving the anterior cerebral arteries with minimal  associated decreased perfusion to the frontal lobes. No other significant  Change  . CT Perfusion on 6/9/2020 at 1336 shows  Impression:  CT perfusion brain: Very minimal decreased perfusion to the frontal lobes. CT of Head on 6/9/2020 at 0234 shows  IMPRESSION:   No significant change.     CT of Head on 6/8/2020 shows  IMPRESSION:   Global edema, otherwise no significant change    CTA of Head and Neck on 6/8/2020 per radiology shows  Impression:  Mild to moderate vasospasm in the left middle cerebral artery status post  bifurcation aneurysm coil embolization. Stable 4 mm right MCA aneurysm. Endotracheal tube terminates just above the matt. 1 cm low-density left thyroid nodule. Mildly enlarged left axillary lymph node measuring 11 mm in short axis  Patchy bilateral airspace disease.   Nonspecific asymmetric enhancement of the right superior ophthalmic vein   (However, no significant vasospasm was seen when personally reviewed by NIS)    CT Perfusion on 6/8/2020 shows  IMPRESSION:  No significant cerebral perfusion abnormality    CTA of Chest on 6/7/2020 shows  IMPRESSION:  1. No acute pulmonary embolus  2. Dependent atelectasis with diffuse groundglass opacifications bilaterally. CT of Head on 6/7/2020 shows  IMPRESSION:   1. No further progression of subarachnoid hemorrhage. The overall degree of  subarachnoid hemorrhage is slightly improved. 2. Subtle areas of gray-white differentiation loss throughout the cerebral  hemispheres correlating with areas of restricted diffusion on previous CT  compatible with ischemic changes. MRI of Brain on 6/6/2020 shows  IMPRESSION:   Imaging findings consistent with large left MCA territory ischemia/infarction,  left temporal, parietal and frontal lobes, cortically based, no superimposed  increased parenchymal hemorrhage or midline shift. Right and left PANCHITO territory  cortical diffusion restriction consistent with infarction. Infarction in the right insular cortex and right temporal lobe. Scattered small infarctions right and left corona radiata centrum semiovale, right cerebellum. Allowing for differences in technique likely stable subarachnoid and  intraventricular hemorrhage. CT of Head on 6/6/2020 at 1411 shows  IMPRESSION:   Left MCA territory infarction with smaller right MCA territory infarction.     Stable subarachnoid and intraventricular hemorrhage. CTA of Head on 6/6/2020 at 0903 shows  IMPRESSION:  Slightly increased intraventricular and subarachnoid hemorrhage compared to the  prior exam.     Status post coiling left MCA bifurcation aneurysm. No significant residual  aneurysm filling. CT of Head on 6/62020 at 0239 shows  IMPRESSION:  Slightly diminished subarachnoid and intraventricular hemorrhage. Stable ventricular system. CT of Head WO on 6/5/2020 at 1512 shows  IMPRESSION: Stable acute subarachnoid and intraventricular hemorrhage. Stable  ventricular system. CT Head WO 6/5/20 0826     IMPRESSION:   1.   Diffuse subarachnoid hemorrhage most concentrated in the basal cisterns and  left sylvian fissure. 2.  Interval ventricular enlargement suspicious for developing hydrocephalus.     CTA Head 6/4/20 2017     IMPRESSION:  1. Diffuse subarachnoid hemorrhage in the basilar cisterns and sylvian  fissures. 2.  Bilateral MCA bifurcation aneurysms. 3.  Infundibular origins to the posterior communicating arteries bilaterally. 4.  Symmetric moderately severe airspace disease in the upper lobes bilaterally  which may represent edema or sequela of aspiration. CT Head WO Contrast 6/4/2020 2012    IMPRESSION: Extensive subarachnoid hemorrhage concerning for leaking/ruptured  aneurysm.        24 hour results:    Recent Results (from the past 24 hour(s))   PROCALCITONIN    Collection Time: 06/23/20  4:14 AM   Result Value Ref Range    Procalcitonin <3.47 ng/mL   METABOLIC PANEL, COMPREHENSIVE    Collection Time: 06/23/20  4:14 AM   Result Value Ref Range    Sodium 140 136 - 145 mmol/L    Potassium 4.1 3.5 - 5.1 mmol/L    Chloride 111 (H) 97 - 108 mmol/L    CO2 23 21 - 32 mmol/L    Anion gap 6 5 - 15 mmol/L    Glucose 115 (H) 65 - 100 mg/dL    BUN 19 6 - 20 MG/DL    Creatinine 0.55 0.55 - 1.02 MG/DL    BUN/Creatinine ratio 35 (H) 12 - 20      GFR est AA >60 >60 ml/min/1.73m2    GFR est non-AA >60 >60 ml/min/1.73m2    Calcium 9.1 8.5 - 10.1 MG/DL    Bilirubin, total 0.3 0.2 - 1.0 MG/DL    ALT (SGPT) 30 12 - 78 U/L    AST (SGOT) 34 15 - 37 U/L    Alk.  phosphatase 86 45 - 117 U/L    Protein, total 6.7 6.4 - 8.2 g/dL    Albumin 2.6 (L) 3.5 - 5.0 g/dL    Globulin 4.1 (H) 2.0 - 4.0 g/dL    A-G Ratio 0.6 (L) 1.1 - 2.2     CBC WITH AUTOMATED DIFF    Collection Time: 06/23/20  4:14 AM   Result Value Ref Range    WBC 6.6 3.6 - 11.0 K/uL    RBC 3.36 (L) 3.80 - 5.20 M/uL    HGB 7.8 (L) 11.5 - 16.0 g/dL    HCT 26.6 (L) 35.0 - 47.0 %    MCV 79.2 (L) 80.0 - 99.0 FL    MCH 23.2 (L) 26.0 - 34.0 PG    MCHC 29.3 (L) 30.0 - 36.5 g/dL    RDW 25.8 (H) 11.5 - 14.5 % PLATELET 792 938 - 793 K/uL    MPV 10.3 8.9 - 12.9 FL    NRBC 0.5 (H) 0  WBC    ABSOLUTE NRBC 0.03 (H) 0.00 - 0.01 K/uL    NEUTROPHILS 76 (H) 32 - 75 %    LYMPHOCYTES 13 12 - 49 %    MONOCYTES 7 5 - 13 %    EOSINOPHILS 3 0 - 7 %    BASOPHILS 0 0 - 1 %    IMMATURE GRANULOCYTES 1 (H) 0.0 - 0.5 %    ABS. NEUTROPHILS 4.9 1.8 - 8.0 K/UL    ABS. LYMPHOCYTES 0.9 0.8 - 3.5 K/UL    ABS. MONOCYTES 0.5 0.0 - 1.0 K/UL    ABS. EOSINOPHILS 0.2 0.0 - 0.4 K/UL    ABS. BASOPHILS 0.0 0.0 - 0.1 K/UL    ABS. IMM.  GRANS. 0.1 (H) 0.00 - 0.04 K/UL    DF SMEAR SCANNED      PLATELET COMMENTS Large Platelets      RBC COMMENTS ANISOCYTOSIS  1+        RBC COMMENTS MICROCYTOSIS  1+        RBC COMMENTS OVALOCYTES  1+       MAGNESIUM    Collection Time: 06/23/20  4:23 AM   Result Value Ref Range    Magnesium 2.4 1.6 - 2.4 mg/dL   PHOSPHORUS    Collection Time: 06/23/20  4:23 AM   Result Value Ref Range    Phosphorus 3.0 2.6 - 4.7 MG/DL        Lana Cody NP

## 2020-06-23 NOTE — PROGRESS NOTES
Problem: Dysphagia (Adult)  Goal: *Acute Goals and Plan of Care (Insert Text)  Description: Speech Therapy Goals  Initiated 6/17/2020    1. Patient will participate in swallow re-evaluation within 7 days. Outcome: Progressing Towards Goal     Problem: Voice Impaired (Adult)  Goal: *Acute Goals and Plan of Care (Insert Text)  Description: Speech Therapy Goals  Initiated 6/17/2020    1. Patient will tolerate trials of digital occlusion to work towards ScreenTag placement within 7 days. Outcome: Progressing Towards Goal     SPEECH LANGUAGE PATHOLOGY DYSPHAGIA TREATMENT  Patient: Sejal Moreno (48 y.o. female)  Date: 6/23/2020  Diagnosis: SAH (subarachnoid hemorrhage) ([facility]) [I60.9]   <principal problem not specified>       Precautions: n/a      ASSESSMENT:  Patient with improvement in bolus acceptance on this date, however, still present with absent oral transit/swallow initiation. Recommend pt remain NPO with PEG tube as primary source of nutrition/hydration/medication. Furthermore, pt again with drop in HR with PMV placement and back-pressure when doffing PMV. Therefore, will hold PMV trials until she has a trach downsize. Please consider trach downsize to at least a 6 cuffless when medically appropriate. PLAN:  Recommendations and Planned Interventions:  --NPO  --good oral care  --No PMV yet  --Consider trach downsize to a 4 v 6 cuffless    Patient continues to benefit from skilled intervention to address the above impairments. Continue treatment per established plan of care. Speaking Valve Placement:  SLP only  Recommended Speaking Valve Wearing Schedule:  None  Discharge Recommendations:   To Be Determined     SUBJECTIVE:   Patient remains non-verbal    OBJECTIVE:   Cognitive and Communication Status:  Neurologic State: Alert  Orientation Level: Unable to verbalize  Cognition: No command following           Tracheostomy:        Oxygen Therapy  O2 Sat (%): 100 %  Pulse via Oximetry: 69 beats per minute  O2 Device: Tracheal collar  O2 Flow Rate (L/min): 9 l/min  FIO2 (%): 28 %  Dysphagia Treatment:  Tracheostomy:  Airway Clearance  Suction: Trach  Suction Device: Suction catheter  Suction Catheter Size: 14 Fr  Sputum Method Obtained: Tracheal  Sputum Amount: Small  Sputum Color/Odor: Yellow  Sputum Consistency: Thick     PMV Trial   Vocal Quality: Other (comment)(unable to assess)  Oral Assessment:  Oral Assessment  Labial: Decreased rate;Decreased seal;Flaccid  Dentition: Intact; Natural  Oral Hygiene: oral mucosa moist and clear of secretions  Lingual: No impairment  Velum: No impairment  Mandible: No impairment  P.O. Trials:  Patient Position: upright in bed  Vocal quality prior to P.O.: Other (comment)(unable to assess)  Consistency Presented: Ice chips  How Presented: SLP-fed/presented;Spoon     Bolus Acceptance: Impaired  Bolus Formation/Control: Impaired  Type of Impairment: Delayed; Incomplete  Propulsion: Absent     Initiation of Swallow: Absent  Laryngeal Elevation: Absent                 Oral Phase Severity: Severe  Pharyngeal Phase Severity : Other (comment)  Pain:  Pain Scale 1: Adult Nonverbal Pain Scale  Pain Intensity 1: 0       After treatment:   Call bell within reach and Nursing notified    COMMUNICATION/EDUCATION:     The patient's plan of care including recommendations, planned interventions, and recommended diet changes were discussed with: Registered nurse. Education was provided to patient, family, and staff regarding speaking valve placement, wearing schedule, safety precautions including cuff deflation and removal when sleeping, and care/cleaning guidelines.       MARILYN Edwards  Time Calculation: 20 mins

## 2020-06-23 NOTE — PROGRESS NOTES
MEERA  Patient taken to [facility] ED after co workers found patient with seizure like activity. CT/CTA: Extensive SAH. Transferred to [facility] for Neurosurgery evaluation. Patient is s/p coiling procedure  RUR: 17 %  S/P trach placement 6/16, PEG placed 6/19. Plan: [facility] has accepted patient once patient is weaned off cardene.  Liaison to call patient's mother tomorrow about 3 PM.   Anju Vieyra RN,CRM

## 2020-06-23 NOTE — PROGRESS NOTES
Neurocritical Care Brief Progress Note:    HPI: Kenneth Grayson is a 58-year-old female with a past medical history of HTN. According to patient's mother, the patient began having a headache on 5/31/20 and had been taking BC Powder with aspirin in it for the headache. She recently stopped taking her BP medications. According to her mother she does not smoke, drink alcohol or use recreational drugs. The patient was brought to the [facility] ER via EMS on 6/4/420  after her coworkers found her unresponsive outside of her place of work. 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 On arrival to the ER she was found to be seizing. A stat CT of her head was performed which showed extensive subarachnoid hemorrhage. She was extremely hypertensive in the 240's and was given labetalol and started on a Cardene drip. She was emergently intubated and given Keppra and Ativan as well. Neurosurgery and Neurointerventional Surgery were consulted and the patient was transferred to the [facility] for a higher level of care. 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 Extensive lung infiltrates were also noted. Patient was taken for cerebral angiogram the morning of 06/05/20 and coil embolization of the left MCA aneurysm was completed. The right MCA aneurysm had a very wide neck incorporating both M2s. It could not be treated endovascularly without stents.  May need to be clipped by Neurosurgery.     Procedures: 06/05/20 and coil embolization of the left MCA aneurysm was completeds/p cerebral angiogram for right PANCHITO A1 angioplasty for vasospasm, transarterial infusion of verapamil on 6/9,  S/p angiogram on 6/10 for left ICA/MCA angioplasty and transarterial infusion of verapamil for vasospasm treatment, s/p angiogram on 6/11 and 6/13 for transarterial infusion of verapamil for vasospasm treatment. Tracheostomy placement  6/16 by Dr. Odilon Coffey in thoracic surgery. PEG placement. Physical Exam:  GENERAL: Awake and alert, sating 95% on trach collar. No sedation.    SKIN: Warm, dry, color appropriate for ethnicity. Pedal pulses positive bilaterally. Nonpitting edema in bilateral hands. No edema noted in lower extremities.      Neurologic Exam:  Mental Status:          Awake and alert. Still no command following. EOMI. Follows examiner with eyes.     Language:                   Mute.      Cranial Nerves:          Right pupil is 3 mm and brisk. Left pupil 4mm and brisk. Left eye ptosis.                                     Blinks to threat bilaterally.        Motor:                                                                Bulk and tone normal.                                       No involuntary movements. Occasional spontaneous movement noted with lifting bilateral arms off of the bed and left foot off of the bed.      Sensation:                   Withdraws to pain x 4.      Reflexes:                     Mute Babinski;s on left, negative on right.      Coordination & Gait:   Unable to assess due to patient factors.     PLAN: SAH due to ruptured cerebral aneurysm, Quinones Sales 5, An Grade 3/4. PBD 22   s/p cerebral angiogram with coil embolization of left MCA bifurcation aneurysm on 6/5. Additionally, patient has 5X3 right MCA bifurcation aneurysm with very wide neck which will need to be treated with stents vs. Clipping  -Aspirin 81 mg q day  -Strict I/Os. IVFs decreased to 75 ml a day earlier today. - Florinef discontinued.   -Nimotop Day 18/21  -Continue seizure ppx with Kepra 1000 bid, Vimpat 150 bid  -SBP goal widened to 110-160 by Dr. Marcial Pena today. Cardene gtt as needed.   -Neuro checks q 2 hours during day and q 4 at night.    Ether Mode, NP  Neurocritical Care Nurse Practitioner  [phone]

## 2020-06-23 NOTE — PROGRESS NOTES
Bedside and Verbal shift change report given to Jorge Luis Hinton (oncoming nurse) by Justin Traylor (offgoing nurse). Report included the following information SBAR, Kardex, Procedure Summary, Intake/Output, MAR, Recent Results, Cardiac Rhythm NSR and Dual Neuro Assessment.

## 2020-06-24 LAB
ALBUMIN SERPL-MCNC: 2 G/DL (ref 3.5–5)
ALBUMIN/GLOB SERPL: 0.6 {RATIO} (ref 1.1–2.2)
ALP SERPL-CCNC: 60 U/L (ref 45–117)
ALT SERPL-CCNC: 24 U/L (ref 12–78)
ANION GAP SERPL CALC-SCNC: 9 MMOL/L (ref 5–15)
AST SERPL-CCNC: 18 U/L (ref 15–37)
BASOPHILS # BLD: 0.1 K/UL (ref 0–0.1)
BASOPHILS NFR BLD: 1 % (ref 0–1)
BILIRUB SERPL-MCNC: 0.2 MG/DL (ref 0.2–1)
BUN SERPL-MCNC: 18 MG/DL (ref 6–20)
BUN/CREAT SERPL: 38 (ref 12–20)
CALCIUM SERPL-MCNC: 8.7 MG/DL (ref 8.5–10.1)
CHLORIDE SERPL-SCNC: 110 MMOL/L (ref 97–108)
CO2 SERPL-SCNC: 21 MMOL/L (ref 21–32)
CREAT SERPL-MCNC: 0.48 MG/DL (ref 0.55–1.02)
DIFFERENTIAL METHOD BLD: ABNORMAL
EOSINOPHIL # BLD: 0.2 K/UL (ref 0–0.4)
EOSINOPHIL NFR BLD: 4 % (ref 0–7)
ERYTHROCYTE [DISTWIDTH] IN BLOOD BY AUTOMATED COUNT: 26 % (ref 11.5–14.5)
GLOBULIN SER CALC-MCNC: 3.2 G/DL (ref 2–4)
GLUCOSE SERPL-MCNC: 92 MG/DL (ref 65–100)
HCT VFR BLD AUTO: 24.2 % (ref 35–47)
HGB BLD-MCNC: 6.9 G/DL (ref 11.5–16)
IMM GRANULOCYTES # BLD AUTO: 0.1 K/UL (ref 0–0.04)
IMM GRANULOCYTES NFR BLD AUTO: 1 % (ref 0–0.5)
LYMPHOCYTES # BLD: 1.4 K/UL (ref 0.8–3.5)
LYMPHOCYTES NFR BLD: 22 % (ref 12–49)
MCH RBC QN AUTO: 23.2 PG (ref 26–34)
MCHC RBC AUTO-ENTMCNC: 28.5 G/DL (ref 30–36.5)
MCV RBC AUTO: 81.2 FL (ref 80–99)
MONOCYTES # BLD: 0.6 K/UL (ref 0–1)
MONOCYTES NFR BLD: 10 % (ref 5–13)
NEUTS SEG # BLD: 3.8 K/UL (ref 1.8–8)
NEUTS SEG NFR BLD: 62 % (ref 32–75)
NRBC # BLD: 0.02 K/UL (ref 0–0.01)
NRBC BLD-RTO: 0.3 PER 100 WBC
PLATELET # BLD AUTO: 360 K/UL (ref 150–400)
PMV BLD AUTO: 9.9 FL (ref 8.9–12.9)
POTASSIUM SERPL-SCNC: 4.2 MMOL/L (ref 3.5–5.1)
PROCALCITONIN SERPL-MCNC: 0.05 NG/ML
PROT SERPL-MCNC: 5.2 G/DL (ref 6.4–8.2)
RBC # BLD AUTO: 2.98 M/UL (ref 3.8–5.2)
RBC MORPH BLD: ABNORMAL
SODIUM SERPL-SCNC: 140 MMOL/L (ref 136–145)
WBC # BLD AUTO: 6.2 K/UL (ref 3.6–11)

## 2020-06-24 PROCEDURE — 74011250636 HC RX REV CODE- 250/636: Performed by: NURSE PRACTITIONER

## 2020-06-24 PROCEDURE — 74011000250 HC RX REV CODE- 250: Performed by: NURSE PRACTITIONER

## 2020-06-24 PROCEDURE — 36430 TRANSFUSION BLD/BLD COMPNT: CPT

## 2020-06-24 PROCEDURE — 94640 AIRWAY INHALATION TREATMENT: CPT

## 2020-06-24 PROCEDURE — 74011250636 HC RX REV CODE- 250/636: Performed by: HOSPITALIST

## 2020-06-24 PROCEDURE — 77030021668 HC NEB PREFIL KT VYRM -A

## 2020-06-24 PROCEDURE — 74011250637 HC RX REV CODE- 250/637: Performed by: NURSE PRACTITIONER

## 2020-06-24 PROCEDURE — 74011000258 HC RX REV CODE- 258: Performed by: HOSPITALIST

## 2020-06-24 PROCEDURE — 80053 COMPREHEN METABOLIC PANEL: CPT

## 2020-06-24 PROCEDURE — 84145 PROCALCITONIN (PCT): CPT

## 2020-06-24 PROCEDURE — 65660000000 HC RM CCU STEPDOWN

## 2020-06-24 PROCEDURE — 74011250637 HC RX REV CODE- 250/637: Performed by: PSYCHIATRY & NEUROLOGY

## 2020-06-24 PROCEDURE — 77010033678 HC OXYGEN DAILY

## 2020-06-24 PROCEDURE — 85025 COMPLETE CBC W/AUTO DIFF WBC: CPT

## 2020-06-24 PROCEDURE — 86900 BLOOD TYPING SEROLOGIC ABO: CPT

## 2020-06-24 PROCEDURE — 77030018798 HC PMP KT ENTRL FED COVD -A

## 2020-06-24 PROCEDURE — 74011000250 HC RX REV CODE- 250: Performed by: HOSPITALIST

## 2020-06-24 PROCEDURE — 74011000258 HC RX REV CODE- 258: Performed by: NURSE PRACTITIONER

## 2020-06-24 PROCEDURE — 74011250637 HC RX REV CODE- 250/637: Performed by: ANESTHESIOLOGY

## 2020-06-24 PROCEDURE — 77030038269 HC DRN EXT URIN PURWCK BARD -A

## 2020-06-24 PROCEDURE — 94762 N-INVAS EAR/PLS OXIMTRY CONT: CPT

## 2020-06-24 PROCEDURE — P9016 RBC LEUKOCYTES REDUCED: HCPCS

## 2020-06-24 PROCEDURE — 97530 THERAPEUTIC ACTIVITIES: CPT

## 2020-06-24 PROCEDURE — 74011250637 HC RX REV CODE- 250/637: Performed by: INTERNAL MEDICINE

## 2020-06-24 PROCEDURE — 36415 COLL VENOUS BLD VENIPUNCTURE: CPT

## 2020-06-24 PROCEDURE — 97535 SELF CARE MNGMENT TRAINING: CPT

## 2020-06-24 PROCEDURE — 74011250636 HC RX REV CODE- 250/636: Performed by: ANESTHESIOLOGY

## 2020-06-24 PROCEDURE — 86923 COMPATIBILITY TEST ELECTRIC: CPT

## 2020-06-24 RX ORDER — SODIUM CHLORIDE 9 MG/ML
250 INJECTION, SOLUTION INTRAVENOUS AS NEEDED
Status: DISCONTINUED | OUTPATIENT
Start: 2020-06-24 | End: 2020-07-10 | Stop reason: HOSPADM

## 2020-06-24 RX ORDER — IPRATROPIUM BROMIDE AND ALBUTEROL SULFATE 2.5; .5 MG/3ML; MG/3ML
3 SOLUTION RESPIRATORY (INHALATION)
Status: DISCONTINUED | OUTPATIENT
Start: 2020-06-24 | End: 2020-06-26

## 2020-06-24 RX ORDER — SODIUM CHLORIDE 0.9 % (FLUSH) 0.9 %
SYRINGE (ML) INJECTION
Status: COMPLETED
Start: 2020-06-24 | End: 2020-06-24

## 2020-06-24 RX ADMIN — LEVETIRACETAM 1000 MG: 100 SOLUTION ORAL at 09:05

## 2020-06-24 RX ADMIN — NIMODIPINE 60 MG: 30 SOLUTION ORAL at 05:57

## 2020-06-24 RX ADMIN — IRON SUCROSE 200 MG: 20 INJECTION, SOLUTION INTRAVENOUS at 09:08

## 2020-06-24 RX ADMIN — GLYCOPYRROLATE 0.2 MG: 0.2 INJECTION, SOLUTION INTRAMUSCULAR; INTRAVENOUS at 16:35

## 2020-06-24 RX ADMIN — POTASSIUM BICARBONATE 40 MEQ: 782 TABLET, EFFERVESCENT ORAL at 18:15

## 2020-06-24 RX ADMIN — Medication 2 DROP: at 14:12

## 2020-06-24 RX ADMIN — NIMODIPINE 60 MG: 30 SOLUTION ORAL at 21:54

## 2020-06-24 RX ADMIN — SODIUM CHLORIDE, SODIUM LACTATE, POTASSIUM CHLORIDE, AND CALCIUM CHLORIDE 75 ML/HR: 600; 310; 30; 20 INJECTION, SOLUTION INTRAVENOUS at 03:43

## 2020-06-24 RX ADMIN — CHLORHEXIDINE GLUCONATE 15 ML: 0.12 RINSE ORAL at 20:51

## 2020-06-24 RX ADMIN — Medication 2 DROP: at 05:57

## 2020-06-24 RX ADMIN — LACOSAMIDE 150 MG: 50 TABLET, FILM COATED ORAL at 20:50

## 2020-06-24 RX ADMIN — FENTANYL CITRATE 50 MCG: 50 INJECTION, SOLUTION INTRAMUSCULAR; INTRAVENOUS at 01:47

## 2020-06-24 RX ADMIN — NIMODIPINE 60 MG: 30 SOLUTION ORAL at 01:46

## 2020-06-24 RX ADMIN — LEVETIRACETAM 1000 MG: 100 SOLUTION ORAL at 20:52

## 2020-06-24 RX ADMIN — CARVEDILOL 25 MG: 12.5 TABLET, FILM COATED ORAL at 09:07

## 2020-06-24 RX ADMIN — AMLODIPINE BESYLATE 10 MG: 5 TABLET ORAL at 09:06

## 2020-06-24 RX ADMIN — NIMODIPINE 60 MG: 30 SOLUTION ORAL at 18:15

## 2020-06-24 RX ADMIN — NIMODIPINE 60 MG: 30 SOLUTION ORAL at 14:11

## 2020-06-24 RX ADMIN — ENOXAPARIN SODIUM 40 MG: 40 INJECTION SUBCUTANEOUS at 14:16

## 2020-06-24 RX ADMIN — CASTOR OIL AND BALSAM, PERU: 788; 87 OINTMENT TOPICAL at 19:33

## 2020-06-24 RX ADMIN — NIMODIPINE 60 MG: 30 SOLUTION ORAL at 09:45

## 2020-06-24 RX ADMIN — CARVEDILOL 25 MG: 12.5 TABLET, FILM COATED ORAL at 16:35

## 2020-06-24 RX ADMIN — IPRATROPIUM BROMIDE AND ALBUTEROL SULFATE 3 ML: .5; 3 SOLUTION RESPIRATORY (INHALATION) at 07:37

## 2020-06-24 RX ADMIN — Medication 10 ML: at 05:58

## 2020-06-24 RX ADMIN — SODIUM CHLORIDE, SODIUM LACTATE, POTASSIUM CHLORIDE, AND CALCIUM CHLORIDE 75 ML/HR: 600; 310; 30; 20 INJECTION, SOLUTION INTRAVENOUS at 19:33

## 2020-06-24 RX ADMIN — LACOSAMIDE 150 MG: 50 TABLET, FILM COATED ORAL at 09:06

## 2020-06-24 RX ADMIN — CASTOR OIL AND BALSAM, PERU: 788; 87 OINTMENT TOPICAL at 09:47

## 2020-06-24 RX ADMIN — ASPIRIN 81 MG CHEWABLE TABLET 81 MG: 81 TABLET CHEWABLE at 09:06

## 2020-06-24 RX ADMIN — IPRATROPIUM BROMIDE AND ALBUTEROL SULFATE 3 ML: .5; 3 SOLUTION RESPIRATORY (INHALATION) at 20:28

## 2020-06-24 RX ADMIN — SODIUM CHLORIDE 12.5 MG/HR: 9 INJECTION, SOLUTION INTRAVENOUS at 00:22

## 2020-06-24 RX ADMIN — IPRATROPIUM BROMIDE AND ALBUTEROL SULFATE 3 ML: .5; 3 SOLUTION RESPIRATORY (INHALATION) at 01:46

## 2020-06-24 RX ADMIN — HYDRALAZINE HYDROCHLORIDE 100 MG: 50 TABLET, FILM COATED ORAL at 16:35

## 2020-06-24 RX ADMIN — HYDRALAZINE HYDROCHLORIDE 100 MG: 50 TABLET, FILM COATED ORAL at 21:00

## 2020-06-24 RX ADMIN — POTASSIUM BICARBONATE 40 MEQ: 782 TABLET, EFFERVESCENT ORAL at 09:05

## 2020-06-24 RX ADMIN — HYDRALAZINE HYDROCHLORIDE 100 MG: 50 TABLET, FILM COATED ORAL at 09:07

## 2020-06-24 RX ADMIN — Medication 2 DROP: at 21:14

## 2020-06-24 NOTE — PROGRESS NOTES
Palliative Medicine  [facility]: [phone] ([phone])  [facility]: [phone] ([phone])    Consult received, chart reviewed    Code Status: Full Code    Advance Care Planning:  Advance Care Planning 6/5/2020   Patient's Healthcare Decision Maker is: Legal Next of Kin   Confirm Advance Directive None   Patient Would Like to Complete Advance Directive Unable     Primary Decision Maker: Fabian Hartman - Mother, Jessica [facility] - [phone]    Ghanshyam Larson is a 55-year-old female with a PMH of HTN, obesity. Pt. presented to [facility] ER via EMS on 6/4/420  after her coworkers found her unresponsive outside of her place of work. Per mother, she had recently stopped taking her BP medications. On arrival to the ER she was found to be seizing. A CT of her head showed extensive SAH due to ruptured cerebral aneurysm. She was extremely hypertensive in the 240's and was given labetalol and started on a Cardene drip. She was emergently intubated, given Keppra and Ativan. Neurosurgery and Neurointerventional Surgery were consulted and the patient was transferred to [address] [place] [address]. Repeat CTH/CTA at [address] [place] [address] showed an extensive subarachnoid hemorrhage concerning for leaking/ruptured aneurysm without hydrocephalus. 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 Patient tx out of ICU 6/23, currently on trach collar, on Cardene gtt for B/P. Day 20/21 for Nimotop. Patient is awake, alert, not following commands, non verbal, moves right extremities spontaneously. PT/OT following - currently Total A, no command following. SLP following - currently NPO d/t dysphagia, tube feeds    Social: , pt lives in [place] with 15 yo son Nataly Padilla. and a friend; works full time at [employer], mother Mirtha Tidwell is [employer], brother Lacey Yanes and aunt Rob Homans also involved. Marcos's dad also involved with son.   Spiritual: Rastafari per chart  Baseline: independent ADL, iADL     Palliative care consult for care decisions in setting of SAH/high risk for decompensation. Patient / Family Encounter Documentation    Participants (names): Patient mom/rhett Rodriguez; Palliative Medicine ([place], Dr. Dyan Sanchez)    Narrative:     I met with patient mother in room. Patient is awake in bed, non verbal, but more engaged than yesterday, tracking with R eye, turning head quickly and shifting legs in bed. Sometimes when asked questions, she seems to nod in response, other times, she closes her eyes, quiets her breath and disengages. Provided resources for patient son ([address], [employer], [employer] for TBI)    Attending in room to provide update    [facility] rep [employer] also called to discuss facility. Overall Tahira Rodriguez is overwhelmed with  Worry that patient is not getting neurological support and will be d/c before she is ready. She requested second opinion about patient recovery -- she was not prepared to hear that patient may not get back to baseline. Discussed that there are many unknowns with stroke - would be difficult for any doctor to tell her exactly what to expect. She was able to acknowledge hoping for best and preparing for what ifs, but she is very uncomfortable with uncertainty. She is focused on wanting to hear from [facility] doctors (and also grateful for support from [facility] team). We discussed hierarchy of levels of care (hospital to LTAC to IPR, SNF and so on) so she could have a better understanding of the high level of care pt will receive at [facility] or [place] (which she also requested). Tahira Rodriguez also very concerned about strict visitation at all rehab facilities as she knows her presence in patient's recovery is crucial.     Will need to address second opinion during Thursday's meeting. (attempted to do this today but I think she needs to hear it from an MD).  Will probably also need to provide education again about process for stroke recovery and how this will work for patient depending on progress. Psychosocial challenges/Resilience factors:     Patient mom struggling to accept that patient will not return to baseline - she is a strong advocate for patient and also very overwhelmed with tmedical information. She needs a lot of support in understanding medical status through use of very simple layman's terms and requests to repeat back what she has heard    Patient son is not coping well during patient hospitalization. He is living with Cyn Duran at this time with support from his father. However, Cyn Duran has requested financial support for him. Goals of Care / Plan: Mother would like information re: getting second opinion from [facility]    Family phone meeting Thursday 3pm with mom, aunt, brother          Thank you for the opportunity to be involved in the care of Ms. Victoriano Kapadia and her family.     Sd Madrigal LMSW, Supervisee in Social Work  Palliative Medicine   [phone]

## 2020-06-24 NOTE — PROGRESS NOTES
ADULT PROTOCOL: JET AEROSOL ASSESSMENT    Patient  Aldair Benítez     48 y.o.   female     6/24/2020  12:29 PM    Breath Sounds Pre Procedure: Right Breath Sounds: Diminished                               Left Breath Sounds: Rhonchi    Breath Sounds Post Procedure: Right Breath Sounds: Coarse                                 Left Breath Sounds: Coarse, Diminished    Breathing pattern: Pre procedure Breathing Pattern: Regular          Post procedure Breathing Pattern: Regular    Heart Rate: Pre procedure Pulse: 54           Post procedure Pulse: 66    Resp Rate: Pre procedure Respirations: 20           Post procedure Respirations: 18    Peak Flow: Pre bronchodilator             Post bronchodilator       FVC/FEV1:      Incentive Spirometry:             Cough: Pre procedure Cough: Non-productive               Post procedure Cough: Non-productive    Suctioned: NO    Sputum: Pre procedure                   Post procedure      Oxygen: O2 Device: Tracheal collar   FiO2 (%) 28     Changed: NO    SpO2: Pre procedure SpO2: 99 %   with oxygen              Post procedure SpO2: 100 %  with oxygen    Nebulizer Therapy: Current medications Aerosolized Medications: DuoNeb      Changed: YES    Smoking History: unable to give history     Problem List:   Patient Active Problem List   Diagnosis Code    SAH (subarachnoid hemorrhage) ([facility]) I60.9    Subarachnoid hemorrhage from middle cerebral artery aneurysm, left ([facility]) I60.12    RIGHT middle cerebral artery aneurysm I67.1    Cerebral vasospasm I67.848    Respiratory failure ([facility]) J96.90    Hypoxic ischemic encephalopathy (HIE), unspecified severity P91.60     Spacing per protocol.     Respiratory Therapist: Maile Redmanelor

## 2020-06-24 NOTE — PROGRESS NOTES
MEERA- Pending referrals to [facility] and [facility]    CM met with pt's mother, Matias Butler , to discuss pt's disposition. Cm noted that pt worked with PT and OT today. CM discussed the fact that a referral has been made to [facility], CM also offered information on the [facility] unit. Pt's mother would like a referral to be sent there as well. CM sent a referral to [facility] via [employer].  Devang Schmidt

## 2020-06-24 NOTE — PROGRESS NOTES
Problem: Self Care Deficits Care Plan (Adult)  Goal: *Acute Goals and Plan of Care (Insert Text)  Description:   FUNCTIONAL STATUS PRIOR TO ADMISSION: Patient unable to provide history at evaluation. Per chart, patient was independent and working full-time at [employer]. HOME SUPPORT: Per chart, patient lived with her 15 y/o son and a friend. Occupational Therapy Goals  Initiated 6/22/2020  1. Patient will perform grooming with maximal assistance within 7 day(s). 2.  Patient will follow ~25% simple commands with max multimodal cuing in preparation for functional tasks within 7 days. 3.  Patient will reach for ADL object with each UE with moderate assistance in preparation for functional reach within 7 days. 4.  Patient will perform composite grasp on ADL object with each hand in preparation for functional grasp within 7 days. 5.  Patient will participate in upper extremity therapeutic exercise/activities with maximal assistance for 5 minutes within 7 day(s). Outcome: Progressing Towards Goal  OCCUPATIONAL THERAPY TREATMENT  Patient: Nevaeh Gibson (48 y.o. female)  Date: 6/24/2020  Diagnosis: SA[facility]achnoid hemorrhage) ([facility] [I60.9]   <principal problem not specified>      Precautions:   -160  Chart, occupational therapy assessment, plan of care, and goals were reviewed. ASSESSMENT  Patient continues with skilled OT services and is progressing towards goals. Patient progressing with sitting balance and tolerance, volitional ROM B LEs and moving away from noxious stimuli. ADLs limited by cognition (awake to noxious stimuli, moves away during noxious stimuli, one step command during lower body task only, some volitional movement noted during LEs and trunk activity), strength with L weaker than R, ROM, edema throughout, sitting balance poor with R lateral and posterior lean. Baseline girth.      Current Level of Function Impacting Discharge (ADLs): total A ADLs    Other factors to consider for discharge: 15 y.o. son         PLAN :  Patient continues to benefit from skilled intervention to address the above impairments. Continue treatment per established plan of care. to address goals. Recommend with staff: bed in chair position, foot board and bed alarm on; UEs on pillows to decrease edema (above heart is ideal), minimize stimuli in the room    Recommend next OT session: sitting EOB with two staff or bed in chair position again to work on basic ADL task in front of her     Recommendation for discharge: (in order for the patient to meet his/her long term goals)  Therapy 3 hours per day 5-7 days per week    This discharge recommendation:  Has not yet been discussed the attending provider and/or case management       SUBJECTIVE:   Patient yes head nod    OBJECTIVE DATA SUMMARY:   Cognitive/Behavioral Status:  Neurologic State: Eyes open spontaneously; Alert;Drowsy  Orientation Level: Unable to verbalize  Neurologist present during session and visualized patient sitting. Functional Mobility and Transfers for ADLs:  Bed Mobility:       Transfers:             Balance:   patient tolerated bed in chair position initially, foot board on. Then R lateral lean noted when left to get retrieve items. Patient tolerated sitting unsupported ~2 seconds with moderate A, then total A due to posterior and right lateral lean. See PT note for reps and sets. ADL Intervention:       Grooming  Washing Face: Total assistance (dependent)                   Lower Body Dressing Assistance  Socks: Maximum assistance  Leg Crossed Method Used: Yes  Position Performed: Long sitting on bed   Instruction to pull LE up, total A; instruction push down and patient pushed down on command.       Noted          Therapeutic Exercises:   Instruction and demonstrated unilateral shoulder flexion 5 reps with increased active assist ROM by 4th rep and no active assist 5 rep R UE and total  A L UE. Noted B LEs moving volitionally. Tried bed in slight chair position, foot board up as high as would go. To go get socks, deodorant and PT to trial sititng 2* short LEs and in great position in bed due to short torso could trial sitting balance in prep for ADLs; noted quick fatigue so requiring 2 skilled assist.     Pain:  Shook head yes initially but unable to nod yes when pointed to each body part and patient unable to verbalize or point. Activity Tolerance:   requires rest breaks  9L 28% FIO2 trach collar  Vitals:    06/24/20 1000 06/24/20 1145 06/24/20 1209 06/24/20 1213   BP: 134/61 121/60 (!) 88/69 126/75   BP 1 Location: Left arm      BP Patient Position: At rest;Post activity; Head of bed elevated (Comment degrees) Pre-activity Sitting;During activity Supine; Post activity   Pulse: 61 (!) 52 66 (!) 58   Resp: 19 16  25   Temp: 98.7 °F (37.1 °C)      SpO2: 100% 100%  100%   Weight:       Height:           Please refer to the flowsheet for vital signs taken during this treatment. After treatment patient left in no apparent distress:   Supine in bed, Call bell within reach and Side rails x 3    COMMUNICATION/COLLABORATION:   The patients plan of care was discussed with: Physical therapist and Registered nurse.      Paz Berry  Time Calculation: 30 mins

## 2020-06-24 NOTE — PROGRESS NOTES
Neurointerventional Surgery Progress Note  Manjit Ruby NP   Neurocritical Care Nurse Practitioner  [phone]      Admit Date: 6/4/2020        Daily Progress Note: 6/24/2020   LOS: 20 days      S/P:  POD 18 Cerebral angiogram with coil embolization of ruptured left MCA bifurcation aneurysm on 6/5/2020    ** Delayed Presentation ** PB Day 24    Interval History/Subjective:   Patient currently on trach collar. Doing well on NSTU overnight. Now off cardene. 2 [facility] has accepted the patient for rehab after discharge. Palliative medicine has met with patients mom to help with goals of care discussions moving forward. They will touch base with her again on Thursday to discuss code status. Patient remains unable to vocalize or follow any commands. Unable to perform a ROS due to AMS. Assessment & Plan: Active Problems:    SAH (subarachnoid hemorrhage) ([address] (6/4/2020)      Subarachnoid hemorrhage from middle cerebral artery aneurysm, left (HCC) (6/4/2020)      RIGHT middle cerebral artery aneurysm (6/4/2020)      Cerebral vasospasm (6/4/2020)      Respiratory failure ([address] (6/15/2020)      Hypoxic ischemic encephalopathy (HIE), unspecified severity (6/15/2020)      1.) SAH due to ruptured cerebral aneurysm, [place] 5, An Grade 3/4              - s/p cerebral angiogram with coil embolization of left MCA bifurcation aneurysm on 6/5       Additionally, patient has 5X3 right MCA bifurcation aneurysm with very wide neck which will               need to be treated with stents vs. Clipping              - Continue Nimotop Day 20 of 21 to prevent delayed cerebral ischemia              - change maintenance fluids to LR at 75 ml/hr to maintain euvolemia              - Strict I's and O's, patient slightly positive overnight with I's and O's              - Last TCDs on 6/20 showed continued possible left PCA vasospasm, no evidence of vasospasm in the remaining intracranial vessels bilaterally.  Continue to monitor clinically.  Patient essentially out of vasospasm window               - ECHO shows EF 60-65%, mild concentric hypertrophy, mildly dilated left atrium              - ok with neuro checks every 2 hours during the day and every 4 hours at night               - change SBP goal to 110-160 in the setting of unsecured aneurysm in the right MCA,               - PT/OT/SLP evals    2.) Seizure              - Due to #1, no prior hx of seizure              - Continue Keppra 1000 mg BID               - continue Vimpat & Keppra               - Seizure precautions     3.) Cerebral Edema (resolved)    4.) Acute hypoxic respiratory failure in the setting of likely neurogenic pulmonary edema- resolved              - currently on trach collar, tolerating well              - chest XR on 6/19 Vague left lower lobe airspace process, suggesting subsegmental  atelectasis or pneumonia             - Intensivist managing     5.) Multiple Acute Ischemic Infarctions                - Imaging pattern is consistent with Hypoxic-Ischemic Encephalopathy (HIE)                - Continue aspirin 81 mg daily for stroke prevention           6.) Cerebral Vasospasm- resolved                  - s/p cerebral angiogram for right PANCHITO A1 angioplasty for vasospasm, transarterial infusion of verapamil on 6/9,  S/p angiogram on 6/10 for left ICA/MCA angioplasty and transarterial infusion of verapamil for vasospasm treatment, s/p angiogram on 6/11 and 6/13 for transarterial infusion of verapamil for vasospasm treatment                7.) Fever --> resolved                 - completed course of Zosyn, WBC normal                - respiratory cultures growing Klebsiella Pneumonia and Citrobacter Kos[place] 6/17                - CTA of chest 6/7 negative for PE                 - Cefepime started on 6/19 to cover Citrobacter per Intensivist, discontinued today as respiratory specimen is likely a contaminant per [address] following 8.) Polyuria                  - serum sodium stable at 140, electrolytes WNL                  - urine studies showed serum osmolality 296, urine osmolality 322, specific gravity normal at 1.015. Urine random sodium at 114                  - suspect patient is auto-diuresing due to recent issues with fluid overload                  - discontinue Florinef 0.1 mg BID for prevention of cerebral salt wasting                  - monitor BMP daily    9.) HTN                  - change SBP goal to 110-160                  - continue Amlodipine 10 mg daily and coreg 25 mg BID                  - add Hydralazine 25 mg TID per Intensivist                  - wean Cardene, Hydralazine PRN                   - Intensivist following       Activity: Bed rest  DVT ppx: SCDs, Lovenox  Disposition: TBD    Plan d/wDr. Juan Gregory, bedside RN  and Dr Ladonna Reveles, hospitalist MD.     Admission Summary:     Aldair Benítez is a 48 y.o. 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 On arrival to ED, pt was unresponsive and noted to be seizing. A stat CT of her head was performed there, which showed extensive SAH. Pt was then intubated emergently. She was also extremely hypertensive with SBPs in 240s. She was given Keppra and Ativan, and placed on cardene drip for BP control. NSGY and NIS were then consulted and the patient was transferred to [facility] for higher level of care. En route, paramedics administered 7.5 mg of Versed, 200mcg of Fentanyl, and 10 mg of labetalol for BP control. On arrival, CTA of her head and neck was obtained, which showed a 4.4 x 4.1 mm right MCA trifurcation aneurysm and  a 2.2 x 3.1 mm left MCA trifurcation aneurysm. Of note, CTA head/neck also showed pulmonary infiltrates suspicious for COVID-19 infection and the patient has been tested for COVID which was negative.  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 Mother reported pt does not smoke, does not drink alcohol or use street drugs. The mother also reported that the patient began complaining of a headache on 5/31/2020 and has been taking BC Powder (aspirin) for the headache. She also reported that the pt has a hx of HTN and is supposed to be on BP medications, but recently stopped taking her medications. The patient underwent a cerebral angiogram on 6/5 by Dr. Leslie Gonzales for coil embolization of a ruptured left MCA bifurcation aneurysm.     Current Facility-Administered Medications   Medication Dose Route Frequency Provider Last Rate Last Dose    0.9% sodium chloride infusion 250 mL  250 mL IntraVENous PRN Debby Carrizales MD        hydrALAZINE (APRESOLINE) tablet 100 mg  100 mg Oral TID Socorro Sterling NP   100 mg at 06/24/20 0907    iron sucrose (VENOFER) 200 mg in 0.9% sodium chloride 100 mL IVPB  200 mg IntraVENous Q24H Debby Carrizales  mL/hr at 06/24/20 0908 200 mg at 06/24/20 0908    fentaNYL citrate (PF) injection 50 mcg  50 mcg IntraVENous Q2H PRN Domingo Lira NP   50 mcg at 06/24/20 0147    hydrALAZINE (APRESOLINE) 20 mg/mL injection 10 mg  10 mg IntraVENous Q2H PRN Josh Knight NP   10 mg at 06/23/20 0518    chlorhexidine (ORAL CARE KIT) 0.12 % mouthwash 15 mL  15 mL Oral Q12H Socorro Sterling NP   15 mL at 06/23/20 2220    niCARdipine (CARDENE) 50 mg in 0.9% sodium chloride 100 mL infusion  0-15 mg/hr IntraVENous TITRATE Juan Avery NP   Stopped at 06/24/20 0246    carvediloL (COREG) tablet 25 mg  25 mg Oral BID WITH MEALS Alka Black MD   25 mg at 06/24/20 0907    amLODIPine (NORVASC) tablet 10 mg  10 mg Per NG tube DAILY Alka Black MD   10 mg at 06/24/20 0906    potassium bicarb-citric acid (EFFER-K) tablet 40 mEq  40 mEq Oral BID Alka Black MD   40 mEq at 06/24/20 0905    lacosamide (VIMPAT) tablet 150 mg  150 mg Per NG tube Q12H HekmatdoostOrville MD   150 mg at 06/24/20 0906    lactated Ringers infusion  75 mL/hr IntraVENous CONTINUOUS Rom Avery NP 75 mL/hr at 06/24/20 0343 75 mL/hr at 06/24/20 0343    balsam peru-castor oiL (VENELEX) ointment   Topical BID Leon Berry DO        aspirin chewable tablet 81 mg  81 mg Per NG tube DAILY Bari De Luna MD   81 mg at 06/24/20 0906    enoxaparin (LOVENOX) injection 40 mg  40 mg SubCUTAneous Q24H Leon Berry DO   40 mg at 06/23/20 1602    levETIRAcetam (KEPPRA) oral solution 1,000 mg  1,000 mg Per NG tube Q12H Leon Berry DO   1,000 mg at 06/24/20 0905    glycopyrrolate (ROBINUL) injection 0.2 mg  0.2 mg IntraMUSCular TID PRN Lui Kumar NP   0.2 mg at 06/23/20 2051    niMODipine (NYMALIZE) 6 mg/mL oral solution 60 mg  60 mg Per NG tube Q4H Eduardo Domínguez Console, NP   60 mg at 06/24/20 0945    acetaminophen (TYLENOL) solution 650 mg  650 mg Per NG tube Q4H PRN Mariaelena Rocha NP   650 mg at 06/19/20 0008    albuterol-ipratropium (DUO-NEB) 2.5 MG-0.5 MG/3 ML  3 mL Nebulization Q6H RT Mario AlbertoDARREN Crystal   3 mL at 06/24/20 8179    glucose chewable tablet 16 g  4 Tab Oral PRN Prashanth Dc NP-C        glucagon (GLUCAGEN) injection 1 mg  1 mg IntraMUSCular PRN Prashanth Dc NP-C        dextrose 10% infusion 0-250 mL  0-250 mL IntraVENous PRN Prashanth Dc NP-C        albuterol (PROVENTIL VENTOLIN) nebulizer solution 2.5 mg  2.5 mg Nebulization Q6H PRN Prashanth cD NP-C        polyvinyl alcohol-povidone (NATURAL TEARS) 0.5-0.6 % ophthalmic solution 2 Drop  2 Drop Both Eyes Q8H DARREN Barbour   2 Drop at 06/24/20 9049    bisacodyL (DULCOLAX) suppository 10 mg  10 mg Rectal DAILY PRN DARREN Barbour        ondansetron [facility]) injection 4 mg  4 mg IntraVENous Q4H PRN FABIAN Mercado        docusate (COLACE) 50 mg/5 mL oral liquid 100 mg  100 mg Oral BID PRN Nuris Bradford Loni RIVAS NP-C            No Known Allergies    Review of Systems:  Review of systems not obtained due to patient factors. Objective:     Vital signs  Temp (24hrs), Av.2 °F (37.3 °C), Min:98.7 °F (37.1 °C), Max:99.8 °F (37.7 °C)    07 - 1900  In: 688.8 [I.V.:688.8]  Out: 100 [Urine:100]  1901 -  0700  In: 5840.8 [I.V.:3060.8]  Out: 5725 [Urine:4775; Drains:950]    Visit Vitals  /60   Pulse (!) 52   Temp 98.7 °F (37.1 °C)   Resp 16   Ht 5' 4\" (1.626 m)   Wt 221 lb 5.5 oz (100.4 kg)   SpO2 100%   BMI 37.99 kg/m²    O2 Flow Rate (L/min): 9 l/min O2 Device: Tracheal collar   Vitals:    20 0906 20 0945 20 1000 20 1145   BP: (!) 142/91 130/62 134/61 121/60   Pulse: (!) 53 (!) 57 61 (!) 52   Resp:   19 16   Temp:   98.7 °F (37.1 °C)    SpO2:   100% 100%   Weight:       Height:          Physical Exam:  GENERAL: NAD, trach collar present, alert   EYE: conjunctivae/corneas clear. Right pupil 3 mm, brisk response to light. Left pupil 4 mm, brisk response to light. Keeps left eye closed   LUNG: lungs clear bilaterally   HEART: regular rate and rhythm, S1, S2 normal, no murmur, click, rub or gallop  EXTREMITIES:  extremities normal, atraumatic, no cyanosis, 3+ pitting edema in BUE, +! Pitting edema in BLE, distal pulses 2+ bilaterally   SKIN: Skin warm to touch. Right and left groin site clean, dry, and intact. No hematoma, bruising, or bleeding noted. NEUROLOGIC: Alert. Trach-collar. Non-verbal. Eyes will open spontaneously. No command following. No facial asymmetry. Right pupil 3 mm, brisk response to light. Left pupil 4 mm brisk response to light. Blinks to visual threat. Left eye remains closed. Focuses and tracks with eyes, with the exception of inability to track to the right with left eye. Moves all extremities spontaneously/intermittently. Less movement in RUE vs. LUE. RUE withdraws to pain, intermittently moves right arm at times when stimulated.  No involuntary movements. Gait deferred. Unable to assess language, sensation, and coordination. Imaging:    MRI of Brain on 6/19/2020 shows  IMPRESSION:   1. Persistent subarachnoid hemorrhage in the left sylvian fissure. 2.  Patchy areas of enhancement as described. Some of these are associated with  foci of diffusion restriction and likely represent subacute ischemia. The  leptomeningeal enhancement predominantly in the left frontal and parietal lobes  is likely related to subarachnoid hemorrhage and meningeal irritation. Infection  is not entirely excluded but thought less likely. CTA of Head 6/18/2020 at 1735 shows    IMPRESSION:  1. No acute large vessel occlusion. 2. Recent left MCA bifurcation aneurysm coiling, with interval improvement in  now mild vasospasm involving the bilateral anterior, left middle, vertebral  basilar and bilateral posterior cerebral arteries as discussed in detail above. 3. Unchanged right MCA bifurcation aneurysm.     CT of Head on 6/18/2020 at 1735 shows  IMPRESSION:      Decreased left subarachnoid hemorrhage. No new hemorrhage. Artifact versus increased conspicuity of nonacute left temporal lobe infarct. CT Head on 6/17/20 shows:     IMPRESSION:     1. Persistence of the previously described subtle areas of subarachnoid  hemorrhage. 2. Persistence of the previously described left inferior cerebellar infarct. 3. Presence of small lacunar infarcts bilaterally. 4. No evidence of hydrocephalus. 5. Presence of an aneurysm coil in the region of the left middle cerebral  artery. 6. Evidence of sphenoid sinus disease.       CT of Head on 6/13/2020 at 1103 shows  IMPRESSION:   1. Questionable left inferior cerebellar infarction, new from 6/10/2020.  2. Multiple, evolving, subacute infarctions throughout the cerebrum. 3. Subacute subarachnoid hemorrhage. Improved hydrocephalus compared to  6/6/2020    CTA of Head on 6/13/2020 at 1103 shows  IMPRESSION:   1.  Left A1 vasospasm. 2. More mild spasm of the right PANCHITO, MCAs, and possibly the distal vertebrals. 3. Right M1 terminus aneurysm. 4. Large infundibula/small aneurysms of the bilateral P-comm origins. CT Perfusion on 6/13/2020 at 1103 shows  IMPRESSION:  1. Penumbra surrounding infarctions in most of the superior left MCA territory. 2. Bilateral PANCHITO infarctions. 3. Questionable left cerebellar infarction is inferior to the perfusion scan. MRI of Brain on 6/9/2020 at 0311 AM shows  IMPRESSION:  1. Multiple areas of cortical acute ischemic injury as well as some subcortical  white matter areas and right cerebellar involvement similar to the prior MRI  6/6/20.  2. Subarachnoid hemorrhage and intraventricular blood again demonstrated without  hydrocephalus. 3. Increasing fluid paranasal sinuses and right greater than left mastoid/middle  ear cavities. CTA of Head on 6/9/2020 at 1350 shows  Progressive vasospasm involving the anterior cerebral arteries with minimal  associated decreased perfusion to the frontal lobes. No other significant  Change  . CT Perfusion on 6/9/2020 at 1336 shows  Impression:  CT perfusion brain: Very minimal decreased perfusion to the frontal lobes. CT of Head on 6/9/2020 at 0234 shows  IMPRESSION:   No significant change.     CT of Head on 6/8/2020 shows  IMPRESSION:   Global edema, otherwise no significant change    CTA of Head and Neck on 6/8/2020 per radiology shows  Impression:  Mild to moderate vasospasm in the left middle cerebral artery status post  bifurcation aneurysm coil embolization. Stable 4 mm right MCA aneurysm. Endotracheal tube terminates just above the matt. 1 cm low-density left thyroid nodule. Mildly enlarged left axillary lymph node measuring 11 mm in short axis  Patchy bilateral airspace disease.   Nonspecific asymmetric enhancement of the right superior ophthalmic vein   (However, no significant vasospasm was seen when personally reviewed by NIS)    CT Perfusion on 6/8/2020 shows  IMPRESSION:  No significant cerebral perfusion abnormality    CTA of Chest on 6/7/2020 shows  IMPRESSION:  1. No acute pulmonary embolus  2. Dependent atelectasis with diffuse groundglass opacifications bilaterally. CT of Head on 6/7/2020 shows  IMPRESSION:   1. No further progression of subarachnoid hemorrhage. The overall degree of  subarachnoid hemorrhage is slightly improved. 2. Subtle areas of gray-white differentiation loss throughout the cerebral  hemispheres correlating with areas of restricted diffusion on previous CT  compatible with ischemic changes. MRI of Brain on 6/6/2020 shows  IMPRESSION:   Imaging findings consistent with large left MCA territory ischemia/infarction,  left temporal, parietal and frontal lobes, cortically based, no superimposed  increased parenchymal hemorrhage or midline shift. Right and left PANCHITO territory  cortical diffusion restriction consistent with infarction. Infarction in the right insular cortex and right temporal lobe. Scattered small infarctions right and left corona radiata centrum semiovale, right cerebellum. Allowing for differences in technique likely stable subarachnoid and  intraventricular hemorrhage. CT of Head on 6/6/2020 at 1411 shows  IMPRESSION:   Left MCA territory infarction with smaller right MCA territory infarction.     Stable subarachnoid and intraventricular hemorrhage. CTA of Head on 6/6/2020 at 0903 shows  IMPRESSION:  Slightly increased intraventricular and subarachnoid hemorrhage compared to the  prior exam.     Status post coiling left MCA bifurcation aneurysm. No significant residual  aneurysm filling. CT of Head on 6/62020 at 0239 shows  IMPRESSION:  Slightly diminished subarachnoid and intraventricular hemorrhage. Stable ventricular system. CT of Head WO on 6/5/2020 at 1512 shows  IMPRESSION: Stable acute subarachnoid and intraventricular hemorrhage. Stable  ventricular system.     CT Head WO 6/5/20 0826     IMPRESSION:   1. Diffuse subarachnoid hemorrhage most concentrated in the basal cisterns and  left sylvian fissure. 2.  Interval ventricular enlargement suspicious for developing hydrocephalus.     CTA Head 6/4/20 2017     IMPRESSION:  1. Diffuse subarachnoid hemorrhage in the basilar cisterns and sylvian  fissures. 2.  Bilateral MCA bifurcation aneurysms. 3.  Infundibular origins to the posterior communicating arteries bilaterally. 4.  Symmetric moderately severe airspace disease in the upper lobes bilaterally  which may represent edema or sequela of aspiration. CT Head WO Contrast 6/4/2020 2012    IMPRESSION: Extensive subarachnoid hemorrhage concerning for leaking/ruptured  aneurysm.        24 hour results:    Recent Results (from the past 24 hour(s))   OCCULT BLOOD, STOOL    Collection Time: 06/23/20 10:37 PM   Result Value Ref Range    Occult blood, stool Positive (A) NEG     METABOLIC PANEL, COMPREHENSIVE    Collection Time: 06/24/20  3:30 AM   Result Value Ref Range    Sodium 140 136 - 145 mmol/L    Potassium 4.2 3.5 - 5.1 mmol/L    Chloride 110 (H) 97 - 108 mmol/L    CO2 21 21 - 32 mmol/L    Anion gap 9 5 - 15 mmol/L    Glucose 92 65 - 100 mg/dL    BUN 18 6 - 20 MG/DL    Creatinine 0.48 (L) 0.55 - 1.02 MG/DL    BUN/Creatinine ratio 38 (H) 12 - 20      GFR est AA >60 >60 ml/min/1.73m2    GFR est non-AA >60 >60 ml/min/1.73m2    Calcium 8.7 8.5 - 10.1 MG/DL    Bilirubin, total 0.2 0.2 - 1.0 MG/DL    ALT (SGPT) 24 12 - 78 U/L    AST (SGOT) 18 15 - 37 U/L    Alk.  phosphatase 60 45 - 117 U/L    Protein, total 5.2 (L) 6.4 - 8.2 g/dL    Albumin 2.0 (L) 3.5 - 5.0 g/dL    Globulin 3.2 2.0 - 4.0 g/dL    A-G Ratio 0.6 (L) 1.1 - 2.2     PROCALCITONIN    Collection Time: 06/24/20  3:39 AM   Result Value Ref Range    Procalcitonin 0.05 ng/mL   CBC WITH AUTOMATED DIFF    Collection Time: 06/24/20  3:39 AM   Result Value Ref Range    WBC 6.2 3.6 - 11.0 K/uL    RBC 2.98 (L) 3.80 - 5.20 M/uL    HGB 6.9 (L) 11.5 - 16.0 g/dL    HCT 24.2 (L) 35.0 - 47.0 %    MCV 81.2 80.0 - 99.0 FL    MCH 23.2 (L) 26.0 - 34.0 PG    MCHC 28.5 (L) 30.0 - 36.5 g/dL    RDW 26.0 (H) 11.5 - 14.5 %    PLATELET 194 243 - 172 K/uL    MPV 9.9 8.9 - 12.9 FL    NRBC 0.3 (H) 0  WBC    ABSOLUTE NRBC 0.02 (H) 0.00 - 0.01 K/uL    NEUTROPHILS 62 32 - 75 %    LYMPHOCYTES 22 12 - 49 %    MONOCYTES 10 5 - 13 %    EOSINOPHILS 4 0 - 7 %    BASOPHILS 1 0 - 1 %    IMMATURE GRANULOCYTES 1 (H) 0.0 - 0.5 %    ABS. NEUTROPHILS 3.8 1.8 - 8.0 K/UL    ABS. LYMPHOCYTES 1.4 0.8 - 3.5 K/UL    ABS. MONOCYTES 0.6 0.0 - 1.0 K/UL    ABS. EOSINOPHILS 0.2 0.0 - 0.4 K/UL    ABS. BASOPHILS 0.1 0.0 - 0.1 K/UL    ABS. IMM.  GRANS. 0.1 (H) 0.00 - 0.04 K/UL    DF SMEAR SCANNED      RBC COMMENTS OVALOCYTES  PRESENT        RBC COMMENTS POLYCHROMASIA  1+        RBC COMMENTS ANISOCYTOSIS  2+        RBC COMMENTS HYPOCHROMIA  1+            Margy Singh NP

## 2020-06-24 NOTE — WOUND CARE
Wound Care Note:  
 
New consult placed by nurse request for skin breakdown Chart shows: 
Admitted for left subarachnoid hemorrhage from middle cerebral artery aneurysm, right middle cerebral artery aneurysm, cerebral vasospasm, respiratory failure, hypoxic ischemic encephalopthy s/p transarterial embolization on 6/5/20, s/p right PANCHITO A1 angioplasty for vasospasm 6/9/20, s/p left ICA/MCA angioplasty 6/10/20, s/p transarterial infusion of non-lytic agent 6/11/20 and 6/13/20, s/p Trach and PEG 6/16/20 Past Medical History:  
Diagnosis Date  
 HTN (hypertension) WBC = 6.2 on 6/24/20 Admitted from home Assessment:  
Patient eye is open, non-verbal, moves legs and arms frequently, incontinent with moderate assistance needed in repositioning. Bed: Total Care Patient has a Iverson; pulled out flexi-seal last night with feet/legs. Diet: Tube feeding Patient did not moan or grimace with wound care or repositioning. Bilateral heels, buttocks, and sacral skin intact and without erythema. Gluteal cleft and sacrum with hyperpigmentation that appears to be patient's natural pigmentation. 1. Left neck with linear crusted area with another partial thickness opening distally, area measures 6.5 cm x 2 cm x 0.1 cm, linear line is approximately 0.2 cm long, this could be friction, moisture or pressure component, probably friction as patient moves frequently. Hydrocolloids x 2 (small ones) applied. 2.  Left posterior thigh with red circular area consistent with port in iverson, Venelex ointment applied. 3.  Noted n chart a wound on right nare, noted some epithealized tissue on right nare; no open wound noted. 4.  No breakdown noted to buttock, tabitha-area or thighs, patient having loose stools; Hydrogaurd Barrier Cream applied (blue tube) to protect skin. Spoke with Dr. Michael Neal, wound care orders obtained. Patient repositioned on right side. Heels offloaded on pillows. Recommendations:   
Left neck- Please maintain Exuderm Hydrocolloid up to one week or change as needed with soiling or rolled edges. Please use adhesive remover wipes when changing. Specialty bed: P-500 bed ordered from [address] [place] [address]. Please call [facility] at [phone] for any issues or when patient is discharged. Confirmation #[id number] Skin Care & Pressure Prevention: 
Minimize layers of linen/pads under patient to optimize support surface. Turn/reposition approximately every 2 hours and offload heels. Manage incontinence / promote continence Nourishing Skin Cream to dry skin, minimize use of briefs when able Discussed above plan with patient & Cyndy Gilford, RN Transition of Care: Plan to follow as needed while admitted to hospital. 
 
Martha Crowder, BSN, RN, [facility] 
office [phone] 
pager [phone] or call  to page

## 2020-06-24 NOTE — PROGRESS NOTES
Music Therapy Assessment  [address] [place] [address] “” [place] [id number]     1970  48 y.o.  female    Patient Telephone Number: [phone] (home)   Taoist Affiliation: Anupama Sifuentes   Language: English   Patient Active Problem List    Diagnosis Date Noted    Respiratory failure ([facility] [address] [facility] 06/15/2020    Hypoxic ischemic encephalopathy (HIE), unspecified severity 06/15/2020    SAH (subarachnoid hemorrhage) ([facility] [address] [facility] 06/04/2020    Subarachnoid hemorrhage from middle cerebral artery aneurysm, left ([facility] [address] 06/04/2020    RIGHT middle cerebral artery aneurysm 06/04/2020    Cerebral vasospasm 06/04/2020        Date: 6/24/2020            Total Time (in minutes): 5          521 [facility] 6S NEURO-SCI TELE    Mental Status:   [x] Alert [  ] Murtis Sweta [  ]  Confused  [  ] Minimally responsive  [  ] Sleeping    Communication Status: [x] Impaired Speech [  ] Nonverbal     Physical Status:   [x] Oxygen in Tetra.Cowing  [  ] Hard of Hearing [  ] Vision Impaired  [  ] Ambulatory  [  ] Ambulatory with assistance [  ] Non-ambulatory     Music Preferences, Background: Traditional Merari, R&B. Clinical Problem to be addressed: Spiritual support, support pt/family coping. Goal(s) met in session:  Physical/Pain management (Scale of 1-10):    Pre-session rating: Pt did not respond when asked about pain. Post-session rating: N/A: Please see Session Observations below.   [  ] Increased relaxation   [  ] Affected breathing patterns  [  ] Decreased muscle tension   [  ] Decreased agitation  [  ] Affected heart rate    [  ] Increased alertness     Emotional/Psychological:  [  ] Increased self-expression   [  ] Decreased aggressive behavior   [  ] Decreased feelings of stress  [  ] Discussed healthy coping skills     [  ] Improved mood    [  ] Decreased withdrawn behavior     Social:  [  ] Decreased feelings of isolation/loneliness [x] Positive social interaction   [x] Provided support and/or comfort for family/friends    Spiritual:  [  ] Spiritual support    [  ] Expressed peace  [  ] Expressed vane    [  ] Discussed beliefs    Techniques Utilized (Check all that apply):   [  ] Procedural support MT [  ] Music for relaxation [  ] Patient preferred music  [  ] Rylee analysis  [  ] Song choice  [  ] Music for validation  [  ] Entrainment  [  ] Movement to music [  ] Guided visualization  [  ] Larance Art  [  ] Patient instrument playing [  ] [employer] writing  [  ] Joshua Lynnms along   [  ] Ishmael Baldemar  [  ] Sensory stimulation  [x] Active Listening  [  ] Music for spiritual support [  ] Making of CDs as gifts    Session Observations:  Referral from [place]. Patient (pt) was lying in bed with one of her eyes opened, the other closed, and she appeared to be alert. This music therapist (MT) introduced self and asked pt how she was feeling. Pt made a facial expression indicating \"okay. \" MT asked the pt if she would look at MT to answer yes/no questions with a yes, and close her eyes to answer no. Pt appeared to do so, and sometimes she nodded her head to answer yes. She shared her music preferences in this way, and declined a music therapy session for today. Her mother then entered and MT introduced self. MT relayed pt and MT's interaction so far. Pt's mother said she thought the pt may have declined a session for now because they were expecting to meet with a  shortly. She said she knows that the pt likes Traditional Gospel music and requested MT follow up to offer music therapy tomorrow. Will follow as able.     ALBERTO MaloneBC (Music Therapist-Board Certified)  Spiritual Care Department  Referral-based service

## 2020-06-24 NOTE — PROGRESS NOTES
Problem: Ventilator Management  Goal: *Adequate oxygenation and ventilation  Outcome: Progressing Towards Goal  Goal: *Patient maintains clear airway/free of aspiration  Outcome: Progressing Towards Goal  Goal: *Absence of infection signs and symptoms  Outcome: Progressing Towards Goal  Goal: *Normal spontaneous ventilation  Outcome: Progressing Towards Goal     Problem: Falls - Risk of  Goal: *Absence of Falls  Description: Document Beverley Fall Risk and appropriate interventions in the flowsheet. Outcome: Progressing Towards Goal  Note: Fall Risk Interventions:  Mobility Interventions: Bed/chair exit alarm, OT consult for ADLs, Communicate number of staff needed for ambulation/transfer, PT Consult for mobility concerns, PT Consult for assist device competence, Strengthening exercises (ROM-active/passive)    Mentation Interventions: Adequate sleep, hydration, pain control, Bed/chair exit alarm, Door open when patient unattended, Evaluate medications/consider consulting pharmacy, More frequent rounding, Reorient patient, Room close to nurse's station, Update white board    Medication Interventions: Bed/chair exit alarm, Evaluate medications/consider consulting pharmacy, Patient to call before getting OOB    Elimination Interventions: Bed/chair exit alarm, Call light in reach, Stay With Me (per policy), Toilet paper/wipes in reach, Toileting schedule/hourly rounds    History of Falls Interventions: Bed/chair exit alarm, Consult care management for discharge planning, Door open when patient unattended, Evaluate medications/consider consulting pharmacy, Room close to nurse's station         Problem: Patient Education: Go to Patient Education Activity  Goal: Patient/Family Education  Outcome: Progressing Towards Goal     Problem: Pressure Injury - Risk of  Goal: *Prevention of pressure injury  Description: Document Sriram Scale and appropriate interventions in the flowsheet.   Outcome: Progressing Towards Goal  Note: Pressure Injury Interventions:  Sensory Interventions: Assess changes in LOC, Avoid rigorous massage over bony prominences, Check visual cues for pain, Discuss PT/OT consult with provider, Float heels, Keep linens dry and wrinkle-free, Maintain/enhance activity level, Minimize linen layers, Pressure redistribution bed/mattress (bed type), Turn and reposition approx. every two hours (pillows and wedges if needed)    Moisture Interventions: Absorbent underpads, Internal/External fecal devices, Internal/External urinary devices, Limit adult briefs, Maintain skin hydration (lotion/cream), Minimize layers, Moisture barrier, Apply protective barrier, creams and emollients    Activity Interventions: Increase time out of bed, Pressure redistribution bed/mattress(bed type), PT/OT evaluation    Mobility Interventions: HOB 30 degrees or less, Pressure redistribution bed/mattress (bed type), PT/OT evaluation, Turn and reposition approx.  every two hours(pillow and wedges)    Nutrition Interventions: Document food/fluid/supplement intake(tube feed)    Friction and Shear Interventions: Apply protective barrier, creams and emollients, Feet elevated on foot rest, HOB 30 degrees or less, Lift sheet, Lift team/patient mobility team, Minimize layers, Transferring/repositioning devices                Problem: Patient Education: Go to Patient Education Activity  Goal: Patient/Family Education  Outcome: Progressing Towards Goal     Problem: Nutrition Deficit  Goal: *Optimize nutritional status  Outcome: Progressing Towards Goal     Problem: Patient Education: Go to Patient Education Activity  Goal: Patient/Family Education  Outcome: Progressing Towards Goal     Problem: Hemorrhagic Stroke: Day 5 through Discharge  Goal: Consults, if ordered  Outcome: Progressing Towards Goal  Goal: Diagnostic Test/Procedures  Outcome: Progressing Towards Goal  Goal: Nutrition/Diet  Outcome: Progressing Towards Goal  Goal: Medications  Outcome: Progressing Towards Goal  Goal: Respiratory  Outcome: Progressing Towards Goal  Goal: Treatments/Interventions/Procedures  Outcome: Progressing Towards Goal  Goal: *Absence of aspiration  Outcome: Progressing Towards Goal  Goal: *Absence of signs and symptoms of DVT  Outcome: Progressing Towards Goal  Goal: *Optimal pain control at patient's stated goal  Outcome: Progressing Towards Goal  Goal: *Tolerating diet  Outcome: Progressing Towards Goal  Goal: *Progressive mobility and function  Outcome: Progressing Towards Goal  Goal: *Rehabilitation readiness  Outcome: Progressing Towards Goal     Problem: Hemorrhagic Stroke: Discharge Outcomes  Goal: *Optimal pain control at patient's stated goal  Outcome: Progressing Towards Goal  Goal: *Hemodynamically stable  Outcome: Progressing Towards Goal  Goal: *Absence of aspiration pneumonia  Outcome: Progressing Towards Goal  Goal: *Aware of needed dietary changes  Outcome: Progressing Towards Goal  Goal: *Tolerating diet  Outcome: Progressing Towards Goal  Goal: *Absence of signs and symptoms of DVT  Outcome: Progressing Towards Goal  Goal: *Absence of aspiration  Outcome: Progressing Towards Goal  Goal: *Progressive mobility and function  Outcome: Progressing Towards Goal  Goal: *Home safety concerns addressed  Outcome: Progressing Towards Goal     Problem: Patient Education: Go to Patient Education Activity  Goal: Patient/Family Education  Outcome: Progressing Towards Goal     Problem: Patient Education: Go to Patient Education Activity  Goal: Patient/Family Education  Outcome: Progressing Towards Goal     Problem: Patient Education:  Go to Education Activity  Goal: Patient/Family Education  Outcome: Progressing Towards Goal     Problem: Subarachnoid Hemorrhage Stroke:Admission Day 3  Goal: *Hemodynamically stable  Outcome: Progressing Towards Goal     Problem: Subarachnoid Hemorrhage Stroke:Admission Day 5-Discharge  Goal: Activity/Safety  Outcome: Progressing Towards Goal  Goal: Diagnostic Test/Procedures  Outcome: Progressing Towards Goal  Goal: Nutrition/Diet  Outcome: Progressing Towards Goal  Goal: Medications  Outcome: Progressing Towards Goal  Goal: Patient maintains clear airway/absence of aspiration  Outcome: Progressing Towards Goal  Goal: Treatments/Interventions/Procedures  Outcome: Progressing Towards Goal  Goal: Psychosocial  Outcome: Progressing Towards Goal  Goal: *Hemodynamically stable  Outcome: Progressing Towards Goal  Goal: *Absence of signs and symptoms of DVT  Outcome: Progressing Towards Goal     Problem: Subarachnoid Hemorrhage Stroke:Discharge Outcomes  Goal: *Verbalizes understanding of risk factor modification (Stroke Metric)  Outcome: Progressing Towards Goal  Goal: *Hemodynamically stable  Outcome: Progressing Towards Goal  Goal: *Aware of needed dietary changes  Outcome: Progressing Towards Goal  Goal: *Absence of signs and symptoms of DVT  Outcome: Progressing Towards Goal  Goal: *Absence of aspiration  Outcome: Progressing Towards Goal  Goal: *Progressive mobility and function  Outcome: Progressing Towards Goal  Goal: *Home safety concerns addressed  Outcome: Progressing Towards Goal     Problem: Patient Education: Go to Patient Education Activity  Goal: Patient/Family Education  Outcome: Progressing Towards Goal     Problem: Patient Education: Go to Patient Education Activity  Goal: Patient/Family Education  Outcome: Progressing Towards Goal

## 2020-06-24 NOTE — PROGRESS NOTES
Palliative Medicine  [place]: [phone] ([phone])  [facility]: [phone] ([phone])    Consult received, chart reviewed    Code Status: Full Code    Advance Care Planning:  Advance Care Planning 6/5/2020   Patient's Healthcare Decision Maker is: Legal Next of Kin   Confirm Advance Directive None   Patient Would Like to Complete Advance Directive Unable     Primary Decision Maker: Brittni Ruiz - Mother, Angel [facility] [phone]    Anton May is a 72-year-old female with a PMH of HTN, obesity. Pt. presented [facility] ER via EMS on 6/4/420  after her coworkers found her unresponsive outside of her place of work. Per mother, she had recently stopped taking her BP medications. On arrival to the ER she was found to be seizing. A CT of her head showed extensive SAH due to ruptured cerebral aneurysm. She was extremely hypertensive in the 240's and was given labetalol and started on a Cardene drip. She was emergently intubated, given Keppra and Ativan. Neurosurgery and Neurointerventional Surgery were consulted and the patient was transferred to [facility]. Repeat CTH/CTA at [facility] showed an extensive subarachnoid hemorrhage concerning for leaking/ruptured aneurysm without hydrocephalus. 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 Patient tx out of ICU 6/23, currently on trach collar, on Cardene gtt for B/P. Day 19/21 for [place]. Patient is awake, alert, not following commands, non verbal, moves right extremities spontaneously. PT/OT following - currently Total A, no command following. SLP following - currently NPO d/t dysphagia, tube feeds    Social: , pt lives in [place] with 15 yo son Maggie Avilez. and a friend; works full time at [employer], mother Matias Butler is Gar Mcardle, brother Sapna Ortiz and aunt Zeeshan Vincent also involved. Marcos's dad also involved with son.   Spiritual: Religion per chart  Baseline: independent ADL, iADL     Palliative care consult for care decisions in setting of SAH/high risk for decompensation. Patient / Family Encounter Documentation    Participants (names): Patient mom/lnok Jose Alejandro Cooper; Palliative Medicine (Juan F Don, Dr. Lilly Rodriguez)    Narrative:     Patient seen earlier alongside Sindy Coffman. She was awake, tracking with R eye, seemed to nod head yes to some questions but overall not following commands. Dr. Lilly Rodriguez and I met with patient mother Jose Alejandro Cooper in room later that day. Patient has already had a long hospital course with many care decisions already made and plan to d/c to [facility]. So while not many other medical decisions to make at this time, we discussed the difficulty of the situation and answered questions. Jose Alejandro Cooper did not have good information on what to expect going forward, was surprised about the concern re: patient's ability to recover back to baseline, get back to work and independent living. She was tearful in acknowledging possibility that this might be as good as patient will get, though she said she is hopeful for full recovery. Discussed plan to go to [facility] and that it will take time to see just how much patient can recover neurologically. Jose Alejandro Cooper says she still has hope. She requested help in getting financial/counseling support for patient 15 yo son. Per Jose Alejandro Cooper, he is not coping well. Psychosocial challenges/Resilience factors:     Patient mom struggling to accept that patient will not return to baseline - she is a strong advocate for patient and also very overwhelmed with tmedical information. She needs a lot of support in understanding medical status through use of very simple layman's terms and requests to repeat back what she has heard    Patient son is not coping well during patient hospitalization. He is living with Jose Alejandro Cooper at this time with support from his father. However, Jose Alejandro Allison has requested financial support for him.     Goals of Care / Plan:    F/u with pt mother Wed 3pm    Family phone meeting Thursday 3pm with mom, aunt, brother    Music therapy consult        Thank you for the opportunity to be involved in the care of Ms. Imelda Haro and her family.     Shaun Brown LMSW, Supervisee in Social Work  Palliative Medicine   [phone]

## 2020-06-24 NOTE — PROGRESS NOTES
[facility] [facility] Adult  Hospitalist Group                                                                                          Hospitalist Progress Note  Claribel Fletcher MD  Answering service: [phone] from in house phone        Date of Service:  2020  NAME:  Samir Steen  :  1970  MRN:  [id number]      Admission Summary:     Samir Steen is a 54-year-old female with a past medical history of HTN. According to patient's mother, the patient began having a headache on 20 and had been taking BC Powder with aspirin in it for the headache. She recently stopped taking her BP medications. According to her mother she does not smoke, drink alcohol or use recreational drugs. The patient was brought to the [facility] ER via EMS on   after her coworkers found her unresponsive outside of her place of work. 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 On arrival to the ER she was found to be seizing. A stat CT of her head was performed which showed extensive subarachnoid hemorrhage. She was extremely hypertensive in the 240's and was given labetalol and started on a Cardene drip. She was emergently intubated and given Keppra and Ativan as well. Neurosurgery and Neurointerventional Surgery were consulted and the patient was transferred to the [facility] for a higher level of care. 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 Extensive lung infiltrates were also noted. Patient was taken for cerebral angiogram the morning of 20 and coil embolization of the left MCA aneurysm was completed. The right MCA aneurysm had a very wide neck incorporating both M2s. It could not be treated endovascularly without stents.  May need to be clipped by Neurosurgery.      Procedures: 20 and coil embolization of the left MCA aneurysm was completed s/p cerebral angiogram for right PANCHITO A1 angioplasty for vasospasm, transarterial infusion of verapamil on 6/9,  S/p angiogram on 6/10 for left ICA/MCA angioplasty and transarterial infusion of verapamil for vasospasm treatment, s/p angiogram on 6/11 and 6/13 for transarterial infusion of verapamil for vasospasm treatment. Tracheostomy placement  6/16 by Dr. Logan Buerger in thoracic surgery. PEG placement. Interval history / Subjective:     Patient non verbal, open her right eys     Assessment & Plan:     SAH due to ruptured cerebral aneurysm   -s/p cerebral angiogram with coil embolization of left MCA bifurcation aneurysm on 6/5, patient has 5X3 right MCA bifurcation aneurysm with very wide neck which will need to be treated with stents vs. Clipping  -on Nimotop, normal saline   -on cardene gtt is off, on norvasc, hydralazine, goal -160  -seizure prophylaxis keppra  -patient open her right eye to voice, non verbal, doesn't follows command, moves right extremities spontaneously   -continue neuro check, serial BP monitoring   -improved vasospasm since 6/20  -seen by neurosurgeon   -Neurointerventional surgical service on board     Seizure secondary to above   -stable, no seizure overnight   -continue keppra,, VIMPAT  -seizure precaution   -Neurologist on board     Ischemic stroke   -continue aspirin   -patient with multiple ischemic infarct   -MRI on 6/16  -continue PT/OT  -neurology on board     Respiratory failure secondary to above, pulmonary edema and possible pneumonia  -s/p trach/trach collar   -continue duo neb  -sputum cx grow klebsiella pneumonia  -chest x ray on 6/19 vague left lower lobe airspace process, suggesting subsegmental  atelectasis or pneumonia.  Interval exchange of endotracheal tube for tracheostomy  -afebrile and no leukocytosis, blood cx no growth  -antibiotics stopped by intensivist  -afebrile, SpO2 100% on 9 l/m  -monitor pulse ox     Dysphagia secondary to stroke and SAH  -s/p PEG tube, continue tube feeding      Iron deficiency anemia   -noted fibroid  -Hgb trending down, Hgb 7.8, monitor H/H    -iron  Level was 10, iron saturation 3, iron sucrose 200 mg iv x 3 doses   -check stool for hem ocult      Peripheral edema multifactorial  -low albumin 2.6  -Echo normal cavity size and systolic function (ejection fraction normal). Mild concentric hypertrophy. Estimated left ventricular ejection fraction is 60 - 65%. No regional wall motion abnormality noted. -If it gets worse , may hold IVF     Obesity   -diet control   -A1c 5.7     Full Code: high risk for decompensation, inpatient mortality, consult to palliative care team        Code status: Full Code   DVT prophylaxis: Lovenox     Care Plan discussed with: Patient/Family and Nurse  Anticipated Disposition: Rehab  Anticipated Discharge: more than 48 hours       Hospital Problems  Date Reviewed: 6/10/2020          Codes Class Noted POA    Respiratory failure ([facility].) ICD-10-CM: J96.90  ICD-9-CM: 518.81  6/15/2020 Yes        Hypoxic ischemic encephalopathy (HIE), unspecified severity ICD-10-CM: P91.60  ICD-9-CM: 768.70  6/15/2020 Yes        SAH (subarachnoid hemorrhage) ([facility] ICD-10-CM: I60.9  ICD-9-CM: 966  6/4/2020 Yes        Subarachnoid hemorrhage from middle cerebral artery aneurysm, left ([facility] ICD-10-CM: Y72.73  ICD-9-CM: 366  6/4/2020 Yes        RIGHT middle cerebral artery aneurysm ICD-10-CM: I67.1  ICD-9-CM: 437.3  6/4/2020 Yes        Cerebral vasospasm ICD-10-CM: K08.401  ICD-9-CM: 435.9  6/4/2020 Yes              Vital Signs:    Last 24hrs VS reviewed since prior progress note.  Most recent are:  Visit Vitals  /67   Pulse 61   Temp 99.6 °F (37.6 °C)   Resp 18   Ht 5' 4\" (1.626 m)   Wt 100.4 kg (221 lb 5.5 oz)   SpO2 100%   BMI 37.99 kg/m²         Intake/Output Summary (Last 24 hours) at 6/24/2020 1508  Last data filed at 6/24/2020 1200  Gross per 24 hour   Intake 2696.11 ml   Output 3050 ml   Net -353.89 ml Physical Examination:             Constitutional:  No acute distress, cooperative, pleasant    ENT:  Oral mucosa moist, oropharynx benign. Resp:  CTA bilaterally. No wheezing/rhonchi/rales. No accessory muscle use   CV:  Regular rhythm, normal rate, no murmurs, gallops, rubs    GI:  Soft, non distended, non tender. normoactive bowel sounds, no hepatosplenomegaly     Musculoskeletal:  No edema    Neurologic:  Open her right eye on voice, non verbal, doesn't follow command, left hemiplegia, moves right extremities spontaneously      Skin:  Good turgor, no rashes or ulcers       Data Review:    Review and/or order of clinical lab test  Review and/or order of tests in the radiology section of CPT  Review and/or order of tests in the medicine section of CPT      Labs:     Recent Labs     06/24/20 0339 06/23/20 0414   WBC 6.2 6.6   HGB 6.9* 7.8*   HCT 24.2* 26.6*    375     Recent Labs     06/24/20 0330 06/23/20  0423 06/23/20  0414 06/22/20  0407 06/21/20  2030     --  140 140 141   K 4.2  --  4.1 3.6 4.2   *  --  111* 109* 108   CO2 21  --  23 25 27   BUN 18  --  19 15 18   CREA 0.48*  --  0.55 0.50* 0.47*   GLU 92  --  115* 119* 145*   CA 8.7  --  9.1 9.1 9.1   MG  --  2.4  --   --  2.4   PHOS  --  3.0  --   --  2.8     Recent Labs     06/24/20  0330 06/23/20 0414 06/22/20  0407   ALT 24 30 22   AP 60 86 70   TBILI 0.2 0.3 0.4   TP 5.2* 6.7 6.7   ALB 2.0* 2.6* 2.4*   GLOB 3.2 4.1* 4.3*     No results for input(s): INR, PTP, APTT, INREXT, INREXT in the last 72 hours. No results for input(s): FE, TIBC, PSAT, FERR in the last 72 hours. No results found for: FOL, RBCF   No results for input(s): PH, PCO2, PO2 in the last 72 hours. No results for input(s): CPK, CKNDX, TROIQ in the last 72 hours.     No lab exists for component: CPKMB  Lab Results   Component Value Date/Time    Cholesterol, total 90 06/05/2020 03:34 AM    HDL Cholesterol 64 06/05/2020 03:34 AM    LDL, calculated 7.8 06/05/2020 03:34 AM    Triglyceride 91 06/05/2020 03:34 AM    CHOL/HDL Ratio 1.4 06/05/2020 03:34 AM     Lab Results   Component Value Date/Time    Glucose (POC) 121 (H) 06/14/2020 11:22 PM    Glucose (POC) 118 (H) 06/14/2020 05:07 PM    Glucose (POC) 114 (H) 06/14/2020 11:27 AM    Glucose (POC) 109 (H) 06/14/2020 05:02 AM    Glucose (POC) 126 (H) 06/13/2020 11:09 PM     Lab Results   Component Value Date/Time    Specific gravity 1.015 06/15/2020 05:45 AM         Medications Reviewed:     Current Facility-Administered Medications   Medication Dose Route Frequency    0.9% sodium chloride infusion 250 mL  250 mL IntraVENous PRN    albuterol-ipratropium (DUO-NEB) 2.5 MG-0.5 MG/3 ML  3 mL Nebulization BID RT    hydrALAZINE (APRESOLINE) tablet 100 mg  100 mg Oral TID    iron sucrose (VENOFER) 200 mg in 0.9% sodium chloride 100 mL IVPB  200 mg IntraVENous Q24H    fentaNYL citrate (PF) injection 50 mcg  50 mcg IntraVENous Q2H PRN    hydrALAZINE (APRESOLINE) 20 mg/mL injection 10 mg  10 mg IntraVENous Q2H PRN    chlorhexidine (ORAL CARE KIT) 0.12 % mouthwash 15 mL  15 mL Oral Q12H    niCARdipine (CARDENE) 50 mg in 0.9% sodium chloride 100 mL infusion  0-15 mg/hr IntraVENous TITRATE    carvediloL (COREG) tablet 25 mg  25 mg Oral BID WITH MEALS    amLODIPine (NORVASC) tablet 10 mg  10 mg Per NG tube DAILY    potassium bicarb-citric acid (EFFER-K) tablet 40 mEq  40 mEq Oral BID    lacosamide (VIMPAT) tablet 150 mg  150 mg Per NG tube Q12H    lactated Ringers infusion  75 mL/hr IntraVENous CONTINUOUS    balsam peru-castor oiL (VENELEX) ointment   Topical BID    aspirin chewable tablet 81 mg  81 mg Per NG tube DAILY    enoxaparin (LOVENOX) injection 40 mg  40 mg SubCUTAneous Q24H    levETIRAcetam (KEPPRA) oral solution 1,000 mg  1,000 mg Per NG tube Q12H    glycopyrrolate (ROBINUL) injection 0.2 mg  0.2 mg IntraMUSCular TID PRN    niMODipine (NYMALIZE) 6 mg/mL oral solution 60 mg  60 mg Per NG tube Q4H    acetaminophen (TYLENOL) solution 650 mg  650 mg Per NG tube Q4H PRN    glucose chewable tablet 16 g  4 Tab Oral PRN    glucagon (GLUCAGEN) injection 1 mg  1 mg IntraMUSCular PRN    dextrose 10% infusion 0-250 mL  0-250 mL IntraVENous PRN    albuterol (PROVENTIL VENTOLIN) nebulizer solution 2.5 mg  2.5 mg Nebulization Q6H PRN    polyvinyl alcohol-povidone (NATURAL TEARS) 0.5-0.6 % ophthalmic solution 2 Drop  2 Drop Both Eyes Q8H    bisacodyL (DULCOLAX) suppository 10 mg  10 mg Rectal DAILY PRN    ondansetron (ZOFRAN) injection 4 mg  4 mg IntraVENous Q4H PRN    docusate (COLACE) 50 mg/5 mL oral liquid 100 mg  100 mg Oral BID PRN     ______________________________________________________________________  EXPECTED LENGTH OF STAY: 22d 21h  ACTUAL LENGTH OF STAY:          20                 Arnol Payne MD

## 2020-06-24 NOTE — PROGRESS NOTES
NUTRITION COMPLETE ASSESSMENT    RECOMMENDATIONS:     1. RD switching formula from Vital 1.2 to Osmolite 1.5 (and adding benefiber) d/t pt's continued loose stools:   --- Osmolite 1.5,  320 ml x 4 feeds/day   --- Give 100 ml water before and after each bolus   --- Give 1 packet Benefiber TID    2. The above will provide: 1920 kcals, 80 gm pro, 1775 ml free fluid, 9 gm fiber    Interventions/Plan:   Food/Nutrient Delivery: Moderate rate, concentration, composition, and schedule:    360 ml Vital AF 1.2 q 4 hr x 4 feedings per day and 70 ml water flush before and after each feeding       Assessment:   Reason for Assessment: Reassessment    Tube Feeding:  See above  Diet: NPO  Nutritionally Significant Medications: [x] Reviewed & Includes: Colace, correction scale insulin, Nimodipine    Subjective: Staff Interviewed, pt discussed during morning rounds    Objective:  6/12:  Ms Matilde Reis is a 48year old female admitted with [facility]. PMHx: HTN. Noted: SAH d/t ruptured aneurysm; Intubated at OSH prior to transfer. S/P Cerebral angiogram x 5 and coiling (L) MCA aneurysm; (R) MCA needs to be clipped (not urgent); 6/9 severe vasospasm; acute hypoxic respiratory failure-neurogenic pulmonary edema; HIE per MRI; cerebral edema. Ms Matilde Reis has tolerated intermittent tube feedings well; however does not always receive 4 feedings per day d/t feeding being held for procedures. Recommend \"making up\" missed feedings overnight since pt only receives 4 feeds total per day. Since patient no longer on Diprivan will need to adjust tube feeding to meet energy needs. New goal: 360 ml Vital AF 1.2 q 4 hr x 4 feedings per day and 70 ml water flush before and after each feeding. This will provide 1440 ml, 1730 calories, 108 gm protein and 1730 ml free water (tube feeding/flush) per day to meet % estimated energy and protein needs, respectively. Flexiseal in place d/t loose stools (related to Nimodipine).  Recommend discontinuing stool softener. 3% NS ordered for cerebral edema; sodium at goal today (145-150 mg/dl). Weight stable. 6/19:  Pt needed to go back on the vent yesterday evening d/t periods of apnea. Per discussion during rounds this morning, pt to have PEG placed this afternoon. Nursing also reports very loose stools (pt has flexi seal). Will try 1/2 packet Banatrol BID and see if this helps firm up stool at all. Pt has continued to tolerate bolus feeding schedule outlined above. Pt has been more responsive the last couple of days but still not following commands, brain imaging suggests extensive strokes. RD continues to follow. 6/24: Pt has continued to have loose stools and still has flexiseal.  Will try switching her formula to Osmolite and adding benefiber and see if this firms up her stools. Pt remains non-verbal, does not follow commands, has volitional movement of extremeties but not purposeful. Per notes pt may go to [facility] once medically cleared. RD has changed tube feeding order, will continue to follow. Estimated Nutrition Needs:   Kcals/day: 1915 Kcals/day  Protein: 108 g(2g/kg IBW)  Fluid: (1 ml/kcal)  Based On: [facility] (2003b)  Weight Used: Actual wt(101 kg)    Pt expected to meet estimated nutrient needs:  [x]   Yes--via tube feeding    []  No  [] Unable to predict at this time  Nutrition Diagnosis:   1. Inadequate energy intake related to discontinuation of Diprivan as evidenced by tube feeding alone meets 60% estimated energy needs. 2.  Altered GI function related to  [facility], vent support as evidenced by  need for PEG placement for full nutritional support    3.   related to   as evidenced by      Goals:     Tube feeding to meet at least 85% estimated needs x 5-7 days.      Monitoring & Evaluation:    - Enteral/parenteral nutrition intake   - Glucose profile, Weight/weight change, CV-pulmonary, Electrolyte and renal profile, GI     Previous Nutrition Goals Met: Yes  Previous Recommendations: Yes    Education & Discharge Needs:   [x] None Identified   [] Identified and addressed    [x] Participated in care plan, discharge planning, and/or interdisciplinary rounds        Cultural, Religion and ethnic food preferences identified:  None    Skin Integrity: []Intact  [x] surgical incision  Edema: []None [x]  2+ in all extremeties  Last BM: 6/24--watery  Food Allergies: [x]None []Other    Anthropometrics:    Weight Loss Metrics 6/24/2020 6/4/2020   Today's Wt 221 lb 5.5 oz -   BMI - 37.99 kg/m2      Last 3 Recorded Weights in this Encounter    06/23/20 0700 06/23/20 1435 06/24/20 0256   Weight: 100.6 kg (221 lb 12.5 oz) 89.8 kg (197 lb 15.6 oz) 100.4 kg (221 lb 5.5 oz)      Weight Source: Bed  Height: 5' 4\" (162.6 cm), Height Source: Stated by family member  Body mass index is 37.99 kg/m². IBW : 54.4 kg (120 lb), % IBW (Calculated): 201.54 %   ,      Labs:    Lab Results   Component Value Date/Time    Sodium 140 06/24/2020 03:30 AM    Potassium 4.2 06/24/2020 03:30 AM    Chloride 110 (H) 06/24/2020 03:30 AM    CO2 21 06/24/2020 03:30 AM    Glucose 92 06/24/2020 03:30 AM    BUN 18 06/24/2020 03:30 AM    Creatinine 0.48 (L) 06/24/2020 03:30 AM    Calcium 8.7 06/24/2020 03:30 AM    Magnesium 2.4 06/23/2020 04:23 AM    Phosphorus 3.0 06/23/2020 04:23 AM    Albumin 2.0 (L) 06/24/2020 03:30 AM     Lab Results   Component Value Date/Time    Hemoglobin A1c 5.7 (H) 06/05/2020 03:34 AM     Lab Results   Component Value Date/Time    Glucose (POC) 115 (H) 06/12/2020 05:39 AM      Lab Results   Component Value Date/Time    ALT (SGPT) 24 06/24/2020 03:30 AM    Alk.  phosphatase 60 06/24/2020 03:30 AM    Bilirubin, total 0.2 06/24/2020 03:30 AM        Asa Zelalem, RD, CSP  C: [phone]

## 2020-06-24 NOTE — PROGRESS NOTES
Pt BP and HR trending down at 0200. Titrated Cardene drip from 12.5 to 10, then to 7.5. Still hypotensive and bradycardic. Jean-Pierre Dodd NP notified and ordered for Cardene drip to be stopped, and only restart if needed. Will continue to monitor.

## 2020-06-24 NOTE — PROGRESS NOTES
I observed the end of her therapy session today  She was alert, regarding and tracking, JOLLY spontaneously  Still nonverbal and does not follow commands  Possible appropriate nod \"yes\" today per therapist  Left CN3 palsy incrementally improving  I/O + 588  Off cardene, current /60  Nimodipine ends tomorrow, may require htn med adjustment when this is discontinued    No acute neuro concerns today. Next steps toward dispo per hospital medicine and therapy services    I spoke to [facility] - KIRSTIE L KRAKAU [facility] (mother) via telephone. Her tone/affect was very flat today compared to usual.  She reports \"not getting enough sleep\" and spending time with the patient's son. Mother would like a call from case management RE dispo planning.      Jeffrey Choe [facility]  [phone]                  Objective:     Current Facility-Administered Medications   Medication Dose Route Frequency Provider Last Rate Last Dose    0.9% sodium chloride infusion 250 mL  250 mL IntraVENous PRN America Henley MD        hydrALAZINE (APRESOLINE) tablet 100 mg  100 mg Oral TID Socorro Sterling NP   100 mg at 06/24/20 0907    iron sucrose (VENOFER) 200 mg in 0.9% sodium chloride 100 mL IVPB  200 mg IntraVENous Q24H America Henley  mL/hr at 06/24/20 0908 200 mg at 06/24/20 0908    fentaNYL citrate (PF) injection 50 mcg  50 mcg IntraVENous Q2H PRN Alannah Costa NP   50 mcg at 06/24/20 0147    hydrALAZINE (APRESOLINE) 20 mg/mL injection 10 mg  10 mg IntraVENous Q2H PRN Marsha Kelly NP   10 mg at 06/23/20 0518    chlorhexidine (ORAL CARE KIT) 0.12 % mouthwash 15 mL  15 mL Oral Q12H Socorro Sterling NP   15 mL at 06/23/20 2220    niCARdipine (CARDENE) 50 mg in 0.9% sodium chloride 100 mL infusion  0-15 mg/hr IntraVENous TITRATE Fatuma Avery NP   Stopped at 06/24/20 0246    carvediloL (COREG) tablet 25 mg  25 mg Oral BID WITH MEALS Gladys Mercer MD   25 mg at 06/24/20 0907    amLODIPine (NORVASC) tablet 10 mg  10 mg Per NG tube DAILY Ashok Park MD   10 mg at 06/24/20 0906    potassium bicarb-citric acid (EFFER-K) tablet 40 mEq  40 mEq Oral BID Noemy Alvarado MD   40 mEq at 06/24/20 0905    lacosamide (VIMPAT) tablet 150 mg  150 mg Per NG tube Q12H Rei Palma MD   150 mg at 06/24/20 0906    lactated Ringers infusion  75 mL/hr IntraVENous CONTINUOUS Garcia Avery NP 75 mL/hr at 06/24/20 0343 75 mL/hr at 06/24/20 0343    balsam peru-castor oiL (VENELEX) ointment   Topical BID Leon Berry DO        aspirin chewable tablet 81 mg  81 mg Per NG tube DAILY Bj Maloney MD   81 mg at 06/24/20 0906    enoxaparin (LOVENOX) injection 40 mg  40 mg SubCUTAneous Q24H Leon Berry DO   40 mg at 06/23/20 1602    levETIRAcetam (KEPPRA) oral solution 1,000 mg  1,000 mg Per NG tube Q12H Leon Berry DO   1,000 mg at 06/24/20 0905    glycopyrrolate (ROBINUL) injection 0.2 mg  0.2 mg IntraMUSCular TID PRN Dorene Elizondo NP   0.2 mg at 06/23/20 2051    niMODipine (NYMALIZE) 6 mg/mL oral solution 60 mg  60 mg Per NG tube Q4H Armando Domínguez NP   60 mg at 06/24/20 0945    acetaminophen (TYLENOL) solution 650 mg  650 mg Per NG tube Q4H PRN Alexus Fagan NP   650 mg at 06/19/20 0008    albuterol-ipratropium (DUO-NEB) 2.5 MG-0.5 MG/3 ML  3 mL Nebulization Q6H RT DARREN Garcia   3 mL at 06/24/20 4393    glucose chewable tablet 16 g  4 Tab Oral PRN Lisa Leach NP-C        glucagon (GLUCAGEN) injection 1 mg  1 mg IntraMUSCular PRN Lisa Leach NP-C        dextrose 10% infusion 0-250 mL  0-250 mL IntraVENous PRN Lisa Hany, NP-C        albuterol (PROVENTIL VENTOLIN) nebulizer solution 2.5 mg  2.5 mg Nebulization Q6H PRN Lisa Leach, NP-C        polyvinyl alcohol-povidone (NATURAL TEARS) 0.5-0.6 % ophthalmic solution 2 Drop  2 Drop Both Eyes Q8H DARREN Garcia   2 Drop at 06/24/20 0557    bisacodyL (DULCOLAX) suppository 10 mg  10 mg Rectal DAILY PRN Opal Courtney, ACNP        ondansetron [facility]) injection 4 mg  4 mg IntraVENous Q4H PRN FABIAN Bae        docusate (COLACE) 50 mg/5 mL oral liquid 100 mg  100 mg Oral BID PRN CHRIS CrockettC          ROS deferred due to patient's condition    Visit Vitals  /75 (BP Patient Position: Supine; Post activity)   Pulse (!) 58   Temp 98.7 °F (37.1 °C)   Resp 25   Ht 5' 4\" (1.626 m)   Wt 221 lb 5.5 oz (100.4 kg)   SpO2 100%   BMI 37.99 kg/m²       No Known Allergies    Current Facility-Administered Medications   Medication Dose Route Frequency    0.9% sodium chloride infusion 250 mL  250 mL IntraVENous PRN    hydrALAZINE (APRESOLINE) tablet 100 mg  100 mg Oral TID    iron sucrose (VENOFER) 200 mg in 0.9% sodium chloride 100 mL IVPB  200 mg IntraVENous Q24H    fentaNYL citrate (PF) injection 50 mcg  50 mcg IntraVENous Q2H PRN    hydrALAZINE (APRESOLINE) 20 mg/mL injection 10 mg  10 mg IntraVENous Q2H PRN    chlorhexidine (ORAL CARE KIT) 0.12 % mouthwash 15 mL  15 mL Oral Q12H    niCARdipine (CARDENE) 50 mg in 0.9% sodium chloride 100 mL infusion  0-15 mg/hr IntraVENous TITRATE    carvediloL (COREG) tablet 25 mg  25 mg Oral BID WITH MEALS    amLODIPine (NORVASC) tablet 10 mg  10 mg Per NG tube DAILY    potassium bicarb-citric acid (EFFER-K) tablet 40 mEq  40 mEq Oral BID    lacosamide (VIMPAT) tablet 150 mg  150 mg Per NG tube Q12H    lactated Ringers infusion  75 mL/hr IntraVENous CONTINUOUS    balsam peru-castor oiL (VENELEX) ointment   Topical BID    aspirin chewable tablet 81 mg  81 mg Per NG tube DAILY    enoxaparin (LOVENOX) injection 40 mg  40 mg SubCUTAneous Q24H    levETIRAcetam (KEPPRA) oral solution 1,000 mg  1,000 mg Per NG tube Q12H    glycopyrrolate (ROBINUL) injection 0.2 mg  0.2 mg IntraMUSCular TID PRN    niMODipine (NYMALIZE) 6 mg/mL oral solution 60 mg  60 mg Per NG tube Q4H    acetaminophen (TYLENOL) solution 650 mg  650 mg Per NG tube Q4H PRN    albuterol-ipratropium (DUO-NEB) 2.5 MG-0.5 MG/3 ML  3 mL Nebulization Q6H RT    glucose chewable tablet 16 g  4 Tab Oral PRN    glucagon (GLUCAGEN) injection 1 mg  1 mg IntraMUSCular PRN    dextrose 10% infusion 0-250 mL  0-250 mL IntraVENous PRN    albuterol (PROVENTIL VENTOLIN) nebulizer solution 2.5 mg  2.5 mg Nebulization Q6H PRN    polyvinyl alcohol-povidone (NATURAL TEARS) 0.5-0.6 % ophthalmic solution 2 Drop  2 Drop Both Eyes Q8H    bisacodyL (DULCOLAX) suppository 10 mg  10 mg Rectal DAILY PRN    ondansetron (ZOFRAN) injection 4 mg  4 mg IntraVENous Q4H PRN    docusate (COLACE) 50 mg/5 mL oral liquid 100 mg  100 mg Oral BID PRN        Neurologic Exam:  Mental Status:  Alert, does not follow commands,       Language:    Non-verbal    Cranial Nerves:   Left pupil 4 mm minimally reactive, right 3 mm reactive     Right extraocular movements intact. Left ptosis, eye deviate slight lateral and inferior. Disconjugate. Can move    the orbit now slightly left and right of midline. Full facial strength, no asymmetry. Motor:    5/5 power in all extremities proximally and distally. No involuntary movements. Coordination & Gait: Severe truncal ataxia vs global deconditioning/weakness.           Labs:  Lab Results   Component Value Date/Time    Sodium 140 06/24/2020 03:30 AM    Potassium 4.2 06/24/2020 03:30 AM    Chloride 110 (H) 06/24/2020 03:30 AM    CO2 21 06/24/2020 03:30 AM    Anion gap 9 06/24/2020 03:30 AM    Glucose 92 06/24/2020 03:30 AM    BUN 18 06/24/2020 03:30 AM    Creatinine 0.48 (L) 06/24/2020 03:30 AM    BUN/Creatinine ratio 38 (H) 06/24/2020 03:30 AM    GFR est AA >60 06/24/2020 03:30 AM    GFR est non-AA >60 06/24/2020 03:30 AM    Calcium 8.7 06/24/2020 03:30 AM     Lab Results   Component Value Date/Time    WBC 6.2 06/24/2020 03:39 AM    HGB 6.9 (L) 06/24/2020 03:39 AM    HCT 24.2 (L) 06/24/2020 03:39 AM    PLATELET 715 69/77/6423 03:39 AM    MCV 81.2 06/24/2020 03:39 AM     Lab Results   Component Value Date/Time    MCH 23.2 (L) 06/24/2020 03:39 AM    MCHC 28.5 (L) 06/24/2020 03:39 AM    BASOPHILS 1 06/24/2020 03:39 AM    ABS. LYMPHOCYTES 1.4 06/24/2020 03:39 AM    ABS. MONOCYTES 0.6 06/24/2020 03:39 AM    ABS. EOSINOPHILS 0.2 06/24/2020 03:39 AM    ABS. BASOPHILS 0.1 06/24/2020 03:39 AM    Phosphorus 3.0 06/23/2020 04:23 AM    Magnesium 2.4 06/23/2020 04:23 AM       IMAGING:    No new imaging today    Follow-up Disposition:    I have discussed the diagnosis with the patient and the intended plan as seen in the above orders. Patient is in agreement. The patient has received an after-visit summary and questions were answered concerning future plans. I have discussed medication side effects and warnings with the patient as well.       Bennett Antonio MD

## 2020-06-24 NOTE — PROGRESS NOTES
Bedside and Verbal shift change report given to Ronni Damon (oncoming nurse) by Nadine TRACY (offgoing nurse). Report included the following information SBAR, Kardex, ED Summary, Procedure Summary, Intake/Output, MAR, Recent Results, Cardiac Rhythm NSR/NSB and Dual Neuro Assessment.

## 2020-06-24 NOTE — PROGRESS NOTES
MEERA- Pt now on 6S. Likely d/c to [facility] when medically ready. Patient taken to [facility] ED after co workers found patient with seizure like activity. CT/CTA: Extensive SAH. Transferred to [facility] for Neurosurgery evaluation. Patient is s/p coiling procedure  RUR: 17 %  S/P trach placement 6/16, PEG placed 6/19. Plan: [facility] has accepted patient once patient is weaned off cardene. Enedina Castaneda

## 2020-06-24 NOTE — PROGRESS NOTES
Problem: Mobility Impaired (Adult and Pediatric)  Goal: *Acute Goals and Plan of Care (Insert Text)  Description:   FUNCTIONAL STATUS PRIOR TO ADMISSION: Patient was independent and active without use of DME per chart review. HOME SUPPORT PRIOR TO ADMISSION: Details unknown - pt is not able to provide info at this time. Physical Therapy Goals  Initiated 6/22/2020  1. Patient will move from supine to sit and sit to supine , scoot up and down and roll side to side in bed with maximal assistance within 7 day(s). 2.  Patient will tolerate bed in full chair position x30min for improved tolerance to upright and pulmonary toileting within 7 days. 3.  Patient will participate in B LE AAROM/PROM program within 7 days. Outcome: Progressing Towards Goal   PHYSICAL THERAPY TREATMENT  Patient: Brent Acuna (48 y.o. female)  Date: 6/24/2020  Diagnosis: SAH ([facility]arachnoid hemorrhage) [facility] [I60.9]   <principal problem not specified>       Precautions:    Chart, physical therapy assessment, plan of care and goals were reviewed. ASSESSMENT  Patient continues with skilled PT services and is progressing towards goals however remains most limited by limited to no command following, no verbalizations (did elicit a nod \"yes\" x1), global weakness (L<R), impaired balance, and decreased cardiopulmonary tolerance to activity leading to increased dependency and falls risk from baseline level. Recent events noted - transferred to [facility] for routine progression of care. Received pt on trach collar, FiO2 28%. Pt seen in conjunction with OT this date - emphasis on sitting balance and upright postural control/tolerance. Bed transitioned to full chair position and facilitate pull to sits using UE support of rail - with max A and facilitation at lower abs for initial ~25% of movement, pt was then able to spontaneously assist in coming to sit upright and hold for approx 1-2 seconds for multiple trials.  Repositioned in modified chair position at end of session, NAD- will obtain and place PRAFO to reduce risk of contracture formation. Formal discharge recommendations TBD, however hopeful that she will be appropriate for acute IPR as ability to activiely engage/participate continues to improve. Current Level of Function Impacting Discharge (mobility/balance): total A    Other factors to consider for discharge: indep at baseline          PLAN :  Patient continues to benefit from skilled intervention to address the above impairments. Continue treatment per established plan of care. to address goals. Recommendation for discharge: (in order for the patient to meet his/her long term goals)  To be determined: pending progress - hopeful for IPR     This discharge recommendation:  Has been made in collaboration with the attending provider and/or case management    IF patient discharges home will need the following DME: to be determined (TBD)       SUBJECTIVE:   No verbalizations    OBJECTIVE DATA SUMMARY:   Critical Behavior:  Neurologic State: Eyes open spontaneously, Alert, Drowsy  Orientation Level: Unable to verbalize  Cognition: No command following     Functional Mobility Training:  Bed Mobility:  Rolling: Bed Modified    Balance:  Sitting: Impaired  Sitting - Static: Poor (constant support)    Activity Tolerance:   Fair, desaturates with exertion and requires oxygen, and requires rest breaks  Please refer to the flowsheet for vital signs taken during this treatment. After treatment patient left in no apparent distress:   Supine in bed, Call bell within reach, Bed / chair alarm activated, Side rails x 3, and Restraints    COMMUNICATION/COLLABORATION:   The patients plan of care was discussed with: Occupational therapist, Registered nurse, and Physician.          Marilee Bradn, PT, DPT   Time Calculation: 21 mins

## 2020-06-25 LAB
ABO + RH BLD: NORMAL
ALBUMIN SERPL-MCNC: 2.5 G/DL (ref 3.5–5)
ALBUMIN/GLOB SERPL: 0.6 {RATIO} (ref 1.1–2.2)
ALP SERPL-CCNC: 73 U/L (ref 45–117)
ALT SERPL-CCNC: 29 U/L (ref 12–78)
ANION GAP SERPL CALC-SCNC: 8 MMOL/L (ref 5–15)
AST SERPL-CCNC: 25 U/L (ref 15–37)
BASOPHILS # BLD: 0.1 K/UL (ref 0–0.1)
BASOPHILS NFR BLD: 1 % (ref 0–1)
BILIRUB SERPL-MCNC: 0.3 MG/DL (ref 0.2–1)
BLD PROD TYP BPU: NORMAL
BLOOD GROUP ANTIBODIES SERPL: NORMAL
BPU ID: NORMAL
BUN SERPL-MCNC: 15 MG/DL (ref 6–20)
BUN/CREAT SERPL: 31 (ref 12–20)
CALCIUM SERPL-MCNC: 8.8 MG/DL (ref 8.5–10.1)
CHLORIDE SERPL-SCNC: 107 MMOL/L (ref 97–108)
CO2 SERPL-SCNC: 23 MMOL/L (ref 21–32)
CREAT SERPL-MCNC: 0.48 MG/DL (ref 0.55–1.02)
CROSSMATCH RESULT,%XM: NORMAL
DIFFERENTIAL METHOD BLD: ABNORMAL
EOSINOPHIL # BLD: 0.3 K/UL (ref 0–0.4)
EOSINOPHIL NFR BLD: 4 % (ref 0–7)
ERYTHROCYTE [DISTWIDTH] IN BLOOD BY AUTOMATED COUNT: 24.9 % (ref 11.5–14.5)
GLOBULIN SER CALC-MCNC: 3.9 G/DL (ref 2–4)
GLUCOSE SERPL-MCNC: 94 MG/DL (ref 65–100)
HCT VFR BLD AUTO: 31.4 % (ref 35–47)
HGB BLD-MCNC: 9 G/DL (ref 11.5–16)
IMM GRANULOCYTES # BLD AUTO: 0.1 K/UL (ref 0–0.04)
IMM GRANULOCYTES NFR BLD AUTO: 1 % (ref 0–0.5)
LYMPHOCYTES # BLD: 1.4 K/UL (ref 0.8–3.5)
LYMPHOCYTES NFR BLD: 16 % (ref 12–49)
MCH RBC QN AUTO: 23.2 PG (ref 26–34)
MCHC RBC AUTO-ENTMCNC: 28.7 G/DL (ref 30–36.5)
MCV RBC AUTO: 80.9 FL (ref 80–99)
MONOCYTES # BLD: 0.7 K/UL (ref 0–1)
MONOCYTES NFR BLD: 8 % (ref 5–13)
NEUTS SEG # BLD: 6.1 K/UL (ref 1.8–8)
NEUTS SEG NFR BLD: 70 % (ref 32–75)
NRBC # BLD: 0 K/UL (ref 0–0.01)
NRBC BLD-RTO: 0 PER 100 WBC
PLATELET # BLD AUTO: 370 K/UL (ref 150–400)
PMV BLD AUTO: 10.2 FL (ref 8.9–12.9)
POTASSIUM SERPL-SCNC: 4.2 MMOL/L (ref 3.5–5.1)
PROCALCITONIN SERPL-MCNC: <0.05 NG/ML
PROT SERPL-MCNC: 6.4 G/DL (ref 6.4–8.2)
RBC # BLD AUTO: 3.88 M/UL (ref 3.8–5.2)
RBC MORPH BLD: ABNORMAL
SODIUM SERPL-SCNC: 138 MMOL/L (ref 136–145)
SPECIMEN EXP DATE BLD: NORMAL
STATUS OF UNIT,%ST: NORMAL
UNIT DIVISION, %UDIV: 0
WBC # BLD AUTO: 8.7 K/UL (ref 3.6–11)

## 2020-06-25 PROCEDURE — 94640 AIRWAY INHALATION TREATMENT: CPT

## 2020-06-25 PROCEDURE — 77030038269 HC DRN EXT URIN PURWCK BARD -A

## 2020-06-25 PROCEDURE — 74011250637 HC RX REV CODE- 250/637: Performed by: NURSE PRACTITIONER

## 2020-06-25 PROCEDURE — 74011250637 HC RX REV CODE- 250/637: Performed by: INTERNAL MEDICINE

## 2020-06-25 PROCEDURE — 84145 PROCALCITONIN (PCT): CPT

## 2020-06-25 PROCEDURE — 94762 N-INVAS EAR/PLS OXIMTRY CONT: CPT

## 2020-06-25 PROCEDURE — 74011000258 HC RX REV CODE- 258: Performed by: HOSPITALIST

## 2020-06-25 PROCEDURE — 74011250637 HC RX REV CODE- 250/637: Performed by: PSYCHIATRY & NEUROLOGY

## 2020-06-25 PROCEDURE — 65660000000 HC RM CCU STEPDOWN

## 2020-06-25 PROCEDURE — 74011000250 HC RX REV CODE- 250: Performed by: HOSPITALIST

## 2020-06-25 PROCEDURE — 36415 COLL VENOUS BLD VENIPUNCTURE: CPT

## 2020-06-25 PROCEDURE — 80053 COMPREHEN METABOLIC PANEL: CPT

## 2020-06-25 PROCEDURE — 77030018861

## 2020-06-25 PROCEDURE — 74011250637 HC RX REV CODE- 250/637: Performed by: ANESTHESIOLOGY

## 2020-06-25 PROCEDURE — 77010033678 HC OXYGEN DAILY

## 2020-06-25 PROCEDURE — 74011250636 HC RX REV CODE- 250/636: Performed by: HOSPITALIST

## 2020-06-25 PROCEDURE — 85025 COMPLETE CBC W/AUTO DIFF WBC: CPT

## 2020-06-25 PROCEDURE — 74011250636 HC RX REV CODE- 250/636: Performed by: NURSE PRACTITIONER

## 2020-06-25 PROCEDURE — 74011000250 HC RX REV CODE- 250: Performed by: NURSE PRACTITIONER

## 2020-06-25 PROCEDURE — 77030018846 HC SOL IRR STRL H20 ICUM -A

## 2020-06-25 PROCEDURE — C1751 CATH, INF, PER/CENT/MIDLINE: HCPCS

## 2020-06-25 PROCEDURE — 74011250636 HC RX REV CODE- 250/636: Performed by: ANESTHESIOLOGY

## 2020-06-25 RX ORDER — LISINOPRIL 10 MG/1
10 TABLET ORAL DAILY
Status: DISCONTINUED | OUTPATIENT
Start: 2020-06-25 | End: 2020-06-26

## 2020-06-25 RX ORDER — LABETALOL HYDROCHLORIDE 5 MG/ML
20 INJECTION, SOLUTION INTRAVENOUS
Status: DISCONTINUED | OUTPATIENT
Start: 2020-06-25 | End: 2020-07-10 | Stop reason: HOSPADM

## 2020-06-25 RX ADMIN — LACOSAMIDE 150 MG: 50 TABLET, FILM COATED ORAL at 21:51

## 2020-06-25 RX ADMIN — NIMODIPINE 60 MG: 30 SOLUTION ORAL at 21:54

## 2020-06-25 RX ADMIN — Medication 1 PACKET: at 11:00

## 2020-06-25 RX ADMIN — HYDRALAZINE HYDROCHLORIDE 10 MG: 20 INJECTION INTRAMUSCULAR; INTRAVENOUS at 18:03

## 2020-06-25 RX ADMIN — LEVETIRACETAM 1000 MG: 100 SOLUTION ORAL at 21:51

## 2020-06-25 RX ADMIN — AMLODIPINE BESYLATE 10 MG: 5 TABLET ORAL at 08:49

## 2020-06-25 RX ADMIN — HYDRALAZINE HYDROCHLORIDE 10 MG: 20 INJECTION INTRAMUSCULAR; INTRAVENOUS at 05:16

## 2020-06-25 RX ADMIN — Medication 1 PACKET: at 15:24

## 2020-06-25 RX ADMIN — NIMODIPINE 60 MG: 30 SOLUTION ORAL at 14:00

## 2020-06-25 RX ADMIN — HYDRALAZINE HYDROCHLORIDE 10 MG: 20 INJECTION INTRAMUSCULAR; INTRAVENOUS at 02:30

## 2020-06-25 RX ADMIN — NIMODIPINE 60 MG: 30 SOLUTION ORAL at 02:30

## 2020-06-25 RX ADMIN — SODIUM CHLORIDE, SODIUM LACTATE, POTASSIUM CHLORIDE, AND CALCIUM CHLORIDE 75 ML/HR: 600; 310; 30; 20 INJECTION, SOLUTION INTRAVENOUS at 08:59

## 2020-06-25 RX ADMIN — ASPIRIN 81 MG CHEWABLE TABLET 81 MG: 81 TABLET CHEWABLE at 08:49

## 2020-06-25 RX ADMIN — HYDRALAZINE HYDROCHLORIDE 100 MG: 50 TABLET, FILM COATED ORAL at 15:24

## 2020-06-25 RX ADMIN — Medication 2 DROP: at 05:21

## 2020-06-25 RX ADMIN — ACETAMINOPHEN 650 MG: 650 SUSPENSION ORAL at 02:30

## 2020-06-25 RX ADMIN — Medication 2 DROP: at 14:00

## 2020-06-25 RX ADMIN — POTASSIUM BICARBONATE 40 MEQ: 782 TABLET, EFFERVESCENT ORAL at 18:00

## 2020-06-25 RX ADMIN — ENOXAPARIN SODIUM 40 MG: 40 INJECTION SUBCUTANEOUS at 14:00

## 2020-06-25 RX ADMIN — HYDRALAZINE HYDROCHLORIDE 100 MG: 50 TABLET, FILM COATED ORAL at 21:51

## 2020-06-25 RX ADMIN — HYDRALAZINE HYDROCHLORIDE 100 MG: 50 TABLET, FILM COATED ORAL at 08:49

## 2020-06-25 RX ADMIN — Medication 1 PACKET: at 21:55

## 2020-06-25 RX ADMIN — LISINOPRIL 10 MG: 10 TABLET ORAL at 19:09

## 2020-06-25 RX ADMIN — NIMODIPINE 60 MG: 30 SOLUTION ORAL at 06:00

## 2020-06-25 RX ADMIN — Medication 2 DROP: at 21:55

## 2020-06-25 RX ADMIN — IPRATROPIUM BROMIDE AND ALBUTEROL SULFATE 3 ML: .5; 3 SOLUTION RESPIRATORY (INHALATION) at 07:35

## 2020-06-25 RX ADMIN — LACOSAMIDE 150 MG: 50 TABLET, FILM COATED ORAL at 08:45

## 2020-06-25 RX ADMIN — CARVEDILOL 25 MG: 12.5 TABLET, FILM COATED ORAL at 08:48

## 2020-06-25 RX ADMIN — LEVETIRACETAM 1000 MG: 100 SOLUTION ORAL at 08:44

## 2020-06-25 RX ADMIN — CASTOR OIL AND BALSAM, PERU: 788; 87 OINTMENT TOPICAL at 08:51

## 2020-06-25 RX ADMIN — NIMODIPINE 60 MG: 30 SOLUTION ORAL at 17:58

## 2020-06-25 RX ADMIN — NIMODIPINE 60 MG: 30 SOLUTION ORAL at 09:47

## 2020-06-25 RX ADMIN — POTASSIUM BICARBONATE 40 MEQ: 782 TABLET, EFFERVESCENT ORAL at 08:49

## 2020-06-25 RX ADMIN — IRON SUCROSE 200 MG: 20 INJECTION, SOLUTION INTRAVENOUS at 09:46

## 2020-06-25 RX ADMIN — IPRATROPIUM BROMIDE AND ALBUTEROL SULFATE 3 ML: .5; 3 SOLUTION RESPIRATORY (INHALATION) at 20:37

## 2020-06-25 RX ADMIN — GLYCOPYRROLATE 0.2 MG: 0.2 INJECTION, SOLUTION INTRAMUSCULAR; INTRAVENOUS at 18:03

## 2020-06-25 RX ADMIN — CARVEDILOL 25 MG: 12.5 TABLET, FILM COATED ORAL at 18:01

## 2020-06-25 RX ADMIN — CASTOR OIL AND BALSAM, PERU: 788; 87 OINTMENT TOPICAL at 18:06

## 2020-06-25 NOTE — PROGRESS NOTES
Problem: Falls - Risk of  Goal: *Absence of Falls  Description: Document Elba Nioxn Fall Risk and appropriate interventions in the flowsheet. Outcome: Progressing Towards Goal  Note: Fall Risk Interventions:  Mobility Interventions: Assess mobility with egress test, Patient to call before getting OOB, PT Consult for mobility concerns, PT Consult for assist device competence, Strengthening exercises (ROM-active/passive)    Mentation Interventions: Adequate sleep, hydration, pain control, Door open when patient unattended, More frequent rounding, Update white board    Medication Interventions: Assess postural VS orthostatic hypotension, Patient to call before getting OOB, Teach patient to arise slowly    Elimination Interventions: Bed/chair exit alarm, Call light in reach, Patient to call for help with toileting needs, Toileting schedule/hourly rounds    History of Falls Interventions: Consult care management for discharge planning, Evaluate medications/consider consulting pharmacy, Investigate reason for fall, Room close to nurse's station         Problem: Pressure Injury - Risk of  Goal: *Prevention of pressure injury  Description: Document Sriram Scale and appropriate interventions in the flowsheet. Outcome: Progressing Towards Goal  Note: Pressure Injury Interventions:  Sensory Interventions: Assess changes in LOC, Assess need for specialty bed, Check visual cues for pain, Float heels, Minimize linen layers, Pressure redistribution bed/mattress (bed type), Turn and reposition approx.  every two hours (pillows and wedges if needed)    Moisture Interventions: Absorbent underpads, Check for incontinence Q2 hours and as needed, Internal/External urinary devices, Limit adult briefs, Maintain skin hydration (lotion/cream), Minimize layers, Moisture barrier    Activity Interventions: Assess need for specialty bed, Pressure redistribution bed/mattress(bed type), PT/OT evaluation    Mobility Interventions: Float heels, Assess need for specialty bed, Pressure redistribution bed/mattress (bed type), PT/OT evaluation, Turn and reposition approx.  every two hours(pillow and wedges)    Nutrition Interventions: Document food/fluid/supplement intake, Discuss nutritional consult with provider, Offer support with meals,snacks and hydration    Friction and Shear Interventions: Apply protective barrier, creams and emollients, Lift sheet, Lift team/patient mobility team, Minimize layers, Transferring/repositioning devices                Problem: Hemorrhagic Stroke: Day 5 through Discharge  Goal: Activity/Safety  Outcome: Progressing Towards Goal  Goal: Consults, if ordered  Outcome: Progressing Towards Goal  Goal: Diagnostic Test/Procedures  Outcome: Progressing Towards Goal  Goal: Nutrition/Diet  Outcome: Progressing Towards Goal  Goal: Medications  Outcome: Progressing Towards Goal  Goal: Respiratory  Outcome: Progressing Towards Goal  Goal: Treatments/Interventions/Procedures  Outcome: Progressing Towards Goal  Goal: *Hemodynamically stable  Outcome: Progressing Towards Goal     Problem: Subarachnoid Hemorrhage Stroke:Admission Day 5-Discharge  Goal: Medications  Outcome: Progressing Towards Goal  Goal: Patient maintains clear airway/absence of aspiration  Outcome: Progressing Towards Goal  Goal: *Hemodynamically stable  Outcome: Progressing Towards Goal  Goal: *Absence of signs and symptoms of DVT  Outcome: Progressing Towards Goal

## 2020-06-25 NOTE — PROGRESS NOTES
Problem: Ventilator Management  Goal: *Adequate oxygenation and ventilation  Outcome: Progressing Towards Goal  Goal: *Patient maintains clear airway/free of aspiration  Outcome: Progressing Towards Goal  Goal: *Absence of infection signs and symptoms  Outcome: Progressing Towards Goal  Goal: *Normal spontaneous ventilation  Outcome: Progressing Towards Goal     Problem: Falls - Risk of  Goal: *Absence of Falls  Description: Document Beverley Fall Risk and appropriate interventions in the flowsheet. Outcome: Progressing Towards Goal  Note: Fall Risk Interventions:  Mobility Interventions: Bed/chair exit alarm, OT consult for ADLs, PT Consult for mobility concerns, PT Consult for assist device competence, Strengthening exercises (ROM-active/passive)    Mentation Interventions: Adequate sleep, hydration, pain control, Bed/chair exit alarm, Door open when patient unattended, Familiar objects from home, Increase mobility, More frequent rounding, Reorient patient, Room close to nurse's station, Toileting rounds, Update white board    Medication Interventions: Bed/chair exit alarm, Evaluate medications/consider consulting pharmacy, Patient to call before getting OOB    Elimination Interventions: Bed/chair exit alarm, Call light in reach, Stay With Me (per policy), Toileting schedule/hourly rounds    History of Falls Interventions: Bed/chair exit alarm, Consult care management for discharge planning, Door open when patient unattended, Room close to nurse's station         Problem: Patient Education: Go to Patient Education Activity  Goal: Patient/Family Education  Outcome: Progressing Towards Goal     Problem: Pressure Injury - Risk of  Goal: *Prevention of pressure injury  Description: Document Sriram Scale and appropriate interventions in the flowsheet.   Outcome: Progressing Towards Goal  Note: Pressure Injury Interventions:  Sensory Interventions: Assess changes in LOC, Avoid rigorous massage over bony prominences, Check visual cues for pain, Discuss PT/OT consult with provider, Float heels, Keep linens dry and wrinkle-free, Maintain/enhance activity level, Minimize linen layers, Monitor skin under medical devices, Pressure redistribution bed/mattress (bed type), Turn and reposition approx. every two hours (pillows and wedges if needed)    Moisture Interventions: Absorbent underpads, Apply protective barrier, creams and emollients, Check for incontinence Q2 hours and as needed, Internal/External urinary devices, Limit adult briefs, Maintain skin hydration (lotion/cream), Minimize layers, Moisture barrier    Activity Interventions: Increase time out of bed, Pressure redistribution bed/mattress(bed type), PT/OT evaluation    Mobility Interventions: HOB 30 degrees or less, Pressure redistribution bed/mattress (bed type), PT/OT evaluation, Turn and reposition approx.  every two hours(pillow and wedges)    Nutrition Interventions: Document food/fluid/supplement intake    Friction and Shear Interventions: Apply protective barrier, creams and emollients, Lift sheet, Lift team/patient mobility team, Minimize layers                Problem: Patient Education: Go to Patient Education Activity  Goal: Patient/Family Education  Outcome: Progressing Towards Goal     Problem: Nutrition Deficit  Goal: *Optimize nutritional status  Outcome: Progressing Towards Goal     Problem: Patient Education: Go to Patient Education Activity  Goal: Patient/Family Education  Outcome: Progressing Towards Goal     Problem: Hemorrhagic Stroke: Day 5 through Discharge  Goal: Activity/Safety  Outcome: Progressing Towards Goal  Goal: Consults, if ordered  Outcome: Progressing Towards Goal  Goal: Diagnostic Test/Procedures  Outcome: Progressing Towards Goal  Goal: Nutrition/Diet  Outcome: Progressing Towards Goal  Goal: Medications  Outcome: Progressing Towards Goal  Goal: Respiratory  Outcome: Progressing Towards Goal  Goal: Treatments/Interventions/Procedures  Outcome: Progressing Towards Goal  Goal: *Absence of aspiration  Outcome: Progressing Towards Goal  Goal: *Absence of signs and symptoms of DVT  Outcome: Progressing Towards Goal  Goal: *Optimal pain control at patient's stated goal  Outcome: Progressing Towards Goal  Goal: *Tolerating diet  Outcome: Progressing Towards Goal  Goal: *Progressive mobility and function  Outcome: Progressing Towards Goal  Goal: *Rehabilitation readiness  Outcome: Progressing Towards Goal     Problem: Hemorrhagic Stroke: Discharge Outcomes  Goal: *Verbalizes anxiety and depression are reduced or absent  Outcome: Progressing Towards Goal  Goal: *Optimal pain control at patient's stated goal  Outcome: Progressing Towards Goal  Goal: *Absence of aspiration pneumonia  Outcome: Progressing Towards Goal  Goal: *Tolerating diet  Outcome: Progressing Towards Goal  Goal: *Absence of signs and symptoms of DVT  Outcome: Progressing Towards Goal  Goal: *Absence of aspiration  Outcome: Progressing Towards Goal  Goal: *Progressive mobility and function  Outcome: Progressing Towards Goal  Goal: *Home safety concerns addressed  Outcome: Progressing Towards Goal     Problem: Patient Education: Go to Patient Education Activity  Goal: Patient/Family Education  Outcome: Progressing Towards Goal     Problem: Patient Education: Go to Patient Education Activity  Goal: Patient/Family Education  Outcome: Progressing Towards Goal     Problem: Patient Education:  Go to Education Activity  Goal: Patient/Family Education  Outcome: Progressing Towards Goal     Problem: Subarachnoid Hemorrhage Stroke:Admission Day 3  Goal: *Hemodynamically stable  Outcome: Progressing Towards Goal     Problem: Subarachnoid Hemorrhage Stroke:Admission Day 5-Discharge  Goal: Activity/Safety  Outcome: Progressing Towards Goal  Goal: Diagnostic Test/Procedures  Outcome: Progressing Towards Goal  Goal: Nutrition/Diet  Outcome: Progressing Towards Goal  Goal: Medications  Outcome: Progressing Towards Goal  Goal: Patient maintains clear airway/absence of aspiration  Outcome: Progressing Towards Goal  Goal: Treatments/Interventions/Procedures  Outcome: Progressing Towards Goal  Goal: Psychosocial  Outcome: Progressing Towards Goal  Goal: *Absence of signs and symptoms of DVT  Outcome: Progressing Towards Goal     Problem: Subarachnoid Hemorrhage Stroke:Discharge Outcomes  Goal: *Hemodynamically stable  Outcome: Progressing Towards Goal  Goal: *Aware of needed dietary changes  Outcome: Progressing Towards Goal  Goal: *Absence of signs and symptoms of DVT  Outcome: Progressing Towards Goal  Goal: *Absence of aspiration  Outcome: Progressing Towards Goal  Goal: *Progressive mobility and function  Outcome: Progressing Towards Goal  Goal: *Home safety concerns addressed  Outcome: Progressing Towards Goal

## 2020-06-25 NOTE — PROGRESS NOTES
Palliative Medicine Consult  [place]: [phone] ([phone])    Patient Name: Alejandro Silverman  YOB: 1970    Date of Initial Consult: 6/23/2020  Reason for Consult: Care decisions  Requesting Provider: Dr. Dav Pham  Primary Care Physician: Stephon Almazan MD     SUMMARY:   Alejandro Silverman is a 48 y.o. with a past history of hypertension who was admitted on 6/4/2020 from home with a diagnosis of large subarachnoid hemorrhage secondary cerebral aneurysm, hypertensive emergency current medical issues leading to Palliative Medicine involvement include: Care decisions    Chart reviewedpatient is a 66-year-old female with a history of hypertension who apparently had stopped her medication several weeks prior to admission to the hospital.  She had increasing headache for several days and then collapsed at work. She is found to have a large subarachnoid hemorrhage on evaluation in [place]. She was transferred to [facility] for further management. 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 She also is found to have extensive lung infiltrates. She underwent cerebral angiography on the morning of 6/5 and coil embolization of the left MCA aneurysm was completed. The right MCA aneurysm was unable to be treated endovascularly. She also completed cerebral angiography right PANCHITO angioplasty for vasospasm, transarterial infusion of verapamil on 6/9. Status post angiogram on 6/10 for left ICA MCA angioplasty with transarterial infusion of verapamil for vasospasm treatment. She had trach and PEG placed on 6/16. Neurologically, patient appears to open her right eye to voice but remains nonverbal, does not follow commands, seems to move her right extremity at times spontaneously.   Patient has been evaluated for Linh Rebecca but remains on Cardene drip.  Our team is been asked to see her for goals of care. Social history patient has a 15year-old son. 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 She is . Her mother lives in [place]. She does have a brother VIMAL GERMAIN who lives in [place] as well. PALLIATIVE DIAGNOSES:   1. Goals of care discussion  2. Status post subarachnoid hemorrhage secondary to aneurysm rupture with significant neurological injury  3. Advance care planning review  4. Status post trach/PEG  5. Debility       PLAN:   1. Met with patient. Continues to have her right eye open and once again appears to track at times. Still not following any type of verbal commands. Continues on trach collar. Cardene has been weaned off.  2. Later, Tor Louise, palliative medicine  and I had a phone meeting with patient's mom, brother, 2 aunts. Reviewed the role of palliative medicine and then attempted to explain all of the current medical issues. Family did not seem to understand the significant neurological injury that patient has suffered. They are happy with the fact that patient has survived but they continue to focus extensively on wanting a \"second opinion \". They would prefer patient be transferred to [facility] for second opinion before considering retreat [facility] or [facility]. We explained that there is no guarantees that [facility] will accept patient but we will talk with the hospitalist.  We updated them on all the current medical issuesweaned off of Cardene. 3. Goals of carecontinue attempts at full restorative measures. Family requesting second opinion from [facility]. 4. Advance care planningno advanced medical directive in the chart. Patient's mother is legal next of kin and hence would serve as medical power of   5. CODE STATUS full code  6. Symptom management no acute symptoms for us to manage.   7. Psychosocialvery difficult situation as patient is very young but with a devastating neurological injury. Also with a young family. Patient's mother clearly will need ongoing support. 8. Initial consult note routed to primary continuity provider and/or primary health care team members  9. Communicated plan of care with: Palliative Violet JERRY 192 Team     GOALS OF CARE / TREATMENT PREFERENCES:     GOALS OF CARE:  Patient/Health Care Proxy Stated Goals: Prolong life    TREATMENT PREFERENCES:   Code Status: Full Code    Advance Care Planning:  [x] The [facility] Interdisciplinary Team has updated the ACP Navigator with Health Care Decision Maker and Patient Capacity      Primary Decision Maker: Kg Guerrero - Mother, Rehan Stevenson  [phone]  Advance Care Planning 6/5/2020   Patient's Mami is: Legal Next of [address] Directive None   Patient Would Like to Complete Advance Directive Unable       Medical Interventions: Full interventions     Other Instructions:   Artificially Administered Nutrition: Feeding tube long-term, if indicated     Other:    As far as possible, the palliative care team has discussed with patient / health care proxy about goals of care / treatment preferences for patient. HISTORY:     History obtained from: Chart, mother    CHIEF COMPLAINT: Altered mental status    HPI/SUBJECTIVE:    The patient is:   [] Verbal and participatory  [x] Non-participatory due to:   Nonverbal after subarachnoid hemorrhage    6/25 patient with eye open on the right.   Still not following commands    Clinical Pain Assessment (nonverbal scale for severity on nonverbal patients):   Clinical Pain Assessment  Severity: 0     Activity (Movement): Lying quietly, normal position    Duration: for how long has pt been experiencing pain (e.g., 2 days, 1 month, years)  Frequency: how often pain is an issue (e.g., several times per day, once every few days, constant)     FUNCTIONAL ASSESSMENT:     Palliative Performance Scale (PPS):  PPS: 40 PSYCHOSOCIAL/SPIRITUAL SCREENING:     Palliative IDT has assessed this patient for cultural preferences / practices and a referral made as appropriate to needs (Cultural Services, Patient Advocacy, Ethics, etc.)    Any spiritual / Latter-day concerns:  [] Yes /  [x] No    Caregiver Burnout:  [] Yes /  [x] No /  [] No Caregiver Present      Anticipatory grief assessment:   [x] Normal  / [] Maladaptive       ESAS Anxiety:    Unable to assess  ESAS Depression:     Unable to assess     REVIEW OF SYSTEMS:     Positive and pertinent negative findings in ROS are noted above in HPI. The following systems were [x] reviewed / [] unable to be reviewed as noted in HPI  Other findings are noted below. Systems: constitutional, ears/nose/mouth/throat, respiratory, gastrointestinal, genitourinary, musculoskeletal, integumentary, neurologic, psychiatric, endocrine. Positive findings noted below. Modified ESAS Completed by: provider   Fatigue: 3 Drowsiness: 2     Pain: 0     Nausea: 0   Anorexia: 0 Dyspnea: 0     Constipation: No     Stool Occurrence(s): 1        PHYSICAL EXAM:     From RN flowsheet:  Wt Readings from Last 3 Encounters:   06/25/20 223 lb (101.2 kg)   06/19/20 237 lb 7 oz (107.7 kg)   06/10/20 239 lb 6.7 oz (108.6 kg)     Blood pressure 169/72, pulse (!) 56, temperature 98.1 °F (36.7 °C), resp. rate 23, height 5' 4\" (1.626 m), weight 223 lb (101.2 kg), SpO2 100 %.     Pain Scale 1: Adult Nonverbal Pain Scale  Pain Intensity 1: 0  Pain Onset 1: yes           Pain Intervention(s) 1: Medication (see MAR)  Last bowel movement, if known:     Constitutional: Right eye open, appears to track, nonverbal  Eyes: Right eyes open, ptosis of the left eye  ENMT: no nasal discharge, moist mucous membranes, trach in place  Cardiovascular: regular rhythm, distal pulses intact  Respiratory: breathing not labored, symmetric  Gastrointestinal: soft non-tender, +bowel sounds, PEG noted  Musculoskeletal: no deformity, no tenderness to palpation  Skin: warm, dry  Neurologic: Not following commands  Psychiatric: Slightly restless  Other:       HISTORY:     Active Problems:    SAH (subarachnoid hemorrhage) ([facility] (6/4/2020)      Subarachnoid hemorrhage from middle cerebral artery aneurysm, left (HCC) (6/4/2020)      RIGHT middle cerebral artery aneurysm (6/4/2020)      Cerebral vasospasm (6/4/2020)      Respiratory failure ([facility] [address] (6/15/2020)      Hypoxic ischemic encephalopathy (HIE), unspecified severity (6/15/2020)      Past Medical History:   Diagnosis Date    HTN (hypertension)       Past Surgical History:   Procedure Laterality Date    IR INSERT GASTROSTOMY TUBE PERC  6/19/2020      History reviewed. No pertinent family history. History reviewed, no pertinent family history.   Social History     Tobacco Use    Smoking status: Never Smoker    Smokeless tobacco: Never Used   Substance Use Topics    Alcohol use: Not Currently     No Known Allergies   Current Facility-Administered Medications   Medication Dose Route Frequency    guar gum (BENEFIBER) packet 1 Packet  4 g Oral TID    niMODipine (NYMALIZE) 6 mg/mL oral solution 60 mg  60 mg Per NG tube Q4H    0.9% sodium chloride infusion 250 mL  250 mL IntraVENous PRN    albuterol-ipratropium (DUO-NEB) 2.5 MG-0.5 MG/3 ML  3 mL Nebulization BID RT    hydrALAZINE (APRESOLINE) tablet 100 mg  100 mg Oral TID    fentaNYL citrate (PF) injection 50 mcg  50 mcg IntraVENous Q2H PRN    hydrALAZINE (APRESOLINE) 20 mg/mL injection 10 mg  10 mg IntraVENous Q2H PRN    chlorhexidine (ORAL CARE KIT) 0.12 % mouthwash 15 mL  15 mL Oral Q12H    niCARdipine (CARDENE) 50 mg in 0.9% sodium chloride 100 mL infusion  0-15 mg/hr IntraVENous TITRATE    carvediloL (COREG) tablet 25 mg  25 mg Oral BID WITH MEALS    amLODIPine (NORVASC) tablet 10 mg  10 mg Per NG tube DAILY    potassium bicarb-citric acid (EFFER-K) tablet 40 mEq  40 mEq Oral BID    lacosamide (VIMPAT) tablet 150 mg  150 mg Per NG tube Q12H    balsam peru-castor oiL (VENELEX) ointment   Topical BID    aspirin chewable tablet 81 mg  81 mg Per NG tube DAILY    enoxaparin (LOVENOX) injection 40 mg  40 mg SubCUTAneous Q24H    levETIRAcetam (KEPPRA) oral solution 1,000 mg  1,000 mg Per NG tube Q12H    glycopyrrolate (ROBINUL) injection 0.2 mg  0.2 mg IntraMUSCular TID PRN    acetaminophen (TYLENOL) solution 650 mg  650 mg Per NG tube Q4H PRN    glucose chewable tablet 16 g  4 Tab Oral PRN    glucagon (GLUCAGEN) injection 1 mg  1 mg IntraMUSCular PRN    dextrose 10% infusion 0-250 mL  0-250 mL IntraVENous PRN    albuterol (PROVENTIL VENTOLIN) nebulizer solution 2.5 mg  2.5 mg Nebulization Q6H PRN    polyvinyl alcohol-povidone (NATURAL TEARS) 0.5-0.6 % ophthalmic solution 2 Drop  2 Drop Both Eyes Q8H    bisacodyL (DULCOLAX) suppository 10 mg  10 mg Rectal DAILY PRN    ondansetron (ZOFRAN) injection 4 mg  4 mg IntraVENous Q4H PRN    docusate (COLACE) 50 mg/5 mL oral liquid 100 mg  100 mg Oral BID PRN          LAB AND IMAGING FINDINGS:     Lab Results   Component Value Date/Time    WBC 8.7 06/25/2020 05:23 AM    HGB 9.0 (L) 06/25/2020 05:23 AM    PLATELET 755 94/72/8591 05:23 AM     Lab Results   Component Value Date/Time    Sodium 138 06/25/2020 05:23 AM    Potassium 4.2 06/25/2020 05:23 AM    Chloride 107 06/25/2020 05:23 AM    CO2 23 06/25/2020 05:23 AM    BUN 15 06/25/2020 05:23 AM    Creatinine 0.48 (L) 06/25/2020 05:23 AM    Calcium 8.8 06/25/2020 05:23 AM    Magnesium 2.4 06/23/2020 04:23 AM    Phosphorus 3.0 06/23/2020 04:23 AM      Lab Results   Component Value Date/Time    Alk.  phosphatase 73 06/25/2020 05:23 AM    Protein, total 6.4 06/25/2020 05:23 AM    Albumin 2.5 (L) 06/25/2020 05:23 AM    Globulin 3.9 06/25/2020 05:23 AM     Lab Results   Component Value Date/Time    INR 1.1 06/08/2020 12:44 AM    Prothrombin time 11.0 06/08/2020 12:44 AM      Lab Results   Component Value Date/Time    Iron 10 (L) 06/06/2020 04:21 AM    TIBC 376 06/06/2020 04:21 AM    Iron % saturation 3 (L) 06/06/2020 04:21 AM    Ferritin 44 06/13/2020 05:43 PM      No results found for: PH, PCO2, PO2  No components found for: GLPOC   No results found for: CPK, CKMB             Total time:35  Counseling / coordination time, spent as noted above: 35  > 50% counseling / coordination?: yes    Prolonged service was provided for  []30 min   []75 min in face to face time in the presence of the patient, spent as noted above. Time Start:   Time End:   Note: this can only be billed with 52380 (initial) or 93919 (follow up). If multiple start / stop times, list each separately.

## 2020-06-25 NOTE — PROGRESS NOTES
Nimodipine will discontinue at 2200 tonight  SBP already trending high. Goal < 160  Unsecured right MCA bifurcation aneurysm  Discussed with nurse  Hospitalist service is being contacted to add additional po medication, hydralazine not working well currently.     [facility]  [phone]

## 2020-06-25 NOTE — PROGRESS NOTES
Bedside and Verbal shift change report given to Keiko Carter RN (oncoming nurse) by Harpal Hanson and Lulú Huber RN (offgoing nurse). Report included the following information SBAR, Kardex, ED Summary, Procedure Summary, Intake/Output, MAR, Recent Results, Cardiac Rhythm NSR/NSB and Dual Neuro Assessment.

## 2020-06-25 NOTE — PROGRESS NOTES
[address] [facility] Adult  Hospitalist Group                                                                                          Hospitalist Progress Note  Ghazala Richardson MD  Answering service: [phone] OR [phone] from in house phone        Date of Service:  2020  NAME:  Yeni Osei  :  1970  MRN:  [id number]    Admission Summary:     Yeni Osei is a 80-year-old female with a past medical history of HTN. According to patient's mother, the patient began having a headache on 20 and had been taking BC Powder with aspirin in it for the headache. She recently stopped taking her BP medications. According to her mother she does not smoke, drink alcohol or use recreational drugs. The patient was brought to the [facility] ER via EMS on   after her coworkers found her unresponsive outside of her place of work. 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 On arrival to the ER she was found to be seizing. A stat CT of her head was performed which showed extensive subarachnoid hemorrhage. She was extremely hypertensive in the 240's and was given labetalol and started on a Cardene drip. She was emergently intubated and given Keppra and Ativan as well. Neurosurgery and Neurointerventional Surgery were consulted and the patient was transferred to the [facility] for a higher level of care. 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 Extensive lung infiltrates were also noted. Patient was taken for cerebral angiogram the morning of 20 and coil embolization of the left MCA aneurysm was completed. The right MCA aneurysm had a very wide neck incorporating both M2s. It could not be treated endovascularly without stents.  May need to be clipped by Neurosurgery.      Procedures: 20 and coil embolization of the left MCA aneurysm was completed s/p cerebral angiogram for right PANCHITO A1 angioplasty for vasospasm, transarterial infusion of verapamil on 6/9,  S/p angiogram on 6/10 for left ICA/MCA angioplasty and transarterial infusion of verapamil for vasospasm treatment, s/p angiogram on 6/11 and 6/13 for transarterial infusion of verapamil for vasospasm treatment. Tracheostomy placement  6/16 by Dr. Odilon Coffey in thoracic surgery. PEG placement. Interval history / Subjective:   Patient seen and examined. Eyes open randomly, not tracking, not following commands. No acute changes overnight,      Assessment & Plan:     SAH due to ruptured cerebral aneurysm   -s/p cerebral angiogram with coil embolization of left MCA bifurcation aneurysm on 6/5, patient has 5X3 right MCA bifurcation aneurysm with very wide neck which will need to be treated with stents vs. Clipping  -on Nimotop, normal saline   -on cardene gtt is off, on norvasc, hydralazine, goal -160  -seizure prophylaxis keppra  -patient open her right eye to voice, non verbal, doesn't follows command, moves right extremities spontaneously   -continue neuro check, serial BP monitoring   -improved vasospasm since 6/20  -seen by neurosurgeon   -Neurointerventional surgical service on board  - high risk for decompensation, inpatient mortality, consult to palliative care team      Seizure secondary to above   -stable, no seizure overnight   -continue keppra,SANDRAPAT  -seizure precaution   -Neurologist on board     Ischemic stroke -continue aspirin   -patient with multiple ischemic infarct -MRI on 6/16-continue PT/OT-neurology on board     Respiratory failure secondary to above, pulmonary edema and possible pneumonia  -s/p trach/trach collar   -continue duo neb  -sputum cx grow klebsiella pneumonia  -chest x ray on 6/19 vague left lower lobe airspace process, suggesting subsegmental  atelectasis or pneumonia.  Interval exchange of endotracheal tube for tracheostomy  -afebrile and no leukocytosis, blood cx no growth  -antibiotics stopped by intensivist  -afebrile, SpO2 100% on 9 l/m  -monitor pulse ox     Dysphagia secondary to stroke and SAH-s/p PEG tube, continue tube feeding   Iron deficiency anemia -noted fibroid  -Hgb trending down, Hgb 7.8, monitor H/H    -iron  Level was 10, iron saturation 3, iron sucrose 200 mg iv x 3 doses   -check stool for hem ocult      Peripheral edema multifactorial  -low albumin 2.6  -Echo normal cavity size and systolic function (ejection fraction normal). Mild concentric hypertrophy. Estimated left ventricular ejection fraction is 60 - 65%. No regional wall motion abnormality noted. -If it gets worse , may hold IVF     Obesity -diet control -A1c 5.7    Code status: Full Code   DVT prophylaxis: Lovenox  Care Plan discussed with: Patient/Family and Nurse  Anticipated Disposition: Rehab more than 48 hours     Hospital Problems  Date Reviewed: 6/10/2020          Codes Class Noted POA    [facility] ICD-10-CM: J96.90  ICD-9-CM: 518.81  6/15/2020 Yes        Hypoxic ischemic encephalopathy (HIE), unspecified severity ICD-10-CM: P91.60  ICD-9-CM: 768.70  6/15/2020 Yes        SAH (subarachnoid hemorrhage) ([facility] ICD-10-CM: I60.9  ICD-9-CM: 619  6/4/2020 Yes        Subarachnoid hemorrhage from middle cerebral artery aneurysm, left (HCC) ICD-10-CM: C91.09  ICD-9-CM: 430  6/4/2020 Yes        RIGHT middle cerebral artery aneurysm ICD-10-CM: I67.1  ICD-9-CM: 437.3  6/4/2020 Yes        Cerebral vasospasm ICD-10-CM: O98.614  ICD-9-CM: 435.9  6/4/2020 Yes              Vital Signs:    Last 24hrs VS reviewed since prior progress note.  Most recent are:  Visit Vitals  /61   Pulse (!) 57   Temp 98.7 °F (37.1 °C)   Resp 19   Ht 5' 4\" (1.626 m)   Wt 101.2 kg (223 lb)   SpO2 100%   BMI 38.28 kg/m²         Intake/Output Summary (Last 24 hours) at 6/25/2020 1351  Last data filed at 6/25/2020 1200  Gross per 24 hour   Intake 3320.05 ml   Output 1700 ml   Net 1620.05 ml Physical Examination:             Constitutional:  No acute distress, doesn't follow commands. ENT:  Trach- O2 on Trach   Resp:  good AE, no wheezes. Few creps at bases        GI:  Soft, non distended, non tender. Musculoskeletal:  No edema    Neurologic:  Open her right eye on voice, non verbal, doesn't follow command, left hemiplegia, moves right extremities spontaneously      Skin:  Good turgor, no rashes or ulcers       Data Review:    Review and/or order of clinical lab test  Review and/or order of tests in the radiology section of CPT  Review and/or order of tests in the medicine section of [facility]      Labs:     Recent Labs     06/25/20 0523 06/24/20  0339   WBC 8.7 6.2   HGB 9.0* 6.9*   HCT 31.4* 24.2*    360     Recent Labs     06/25/20 0523 06/24/20 0330 06/23/20  0423 06/23/20  0414    140  --  140   K 4.2 4.2  --  4.1    110*  --  111*   CO2 23 21  --  23   BUN 15 18  --  19   CREA 0.48* 0.48*  --  0.55   GLU 94 92  --  115*   CA 8.8 8.7  --  9.1   MG  --   --  2.4  --    PHOS  --   --  3.0  --      Recent Labs     06/25/20 0523 06/24/20 0330 06/23/20  0414   ALT 29 24 30   AP 73 60 86   TBILI 0.3 0.2 0.3   TP 6.4 5.2* 6.7   ALB 2.5* 2.0* 2.6*   GLOB 3.9 3.2 4.1*     No results for input(s): INR, PTP, APTT, INREXT, INREXT in the last 72 hours. No results for input(s): FE, TIBC, PSAT, FERR in the last 72 hours. No results found for: FOL, RBCF   No results for input(s): PH, PCO2, PO2 in the last 72 hours. No results for input(s): CPK, CKNDX, TROIQ in the last 72 hours.     No lab exists for component: CPKMB  Lab Results   Component Value Date/Time    Cholesterol, total 90 06/05/2020 03:34 AM    HDL Cholesterol 64 06/05/2020 03:34 AM    LDL, calculated 7.8 06/05/2020 03:34 AM    Triglyceride 91 06/05/2020 03:34 AM    CHOL/HDL Ratio 1.4 06/05/2020 03:34 AM     Lab Results   Component Value Date/Time    Glucose (POC) 121 (H) 06/14/2020 11:22 PM    Glucose (POC) 118 (H) 06/14/2020 05:07 PM    Glucose (POC) 114 (H) 06/14/2020 11:27 AM    Glucose (POC) 109 (H) 06/14/2020 05:02 AM    Glucose (POC) 126 (H) 06/13/2020 11:09 PM     Lab Results   Component Value Date/Time    Specific gravity 1.015 06/15/2020 05:45 AM         Medications Reviewed:     Current Facility-Administered Medications   Medication Dose Route Frequency    guar gum (BENEFIBER) packet 1 Packet  4 g Oral TID    niMODipine (NYMALIZE) 6 mg/mL oral solution 60 mg  60 mg Per NG tube Q4H    0.9% sodium chloride infusion 250 mL  250 mL IntraVENous PRN    albuterol-ipratropium (DUO-NEB) 2.5 MG-0.5 MG/3 ML  3 mL Nebulization BID RT    hydrALAZINE (APRESOLINE) tablet 100 mg  100 mg Oral TID    fentaNYL citrate (PF) injection 50 mcg  50 mcg IntraVENous Q2H PRN    hydrALAZINE (APRESOLINE) 20 mg/mL injection 10 mg  10 mg IntraVENous Q2H PRN    chlorhexidine (ORAL CARE KIT) 0.12 % mouthwash 15 mL  15 mL Oral Q12H    niCARdipine (CARDENE) 50 mg in 0.9% sodium chloride 100 mL infusion  0-15 mg/hr IntraVENous TITRATE    carvediloL (COREG) tablet 25 mg  25 mg Oral BID WITH MEALS    amLODIPine (NORVASC) tablet 10 mg  10 mg Per NG tube DAILY    potassium bicarb-citric acid (EFFER-K) tablet 40 mEq  40 mEq Oral BID    lacosamide (VIMPAT) tablet 150 mg  150 mg Per NG tube Q12H    balsam peru-castor oiL (VENELEX) ointment   Topical BID    aspirin chewable tablet 81 mg  81 mg Per NG tube DAILY    enoxaparin (LOVENOX) injection 40 mg  40 mg SubCUTAneous Q24H    levETIRAcetam (KEPPRA) oral solution 1,000 mg  1,000 mg Per NG tube Q12H    glycopyrrolate (ROBINUL) injection 0.2 mg  0.2 mg IntraMUSCular TID PRN    acetaminophen (TYLENOL) solution 650 mg  650 mg Per NG tube Q4H PRN    glucose chewable tablet 16 g  4 Tab Oral PRN    glucagon (GLUCAGEN) injection 1 mg  1 mg IntraMUSCular PRN    dextrose 10% infusion 0-250 mL  0-250 mL IntraVENous PRN    albuterol (PROVENTIL VENTOLIN) nebulizer solution 2.5 mg  2.5 mg Nebulization Q6H PRN    polyvinyl alcohol-povidone (NATURAL TEARS) 0.5-0.6 % ophthalmic solution 2 Drop  2 Drop Both Eyes Q8H    bisacodyL (DULCOLAX) suppository 10 mg  10 mg Rectal DAILY PRN    ondansetron (ZOFRAN) injection 4 mg  4 mg IntraVENous Q4H PRN    docusate (COLACE) 50 mg/5 mL oral liquid 100 mg  100 mg Oral BID PRN     ______________________________________________________________________  EXPECTED LENGTH OF STAY: 22d 21h  ACTUAL LENGTH OF STAY:          Maicol Jama MD

## 2020-06-25 NOTE — PROGRESS NOTES
TRANSITION OF CARE; RUR 18%-Moderate     Patient is currently on trach collar. Ongoing medical evaluation; Awaiting recommendations. Palliative meeting scheduled for today @  to discuss care goals. DISPOSITION--Accepted to [facility] pending insurance authorization. 2nd referral submitted to [facility] (pending status)    Spoke with Cari Arellano Liaison [phone] to check referral status. Patient accepted to [facility] pending insurance authorization. Facility plan to submit authorization closer to discharge date. Call placed to [facility] Liaison [phone] to check status. No answer. Awaiting call back. CM to follow.  Yareli Lewis, MSW,CRM

## 2020-06-25 NOTE — PROGRESS NOTES
Physical therapy:    Chart reviewed in prep for PT session. Noted patient not following commands at this time. Will defer and continue to follow.  Thank you    Ben Youssef, PT, DPT

## 2020-06-25 NOTE — PROGRESS NOTES
Occupational Therapy: defer    Chart reviewed, consulted with RN. Patient is not following commands and is unable to participate in OT. Will defer at this time and will f/u as able and appropriate.     Karon Griffith, OTR/L

## 2020-06-25 NOTE — PROGRESS NOTES
Neurointerventional Surgery Progress Note  Nathanael Pal NP   Neurocritical Care Nurse Practitioner  [phone]      Admit Date: 6/4/2020        Daily Progress Note: 6/25/2020   LOS: 21 days      S/P:  POD 19 Cerebral angiogram with coil embolization of ruptured left MCA bifurcation aneurysm on 6/5/2020    ** Delayed Presentation ** PB Day 25    Interval History/Subjective:   Patient currently on trach collar. Continues to be stable. Plans for transfer to [facility] once bed available. Last day of Nimotop. Stop time put on Nimotop for this evening. May need additional BP meds once nimotop stopped. Patient remains unable to vocalize or follow any commands. Tracking with eyes. Unable to perform a ROS due to AMS. Assessment & Plan: Active Problems:    SAH (subarachnoid hemorrhage) ([address] (6/4/2020)      Subarachnoid hemorrhage from middle cerebral artery aneurysm, left (HCC) (6/4/2020)      RIGHT middle cerebral artery aneurysm (6/4/2020)      Cerebral vasospasm (6/4/2020)      Respiratory failure ([address] (6/15/2020)      Hypoxic ischemic encephalopathy (HIE), unspecified severity (6/15/2020)      1.) SAH due to ruptured cerebral aneurysm, [place] 5, An Grade 3/4              - s/p cerebral angiogram with coil embolization of left MCA bifurcation aneurysm on 6/5       Additionally, patient has 5X3 right MCA bifurcation aneurysm with very wide neck which will               need to be treated with stents vs. Clipping              - Continue Nimotop Day 21 of 21 to prevent delayed cerebral ischemia              - change maintenance fluids to LR at 75 ml/hr to maintain euvolemia              - Strict I's and O's, patient slightly positive overnight with I's and O's              - Last TCDs on 6/20 showed continued possible left PCA vasospasm, no evidence of vasospasm in the remaining intracranial vessels bilaterally. Continue to monitor clinically.  Patient essentially out of vasospasm window - ECHO shows EF 60-65%, mild concentric hypertrophy, mildly dilated left atrium              - ok with neuro checks every 2 hours during the day and every 4 hours at night               - change SBP goal to 110-160 in the setting of unsecured aneurysm in the right MCA,               - PT/OT/SLP evals    2.) Seizure              - Due to #1, no prior hx of seizure              - Continue Keppra 1000 mg BID               - continue Vimpat & Keppra               - Seizure precautions     3.) Cerebral Edema (resolved)    4.) Acute hypoxic respiratory failure in the setting of likely neurogenic pulmonary edema- resolved              - currently on trach collar, tolerating well              - chest XR on 6/19 Vague left lower lobe airspace process, suggesting subsegmental  atelectasis or pneumonia             - Intensivist managing     5.) Multiple Acute Ischemic Infarctions                - Imaging pattern is consistent with Hypoxic-Ischemic Encephalopathy (HIE)                - Continue aspirin 81 mg daily for stroke prevention           6.) Cerebral Vasospasm- resolved                  - s/p cerebral angiogram for right PANCHITO A1 angioplasty for vasospasm, transarterial infusion of verapamil on 6/9,  S/p angiogram on 6/10 for left ICA/MCA angioplasty and transarterial infusion of verapamil for vasospasm treatment, s/p angiogram on 6/11 and 6/13 for transarterial infusion of verapamil for vasospasm treatment                7.) Fever --> resolved                 - completed course of Zosyn, WBC normal                - respiratory cultures growing Klebsiella Pneumonia and Citrobacter Koseria 6/17                - CTA of chest 6/7 negative for PE                 - Cefepime started on 6/19 to cover Citrobacter per Intensivist, discontinued today as respiratory specimen is likely a contaminant per Intensivist                - Intensivist following     8.) Polyuria                  - serum sodium stable at 138, electrolytes WNL                  - urine studies showed serum osmolality 296, urine osmolality 322, specific gravity normal at 1.015. Urine random sodium at 114                  - suspect patient is auto-diuresing due to recent issues with fluid overload                  - discontinue Florinef 0.1 mg BID for prevention of cerebral salt wasting                  - monitor BMP daily    9.) HTN                  - change SBP goal to 110-160                  - continue Amlodipine 10 mg daily and coreg 25 mg BID                  - add Hydralazine 100 mg TID per hospitalist                    Activity: Bed rest  DVT ppx: SCDs, Lovenox  Disposition: TBD    Plan d/wDr. Marlena Contreras, bedside RN  and Dr Calos Rojo, hospitalist MD.     Admission Summary:     Lauren Diaz is a 48 y.o. 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 On arrival to ED, pt was unresponsive and noted to be seizing. A stat CT of her head was performed there, which showed extensive SAH. Pt was then intubated emergently. She was also extremely hypertensive with SBPs in 240s. She was given Keppra and Ativan, and placed on cardene drip for BP control. NSGY and NIS were then consulted and the patient was transferred to [facility] for higher level of care. En route, paramedics administered 7.5 mg of Versed, 200mcg of Fentanyl, and 10 mg of labetalol for BP control. On arrival, CTA of her head and neck was obtained, which showed a 4.4 x 4.1 mm right MCA trifurcation aneurysm and  a 2.2 x 3.1 mm left MCA trifurcation aneurysm. Of note, CTA head/neck also showed pulmonary infiltrates suspicious for COVID-19 infection and the patient has been tested for COVID which was negative. 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 Mother reported pt does not smoke, does not drink alcohol or use street drugs.  The mother also reported that the patient began complaining of a headache on 5/31/2020 and has been taking BC Powder (aspirin) for the headache. She also reported that the pt has a hx of HTN and is supposed to be on BP medications, but recently stopped taking her medications. The patient underwent a cerebral angiogram on 6/5 by Dr. Beryle Bob for coil embolization of a ruptured left MCA bifurcation aneurysm.     Current Facility-Administered Medications   Medication Dose Route Frequency Provider Last Rate Last Dose    guar gum (BENEFIBER) packet 1 Packet  4 g Oral TID Landry Larios MD        0.9% sodium chloride infusion 250 mL  250 mL IntraVENous PRN Ifeanyi Jacobs MD        albuterol-ipratropium (DUO-NEB) 2.5 MG-0.5 MG/3 ML  3 mL Nebulization BID RT Ifeanyi Jacobs MD   3 mL at 06/25/20 0735    hydrALAZINE (APRESOLINE) tablet 100 mg  100 mg Oral TID Socorro Sterling NP   100 mg at 06/25/20 0849    fentaNYL citrate (PF) injection 50 mcg  50 mcg IntraVENous Q2H PRN Angélica Kohler NP   50 mcg at 06/24/20 0147    hydrALAZINE (APRESOLINE) 20 mg/mL injection 10 mg  10 mg IntraVENous Q2H PRN Geovanna Farias NP   10 mg at 06/25/20 0516    chlorhexidine (ORAL CARE KIT) 0.12 % mouthwash 15 mL  15 mL Oral Q12H Socorro Sterling NP   15 mL at 06/24/20 2051    niCARdipine (CARDENE) 50 mg in 0.9% sodium chloride 100 mL infusion  0-15 mg/hr IntraVENous TITRATE Petrona Avery NP   Stopped at 06/24/20 0246    carvediloL (COREG) tablet 25 mg  25 mg Oral BID WITH MEALS Eleuterio Hansen MD   25 mg at 06/25/20 0848    amLODIPine (NORVASC) tablet 10 mg  10 mg Per NG tube DAILY Eleuterio Hansen MD   10 mg at 06/25/20 0849    potassium bicarb-citric acid (EFFER-K) tablet 40 mEq  40 mEq Oral BID Eleuterio Hansen MD   40 mEq at 06/25/20 0849    lacosamide (VIMPAT) tablet 150 mg  150 mg Per NG tube Q12H All Palma MD   150 mg at 06/25/20 0845    balsam peru-castor oiL (VENELEX) ointment Topical BID eLon Berry DO        aspirin chewable tablet 81 mg  81 mg Per NG tube DAILY Roberta Ashley MD   81 mg at 20 0849    enoxaparin (LOVENOX) injection 40 mg  40 mg SubCUTAneous Q24H Leon Berry DO   40 mg at 20 1416    levETIRAcetam (KEPPRA) oral solution 1,000 mg  1,000 mg Per NG tube Q12H Leon Berry DO   1,000 mg at 20 0844    glycopyrrolate (ROBINUL) injection 0.2 mg  0.2 mg IntraMUSCular TID PRN Manjula Simmons NP   0.2 mg at 20 1635    niMODipine (NYMALIZE) 6 mg/mL oral solution 60 mg  60 mg Per NG tube Q4H Isaac Domínguez NP   60 mg at 20 0947    acetaminophen (TYLENOL) solution 650 mg  650 mg Per NG tube Q4H PRN Virgil Libman, NP   650 mg at 20 0230    glucose chewable tablet 16 g  4 Tab Oral PRN Adriel Showers, NP-C        glucagon (GLUCAGEN) injection 1 mg  1 mg IntraMUSCular PRN Adriel Showers, NP-C        dextrose 10% infusion 0-250 mL  0-250 mL IntraVENous PRN Adriel Showers, NP-C        albuterol (PROVENTIL VENTOLIN) nebulizer solution 2.5 mg  2.5 mg Nebulization Q6H PRN Adriel Showers, NP-C        polyvinyl alcohol-povidone (NATURAL TEARS) 0.5-0.6 % ophthalmic solution 2 Drop  2 Drop Both Eyes Q8H Felipe Mutter, ACNP   2 Drop at 20 3958    bisacodyL (DULCOLAX) suppository 10 mg  10 mg Rectal DAILY PRN Felipe Mutter, ACNP        ondansetron [facility]) injection 4 mg  4 mg IntraVENous Q4H PRN Adriel Showers, NP-C        docusate (COLACE) 50 mg/5 mL oral liquid 100 mg  100 mg Oral BID PRN Tarik Mckeon R, NP-C            No Known Allergies    Review of Systems:  Review of systems not obtained due to patient factors.       Objective:     Vital signs  Temp (24hrs), Av.9 °F (37.2 °C), Min:98.5 °F (36.9 °C), Max:99.6 °F (37.6 °C)   701 - 1900  In: 1793.8 [I.V.:1793.8]  Out: -   1901 -  0700  In: 4692.3 [I.V.:1501.5]  Out: 3100 [Urine:2450; Drains:650]    Visit Vitals  /61   Pulse (!) 57   Temp 98.7 °F (37.1 °C)   Resp 19   Ht 5' 4\" (1.626 m)   Wt 223 lb (101.2 kg)   SpO2 100%   BMI 38.28 kg/m²    O2 Flow Rate (L/min): 6 l/min O2 Device: Tracheal collar   Vitals:    06/25/20 0735 06/25/20 0848 06/25/20 0947 06/25/20 1000   BP:  (!) 214/82 171/68 159/61   Pulse: 70 (!) 54 (!) 58 (!) 57   Resp: 19 19   Temp:    98.7 °F (37.1 °C)   SpO2: 99%   100%   Weight:       Height:          Physical Exam:  GENERAL: NAD, trach collar present, alert   EYE: conjunctivae/corneas clear. Right pupil 3 mm, brisk response to light. Left pupil 4 mm, brisk response to light. Keeps left eye closed   LUNG: lungs clear bilaterally   HEART: regular rate and rhythm, S1, S2 normal, no murmur, click, rub or gallop  EXTREMITIES:  extremities normal, atraumatic, no cyanosis, 3+ pitting edema in BUE, +! Pitting edema in BLE, distal pulses 2+ bilaterally   SKIN: Skin warm to touch. Right and left groin site clean, dry, and intact. No hematoma, bruising, or bleeding noted. NEUROLOGIC: Alert. Trach-collar. Non-verbal. Eyes will open spontaneously. No command following. No facial asymmetry. Right pupil 3 mm, brisk response to light. Left pupil 4 mm brisk response to light. Blinks to visual threat. Left eye remains closed. Focuses and tracks with eyes, with the exception of inability to track to the right with left eye. Moves all extremities spontaneously/intermittently. Less movement in RUE vs. LUE. RUE withdraws to pain, intermittently moves right arm at times when stimulated. No involuntary movements. Gait deferred. Unable to assess language, sensation, and coordination. Imaging:    MRI of Brain on 6/19/2020 shows  IMPRESSION:   1. Persistent subarachnoid hemorrhage in the left sylvian fissure. 2.  Patchy areas of enhancement as described. Some of these are associated with  foci of diffusion restriction and likely represent subacute ischemia.  The  leptomeningeal enhancement predominantly in the left frontal and parietal lobes  is likely related to subarachnoid hemorrhage and meningeal irritation. Infection  is not entirely excluded but thought less likely. CTA of Head 6/18/2020 at 1735 shows    IMPRESSION:  1. No acute large vessel occlusion. 2. Recent left MCA bifurcation aneurysm coiling, with interval improvement in  now mild vasospasm involving the bilateral anterior, left middle, vertebral  basilar and bilateral posterior cerebral arteries as discussed in detail above. 3. Unchanged right MCA bifurcation aneurysm.     CT of Head on 6/18/2020 at 1735 shows  IMPRESSION:      Decreased left subarachnoid hemorrhage. No new hemorrhage. Artifact versus increased conspicuity of nonacute left temporal lobe infarct. CT Head on 6/17/20 shows:     IMPRESSION:     1. Persistence of the previously described subtle areas of subarachnoid  hemorrhage. 2. Persistence of the previously described left inferior cerebellar infarct. 3. Presence of small lacunar infarcts bilaterally. 4. No evidence of hydrocephalus. 5. Presence of an aneurysm coil in the region of the left middle cerebral  artery. 6. Evidence of sphenoid sinus disease.       CT of Head on 6/13/2020 at 1103 shows  IMPRESSION:   1. Questionable left inferior cerebellar infarction, new from 6/10/2020.  2. Multiple, evolving, subacute infarctions throughout the cerebrum. 3. Subacute subarachnoid hemorrhage. Improved hydrocephalus compared to  6/6/2020    CTA of Head on 6/13/2020 at 1103 shows  IMPRESSION:   1. Left A1 vasospasm. 2. More mild spasm of the right PANCHITO, MCAs, and possibly the distal vertebrals. 3. Right M1 terminus aneurysm. 4. Large infundibula/small aneurysms of the bilateral P-comm origins. CT Perfusion on 6/13/2020 at 1103 shows  IMPRESSION:  1. Penumbra surrounding infarctions in most of the superior left MCA territory. 2. Bilateral PANCHITO infarctions.   3. Questionable left cerebellar infarction is inferior to the perfusion scan. MRI of Brain on 6/9/2020 at 0311 AM shows  IMPRESSION:  1. Multiple areas of cortical acute ischemic injury as well as some subcortical  white matter areas and right cerebellar involvement similar to the prior MRI  6/6/20.  2. Subarachnoid hemorrhage and intraventricular blood again demonstrated without  hydrocephalus. 3. Increasing fluid paranasal sinuses and right greater than left mastoid/middle  ear cavities. CTA of Head on 6/9/2020 at 1350 shows  Progressive vasospasm involving the anterior cerebral arteries with minimal  associated decreased perfusion to the frontal lobes. No other significant  Change  . CT Perfusion on 6/9/2020 at 1336 shows  Impression:  CT perfusion brain: Very minimal decreased perfusion to the frontal lobes. CT of Head on 6/9/2020 at 0234 shows  IMPRESSION:   No significant change.     CT of Head on 6/8/2020 shows  IMPRESSION:   Global edema, otherwise no significant change    CTA of Head and Neck on 6/8/2020 per radiology shows  Impression:  Mild to moderate vasospasm in the left middle cerebral artery status post  bifurcation aneurysm coil embolization. Stable 4 mm right MCA aneurysm. Endotracheal tube terminates just above the matt. 1 cm low-density left thyroid nodule. Mildly enlarged left axillary lymph node measuring 11 mm in short axis  Patchy bilateral airspace disease. Nonspecific asymmetric enhancement of the right superior ophthalmic vein   (However, no significant vasospasm was seen when personally reviewed by NIS)    CT Perfusion on 6/8/2020 shows  IMPRESSION:  No significant cerebral perfusion abnormality    CTA of Chest on 6/7/2020 shows  IMPRESSION:  1. No acute pulmonary embolus  2. Dependent atelectasis with diffuse groundglass opacifications bilaterally. CT of Head on 6/7/2020 shows  IMPRESSION:   1. No further progression of subarachnoid hemorrhage.  The overall degree of  subarachnoid hemorrhage is slightly improved. 2. Subtle areas of gray-white differentiation loss throughout the cerebral  hemispheres correlating with areas of restricted diffusion on previous CT  compatible with ischemic changes. MRI of Brain on 6/6/2020 shows  IMPRESSION:   Imaging findings consistent with large left MCA territory ischemia/infarction,  left temporal, parietal and frontal lobes, cortically based, no superimposed  increased parenchymal hemorrhage or midline shift. Right and left PANCHITO territory  cortical diffusion restriction consistent with infarction. Infarction in the right insular cortex and right temporal lobe. Scattered small infarctions right and left corona radiata centrum semiovale, right cerebellum. Allowing for differences in technique likely stable subarachnoid and  intraventricular hemorrhage. CT of Head on 6/6/2020 at 1411 shows  IMPRESSION:   Left MCA territory infarction with smaller right MCA territory infarction.     Stable subarachnoid and intraventricular hemorrhage. CTA of Head on 6/6/2020 at 0903 shows  IMPRESSION:  Slightly increased intraventricular and subarachnoid hemorrhage compared to the  prior exam.     Status post coiling left MCA bifurcation aneurysm. No significant residual  aneurysm filling. CT of Head on 6/62020 at 0239 shows  IMPRESSION:  Slightly diminished subarachnoid and intraventricular hemorrhage. Stable ventricular system. CT of Head WO on 6/5/2020 at 1512 shows  IMPRESSION: Stable acute subarachnoid and intraventricular hemorrhage. Stable  ventricular system. CT Head WO 6/5/20 0826     IMPRESSION:   1. Diffuse subarachnoid hemorrhage most concentrated in the basal cisterns and  left sylvian fissure. 2.  Interval ventricular enlargement suspicious for developing hydrocephalus.     CTA Head 6/4/20 2017     IMPRESSION:  1. Diffuse subarachnoid hemorrhage in the basilar cisterns and sylvian  fissures. 2.  Bilateral MCA bifurcation aneurysms.   3.  Infundibular origins to the posterior communicating arteries bilaterally. 4.  Symmetric moderately severe airspace disease in the upper lobes bilaterally  which may represent edema or sequela of aspiration. CT Head WO Contrast 6/4/2020 2012    IMPRESSION: Extensive subarachnoid hemorrhage concerning for leaking/ruptured  aneurysm.        24 hour results:    Recent Results (from the past 24 hour(s))   TYPE + CROSSMATCH    Collection Time: 06/24/20 11:02 AM   Result Value Ref Range    Crossmatch Expiration 06/27/2020     ABO/Rh(D) O POSITIVE     Antibody screen NEG     Unit number [id number]     Blood component type RC LR     Unit division 00     Status of unit TRANSFUSED     Crossmatch result Compatible    PROCALCITONIN    Collection Time: 06/25/20  5:23 AM   Result Value Ref Range    Procalcitonin <1.44 ng/mL   METABOLIC PANEL, COMPREHENSIVE    Collection Time: 06/25/20  5:23 AM   Result Value Ref Range    Sodium 138 136 - 145 mmol/L    Potassium 4.2 3.5 - 5.1 mmol/L    Chloride 107 97 - 108 mmol/L    CO2 23 21 - 32 mmol/L    Anion gap 8 5 - 15 mmol/L    Glucose 94 65 - 100 mg/dL    BUN 15 6 - 20 MG/DL    Creatinine 0.48 (L) 0.55 - 1.02 MG/DL    BUN/Creatinine ratio 31 (H) 12 - 20      GFR est AA >60 >60 ml/min/1.73m2    GFR est non-AA >60 >60 ml/min/1.73m2    Calcium 8.8 8.5 - 10.1 MG/DL    Bilirubin, total 0.3 0.2 - 1.0 MG/DL    ALT (SGPT) 29 12 - 78 U/L    AST (SGOT) 25 15 - 37 U/L    Alk.  phosphatase 73 45 - 117 U/L    Protein, total 6.4 6.4 - 8.2 g/dL    Albumin 2.5 (L) 3.5 - 5.0 g/dL    Globulin 3.9 2.0 - 4.0 g/dL    A-G Ratio 0.6 (L) 1.1 - 2.2     CBC WITH AUTOMATED DIFF    Collection Time: 06/25/20  5:23 AM   Result Value Ref Range    WBC 8.7 3.6 - 11.0 K/uL    RBC 3.88 3.80 - 5.20 M/uL    HGB 9.0 (L) 11.5 - 16.0 g/dL    HCT 31.4 (L) 35.0 - 47.0 %    MCV 80.9 80.0 - 99.0 FL    MCH 23.2 (L) 26.0 - 34.0 PG    MCHC 28.7 (L) 30.0 - 36.5 g/dL    RDW 24.9 (H) 11.5 - 14.5 %    PLATELET 957 194 - 270 K/uL    MPV 10.2 8.9 - 12.9 FL    NRBC 0.0 0  WBC    ABSOLUTE NRBC 0.00 0.00 - 0.01 K/uL    NEUTROPHILS 70 32 - 75 %    LYMPHOCYTES 16 12 - 49 %    MONOCYTES 8 5 - 13 %    EOSINOPHILS 4 0 - 7 %    BASOPHILS 1 0 - 1 %    IMMATURE GRANULOCYTES 1 (H) 0.0 - 0.5 %    ABS. NEUTROPHILS 6.1 1.8 - 8.0 K/UL    ABS. LYMPHOCYTES 1.4 0.8 - 3.5 K/UL    ABS. MONOCYTES 0.7 0.0 - 1.0 K/UL    ABS. EOSINOPHILS 0.3 0.0 - 0.4 K/UL    ABS. BASOPHILS 0.1 0.0 - 0.1 K/UL    ABS. IMM.  GRANS. 0.1 (H) 0.00 - 0.04 K/UL    DF SMEAR SCANNED      RBC COMMENTS ANISOCYTOSIS  3+        RBC COMMENTS HYPOCHROMIA  1+        RBC COMMENTS MICROCYTOSIS  1+            Brenton Gracia, NP

## 2020-06-25 NOTE — PROGRESS NOTES
Palliative Medicine Social Work        Dr. Pat Maravilla and I spoke with patient mom Josette Laureano, brother Madison, [address], [address]. Meidcal update given, questions answered. Family very upset that patient will discharged to [facility] where there is no visitation and also concerned that she will not get neurological support. Ultimately, Josette Laureano wants second opinion from [facility]. Discussed process for second opinion/transfer - that this may not be possible. If by some change patient is transferred to [facility] for second opinion, she would still eventually be d/c to [facility] at some point. Trajectory is same. Many unknowns. Mostly family is very upset re: patient poor prognosis for recovery. This has caught them by surprise after everything patient has been through. Josette Laureano does NOT want patient sent to [facility] until she hears about possibility for second opinion at [facility]. Dr. Pat Maravilla reaching out to attending to discuss second opinion. CM updated ia vmail. Thank you for the opportunity to be involved in the care of Ms. Imelda Haro and her family.     Shaun Brown, ANNALEE, Supervisee in Social Work  Palliative Medicine   [phone]

## 2020-06-26 LAB
ALBUMIN SERPL-MCNC: 2.8 G/DL (ref 3.5–5)
ALBUMIN/GLOB SERPL: 0.7 {RATIO} (ref 1.1–2.2)
ALP SERPL-CCNC: 81 U/L (ref 45–117)
ALT SERPL-CCNC: 31 U/L (ref 12–78)
ANION GAP SERPL CALC-SCNC: 5 MMOL/L (ref 5–15)
AST SERPL-CCNC: 25 U/L (ref 15–37)
BASOPHILS # BLD: 0.1 K/UL (ref 0–0.1)
BASOPHILS NFR BLD: 1 % (ref 0–1)
BILIRUB SERPL-MCNC: 0.3 MG/DL (ref 0.2–1)
BUN SERPL-MCNC: 15 MG/DL (ref 6–20)
BUN/CREAT SERPL: 25 (ref 12–20)
CALCIUM SERPL-MCNC: 9.2 MG/DL (ref 8.5–10.1)
CHLORIDE SERPL-SCNC: 108 MMOL/L (ref 97–108)
CO2 SERPL-SCNC: 26 MMOL/L (ref 21–32)
CREAT SERPL-MCNC: 0.6 MG/DL (ref 0.55–1.02)
DIFFERENTIAL METHOD BLD: ABNORMAL
EOSINOPHIL # BLD: 0.3 K/UL (ref 0–0.4)
EOSINOPHIL NFR BLD: 4 % (ref 0–7)
ERYTHROCYTE [DISTWIDTH] IN BLOOD BY AUTOMATED COUNT: 25.2 % (ref 11.5–14.5)
GLOBULIN SER CALC-MCNC: 4.1 G/DL (ref 2–4)
GLUCOSE SERPL-MCNC: 159 MG/DL (ref 65–100)
HCT VFR BLD AUTO: 34.1 % (ref 35–47)
HGB BLD-MCNC: 9.8 G/DL (ref 11.5–16)
IMM GRANULOCYTES # BLD AUTO: 0.1 K/UL (ref 0–0.04)
IMM GRANULOCYTES NFR BLD AUTO: 1 % (ref 0–0.5)
LYMPHOCYTES # BLD: 1.2 K/UL (ref 0.8–3.5)
LYMPHOCYTES NFR BLD: 16 % (ref 12–49)
MCH RBC QN AUTO: 23.3 PG (ref 26–34)
MCHC RBC AUTO-ENTMCNC: 28.7 G/DL (ref 30–36.5)
MCV RBC AUTO: 81 FL (ref 80–99)
MONOCYTES # BLD: 0.6 K/UL (ref 0–1)
MONOCYTES NFR BLD: 8 % (ref 5–13)
NEUTS SEG # BLD: 4.9 K/UL (ref 1.8–8)
NEUTS SEG NFR BLD: 70 % (ref 32–75)
NRBC # BLD: 0 K/UL (ref 0–0.01)
NRBC BLD-RTO: 0 PER 100 WBC
PLATELET # BLD AUTO: 406 K/UL (ref 150–400)
PMV BLD AUTO: 9.6 FL (ref 8.9–12.9)
POTASSIUM SERPL-SCNC: 4.1 MMOL/L (ref 3.5–5.1)
PROCALCITONIN SERPL-MCNC: <0.05 NG/ML
PROT SERPL-MCNC: 6.9 G/DL (ref 6.4–8.2)
RBC # BLD AUTO: 4.21 M/UL (ref 3.8–5.2)
RBC MORPH BLD: ABNORMAL
SODIUM SERPL-SCNC: 139 MMOL/L (ref 136–145)
WBC # BLD AUTO: 7.2 K/UL (ref 3.6–11)

## 2020-06-26 PROCEDURE — 74011250637 HC RX REV CODE- 250/637: Performed by: ANESTHESIOLOGY

## 2020-06-26 PROCEDURE — 92523 SPEECH SOUND LANG COMPREHEN: CPT | Performed by: SPEECH-LANGUAGE PATHOLOGIST

## 2020-06-26 PROCEDURE — 74011250636 HC RX REV CODE- 250/636: Performed by: NURSE PRACTITIONER

## 2020-06-26 PROCEDURE — 74011250636 HC RX REV CODE- 250/636: Performed by: ANESTHESIOLOGY

## 2020-06-26 PROCEDURE — 77010033678 HC OXYGEN DAILY

## 2020-06-26 PROCEDURE — 74011250637 HC RX REV CODE- 250/637: Performed by: PSYCHIATRY & NEUROLOGY

## 2020-06-26 PROCEDURE — 74011250637 HC RX REV CODE- 250/637: Performed by: NURSE PRACTITIONER

## 2020-06-26 PROCEDURE — 74011000250 HC RX REV CODE- 250: Performed by: HOSPITALIST

## 2020-06-26 PROCEDURE — L4396 STATIC OR DYNAMI AFO PRE CST: HCPCS

## 2020-06-26 PROCEDURE — 74011250637 HC RX REV CODE- 250/637: Performed by: INTERNAL MEDICINE

## 2020-06-26 PROCEDURE — 36415 COLL VENOUS BLD VENIPUNCTURE: CPT

## 2020-06-26 PROCEDURE — 85025 COMPLETE CBC W/AUTO DIFF WBC: CPT

## 2020-06-26 PROCEDURE — 84145 PROCALCITONIN (PCT): CPT

## 2020-06-26 PROCEDURE — 97530 THERAPEUTIC ACTIVITIES: CPT

## 2020-06-26 PROCEDURE — 65660000000 HC RM CCU STEPDOWN

## 2020-06-26 PROCEDURE — 80053 COMPREHEN METABOLIC PANEL: CPT

## 2020-06-26 PROCEDURE — 97112 NEUROMUSCULAR REEDUCATION: CPT

## 2020-06-26 PROCEDURE — 94640 AIRWAY INHALATION TREATMENT: CPT

## 2020-06-26 PROCEDURE — 94762 N-INVAS EAR/PLS OXIMTRY CONT: CPT

## 2020-06-26 RX ORDER — LISINOPRIL 20 MG/1
20 TABLET ORAL DAILY
Status: DISCONTINUED | OUTPATIENT
Start: 2020-06-26 | End: 2020-06-27

## 2020-06-26 RX ORDER — IPRATROPIUM BROMIDE AND ALBUTEROL SULFATE 2.5; .5 MG/3ML; MG/3ML
3 SOLUTION RESPIRATORY (INHALATION)
Status: DISCONTINUED | OUTPATIENT
Start: 2020-06-26 | End: 2020-07-10 | Stop reason: HOSPADM

## 2020-06-26 RX ADMIN — LACOSAMIDE 150 MG: 50 TABLET, FILM COATED ORAL at 08:21

## 2020-06-26 RX ADMIN — CHLORHEXIDINE GLUCONATE 15 ML: 0.12 RINSE ORAL at 21:55

## 2020-06-26 RX ADMIN — CARVEDILOL 25 MG: 12.5 TABLET, FILM COATED ORAL at 08:21

## 2020-06-26 RX ADMIN — LEVETIRACETAM 1000 MG: 100 SOLUTION ORAL at 21:52

## 2020-06-26 RX ADMIN — CASTOR OIL AND BALSAM, PERU: 788; 87 OINTMENT TOPICAL at 08:21

## 2020-06-26 RX ADMIN — ACETAMINOPHEN 650 MG: 650 SUSPENSION ORAL at 13:50

## 2020-06-26 RX ADMIN — CASTOR OIL AND BALSAM, PERU: 788; 87 OINTMENT TOPICAL at 17:56

## 2020-06-26 RX ADMIN — Medication 2 DROP: at 13:45

## 2020-06-26 RX ADMIN — POTASSIUM BICARBONATE 40 MEQ: 782 TABLET, EFFERVESCENT ORAL at 17:56

## 2020-06-26 RX ADMIN — ENOXAPARIN SODIUM 40 MG: 40 INJECTION SUBCUTANEOUS at 13:45

## 2020-06-26 RX ADMIN — HYDRALAZINE HYDROCHLORIDE 100 MG: 50 TABLET, FILM COATED ORAL at 15:58

## 2020-06-26 RX ADMIN — ASPIRIN 81 MG CHEWABLE TABLET 81 MG: 81 TABLET CHEWABLE at 08:21

## 2020-06-26 RX ADMIN — HYDRALAZINE HYDROCHLORIDE 100 MG: 50 TABLET, FILM COATED ORAL at 21:53

## 2020-06-26 RX ADMIN — POTASSIUM BICARBONATE 40 MEQ: 782 TABLET, EFFERVESCENT ORAL at 08:20

## 2020-06-26 RX ADMIN — HYDRALAZINE HYDROCHLORIDE 100 MG: 50 TABLET, FILM COATED ORAL at 08:21

## 2020-06-26 RX ADMIN — IPRATROPIUM BROMIDE AND ALBUTEROL SULFATE 3 ML: .5; 3 SOLUTION RESPIRATORY (INHALATION) at 09:15

## 2020-06-26 RX ADMIN — Medication 1 PACKET: at 08:20

## 2020-06-26 RX ADMIN — HYDRALAZINE HYDROCHLORIDE 10 MG: 20 INJECTION INTRAMUSCULAR; INTRAVENOUS at 06:55

## 2020-06-26 RX ADMIN — Medication 2 DROP: at 22:00

## 2020-06-26 RX ADMIN — FENTANYL CITRATE 50 MCG: 50 INJECTION, SOLUTION INTRAMUSCULAR; INTRAVENOUS at 14:14

## 2020-06-26 RX ADMIN — HYDRALAZINE HYDROCHLORIDE 10 MG: 20 INJECTION INTRAMUSCULAR; INTRAVENOUS at 01:40

## 2020-06-26 RX ADMIN — Medication 1 PACKET: at 15:58

## 2020-06-26 RX ADMIN — LEVETIRACETAM 1000 MG: 100 SOLUTION ORAL at 08:20

## 2020-06-26 RX ADMIN — HYDRALAZINE HYDROCHLORIDE 10 MG: 20 INJECTION INTRAMUSCULAR; INTRAVENOUS at 13:50

## 2020-06-26 RX ADMIN — AMLODIPINE BESYLATE 10 MG: 5 TABLET ORAL at 08:21

## 2020-06-26 RX ADMIN — Medication 1 PACKET: at 21:52

## 2020-06-26 RX ADMIN — LISINOPRIL 20 MG: 20 TABLET ORAL at 08:21

## 2020-06-26 RX ADMIN — Medication 2 DROP: at 06:00

## 2020-06-26 RX ADMIN — CARVEDILOL 25 MG: 12.5 TABLET, FILM COATED ORAL at 16:00

## 2020-06-26 RX ADMIN — LACOSAMIDE 150 MG: 50 TABLET, FILM COATED ORAL at 21:52

## 2020-06-26 NOTE — PROGRESS NOTES
Palliative Medicine Consult  [place]: [phone] ([phone])    Patient Name: Calos Blackwood  YOB: 1970    Date of Initial Consult: 6/23/2020  Reason for Consult: Care decisions  Requesting Provider: Dr. Olaf Crawford  Primary Care Physician: Anitha Eden MD     SUMMARY:   Calos Blackwood is a 48 y.o. with a past history of hypertension who was admitted on 6/4/2020 from home with a diagnosis of large subarachnoid hemorrhage secondary cerebral aneurysm, hypertensive emergency current medical issues leading to Palliative Medicine involvement include: Care decisions    Chart reviewedpatient is a 51-year-old female with a history of hypertension who apparently had stopped her medication several weeks prior to admission to the hospital.  She had increasing headache for several days and then collapsed at work. She is found to have a large subarachnoid hemorrhage on evaluation in [place]. She was transferred to [facility] for further management. 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 She also is found to have extensive lung infiltrates. She underwent cerebral angiography on the morning of 6/5 and coil embolization of the left MCA aneurysm was completed. The right MCA aneurysm was unable to be treated endovascularly. She also completed cerebral angiography right PANCHITO angioplasty for vasospasm, transarterial infusion of verapamil on 6/9. Status post angiogram on 6/10 for left ICA MCA angioplasty with transarterial infusion of verapamil for vasospasm treatment. She had trach and PEG placed on 6/16. Neurologically, patient appears to open her right eye to voice but remains nonverbal, does not follow commands, seems to move her right extremity at times spontaneously.   Patient has been evaluated for [place] but remains on Cardene drip.  Our team is been asked to see her for goals of care. Social history patient has a 15year-old son. 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 She is . Her mother lives in [place]. She does have a brother VIMAL GERMAIN who lives in [place] as well. PALLIATIVE DIAGNOSES:   1. Goals of care discussion  2. Status post subarachnoid hemorrhage secondary to aneurysm rupture with significant neurological injury  3. Advance care planning review  4. Status post trach/PEG  5. Debility       PLAN:   1. Met with patient. She was sleeping when I saw her this morning. Per Dr. Monique Esparza note and NP Moi, patient with subtle neurological improvements. Remains on trach collar and tube feedings. 2. Talked with Dr. Dharmesh Stevens this morning prior to meeting with patient's mother. Reviewed the family meeting we had yesterday. Explained that our anticipated role was just to educate family on next steps such [facility]. Multiple family members on the phone call and at that point, they wanted a second opinion from [facility]. They were very complementary of the inpatient team for saving their loved ones life. Apologized to Dr. Dharmesh Stevens that if we would anticipated a more complex discussion such as transfer, would definitely have involved his team in the family meeting. Told him we plan on talking with patient's mother later today and will clarify again what she is asking. 3.  [place], palliative medicine , and I met with patient's mother at the bedside. Nancy Mcfarlane once again was very complementary of the whole team that is care for her daughter. Her second opinion has nothing to do with the care that she was received at [facility].  She is only requesting this to be sure that nothing else can be done prior to transition to either [facility] or [facility] care center at [facility].   We again expressed the difficulties in transfer that can sometimes occur such as the availability of bed, accepting physician, and making sure patient is stable for the transfer. Told her we would express her wishes to the inpatient team as well as Dr. Padmini Harris. She again was very thankful for everything that is been done for her daughter. 4. Discussed via phone with Dr. Padmini Harris the request by patient's mother. Also sent a message to Dr Karey Fry talked with case management. 5. Goals of carecontinue attempts at full restorative measures. Family requesting second opinion from [facility]. 6. Advance care planningno advanced medical directive in the chart. Patient's mother is legal next of kin and hence would serve as medical power of   7. CODE STATUS full code  8. Symptom management no acute symptoms for us to manage. 9. Psychosocialvery difficult situation as patient is very young but with a devastating neurological injury. Also with a young family. Patient's mother clearly will need ongoing support. 10. Initial consult note routed to primary continuity provider and/or primary health care team members  11.  Communicated plan of care with: Palliative [facility] Team     GOALS OF CARE / TREATMENT PREFERENCES:     GOALS OF CARE:  Patient/Health Care Proxy Stated Goals: Prolong life    TREATMENT PREFERENCES:   Code Status: Full Code    Advance Care Planning:  [x] [facility] Interdisciplinary Team has updated the ACP Navigator with Health Care Decision Maker and Patient Capacity      Primary Decision Maker: Bandar Garibay - Mother, Clyde Yeung - [phone]  Advance Care Planning 6/5/2020   Patient's [address] is: Legal Next of [facility] Directive None   Patient Would Like to Complete Advance Directive Unable       Medical Interventions: Full interventions     Other Instructions:   Artificially Administered Nutrition: Feeding tube long-term, if indicated     Other:    As far as possible, the palliative care team has discussed with patient / health care proxy about goals of care / treatment preferences for patient. HISTORY:     History obtained from: Chart, mother    CHIEF COMPLAINT: Altered mental status    HPI/SUBJECTIVE:    The patient is:   [] Verbal and participatory  [x] Non-participatory due to:   Nonverbal after subarachnoid hemorrhage    6/25 patient with eye open on the right. Still not following commands    6/26patient was sleeping this morning when I saw her. When I came back to the bedside with her mother present, eyes were open and appeared to be tracking again especially the right eye. Clinical Pain Assessment (nonverbal scale for severity on nonverbal patients):   Clinical Pain Assessment  Severity: 0     Activity (Movement): Restless, excessive activity and/or withdrawal reflexes    Duration: for how long has pt been experiencing pain (e.g., 2 days, 1 month, years)  Frequency: how often pain is an issue (e.g., several times per day, once every few days, constant)     FUNCTIONAL ASSESSMENT:     Palliative Performance Scale (PPS):  PPS: 40       PSYCHOSOCIAL/SPIRITUAL SCREENING:     Palliative IDT has assessed this patient for cultural preferences / practices and a referral made as appropriate to needs (Cultural Services, Patient Advocacy, Ethics, etc.)    Any spiritual / Baptist concerns:  [] Yes /  [x] No    Caregiver Burnout:  [] Yes /  [x] No /  [] No Caregiver Present      Anticipatory grief assessment:   [x] Normal  / [] Maladaptive       ESAS Anxiety:    Unable to assess  ESAS Depression:     Unable to assess     REVIEW OF SYSTEMS:     Positive and pertinent negative findings in ROS are noted above in HPI. The following systems were [x] reviewed / [] unable to be reviewed as noted in HPI  Other findings are noted below. Systems: constitutional, ears/nose/mouth/throat, respiratory, gastrointestinal, genitourinary, musculoskeletal, integumentary, neurologic, psychiatric, endocrine. Positive findings noted below.   Modified ESAS Completed by: provider   Fatigue: 3 Drowsiness: 2     Pain: 0     Nausea: 0   Anorexia: 0 Dyspnea: 0     Constipation: No     Stool Occurrence(s): 1        PHYSICAL EXAM:     From RN flowsheet:  Wt Readings from Last 3 Encounters:   06/26/20 217 lb 14.4 oz (98.8 kg)   06/19/20 237 lb 7 oz (107.7 kg)   06/10/20 239 lb 6.7 oz (108.6 kg)     Blood pressure 135/61, pulse (!) 47, temperature 98.2 °F (36.8 °C), resp. rate 22, height 5' 4\" (1.626 m), weight 217 lb 14.4 oz (98.8 kg), SpO2 99 %. Pain Scale 1: Adult Nonverbal Pain Scale  Pain Intensity 1: 0  Pain Onset 1: yes           Pain Intervention(s) 1: Medication (see MAR)  Last bowel movement, if known:     Constitutional: Right eye open, appears to track, nonverbal  Eyes: Right eyes open, ptosis of the left eye  ENMT: no nasal discharge, moist mucous membranes, trach in place  Cardiovascular: regular rhythm, distal pulses intact  Respiratory: breathing not labored, symmetric  Gastrointestinal: soft non-tender, +bowel sounds, PEG noted  Musculoskeletal: no deformity, no tenderness to palpation  Skin: warm, dry  Neurologic: Not following commands  Psychiatric: Calm  Other:       HISTORY:     Active Problems:    SAH (subarachnoid hemorrhage) ([facility] (6/4/2020)      Subarachnoid hemorrhage from middle cerebral artery aneurysm, left ([address] (6/4/2020)      RIGHT middle cerebral artery aneurysm (6/4/2020)      Cerebral vasospasm (6/4/2020)      Respiratory failure ([address] (6/15/2020)      Hypoxic ischemic encephalopathy (HIE), unspecified severity (6/15/2020)      Past Medical History:   Diagnosis Date    HTN (hypertension)       Past Surgical History:   Procedure Laterality Date    IR INSERT GASTROSTOMY TUBE PERC  6/19/2020      History reviewed. No pertinent family history. History reviewed, no pertinent family history.   Social History     Tobacco Use    Smoking status: Never Smoker    Smokeless tobacco: Never Used   Substance Use Topics    Alcohol use: Not Currently     No Known Allergies   Current Facility-Administered Medications   Medication Dose Route Frequency    lisinopriL (PRINIVIL, ZESTRIL) tablet 20 mg  20 mg Oral DAILY    guar gum (BENEFIBER) packet 1 Packet  4 g Oral TID    labetaloL (NORMODYNE;TRANDATE) injection 20 mg  20 mg IntraVENous Q4H PRN    0.9% sodium chloride infusion 250 mL  250 mL IntraVENous PRN    albuterol-ipratropium (DUO-NEB) 2.5 MG-0.5 MG/3 ML  3 mL Nebulization BID RT    hydrALAZINE (APRESOLINE) tablet 100 mg  100 mg Oral TID    fentaNYL citrate (PF) injection 50 mcg  50 mcg IntraVENous Q2H PRN    hydrALAZINE (APRESOLINE) 20 mg/mL injection 10 mg  10 mg IntraVENous Q2H PRN    chlorhexidine (ORAL CARE KIT) 0.12 % mouthwash 15 mL  15 mL Oral Q12H    niCARdipine (CARDENE) 50 mg in 0.9% sodium chloride 100 mL infusion  0-15 mg/hr IntraVENous TITRATE    carvediloL (COREG) tablet 25 mg  25 mg Oral BID WITH MEALS    amLODIPine (NORVASC) tablet 10 mg  10 mg Per NG tube DAILY    potassium bicarb-citric acid (EFFER-K) tablet 40 mEq  40 mEq Oral BID    lacosamide (VIMPAT) tablet 150 mg  150 mg Per NG tube Q12H    balsam peru-castor oiL (VENELEX) ointment   Topical BID    aspirin chewable tablet 81 mg  81 mg Per NG tube DAILY    enoxaparin (LOVENOX) injection 40 mg  40 mg SubCUTAneous Q24H    levETIRAcetam (KEPPRA) oral solution 1,000 mg  1,000 mg Per NG tube Q12H    glycopyrrolate (ROBINUL) injection 0.2 mg  0.2 mg IntraMUSCular TID PRN    acetaminophen (TYLENOL) solution 650 mg  650 mg Per NG tube Q4H PRN    glucose chewable tablet 16 g  4 Tab Oral PRN    glucagon (GLUCAGEN) injection 1 mg  1 mg IntraMUSCular PRN    dextrose 10% infusion 0-250 mL  0-250 mL IntraVENous PRN    albuterol (PROVENTIL VENTOLIN) nebulizer solution 2.5 mg  2.5 mg Nebulization Q6H PRN    polyvinyl alcohol-povidone (NATURAL TEARS) 0.5-0.6 % ophthalmic solution 2 Drop  2 Drop Both Eyes Q8H    bisacodyL (DULCOLAX) suppository 10 mg  10 mg Rectal DAILY PRN    ondansetron (ZOFRAN) injection 4 mg  4 mg IntraVENous Q4H PRN    docusate (COLACE) 50 mg/5 mL oral liquid 100 mg  100 mg Oral BID PRN          LAB AND IMAGING FINDINGS:     Lab Results   Component Value Date/Time    WBC 7.2 06/26/2020 01:36 AM    HGB 9.8 (L) 06/26/2020 01:36 AM    PLATELET 558 (H) 41/02/4808 01:36 AM     Lab Results   Component Value Date/Time    Sodium 139 06/26/2020 01:36 AM    Potassium 4.1 06/26/2020 01:36 AM    Chloride 108 06/26/2020 01:36 AM    CO2 26 06/26/2020 01:36 AM    BUN 15 06/26/2020 01:36 AM    Creatinine 0.60 06/26/2020 01:36 AM    Calcium 9.2 06/26/2020 01:36 AM    Magnesium 2.4 06/23/2020 04:23 AM    Phosphorus 3.0 06/23/2020 04:23 AM      Lab Results   Component Value Date/Time    Alk. phosphatase 81 06/26/2020 01:36 AM    Protein, total 6.9 06/26/2020 01:36 AM    Albumin 2.8 (L) 06/26/2020 01:36 AM    Globulin 4.1 (H) 06/26/2020 01:36 AM     Lab Results   Component Value Date/Time    INR 1.1 06/08/2020 12:44 AM    Prothrombin time 11.0 06/08/2020 12:44 AM      Lab Results   Component Value Date/Time    Iron 10 (L) 06/06/2020 04:21 AM    TIBC 376 06/06/2020 04:21 AM    Iron % saturation 3 (L) 06/06/2020 04:21 AM    Ferritin 44 06/13/2020 05:43 PM      No results found for: PH, PCO2, PO2  No components found for: GLPOC   No results found for: CPK, CKMB             Total time:35  Counseling / coordination time, spent as noted above: 35  > 50% counseling / coordination?: yes    Prolonged service was provided for  []30 min   []75 min in face to face time in the presence of the patient, spent as noted above. Time Start:   Time End:   Note: this can only be billed with 80176 (initial) or 79114 (follow up). If multiple start / stop times, list each separately.

## 2020-06-26 NOTE — ROUTINE PROCESS
Bedside shift change report given to [address] [place] [address] (oncoming nurse) by Teodoro Belle RN (offgoing nurse). Report included the following information SBAR, Kardex, ED Summary, Procedure Summary, Intake/Output, MAR, Accordion, Recent Results, Cardiac Rhythm NSR/SB, Quality Measures and Dual Neuro Assessment.

## 2020-06-26 NOTE — PROGRESS NOTES
Problem: Self Care Deficits Care Plan (Adult)  Goal: *Acute Goals and Plan of Care (Insert Text)  Description:   FUNCTIONAL STATUS PRIOR TO ADMISSION: Patient unable to provide history at evaluation. Per chart, patient was independent and working full-time at [employer]. HOME SUPPORT: Per chart, patient lived with her 15 y/o son and a friend. Occupational Therapy Goals  Initiated 6/22/2020  1. Patient will perform grooming with maximal assistance within 7 day(s). 2.  Patient will follow ~25% simple commands with max multimodal cuing in preparation for functional tasks within 7 days. 3.  Patient will reach for ADL object with each UE with moderate assistance in preparation for functional reach within 7 days. 4.  Patient will perform composite grasp on ADL object with each hand in preparation for functional grasp within 7 days. 5.  Patient will participate in upper extremity therapeutic exercise/activities with maximal assistance for 5 minutes within 7 day(s). Outcome: Not Progressing Towards Goal     OCCUPATIONAL THERAPY TREATMENT  Patient: Edison Huitron (48 y.o. female)  Date: 6/26/2020  Diagnosis: SAH ([facility]arachnoid hemorrhage) ([facility] [I60.9]   <principal problem not specified>       Precautions: Aspiration  Chart, occupational therapy assessment, plan of care, and goals were reviewed. ASSESSMENT  Patient continues with skilled OT services and is not progressing towards goals. Patient seen in collaboration with PT in an effort to maximize engagement and functional response. However, patient was unable to participate in session. Patient was alert throughout session but followed 0% commands despite max multimodal cuing. Wrist restraints removed for session and she spontaneously produced slight BUE AROM, although non-purposeful, and not on command. She attended to both sides with R eye (L eye remains closed).   Attempted to engage patient in face washing with hand-over-hand prompting, but with no engagement, ultimately requiring total assistance. Patient required total assistance x2 for scooting to [facility], lateral repositioning, and anterior lean once in partial bed-in-chair position. Unfortunately patient's ability to participate in OT remains limited at this time, but will continue 3x/ week trial period. Current Level of Function Impacting Discharge (ADLs/self-care): total assistance          PLAN :  Patient continues to benefit from skilled intervention to address the above impairments. Continue treatment per established plan of care. to address goals. Recommendation for discharge: (in order for the patient to meet his/her long term goals)  To be determined: [place] with hopeful bridge to IPR vs IPR pending progress and ability to participate      This discharge recommendation:  Has not yet been discussed the attending provider and/or case management       SUBJECTIVE:   Patient stated no verbalizations and made no gestures    OBJECTIVE DATA SUMMARY:   Cognitive/Behavioral Status:  Neurologic State: Alert  Orientation Level: Unable to verbalize  Cognition: No command following        Safety/Judgement: Not assessed    Functional Mobility and Transfers for ADLs:  Bed Mobility:   Total assistance x2      ADL Intervention:        Grooming: total assistance, attempted hand-over-hand face washing       Cognitive Retraining  Safety/Judgement: Not assessed    Pain:  Patient did not indicate pain    Activity Tolerance:   VSS    After treatment patient left in no apparent distress:   Supine in bed, Call bell within reach, and Bed / chair alarm activated    COMMUNICATION/COLLABORATION:   The patients plan of care was discussed with: Physical therapist and Registered nurse.      Ministerio Coffman OT  Time Calculation: 15 mins

## 2020-06-26 NOTE — PROGRESS NOTES
Transitions of care:  CRM participated in IDR this am.  I did follow up with Judy López for [facility] care unit phone [phone] Cell [phone]. Per Najma Gu she did not feel that patient met their level of care at this time.  met with patient's mother John Sullivan this afternoon. Per mom she wants to have a second opinion and wishes for her daughter to be transferred to [address] as soon as this can be arranged. I did speak with Dr Elinor Molina hospitalist and he has been in contact with the [facility] transfer  [address] [facility], [address] per my conversation with him and will meet with patient's mom in the morning of 6/27/2020. Presently  Patient's mom wishes to pursue this route first with [facility] if feasible.   I have offered support to mom and she will expect a visit from hospitalist in the am.

## 2020-06-26 NOTE — PROGRESS NOTES
Neurointerventional Surgery Progress Note  Emi Churchill NP   Neurocritical Care Nurse Practitioner  [phone]      Admit Date: 6/4/2020        Daily Progress Note: 6/26/2020   LOS: 22 days      S/P:  POD 20 Cerebral angiogram with coil embolization of ruptured left MCA bifurcation aneurysm on 6/5/2020    ** Delayed Presentation ** PB Day 26    Interval History/Subjective:   Patient currently on trach collar. Continues to be stable. Nimodipine course completed. Now on Coreg, Hydralazine, Norvasc and Lisinopril added today for BP management. Patient did follow some basic commands for Dr. Imogene Gosselin yesterday evening including shaking head \"yes\" \"no\" and squeezing with left hand. Unable to perform a ROS due to AMS. Assessment & Plan: Active Problems:    SAH (subarachnoid hemorrhage) ([address] (6/4/2020)      Subarachnoid hemorrhage from middle cerebral artery aneurysm, left ([address] (6/4/2020)      RIGHT middle cerebral artery aneurysm (6/4/2020)      Cerebral vasospasm (6/4/2020)      Respiratory failure ([address] (6/15/2020)      Hypoxic ischemic encephalopathy (HIE), unspecified severity (6/15/2020)      1.) SAH due to ruptured cerebral aneurysm, [place] 5, An Grade 3/4              - s/p cerebral angiogram with coil embolization of left MCA bifurcation aneurysm on 6/5       Additionally, patient has 5X3 right MCA bifurcation aneurysm with very wide neck which will               need to be treated with stents vs. Clipping              - Completed Nimotop 21 days to prevent delayed cerebral ischemia              - Last TCDs on 6/20 showed continued possible left PCA vasospasm, no evidence of vasospasm in the remaining intracranial vessels bilaterally. Continue to monitor clinically.  Patient essentially out of vasospasm window               - ECHO shows EF 60-65%, mild concentric hypertrophy, mildly dilated left atrium              - ok with neuro checks every 2 hours during the day and every 4 hours at night               - change SBP goal to 110-160 in the setting of unsecured aneurysm in the right MCA,               - PT/OT/SLP evals    2.) Seizure              - Due to #1, no prior hx of seizure              - Continue Keppra 1000 mg BID               - continue Vimpat & Keppra               - Seizure precautions     3.) Cerebral Edema (resolved)    4.) Acute hypoxic respiratory failure in the setting of likely neurogenic pulmonary edema- resolved              - currently on trach collar, tolerating well              - chest XR on 6/19 Vague left lower lobe airspace process, suggesting subsegmental  atelectasis or pneumonia    5.) Multiple Acute Ischemic Infarctions                - Imaging pattern is consistent with Hypoxic-Ischemic Encephalopathy (HIE)                - Continue aspirin 81 mg daily for stroke prevention           6.) Cerebral Vasospasm- resolved                  - s/p cerebral angiogram for right PANCHITO A1 angioplasty for vasospasm, transarterial infusion of verapamil on 6/9,  S/p angiogram on 6/10 for left ICA/MCA angioplasty and transarterial infusion of verapamil for vasospasm treatment, s/p angiogram on 6/11 and 6/13 for transarterial infusion of verapamil for vasospasm treatment                7.) Fever --> resolved                 - completed course of Zosyn, WBC normal                - respiratory cultures growing Klebsiella Pneumonia and Citrobacter Koseria 6/17                - CTA of chest 6/7 negative for PE                 - Cefepime started on 6/19 to cover Citrobacter per Intensivist, discontinued today as respiratory specimen is likely a contaminant per Intensivist                - Intensivist following     8.) Polyuria                  - serum sodium stable at 139, electrolytes WNL                  - urine studies showed serum osmolality 296, urine osmolality 322, specific gravity normal at 1.015.  Urine random sodium at 114                  - suspect patient is auto-diuresing due to recent issues with fluid overload                  - discontinue Florinef 0.1 mg BID for prevention of cerebral salt wasting                  - monitor BMP daily    9.) HTN                  - change SBP goal to 110-160                  - continue Amlodipine 10 mg daily and coreg 25 mg BID                  - add Hydralazine 100 mg TID per hospitalist                   - Lisinopril 20 mg added 6/26                    Activity: Bed rest  DVT ppx: SCDs, Lovenox  Disposition: TBD    Plan d/wDr. Marlena Contreras, bedside RN  and Dr Greyson Serrano, hospitalist MD and Dr. Elizabeth Breaux with palliative care medicine. Admission Summary:     Lauren Diaz is a 48 y.o. 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 On arrival to ED, pt was unresponsive and noted to be seizing. A stat CT of her head was performed there, which showed extensive SAH. Pt was then intubated emergently. She was also extremely hypertensive with SBPs in 240s. She was given Keppra and Ativan, and placed on cardene drip for BP control. NSGY and NIS were then consulted and the patient was transferred to [facility] for higher level of care. En route, paramedics administered 7.5 mg of Versed, 200mcg of Fentanyl, and 10 mg of labetalol for BP control. On arrival, CTA of her head and neck was obtained, which showed a 4.4 x 4.1 mm right MCA trifurcation aneurysm and  a 2.2 x 3.1 mm left MCA trifurcation aneurysm. Of note, CTA head/neck also showed pulmonary infiltrates suspicious for COVID-19 infection and the patient has been tested for COVID which was negative. 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 Mother reported pt does not smoke, does not drink alcohol or use street drugs.  The mother also reported that the patient began complaining of a headache on 5/31/2020 and has been taking [facility] Powder (aspirin) for the headache. She also reported that the pt has a hx of HTN and is supposed to be on BP medications, but recently stopped taking her medications. The patient underwent a cerebral angiogram on 6/5 by Dr. Estefanía Cheatham for coil embolization of a ruptured left MCA bifurcation aneurysm.     Current Facility-Administered Medications   Medication Dose Route Frequency Provider Last Rate Last Dose    lisinopriL (PRINIVIL, ZESTRIL) tablet 20 mg  20 mg Oral DAILY Landry Larios MD   20 mg at 06/26/20 0821    guar gum (BENEFIBER) packet 1 Packet  4 g Oral TID Landry Larios MD   1 Packet at 06/26/20 0820    labetaloL (NORMODYNE;TRANDATE) injection 20 mg  20 mg IntraVENous Q4H PRN Landry Larios MD        0.9% sodium chloride infusion 250 mL  250 mL IntraVENous PRN Marc Rodrigues MD        albuterol-ipratropium (DUO-NEB) 2.5 MG-0.5 MG/3 ML  3 mL Nebulization BID RT Marc Rodrigues MD   3 mL at 06/26/20 0915    hydrALAZINE (APRESOLINE) tablet 100 mg  100 mg Oral TID Socorro Sterling NP   100 mg at 06/26/20 9572    fentaNYL citrate (PF) injection 50 mcg  50 mcg IntraVENous Q2H PRN Jonnie Phoenix, NP   50 mcg at 06/24/20 0147    hydrALAZINE (APRESOLINE) 20 mg/mL injection 10 mg  10 mg IntraVENous Q2H PRN Faiza Chandler NP   10 mg at 06/26/20 0655    chlorhexidine (ORAL CARE KIT) 0.12 % mouthwash 15 mL  15 mL Oral Q12H Socorro Sterling NP   15 mL at 06/24/20 2051    niCARdipine (CARDENE) 50 mg in 0.9% sodium chloride 100 mL infusion  0-15 mg/hr IntraVENous TITRATE Carlos Avery NP   Stopped at 06/24/20 0246    carvediloL (COREG) tablet 25 mg  25 mg Oral BID WITH MEALS Michele Lezama MD   25 mg at 06/26/20 0821    amLODIPine (NORVASC) tablet 10 mg  10 mg Per NG tube DAILY Michele Lezama MD   10 mg at 06/26/20 0821    potassium bicarb-citric acid (EFFER-K) tablet 40 mEq  40 mEq Oral BID Michele Lezama MD   40 mEq at 06/26/20 0820    lacosamide (VIMPAT) tablet 150 mg  150 mg Per NG tube Q12H Hekmatdoost, Osvaldo Limon MD   150 mg at 20 9522    balsam peru-castor oiL (VENELEX) ointment   Topical BID Leon Berry DO        aspirin chewable tablet 81 mg  81 mg Per NG tube DAILY Gopi Dixon MD   81 mg at 20 0821    enoxaparin (LOVENOX) injection 40 mg  40 mg SubCUTAneous Q24H Leon Berry DO   40 mg at 20 1400    levETIRAcetam (KEPPRA) oral solution 1,000 mg  1,000 mg Per NG tube Q12H Leon Berry DO   1,000 mg at 20 0820    glycopyrrolate (ROBINUL) injection 0.2 mg  0.2 mg IntraMUSCular TID PRN Ailyn Pinto NP   0.2 mg at 20 1803    acetaminophen (TYLENOL) solution 650 mg  650 mg Per NG tube Q4H PRN David Mejia NP   650 mg at 20 0230    glucose chewable tablet 16 g  4 Tab Oral PRN Wendie Hernandez NP-JOVANY        glucagon (GLUCAGEN) injection 1 mg  1 mg IntraMUSCular PRN FABIAN Jurado        dextrose 10% infusion 0-250 mL  0-250 mL IntraVENous PRN Wendie Hernandez NP-JOVANY        albuterol (PROVENTIL VENTOLIN) nebulizer solution 2.5 mg  2.5 mg Nebulization Q6H PRN Wendie Hernandez NP-JOVANY        polyvinyl alcohol-povidone (NATURAL TEARS) 0.5-0.6 % ophthalmic solution 2 Drop  2 Drop Both Eyes Q8H Haroldine Liu ACNP   2 Drop at 20 0600    bisacodyL (DULCOLAX) suppository 10 mg  10 mg Rectal DAILY PRN Haroldine Liu ACNP        ondansetron [facility]) injection 4 mg  4 mg IntraVENous Q4H PRN Wendie Hernandez NP-C        docusate (COLACE) 50 mg/5 mL oral liquid 100 mg  100 mg Oral BID PRN Miah RIVAS NP-C            No Known Allergies    Review of Systems:  Review of systems not obtained due to patient factors.       Objective:     Vital signs  Temp (24hrs), Av.2 °F (36.8 °C), Min:97.9 °F (36.6 °C), Max:98.8 °F (37.1 °C)    07 -  1900  In: 300   Out: -   1901 -  0700  In: 3340.1 [I.V.:1793.8]  Out: 1500 [Urine:1500]    Visit Vitals  /82 (BP 1 Location: Left arm, BP Patient Position: At rest)   Pulse (!) 56   Temp 97.9 °F (36.6 °C)   Resp 19   Ht 5' 4\" (1.626 m)   Wt 217 lb 14.4 oz (98.8 kg)   SpO2 100%   BMI 37.40 kg/m²    O2 Flow Rate (L/min): 6 l/min O2 Device: Tracheal collar(water added for humity)   Vitals:    06/26/20 0654 06/26/20 0726 06/26/20 0820 06/26/20 0956   BP: 176/84 157/61 156/77 157/82   Pulse: (!) 52 60 60 (!) 56   Resp: 17   19   Temp: 98 °F (36.7 °C)   97.9 °F (36.6 °C)   SpO2: 98%   100%   Weight:       Height:          Physical Exam:  GENERAL: NAD, trach collar present, alert   EYE: conjunctivae/corneas clear. Right pupil 3 mm, brisk response to light. Left pupil 4 mm, brisk response to light. Keeps left eye closed   LUNG: lungs clear bilaterally   HEART: regular rate and rhythm, S1, S2 normal, no murmur, click, rub or gallop  EXTREMITIES:  extremities normal, atraumatic, no cyanosis, 3+ pitting edema in BUE, +! Pitting edema in BLE, distal pulses 2+ bilaterally   SKIN: Skin warm to touch. Right and left groin site clean, dry, and intact. No hematoma, bruising, or bleeding noted. NEUROLOGIC: Alert. Trach-collar. Non-verbal. Eyes will open spontaneously. No command following. No facial asymmetry. Right pupil 3 mm, brisk response to light. Left pupil 4 mm brisk response to light. Blinks to visual threat. Left eye remains closed. Focuses and tracks with eyes, with the exception of inability to track to the right with left eye. Moves all extremities spontaneously/intermittently. Less movement in RUE vs. LUE. RUE withdraws to pain, intermittently moves right arm at times when stimulated. No involuntary movements. Gait deferred. Unable to assess language, sensation, and coordination. Imaging:    MRI of Brain on 6/19/2020 shows  IMPRESSION:   1. Persistent subarachnoid hemorrhage in the left sylvian fissure. 2.  Patchy areas of enhancement as described.  Some of these are associated with  foci of diffusion restriction and likely represent subacute ischemia. The  leptomeningeal enhancement predominantly in the left frontal and parietal lobes  is likely related to subarachnoid hemorrhage and meningeal irritation. Infection  is not entirely excluded but thought less likely. CTA of Head 6/18/2020 at 1735 shows    IMPRESSION:  1. No acute large vessel occlusion. 2. Recent left MCA bifurcation aneurysm coiling, with interval improvement in  now mild vasospasm involving the bilateral anterior, left middle, vertebral  basilar and bilateral posterior cerebral arteries as discussed in detail above. 3. Unchanged right MCA bifurcation aneurysm.     CT of Head on 6/18/2020 at 1735 shows  IMPRESSION:      Decreased left subarachnoid hemorrhage. No new hemorrhage. Artifact versus increased conspicuity of nonacute left temporal lobe infarct. CT Head on 6/17/20 shows:     IMPRESSION:     1. Persistence of the previously described subtle areas of subarachnoid  hemorrhage. 2. Persistence of the previously described left inferior cerebellar infarct. 3. Presence of small lacunar infarcts bilaterally. 4. No evidence of hydrocephalus. 5. Presence of an aneurysm coil in the region of the left middle cerebral  artery. 6. Evidence of sphenoid sinus disease.       CT of Head on 6/13/2020 at 1103 shows  IMPRESSION:   1. Questionable left inferior cerebellar infarction, new from 6/10/2020.  2. Multiple, evolving, subacute infarctions throughout the cerebrum. 3. Subacute subarachnoid hemorrhage. Improved hydrocephalus compared to  6/6/2020    CTA of Head on 6/13/2020 at 1103 shows  IMPRESSION:   1. Left A1 vasospasm. 2. More mild spasm of the right PANCHITO, MCAs, and possibly the distal vertebrals. 3. Right M1 terminus aneurysm. 4. Large infundibula/small aneurysms of the bilateral P-comm origins. CT Perfusion on 6/13/2020 at 1103 shows  IMPRESSION:  1.  Penumbra surrounding infarctions in most of the superior left MCA territory. 2. Bilateral PANCHITO infarctions. 3. Questionable left cerebellar infarction is inferior to the perfusion scan. MRI of Brain on 6/9/2020 at 0311 AM shows  IMPRESSION:  1. Multiple areas of cortical acute ischemic injury as well as some subcortical  white matter areas and right cerebellar involvement similar to the prior MRI  6/6/20.  2. Subarachnoid hemorrhage and intraventricular blood again demonstrated without  hydrocephalus. 3. Increasing fluid paranasal sinuses and right greater than left mastoid/middle  ear cavities. CTA of Head on 6/9/2020 at 1350 shows  Progressive vasospasm involving the anterior cerebral arteries with minimal  associated decreased perfusion to the frontal lobes. No other significant  Change  . CT Perfusion on 6/9/2020 at 1336 shows  Impression:  CT perfusion brain: Very minimal decreased perfusion to the frontal lobes. CT of Head on 6/9/2020 at 0234 shows  IMPRESSION:   No significant change.     CT of Head on 6/8/2020 shows  IMPRESSION:   Global edema, otherwise no significant change    CTA of Head and Neck on 6/8/2020 per radiology shows  Impression:  Mild to moderate vasospasm in the left middle cerebral artery status post  bifurcation aneurysm coil embolization. Stable 4 mm right MCA aneurysm. Endotracheal tube terminates just above the matt. 1 cm low-density left thyroid nodule. Mildly enlarged left axillary lymph node measuring 11 mm in short axis  Patchy bilateral airspace disease. Nonspecific asymmetric enhancement of the right superior ophthalmic vein   (However, no significant vasospasm was seen when personally reviewed by NIS)    CT Perfusion on 6/8/2020 shows  IMPRESSION:  No significant cerebral perfusion abnormality    CTA of Chest on 6/7/2020 shows  IMPRESSION:  1. No acute pulmonary embolus  2. Dependent atelectasis with diffuse groundglass opacifications bilaterally. CT of Head on 6/7/2020 shows  IMPRESSION:   1.  No further progression of subarachnoid hemorrhage. The overall degree of  subarachnoid hemorrhage is slightly improved. 2. Subtle areas of gray-white differentiation loss throughout the cerebral  hemispheres correlating with areas of restricted diffusion on previous CT  compatible with ischemic changes. MRI of Brain on 6/6/2020 shows  IMPRESSION:   Imaging findings consistent with large left MCA territory ischemia/infarction,  left temporal, parietal and frontal lobes, cortically based, no superimposed  increased parenchymal hemorrhage or midline shift. Right and left PANCHITO territory  cortical diffusion restriction consistent with infarction. Infarction in the right insular cortex and right temporal lobe. Scattered small infarctions right and left corona radiata centrum semiovale, right cerebellum. Allowing for differences in technique likely stable subarachnoid and  intraventricular hemorrhage. CT of Head on 6/6/2020 at 1411 shows  IMPRESSION:   Left MCA territory infarction with smaller right MCA territory infarction.     Stable subarachnoid and intraventricular hemorrhage. CTA of Head on 6/6/2020 at 0903 shows  IMPRESSION:  Slightly increased intraventricular and subarachnoid hemorrhage compared to the  prior exam.     Status post coiling left MCA bifurcation aneurysm. No significant residual  aneurysm filling. CT of Head on 6/62020 at 0239 shows  IMPRESSION:  Slightly diminished subarachnoid and intraventricular hemorrhage. Stable ventricular system. CT of Head WO on 6/5/2020 at 1512 shows  IMPRESSION: Stable acute subarachnoid and intraventricular hemorrhage. Stable  ventricular system. CT Head WO 6/5/20 0826     IMPRESSION:   1. Diffuse subarachnoid hemorrhage most concentrated in the basal cisterns and  left sylvian fissure. 2.  Interval ventricular enlargement suspicious for developing hydrocephalus.     CTA Head 6/4/20 2017     IMPRESSION:  1.   Diffuse subarachnoid hemorrhage in the basilar cisterns and sylvian  fissures. 2.  Bilateral MCA bifurcation aneurysms. 3.  Infundibular origins to the posterior communicating arteries bilaterally. 4.  Symmetric moderately severe airspace disease in the upper lobes bilaterally  which may represent edema or sequela of aspiration. CT Head WO Contrast 6/4/2020 2012    IMPRESSION: Extensive subarachnoid hemorrhage concerning for leaking/ruptured  aneurysm.        24 hour results:    Recent Results (from the past 24 hour(s))   PROCALCITONIN    Collection Time: 06/26/20  1:36 AM   Result Value Ref Range    Procalcitonin <7.26 ng/mL   METABOLIC PANEL, COMPREHENSIVE    Collection Time: 06/26/20  1:36 AM   Result Value Ref Range    Sodium 139 136 - 145 mmol/L    Potassium 4.1 3.5 - 5.1 mmol/L    Chloride 108 97 - 108 mmol/L    CO2 26 21 - 32 mmol/L    Anion gap 5 5 - 15 mmol/L    Glucose 159 (H) 65 - 100 mg/dL    BUN 15 6 - 20 MG/DL    Creatinine 0.60 0.55 - 1.02 MG/DL    BUN/Creatinine ratio 25 (H) 12 - 20      GFR est AA >60 >60 ml/min/1.73m2    GFR est non-AA >60 >60 ml/min/1.73m2    Calcium 9.2 8.5 - 10.1 MG/DL    Bilirubin, total 0.3 0.2 - 1.0 MG/DL    ALT (SGPT) 31 12 - 78 U/L    AST (SGOT) 25 15 - 37 U/L    Alk.  phosphatase 81 45 - 117 U/L    Protein, total 6.9 6.4 - 8.2 g/dL    Albumin 2.8 (L) 3.5 - 5.0 g/dL    Globulin 4.1 (H) 2.0 - 4.0 g/dL    A-G Ratio 0.7 (L) 1.1 - 2.2     CBC WITH AUTOMATED DIFF    Collection Time: 06/26/20  1:36 AM   Result Value Ref Range    WBC 7.2 3.6 - 11.0 K/uL    RBC 4.21 3.80 - 5.20 M/uL    HGB 9.8 (L) 11.5 - 16.0 g/dL    HCT 34.1 (L) 35.0 - 47.0 %    MCV 81.0 80.0 - 99.0 FL    MCH 23.3 (L) 26.0 - 34.0 PG    MCHC 28.7 (L) 30.0 - 36.5 g/dL    RDW 25.2 (H) 11.5 - 14.5 %    PLATELET 496 (H) 009 - 400 K/uL    MPV 9.6 8.9 - 12.9 FL    NRBC 0.0 0  WBC    ABSOLUTE NRBC 0.00 0.00 - 0.01 K/uL    NEUTROPHILS 70 32 - 75 %    LYMPHOCYTES 16 12 - 49 %    MONOCYTES 8 5 - 13 %    EOSINOPHILS 4 0 - 7 %    BASOPHILS 1 0 - 1 %    IMMATURE GRANULOCYTES 1 (H) 0.0 - 0.5 %    ABS. NEUTROPHILS 4.9 1.8 - 8.0 K/UL    ABS. LYMPHOCYTES 1.2 0.8 - 3.5 K/UL    ABS. MONOCYTES 0.6 0.0 - 1.0 K/UL    ABS. EOSINOPHILS 0.3 0.0 - 0.4 K/UL    ABS. BASOPHILS 0.1 0.0 - 0.1 K/UL    ABS. IMM.  GRANS. 0.1 (H) 0.00 - 0.04 K/UL    DF SMEAR SCANNED      RBC COMMENTS ANISOCYTOSIS  3+        RBC COMMENTS HYPOCHROMIA  1+        RBC COMMENTS MICROCYTOSIS  1+        RBC COMMENTS SCHISTOCYTES  1+            Lana Cody NP

## 2020-06-26 NOTE — PROGRESS NOTES
Music Therapy Assessment  [address] [place] [address] “” Page [id number]     1970  48 y.o.  female    Patient Telephone Number: [phone] (home)   Latter day Affiliation: Ripl   Language: English   Patient Active Problem List    Diagnosis Date Noted    Respiratory failure ([facility] 06/15/2020    Hypoxic ischemic encephalopathy (HIE), unspecified severity 06/15/2020    SAH (subarachnoid hemorrhage) ([facility] 06/04/2020    Subarachnoid hemorrhage from middle cerebral artery aneurysm, left ([facility] 06/04/2020    RIGHT middle cerebral artery aneurysm 06/04/2020    Cerebral vasospasm 06/04/2020        Date: 6/26/2020            Total Time (in minutes): 15          [facility] 6S NEURO-SCI TELE    Mental Status:   [  ] Alert [  ] Isa Certain [  ]  Confused  [  ] Minimally responsive  [x] Sleeping    Communication Status: [  ] Impaired Speech [  ] Nonverbal -N/A    Physical Status:   [x] Oxygen in [place].Ling  [  ] Hard of Hearing [  ] Vision Impaired  [  ] Ambulatory  [  ] Ambulatory with assistance [  ] Non-ambulatory     Music Preferences, Background: Traditional [place], R&B.     Clinical Problem to be addressed: Spiritual support, support pt/family coping. Clinical Problem addressed: Spiritual support, support family coping. Goal(s) met in session:  Physical/Pain management (Scale of 1-10):    Pre-session rating: Pt couldn't report on pain; she had been given medication for pain; no pain indicators were observed during this session. Post-session rating: No change.   [  ] Increased relaxation   [  ] Affected breathing patterns  [  ] Decreased muscle tension   [  ] Decreased agitation  [  ] Affected heart rate    [  ] Increased alertness     Emotional/Psychological:  [  ] Increased self-expression   [  ] Decreased aggressive behavior   [  ] Decreased feelings of stress  [  ] Discussed healthy coping skills     [  ] Improved mood    [  ] Decreased withdrawn behavior     Social:  [  ] Decreased feelings of isolation/loneliness [  ] Positive social interaction   [x] Provided support and/or comfort for family/friends    Spiritual:  [x] Spiritual support    [  ] Expressed peace  [  ] Expressed vane    [  ] Discussed beliefs    Techniques Utilized (Check all that apply):   [  ] Procedural support MT [  ] Music for relaxation [x] Patient preferred music  [  ] Rylee analysis  [  ] Song choice  [  ] Music for validation  [  ] Entrainment  [  ] Movement to music [  ] Guided visualization  [  ] Jennie Sensing  [  ] Patient instrument playing [  ] Susan Colon writing  [  ] Femi Hartmann along   [  ] Blu Powell  [  ] Sensory stimulation  [  ] Active Listening  [x] Music for spiritual support [  ] Making of CDs as gifts    Session Observations:  F/up visit; Patient (pt) was lying in bed with her eyes closed. Her mother Ms. Shannan Fernandez was at bedside. She remembered meeting this music therapist (MT) previously. She shared that the pt was given medication that makes her drowsy and she hadn't been able to awaken pt since she arrived to visit her about ten minutes ago. MT expressed understanding and asked if she would like music for the pt, in case she could still hear it, even if she couldn't communicate that she could. Pt's mother was open to this, especially after MT suggested a [employer]. MT sang and played [place] Believe with guitar. Pt's mother subtly moved to the beat of the music, and she gently touched pt's face and arm. Her affect brightened and she expressed enjoyment in the song. Pt remained asleep. Pt's mother said she hadn't ever heard of music therapy before meeting this MT and she was thankful the hospital has this service. Will follow as able.     ALBERTO HebertBC (Music Therapist-Board Certified)  Spiritual Care Department  Referral-based service

## 2020-06-26 NOTE — PROGRESS NOTES
06/26/20 1639   Airway - Tracheostomy Tube 06/26/20 Shiley; Reusable Inner cannula; Cuffless   Placement Date/Time: 06/26/20 1638   Number of Attempts: 1  Inserted By: Aisha Mendez RRT  Placement Verified: Auscultation;BBS  Airway Types: Tracheostomy  Trach Tube Type: Shiley; Reusable Inner cannula; Cuffless  Airway Tube Size: 6 mm   Site Assessment Clean, dry, & intact   Trach Dressing Changed Yes   Trach Tie Changed Yes   Trach Cannula Changed Yes   Inner Cannula #6 Shiley   Side Secured Device   Spare Trach at Bedside Yes   Spare Trach Tube One Size Smaller at Bedside Yes   Ambu Bag With Mask at Bedside Yes       Trach changed to #6 Shiley cuffless with RN at bedside. No complications noted.

## 2020-06-26 NOTE — PROGRESS NOTES
[facility] Adult  Hospitalist Group                                                                                          Hospitalist Progress Note  Maribel Berumen MD  Answering service: [phone] from in house phone        Date of Service:  2020  NAME:  Xavi Valdez  :  1970  MRN:  [id number]    Admission Summary:     Xavi Valdez is a 59-year-old female with a past medical history of HTN. According to patient's mother, the patient began having a headache on 20 and had been taking BC Powder with aspirin in it for the headache. She recently stopped taking her BP medications. According to her mother she does not smoke, drink alcohol or use recreational drugs. The patient was brought to the [facility] ER via EMS on   after her coworkers found her unresponsive outside of her place of work. 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 On arrival to the ER she was found to be seizing. A stat CT of her head was performed which showed extensive subarachnoid hemorrhage. She was extremely hypertensive in the 240's and was given labetalol and started on a Cardene drip. She was emergently intubated and given Keppra and Ativan as well. Neurosurgery and Neurointerventional Surgery were consulted and the patient was transferred to the [facility] for a higher level of care. 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 Extensive lung infiltrates were also noted. Patient was taken for cerebral angiogram the morning of 20 and coil embolization of the left MCA aneurysm was completed. The right MCA aneurysm had a very wide neck incorporating both M2s. It could not be treated endovascularly without stents.  May need to be clipped by Neurosurgery.      Procedures: 20 and coil embolization of the left MCA aneurysm was completed s/p cerebral angiogram for right PANCHITO A1 angioplasty for vasospasm, transarterial infusion of verapamil on 6/9,  S/p angiogram on 6/10 for left ICA/MCA angioplasty and transarterial infusion of verapamil for vasospasm treatment, s/p angiogram on 6/11 and 6/13 for transarterial infusion of verapamil for vasospasm treatment. Tracheostomy placement  6/16 by Dr. Krista King in thoracic surgery. PEG placement. Interval history / Subjective:   Patient seen and examined. No changes, eyes open, doesn't track, doesn't follow commands. Hemodynamically stable. RT working on changing Trach to cofless, and weaning down on O2. Family had requested transfer [facility] opinion. I spoke to Dr Dharmesh Stevens who will discuss with family options.      Assessment & Plan:     SAH due to ruptured cerebral aneurysm   -s/p cerebral angiogram with coil embolization of left MCA bifurcation aneurysm on 6/5, patient has 5X3 right MCA bifurcation aneurysm with very wide neck which will need to be treated with stents vs. Clipping  -on Nimotop, normal saline   -on cardene gtt is off, on norvasc, hydralazine, goal -160  -seizure prophylaxis keppra  -patient open her right eye to voice, non verbal, doesn't follows command, moves right extremities spontaneously   -continue neuro check, serial BP monitoring   -improved vasospasm since 6/20  -seen by neurosurgeon   -Neurointerventional surgical service on board  - high risk for decompensation, inpatient mortality, consult to palliative care team      Seizure secondary to above   -stable, no seizure overnight   -continue keppra,, VIMPAT  -seizure precaution   -Neurologist on board     Ischemic stroke -continue aspirin   -patient with multiple ischemic infarct -MRI on 6/16-continue PT/OT-neurology on board     Respiratory failure secondary to above, pulmonary edema and possible pneumonia  -s/p trach/trach collar   -continue duo neb  -sputum cx grow klebsiella pneumonia  -chest x ray on 6/19 vague left lower lobe airspace process, suggesting subsegmental  atelectasis or pneumonia. Interval exchange of endotracheal tube for tracheostomy  -afebrile and no leukocytosis, blood cx no growth  -antibiotics stopped by intensivist  -afebrile, SpO2 100% on 9 l/m  -monitor pulse ox     Dysphagia secondary to stroke and SAH-s/p PEG tube, continue tube feeding   Iron deficiency anemia -noted fibroid  -Hgb trending down, Hgb 7.8, monitor H/H    -iron  Level was 10, iron saturation 3, iron sucrose 200 mg iv x 3 doses   -check stool for hem ocult      Peripheral edema multifactorial  -low albumin 2.6  -Echo normal cavity size and systolic function (ejection fraction normal). Mild concentric hypertrophy. Estimated left ventricular ejection fraction is 60 - 65%. No regional wall motion abnormality noted. -If it gets worse , may hold IVF     Obesity -diet control -A1c 5.7    Code status: Full Code   DVT prophylaxis: Lovenox  Care Plan discussed with: Patient/Family and Nurse  Anticipated Disposition: Rehab more than 48 hours     Hospital Problems  Date Reviewed: 6/10/2020          Codes Class Noted POA    Respiratory failure ([facility] ICD-10-CM: J96.90  ICD-9-CM: 518.81  6/15/2020 Yes        Hypoxic ischemic encephalopathy (HIE), unspecified severity ICD-10-CM: P91.60  ICD-9-CM: 768.70  6/15/2020 Yes        SAH (subarachnoid hemorrhage) ([facility] ICD-10-CM: I60.9  ICD-9-CM: 442  6/4/2020 Yes        Subarachnoid hemorrhage from middle cerebral artery aneurysm, left (HCC) ICD-10-CM: G83.18  ICD-9-CM: 430  6/4/2020 Yes        RIGHT middle cerebral artery aneurysm ICD-10-CM: I67.1  ICD-9-CM: 437.3  6/4/2020 Yes        Cerebral vasospasm ICD-10-CM: P12.985  ICD-9-CM: 435.9  6/4/2020 Yes              Vital Signs:    Last 24hrs VS reviewed since prior progress note.  Most recent are:  Visit Vitals  /82 (BP 1 Location: Left arm, BP Patient Position: At rest)   Pulse (!) 56   Temp 97.9 °F (36.6 °C)   Resp 19   Ht 5' 4\" (1.626 m)   Wt 98.8 kg (217 lb 14.4 oz)   SpO2 100%   BMI 37.40 kg/m²         Intake/Output Summary (Last 24 hours) at 6/26/2020 1217  Last data filed at 6/26/2020 1200  Gross per 24 hour   Intake 550 ml   Output 500 ml   Net 50 ml        Physical Examination:             Constitutional:  No acute distress, doesn't follow commands. ENT:  Trach- O2 on Trach   Resp:  good AE, no wheezes. Few creps at bases        GI:  Soft, non distended, non tender. Musculoskeletal:  No edema    Neurologic:  Open her right eye on voice, non verbal, doesn't follow command, left hemiplegia, moves right extremities spontaneously      Skin:  Good turgor, no rashes or ulcers       Data Review:    Review and/or order of clinical lab test  Review and/or order of tests in the radiology section of CPT  Review and/or order of tests in the medicine section of CPT      Labs:     Recent Labs     06/26/20 0136 06/25/20  0523   WBC 7.2 8.7   HGB 9.8* 9.0*   HCT 34.1* 31.4*   * 370     Recent Labs     06/26/20 0136 06/25/20  0523 06/24/20  0330    138 140   K 4.1 4.2 4.2    107 110*   CO2 26 23 21   BUN 15 15 18   CREA 0.60 0.48* 0.48*   * 94 92   CA 9.2 8.8 8.7     Recent Labs     06/26/20 0136 06/25/20  0523 06/24/20  0330   ALT 31 29 24   AP 81 73 60   TBILI 0.3 0.3 0.2   TP 6.9 6.4 5.2*   ALB 2.8* 2.5* 2.0*   GLOB 4.1* 3.9 3.2     No results for input(s): INR, PTP, APTT, INREXT, INREXT in the last 72 hours. No results for input(s): FE, TIBC, PSAT, FERR in the last 72 hours. No results found for: FOL, RBCF   No results for input(s): PH, PCO2, PO2 in the last 72 hours. No results for input(s): CPK, CKNDX, TROIQ in the last 72 hours.     No lab exists for component: CPB  Lab Results   Component Value Date/Time    Cholesterol, total 90 06/05/2020 03:34 AM    HDL Cholesterol 64 06/05/2020 03:34 AM    LDL, calculated 7.8 06/05/2020 03:34 AM    Triglyceride 91 06/05/2020 03:34 AM    CHOL/HDL Ratio 1.4 06/05/2020 03:34 AM     Lab Results   Component Value Date/Time    Glucose (POC) 121 (H) 06/14/2020 11:22 PM    Glucose (POC) 118 (H) 06/14/2020 05:07 PM    Glucose (POC) 114 (H) 06/14/2020 11:27 AM    Glucose (POC) 109 (H) 06/14/2020 05:02 AM    Glucose (POC) 126 (H) 06/13/2020 11:09 PM     Lab Results   Component Value Date/Time    Specific gravity 1.015 06/15/2020 05:45 AM         Medications Reviewed:     Current Facility-Administered Medications   Medication Dose Route Frequency    lisinopriL (PRINIVIL, ZESTRIL) tablet 20 mg  20 mg Oral DAILY    guar gum (BENEFIBER) packet 1 Packet  4 g Oral TID    labetaloL (NORMODYNE;TRANDATE) injection 20 mg  20 mg IntraVENous Q4H PRN    0.9% sodium chloride infusion 250 mL  250 mL IntraVENous PRN    albuterol-ipratropium (DUO-NEB) 2.5 MG-0.5 MG/3 ML  3 mL Nebulization BID RT    hydrALAZINE (APRESOLINE) tablet 100 mg  100 mg Oral TID    fentaNYL citrate (PF) injection 50 mcg  50 mcg IntraVENous Q2H PRN    hydrALAZINE (APRESOLINE) 20 mg/mL injection 10 mg  10 mg IntraVENous Q2H PRN    chlorhexidine (ORAL CARE KIT) 0.12 % mouthwash 15 mL  15 mL Oral Q12H    niCARdipine (CARDENE) 50 mg in 0.9% sodium chloride 100 mL infusion  0-15 mg/hr IntraVENous TITRATE    carvediloL (COREG) tablet 25 mg  25 mg Oral BID WITH MEALS    amLODIPine (NORVASC) tablet 10 mg  10 mg Per NG tube DAILY    potassium bicarb-citric acid (EFFER-K) tablet 40 mEq  40 mEq Oral BID    lacosamide (VIMPAT) tablet 150 mg  150 mg Per NG tube Q12H    balsam peru-castor oiL (VENELEX) ointment   Topical BID    aspirin chewable tablet 81 mg  81 mg Per NG tube DAILY    enoxaparin (LOVENOX) injection 40 mg  40 mg SubCUTAneous Q24H    levETIRAcetam (KEPPRA) oral solution 1,000 mg  1,000 mg Per NG tube Q12H    glycopyrrolate (ROBINUL) injection 0.2 mg  0.2 mg IntraMUSCular TID PRN    acetaminophen (TYLENOL) solution 650 mg  650 mg Per NG tube Q4H PRN    glucose chewable tablet 16 g  4 Tab Oral PRN    glucagon (GLUCAGEN) injection 1 mg  1 mg IntraMUSCular PRN    dextrose 10% infusion 0-250 mL  0-250 mL IntraVENous PRN    albuterol (PROVENTIL VENTOLIN) nebulizer solution 2.5 mg  2.5 mg Nebulization Q6H PRN    polyvinyl alcohol-povidone (NATURAL TEARS) 0.5-0.6 % ophthalmic solution 2 Drop  2 Drop Both Eyes Q8H    bisacodyL (DULCOLAX) suppository 10 mg  10 mg Rectal DAILY PRN    ondansetron (ZOFRAN) injection 4 mg  4 mg IntraVENous Q4H PRN    docusate (COLACE) 50 mg/5 mL oral liquid 100 mg  100 mg Oral BID PRN     ______________________________________________________________________  EXPECTED LENGTH OF STAY: 22d 21h  ACTUAL LENGTH OF STAY:          Celina Be MD

## 2020-06-26 NOTE — PROGRESS NOTES
Problem: Voice Impaired (Adult)  Goal: *Acute Goals and Plan of Care (Insert Text)  Description: Speech Therapy Goals  Initiated 6/17/2020    1. Patient will tolerate trials of digital occlusion to work towards [address] [employer] placement within 7 days. Outcome: Not Progressing Towards Goal     Problem: Communication Impaired (Adult)  Goal: *Acute Goals and Plan of Care (Insert Text)  Description: Speech therapy goals  Initiated 6/26/2020   1. Patient will follow 1-step commands with 10% accuracy with max cues within 7 days   2. Patient will respond to yes/no questions with 10% accuracy with max cues within 7 days    Outcome: Progressing Towards Goal     SPEECH LANGUAGE PATHOLOGY EVALUATION  Patient: Mohsen Vaughn (48 y.o. female)  Date: 6/26/2020  Primary Diagnosis: SAH (subarachnoid hemorrhage) ([facility] [I60.9]        Precautions:   Aspiration    ASSESSMENT :  Based on the objective data described below, the patient presents with global aphasia with no responses to structured tasks and no attempts at communication. She did nod her head one time during session, however, it was not in response to a y/n question. Completed finger occlusion trials to determine appropriateness for PMV placement (still has a #6 cuffed trach, cuff deflated). Continues with significant back pressure with patient immediately showing panic on her face each time finger occlusion was completed. Dr. Batool June then arrived bedside and discussed trach downsize to a #6 cuffless in order to hopefully be able to tolerate PMV placement which would in turn improve secretion management, ability to achieve voicing, restore normal pressure systems, prevent further atrophy of muscles of oropharynx related to lack of airflow, and eventually hopefully allow safer swallowing once oral dysphagia and aphasia/bolus recognition begin to improve. Dr. Batool June requested SLP place order and in agreement with downsize.      PO trials not offered due to aspiration risks as well as results of previous dysphagia assessments. Patient will benefit from skilled intervention to address the above impairments. Patients rehabilitation potential is considered to be Guarded     PLAN :  Recommendations and Planned Interventions: Will follow for a trial of aphasia treatment as well as continued assessment of safety for PMV placement. Requested respiratory downsize trach to #6 cuffless. Frequency/Duration: Patient will be followed by speech-language pathology 3 times a week to address goals. Discharge Recommendations: [facility] vs retreat complex care unit      SUBJECTIVE:   Patient alert, cooperative     OBJECTIVE:     Past Medical History:   Diagnosis Date    HTN (hypertension)      Past Surgical History:   Procedure Laterality Date    IR INSERT GASTROSTOMY TUBE PERC  6/19/2020     Prior Level of Function/Home Situation:   Home Situation  Home Environment: Private residence  One/Two Story Residence: One story  Living Alone: No  Support Systems: Child(yuli), Family member(s)  Patient Expects to be Discharged to[de-identified] Unknown  Current DME Used/Available at Home: None  Mental Status:  Neurologic State: Alert  Orientation Level: Unable to verbalize  Cognition: No command following        Safety/Judgement: Not assessed  Motor Speech:     Language Comprehension and Expression:  Auditory Comprehension  Auditory Impairment: Yes  Hearing Aid: None  Response to Basic Yes/No Questions (%): 0 %  One-Step Basic Commands (%): 0 %           Neuro-Linguistics:                                                  Pragmatics:        Voice:            Finger occlusion poorly tolerated with significant back pressure and immediate look of panic as soon as trach occluded indicating no airflow through upper pharynx. NOMS:   The NOMS functional outcome measure was used to quantify this patient's level of expressive language impairment.   Based on the NOMS, the patient was determined to be at level 1 for spoken language expression. NOMS Spoken Language Expression:  Level 1 (CN): Verbalizations not meaningful to anyone. Level 2 (CM): Few attempts accurate/appropriate. Max cues to elicit automatic/imitative words/phrases. Level 3 (CL): Communication partner responsible for communication; Mod cues for words/phrases meaningful in context  Level 4 (CK): Initiate during simple, routine activities w/familiar partner. Mod cues to produce simple sentences  Level 5 (Tor Dec): Initiates communication with familiar and unfamiliar partners. Min cues for complex sentences. Level 6 (CI): Communicates for most activities. Some limitations still present for vocational/social activities. Rarely cued for complex info  Level 7 [facility]): Independent communication. KEVIN. (2003). National Outcomes Measurement System (NOMS): Adult Speech-Language Pathology User's Guide. Pain:  Pain Scale 1: Adult Nonverbal Pain Scale  Pain Intensity 1: 0       After treatment:   Patient left in no apparent distress in bed, Call bell within reach, and Nursing notified    COMMUNICATION/EDUCATION:   Patient was educated regarding her deficit(s) of aphasia as this relates to her diagnosis of SAH. She demonstrated Poor understanding as evidenced by no response to education. The patient's plan of care including recommendations, planned interventions, and recommended diet changes were discussed with: Registered nurse and Physician. Patient is unable to participate in goal setting and plan of care. Thank you for this referral.  Deedee Tapia M.CD.  CCC-SLP   Time Calculation: 20 mins

## 2020-06-26 NOTE — PROGRESS NOTES
Palliative Medicine Social Work      Dr. Myrtle Rider and I met with patient mother Stephane Mauro in room. Discussed her request for second opinion. She strongly asserted her gratitude for patient care at [facility] \"you all kept her alive\" and she also feels that to best support her daughter she needs a second opinion. This is something she has requested for herself in her own medical care and considers it an essential next step. Dr. Myrtle Rider to reach out to hospitalist.    CM updated. Thank you for the opportunity to be involved in the care of Ms. Alvarado and her family.     Josefa Ann LMSW, Supervisee in Social Work  Palliative Medicine   [phone]

## 2020-06-26 NOTE — PROGRESS NOTES
Problem: Hemorrhagic Stroke: Discharge Outcomes  Goal: *Verbalizes anxiety and depression are reduced or absent  Outcome: Progressing Towards Goal  Goal: *Verbalize understanding of risk factor modification(Stroke Metric)  Outcome: Progressing Towards Goal  Goal: *Optimal pain control at patient's stated goal  Outcome: Progressing Towards Goal  Goal: *Hemodynamically stable  Outcome: Progressing Towards Goal  Goal: *Absence of aspiration pneumonia  Outcome: Progressing Towards Goal  Goal: *Aware of needed dietary changes  Outcome: Progressing Towards Goal  Goal: *Verbalizes understanding and describes medication purposes and frequencies  Outcome: Progressing Towards Goal  Goal: *Tolerating diet  Outcome: Progressing Towards Goal  Goal: *Absence of signs and symptoms of DVT  Outcome: Progressing Towards Goal  Goal: *Absence of aspiration  Outcome: Progressing Towards Goal     Problem: Patient Education: Go to Patient Education Activity  Goal: Patient/Family Education  Outcome: Progressing Towards Goal     Problem: Patient Education: Go to Patient Education Activity  Goal: Patient/Family Education  Outcome: Progressing Towards Goal     Problem: Patient Education:  Go to Education Activity  Goal: Patient/Family Education  Outcome: Progressing Towards Goal     Problem: Subarachnoid Hemorrhage Stroke:Admission Day 5-Discharge  Goal: Activity/Safety  Outcome: Progressing Towards Goal  Goal: Diagnostic Test/Procedures  Outcome: Progressing Towards Goal  Goal: Nutrition/Diet  Outcome: Progressing Towards Goal  Goal: Medications  Outcome: Progressing Towards Goal  Goal: Patient maintains clear airway/absence of aspiration  Outcome: Progressing Towards Goal  Goal: Treatments/Interventions/Procedures  Outcome: Progressing Towards Goal  Goal: Psychosocial  Outcome: Progressing Towards Goal  Goal: *Hemodynamically stable  Outcome: Progressing Towards Goal  Goal: *Absence of signs and symptoms of DVT  Outcome: Progressing Towards Goal

## 2020-06-26 NOTE — PROGRESS NOTES
Transition of Care:  CRM was asked to visit patient's mom who came from work and was at the bedside. Mom is adamant that she wishes to have the opportunity to have patient transferred to [address] [place] [address]. She made it clear intially she did not want to consider her daughter going directly to the acute REHAB unit. She does not wish to pursue [facility] at this time per our conversation. Mom will return around 1pm on Saturday June 27th 2020 and I had encouraged her to go home early tonite as she had worked all week here in [employer] at a [employer] [address] She is off the weekend and did discuss the possibility of FMLA with her manager and will follow up on Monday. Presently she has some vacation time and is trying to be supportive to her grandson(patient's son who is 15). I will follow up in pm SAT June 27TH  with visiting mom at the bedside.

## 2020-06-26 NOTE — PROGRESS NOTES
Problem: Mobility Impaired (Adult and Pediatric)  Goal: *Acute Goals and Plan of Care (Insert Text)  Description:   FUNCTIONAL STATUS PRIOR TO ADMISSION: Patient was independent and active without use of DME per chart review. HOME SUPPORT PRIOR TO ADMISSION: Details unknown - pt is not able to provide info at this time. Physical Therapy Goals  Initiated 6/22/2020  1. Patient will move from supine to sit and sit to supine , scoot up and down and roll side to side in bed with maximal assistance within 7 day(s). 2.  Patient will tolerate bed in full chair position x30min for improved tolerance to upright and pulmonary toileting within 7 days. 3.  Patient will participate in B LE AAROM/PROM program within 7 days. Outcome: Progressing Towards Goal     PHYSICAL THERAPY TREATMENT  Patient: Aldair Benítez (48 y.o. female)  Date: 6/26/2020  Diagnosis: SAH (subarachnoid hemorrhage) ([facility]) [I60.9]   <principal problem not specified>       Precautions: Aspiration  Chart, physical therapy assessment, plan of care and goals were reviewed. ASSESSMENT  Patient continues with skilled PT services and is not consistently progressing towards goals and remains significantly limited by decreased cardiopulmonary tolerance to activity, global aphasia with zero command following or purposeful attempts to communicate, global weakness, global edema, and impaired postural control leading to increased dependency and falls risk from baseline level. Received pt supine in bed on trach collar - noted to be nearly sideways in bed. Repositioned with total A for optimal positioning - bed transitioned to modified chair in attempts to work on pull to sits and postural corrections, however unable to initiate despite max A x2. Unable to elicit any command following despite multimodal attempts. Pt remained in modified chair position at end of session, feet supported, NAD.  Did have audible secretions that she was unable to clear - may need deep suction, RN notified. Will obtain PRAFO to reduce risk of contracture formation; recommend alternating L<>R every 2hrs. She remains far below her baseline level and a very high falls risk - unfortunately participation and overall progress are limited at this point by limited to no command following. Will follow     Current Level of Function Impacting Discharge (mobility/balance): total A; no command following; trach     Other factors to consider for discharge: indep at baseline; high falls risk         PLAN :  Patient continues to benefit from skilled intervention to address the above impairments. Continue treatment per established plan of care. to address goals. Recommendation for discharge: (in order for the patient to meet his/her long term goals)  To be determined: Soila Lynn with hopeful bridge to IPR vs IPR pending progress and ability to participate      This discharge recommendation:  A follow-up discussion with the attending provider and/or case management is planned    IF patient discharges home will need the following DME: to be determined (TBD)       SUBJECTIVE:   No verbalizations    OBJECTIVE DATA SUMMARY:   Critical Behavior:  Neurologic State: Alert  Orientation Level: Unable to verbalize  Cognition: No command following  Safety/Judgement: Not assessed  Functional Mobility Training:  Bed Mobility:  Rolling: Bed Modified     Balance:  Sitting: Impaired  Sitting - Static: Poor (constant support)      Activity Tolerance:   Fair, requires frequent rest breaks, and observed SOB with activity  Please refer to the flowsheet for vital signs taken during this treatment. After treatment patient left in no apparent distress:   Supine in bed, Call bell within reach, Side rails x 3, and Restraints    COMMUNICATION/COLLABORATION:   The patients plan of care was discussed with: Occupational therapist and Registered nurse.      Maria T Lane, PT, DPT   Time Calculation: 15 mins

## 2020-06-26 NOTE — PROGRESS NOTES
ADULT PROTOCOL: JET AEROSOL ASSESSMENT    Patient  Hassell Hatchet     48 y.o.   female     6/26/2020  7:07 PM    Breath Sounds Pre Procedure: Right Breath Sounds: Diminished                               Left Breath Sounds: Rhonchi    Breath Sounds Post Procedure: Right Breath Sounds: Coarse                                 Left Breath Sounds: Coarse, Diminished    Breathing pattern: Pre procedure Breathing Pattern: Regular          Post procedure Breathing Pattern: Regular    Heart Rate: Pre procedure Pulse: 52           Post procedure Pulse: 53    Resp Rate: Pre procedure Respirations: 18           Post procedure Respirations: 16            Cough: Pre procedure Cough: Non-productive               Post procedure Cough: Non-productive    Suctioned: NO      Oxygen: O2 Device: Tracheal collar   Flow rate (L/min) 6/28%     Changed: NO    SpO2: Pre procedure SpO2: 99 %   with oxygen              Post procedure SpO2: 100 %  with oxygen    Nebulizer Therapy: Current medications Aerosolized Medications: DuoNeb      Changed: YES    Chnaged to BID as needed for wheezing    Problem List:   Patient Active Problem List   Diagnosis Code    SAH (subarachnoid hemorrhage) ([facility]) I60.9    Subarachnoid hemorrhage from middle cerebral artery aneurysm, left ([facility]) I60.12    RIGHT middle cerebral artery aneurysm I67.1    Cerebral vasospasm I67.848    Respiratory failure ([facility] [address] J96.90    Hypoxic ischemic encephalopathy (HIE), unspecified severity P91.60       Respiratory Therapist: Darryle Morin

## 2020-06-27 LAB
ALBUMIN SERPL-MCNC: 2.9 G/DL (ref 3.5–5)
ALBUMIN/GLOB SERPL: 0.7 {RATIO} (ref 1.1–2.2)
ALP SERPL-CCNC: 80 U/L (ref 45–117)
ALT SERPL-CCNC: 24 U/L (ref 12–78)
ANION GAP SERPL CALC-SCNC: 5 MMOL/L (ref 5–15)
AST SERPL-CCNC: 18 U/L (ref 15–37)
BASOPHILS # BLD: 0 K/UL (ref 0–0.1)
BASOPHILS NFR BLD: 0 % (ref 0–1)
BILIRUB SERPL-MCNC: 0.3 MG/DL (ref 0.2–1)
BUN SERPL-MCNC: 19 MG/DL (ref 6–20)
BUN/CREAT SERPL: 30 (ref 12–20)
CALCIUM SERPL-MCNC: 9.6 MG/DL (ref 8.5–10.1)
CHLORIDE SERPL-SCNC: 107 MMOL/L (ref 97–108)
CO2 SERPL-SCNC: 26 MMOL/L (ref 21–32)
CREAT SERPL-MCNC: 0.64 MG/DL (ref 0.55–1.02)
DIFFERENTIAL METHOD BLD: ABNORMAL
EOSINOPHIL # BLD: 0.2 K/UL (ref 0–0.4)
EOSINOPHIL NFR BLD: 3 % (ref 0–7)
ERYTHROCYTE [DISTWIDTH] IN BLOOD BY AUTOMATED COUNT: 25.8 % (ref 11.5–14.5)
GLOBULIN SER CALC-MCNC: 4 G/DL (ref 2–4)
GLUCOSE SERPL-MCNC: 95 MG/DL (ref 65–100)
HBV SURFACE AG SER QL: <0.1 INDEX
HBV SURFACE AG SER QL: NEGATIVE
HCT VFR BLD AUTO: 35.2 % (ref 35–47)
HCV AB SERPL QL IA: NONREACTIVE
HCV COMMENT,HCGAC: NORMAL
HGB BLD-MCNC: 10.2 G/DL (ref 11.5–16)
HIV1 P24 AG SERPL QL IA: NONREACTIVE
HIV1+2 AB SERPL QL IA: NONREACTIVE
IMM GRANULOCYTES # BLD AUTO: 0.1 K/UL (ref 0–0.04)
IMM GRANULOCYTES NFR BLD AUTO: 1 % (ref 0–0.5)
LYMPHOCYTES # BLD: 1.3 K/UL (ref 0.8–3.5)
LYMPHOCYTES NFR BLD: 19 % (ref 12–49)
MCH RBC QN AUTO: 23.9 PG (ref 26–34)
MCHC RBC AUTO-ENTMCNC: 29 G/DL (ref 30–36.5)
MCV RBC AUTO: 82.6 FL (ref 80–99)
MONOCYTES # BLD: 0.7 K/UL (ref 0–1)
MONOCYTES NFR BLD: 10 % (ref 5–13)
NEUTS SEG # BLD: 4.3 K/UL (ref 1.8–8)
NEUTS SEG NFR BLD: 67 % (ref 32–75)
NRBC # BLD: 0 K/UL (ref 0–0.01)
NRBC BLD-RTO: 0 PER 100 WBC
PLATELET # BLD AUTO: 398 K/UL (ref 150–400)
PMV BLD AUTO: 9.9 FL (ref 8.9–12.9)
POTASSIUM SERPL-SCNC: 4.2 MMOL/L (ref 3.5–5.1)
PROCALCITONIN SERPL-MCNC: <0.05 NG/ML
PROT SERPL-MCNC: 6.9 G/DL (ref 6.4–8.2)
RBC # BLD AUTO: 4.26 M/UL (ref 3.8–5.2)
RBC MORPH BLD: ABNORMAL
SODIUM SERPL-SCNC: 138 MMOL/L (ref 136–145)
WBC # BLD AUTO: 6.6 K/UL (ref 3.6–11)

## 2020-06-27 PROCEDURE — 77030018798 HC PMP KT ENTRL FED COVD -A

## 2020-06-27 PROCEDURE — 80053 COMPREHEN METABOLIC PANEL: CPT

## 2020-06-27 PROCEDURE — 77010033678 HC OXYGEN DAILY

## 2020-06-27 PROCEDURE — 36415 COLL VENOUS BLD VENIPUNCTURE: CPT

## 2020-06-27 PROCEDURE — 77030040361 HC SLV COMPR DVT MDII -B

## 2020-06-27 PROCEDURE — 74011250636 HC RX REV CODE- 250/636: Performed by: NURSE PRACTITIONER

## 2020-06-27 PROCEDURE — 84145 PROCALCITONIN (PCT): CPT

## 2020-06-27 PROCEDURE — 74011250637 HC RX REV CODE- 250/637: Performed by: NURSE PRACTITIONER

## 2020-06-27 PROCEDURE — 77030038269 HC DRN EXT URIN PURWCK BARD -A

## 2020-06-27 PROCEDURE — 74011250637 HC RX REV CODE- 250/637: Performed by: ANESTHESIOLOGY

## 2020-06-27 PROCEDURE — 74011000250 HC RX REV CODE- 250: Performed by: NURSE PRACTITIONER

## 2020-06-27 PROCEDURE — 74011250637 HC RX REV CODE- 250/637: Performed by: PSYCHIATRY & NEUROLOGY

## 2020-06-27 PROCEDURE — 74011250636 HC RX REV CODE- 250/636: Performed by: ANESTHESIOLOGY

## 2020-06-27 PROCEDURE — 94762 N-INVAS EAR/PLS OXIMTRY CONT: CPT

## 2020-06-27 PROCEDURE — 74011250637 HC RX REV CODE- 250/637: Performed by: INTERNAL MEDICINE

## 2020-06-27 PROCEDURE — 65660000000 HC RM CCU STEPDOWN

## 2020-06-27 PROCEDURE — 85025 COMPLETE CBC W/AUTO DIFF WBC: CPT

## 2020-06-27 RX ADMIN — POTASSIUM BICARBONATE 40 MEQ: 782 TABLET, EFFERVESCENT ORAL at 08:37

## 2020-06-27 RX ADMIN — ACETAMINOPHEN 650 MG: 650 SUSPENSION ORAL at 21:48

## 2020-06-27 RX ADMIN — CASTOR OIL AND BALSAM, PERU: 788; 87 OINTMENT TOPICAL at 08:38

## 2020-06-27 RX ADMIN — POTASSIUM BICARBONATE 40 MEQ: 782 TABLET, EFFERVESCENT ORAL at 17:05

## 2020-06-27 RX ADMIN — FENTANYL CITRATE 50 MCG: 50 INJECTION, SOLUTION INTRAMUSCULAR; INTRAVENOUS at 13:36

## 2020-06-27 RX ADMIN — CASTOR OIL AND BALSAM, PERU: 788; 87 OINTMENT TOPICAL at 17:05

## 2020-06-27 RX ADMIN — Medication 1 PACKET: at 21:48

## 2020-06-27 RX ADMIN — HYDRALAZINE HYDROCHLORIDE 100 MG: 50 TABLET, FILM COATED ORAL at 08:37

## 2020-06-27 RX ADMIN — GLYCOPYRROLATE 0.2 MG: 0.2 INJECTION, SOLUTION INTRAMUSCULAR; INTRAVENOUS at 20:39

## 2020-06-27 RX ADMIN — Medication 2 DROP: at 06:00

## 2020-06-27 RX ADMIN — Medication 1 PACKET: at 08:37

## 2020-06-27 RX ADMIN — HYDRALAZINE HYDROCHLORIDE 10 MG: 20 INJECTION INTRAMUSCULAR; INTRAVENOUS at 08:46

## 2020-06-27 RX ADMIN — HYDRALAZINE HYDROCHLORIDE 100 MG: 50 TABLET, FILM COATED ORAL at 16:02

## 2020-06-27 RX ADMIN — HYDRALAZINE HYDROCHLORIDE 100 MG: 50 TABLET, FILM COATED ORAL at 21:47

## 2020-06-27 RX ADMIN — FENTANYL CITRATE 50 MCG: 50 INJECTION, SOLUTION INTRAMUSCULAR; INTRAVENOUS at 06:32

## 2020-06-27 RX ADMIN — CHLORHEXIDINE GLUCONATE 15 ML: 0.12 RINSE ORAL at 08:59

## 2020-06-27 RX ADMIN — LISINOPRIL 20 MG: 20 TABLET ORAL at 08:37

## 2020-06-27 RX ADMIN — ASPIRIN 81 MG CHEWABLE TABLET 81 MG: 81 TABLET CHEWABLE at 08:37

## 2020-06-27 RX ADMIN — LACOSAMIDE 150 MG: 50 TABLET, FILM COATED ORAL at 08:37

## 2020-06-27 RX ADMIN — CARVEDILOL 25 MG: 12.5 TABLET, FILM COATED ORAL at 16:02

## 2020-06-27 RX ADMIN — Medication 1 PACKET: at 16:02

## 2020-06-27 RX ADMIN — CHLORHEXIDINE GLUCONATE 15 ML: 0.12 RINSE ORAL at 21:00

## 2020-06-27 RX ADMIN — HYDRALAZINE HYDROCHLORIDE 10 MG: 20 INJECTION INTRAMUSCULAR; INTRAVENOUS at 16:07

## 2020-06-27 RX ADMIN — CARVEDILOL 25 MG: 12.5 TABLET, FILM COATED ORAL at 08:38

## 2020-06-27 RX ADMIN — LEVETIRACETAM 1000 MG: 100 SOLUTION ORAL at 20:39

## 2020-06-27 RX ADMIN — Medication 2 DROP: at 14:00

## 2020-06-27 RX ADMIN — ENOXAPARIN SODIUM 40 MG: 40 INJECTION SUBCUTANEOUS at 13:34

## 2020-06-27 RX ADMIN — LEVETIRACETAM 1000 MG: 100 SOLUTION ORAL at 08:37

## 2020-06-27 RX ADMIN — Medication 2 DROP: at 22:00

## 2020-06-27 RX ADMIN — AMLODIPINE BESYLATE 10 MG: 5 TABLET ORAL at 08:37

## 2020-06-27 RX ADMIN — LACOSAMIDE 150 MG: 50 TABLET, FILM COATED ORAL at 20:38

## 2020-06-27 NOTE — PROGRESS NOTES
Problem: Falls - Risk of  Goal: *Absence of Falls  Description: Document Tu King Fall Risk and appropriate interventions in the flowsheet. Outcome: Progressing Towards Goal  Note: Fall Risk Interventions:  Mobility Interventions: Communicate number of staff needed for ambulation/transfer, OT consult for ADLs, Patient to call before getting OOB, PT Consult for mobility concerns, PT Consult for assist device competence    Mentation Interventions: Bed/chair exit alarm, Increase mobility, More frequent rounding, Reorient patient, Room close to nurse's station, Door open when patient unattended    Medication Interventions: Evaluate medications/consider consulting pharmacy, Patient to call before getting OOB, Teach patient to arise slowly    Elimination Interventions: Call light in reach, Patient to call for help with toileting needs, Toilet paper/wipes in reach, Toileting schedule/hourly rounds, Stay With Me (per policy)    History of Falls Interventions: Evaluate medications/consider consulting pharmacy, Investigate reason for fall, Room close to nurse's station         Problem: Pressure Injury - Risk of  Goal: *Prevention of pressure injury  Description: Document Sriram Scale and appropriate interventions in the flowsheet. Outcome: Progressing Towards Goal  Note: Pressure Injury Interventions:  Sensory Interventions: Discuss PT/OT consult with provider, Float heels, Keep linens dry and wrinkle-free, Maintain/enhance activity level, Minimize linen layers, Turn and reposition approx.  every two hours (pillows and wedges if needed), Assess changes in LOC, Assess need for specialty bed, Avoid rigorous massage over bony prominences    Moisture Interventions: Check for incontinence Q2 hours and as needed, Internal/External urinary devices, Limit adult briefs, Moisture barrier, Offer toileting Q_hr, Minimize layers, Maintain skin hydration (lotion/cream), Apply protective barrier, creams and emollients, Assess need for specialty bed, Absorbent underpads    Activity Interventions: Increase time out of bed, Pressure redistribution bed/mattress(bed type), PT/OT evaluation, Assess need for specialty bed    Mobility Interventions: Assess need for specialty bed, Float heels, HOB 30 degrees or less, Pressure redistribution bed/mattress (bed type), PT/OT evaluation, Turn and reposition approx.  every two hours(pillow and wedges)    Nutrition Interventions: Document food/fluid/supplement intake, Discuss nutritional consult with provider    Friction and Shear Interventions: Apply protective barrier, creams and emollients, HOB 30 degrees or less, Lift sheet, Lift team/patient mobility team, Minimize layers                Problem: Nutrition Deficit  Goal: *Optimize nutritional status  Outcome: Progressing Towards Goal     Problem: Hemorrhagic Stroke: Day 5 through Discharge  Goal: Activity/Safety  Outcome: Progressing Towards Goal  Goal: Consults, if ordered  Outcome: Progressing Towards Goal  Goal: Diagnostic Test/Procedures  Outcome: Progressing Towards Goal  Goal: Nutrition/Diet  Outcome: Progressing Towards Goal  Goal: Medications  Outcome: Progressing Towards Goal  Goal: Respiratory  Outcome: Progressing Towards Goal  Goal: Treatments/Interventions/Procedures  Outcome: Progressing Towards Goal  Goal: Psychosocial  Outcome: Progressing Towards Goal  Goal: *Hemodynamically stable  Outcome: Progressing Towards Goal  Goal: *Verbalizes anxiety and depression are reduced or absent  Outcome: Progressing Towards Goal  Goal: *Absence of aspiration  Outcome: Progressing Towards Goal  Goal: *Absence of signs and symptoms of DVT  Outcome: Progressing Towards Goal  Goal: *Optimal pain control at patient's stated goal  Outcome: Progressing Towards Goal  Goal: *Tolerating diet  Outcome: Progressing Towards Goal  Goal: *Progressive mobility and function  Outcome: Progressing Towards Goal  Goal: *Rehabilitation readiness  Outcome: Progressing Towards Goal     Problem: Subarachnoid Hemorrhage Stroke:Admission Day 5-Discharge  Goal: Activity/Safety  Outcome: Progressing Towards Goal  Goal: Diagnostic Test/Procedures  Outcome: Progressing Towards Goal  Goal: Nutrition/Diet  Outcome: Progressing Towards Goal  Goal: Medications  Outcome: Progressing Towards Goal  Goal: Patient maintains clear airway/absence of aspiration  Outcome: Progressing Towards Goal  Goal: Treatments/Interventions/Procedures  Outcome: Progressing Towards Goal  Goal: Psychosocial  Outcome: Progressing Towards Goal  Goal: *Hemodynamically stable  Outcome: Progressing Towards Goal  Goal: *Absence of signs and symptoms of DVT  Outcome: Progressing Towards Goal

## 2020-06-27 NOTE — PROGRESS NOTES
Problem: Falls - Risk of  Goal: *Absence of Falls  Description: Document Gurinder Lang Fall Risk and appropriate interventions in the flowsheet. Outcome: Progressing Towards Goal  Note: Fall Risk Interventions:  Mobility Interventions: Bed/chair exit alarm, Communicate number of staff needed for ambulation/transfer, OT consult for ADLs, Patient to call before getting OOB, PT Consult for mobility concerns, PT Consult for assist device competence, Utilize walker, cane, or other assistive device    Mentation Interventions: Adequate sleep, hydration, pain control, Bed/chair exit alarm, Door open when patient unattended, Evaluate medications/consider consulting pharmacy, Eyeglasses and hearing aids, Familiar objects from home, Increase mobility, More frequent rounding, Reorient patient, Room close to nurse's station, Toileting rounds, Update white board    Medication Interventions: Evaluate medications/consider consulting pharmacy, Patient to call before getting OOB, Teach patient to arise slowly    Elimination Interventions: Call light in reach, Elevated toilet seat, Patient to call for help with toileting needs, Stay With Me (per policy), Toilet paper/wipes in reach, Toileting schedule/hourly rounds    History of Falls Interventions: Consult care management for discharge planning, Door open when patient unattended, Investigate reason for fall, Evaluate medications/consider consulting pharmacy         Problem: Patient Education: Go to Patient Education Activity  Goal: Patient/Family Education  Outcome: Progressing Towards Goal     Problem: Pressure Injury - Risk of  Goal: *Prevention of pressure injury  Description: Document Sriram Scale and appropriate interventions in the flowsheet.   Outcome: Progressing Towards Goal  Note: Pressure Injury Interventions:  Sensory Interventions: Assess changes in LOC, Avoid rigorous massage over bony prominences, Assess need for specialty bed, Check visual cues for pain, Discuss PT/OT consult with provider, Float heels, Keep linens dry and wrinkle-free, Maintain/enhance activity level, Minimize linen layers, Monitor skin under medical devices    Moisture Interventions: Absorbent underpads, Apply protective barrier, creams and emollients, Assess need for specialty bed, Check for incontinence Q2 hours and as needed, Internal/External urinary devices    Activity Interventions: Increase time out of bed, Pressure redistribution bed/mattress(bed type), PT/OT evaluation    Mobility Interventions: Float heels, HOB 30 degrees or less, Pressure redistribution bed/mattress (bed type), PT/OT evaluation    Nutrition Interventions: Discuss nutritional consult with provider, Offer support with meals,snacks and hydration    Friction and Shear Interventions: Feet elevated on foot rest, HOB 30 degrees or less, Lift sheet, Lift team/patient mobility team, Minimize layers, Sit at 90-degree angle                Problem: Patient Education: Go to Patient Education Activity  Goal: Patient/Family Education  Outcome: Progressing Towards Goal     Problem: Nutrition Deficit  Goal: *Optimize nutritional status  Outcome: Progressing Towards Goal     Problem: Patient Education: Go to Patient Education Activity  Goal: Patient/Family Education  Outcome: Progressing Towards Goal     Problem: Hemorrhagic Stroke: Day 5 through Discharge  Goal: Off Pathway (Use only if patient is Off Pathway)  Outcome: Progressing Towards Goal  Goal: Activity/Safety  Outcome: Progressing Towards Goal  Goal: Consults, if ordered  Outcome: Progressing Towards Goal  Goal: Diagnostic Test/Procedures  Outcome: Progressing Towards Goal  Goal: Nutrition/Diet  Outcome: Progressing Towards Goal  Goal: Medications  Outcome: Progressing Towards Goal  Goal: Respiratory  Outcome: Progressing Towards Goal  Goal: Treatments/Interventions/Procedures  Outcome: Progressing Towards Goal  Goal: Psychosocial  Outcome: Progressing Towards Goal  Goal: *Hemodynamically stable  Outcome: Progressing Towards Goal  Goal: *Verbalizes anxiety and depression are reduced or absent  Outcome: Progressing Towards Goal  Goal: *Absence of aspiration  Outcome: Progressing Towards Goal  Goal: *Absence of signs and symptoms of DVT  Outcome: Progressing Towards Goal  Goal: *Optimal pain control at patient's stated goal  Outcome: Progressing Towards Goal  Goal: *Tolerating diet  Outcome: Progressing Towards Goal  Goal: *Progressive mobility and function  Outcome: Progressing Towards Goal  Goal: *Rehabilitation readiness  Outcome: Progressing Towards Goal     Problem: Hemorrhagic Stroke: Discharge Outcomes  Goal: *Verbalizes anxiety and depression are reduced or absent  Outcome: Progressing Towards Goal  Goal: *Verbalize understanding of risk factor modification(Stroke Metric)  Outcome: Progressing Towards Goal  Goal: *Optimal pain control at patient's stated goal  Outcome: Progressing Towards Goal  Goal: *Hemodynamically stable  Outcome: Progressing Towards Goal  Goal: *Absence of aspiration pneumonia  Outcome: Progressing Towards Goal  Goal: *Aware of needed dietary changes  Outcome: Progressing Towards Goal  Goal: *Verbalizes understanding and describes medication purposes and frequencies  Outcome: Progressing Towards Goal  Goal: *Tolerating diet  Outcome: Progressing Towards Goal  Goal: *Absence of signs and symptoms of DVT  Outcome: Progressing Towards Goal  Goal: *Absence of aspiration  Outcome: Progressing Towards Goal  Goal: *Progressive mobility and function  Outcome: Progressing Towards Goal  Goal: *Home safety concerns addressed  Outcome: Progressing Towards Goal     Problem: Patient Education: Go to Patient Education Activity  Goal: Patient/Family Education  Outcome: Progressing Towards Goal     Problem: Patient Education: Go to Patient Education Activity  Goal: Patient/Family Education  Outcome: Progressing Towards Goal     Problem: Patient Education:  Go to Education Activity  Goal: Patient/Family Education  Outcome: Progressing Towards Goal     Problem: Subarachnoid Hemorrhage Stroke:Admission Day 3  Goal: *Hemodynamically stable  Outcome: Progressing Towards Goal     Problem: Subarachnoid Hemorrhage Stroke:Admission Day 5-Discharge  Goal: Off Pathway (Use only if patient is Off Pathway)  Outcome: Progressing Towards Goal  Goal: Activity/Safety  Outcome: Progressing Towards Goal  Goal: Diagnostic Test/Procedures  Outcome: Progressing Towards Goal  Goal: Nutrition/Diet  Outcome: Progressing Towards Goal  Goal: Medications  Outcome: Progressing Towards Goal  Goal: Patient maintains clear airway/absence of aspiration  Outcome: Progressing Towards Goal  Goal: Treatments/Interventions/Procedures  Outcome: Progressing Towards Goal  Goal: Psychosocial  Outcome: Progressing Towards Goal  Goal: *Hemodynamically stable  Outcome: Progressing Towards Goal  Goal: *Absence of signs and symptoms of DVT  Outcome: Progressing Towards Goal     Problem: Subarachnoid Hemorrhage Stroke:Discharge Outcomes  Goal: *Verbalizes anxiety and depression are reduced or absent  Outcome: Progressing Towards Goal  Goal: *Verbalizes understanding of risk factor modification (Stroke Metric)  Outcome: Progressing Towards Goal  Goal: *Hemodynamically stable  Outcome: Progressing Towards Goal  Goal: *Aware of needed dietary changes  Outcome: Progressing Towards Goal  Goal: *Verbalizes understanding and describes medication purposes and frequencies  Outcome: Progressing Towards Goal  Goal: *Verbalizes understanding of plan for nimodipine administration  Description: until day 21 and when next dose is scheduled  Outcome: Progressing Towards Goal  Goal: *Absence of signs and symptoms of DVT  Outcome: Progressing Towards Goal  Goal: *Absence of aspiration  Outcome: Progressing Towards Goal  Goal: *Progressive mobility and function  Outcome: Progressing Towards Goal  Goal: *Home safety concerns addressed  Outcome: Progressing Towards Goal     Problem: Patient Education: Go to Patient Education Activity  Goal: Patient/Family Education  Outcome: Progressing Towards Goal

## 2020-06-27 NOTE — PROGRESS NOTES
[facility] [facility] Adult  Hospitalist Group                                                                                          Hospitalist Progress Note  Filipe Wiggins MD  Answering service: [phone] from in house phone        Date of Service:  2020  NAME:  Brent Acuna  :  1970  MRN:  [id number]    Admission Summary:     Brent Acuna is a 24-year-old female with a past medical history of HTN. According to patient's mother, the patient began having a headache on 20 and had been taking BC Powder with aspirin in it for the headache. She recently stopped taking her BP medications. According to her mother she does not smoke, drink alcohol or use recreational drugs. The patient was brought to the [facility] ER via EMS on   after her coworkers found her unresponsive outside of her place of work. 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 On arrival to the ER she was found to be seizing. A stat CT of her head was performed which showed extensive subarachnoid hemorrhage. She was extremely hypertensive in the 240's and was given labetalol and started on a Cardene drip. She was emergently intubated and given Keppra and Ativan as well. Neurosurgery and Neurointerventional Surgery were consulted and the patient was transferred to the [facility] for a higher level of care. On arrival to [facility] a CTA of her head and neck was obtained which showed a 4.4 x 4.1 mm right MCA trifurcation aneurysm and a 2.2 x 3.1 mm left MCA trifurcation aneurysm and possible less than 2 mm ACOM aneurysms. Extensive lung infiltrates were also noted. Patient was taken for cerebral angiogram the morning of 20 and coil embolization of the left MCA aneurysm was completed. The right MCA aneurysm had a very wide neck incorporating both M2s. It could not be treated endovascularly without stents.  May need to be clipped by Neurosurgery.      Procedures: 20 and coil embolization of the left MCA aneurysm was completed s/p cerebral angiogram for right PANCHITO A1 angioplasty for vasospasm, transarterial infusion of verapamil on 6/9,  S/p angiogram on 6/10 for left ICA/MCA angioplasty and transarterial infusion of verapamil for vasospasm treatment, s/p angiogram on 6/11 and 6/13 for transarterial infusion of verapamil for vasospasm treatment. Tracheostomy placement  6/16 by Dr. Susannah Rodríguez in thoracic surgery. PEG placement. Interval history / Subjective:   Patient seen and examined. No changes clinically, still not purposeful, calm, vitals stable. I have called for transfer yesterday to [facility], and again today, I spoke to neurosurgery yesterday there, and they didn't think there was much to offer, however they will look at her images and advise us with final decisions.      Assessment & Plan:     SAH due to ruptured cerebral aneurysm   -s/p cerebral angiogram with coil embolization of left MCA bifurcation aneurysm on 6/5, patient has 5X3 right MCA bifurcation aneurysm with very wide neck which will need to be treated with stents vs. Clipping  -on Nimotop, normal saline   -on cardene gtt is off, on norvasc, hydralazine, goal -160  -seizure prophylaxis keppra  -patient open her right eye to voice, non verbal, doesn't follows command, moves right extremities spontaneously   -continue neuro check, serial BP monitoring   -improved vasospasm since 6/20- seen by neurosurgeon   -Neurointerventional surgical service on board  - high risk for decompensation, inpatient mortality, consult to palliative care team      Seizure: 2nd to above - stable - keppra, VIMPAT-seizure precaution- Neurologist  Ischemic CVAs - aspirin - multiple ischemic infarct -MRI on 6/16- PT/OT-neurology evaluated  Respiratory failure secondary to above, pulmonary edema and possible pneumonia  -s/p trach/trach collar -continue duo neb  -sputum cx grow klebsiella pneumonia  -chest x ray on 6/19 vague left lower lobe airspace process, suggesting subsegmental  atelectasis or pneumonia. Interval exchange of endotracheal tube for tracheostomy  -afebrile and no leukocytosis, blood cx no growth  -antibiotics stopped by intensivist- afebrile, SpO2 100% on 9 l/m- monitor pulse ox     Dysphagia secondary to stroke and SAH-s/p PEG tube, continue tube feeding   Iron deficiency anemia -noted fibroid  -Hgb trending down, Hgb 7.8, monitor H/H    -iron  Level was 10, iron saturation 3, iron sucrose 200 mg iv x 3 doses   -check stool for hem ocult      Peripheral edema multifactorial  -low albumin 2.6  -Echo normal cavity size and systolic function (ejection fraction normal). Mild concentric hypertrophy. Estimated left ventricular ejection fraction is 60 - 65%. No regional wall motion abnormality noted. -If it gets worse , may hold IVF     Obesity -diet control -A1c 5.7    Code status: Full Code   DVT prophylaxis: Lovenox  Care Plan discussed with: Patient/Family and Nurse  Anticipated Disposition: Rehab more than 48 hours     Hospital Problems  Date Reviewed: 6/10/2020          Codes Class Noted POA    [facility] ICD-10-CM: J96.90  ICD-9-CM: 518.81  6/15/2020 Yes        Hypoxic ischemic encephalopathy (HIE), unspecified severity ICD-10-CM: P91.60  ICD-9-CM: 768.70  6/15/2020 Yes        SAH (subarachnoid hemorrhage) ([facility] ICD-10-CM: I60.9  ICD-9-CM: 492  6/4/2020 Yes        Subarachnoid hemorrhage from middle cerebral artery aneurysm, left (HCC) ICD-10-CM: K14.09  ICD-9-CM: 430  6/4/2020 Yes        RIGHT middle cerebral artery aneurysm ICD-10-CM: I67.1  ICD-9-CM: 437.3  6/4/2020 Yes        Cerebral vasospasm ICD-10-CM: U46.900  ICD-9-CM: 435.9  6/4/2020 Yes              Vital Signs:    Last 24hrs VS reviewed since prior progress note.  Most recent are:  Visit Vitals  /70   Pulse 60   Temp 99 °F (37.2 °C)   Resp 13   Ht 5' 4\" (1.626 m)   Wt 99.7 kg (219 lb 14.4 oz)   SpO2 100%   BMI 37.75 kg/m²         Intake/Output Summary (Last 24 hours) at 6/27/2020 1009  Last data filed at 6/27/2020 0800  Gross per 24 hour   Intake 750 ml   Output 1350 ml   Net -600 ml        Physical Examination:             Constitutional:  No acute distress, doesn't follow commands. ENT:  Trach- O2 on Trach   Resp:  good AE, no wheezes. Few creps at bases        GI:  Soft, non distended, non tender. obese    Musculoskeletal:  No edema    Neurologic:  Open her right eye on voice, non verbal, doesn't follow command, left hemiplegia, moves right extremities spontaneously      Skin:  Good turgor, no rashes or ulcers       Data Review:    Review and/or order of clinical lab test  Review and/or order of tests in the radiology section of CPT  Review and/or order of tests in the medicine section of CPT      Labs:     Recent Labs     06/27/20 0426 06/26/20  0136   WBC 6.6 7.2   HGB 10.2* 9.8*   HCT 35.2 34.1*    406*     Recent Labs     06/27/20 0426 06/26/20 0136 06/25/20  0523    139 138   K 4.2 4.1 4.2    108 107   CO2 26 26 23   BUN 19 15 15   CREA 0.64 0.60 0.48*   GLU 95 159* 94   CA 9.6 9.2 8.8     Recent Labs     06/27/20 0426 06/26/20 0136 06/25/20  0523   ALT 24 31 29   AP 80 81 73   TBILI 0.3 0.3 0.3   TP 6.9 6.9 6.4   ALB 2.9* 2.8* 2.5*   GLOB 4.0 4.1* 3.9     No results for input(s): INR, PTP, APTT, INREXT, INREXT in the last 72 hours. No results for input(s): FE, TIBC, PSAT, FERR in the last 72 hours. No results found for: FOL, RBCF   No results for input(s): PH, PCO2, PO2 in the last 72 hours. No results for input(s): CPK, CKNDX, TROIQ in the last 72 hours.     No lab exists for component: CPKMB  Lab Results   Component Value Date/Time    Cholesterol, total 90 06/05/2020 03:34 AM    HDL Cholesterol 64 06/05/2020 03:34 AM    LDL, calculated 7.8 06/05/2020 03:34 AM    Triglyceride 91 06/05/2020 03:34 AM    CHOL/HDL Ratio 1.4 06/05/2020 03:34 AM     Lab Results   Component Value Date/Time    Glucose (POC) 121 (H) 06/14/2020 11:22 PM    Glucose (POC) 118 (H) 06/14/2020 05:07 PM    Glucose (POC) 114 (H) 06/14/2020 11:27 AM    Glucose (POC) 109 (H) 06/14/2020 05:02 AM    Glucose (POC) 126 (H) 06/13/2020 11:09 PM     Lab Results   Component Value Date/Time    Specific gravity 1.015 06/15/2020 05:45 AM         Medications Reviewed:     Current Facility-Administered Medications   Medication Dose Route Frequency    lisinopriL (PRINIVIL, ZESTRIL) tablet 20 mg  20 mg Oral DAILY    albuterol-ipratropium (DUO-NEB) 2.5 MG-0.5 MG/3 ML  3 mL Nebulization BID PRN    guar gum (BENEFIBER) packet 1 Packet  4 g Oral TID    labetaloL (NORMODYNE;TRANDATE) injection 20 mg  20 mg IntraVENous Q4H PRN    0.9% sodium chloride infusion 250 mL  250 mL IntraVENous PRN    hydrALAZINE (APRESOLINE) tablet 100 mg  100 mg Oral TID    fentaNYL citrate (PF) injection 50 mcg  50 mcg IntraVENous Q2H PRN    hydrALAZINE (APRESOLINE) 20 mg/mL injection 10 mg  10 mg IntraVENous Q2H PRN    chlorhexidine (ORAL CARE KIT) 0.12 % mouthwash 15 mL  15 mL Oral Q12H    niCARdipine (CARDENE) 50 mg in 0.9% sodium chloride 100 mL infusion  0-15 mg/hr IntraVENous TITRATE    carvediloL (COREG) tablet 25 mg  25 mg Oral BID WITH MEALS    amLODIPine (NORVASC) tablet 10 mg  10 mg Per NG tube DAILY    potassium bicarb-citric acid (EFFER-K) tablet 40 mEq  40 mEq Oral BID    lacosamide (VIMPAT) tablet 150 mg  150 mg Per NG tube Q12H    balsam peru-castor oiL (VENELEX) ointment   Topical BID    aspirin chewable tablet 81 mg  81 mg Per NG tube DAILY    enoxaparin (LOVENOX) injection 40 mg  40 mg SubCUTAneous Q24H    levETIRAcetam (KEPPRA) oral solution 1,000 mg  1,000 mg Per NG tube Q12H    glycopyrrolate (ROBINUL) injection 0.2 mg  0.2 mg IntraMUSCular TID PRN    acetaminophen (TYLENOL) solution 650 mg  650 mg Per NG tube Q4H PRN    glucose chewable tablet 16 g  4 Tab Oral PRN    glucagon (GLUCAGEN) injection 1 mg  1 mg IntraMUSCular PRN    dextrose 10% infusion 0-250 mL  0-250 mL IntraVENous PRN    albuterol (PROVENTIL VENTOLIN) nebulizer solution 2.5 mg  2.5 mg Nebulization Q6H PRN    polyvinyl alcohol-povidone (NATURAL TEARS) 0.5-0.6 % ophthalmic solution 2 Drop  2 Drop Both Eyes Q8H    bisacodyL (DULCOLAX) suppository 10 mg  10 mg Rectal DAILY PRN    ondansetron (ZOFRAN) injection 4 mg  4 mg IntraVENous Q4H PRN    docusate (COLACE) 50 mg/5 mL oral liquid 100 mg  100 mg Oral BID PRN     ______________________________________________________________________  EXPECTED LENGTH OF STAY: 22d 21h  ACTUAL LENGTH OF STAY:          Jessica Grayson MD

## 2020-06-27 NOTE — PROGRESS NOTES
Transition of care note:   followed up with patient's nurse [facility] this afternoon and hospitalist did speak with patient's mom [facility] L [facility] who was visiting this afternoon. I was able to reach patient's mom by phone and she now has given me permission to send a referral to [address] for review.

## 2020-06-27 NOTE — INTERDISCIPLINARY ROUNDS
Bedside and Verbal shift change report given to Kade Layton RN (oncoming nurse) by Bret Zaragoza RN (offgoing nurse). Report included the following information SBAR, Kardex, Intake/Output, MAR, Accordion, Recent Results, Med Rec Status, Cardiac Rhythm SB/SR and Dual Neuro Assessment.

## 2020-06-27 NOTE — ROUTINE PROCESS
Bedside shift change report given to [address] [place] [address] (oncoming nurse) by Celia Rodriguez RN (offgoing nurse). Report included the following information SBAR, Kardex, ED Summary, Procedure Summary, Intake/Output, MAR, Accordion, Recent Results, Cardiac Rhythm NSR/SB, Quality Measures and Dual Neuro Assessment.

## 2020-06-27 NOTE — PROGRESS NOTES
Neurointerventional Surgery Progress Note  Yamil Jimenez MD  Cell: [phone]    Admit Date: 6/4/2020                                          Daily Progress Note: 6/27/2020   LOS: 32 days       S/P:  POD 10 Cerebral angiogram with coil embolization of ruptured left MCA bifurcation aneurysm on 6/5/2020     ** Delayed Presentation ** PB Day 27     Interval History/Subjective:   Patient currently on trach collar. Continues to be stable. On Coreg, Hydralazine, Norvasc and Lisinopril added yesterday for BP management. Systolic BPs range from 632-638W on four antihypertensives     Patient not following commands today     Unable to perform a ROS due to AMS.    Assessment & Plan:      Active Problems:    SAH (subarachnoid hemorrhage) ([address] (6/4/2020)       Subarachnoid hemorrhage from middle cerebral artery aneurysm, left (HCC) (6/4/2020)       RIGHT middle cerebral artery aneurysm (6/4/2020)       Cerebral vasospasm (6/4/2020)       Respiratory failure ([address] (6/15/2020)       Hypoxic ischemic encephalopathy (HIE), unspecified severity (6/15/2020)        1.) SAH due to ruptured cerebral aneurysm, Quinones Sales 5, An Grade 3/4              - s/p cerebral angiogram with coil embolization of left MCA bifurcation aneurysm on 6/5       Additionally, patient has 5X3 right MCA bifurcation aneurysm with very wide neck which will               need to be treated with stents vs. Clipping              - Completed Nimotop 21 days to prevent delayed cerebral ischemia              - Last TCDs on 6/20 showed continued possible left PCA vasospasm, no evidence of vasospasm in the remaining intracranial vessels bilaterally. Continue to monitor clinically.  Patient essentially out of vasospasm window               - ECHO shows EF 60-65%, mild concentric hypertrophy, mildly dilated left atrium              - ok with neuro checks every 2 hours during the day and every 4 hours at night               - change SBP goal to 110-160 in the setting of unsecured aneurysm in the right MCA,               - PT/OT/SLP evals     2. ) Seizure              - Due to #1, no prior hx of seizure              - Continue Keppra 1000 mg BID               - continue Vimpat & Keppra               - Seizure precautions     3.) Cerebral Edema (resolved)     4.) Acute hypoxic respiratory failure in the setting of likely neurogenic pulmonary edema- resolved              - currently on trach collar, tolerating well              - chest XR on 6/19 Vague left lower lobe airspace process, suggesting subsegmental  atelectasis or pneumonia     5.) Multiple Acute Ischemic Infarctions                - Imaging pattern is consistent with Hypoxic-Ischemic Encephalopathy (HIE)                - Continue aspirin 81 mg daily for stroke prevention           6.) Cerebral Vasospasm- resolved                  - s/p cerebral angiogram for right PANCHITO A1 angioplasty for vasospasm, transarterial infusion of verapamil on 6/9,  S/p angiogram on 6/10 for left ICA/MCA angioplasty and transarterial infusion of verapamil for vasospasm treatment, s/p angiogram on 6/11 and 6/13 for transarterial infusion of verapamil for vasospasm treatment                7.) Fever --> resolved                 - completed course of Zosyn, WBC normal                - respiratory cultures growing Klebsiella Pneumonia and Citrobacter Koseria 6/17                - CTA of chest 6/7 negative for PE                 - Cefepime started on 6/19 to cover Citrobacter per Intensivist, discontinued today as respiratory specimen is likely a contaminant per Intensivist                - Intensivist following      8.) Polyuria                  - serum sodium stable at 138, electrolytes WNL, glucose 95                  - suspect patient is auto-diuresing due to recent issues with fluid overload                  - discontinue Florinef 0.1 mg BID for prevention of cerebral salt wasting                  - monitor BMP daily     9.) HTN - change SBP goal to 110-160                  - continue Amlodipine 10 mg daily and coreg 25 mg BID                  - add Hydralazine 100 mg TID per hospitalist                   - Lisinopril 20 mg added 6/26                      Activity: Bed rest  DVT ppx: SCDs, Lovenox  Disposition: TBD     Plan:  Awaiting disposition decision      Admission Summary:     Erinn Carreon is a 48 y.o. 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 On arrival to ED, pt was unresponsive and noted to be seizing. A stat CT of her head was performed there, which showed extensive SAH. Pt was then intubated emergently. She was also extremely hypertensive with SBPs in 240s. She was given Keppra and Ativan, and placed on cardene drip for BP control. NSGY and NIS were then consulted and the patient was transferred to [facility] for higher level of care. En route, paramedics administered 7.5 mg of Versed, 200mcg of Fentanyl, and 10 mg of labetalol for BP control. On arrival, CTA of her head and neck was obtained, which showed a 4.4 x 4.1 mm right MCA trifurcation aneurysm and  a 2.2 x 3.1 mm left MCA trifurcation aneurysm. Of note, CTA head/neck also showed pulmonary infiltrates suspicious for COVID-19 infection and the patient has been tested for COVID which was negative. 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 Mother reported pt does not smoke, does not drink alcohol or use street drugs. The mother also reported that the patient began complaining of a headache on 5/31/2020 and has been taking BC Powder (aspirin) for the headache.  She also reported that the pt has a hx of HTN and is supposed to be on BP medications, but recently stopped taking her medications. The patient underwent a cerebral angiogram on 6/5 by Dr. Manuel Nino for coil embolization of a ruptured left MCA bifurcation aneurysm.               Current Facility-Administered Medications   Medication Dose Route Frequency Provider Last Rate Last Dose    lisinopriL (PRINIVIL, ZESTRIL) tablet 20 mg  20 mg Oral DAILY Landry Larios MD   20 mg at 06/26/20 0821    guar gum (BENEFIBER) packet 1 Packet  4 g Oral TID Landry Larios MD   1 Packet at 06/26/20 0820    labetaloL (NORMODYNE;TRANDATE) injection 20 mg  20 mg IntraVENous Q4H PRN Landry Larios MD        0.9% sodium chloride infusion 250 mL  250 mL IntraVENous PRN Ayal Pettit MD        albuterol-ipratropium (DUO-NEB) 2.5 MG-0.5 MG/3 ML  3 mL Nebulization BID RT Ayla Pettit MD   3 mL at 06/26/20 0915    hydrALAZINE (APRESOLINE) tablet 100 mg  100 mg Oral TID Socorro Sterling NP   100 mg at 06/26/20 4089    fentaNYL citrate (PF) injection 50 mcg  50 mcg IntraVENous Q2H PRN Kalen Klein NP   50 mcg at 06/24/20 0147    hydrALAZINE (APRESOLINE) 20 mg/mL injection 10 mg  10 mg IntraVENous Q2H PRN Kvng Leal NP   10 mg at 06/26/20 0655    chlorhexidine (ORAL CARE KIT) 0.12 % mouthwash 15 mL  15 mL Oral Q12H Socorro Sterling NP   15 mL at 06/24/20 2051    niCARdipine (CARDENE) 50 mg in 0.9% sodium chloride 100 mL infusion  0-15 mg/hr IntraVENous TITRATE Margarita Avery NP   Stopped at 06/24/20 0246    carvediloL (COREG) tablet 25 mg  25 mg Oral BID WITH MEALS Sujata Santos MD   25 mg at 06/26/20 0821    amLODIPine (NORVASC) tablet 10 mg  10 mg Per NG tube DAILY Sujata Santos MD   10 mg at 06/26/20 7224    potassium bicarb-citric acid (EFFER-K) tablet 40 mEq  40 mEq Oral BID Sujata Santos MD   40 mEq at 06/26/20 0820    lacosamide (VIMPAT) tablet 150 mg  150 mg Per NG tube Q12H Riley Palma MD   150 mg at 06/26/20 3753    balsam peru-castor oiL (VENELEX) ointment   Topical BID Leon Berry DO        aspirin chewable tablet 81 mg  81 mg Per NG tube DAILY Danielle, Rachelle Barreto MD   81 mg at 20 0821    enoxaparin (LOVENOX) injection 40 mg  40 mg SubCUTAneous Q24H Leon Berry DO   40 mg at 20 1400    levETIRAcetam (KEPPRA) oral solution 1,000 mg  1,000 mg Per NG tube Q12H Leon Berry DO   1,000 mg at 20 0820    glycopyrrolate (ROBINUL) injection 0.2 mg  0.2 mg IntraMUSCular TID PRN Manjula Simmons NP   0.2 mg at 20 1803    acetaminophen (TYLENOL) solution 650 mg  650 mg Per NG tube Q4H PRN Virgil Libman, NP   650 mg at 20 0230    glucose chewable tablet 16 g  4 Tab Oral PRN Adriel Showers, NP-C        glucagon (GLUCAGEN) injection 1 mg  1 mg IntraMUSCular PRN Adriel Showers, NP-C        dextrose 10% infusion 0-250 mL  0-250 mL IntraVENous PRN Adriel Showers, NP-C        albuterol (PROVENTIL VENTOLIN) nebulizer solution 2.5 mg  2.5 mg Nebulization Q6H PRN Adriel Showers, NP-C        polyvinyl alcohol-povidone (NATURAL TEARS) 0.5-0.6 % ophthalmic solution 2 Drop  2 Drop Both Eyes Q8H Felipe Mutter, ACNP   2 Drop at 20 0600    bisacodyL (DULCOLAX) suppository 10 mg  10 mg Rectal DAILY PRN Felipe Mutter, ACNP        ondansetron [facility]) injection 4 mg  4 mg IntraVENous Q4H PRN Adriel Showers, NP-C        docusate (COLACE) 50 mg/5 mL oral liquid 100 mg  100 mg Oral BID PRN Adriel Showers, NP-C             No Known Allergies     Review of Systems:  Review of systems not obtained due to patient factors.       Objective:     Vital signs  Temp (24hrs), Av.6 °F (37 °C), Min:97.9 °F (36.6 °C), Max:99.5 °F (37.5 °C)    07 -  190  In: 300   Out: 350 [Urine:350]  1901 -  0700  In: 750   Out: 1350 [KEVPU:1909]    Visit Vitals  /61 (BP 1 Location: Left arm, BP Patient Position: At rest)   Pulse 60   Temp 98.2 °F (36.8 °C)   Resp 19   Ht 5' 4\" (1.626 m)   Wt 219 lb 14.4 oz (99.7 kg)   SpO2 98%   BMI 37.75 kg/m²    O2 Flow Rate (L/min): 6 l/min O2 Device: Tracheal collar   Vitals:    06/27/20 0900 06/27/20 1011 06/27/20 1100 06/27/20 1357   BP: 153/61 122/55 125/53 153/61   Pulse:  (!) 50 (!) 56 60   Resp:  21  19   Temp:  97.9 °F (36.6 °C)  98.2 °F (36.8 °C)   SpO2:  100%  98%   Weight:       Height:              Physical Exam:  GENERAL: NAD, trach collar present, alert   EYE: conjunctivae/corneas clear. Right pupil 3 mm, brisk response to light. Left pupil 4 mm, brisk response to light. Keeps left eye closed   LUNG: lnon-labored breathing on trach collar  HEART: RRR  ABD:  Non tender  EXTREMITIES:   3+ pitting edema in BUE, +! Pitting edema in BLE, distal pulses 2+ bilaterally   SKIN: Skin warm to touch. Right and left groin site clean, dry, and intact. No hematoma, bruising, or bleeding noted. NEUROLOGIC: Alert. Trach-collar. Non-verbal. Eyes will open spontaneously. Does not follow commands. Left eye remains closed. Focuses and tracks with eyes, with the exception of inability to track to the right with left eye. Moves all extremities spontaneously, left greater than right;  RUE withdraws to pain,  Unable to assess language, sensation, and coordination. Imaging:  No new imaging    24 hour results:    Recent Results (from the past 24 hour(s))   METABOLIC PANEL, COMPREHENSIVE    Collection Time: 06/27/20  4:26 AM   Result Value Ref Range    Sodium 138 136 - 145 mmol/L    Potassium 4.2 3.5 - 5.1 mmol/L    Chloride 107 97 - 108 mmol/L    CO2 26 21 - 32 mmol/L    Anion gap 5 5 - 15 mmol/L    Glucose 95 65 - 100 mg/dL    BUN 19 6 - 20 MG/DL    Creatinine 0.64 0.55 - 1.02 MG/DL    BUN/Creatinine ratio 30 (H) 12 - 20      GFR est AA >60 >60 ml/min/1.73m2    GFR est non-AA >60 >60 ml/min/1.73m2    Calcium 9.6 8.5 - 10.1 MG/DL    Bilirubin, total 0.3 0.2 - 1.0 MG/DL    ALT (SGPT) 24 12 - 78 U/L    AST (SGOT) 18 15 - 37 U/L    Alk.  phosphatase 80 45 - 117 U/L    Protein, total 6.9 6.4 - 8.2 g/dL    Albumin 2.9 (L) 3.5 - 5.0 g/dL    Globulin 4.0 2.0 - 4.0 g/dL    A-G Ratio 0.7 (L) 1.1 - 2.2     CBC WITH AUTOMATED DIFF    Collection Time: 06/27/20  4:26 AM   Result Value Ref Range    WBC 6.6 3.6 - 11.0 K/uL    RBC 4.26 3.80 - 5.20 M/uL    HGB 10.2 (L) 11.5 - 16.0 g/dL    HCT 35.2 35.0 - 47.0 %    MCV 82.6 80.0 - 99.0 FL    MCH 23.9 (L) 26.0 - 34.0 PG    MCHC 29.0 (L) 30.0 - 36.5 g/dL    RDW 25.8 (H) 11.5 - 14.5 %    PLATELET 784 222 - 383 K/uL    MPV 9.9 8.9 - 12.9 FL    NRBC 0.0 0  WBC    ABSOLUTE NRBC 0.00 0.00 - 0.01 K/uL    NEUTROPHILS 67 32 - 75 %    LYMPHOCYTES 19 12 - 49 %    MONOCYTES 10 5 - 13 %    EOSINOPHILS 3 0 - 7 %    BASOPHILS 0 0 - 1 %    IMMATURE GRANULOCYTES 1 (H) 0.0 - 0.5 %    ABS. NEUTROPHILS 4.3 1.8 - 8.0 K/UL    ABS. LYMPHOCYTES 1.3 0.8 - 3.5 K/UL    ABS. MONOCYTES 0.7 0.0 - 1.0 K/UL    ABS. EOSINOPHILS 0.2 0.0 - 0.4 K/UL    ABS. BASOPHILS 0.0 0.0 - 0.1 K/UL    ABS. IMM.  GRANS. 0.1 (H) 0.00 - 0.04 K/UL    DF SMEAR SCANNED      RBC COMMENTS ANISOCYTOSIS  3+        RBC COMMENTS OVALOCYTES  PRESENT        RBC COMMENTS HYPOCHROMIA  1+        RBC COMMENTS SCHISTOCYTES  PRESENT       PROCALCITONIN    Collection Time: 06/27/20  4:26 AM   Result Value Ref Range    Procalcitonin <0.05 ng/mL        Yon López MD

## 2020-06-27 NOTE — PROGRESS NOTES
Transitions of care:  Patient's mom did telephone me back to [facility] that I did send referral.  I reassured her that we would not have an answer until the early part of the week. CRM offered support and mom will visit daughter in the late am on Sunday.

## 2020-06-28 LAB
ALBUMIN SERPL-MCNC: 2.7 G/DL (ref 3.5–5)
ALBUMIN/GLOB SERPL: 0.8 {RATIO} (ref 1.1–2.2)
ALP SERPL-CCNC: 70 U/L (ref 45–117)
ALT SERPL-CCNC: 21 U/L (ref 12–78)
ANION GAP SERPL CALC-SCNC: 7 MMOL/L (ref 5–15)
AST SERPL-CCNC: 16 U/L (ref 15–37)
BASOPHILS # BLD: 0.1 K/UL (ref 0–0.1)
BASOPHILS NFR BLD: 1 % (ref 0–1)
BILIRUB SERPL-MCNC: 0.3 MG/DL (ref 0.2–1)
BUN SERPL-MCNC: 17 MG/DL (ref 6–20)
BUN/CREAT SERPL: 24 (ref 12–20)
CALCIUM SERPL-MCNC: 8.9 MG/DL (ref 8.5–10.1)
CHLORIDE SERPL-SCNC: 105 MMOL/L (ref 97–108)
CO2 SERPL-SCNC: 28 MMOL/L (ref 21–32)
CREAT SERPL-MCNC: 0.72 MG/DL (ref 0.55–1.02)
DIFFERENTIAL METHOD BLD: ABNORMAL
EOSINOPHIL # BLD: 0.2 K/UL (ref 0–0.4)
EOSINOPHIL NFR BLD: 3 % (ref 0–7)
ERYTHROCYTE [DISTWIDTH] IN BLOOD BY AUTOMATED COUNT: 24.7 % (ref 11.5–14.5)
GLOBULIN SER CALC-MCNC: 3.6 G/DL (ref 2–4)
GLUCOSE SERPL-MCNC: 90 MG/DL (ref 65–100)
HCT VFR BLD AUTO: 32.3 % (ref 35–47)
HGB BLD-MCNC: 9.4 G/DL (ref 11.5–16)
IMM GRANULOCYTES # BLD AUTO: 0 K/UL (ref 0–0.04)
IMM GRANULOCYTES NFR BLD AUTO: 0 % (ref 0–0.5)
LYMPHOCYTES # BLD: 1.2 K/UL (ref 0.8–3.5)
LYMPHOCYTES NFR BLD: 20 % (ref 12–49)
MCH RBC QN AUTO: 23.7 PG (ref 26–34)
MCHC RBC AUTO-ENTMCNC: 29.1 G/DL (ref 30–36.5)
MCV RBC AUTO: 81.6 FL (ref 80–99)
MONOCYTES # BLD: 0.7 K/UL (ref 0–1)
MONOCYTES NFR BLD: 11 % (ref 5–13)
NEUTS SEG # BLD: 4 K/UL (ref 1.8–8)
NEUTS SEG NFR BLD: 65 % (ref 32–75)
NRBC # BLD: 0 K/UL (ref 0–0.01)
NRBC BLD-RTO: 0 PER 100 WBC
PLATELET # BLD AUTO: 359 K/UL (ref 150–400)
PMV BLD AUTO: 10 FL (ref 8.9–12.9)
POTASSIUM SERPL-SCNC: 4.4 MMOL/L (ref 3.5–5.1)
PROCALCITONIN SERPL-MCNC: 0.05 NG/ML
PROT SERPL-MCNC: 6.3 G/DL (ref 6.4–8.2)
RBC # BLD AUTO: 3.96 M/UL (ref 3.8–5.2)
RBC MORPH BLD: ABNORMAL
SODIUM SERPL-SCNC: 140 MMOL/L (ref 136–145)
WBC # BLD AUTO: 6.2 K/UL (ref 3.6–11)

## 2020-06-28 PROCEDURE — 84145 PROCALCITONIN (PCT): CPT

## 2020-06-28 PROCEDURE — 36415 COLL VENOUS BLD VENIPUNCTURE: CPT

## 2020-06-28 PROCEDURE — 74011250637 HC RX REV CODE- 250/637: Performed by: PSYCHIATRY & NEUROLOGY

## 2020-06-28 PROCEDURE — 74011250636 HC RX REV CODE- 250/636: Performed by: ANESTHESIOLOGY

## 2020-06-28 PROCEDURE — 74011250637 HC RX REV CODE- 250/637: Performed by: ANESTHESIOLOGY

## 2020-06-28 PROCEDURE — 65660000000 HC RM CCU STEPDOWN

## 2020-06-28 PROCEDURE — 74011250637 HC RX REV CODE- 250/637: Performed by: NURSE PRACTITIONER

## 2020-06-28 PROCEDURE — 85025 COMPLETE CBC W/AUTO DIFF WBC: CPT

## 2020-06-28 PROCEDURE — 74011250637 HC RX REV CODE- 250/637: Performed by: INTERNAL MEDICINE

## 2020-06-28 PROCEDURE — 80053 COMPREHEN METABOLIC PANEL: CPT

## 2020-06-28 PROCEDURE — 74011250636 HC RX REV CODE- 250/636: Performed by: NURSE PRACTITIONER

## 2020-06-28 PROCEDURE — 77030018836 HC SOL IRR NACL ICUM -A

## 2020-06-28 PROCEDURE — 77030038269 HC DRN EXT URIN PURWCK BARD -A

## 2020-06-28 RX ADMIN — CASTOR OIL AND BALSAM, PERU: 788; 87 OINTMENT TOPICAL at 08:27

## 2020-06-28 RX ADMIN — AMLODIPINE BESYLATE 10 MG: 5 TABLET ORAL at 08:20

## 2020-06-28 RX ADMIN — CARVEDILOL 25 MG: 12.5 TABLET, FILM COATED ORAL at 16:03

## 2020-06-28 RX ADMIN — Medication 2 DROP: at 22:00

## 2020-06-28 RX ADMIN — CASTOR OIL AND BALSAM, PERU: 788; 87 OINTMENT TOPICAL at 17:25

## 2020-06-28 RX ADMIN — Medication 1 PACKET: at 08:20

## 2020-06-28 RX ADMIN — POTASSIUM BICARBONATE 40 MEQ: 782 TABLET, EFFERVESCENT ORAL at 17:25

## 2020-06-28 RX ADMIN — POTASSIUM BICARBONATE 40 MEQ: 782 TABLET, EFFERVESCENT ORAL at 08:20

## 2020-06-28 RX ADMIN — LEVETIRACETAM 1000 MG: 100 SOLUTION ORAL at 21:32

## 2020-06-28 RX ADMIN — HYDRALAZINE HYDROCHLORIDE 100 MG: 50 TABLET, FILM COATED ORAL at 21:32

## 2020-06-28 RX ADMIN — LEVETIRACETAM 1000 MG: 100 SOLUTION ORAL at 08:21

## 2020-06-28 RX ADMIN — Medication 1 PACKET: at 16:03

## 2020-06-28 RX ADMIN — LISINOPRIL 30 MG: 20 TABLET ORAL at 08:21

## 2020-06-28 RX ADMIN — LACOSAMIDE 150 MG: 50 TABLET, FILM COATED ORAL at 21:32

## 2020-06-28 RX ADMIN — CHLORHEXIDINE GLUCONATE 15 ML: 0.12 RINSE ORAL at 21:00

## 2020-06-28 RX ADMIN — CARVEDILOL 25 MG: 12.5 TABLET, FILM COATED ORAL at 08:21

## 2020-06-28 RX ADMIN — Medication 2 DROP: at 06:00

## 2020-06-28 RX ADMIN — ACETAMINOPHEN 650 MG: 650 SUSPENSION ORAL at 21:32

## 2020-06-28 RX ADMIN — ASPIRIN 81 MG CHEWABLE TABLET 81 MG: 81 TABLET CHEWABLE at 08:21

## 2020-06-28 RX ADMIN — ENOXAPARIN SODIUM 40 MG: 40 INJECTION SUBCUTANEOUS at 13:35

## 2020-06-28 RX ADMIN — HYDRALAZINE HYDROCHLORIDE 100 MG: 50 TABLET, FILM COATED ORAL at 08:21

## 2020-06-28 RX ADMIN — ACETAMINOPHEN 650 MG: 650 SUSPENSION ORAL at 06:30

## 2020-06-28 RX ADMIN — Medication 1 PACKET: at 21:32

## 2020-06-28 RX ADMIN — HYDRALAZINE HYDROCHLORIDE 10 MG: 20 INJECTION INTRAMUSCULAR; INTRAVENOUS at 22:46

## 2020-06-28 RX ADMIN — HYDRALAZINE HYDROCHLORIDE 100 MG: 50 TABLET, FILM COATED ORAL at 16:03

## 2020-06-28 RX ADMIN — HYDRALAZINE HYDROCHLORIDE 10 MG: 20 INJECTION INTRAMUSCULAR; INTRAVENOUS at 06:29

## 2020-06-28 RX ADMIN — HYDRALAZINE HYDROCHLORIDE 10 MG: 20 INJECTION INTRAMUSCULAR; INTRAVENOUS at 00:02

## 2020-06-28 RX ADMIN — Medication 2 DROP: at 13:36

## 2020-06-28 RX ADMIN — LACOSAMIDE 150 MG: 50 TABLET, FILM COATED ORAL at 08:20

## 2020-06-28 NOTE — PROGRESS NOTES
[address] [facility] Adult  Hospitalist Group                                                                                          Hospitalist Progress Note  Yessenia Ballesteros MD  Answering service: [phone] or [phone] from in house phone        Date of Service:  2020  NAME:  Alejandro Silverman  :  1970  MRN:  [id number]    Admission Summary:     Alejandro Silverman is a 61-year-old female with a past medical history of HTN. According to patient's mother, the patient began having a headache on 20 and had been taking BC Powder with aspirin in it for the headache. She recently stopped taking her BP medications. According to her mother she does not smoke, drink alcohol or use recreational drugs. The patient was brought to the [facility] ER via EMS on   after her coworkers found her unresponsive outside of her place of work. 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 On arrival to the ER she was found to be seizing. A stat CT of her head was performed which showed extensive subarachnoid hemorrhage. She was extremely hypertensive in the 240's and was given labetalol and started on a Cardene drip. She was emergently intubated and given Keppra and Ativan as well. Neurosurgery and Neurointerventional Surgery were consulted and the patient was transferred to the [facility] for a higher level of care. 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 Extensive lung infiltrates were also noted. Patient was taken for cerebral angiogram the morning of 20 and coil embolization of the left MCA aneurysm was completed. The right MCA aneurysm had a very wide neck incorporating both M2s. It could not be treated endovascularly without stents.  May need to be clipped by Neurosurgery.      Procedures: 20 and coil embolization of the left MCA aneurysm was completed s/p cerebral angiogram for right PANCHITO A1 angioplasty for vasospasm, transarterial infusion of verapamil on 6/9,  S/p angiogram on 6/10 for left ICA/MCA angioplasty and transarterial infusion of verapamil for vasospasm treatment, s/p angiogram on 6/11 and 6/13 for transarterial infusion of verapamil for vasospasm treatment. Tracheostomy placement  6/16 by Dr. Duncan Nunez in thoracic surgery. PEG placement. Interval history / Subjective:   Patient seen and examined. Appears comfortable, no signs of distress, hemodynamics stable. Lengthy conversation with mother yesterday at bedside. Attempts for transfer to [facility]- Dr Zackary Simms gave his opinion on dc to [facility] and f/u with them op for review of further treatment if pt does well with recovery. Mother requesting attempts to transfer to [facility]- I initiated process, and radiology still trying to get the images there for their doctors to review before calling me back, could be today or tomorrow. Assessment & Plan:     #. Cerebral Aneurysms. #. SAH due to ruptured cerebral aneurysm   -s/p cerebral angiogram with coil embolization of left MCA bifurcation aneurysm on 6/5, patient has 5X3 right MCA bifurcation aneurysm with very wide neck which will need to be treated with stents vs. Clipping  -on Nimotop, normal saline   -on cardene gtt is off, on norvasc, hydralazine, goal -160  -seizure prophylaxis keppra  -patient open her right eye to voice, non verbal, doesn't follows command, moves right extremities spontaneously   -continue neuro check, serial BP monitoring   -improved vasospasm since 6/20- seen by neurosurgeon   -Neurointerventional surgical service on board  - high risk for decompensation, inpatient mortality, consult to palliative care team      #. Seizure: 2nd to above - stable - keppra, VIMPAT-seizure precaution- Neurologist  #. CVAs - aspirin - multiple ischemic infarct -MRI on 6/16- PT/OT-neurology evaluated  #.  Respiratory failure secondary to above, pulmonary edema and possible pneumonia  -s/p trach/trach collar -continue duo neb  -sputum cx grow klebsiella pneumonia  -chest x ray on 6/19 vague left lower lobe airspace process, suggesting subsegmental  atelectasis or pneumonia. Interval exchange of endotracheal tube for tracheostomy  -afebrile and no leukocytosis, blood cx no growth  -antibiotics stopped by intensivist- afebrile, SpO2 100% on 9 l/m- monitor pulse ox     #. Dysphagia secondary to stroke and SAH-s/p PEG tube, continue tube feeding   #. Iron deficiency anemia -noted fibroid  -Hgb trending down, Hgb 7.8, monitor H/H    -iron  Level was 10, iron saturation 3, iron sucrose 200 mg iv x 3 doses   -check stool for hem ocult      #. Peripheral edema multifactorial- low albumin 2.6  -Echo normal cavity size and systolic function (ejection fraction normal). Mild concentric hypertrophy. Estimated left ventricular ejection fraction is 60 - 65%. No regional wall motion abnormality noted.     Obesity -diet control -A1c 5.7    Code status: Full Code   DVT prophylaxis: Lovenox  Care Plan discussed with: Patient/Family and Nurse  Anticipated Disposition: Rehab more than 48 hours     Hospital Problems  Date Reviewed: 6/10/2020          Codes Class Noted POA    Res[address] failure ([facility] [address] ICD-10-CM: J96.90  ICD-9-CM: 518.81  6/15/2020 Yes        Hypoxic ischemic encephalopathy (HIE), unspecified severity ICD-10-CM: P91.60  ICD-9-CM: 768.70  6/15/2020 Yes        SAH (subarachnoid hemorrhage) ([facility] [address] ICD-10-CM: I60.9  ICD-9-CM: 294  6/4/2020 Yes        Subarachnoid hemorrhage from middle cerebral artery aneurysm, left ([facility] [address] ICD-10-CM: E25.31  ICD-9-CM: 934  6/4/2020 Yes        RIGHT middle cerebral artery aneurysm ICD-10-CM: I67.1  ICD-9-CM: 437.3  6/4/2020 Yes        Cerebral vasospasm ICD-10-CM: G90.970  ICD-9-CM: 435.9  6/4/2020 Yes              Vital Signs:    Last 24hrs VS reviewed since prior progress note.  Most recent are:  Visit Vitals  BP 138/65 (BP 1 Location: Left arm, BP Patient Position: At rest)   Pulse 60   Temp 98.1 °F (36.7 °C)   Resp 14   Ht 5' 4\" (1.626 m)   Wt 100.2 kg (221 lb)   SpO2 100%   BMI 37.93 kg/m²         Intake/Output Summary (Last 24 hours) at 6/28/2020 1030  Last data filed at 6/28/2020 0800  Gross per 24 hour   Intake 1100 ml   Output 1750 ml   Net -650 ml        Physical Examination:             Constitutional:  No acute distress, doesn't follow commands. ENT:  Trach- O2 on Trach   Resp:  good AE, no wheezes. Few creps at bases        GI:  Soft, non distended, non tender. obese    Musculoskeletal:  No edema    Neurologic:  Open her right eye on voice, non verbal, doesn't follow command, left hemiplegia, moves right extremities spontaneously      Skin:  Good turgor, no rashes or ulcers       Data Review:    Review and/or order of clinical lab test  Review and/or order of tests in the radiology section of CPT  Review and/or order of tests in the medicine section of CPT      Labs:     Recent Labs     06/28/20 0141 06/27/20 0426   WBC 6.2 6.6   HGB 9.4* 10.2*   HCT 32.3* 35.2    398     Recent Labs     06/28/20 0141 06/27/20 0426 06/26/20  0136    138 139   K 4.4 4.2 4.1    107 108   CO2 28 26 26   BUN 17 19 15   CREA 0.72 0.64 0.60   GLU 90 95 159*   CA 8.9 9.6 9.2     Recent Labs     06/28/20 0141 06/27/20 0426 06/26/20  0136   ALT 21 24 31   AP 70 80 81   TBILI 0.3 0.3 0.3   TP 6.3* 6.9 6.9   ALB 2.7* 2.9* 2.8*   GLOB 3.6 4.0 4.1*     No results for input(s): INR, PTP, APTT, INREXT, INREXT in the last 72 hours. No results for input(s): FE, TIBC, PSAT, FERR in the last 72 hours. No results found for: FOL, RBCF   No results for input(s): PH, PCO2, PO2 in the last 72 hours. No results for input(s): CPK, CKNDX, TROIQ in the last 72 hours.     No lab exists for component: CPKMB  Lab Results   Component Value Date/Time    Cholesterol, total 90 06/05/2020 03:34 AM    HDL Cholesterol 64 06/05/2020 03:34 AM    LDL, calculated 7.8 06/05/2020 03:34 AM    Triglyceride 91 06/05/2020 03:34 AM    CHOL/HDL Ratio 1.4 06/05/2020 03:34 AM     Lab Results   Component Value Date/Time    Glucose (POC) 121 (H) 06/14/2020 11:22 PM    Glucose (POC) 118 (H) 06/14/2020 05:07 PM    Glucose (POC) 114 (H) 06/14/2020 11:27 AM    Glucose (POC) 109 (H) 06/14/2020 05:02 AM    Glucose (POC) 126 (H) 06/13/2020 11:09 PM     Lab Results   Component Value Date/Time    Specific gravity 1.015 06/15/2020 05:45 AM         Medications Reviewed:     Current Facility-Administered Medications   Medication Dose Route Frequency    lisinopriL (PRINIVIL, ZESTRIL) tablet 30 mg  30 mg Oral DAILY    albuterol-ipratropium (DUO-NEB) 2.5 MG-0.5 MG/3 ML  3 mL Nebulization BID PRN    guar gum (BENEFIBER) packet 1 Packet  4 g Oral TID    labetaloL (NORMODYNE;TRANDATE) injection 20 mg  20 mg IntraVENous Q4H PRN    0.9% sodium chloride infusion 250 mL  250 mL IntraVENous PRN    hydrALAZINE (APRESOLINE) tablet 100 mg  100 mg Oral TID    fentaNYL citrate (PF) injection 50 mcg  50 mcg IntraVENous Q2H PRN    hydrALAZINE (APRESOLINE) 20 mg/mL injection 10 mg  10 mg IntraVENous Q2H PRN    chlorhexidine (ORAL CARE KIT) 0.12 % mouthwash 15 mL  15 mL Oral Q12H    niCARdipine (CARDENE) 50 mg in 0.9% sodium chloride 100 mL infusion  0-15 mg/hr IntraVENous TITRATE    carvediloL (COREG) tablet 25 mg  25 mg Oral BID WITH MEALS    amLODIPine (NORVASC) tablet 10 mg  10 mg Per NG tube DAILY    potassium bicarb-citric acid (EFFER-K) tablet 40 mEq  40 mEq Oral BID    lacosamide (VIMPAT) tablet 150 mg  150 mg Per NG tube Q12H    balsam peru-castor oiL (VENELEX) ointment   Topical BID    aspirin chewable tablet 81 mg  81 mg Per NG tube DAILY    enoxaparin (LOVENOX) injection 40 mg  40 mg SubCUTAneous Q24H    levETIRAcetam (KEPPRA) oral solution 1,000 mg  1,000 mg Per NG tube Q12H    glycopyrrolate (ROBINUL) injection 0.2 mg  0.2 mg IntraMUSCular TID PRN    acetaminophen (TYLENOL) solution 650 mg  650 mg Per NG tube Q4H PRN    glucose chewable tablet 16 g  4 Tab Oral PRN    glucagon (GLUCAGEN) injection 1 mg  1 mg IntraMUSCular PRN    dextrose 10% infusion 0-250 mL  0-250 mL IntraVENous PRN    albuterol (PROVENTIL VENTOLIN) nebulizer solution 2.5 mg  2.5 mg Nebulization Q6H PRN    polyvinyl alcohol-povidone (NATURAL TEARS) 0.5-0.6 % ophthalmic solution 2 Drop  2 Drop Both Eyes Q8H    bisacodyL (DULCOLAX) suppository 10 mg  10 mg Rectal DAILY PRN    ondansetron (ZOFRAN) injection 4 mg  4 mg IntraVENous Q4H PRN    docusate (COLACE) 50 mg/5 mL oral liquid 100 mg  100 mg Oral BID PRN     ______________________________________________________________________  EXPECTED LENGTH OF STAY: 22d 21h  ACTUAL LENGTH OF STAY:          Pearl Miranda MD

## 2020-06-28 NOTE — PROGRESS NOTES
Problem: Falls - Risk of  Goal: *Absence of Falls  Description: Document Dion Varela Fall Risk and appropriate interventions in the flowsheet.   Outcome: Progressing Towards Goal  Note: Fall Risk Interventions:  Mobility Interventions: Communicate number of staff needed for ambulation/transfer, OT consult for ADLs, Patient to call before getting OOB, PT Consult for mobility concerns, PT Consult for assist device competence, Strengthening exercises (ROM-active/passive), Utilize walker, cane, or other assistive device, Utilize gait belt for transfers/ambulation    Mentation Interventions: Adequate sleep, hydration, pain control, Door open when patient unattended, Evaluate medications/consider consulting pharmacy, Gait belt with transfers/ambulation, Familiar objects from home, Increase mobility, More frequent rounding, Reorient patient, Room close to nurse's station, Toileting rounds, Update white board    Medication Interventions: Assess postural VS orthostatic hypotension, Utilize gait belt for transfers/ambulation, Teach patient to arise slowly, Evaluate medications/consider consulting pharmacy, Patient to call before getting OOB    Elimination Interventions: Call light in reach, Patient to call for help with toileting needs, Stay With Me (per policy), Toilet paper/wipes in reach, Toileting schedule/hourly rounds    History of Falls Interventions: Consult care management for discharge planning, Door open when patient unattended, Evaluate medications/consider consulting pharmacy, Investigate reason for fall, Room close to nurse's station, Utilize gait belt for transfer/ambulation, Assess for delayed presentation/identification of injury for 48 hrs (comment for end date), Vital signs minimum Q4HRs X 24 hrs (comment for end date)         Problem: Patient Education: Go to Patient Education Activity  Goal: Patient/Family Education  Outcome: Progressing Towards Goal     Problem: Pressure Injury - Risk of  Goal: *Prevention of pressure injury  Description: Document Sriram Scale and appropriate interventions in the flowsheet. Outcome: Progressing Towards Goal  Note: Pressure Injury Interventions:  Sensory Interventions: Assess changes in LOC, Assess need for specialty bed, Avoid rigorous massage over bony prominences, Check visual cues for pain, Discuss PT/OT consult with provider, Float heels, Keep linens dry and wrinkle-free, Maintain/enhance activity level, Minimize linen layers, Monitor skin under medical devices, Pressure redistribution bed/mattress (bed type), Sit a 90-degree angle/use footstool if needed, Turn and reposition approx. every two hours (pillows and wedges if needed)    Moisture Interventions: Absorbent underpads, Apply protective barrier, creams and emollients, Assess need for specialty bed, Check for incontinence Q2 hours and as needed, Internal/External urinary devices, Maintain skin hydration (lotion/cream), Minimize layers, Offer toileting Q_hr    Activity Interventions: Assess need for specialty bed, Increase time out of bed, Pressure redistribution bed/mattress(bed type), PT/OT evaluation    Mobility Interventions: Assess need for specialty bed, Float heels, HOB 30 degrees or less, Pressure redistribution bed/mattress (bed type), PT/OT evaluation, Turn and reposition approx.  every two hours(pillow and wedges)    Nutrition Interventions: Document food/fluid/supplement intake, Discuss nutritional consult with provider, Offer support with meals,snacks and hydration(tube feeds)    Friction and Shear Interventions: Apply protective barrier, creams and emollients, Feet elevated on foot rest, HOB 30 degrees or less, Lift sheet, Lift team/patient mobility team, Minimize layers, Sit at 90-degree angle                Problem: Patient Education: Go to Patient Education Activity  Goal: Patient/Family Education  Outcome: Progressing Towards Goal     Problem: Nutrition Deficit  Goal: *Optimize nutritional status  Outcome: Progressing Towards Goal     Problem: Patient Education: Go to Patient Education Activity  Goal: Patient/Family Education  Outcome: Progressing Towards Goal     Problem: Hemorrhagic Stroke: Day 5 through Discharge  Goal: Activity/Safety  Outcome: Progressing Towards Goal  Goal: Consults, if ordered  Outcome: Progressing Towards Goal  Goal: Diagnostic Test/Procedures  Outcome: Progressing Towards Goal  Goal: Nutrition/Diet  Outcome: Progressing Towards Goal  Goal: Medications  Outcome: Progressing Towards Goal  Goal: Respiratory  Outcome: Progressing Towards Goal  Goal: Treatments/Interventions/Procedures  Outcome: Progressing Towards Goal  Goal: Psychosocial  Outcome: Progressing Towards Goal  Goal: *Hemodynamically stable  Outcome: Progressing Towards Goal  Goal: *Verbalizes anxiety and depression are reduced or absent  Outcome: Progressing Towards Goal  Goal: *Absence of aspiration  Outcome: Progressing Towards Goal  Goal: *Absence of signs and symptoms of DVT  Outcome: Progressing Towards Goal  Goal: *Optimal pain control at patient's stated goal  Outcome: Progressing Towards Goal  Goal: *Tolerating diet  Outcome: Progressing Towards Goal  Goal: *Progressive mobility and function  Outcome: Progressing Towards Goal  Goal: *Rehabilitation readiness  Outcome: Progressing Towards Goal     Problem: Hemorrhagic Stroke: Discharge Outcomes  Goal: *Verbalizes anxiety and depression are reduced or absent  Outcome: Progressing Towards Goal  Goal: *Verbalize understanding of risk factor modification(Stroke Metric)  Outcome: Progressing Towards Goal  Goal: *Optimal pain control at patient's stated goal  Outcome: Progressing Towards Goal  Goal: *Hemodynamically stable  Outcome: Progressing Towards Goal  Goal: *Absence of aspiration pneumonia  Outcome: Progressing Towards Goal  Goal: *Aware of needed dietary changes  Outcome: Progressing Towards Goal  Goal: *Verbalizes understanding and describes medication purposes and frequencies  Outcome: Progressing Towards Goal  Goal: *Tolerating diet  Outcome: Progressing Towards Goal  Goal: *Absence of signs and symptoms of DVT  Outcome: Progressing Towards Goal  Goal: *Absence of aspiration  Outcome: Progressing Towards Goal  Goal: *Progressive mobility and function  Outcome: Progressing Towards Goal  Goal: *Home safety concerns addressed  Outcome: Progressing Towards Goal     Problem: Patient Education: Go to Patient Education Activity  Goal: Patient/Family Education  Outcome: Progressing Towards Goal     Problem: Patient Education:  Go to Education Activity  Goal: Patient/Family Education  Outcome: Progressing Towards Goal     Problem: Subarachnoid Hemorrhage Stroke:Admission Day 5-Discharge  Goal: Activity/Safety  Outcome: Progressing Towards Goal  Goal: Diagnostic Test/Procedures  Outcome: Progressing Towards Goal  Goal: Nutrition/Diet  Outcome: Progressing Towards Goal  Goal: Medications  Outcome: Progressing Towards Goal  Goal: Patient maintains clear airway/absence of aspiration  Outcome: Progressing Towards Goal  Goal: Treatments/Interventions/Procedures  Outcome: Progressing Towards Goal  Goal: Psychosocial  Outcome: Progressing Towards Goal  Goal: *Hemodynamically stable  Outcome: Progressing Towards Goal  Goal: *Absence of signs and symptoms of DVT  Outcome: Progressing Towards Goal     Problem: Subarachnoid Hemorrhage Stroke:Discharge Outcomes  Goal: *Verbalizes anxiety and depression are reduced or absent  Outcome: Progressing Towards Goal  Goal: *Verbalizes understanding of risk factor modification (Stroke Metric)  Outcome: Progressing Towards Goal  Goal: *Hemodynamically stable  Outcome: Progressing Towards Goal  Goal: *Aware of needed dietary changes  Outcome: Progressing Towards Goal  Goal: *Verbalizes understanding and describes medication purposes and frequencies  Outcome: Progressing Towards Goal  Goal: *Verbalizes understanding of plan for nimodipine administration  Description: until day 21 and when next dose is scheduled  Outcome: Progressing Towards Goal  Goal: *Absence of signs and symptoms of DVT  Outcome: Progressing Towards Goal  Goal: *Absence of aspiration  Outcome: Progressing Towards Goal  Goal: *Progressive mobility and function  Outcome: Progressing Towards Goal  Goal: *Home safety concerns addressed  Outcome: Progressing Towards Goal     Problem: Patient Education: Go to Patient Education Activity  Goal: Patient/Family Education  Outcome: Progressing Towards Goal

## 2020-06-28 NOTE — PROGRESS NOTES
Neurointerventional Surgery Progress Note  Elizabeth Mejia NP  Neurocritical Care Nurse Practitioner  [phone]    Admit Date: 6/4/2020                                          Daily Progress Note: 6/27/2020   LOS: 32 days       S/P:  POD 22 Cerebral angiogram with coil embolization of ruptured left MCA bifurcation aneurysm on 6/5/2020     ** Delayed Presentation ** PB Day 28     Interval History/Subjective:   Patient currently on trach collar. Continues to be stable. On Coreg, Hydralazine, Norvasc and Lisinopril for BP management.    Patient not following commands today. She will open her eyes spontaneously and track with eyes. Occasionally starts moving mouth, but has no effort in communicating.     Unable to perform a ROS due to AMS.    Assessment & Plan:      Active Problems:    SAH (subarachnoid hemorrhage) ([address] (6/4/2020)       Subarachnoid hemorrhage from middle cerebral artery aneurysm, left (HCC) (6/4/2020)       RIGHT middle cerebral artery aneurysm (6/4/2020)       Cerebral vasospasm (6/4/2020)       Respiratory failure ([address] (6/15/2020)       Hypoxic ischemic encephalopathy (HIE), unspecified severity (6/15/2020)        1.) SAH due to ruptured cerebral aneurysm, Quinones Sales 5, An Grade 3/4              - s/p cerebral angiogram with coil embolization of left MCA bifurcation aneurysm on 6/5       Additionally, patient has 5X3 right MCA bifurcation aneurysm with very wide neck which will               need to be treated with stents vs. Clipping              - Completed Nimotop 21 days to prevent delayed cerebral ischemia              - Last TCDs on 6/20 showed continued possible left PCA vasospasm, no evidence of vasospasm in the remaining intracranial vessels bilaterally.  Patient  out of vasospasm window               - ECHO shows EF 60-65%, mild concentric hypertrophy, mildly dilated left atrium              - ok with neuro checks every 2 hours during the day and every 4 hours at night             - change SBP goal to 110-160 in the setting of unsecured aneurysm in the right MCA,               - PT/OT/SLP evals     2. ) Seizure              - Due to #1, no prior hx of seizure              - Continue Keppra 1000 mg BID and Vimpat 150 mg BID               - Seizure precautions     3.) Cerebral Edema (resolved)     4.) Acute hypoxic respiratory failure in the setting of likely neurogenic pulmonary edema- resolved              - currently on trach collar, tolerating well              - chest XR on 6/19 Vague left lower lobe airspace process, suggesting subsegmental  atelectasis or pneumonia     5.) Multiple Acute Ischemic Infarctions                - Imaging pattern is consistent with Hypoxic-Ischemic Encephalopathy (HIE)                - Continue aspirin 81 mg daily for stroke prevention           6.) Cerebral Vasospasm- resolved                  - s/p cerebral angiogram for right PANCHITO A1 angioplasty for vasospasm, transarterial infusion of verapamil on 6/9,  S/p angiogram on 6/10 for left ICA/MCA angioplasty and transarterial infusion of verapamil for vasospasm treatment, s/p angiogram on 6/11 and 6/13 for transarterial infusion of verapamil for vasospasm treatment                7.) Fever --> resolved                 - completed course of Zosyn, WBC normal                - respiratory cultures growing Klebsiella Pneumonia and Citrobacter Koseria 6/17                - CTA of chest 6/7 negative for PE                 - Cefepime started on 6/19 to cover Citrobacter per Intensivist, discontinued as respiratory specimen is likely a contaminant per Intensivist                     8.) Polyuria                  - serum sodium stable at 140, electrolytes WNL                  - suspect patient is auto-diuresing due to recent issues with fluid overload                  - monitor BMP daily     9.) HTN                  - SBP goal to 110-160                  - continue Amlodipine, Coreg, Lisinopril, and hydralazine                  - Hospitalist following                     Activity: Bed rest  DVT ppx: SCDs, Lovenox  Disposition: TBD     Plan:  Awaiting disposition decision  Plan discussed with Dr. Cuba Asencio.      Admission Summary:     Sony Santoyo a 48 y.o. 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 On arrival to ED, pt was unresponsive and noted to be seizing. A stat CT of her head was performed there, which showed extensive SAH. Pt was then intubated emergently. She was also extremely hypertensive with SBPs in 240s. She was given Keppra and Ativan, and placed on cardene drip for BP control. NSGY and NIS were then consulted and the patient was transferred to [facility] for higher level of care. En route, paramedics administered 7.5 mg of Versed, 200mcg of Fentanyl, and 10 mg of labetalol for BP control. On arrival, CTA of her head and neck was obtained, which showed a 4.4 x 4.1 mm right MCA trifurcation aneurysm and  a 2.2 x 3.1 mm left MCA trifurcation aneurysm. Of note, CTA head/neck also showed pulmonary infiltrates suspicious for COVID-19 infection and the patient has been tested for COVID which was negative. 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 Mother reported pt does not smoke, does not drink alcohol or use street drugs. The mother also reported that the patient began complaining of a headache on 5/31/2020 and has been taking BC Powder (aspirin) for the headache.  She also reported that the pt has a hx of HTN and is supposed to be on BP medications, but recently stopped taking her medications. The patient underwent a cerebral angiogram on 6/5 by Dr. Billie Alvarado for coil embolization of a ruptured left MCA bifurcation aneurysm.               Current Facility-Administered Medications   Medication Dose Route Frequency Provider Last Rate Last Dose    lisinopriL (PRINIVIL, ZESTRIL) tablet 20 mg  20 mg Oral DAILY Landry Larios MD   20 mg at 06/26/20 0821    guar gum (BENEFIBER) packet 1 Packet  4 g Oral TID Landry Larios MD   1 Packet at 06/26/20 0820    labetaloL (NORMODYNE;TRANDATE) injection 20 mg  20 mg IntraVENous Q4H PRN Landry Larios MD        0.9% sodium chloride infusion 250 mL  250 mL IntraVENous PRN Ifeanyi Jacobs MD        albuterol-ipratropium (DUO-NEB) 2.5 MG-0.5 MG/3 ML  3 mL Nebulization BID RT Ifeanyi Jacobs MD   3 mL at 06/26/20 0915    hydrALAZINE (APRESOLINE) tablet 100 mg  100 mg Oral TID Socorro Sterling NP   100 mg at 06/26/20 3750    fentaNYL citrate (PF) injection 50 mcg  50 mcg IntraVENous Q2H PRN Angélica Kohler NP   50 mcg at 06/24/20 0147    hydrALAZINE (APRESOLINE) 20 mg/mL injection 10 mg  10 mg IntraVENous Q2H PRN Geovanna Farias NP   10 mg at 06/26/20 0655    chlorhexidine (ORAL CARE KIT) 0.12 % mouthwash 15 mL  15 mL Oral Q12H Socorro Sterling NP   15 mL at 06/24/20 2051    niCARdipine (CARDENE) 50 mg in 0.9% sodium chloride 100 mL infusion  0-15 mg/hr IntraVENous TITRATE Petrona Avery NP   Stopped at 06/24/20 0246    carvediloL (COREG) tablet 25 mg  25 mg Oral BID WITH MEALS Eleuterio Hansen MD   25 mg at 06/26/20 0821    amLODIPine (NORVASC) tablet 10 mg  10 mg Per NG tube DAILY Eleuterio Hansen MD   10 mg at 06/26/20 9876    potassium bicarb-citric acid (EFFER-K) tablet 40 mEq  40 mEq Oral BID Eleuterio Hansen MD   40 mEq at 06/26/20 0820    lacosamide (VIMPAT) tablet 150 mg  150 mg Per NG tube Q12H All Palma MD   150 mg at 06/26/20 9381    balsam peru-castor oiL (VENELEX) ointment   Topical BID Leon Berry DO        aspirin chewable tablet 81 mg  81 mg Per NG tube DAILY Jailene Martinez MD   81 mg at 06/26/20 0821    enoxaparin (LOVENOX) injection 40 mg  40 mg SubCUTAneous Q24H Leon Berry DO   40 mg at 20 1400    levETIRAcetam (KEPPRA) oral solution 1,000 mg  1,000 mg Per NG tube Q12H Leon Berry DO   1,000 mg at 20 0820    glycopyrrolate (ROBINUL) injection 0.2 mg  0.2 mg IntraMUSCular TID PRN Linus Thayer NP   0.2 mg at 20 1803    acetaminophen (TYLENOL) solution 650 mg  650 mg Per NG tube Q4H PRN Massiel Salcedo NP   650 mg at 20 0230    glucose chewable tablet 16 g  4 Tab Oral PRN Calin Jabs, NP-C        glucagon (GLUCAGEN) injection 1 mg  1 mg IntraMUSCular PRN Calin Jabs, NP-C        dextrose 10% infusion 0-250 mL  0-250 mL IntraVENous PRN Calin Jaaretha, NP-C        albuterol (PROVENTIL VENTOLIN) nebulizer solution 2.5 mg  2.5 mg Nebulization Q6H PRN Calin Rosa Elena, NP-C        polyvinyl alcohol-povidone (NATURAL TEARS) 0.5-0.6 % ophthalmic solution 2 Drop  2 Drop Both Eyes Q8H Ree Isaacs, ACNP   2 Drop at 20 0600    bisacodyL (DULCOLAX) suppository 10 mg  10 mg Rectal DAILY PRN Valeriolis Ferny, ACNP        ondansetron [facility]) injection 4 mg  4 mg IntraVENous Q4H PRN Calinramesh Cuba, NP-C        docusate (COLACE) 50 mg/5 mL oral liquid 100 mg  100 mg Oral BID PRN Calin Jabs, NP-C             No Known Allergies     Review of Systems:  Review of systems not obtained due to patient factors.       Objective:     Vital signs  Temp (24hrs), Av.7 °F (37.1 °C), Min:98.1 °F (36.7 °C), Max:99.7 °F (37.6 °C)   701 - 1900  In: 500   Out: 8751 [FTOUK:0826]  1901 -  0700  In: 1350   Out: 2350 [Urine:2350]    Visit Vitals  /58   Pulse (!) 56   Temp 98.2 °F (36.8 °C)   Resp 16   Ht 5' 4\" (1.626 m)   Wt 221 lb (100.2 kg)   SpO2 100%   BMI 37.93 kg/m²    O2 Flow Rate (L/min): 8 l/min O2 Device: Tracheal collar   Vitals:    20 0951 20 1200 20 1346 20 1603   BP: 138/65 146/64 152/66 161/58   Pulse: 60 (!) 55 75 (!) 56   Resp: 14  16    Temp: 98.1 °F (36.7 °C)  98.2 °F (36.8 °C)    SpO2: 100%  100%    Weight:       Height:              Physical Exam:  GENERAL: NAD, trach collar present, alert   EYE: conjunctivae/corneas clear. Right pupil 3 mm, brisk response to light. Left pupil 4 mm, brisk response to light. Keeps left eye closed   LUNG: non-labored breathing on trach collar, lungs clear anteriorly   HEART: RRR, no murmur, rub, click or gallop  EXTREMITIES:   2+ pitting edema in BUE, +1 Pitting edema in BLE, distal pulses 2+ bilaterally   SKIN: Skin warm to touch. Right and left groin site clean, dry, and intact. No hematoma, bruising, or bleeding noted. NEUROLOGIC: Alert. Trach-collar. Non-verbal. Was moving lips on exam as if talking, but not on command or an effort to try to communicate. Eyes will open spontaneously. Does not follow commands. Left eye remains closed. Focuses and tracks with eyes, with the exception of inability to track to the right with left eye. Moves all extremities spontaneously, left greater than right; Unable to assess language, sensation, and coordination. Imaging:    CT of Head on 6/18/2020 shows  IMPRESSION:      Decreased left subarachnoid hemorrhage. No new hemorrhage. Artifact versus increased conspicuity of nonacute left temporal lobe infarct. CTA of Head on 6/18/2020 shows  IMPRESSION:     1. No acute large vessel occlusion. 2. Recent left MCA bifurcation aneurysm coiling, with interval improvement in  now mild vasospasm involving the bilateral anterior, left middle, vertebral  basilar and bilateral posterior cerebral arteries as discussed in detail above. 3. Unchanged right MCA bifurcation aneurysm. MRI of Brain on 6/19/2020 shows  IMPRESSION:   1. Persistent subarachnoid hemorrhage in the left sylvian fissure.     2.  Patchy areas of enhancement as described. Some of these are associated with  foci of diffusion restriction and likely represent subacute ischemia.  The  leptomeningeal enhancement predominantly in the left frontal and parietal lobes  is likely related to subarachnoid hemorrhage and meningeal irritation. Infection  is not entirely excluded but thought less likely. 24 hour results:    Recent Results (from the past 24 hour(s))   METABOLIC PANEL, COMPREHENSIVE    Collection Time: 06/28/20  1:41 AM   Result Value Ref Range    Sodium 140 136 - 145 mmol/L    Potassium 4.4 3.5 - 5.1 mmol/L    Chloride 105 97 - 108 mmol/L    CO2 28 21 - 32 mmol/L    Anion gap 7 5 - 15 mmol/L    Glucose 90 65 - 100 mg/dL    BUN 17 6 - 20 MG/DL    Creatinine 0.72 0.55 - 1.02 MG/DL    BUN/Creatinine ratio 24 (H) 12 - 20      GFR est AA >60 >60 ml/min/1.73m2    GFR est non-AA >60 >60 ml/min/1.73m2    Calcium 8.9 8.5 - 10.1 MG/DL    Bilirubin, total 0.3 0.2 - 1.0 MG/DL    ALT (SGPT) 21 12 - 78 U/L    AST (SGOT) 16 15 - 37 U/L    Alk. phosphatase 70 45 - 117 U/L    Protein, total 6.3 (L) 6.4 - 8.2 g/dL    Albumin 2.7 (L) 3.5 - 5.0 g/dL    Globulin 3.6 2.0 - 4.0 g/dL    A-G Ratio 0.8 (L) 1.1 - 2.2     CBC WITH AUTOMATED DIFF    Collection Time: 06/28/20  1:41 AM   Result Value Ref Range    WBC 6.2 3.6 - 11.0 K/uL    RBC 3.96 3.80 - 5.20 M/uL    HGB 9.4 (L) 11.5 - 16.0 g/dL    HCT 32.3 (L) 35.0 - 47.0 %    MCV 81.6 80.0 - 99.0 FL    MCH 23.7 (L) 26.0 - 34.0 PG    MCHC 29.1 (L) 30.0 - 36.5 g/dL    RDW 24.7 (H) 11.5 - 14.5 %    PLATELET 287 548 - 008 K/uL    MPV 10.0 8.9 - 12.9 FL    NRBC 0.0 0  WBC    ABSOLUTE NRBC 0.00 0.00 - 0.01 K/uL    NEUTROPHILS 65 32 - 75 %    LYMPHOCYTES 20 12 - 49 %    MONOCYTES 11 5 - 13 %    EOSINOPHILS 3 0 - 7 %    BASOPHILS 1 0 - 1 %    IMMATURE GRANULOCYTES 0 0.0 - 0.5 %    ABS. NEUTROPHILS 4.0 1.8 - 8.0 K/UL    ABS. LYMPHOCYTES 1.2 0.8 - 3.5 K/UL    ABS. MONOCYTES 0.7 0.0 - 1.0 K/UL    ABS. EOSINOPHILS 0.2 0.0 - 0.4 K/UL    ABS. BASOPHILS 0.1 0.0 - 0.1 K/UL    ABS. IMM.  GRANS. 0.0 0.00 - 0.04 K/UL    DF SMEAR SCANNED      RBC COMMENTS ANISOCYTOSIS  3+        RBC COMMENTS HYPOCHROMIA  1+        RBC COMMENTS OVALOCYTES  PRESENT        RBC COMMENTS SCHISTOCYTES  PRESENT       PROCALCITONIN    Collection Time: 06/28/20  1:41 AM   Result Value Ref Range    Procalcitonin 0.05 ng/mL        Goledn Villalta NP

## 2020-06-28 NOTE — PROGRESS NOTES
Bedside and Verbal shift change report given to [address] (oncoming nurse) by Jeannette Chen (offgoing nurse). Report included the following information SBAR, Kardex, Intake/Output, MAR, Recent Results, Cardiac Rhythm NSR and Dual Neuro Assessment.

## 2020-06-28 NOTE — ROUTINE PROCESS
Bedside shift change report given to [address] [place] [address] (oncoming nurse) by Sabra Flores RN (offgoing nurse). Report included the following information SBAR, Kardex, ED Summary, Procedure Summary, Intake/Output, MAR, Recent Results, Cardiac Rhythm NSR/SB, Quality Measures and Dual Neuro Assessment.

## 2020-06-28 NOTE — PROGRESS NOTES
Problem: Falls - Risk of  Goal: *Absence of Falls  Description: Document Sandi Alba Fall Risk and appropriate interventions in the flowsheet. Outcome: Progressing Towards Goal  Note: Fall Risk Interventions:  Mobility Interventions: Communicate number of staff needed for ambulation/transfer, OT consult for ADLs, Patient to call before getting OOB, PT Consult for mobility concerns, PT Consult for assist device competence, Strengthening exercises (ROM-active/passive)    Mentation Interventions: Adequate sleep, hydration, pain control, Bed/chair exit alarm, Door open when patient unattended, Evaluate medications/consider consulting pharmacy, Eyeglasses and hearing aids, Increase mobility, More frequent rounding, Reorient patient, Room close to nurse's station, Toileting rounds, Update white board    Medication Interventions: Evaluate medications/consider consulting pharmacy, Patient to call before getting OOB, Teach patient to arise slowly    Elimination Interventions: Call light in reach, Elevated toilet seat    History of Falls Interventions: Consult care management for discharge planning, Door open when patient unattended, Evaluate medications/consider consulting pharmacy, Investigate reason for fall         Problem: Patient Education: Go to Patient Education Activity  Goal: Patient/Family Education  Outcome: Progressing Towards Goal     Problem: Pressure Injury - Risk of  Goal: *Prevention of pressure injury  Description: Document Sriram Scale and appropriate interventions in the flowsheet.   Outcome: Progressing Towards Goal  Note: Pressure Injury Interventions:  Sensory Interventions: Assess changes in LOC, Assess need for specialty bed, Avoid rigorous massage over bony prominences, Check visual cues for pain, Discuss PT/OT consult with provider, Float heels, Keep linens dry and wrinkle-free, Maintain/enhance activity level, Minimize linen layers, Monitor skin under medical devices    Moisture Interventions: Absorbent underpads, Apply protective barrier, creams and emollients, Assess need for specialty bed, Check for incontinence Q2 hours and as needed, Internal/External urinary devices    Activity Interventions: Increase time out of bed, Pressure redistribution bed/mattress(bed type), PT/OT evaluation    Mobility Interventions: Float heels, HOB 30 degrees or less, Pressure redistribution bed/mattress (bed type), PT/OT evaluation    Nutrition Interventions: Offer support with meals,snacks and hydration    Friction and Shear Interventions: Feet elevated on foot rest, HOB 30 degrees or less, Lift sheet, Sit at 90-degree angle, Lift team/patient mobility team                Problem: Patient Education: Go to Patient Education Activity  Goal: Patient/Family Education  Outcome: Progressing Towards Goal     Problem: Nutrition Deficit  Goal: *Optimize nutritional status  Outcome: Progressing Towards Goal     Problem: Patient Education: Go to Patient Education Activity  Goal: Patient/Family Education  Outcome: Progressing Towards Goal     Problem: Hemorrhagic Stroke: Day 5 through Discharge  Goal: Off Pathway (Use only if patient is Off Pathway)  Outcome: Progressing Towards Goal  Goal: Activity/Safety  Outcome: Progressing Towards Goal  Goal: Consults, if ordered  Outcome: Progressing Towards Goal  Goal: Diagnostic Test/Procedures  Outcome: Progressing Towards Goal  Goal: Nutrition/Diet  Outcome: Progressing Towards Goal  Goal: Medications  Outcome: Progressing Towards Goal  Goal: Respiratory  Outcome: Progressing Towards Goal  Goal: Treatments/Interventions/Procedures  Outcome: Progressing Towards Goal  Goal: Psychosocial  Outcome: Progressing Towards Goal  Goal: *Hemodynamically stable  Outcome: Progressing Towards Goal  Goal: *Verbalizes anxiety and depression are reduced or absent  Outcome: Progressing Towards Goal  Goal: *Absence of aspiration  Outcome: Progressing Towards Goal  Goal: *Absence of signs and symptoms of DVT  Outcome: Progressing Towards Goal  Goal: *Optimal pain control at patient's stated goal  Outcome: Progressing Towards Goal  Goal: *Tolerating diet  Outcome: Progressing Towards Goal  Goal: *Progressive mobility and function  Outcome: Progressing Towards Goal  Goal: *Rehabilitation readiness  Outcome: Progressing Towards Goal     Problem: Hemorrhagic Stroke: Discharge Outcomes  Goal: *Verbalizes anxiety and depression are reduced or absent  Outcome: Progressing Towards Goal  Goal: *Verbalize understanding of risk factor modification(Stroke Metric)  Outcome: Progressing Towards Goal  Goal: *Optimal pain control at patient's stated goal  Outcome: Progressing Towards Goal  Goal: *Hemodynamically stable  Outcome: Progressing Towards Goal  Goal: *Absence of aspiration pneumonia  Outcome: Progressing Towards Goal  Goal: *Aware of needed dietary changes  Outcome: Progressing Towards Goal  Goal: *Verbalizes understanding and describes medication purposes and frequencies  Outcome: Progressing Towards Goal  Goal: *Tolerating diet  Outcome: Progressing Towards Goal  Goal: *Absence of signs and symptoms of DVT  Outcome: Progressing Towards Goal  Goal: *Absence of aspiration  Outcome: Progressing Towards Goal  Goal: *Progressive mobility and function  Outcome: Progressing Towards Goal  Goal: *Home safety concerns addressed  Outcome: Progressing Towards Goal     Problem: Patient Education: Go to Patient Education Activity  Goal: Patient/Family Education  Outcome: Progressing Towards Goal     Problem: Patient Education: Go to Patient Education Activity  Goal: Patient/Family Education  Outcome: Progressing Towards Goal     Problem: Patient Education:  Go to Education Activity  Goal: Patient/Family Education  Outcome: Progressing Towards Goal     Problem: Subarachnoid Hemorrhage Stroke:Admission Day 5-Discharge  Goal: Off Pathway (Use only if patient is Off Pathway)  Outcome: Progressing Towards Goal  Goal: Activity/Safety  Outcome: Progressing Towards Goal  Goal: Diagnostic Test/Procedures  Outcome: Progressing Towards Goal  Goal: Nutrition/Diet  Outcome: Progressing Towards Goal  Goal: Medications  Outcome: Progressing Towards Goal  Goal: Patient maintains clear airway/absence of aspiration  Outcome: Progressing Towards Goal  Goal: Treatments/Interventions/Procedures  Outcome: Progressing Towards Goal  Goal: Psychosocial  Outcome: Progressing Towards Goal  Goal: *Hemodynamically stable  Outcome: Progressing Towards Goal  Goal: *Absence of signs and symptoms of DVT  Outcome: Progressing Towards Goal     Problem: Subarachnoid Hemorrhage Stroke:Discharge Outcomes  Goal: *Verbalizes anxiety and depression are reduced or absent  Outcome: Progressing Towards Goal  Goal: *Verbalizes understanding of risk factor modification (Stroke Metric)  Outcome: Progressing Towards Goal  Goal: *Hemodynamically stable  Outcome: Progressing Towards Goal  Goal: *Aware of needed dietary changes  Outcome: Progressing Towards Goal  Goal: *Verbalizes understanding and describes medication purposes and frequencies  Outcome: Progressing Towards Goal  Goal: *Verbalizes understanding of plan for nimodipine administration  Description: until day 21 and when next dose is scheduled  Outcome: Progressing Towards Goal  Goal: *Absence of signs and symptoms of DVT  Outcome: Progressing Towards Goal  Goal: *Absence of aspiration  Outcome: Progressing Towards Goal  Goal: *Progressive mobility and function  Outcome: Progressing Towards Goal  Goal: *Home safety concerns addressed  Outcome: Progressing Towards Goal     Problem: Patient Education: Go to Patient Education Activity  Goal: Patient/Family Education  Outcome: Progressing Towards Goal     Problem: Patient Education: Go to Patient Education Activity  Goal: Patient/Family Education  Outcome: Progressing Towards Goal

## 2020-06-29 LAB
ALBUMIN SERPL-MCNC: 2.7 G/DL (ref 3.5–5)
ALBUMIN/GLOB SERPL: 0.8 {RATIO} (ref 1.1–2.2)
ALP SERPL-CCNC: 69 U/L (ref 45–117)
ALT SERPL-CCNC: 19 U/L (ref 12–78)
ANION GAP SERPL CALC-SCNC: 5 MMOL/L (ref 5–15)
AST SERPL-CCNC: 15 U/L (ref 15–37)
BASOPHILS # BLD: 0 K/UL (ref 0–0.1)
BASOPHILS NFR BLD: 0 % (ref 0–1)
BILIRUB SERPL-MCNC: 0.2 MG/DL (ref 0.2–1)
BUN SERPL-MCNC: 19 MG/DL (ref 6–20)
BUN/CREAT SERPL: 27 (ref 12–20)
CALCIUM SERPL-MCNC: 9 MG/DL (ref 8.5–10.1)
CHLORIDE SERPL-SCNC: 106 MMOL/L (ref 97–108)
CO2 SERPL-SCNC: 27 MMOL/L (ref 21–32)
CREAT SERPL-MCNC: 0.7 MG/DL (ref 0.55–1.02)
DIFFERENTIAL METHOD BLD: ABNORMAL
EOSINOPHIL # BLD: 0.2 K/UL (ref 0–0.4)
EOSINOPHIL NFR BLD: 4 % (ref 0–7)
ERYTHROCYTE [DISTWIDTH] IN BLOOD BY AUTOMATED COUNT: 24.6 % (ref 11.5–14.5)
GLOBULIN SER CALC-MCNC: 3.6 G/DL (ref 2–4)
GLUCOSE SERPL-MCNC: 89 MG/DL (ref 65–100)
HCT VFR BLD AUTO: 31.8 % (ref 35–47)
HGB BLD-MCNC: 9.4 G/DL (ref 11.5–16)
IMM GRANULOCYTES # BLD AUTO: 0 K/UL (ref 0–0.04)
IMM GRANULOCYTES NFR BLD AUTO: 0 % (ref 0–0.5)
LYMPHOCYTES # BLD: 1.3 K/UL (ref 0.8–3.5)
LYMPHOCYTES NFR BLD: 23 % (ref 12–49)
MCH RBC QN AUTO: 24.1 PG (ref 26–34)
MCHC RBC AUTO-ENTMCNC: 29.6 G/DL (ref 30–36.5)
MCV RBC AUTO: 81.5 FL (ref 80–99)
MONOCYTES # BLD: 0.6 K/UL (ref 0–1)
MONOCYTES NFR BLD: 11 % (ref 5–13)
NEUTS SEG # BLD: 3.4 K/UL (ref 1.8–8)
NEUTS SEG NFR BLD: 62 % (ref 32–75)
NRBC # BLD: 0 K/UL (ref 0–0.01)
NRBC BLD-RTO: 0 PER 100 WBC
PLATELET # BLD AUTO: 320 K/UL (ref 150–400)
PLATELET COMMENTS,PCOM: ABNORMAL
PMV BLD AUTO: 10.3 FL (ref 8.9–12.9)
POTASSIUM SERPL-SCNC: 4.1 MMOL/L (ref 3.5–5.1)
PROCALCITONIN SERPL-MCNC: 0.08 NG/ML
PROT SERPL-MCNC: 6.3 G/DL (ref 6.4–8.2)
RBC # BLD AUTO: 3.9 M/UL (ref 3.8–5.2)
RBC MORPH BLD: ABNORMAL
SODIUM SERPL-SCNC: 138 MMOL/L (ref 136–145)
WBC # BLD AUTO: 5.5 K/UL (ref 3.6–11)

## 2020-06-29 PROCEDURE — 74011250637 HC RX REV CODE- 250/637: Performed by: INTERNAL MEDICINE

## 2020-06-29 PROCEDURE — 74011250636 HC RX REV CODE- 250/636: Performed by: NURSE PRACTITIONER

## 2020-06-29 PROCEDURE — 74011250637 HC RX REV CODE- 250/637: Performed by: NURSE PRACTITIONER

## 2020-06-29 PROCEDURE — 80053 COMPREHEN METABOLIC PANEL: CPT

## 2020-06-29 PROCEDURE — 84145 PROCALCITONIN (PCT): CPT

## 2020-06-29 PROCEDURE — 36415 COLL VENOUS BLD VENIPUNCTURE: CPT

## 2020-06-29 PROCEDURE — 77030018846 HC SOL IRR STRL H20 ICUM -A

## 2020-06-29 PROCEDURE — 74011250637 HC RX REV CODE- 250/637: Performed by: ANESTHESIOLOGY

## 2020-06-29 PROCEDURE — 97530 THERAPEUTIC ACTIVITIES: CPT

## 2020-06-29 PROCEDURE — 74011250637 HC RX REV CODE- 250/637: Performed by: PSYCHIATRY & NEUROLOGY

## 2020-06-29 PROCEDURE — 74011250636 HC RX REV CODE- 250/636: Performed by: ANESTHESIOLOGY

## 2020-06-29 PROCEDURE — 77030038269 HC DRN EXT URIN PURWCK BARD -A

## 2020-06-29 PROCEDURE — 97535 SELF CARE MNGMENT TRAINING: CPT

## 2020-06-29 PROCEDURE — 85025 COMPLETE CBC W/AUTO DIFF WBC: CPT

## 2020-06-29 PROCEDURE — 94762 N-INVAS EAR/PLS OXIMTRY CONT: CPT

## 2020-06-29 PROCEDURE — 77030018836 HC SOL IRR NACL ICUM -A

## 2020-06-29 PROCEDURE — 77010033678 HC OXYGEN DAILY

## 2020-06-29 PROCEDURE — 65660000000 HC RM CCU STEPDOWN

## 2020-06-29 RX ADMIN — Medication 1 PACKET: at 16:58

## 2020-06-29 RX ADMIN — Medication 1 PACKET: at 08:37

## 2020-06-29 RX ADMIN — HYDRALAZINE HYDROCHLORIDE 100 MG: 50 TABLET, FILM COATED ORAL at 10:00

## 2020-06-29 RX ADMIN — CASTOR OIL AND BALSAM, PERU: 788; 87 OINTMENT TOPICAL at 08:39

## 2020-06-29 RX ADMIN — CARVEDILOL 25 MG: 12.5 TABLET, FILM COATED ORAL at 08:39

## 2020-06-29 RX ADMIN — CARVEDILOL 25 MG: 12.5 TABLET, FILM COATED ORAL at 16:58

## 2020-06-29 RX ADMIN — HYDRALAZINE HYDROCHLORIDE 100 MG: 50 TABLET, FILM COATED ORAL at 08:39

## 2020-06-29 RX ADMIN — ENOXAPARIN SODIUM 40 MG: 40 INJECTION SUBCUTANEOUS at 14:10

## 2020-06-29 RX ADMIN — HYDRALAZINE HYDROCHLORIDE 100 MG: 50 TABLET, FILM COATED ORAL at 16:58

## 2020-06-29 RX ADMIN — LACOSAMIDE 150 MG: 50 TABLET, FILM COATED ORAL at 08:38

## 2020-06-29 RX ADMIN — Medication 2 DROP: at 22:38

## 2020-06-29 RX ADMIN — ASPIRIN 81 MG CHEWABLE TABLET 81 MG: 81 TABLET CHEWABLE at 08:38

## 2020-06-29 RX ADMIN — Medication 2 DROP: at 14:00

## 2020-06-29 RX ADMIN — AMLODIPINE BESYLATE 10 MG: 5 TABLET ORAL at 08:39

## 2020-06-29 RX ADMIN — LEVETIRACETAM 1000 MG: 100 SOLUTION ORAL at 22:35

## 2020-06-29 RX ADMIN — POTASSIUM BICARBONATE 40 MEQ: 782 TABLET, EFFERVESCENT ORAL at 17:00

## 2020-06-29 RX ADMIN — Medication 1 PACKET: at 22:35

## 2020-06-29 RX ADMIN — ACETAMINOPHEN 650 MG: 650 SUSPENSION ORAL at 06:13

## 2020-06-29 RX ADMIN — Medication 2 DROP: at 06:00

## 2020-06-29 RX ADMIN — HYDRALAZINE HYDROCHLORIDE 10 MG: 20 INJECTION INTRAMUSCULAR; INTRAVENOUS at 06:13

## 2020-06-29 RX ADMIN — LACOSAMIDE 150 MG: 50 TABLET, FILM COATED ORAL at 22:35

## 2020-06-29 RX ADMIN — LEVETIRACETAM 1000 MG: 100 SOLUTION ORAL at 08:37

## 2020-06-29 RX ADMIN — LISINOPRIL 30 MG: 20 TABLET ORAL at 08:38

## 2020-06-29 RX ADMIN — CASTOR OIL AND BALSAM, PERU: 788; 87 OINTMENT TOPICAL at 17:00

## 2020-06-29 RX ADMIN — POTASSIUM BICARBONATE 40 MEQ: 782 TABLET, EFFERVESCENT ORAL at 08:37

## 2020-06-29 NOTE — PROGRESS NOTES
Bedside and Verbal shift change report given to Yelena Peralta (oncoming nurse) by Anahi Osorio (offgoing nurse). Report included the following information SBAR, Kardex, Procedure Summary, Intake/Output, MAR, Recent Results, Cardiac Rhythm NSR and Dual Neuro Assessment.

## 2020-06-29 NOTE — PROGRESS NOTES
Palliative Medicine Social Work    I met with patient mother in room. Patient in bed, awake, not following commands. Mother aware patient was not accepted for transfer to [facility]. She is disappointed that she can't get a second opinion - she wanted to be able to talk to doctors herself. She reiterated that this is not about being disappointed with care at [address] [place] [address]. She believes second opinions are standard protocol especially when stakes are so high. She is also worried about patient d/c to [place] and she won't get neurological support to get better. She has many questions about [facility] and care there, and I helped patient in writing a list of questions for [facility] rep. Underlying all of this anxiety is the fear that patient just isn't going to get better. She seems to be grasping for someone to tell her patient will improve and what that timeline will look like. The uncertainty is very difficult for her to live in. CM to speak with mom about d/c.    [facility] and will call Tahira Rodriguez at 4:30 today. Thank you for the opportunity to be involved in the care of Ms. Marely Petit and her family.     Alma Rosa Gordon, ANNALEE, Supervisee in Social Work  Palliative Medicine   [phone]

## 2020-06-29 NOTE — PROGRESS NOTES
Problem: Self Care Deficits Care Plan (Adult)  Goal: *Acute Goals and Plan of Care (Insert Text)  Description:   FUNCTIONAL STATUS PRIOR TO ADMISSION: Patient unable to provide history at evaluation. Per chart, patient was independent and working full-time at [employer]. HOME SUPPORT: Per chart, patient lived with her 15 y/o son and a friend. Occupational Therapy Goals  Goals reviewed and continued: 6/29/2020  Initiated 6/22/2020  1. Patient will perform grooming with maximal assistance within 7 day(s). 2.  Patient will follow ~25% simple commands with max multimodal cuing in preparation for functional tasks within 7 days. 3.  Patient will reach for ADL object with each UE with moderate assistance in preparation for functional reach within 7 days. 4.  Patient will perform composite grasp on ADL object with each hand in preparation for functional grasp within 7 days. 5.  Patient will participate in upper extremity therapeutic exercise/activities with maximal assistance for 5 minutes within 7 day(s). Outcome: Not Met     Problem: Patient Education: Go to Patient Education Activity  Goal: Patient/Family Education  Outcome: Not Met   OCCUPATIONAL THERAPY TREATMENT/WEEKLY RE-ASSESSMENT  Patient: aLuren Diaz (54 y.o. female)  Date: 6/29/2020  Diagnosis: [facility] [I60.9]   <principal problem not specified>       Precautions: Aspiration  Chart, occupational therapy assessment, plan of care, and goals were reviewed. ASSESSMENT  Patient continues with skilled OT services and is not progressing towards goals. Patient received in bed. Repositioned in alignment with total assist. Patient followed 0% commands despite max multimodal cuing. Wrist restraints removed for session and she spontaneously moved BUEs although with limited AROM, non-purposeful, and not on command. She spontaneously attended to both sides  with right eye.  No eye opening with left eye. Patient resists movement toward face for hand over hand assist to wash face. Total assist for washing face and hands. Poor visual attention. Noted edema bilateral UEs with right>left. Resistant to PROM bilateral shoulders and elbows in flexion after an initial 3 reps. No goals met. Little change overall from initial evaluation. Vitals within parameters at this time. Will decrease to 2 times a week and follow for 1 more week of trial.     Current Level of Function Impacting Discharge (ADLs): Total assist all self care    Other factors to consider for discharge: 15year old son         PLAN :  Goals have been updated based on progression since last assessment. Patient continues to benefit from skilled intervention to address the above impairments. Continue to follow patient 2 times a week to address goals.     Recommend with staff: bed in chair position, foot board and bed alarm on; UEs on pillows to decrease edema (above heart is ideal), minimize stimuli in the room     Recommend next OT session: sitting EOB with two staff or bed in chair position again to work on basic ADL task in front of her       Recommendation for discharge: (in order for the patient to meet his/her long term goals)  Therapy 3 hours per day 5-7 days per week    This discharge recommendation:  Has been made in collaboration with the attending provider and/or case management    IF patient discharges home will need the following DME: hospital bed, mechanical lift, and wheelchair       SUBJECTIVE:   Patient stated: Nonverbal    OBJECTIVE DATA SUMMARY:   Cognitive/Behavioral Status:  Neurologic State: Eyes open to stimulus(right eye; left eye stays closed)  Orientation Level: Unable to verbalize  Cognition: No command following;Decreased attention/concentration  Perception: Cues to maintain midline in sitting(seated in bed, moving to left side of bed)  Perseveration: No perseveration noted  Safety/Judgement: Decreased awareness of environment;Decreased insight into deficits; Decreased awareness of need for safety    Functional Mobility and Transfers for ADLs:    ADL Intervention:  Feeding  Feeding Assistance: (PEG)    Grooming  Grooming Assistance: Total assistance(dependent)  Washing Face: Total assistance (dependent)  Washing Hands: Total assistance (dependent)    Upper Body Bathing  Bathing Assistance: Total assistance(dependent)    Lower Body Bathing  Bathing Assistance: Total assistance(dependent)          Cognitive Retraining  Orientation Retraining: Reorienting;Place  Organizing/Sequencing: Breaking task down  Attention to Task: Distractibility  Safety/Judgement: Decreased awareness of environment;Decreased insight into deficits; Decreased awareness of need for safety  Cues: Verbal cues provided; Tactile cues provided;Visual cues provided    Activity Tolerance:   Fair  Please refer to the flowsheet for vital signs taken during this treatment. After treatment patient left in no apparent distress:   Supine in bed, Call bell within reach, and Restraints removed and replaced following end of session, HOB elevated    COMMUNICATION/COLLABORATION:   The patients plan of care was discussed with: Occupational therapist and Registered nurse.      MARTIN Cervantes  Time Calculation: 23 mins

## 2020-06-29 NOTE — PROGRESS NOTES
Problem: Mobility Impaired (Adult and Pediatric)  Goal: *Acute Goals and Plan of Care (Insert Text)  Description:   FUNCTIONAL STATUS PRIOR TO ADMISSION: Patient was independent and active without use of DME per chart review. HOME SUPPORT PRIOR TO ADMISSION: Details unknown - pt is not able to provide info at this time. Physical Therapy Goals  Initiated 6/22/2020, appropriate for carryover 06/29/2020  /1. Patient will move from supine to sit and sit to supine , scoot up and down and roll side to side in bed with maximal assistance within 7 day(s). 2.  Patient will tolerate bed in full chair position x30min for improved tolerance to upright and pulmonary toileting within 7 days. 3.  Patient will participate in B LE AAROM/PROM program within 7 days. Outcome: Not Progressing Towards Goal    PHYSICAL THERAPY TREATMENT: WEEKLY REASSESSMENT  Patient: Yon Wang (48 y.o. female)  Date: 6/29/2020  Primary Diagnosis: SAH (subarachnoid hemorrhage) ([facility] [I60.9]        Precautions:   Aspiration      ASSESSMENT  Patient continues with skilled PT services and is not progressing towards goals. Received pt semiupright in bed, cleared for mobility attempts. Soundly sleeping but eventually opened R eye with max multi modal attempts and tracked therapist around the room. Again attempted to elicit any purposeful movement or form of communication, however demonstrated zero command following and seemingly actively resisted against PROM or initiation of positional corrections. PRAFO donned on R foot to reduce risk of contracture formation - RN notified (2hrs on, 2hrs off wear schedule). Unfortunately pts progress is significantly limited by no command following. Will decrease frequency to 2x/ wk for additional trial week.      Patient's progression toward goals since last assessment: No goals met, limited to no functional improvement    Current Level of Function Impacting Discharge (mobility/balance): total A; no command following     Other factors to consider for discharge: no command following          PLAN :  Goals have been updated based on progression since last assessment. Patient continues to benefit from skilled intervention to address the above impairments. Recommendations and Planned Interventions: bed mobility training, therapeutic exercises, neuromuscular re-education, patient and family training/education, and therapeutic activities      Frequency/Duration: Patient will be followed by physical therapy:  2 times a week to address goals. Recommendation for discharge: (in order for the patient to meet his/her long term goals)  To be determined: LTAC vs [place]      This discharge recommendation:  A follow-up discussion with the attending provider and/or case management is planned    IF patient discharges home will need the following DME: to be determined (TBD)         SUBJECTIVE:   Zero communication attempts of any form. OBJECTIVE DATA SUMMARY:   HISTORY:    Past Medical History:   Diagnosis Date    HTN (hypertension)      Past Surgical History:   Procedure Laterality Date    IR INSERT GASTROSTOMY TUBE [facility]  6/19/2020       Personal factors and/or comorbidities impacting plan of care: PMHx    Home Situation  Home Environment: Private residence  One/Two Story Residence: One story  Living Alone: No  Support Systems: Child(yuli), Family member(s)  Patient Expects to be Discharged to[de-identified] Unknown  Current DME Used/Available at Home: None    EXAMINATION/PRESENTATION/DECISION MAKING:   Critical Behavior:  Neurologic State: Eyes open to stimulus(right eye; left eye stays closed)  Orientation Level: Unable to verbalize  Cognition: No command following, Decreased attention/concentration  Safety/Judgement: Decreased awareness of environment, Decreased insight into deficits, Decreased awareness of need for safety  Hearing:   Auditory  Auditory Impairment: None  Skin:    Edema: globa  Range Of Motion:  Functional Mobility:     Balance:   Sitting: Impaired; With support  Sitting - Static: Poor (constant support)    Activity Tolerance:   Poor and desaturates with exertion and requires oxygen  Please refer to the flowsheet for vital signs taken during this treatment. After treatment patient left in no apparent distress:   Supine in bed, Call bell within reach, and Side rails x 3    COMMUNICATION/EDUCATION:   The patients plan of care was discussed with: Registered nurse. Fall prevention education was provided and the patient/caregiver indicated understanding. and Patient is unable to participate in goal setting and plan of care.     Thank you for this referral.  Drew Parker, PT, DPT   Time Calculation: 8 mins

## 2020-06-29 NOTE — PROGRESS NOTES
Problem: Falls - Risk of  Goal: *Absence of Falls  Description: Document Vicente Castrejon Fall Risk and appropriate interventions in the flowsheet.   Outcome: Progressing Towards Goal  Note: Fall Risk Interventions:  Mobility Interventions: Assess mobility with egress test, Communicate number of staff needed for ambulation/transfer, OT consult for ADLs, Patient to call before getting OOB, PT Consult for mobility concerns, PT Consult for assist device competence, Strengthening exercises (ROM-active/passive), Utilize walker, cane, or other assistive device, Utilize gait belt for transfers/ambulation    Mentation Interventions: Adequate sleep, hydration, pain control, Door open when patient unattended, Evaluate medications/consider consulting pharmacy, Gait belt with transfers/ambulation, Increase mobility, More frequent rounding, Reorient patient, Room close to nurse's station, Toileting rounds, Update white board    Medication Interventions: Assess postural VS orthostatic hypotension, Evaluate medications/consider consulting pharmacy, Patient to call before getting OOB, Teach patient to arise slowly, Utilize gait belt for transfers/ambulation    Elimination Interventions: Elevated toilet seat, Patient to call for help with toileting needs, Call light in reach, Stay With Me (per policy), Toilet paper/wipes in reach, Toileting schedule/hourly rounds    History of Falls Interventions: Consult care management for discharge planning, Door open when patient unattended, Evaluate medications/consider consulting pharmacy, Investigate reason for fall, Room close to nurse's station, Utilize gait belt for transfer/ambulation, Assess for delayed presentation/identification of injury for 48 hrs (comment for end date), Vital signs minimum Q4HRs X 24 hrs (comment for end date)         Problem: Patient Education: Go to Patient Education Activity  Goal: Patient/Family Education  Outcome: Progressing Towards Goal     Problem: Pressure Injury - Risk of  Goal: *Prevention of pressure injury  Description: Document Sriram Scale and appropriate interventions in the flowsheet. Outcome: Progressing Towards Goal  Note: Pressure Injury Interventions:  Sensory Interventions: Assess changes in LOC, Assess need for specialty bed, Avoid rigorous massage over bony prominences, Check visual cues for pain, Discuss PT/OT consult with provider, Float heels, Keep linens dry and wrinkle-free, Maintain/enhance activity level, Minimize linen layers, Monitor skin under medical devices, Pressure redistribution bed/mattress (bed type), Sit a 90-degree angle/use footstool if needed, Turn and reposition approx. every two hours (pillows and wedges if needed), Use 30-degree side-lying position    Moisture Interventions: Absorbent underpads, Apply protective barrier, creams and emollients, Assess need for specialty bed, Check for incontinence Q2 hours and as needed, Internal/External urinary devices, Maintain skin hydration (lotion/cream), Minimize layers, Offer toileting Q_hr    Activity Interventions: Assess need for specialty bed, Increase time out of bed, Pressure redistribution bed/mattress(bed type), PT/OT evaluation    Mobility Interventions: Assess need for specialty bed, Float heels, HOB 30 degrees or less, Pressure redistribution bed/mattress (bed type), PT/OT evaluation, Turn and reposition approx.  every two hours(pillow and wedges)    Nutrition Interventions: Document food/fluid/supplement intake, Discuss nutritional consult with provider, Offer support with meals,snacks and hydration(tube feeds)    Friction and Shear Interventions: Apply protective barrier, creams and emollients, Feet elevated on foot rest, HOB 30 degrees or less, Lift sheet, Lift team/patient mobility team, Minimize layers, Sit at 90-degree angle                Problem: Patient Education: Go to Patient Education Activity  Goal: Patient/Family Education  Outcome: Progressing Towards Goal     Problem: Nutrition Deficit  Goal: *Optimize nutritional status  Outcome: Progressing Towards Goal     Problem: Patient Education: Go to Patient Education Activity  Goal: Patient/Family Education  Outcome: Progressing Towards Goal     Problem: Hemorrhagic Stroke: Day 5 through Discharge  Goal: Activity/Safety  Outcome: Progressing Towards Goal  Goal: Consults, if ordered  Outcome: Progressing Towards Goal  Goal: Diagnostic Test/Procedures  Outcome: Progressing Towards Goal  Goal: Nutrition/Diet  Outcome: Progressing Towards Goal  Goal: Medications  Outcome: Progressing Towards Goal  Goal: Respiratory  Outcome: Progressing Towards Goal  Goal: Treatments/Interventions/Procedures  Outcome: Progressing Towards Goal  Goal: Psychosocial  Outcome: Progressing Towards Goal  Goal: *Hemodynamically stable  Outcome: Progressing Towards Goal  Goal: *Verbalizes anxiety and depression are reduced or absent  Outcome: Progressing Towards Goal  Goal: *Absence of aspiration  Outcome: Progressing Towards Goal  Goal: *Absence of signs and symptoms of DVT  Outcome: Progressing Towards Goal  Goal: *Optimal pain control at patient's stated goal  Outcome: Progressing Towards Goal  Goal: *Tolerating diet  Outcome: Progressing Towards Goal  Goal: *Progressive mobility and function  Outcome: Progressing Towards Goal  Goal: *Rehabilitation readiness  Outcome: Progressing Towards Goal     Problem: Hemorrhagic Stroke: Discharge Outcomes  Goal: *Verbalizes anxiety and depression are reduced or absent  Outcome: Progressing Towards Goal  Goal: *Verbalize understanding of risk factor modification(Stroke Metric)  Outcome: Progressing Towards Goal  Goal: *Optimal pain control at patient's stated goal  Outcome: Progressing Towards Goal  Goal: *Hemodynamically stable  Outcome: Progressing Towards Goal  Goal: *Absence of aspiration pneumonia  Outcome: Progressing Towards Goal  Goal: *Aware of needed dietary changes  Outcome: Progressing Towards Goal  Goal: *Verbalizes understanding and describes medication purposes and frequencies  Outcome: Progressing Towards Goal  Goal: *Tolerating diet  Outcome: Progressing Towards Goal  Goal: *Absence of signs and symptoms of DVT  Outcome: Progressing Towards Goal  Goal: *Absence of aspiration  Outcome: Progressing Towards Goal  Goal: *Progressive mobility and function  Outcome: Progressing Towards Goal  Goal: *Home safety concerns addressed  Outcome: Progressing Towards Goal     Problem: Patient Education: Go to Patient Education Activity  Goal: Patient/Family Education  Outcome: Progressing Towards Goal     Problem: Patient Education:  Go to Education Activity  Goal: Patient/Family Education  Outcome: Progressing Towards Goal     Problem: Subarachnoid Hemorrhage Stroke:Admission Day 5-Discharge  Goal: Activity/Safety  Outcome: Progressing Towards Goal  Goal: Diagnostic Test/Procedures  Outcome: Progressing Towards Goal  Goal: Nutrition/Diet  Outcome: Progressing Towards Goal  Goal: Medications  Outcome: Progressing Towards Goal  Goal: Patient maintains clear airway/absence of aspiration  Outcome: Progressing Towards Goal  Goal: Treatments/Interventions/Procedures  Outcome: Progressing Towards Goal  Goal: Psychosocial  Outcome: Progressing Towards Goal  Goal: *Hemodynamically stable  Outcome: Progressing Towards Goal  Goal: *Absence of signs and symptoms of DVT  Outcome: Progressing Towards Goal     Problem: Subarachnoid Hemorrhage Stroke:Discharge Outcomes  Goal: *Verbalizes anxiety and depression are reduced or absent  Outcome: Progressing Towards Goal  Goal: *Verbalizes understanding of risk factor modification (Stroke Metric)  Outcome: Progressing Towards Goal  Goal: *Hemodynamically stable  Outcome: Progressing Towards Goal  Goal: *Aware of needed dietary changes  Outcome: Progressing Towards Goal  Goal: *Verbalizes understanding and describes medication purposes and frequencies  Outcome: Progressing Towards Goal  Goal: *Verbalizes understanding of plan for nimodipine administration  Description: until day 21 and when next dose is scheduled  Outcome: Progressing Towards Goal  Goal: *Absence of signs and symptoms of DVT  Outcome: Progressing Towards Goal  Goal: *Absence of aspiration  Outcome: Progressing Towards Goal  Goal: *Progressive mobility and function  Outcome: Progressing Towards Goal  Goal: *Home safety concerns addressed  Outcome: Progressing Towards Goal     Problem: Patient Education: Go to Patient Education Activity  Goal: Patient/Family Education  Outcome: Progressing Towards Goal

## 2020-06-29 NOTE — PROGRESS NOTES
Got call back from [facility]- spoke to Dr Eran Fairchild 'NeuroIntervention' there. They state there isn't anything more they could off at this stage. Dr Eran Fairchild commanded Dr Brian Broderick work, and agrees with everything we did here in [facility] [place]. He agrees with our plans for discharge and doesn't recommend transfer to [facility].

## 2020-06-29 NOTE — PROGRESS NOTES
NIS Brief Note    No acute events overnight. Patient was not accepted for transfer to [facility]. Awaiting rehab placement. Patient remains on 4 antihypertensives (amlodipine, coreg, hydralazine, and lisinopril) for BP control (SBP goal <160). Patient remains on trach collar. Eyes open spontaneously. JOLLY's spontaneously. Tracks and focuses with eyes. Difficulty tracking to right with left eye. Left eye remains closed. No command following. Pupils 4 mm bilaterally. Left pupil slightly sluggish to light. Blinks to visual threat. Unable to assess speech, coordination, or sensation. Gait deferred. Ok with neuro checks every 4 hours. Discussed with RN. NIS will follow peripherally. Please call with any questions.      MIMI [facility]unasThe [facility]  Neurocritical care NP  Neurointerventional Surgery

## 2020-06-29 NOTE — PROGRESS NOTES
Transition of Care Plan   RUR- 17% Moderate Risk   [facility]: [facility]  [facility] Patient has been accepted [facility] pending insurance authorization    [facility]: [facility] phone [phone]  [address] with patient's mother, Audrey Nichole at bedside to discuss discharge plan to include rehab options. Mother would like a referral to be submitted to [facility]. Sent referral via [employer]; Awaiting response. CM to follow.  Verner Seek, MSW,CRM

## 2020-06-29 NOTE — PROGRESS NOTES
Bedside shift change report given to [facility] (oncoming nurse) by Brigido Holland (offgoing nurse). Report included the following information SBAR, MAR, Recent Results and Cardiac Rhythm NSR/Sinus Bradycardia.

## 2020-06-29 NOTE — PROGRESS NOTES
[address] [facility] Adult  Hospitalist Group                                                                                          Hospitalist Progress Note  Ronni Barrett MD  Answering service: [phone] or [phone] from in house phone        Date of Service:  2020  NAME:  Yon Wang  :  1970  MRN:  [id number]    Admission Summary:     Yon Wang is a 59-year-old female with a past medical history of HTN. According to patient's mother, the patient began having a headache on 20 and had been taking BC Powder with aspirin in it for the headache. She recently stopped taking her BP medications. According to her mother she does not smoke, drink alcohol or use recreational drugs. The patient was brought to the [facility] ER via EMS on   after her coworkers found her unresponsive outside of her place of work. 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 On arrival to the ER she was found to be seizing. A stat CT of her head was performed which showed extensive subarachnoid hemorrhage. She was extremely hypertensive in the 240's and was given labetalol and started on a Cardene drip. She was emergently intubated and given Keppra and Ativan as well. Neurosurgery and Neurointerventional Surgery were consulted and the patient was transferred to the [facility] for a higher level of care. On arrival to [facility] a CTA of her head and neck was obtained which showed a 4.4 x 4.1 mm right MCA trifurcation aneurysm and a 2.2 x 3.1 mm left MCA trifurcation aneurysm and possible less than 2 mm ACOM aneurysms. Extensive lung infiltrates were also noted. Patient was taken for cerebral angiogram the morning of 20 and coil embolization of the left MCA aneurysm was completed. The right MCA aneurysm had a very wide neck incorporating both M2s. It could not be treated endovascularly without stents.  May need to be clipped by Neurosurgery.      Procedures: 20 and coil embolization of the left MCA aneurysm was completed s/p cerebral angiogram for right PANCHITO A1 angioplasty for vasospasm, transarterial infusion of verapamil on 6/9,  S/p angiogram on 6/10 for left ICA/MCA angioplasty and transarterial infusion of verapamil for vasospasm treatment, s/p angiogram on 6/11 and 6/13 for transarterial infusion of verapamil for vasospasm treatment. Tracheostomy placement  6/16 by Dr. Angie Charlton in thoracic surgery. PEG placement. Interval history / Subjective:   Patient seen and examined. Appears comfortable on trach colar  Seen by NIS  Note from [facility] via phone call to prior hospitalist noted  Await rehab placement. Assessment & Plan:     #. Cerebral Aneurysms. #. SAH due to ruptured cerebral aneurysm   -s/p cerebral angiogram with coil embolization of left MCA bifurcation aneurysm on 6/5, patient has 5X3 right MCA bifurcation aneurysm with very wide neck which will need to be treated with stents vs. Clipping  -on Nimotop, normal saline   -on cardene gtt is off, on norvasc, hydralazine, goal -160  -seizure prophylaxis keppra  -patient open her right eye to voice, non verbal, doesn't follows command, moves right extremities spontaneously   -continue neuro check, serial BP monitoring   -improved vasospasm since 6/20- seen by neurosurgeon   -Neurointerventional surgical service on board  - high risk for decompensation, inpatient mortality, consult to palliative care team   - cont high risk for decompensation 6/29/2020      #. Seizure: 2nd to above - stable - keSANDRA encinasPAT-seizure precaution- Neurologist  #. CVAs - aspirin - multiple ischemic infarct -MRI on 6/16- PT/OT-neurology evaluated  #.  Respiratory failure secondary to above, pulmonary edema and possible pneumonia  -s/p trach/trach collar -continue duo neb; cont on trach colar 02 sats satifactory 6/29/2020 rounds.   -sputum cx grow klebsiella pneumonia  -chest x ray on 6/19 vague left lower lobe airspace process, suggesting subsegmental  atelectasis or pneumonia. Interval exchange of endotracheal tube for tracheostomy  -afebrile and no leukocytosis, blood cx no growth  -antibiotics stopped by intensivist- afebrile, SpO2 100% on 9 l/m- monitor pulse ox. No changes 6/29/2020      #. Dysphagia secondary to stroke and SAH-s/p PEG tube, continue tube feeding   #. Iron deficiency anemia -noted fibroid  -Hgb trending down, Hgb 7.8, monitor H/H    -iron  Level was 10, iron saturation 3, iron sucrose 200 mg iv x 3 doses   -check stool for hem ocult   - f./u iron levels post discharge at accepting facility in 3-6 weeks ( August 2020 ok )     #. Peripheral edema multifactorial- low albumin 2.6  -Echo normal cavity size and systolic function (ejection fraction normal). Mild concentric hypertrophy. Estimated left ventricular ejection fraction is 60 - 65%. No regional wall motion abnormality noted.     Obesity -diet control -A1c 5.7    Code status: Full Code   DVT prophylaxis: Lovenox  Care Plan discussed with: Patient/Family and Nurse  Anticipated Disposition: Rehab more than 48 hours     Hospital Problems  Date Reviewed: 6/10/2020          Codes Class Noted POA    [facility] ICD-10-CM: J96.90  ICD-9-CM: 518.81  6/15/2020 Yes        Hypoxic ischemic encephalopathy (HIE), unspecified severity ICD-10-CM: P91.60  ICD-9-CM: 768.70  6/15/2020 Yes        SAH (subarachnoid hemorrhage) ([facility] ICD-10-CM: I60.9  ICD-9-CM: 979  6/4/2020 Yes        Subarachnoid hemorrhage from middle cerebral artery aneurysm, left ([facility] ICD-10-CM: O59.20  ICD-9-CM: 569  6/4/2020 Yes        RIGHT middle cerebral artery aneurysm ICD-10-CM: I67.1  ICD-9-CM: 437.3  6/4/2020 Yes        Cerebral vasospasm ICD-10-CM: I29.008  ICD-9-CM: 435.9  6/4/2020 Yes            ROS not obtainable 2n2 pt condition       Vital Signs:    Last 24hrs VS reviewed since prior progress note.  Most recent are:  Visit Vitals  /53 (BP 1 Location: Left arm, BP Patient Position: At rest)   Pulse (!) 50   Temp 98.3 °F (36.8 °C)   Resp 18   Ht 5' 4\" (1.626 m)   Wt 99.4 kg (219 lb 3.2 oz)   SpO2 99%   BMI 37.63 kg/m²         Intake/Output Summary (Last 24 hours) at 6/29/2020 1055  Last data filed at 6/29/2020 0800  Gross per 24 hour   Intake 1520 ml   Output 1950 ml   Net -430 ml        Physical Examination:             Constitutional:  No acute distress, doesn't follow commands. ENT:  Trach- O2 on Trach   Resp:  good AE, no wheezes. Few creps at bases        GI:  Soft, non distended, non tender. obese    Musculoskeletal:  No edema    Neurologic:        Skin:  Good turgor, no rashes or ulcers       Data Review:    Review and/or order of clinical lab test  Review and/or order of tests in the radiology section of CPT  Review and/or order of tests in the medicine section of CPT      Labs:     Recent Labs     06/29/20  0140 06/28/20  0141   WBC 5.5 6.2   HGB 9.4* 9.4*   HCT 31.8* 32.3*    359     Recent Labs     06/29/20  0140 06/28/20  0141 06/27/20  0426    140 138   K 4.1 4.4 4.2    105 107   CO2 27 28 26   BUN 19 17 19   CREA 0.70 0.72 0.64   GLU 89 90 95   CA 9.0 8.9 9.6     Recent Labs     06/29/20  0140 06/28/20  0141 06/27/20  0426   ALT 19 21 24   AP 69 70 80   TBILI 0.2 0.3 0.3   TP 6.3* 6.3* 6.9   ALB 2.7* 2.7* 2.9*   GLOB 3.6 3.6 4.0     No results for input(s): INR, PTP, APTT, INREXT, INREXT in the last 72 hours. No results for input(s): FE, TIBC, PSAT, FERR in the last 72 hours. No results found for: FOL, RBCF   No results for input(s): PH, PCO2, PO2 in the last 72 hours. No results for input(s): CPK, CKNDX, TROIQ in the last 72 hours.     No lab exists for component: CPKMB  Lab Results   Component Value Date/Time    Cholesterol, total 90 06/05/2020 03:34 AM    HDL Cholesterol 64 06/05/2020 03:34 AM    LDL, calculated 7.8 06/05/2020 03:34 AM    Triglyceride 91 06/05/2020 03:34 AM    CHOL/HDL Ratio 1.4 06/05/2020 03:34 AM     Lab Results   Component Value Date/Time    Glucose (POC) 121 (H) 06/14/2020 11:22 PM    Glucose (POC) 118 (H) 06/14/2020 05:07 PM    Glucose (POC) 114 (H) 06/14/2020 11:27 AM    Glucose (POC) 109 (H) 06/14/2020 05:02 AM    Glucose (POC) 126 (H) 06/13/2020 11:09 PM     Lab Results   Component Value Date/Time    Specific gravity 1.015 06/15/2020 05:45 AM         Medications Reviewed:     Current Facility-Administered Medications   Medication Dose Route Frequency    lisinopriL (PRINIVIL, ZESTRIL) tablet 30 mg  30 mg Oral DAILY    albuterol-ipratropium (DUO-NEB) 2.5 MG-0.5 MG/3 ML  3 mL Nebulization BID PRN    guar gum (BENEFIBER) packet 1 Packet  4 g Oral TID    labetaloL (NORMODYNE;TRANDATE) injection 20 mg  20 mg IntraVENous Q4H PRN    0.9% sodium chloride infusion 250 mL  250 mL IntraVENous PRN    hydrALAZINE (APRESOLINE) tablet 100 mg  100 mg Oral TID    fentaNYL citrate (PF) injection 50 mcg  50 mcg IntraVENous Q2H PRN    hydrALAZINE (APRESOLINE) 20 mg/mL injection 10 mg  10 mg IntraVENous Q2H PRN    chlorhexidine (ORAL CARE KIT) 0.12 % mouthwash 15 mL  15 mL Oral Q12H    carvediloL (COREG) tablet 25 mg  25 mg Oral BID WITH MEALS    amLODIPine (NORVASC) tablet 10 mg  10 mg Per NG tube DAILY    potassium bicarb-citric acid (EFFER-K) tablet 40 mEq  40 mEq Oral BID    lacosamide (VIMPAT) tablet 150 mg  150 mg Per NG tube Q12H    balsam peru-castor oiL (VENELEX) ointment   Topical BID    aspirin chewable tablet 81 mg  81 mg Per NG tube DAILY    enoxaparin (LOVENOX) injection 40 mg  40 mg SubCUTAneous Q24H    levETIRAcetam (KEPPRA) oral solution 1,000 mg  1,000 mg Per NG tube Q12H    glycopyrrolate (ROBINUL) injection 0.2 mg  0.2 mg IntraMUSCular TID PRN    acetaminophen (TYLENOL) solution 650 mg  650 mg Per NG tube Q4H PRN    glucose chewable tablet 16 g  4 Tab Oral PRN    glucagon (GLUCAGEN) injection 1 mg  1 mg IntraMUSCular PRN    dextrose 10% infusion 0-250 mL  0-250 mL IntraVENous PRN    albuterol (PROVENTIL VENTOLIN) nebulizer solution 2.5 mg  2.5 mg Nebulization Q6H PRN    polyvinyl alcohol-povidone (NATURAL TEARS) 0.5-0.6 % ophthalmic solution 2 Drop  2 Drop Both Eyes Q8H    bisacodyL (DULCOLAX) suppository 10 mg  10 mg Rectal DAILY PRN    ondansetron (ZOFRAN) injection 4 mg  4 mg IntraVENous Q4H PRN    docusate (COLACE) 50 mg/5 mL oral liquid 100 mg  100 mg Oral BID PRN     ______________________________________________________________________  EXPECTED LENGTH OF STAY: 22d 21h  ACTUAL LENGTH OF STAY:          Aida Webb MD

## 2020-06-29 NOTE — PROGRESS NOTES
Problem: Hemorrhagic Stroke: Day 5 through Discharge  Goal: Activity/Safety  Outcome: Progressing Towards Goal     Problem: Hemorrhagic Stroke: Day 5 through Discharge  Goal: Consults, if ordered  Outcome: Progressing Towards Goal

## 2020-06-30 LAB
ALBUMIN SERPL-MCNC: 2.6 G/DL (ref 3.5–5)
ALBUMIN/GLOB SERPL: 0.7 {RATIO} (ref 1.1–2.2)
ALP SERPL-CCNC: 70 U/L (ref 45–117)
ALT SERPL-CCNC: 17 U/L (ref 12–78)
ANION GAP SERPL CALC-SCNC: 5 MMOL/L (ref 5–15)
AST SERPL-CCNC: 16 U/L (ref 15–37)
BASOPHILS # BLD: 0 K/UL (ref 0–0.1)
BASOPHILS NFR BLD: 0 % (ref 0–1)
BILIRUB SERPL-MCNC: 0.3 MG/DL (ref 0.2–1)
BUN SERPL-MCNC: 17 MG/DL (ref 6–20)
BUN/CREAT SERPL: 26 (ref 12–20)
CALCIUM SERPL-MCNC: 9 MG/DL (ref 8.5–10.1)
CHLORIDE SERPL-SCNC: 107 MMOL/L (ref 97–108)
CO2 SERPL-SCNC: 28 MMOL/L (ref 21–32)
CREAT SERPL-MCNC: 0.66 MG/DL (ref 0.55–1.02)
DIFFERENTIAL METHOD BLD: ABNORMAL
EOSINOPHIL # BLD: 0.2 K/UL (ref 0–0.4)
EOSINOPHIL NFR BLD: 3 % (ref 0–7)
ERYTHROCYTE [DISTWIDTH] IN BLOOD BY AUTOMATED COUNT: 24.3 % (ref 11.5–14.5)
GLOBULIN SER CALC-MCNC: 3.8 G/DL (ref 2–4)
GLUCOSE SERPL-MCNC: 84 MG/DL (ref 65–100)
HCT VFR BLD AUTO: 32 % (ref 35–47)
HGB BLD-MCNC: 9.3 G/DL (ref 11.5–16)
IMM GRANULOCYTES # BLD AUTO: 0 K/UL (ref 0–0.04)
IMM GRANULOCYTES NFR BLD AUTO: 0 % (ref 0–0.5)
LYMPHOCYTES # BLD: 1.2 K/UL (ref 0.8–3.5)
LYMPHOCYTES NFR BLD: 18 % (ref 12–49)
MCH RBC QN AUTO: 23.8 PG (ref 26–34)
MCHC RBC AUTO-ENTMCNC: 29.1 G/DL (ref 30–36.5)
MCV RBC AUTO: 82.1 FL (ref 80–99)
MONOCYTES # BLD: 0.7 K/UL (ref 0–1)
MONOCYTES NFR BLD: 11 % (ref 5–13)
NEUTS SEG # BLD: 4.4 K/UL (ref 1.8–8)
NEUTS SEG NFR BLD: 68 % (ref 32–75)
NRBC # BLD: 0 K/UL (ref 0–0.01)
NRBC BLD-RTO: 0 PER 100 WBC
PLATELET # BLD AUTO: 200 K/UL (ref 150–400)
POTASSIUM SERPL-SCNC: 4.4 MMOL/L (ref 3.5–5.1)
PROCALCITONIN SERPL-MCNC: <0.05 NG/ML
PROT SERPL-MCNC: 6.4 G/DL (ref 6.4–8.2)
RBC # BLD AUTO: 3.9 M/UL (ref 3.8–5.2)
RBC MORPH BLD: ABNORMAL
SODIUM SERPL-SCNC: 140 MMOL/L (ref 136–145)
WBC # BLD AUTO: 6.5 K/UL (ref 3.6–11)

## 2020-06-30 PROCEDURE — 77030018836 HC SOL IRR NACL ICUM -A

## 2020-06-30 PROCEDURE — 74011250637 HC RX REV CODE- 250/637: Performed by: PSYCHIATRY & NEUROLOGY

## 2020-06-30 PROCEDURE — 74011250637 HC RX REV CODE- 250/637: Performed by: INTERNAL MEDICINE

## 2020-06-30 PROCEDURE — 94762 N-INVAS EAR/PLS OXIMTRY CONT: CPT

## 2020-06-30 PROCEDURE — 85025 COMPLETE CBC W/AUTO DIFF WBC: CPT

## 2020-06-30 PROCEDURE — 92526 ORAL FUNCTION THERAPY: CPT

## 2020-06-30 PROCEDURE — 84145 PROCALCITONIN (PCT): CPT

## 2020-06-30 PROCEDURE — 77010033678 HC OXYGEN DAILY

## 2020-06-30 PROCEDURE — 74011250637 HC RX REV CODE- 250/637: Performed by: ANESTHESIOLOGY

## 2020-06-30 PROCEDURE — 36415 COLL VENOUS BLD VENIPUNCTURE: CPT

## 2020-06-30 PROCEDURE — 80053 COMPREHEN METABOLIC PANEL: CPT

## 2020-06-30 PROCEDURE — 74011250637 HC RX REV CODE- 250/637: Performed by: NURSE PRACTITIONER

## 2020-06-30 PROCEDURE — 74011000250 HC RX REV CODE- 250: Performed by: NURSE PRACTITIONER

## 2020-06-30 PROCEDURE — 74011250636 HC RX REV CODE- 250/636: Performed by: ANESTHESIOLOGY

## 2020-06-30 PROCEDURE — 92507 TX SP LANG VOICE COMM INDIV: CPT

## 2020-06-30 PROCEDURE — 65660000000 HC RM CCU STEPDOWN

## 2020-06-30 RX ADMIN — Medication 2 DROP: at 21:37

## 2020-06-30 RX ADMIN — CHLORHEXIDINE GLUCONATE 15 ML: 0.12 RINSE ORAL at 21:38

## 2020-06-30 RX ADMIN — CARVEDILOL 25 MG: 12.5 TABLET, FILM COATED ORAL at 08:42

## 2020-06-30 RX ADMIN — Medication 1 PACKET: at 08:38

## 2020-06-30 RX ADMIN — LEVETIRACETAM 1000 MG: 100 SOLUTION ORAL at 21:37

## 2020-06-30 RX ADMIN — LISINOPRIL 30 MG: 20 TABLET ORAL at 08:42

## 2020-06-30 RX ADMIN — ENOXAPARIN SODIUM 40 MG: 40 INJECTION SUBCUTANEOUS at 14:15

## 2020-06-30 RX ADMIN — AMLODIPINE BESYLATE 10 MG: 5 TABLET ORAL at 08:42

## 2020-06-30 RX ADMIN — ACETAMINOPHEN 650 MG: 650 SUSPENSION ORAL at 21:43

## 2020-06-30 RX ADMIN — LEVETIRACETAM 1000 MG: 100 SOLUTION ORAL at 08:37

## 2020-06-30 RX ADMIN — CASTOR OIL AND BALSAM, PERU: 788; 87 OINTMENT TOPICAL at 17:30

## 2020-06-30 RX ADMIN — CARVEDILOL 25 MG: 12.5 TABLET, FILM COATED ORAL at 17:29

## 2020-06-30 RX ADMIN — CASTOR OIL AND BALSAM, PERU: 788; 87 OINTMENT TOPICAL at 08:43

## 2020-06-30 RX ADMIN — Medication 2 DROP: at 14:00

## 2020-06-30 RX ADMIN — Medication 1 PACKET: at 16:19

## 2020-06-30 RX ADMIN — HYDRALAZINE HYDROCHLORIDE 100 MG: 50 TABLET, FILM COATED ORAL at 21:37

## 2020-06-30 RX ADMIN — Medication 2 DROP: at 06:00

## 2020-06-30 RX ADMIN — POTASSIUM BICARBONATE 40 MEQ: 782 TABLET, EFFERVESCENT ORAL at 17:29

## 2020-06-30 RX ADMIN — CHLORHEXIDINE GLUCONATE 15 ML: 0.12 RINSE ORAL at 06:10

## 2020-06-30 RX ADMIN — LACOSAMIDE 150 MG: 50 TABLET, FILM COATED ORAL at 08:38

## 2020-06-30 RX ADMIN — Medication 1 PACKET: at 21:39

## 2020-06-30 RX ADMIN — GLYCOPYRROLATE 0.2 MG: 0.2 INJECTION, SOLUTION INTRAMUSCULAR; INTRAVENOUS at 00:01

## 2020-06-30 RX ADMIN — ASPIRIN 81 MG CHEWABLE TABLET 81 MG: 81 TABLET CHEWABLE at 08:38

## 2020-06-30 RX ADMIN — HYDRALAZINE HYDROCHLORIDE 100 MG: 50 TABLET, FILM COATED ORAL at 16:19

## 2020-06-30 RX ADMIN — HYDRALAZINE HYDROCHLORIDE 100 MG: 50 TABLET, FILM COATED ORAL at 08:43

## 2020-06-30 RX ADMIN — POTASSIUM BICARBONATE 40 MEQ: 782 TABLET, EFFERVESCENT ORAL at 08:38

## 2020-06-30 RX ADMIN — LACOSAMIDE 150 MG: 50 TABLET, FILM COATED ORAL at 21:37

## 2020-06-30 NOTE — PROGRESS NOTES
Bedside shift change report given to [address] [place] [address] (oncoming nurse) by Erla Hodgkins (offgoing nurse). Report included the following information SBAR, MAR, Recent Results and Cardiac Rhythm NSR.

## 2020-06-30 NOTE — PROGRESS NOTES
Problem: Hemorrhagic Stroke: Day 5 through Discharge  Goal: Activity/Safety  Outcome: Progressing Towards Goal

## 2020-06-30 NOTE — PROGRESS NOTES
TRANSITION OF CARE    RUR 17% MODERATE     DISPOSITION--Accepted to [facility] pending insurance authorization. Facility initiated insurance authorization today. Likely to receive insurance authorization tomorrow. Per facility, Patient does not need new COVID-19 test. Last two COVID-19 test were 6/4 and 6/6. Requested [facility] transportation for Wednesday July 1, 2020 @ 3:30P. Please note, Patient was denied admissions to [place] [address] (no longer accepting referrals) and [facility] at this time. Notified patient's mother, Ceci Escamilla [phone] via phone. Family is in agreement with plan. CM to follow.  Vishal Chaudhari, MSW,CRM

## 2020-06-30 NOTE — PROGRESS NOTES
Bedside and Verbal shift change report given to Mariah RN  (oncoming nurse) by Macy Jay RN (offgoing nurse). Report included the following information SBAR, Kardex, ED Summary, Procedure Summary, Intake/Output, MAR, Recent Results, Cardiac Rhythm NSR/NSB and Dual Neuro Assessment.

## 2020-06-30 NOTE — PROGRESS NOTES
Problem: Falls - Risk of  Goal: *Absence of Falls  Description: Document Donata Carrel Fall Risk and appropriate interventions in the flowsheet. Outcome: Progressing Towards Goal  Note: Fall Risk Interventions:  Mobility Interventions: Communicate number of staff needed for ambulation/transfer, OT consult for ADLs, Patient to call before getting OOB, PT Consult for mobility concerns, PT Consult for assist device competence    Mentation Interventions: Adequate sleep, hydration, pain control, Door open when patient unattended, Evaluate medications/consider consulting pharmacy, Increase mobility, More frequent rounding, Reorient patient, Room close to nurse's station, Update white board, Toileting rounds    Medication Interventions: Bed/chair exit alarm, Evaluate medications/consider consulting pharmacy, Patient to call before getting OOB    Elimination Interventions: Call light in reach, Patient to call for help with toileting needs, Toileting schedule/hourly rounds    History of Falls Interventions: Consult care management for discharge planning, Door open when patient unattended, Evaluate medications/consider consulting pharmacy, Room close to nurse's station         Problem: Patient Education: Go to Patient Education Activity  Goal: Patient/Family Education  Outcome: Progressing Towards Goal     Problem: Pressure Injury - Risk of  Goal: *Prevention of pressure injury  Description: Document Sriram Scale and appropriate interventions in the flowsheet. Outcome: Progressing Towards Goal  Note: Pressure Injury Interventions:  Sensory Interventions: Assess changes in LOC, Check visual cues for pain, Discuss PT/OT consult with provider, Float heels, Maintain/enhance activity level, Keep linens dry and wrinkle-free, Minimize linen layers, Monitor skin under medical devices, Pressure redistribution bed/mattress (bed type), Turn and reposition approx.  every two hours (pillows and wedges if needed)    Moisture Interventions: Absorbent underpads, Apply protective barrier, creams and emollients, Check for incontinence Q2 hours and as needed, Internal/External urinary devices, Limit adult briefs, Maintain skin hydration (lotion/cream), Minimize layers, Moisture barrier    Activity Interventions: Increase time out of bed, Pressure redistribution bed/mattress(bed type), PT/OT evaluation    Mobility Interventions: HOB 30 degrees or less, Pressure redistribution bed/mattress (bed type), PT/OT evaluation, Turn and reposition approx.  every two hours(pillow and wedges)    Nutrition Interventions: Document food/fluid/supplement intake    Friction and Shear Interventions: Feet elevated on foot rest, HOB 30 degrees or less, Lift sheet, Lift team/patient mobility team, Minimize layers                Problem: Patient Education: Go to Patient Education Activity  Goal: Patient/Family Education  Outcome: Progressing Towards Goal     Problem: Nutrition Deficit  Goal: *Optimize nutritional status  Outcome: Progressing Towards Goal     Problem: Patient Education: Go to Patient Education Activity  Goal: Patient/Family Education  Outcome: Progressing Towards Goal     Problem: Hemorrhagic Stroke: Day 5 through Discharge  Goal: Activity/Safety  Outcome: Progressing Towards Goal  Goal: Consults, if ordered  Outcome: Progressing Towards Goal  Goal: Diagnostic Test/Procedures  Outcome: Progressing Towards Goal  Goal: Nutrition/Diet  Outcome: Progressing Towards Goal  Goal: Medications  Outcome: Progressing Towards Goal  Goal: Respiratory  Outcome: Progressing Towards Goal  Goal: Treatments/Interventions/Procedures  Outcome: Progressing Towards Goal  Goal: Psychosocial  Outcome: Progressing Towards Goal  Goal: *Hemodynamically stable  Outcome: Progressing Towards Goal  Goal: *Absence of aspiration  Outcome: Progressing Towards Goal  Goal: *Absence of signs and symptoms of DVT  Outcome: Progressing Towards Goal  Goal: *Optimal pain control at patient's stated goal  Outcome: Progressing Towards Goal  Goal: *Tolerating diet  Outcome: Progressing Towards Goal  Goal: *Progressive mobility and function  Outcome: Progressing Towards Goal  Goal: *Rehabilitation readiness  Outcome: Progressing Towards Goal     Problem: Hemorrhagic Stroke: Discharge Outcomes  Goal: *Optimal pain control at patient's stated goal  Outcome: Progressing Towards Goal  Goal: *Hemodynamically stable  Outcome: Progressing Towards Goal  Goal: *Absence of aspiration pneumonia  Outcome: Progressing Towards Goal  Goal: *Aware of needed dietary changes  Outcome: Progressing Towards Goal  Goal: *Tolerating diet  Outcome: Progressing Towards Goal  Goal: *Absence of signs and symptoms of DVT  Outcome: Progressing Towards Goal  Goal: *Absence of aspiration  Outcome: Progressing Towards Goal  Goal: *Progressive mobility and function  Outcome: Progressing Towards Goal  Goal: *Home safety concerns addressed  Outcome: Progressing Towards Goal     Problem: Patient Education: Go to Patient Education Activity  Goal: Patient/Family Education  Outcome: Progressing Towards Goal     Problem: Patient Education: Go to Patient Education Activity  Goal: Patient/Family Education  Outcome: Progressing Towards Goal     Problem: Patient Education:  Go to Education Activity  Goal: Patient/Family Education  Outcome: Progressing Towards Goal     Problem: Subarachnoid Hemorrhage Stroke:Admission Day 5-Discharge  Goal: Activity/Safety  Outcome: Progressing Towards Goal  Goal: Diagnostic Test/Procedures  Outcome: Progressing Towards Goal  Goal: Nutrition/Diet  Outcome: Progressing Towards Goal  Goal: Medications  Outcome: Progressing Towards Goal  Goal: Patient maintains clear airway/absence of aspiration  Outcome: Progressing Towards Goal  Goal: Treatments/Interventions/Procedures  Outcome: Progressing Towards Goal  Goal: Psychosocial  Outcome: Progressing Towards Goal  Goal: *Hemodynamically stable  Outcome: Progressing Towards Goal  Goal: *Absence of signs and symptoms of DVT  Outcome: Progressing Towards Goal     Problem: Subarachnoid Hemorrhage Stroke:Discharge Outcomes  Goal: *Hemodynamically stable  Outcome: Progressing Towards Goal  Goal: *Absence of aspiration  Outcome: Progressing Towards Goal  Goal: *Progressive mobility and function  Outcome: Progressing Towards Goal  Goal: *Home safety concerns addressed  Outcome: Progressing Towards Goal     Problem: Patient Education: Go to Patient Education Activity  Goal: Patient/Family Education  Outcome: Progressing Towards Goal     Problem: Patient Education: Go to Patient Education Activity  Goal: Patient/Family Education  Outcome: Progressing Towards Goal     Problem: Patient Education: Go to Patient Education Activity  Goal: Patient/Family Education  Outcome: Progressing Towards Goal     Problem: Patient Education: Go to Patient Education Activity  Goal: Patient/Family Education  Outcome: Progressing Towards Goal

## 2020-06-30 NOTE — PROGRESS NOTES
Problem: Dysphagia (Adult)  Goal: *Acute Goals and Plan of Care (Insert Text)  Description: Speech Therapy Goals  Initiated 6/17/2020    1. Patient will participate in swallow re-evaluation within 7 days. Outcome: Progressing Towards Goal     Problem: Voice Impaired (Adult)  Goal: *Acute Goals and Plan of Care (Insert Text)  Description: Speech Therapy Goals  Initiated 6/17/2020    1. Patient will tolerate trials of digital occlusion to work towards Datacastle placement within 7 days. Outcome: Progressing Towards Goal     SPEECH LANGUAGE PATHOLOGY DYSPHAGIA TREATMENT  Patient: Duke Sullivan (54 y.o. female)  Date: 6/30/2020  Diagnosis: SAH (subarachnoid hemorrhage) ([facility] [address] [I60.9]   <principal problem not specified>       Precautions:  Aspiration    ASSESSMENT:  Patient with significant improvement on this date now s/p trach downsize to #6 shiley cuffless. Pt with vocalizations and tolerating PMV for 30 minutes without any adverse effects nor change in vital signs. Therefore, recommend pt utilize PMV during all waking hours. If patient continues to tolerate PMV, consider red-capping trials. Of note, pt with cough/throat clear with and without the PMV likely 2/2 upper airway sensation restored. Continue to monitor    Furthermore, pt with bolus acceptance of thin liquids on this date with only intermittent s/s of aspiration. Pt consistently initiating a swallow which is a significant improvement. Will follow-up tomorrow and, if patient continues with good participation, will complete Modified Barium Swallow Study. PLAN:  Recommendations and Planned Interventions:  --Reassess tomorrow morning for readiness for MBS    Patient continues to benefit from skilled intervention to address the above impairments. Continue treatment per established plan of care.   Speaking Valve Placement:  SLP / RT / RN and patient/family  Recommended Speaking Valve Wearing Schedule:  [x]    All waking hours  [x]    As tolerated  Discharge Recommendations:  Inpatient Rehab     SUBJECTIVE:   Patient stated, \"I don't know man. OBJECTIVE:   Cognitive and Communication Status:  Neurologic State: Alert  Orientation Level: Unable to verbalize  Cognition: Decreased attention/concentration, Decreased command following  Perception: Cues to attend right visual field  Perseveration: No perseveration noted  Safety/Judgement: Decreased awareness of environment, Decreased insight into deficits, Decreased awareness of need for safety  Tracheostomy:  O2: 100% throughout entirety of session  RR: 10-16  #6 Shiley Cuffless Traach    Oxygen Therapy  O2 Sat (%): 100 %  Pulse via Oximetry: 64 beats per minute  O2 Device: Tracheal collar  O2 Flow Rate (L/min): 6 l/min  FIO2 (%): 28 %  Dysphagia Treatment:  Tracheostomy:  Airway Clearance  Suction: Trach  Suction Device: Suction catheter  Suction Catheter Size: 14 Fr  Sputum Method Obtained: Tracheal  Sputum Amount: Small  Sputum Color/Odor: White  Sputum Consistency: Thick     PMV Trial   Vocal Quality: No impairment    P.O. Trials:  Patient Position: upright in bed  Vocal quality prior to P.O.: No impairment  Consistency Presented:  Thin liquid  How Presented: SLP-fed/presented;Cup/sip     Bolus Acceptance: No impairment  Bolus Formation/Control: Impaired  Type of Impairment: Delayed  Propulsion: Delayed (# of seconds)  Oral Residue: None  Initiation of Swallow: Delayed (# of seconds)  Laryngeal Elevation: Decreased  Aspiration Signs/Symptoms: Other (comment)(intermittent cough)  Pharyngeal Phase Characteristics: No impairment, issues, or problems            Oral Phase Severity: Mild  Pharyngeal Phase Severity : Mild-moderate  Pain:  Pain Scale 1: Adult Nonverbal Pain Scale          After treatment:   Call bell within reach and Nursing notified    COMMUNICATION/EDUCATION:     The patient's plan of care including recommendations, planned interventions, and recommended diet changes were discussed with: Registered nurse. Education was provided to patient, family, and staff regarding speaking valve placement, wearing schedule, safety precautions including cuff deflation and removal when sleeping, and care/cleaning guidelines.       Shorty Galvez, SLP  Time Calculation: 20 mins

## 2020-06-30 NOTE — PROGRESS NOTES
Palliative Medicine Social Work    I visited patient in her room. She was working with SLP - is more alert and awake today, good eye contact, and was able to respond  to some of my yes/no questions appropriately. Per SLP, she was able to swallow water during eval, now has on voice box, will possibly be able to remove trach and attempt MBS tomorrow depending on how she does. Possible d/c to IPR - Appreciate update from CM/SLP. Thank you for the opportunity to be involved in the care of Ms. Craig Sullivan and her family.     Rafael Castrejon LMSW, Supervisee in Social Work  Palliative Medicine   [phone]

## 2020-06-30 NOTE — PROGRESS NOTES
[facility] Adult  Hospitalist Group                                                                                          Hospitalist Progress Note  Stanislav Ziegler MD  Answering service: [phone] or [phone] from in house phone        Date of Service:  2020  NAME:  Nevaeh Gibson  :  1970  MRN:  [id number]    Admission Summary:     Nevaeh Gibson is a 44-year-old female with a past medical history of HTN. According to patient's mother, the patient began having a headache on 20 and had been taking BC Powder with aspirin in it for the headache. She recently stopped taking her BP medications. According to her mother she does not smoke, drink alcohol or use recreational drugs. The patient was brought to the [facility] ER via EMS on   after her coworkers found her unresponsive outside of her place of work. 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 On arrival to the ER she was found to be seizing. A stat CT of her head was performed which showed extensive subarachnoid hemorrhage. She was extremely hypertensive in the 240's and was given labetalol and started on a Cardene drip. She was emergently intubated and given Keppra and Ativan as well. Neurosurgery and Neurointerventional Surgery were consulted and the patient was transferred to the [facility] for a higher level of care. On arrival to [facility] a CTA of her head and neck was obtained which showed a 4.4 x 4.1 mm right MCA trifurcation aneurysm and a 2.2 x 3.1 mm left MCA trifurcation aneurysm and possible less than 2 mm ACOM aneurysms. Extensive lung infiltrates were also noted. Patient was taken for cerebral angiogram the morning of 20 and coil embolization of the left MCA aneurysm was completed. The right MCA aneurysm had a very wide neck incorporating both M2s. It could not be treated endovascularly without stents.  May need to be clipped by Neurosurgery.      Procedures: 20 and coil embolization of the left MCA aneurysm was completed s/p cerebral angiogram for right PANCHITO A1 angioplasty for vasospasm, transarterial infusion of verapamil on 6/9,  S/p angiogram on 6/10 for left ICA/MCA angioplasty and transarterial infusion of verapamil for vasospasm treatment, s/p angiogram on 6/11 and 6/13 for transarterial infusion of verapamil for vasospasm treatment. Tracheostomy placement  6/16 by Dr. Emmett Hollins in thoracic surgery. PEG placement. Interval history / Subjective:   Stable for discharge to rehab     Assessment & Plan:     #. Cerebral Aneurysms. #. SAH due to ruptured cerebral aneurysm   -s/p cerebral angiogram with coil embolization of left MCA bifurcation aneurysm on 6/5, patient has 5X3 right MCA bifurcation aneurysm with very wide neck which will need to be treated with stents vs. Clipping  -on Nimotop, normal saline   -on cardene gtt is off, on norvasc, hydralazine, goal -160  -seizure prophylaxis keppra  -patient open her right eye to voice, non verbal, doesn't follows command, moves right extremities spontaneously   -continue neuro check, serial BP monitoring   -improved vasospasm since 6/20- seen by neurosurgeon   -Neurointerventional surgical service on board  - high risk for decompensation, inpatient mortality, consult to palliative care team   - cont high risk for decompensation 6/30/2020      #. Seizure: 2nd to above - stable - keppra, VIMPAT-seizure precaution- Neurologist  #. CVAs - aspirin - multiple ischemic infarct -MRI on 6/16- PT/OT-neurology evaluated  #. Respiratory failure secondary to above, pulmonary edema and possible pneumonia  -s/p trach/trach collar -continue duo neb; cont on trach colar 02 sats satifactory 6/30/2020 rounds.   -sputum cx grow klebsiella pneumonia  -chest x ray on 6/19 vague left lower lobe airspace process, suggesting subsegmental  atelectasis or pneumonia.  Interval exchange of endotracheal tube for tracheostomy  -afebrile and no leukocytosis, blood cx no growth  -antibiotics stopped by intensivist- afebrile, SpO2 100% on 9 l/m- monitor pulse ox. No changes 6/29 -  6/30/2020 ( on trach collar 8 LPM)      #. Dysphagia secondary to stroke and SAH-s/p PEG tube, continue tube feeding   #. Iron deficiency anemia -noted fibroid  -Hgb trending down, Hgb 7.8, monitor H/H    -iron  Level was 10, iron saturation 3, iron sucrose 200 mg iv x 3 doses   -check stool for hem ocult   - f./u iron levels post discharge at accepting facility in 3-6 weeks ( August 2020 ok )     #. Peripheral edema multifactorial- low albumin 2.6  -Echo normal cavity size and systolic function (ejection fraction normal). Mild concentric hypertrophy. Estimated left ventricular ejection fraction is 60 - 65%. No regional wall motion abnormality noted.     Obesity -diet control -A1c 5.7    Code status: Full Code   DVT prophylaxis: Lovenox  Care Plan discussed with: Patient/Family and Nurse  Anticipated Disposition: Rehab more than 48 hours     Hospital Problems  Date Reviewed: 6/10/2020          Codes Class Noted POA    Res[facility] ICD-10-CM: J96.90  ICD-9-CM: 518.81  6/15/2020 Yes        Hypoxic ischemic encephalopathy (HIE), unspecified severity ICD-10-CM: P91.60  ICD-9-CM: 768.70  6/15/2020 Yes        SAH (subarachnoid hemorrhage) ([facility] ICD-10-CM: I60.9  ICD-9-CM: 389  6/4/2020 Yes        Subarachnoid hemorrhage from middle cerebral artery aneurysm, left ([facility] [address] ICD-10-CM: Z66.55  ICD-9-CM: 019  6/4/2020 Yes        RIGHT middle cerebral artery aneurysm ICD-10-CM: I67.1  ICD-9-CM: 437.3  6/4/2020 Yes        Cerebral vasospasm ICD-10-CM: J88.777  ICD-9-CM: 435.9  6/4/2020 Yes            ROS not obtainable 2n2 pt condition       Vital Signs:    Last 24hrs VS reviewed since prior progress note.  Most recent are:  Visit Vitals  /68 (BP 1 Location: Left arm, BP Patient Position: At rest)   Pulse 63   Temp 99.6 °F (37.6 °C)   Resp 14   Ht 5' 4\" (1.626 m)   Wt 99.2 kg (218 lb 11.1 oz)   SpO2 100% BMI 37.54 kg/m²         Intake/Output Summary (Last 24 hours) at 6/30/2020 0803  Last data filed at 6/30/2020 0600  Gross per 24 hour   Intake 2190 ml   Output    Net 2190 ml        Physical Examination:             Constitutional:  No acute distress, doesn't follow commands. ENT:  Trach- O2 on Trach   Resp:  good AE, no wheezes. Few creps at bases        GI:  Soft, non distended, non tender. obese    Musculoskeletal:  No edema    Neurologic:        Skin:  Good turgor, no rashes or ulcers       Data Review:    Review and/or order of clinical lab test  Review and/or order of tests in the radiology section of CPT  Review and/or order of tests in the medicine section of CPT      Labs:     Recent Labs     06/30/20 0247 06/29/20  0140   WBC 6.5 5.5   HGB 9.3* 9.4*   HCT 32.0* 31.8*    320     Recent Labs     06/30/20 0247 06/29/20  0140 06/28/20  0141    138 140   K 4.4 4.1 4.4    106 105   CO2 28 27 28   BUN 17 19 17   CREA 0.66 0.70 0.72   GLU 84 89 90   CA 9.0 9.0 8.9     Recent Labs     06/30/20 0247 06/29/20  0140 06/28/20  0141   ALT 17 19 21   AP 70 69 70   TBILI 0.3 0.2 0.3   TP 6.4 6.3* 6.3*   ALB 2.6* 2.7* 2.7*   GLOB 3.8 3.6 3.6     No results for input(s): INR, PTP, APTT, INREXT, INREXT in the last 72 hours. No results for input(s): FE, TIBC, PSAT, FERR in the last 72 hours. No results found for: FOL, RBCF   No results for input(s): PH, PCO2, PO2 in the last 72 hours. No results for input(s): CPK, CKNDX, TROIQ in the last 72 hours.     No lab exists for component: CPKMB  Lab Results   Component Value Date/Time    Cholesterol, total 90 06/05/2020 03:34 AM    HDL Cholesterol 64 06/05/2020 03:34 AM    LDL, calculated 7.8 06/05/2020 03:34 AM    Triglyceride 91 06/05/2020 03:34 AM    CHOL/HDL Ratio 1.4 06/05/2020 03:34 AM     Lab Results   Component Value Date/Time    Glucose (POC) 121 (H) 06/14/2020 11:22 PM    Glucose (POC) 118 (H) 06/14/2020 05:07 PM    Glucose (POC) 114 (H) 06/14/2020 11:27 AM    Glucose (POC) 109 (H) 06/14/2020 05:02 AM    Glucose (POC) 126 (H) 06/13/2020 11:09 PM     Lab Results   Component Value Date/Time    Specific gravity 1.015 06/15/2020 05:45 AM         Medications Reviewed:     Current Facility-Administered Medications   Medication Dose Route Frequency    lisinopriL (PRINIVIL, ZESTRIL) tablet 30 mg  30 mg Oral DAILY    albuterol-ipratropium (DUO-NEB) 2.5 MG-0.5 MG/3 ML  3 mL Nebulization BID PRN    guar gum (BENEFIBER) packet 1 Packet  4 g Oral TID    labetaloL (NORMODYNE;TRANDATE) injection 20 mg  20 mg IntraVENous Q4H PRN    0.9% sodium chloride infusion 250 mL  250 mL IntraVENous PRN    hydrALAZINE (APRESOLINE) tablet 100 mg  100 mg Oral TID    fentaNYL citrate (PF) injection 50 mcg  50 mcg IntraVENous Q2H PRN    hydrALAZINE (APRESOLINE) 20 mg/mL injection 10 mg  10 mg IntraVENous Q2H PRN    chlorhexidine (ORAL CARE KIT) 0.12 % mouthwash 15 mL  15 mL Oral Q12H    carvediloL (COREG) tablet 25 mg  25 mg Oral BID WITH MEALS    amLODIPine (NORVASC) tablet 10 mg  10 mg Per NG tube DAILY    potassium bicarb-citric acid (EFFER-K) tablet 40 mEq  40 mEq Oral BID    lacosamide (VIMPAT) tablet 150 mg  150 mg Per NG tube Q12H    balsam peru-castor oiL (VENELEX) ointment   Topical BID    aspirin chewable tablet 81 mg  81 mg Per NG tube DAILY    enoxaparin (LOVENOX) injection 40 mg  40 mg SubCUTAneous Q24H    levETIRAcetam (KEPPRA) oral solution 1,000 mg  1,000 mg Per NG tube Q12H    glycopyrrolate (ROBINUL) injection 0.2 mg  0.2 mg IntraMUSCular TID PRN    acetaminophen (TYLENOL) solution 650 mg  650 mg Per NG tube Q4H PRN    glucose chewable tablet 16 g  4 Tab Oral PRN    glucagon (GLUCAGEN) injection 1 mg  1 mg IntraMUSCular PRN    dextrose 10% infusion 0-250 mL  0-250 mL IntraVENous PRN    albuterol (PROVENTIL VENTOLIN) nebulizer solution 2.5 mg  2.5 mg Nebulization Q6H PRN    polyvinyl alcohol-povidone (NATURAL TEARS) 0.5-0.6 % ophthalmic solution 2 Drop  2 Drop Both Eyes Q8H    bisacodyL (DULCOLAX) suppository 10 mg  10 mg Rectal DAILY PRN    ondansetron (ZOFRAN) injection 4 mg  4 mg IntraVENous Q4H PRN    docusate (COLACE) 50 mg/5 mL oral liquid 100 mg  100 mg Oral BID PRN     ______________________________________________________________________  EXPECTED LENGTH OF STAY: 22d 21h  ACTUAL LENGTH OF STAY:          26                 Abad Ansari MD

## 2020-07-01 ENCOUNTER — APPOINTMENT (OUTPATIENT)
Dept: GENERAL RADIOLOGY | Age: 50
DRG: 003 | End: 2020-07-01
Attending: INTERNAL MEDICINE
Payer: COMMERCIAL

## 2020-07-01 PROCEDURE — 77030038269 HC DRN EXT URIN PURWCK BARD -A

## 2020-07-01 PROCEDURE — 74011250637 HC RX REV CODE- 250/637: Performed by: PSYCHIATRY & NEUROLOGY

## 2020-07-01 PROCEDURE — 74011250637 HC RX REV CODE- 250/637: Performed by: NURSE PRACTITIONER

## 2020-07-01 PROCEDURE — 65660000000 HC RM CCU STEPDOWN

## 2020-07-01 PROCEDURE — 97112 NEUROMUSCULAR REEDUCATION: CPT

## 2020-07-01 PROCEDURE — 92507 TX SP LANG VOICE COMM INDIV: CPT

## 2020-07-01 PROCEDURE — 74011250636 HC RX REV CODE- 250/636: Performed by: ANESTHESIOLOGY

## 2020-07-01 PROCEDURE — 92526 ORAL FUNCTION THERAPY: CPT

## 2020-07-01 PROCEDURE — 97530 THERAPEUTIC ACTIVITIES: CPT

## 2020-07-01 PROCEDURE — 94762 N-INVAS EAR/PLS OXIMTRY CONT: CPT

## 2020-07-01 PROCEDURE — 74011250637 HC RX REV CODE- 250/637: Performed by: INTERNAL MEDICINE

## 2020-07-01 PROCEDURE — 74011250637 HC RX REV CODE- 250/637: Performed by: ANESTHESIOLOGY

## 2020-07-01 PROCEDURE — 77010033678 HC OXYGEN DAILY

## 2020-07-01 RX ADMIN — Medication 2 DROP: at 13:35

## 2020-07-01 RX ADMIN — Medication 2 DROP: at 05:11

## 2020-07-01 RX ADMIN — CARVEDILOL 25 MG: 12.5 TABLET, FILM COATED ORAL at 09:08

## 2020-07-01 RX ADMIN — AMLODIPINE BESYLATE 10 MG: 5 TABLET ORAL at 09:08

## 2020-07-01 RX ADMIN — Medication 1 PACKET: at 09:09

## 2020-07-01 RX ADMIN — CHLORHEXIDINE GLUCONATE 15 ML: 0.12 RINSE ORAL at 21:00

## 2020-07-01 RX ADMIN — CARVEDILOL 25 MG: 12.5 TABLET, FILM COATED ORAL at 17:22

## 2020-07-01 RX ADMIN — POTASSIUM BICARBONATE 40 MEQ: 782 TABLET, EFFERVESCENT ORAL at 17:22

## 2020-07-01 RX ADMIN — Medication 1 PACKET: at 21:15

## 2020-07-01 RX ADMIN — HYDRALAZINE HYDROCHLORIDE 100 MG: 50 TABLET, FILM COATED ORAL at 21:12

## 2020-07-01 RX ADMIN — CASTOR OIL AND BALSAM, PERU: 788; 87 OINTMENT TOPICAL at 19:09

## 2020-07-01 RX ADMIN — ACETAMINOPHEN 650 MG: 650 SUSPENSION ORAL at 05:10

## 2020-07-01 RX ADMIN — HYDRALAZINE HYDROCHLORIDE 100 MG: 50 TABLET, FILM COATED ORAL at 15:47

## 2020-07-01 RX ADMIN — LEVETIRACETAM 1000 MG: 100 SOLUTION ORAL at 21:12

## 2020-07-01 RX ADMIN — LEVETIRACETAM 1000 MG: 100 SOLUTION ORAL at 09:09

## 2020-07-01 RX ADMIN — LISINOPRIL 30 MG: 20 TABLET ORAL at 09:08

## 2020-07-01 RX ADMIN — ASPIRIN 81 MG CHEWABLE TABLET 81 MG: 81 TABLET CHEWABLE at 09:08

## 2020-07-01 RX ADMIN — ACETAMINOPHEN 650 MG: 650 SUSPENSION ORAL at 21:19

## 2020-07-01 RX ADMIN — Medication 1 PACKET: at 17:24

## 2020-07-01 RX ADMIN — POTASSIUM BICARBONATE 40 MEQ: 782 TABLET, EFFERVESCENT ORAL at 09:09

## 2020-07-01 RX ADMIN — LACOSAMIDE 150 MG: 50 TABLET, FILM COATED ORAL at 21:12

## 2020-07-01 RX ADMIN — Medication 2 DROP: at 21:12

## 2020-07-01 RX ADMIN — CASTOR OIL AND BALSAM, PERU: 788; 87 OINTMENT TOPICAL at 09:10

## 2020-07-01 RX ADMIN — HYDRALAZINE HYDROCHLORIDE 100 MG: 50 TABLET, FILM COATED ORAL at 09:09

## 2020-07-01 RX ADMIN — LACOSAMIDE 150 MG: 50 TABLET, FILM COATED ORAL at 09:09

## 2020-07-01 RX ADMIN — ENOXAPARIN SODIUM 40 MG: 40 INJECTION SUBCUTANEOUS at 13:32

## 2020-07-01 RX ADMIN — ACETAMINOPHEN 650 MG: 650 SUSPENSION ORAL at 15:47

## 2020-07-01 NOTE — PROGRESS NOTES
Problem: Falls - Risk of  Goal: *Absence of Falls  Description: Document Evaristo Estela Fall Risk and appropriate interventions in the flowsheet. Outcome: Progressing Towards Goal  Note: Fall Risk Interventions:  Mobility Interventions: Communicate number of staff needed for ambulation/transfer    Mentation Interventions: Door open when patient unattended    Medication Interventions: Evaluate medications/consider consulting pharmacy    Elimination Interventions: Call light in reach    History of Falls Interventions: Consult care management for discharge planning         Problem: Patient Education: Go to Patient Education Activity  Goal: Patient/Family Education  Outcome: Progressing Towards Goal     Problem: Pressure Injury - Risk of  Goal: *Prevention of pressure injury  Description: Document Sriram Scale and appropriate interventions in the flowsheet.   Outcome: Progressing Towards Goal  Note: Pressure Injury Interventions:  Sensory Interventions: Assess changes in LOC    Moisture Interventions: Absorbent underpads    Activity Interventions: PT/OT evaluation    Mobility Interventions: PT/OT evaluation    Nutrition Interventions: Document food/fluid/supplement intake    Friction and Shear Interventions: Lift team/patient mobility team                Problem: Patient Education: Go to Patient Education Activity  Goal: Patient/Family Education  Outcome: Progressing Towards Goal     Problem: Nutrition Deficit  Goal: *Optimize nutritional status  Outcome: Progressing Towards Goal     Problem: Hemorrhagic Stroke: Day 5 through Discharge  Goal: Activity/Safety  Outcome: Progressing Towards Goal  Goal: Consults, if ordered  Outcome: Progressing Towards Goal  Goal: Diagnostic Test/Procedures  Outcome: Progressing Towards Goal  Goal: Nutrition/Diet  Outcome: Progressing Towards Goal  Goal: Medications  Outcome: Progressing Towards Goal  Goal: Respiratory  Outcome: Progressing Towards Goal  Goal: Treatments/Interventions/Procedures  Outcome: Progressing Towards Goal  Goal: Psychosocial  Outcome: Progressing Towards Goal  Goal: *Hemodynamically stable  Outcome: Progressing Towards Goal

## 2020-07-01 NOTE — PROGRESS NOTES
NUTRITION COMPLETE ASSESSMENT    RECOMMENDATIONS:     1. Continue Osmolite 1.5 w added benefiber      --- Osmolite 1.5,  320 ml x 4 feeds/day   --- Give 100 ml water before and after each bolus   --- Give 1 packet Benefiber TID    Pt may benefit from Imodium or other medication for loose stools    2. The above will provide: 2160 kcals, 90 gm pro, 1930 ml free fluid, 9 gm fiber    Interventions/Plan:   Food/Nutrient Delivery: Moderate rate, concentration, composition, and schedule:    320 ml Osmolite 1.5 q 4 hr x 4 feedings per day and 100 ml water flush before and after each feeding       Assessment:   Reason for Assessment: Reassessment    Tube Feeding:  See above  Diet: NPO  Nutritionally Significant Medications: [x] Reviewed & Includes: Keppra, benefiber,     Subjective: Staff Lissa Jackson, pt discussed during morning rounds    Objective:  6/12:  Ms Romel Barclay is a 48year old female admitted with [facility]. PMHx: HTN. Noted: SAH d/t ruptured aneurysm; Intubated at OSH prior to transfer. S/P Cerebral angiogram x 5 and coiling (L) MCA aneurysm; (R) MCA needs to be clipped (not urgent); 6/9 severe vasospasm; acute hypoxic respiratory failure-neurogenic pulmonary edema; HIE per MRI; cerebral edema. Ms Romel Barclay has tolerated intermittent tube feedings well; however does not always receive 4 feedings per day d/t feeding being held for procedures. Recommend \"making up\" missed feedings overnight since pt only receives 4 feeds total per day. Since patient no longer on Diprivan will need to adjust tube feeding to meet energy needs. New goal: 360 ml Vital AF 1.2 q 4 hr x 4 feedings per day and 70 ml water flush before and after each feeding. This will provide 1440 ml, 1730 calories, 108 gm protein and 1730 ml free water (tube feeding/flush) per day to meet % estimated energy and protein needs, respectively. Flexiseal in place d/t loose stools (related to Nimodipine). Recommend discontinuing stool softener.  3% NS ordered for cerebral edema; sodium at goal today (145-150 mg/dl). Weight stable. 6/19:  Pt needed to go back on the vent yesterday evening d/t periods of apnea. Per discussion during rounds this morning, pt to have PEG placed this afternoon. Nursing also reports very loose stools (pt has flexi seal). Will try 1/2 packet Banatrol BID and see if this helps firm up stool at all. Pt has continued to tolerate bolus feeding schedule outlined above. Pt has been more responsive the last couple of days but still not following commands, brain imaging suggests extensive strokes. RD continues to follow. 6/24: Pt has continued to have loose stools and still has flexiseal.  Will try switching her formula to Osmolite and adding benefiber and see if this firms up her stools. Pt remains non-verbal, does not follow commands, has volitional movement of extremeties but not purposeful. Per notes pt may go to [place] once medically cleared. RD has changed tube feeding order, will continue to follow. 7/1: Noted stools continue as loose with alternate formula and Benefiber added. Awaiting placement- authorization and checking on restraints and if fever needs to be addressed? Continue TF formula and benefiber. Pt may benefit from adding imodium. Estimated Nutrition Needs:   Kcals/day: 1649 Kcals/day(BMR(1598x1.2))  Protein: 79 g(-99g/day(0.8-1.0g/kg))  Fluid: (1 ml/kcal)  Based On: Kcal/kg - specify (Comment)  Weight Used: Actual wt(99.3 kg)    Pt expected to meet estimated nutrient needs:  [x]   Yes--via tube feeding    []  No  [] Unable to predict at this time  Nutrition Diagnosis:   1. Inadequate energy intake related to discontinuation of Diprivan as evidenced by tube feeding alone meets 60% estimated energy needs.     2.  Altered GI function related to  [facility], vent support as evidenced by  need for PEG placement for full nutritional support    3.   related to   as evidenced by      Goals:     Tube feeding to meet at least 85% estimated needs x 5-7 days. Monitoring & Evaluation:    - Enteral/parenteral nutrition intake   - Glucose profile, Weight/weight change, CV-pulmonary, Electrolyte and renal profile, GI     Previous Nutrition Goals Met: Yes  Previous Recommendations: Yes    Education & Discharge Needs:   [x] None Identified   [] Identified and addressed    [x] Participated in care plan, discharge planning, and/or interdisciplinary rounds        Cultural, Taoist and ethnic food preferences identified:  None    Skin Integrity: []Intact  [x] surgical incision  Edema: []None [x]  1+ in all extremeties  Last BM: 7/1--loose  Food Allergies: [x]None []Other    Anthropometrics:    Weight Loss Metrics 7/1/2020 6/4/2020   Today's Wt 219 lb -   BMI - 37.59 kg/m2      Last 3 Recorded Weights in this Encounter    06/29/20 0202 06/30/20 0354 07/01/20 0143   Weight: 99.4 kg (219 lb 3.2 oz) 99.2 kg (218 lb 11.1 oz) 99.3 kg (219 lb)      Weight Source: Bed  Height: 5' 4\" (162.6 cm), Height Source: Stated by family member  Body mass index is 37.59 kg/m². IBW : 54.4 kg (120 lb), % IBW (Calculated): 201.54 %   ,      Labs:    Lab Results   Component Value Date/Time    Sodium 140 06/30/2020 02:47 AM    Potassium 4.4 06/30/2020 02:47 AM    Chloride 107 06/30/2020 02:47 AM    CO2 28 06/30/2020 02:47 AM    Glucose 84 06/30/2020 02:47 AM    BUN 17 06/30/2020 02:47 AM    Creatinine 0.66 06/30/2020 02:47 AM    Calcium 9.0 06/30/2020 02:47 AM    Magnesium 2.4 06/23/2020 04:23 AM    Phosphorus 3.0 06/23/2020 04:23 AM    Albumin 2.6 (L) 06/30/2020 02:47 AM     Lab Results   Component Value Date/Time    Hemoglobin A1c 5.7 (H) 06/05/2020 03:34 AM     Lab Results   Component Value Date/Time    Glucose (POC) 115 (H) 06/12/2020 05:39 AM      Lab Results   Component Value Date/Time    ALT (SGPT) 17 06/30/2020 02:47 AM    Alk.  phosphatase 70 06/30/2020 02:47 AM    Bilirubin, total 0.3 06/30/2020 02:47 AM        Lawyer Lonny RD  Pager: [phone]

## 2020-07-01 NOTE — PROGRESS NOTES
Problem: Self Care Deficits Care Plan (Adult)  Goal: *Acute Goals and Plan of Care (Insert Text)  Description:   FUNCTIONAL STATUS PRIOR TO ADMISSION: Patient unable to provide history at evaluation. Per chart, patient was independent and working full-time at [employer]. HOME SUPPORT: Per chart, patient lived with her 15 y/o son and a friend. Occupational Therapy Goals  Goals reviewed and continued: 6/29/2020  Initiated 6/22/2020  1. Patient will perform grooming with maximal assistance within 7 day(s). 2.  Patient will follow ~25% simple commands with max multimodal cuing in preparation for functional tasks within 7 days. 3.  Patient will reach for ADL object with each UE with moderate assistance in preparation for functional reach within 7 days. 4.  Patient will perform composite grasp on ADL object with each hand in preparation for functional grasp within 7 days. 5.  Patient will participate in upper extremity therapeutic exercise/activities with maximal assistance for 5 minutes within 7 day(s). Outcome: Not Progressing Towards Goal    OCCUPATIONAL THERAPY TREATMENT  Patient: Alejandro Silverman (48 y.o. female)  Date: 7/1/2020  Diagnosis: SAH (subarachnoid hemorrhage) ([facility] [I60.9]   <principal problem not specified>       Precautions: Aspiration, fall  Chart, occupational therapy assessment, plan of care, and goals were reviewed. ASSESSMENT  Patient continues with skilled OT services and is not progressing towards goals. Patient seen in collaboration with PT in an effort to maximize engagement and functional response. However, patient was unable to follow any commands despite max multimodal cuing, with no purposeful movements or functional engagement. She produced slight BUE AROM, although not purposeful or on command. Attempted to engage patient in hand washing with wipe, also attempted to facilitate grasp on cup.   Patient with no engagement in these tasks despite hand-over-hand prompting, ultimately requiring total assistance. Also attempted to engage patient in repositioning in bed and anterior lean from partial bed-in-chair position, but patient produced no contribution and required total-assistance. Unfortunately patient's ability to participate in OT remains limited at this time. Recommend SNF level rehab at this time, with hopeful transition to inpatient rehab pending improved participation. Current Level of Function Impacting Discharge (ADLs): total assistance         PLAN :  Patient continues to benefit from skilled intervention to address the above impairments. Continue treatment per established plan of care. to address goals. Recommendation for discharge: (in order for the patient to meet his/her long term goals)  Therapy up to 5 days/week in SNF setting with hopeful transition to inpatient rehab pending improved participation    This discharge recommendation:  Has been made in collaboration with the attending provider and/or case management       SUBJECTIVE:   Patient stated Ow!  in response to noxious nailbed stimuli     OBJECTIVE DATA SUMMARY:   Cognitive/Behavioral Status:  Neurologic State: Alert  Orientation Level: Unable to verbalize  Cognition: Decreased attention/concentration;Decreased command following             Functional Mobility and Transfers for ADLs:  Bed Mobility:  Rolling: Bed Modified  Supine to Sit: Total assistance      Balance:  Sitting: Impaired; With support  Sitting - Static: Poor (constant support)  Sitting - Dynamic: Poor (constant support)    ADL Intervention:       Grooming  Washing Hands:  Total assistance (dependent)(using sani wipe)     Pain:  Patient did not report pain    Activity Tolerance:   Fair, VSS    After treatment patient left in no apparent distress:   Supine in bed, Call bell within reach, and Bed / chair alarm activated    COMMUNICATION/COLLABORATION:   The patients plan of care was discussed with: Physical therapist and Registered nurse.      Ministerio Coffman OT  Time Calculation: 18 mins

## 2020-07-01 NOTE — PROGRESS NOTES
TRANSITION OF CARE 
RUR--17% Disposition--Home with home saige: [address] [place] [address]. Transport--Friend/ Family Referral to [facility] was declined re: out of network with insurance. CM sent referral via [facility] to [address] [place] [address], and referral was accepted. CM updated AVS and informed patient and staff nurse.

## 2020-07-01 NOTE — PROGRESS NOTES
Problem: Mobility Impaired (Adult and Pediatric)  Goal: *Acute Goals and Plan of Care (Insert Text)  Description:   FUNCTIONAL STATUS PRIOR TO ADMISSION: Patient was independent and active without use of DME per chart review. HOME SUPPORT PRIOR TO ADMISSION: Details unknown - pt is not able to provide info at this time. Physical Therapy Goals  Initiated 6/22/2020, appropriate for carryover 06/29/2020  /1. Patient will move from supine to sit and sit to supine , scoot up and down and roll side to side in bed with maximal assistance within 7 day(s). 2.  Patient will tolerate bed in full chair position x30min for improved tolerance to upright and pulmonary toileting within 7 days. 3.  Patient will participate in B LE KIERA/PROM program within 7 days. Outcome: Not Progressing Towards Goal     PHYSICAL THERAPY TREATMENT  Patient: Yon Wang (48 y.o. female)  Date: 7/1/2020  Diagnosis: SAH (subarachnoid hemorrhage) ([facility]) [I60.9]   <principal problem not specified>       Precautions: Aspiration  Chart, physical therapy assessment, plan of care and goals were reviewed. ASSESSMENT  Patient continues with skilled PT services and is not progressing towards goals - unfortunately continues to be significantly impaired by no command following or purposeful movements despite max multimodal attempts. Remains very alert and initially tracks therapist around the room, vitals stable throughout with PMV on. Had spontaneous speech production with noxious stimuli however no attempt to vocalize to specific questions or prompts. Bed transitioned to modified chair position for upright tolerance and attempts at pull to sit for core initiations however again, no attempt to participate and questionably resists against. Does spontaneously move all extremities and reposition self in bed. Mobility progress has been largely impacted by pt's inability to follow any skilled commands/cues/facilitation.  Continue to recommend discharge to [address] [place] [address] with goal to transition to acute IPR once ability to actively participate improves. Current Level of Function Impacting Discharge (mobility/balance): total A; no command following or purposeful movements    Other factors to consider for discharge: total A         PLAN :  Patient continues to benefit from skilled intervention to address the above impairments. Continue treatment per established plan of care. to address goals. Recommendation for discharge: (in order for the patient to meet his/her long term goals)  SNF with goal to transition to acute IPR once ability to actively participate improves. IF this is not feasible and pt discharges home (STRONGLY RECOMMEND AGAINST this option), then she would require hospital bed, shailesh lift, specialty custom wheelchair, 24/7 assist of 2 people , and HHPT    This discharge recommendation:  A follow-up discussion with the attending provider and/or case management is planned    IF patient discharges home will need the following DME: hospital bed, mechanical lift, and custom specialty wheelchair amd 24/7 assist x2       SUBJECTIVE:   Patient stated OW! Son Points    OBJECTIVE DATA SUMMARY:   Critical Behavior:  Neurologic State: Alert  Orientation Level: Unable to verbalize  Cognition: Decreased attention/concentration, Decreased command following  Safety/Judgement: Decreased awareness of environment, Decreased insight into deficits, Decreased awareness of need for safety  Functional Mobility Training:  Bed Mobility:  Rolling: Bed Modified  Supine to Sit: Total assistance        Balance:  Sitting: Impaired; With support  Sitting - Static: Poor (constant support)  Sitting - Dynamic: Poor (constant support)    Activity Tolerance:   Fair  Please refer to the flowsheet for vital signs taken during this treatment.     After treatment patient left in no apparent distress:   Supine in bed, Call bell within reach, Bed / chair alarm activated, Side rails x 3, and Restraints    COMMUNICATION/COLLABORATION:   The patients plan of care was discussed with: Occupational therapist, Speech therapist, and Registered nurse.      Swati Alberto, PT, DPT   Time Calculation: 18 mins

## 2020-07-01 NOTE — PROGRESS NOTES
Bedside and Verbal shift change report given to [address], RN (oncoming nurse) by Mihai Ibarra RN (offgoing nurse). Report included the following information SBAR, Kardex, Intake/Output, MAR, Recent Results, Med Rec Status and Cardiac Rhythm NSR.

## 2020-07-01 NOTE — PROGRESS NOTES
Problem: Dysphagia (Adult)  Goal: *Acute Goals and Plan of Care (Insert Text)  Description: Speech Therapy Goals  Initiated 6/17/2020    1. Patient will participate in swallow re-evaluation within 7 days. Outcome: Progressing Towards Goal     Problem: Voice Impaired (Adult)  Goal: *Acute Goals and Plan of Care (Insert Text)  Description: Speech Therapy Goals  Initiated 6/17/2020    1. Patient will tolerate trials of digital occlusion to work towards [address] placement within 7 days. Outcome: Progressing Towards Goal     SPEECH LANGUAGE PATHOLOGY DYSPHAGIA/PMV TREATMENT  Patient: Lexi Goncalves (48 y.o. female)  Date: 7/1/2020  Diagnosis: SAH (subarachnoid hemorrhage) ([facility] [I60.9]   <principal problem not specified>       Precautions:  Aspiration    ASSESSMENT:  Patient continues to tolerate the PMV without any overt difficulty, no adverse changes to vital signs (O2 and RR stable throughout), and no back-pressure nor breath stacking upon doffing PMV. Therefore, recommend pt continue to utilize PMV during all waking hours. Given that pt tolerating PMV for the second day in a row, consider red-capping trials to work towards trach decannulation. Furthermore, pt participative in swallow treatment on this date. Continues to be at high risk for prandial aspiration given dense stroke as well as respiratory failure necessitating trach placement, which can impact pharyngeal sensations and pressures. Given these risk factors, recommend pt participate in a Modified Barium Swallow Study to objectively assess safety of swallow physiology. Given that pt may be decannulated tomorrow, will hold MBS until decannulation occurs. Hopeful to complete tomorrow afternoon. PLAN:  Recommendations and Planned Interventions:  --NPO with ice chips after oral care  --Vigilant oral care  --Consider red-capping trials    Patient continues to benefit from skilled intervention to address the above impairments.   Continue treatment per established plan of care. Speaking Valve Placement:  SLP / RT / RN and patient/family  Recommended Speaking Valve Wearing Schedule:  [x]    All waking hours  [x]    As tolerated  Discharge Recommendations:  Inpatient Rehab and [address] [place] [address]:   Patient stated, \"What do you want me to do, man? .    OBJECTIVE:   Cognitive and Communication Status:  Neurologic State: Alert  Orientation Level: Unable to verbalize  Cognition: Decreased command following, Impulsive  Perception: Cues to attend right visual field  Perseveration: No perseveration noted  Safety/Judgement: Lack of insight into deficits  Tracheostomy:  #6 Shiley Cuffless Trach  O2: %  RR: 16-20   Tolerating PMV for 17 minutes and beyond (SLP observed from outside of the room)    Oxygen Therapy  O2 Sat (%): 98 %  O2 Device: Room air  O2 Flow Rate (L/min): 6 l/min  FIO2 (%): 28 %  Dysphagia Treatment:  Tracheostomy:  Airway Clearance  Sputum Amount: Small  Sputum Color/Odor: White  Sputum Consistency: Thick     PMV Trial   Vocal Quality: Low volume  Oral Assessment:  Oral Assessment  Labial: Decreased rate;Decreased seal  Dentition: Intact; Natural  Oral Hygiene: oral mucosa moist and clear of secretions  Lingual: No impairment  Velum: No impairment  Mandible: No impairment  P.O. Trials:  Patient Position: upright in bed  Vocal quality prior to P.O.: Low volume  Consistency Presented: Puree; Ice chips  How Presented: SLP-fed/presented;Straw;Spoon     Bolus Acceptance: Impaired  Bolus Formation/Control: Impaired  Type of Impairment: Delayed  Propulsion: Delayed (# of seconds)  Oral Residue: None  Initiation of Swallow: Delayed (# of seconds)  Laryngeal Elevation: Decreased  Aspiration Signs/Symptoms: Weak cough(intermittent cough)  Pharyngeal Phase Characteristics: No impairment, issues, or problems            Oral Phase Severity: Mild-moderate  Pharyngeal Phase Severity : Mild-moderate  Pain:  Pain Scale 1: Adult Nonverbal Pain Scale  Pain Intensity 1: 0       After treatment:   Patient left in no apparent distress in bed, Call bell within reach, Nursing notified, and Patient left with Passy-Blake valve on, nursing staff or respiratory therapist notified. COMMUNICATION/EDUCATION:   Patient was educated regarding her deficit(s) of dysphagia as this relates to her diagnosis of stroke. She demonstrated Guarded understanding as evidenced by receptive language deficits. The patient's plan of care including recommendations, planned interventions, and recommended diet changes were discussed with: Physical therapist, Occupational therapist, and Registered nurse. Education was provided to patient, family, and staff regarding speaking valve placement, wearing schedule, safety precautions including cuff deflation and removal when sleeping, and care/cleaning guidelines.       MARILYN Yanez  Time Calculation: 17 mins

## 2020-07-01 NOTE — PROGRESS NOTES
TRANSITION OF CARE  RUR--17%  [address] [facility]. Accepted. Insurance authorization is received. No bed today. Transport--BLS stretcher    FAUZIA spoke with Nasir Bonilla Liaison [phone], who said that insurance authorization is now received. No bed available today. FAUZIA spoke to Allen Fay again to report medical development noted below. FAUZIA changed [facility] transport from 3:30PM today  to Will Call status. FAUZIA spoke with staff nurse who said that plan is to cap trach today in preparation for modified barium swallow tomorrow. If successful, patient then may be able to eat. FAUZIA and staff nurse gave mother Maurilio Harris [phone] update at the bedside.

## 2020-07-01 NOTE — PROGRESS NOTES
[facility] [facility] Adult  Hospitalist Group                                                                                          Hospitalist Progress Note  Savanna Harp MD  Answering service: [phone] or [phone] from in house phone        Date of Service:  2020  NAME:  Ramiro Reynaga  :  1970  MRN:  [id number]    Admission Summary:     Ramiro Reynaga is a 54-year-old female with a past medical history of HTN. According to patient's mother, the patient began having a headache on 20 and had been taking BC Powder with aspirin in it for the headache. She recently stopped taking her BP medications. According to her mother she does not smoke, drink alcohol or use recreational drugs. The patient was brought to the [facility] ER via EMS on   after her coworkers found her unresponsive outside of her place of work. 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 On arrival to the ER she was found to be seizing. A stat CT of her head was performed which showed extensive subarachnoid hemorrhage. She was extremely hypertensive in the 240's and was given labetalol and started on a Cardene drip. She was emergently intubated and given Keppra and Ativan as well. Neurosurgery and Neurointerventional Surgery were consulted and the patient was transferred to the [facility] for a higher level of care. On arrival to 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 Extensive lung infiltrates were also noted. Patient was taken for cerebral angiogram the morning of 20 and coil embolization of the left MCA aneurysm was completed. The right MCA aneurysm had a very wide neck incorporating both M2s. It could not be treated endovascularly without stents.  May need to be clipped by Neurosurgery.      Procedures: 20 and coil embolization of the left MCA aneurysm was completed s/p cerebral angiogram for right PANCHITO A1 angioplasty for vasospasm, transarterial infusion of verapamil on 6/9,  S/p angiogram on 6/10 for left ICA/MCA angioplasty and transarterial infusion of verapamil for vasospasm treatment, s/p angiogram on 6/11 and 6/13 for transarterial infusion of verapamil for vasospasm treatment. Tracheostomy placement  6/16 by Dr. Colby Abebe in thoracic surgery. PEG placement. Interval history / Subjective:     7/1/2020 : mildly agitated, resist exam no distress, lab stable for rehab on acceptance/insurance ; CM aware, no issues over night reported on rounds am 7/1/2020      Assessment & Plan:     #. Cerebral Aneurysms. #. SAH due to ruptured cerebral aneurysm   -s/p cerebral angiogram with coil embolization of left MCA bifurcation aneurysm on 6/5, patient has 5X3 right MCA bifurcation aneurysm with very wide neck which will need to be treated with stents vs. Clipping  -on Nimotop, normal saline   -on cardene gtt is off, on norvasc, hydralazine, goal -160  -seizure prophylaxis keppra  -patient open her right eye to voice, non verbal, doesn't follows command, moves right extremities spontaneously   -continue neuro check, serial BP monitoring   -improved vasospasm since 6/20- seen by neurosurgeon   -Neurointerventional surgical service on board  - high risk for decompensation, inpatient mortality, consult to palliative care team   - cont high risk for decompensation 7/1/2020   - per personal conversation with [facility], Dr. Lafrances Eisenmenger, 7/1/2020 ; prognosis is UNKNOWN     #. Seizure: 2nd to above - stable - keppra, VIMPAT-seizure precaution- Neurologist  #. CVAs - aspirin - multiple ischemic infarct -MRI on 6/16- PT/OT-neurology evaluated  #.  Respiratory failure secondary to above, pulmonary edema and possible pneumonia  -s/p trach/trach collar -continue duo neb; cont on trach colar 02 sats satifactory 7/1/2020 rounds.   -sputum cx grow klebsiella pneumonia  -chest x ray on 6/19 vague left lower lobe airspace process, suggesting subsegmental  atelectasis or pneumonia. Interval exchange of endotracheal tube for tracheostomy  -afebrile and no leukocytosis, blood cx no growth  -antibiotics stopped by intensivist- afebrile, SpO2 100% on 9 l/m- monitor pulse ox. No changes 6/29 -  7/1/2020 ( on trach collar 8 LPM)      #. Dysphagia secondary to stroke and SAH-s/p PEG tube, continue tube feeding   #. Iron deficiency anemia -noted fibroid  -Hgb trending down, Hgb 7.8, monitor H/H    -iron  Level was 10, iron saturation 3, iron sucrose 200 mg iv x 3 doses   -check stool for hem ocult   - f./u iron levels post discharge at accepting facility in 3-6 weeks ( August 2020 ok )     #. Peripheral edema multifactorial- low albumin 2.6  -Echo normal cavity size and systolic function (ejection fraction normal). Mild concentric hypertrophy. Estimated left ventricular ejection fraction is 60 - 65%. No regional wall motion abnormality noted.     Obesity -diet control -A1c 5.7    Code status: Full Code   DVT prophylaxis: Lovenox  Care Plan discussed with: Patient/Family and Nurse  Anticipated Disposition: Rehab more than 48 hours     Hospital Problems  Date Reviewed: 6/10/2020          Codes Class Noted POA    Res[facility] failure ([facility] ICD-10-CM: J96.90  ICD-9-CM: 518.81  6/15/2020 Yes        Hypoxic ischemic encephalopathy (HIE), unspecified severity ICD-10-CM: P91.60  ICD-9-CM: 768.70  6/15/2020 Yes        SAH (subarachnoid hemorrhage) ([facility] ICD-10-CM: I60.9  ICD-9-CM: 252  6/4/2020 Yes        Subarachnoid hemorrhage from middle cerebral artery aneurysm, left ([facility] ICD-10-CM: I31.05  ICD-9-CM: 617  6/4/2020 Yes        RIGHT middle cerebral artery aneurysm ICD-10-CM: I67.1  ICD-9-CM: 437.3  6/4/2020 Yes        Cerebral vasospasm ICD-10-CM: N19.109  ICD-9-CM: 435.9  6/4/2020 Yes            ROS not obtainable 2n2 pt condition       Vital Signs:    Last 24hrs VS reviewed since prior progress note.  Most recent are:  Visit Vitals  /47   Pulse 61   Temp 99.2 °F (37.3 °C)   Resp 18   Ht 5' 4\" (1.626 m)   Wt 99.3 kg (219 lb)   SpO2 99%   BMI 37.59 kg/m²         Intake/Output Summary (Last 24 hours) at 7/1/2020 0533  Last data filed at 7/1/2020 0000  Gross per 24 hour   Intake 2360 ml   Output    Net 2360 ml        Physical Examination:             Constitutional:  No acute distress, doesn't follow commands. ENT:  Trach- O2 on Trach   Resp:  good AE, no wheezes. Few creps at bases        GI:  Soft, non distended, non tender. obese    Musculoskeletal:  No edema    Neurologic:        Skin:  Good turgor, no rashes or ulcers       Data Review:    Review and/or order of clinical lab test  Review and/or order of tests in the radiology section of CPT  Review and/or order of tests in the medicine section of CPT      Labs:     Recent Labs     06/30/20 0247 06/29/20  0140   WBC 6.5 5.5   HGB 9.3* 9.4*   HCT 32.0* 31.8*    320     Recent Labs     06/30/20 0247 06/29/20  0140    138   K 4.4 4.1    106   CO2 28 27   BUN 17 19   CREA 0.66 0.70   GLU 84 89   CA 9.0 9.0     Recent Labs     06/30/20 0247 06/29/20  0140   ALT 17 19   AP 70 69   TBILI 0.3 0.2   TP 6.4 6.3*   ALB 2.6* 2.7*   GLOB 3.8 3.6     No results for input(s): INR, PTP, APTT, INREXT, INREXT in the last 72 hours. No results for input(s): FE, TIBC, PSAT, FERR in the last 72 hours. No results found for: FOL, RBCF   No results for input(s): PH, PCO2, PO2 in the last 72 hours. No results for input(s): CPK, CKNDX, TROIQ in the last 72 hours.     No lab exists for component: CPKMB  Lab Results   Component Value Date/Time    Cholesterol, total 90 06/05/2020 03:34 AM    HDL Cholesterol 64 06/05/2020 03:34 AM    LDL, calculated 7.8 06/05/2020 03:34 AM    Triglyceride 91 06/05/2020 03:34 AM    CHOL/HDL Ratio 1.4 06/05/2020 03:34 AM     Lab Results   Component Value Date/Time    Glucose (POC) 121 (H) 06/14/2020 11:22 PM    Glucose (POC) 118 (H) 06/14/2020 05:07 PM    Glucose (POC) 114 (H) 06/14/2020 11:27 AM    Glucose (POC) 109 (H) 06/14/2020 05:02 AM    Glucose (POC) 126 (H) 06/13/2020 11:09 PM     Lab Results   Component Value Date/Time    Specific gravity 1.015 06/15/2020 05:45 AM         Medications Reviewed:     Current Facility-Administered Medications   Medication Dose Route Frequency    lisinopriL (PRINIVIL, ZESTRIL) tablet 30 mg  30 mg Oral DAILY    albuterol-ipratropium (DUO-NEB) 2.5 MG-0.5 MG/3 ML  3 mL Nebulization BID PRN    guar gum (BENEFIBER) packet 1 Packet  4 g Oral TID    labetaloL (NORMODYNE;TRANDATE) injection 20 mg  20 mg IntraVENous Q4H PRN    0.9% sodium chloride infusion 250 mL  250 mL IntraVENous PRN    hydrALAZINE (APRESOLINE) tablet 100 mg  100 mg Oral TID    fentaNYL citrate (PF) injection 50 mcg  50 mcg IntraVENous Q2H PRN    hydrALAZINE (APRESOLINE) 20 mg/mL injection 10 mg  10 mg IntraVENous Q2H PRN    chlorhexidine (ORAL CARE KIT) 0.12 % mouthwash 15 mL  15 mL Oral Q12H    carvediloL (COREG) tablet 25 mg  25 mg Oral BID WITH MEALS    amLODIPine (NORVASC) tablet 10 mg  10 mg Per NG tube DAILY    potassium bicarb-citric acid (EFFER-K) tablet 40 mEq  40 mEq Oral BID    lacosamide (VIMPAT) tablet 150 mg  150 mg Per NG tube Q12H    balsam peru-castor oiL (VENELEX) ointment   Topical BID    aspirin chewable tablet 81 mg  81 mg Per NG tube DAILY    enoxaparin (LOVENOX) injection 40 mg  40 mg SubCUTAneous Q24H    levETIRAcetam (KEPPRA) oral solution 1,000 mg  1,000 mg Per NG tube Q12H    glycopyrrolate (ROBINUL) injection 0.2 mg  0.2 mg IntraMUSCular TID PRN    acetaminophen (TYLENOL) solution 650 mg  650 mg Per NG tube Q4H PRN    glucose chewable tablet 16 g  4 Tab Oral PRN    glucagon (GLUCAGEN) injection 1 mg  1 mg IntraMUSCular PRN    dextrose 10% infusion 0-250 mL  0-250 mL IntraVENous PRN    albuterol (PROVENTIL VENTOLIN) nebulizer solution 2.5 mg  2.5 mg Nebulization Q6H PRN    polyvinyl alcohol-povidone (NATURAL TEARS) 0.5-0.6 % ophthalmic solution 2 Drop  2 Drop Both Eyes Q8H    bisacodyL (DULCOLAX) suppository 10 mg  10 mg Rectal DAILY PRN    ondansetron (ZOFRAN) injection 4 mg  4 mg IntraVENous Q4H PRN    docusate (COLACE) 50 mg/5 mL oral liquid 100 mg  100 mg Oral BID PRN     ______________________________________________________________________  EXPECTED LENGTH OF STAY: 22d 21h  ACTUAL LENGTH OF STAY:          Marquita Mack MD

## 2020-07-01 NOTE — PROGRESS NOTES
Problem: Falls - Risk of  Goal: *Absence of Falls  Description: Document Jayant Ian Fall Risk and appropriate interventions in the flowsheet. Outcome: Progressing Towards Goal  Note: Fall Risk Interventions:  Mobility Interventions: Communicate number of staff needed for ambulation/transfer    Mentation Interventions: Door open when patient unattended    Medication Interventions: Evaluate medications/consider consulting pharmacy    Elimination Interventions: Call light in reach    History of Falls Interventions: Consult care management for discharge planning         Problem: Pressure Injury - Risk of  Goal: *Prevention of pressure injury  Description: Document Sriram Scale and appropriate interventions in the flowsheet.   Outcome: Progressing Towards Goal  Note: Pressure Injury Interventions:  Sensory Interventions: Assess changes in LOC    Moisture Interventions: Absorbent underpads    Activity Interventions: PT/OT evaluation    Mobility Interventions: PT/OT evaluation    Nutrition Interventions: Document food/fluid/supplement intake    Friction and Shear Interventions: Lift team/patient mobility team                Problem: Hemorrhagic Stroke: Day 5 through Discharge  Goal: Activity/Safety  Outcome: Progressing Towards Goal  Goal: Consults, if ordered  Outcome: Progressing Towards Goal  Goal: Nutrition/Diet  Outcome: Progressing Towards Goal  Goal: Medications  Outcome: Progressing Towards Goal     Problem: Hemorrhagic Stroke: Discharge Outcomes  Goal: *Absence of aspiration pneumonia  Outcome: Progressing Towards Goal     Problem: Subarachnoid Hemorrhage Stroke:Admission Day 5-Discharge  Goal: Activity/Safety  Outcome: Progressing Towards Goal  Goal: Medications  Outcome: Progressing Towards Goal  Goal: Patient maintains clear airway/absence of aspiration  Outcome: Progressing Towards Goal     Problem: Subarachnoid Hemorrhage Stroke:Discharge Outcomes  Goal: *Absence of aspiration  Outcome: Progressing Towards Goal

## 2020-07-01 NOTE — PROGRESS NOTES
Clarification - Use of Cool aerosol via tracheostomy mask while wearing PMV. Per Kimberly Guaman non-medicated warm or cool aerosol may be used with the PMV. Passed this information on to the RN caring for the patient.

## 2020-07-01 NOTE — PROGRESS NOTES
placed on trach at this time. Pt on RA. Pt noted tachypneic at this time but will continue to monitor and see if pt RR slows once use to capped trach. Pt able to speak with cap. BBS equal with cap. Pt coughing but doesn't require suction.      HR 65  SP02 100  RR 27

## 2020-07-01 NOTE — PROGRESS NOTES
SLP Contact Note    SLP treatment complete. Pt tolerating PMV during all waking hours. Recommend red-capping trials per RT and MD.  Hopeful that trach will be decannulated tomorrow morning, and then can complete MBS after decannulation.      Thank you,  Leafy Boxer, M.Ed, [address] [facility]  Speech-Language Pathologist

## 2020-07-01 NOTE — WOUND CARE
Wound Care Note: Follow-up visit for left neck Chart shows: 
Admitted for left subarachnoid hemorrhage from middle cerebral artery aneurysm, right middle cerebral artery aneurysm, cerebral vasospasm, respiratory failure, hypoxic ischemic encephalopthy s/p transarterial embolization on 6/5/20, s/p right PANCHITO A1 angioplasty for vasospasm 6/9/20, s/p left ICA/MCA angioplasty 6/10/20, s/p transarterial infusion of non-lytic agent 6/11/20 and 6/13/20, s/p Trach and PEG 6/16/20 Past Medical History:  
Diagnosis Date  
 HTN (hypertension) ' 
WBC = 6.5 on 6/30/20 Admitted from home Assessment:  
Patient's eye is open, non-verbal, moves arms and legs frequently, incontinent with moderate assistance needed in repositioning. Bed: P-500 Patient wearing briefs for incontinence and has a Pure Kwasi Gills in place. Diet: Tube feeding Patient did not moan or grimace with repositioning Bilateral heels, buttocks, and sacral skin intact and without erythema. 1. Left neck no longer has crusted area, partial thickness wound has resolved, light hyperpigmentation when crusted area was, no drainage, tabitha-wound intact. Left open to air. 2.  Left posterior thigh now with thin circular line where iverson port was, no open area, tabitha-wound intact. Venelex ointment applied. 3.  Right nare skin intact. Venelex is no longer needed Patient repositioned on right side with pillow between the knees. Heels offloaded on pillows. Recommendations:   
Sacrum and bilateral heels- BID liberally apply Venelex ointment Specialty bed: P-500 bed ordered from [address] [place] [address]. Please call [facility] at [phone] for any issues or when patient is discharged. Confirmation #[id number] Skin Care & Pressure Prevention: 
Minimize layers of linen/pads under patient to optimize support surface. Turn/reposition approximately every 2 hours and offload heels. Manage incontinence / promote continence Nourishing Skin Cream to dry skin, minimize use of briefs when able Discussed above plan with patient & Jonas Orellana RN Transition of Care: Wound care will sign off. AGUILAR PayneN, RN, [facility], [facility]. 
office [phone] 
pager [phone] or call  to page

## 2020-07-02 ENCOUNTER — APPOINTMENT (OUTPATIENT)
Dept: GENERAL RADIOLOGY | Age: 50
DRG: 003 | End: 2020-07-02
Attending: INTERNAL MEDICINE
Payer: COMMERCIAL

## 2020-07-02 LAB
ERYTHROCYTE [DISTWIDTH] IN BLOOD BY AUTOMATED COUNT: 23.4 % (ref 11.5–14.5)
GLUCOSE BLD STRIP.AUTO-MCNC: 107 MG/DL (ref 65–100)
GLUCOSE BLD STRIP.AUTO-MCNC: 157 MG/DL (ref 65–100)
GLUCOSE BLD STRIP.AUTO-MCNC: 94 MG/DL (ref 65–100)
HCT VFR BLD AUTO: 35.6 % (ref 35–47)
HGB BLD-MCNC: 10.4 G/DL (ref 11.5–16)
MCH RBC QN AUTO: 24 PG (ref 26–34)
MCHC RBC AUTO-ENTMCNC: 29.2 G/DL (ref 30–36.5)
MCV RBC AUTO: 82 FL (ref 80–99)
NRBC # BLD: 0 K/UL (ref 0–0.01)
NRBC BLD-RTO: 0 PER 100 WBC
PLATELET # BLD AUTO: 252 K/UL (ref 150–400)
PMV BLD AUTO: 10.3 FL (ref 8.9–12.9)
RBC # BLD AUTO: 4.34 M/UL (ref 3.8–5.2)
SERVICE CMNT-IMP: ABNORMAL
SERVICE CMNT-IMP: ABNORMAL
SERVICE CMNT-IMP: NORMAL
WBC # BLD AUTO: 4.8 K/UL (ref 3.6–11)

## 2020-07-02 PROCEDURE — 74011250637 HC RX REV CODE- 250/637: Performed by: PSYCHIATRY & NEUROLOGY

## 2020-07-02 PROCEDURE — 74230 X-RAY XM SWLNG FUNCJ C+: CPT

## 2020-07-02 PROCEDURE — 92611 MOTION FLUOROSCOPY/SWALLOW: CPT

## 2020-07-02 PROCEDURE — 74011250637 HC RX REV CODE- 250/637: Performed by: NURSE PRACTITIONER

## 2020-07-02 PROCEDURE — 36415 COLL VENOUS BLD VENIPUNCTURE: CPT

## 2020-07-02 PROCEDURE — 85027 COMPLETE CBC AUTOMATED: CPT

## 2020-07-02 PROCEDURE — 77030038269 HC DRN EXT URIN PURWCK BARD -A

## 2020-07-02 PROCEDURE — 74011250637 HC RX REV CODE- 250/637: Performed by: ANESTHESIOLOGY

## 2020-07-02 PROCEDURE — 74011250637 HC RX REV CODE- 250/637: Performed by: INTERNAL MEDICINE

## 2020-07-02 PROCEDURE — 74011250636 HC RX REV CODE- 250/636: Performed by: ANESTHESIOLOGY

## 2020-07-02 PROCEDURE — 74011250636 HC RX REV CODE- 250/636: Performed by: NURSE PRACTITIONER

## 2020-07-02 PROCEDURE — 31502 CHANGE OF WINDPIPE AIRWAY: CPT

## 2020-07-02 PROCEDURE — 77010033678 HC OXYGEN DAILY

## 2020-07-02 PROCEDURE — 94762 N-INVAS EAR/PLS OXIMTRY CONT: CPT

## 2020-07-02 PROCEDURE — 65660000000 HC RM CCU STEPDOWN

## 2020-07-02 PROCEDURE — 82962 GLUCOSE BLOOD TEST: CPT

## 2020-07-02 RX ADMIN — ASPIRIN 81 MG CHEWABLE TABLET 81 MG: 81 TABLET CHEWABLE at 10:02

## 2020-07-02 RX ADMIN — Medication 1 PACKET: at 10:00

## 2020-07-02 RX ADMIN — CASTOR OIL AND BALSAM, PERU: 788; 87 OINTMENT TOPICAL at 10:00

## 2020-07-02 RX ADMIN — Medication 1 PACKET: at 21:15

## 2020-07-02 RX ADMIN — LACOSAMIDE 150 MG: 50 TABLET, FILM COATED ORAL at 10:02

## 2020-07-02 RX ADMIN — HYDRALAZINE HYDROCHLORIDE 100 MG: 50 TABLET, FILM COATED ORAL at 10:02

## 2020-07-02 RX ADMIN — CHLORHEXIDINE GLUCONATE 15 ML: 0.12 RINSE ORAL at 21:00

## 2020-07-02 RX ADMIN — Medication 2 DROP: at 16:07

## 2020-07-02 RX ADMIN — LISINOPRIL 30 MG: 20 TABLET ORAL at 10:02

## 2020-07-02 RX ADMIN — POTASSIUM BICARBONATE 40 MEQ: 782 TABLET, EFFERVESCENT ORAL at 10:00

## 2020-07-02 RX ADMIN — POTASSIUM BICARBONATE 40 MEQ: 782 TABLET, EFFERVESCENT ORAL at 16:00

## 2020-07-02 RX ADMIN — Medication 1 PACKET: at 17:47

## 2020-07-02 RX ADMIN — LACOSAMIDE 150 MG: 50 TABLET, FILM COATED ORAL at 21:15

## 2020-07-02 RX ADMIN — AMLODIPINE BESYLATE 10 MG: 5 TABLET ORAL at 10:02

## 2020-07-02 RX ADMIN — HYDRALAZINE HYDROCHLORIDE 100 MG: 50 TABLET, FILM COATED ORAL at 21:15

## 2020-07-02 RX ADMIN — Medication 2 DROP: at 21:16

## 2020-07-02 RX ADMIN — CARVEDILOL 25 MG: 12.5 TABLET, FILM COATED ORAL at 10:01

## 2020-07-02 RX ADMIN — ACETAMINOPHEN 650 MG: 650 SUSPENSION ORAL at 01:50

## 2020-07-02 RX ADMIN — LEVETIRACETAM 1000 MG: 100 SOLUTION ORAL at 21:15

## 2020-07-02 RX ADMIN — HYDRALAZINE HYDROCHLORIDE 100 MG: 50 TABLET, FILM COATED ORAL at 17:44

## 2020-07-02 RX ADMIN — CARVEDILOL 25 MG: 12.5 TABLET, FILM COATED ORAL at 17:44

## 2020-07-02 RX ADMIN — LEVETIRACETAM 1000 MG: 100 SOLUTION ORAL at 10:01

## 2020-07-02 RX ADMIN — HYDRALAZINE HYDROCHLORIDE 10 MG: 20 INJECTION INTRAMUSCULAR; INTRAVENOUS at 06:03

## 2020-07-02 RX ADMIN — Medication 2 DROP: at 05:54

## 2020-07-02 RX ADMIN — CASTOR OIL AND BALSAM, PERU: 788; 87 OINTMENT TOPICAL at 17:35

## 2020-07-02 RX ADMIN — ENOXAPARIN SODIUM 40 MG: 40 INJECTION SUBCUTANEOUS at 16:07

## 2020-07-02 RX ADMIN — CHLORHEXIDINE GLUCONATE 15 ML: 0.12 RINSE ORAL at 09:00

## 2020-07-02 NOTE — PROGRESS NOTES
MEERA:  Expected disposition remains [facility] Sen for continued care (authorization in place from Oleks[facility]r De Luna at least through holiday weekend before needing to be redone per [facility]), [facility] on will call. Pt is decanullated today and MBS to be completed. CM spoke with [facility], [phone] and they will call tomorrow AM to confirm if bed is available tomorrow. Updated attending physician.     JUDE Kwon

## 2020-07-02 NOTE — PROGRESS NOTES
[facility] Adult  Hospitalist Group                                                                                          Hospitalist Progress Note  Becky DelaneyDO martina  Answering service: [phone] from in house phone        Date of Service:  2020  NAME:  Marion Corona  :  1970  MRN:  [id number]    Admission Summary:     Marion Corona is a 44-year-old female with a past medical history of HTN. According to patient's mother, the patient began having a headache on 20 and had been taking BC Powder with aspirin in it for the headache. She recently stopped taking her BP medications. According to her mother she does not smoke, drink alcohol or use recreational drugs. The patient was brought to the [facility] ER via EMS on   after her coworkers found her unresponsive outside of her place of work. 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 On arrival to the ER she was found to be seizing. A stat CT of her head was performed which showed extensive subarachnoid hemorrhage. She was extremely hypertensive in the 240's and was given labetalol and started on a Cardene drip. She was emergently intubated and given Keppra and Ativan as well. Neurosurgery and Neurointerventional Surgery were consulted and the patient was transferred to the [facility] for a higher level of care. On arrival to [facility] a CTA of her head and neck was obtained which showed a 4.4 x 4.1 mm right MCA trifurcation aneurysm and a 2.2 x 3.1 mm left MCA trifurcation aneurysm and possible less than 2 mm ACOM aneurysms. Extensive lung infiltrates were also noted. Patient was taken for cerebral angiogram the morning of 20 and coil embolization of the left MCA aneurysm was completed. The right MCA aneurysm had a very wide neck incorporating both M2s. It could not be treated endovascularly without stents.  May need to be clipped by Neurosurgery.      Procedures: 20 and coil embolization of the left MCA aneurysm was completed s/p cerebral angiogram for right PANCHITO A1 angioplasty for vasospasm, transarterial infusion of verapamil on 6/9,  S/p angiogram on 6/10 for left ICA/MCA angioplasty and transarterial infusion of verapamil for vasospasm treatment, s/p angiogram on 6/11 and 6/13 for transarterial infusion of verapamil for vasospasm treatment. Tracheostomy placement  6/16 by Dr. Byron Leonard in thoracic surgery. PEG placement. Interval history / Subjective: Follow up [facility]. Patient seen and examined. Trach decannulated today. Appreciate speech therapy. Initiated on oral diet. Case management following for placement. Assessment & Plan:     #. Cerebral Aneurysms. #. SAH due to ruptured cerebral aneurysm   -s/p cerebral angiogram with coil embolization of left MCA bifurcation aneurysm on 6/5, patient has 5X3 right MCA bifurcation aneurysm with very wide neck which will need to be treated with stents vs. Clipping  -on Nimotop, normal saline   -on cardene gtt is off, on norvasc, hydralazine, goal -160  -seizure prophylaxis keppra  -patient open her right eye to voice, non verbal, doesn't follows command, moves right extremities spontaneously   -continue neuro check, serial BP monitoring   -improved vasospasm since 6/20- seen by neurosurgeon   -Neurointerventional surgical service on board     #. Seizure: 2nd to above - stable - keppra, VIMPAT-seizure precaution- Neurologist  #. CVAs - aspirin - multiple ischemic infarct -MRI on 6/16- PT/OT-neurology evaluated  #. Respiratory failure secondary to above, pulmonary edema and possible pneumonia  -s/p trach/trach collar -continue duo neb; cont on trach colar 02 sats satifactory 7/2/2020 rounds.   -sputum cx grow klebsiella pneumonia  -s/p antibiotics     #Dysphagia secondary to stroke and SAH-s/p PEG tube, continue tube feeding   -MBS 7/2. Diet initiated.   Continue tube feeds    #Iron deficiency anemia -noted fibroid  -Hgb trending down, Hgb 7.8, monitor H/H -iron sucrose 200 mg iv x 3 doses     #Peripheral edema multifactorial- low albumin 2.6  -Echo normal cavity size and systolic function (ejection fraction normal). Mild concentric hypertrophy. Estimated left ventricular ejection fraction is 60 - 65%. No regional wall motion abnormality noted.     Obesity -diet control -A1c 5.7    Code status: Full Code   DVT prophylaxis: Lovenox  Care Plan discussed with: Patient/Family and Nurse  Anticipated Disposition: Patient previously accepted to [facility]. However, trach decannulated. Asking [facility] to review chart and see if patient appropriate for rehab there. Hospital Problems  Date Reviewed: 6/10/2020          Codes Class Noted POA    Respiratory failure ([facility] ICD-10-CM: J96.90  ICD-9-CM: 518.81  6/15/2020 Yes        Hypoxic ischemic encephalopathy (HIE), unspecified severity ICD-10-CM: P91.60  ICD-9-CM: 768.70  6/15/2020 Yes        SAH (subarachnoid hemorrhage) ([facility] ICD-10-CM: I60.9  ICD-9-CM: 755  6/4/2020 Yes        Subarachnoid hemorrhage from middle cerebral artery aneurysm, left (HCC) ICD-10-CM: C39.96  ICD-9-CM: 430  6/4/2020 Yes        RIGHT middle cerebral artery aneurysm ICD-10-CM: I67.1  ICD-9-CM: 437.3  6/4/2020 Yes        Cerebral vasospasm ICD-10-CM: V42.230  ICD-9-CM: 435.9  6/4/2020 Yes            ROS not obtainable 2n2 pt condition       Vital Signs:    Last 24hrs VS reviewed since prior progress note. Most recent are:  Visit Vitals  /72 (BP 1 Location: Left arm, BP Patient Position: At rest)   Pulse 65   Temp 100.4 °F (38 °C)   Resp 18   Ht 5' 4\" (1.626 m)   Wt 96.1 kg (211 lb 14.4 oz)   SpO2 100%   BMI 36.37 kg/m²         Intake/Output Summary (Last 24 hours) at 7/2/2020 1740  Last data filed at 7/2/2020 1200  Gross per 24 hour   Intake 1000 ml   Output 400 ml   Net 600 ml        Physical Examination:             Constitutional:  No acute distress, doesn't follow commands. ENT:  Katherine Divine removed   Resp:  good AE, no wheezes.  Few creps at bases GI:  Soft, non distended, non tender. obese    Musculoskeletal:  No edema    Neurologic:   Does not follow commands, can speak some words      Skin:  Good turgor, no rashes or ulcers       Data Review:    Review and/or order of clinical lab test  Review and/or order of tests in the radiology section of CPT  Review and/or order of tests in the medicine section of CPT      Labs:     Recent Labs     06/30/20  0247   WBC 6.5   HGB 9.3*   HCT 32.0*        Recent Labs     06/30/20 0247      K 4.4      CO2 28   BUN 17   CREA 0.66   GLU 84   CA 9.0     Recent Labs     06/30/20 0247   ALT 17   AP 70   TBILI 0.3   TP 6.4   ALB 2.6*   GLOB 3.8     No results for input(s): INR, PTP, APTT, INREXT, INREXT in the last 72 hours. No results for input(s): FE, TIBC, PSAT, FERR in the last 72 hours. No results found for: FOL, RBCF   No results for input(s): PH, PCO2, PO2 in the last 72 hours. No results for input(s): CPK, CKNDX, TROIQ in the last 72 hours.     No lab exists for component: CPKMB  Lab Results   Component Value Date/Time    Cholesterol, total 90 06/05/2020 03:34 AM    HDL Cholesterol 64 06/05/2020 03:34 AM    LDL, calculated 7.8 06/05/2020 03:34 AM    Triglyceride 91 06/05/2020 03:34 AM    CHOL/HDL Ratio 1.4 06/05/2020 03:34 AM     Lab Results   Component Value Date/Time    Glucose (POC) 94 07/02/2020 11:42 AM    Glucose (POC) 157 (H) 07/02/2020 06:58 AM    Glucose (POC) 121 (H) 06/14/2020 11:22 PM    Glucose (POC) 118 (H) 06/14/2020 05:07 PM    Glucose (POC) 114 (H) 06/14/2020 11:27 AM     Lab Results   Component Value Date/Time    Specific gravity 1.015 06/15/2020 05:45 AM         Medications Reviewed:     Current Facility-Administered Medications   Medication Dose Route Frequency    lisinopriL (PRINIVIL, ZESTRIL) tablet 30 mg  30 mg Oral DAILY    albuterol-ipratropium (DUO-NEB) 2.5 MG-0.5 MG/3 ML  3 mL Nebulization BID PRN    guar gum (BENEFIBER) packet 1 Packet  4 g Oral TID    labetaloL (NORMODYNE;TRANDATE) injection 20 mg  20 mg IntraVENous Q4H PRN    0.9% sodium chloride infusion 250 mL  250 mL IntraVENous PRN    hydrALAZINE (APRESOLINE) tablet 100 mg  100 mg Oral TID    fentaNYL citrate (PF) injection 50 mcg  50 mcg IntraVENous Q2H PRN    hydrALAZINE (APRESOLINE) 20 mg/mL injection 10 mg  10 mg IntraVENous Q2H PRN    chlorhexidine (ORAL CARE KIT) 0.12 % mouthwash 15 mL  15 mL Oral Q12H    carvediloL (COREG) tablet 25 mg  25 mg Oral BID WITH MEALS    amLODIPine (NORVASC) tablet 10 mg  10 mg Per NG tube DAILY    potassium bicarb-citric acid (EFFER-K) tablet 40 mEq  40 mEq Oral BID    lacosamide (VIMPAT) tablet 150 mg  150 mg Per NG tube Q12H    balsam peru-castor oiL (VENELEX) ointment   Topical BID    aspirin chewable tablet 81 mg  81 mg Per NG tube DAILY    enoxaparin (LOVENOX) injection 40 mg  40 mg SubCUTAneous Q24H    levETIRAcetam (KEPPRA) oral solution 1,000 mg  1,000 mg Per NG tube Q12H    glycopyrrolate (ROBINUL) injection 0.2 mg  0.2 mg IntraMUSCular TID PRN    acetaminophen (TYLENOL) solution 650 mg  650 mg Per NG tube Q4H PRN    glucose chewable tablet 16 g  4 Tab Oral PRN    glucagon (GLUCAGEN) injection 1 mg  1 mg IntraMUSCular PRN    dextrose 10% infusion 0-250 mL  0-250 mL IntraVENous PRN    albuterol (PROVENTIL VENTOLIN) nebulizer solution 2.5 mg  2.5 mg Nebulization Q6H PRN    polyvinyl alcohol-povidone (NATURAL TEARS) 0.5-0.6 % ophthalmic solution 2 Drop  2 Drop Both Eyes Q8H    bisacodyL (DULCOLAX) suppository 10 mg  10 mg Rectal DAILY PRN    ondansetron (ZOFRAN) injection 4 mg  4 mg IntraVENous Q4H PRN    docusate (COLACE) 50 mg/5 mL oral liquid 100 mg  100 mg Oral BID PRN     ______________________________________________________________________  EXPECTED LENGTH OF STAY: 22d 21h  ACTUAL LENGTH OF STAY:          [address], DO

## 2020-07-02 NOTE — PROGRESS NOTES
Problem: Dysphagia (Adult)  Goal: *Acute Goals and Plan of Care (Insert Text)  Description: Speech Therapy Goals  Initiated 7/2/2020  1. Patient will tolerate pureed diet/thin liquids without adverse effects within 7 days. Initiated 6/17/2020    1. Patient will participate in swallow re-evaluation within 7 days. Outcome: Progressing Towards Goal     SPEECH PATHOLOGY MODIFIED BARIUM SWALLOW STUDY  Patient: Edison Huitron (54 y.o. female)  Date: 7/2/2020  Primary Diagnosis: SA[facility]oid hemorrhage) [facility] [I60.9]        Precautions:   Aspiration    ASSESSMENT :  Based on the objective data described below, the patient, s/p trach decannulation, presents with moderate oral dysphagia and functional pharyngeal phase of swallow. Oral dysphagia characterized by impaired bolus acceptance with discoordination (pt initially biting on straw prior to sucking through), delayed oral transit, and overall oral discoordination. However, once bolus was transported posteriorly, pt without any significant pharyngeal deficits. No significant pharyngeal weakness, timely swallow, and no aspiration, penetration, nor significant pharyngeal residue. At this juncture, feel pt safe to initiate diet as outlined below with 1:1 assistance and straw sips with thin liquids. Given overall cognitive impairment, unsure that pt will be able to maintain nutrition PO at this time, however, will order meal trays in an attempt to transition to PO. However, SLP will continue to follow. Patient will benefit from skilled intervention to address the above impairments. Patients rehabilitation potential is considered to be Fair     PLAN :  Recommendations and Planned Interventions:  --pureed diet/thin liquids  --straw sips with thins  --upright  --alternate liquids/solids  --small sips/bites  --1:1 assistance  --good oral care  --SLP will continue to follow for diet tolerance and language treatment.     Frequency/Duration: Patient will be followed by speech-language pathology 3 times a week to address goals. Discharge Recommendations: Inpatient Rehab and Skilled Nursing Facility     SUBJECTIVE:   Patient alternating between lethargic and alert throughout study. OBJECTIVE:     Past Medical History:   Diagnosis Date    HTN (hypertension)      Past Surgical History:   Procedure Laterality Date    IR INSERT GASTROSTOMY TUBE PERC  6/19/2020     Prior Level of Function/Home Situation:   Home Situation  Home Environment: Private residence  One/Two Story Residence: One story  Living Alone: No  Support Systems: Child(yuli), Family member(s)  Patient Expects to be Discharged to[de-identified] Unknown  Current DME Used/Available at Home: None  Diet prior to admission: Regular diet/thin liquids  Current Diet:  NPO with PEG tube   Radiologist: Dr. Dhaliwal Click: Lateral;Fluoro  Patient Position: leaning to left in Hausted chair    Trial 1:   Consistency Presented: Thin liquid;Puree;Nectar thick liquid   How Presented: SLP-fed/presented;Spoon;Straw   Consistency Amount: (50 cc thin; 20 cc nectar; 1 tsp Ba paste)   Bolus Acceptance: Impaired   Bolus Formation/Control: Impaired: Delayed   Propulsion: Delayed (# of seconds); Discoordination   Oral Residue: None   Initiation of Swallow: Triggered at vallecula   Timing: No impairment   Penetration: None   Aspiration/Timing: No evidence of aspiration   Pharyngeal Clearance: Less than 10%; Vallecular residue                       Decreased Tongue Base Retraction?: No  Laryngeal Elevation: WFL (within functional limits)  Aspiration/Penetration Score: 1 (No penetration or aspiration-Contrast does not enter the airway)  Pharyngeal Symmetry: Not assessed  Pharyngeal-Esophageal Segment: No impairment  Pharyngeal Dysfunction: None    Oral Phase Severity: Moderate  Pharyngeal Phase Severity: N/A  NOMS:   The NOMS functional outcome measure was used to quantify this patient's level of swallowing impairment.   Based on the NOMS, the patient was determined to be at level 3 for swallow function         NOMS Swallowing Levels:  Level 1 (CN): NPO  Level 2 (CM): NPO but takes consistency in therapy  Level 3 (CL): Takes less than 50% of nutrition p.o. and continues with nonoral feedings; and/or safe with mod cues; and/or max diet restriction  Level 4 (CK): Safe swallow but needs mod cues; and/or mod diet restriction; and/or still requires some nonoral feeding/supplements  Level 5 (CJ): Safe swallow with min diet restriction; and/or needs min cues  Level 6 (CI): Independent with p.o.; rare cues; usually self cues; may need to avoid some foods or needs extra time  Level 7 ([address] [place] [address]): Independent for all p.o.  KEVIN. (2003). National Outcomes Measurement System (NOMS): Adult Speech-Language Pathology User's Guide. COMMUNICATION/EDUCATION:     The patients plan of care including findings from MBS, recommendations, planned interventions, and recommended diet changes were discussed with: Registered nurse. Patient is unable to participate in goal setting and plan of care.     Thank you for this referral.  Todd Mary, MARILYN  Time Calculation: 35 mins

## 2020-07-02 NOTE — PROGRESS NOTES
Problem: Falls - Risk of  Goal: *Absence of Falls  Description: Document Angela Bremen Fall Risk and appropriate interventions in the flowsheet. Outcome: Progressing Towards Goal  Note: Fall Risk Interventions:  Mobility Interventions: Bed/chair exit alarm, Communicate number of staff needed for ambulation/transfer    Mentation Interventions: Adequate sleep, hydration, pain control, Bed/chair exit alarm, Door open when patient unattended, Update white board, Reorient patient, More frequent rounding    Medication Interventions: Bed/chair exit alarm, Patient to call before getting OOB    Elimination Interventions: Bed/chair exit alarm, Call light in reach, Patient to call for help with toileting needs, Stay With Me (per policy), Toilet paper/wipes in reach, Toileting schedule/hourly rounds    History of Falls Interventions: Bed/chair exit alarm, Consult care management for discharge planning, Door open when patient unattended, Room close to nurse's station, Assess for delayed presentation/identification of injury for 48 hrs (comment for end date), Vital signs minimum Q4HRs X 24 hrs (comment for end date), Investigate reason for fall         Problem: Pressure Injury - Risk of  Goal: *Prevention of pressure injury  Description: Document Sriram Scale and appropriate interventions in the flowsheet. Offload heels  Turn approximately every 2 hours  Venelex ointment   Outcome: Progressing Towards Goal  Note: Pressure Injury Interventions:  Sensory Interventions: Assess changes in LOC, Assess need for specialty bed, Avoid rigorous massage over bony prominences, Discuss PT/OT consult with provider, Float heels, Check visual cues for pain, Minimize linen layers, Maintain/enhance activity level, Keep linens dry and wrinkle-free, Turn and reposition approx.  every two hours (pillows and wedges if needed), Pressure redistribution bed/mattress (bed type)    Moisture Interventions: Absorbent underpads, Apply protective barrier, creams and emollients, Assess need for specialty bed, Check for incontinence Q2 hours and as needed, Minimize layers, Moisture barrier, Offer toileting Q_hr    Activity Interventions: Increase time out of bed, Pressure redistribution bed/mattress(bed type), PT/OT evaluation    Mobility Interventions: Float heels, Pressure redistribution bed/mattress (bed type), PT/OT evaluation, Assess need for specialty bed, Turn and reposition approx.  every two hours(pillow and wedges)    Nutrition Interventions: Document food/fluid/supplement intake    Friction and Shear Interventions: Lift sheet, Lift team/patient mobility team, Minimize layers, Apply protective barrier, creams and emollients                Problem: Hemorrhagic Stroke: Day 5 through Discharge  Goal: Activity/Safety  Outcome: Progressing Towards Goal  Goal: Consults, if ordered  Outcome: Progressing Towards Goal  Goal: Diagnostic Test/Procedures  Outcome: Progressing Towards Goal  Goal: Medications  Outcome: Progressing Towards Goal  Goal: Treatments/Interventions/Procedures  Outcome: Progressing Towards Goal     Problem: Hemorrhagic Stroke: Discharge Outcomes  Goal: *Hemodynamically stable  Outcome: Progressing Towards Goal     Problem: Subarachnoid Hemorrhage Stroke:Admission Day 5-Discharge  Goal: Activity/Safety  Outcome: Progressing Towards Goal  Goal: Diagnostic Test/Procedures  Outcome: Progressing Towards Goal  Goal: Medications  Outcome: Progressing Towards Goal  Goal: *Hemodynamically stable  Outcome: Progressing Towards Goal     Problem: Subarachnoid Hemorrhage Stroke:Discharge Outcomes  Goal: *Hemodynamically stable  Outcome: Progressing Towards Goal

## 2020-07-02 NOTE — PROGRESS NOTES
Bedside and Verbal shift change report given to ROSSANA Winslow (oncoming nurse) by Erika Weaver RN (offgoing nurse). Report included the following information SBAR, Kardex, Intake/Output, MAR, Recent Results, Cardiac Rhythm NSR and Dual Neuro Assessment.

## 2020-07-02 NOTE — PROGRESS NOTES
Problem: Hemorrhagic Stroke: Discharge Outcomes  Goal: *Verbalizes anxiety and depression are reduced or absent  Outcome: Not Progressing Towards Goal  Goal: *Verbalize understanding of risk factor modification(Stroke Metric)  Outcome: Not Progressing Towards Goal  Goal: *Hemodynamically stable  Outcome: Progressing Towards Goal  Goal: *Absence of aspiration pneumonia  Outcome: Progressing Towards Goal  Goal: *Absence of signs and symptoms of DVT  Outcome: Progressing Towards Goal

## 2020-07-02 NOTE — PROGRESS NOTES
SLP Contact Note    MBS complete. Patient cleared for pureed diet/thin liquids via straw with 1:1 assistance. Given that pt now able to eat/drink, and is now decannulated, feel pt more appropriate for SNF vs IPR over a LTAC. Recommend SNF v IPR. Full note to follow.        Thank you,  TEVIN CelestinEd, [address] [facility]  Speech-Language Pathologist

## 2020-07-02 NOTE — PROGRESS NOTES
TRANSITION OF CARE  RUR--17%  [facility] [address]. Accepted. Insurance authorization is received. No bed today. Transport--BLS stretcher     CM noted that CM  spoke with Manuel [facility] Liaison, who again said that no bed is available today. FAUZIA spoke with hospitalist who asked, based on SLP recommendation following successful procedure today, that need for LTAC be re-assessed. CM called Lorraine Santiago in this regard and left message without response as yet.

## 2020-07-02 NOTE — PROGRESS NOTES
NUTRITION         Noted pt results from MBS with SLP. D/C'd Tube feeding and added Ensure compact to help meet nutritional needs. Please document PO intakes to see if pt meeting needs.        Pat Grove RD

## 2020-07-02 NOTE — ROUTINE PROCESS
Bedside and Verbal shift change report given to [address], RN (oncoming nurse) by Vitaly Blanca RN (offgoing nurse). Report included the following information SBAR, Kardex, ED Summary, Procedure Summary, Intake/Output, MAR, Recent Results, Cardiac Rhythm NSR, Alarm Parameters  and Dual Neuro Assessment.

## 2020-07-02 NOTE — PROGRESS NOTES
SLP Contact Note    Patient has been tolerating red-capping without difficulty. Discussed with Dr. Bebe Rizo. Will place orders for decannulation of trach, and will complete MBS at ~12:30 today to objectively assess safety of swallow.         Thank you,  TEVIN CantrellEd, [id number] [facility]  Speech-Language Pathologist

## 2020-07-03 PROCEDURE — 74011250637 HC RX REV CODE- 250/637: Performed by: INTERNAL MEDICINE

## 2020-07-03 PROCEDURE — 74011250637 HC RX REV CODE- 250/637: Performed by: PSYCHIATRY & NEUROLOGY

## 2020-07-03 PROCEDURE — 77010033678 HC OXYGEN DAILY

## 2020-07-03 PROCEDURE — 65660000000 HC RM CCU STEPDOWN

## 2020-07-03 PROCEDURE — 92526 ORAL FUNCTION THERAPY: CPT | Performed by: SPEECH-LANGUAGE PATHOLOGIST

## 2020-07-03 PROCEDURE — 74011250637 HC RX REV CODE- 250/637: Performed by: NURSE PRACTITIONER

## 2020-07-03 PROCEDURE — 74011250636 HC RX REV CODE- 250/636: Performed by: ANESTHESIOLOGY

## 2020-07-03 PROCEDURE — 74011250636 HC RX REV CODE- 250/636: Performed by: NURSE PRACTITIONER

## 2020-07-03 PROCEDURE — 74011250637 HC RX REV CODE- 250/637: Performed by: ANESTHESIOLOGY

## 2020-07-03 PROCEDURE — 92507 TX SP LANG VOICE COMM INDIV: CPT | Performed by: SPEECH-LANGUAGE PATHOLOGIST

## 2020-07-03 RX ORDER — CARVEDILOL 12.5 MG/1
12.5 TABLET ORAL 2 TIMES DAILY WITH MEALS
Status: DISCONTINUED | OUTPATIENT
Start: 2020-07-03 | End: 2020-07-04

## 2020-07-03 RX ORDER — HYDROCHLOROTHIAZIDE 25 MG/1
25 TABLET ORAL DAILY
Status: DISCONTINUED | OUTPATIENT
Start: 2020-07-03 | End: 2020-07-10 | Stop reason: HOSPADM

## 2020-07-03 RX ORDER — LISINOPRIL 20 MG/1
40 TABLET ORAL DAILY
Status: DISCONTINUED | OUTPATIENT
Start: 2020-07-03 | End: 2020-07-10 | Stop reason: HOSPADM

## 2020-07-03 RX ADMIN — AMLODIPINE BESYLATE 10 MG: 5 TABLET ORAL at 08:40

## 2020-07-03 RX ADMIN — LEVETIRACETAM 1000 MG: 100 SOLUTION ORAL at 21:18

## 2020-07-03 RX ADMIN — Medication 2 DROP: at 07:02

## 2020-07-03 RX ADMIN — HYDRALAZINE HYDROCHLORIDE 100 MG: 50 TABLET, FILM COATED ORAL at 16:19

## 2020-07-03 RX ADMIN — LEVETIRACETAM 1000 MG: 100 SOLUTION ORAL at 08:40

## 2020-07-03 RX ADMIN — HYDROCHLOROTHIAZIDE 25 MG: 25 TABLET ORAL at 08:40

## 2020-07-03 RX ADMIN — HYDRALAZINE HYDROCHLORIDE 100 MG: 50 TABLET, FILM COATED ORAL at 21:19

## 2020-07-03 RX ADMIN — LACOSAMIDE 150 MG: 50 TABLET, FILM COATED ORAL at 08:40

## 2020-07-03 RX ADMIN — HYDRALAZINE HYDROCHLORIDE 100 MG: 50 TABLET, FILM COATED ORAL at 08:40

## 2020-07-03 RX ADMIN — Medication 1 PACKET: at 08:40

## 2020-07-03 RX ADMIN — Medication 2 DROP: at 21:46

## 2020-07-03 RX ADMIN — Medication 1 PACKET: at 16:19

## 2020-07-03 RX ADMIN — Medication 2 DROP: at 13:47

## 2020-07-03 RX ADMIN — CHLORHEXIDINE GLUCONATE 15 ML: 0.12 RINSE ORAL at 09:00

## 2020-07-03 RX ADMIN — ASPIRIN 81 MG CHEWABLE TABLET 81 MG: 81 TABLET CHEWABLE at 08:40

## 2020-07-03 RX ADMIN — POTASSIUM BICARBONATE 40 MEQ: 782 TABLET, EFFERVESCENT ORAL at 17:29

## 2020-07-03 RX ADMIN — HYDRALAZINE HYDROCHLORIDE 10 MG: 20 INJECTION INTRAMUSCULAR; INTRAVENOUS at 07:03

## 2020-07-03 RX ADMIN — HYDRALAZINE HYDROCHLORIDE 10 MG: 20 INJECTION INTRAMUSCULAR; INTRAVENOUS at 14:45

## 2020-07-03 RX ADMIN — CASTOR OIL AND BALSAM, PERU: 788; 87 OINTMENT TOPICAL at 17:29

## 2020-07-03 RX ADMIN — ACETAMINOPHEN 650 MG: 650 SUSPENSION ORAL at 21:26

## 2020-07-03 RX ADMIN — LISINOPRIL 40 MG: 20 TABLET ORAL at 08:40

## 2020-07-03 RX ADMIN — HYDRALAZINE HYDROCHLORIDE 10 MG: 20 INJECTION INTRAMUSCULAR; INTRAVENOUS at 22:34

## 2020-07-03 RX ADMIN — CASTOR OIL AND BALSAM, PERU: 788; 87 OINTMENT TOPICAL at 08:59

## 2020-07-03 RX ADMIN — ENOXAPARIN SODIUM 40 MG: 40 INJECTION SUBCUTANEOUS at 13:47

## 2020-07-03 RX ADMIN — LACOSAMIDE 150 MG: 50 TABLET, FILM COATED ORAL at 21:19

## 2020-07-03 RX ADMIN — POTASSIUM BICARBONATE 40 MEQ: 782 TABLET, EFFERVESCENT ORAL at 08:40

## 2020-07-03 NOTE — PROGRESS NOTES
TRANSITION OF CARE    RUR 18%-Moderate risk     DISPOSITION-- Accepted to [facility] pending bed availability. No beds today. Call placed to [place], [place] [phone] to check bed availability. No beds available as of now. Insurance authorized until 7/7. [facility] can accept over the weekend if beds become available. CM to follow.  JUDE Lyons,CRM

## 2020-07-03 NOTE — PROGRESS NOTES
Problem: Pressure Injury - Risk of  Goal: *Prevention of pressure injury  Description: Document Sriram Scale and appropriate interventions in the flowsheet.     Offload heels  Turn approximately every 2 hours  Venelex ointment   Outcome: Progressing Towards Goal  Note: Pressure Injury Interventions:  Sensory Interventions: Assess changes in LOC, Float heels, Keep linens dry and wrinkle-free, Discuss PT/OT consult with provider    Moisture Interventions: Check for incontinence Q2 hours and as needed, Maintain skin hydration (lotion/cream), Minimize layers, Offer toileting Q_hr    Activity Interventions: PT/OT evaluation, Increase time out of bed    Mobility Interventions: PT/OT evaluation, Float heels    Nutrition Interventions: Document food/fluid/supplement intake    Friction and Shear Interventions: Minimize layers, Lift team/patient mobility team                Problem: Patient Education: Go to Patient Education Activity  Goal: Patient/Family Education  Outcome: Progressing Towards Goal     Problem: Nutrition Deficit  Goal: *Optimize nutritional status  Outcome: Progressing Towards Goal     Problem: Patient Education: Go to Patient Education Activity  Goal: Patient/Family Education  Outcome: Progressing Towards Goal     Problem: Hemorrhagic Stroke: Day 5 through Discharge  Goal: Off Pathway (Use only if patient is Off Pathway)  Outcome: Progressing Towards Goal  Goal: Activity/Safety  Outcome: Progressing Towards Goal  Goal: Consults, if ordered  Outcome: Progressing Towards Goal  Goal: Diagnostic Test/Procedures  Outcome: Progressing Towards Goal  Goal: Nutrition/Diet  Outcome: Progressing Towards Goal  Goal: Medications  Outcome: Progressing Towards Goal  Goal: Respiratory  Outcome: Progressing Towards Goal  Goal: Treatments/Interventions/Procedures  Outcome: Progressing Towards Goal  Goal: Psychosocial  Outcome: Progressing Towards Goal  Goal: *Hemodynamically stable  Outcome: Progressing Towards Goal  Goal: *Verbalizes anxiety and depression are reduced or absent  Outcome: Progressing Towards Goal  Goal: *Absence of aspiration  Outcome: Progressing Towards Goal  Goal: *Absence of signs and symptoms of DVT  Outcome: Progressing Towards Goal  Goal: *Optimal pain control at patient's stated goal  Outcome: Progressing Towards Goal  Goal: *Tolerating diet  Outcome: Progressing Towards Goal  Goal: *Progressive mobility and function  Outcome: Progressing Towards Goal  Goal: *Rehabilitation readiness  Outcome: Progressing Towards Goal

## 2020-07-03 NOTE — PROGRESS NOTES
[facility] Adult  Hospitalist Group                                                                                          Hospitalist Progress Note  [facility],   Answering service: [phone] from in house phone        Date of Service:  7/3/2020  NAME:  Mohsen Vaughn  :  1970  MRN:  [id number]    Admission Summary:     Mohsen Vaughn is a 80-year-old female with a past medical history of HTN. According to patient's mother, the patient began having a headache on 20 and had been taking BC Powder with aspirin in it for the headache. She recently stopped taking her BP medications. According to her mother she does not smoke, drink alcohol or use recreational drugs. The patient was brought to the [facility] ER via EMS on   after her coworkers found her unresponsive outside of her place of work. 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 On arrival to the ER she was found to be seizing. A stat CT of her head was performed which showed extensive subarachnoid hemorrhage. She was extremely hypertensive in the 240's and was given labetalol and started on a Cardene drip. She was emergently intubated and given Keppra and Ativan as well. Neurosurgery and Neurointerventional Surgery were consulted and the patient was transferred to the [facility] for a higher level of care. 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 Extensive lung infiltrates were also noted. Patient was taken for cerebral angiogram the morning of 20 and coil embolization of the left MCA aneurysm was completed. The right MCA aneurysm had a very wide neck incorporating both M2s. It could not be treated endovascularly without stents.  May need to be clipped by Neurosurgery.      Procedures: 20 and coil embolization of the left MCA aneurysm was completed s/p cerebral angiogram for right PANCHITO A1 angioplasty for vasospasm, transarterial infusion of verapamil on 6/9,  S/p angiogram on 6/10 for left ICA/MCA angioplasty and transarterial infusion of verapamil for vasospasm treatment, s/p angiogram on 6/11 and 6/13 for transarterial infusion of verapamil for vasospasm treatment. Tracheostomy placement  6/16 by Dr. Odilon Coffey in thoracic surgery. PEG placement. Interval history / Subjective: Follow up [facility]. Patient seen and examined. Exam unchanged. SBP goal <160, will monitor closely. Initiated on hydrocholorathiazide. Assessment & Plan:     #. Cerebral Aneurysms. #. SAH due to ruptured cerebral aneurysm   #. Hypertension  -s/p cerebral angiogram with coil embolization of left MCA bifurcation aneurysm on 6/5, patient has 5X3 right MCA bifurcation aneurysm with very wide neck which will need to be treated with stents vs. Clipping  -on Nimotop, normal saline   -on cardene gtt is off, on norvasc, hydralazine, lisinopril, HCTZ, goal SBP <160  -seizure prophylaxis keppra  -patient open her right eye to voice, non verbal, doesn't follows command, moves right extremities spontaneously   -continue neuro check, serial BP monitoring   -improved vasospasm since 6/20- seen by neurosurgeon   -Neurointerventional surgical service on board     #. Bradycardia: holding carvediolol   #. Seizure: 2nd to above - stable - keppra, VIMPAT-seizure precaution- Neurologist  #. CVAs - aspirin - multiple ischemic infarct -MRI on 6/16- PT/OT-neurology evaluated  #. Respiratory failure secondary to above, pulmonary edema and possible pneumonia- resolved     #Dysphagia secondary to stroke and SAH-s/p PEG tube, continue tube feeding   -[facility] [place]. Diet initiated.   Continue tube feeds    #Iron deficiency anemia -noted fibroid  -Hgb trending down, Hgb 7.8, monitor H/H    -iron sucrose 200 mg iv x 3 doses     #Peripheral edema multifactorial- low albumin 2.6  -Echo normal cavity size and systolic function (ejection fraction normal). Mild concentric hypertrophy. Estimated left ventricular ejection fraction is 60 - 65%. No regional wall motion abnormality noted.     Obesity -diet control -A1c 5.7    Code status: Full Code   DVT prophylaxis: Lovenox  Care Plan discussed with: Patient/Family and Nurse  Anticipated Disposition: Patient previously accepted to [facility]. However, trach decannulated. Asking [facility] to review chart and see if patient appropriate for rehab there. Hospital Problems  Date Reviewed: 6/10/2020          Codes Class Noted POA    Respiratory failure ([facility] ICD-10-CM: J96.90  ICD-9-CM: 518.81  6/15/2020 Yes        Hypoxic ischemic encephalopathy (HIE), unspecified severity ICD-10-CM: P91.60  ICD-9-CM: 768.70  6/15/2020 Yes        SAH (subarachnoid hemorrhage) ([facility] ICD-10-CM: I60.9  ICD-9-CM: 569  6/4/2020 Yes        Subarachnoid hemorrhage from middle cerebral artery aneurysm, left (HCC) ICD-10-CM: N61.58  ICD-9-CM: 430  6/4/2020 Yes        RIGHT middle cerebral artery aneurysm ICD-10-CM: I67.1  ICD-9-CM: 437.3  6/4/2020 Yes        Cerebral vasospasm ICD-10-CM: H32.358  ICD-9-CM: 435.9  6/4/2020 Yes            ROS not obtainable 2n2 pt condition       Vital Signs:    Last 24hrs VS reviewed since prior progress note. Most recent are:  Visit Vitals  /68   Pulse (!) 55   Temp 99.4 °F (37.4 °C)   Resp 14   Ht 5' 4\" (1.626 m)   Wt 95.9 kg (211 lb 6.7 oz)   SpO2 100%   BMI 36.29 kg/m²         Intake/Output Summary (Last 24 hours) at 7/3/2020 1719  Last data filed at 7/3/2020 0800  Gross per 24 hour   Intake 60 ml   Output    Net 60 ml        Physical Examination:             Constitutional:  No acute distress, doesn't follow commands. ENT:  Emilia Meiers removed, left eye closed   Resp:  good AE, no wheezes. Few creps at bases        GI:  Soft, non distended, non tender.  obese    Musculoskeletal:  No edema    Neurologic:   Does not follow commands, can speak some words      Skin: Good turgor, no rashes or ulcers       Data Review:    Review and/or order of clinical lab test  Review and/or order of tests in the radiology section of CPT  Review and/or order of tests in the medicine section of CPT      Labs:     Recent Labs     07/02/20  1721   WBC 4.8   HGB 10.4*   HCT 35.6        No results for input(s): NA, K, CL, CO2, BUN, CREA, GLU, CA, MG, PHOS, URICA in the last 72 hours. No results for input(s): ALT, AP, TBIL, TBILI, TP, ALB, GLOB, GGT, AML, LPSE in the last 72 hours. No lab exists for component: SGOT, GPT, AMYP, HLPSE  No results for input(s): INR, PTP, APTT, INREXT, INREXT in the last 72 hours. No results for input(s): FE, TIBC, PSAT, FERR in the last 72 hours. No results found for: FOL, RBCF   No results for input(s): PH, PCO2, PO2 in the last 72 hours. No results for input(s): CPK, CKNDX, TROIQ in the last 72 hours.     No lab exists for component: CPKMB  Lab Results   Component Value Date/Time    Cholesterol, total 90 06/05/2020 03:34 AM    HDL Cholesterol 64 06/05/2020 03:34 AM    LDL, calculated 7.8 06/05/2020 03:34 AM    Triglyceride 91 06/05/2020 03:34 AM    CHOL/HDL Ratio 1.4 06/05/2020 03:34 AM     Lab Results   Component Value Date/Time    Glucose (POC) 107 (H) 07/02/2020 05:42 PM    Glucose (POC) 94 07/02/2020 11:42 AM    Glucose (POC) 157 (H) 07/02/2020 06:58 AM    Glucose (POC) 121 (H) 06/14/2020 11:22 PM    Glucose (POC) 118 (H) 06/14/2020 05:07 PM     Lab Results   Component Value Date/Time    Specific gravity 1.015 06/15/2020 05:45 AM         Medications Reviewed:     Current Facility-Administered Medications   Medication Dose Route Frequency    lisinopriL (PRINIVIL, ZESTRIL) tablet 40 mg  40 mg Oral DAILY    hydroCHLOROthiazide (HYDRODIURIL) tablet 25 mg  25 mg Oral DAILY    [Held by provider] carvediloL (COREG) tablet 12.5 mg  12.5 mg Oral BID WITH MEALS    albuterol-ipratropium (DUO-NEB) 2.5 MG-0.5 MG/3 ML  3 mL Nebulization BID PRN    guar gum (BENEFIBER) packet 1 Packet  4 g Oral TID    labetaloL (NORMODYNE;TRANDATE) injection 20 mg  20 mg IntraVENous Q4H PRN    0.9% sodium chloride infusion 250 mL  250 mL IntraVENous PRN    hydrALAZINE (APRESOLINE) tablet 100 mg  100 mg Oral TID    fentaNYL citrate (PF) injection 50 mcg  50 mcg IntraVENous Q2H PRN    hydrALAZINE (APRESOLINE) 20 mg/mL injection 10 mg  10 mg IntraVENous Q2H PRN    chlorhexidine (ORAL CARE KIT) 0.12 % mouthwash 15 mL  15 mL Oral Q12H    amLODIPine (NORVASC) tablet 10 mg  10 mg Per NG tube DAILY    potassium bicarb-citric acid (EFFER-K) tablet 40 mEq  40 mEq Oral BID    lacosamide (VIMPAT) tablet 150 mg  150 mg Per NG tube Q12H    balsam peru-castor oiL (VENELEX) ointment   Topical BID    aspirin chewable tablet 81 mg  81 mg Per NG tube DAILY    enoxaparin (LOVENOX) injection 40 mg  40 mg SubCUTAneous Q24H    levETIRAcetam (KEPPRA) oral solution 1,000 mg  1,000 mg Per NG tube Q12H    glycopyrrolate (ROBINUL) injection 0.2 mg  0.2 mg IntraMUSCular TID PRN    acetaminophen (TYLENOL) solution 650 mg  650 mg Per NG tube Q4H PRN    glucose chewable tablet 16 g  4 Tab Oral PRN    glucagon (GLUCAGEN) injection 1 mg  1 mg IntraMUSCular PRN    dextrose 10% infusion 0-250 mL  0-250 mL IntraVENous PRN    albuterol (PROVENTIL VENTOLIN) nebulizer solution 2.5 mg  2.5 mg Nebulization Q6H PRN    polyvinyl alcohol-povidone (NATURAL TEARS) 0.5-0.6 % ophthalmic solution 2 Drop  2 Drop Both Eyes Q8H    bisacodyL (DULCOLAX) suppository 10 mg  10 mg Rectal DAILY PRN    ondansetron (ZOFRAN) injection 4 mg  4 mg IntraVENous Q4H PRN    docusate (COLACE) 50 mg/5 mL oral liquid 100 mg  100 mg Oral BID PRN     ______________________________________________________________________  EXPECTED LENGTH OF STAY: 22d 21h  ACTUAL LENGTH OF STAY:          [address], DO

## 2020-07-03 NOTE — PROGRESS NOTES
Problem: Falls - Risk of  Goal: *Absence of Falls  Description: Document Alayna Millard Fall Risk and appropriate interventions in the flowsheet. Outcome: Progressing Towards Goal  Note: Fall Risk Interventions:  Mobility Interventions: Assess mobility with egress test    Mentation Interventions: Door open when patient unattended    Medication Interventions: Assess postural VS orthostatic hypotension    Elimination Interventions: Toileting schedule/hourly rounds    History of Falls Interventions: Consult care management for discharge planning, Door open when patient unattended         Problem: Pressure Injury - Risk of  Goal: *Prevention of pressure injury  Description: Document Sriram Scale and appropriate interventions in the flowsheet.     Offload heels  Turn approximately every 2 hours  Venelex ointment   Outcome: Progressing Towards Goal  Note: Pressure Injury Interventions:  Sensory Interventions: Assess changes in LOC, Discuss PT/OT consult with provider, Keep linens dry and wrinkle-free, Maintain/enhance activity level, Minimize linen layers, Monitor skin under medical devices, Pressure redistribution bed/mattress (bed type), Pad between skin to skin    Moisture Interventions: Check for incontinence Q2 hours and as needed, Internal/External urinary devices, Maintain skin hydration (lotion/cream), Minimize layers, Moisture barrier    Activity Interventions: Pressure redistribution bed/mattress(bed type)    Mobility Interventions: HOB 30 degrees or less, Pressure redistribution bed/mattress (bed type), PT/OT evaluation    Nutrition Interventions: Document food/fluid/supplement intake    Friction and Shear Interventions: Apply protective barrier, creams and emollients, HOB 30 degrees or less, Lift sheet, Lift team/patient mobility team, Minimize layers                Problem: Patient Education: Go to Patient Education Activity  Goal: Patient/Family Education  Outcome: Progressing Towards Goal     Problem: Hemorrhagic Stroke: Day 5 through Discharge  Goal: Activity/Safety  Outcome: Progressing Towards Goal  Goal: Consults, if ordered  Outcome: Progressing Towards Goal  Goal: Diagnostic Test/Procedures  Outcome: Progressing Towards Goal  Goal: Nutrition/Diet  Outcome: Progressing Towards Goal  Goal: Medications  Outcome: Progressing Towards Goal  Goal: Respiratory  Outcome: Progressing Towards Goal  Goal: Treatments/Interventions/Procedures  Outcome: Progressing Towards Goal  Goal: Psychosocial  Outcome: Progressing Towards Goal  Goal: *Hemodynamically stable  Outcome: Progressing Towards Goal  Goal: *Verbalizes anxiety and depression are reduced or absent  Outcome: Progressing Towards Goal  Goal: *Absence of aspiration  Outcome: Progressing Towards Goal  Goal: *Absence of signs and symptoms of DVT  Outcome: Progressing Towards Goal  Goal: *Optimal pain control at patient's stated goal  Outcome: Progressing Towards Goal  Goal: *Tolerating diet  Outcome: Progressing Towards Goal  Goal: *Progressive mobility and function  Outcome: Progressing Towards Goal  Goal: *Rehabilitation readiness  Outcome: Progressing Towards Goal     Problem: Subarachnoid Hemorrhage Stroke:Admission Day 5-Discharge  Goal: Activity/Safety  Outcome: Progressing Towards Goal  Goal: Diagnostic Test/Procedures  Outcome: Progressing Towards Goal  Goal: Nutrition/Diet  Outcome: Progressing Towards Goal  Goal: Medications  Outcome: Progressing Towards Goal  Goal: Patient maintains clear airway/absence of aspiration  Outcome: Progressing Towards Goal  Goal: Treatments/Interventions/Procedures  Outcome: Progressing Towards Goal  Goal: Psychosocial  Outcome: Progressing Towards Goal  Goal: *Hemodynamically stable  Outcome: Progressing Towards Goal  Goal: *Absence of signs and symptoms of DVT  Outcome: Progressing Towards Goal     Problem: Subarachnoid Hemorrhage Stroke:Discharge Outcomes  Goal: *Verbalizes anxiety and depression are reduced or absent  Outcome: Progressing Towards Goal  Goal: *Verbalizes understanding of risk factor modification (Stroke Metric)  Outcome: Progressing Towards Goal  Goal: *Hemodynamically stable  Outcome: Progressing Towards Goal  Goal: *Aware of needed dietary changes  Outcome: Progressing Towards Goal  Goal: *Verbalizes understanding and describes medication purposes and frequencies  Outcome: Progressing Towards Goal  Goal: *Verbalizes understanding of plan for nimodipine administration  Description: until day 21 and when next dose is scheduled  Outcome: Progressing Towards Goal  Goal: *Absence of signs and symptoms of DVT  Outcome: Progressing Towards Goal  Goal: *Absence of aspiration  Outcome: Progressing Towards Goal  Goal: *Progressive mobility and function  Outcome: Progressing Towards Goal  Goal: *Home safety concerns addressed  Outcome: Progressing Towards Goal

## 2020-07-03 NOTE — PROGRESS NOTES
Problem: Dysphagia (Adult)  Goal: *Acute Goals and Plan of Care (Insert Text)  Description: Speech Therapy Goals  Initiated 7/2/2020  1. Patient will tolerate pureed diet/thin liquids without adverse effects within 7 days. Initiated 6/17/2020    1. Patient will participate in swallow re-evaluation within 7 days. 7/3/2020 1031 by Ora Vigil, SLP  Outcome: Progressing Towards Goal     Problem: Communication Impaired (Adult)  Goal: *Acute Goals and Plan of Care (Insert Text)  Description: Speech therapy goals  Initiated 6/26/2020   1. Patient will follow 1-step commands with 10% accuracy with max cues within 7 days   2. Patient will respond to yes/no questions with 10% accuracy with max cues within 7 days      UPDATED 7-3-2020  1. Patient will follow 1-step commands with 50% accuracy with max cues within 7 days   2. Patient will respond to yes/no questions with 66% accuracy with max cues within 7 days         7/3/2020 1031 by Ora Vigil, SLP  Outcome: Progressing Towards Goal      SPEECH LANGUAGE PATHOLOGY SPEECH/LANGUAGE AND DYSPHAGIA TREATMENT: WEEKLY REASSESSMENT  Patient: Norma gArawal (54 y.o. female)  Date: 7/3/2020  Diagnosis: SAH [facility] [I60.9]   <principal problem not specified>       Precautions:  Aspiration    ASSESSMENT:  Patient has made excellent progress with swallow as addressed in weekly update yesterday. This has been the main focus of treatment. She has made some progress with communication but was drowsy during this session: had clearly been more alert earlier as significant portion of breakfast was consumed. She did follow some commands start of session but declined in accuracy as session progressed. Patient's progression toward goals since last assessment: Made progress today with responding to yes no, some command following. Goal percentages updated.       PLAN:  Goals have been updated based on progression since last assessment. Patient continues to benefit from skilled intervention to address the above impairments. Continue to follow the patient 3 times a week to address goals. Recommendations and Planned Interventions:  SLP tx as above for speech and swallow   Discharge Recommendations: Inpatient Rehab and [address] [place] [address] Avenue:   Patient nonverbal except exclaiming politely when moved in bed. OBJECTIVE:   Cognitive and Communication Status:  Neurologic State: Drowsy  Orientation Level: Unable to verbalize  Cognition: No command following  Perception: Cues to attend right visual field  Perseveration: No perseveration noted  Safety/Judgement: Decreased awareness of environment, Decreased awareness of need for assistance, Decreased insight into deficits, Decreased awareness of need for safety  Dysphagia Treatment:  Oral Assessment:  Oral Assessment  Labial: Decreased seal  Dentition: Intact; Natural  Oral Hygiene: clean tongue  Mandible: (tight bite on spoon with ice, does not appear intentional, patient able to release after 1-2 seconds. RN reported this had not occured with her.)  P. O. Trials:  Patient Position: upright in bed with frequent repositioning, SLP later assisted RN with posture. Vocal quality prior to P.O.: Low volume  Consistency Presented: Thin liquid;Puree; Ice chips  How Presented: Straw;Spoon;SLP-fed/presented     Bolus Acceptance: Impaired(See above re: tight bite reflex )     Type of Impairment: Delayed  Propulsion: Delayed (# of seconds)  Oral Residue: None  Initiation of Swallow: Delayed (# of seconds)  Laryngeal Elevation: Functional  Aspiration Signs/Symptoms: None  Pharyngeal Phase Characteristics: No impairment, issues, or problems            Oral Phase Severity: Moderate  Pharyngeal Phase Severity : Mild  Exercises:  Laryngeal Exercises:                          Speech/Language treatment & Interventions:                                     Language Comprehension and Expression:  Auditory Comprehension   Response to Basic Yes/No Questions (%): 75 %(50)  One-Step Basic Commands (%): (050)  Verbal Expression  Verbal Expression  Automatic Speech Task: Impaired (comment)(no recognition of counting in unison tasks even with tactile cues)  Automatic speech task cueing amount: Maximum  Repetition: Impaired(patient drowsy, did not respond in this task )  Conversation: Non-fluent     Pain:  Pain Scale 1: Adult Nonverbal Pain Scale  Pain Intensity 1: 0       After treatment patient left in no apparent distress:   Patient left in no apparent distress in bed, Nursing notified, and staff present     COMMUNICATION/EDUCATION:     The patients plan of care including recommendations, planned interventions, and recommended diet changes were discussed with: Registered nurse.      Chandni Quintana SLP  Time Calculation: 10 mins

## 2020-07-03 NOTE — PROGRESS NOTES
Bedside shift change report given to bertrand (oncoming nurse) by ismael (offgoing nurse). Report included the following information SBAR and Dual Neuro Assessment.

## 2020-07-03 NOTE — PROGRESS NOTES
Problem: Hemorrhagic Stroke: Discharge Outcomes  Goal: *Optimal pain control at patient's stated goal  Outcome: Progressing Towards Goal  Goal: *Hemodynamically stable  Outcome: Progressing Towards Goal  Goal: *Absence of aspiration pneumonia  Outcome: Progressing Towards Goal  Goal: *Tolerating diet  Outcome: Progressing Towards Goal  Goal: *Absence of signs and symptoms of DVT  Outcome: Progressing Towards Goal  Goal: *Absence of aspiration  Outcome: Progressing Towards Goal

## 2020-07-04 LAB
ANION GAP SERPL CALC-SCNC: 7 MMOL/L (ref 5–15)
BUN SERPL-MCNC: 18 MG/DL (ref 6–20)
BUN/CREAT SERPL: 29 (ref 12–20)
CALCIUM SERPL-MCNC: 9.9 MG/DL (ref 8.5–10.1)
CHLORIDE SERPL-SCNC: 103 MMOL/L (ref 97–108)
CO2 SERPL-SCNC: 28 MMOL/L (ref 21–32)
COMMENT, HOLDF: NORMAL
CREAT SERPL-MCNC: 0.63 MG/DL (ref 0.55–1.02)
GLUCOSE SERPL-MCNC: 90 MG/DL (ref 65–100)
POTASSIUM SERPL-SCNC: 4 MMOL/L (ref 3.5–5.1)
SAMPLES BEING HELD,HOLD: NORMAL
SODIUM SERPL-SCNC: 138 MMOL/L (ref 136–145)

## 2020-07-04 PROCEDURE — 80048 BASIC METABOLIC PNL TOTAL CA: CPT

## 2020-07-04 PROCEDURE — 74011250637 HC RX REV CODE- 250/637: Performed by: NURSE PRACTITIONER

## 2020-07-04 PROCEDURE — 74011250637 HC RX REV CODE- 250/637: Performed by: INTERNAL MEDICINE

## 2020-07-04 PROCEDURE — 77030038269 HC DRN EXT URIN PURWCK BARD -A

## 2020-07-04 PROCEDURE — 65660000000 HC RM CCU STEPDOWN

## 2020-07-04 PROCEDURE — 74011250636 HC RX REV CODE- 250/636: Performed by: ANESTHESIOLOGY

## 2020-07-04 PROCEDURE — 74011250637 HC RX REV CODE- 250/637: Performed by: ANESTHESIOLOGY

## 2020-07-04 PROCEDURE — 74011250637 HC RX REV CODE- 250/637: Performed by: PSYCHIATRY & NEUROLOGY

## 2020-07-04 PROCEDURE — 36415 COLL VENOUS BLD VENIPUNCTURE: CPT

## 2020-07-04 RX ORDER — CARVEDILOL 12.5 MG/1
25 TABLET ORAL 2 TIMES DAILY WITH MEALS
Status: DISCONTINUED | OUTPATIENT
Start: 2020-07-04 | End: 2020-07-10 | Stop reason: HOSPADM

## 2020-07-04 RX ADMIN — LEVETIRACETAM 1000 MG: 100 SOLUTION ORAL at 21:16

## 2020-07-04 RX ADMIN — POTASSIUM BICARBONATE 40 MEQ: 782 TABLET, EFFERVESCENT ORAL at 08:05

## 2020-07-04 RX ADMIN — Medication 1 PACKET: at 08:05

## 2020-07-04 RX ADMIN — CHLORHEXIDINE GLUCONATE 15 ML: 0.12 RINSE ORAL at 09:00

## 2020-07-04 RX ADMIN — CASTOR OIL AND BALSAM, PERU: 788; 87 OINTMENT TOPICAL at 17:12

## 2020-07-04 RX ADMIN — HYDRALAZINE HYDROCHLORIDE 100 MG: 50 TABLET, FILM COATED ORAL at 21:16

## 2020-07-04 RX ADMIN — Medication 2 DROP: at 05:58

## 2020-07-04 RX ADMIN — AMLODIPINE BESYLATE 10 MG: 5 TABLET ORAL at 08:05

## 2020-07-04 RX ADMIN — LISINOPRIL 40 MG: 20 TABLET ORAL at 08:05

## 2020-07-04 RX ADMIN — LACOSAMIDE 150 MG: 50 TABLET, FILM COATED ORAL at 21:16

## 2020-07-04 RX ADMIN — CARVEDILOL 25 MG: 12.5 TABLET, FILM COATED ORAL at 08:26

## 2020-07-04 RX ADMIN — LACOSAMIDE 150 MG: 50 TABLET, FILM COATED ORAL at 08:05

## 2020-07-04 RX ADMIN — Medication 1 PACKET: at 17:12

## 2020-07-04 RX ADMIN — Medication 2 DROP: at 21:24

## 2020-07-04 RX ADMIN — ASPIRIN 81 MG CHEWABLE TABLET 81 MG: 81 TABLET CHEWABLE at 08:05

## 2020-07-04 RX ADMIN — HYDRALAZINE HYDROCHLORIDE 100 MG: 50 TABLET, FILM COATED ORAL at 08:05

## 2020-07-04 RX ADMIN — Medication 2 DROP: at 13:52

## 2020-07-04 RX ADMIN — POTASSIUM BICARBONATE 40 MEQ: 782 TABLET, EFFERVESCENT ORAL at 17:12

## 2020-07-04 RX ADMIN — HYDRALAZINE HYDROCHLORIDE 100 MG: 50 TABLET, FILM COATED ORAL at 17:12

## 2020-07-04 RX ADMIN — CARVEDILOL 25 MG: 12.5 TABLET, FILM COATED ORAL at 17:12

## 2020-07-04 RX ADMIN — LEVETIRACETAM 1000 MG: 100 SOLUTION ORAL at 08:05

## 2020-07-04 RX ADMIN — HYDROCHLOROTHIAZIDE 25 MG: 25 TABLET ORAL at 08:05

## 2020-07-04 RX ADMIN — ENOXAPARIN SODIUM 40 MG: 40 INJECTION SUBCUTANEOUS at 13:51

## 2020-07-04 RX ADMIN — CASTOR OIL AND BALSAM, PERU: 788; 87 OINTMENT TOPICAL at 08:05

## 2020-07-04 NOTE — PROGRESS NOTES
Problem: Falls - Risk of  Goal: *Absence of Falls  Description: Document Edgard Post Fall Risk and appropriate interventions in the flowsheet. Outcome: Progressing Towards Goal  Note: Fall Risk Interventions:  Mobility Interventions: Assess mobility with egress test, Bed/chair exit alarm    Mentation Interventions: Adequate sleep, hydration, pain control, Bed/chair exit alarm, Door open when patient unattended    Medication Interventions: Assess postural VS orthostatic hypotension, Bed/chair exit alarm    Elimination Interventions: Bed/chair exit alarm, Toileting schedule/hourly rounds    History of Falls Interventions: Consult care management for discharge planning, Bed/chair exit alarm, Door open when patient unattended         Problem: Pressure Injury - Risk of  Goal: *Prevention of pressure injury  Description: Document Sriram Scale and appropriate interventions in the flowsheet.     Offload heels  Turn approximately every 2 hours  Venelex ointment   Outcome: Progressing Towards Goal  Note: Pressure Injury Interventions:  Sensory Interventions: Assess changes in LOC, Float heels, Discuss PT/OT consult with provider, Keep linens dry and wrinkle-free, Maintain/enhance activity level, Minimize linen layers, Monitor skin under medical devices    Moisture Interventions: Absorbent underpads, Apply protective barrier, creams and emollients, Internal/External urinary devices, Limit adult briefs, Maintain skin hydration (lotion/cream), Minimize layers    Activity Interventions: Pressure redistribution bed/mattress(bed type)    Mobility Interventions: Float heels, HOB 30 degrees or less, Pressure redistribution bed/mattress (bed type)    Nutrition Interventions: Document food/fluid/supplement intake    Friction and Shear Interventions: Apply protective barrier, creams and emollients, HOB 30 degrees or less, Lift sheet, Minimize layers                Problem: Nutrition Deficit  Goal: *Optimize nutritional status  Outcome: Progressing Towards Goal     Problem: Subarachnoid Hemorrhage Stroke:Admission Day 5-Discharge  Goal: Activity/Safety  Outcome: Progressing Towards Goal  Goal: Diagnostic Test/Procedures  Outcome: Progressing Towards Goal  Goal: Nutrition/Diet  Outcome: Progressing Towards Goal  Goal: Medications  Outcome: Progressing Towards Goal  Goal: Patient maintains clear airway/absence of aspiration  Outcome: Progressing Towards Goal  Goal: Treatments/Interventions/Procedures  Outcome: Progressing Towards Goal  Goal: Psychosocial  Outcome: Progressing Towards Goal  Goal: *Hemodynamically stable  Outcome: Progressing Towards Goal  Goal: *Absence of signs and symptoms of DVT  Outcome: Progressing Towards Goal     Problem: Subarachnoid Hemorrhage Stroke:Discharge Outcomes  Goal: *Verbalizes anxiety and depression are reduced or absent  Outcome: Progressing Towards Goal  Goal: *Verbalizes understanding of risk factor modification (Stroke Metric)  Outcome: Progressing Towards Goal  Goal: *Hemodynamically stable  Outcome: Progressing Towards Goal  Goal: *Aware of needed dietary changes  Outcome: Progressing Towards Goal  Goal: *Verbalizes understanding and describes medication purposes and frequencies  Outcome: Progressing Towards Goal  Goal: *Verbalizes understanding of plan for nimodipine administration  Description: until day 21 and when next dose is scheduled  Outcome: Progressing Towards Goal  Goal: *Absence of signs and symptoms of DVT  Outcome: Progressing Towards Goal  Goal: *Absence of aspiration  Outcome: Progressing Towards Goal  Goal: *Progressive mobility and function  Outcome: Progressing Towards Goal  Goal: *Home safety concerns addressed  Outcome: Progressing Towards Goal

## 2020-07-04 NOTE — PROGRESS NOTES
[facility] [facility] Adult  Hospitalist Group                                                                                          Hospitalist Progress Note  [address],   Answering service: [phone] from in house phone        Date of Service:  2020  NAME:  Yeni Osei  :  1970  MRN:  [id number]    Admission Summary:     Yeni Osei is a 51-year-old female with a past medical history of HTN. According to patient's mother, the patient began having a headache on 20 and had been taking BC Powder with aspirin in it for the headache. She recently stopped taking her BP medications. According to her mother she does not smoke, drink alcohol or use recreational drugs. The patient was brought to the [facility] ER via EMS on   after her coworkers found her unresponsive outside of her place of work. 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 On arrival to the ER she was found to be seizing. A stat CT of her head was performed which showed extensive subarachnoid hemorrhage. She was extremely hypertensive in the 240's and was given labetalol and started on a Cardene drip. She was emergently intubated and given Keppra and Ativan as well. Neurosurgery and Neurointerventional Surgery were consulted and the patient was transferred to the [facility] for a higher level of care. On arrival to 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 Extensive lung infiltrates were also noted. Patient was taken for cerebral angiogram the morning of 20 and coil embolization of the left MCA aneurysm was completed. The right MCA aneurysm had a very wide neck incorporating both M2s. It could not be treated endovascularly without stents.  May need to be clipped by Neurosurgery.      Procedures: 20 and coil embolization of the left MCA aneurysm was completed s/p cerebral angiogram for right PANCHITO A1 angioplasty for vasospasm, transarterial infusion of verapamil on 6/9,  S/p angiogram on 6/10 for left ICA/MCA angioplasty and transarterial infusion of verapamil for vasospasm treatment, s/p angiogram on 6/11 and 6/13 for transarterial infusion of verapamil for vasospasm treatment. Tracheostomy placement  6/16 by Dr. Richard Schroeder in thoracic surgery. PEG placement. Interval history / Subjective: Follow up [facility]. Patient seen and examined. Drowsy during my encounter. Discussed plan with mother at bedside. BP more controlled. Per nursing, minimal oral intake today, will monitor closely. Assessment & Plan:     #. Cerebral Aneurysms. #. SAH due to ruptured cerebral aneurysm   #. Hypertension  -s/p cerebral angiogram with coil embolization of left MCA bifurcation aneurysm on 6/5, patient has 5X3 right MCA bifurcation aneurysm with very wide neck which will need to be treated with stents vs. Clipping  -on Nimotop, normal saline   -on cardene gtt is off, on norvasc, hydralazine, lisinopril, HCTZ, carvedilol, goal SBP <160  -seizure prophylaxis keppra  -patient open her right eye to voice, non verbal, doesn't follows command, moves lower extremities spontaneously   -improved vasospasm since 6/20- seen by neurosurgeon   -Neurointerventional surgical service on board     #. Bradycardia: holding carvediolol   #. Seizure: 2nd to above - stable - keppra, VIMPAT-seizure precaution- Neurologist  #. CVAs - aspirin - multiple ischemic infarct -MRI on 6/16- PT/OT-neurology evaluated  #. Respiratory failure secondary to above, pulmonary edema and possible pneumonia- resolved     #Dysphagia secondary to stroke and SAH-s/p PEG tube, continue tube feeding   -[facility] 7/2. Diet initiated.   Continue tube feeds    #Iron deficiency anemia -noted fibroid  -Hgb trending down, Hgb 7.8, monitor H/H    -iron sucrose 200 mg iv x 3 doses     #Peripheral edema multifactorial- low albumin 2.6  -Echo normal cavity size and systolic function (ejection fraction normal). Mild concentric hypertrophy. Estimated left ventricular ejection fraction is 60 - 65%. No regional wall motion abnormality noted.     Obesity -diet control -A1c 5.7    Code status: Full Code   DVT prophylaxis: Lovenox  Care Plan discussed with: Patient/Family and Nurse  Anticipated Disposition: SNF     Hospital Problems  Date Reviewed: 6/10/2020          Codes Class Noted POA    Respiratory failure ([facility] [address] ICD-10-CM: J96.90  ICD-9-CM: 518.81  6/15/2020 Yes        Hypoxic ischemic encephalopathy (HIE), unspecified severity ICD-10-CM: P91.60  ICD-9-CM: 768.70  6/15/2020 Yes        SAH (subarachnoid hemorrhage) ([facility] [address] ICD-10-CM: I60.9  ICD-9-CM: 814  6/4/2020 Yes        Subarachnoid hemorrhage from middle cerebral artery aneurysm, left ([facility] [address] ICD-10-CM: W56.08  ICD-9-CM: 567  6/4/2020 Yes        RIGHT middle cerebral artery aneurysm ICD-10-CM: I67.1  ICD-9-CM: 437.3  6/4/2020 Yes        Cerebral vasospasm ICD-10-CM: V81.157  ICD-9-CM: 435.9  6/4/2020 Yes            ROS not obtainable 2n2 pt condition       Vital Signs:    Last 24hrs VS reviewed since prior progress note. Most recent are:  Visit Vitals  /63 (BP 1 Location: Left arm, BP Patient Position: At rest)   Pulse (!) 50   Temp 98.5 °F (36.9 °C)   Resp 19   Ht 5' 4\" (1.626 m)   Wt 94.6 kg (208 lb 8 oz)   SpO2 99%   BMI 35.79 kg/m²         Intake/Output Summary (Last 24 hours) at 7/4/2020 1638  Last data filed at 7/4/2020 0800  Gross per 24 hour   Intake 100 ml   Output 1900 ml   Net -1800 ml        Physical Examination:             Constitutional:  No acute distress, doesn't follow commands. ENT:  Yadira Taylor removed, left eye closed   Resp:  good AE, no wheezes. Few creps at bases        GI:  Soft, non distended, non tender.  obese    Musculoskeletal:  No edema    Neurologic:   Does not follow commands, can speak some words      Skin:  Good turgor, no rashes or ulcers       Data Review:    Review and/or order of clinical lab test  Review and/or order of tests in the radiology section of CPT  Review and/or order of tests in the medicine section of CPT      Labs:     Recent Labs     07/02/20  1721   WBC 4.8   HGB 10.4*   HCT 35.6        Recent Labs     07/04/20  0127      K 4.0      CO2 28   BUN 18   CREA 0.63   GLU 90   CA 9.9     No results for input(s): ALT, AP, TBIL, TBILI, TP, ALB, GLOB, GGT, AML, LPSE in the last 72 hours. No lab exists for component: SGOT, GPT, AMYP, HLPSE  No results for input(s): INR, PTP, APTT, INREXT, INREXT in the last 72 hours. No results for input(s): FE, TIBC, PSAT, FERR in the last 72 hours. No results found for: FOL, RBCF   No results for input(s): PH, PCO2, PO2 in the last 72 hours. No results for input(s): CPK, CKNDX, TROIQ in the last 72 hours.     No lab exists for component: CPKMB  Lab Results   Component Value Date/Time    Cholesterol, total 90 06/05/2020 03:34 AM    HDL Cholesterol 64 06/05/2020 03:34 AM    LDL, calculated 7.8 06/05/2020 03:34 AM    Triglyceride 91 06/05/2020 03:34 AM    CHOL/HDL Ratio 1.4 06/05/2020 03:34 AM     Lab Results   Component Value Date/Time    Glucose (POC) 107 (H) 07/02/2020 05:42 PM    Glucose (POC) 94 07/02/2020 11:42 AM    Glucose (POC) 157 (H) 07/02/2020 06:58 AM    Glucose (POC) 121 (H) 06/14/2020 11:22 PM    Glucose (POC) 118 (H) 06/14/2020 05:07 PM     Lab Results   Component Value Date/Time    Specific gravity 1.015 06/15/2020 05:45 AM         Medications Reviewed:     Current Facility-Administered Medications   Medication Dose Route Frequency    carvediloL (COREG) tablet 25 mg  25 mg Oral BID WITH MEALS    lisinopriL (PRINIVIL, ZESTRIL) tablet 40 mg  40 mg Oral DAILY    hydroCHLOROthiazide (HYDRODIURIL) tablet 25 mg  25 mg Oral DAILY    albuterol-ipratropium (DUO-NEB) 2.5 MG-0.5 MG/3 ML  3 mL Nebulization BID PRN    guar gum (BENEFIBER) packet 1 Packet  4 g Oral TID    labetaloL (NORMODYNE;TRANDATE) injection 20 mg  20 mg IntraVENous Q4H PRN    0.9% sodium chloride infusion 250 mL  250 mL IntraVENous PRN    hydrALAZINE (APRESOLINE) tablet 100 mg  100 mg Oral TID    fentaNYL citrate (PF) injection 50 mcg  50 mcg IntraVENous Q2H PRN    hydrALAZINE (APRESOLINE) 20 mg/mL injection 10 mg  10 mg IntraVENous Q2H PRN    chlorhexidine (ORAL CARE KIT) 0.12 % mouthwash 15 mL  15 mL Oral Q12H    amLODIPine (NORVASC) tablet 10 mg  10 mg Per NG tube DAILY    potassium bicarb-citric acid (EFFER-K) tablet 40 mEq  40 mEq Oral BID    lacosamide (VIMPAT) tablet 150 mg  150 mg Per NG tube Q12H    balsam peru-castor oiL (VENELEX) ointment   Topical BID    aspirin chewable tablet 81 mg  81 mg Per NG tube DAILY    enoxaparin (LOVENOX) injection 40 mg  40 mg SubCUTAneous Q24H    levETIRAcetam (KEPPRA) oral solution 1,000 mg  1,000 mg Per NG tube Q12H    glycopyrrolate (ROBINUL) injection 0.2 mg  0.2 mg IntraMUSCular TID PRN    acetaminophen (TYLENOL) solution 650 mg  650 mg Per NG tube Q4H PRN    glucose chewable tablet 16 g  4 Tab Oral PRN    glucagon (GLUCAGEN) injection 1 mg  1 mg IntraMUSCular PRN    dextrose 10% infusion 0-250 mL  0-250 mL IntraVENous PRN    albuterol (PROVENTIL VENTOLIN) nebulizer solution 2.5 mg  2.5 mg Nebulization Q6H PRN    polyvinyl alcohol-povidone (NATURAL TEARS) 0.5-0.6 % ophthalmic solution 2 Drop  2 Drop Both Eyes Q8H    bisacodyL (DULCOLAX) suppository 10 mg  10 mg Rectal DAILY PRN    ondansetron (ZOFRAN) injection 4 mg  4 mg IntraVENous Q4H PRN    docusate (COLACE) 50 mg/5 mL oral liquid 100 mg  100 mg Oral BID PRN     ______________________________________________________________________  EXPECTED LENGTH OF STAY: 22d 21h  ACTUAL LENGTH OF STAY:          [address] DO

## 2020-07-04 NOTE — PROGRESS NOTES
Problem: Falls - Risk of  Goal: *Absence of Falls  Description: Document Mirza Stevens Fall Risk and appropriate interventions in the flowsheet. Outcome: Progressing Towards Goal  Note: Fall Risk Interventions:  Mobility Interventions: Assess mobility with egress test    Mentation Interventions: Adequate sleep, hydration, pain control, Door open when patient unattended    Medication Interventions: Assess postural VS orthostatic hypotension, Patient to call before getting OOB    Elimination Interventions: Call light in reach    History of Falls Interventions: Door open when patient unattended         Problem: Patient Education: Go to Patient Education Activity  Goal: Patient/Family Education  Outcome: Progressing Towards Goal     Problem: Pressure Injury - Risk of  Goal: *Prevention of pressure injury  Description: Document Sriram Scale and appropriate interventions in the flowsheet.     Offload heels  Turn approximately every 2 hours  Venelex ointment   Outcome: Progressing Towards Goal  Note: Pressure Injury Interventions:  Sensory Interventions: Assess changes in LOC, Assess need for specialty bed, Avoid rigorous massage over bony prominences    Moisture Interventions: Absorbent underpads, Apply protective barrier, creams and emollients, Assess need for specialty bed, Check for incontinence Q2 hours and as needed    Activity Interventions: Pressure redistribution bed/mattress(bed type)    Mobility Interventions: HOB 30 degrees or less    Nutrition Interventions: Document food/fluid/supplement intake    Friction and Shear Interventions: Apply protective barrier, creams and emollients                Problem: Patient Education: Go to Patient Education Activity  Goal: Patient/Family Education  Outcome: Progressing Towards Goal     Problem: Nutrition Deficit  Goal: *Optimize nutritional status  Outcome: Progressing Towards Goal     Problem: Patient Education: Go to Patient Education Activity  Goal: Patient/Family Education  Outcome: Progressing Towards Goal     Problem: Hemorrhagic Stroke: Day 5 through Discharge  Goal: Off Pathway (Use only if patient is Off Pathway)  Outcome: Progressing Towards Goal  Goal: Activity/Safety  Outcome: Progressing Towards Goal  Goal: Consults, if ordered  Outcome: Progressing Towards Goal  Goal: Diagnostic Test/Procedures  Outcome: Progressing Towards Goal  Goal: Nutrition/Diet  Outcome: Progressing Towards Goal  Goal: Medications  Outcome: Progressing Towards Goal  Goal: Respiratory  Outcome: Progressing Towards Goal  Goal: Treatments/Interventions/Procedures  Outcome: Progressing Towards Goal  Goal: Psychosocial  Outcome: Progressing Towards Goal  Goal: *Hemodynamically stable  Outcome: Progressing Towards Goal  Goal: *Verbalizes anxiety and depression are reduced or absent  Outcome: Progressing Towards Goal  Goal: *Absence of aspiration  Outcome: Progressing Towards Goal  Goal: *Absence of signs and symptoms of DVT  Outcome: Progressing Towards Goal  Goal: *Optimal pain control at patient's stated goal  Outcome: Progressing Towards Goal  Goal: *Tolerating diet  Outcome: Progressing Towards Goal  Goal: *Progressive mobility and function  Outcome: Progressing Towards Goal  Goal: *Rehabilitation readiness  Outcome: Progressing Towards Goal     Problem: Hemorrhagic Stroke: Discharge Outcomes  Goal: *Verbalizes anxiety and depression are reduced or absent  Outcome: Progressing Towards Goal  Goal: *Verbalize understanding of risk factor modification(Stroke Metric)  Outcome: Progressing Towards Goal  Goal: *Optimal pain control at patient's stated goal  Outcome: Progressing Towards Goal  Goal: *Hemodynamically stable  Outcome: Progressing Towards Goal  Goal: *Absence of aspiration pneumonia  Outcome: Progressing Towards Goal  Goal: *Aware of needed dietary changes  Outcome: Progressing Towards Goal  Goal: *Verbalizes understanding and describes medication purposes and frequencies  Outcome: Progressing Towards Goal  Goal: *Tolerating diet  Outcome: Progressing Towards Goal  Goal: *Absence of signs and symptoms of DVT  Outcome: Progressing Towards Goal  Goal: *Absence of aspiration  Outcome: Progressing Towards Goal  Goal: *Progressive mobility and function  Outcome: Progressing Towards Goal  Goal: *Home safety concerns addressed  Outcome: Progressing Towards Goal     Problem: Patient Education: Go to Patient Education Activity  Goal: Patient/Family Education  Outcome: Progressing Towards Goal     Problem: Patient Education: Go to Patient Education Activity  Goal: Patient/Family Education  Outcome: Progressing Towards Goal     Problem: Patient Education:  Go to Education Activity  Goal: Patient/Family Education  Outcome: Progressing Towards Goal     Problem: Subarachnoid Hemorrhage Stroke:Admission Day 5-Discharge  Goal: Off Pathway (Use only if patient is Off Pathway)  Outcome: Progressing Towards Goal  Goal: Activity/Safety  Outcome: Progressing Towards Goal  Goal: Diagnostic Test/Procedures  Outcome: Progressing Towards Goal  Goal: Nutrition/Diet  Outcome: Progressing Towards Goal  Goal: Medications  Outcome: Progressing Towards Goal  Goal: Patient maintains clear airway/absence of aspiration  Outcome: Progressing Towards Goal  Goal: Treatments/Interventions/Procedures  Outcome: Progressing Towards Goal  Goal: Psychosocial  Outcome: Progressing Towards Goal  Goal: *Hemodynamically stable  Outcome: Progressing Towards Goal  Goal: *Absence of signs and symptoms of DVT  Outcome: Progressing Towards Goal     Problem: Subarachnoid Hemorrhage Stroke:Discharge Outcomes  Goal: *Verbalizes anxiety and depression are reduced or absent  Outcome: Progressing Towards Goal  Goal: *Verbalizes understanding of risk factor modification (Stroke Metric)  Outcome: Progressing Towards Goal  Goal: *Hemodynamically stable  Outcome: Progressing Towards Goal  Goal: *Aware of needed dietary changes  Outcome: Progressing Towards Goal  Goal: *Verbalizes understanding and describes medication purposes and frequencies  Outcome: Progressing Towards Goal  Goal: *Verbalizes understanding of plan for nimodipine administration  Description: until day 21 and when next dose is scheduled  Outcome: Progressing Towards Goal  Goal: *Absence of signs and symptoms of DVT  Outcome: Progressing Towards Goal  Goal: *Absence of aspiration  Outcome: Progressing Towards Goal  Goal: *Progressive mobility and function  Outcome: Progressing Towards Goal  Goal: *Home safety concerns addressed  Outcome: Progressing Towards Goal     Problem: Patient Education: Go to Patient Education Activity  Goal: Patient/Family Education  Outcome: Progressing Towards Goal     Problem: Patient Education: Go to Patient Education Activity  Goal: Patient/Family Education  Outcome: Progressing Towards Goal

## 2020-07-04 NOTE — PROGRESS NOTES
Bedside shift change report given to Ky Plascencia RN (oncoming nurse) by Daljit Sommers RN (offgoing nurse). Report included the following information SBAR, Kardex, Intake/Output, MAR, Med Rec Status, Cardiac Rhythm NSR, Quality Measures and Dual Neuro Assessment.

## 2020-07-05 PROCEDURE — 74011250637 HC RX REV CODE- 250/637: Performed by: ANESTHESIOLOGY

## 2020-07-05 PROCEDURE — 65660000000 HC RM CCU STEPDOWN

## 2020-07-05 PROCEDURE — 74011250637 HC RX REV CODE- 250/637: Performed by: INTERNAL MEDICINE

## 2020-07-05 PROCEDURE — 74011250637 HC RX REV CODE- 250/637: Performed by: PSYCHIATRY & NEUROLOGY

## 2020-07-05 PROCEDURE — 74011250637 HC RX REV CODE- 250/637: Performed by: NURSE PRACTITIONER

## 2020-07-05 PROCEDURE — 74011250636 HC RX REV CODE- 250/636: Performed by: ANESTHESIOLOGY

## 2020-07-05 RX ADMIN — Medication 2 DROP: at 13:52

## 2020-07-05 RX ADMIN — CHLORHEXIDINE GLUCONATE 15 ML: 0.12 RINSE ORAL at 21:00

## 2020-07-05 RX ADMIN — Medication 1 PACKET: at 22:08

## 2020-07-05 RX ADMIN — Medication 1 PACKET: at 08:03

## 2020-07-05 RX ADMIN — CHLORHEXIDINE GLUCONATE 15 ML: 0.12 RINSE ORAL at 09:00

## 2020-07-05 RX ADMIN — HYDRALAZINE HYDROCHLORIDE 100 MG: 50 TABLET, FILM COATED ORAL at 22:08

## 2020-07-05 RX ADMIN — ASPIRIN 81 MG CHEWABLE TABLET 81 MG: 81 TABLET CHEWABLE at 08:04

## 2020-07-05 RX ADMIN — ENOXAPARIN SODIUM 40 MG: 40 INJECTION SUBCUTANEOUS at 13:52

## 2020-07-05 RX ADMIN — LACOSAMIDE 150 MG: 50 TABLET, FILM COATED ORAL at 22:08

## 2020-07-05 RX ADMIN — Medication 2 DROP: at 22:09

## 2020-07-05 RX ADMIN — LACOSAMIDE 150 MG: 50 TABLET, FILM COATED ORAL at 08:04

## 2020-07-05 RX ADMIN — CASTOR OIL AND BALSAM, PERU: 788; 87 OINTMENT TOPICAL at 17:21

## 2020-07-05 RX ADMIN — HYDRALAZINE HYDROCHLORIDE 100 MG: 50 TABLET, FILM COATED ORAL at 17:20

## 2020-07-05 RX ADMIN — LEVETIRACETAM 1000 MG: 100 SOLUTION ORAL at 08:03

## 2020-07-05 RX ADMIN — HYDRALAZINE HYDROCHLORIDE 100 MG: 50 TABLET, FILM COATED ORAL at 08:03

## 2020-07-05 RX ADMIN — LISINOPRIL 40 MG: 20 TABLET ORAL at 08:04

## 2020-07-05 RX ADMIN — HYDROCHLOROTHIAZIDE 25 MG: 25 TABLET ORAL at 08:03

## 2020-07-05 RX ADMIN — AMLODIPINE BESYLATE 10 MG: 5 TABLET ORAL at 08:04

## 2020-07-05 RX ADMIN — Medication 2 DROP: at 05:21

## 2020-07-05 RX ADMIN — CARVEDILOL 25 MG: 12.5 TABLET, FILM COATED ORAL at 17:20

## 2020-07-05 RX ADMIN — POTASSIUM BICARBONATE 40 MEQ: 782 TABLET, EFFERVESCENT ORAL at 17:21

## 2020-07-05 RX ADMIN — CASTOR OIL AND BALSAM, PERU: 788; 87 OINTMENT TOPICAL at 08:04

## 2020-07-05 RX ADMIN — LEVETIRACETAM 1000 MG: 100 SOLUTION ORAL at 22:08

## 2020-07-05 RX ADMIN — Medication 1 PACKET: at 17:21

## 2020-07-05 RX ADMIN — POTASSIUM BICARBONATE 40 MEQ: 782 TABLET, EFFERVESCENT ORAL at 08:03

## 2020-07-05 RX ADMIN — CARVEDILOL 25 MG: 12.5 TABLET, FILM COATED ORAL at 08:04

## 2020-07-05 NOTE — PROGRESS NOTES
Problem: Falls - Risk of  Goal: *Absence of Falls  Description: Document Rebbecca Persons Fall Risk and appropriate interventions in the flowsheet. Outcome: Progressing Towards Goal  Note: Fall Risk Interventions:  Mobility Interventions: Assess mobility with egress test, OT consult for ADLs, Patient to call before getting OOB, PT Consult for mobility concerns, PT Consult for assist device competence, Strengthening exercises (ROM-active/passive)    Mentation Interventions: Adequate sleep, hydration, pain control, Bed/chair exit alarm, Door open when patient unattended, More frequent rounding, Reorient patient, Update white board    Medication Interventions: Assess postural VS orthostatic hypotension, Evaluate medications/consider consulting pharmacy, Teach patient to arise slowly, Patient to call before getting OOB    Elimination Interventions: Bed/chair exit alarm, Call light in reach, Patient to call for help with toileting needs    History of Falls Interventions: Consult care management for discharge planning, Door open when patient unattended, Bed/chair exit alarm, Vital signs minimum Q4HRs X 24 hrs (comment for end date)         Problem: Patient Education: Go to Patient Education Activity  Goal: Patient/Family Education  Outcome: Progressing Towards Goal     Problem: Pressure Injury - Risk of  Goal: *Prevention of pressure injury  Description: Document Sriram Scale and appropriate interventions in the flowsheet. Offload heels  Turn approximately every 2 hours  Venelex ointment   Outcome: Progressing Towards Goal  Note: Pressure Injury Interventions:  Sensory Interventions: Assess changes in LOC, Avoid rigorous massage over bony prominences, Float heels, Keep linens dry and wrinkle-free, Minimize linen layers, Monitor skin under medical devices, Pressure redistribution bed/mattress (bed type), Turn and reposition approx.  every two hours (pillows and wedges if needed)    Moisture Interventions: Absorbent underpads, Apply protective barrier, creams and emollients, Minimize layers, Internal/External urinary devices, Check for incontinence Q2 hours and as needed, Limit adult briefs    Activity Interventions: Pressure redistribution bed/mattress(bed type), Increase time out of bed, PT/OT evaluation    Mobility Interventions: Float heels, HOB 30 degrees or less, Pressure redistribution bed/mattress (bed type), PT/OT evaluation    Nutrition Interventions: Discuss nutritional consult with provider    Friction and Shear Interventions: Apply protective barrier, creams and emollients, Lift sheet, Minimize layers, Lift team/patient mobility team                Problem: Patient Education: Go to Patient Education Activity  Goal: Patient/Family Education  Outcome: Progressing Towards Goal     Problem: Nutrition Deficit  Goal: *Optimize nutritional status  Outcome: Progressing Towards Goal     Problem: Patient Education: Go to Patient Education Activity  Goal: Patient/Family Education  Outcome: Progressing Towards Goal     Problem: Hemorrhagic Stroke: Day 5 through Discharge  Goal: Activity/Safety  Outcome: Progressing Towards Goal  Goal: Consults, if ordered  Outcome: Progressing Towards Goal  Goal: Diagnostic Test/Procedures  Outcome: Progressing Towards Goal  Goal: Nutrition/Diet  Outcome: Progressing Towards Goal  Goal: Medications  Outcome: Progressing Towards Goal  Goal: Respiratory  Outcome: Progressing Towards Goal  Goal: Treatments/Interventions/Procedures  Outcome: Progressing Towards Goal  Goal: Psychosocial  Outcome: Progressing Towards Goal  Goal: *Hemodynamically stable  Outcome: Progressing Towards Goal  Goal: *Verbalizes anxiety and depression are reduced or absent  Outcome: Progressing Towards Goal  Goal: *Absence of aspiration  Outcome: Progressing Towards Goal  Goal: *Absence of signs and symptoms of DVT  Outcome: Progressing Towards Goal  Goal: *Optimal pain control at patient's stated goal  Outcome: Progressing Towards Goal  Goal: *Tolerating diet  Outcome: Progressing Towards Goal  Goal: *Progressive mobility and function  Outcome: Progressing Towards Goal  Goal: *Rehabilitation readiness  Outcome: Progressing Towards Goal     Problem: Hemorrhagic Stroke: Discharge Outcomes  Goal: *Verbalizes anxiety and depression are reduced or absent  Outcome: Progressing Towards Goal  Goal: *Verbalize understanding of risk factor modification(Stroke Metric)  Outcome: Progressing Towards Goal  Goal: *Optimal pain control at patient's stated goal  Outcome: Progressing Towards Goal  Goal: *Hemodynamically stable  Outcome: Progressing Towards Goal  Goal: *Absence of aspiration pneumonia  Outcome: Progressing Towards Goal  Goal: *Aware of needed dietary changes  Outcome: Progressing Towards Goal  Goal: *Verbalizes understanding and describes medication purposes and frequencies  Outcome: Progressing Towards Goal  Goal: *Tolerating diet  Outcome: Progressing Towards Goal  Goal: *Absence of signs and symptoms of DVT  Outcome: Progressing Towards Goal  Goal: *Absence of aspiration  Outcome: Progressing Towards Goal  Goal: *Progressive mobility and function  Outcome: Progressing Towards Goal  Goal: *Home safety concerns addressed  Outcome: Progressing Towards Goal     Problem: Patient Education:  Go to Education Activity  Goal: Patient/Family Education  Outcome: Progressing Towards Goal     Problem: Subarachnoid Hemorrhage Stroke:Admission Day 5-Discharge  Goal: Activity/Safety  Outcome: Progressing Towards Goal  Goal: Diagnostic Test/Procedures  Outcome: Progressing Towards Goal  Goal: Nutrition/Diet  Outcome: Progressing Towards Goal  Goal: Medications  Outcome: Progressing Towards Goal  Goal: Patient maintains clear airway/absence of aspiration  Outcome: Progressing Towards Goal  Goal: Treatments/Interventions/Procedures  Outcome: Progressing Towards Goal  Goal: Psychosocial  Outcome: Progressing Towards Goal  Goal: *Hemodynamically stable  Outcome: Progressing Towards Goal  Goal: *Absence of signs and symptoms of DVT  Outcome: Progressing Towards Goal     Problem: Subarachnoid Hemorrhage Stroke:Discharge Outcomes  Goal: *Verbalizes anxiety and depression are reduced or absent  Outcome: Progressing Towards Goal  Goal: *Verbalizes understanding of risk factor modification (Stroke Metric)  Outcome: Progressing Towards Goal  Goal: *Hemodynamically stable  Outcome: Progressing Towards Goal  Goal: *Aware of needed dietary changes  Outcome: Progressing Towards Goal  Goal: *Verbalizes understanding and describes medication purposes and frequencies  Outcome: Progressing Towards Goal  Goal: *Verbalizes understanding of plan for nimodipine administration  Description: until day 21 and when next dose is scheduled  Outcome: Progressing Towards Goal  Goal: *Absence of signs and symptoms of DVT  Outcome: Progressing Towards Goal  Goal: *Absence of aspiration  Outcome: Progressing Towards Goal  Goal: *Progressive mobility and function  Outcome: Progressing Towards Goal  Goal: *Home safety concerns addressed  Outcome: Progressing Towards Goal

## 2020-07-05 NOTE — PROGRESS NOTES
[address] [facility]  Hospitalist Group                                                                                          Hospitalist Progress Note  Domingo Parson DO  Answering service: [phone] from in house phone        Date of Service:  2020  NAME:  Sandra Bangura  :  1970  MRN:  [id number]    Admission Summary:     Sandra Bangura is a 26-year-old female with a past medical history of HTN. According to patient's mother, the patient began having a headache on 20 and had been taking BC Powder with aspirin in it for the headache. She recently stopped taking her BP medications. According to her mother she does not smoke, drink alcohol or use recreational drugs. The patient was brought to the [facility] ER via EMS on   after her coworkers found her unresponsive outside of her place of work. 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 On arrival to the ER she was found to be seizing. A stat CT of her head was performed which showed extensive subarachnoid hemorrhage. She was extremely hypertensive in the 240's and was given labetalol and started on a Cardene drip. She was emergently intubated and given Keppra and Ativan as well. Neurosurgery and Neurointerventional Surgery were consulted and the patient was transferred to the [facility] for a higher level of care. 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 Extensive lung infiltrates were also noted. Patient was taken for cerebral angiogram the morning of 20 and coil embolization of the left MCA aneurysm was completed. The right MCA aneurysm had a very wide neck incorporating both M2s. It could not be treated endovascularly without stents.  May need to be clipped by Neurosurgery.      Procedures: 20 and coil embolization of the left MCA aneurysm was completed s/p cerebral angiogram for right PANCHITO A1 angioplasty for vasospasm, transarterial infusion of verapamil on 6/9,  S/p angiogram on 6/10 for left ICA/MCA angioplasty and transarterial infusion of verapamil for vasospasm treatment, s/p angiogram on 6/11 and 6/13 for transarterial infusion of verapamil for vasospasm treatment. Tracheostomy placement  6/16 by Dr. Duncan Nunez in thoracic surgery. PEG placement. Interval history / Subjective: Follow up [facility]. Patient seen and examined. Opens right eye to voice. Case management following for rehab placement. Assessment & Plan:     #. Cerebral Aneurysms. #. SAH due to ruptured cerebral aneurysm   #. Hypertension  -s/p cerebral angiogram with coil embolization of left MCA bifurcation aneurysm on 6/5, patient has 5X3 right MCA bifurcation aneurysm with very wide neck which will need to be treated with stents vs. Clipping  -on Nimotop, normal saline   -on cardene gtt is off, on norvasc, hydralazine, lisinopril, HCTZ, carvedilol, goal SBP <160  -seizure prophylaxis keppra  -patient open her right eye to voice, non verbal, doesn't follows command, moves lower extremities spontaneously   -improved vasospasm since 6/20- seen by neurosurgeon   -Neurointerventional surgical service on board     #. Bradycardia: monitor, continue carvedilol   #. Seizure: 2nd to above - stable - keppra, VIMPAT-seizure precaution- Neurologist  #. CVAs - aspirin - multiple ischemic infarct -MRI on 6/16- PT/OT-neurology evaluated  #. Respiratory failure secondary to above, pulmonary edema and possible pneumonia- resolved     #Dysphagia secondary to stroke and SAH-s/p PEG tube, continue tube feeding   -[facility] 7/2. Diet initiated.   Holding tube feeds     #Iron deficiency anemia -noted fibroid  -Hgb trending down, Hgb 7.8, monitor H/H    -iron sucrose 200 mg iv x 3 doses     #Peripheral edema multifactorial- low albumin 2.6  -Echo normal cavity size and systolic function (ejection fraction normal). Mild concentric hypertrophy. Estimated left ventricular ejection fraction is 60 - 65%. No regional wall motion abnormality noted.     Obesity -diet control -A1c 5.7    Code status: Full Code   DVT prophylaxis: Lovenox  Care Plan discussed with: Patient/Family and Nurse  Anticipated Disposition: SNF     Hospital Problems  Date Reviewed: 6/10/2020          Codes Class Noted POA    Respiratory failure ([facility] ICD-10-CM: J96.90  ICD-9-CM: 518.81  6/15/2020 Yes        Hypoxic ischemic encephalopathy (HIE), unspecified severity ICD-10-CM: P91.60  ICD-9-CM: 768.70  6/15/2020 Yes        SAH (subarachnoid hemorrhage) ([facility] ICD-10-CM: I60.9  ICD-9-CM: 239  6/4/2020 Yes        Subarachnoid hemorrhage from middle cerebral artery [facility], [facility] ICD-10-CM: D61.52  ICD-9-CM: 612  6/4/2020 Yes        RIGHT middle cerebral artery aneurysm ICD-10-CM: I67.1  ICD-9-CM: 437.3  6/4/2020 Yes        Cerebral vasospasm ICD-10-CM: H81.904  ICD-9-CM: 435.9  6/4/2020 Yes            ROS not obtainable 2n2 pt condition       Vital Signs:    Last 24hrs VS reviewed since prior progress note. Most recent are:  Visit Vitals  /66 (BP 1 Location: Left arm)   Pulse (!) 56   Temp 98.4 °F (36.9 °C)   Resp 12   Ht 5' 4\" (1.626 m)   Wt 94.2 kg (207 lb 11.2 oz)   SpO2 99%   BMI 35.65 kg/m²         Intake/Output Summary (Last 24 hours) at 7/5/2020 1603  Last data filed at 7/5/2020 0800  Gross per 24 hour   Intake 100 ml   Output 1520 ml   Net -1420 ml        Physical Examination:             Constitutional:  No acute distress, doesn't follow commands. ENT:  Sasha Wallace removed, left eye closed   Resp:  good AE, no wheezes. Few creps at bases        GI:  Soft, non distended, non tender.  obese    Musculoskeletal:  No edema    Neurologic:   Does not follow commands, can speak some words      Skin:  Good turgor, no rashes or ulcers       Data Review:    Review and/or order of clinical lab test  Review and/or order of tests in the radiology section of CPT  Review and/or order of tests in the medicine section of CPT      Labs:     Recent Labs     07/02/20  1721   WBC 4.8   HGB 10.4*   HCT 35.6        Recent Labs     07/04/20  0127      K 4.0      CO2 28   BUN 18   CREA 0.63   GLU 90   CA 9.9     No results for input(s): ALT, AP, TBIL, TBILI, TP, ALB, GLOB, GGT, AML, LPSE in the last 72 hours. No lab exists for component: SGOT, GPT, AMYP, HLPSE  No results for input(s): INR, PTP, APTT, INREXT, INREXT in the last 72 hours. No results for input(s): FE, TIBC, PSAT, FERR in the last 72 hours. No results found for: FOL, RBCF   No results for input(s): PH, PCO2, PO2 in the last 72 hours. No results for input(s): CPK, CKNDX, TROIQ in the last 72 hours.     No lab exists for component: CPKMB  Lab Results   Component Value Date/Time    Cholesterol, total 90 06/05/2020 03:34 AM    HDL Cholesterol 64 06/05/2020 03:34 AM    LDL, calculated 7.8 06/05/2020 03:34 AM    Triglyceride 91 06/05/2020 03:34 AM    CHOL/HDL Ratio 1.4 06/05/2020 03:34 AM     Lab Results   Component Value Date/Time    Glucose (POC) 107 (H) 07/02/2020 05:42 PM    Glucose (POC) 94 07/02/2020 11:42 AM    Glucose (POC) 157 (H) 07/02/2020 06:58 AM    Glucose (POC) 121 (H) 06/14/2020 11:22 PM    Glucose (POC) 118 (H) 06/14/2020 05:07 PM     Lab Results   Component Value Date/Time    Specific gravity 1.015 06/15/2020 05:45 AM         Medications Reviewed:     Current Facility-Administered Medications   Medication Dose Route Frequency    carvediloL (COREG) tablet 25 mg  25 mg Oral BID WITH MEALS    lisinopriL (PRINIVIL, ZESTRIL) tablet 40 mg  40 mg Oral DAILY    hydroCHLOROthiazide (HYDRODIURIL) tablet 25 mg  25 mg Oral DAILY    albuterol-ipratropium (DUO-NEB) 2.5 MG-0.5 MG/3 ML  3 mL Nebulization BID PRN    guar gum (BENEFIBER) packet 1 Packet  4 g Oral TID    labetaloL (NORMODYNE;TRANDATE) injection 20 mg  20 mg IntraVENous Q4H PRN    0.9% sodium chloride infusion 250 mL  250 mL IntraVENous PRN    hydrALAZINE (APRESOLINE) tablet 100 mg  100 mg Oral TID    fentaNYL citrate (PF) injection 50 mcg  50 mcg IntraVENous Q2H PRN    hydrALAZINE (APRESOLINE) 20 mg/mL injection 10 mg  10 mg IntraVENous Q2H PRN    chlorhexidine (ORAL CARE KIT) 0.12 % mouthwash 15 mL  15 mL Oral Q12H    amLODIPine (NORVASC) tablet 10 mg  10 mg Per NG tube DAILY    potassium bicarb-citric acid (EFFER-K) tablet 40 mEq  40 mEq Oral BID    lacosamide (VIMPAT) tablet 150 mg  150 mg Per NG tube Q12H    balsam peru-castor oiL (VENELEX) ointment   Topical BID    aspirin chewable tablet 81 mg  81 mg Per NG tube DAILY    enoxaparin (LOVENOX) injection 40 mg  40 mg SubCUTAneous Q24H    levETIRAcetam (KEPPRA) oral solution 1,000 mg  1,000 mg Per NG tube Q12H    glycopyrrolate (ROBINUL) injection 0.2 mg  0.2 mg IntraMUSCular TID PRN    acetaminophen (TYLENOL) solution 650 mg  650 mg Per NG tube Q4H PRN    glucose chewable tablet 16 g  4 Tab Oral PRN    glucagon (GLUCAGEN) injection 1 mg  1 mg IntraMUSCular PRN    dextrose 10% infusion 0-250 mL  0-250 mL IntraVENous PRN    albuterol (PROVENTIL VENTOLIN) nebulizer solution 2.5 mg  2.5 mg Nebulization Q6H PRN    polyvinyl alcohol-povidone (NATURAL TEARS) 0.5-0.6 % ophthalmic solution 2 Drop  2 Drop Both Eyes Q8H    bisacodyL (DULCOLAX) suppository 10 mg  10 mg Rectal DAILY PRN    ondansetron (ZOFRAN) injection 4 mg  4 mg IntraVENous Q4H PRN    docusate (COLACE) 50 mg/5 mL oral liquid 100 mg  100 mg Oral BID PRN     ______________________________________________________________________  EXPECTED LENGTH OF STAY: 22d 21h  ACTUAL LENGTH OF STAY:          [address], DO

## 2020-07-05 NOTE — PROGRESS NOTES
Problem: Falls - Risk of  Goal: *Absence of Falls  Description: Document Sandi Alba Fall Risk and appropriate interventions in the flowsheet. Outcome: Progressing Towards Goal  Note: Fall Risk Interventions:  Mobility Interventions: Bed/chair exit alarm, PT Consult for mobility concerns    Mentation Interventions: Adequate sleep, hydration, pain control, Toileting rounds, Bed/chair exit alarm, Door open when patient unattended    Medication Interventions: Assess postural VS orthostatic hypotension, Bed/chair exit alarm    Elimination Interventions: Bed/chair exit alarm, Toileting schedule/hourly rounds    History of Falls Interventions: Bed/chair exit alarm, Consult care management for discharge planning, Door open when patient unattended         Problem: Pressure Injury - Risk of  Goal: *Prevention of pressure injury  Description: Document Sirram Scale and appropriate interventions in the flowsheet.     Offload heels  Turn approximately every 2 hours  Venelex ointment   Outcome: Progressing Towards Goal  Note: Pressure Injury Interventions:  Sensory Interventions: Assess changes in LOC, Check visual cues for pain, Discuss PT/OT consult with provider, Float heels, Keep linens dry and wrinkle-free, Maintain/enhance activity level, Minimize linen layers, Monitor skin under medical devices, Pad between skin to skin, Pressure redistribution bed/mattress (bed type)    Moisture Interventions: Absorbent underpads, Apply protective barrier, creams and emollients, Check for incontinence Q2 hours and as needed, Internal/External urinary devices, Limit adult briefs, Minimize layers, Moisture barrier    Activity Interventions: Pressure redistribution bed/mattress(bed type), PT/OT evaluation    Mobility Interventions: HOB 30 degrees or less, Pressure redistribution bed/mattress (bed type), PT/OT evaluation    Nutrition Interventions: Document food/fluid/supplement intake    Friction and Shear Interventions: Apply protective barrier, creams and emollients, HOB 30 degrees or less, Lift sheet, Lift team/patient mobility team, Minimize layers                Problem: Hemorrhagic Stroke: Day 5 through Discharge  Goal: Activity/Safety  Outcome: Progressing Towards Goal  Goal: Consults, if ordered  Outcome: Progressing Towards Goal  Goal: Diagnostic Test/Procedures  Outcome: Progressing Towards Goal  Goal: Nutrition/Diet  Outcome: Progressing Towards Goal  Goal: Medications  Outcome: Progressing Towards Goal  Goal: Respiratory  Outcome: Progressing Towards Goal  Goal: Treatments/Interventions/Procedures  Outcome: Progressing Towards Goal  Goal: Psychosocial  Outcome: Progressing Towards Goal  Goal: *Hemodynamically stable  Outcome: Progressing Towards Goal  Goal: *Verbalizes anxiety and depression are reduced or absent  Outcome: Progressing Towards Goal  Goal: *Absence of aspiration  Outcome: Progressing Towards Goal  Goal: *Absence of signs and symptoms of DVT  Outcome: Progressing Towards Goal  Goal: *Optimal pain control at patient's stated goal  Outcome: Progressing Towards Goal  Goal: *Tolerating diet  Outcome: Progressing Towards Goal  Goal: *Progressive mobility and function  Outcome: Progressing Towards Goal  Goal: *Rehabilitation readiness  Outcome: Progressing Towards Goal     Problem: Hemorrhagic Stroke: Discharge Outcomes  Goal: *Verbalizes anxiety and depression are reduced or absent  Outcome: Progressing Towards Goal  Goal: *Verbalize understanding of risk factor modification(Stroke Metric)  Outcome: Progressing Towards Goal  Goal: *Optimal pain control at patient's stated goal  Outcome: Progressing Towards Goal  Goal: *Hemodynamically stable  Outcome: Progressing Towards Goal  Goal: *Absence of aspiration pneumonia  Outcome: Progressing Towards Goal  Goal: *Aware of needed dietary changes  Outcome: Progressing Towards Goal  Goal: *Verbalizes understanding and describes medication purposes and frequencies  Outcome: Progressing Towards Goal  Goal: *Tolerating diet  Outcome: Progressing Towards Goal  Goal: *Absence of signs and symptoms of DVT  Outcome: Progressing Towards Goal  Goal: *Absence of aspiration  Outcome: Progressing Towards Goal  Goal: *Progressive mobility and function  Outcome: Progressing Towards Goal  Goal: *Home safety concerns addressed  Outcome: Progressing Towards Goal     Problem: Subarachnoid Hemorrhage Stroke:Admission Day 5-Discharge  Goal: Activity/Safety  Outcome: Progressing Towards Goal  Goal: Diagnostic Test/Procedures  Outcome: Progressing Towards Goal  Goal: Nutrition/Diet  Outcome: Progressing Towards Goal  Goal: Medications  Outcome: Progressing Towards Goal  Goal: Patient maintains clear airway/absence of aspiration  Outcome: Progressing Towards Goal  Goal: Treatments/Interventions/Procedures  Outcome: Progressing Towards Goal  Goal: Psychosocial  Outcome: Progressing Towards Goal  Goal: *Hemodynamically stable  Outcome: Progressing Towards Goal  Goal: *Absence of signs and symptoms of DVT  Outcome: Progressing Towards Goal     Problem: Subarachnoid Hemorrhage Stroke:Discharge Outcomes  Goal: *Verbalizes anxiety and depression are reduced or absent  Outcome: Progressing Towards Goal  Goal: *Verbalizes understanding of risk factor modification (Stroke Metric)  Outcome: Progressing Towards Goal  Goal: *Hemodynamically stable  Outcome: Progressing Towards Goal  Goal: *Aware of needed dietary changes  Outcome: Progressing Towards Goal  Goal: *Verbalizes understanding and describes medication purposes and frequencies  Outcome: Progressing Towards Goal  Goal: *Verbalizes understanding of plan for nimodipine administration  Description: until day 21 and when next dose is scheduled  Outcome: Progressing Towards Goal  Goal: *Absence of signs and symptoms of DVT  Outcome: Progressing Towards Goal  Goal: *Absence of aspiration  Outcome: Progressing Towards Goal  Goal: *Progressive mobility and function  Outcome: Progressing Towards Goal  Goal: *Home safety concerns addressed  Outcome: Progressing Towards Goal

## 2020-07-05 NOTE — PROGRESS NOTES
Bedside shift change report given to ROSSANA Bolden (oncoming nurse) by Nani Damico (offgoing nurse). Report included the following information SBAR, Kardex, Intake/Output, MAR, Med Rec Status, Cardiac Rhythm NSR, Quality Measures and Dual Neuro Assessment.

## 2020-07-06 PROCEDURE — 74011250637 HC RX REV CODE- 250/637: Performed by: NURSE PRACTITIONER

## 2020-07-06 PROCEDURE — 77030038269 HC DRN EXT URIN PURWCK BARD -A

## 2020-07-06 PROCEDURE — 74011250637 HC RX REV CODE- 250/637: Performed by: ANESTHESIOLOGY

## 2020-07-06 PROCEDURE — 97112 NEUROMUSCULAR REEDUCATION: CPT

## 2020-07-06 PROCEDURE — 92507 TX SP LANG VOICE COMM INDIV: CPT

## 2020-07-06 PROCEDURE — 74011250637 HC RX REV CODE- 250/637: Performed by: PSYCHIATRY & NEUROLOGY

## 2020-07-06 PROCEDURE — 87635 SARS-COV-2 COVID-19 AMP PRB: CPT

## 2020-07-06 PROCEDURE — 74011250637 HC RX REV CODE- 250/637: Performed by: INTERNAL MEDICINE

## 2020-07-06 PROCEDURE — 92526 ORAL FUNCTION THERAPY: CPT

## 2020-07-06 PROCEDURE — 97530 THERAPEUTIC ACTIVITIES: CPT

## 2020-07-06 PROCEDURE — 65660000000 HC RM CCU STEPDOWN

## 2020-07-06 PROCEDURE — 74011250636 HC RX REV CODE- 250/636: Performed by: ANESTHESIOLOGY

## 2020-07-06 RX ADMIN — LACOSAMIDE 150 MG: 50 TABLET, FILM COATED ORAL at 22:00

## 2020-07-06 RX ADMIN — ASPIRIN 81 MG CHEWABLE TABLET 81 MG: 81 TABLET CHEWABLE at 09:33

## 2020-07-06 RX ADMIN — CARVEDILOL 25 MG: 12.5 TABLET, FILM COATED ORAL at 17:33

## 2020-07-06 RX ADMIN — Medication 1 PACKET: at 17:33

## 2020-07-06 RX ADMIN — ENOXAPARIN SODIUM 40 MG: 40 INJECTION SUBCUTANEOUS at 14:18

## 2020-07-06 RX ADMIN — HYDRALAZINE HYDROCHLORIDE 100 MG: 50 TABLET, FILM COATED ORAL at 22:14

## 2020-07-06 RX ADMIN — Medication 1 PACKET: at 09:33

## 2020-07-06 RX ADMIN — CHLORHEXIDINE GLUCONATE 15 ML: 0.12 RINSE ORAL at 21:00

## 2020-07-06 RX ADMIN — HYDRALAZINE HYDROCHLORIDE 100 MG: 50 TABLET, FILM COATED ORAL at 17:33

## 2020-07-06 RX ADMIN — LEVETIRACETAM 1000 MG: 100 SOLUTION ORAL at 21:30

## 2020-07-06 RX ADMIN — LACOSAMIDE 150 MG: 50 TABLET, FILM COATED ORAL at 09:33

## 2020-07-06 RX ADMIN — HYDRALAZINE HYDROCHLORIDE 100 MG: 50 TABLET, FILM COATED ORAL at 09:34

## 2020-07-06 RX ADMIN — POTASSIUM BICARBONATE 40 MEQ: 782 TABLET, EFFERVESCENT ORAL at 09:33

## 2020-07-06 RX ADMIN — LISINOPRIL 40 MG: 20 TABLET ORAL at 09:33

## 2020-07-06 RX ADMIN — CHLORHEXIDINE GLUCONATE 15 ML: 0.12 RINSE ORAL at 09:00

## 2020-07-06 RX ADMIN — LEVETIRACETAM 1000 MG: 100 SOLUTION ORAL at 09:32

## 2020-07-06 RX ADMIN — CARVEDILOL 25 MG: 12.5 TABLET, FILM COATED ORAL at 09:34

## 2020-07-06 RX ADMIN — CASTOR OIL AND BALSAM, PERU: 788; 87 OINTMENT TOPICAL at 09:48

## 2020-07-06 RX ADMIN — AMLODIPINE BESYLATE 10 MG: 5 TABLET ORAL at 09:34

## 2020-07-06 RX ADMIN — POTASSIUM BICARBONATE 40 MEQ: 782 TABLET, EFFERVESCENT ORAL at 17:33

## 2020-07-06 RX ADMIN — CASTOR OIL AND BALSAM, PERU: 788; 87 OINTMENT TOPICAL at 17:32

## 2020-07-06 RX ADMIN — Medication 2 DROP: at 05:24

## 2020-07-06 RX ADMIN — Medication 2 DROP: at 22:14

## 2020-07-06 RX ADMIN — HYDROCHLOROTHIAZIDE 25 MG: 25 TABLET ORAL at 09:33

## 2020-07-06 RX ADMIN — Medication 2 DROP: at 17:33

## 2020-07-06 NOTE — PROGRESS NOTES
Problem: Mobility Impaired (Adult and Pediatric)  Goal: *Acute Goals and Plan of Care (Insert Text)  Description:   FUNCTIONAL STATUS PRIOR TO ADMISSION: Patient was independent and active without use of DME per chart review. HOME SUPPORT PRIOR TO ADMISSION: Details unknown - pt is not able to provide info at this time. Physical Therapy Goals  Initiated 7/6/2020  1. Patient will move from supine to sit and sit to supine  and scoot up and down in bed with minimal assistance/contact guard assist within 7 day(s). 2.  Patient will transfer from bed to chair and chair to bed with moderate assistance  using the least restrictive device within 7 day(s). 3.  Patient will perform sit to stand with moderate assistance  within 7 day(s). 4.  Patient will ambulate with maximal assistance for 5 feet with the least restrictive device within 7 day(s). 5.  Patient will improve Garza Balance score by 7 points within 7 days. Initiated 6/22/2020, appropriate for carryover 06/29/2020  1. Patient will move from supine to sit and sit to supine , scoot up and down and roll side to side in bed with maximal assistance within 7 day(s). 2.  Patient will tolerate bed in full chair position x30min for improved tolerance to upright and pulmonary toileting within 7 days. 3.  Patient will participate in B TAMELA QURESHI/PROM program within 7 days. Outcome: Progressing Towards Goal    PHYSICAL THERAPY TREATMENT: WEEKLY REASSESSMENT  Patient: Presley Perez (48 y.o. female)  Date: 7/6/2020  Primary Diagnosis: SA[facility] [I60.9]        Precautions:   Aspiration      ASSESSMENT  Patient continues with skilled PT services and is progressing towards goals. She remains most limited by global aphasia and decreased command following, global weakness (R<L), global edema, impaired balance, and decreased tolerance to sustained activity leading to increased dependency and falls risk from baseline level. She is now decannulated and on room air, sats remained in the high 90s throughout entire session. Note significant improvement in alertness and ability to participate this date - she was able to mobilize to EOB with up to mod A x2 and initiate to scoot and reposition once upright. Demos good/fair sitting balance once feet approximated to the floor - cues to fully integrate R UE. Able to progress to standing x2 reps!! Overall max A x2 for initial lift off, improving to mod A x2 through remaining 75% of stand - [place] facilitation to use UEs to assist in pull to stand. Tolerated bouts of static stance without knee buckle and even further progressed to initiating weight shifting and side stepping! Assisted back to bed at end of session, NAD, VSS. At this time- feel that she is an excellent acute IPR candidate as she is now able to meaningfully participate in skilled PT sessions. She is motivated, has strong support, and will greatly benefit from intensive multidisciplinary rehab efforts. Patient's progression toward goals since last assessment: all goals met and POC increased to 5x/wk; new goals established; able to participate and follow some commands! Current Level of Function Impacting Discharge (mobility/balance): max a x2    Functional Outcome Measure: The patient scored 3/56 on the Garza outcome measure which is indicative of high falls risk. Other factors to consider for discharge: fully indep at baseline         PLAN :  Goals have been updated based on progression since last assessment. Patient continues to benefit from skilled intervention to address the above impairments.     Recommendations and Planned Interventions: bed mobility training, transfer training, gait training, therapeutic exercises, neuromuscular re-education, patient and family training/education, and therapeutic activities      Frequency/Duration: Patient will be followed by physical therapy:  5 times a week to address goals. Recommendation for discharge: (in order for the patient to meet his/her long term goals)  Therapy 3 hours per day 5-7 days per week    This discharge recommendation:  Has been made in collaboration with the attending provider and/or case management    IF patient discharges home will need the following DME: to be determined (TBD)         SUBJECTIVE:   Patient stated Duke Santiago    OBJECTIVE DATA SUMMARY:   HISTORY:    Past Medical History:   Diagnosis Date    HTN (hypertension)      Past Surgical History:   Procedure Laterality Date    IR INSERT GASTROSTOMY TUBE [facility]  6/19/2020       Personal factors and/or comorbidities impacting plan of care: PMHx    Home Situation  Home Environment: Private residence  One/Two Story Residence: One story  Living Alone: No  Support Systems: Child(yuli), Family member(s)  Patient Expects to be Discharged to[de-identified] Unknown  Current DME Used/Available at Home: None    EXAMINATION/PRESENTATION/DECISION MAKING:   Critical Behavior:  Neurologic State: Alert  Orientation Level: Other (Comment)(unable to access secondary to aphasia)  Cognition: Decreased attention/concentration, Decreased command following, Impaired decision making, Impulsive, Poor safety awareness  Safety/Judgement: Decreased awareness of environment, Decreased awareness of need for assistance, Decreased awareness of need for safety, Decreased insight into deficits  Hearing: Auditory  Auditory Impairment: None  Skin:    Edema:   Range Of Motion:  AROM: Generally decreased, functional           Strength:    Strength: Generally decreased, functional        Coordination:  Coordination: Generally decreased, functional  Vision:   Functional Mobility:  Bed Mobility:     Supine to Sit: Moderate assistance;Assist x2  Sit to Supine: Moderate assistance;Assist x2     Transfers:  Sit to Stand: Assist x2; Moderate assistance;Maximum assistance  Stand to Sit: Moderate assistance;Maximum assistance;Assist x2     Balance:   Sitting: Impaired  Sitting - Static: Good (unsupported)  Sitting - Dynamic: Fair (occasional)  Standing: Impaired;Pull to stand; With support  Standing - Static: Fair;Constant support  Standing - Dynamic : Poor;Constant support    Functional Measure:  Garza Balance Test:    Sitting to Standin  Standing Unsupported: 0  Sitting with Back Unsupported: 3  Standing to Sittin  Transfers: 0  Standing Unsupported with Eyes Closed: 0  Standing Unsupported with Feet Together: 0  Reach Forward with Outstretched Arm: 0   Object: 0  Turn to Look Over Shoulders: 0  Turn 360 Degrees: 0  Alternate Foot on Step/Stool: 0  Standing Unsupported One Foot in Front: 0  Stand on One Le  Total: 3/56         56=Maximum possible score;   0-20=High fall risk  21-40=Moderate fall risk   41-56=Low fall risk         Activity Tolerance:   Good  Please refer to the flowsheet for vital signs taken during this treatment. After treatment patient left in no apparent distress:   Supine in bed, Call bell within reach, Bed / chair alarm activated, Caregiver / family present, and Side rails x 3    COMMUNICATION/EDUCATION:   The patients plan of care was discussed with: Occupational therapist, Speech therapist, Registered nurse, and Case management. Fall prevention education was provided and the patient/caregiver indicated understanding., Patient/family have participated as able in goal setting and plan of care. , and Patient/family agree to work toward stated goals and plan of care.     Thank you for this referral.  Alli Mayers, PT, DPT   Time Calculation: 31 mins

## 2020-07-06 NOTE — PROGRESS NOTES
Problem: Nutrition Deficit  Goal: *Optimize nutritional status  Outcome: Progressing Towards Goal     Problem: Patient Education: Go to Patient Education Activity  Goal: Patient/Family Education  Outcome: Progressing Towards Goal     Problem: Hemorrhagic Stroke: Day 5 through Discharge  Goal: Off Pathway (Use only if patient is Off Pathway)  Outcome: Progressing Towards Goal  Goal: Activity/Safety  Outcome: Progressing Towards Goal  Goal: Consults, if ordered  Outcome: Progressing Towards Goal  Goal: Diagnostic Test/Procedures  Outcome: Progressing Towards Goal  Goal: Nutrition/Diet  Outcome: Progressing Towards Goal  Goal: Medications  Outcome: Progressing Towards Goal  Goal: Respiratory  Outcome: Progressing Towards Goal  Goal: Treatments/Interventions/Procedures  Outcome: Progressing Towards Goal  Goal: Psychosocial  Outcome: Progressing Towards Goal  Goal: *Hemodynamically stable  Outcome: Progressing Towards Goal  Goal: *Verbalizes anxiety and depression are reduced or absent  Outcome: Progressing Towards Goal  Goal: *Absence of aspiration  Outcome: Progressing Towards Goal  Goal: *Absence of signs and symptoms of DVT  Outcome: Progressing Towards Goal  Goal: *Optimal pain control at patient's stated goal  Outcome: Progressing Towards Goal  Goal: *Tolerating diet  Outcome: Progressing Towards Goal  Goal: *Progressive mobility and function  Outcome: Progressing Towards Goal  Goal: *Rehabilitation readiness  Outcome: Progressing Towards Goal     Problem: Patient Education: Go to Patient Education Activity  Goal: Patient/Family Education  Outcome: Progressing Towards Goal     Problem: Patient Education: Go to Patient Education Activity  Goal: Patient/Family Education  Outcome: Progressing Towards Goal     Problem: Patient Education:  Go to Education Activity  Goal: Patient/Family Education  Outcome: Progressing Towards Goal     Problem: Subarachnoid Hemorrhage Stroke:Admission Day 5-Discharge  Goal: Off Pathway (Use only if patient is Off Pathway)  Outcome: Progressing Towards Goal  Goal: Activity/Safety  Outcome: Progressing Towards Goal  Goal: Diagnostic Test/Procedures  Outcome: Progressing Towards Goal  Goal: Nutrition/Diet  Outcome: Progressing Towards Goal  Goal: Medications  Outcome: Progressing Towards Goal  Goal: Patient maintains clear airway/absence of aspiration  Outcome: Progressing Towards Goal  Goal: Treatments/Interventions/Procedures  Outcome: Progressing Towards Goal  Goal: Psychosocial  Outcome: Progressing Towards Goal  Goal: *Hemodynamically stable  Outcome: Progressing Towards Goal  Goal: *Absence of signs and symptoms of DVT  Outcome: Progressing Towards Goal

## 2020-07-06 NOTE — PROGRESS NOTES
NUTRITION COMPLETE ASSESSMENT    RECOMMENDATIONS:     1. Continue Pureed diet per SLP. Drinks through straws only. 2. Ensure Compact added TID- encourage intake for concentrated calories/protein    3. Consider adding appetite stimulant - appetite very low. Interventions/Plan:   Food/Nutrient Delivery: Moderate rate, concentration, composition, and schedule:    320 ml Osmolite 1.5 q 4 hr x 4 feedings per day and 100 ml water flush before and after each feeding       Assessment:   Reason for Assessment: Reassessment    Diet: Puree   Supplements: Ensure compact TID  Nutritionally Significant Medications: [x] Reviewed & Includes: Keppra, benefiber,      Subjective: Staff Presley Ricci, pt discussed during morning rounds    Objective:  6/12:  Ms Lonnie Gross is a 48year old female admitted with [address] PMHx: HTN. Noted: SAH d/t ruptured aneurysm; Intubated at OSH prior to transfer. S/P Cerebral angiogram x 5 and coiling (L) MCA aneurysm; (R) MCA needs to be clipped (not urgent); 6/9 severe vasospasm; acute hypoxic respiratory failure-neurogenic pulmonary edema; HIE per MRI; cerebral edema. 7/6: SLP cleared pt for PO after MBS 7/2. On pureed diet, thin liquids. Pt tolerating PO, but not eating much. RN states appetite is low and needs max encouragement to eat anything. If not contraindicated with meds, recommend appetite stimulant. Consider using PEG (placed 6/19) to assist pt to meet needs. Can pour Ensure compact in PEG if PO is poor. Ensure compact is not a sole source feeding but can be used as intended, as a supplement to poor intake. Estimated Nutrition Needs:   Kcals/day: 4728 Kcals/day(BMR(1598x1.2))  Protein: 79 g(-99g/day(0.8-1.0g/kg))  Fluid: (1 ml/kcal)  Based On: Kcal/kg - specify (Comment)  Weight Used: Actual wt(99.3 kg)    Pt expected to meet estimated nutrient needs:  [x]   Yes--via tube feeding    []  No  [] Unable to predict at this time  Nutrition Diagnosis:   1.  Inadequate energy intake related to discontinuation of Diprivan as evidenced by poor PO, low appetite, TF off. Poor PO.     2.  Altered GI function related to  [facility], vent support as evidenced by  need for PEG placement for full nutritional support  Resolved- PO diet    3.   related to   as evidenced by      Goals:     Pt will consume >25% of pureed/thin diet and 1-2 ONS within 2-4 days     Monitoring & Evaluation:    - Enteral/parenteral nutrition intake   - Glucose profile, Weight/weight change, CV-pulmonary, Electrolyte and renal profile, GI     Previous Nutrition Goals Met: No  Previous Recommendations: Yes    Education & Discharge Needs:   [x] None Identified   [] Identified and addressed    [x] Participated in care plan, discharge planning, and/or interdisciplinary rounds        Cultural, Gnosticism and ethnic food preferences identified:  None    Skin Integrity: []Intact  [x] surgical incision  Edema: []None [x]  1+ in all extremeties  Last BM: 7/4 - loose  Food Allergies: []None []Other    Anthropometrics:    Weight Loss Metrics 7/6/2020 6/4/2020   Today's Wt 188 lb 14.4 oz -   BMI - 32.42 kg/m2      Last 3 Recorded Weights in this Encounter    07/04/20 0150 07/05/20 0201 07/06/20 0155   Weight: 94.6 kg (208 lb 8 oz) 94.2 kg (207 lb 11.2 oz) 85.7 kg (188 lb 14.4 oz)      Weight Source: Bed  Height: 5' 4\" (162.6 cm), Height Source: Stated by family member  Body mass index is 32.42 kg/m².      IBW : 54.4 kg (120 lb), % IBW (Calculated): 201.54 %   ,      Labs:    Lab Results   Component Value Date/Time    Sodium 138 07/04/2020 01:27 AM    Potassium 4.0 07/04/2020 01:27 AM    Chloride 103 07/04/2020 01:27 AM    CO2 28 07/04/2020 01:27 AM    Glucose 90 07/04/2020 01:27 AM    BUN 18 07/04/2020 01:27 AM    Creatinine 0.63 07/04/2020 01:27 AM    Calcium 9.9 07/04/2020 01:27 AM    Magnesium 2.4 06/23/2020 04:23 AM    Phosphorus 3.0 06/23/2020 04:23 AM    Albumin 2.6 (L) 06/30/2020 02:47 AM     Lab Results   Component Value Date/Time Hemoglobin A1c 5.7 (H) 06/05/2020 03:34 AM     Lab Results   Component Value Date/Time    Glucose (POC) 115 (H) 06/12/2020 05:39 AM      Lab Results   Component Value Date/Time    ALT (SGPT) 17 06/30/2020 02:47 AM    Alk.  phosphatase 70 06/30/2020 02:47 AM    Bilirubin, total 0.3 06/30/2020 02:47 AM        Donavan Skiff, RD  Pager: [phone]

## 2020-07-06 NOTE — PROGRESS NOTES
TRANSITION OF CARE    RUR 17% MODERATE     Update 16:00P-Spoke with therapy team regarding updated recommendations. Patient is now appropriate for IPR at this time. Met with patient's mother, Anahi Santos at bedside to offer [address]. Sent referral to [facility] via [employer]; Awaiting response. Monalisa Valdes to cancel referral to [facility] [place]/ SNF placement. DISPOSITION--Pending referral to [facility]. Will need insurance authorization. COVID-19 test pending     Chart reviewed. Per therapy team, SNF recommended with goal to transition to acute IPR once ability to actively participate improves. Please note, Patient was accepted to [place] [id number]; However, patient's mother verbalized concerns about patient discharging to [facility]. Met with patient and patient's mother, Rosita Cobb [phone] to discuss new recommendations. Provided a list of SNF placements. Family selected [facility]. Call placed and spoke with Kiana Edgar. Requested to cancel referral at this time. The Plan for Transition of Care is related to the following treatment goals: SNF placement recommended. Provided a list of SNF placement. Sent referral to [employer]; Awaiting response. Patient representative was provided with a choice of provider and agrees with the discharge plan. [x] Yes [] No    Freedom of choice list was provided with basic dialogue that supports the patient's individualized plan of care/goals, treatment preferences and shares the quality data associated with the providers. [x] Yes [] No    Spoke with Monalisa Dave [phone] to check referral status. Referral under review. CM to follow.  Johnny Gunderson, MSW,CRM

## 2020-07-06 NOTE — PROGRESS NOTES
[address] [facility]  Hospitalist Group                                                                                          Hospitalist Progress Note  [address] [facility],   Answering service: [phone] from in house phone        Date of Service:  2020  NAME:  Nevaeh Gibson  :  1970  MRN:  [id number]    Admission Summary:     Nevaeh Gibson is a 44-year-old female with a past medical history of HTN. According to patient's mother, the patient began having a headache on 20 and had been taking BC Powder with aspirin in it for the headache. She recently stopped taking her BP medications. According to her mother she does not smoke, drink alcohol or use recreational drugs. The patient was brought to the [facility] ER via EMS on   after her coworkers found her unresponsive outside of her place of work. 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 On arrival to the ER she was found to be seizing. A stat CT of her head was performed which showed extensive subarachnoid hemorrhage. She was extremely hypertensive in the 240's and was given labetalol and started on a Cardene drip. She was emergently intubated and given Keppra and Ativan as well. Neurosurgery and Neurointerventional Surgery were consulted and the patient was transferred to the [facility] for a higher level of care. 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 Extensive lung infiltrates were also noted. Patient was taken for cerebral angiogram the morning of 20 and coil embolization of the left MCA aneurysm was completed. The right MCA aneurysm had a very wide neck incorporating both M2s. It could not be treated endovascularly without stents.  May need to be clipped by Neurosurgery.      Procedures: 20 and coil embolization of the left MCA aneurysm was completed s/p cerebral angiogram for right PANCHITO A1 angioplasty for vasospasm, transarterial infusion of verapamil on 6/9,  S/p angiogram on 6/10 for left ICA/MCA angioplasty and transarterial infusion of verapamil for vasospasm treatment, s/p angiogram on 6/11 and 6/13 for transarterial infusion of verapamil for vasospasm treatment. Tracheostomy placement  6/16 by Dr. Davon Garcia in thoracic surgery. PEG placement. Interval history / Subjective: Follow up [address] Patient seen and examined. No acute events. Can say minimal words. Case management for placement. Assessment & Plan:     #. Cerebral Aneurysms. #. SAH due to ruptured cerebral aneurysm   #. Hypertension  -s/p cerebral angiogram with coil embolization of left MCA bifurcation aneurysm on 6/5, patient has 5X3 right MCA bifurcation aneurysm with very wide neck which will need to be treated with stents vs. Clipping  -s/p cardene. Continues on norvasc, hydralazine, lisinopril, HCTZ, carvedilol, goal SBP <160  -seizure prophylaxis keppra  -patient open her right eye to voice, non verbal, doesn't follows command, moves lower extremities spontaneously   -improved vasospasm since 6/20- seen by neurosurgeon      #. Bradycardia: monitor, continue carvedilol   #. Seizure: 2nd to above - stable - keppra, VIMPAT-seizure precaution- Neurologist  #. CVAs - aspirin - multiple ischemic infarct -MRI on 6/16- PT/OT-neurology evaluated  #. Respiratory failure secondary to above, pulmonary edema and possible pneumonia- resolved     #Dysphagia secondary to stroke and SAH-s/p PEG tube  -[facility] 7/2. Diet initiated. Holding tube feeds     #Iron deficiency anemia -noted fibroid  -Hgb trending down, Hgb 7.8, monitor H/H    -iron sucrose 200 mg iv x 3 doses     #Peripheral edema multifactorial- low albumin 2.6  -Echo normal cavity size and systolic function (ejection fraction normal). Mild concentric hypertrophy. Estimated left ventricular ejection fraction is 60 - 65%. No regional wall motion abnormality noted.     Obesity -diet control -A1c 5.7    Code status: Full Code   DVT prophylaxis: Lovenox  Care Plan discussed with: Patient/Family and Nurse  Anticipated Disposition: SNF     Hospital Problems  Date Reviewed: 6/10/2020          Codes Class Noted POA    Respiratory failure ([facility] ICD-10-CM: J96.90  ICD-9-CM: 518.81  6/15/2020 Yes        Hypoxic ischemic encephalopathy (HIE), unspecified severity ICD-10-CM: P91.60  ICD-9-CM: 768.70  6/15/2020 Yes        SAH (subarachnoid hemorrhage) ([facility] ICD-10-CM: I60.9  ICD-9-CM: 222  6/4/2020 Yes        [facility]achnoid hemorrhage from [facility] ICD-10-CM: O74.35  ICD-9-CM: 866  6/4/2020 Yes        RIGHT middle cerebral artery aneurysm ICD-10-CM: I67.1  ICD-9-CM: 437.3  6/4/2020 Yes        Cerebral vasospasm ICD-10-CM: I02.975  ICD-9-CM: 435.9  6/4/2020 Yes            ROS not obtainable 2n2 pt condition       Vital Signs:    Last 24hrs VS reviewed since prior progress note. Most recent are:  Visit Vitals  /60 (BP 1 Location: Left arm, BP Patient Position: At rest)   Pulse (!) 56   Temp 98 °F (36.7 °C)   Resp 16   Ht 5' 4\" (1.626 m)   Wt 85.7 kg (188 lb 14.4 oz)   SpO2 100%   BMI 32.42 kg/m²         Intake/Output Summary (Last 24 hours) at 7/6/2020 1239  Last data filed at 7/6/2020 0800  Gross per 24 hour   Intake 330 ml   Output 600 ml   Net -270 ml        Physical Examination:             Constitutional:  No acute distress, doesn't follow commands. ENT:  Sasha Wallace removed, left eye closed   Resp:  good AE, no wheezes. Clear bilateral        GI:  Soft, non distended, non tender.  obese    Musculoskeletal:  No edema    Neurologic:   Does not follow commands, can speak some words      Skin:  Good turgor, no rashes or ulcers       Data Review:    Review and/or order of clinical lab test  Review and/or order of tests in the radiology section of CPT  Review and/or order of tests in the medicine section of CPT      Labs:     No results for input(s): WBC, HGB, HCT, PLT, HGBEXT, HCTEXT, PLTEXT, HGBEXT, HCTEXT, PLTEXT in the last 72 hours. Recent Labs     07/04/20  0127      K 4.0      CO2 28   BUN 18   CREA 0.63   GLU 90   CA 9.9     No results for input(s): ALT, AP, TBIL, TBILI, TP, ALB, GLOB, GGT, AML, LPSE in the last 72 hours. No lab exists for component: SGOT, GPT, AMYP, HLPSE  No results for input(s): INR, PTP, APTT, INREXT, INREXT in the last 72 hours. No results for input(s): FE, TIBC, PSAT, FERR in the last 72 hours. No results found for: FOL, RBCF   No results for input(s): PH, PCO2, PO2 in the last 72 hours. No results for input(s): CPK, CKNDX, TROIQ in the last 72 hours.     No lab exists for component: CPKMB  Lab Results   Component Value Date/Time    Cholesterol, total 90 06/05/2020 03:34 AM    HDL Cholesterol 64 06/05/2020 03:34 AM    LDL, calculated 7.8 06/05/2020 03:34 AM    Triglyceride 91 06/05/2020 03:34 AM    CHOL/HDL Ratio 1.4 06/05/2020 03:34 AM     Lab Results   Component Value Date/Time    Glucose (POC) 107 (H) 07/02/2020 05:42 PM    Glucose (POC) 94 07/02/2020 11:42 AM    Glucose (POC) 157 (H) 07/02/2020 06:58 AM    Glucose (POC) 121 (H) 06/14/2020 11:22 PM    Glucose (POC) 118 (H) 06/14/2020 05:07 PM     Lab Results   Component Value Date/Time    Specific gravity 1.015 06/15/2020 05:45 AM         Medications Reviewed:     Current Facility-Administered Medications   Medication Dose Route Frequency    carvediloL (COREG) tablet 25 mg  25 mg Oral BID WITH MEALS    lisinopriL (PRINIVIL, ZESTRIL) tablet 40 mg  40 mg Oral DAILY    hydroCHLOROthiazide (HYDRODIURIL) tablet 25 mg  25 mg Oral DAILY    albuterol-ipratropium (DUO-NEB) 2.5 MG-0.5 MG/3 ML  3 mL Nebulization BID PRN    guar gum (BENEFIBER) packet 1 Packet  4 g Oral TID    labetaloL (NORMODYNE;TRANDATE) injection 20 mg  20 mg IntraVENous Q4H PRN    0.9% sodium chloride infusion 250 mL  250 mL IntraVENous PRN    hydrALAZINE (APRESOLINE) tablet 100 mg  100 mg Oral TID    fentaNYL citrate (PF) injection 50 mcg  50 mcg IntraVENous Q2H PRN    hydrALAZINE (APRESOLINE) 20 mg/mL injection 10 mg  10 mg IntraVENous Q2H PRN    chlorhexidine (ORAL CARE KIT) 0.12 % mouthwash 15 mL  15 mL Oral Q12H    amLODIPine (NORVASC) tablet 10 mg  10 mg Per NG tube DAILY    potassium bicarb-citric acid (EFFER-K) tablet 40 mEq  40 mEq Oral BID    lacosamide (VIMPAT) tablet 150 mg  150 mg Per NG tube Q12H    balsam peru-castor oiL (VENELEX) ointment   Topical BID    aspirin chewable tablet 81 mg  81 mg Per NG tube DAILY    enoxaparin (LOVENOX) injection 40 mg  40 mg SubCUTAneous Q24H    levETIRAcetam (KEPPRA) oral solution 1,000 mg  1,000 mg Per NG tube Q12H    glycopyrrolate (ROBINUL) injection 0.2 mg  0.2 mg IntraMUSCular TID PRN    acetaminophen (TYLENOL) solution 650 mg  650 mg Per NG tube Q4H PRN    glucose chewable tablet 16 g  4 Tab Oral PRN    glucagon (GLUCAGEN) injection 1 mg  1 mg IntraMUSCular PRN    dextrose 10% infusion 0-250 mL  0-250 mL IntraVENous PRN    albuterol (PROVENTIL VENTOLIN) nebulizer solution 2.5 mg  2.5 mg Nebulization Q6H PRN    polyvinyl alcohol-povidone (NATURAL TEARS) 0.5-0.6 % ophthalmic solution 2 Drop  2 Drop Both Eyes Q8H    bisacodyL (DULCOLAX) suppository 10 mg  10 mg Rectal DAILY PRN    ondansetron (ZOFRAN) injection 4 mg  4 mg IntraVENous Q4H PRN    docusate (COLACE) 50 mg/5 mL oral liquid 100 mg  100 mg Oral BID PRN     ______________________________________________________________________  EXPECTED LENGTH OF STAY: 22d 21h  ACTUAL LENGTH OF STAY:          Gary 51, DO

## 2020-07-06 NOTE — PROGRESS NOTES
Problem: Dysphagia (Adult)  Goal: *Acute Goals and Plan of Care (Insert Text)  Description: Speech Therapy Goals  Initiated 7/6/2020  1. Patient will tolerate ndd2/thin liquids without adverse effects within 7 days. Initiated 7/2/2020  1. Patient will tolerate pureed diet/thin liquids without adverse effects within 7 days. MET 7/06/2020    Initiated 6/17/2020    1. Patient will participate in swallow re-evaluation within 7 days. Outcome: Progressing Towards Goal     Problem: Communication Impaired (Adult)  Goal: *Acute Goals and Plan of Care (Insert Text)  Description: Speech therapy goals  Initiated 6/26/2020   1. Patient will follow 1-step commands with 10% accuracy with max cues within 7 days   2. Patient will respond to yes/no questions with 10% accuracy with max cues within 7 days      UPDATED 7-3-2020  1. Patient will follow 1-step commands with 50% accuracy with max cues within 7 days   2. Patient will respond to yes/no questions with 66% accuracy with max cues within 7 days         Outcome: Progressing Towards Goal    SPEECH LANGUAGE PATHOLOGY DYSPHAGIA AND SPEECH TREATMENT  Patient: Mohsen Vaughn (48 y.o. female)  Date: 7/6/2020  Diagnosis: SAH (subarachnoid hemorrhage) (HCC) [I60.9]   <principal problem not specified>       Precautions:  Aspiration    ASSESSMENT:  Patient presents with improvement on mastication on this date. However, still presents with some discoordination and perseverated rotary movements. Pt has been tolerating thin liquids via straw without difficulty. Therefore, feel pt safe to initiate diet as outlined below. Suspect as neuro status continues to improve, swallow status will also continue to improve. Patient continues to present with severe non-fluent expressive-receptive aphasia characterized by poor verbal initiation, difficulty with automatic speech and difficulty with simple auditory comprehension tasks.  Patient will continue to benefit from language treatment at this and the next level of care to safely communicate medical wants, needs, and opinions. PLAN:  Recommendations and Planned Interventions:  --Mechanically altered ground  --Thin liquids  --Upright  --Liquids via straw  Patient continues to benefit from skilled intervention to address the above impairments. Continue treatment per established plan of care. Discharge Recommendations:  Inpatient Rehab and Skilled Nursing Facility     SUBJECTIVE:   Patient stated I don't know. OBJECTIVE:   Cognitive and Communication Status:  Neurologic State: Alert(flat affect)  Orientation Level: Other (Comment)(unable to access secondary to aphasia)  Cognition: Decreased attention/concentration, Decreased command following  Perception: Cues to attend left visual field, Cues to attend right visual field  Perseveration: No perseveration noted  Safety/Judgement: Not assessed(unable to assess secondary to aphasia)    Dysphagia Treatment and Interventions:  P.O. Trials:  Patient Position: upright in bed  Vocal quality prior to P.O.: Other (comment)(none noted)  Consistency Presented: Solid; Thin liquid  How Presented: SLP-fed/presented;Straw     Bolus Acceptance: No impairment  Bolus Formation/Control: Impaired  Type of Impairment: Mastication  Propulsion: Discoordination  Oral Residue: Less than 10% of bolus  Initiation of Swallow: No impairment  Laryngeal Elevation: Functional  Aspiration Signs/Symptoms: None  Pharyngeal Phase Characteristics: No impairment, issues, or problems   Effective Modifications: None          Oral Phase Severity: Mild-moderate  Pharyngeal Phase Severity : No impairment    Speech Treatment and Interventions:   Language Comprehension and Expression:  Auditory Comprehension   Hearing Aid: None  Verbal Expression  Verbal Expression  Primary Mode of Expression: Verbal  Initiation: Impaired (%)  Automatic Speech Task: Impaired (comment)  Automatic speech task cueing type: Multi modality;Repetition  Automatic speech task cueing amount: Maximum  Repetition: Impaired  Phrase Repetition (%): 0 %    Pain:  Pain Scale 1: Numeric (0 - 10)  Pain Intensity 1: 0       After treatment:   Patient left in no apparent distress in bed, Call bell within reach, and Nursing notified    COMMUNICATION/EDUCATION:     The patient's plan of care including recommendations, planned interventions, and recommended diet changes were discussed with: Registered nurseDamien Benitez  Time Calculation: 17 mins      Regarding student involvement in patient care:  A student participated in this treatment session. Per CMS Medicare statements and KEVIN guidelines I certify that the following was true:  1. I was present and directly observed the entire session. 2. I made all skilled judgments and clinical decisions regarding care. 3. I am the practitioner responsible for assessment, treatment, and documentation.   TEVIN KhanEd, [address] [facility]  Speech-Language Pathologist

## 2020-07-06 NOTE — PROGRESS NOTES
Bedside and Verbal shift change report given to CodieRN and AshleyRN (oncoming nurse) by Anitha Donato (offgoing nurse). Report included the following information SBAR, Kardex, Intake/Output, MAR, Recent Results, Cardiac Rhythm Sinus Louvenia Nip and Dual Neuro Assessment.

## 2020-07-06 NOTE — ROUTINE PROCESS
Bedside and Verbal shift change report given to Elsie Sanchez (oncoming nurse) by Maryam Arthur RN (offgoing nurse). Report included the following information SBAR, Kardex, ED Summary, Intake/Output, MAR, Recent Results, Med Rec Status, Cardiac Rhythm NSR/Haile and Alarm Parameters .

## 2020-07-06 NOTE — PROGRESS NOTES
Problem: Self Care Deficits Care Plan (Adult)  Goal: *Acute Goals and Plan of Care (Insert Text)  Description:   FUNCTIONAL STATUS PRIOR TO ADMISSION: Patient unable to provide history at evaluation. Per chart, patient was independent and working full-time at [employer]. HOME SUPPORT: Per chart, patient lived with her 15 y/o son and a friend. Occupational Therapy Goals  Goals reviewed and continued: 6/29/2020, continued 7/6/2020  Initiated 6/22/2020  1. Patient will perform grooming with maximal assistance within 7 day(s). 2.  Patient will follow ~25% simple commands with max multimodal cuing in preparation for functional tasks within 7 days. 3.  Patient will reach for ADL object with each UE with moderate assistance in preparation for functional reach within 7 days. 4.  Patient will perform composite grasp on ADL object with each hand in preparation for functional grasp within 7 days. 5.  Patient will participate in upper extremity therapeutic exercise/activities with maximal assistance for 5 minutes within 7 day(s). Outcome: Progressing Towards Goal    OCCUPATIONAL THERAPY TREATMENT/WEEKLY RE-ASSESSMENT  Patient: Claria Hatchet (54 y.o. female)  Date: 7/6/2020  Diagnosis: SAH (subarachnoid hemorrhage) ([facility] [I60.9]   <principal problem not specified>       Precautions: Aspiration, fall  Chart, occupational therapy assessment, plan of care, and goals were reviewed. ASSESSMENT  Patient continues with skilled OT services and is progressing towards goals. Patient continues to present with limited command following, d/t global aphasia per SLP. However, patient presents today with significantly improved alertness, responsiveness, engagement, participation, and functional mobility. Patient was able to mobilize today with multimodal cuing.   She performed supine<>sit with moderate assistance x2, maintained static sitting balance unsupported EOB, and performed 2 trials sit-stand using UE support on back of recliner chair with moderate to maximum assistance x2. Required total assistance x2 for toileting once standing. Patient initiated LUE functional use more than RUE, requiring tactile cues/ hand-over-hand prompting to reach and grasp with RUE. Given progress, OT frequency increased to 5x/ week. Now recommend inpatient rehab at d/c to maximize functional/ neurological recovery. Current Level of Function Impacting Discharge (ADLs): total assistance ADLs       PLAN :  Goals have been updated based on progression since last assessment. Patient continues to benefit from skilled intervention to address the above impairments. Continue to follow patient 5 times a week to address goals. Recommend next OT session: attempt UB ADLs seated EOB    Recommendation for discharge: (in order for the patient to meet his/her long term goals)  Therapy 3 hours per day 5-7 days per week    This discharge recommendation:  Has been made in collaboration with the attending provider and/or case management       SUBJECTIVE:   Patient stated \"Hallelujah. \" once standing    OBJECTIVE DATA SUMMARY:   Cognitive/Behavioral Status:  Neurologic State: Alert  Orientation Level: Other (Comment)(unable to access secondary to aphasia)  Cognition: Decreased attention/concentration;Decreased command following; Impaired decision making; Impulsive;Poor safety awareness  Perception: Cues to attend left visual field;Cues to attend right visual field  Perseveration: No perseveration noted  Safety/Judgement: Decreased awareness of environment;Decreased awareness of need for assistance;Decreased awareness of need for safety;Decreased insight into deficits    Functional Mobility and Transfers for ADLs:  Bed Mobility:  Supine to Sit: Moderate assistance;Assist x2  Sit to Supine: Moderate assistance;Assist x2    Transfers:  Sit to Stand: Assist x2; Moderate assistance;Maximum assistance          Balance:  Sitting: Impaired  Sitting - Static: Good (unsupported)  Sitting - Dynamic: Fair (occasional)  Standing: Impaired;Pull to stand; With support  Standing - Static: Fair;Constant support  Standing - Dynamic : Poor;Constant support    ADL Intervention:            Lower Body Dressing Assistance  Underpants: Total assistance (dependent)  Socks: Total assistance (dependent)    Toileting  Bladder Hygiene: Total assistance (dependent)    Cognitive Retraining  Safety/Judgement: Decreased awareness of environment;Decreased awareness of need for assistance;Decreased awareness of need for safety;Decreased insight into deficits    Pain:  Patient did not indicate pain    Activity Tolerance:   Good, VSS    After treatment patient left in no apparent distress:   Supine in bed, Call bell within reach, Bed / chair alarm activated, and Caregiver / family present    COMMUNICATION/COLLABORATION:   The patients plan of care was discussed with: Physical therapist and Registered nurse.      Kay Bagley OT  Time Calculation: 29 mins

## 2020-07-06 NOTE — PROGRESS NOTES
Problem: Falls - Risk of  Goal: *Absence of Falls  Description: Document Maria Luisa Spring Fall Risk and appropriate interventions in the flowsheet.   Outcome: Progressing Towards Goal  Note: Fall Risk Interventions:  Mobility Interventions: Bed/chair exit alarm, Communicate number of staff needed for ambulation/transfer, OT consult for ADLs, Patient to call before getting OOB, PT Consult for mobility concerns, PT Consult for assist device competence, Strengthening exercises (ROM-active/passive), Utilize walker, cane, or other assistive device, Utilize gait belt for transfers/ambulation    Mentation Interventions: Adequate sleep, hydration, pain control, Bed/chair exit alarm, Door open when patient unattended, Evaluate medications/consider consulting pharmacy, Familiar objects from home, Gait belt with transfers/ambulation, Increase mobility, More frequent rounding, Reorient patient, Self-releasing belt, Toileting rounds, Update white board    Medication Interventions: Assess postural VS orthostatic hypotension, Bed/chair exit alarm, Evaluate medications/consider consulting pharmacy, Patient to call before getting OOB, Teach patient to arise slowly, Utilize gait belt for transfers/ambulation    Elimination Interventions: Bed/chair exit alarm, Call light in reach, Patient to call for help with toileting needs, Stay With Me (per policy), Toilet paper/wipes in reach, Toileting schedule/hourly rounds    History of Falls Interventions: Bed/chair exit alarm, Consult care management for discharge planning, Door open when patient unattended, Evaluate medications/consider consulting pharmacy, Investigate reason for fall, Utilize gait belt for transfer/ambulation, Assess for delayed presentation/identification of injury for 48 hrs (comment for end date), Vital signs minimum Q4HRs X 24 hrs (comment for end date)         Problem: Patient Education: Go to Patient Education Activity  Goal: Patient/Family Education  Outcome: Progressing Towards Goal     Problem: Pressure Injury - Risk of  Goal: *Prevention of pressure injury  Description: Document Sriram Scale and appropriate interventions in the flowsheet. Offload heels  Turn approximately every 2 hours  Venelex ointment   Outcome: Progressing Towards Goal  Note: Pressure Injury Interventions:  Sensory Interventions: Assess changes in LOC, Assess need for specialty bed, Avoid rigorous massage over bony prominences, Check visual cues for pain, Discuss PT/OT consult with provider, Float heels, Keep linens dry and wrinkle-free, Maintain/enhance activity level, Minimize linen layers, Monitor skin under medical devices, Pressure redistribution bed/mattress (bed type), Sit a 90-degree angle/use footstool if needed, Turn and reposition approx. every two hours (pillows and wedges if needed), Use 30-degree side-lying position    Moisture Interventions: Absorbent underpads, Apply protective barrier, creams and emollients, Assess need for specialty bed, Check for incontinence Q2 hours and as needed, Internal/External urinary devices, Maintain skin hydration (lotion/cream), Minimize layers, Offer toileting Q_hr    Activity Interventions: Assess need for specialty bed, Increase time out of bed, Pressure redistribution bed/mattress(bed type), PT/OT evaluation    Mobility Interventions: Assess need for specialty bed, Float heels, HOB 30 degrees or less, Pressure redistribution bed/mattress (bed type), PT/OT evaluation, Turn and reposition approx.  every two hours(pillow and wedges)    Nutrition Interventions: Document food/fluid/supplement intake, Discuss nutritional consult with provider, Offer support with meals,snacks and hydration    Friction and Shear Interventions: Apply protective barrier, creams and emollients, Feet elevated on foot rest, HOB 30 degrees or less, Lift sheet, Lift team/patient mobility team, Minimize layers, Sit at 90-degree angle                Problem: Patient Education: Go to Patient Education Activity  Goal: Patient/Family Education  Outcome: Progressing Towards Goal     Problem: Nutrition Deficit  Goal: *Optimize nutritional status  Outcome: Progressing Towards Goal     Problem: Patient Education: Go to Patient Education Activity  Goal: Patient/Family Education  Outcome: Progressing Towards Goal     Problem: Hemorrhagic Stroke: Day 5 through Discharge  Goal: Activity/Safety  Outcome: Progressing Towards Goal  Goal: Consults, if ordered  Outcome: Progressing Towards Goal  Goal: Diagnostic Test/Procedures  Outcome: Progressing Towards Goal  Goal: Nutrition/Diet  Outcome: Progressing Towards Goal  Goal: Medications  Outcome: Progressing Towards Goal  Goal: Respiratory  Outcome: Progressing Towards Goal  Goal: Treatments/Interventions/Procedures  Outcome: Progressing Towards Goal  Goal: Psychosocial  Outcome: Progressing Towards Goal  Goal: *Hemodynamically stable  Outcome: Progressing Towards Goal  Goal: *Verbalizes anxiety and depression are reduced or absent  Outcome: Progressing Towards Goal  Goal: *Absence of aspiration  Outcome: Progressing Towards Goal  Goal: *Absence of signs and symptoms of DVT  Outcome: Progressing Towards Goal  Goal: *Optimal pain control at patient's stated goal  Outcome: Progressing Towards Goal  Goal: *Tolerating diet  Outcome: Progressing Towards Goal  Goal: *Progressive mobility and function  Outcome: Progressing Towards Goal  Goal: *Rehabilitation readiness  Outcome: Progressing Towards Goal     Problem: Hemorrhagic Stroke: Discharge Outcomes  Goal: *Verbalizes anxiety and depression are reduced or absent  Outcome: Progressing Towards Goal  Goal: *Verbalize understanding of risk factor modification(Stroke Metric)  Outcome: Progressing Towards Goal  Goal: *Optimal pain control at patient's stated goal  Outcome: Progressing Towards Goal  Goal: *Hemodynamically stable  Outcome: Progressing Towards Goal  Goal: *Absence of aspiration pneumonia  Outcome: Progressing Towards Goal  Goal: *Aware of needed dietary changes  Outcome: Progressing Towards Goal  Goal: *Verbalizes understanding and describes medication purposes and frequencies  Outcome: Progressing Towards Goal  Goal: *Tolerating diet  Outcome: Progressing Towards Goal  Goal: *Absence of signs and symptoms of DVT  Outcome: Progressing Towards Goal  Goal: *Absence of aspiration  Outcome: Progressing Towards Goal  Goal: *Progressive mobility and function  Outcome: Progressing Towards Goal  Goal: *Home safety concerns addressed  Outcome: Progressing Towards Goal     Problem: Patient Education: Go to Patient Education Activity  Goal: Patient/Family Education  Outcome: Progressing Towards Goal     Problem: Patient Education: Go to Patient Education Activity  Goal: Patient/Family Education  Outcome: Progressing Towards Goal     Problem: Patient Education:  Go to Education Activity  Goal: Patient/Family Education  Outcome: Progressing Towards Goal     Problem: Subarachnoid Hemorrhage Stroke:Admission Day 5-Discharge  Goal: Activity/Safety  Outcome: Progressing Towards Goal  Goal: Diagnostic Test/Procedures  Outcome: Progressing Towards Goal  Goal: Nutrition/Diet  Outcome: Progressing Towards Goal  Goal: Medications  Outcome: Progressing Towards Goal  Goal: Patient maintains clear airway/absence of aspiration  Outcome: Progressing Towards Goal  Goal: Treatments/Interventions/Procedures  Outcome: Progressing Towards Goal  Goal: Psychosocial  Outcome: Progressing Towards Goal  Goal: *Hemodynamically stable  Outcome: Progressing Towards Goal  Goal: *Absence of signs and symptoms of DVT  Outcome: Progressing Towards Goal     Problem: Subarachnoid Hemorrhage Stroke:Discharge Outcomes  Goal: *Verbalizes anxiety and depression are reduced or absent  Outcome: Progressing Towards Goal  Goal: *Verbalizes understanding of risk factor modification (Stroke Metric)  Outcome: Progressing Towards Goal  Goal: *Hemodynamically stable  Outcome: Progressing Towards Goal  Goal: *Aware of needed dietary changes  Outcome: Progressing Towards Goal  Goal: *Verbalizes understanding and describes medication purposes and frequencies  Outcome: Progressing Towards Goal  Goal: *Verbalizes understanding of plan for nimodipine administration  Description: until day 21 and when next dose is scheduled  Outcome: Progressing Towards Goal  Goal: *Absence of signs and symptoms of DVT  Outcome: Progressing Towards Goal  Goal: *Absence of aspiration  Outcome: Progressing Towards Goal  Goal: *Progressive mobility and function  Outcome: Progressing Towards Goal  Goal: *Home safety concerns addressed  Outcome: Progressing Towards Goal     Problem: Patient Education: Go to Patient Education Activity  Goal: Patient/Family Education  Outcome: Progressing Towards Goal

## 2020-07-07 PROCEDURE — 74011250637 HC RX REV CODE- 250/637: Performed by: PSYCHIATRY & NEUROLOGY

## 2020-07-07 PROCEDURE — 97530 THERAPEUTIC ACTIVITIES: CPT

## 2020-07-07 PROCEDURE — 74011250637 HC RX REV CODE- 250/637: Performed by: INTERNAL MEDICINE

## 2020-07-07 PROCEDURE — 65660000000 HC RM CCU STEPDOWN

## 2020-07-07 PROCEDURE — 74011250637 HC RX REV CODE- 250/637: Performed by: NURSE PRACTITIONER

## 2020-07-07 PROCEDURE — 97112 NEUROMUSCULAR REEDUCATION: CPT

## 2020-07-07 PROCEDURE — 74011250636 HC RX REV CODE- 250/636: Performed by: ANESTHESIOLOGY

## 2020-07-07 PROCEDURE — 74011250637 HC RX REV CODE- 250/637: Performed by: ANESTHESIOLOGY

## 2020-07-07 RX ADMIN — CASTOR OIL AND BALSAM, PERU: 788; 87 OINTMENT TOPICAL at 17:02

## 2020-07-07 RX ADMIN — LACOSAMIDE 150 MG: 50 TABLET, FILM COATED ORAL at 21:42

## 2020-07-07 RX ADMIN — ASPIRIN 81 MG CHEWABLE TABLET 81 MG: 81 TABLET CHEWABLE at 08:15

## 2020-07-07 RX ADMIN — POTASSIUM BICARBONATE 40 MEQ: 782 TABLET, EFFERVESCENT ORAL at 08:16

## 2020-07-07 RX ADMIN — POTASSIUM BICARBONATE 40 MEQ: 782 TABLET, EFFERVESCENT ORAL at 17:00

## 2020-07-07 RX ADMIN — Medication 2 DROP: at 21:43

## 2020-07-07 RX ADMIN — HYDRALAZINE HYDROCHLORIDE 100 MG: 50 TABLET, FILM COATED ORAL at 17:02

## 2020-07-07 RX ADMIN — HYDROCHLOROTHIAZIDE 25 MG: 25 TABLET ORAL at 08:16

## 2020-07-07 RX ADMIN — LISINOPRIL 40 MG: 20 TABLET ORAL at 08:16

## 2020-07-07 RX ADMIN — Medication 1 PACKET: at 21:43

## 2020-07-07 RX ADMIN — CARVEDILOL 25 MG: 12.5 TABLET, FILM COATED ORAL at 17:01

## 2020-07-07 RX ADMIN — LEVETIRACETAM 1000 MG: 100 SOLUTION ORAL at 08:15

## 2020-07-07 RX ADMIN — AMLODIPINE BESYLATE 10 MG: 5 TABLET ORAL at 08:16

## 2020-07-07 RX ADMIN — HYDRALAZINE HYDROCHLORIDE 100 MG: 50 TABLET, FILM COATED ORAL at 08:16

## 2020-07-07 RX ADMIN — CASTOR OIL AND BALSAM, PERU: 788; 87 OINTMENT TOPICAL at 08:17

## 2020-07-07 RX ADMIN — Medication 2 DROP: at 06:00

## 2020-07-07 RX ADMIN — LEVETIRACETAM 1000 MG: 100 SOLUTION ORAL at 21:41

## 2020-07-07 RX ADMIN — ENOXAPARIN SODIUM 40 MG: 40 INJECTION SUBCUTANEOUS at 13:02

## 2020-07-07 RX ADMIN — CHLORHEXIDINE GLUCONATE 15 ML: 0.12 RINSE ORAL at 21:43

## 2020-07-07 RX ADMIN — Medication 1 PACKET: at 17:00

## 2020-07-07 RX ADMIN — Medication 1 PACKET: at 08:15

## 2020-07-07 RX ADMIN — CHLORHEXIDINE GLUCONATE 15 ML: 0.12 RINSE ORAL at 08:16

## 2020-07-07 RX ADMIN — Medication 2 DROP: at 13:02

## 2020-07-07 RX ADMIN — LACOSAMIDE 150 MG: 50 TABLET, FILM COATED ORAL at 08:15

## 2020-07-07 RX ADMIN — CARVEDILOL 25 MG: 12.5 TABLET, FILM COATED ORAL at 08:16

## 2020-07-07 NOTE — PROGRESS NOTES
Problem: Mobility Impaired (Adult and Pediatric)  Goal: *Acute Goals and Plan of Care (Insert Text)  Description:   FUNCTIONAL STATUS PRIOR TO ADMISSION: Patient was independent and active without use of DME per chart review. HOME SUPPORT PRIOR TO ADMISSION: Details unknown - pt is not able to provide info at this time. Physical Therapy Goals  Initiated 7/6/2020  1. Patient will move from supine to sit and sit to supine  and scoot up and down in bed with minimal assistance/contact guard assist within 7 day(s). 2.  Patient will transfer from bed to chair and chair to bed with moderate assistance  using the least restrictive device within 7 day(s). 3.  Patient will perform sit to stand with moderate assistance  within 7 day(s). 4.  Patient will ambulate with maximal assistance for 5 feet with the least restrictive device within 7 day(s). 5.  Patient will improve Garza Balance score by 7 points within 7 days. Initiated 6/22/2020, appropriate for carryover 06/29/2020  1. Patient will move from supine to sit and sit to supine , scoot up and down and roll side to side in bed with maximal assistance within 7 day(s). 2.  Patient will tolerate bed in full chair position x30min for improved tolerance to upright and pulmonary toileting within 7 days. 3.  Patient will participate in B TAMELA QURESHI/PROM program within 7 days. Outcome: Progressing Towards Goal     PHYSICAL THERAPY TREATMENT  Patient: Brent Acuna (48 y.o. female)  Date: 7/7/2020  Diagnosis: SAH (subarachnoid hemorrhage) ([facility]ly [facility]) [I60.9]   <principal problem not specified>       Precautions: Aspiration  Chart, physical therapy assessment, plan of care and goals were reviewed.     ASSESSMENT  Patient continues with skilled PT services and is slowly progressing towards goals -  remains most limited by global aphasia and decreased command following, global weakness (R<L), global edema, impaired balance, and decreased tolerance to sustained activity leading to increased dependency and falls risk from baseline level. Required increased assist, time, and multi modal cues today to engage in session, question possible slight behavioral component - might to better in the PM or when mom is present. She was able to mobilize to EOB with max A x2 and initially required max A to maintain static sitting balance in midline - improved with time to occasional assist, mostly to prevent impulsively returning self to supine. Eventually progressed to standing trials x2 reps with max A x2. Able to take 1 small shuffled side step with max A for lateral weight shift. BP checked in sitting 2* pt stating \"I can't\" - noted to be 70s/20s (unsure if completely accurate) - therefore assisted back to bed.  on recheck in full supine position. Continue to recommend discharge to acute IPR once medically cleared as she is now showing progress and is far below her indep baseline level. Current Level of Function Impacting Discharge (mobility/balance): max A x2; globally aphasic     Other factors to consider for discharge: indep at baseline; good family support          PLAN :  Patient continues to benefit from skilled intervention to address the above impairments. Continue treatment per established plan of care. to address goals.     Recommendation for discharge: (in order for the patient to meet his/her long term goals)  Therapy 3 hours per day 5-7 days per week    This discharge recommendation:  Has been made in collaboration with the attending provider and/or case management    IF patient discharges home will need the following DME: to be determined (TBD)       SUBJECTIVE:   Patient stated Ok, Ok.     OBJECTIVE DATA SUMMARY:   Critical Behavior:  Neurologic State: Confused, Alert, Eyes open spontaneously  Orientation Level: Unable to verbalize  Cognition: Decreased attention/concentration, Decreased command following, Impaired decision making, Poor safety awareness  Safety/Judgement: Decreased awareness of environment, Decreased awareness of need for assistance, Decreased awareness of need for safety, Decreased insight into deficits  Functional Mobility Training:  Bed Mobility:     Supine to Sit: Assist x2;Maximum assistance  Sit to Supine: Assist x2;Maximum assistance     Transfers:  Sit to Stand: Assist x2;Maximum assistance  Stand to Sit: Assist x2; Moderate assistance;Maximum assistance          Balance:  Sitting: Impaired  Sitting - Static: Fair (occasional)  Sitting - Dynamic: Fair (occasional); Poor (constant support)  Standing: Impaired;Pull to stand  Standing - Static: Fair;Constant support  Standing - Dynamic : Poor;Constant support      Activity Tolerance:   SpO2 stable on RA, requires rest breaks, and signs and symptoms of orthostatic hypotension  Please refer to the flowsheet for vital signs taken during this treatment. After treatment patient left in no apparent distress:   Supine in bed, Call bell within reach, Bed / chair alarm activated, and Side rails x 3    COMMUNICATION/COLLABORATION:   The patients plan of care was discussed with: Occupational therapist and Registered nurse.      Mirian Barber, PT, DPT   Time Calculation: 29 mins

## 2020-07-07 NOTE — PROGRESS NOTES
1930: Bedside shift change report given to Lily Abebe RN (oncoming nurse) by Harrison Valencia RN (offgoing nurse). Report included the following information SBAR, Kardex, ED Summary, OR Summary, Procedure Summary, Intake/Output, MAR, Recent Results, Cardiac Rhythm NSR and Dual Neuro Assessment. 2330: Bedside shift change report given to Baylee Gillette RN (oncoming nurse) by Lily Abebe RN (offgoing nurse). Report included the following information SBAR, Kardex, ED Summary, OR Summary, Procedure Summary, Intake/Output, MAR, Recent Results, Cardiac Rhythm SB/NSR and Dual Neuro Assessment.

## 2020-07-07 NOTE — PROGRESS NOTES
[facility] Adult  Hospitalist Group                                                                                          Hospitalist Progress Note  [address],   Answering service: [phone] from in house phone        Date of Service:  2020  NAME:  Lexi Goncalves  :  1970  MRN:  [id number]    Admission Summary:     Lexi Goncalves is a 59-year-old female with a past medical history of HTN. According to patient's mother, the patient began having a headache on 20 and had been taking BC Powder with aspirin in it for the headache. She recently stopped taking her BP medications. According to her mother she does not smoke, drink alcohol or use recreational drugs. The patient was brought to the [facility] ER via EMS on   after her coworkers found her unresponsive outside of her place of work. 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 On arrival to the ER she was found to be seizing. A stat CT of her head was performed which showed extensive subarachnoid hemorrhage. She was extremely hypertensive in the 240's and was given labetalol and started on a Cardene drip. She was emergently intubated and given Keppra and Ativan as well. Neurosurgery and Neurointerventional Surgery were consulted and the patient was transferred to the [facility] for a higher level of care. 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 Extensive lung infiltrates were also noted. Patient was taken for cerebral angiogram the morning of 20 and coil embolization of the left MCA aneurysm was completed. The right MCA aneurysm had a very wide neck incorporating both M2s. It could not be treated endovascularly without stents.  May need to be clipped by Neurosurgery.      Procedures: 20 and coil embolization of the left MCA aneurysm was completed s/p cerebral angiogram for right PANCHITO A1 angioplasty for vasospasm, transarterial infusion of verapamil on 6/9,  S/p angiogram on 6/10 for left ICA/MCA angioplasty and transarterial infusion of verapamil for vasospasm treatment, s/p angiogram on 6/11 and 6/13 for transarterial infusion of verapamil for vasospasm treatment. Tracheostomy placement  6/16 by Dr. Glory Gray in thoracic surgery. PEG placement. Interval history / Subjective: Follow up [facility]. Patient seen and examined. No acute events. Blood pressure controlled. Appreciate case management. Assessment & Plan:     #. Cerebral Aneurysms. #. SAH due to ruptured cerebral aneurysm   #. Hypertension  -s/p cerebral angiogram with coil embolization of left MCA bifurcation aneurysm on 6/5, patient has 5X3 right MCA bifurcation aneurysm with very wide neck which will need to be treated with stents vs. Clipping  -s/p cardene. Continues on norvasc, hydralazine, lisinopril, HCTZ, carvedilol, goal SBP <160  -seizure prophylaxis keppra  -patient open her right eye to voice, non verbal, doesn't follows command, moves lower extremities spontaneously   -improved vasospasm since 6/20- seen by neurosurgeon      #. Bradycardia: monitor, continue carvedilol   #. Seizure: 2nd to above - stable - keppra, VIMPAT-seizure precaution- Neurologist  #. CVAs - aspirin - multiple ischemic infarct -MRI on 6/16- PT/OT-neurology evaluated  #. Respiratory failure secondary to above, pulmonary edema and possible pneumonia- resolved     #Dysphagia secondary to stroke and SAH-s/p PEG tube  -[facility] 7/2. Diet initiated. Holding tube feeds     #Iron deficiency anemia -noted fibroid  -Hgb trending down, Hgb 7.8, monitor H/H    -iron sucrose 200 mg iv x 3 doses     #Peripheral edema multifactorial- low albumin 2.6  -Echo normal cavity size and systolic function (ejection fraction normal). Mild concentric hypertrophy.  Estimated left ventricular ejection fraction is 60 - 65%. No regional wall motion abnormality noted.     Obesity -diet control -A1c 5.7    Code status: Full Code   DVT prophylaxis: Lovenox  Care Plan discussed with: Patient/Family and Nurse  Anticipated Disposition: SNF     Hospital Problems  Date Reviewed: 6/10/2020          Codes Class Noted POA    Respiratory failure ([facility] ICD-10-CM: J96.90  ICD-9-CM: 518.81  6/15/2020 Yes        Hypoxic ischemic encephalopathy (HIE), unspecified severity ICD-10-CM: P91.60  ICD-9-CM: 768.70  6/15/2020 Yes        SAH (subarachnoid hemorrhage) ([facility] ICD-10-CM: I60.9  ICD-9-CM: 023  6/4/2020 Yes        Subarachnoid hemorrhage from middle cerebral artery aneurysm, [facility] ICD-10-CM: K12.37  ICD-9-CM: 300  6/4/2020 Yes        RIGHT middle cerebral artery aneurysm ICD-10-CM: I67.1  ICD-9-CM: 437.3  6/4/2020 Yes        Cerebral vasospasm ICD-10-CM: L77.796  ICD-9-CM: 435.9  6/4/2020 Yes            ROS not obtainable 2n2 pt condition       Vital Signs:    Last 24hrs VS reviewed since prior progress note. Most recent are:  Visit Vitals  /47 (BP 1 Location: Left arm, BP Patient Position: At rest)   Pulse (!) 55   Temp 98.1 °F (36.7 °C)   Resp 20   Ht 5' 4\" (1.626 m)   Wt 85.6 kg (188 lb 11.4 oz)   SpO2 94%   BMI 32.39 kg/m²         Intake/Output Summary (Last 24 hours) at 7/7/2020 1307  Last data filed at 7/6/2020 2000  Gross per 24 hour   Intake    Output 200 ml   Net -200 ml        Physical Examination:             Constitutional:  No acute distress, doesn't follow commands. ENT:  Lyndsay Grills removed, left eye closed   Resp:  good AE, no wheezes. Clear bilateral        GI:  Soft, non distended, non tender.  obese    Musculoskeletal:  No edema    Neurologic:   Does not follow commands, can speak some words      Skin:  Good turgor, no rashes or ulcers       Data Review:    Review and/or order of clinical lab test  Review and/or order of tests in the radiology section of CPT  Review and/or order of tests in the medicine section of CPT      Labs:     No results for input(s): WBC, HGB, HCT, PLT, HGBEXT, HCTEXT, PLTEXT, HGBEXT, HCTEXT, PLTEXT in the last 72 hours. No results for input(s): NA, K, CL, CO2, BUN, CREA, GLU, CA, MG, PHOS, URICA in the last 72 hours. No results for input(s): ALT, AP, TBIL, TBILI, TP, ALB, GLOB, GGT, AML, LPSE in the last 72 hours. No lab exists for component: SGOT, GPT, AMYP, HLPSE  No results for input(s): INR, PTP, APTT, INREXT, INREXT in the last 72 hours. No results for input(s): FE, TIBC, PSAT, FERR in the last 72 hours. No results found for: FOL, RBCF   No results for input(s): PH, PCO2, PO2 in the last 72 hours. No results for input(s): CPK, CKNDX, TROIQ in the last 72 hours.     No lab exists for component: CPKMB  Lab Results   Component Value Date/Time    Cholesterol, total 90 06/05/2020 03:34 AM    HDL Cholesterol 64 06/05/2020 03:34 AM    LDL, calculated 7.8 06/05/2020 03:34 AM    Triglyceride 91 06/05/2020 03:34 AM    CHOL/HDL Ratio 1.4 06/05/2020 03:34 AM     Lab Results   Component Value Date/Time    Glucose (POC) 107 (H) 07/02/2020 05:42 PM    Glucose (POC) 94 07/02/2020 11:42 AM    Glucose (POC) 157 (H) 07/02/2020 06:58 AM    Glucose (POC) 121 (H) 06/14/2020 11:22 PM    Glucose (POC) 118 (H) 06/14/2020 05:07 PM     Lab Results   Component Value Date/Time    Specific gravity 1.015 06/15/2020 05:45 AM         Medications Reviewed:     Current Facility-Administered Medications   Medication Dose Route Frequency    carvediloL (COREG) tablet 25 mg  25 mg Oral BID WITH MEALS    lisinopriL (PRINIVIL, ZESTRIL) tablet 40 mg  40 mg Oral DAILY    hydroCHLOROthiazide (HYDRODIURIL) tablet 25 mg  25 mg Oral DAILY    albuterol-ipratropium (DUO-NEB) 2.5 MG-0.5 MG/3 ML  3 mL Nebulization BID PRN    guar gum (BENEFIBER) packet 1 Packet  4 g Oral TID    labetaloL (NORMODYNE;TRANDATE) injection 20 mg  20 mg IntraVENous Q4H PRN    0.9% sodium chloride infusion 250 mL  250 mL IntraVENous PRN    hydrALAZINE (APRESOLINE) tablet 100 mg  100 mg Oral TID    fentaNYL citrate (PF) injection 50 mcg  50 mcg IntraVENous Q2H PRN    hydrALAZINE (APRESOLINE) 20 mg/mL injection 10 mg  10 mg IntraVENous Q2H PRN    chlorhexidine (ORAL CARE KIT) 0.12 % mouthwash 15 mL  15 mL Oral Q12H    amLODIPine (NORVASC) tablet 10 mg  10 mg Per NG tube DAILY    potassium bicarb-citric acid (EFFER-K) tablet 40 mEq  40 mEq Oral BID    lacosamide (VIMPAT) tablet 150 mg  150 mg Per NG tube Q12H    balsam peru-castor oiL (VENELEX) ointment   Topical BID    aspirin chewable tablet 81 mg  81 mg Per NG tube DAILY    enoxaparin (LOVENOX) injection 40 mg  40 mg SubCUTAneous Q24H    levETIRAcetam (KEPPRA) oral solution 1,000 mg  1,000 mg Per NG tube Q12H    glycopyrrolate (ROBINUL) injection 0.2 mg  0.2 mg IntraMUSCular TID PRN    acetaminophen (TYLENOL) solution 650 mg  650 mg Per NG tube Q4H PRN    glucose chewable tablet 16 g  4 Tab Oral PRN    glucagon (GLUCAGEN) injection 1 mg  1 mg IntraMUSCular PRN    dextrose 10% infusion 0-250 mL  0-250 mL IntraVENous PRN    albuterol (PROVENTIL VENTOLIN) nebulizer solution 2.5 mg  2.5 mg Nebulization Q6H PRN    polyvinyl alcohol-povidone (NATURAL TEARS) 0.5-0.6 % ophthalmic solution 2 Drop  2 Drop Both Eyes Q8H    bisacodyL (DULCOLAX) suppository 10 mg  10 mg Rectal DAILY PRN    ondansetron (ZOFRAN) injection 4 mg  4 mg IntraVENous Q4H PRN    docusate (COLACE) 50 mg/5 mL oral liquid 100 mg  100 mg Oral BID PRN     ______________________________________________________________________  EXPECTED LENGTH OF STAY: 22d 21h  ACTUAL LENGTH OF STAY:          [facility]

## 2020-07-07 NOTE — PROGRESS NOTES
Problem: Self Care Deficits Care Plan (Adult)  Goal: *Acute Goals and Plan of Care (Insert Text)  Description:   FUNCTIONAL STATUS PRIOR TO ADMISSION: Patient unable to provide history at evaluation. Per chart, patient was independent and working full-time at [employer]. HOME SUPPORT: Per chart, patient lived with her 15 y/o son and a friend. Occupational Therapy Goals  Goals reviewed and continued: 6/29/2020, continued 7/6/2020  Initiated 6/22/2020  1. Patient will perform grooming with maximal assistance within 7 day(s). 2.  Patient will follow ~25% simple commands with max multimodal cuing in preparation for functional tasks within 7 days. 3.  Patient will reach for ADL object with each UE with moderate assistance in preparation for functional reach within 7 days. 4.  Patient will perform composite grasp on ADL object with each hand in preparation for functional grasp within 7 days. 5.  Patient will participate in upper extremity therapeutic exercise/activities with maximal assistance for 5 minutes within 7 day(s). Outcome: Progressing Towards Goal    OCCUPATIONAL THERAPY TREATMENT  Patient: Ramiro Reynaga (48 y.o. female)  Date: 7/7/2020  Diagnosis: SA[facility] [I60.9]   <principal problem not specified>       Precautions: Aspiration, fall  Chart, occupational therapy assessment, plan of care, and goals were reviewed. ASSESSMENT  Patient continues with skilled OT services and is progressing towards goals. Patient continues to present with limited command following, d/t global aphasia per SLP, but continues to present with improved alertness and activity tolerance. Patient required maximum assistance x2 to mobilize to EOB and maintained sitting balance with intermittent assistance.   Attempted to engage patient in feeding/ drinking/ toothbrushing, but she appeared disinterested and did not engage in task despite max cueing and hand-over-hand prompting. Patient continues to initiate LUE > RUE AROM, with mild R DIP and PIP flexor tightness, and grimaced/ winced with R hand PROM but achieved nearly full extension. Patient may benefit from R hand splinting to promote functional positioning/ prevent contracture. Will take conservative approach for now and will continue to monitor ROM progression and pain. Recommend supporting RUE with pillow and placing R hand open in extension throughout the day. Patient performed 2 trials sit-stand using UE support on back of recliner chair with moderate to maximum assistance x2. Continue to recommend inpatient rehab at d/c to maximize functional/ neurological recovery. BP seated EOB after standing measured at 75/25 (HR 55) at R forearm, but unclear if accurate reading. Patient demonstrated no signs/ symptoms of hypotension. Patient was immediately assisted to supine with /50 (HR 47) at L upper arm. RN notified of vitals. Current Level of Function Impacting Discharge (ADLs): total assistance ADLs         PLAN :  Patient continues to benefit from skilled intervention to address the above impairments. Continue treatment per established plan of care. to address goals. Recommend with staff:  Recommend supporting RUE with pillow and placing R hand open in extension throughout the day. Recommend next OT session: ADL engagement sitting EOB    Recommendation for discharge: (in order for the patient to meet his/her long term goals)  Therapy 3 hours per day 5-7 days per week    This discharge recommendation:  Has been made in collaboration with the attending provider and/or case management       SUBJECTIVE:   Patient stated Oh boy.     OBJECTIVE DATA SUMMARY:   Cognitive/Behavioral Status:  Neurologic State: Confused; Alert;Eyes open spontaneously  Orientation Level: Unable to verbalize  Cognition: Decreased attention/concentration;Decreased command following; Impaired decision making;Poor safety awareness             Functional Mobility and Transfers for ADLs:  Bed Mobility:  Supine to Sit: Assist x2;Maximum assistance  Sit to Supine: Assist x2;Maximum assistance    Transfers:  Sit to Stand: Assist x2;Maximum assistance          Balance:  Sitting: Impaired  Sitting - Static: Fair (occasional)  Sitting - Dynamic: Fair (occasional); Poor (constant support)  Standing: Impaired;Pull to stand  Standing - Static: Fair;Constant support  Standing - Dynamic : Poor;Constant support    Pain:  Patient grimaced with R hand PROM to extension, improving with rest    Activity Tolerance:   Fair    After treatment patient left in no apparent distress:   Supine in bed, Call bell within reach, and Bed / chair alarm activated    COMMUNICATION/COLLABORATION:   The patients plan of care was discussed with: Physical therapist and Registered nurse.      Victorino Kelly OT  Time Calculation: 30 mins

## 2020-07-07 NOTE — PROGRESS NOTES
Problem: Falls - Risk of  Goal: *Absence of Falls  Description: Document Tressia Bullion Fall Risk and appropriate interventions in the flowsheet. Outcome: Progressing Towards Goal  Note: Fall Risk Interventions:  Mobility Interventions: Bed/chair exit alarm, Communicate number of staff needed for ambulation/transfer    Mentation Interventions: Adequate sleep, hydration, pain control, Bed/chair exit alarm, Evaluate medications/consider consulting pharmacy    Medication Interventions: Bed/chair exit alarm, Evaluate medications/consider consulting pharmacy    Elimination Interventions: Bed/chair exit alarm, Call light in reach    History of Falls Interventions: Bed/chair exit alarm, Evaluate medications/consider consulting pharmacy         Problem: Pressure Injury - Risk of  Goal: *Prevention of pressure injury  Description: Document Sriram Scale and appropriate interventions in the flowsheet.     Offload heels  Turn approximately every 2 hours  Venelex ointment   Outcome: Progressing Towards Goal  Note: Pressure Injury Interventions:  Sensory Interventions: Assess changes in LOC, Discuss PT/OT consult with provider, Float heels, Keep linens dry and wrinkle-free, Maintain/enhance activity level    Moisture Interventions: Absorbent underpads, Apply protective barrier, creams and emollients, Minimize layers, Maintain skin hydration (lotion/cream)    Activity Interventions: Assess need for specialty bed, Pressure redistribution bed/mattress(bed type), PT/OT evaluation    Mobility Interventions: Pressure redistribution bed/mattress (bed type), PT/OT evaluation    Nutrition Interventions: Document food/fluid/supplement intake    Friction and Shear Interventions: Apply protective barrier, creams and emollients, Minimize layers                Problem: Nutrition Deficit  Goal: *Optimize nutritional status  Outcome: Progressing Towards Goal     Problem: Hemorrhagic Stroke: Day 5 through Discharge  Goal: Activity/Safety  Outcome: Progressing Towards Goal  Goal: Consults, if ordered  Outcome: Progressing Towards Goal  Goal: Diagnostic Test/Procedures  Outcome: Progressing Towards Goal  Goal: Medications  Outcome: Progressing Towards Goal  Goal: Treatments/Interventions/Procedures  Outcome: Progressing Towards Goal  Goal: *Hemodynamically stable  Outcome: Progressing Towards Goal

## 2020-07-07 NOTE — PROGRESS NOTES
Transition of Care Plan  RUR 17%    Disposition:  Inpatient rehab   Referral sent to [facility]--acceptance pending  Authorization needed from insurance [address] [facility]- BLS    Main contact: Mother: Sujata Santos  [phone] or [phone]      Cm spoke with [facility], [address] [place] [address] today. CM waiting for decision from physician regarding acceptance.      If accepted, patient will need authorization from Damien Hemphill 70 19 results Pending    Note-- patient denied admission from [facility]

## 2020-07-07 NOTE — PROGRESS NOTES
Bedside and Verbal shift change report given to Burgess Maryanne RN (oncoming nurse) by Tara Lucia RN (offgoing nurse). Report included the following information SBAR, Kardex, Intake/Output, MAR, Recent Results, Med Rec Status, Cardiac Rhythm SB and Dual Neuro Assessment.

## 2020-07-07 NOTE — PROGRESS NOTES
Problem: Falls - Risk of  Goal: *Absence of Falls  Description: Document Darius Ernie Fall Risk and appropriate interventions in the flowsheet.   Outcome: Progressing Towards Goal  Note: Fall Risk Interventions:  Mobility Interventions: Bed/chair exit alarm, Communicate number of staff needed for ambulation/transfer    Mentation Interventions: Adequate sleep, hydration, pain control, Bed/chair exit alarm, Evaluate medications/consider consulting pharmacy    Medication Interventions: Bed/chair exit alarm, Evaluate medications/consider consulting pharmacy    Elimination Interventions: Bed/chair exit alarm, Call light in reach    History of Falls Interventions: Bed/chair exit alarm, Evaluate medications/consider consulting pharmacy         Problem: Hemorrhagic Stroke: Day 5 through Discharge  Goal: Activity/Safety  Outcome: Progressing Towards Goal  Goal: Consults, if ordered  Outcome: Progressing Towards Goal  Goal: Diagnostic Test/Procedures  Outcome: Progressing Towards Goal  Goal: Nutrition/Diet  Outcome: Progressing Towards Goal  Goal: Medications  Outcome: Progressing Towards Goal  Goal: Respiratory  Outcome: Progressing Towards Goal  Goal: Treatments/Interventions/Procedures  Outcome: Progressing Towards Goal  Goal: Psychosocial  Outcome: Progressing Towards Goal  Goal: *Hemodynamically stable  Outcome: Progressing Towards Goal  Goal: *Verbalizes anxiety and depression are reduced or absent  Outcome: Progressing Towards Goal  Goal: *Absence of aspiration  Outcome: Progressing Towards Goal  Goal: *Absence of signs and symptoms of DVT  Outcome: Progressing Towards Goal  Goal: *Optimal pain control at patient's stated goal  Outcome: Progressing Towards Goal  Goal: *Tolerating diet  Outcome: Progressing Towards Goal  Goal: *Progressive mobility and function  Outcome: Progressing Towards Goal  Goal: *Rehabilitation readiness  Outcome: Progressing Towards Goal

## 2020-07-07 NOTE — PROGRESS NOTES
07/07/20 1815   Vital Signs   Temp 98.8 °F (37.1 °C)   Temp Source Axillary   Pulse (Heart Rate) (!) 45   Heart Rate Source Monitor   Cardiac Rhythm Sinus Haile   Resp Rate 17   O2 Sat (%) 97 %   Level of Consciousness Alert   BP 92/58   MAP (Calculated) 69   BP 1 Method Automatic   BP 1 Location Left arm   BP Patient Position At rest   MEWS Score 3   MD notified. Ordered to hold PM BP medications.

## 2020-07-07 NOTE — PROGRESS NOTES
Incremental but definite neuro progress  Verbalizing, one word responses  Following some commands intermittently  JOLLY spontaneously.      Melanie Loomis [facility]  [phone]

## 2020-07-07 NOTE — PROGRESS NOTES
Follow-up visit with Ms. Venkatesh Wadsworth. Pt opened her eyes when I spoke her name, but then closed them again. Offered words of comfort and assurance of prayers. No family present at the time of my visit. Music therapist provided session last week when pt's mother was present. Spiritual Care services remain available for support as needed.     Swetha Navarro, Palliative

## 2020-07-07 NOTE — PROGRESS NOTES
Palliative Medicine Social Work    Chart reviewed. Acknowledge PT/OT note that patient is progressing - now IPR appropriate. Pending referral to [facility]. Will follow peripherally. Thank you for the opportunity to be involved in the care of Ms. Sarah Beth Barahona and her family.     Luz Higgins LMSW, Supervisee in Social Work  Palliative Medicine   [phone]

## 2020-07-08 LAB
SARS-COV-2, COV2: NOT DETECTED
SOURCE, COVRS: NORMAL
SPECIMEN SOURCE, FCOV2M: NORMAL

## 2020-07-08 PROCEDURE — 74011250637 HC RX REV CODE- 250/637: Performed by: PSYCHIATRY & NEUROLOGY

## 2020-07-08 PROCEDURE — 74011250637 HC RX REV CODE- 250/637: Performed by: INTERNAL MEDICINE

## 2020-07-08 PROCEDURE — 97535 SELF CARE MNGMENT TRAINING: CPT

## 2020-07-08 PROCEDURE — 65660000000 HC RM CCU STEPDOWN

## 2020-07-08 PROCEDURE — 74011250637 HC RX REV CODE- 250/637: Performed by: ANESTHESIOLOGY

## 2020-07-08 PROCEDURE — 97112 NEUROMUSCULAR REEDUCATION: CPT

## 2020-07-08 PROCEDURE — 74011250637 HC RX REV CODE- 250/637: Performed by: NURSE PRACTITIONER

## 2020-07-08 PROCEDURE — 77030038269 HC DRN EXT URIN PURWCK BARD -A

## 2020-07-08 PROCEDURE — 74011250636 HC RX REV CODE- 250/636: Performed by: ANESTHESIOLOGY

## 2020-07-08 PROCEDURE — 97530 THERAPEUTIC ACTIVITIES: CPT

## 2020-07-08 RX ORDER — AMLODIPINE BESYLATE 5 MG/1
5 TABLET ORAL DAILY
Status: DISCONTINUED | OUTPATIENT
Start: 2020-07-08 | End: 2020-07-10 | Stop reason: HOSPADM

## 2020-07-08 RX ADMIN — HYDRALAZINE HYDROCHLORIDE 100 MG: 50 TABLET, FILM COATED ORAL at 21:08

## 2020-07-08 RX ADMIN — POTASSIUM BICARBONATE 40 MEQ: 782 TABLET, EFFERVESCENT ORAL at 08:39

## 2020-07-08 RX ADMIN — LEVETIRACETAM 1000 MG: 100 SOLUTION ORAL at 08:40

## 2020-07-08 RX ADMIN — Medication 1 PACKET: at 17:00

## 2020-07-08 RX ADMIN — ENOXAPARIN SODIUM 40 MG: 40 INJECTION SUBCUTANEOUS at 13:10

## 2020-07-08 RX ADMIN — LACOSAMIDE 150 MG: 50 TABLET, FILM COATED ORAL at 21:08

## 2020-07-08 RX ADMIN — ASPIRIN 81 MG CHEWABLE TABLET 81 MG: 81 TABLET CHEWABLE at 08:39

## 2020-07-08 RX ADMIN — AMLODIPINE BESYLATE 5 MG: 5 TABLET ORAL at 08:46

## 2020-07-08 RX ADMIN — Medication 2 DROP: at 05:23

## 2020-07-08 RX ADMIN — LEVETIRACETAM 1000 MG: 100 SOLUTION ORAL at 21:09

## 2020-07-08 RX ADMIN — CASTOR OIL AND BALSAM, PERU: 788; 87 OINTMENT TOPICAL at 08:46

## 2020-07-08 RX ADMIN — HYDRALAZINE HYDROCHLORIDE 100 MG: 50 TABLET, FILM COATED ORAL at 08:46

## 2020-07-08 RX ADMIN — Medication 2 DROP: at 13:10

## 2020-07-08 RX ADMIN — POTASSIUM BICARBONATE 40 MEQ: 782 TABLET, EFFERVESCENT ORAL at 17:00

## 2020-07-08 RX ADMIN — Medication 1 PACKET: at 08:39

## 2020-07-08 RX ADMIN — CASTOR OIL AND BALSAM, PERU: 788; 87 OINTMENT TOPICAL at 17:00

## 2020-07-08 RX ADMIN — LISINOPRIL 40 MG: 20 TABLET ORAL at 08:46

## 2020-07-08 RX ADMIN — LACOSAMIDE 150 MG: 50 TABLET, FILM COATED ORAL at 08:45

## 2020-07-08 RX ADMIN — CHLORHEXIDINE GLUCONATE 15 ML: 0.12 RINSE ORAL at 08:44

## 2020-07-08 RX ADMIN — HYDRALAZINE HYDROCHLORIDE 100 MG: 50 TABLET, FILM COATED ORAL at 17:00

## 2020-07-08 NOTE — PROGRESS NOTES
Problem: Falls - Risk of  Goal: *Absence of Falls  Description: Document Mirza Stevens Fall Risk and appropriate interventions in the flowsheet. Outcome: Progressing Towards Goal  Note: Fall Risk Interventions:  Mobility Interventions: Assess mobility with egress test, Bed/chair exit alarm, Communicate number of staff needed for ambulation/transfer, Patient to call before getting OOB, PT Consult for assist device competence, PT Consult for mobility concerns, Strengthening exercises (ROM-active/passive), Utilize walker, cane, or other assistive device    Mentation Interventions: Adequate sleep, hydration, pain control, Bed/chair exit alarm, Door open when patient unattended, Evaluate medications/consider consulting pharmacy, Increase mobility, More frequent rounding, Reorient patient, Room close to nurse's station, Self-releasing belt    Medication Interventions: Assess postural VS orthostatic hypotension, Bed/chair exit alarm, Patient to call before getting OOB, Teach patient to arise slowly, Utilize gait belt for transfers/ambulation    Elimination Interventions: Bed/chair exit alarm, Call light in reach, Elevated toilet seat, Patient to call for help with toileting needs, Stay With Me (per policy), Toilet paper/wipes in reach, Toileting schedule/hourly rounds    History of Falls Interventions: Bed/chair exit alarm, Consult care management for discharge planning, Door open when patient unattended, Evaluate medications/consider consulting pharmacy, Investigate reason for fall, Room close to nurse's station, Utilize gait belt for transfer/ambulation, Assess for delayed presentation/identification of injury for 48 hrs (comment for end date)         Problem: Pressure Injury - Risk of  Goal: *Prevention of pressure injury  Description: Document Sriram Scale and appropriate interventions in the flowsheet.     Offload heels  Turn approximately every 2 hours  Venelex ointment   Outcome: Progressing Towards Goal  Note: Pressure Injury Interventions:  Sensory Interventions: Assess changes in LOC, Avoid rigorous massage over bony prominences, Check visual cues for pain, Discuss PT/OT consult with provider, Keep linens dry and wrinkle-free, Maintain/enhance activity level, Minimize linen layers, Monitor skin under medical devices, Turn and reposition approx. every two hours (pillows and wedges if needed)    Moisture Interventions: Absorbent underpads, Check for incontinence Q2 hours and as needed, Internal/External urinary devices, Limit adult briefs, Maintain skin hydration (lotion/cream), Minimize layers, Moisture barrier    Activity Interventions: Assess need for specialty bed, Increase time out of bed, Pressure redistribution bed/mattress(bed type), PT/OT evaluation    Mobility Interventions: Assess need for specialty bed, HOB 30 degrees or less, Pressure redistribution bed/mattress (bed type), PT/OT evaluation, Turn and reposition approx.  every two hours(pillow and wedges)    Nutrition Interventions: Document food/fluid/supplement intake, Offer support with meals,snacks and hydration    Friction and Shear Interventions: Apply protective barrier, creams and emollients, Lift sheet, Lift team/patient mobility team, Minimize layers, Transferring/repositioning devices                Problem: Nutrition Deficit  Goal: *Optimize nutritional status  Outcome: Progressing Towards Goal     Problem: Hemorrhagic Stroke: Day 5 through Discharge  Goal: Activity/Safety  Outcome: Progressing Towards Goal  Goal: Diagnostic Test/Procedures  Outcome: Progressing Towards Goal  Goal: Medications  Outcome: Progressing Towards Goal  Goal: Respiratory  Outcome: Progressing Towards Goal  Goal: Treatments/Interventions/Procedures  Outcome: Progressing Towards Goal  Goal: Psychosocial  Outcome: Progressing Towards Goal  Goal: *Hemodynamically stable  Outcome: Progressing Towards Goal  Goal: *Absence of aspiration  Outcome: Progressing Towards Goal  Goal: *Tolerating diet  Outcome: Progressing Towards Goal  Goal: *Progressive mobility and function  Outcome: Progressing Towards Goal  Goal: *Rehabilitation readiness  Outcome: Progressing Towards Goal

## 2020-07-08 NOTE — PROGRESS NOTES
Problem: Falls - Risk of  Goal: *Absence of Falls  Description: Document Gosia Sandoval Fall Risk and appropriate interventions in the flowsheet.   Outcome: Progressing Towards Goal  Note: Fall Risk Interventions:  Mobility Interventions: Assess mobility with egress test, Bed/chair exit alarm, Communicate number of staff needed for ambulation/transfer, Patient to call before getting OOB, PT Consult for mobility concerns, PT Consult for assist device competence, Utilize gait belt for transfers/ambulation, Strengthening exercises (ROM-active/passive), Utilize walker, cane, or other assistive device    Mentation Interventions: Adequate sleep, hydration, pain control, Bed/chair exit alarm, Door open when patient unattended, Evaluate medications/consider consulting pharmacy, Increase mobility, More frequent rounding, Reorient patient, Room close to nurse's station, Toileting rounds, Update white board, Gait belt with transfers/ambulation    Medication Interventions: Bed/chair exit alarm, Evaluate medications/consider consulting pharmacy, Patient to call before getting OOB, Teach patient to arise slowly, Utilize gait belt for transfers/ambulation    Elimination Interventions: Bed/chair exit alarm, Call light in reach, Patient to call for help with toileting needs, Toilet paper/wipes in reach, Toileting schedule/hourly rounds    History of Falls Interventions: Bed/chair exit alarm, Consult care management for discharge planning, Door open when patient unattended, Evaluate medications/consider consulting pharmacy, Investigate reason for fall, Room close to nurse's station, Utilize gait belt for transfer/ambulation

## 2020-07-08 NOTE — PROGRESS NOTES
Problem: Self Care Deficits Care Plan (Adult)  Goal: *Acute Goals and Plan of Care (Insert Text)  Description:   FUNCTIONAL STATUS PRIOR TO ADMISSION: Patient unable to provide history at evaluation. Per chart, patient was independent and working full-time at [employer]. HOME SUPPORT: Per chart, patient lived with her 15 y/o son and a friend. Occupational Therapy Goals  Goals reviewed and continued: 6/29/2020, continued 7/6/2020  Initiated 6/22/2020  1. Patient will perform grooming with maximal assistance within 7 day(s). 2.  Patient will follow ~25% simple commands with max multimodal cuing in preparation for functional tasks within 7 days. 3.  Patient will reach for ADL object with each UE with moderate assistance in preparation for functional reach within 7 days. 4.  Patient will perform composite grasp on ADL object with each hand in preparation for functional grasp within 7 days. 5.  Patient will participate in upper extremity therapeutic exercise/activities with maximal assistance for 5 minutes within 7 day(s). Outcome: Progressing Towards Goal     Problem: Patient Education: Go to Patient Education Activity  Goal: Patient/Family Education  Outcome: Progressing Towards Goal   OCCUPATIONAL THERAPY TREATMENT  Patient: Humphrey Alva (48 y.o. female)  Date: 7/8/2020  Diagnosis: SAH (subarachnoid hemorrhage) ([facility] [I60.9]   <principal problem not specified>       Precautions: Aspiration  Chart, occupational therapy assessment, plan of care, and goals were reviewed. ASSESSMENT  Patient continues with skilled OT services and is progressing towards goals. Participation impacted by impaired sitting and standing balance, impaired reach to LEs, impaired functional use of right UE, increased tone of right UE, particularly at PIP joints (passively able to extend), edema right UE.  Patient seen in co treat with Physical Therapy to maximize focus on neuro plasticity, balance, and functional use of right UE. Current Level of Function Impacting Discharge (ADLs): Total assist for self care    Other factors to consider for discharge: none         PLAN :  Patient continues to benefit from skilled intervention to address the above impairments. Continue treatment per established plan of care. to address goals. Recommend with staff: encourage participation in self care in long sit in bed    Recommend next OT session: transfer to chair/BSC, bathing in long sit versus EOB    Recommendation for discharge: (in order for the patient to meet his/her long term goals)  Therapy 3 hours per day 5-7 days per week    This discharge recommendation:  Has been made in collaboration with the attending provider and/or case management    IF patient discharges home will need the following DME: bedside commode, hospital bed, and wheelchair       SUBJECTIVE:   Patient stated Help me [employer] and [employer].     OBJECTIVE DATA SUMMARY:   Cognitive/Behavioral Status:  Neurologic State: Lethargic;Eyes open to stimulus  Orientation Level: (responds to her name)  Cognition: Decreased attention/concentration;Decreased command following; Impulsive  Perception: Cues to maintain midline in sitting;Verbal;Visual;Tactile  Perseveration: No perseveration noted  Safety/Judgement: Decreased awareness of environment;Decreased insight into deficits    Functional Mobility and Transfers for ADLs:  Bed Mobility:  Supine to Sit: Assist x2;Total assistance  Sit to Supine: Maximum assistance;Assist x2  Scooting: Total assistance    Transfers:        Bed to Chair: Moderate assistance;Assist x2    Balance:  Sitting: Impaired; With support  Sitting - Static: Poor (constant support)  Sitting - Dynamic: Poor (constant support)  Standing: Impaired; With support  Standing - Static: Fair  Standing - Dynamic : Poor    ADL Intervention:       Grooming  Position Performed: Seated edge of bed  Washing Face: Maximum assistance  Cues: Physical assistance;Verbal cues provided;Visual cues provided         Lower Body Bathing  Bathing Assistance: Total assistance(dependent)(inferred from mobility)  Lower Body : Total assistance (dependent)  Position Performed: Seated edge of bed         Lower Body Dressing Assistance  Dressing Assistance: Total assistance(dependent)(inferred from mobility)  Leg Crossed Method Used: No  Position Performed: Seated edge of bed    Toileting  Bladder Hygiene: Total assistance (dependent)(Pure [place])    Cognitive Retraining  Orientation Retraining: Reorienting;Place;Time  Organizing/Sequencing: Breaking task down  Attention to Task: Distractibility  Safety/Judgement: Decreased awareness of environment;Decreased insight into deficits  Cues: Verbal cues provided;Visual cues provided; Tactile cues provided    Therapeutic Exercises:   Right UE PROM. Hand roll placed to support finger extension at rest with right UE also supported on pillow with hand higher than elbow. Right UE used in weight bearing in sitting with max manual cues. Activity Tolerance:   Fair  Please refer to the flowsheet for vital signs taken during this treatment. After treatment patient left in no apparent distress:   Supine in bed, Call bell within reach, and Bed / chair alarm activated    COMMUNICATION/COLLABORATION:   The patients plan of care was discussed with: Physical therapist and Registered nurse.      MARTIN Ma  Time Calculation: 36 mins

## 2020-07-08 NOTE — ROUTINE PROCESS
Bedside shift change report given to Daphine Apgar (oncoming nurse) by Kodak Garcia RN (offgoing nurse). Report included the following information SBAR, Cardiac Rhythm sinus merlyn and Dual Neuro Assessment.

## 2020-07-08 NOTE — PROGRESS NOTES
Problem: Mobility Impaired (Adult and Pediatric)  Goal: *Acute Goals and Plan of Care (Insert Text)  Description:   FUNCTIONAL STATUS PRIOR TO ADMISSION: Patient was independent and active without use of DME per chart review. HOME SUPPORT PRIOR TO ADMISSION: Details unknown - pt is not able to provide info at this time. Physical Therapy Goals  Initiated 7/6/2020  1. Patient will move from supine to sit and sit to supine  and scoot up and down in bed with minimal assistance/contact guard assist within 7 day(s). 2.  Patient will transfer from bed to chair and chair to bed with moderate assistance  using the least restrictive device within 7 day(s). 3.  Patient will perform sit to stand with moderate assistance  within 7 day(s). 4.  Patient will ambulate with maximal assistance for 5 feet with the least restrictive device within 7 day(s). 5.  Patient will improve Garza Balance score by 7 points within 7 days. Initiated 6/22/2020, appropriate for carryover 06/29/2020  1. Patient will move from supine to sit and sit to supine , scoot up and down and roll side to side in bed with maximal assistance within 7 day(s). 2.  Patient will tolerate bed in full chair position x30min for improved tolerance to upright and pulmonary toileting within 7 days. 3.  Patient will participate in B TAMELA QURESHI/PROM program within 7 days. Outcome: Progressing Towards Goal    PHYSICAL THERAPY TREATMENT  Patient: Lauren Diaz (48 y.o. female)  Date: 7/8/2020  Diagnosis: SAH (subarachnoid hemorrhage) ([facility] [address] [I60.9]   <principal problem not specified>       Precautions: Aspiration  Chart, physical therapy assessment, plan of care and goals were reviewed.     ASSESSMENT  Patient continues with skilled PT services and is marginally progressing towards goals - remains most limited by global aphasia and decreased command following, global weakness (R<L), global edema, impaired balance, and decreased tolerance to sustained activity leading to increased dependency and falls risk from baseline level. Required increased assist, time, and multi modal cues today to engage in session, however with persistence and initiation she did engage. She was able to mobilize to EOB with max A x2 and initially required max A to maintain static sitting balance in midline - improved with time to occasional assist, mostly to prevent impulsively returning self to supine. Eventually progressed to standing trials x2 reps with mod A x2. Tolerated several bouts of static stance for up to 30sec prior to impulsively returning self to sitting. Independently initiated to take small steps to adjust COLLETTE when standing. Assisted back to bed and repositioned in modified chair position at end of session, NAD. PRAFO placed on R foot to prevent PF contracture formation. Continue to recommend discharge to acute IPR once medically cleared as she is now showing progress and is far below her indep baseline level. .     Current Level of Function Impacting Discharge (mobility/balance): overall max A x2; unable to ambulate; aphasic; quick fatigue     Other factors to consider for discharge: Indep at baseline; high falls risk          PLAN :  Patient continues to benefit from skilled intervention to address the above impairments. Continue treatment per established plan of care. to address goals. Recommendation for discharge: (in order for the patient to meet his/her long term goals)  Therapy 3 hours per day 5-7 days per week    This discharge recommendation:  A follow-up discussion with the attending provider and/or case management is planned    IF patient discharges home will need the following DME: to be determined (TBD)       SUBJECTIVE:   Patient stated Move.     OBJECTIVE DATA SUMMARY:   Critical Behavior:  Neurologic State: Lethargic, Eyes open to stimulus  Orientation Level: (responds to her name)  Cognition: Decreased attention/concentration, Decreased command following, Impulsive  Safety/Judgement: Decreased awareness of environment, Decreased insight into deficits  Functional Mobility Training:  Bed Mobility:     Supine to Sit: Assist x2;Total assistance  Sit to Supine: Maximum assistance  Scooting: Total assistance      Transfers:  Sit to Stand: Assist x2; Moderate assistance  Stand to Sit: Assist x2; Moderate assistance  Bed to Chair: Moderate assistance;Assist x2    Balance:  Sitting: Impaired; With support  Sitting - Static: Fair (occasional)  Sitting - Dynamic: Poor (constant support)  Standing: Impaired; With support  Standing - Static: Fair;Constant support  Standing - Dynamic : Poor;Constant support    Activity Tolerance:   Good and SpO2 stable on RA  Please refer to the flowsheet for vital signs taken during this treatment. After treatment patient left in no apparent distress:   Supine in bed, Call bell within reach, Bed / chair alarm activated, and Side rails x 3    COMMUNICATION/COLLABORATION:   The patients plan of care was discussed with: Occupational therapy assistant and Registered nurse.      Dutch Matthews, PT, DPT   Time Calculation: 37 mins

## 2020-07-08 NOTE — PROGRESS NOTES
Problem: Hemorrhagic Stroke: Day 5 through Discharge  Goal: Activity/Safety  Outcome: Progressing Towards Goal  Goal: Consults, if ordered  Outcome: Progressing Towards Goal  Goal: Diagnostic Test/Procedures  Outcome: Progressing Towards Goal  Goal: Nutrition/Diet  Outcome: Progressing Towards Goal  Goal: Medications  Outcome: Progressing Towards Goal  Goal: Respiratory  Outcome: Progressing Towards Goal  Goal: Treatments/Interventions/Procedures  Outcome: Progressing Towards Goal  Goal: Psychosocial  Outcome: Progressing Towards Goal  Goal: *Hemodynamically stable  Outcome: Progressing Towards Goal  Goal: *Verbalizes anxiety and depression are reduced or absent  Outcome: Progressing Towards Goal  Goal: *Absence of aspiration  Outcome: Progressing Towards Goal  Goal: *Absence of signs and symptoms of DVT  Outcome: Progressing Towards Goal  Goal: *Optimal pain control at patient's stated goal  Outcome: Progressing Towards Goal  Goal: *Tolerating diet  Outcome: Progressing Towards Goal  Goal: *Progressive mobility and function  Outcome: Progressing Towards Goal  Goal: *Rehabilitation readiness  Outcome: Progressing Towards Goal     Problem: Hemorrhagic Stroke: Discharge Outcomes  Goal: *Verbalizes anxiety and depression are reduced or absent  Outcome: Progressing Towards Goal  Goal: *Verbalize understanding of risk factor modification(Stroke Metric)  Outcome: Progressing Towards Goal  Goal: *Optimal pain control at patient's stated goal  Outcome: Progressing Towards Goal  Goal: *Hemodynamically stable  Outcome: Progressing Towards Goal  Goal: *Absence of aspiration pneumonia  Outcome: Progressing Towards Goal  Goal: *Aware of needed dietary changes  Outcome: Progressing Towards Goal  Goal: *Verbalizes understanding and describes medication purposes and frequencies  Outcome: Progressing Towards Goal  Goal: *Tolerating diet  Outcome: Progressing Towards Goal  Goal: *Absence of signs and symptoms of DVT  Outcome: Progressing Towards Goal  Goal: *Absence of aspiration  Outcome: Progressing Towards Goal  Goal: *Progressive mobility and function  Outcome: Progressing Towards Goal  Goal: *Home safety concerns addressed  Outcome: Progressing Towards Goal

## 2020-07-08 NOTE — PROGRESS NOTES
Music Therapy Assessment  [address] [id number]     1970  48 y.o.  female    Patient Telephone Number: [phone] (home)   [place] Affiliation: [facility]   Language: English   Patient Active Problem List    Diagnosis Date Noted    Respiratory failure ([facility] 06/15/2020    Hypoxic ischemic encephalopathy (HIE), unspecified severity 06/15/2020    SAH (subarachnoid hemorrhage) ([facility] 06/04/2020    Subarachnoid hemorrhage from middle cerebral artery aneurysm, left ([facility] 06/04/2020    RIGHT middle cerebral artery aneurysm 06/04/2020    Cerebral vasospasm 06/04/2020        Date: 7/8/2020            Total Time (in minutes): 5          [facility] 6S NEURO-SCI TELE    Mental Status:   [x] Alert-but drowsy [  ] Claven Madelyn [  ]  Confused  [  ] Minimally responsive  [  ] Sleeping    Communication Status: [  ] Impaired Speech [  ] Nonverbal -N/A    Physical Status:   [x] Oxygen in use  [  ] Hard of Hearing [  ] Vision Impaired  [  ] Ambulatory  [  ] Ambulatory with assistance [  ] Non-ambulatory    Music Preferences, Background: Traditional [place], R&B.     Clinical Problem to be addressed: Spiritual support, support pt/family coping. Goal(s) met in session: N/A: Please see Session Observations below.   Physical/Pain management (Scale of 1-10):    Pre-session rating ___________    Post-session rating __________   [  ] Increased relaxation   [  ] Affected breathing patterns  [  ] Decreased muscle tension   [  ] Decreased agitation  [  ] Affected heart rate    [  ] Increased alertness     Emotional/Psychological:  [  ] Increased self-expression   [  ] Decreased aggressive behavior   [  ] Decreased feelings of stress  [  ] Discussed healthy coping skills     [  ] Improved mood    [  ] Decreased withdrawn behavior     Social:  [  ] Decreased feelings of isolation/loneliness [  ] Positive social interaction   [  ] Provided support and/or comfort for family/friends    Spiritual:  [  ] Spiritual support    [  ] Expressed peace  [  ] Expressed vane    [  ] Discussed beliefs    Techniques Utilized (Check all that apply): N/A: Please see Session Observations below. [  ] Procedural support MT [  ] Music for relaxation [  ] Patient preferred music  [  ] Rylee analysis  [  ] Song choice  [  ] Music for validation  [  ] Entrainment  [  ] Movement to music [  ] Guided visualization  [  ] Huyen Massiel  [  ] Patient instrument playing [  ] Rashid Ceballos writing  [  ] Jennifer Mis along   [  ] [place]  [  ] Sensory stimulation  [  ] Active Listening  [  ] Music for spiritual support [  ] Making of CDs as gifts    Session Observations:  Referral from Seun Chandler, Palliative , and f/up visit. Patient (pt) was alert but drowsy lying in bed. Her mother was at bedside. This music therapist (MT) greeted them, and then the pt's mother's cell phone rang. This was a FaceTime call from a loved one who wanted to talk to them. Pt's mother requested that the MT follow up again at another time. MT expressed understanding and will follow as able.     Sandria Landau, MT-BC (Music Therapist-Board Certified)  Spiritual Care Department  Referral-based service

## 2020-07-08 NOTE — PROGRESS NOTES
SLP Contact Note    Attempted to see patient for swallow and language treatment. Pt without any bolus acceptance, which appeared to be behavioral.  Therefore, will defer treatment for now.        Thank you,  TEVIN HelmsEd, [address] [facility], [place], operated by [facility]  Speech-Language Pathologist

## 2020-07-08 NOTE — PROGRESS NOTES
TRANSITION OF CARE PLAN    RUR 17%- MODERATE     DISPOSITION--Accepted to [facility]. Requires insurance authorization. COVID-19 test pending     Spoke with Paulette Deluca [phone] to check referral status. Patient accepted to [facility] pending insurance authorization. Facility initiated insurance authorization this morning. Likely to have authorization later today or tomorrow. Notified patient's mother, Noemy Alvarado [phone] of discharge plan. Family is agreeable. CM to follow.  JUDE Manuel,CRM

## 2020-07-08 NOTE — PROGRESS NOTES
Bedside shift change report given to Pipo Dewitt RN (oncoming nurse) by Silvia Smith RN (offgoing nurse). Report included the following information SBAR, Kardex, ED Summary, Procedure Summary, Intake/Output, Accordion, Recent Results, Cardiac Rhythm Sinus Stephany Barley and Dual Neuro Assessment.

## 2020-07-08 NOTE — PROGRESS NOTES
[address] [facility] Adult  Hospitalist Group                                                                                          Hospitalist Progress Note  Jethro Decker DO  Answering service: [phone] from in house phone        Date of Service:  2020  NAME:  Prashant Lai  :  1970  MRN:  [id number]    Admission Summary:     Prashant Lai is a 80-year-old female with a past medical history of HTN. According to patient's mother, the patient began having a headache on 20 and had been taking BC Powder with aspirin in it for the headache. She recently stopped taking her BP medications. According to her mother she does not smoke, drink alcohol or use recreational drugs. The patient was brought to the [facility] via EMS on   after her coworkers found her unresponsive outside of her place of work. 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 On arrival to the ER she was found to be seizing. A stat CT of her head was performed which showed extensive subarachnoid hemorrhage. She was extremely hypertensive in the 240's and was given labetalol and started on a Cardene drip. She was emergently intubated and given Keppra and Ativan as well. Neurosurgery and Neurointerventional Surgery were consulted and the patient was transferred to the [facility] for a higher level of care. On arrival to [facility] a CTA of her head and neck was obtained which showed a 4.4 x 4.1 mm right MCA trifurcation aneurysm and a 2.2 x 3.1 mm left MCA trifurcation aneurysm and possible less than 2 mm ACOM aneurysms. Extensive lung infiltrates were also noted. Patient was taken for cerebral angiogram the morning of 20 and coil embolization of the left MCA aneurysm was completed. The right MCA aneurysm had a very wide neck incorporating both M2s. It could not be treated endovascularly without stents.  May need to be clipped by Neurosurgery.      Procedures: 20 and coil embolization of the left MCA aneurysm was completed s/p cerebral angiogram for right PANCHITO A1 angioplasty for vasospasm, transarterial infusion of verapamil on 6/9,  S/p angiogram on 6/10 for left ICA/MCA angioplasty and transarterial infusion of verapamil for vasospasm treatment, s/p angiogram on 6/11 and 6/13 for transarterial infusion of verapamil for vasospasm treatment. Tracheostomy placement  6/16 by Dr. Jl Lezama in thoracic surgery. PEG placement. Interval history / Subjective: Follow up [address] Patient seen and examined. Speaking more words today. Was able to tell me my hand was \"cold\" when asked hot versus cold. Slow improvement and making daily progress. Hypotensive and adjusted medications     Assessment & Plan:     #. Cerebral Aneurysms. #. SAH due to ruptured cerebral aneurysm   #. Hypertension  -s/p cerebral angiogram with coil embolization of left MCA bifurcation aneurysm on 6/5, patient has 5X3 right MCA bifurcation aneurysm with very wide neck which will need to be treated with stents vs. Clipping  -s/p cardene. Continues norvasc, hydralazine, lisinopril, goal SBP <160  -seizure prophylaxis keppra  -patient open her right eye to voice, non verbal, doesn't follows command, moves lower extremities spontaneously   -improved vasospasm since 6/20- seen by neurosurgeon      #. Bradycardia: monitor, hold carvedilol   #. Seizure: 2nd to above - stable - keppra, VIMPAT-seizure precaution- Neurologist  #. CVAs - aspirin - multiple ischemic infarct -MRI on 6/16- PT/OT-neurology evaluated  #. Respiratory failure secondary to above, pulmonary edema and possible pneumonia- resolved     #Dysphagia secondary to stroke and SAH-s/p PEG tube  -MBS 7/2. Diet initiated. Holding tube feeds     #Iron deficiency anemia -noted fibroid  -Hgb trending down, Hgb 7.8, monitor H/H    -iron sucrose 200 mg iv x 3 doses     #Peripheral edema multifactorial- low albumin 2.6  -Echo normal cavity size and systolic function (ejection fraction normal).  Mild concentric hypertrophy. Estimated left ventricular ejection fraction is 60 - 65%. No regional wall motion abnormality noted.     Obesity -diet control -A1c 5.7    Code status: Full Code   DVT prophylaxis: Lovenox  Care Plan discussed with: Patient/Family and Nurse  Anticipated Disposition: Inpatient rehab     Hospital Problems  Date Reviewed: 6/10/2020          Codes Class Noted POA    Respiratory failure ([facility] [address] ICD-10-CM: J96.90  ICD-9-CM: 518.81  6/15/2020 Yes        Hypoxic ischemic encephalopathy (HIE), unspecified severity ICD-10-CM: P91.60  ICD-9-CM: 768.70  6/15/2020 Yes        SAH (subarachnoid hemorrhage) ([facility] [address] ICD-10-CM: I60.9  ICD-9-CM: 797  6/4/2020 Yes        Subarachnoid hemorrhage from middle cerebral artery aneurysm, left ([address] ICD-10-CM: Z16.82  ICD-9-CM: 968  6/4/2020 Yes        RIGHT middle cerebral artery aneurysm ICD-10-CM: I67.1  ICD-9-CM: 437.3  6/4/2020 Yes        Cerebral vasospasm ICD-10-CM: M73.143  ICD-9-CM: 435.9  6/4/2020 Yes            ROS not obtainable 2n2 pt condition       Vital Signs:    Last 24hrs VS reviewed since prior progress note. Most recent are:  Visit Vitals  /57   Pulse (!) 52   Temp 97.9 °F (36.6 °C)   Resp 14   Ht 5' 4\" (1.626 m)   Wt 90 kg (198 lb 6.6 oz)   SpO2 100%   BMI 34.06 kg/m²         Intake/Output Summary (Last 24 hours) at 7/8/2020 1247  Last data filed at 7/8/2020 0850  Gross per 24 hour   Intake    Output 600 ml   Net -600 ml        Physical Examination:             Constitutional:  No acute distress, responds intermittently    ENT:  Trach removed, left eye closed   Resp:  good AE, no wheezes. Clear bilateral        GI:  Soft, non distended, non tender.  obese    Musculoskeletal:  No edema    Neurologic:   Moves extremities, speaks some words, intermittently answers questions     Skin:  Good turgor, no rashes or ulcers       Data Review:    Review and/or order of clinical lab test  Review and/or order of tests in the radiology section of CPT  Review and/or order of tests in the medicine section of [facility]      Labs:     No results for input(s): WBC, HGB, HCT, PLT, HGBEXT, HCTEXT, PLTEXT, HGBEXT, HCTEXT, PLTEXT in the last 72 hours. No results for input(s): NA, K, CL, CO2, BUN, CREA, GLU, CA, MG, PHOS, URICA in the last 72 hours. No results for input(s): ALT, AP, TBIL, TBILI, TP, ALB, GLOB, GGT, AML, LPSE in the last 72 hours. No lab exists for component: SGOT, GPT, AMYP, HLPSE  No results for input(s): INR, PTP, APTT, INREXT, INREXT in the last 72 hours. No results for input(s): FE, TIBC, PSAT, FERR in the last 72 hours. No results found for: FOL, RBCF   No results for input(s): PH, PCO2, PO2 in the last 72 hours. No results for input(s): CPK, CKNDX, TROIQ in the last 72 hours.     No lab exists for component: CPKMB  Lab Results   Component Value Date/Time    Cholesterol, total 90 06/05/2020 03:34 AM    HDL Cholesterol 64 06/05/2020 03:34 AM    LDL, calculated 7.8 06/05/2020 03:34 AM    Triglyceride 91 06/05/2020 03:34 AM    CHOL/HDL Ratio 1.4 06/05/2020 03:34 AM     Lab Results   Component Value Date/Time    Glucose (POC) 107 (H) 07/02/2020 05:42 PM    Glucose (POC) 94 07/02/2020 11:42 AM    Glucose (POC) 157 (H) 07/02/2020 06:58 AM    Glucose (POC) 121 (H) 06/14/2020 11:22 PM    Glucose (POC) 118 (H) 06/14/2020 05:07 PM     Lab Results   Component Value Date/Time    Specific gravity 1.015 06/15/2020 05:45 AM         Medications Reviewed:     Current Facility-Administered Medications   Medication Dose Route Frequency    amLODIPine (NORVASC) tablet 5 mg  5 mg Per NG tube DAILY    [Held by provider] carvediloL (COREG) tablet 25 mg  25 mg Oral BID WITH MEALS    lisinopriL (PRINIVIL, ZESTRIL) tablet 40 mg  40 mg Oral DAILY    [Held by provider] hydroCHLOROthiazide (HYDRODIURIL) tablet 25 mg  25 mg Oral DAILY    albuterol-ipratropium (DUO-NEB) 2.5 MG-0.5 MG/3 ML  3 mL Nebulization BID PRN    guar gum (BENEFIBER) packet 1 Packet  4 g Oral TID    labetaloL (NORMODYNE;TRANDATE) injection 20 mg  20 mg IntraVENous Q4H PRN    0.9% sodium chloride infusion 250 mL  250 mL IntraVENous PRN    hydrALAZINE (APRESOLINE) tablet 100 mg  100 mg Oral TID    fentaNYL citrate (PF) injection 50 mcg  50 mcg IntraVENous Q2H PRN    hydrALAZINE (APRESOLINE) 20 mg/mL injection 10 mg  10 mg IntraVENous Q2H PRN    chlorhexidine (ORAL CARE KIT) 0.12 % mouthwash 15 mL  15 mL Oral Q12H    potassium bicarb-citric acid (EFFER-K) tablet 40 mEq  40 mEq Oral BID    lacosamide (VIMPAT) tablet 150 mg  150 mg Per NG tube Q12H    balsam peru-castor oiL (VENELEX) ointment   Topical BID    aspirin chewable tablet 81 mg  81 mg Per NG tube DAILY    enoxaparin (LOVENOX) injection 40 mg  40 mg SubCUTAneous Q24H    levETIRAcetam (KEPPRA) oral solution 1,000 mg  1,000 mg Per NG tube Q12H    glycopyrrolate (ROBINUL) injection 0.2 mg  0.2 mg IntraMUSCular TID PRN    acetaminophen (TYLENOL) solution 650 mg  650 mg Per NG tube Q4H PRN    glucose chewable tablet 16 g  4 Tab Oral PRN    glucagon (GLUCAGEN) injection 1 mg  1 mg IntraMUSCular PRN    dextrose 10% infusion 0-250 mL  0-250 mL IntraVENous PRN    albuterol (PROVENTIL VENTOLIN) nebulizer solution 2.5 mg  2.5 mg Nebulization Q6H PRN    polyvinyl alcohol-povidone (NATURAL TEARS) 0.5-0.6 % ophthalmic solution 2 Drop  2 Drop Both Eyes Q8H    bisacodyL (DULCOLAX) suppository 10 mg  10 mg Rectal DAILY PRN    ondansetron (ZOFRAN) injection 4 mg  4 mg IntraVENous Q4H PRN    docusate (COLACE) 50 mg/5 mL oral liquid 100 mg  100 mg Oral BID PRN     ______________________________________________________________________  EXPECTED LENGTH OF STAY: 22d 21h  ACTUAL LENGTH OF STAY:          Burak Haq 8, DO

## 2020-07-09 LAB
BASOPHILS # BLD: 0.1 K/UL (ref 0–0.1)
BASOPHILS NFR BLD: 1 % (ref 0–1)
DIFFERENTIAL METHOD BLD: ABNORMAL
EOSINOPHIL # BLD: 0.1 K/UL (ref 0–0.4)
EOSINOPHIL NFR BLD: 2 % (ref 0–7)
ERYTHROCYTE [DISTWIDTH] IN BLOOD BY AUTOMATED COUNT: 22.4 % (ref 11.5–14.5)
HCT VFR BLD AUTO: 38.6 % (ref 35–47)
HGB BLD-MCNC: 11.8 G/DL (ref 11.5–16)
IMM GRANULOCYTES # BLD AUTO: 0 K/UL (ref 0–0.04)
IMM GRANULOCYTES NFR BLD AUTO: 0 % (ref 0–0.5)
LYMPHOCYTES # BLD: 1.2 K/UL (ref 0.8–3.5)
LYMPHOCYTES NFR BLD: 21 % (ref 12–49)
MCH RBC QN AUTO: 24 PG (ref 26–34)
MCHC RBC AUTO-ENTMCNC: 30.6 G/DL (ref 30–36.5)
MCV RBC AUTO: 78.5 FL (ref 80–99)
MONOCYTES # BLD: 0.6 K/UL (ref 0–1)
MONOCYTES NFR BLD: 11 % (ref 5–13)
NEUTS SEG # BLD: 3.9 K/UL (ref 1.8–8)
NEUTS SEG NFR BLD: 65 % (ref 32–75)
NRBC # BLD: 0 K/UL (ref 0–0.01)
NRBC BLD-RTO: 0 PER 100 WBC
PLATELET # BLD AUTO: 315 K/UL (ref 150–400)
PMV BLD AUTO: 10.4 FL (ref 8.9–12.9)
RBC # BLD AUTO: 4.92 M/UL (ref 3.8–5.2)
RBC MORPH BLD: ABNORMAL
WBC # BLD AUTO: 5.9 K/UL (ref 3.6–11)

## 2020-07-09 PROCEDURE — 65660000000 HC RM CCU STEPDOWN

## 2020-07-09 PROCEDURE — 74011250637 HC RX REV CODE- 250/637: Performed by: NURSE PRACTITIONER

## 2020-07-09 PROCEDURE — 74011250637 HC RX REV CODE- 250/637: Performed by: PSYCHIATRY & NEUROLOGY

## 2020-07-09 PROCEDURE — 97535 SELF CARE MNGMENT TRAINING: CPT

## 2020-07-09 PROCEDURE — 74011250636 HC RX REV CODE- 250/636: Performed by: ANESTHESIOLOGY

## 2020-07-09 PROCEDURE — 74011250637 HC RX REV CODE- 250/637: Performed by: ANESTHESIOLOGY

## 2020-07-09 PROCEDURE — 74011250637 HC RX REV CODE- 250/637: Performed by: INTERNAL MEDICINE

## 2020-07-09 PROCEDURE — 36415 COLL VENOUS BLD VENIPUNCTURE: CPT

## 2020-07-09 PROCEDURE — 85025 COMPLETE CBC W/AUTO DIFF WBC: CPT

## 2020-07-09 PROCEDURE — 97530 THERAPEUTIC ACTIVITIES: CPT

## 2020-07-09 PROCEDURE — 87635 SARS-COV-2 COVID-19 AMP PRB: CPT

## 2020-07-09 RX ADMIN — CASTOR OIL AND BALSAM, PERU: 788; 87 OINTMENT TOPICAL at 09:58

## 2020-07-09 RX ADMIN — HYDRALAZINE HYDROCHLORIDE 100 MG: 50 TABLET, FILM COATED ORAL at 17:09

## 2020-07-09 RX ADMIN — HYDRALAZINE HYDROCHLORIDE 100 MG: 50 TABLET, FILM COATED ORAL at 09:57

## 2020-07-09 RX ADMIN — LEVETIRACETAM 1000 MG: 100 SOLUTION ORAL at 21:15

## 2020-07-09 RX ADMIN — POTASSIUM BICARBONATE 40 MEQ: 782 TABLET, EFFERVESCENT ORAL at 09:57

## 2020-07-09 RX ADMIN — Medication 2 DROP: at 14:48

## 2020-07-09 RX ADMIN — ENOXAPARIN SODIUM 40 MG: 40 INJECTION SUBCUTANEOUS at 14:48

## 2020-07-09 RX ADMIN — LACOSAMIDE 150 MG: 50 TABLET, FILM COATED ORAL at 21:15

## 2020-07-09 RX ADMIN — Medication 1 PACKET: at 17:09

## 2020-07-09 RX ADMIN — LEVETIRACETAM 1000 MG: 100 SOLUTION ORAL at 09:58

## 2020-07-09 RX ADMIN — Medication 1 PACKET: at 21:21

## 2020-07-09 RX ADMIN — POTASSIUM BICARBONATE 40 MEQ: 782 TABLET, EFFERVESCENT ORAL at 17:09

## 2020-07-09 RX ADMIN — Medication 2 DROP: at 21:22

## 2020-07-09 RX ADMIN — ASPIRIN 81 MG CHEWABLE TABLET 81 MG: 81 TABLET CHEWABLE at 09:56

## 2020-07-09 RX ADMIN — LACOSAMIDE 150 MG: 50 TABLET, FILM COATED ORAL at 09:57

## 2020-07-09 RX ADMIN — CASTOR OIL AND BALSAM, PERU: 788; 87 OINTMENT TOPICAL at 17:10

## 2020-07-09 RX ADMIN — LISINOPRIL 40 MG: 20 TABLET ORAL at 09:57

## 2020-07-09 RX ADMIN — CHLORHEXIDINE GLUCONATE 15 ML: 0.12 RINSE ORAL at 09:00

## 2020-07-09 RX ADMIN — AMLODIPINE BESYLATE 5 MG: 5 TABLET ORAL at 09:57

## 2020-07-09 RX ADMIN — Medication 1 PACKET: at 10:05

## 2020-07-09 RX ADMIN — Medication 2 DROP: at 06:13

## 2020-07-09 NOTE — PROGRESS NOTES
Problem: Falls - Risk of  Goal: *Absence of Falls  Description: Document Nabila Dear Fall Risk and appropriate interventions in the flowsheet. Outcome: Progressing Towards Goal  Note: Fall Risk Interventions:  Mobility Interventions: Bed/chair exit alarm, Communicate number of staff needed for ambulation/transfer, Patient to call before getting OOB, PT Consult for mobility concerns, PT Consult for assist device competence    Mentation Interventions: Bed/chair exit alarm, More frequent rounding, Increase mobility, Door open when patient unattended    Medication Interventions: Patient to call before getting OOB, Bed/chair exit alarm    Elimination Interventions: Bed/chair exit alarm, Call light in reach, Patient to call for help with toileting needs    History of Falls Interventions: Consult care management for discharge planning, Door open when patient unattended         Problem: Pressure Injury - Risk of  Goal: *Prevention of pressure injury  Description: Document Sriram Scale and appropriate interventions in the flowsheet.     Offload heels  Turn approximately every 2 hours  Venelex ointment   Outcome: Progressing Towards Goal  Note: Pressure Injury Interventions:  Sensory Interventions: Assess changes in LOC, Assess need for specialty bed, Avoid rigorous massage over bony prominences    Moisture Interventions: Absorbent underpads, Apply protective barrier, creams and emollients, Assess need for specialty bed    Activity Interventions: Increase time out of bed, Pressure redistribution bed/mattress(bed type), PT/OT evaluation    Mobility Interventions: Assess need for specialty bed, HOB 30 degrees or less    Nutrition Interventions: Document food/fluid/supplement intake    Friction and Shear Interventions: Lift sheet                Problem: Hemorrhagic Stroke: Day 5 through Discharge  Goal: Activity/Safety  Outcome: Progressing Towards Goal  Goal: Consults, if ordered  Outcome: Progressing Towards Goal  Goal: Diagnostic Test/Procedures  Outcome: Progressing Towards Goal  Goal: Medications  Outcome: Progressing Towards Goal  Goal: Respiratory  Outcome: Progressing Towards Goal

## 2020-07-09 NOTE — PROGRESS NOTES
Music Therapy Assessment  [address] [place] [address] “” [place] [id number]     1970  48 y.o.  female    Patient Telephone Number: [phone] (home)   Mormonism Affiliation: Rodger Chu   Language: English   Patient Active Problem List    Diagnosis Date Noted    Respiratory failure ([facility] [address] 06/15/2020    Hypoxic ischemic encephalopathy (HIE), unspecified severity 06/15/2020    SAH (subarachnoid hemorrhage) ([facility] [address] 06/04/2020    Subarachnoid hemorrhage from middle cerebral artery aneurysm, left ([facility] [address] 06/04/2020    RIGHT middle cerebral artery aneurysm 06/04/2020    Cerebral vasospasm 06/04/2020        Date: 7/9/2020            Total Time (in minutes): 10          [facility] 6S NEURO-SCI TELE    Mental Status:   [x] Alert [  ] Ronald Marciano [  ]  Confused  [  ] Minimally responsive  [  ] Sleeping    Communication Status: [x] Impaired Speech [  ] Nonverbal     Physical Status:   [  ] Oxygen in use  [  ] Hard of Hearing [  ] Vision Impaired  [  ] Ambulatory  [  ] Ambulatory with assistance [  ] Non-ambulatory -N/A    Music Preferences, Background: Merari R&BRAYDEN    Clinical Problem addressed: Improve mood, support healthy pt/family coping. Goal(s) met in session: N/A: Please see Session Observations below.   Physical/Pain management (Scale of 1-10):    Pre-session rating ___________    Post-session rating __________   [  ] Increased relaxation   [  ] Affected breathing patterns  [  ] Decreased muscle tension   [  ] Decreased agitation  [  ] Affected heart rate    [  ] Increased alertness     Emotional/Psychological:  [  ] Increased self-expression   [  ] Decreased aggressive behavior   [  ] Decreased feelings of stress  [  ] Discussed healthy coping skills     [  ] Improved mood    [  ] Decreased withdrawn behavior     Social:  [  ] Decreased feelings of isolation/loneliness [  ] Positive social interaction   [  ] Provided support and/or comfort for family/friends    Spiritual:  [  ] Spiritual support    [  ] Expressed peace  [  ] Expressed vane    [  ] Discussed beliefs    Techniques Utilized (Check all that apply): N/A: Please see Session Observations below. [  ] Procedural support MT [  ] Music for relaxation [  ] Patient preferred music  [  ] Rylee analysis  [  ] Song choice  [  ] Music for validation  [  ] Entrainment  [  ] Movement to music [  ] Guided visualization  [  ] Jarett Edwards  [  ] Patient instrument playing [  ] [employer] writing  [  ] Braden Lowery along   [  ] Minus Saver  [  ] Sensory stimulation  [  ] Active Listening  [  ] Music for spiritual support [  ] Making of CDs as gifts    Session Observations:  F/up visit; Patient (pt) was alert with flat affect lying in bed. Her mother was at bedside. This music therapist (MT) asked pt how she was feeling and pt said horrible. MT offered music therapy to help with this and pt declined. She said she wanted to move. Pt's mother shared that she was trying to stay hopeful. She said she was glad that pt was expected to be discharged to a rehab facility soon because she saw this as a step forward. Albertina Cosme, Occupational Therapist entered to offer to work with the pt. Since pt had said she wanted to move, MT concluded visit so the pt could work with the OT. Will follow as able.     Lamont Siemens, MT-BC (Music Therapist-Board Certified)  Spiritual Care Department  Referral-based service

## 2020-07-09 NOTE — PROGRESS NOTES
Problem: Mobility Impaired (Adult and Pediatric)  Goal: *Acute Goals and Plan of Care (Insert Text)  Description:   FUNCTIONAL STATUS PRIOR TO ADMISSION: Patient was independent and active without use of DME per chart review. HOME SUPPORT PRIOR TO ADMISSION: Details unknown - pt is not able to provide info at this time. Physical Therapy Goals  Initiated 7/6/2020  1. Patient will move from supine to sit and sit to supine  and scoot up and down in bed with minimal assistance/contact guard assist within 7 day(s). 2.  Patient will transfer from bed to chair and chair to bed with moderate assistance  using the least restrictive device within 7 day(s). 3.  Patient will perform sit to stand with moderate assistance  within 7 day(s). 4.  Patient will ambulate with maximal assistance for 5 feet with the least restrictive device within 7 day(s). 5.  Patient will improve Garza Balance score by 7 points within 7 days. Initiated 6/22/2020, appropriate for carryover 06/29/2020  1. Patient will move from supine to sit and sit to supine , scoot up and down and roll side to side in bed with maximal assistance within 7 day(s). 2.  Patient will tolerate bed in full chair position x30min for improved tolerance to upright and pulmonary toileting within 7 days. 3.  Patient will participate in B TAMELA QURESHI/PROM program within 7 days. Outcome: Progressing Towards Goal    PHYSICAL THERAPY TREATMENT  Patient: Grayson Nieves (48 y.o. female)  Date: 7/9/2020  Diagnosis: SAH (subarachnoid hemorrhage) ([facility] [address] [I60.9]   <principal problem not specified>       Precautions: Aspiration  Chart, physical therapy assessment, plan of care and goals were reviewed. ASSESSMENT  Patient continues with skilled PT services and is progressing towards goals. Pt globally aphasic limiting command following. Pt was total assist x2  to get EOB pt resistant due to understanding of task.   Pt had decrease awareness to right UE and LE. Pt did display ability to move right LE for comfort but not on command. Will continue to progress as able     Current Level of Function Impacting Discharge (mobility/balance): total Ax2    Other factors to consider for discharge: aphasic,          PLAN :  Patient continues to benefit from skilled intervention to address the above impairments. Continue treatment per established plan of care. to address goals. Recommendation for discharge: (in order for the patient to meet his/her long term goals)  Therapy 3 hours per day 5-7 days per week    This discharge recommendation:  Has not yet been discussed the attending provider and/or case management    IF patient discharges home will need the following DME: to be determined (TBD)       SUBJECTIVE:   Patient stated Dallas Barber me a minute.     OBJECTIVE DATA SUMMARY:   Critical Behavior:  Neurologic State: Lethargic  Orientation Level: (responds to her name)  Cognition: Decreased attention/concentration, Decreased command following  Safety/Judgement: Decreased awareness of environment, Decreased insight into deficits  Functional Mobility Training:  Bed Mobility:     Supine to Sit: Total assistance;Assist x2  Sit to Supine: Maximum assistance  Scooting: Total assistance        Transfers:                                   Balance:  Sitting: Impaired  Sitting - Static: Poor (constant support)  Ambulation/Gait Training:                                                        Stairs: Therapeutic Exercises:     Pain Rating:  Right hand with extending fingers     Activity Tolerance:   Poor   bradycardic   Please refer to the flowsheet for vital signs taken during this treatment. After treatment patient left in no apparent distress:   Supine in bed, Call bell within reach, and Bed / chair alarm activated    COMMUNICATION/COLLABORATION:   The patients plan of care was discussed with: Registered nurse.      Liuns Lemons PTA   Time Calculation: 17 mins

## 2020-07-09 NOTE — PROGRESS NOTES
Problem: Self Care Deficits Care Plan (Adult)  Goal: *Acute Goals and Plan of Care (Insert Text)  Description:   FUNCTIONAL STATUS PRIOR TO ADMISSION: Patient unable to provide history at evaluation. Per chart, patient was independent and working full-time at [employer] HOME SUPPORT: Per chart, patient lived with her 15 y/o son and a friend. Occupational Therapy Goals  Goals reviewed and continued: 6/29/2020, continued 7/6/2020  Initiated 6/22/2020  1. Patient will perform grooming with maximal assistance within 7 day(s). 2.  Patient will follow ~25% simple commands with max multimodal cuing in preparation for functional tasks within 7 days. 3.  Patient will reach for ADL object with each UE with moderate assistance in preparation for functional reach within 7 days. 4.  Patient will perform composite grasp on ADL object with each hand in preparation for functional grasp within 7 days. 5.  Patient will participate in upper extremity therapeutic exercise/activities with maximal assistance for 5 minutes within 7 day(s). Outcome: Goals not met  OCCUPATIONAL THERAPY TREATMENT  Patient: Humphrey Alva (48 y.o. female)  Date: 7/9/2020  Diagnosis: SA[facility] [I60.9]   <principal problem not specified>       Precautions: Aspiration  Chart, occupational therapy assessment, plan of care, and goals were reviewed. ASSESSMENT  Patient continues with skilled OT services and is progressing towards goals in that she is demonstrating increased alertness during session. Participation impacted by impaired command following, attention, orientation, awareness of environment, functional use of right UE, bed mobility, right inattention. Mother present for session and instructed in removing environmental stimuli when working with patient to increase patient's ability to attend to task.  Mother also instructed in use of hand roll for right hand to decrease soft tissue shortening of fingers. Mother verbalizing understanding. Current Level of Function Impacting Discharge (ADLs): Total assist self care    Other factors to consider for discharge: supportive family         PLAN :  Patient continues to benefit from skilled intervention to address the above impairments. Continue treatment per established plan of care. to address goals. Recommend with staff: encourage participation in self care with hand over hand assist, place in modified chair position 3 times a day, remove environmental stimuli when patient participating in command following, total assist for self care    Recommend next OT session: command following, self care    Recommendation for discharge: (in order for the patient to meet his/her long term goals)  Therapy up to 5 days/week in SNF setting    This discharge recommendation:  Has been made in collaboration with the attending provider and/or case management    IF patient discharges home will need the following DME: bedside commode, gait belt, hospital bed, and wheelchair       SUBJECTIVE:   Patient stated help me [employer].     OBJECTIVE DATA SUMMARY:   Cognitive/Behavioral Status:  Neurologic State: (confused), alert  Orientation Level: Other (Comment); Disoriented to situation;Disoriented to time;Disoriented to place(responds to her name)  Cognition: Decreased attention/concentration;Decreased command following  Perception: Cues to attend right visual field;Cues to attend to right side of body;Verbal;Tactile;Visual  Perseveration: No perseveration noted  Safety/Judgement: Decreased awareness of environment;Decreased insight into deficits    Functional Mobility and Transfers for ADLs:  Bed Mobility:  Supine to Sit: Total assistance;Assist x2  Sit to Supine: Maximum assistance  Scooting:  Total assistance       Balance:  Sitting: Impaired  Sitting - Static: Poor (constant support)    ADL Intervention:       Grooming  Position Performed: Long sitting on bed  Washing Face: Maximum assistance    Upper Body Bathing  Bathing Assistance: Total assistance(dependent)(hand over hand assist given)  Position Performed: Long sitting on bed  Cues: Physical assistance; Tactile cues provided;Verbal cues provided;Visual cues provided    Lower Body Bathing  Lower Body : Total assistance (dependent)  Position Performed: Long sitting on bed  Cues: Physical assistance;Verbal cues provided; Tactile cues provided;Visual cues provided    Upper [address]: Total assistance (dependent)    Lower Body Dressing Assistance  Socks: Total assistance (dependent)  Leg Crossed Method Used: No  Position Performed: Long sitting on bed  Cues: Physical assistance; Tactile cues provided;Visual cues provided;Verbal cues provided    Toileting  Toileting Assistance: Total assistance(dependent)    Cognitive Retraining  Orientation Retraining: Reorienting;Place  Organizing/Sequencing: Breaking task down  Attention to Task: Distractibility  Safety/Judgement: Decreased awareness of environment;Decreased insight into deficits  Cues: Tactile cues provided;Verbal cues provided;Visual cues provided    Therapeutic Exercises:   Right UE PROM with hand roll placed in hand at end of session to prevent soft tissue shortening of fingers. Right UE positioned on pillow. Activity Tolerance:   Fair  Please refer to the flowsheet for vital signs taken during this treatment. After treatment patient left in no apparent distress:   Call bell within reach, Bed / chair alarm activated, Caregiver / family present, and seated in modified chair position    COMMUNICATION/COLLABORATION:   The patients plan of care was discussed with: Registered nurse.      MARTIN Fonseca  Time Calculation: 32 mins

## 2020-07-09 NOTE — PROGRESS NOTES
Bedside and Verbal shift change report given to ROSSANA Ravi (oncoming nurse) by Guillermo Arriaga RN (offgoing nurse). Report included the following information SBAR, Kardex, Intake/Output, MAR, Recent Results, Med Rec Status, Cardiac Rhythm SB and Dual Neuro Assessment.

## 2020-07-09 NOTE — PROGRESS NOTES
Problem: Falls - Risk of  Goal: *Absence of Falls  Description: Document Angela Panacea Fall Risk and appropriate interventions in the flowsheet. Outcome: Progressing Towards Goal  Note: Fall Risk Interventions:  Mobility Interventions: Bed/chair exit alarm    Mentation Interventions: Bed/chair exit alarm    Medication Interventions: Patient to call before getting OOB, Bed/chair exit alarm    Elimination Interventions: Bed/chair exit alarm, Call light in reach    History of Falls Interventions: Consult care management for discharge planning         Problem: Patient Education: Go to Patient Education Activity  Goal: Patient/Family Education  Outcome: Progressing Towards Goal     Problem: Pressure Injury - Risk of  Goal: *Prevention of pressure injury  Description: Document Sriram Scale and appropriate interventions in the flowsheet.     Offload heels  Turn approximately every 2 hours  Venelex ointment   Outcome: Progressing Towards Goal  Note: Pressure Injury Interventions:  Sensory Interventions: Assess changes in LOC, Assess need for specialty bed, Avoid rigorous massage over bony prominences    Moisture Interventions: Absorbent underpads, Apply protective barrier, creams and emollients, Assess need for specialty bed    Activity Interventions: Increase time out of bed    Mobility Interventions: Assess need for specialty bed, HOB 30 degrees or less    Nutrition Interventions: Document food/fluid/supplement intake    Friction and Shear Interventions: Lift sheet                Problem: Patient Education: Go to Patient Education Activity  Goal: Patient/Family Education  Outcome: Progressing Towards Goal     Problem: Nutrition Deficit  Goal: *Optimize nutritional status  Outcome: Progressing Towards Goal     Problem: Patient Education: Go to Patient Education Activity  Goal: Patient/Family Education  Outcome: Progressing Towards Goal     Problem: Hemorrhagic Stroke: Day 5 through Discharge  Goal: Off Pathway (Use only if patient is Off Pathway)  Outcome: Progressing Towards Goal  Goal: Activity/Safety  Outcome: Progressing Towards Goal  Goal: Consults, if ordered  Outcome: Progressing Towards Goal  Goal: Diagnostic Test/Procedures  Outcome: Progressing Towards Goal  Goal: Nutrition/Diet  Outcome: Progressing Towards Goal  Goal: Medications  Outcome: Progressing Towards Goal  Goal: Respiratory  Outcome: Progressing Towards Goal  Goal: Treatments/Interventions/Procedures  Outcome: Progressing Towards Goal  Goal: Psychosocial  Outcome: Progressing Towards Goal  Goal: *Hemodynamically stable  Outcome: Progressing Towards Goal  Goal: *Verbalizes anxiety and depression are reduced or absent  Outcome: Progressing Towards Goal  Goal: *Absence of aspiration  Outcome: Progressing Towards Goal  Goal: *Absence of signs and symptoms of DVT  Outcome: Progressing Towards Goal  Goal: *Optimal pain control at patient's stated goal  Outcome: Progressing Towards Goal  Goal: *Tolerating diet  Outcome: Progressing Towards Goal  Goal: *Progressive mobility and function  Outcome: Progressing Towards Goal  Goal: *Rehabilitation readiness  Outcome: Progressing Towards Goal     Problem: Hemorrhagic Stroke: Discharge Outcomes  Goal: *Verbalizes anxiety and depression are reduced or absent  Outcome: Progressing Towards Goal  Goal: *Verbalize understanding of risk factor modification(Stroke Metric)  Outcome: Progressing Towards Goal  Goal: *Optimal pain control at patient's stated goal  Outcome: Progressing Towards Goal  Goal: *Hemodynamically stable  Outcome: Progressing Towards Goal  Goal: *Absence of aspiration pneumonia  Outcome: Progressing Towards Goal  Goal: *Aware of needed dietary changes  Outcome: Progressing Towards Goal  Goal: *Verbalizes understanding and describes medication purposes and frequencies  Outcome: Progressing Towards Goal  Goal: *Tolerating diet  Outcome: Progressing Towards Goal  Goal: *Absence of signs and symptoms of DVT  Outcome: Progressing Towards Goal  Goal: *Absence of aspiration  Outcome: Progressing Towards Goal  Goal: *Progressive mobility and function  Outcome: Progressing Towards Goal  Goal: *Home safety concerns addressed  Outcome: Progressing Towards Goal     Problem: Patient Education: Go to Patient Education Activity  Goal: Patient/Family Education  Outcome: Progressing Towards Goal     Problem: Patient Education: Go to Patient Education Activity  Goal: Patient/Family Education  Outcome: Progressing Towards Goal     Problem: Patient Education:  Go to Education Activity  Goal: Patient/Family Education  Outcome: Progressing Towards Goal     Problem: Subarachnoid Hemorrhage Stroke:Admission Day 5-Discharge  Goal: Off Pathway (Use only if patient is Off Pathway)  Outcome: Progressing Towards Goal  Goal: Activity/Safety  Outcome: Progressing Towards Goal  Goal: Diagnostic Test/Procedures  Outcome: Progressing Towards Goal  Goal: Nutrition/Diet  Outcome: Progressing Towards Goal  Goal: Medications  Outcome: Progressing Towards Goal  Goal: Patient maintains clear airway/absence of aspiration  Outcome: Progressing Towards Goal  Goal: Treatments/Interventions/Procedures  Outcome: Progressing Towards Goal  Goal: Psychosocial  Outcome: Progressing Towards Goal  Goal: *Hemodynamically stable  Outcome: Progressing Towards Goal  Goal: *Absence of signs and symptoms of DVT  Outcome: Progressing Towards Goal     Problem: Subarachnoid Hemorrhage Stroke:Discharge Outcomes  Goal: *Verbalizes anxiety and depression are reduced or absent  Outcome: Progressing Towards Goal  Goal: *Verbalizes understanding of risk factor modification (Stroke Metric)  Outcome: Progressing Towards Goal  Goal: *Hemodynamically stable  Outcome: Progressing Towards Goal  Goal: *Aware of needed dietary changes  Outcome: Progressing Towards Goal  Goal: *Verbalizes understanding and describes medication purposes and frequencies  Outcome: Progressing Towards Goal  Goal: *Verbalizes understanding of plan for nimodipine administration  Description: until day 21 and when next dose is scheduled  Outcome: Progressing Towards Goal  Goal: *Absence of signs and symptoms of DVT  Outcome: Progressing Towards Goal  Goal: *Absence of aspiration  Outcome: Progressing Towards Goal  Goal: *Progressive mobility and function  Outcome: Progressing Towards Goal  Goal: *Home safety concerns addressed  Outcome: Progressing Towards Goal     Problem: Patient Education: Go to Patient Education Activity  Goal: Patient/Family Education  Outcome: Progressing Towards Goal     Problem: Nutrition Deficit  Goal: *Optimize nutritional status  Outcome: Progressing Towards Goal

## 2020-07-09 NOTE — PROGRESS NOTES
[address] [facility] Adult  Hospitalist Group                                                                                          Hospitalist Progress Note  Clyde Guy MD  Answering service: [phone] or [phone] from in house phone        Date of Service:  2020  NAME:  Yeni Osei  :  1970  MRN:  [id number]    Admission Summary:     Yeni Osei is a 51-year-old female with a past medical history of HTN. According to patient's mother, the patient began having a headache on 20 and had been taking BC Powder with aspirin in it for the headache. She recently stopped taking her BP medications. According to her mother she does not smoke, drink alcohol or use recreational drugs. The patient was brought to the [facility] ER via EMS on   after her coworkers found her unresponsive outside of her place of work. 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 On arrival to the ER she was found to be seizing. A stat CT of her head was performed which showed extensive subarachnoid hemorrhage. She was extremely hypertensive in the 240's and was given labetalol and started on a Cardene drip. She was emergently intubated and given Keppra and Ativan as well. Neurosurgery and Neurointerventional Surgery were consulted and the patient was transferred [facility] the [facility] for a higher level of care. 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 Extensive lung infiltrates were also noted. Patient was taken for cerebral angiogram the morning of 20 and coil embolization of the left MCA aneurysm was completed. The right MCA aneurysm had a very wide neck incorporating both M2s. It could not be treated endovascularly without stents.  May need to be clipped by Neurosurgery.      Procedures: 20 and coil embolization of the left MCA aneurysm was completed s/p cerebral angiogram for right PANCHITO A1 angioplasty for vasospasm, transarterial infusion of verapamil on 6/9,  S/p angiogram on 6/10 for left ICA/MCA angioplasty and transarterial infusion of verapamil for vasospasm treatment, s/p angiogram on 6/11 and 6/13 for transarterial infusion of verapamil for vasospasm treatment. Tracheostomy placement  6/16 by Dr. Dale Nance in thoracic surgery. PEG placement. Interval history / Subjective: Follow up [address] Patient seen and examined. With RN in room. Pt avoids exam to a degree, no distress, peg site stable; for placement as accepted,      Assessment & Plan:     #. Cerebral Aneurysms. #. SAH due to ruptured cerebral aneurysm   #. Hypertension  -s/p cerebral angiogram with coil embolization of left MCA bifurcation aneurysm on 6/5, patient has 5X3 right MCA bifurcation aneurysm with very wide neck which will need to be treated with stents vs. Clipping  -s/p cardene. Continues norvasc, hydralazine, lisinopril, goal SBP <160  -seizure prophylaxis keppra  -patient open her right eye to voice, non verbal, doesn't follows command, moves lower extremities spontaneously   -improved vasospasm since 6/20- seen by neurosurgeon      #. Bradycardia: monitor, hold carvedilol   #. Seizure: 2nd to above - stable - keppra, VIMPAT-seizure precaution- Neurologist  #. CVAs - aspirin - multiple ischemic infarct -MRI on 6/16- PT/OT-neurology evaluated  #. Respiratory failure secondary to above, pulmonary edema and possible pneumonia- resolved     #Dysphagia secondary to stroke and SAH-s/p PEG tube  -[facility] 7/2. Diet initiated.   Holding tube feeds   - RN to watch for intake today ; seems on exam 7/9/2020 to be well hydrated,     #Iron deficiency anemia -noted fibroid  -Hgb trending down, Hgb 7.8, monitor H/H    -iron sucrose 200 mg iv x 3 doses   Lab Results   Component Value Date/Time    Iron 10 (L) 06/06/2020 04:21 AM    TIBC 376 06/06/2020 04:21 AM    Iron % saturation 3 (L) 06/06/2020 04:21 AM    Ferritin 44 06/13/2020 05:43 PM     F/u levels while yet here 7/9/2020     #Peripheral edema multifactorial- low albumin 2.6  -Echo normal cavity size and systolic function (ejection fraction normal). Mild concentric hypertrophy. Estimated left ventricular ejection fraction is 60 - 65%. No regional wall motion abnormality noted.     Obesity -diet control -A1c 5.7    Code status: Full Code   DVT prophylaxis: Lovenox  Care Plan discussed with: Patient/Family and Nurse  Anticipated Disposition: Inpatient rehab     Hospital Problems  Date Reviewed: 6/10/2020          Codes Class Noted POA    Respiratory failure ([facility] ICD-10-CM: J96.90  ICD-9-CM: 518.81  6/15/2020 Yes        Hypoxic ischemic encephalopathy (HIE), unspecified severity ICD-10-CM: P91.60  ICD-9-CM: 768.70  6/15/2020 Yes        SAH (subarachnoid hemorrhage) ([facility] ICD-10-CM: I60.9  ICD-9-CM: 477  6/4/2020 Yes        Subarachnoid hemorrhage from middle cerebral artery aneurysm, left ([facility] ICD-10-CM: A92.86  ICD-9-CM: 040  6/4/2020 Yes        RIGHT middle cerebral artery aneurysm ICD-10-CM: I67.1  ICD-9-CM: 437.3  6/4/2020 Yes        Cerebral vasospasm ICD-10-CM: D83.360  ICD-9-CM: 435.9  6/4/2020 Yes            ROS not obtainable 2n2 pt condition       Vital Signs:    Last 24hrs VS reviewed since prior progress note. Most recent are:  Visit Vitals  /73   Pulse (!) 50   Temp 97.9 °F (36.6 °C)   Resp 16   Ht 5' 4\" (1.626 m)   Wt 90.2 kg (198 lb 13.7 oz)   SpO2 100%   BMI 34.13 kg/m²         Intake/Output Summary (Last 24 hours) at 7/9/2020 1001  Last data filed at 7/9/2020 0941  Gross per 24 hour   Intake 10 ml   Output    Net 10 ml        Physical Examination:             Constitutional:  No acute distress, responds intermittently    ENT:  Trach removed, left eye closed   Resp:  good AE, no wheezes. Clear bilateral        GI:  Soft, non distended, non tender.  obese    Musculoskeletal:  No edema    Neurologic:   Moves extremities, speaks some words, intermittently answers questions     Skin:  Good turgor, no rashes or ulcers       Data Review:    Review and/or order of clinical lab test  Review and/or order of tests in the radiology section of CPT  Review and/or order of tests in the medicine section of CPT      Labs:     No results for input(s): WBC, HGB, HCT, PLT, HGBEXT, HCTEXT, PLTEXT, HGBEXT, HCTEXT, PLTEXT in the last 72 hours. No results for input(s): NA, K, CL, CO2, BUN, CREA, GLU, CA, MG, PHOS, URICA in the last 72 hours. No results for input(s): ALT, AP, TBIL, TBILI, TP, ALB, GLOB, GGT, AML, LPSE in the last 72 hours. No lab exists for component: SGOT, GPT, AMYP, HLPSE  No results for input(s): INR, PTP, APTT, INREXT, INREXT in the last 72 hours. No results for input(s): FE, TIBC, PSAT, FERR in the last 72 hours. No results found for: FOL, RBCF   No results for input(s): PH, PCO2, PO2 in the last 72 hours. No results for input(s): CPK, CKNDX, TROIQ in the last 72 hours.     No lab exists for component: CPKMB  Lab Results   Component Value Date/Time    Cholesterol, total 90 06/05/2020 03:34 AM    HDL Cholesterol 64 06/05/2020 03:34 AM    LDL, calculated 7.8 06/05/2020 03:34 AM    Triglyceride 91 06/05/2020 03:34 AM    CHOL/HDL Ratio 1.4 06/05/2020 03:34 AM     Lab Results   Component Value Date/Time    Glucose (POC) 107 (H) 07/02/2020 05:42 PM    Glucose (POC) 94 07/02/2020 11:42 AM    Glucose (POC) 157 (H) 07/02/2020 06:58 AM    Glucose (POC) 121 (H) 06/14/2020 11:22 PM    Glucose (POC) 118 (H) 06/14/2020 05:07 PM     Lab Results   Component Value Date/Time    Specific gravity 1.015 06/15/2020 05:45 AM         Medications Reviewed:     Current Facility-Administered Medications   Medication Dose Route Frequency    amLODIPine (NORVASC) tablet 5 mg  5 mg Per NG tube DAILY    [Held by provider] carvediloL (COREG) tablet 25 mg  25 mg Oral BID WITH MEALS    lisinopriL (PRINIVIL, ZESTRIL) tablet 40 mg  40 mg Oral DAILY    [Held by provider] hydroCHLOROthiazide (HYDRODIURIL) tablet 25 mg  25 mg Oral DAILY    albuterol-ipratropium (DUO-NEB) 2.5 MG-0.5 MG/3 ML  3 mL Nebulization BID PRN    guar gum (BENEFIBER) packet 1 Packet  4 g Oral TID    labetaloL (NORMODYNE;TRANDATE) injection 20 mg  20 mg IntraVENous Q4H PRN    0.9% sodium chloride infusion 250 mL  250 mL IntraVENous PRN    hydrALAZINE (APRESOLINE) tablet 100 mg  100 mg Oral TID    fentaNYL citrate (PF) injection 50 mcg  50 mcg IntraVENous Q2H PRN    hydrALAZINE (APRESOLINE) 20 mg/mL injection 10 mg  10 mg IntraVENous Q2H PRN    chlorhexidine (ORAL CARE KIT) 0.12 % mouthwash 15 mL  15 mL Oral Q12H    potassium bicarb-citric acid (EFFER-K) tablet 40 mEq  40 mEq Oral BID    lacosamide (VIMPAT) tablet 150 mg  150 mg Per NG tube Q12H    balsam peru-castor oiL (VENELEX) ointment   Topical BID    aspirin chewable tablet 81 mg  81 mg Per NG tube DAILY    enoxaparin (LOVENOX) injection 40 mg  40 mg SubCUTAneous Q24H    levETIRAcetam (KEPPRA) oral solution 1,000 mg  1,000 mg Per NG tube Q12H    glycopyrrolate (ROBINUL) injection 0.2 mg  0.2 mg IntraMUSCular TID PRN    acetaminophen (TYLENOL) solution 650 mg  650 mg Per NG tube Q4H PRN    glucose chewable tablet 16 g  4 Tab Oral PRN    glucagon (GLUCAGEN) injection 1 mg  1 mg IntraMUSCular PRN    dextrose 10% infusion 0-250 mL  0-250 mL IntraVENous PRN    albuterol (PROVENTIL VENTOLIN) nebulizer solution 2.5 mg  2.5 mg Nebulization Q6H PRN    polyvinyl alcohol-povidone (NATURAL TEARS) 0.5-0.6 % ophthalmic solution 2 Drop  2 Drop Both Eyes Q8H    bisacodyL (DULCOLAX) suppository 10 mg  10 mg Rectal DAILY PRN    ondansetron (ZOFRAN) injection 4 mg  4 mg IntraVENous Q4H PRN    docusate (COLACE) 50 mg/5 mL oral liquid 100 mg  100 mg Oral BID PRN     ______________________________________________________________________  EXPECTED LENGTH OF STAY: 22d 21h  ACTUAL LENGTH OF STAY:          35                 Deep N Scot Leyva MD

## 2020-07-09 NOTE — PROGRESS NOTES
The documentation for this period is being entered following the guidelines as defined in the [facility] policy by Grant Cruz.       [id number]

## 2020-07-09 NOTE — PROGRESS NOTES
Bedside shift change report given to ROSSANA Martins (oncoming nurse) by Jeri Lefort (offgoing nurse). Report included the following information SBAR, Kardex, Intake/Output, MAR, Med Rec Status, Cardiac Rhythm SB, Quality Measures and Dual Neuro Assessment.

## 2020-07-09 NOTE — PROGRESS NOTES
TRANSITION OF CARE   RUR 18%    Patient denied admissions to [facility] Per report, Insurance company [employer]) feels patient is too low level to participate with therapy and a [id number] is required for [employer]. However, they will approve SNF level of care. Notified MD to initiate [id number] review. MD agrees with recommendations to SNF level. Re-submitted referral to [facility] via [employer]; Awaiting response. Spoke with Mario Elizabeth to check referral status. Patient accepted to [facility] pending insurance authorization. Insurance authorization pending. Requested another COVID test. Last COVID test is 7/6. Update 14:55P-Met with patient's mother, Gladys Mercer at bedside to discuss discharge plan including SNF placement/[facility]. Family is in agreement with plan. Cancelled referral to [facility]. CM to follow.  David Don, MSW,CRM

## 2020-07-09 NOTE — PROGRESS NOTES
Bedside and Verbal shift change report given to Mahnaz Moreno RN (oncoming nurse) by Telly Mohan RN (offgoing nurse). Report included the following information SBAR, Kardex, MAR, Cardiac Rhythm NSR and Dual Neuro Assessment.

## 2020-07-10 VITALS
WEIGHT: 200.6 LBS | OXYGEN SATURATION: 100 % | DIASTOLIC BLOOD PRESSURE: 50 MMHG | BODY MASS INDEX: 34.25 KG/M2 | SYSTOLIC BLOOD PRESSURE: 152 MMHG | HEIGHT: 64 IN | HEART RATE: 52 BPM | RESPIRATION RATE: 14 BRPM | TEMPERATURE: 98.2 F

## 2020-07-10 PROBLEM — I60.12: Status: RESOLVED | Noted: 2020-06-04 | Resolved: 2020-07-10

## 2020-07-10 PROBLEM — J96.90 RESPIRATORY FAILURE (HCC): Status: RESOLVED | Noted: 2020-06-15 | Resolved: 2020-07-10

## 2020-07-10 PROBLEM — I67.848 CEREBRAL VASOSPASM: Status: RESOLVED | Noted: 2020-06-04 | Resolved: 2020-07-10

## 2020-07-10 PROBLEM — I60.9 SAH (SUBARACHNOID HEMORRHAGE) (HCC): Status: RESOLVED | Noted: 2020-06-04 | Resolved: 2020-07-10

## 2020-07-10 LAB
SARS-COV-2, COV2: NOT DETECTED
SOURCE, COVRS: NORMAL
SPECIMEN SOURCE, FCOV2M: NORMAL

## 2020-07-10 PROCEDURE — 92526 ORAL FUNCTION THERAPY: CPT

## 2020-07-10 PROCEDURE — 74011250637 HC RX REV CODE- 250/637: Performed by: PSYCHIATRY & NEUROLOGY

## 2020-07-10 PROCEDURE — 74011250637 HC RX REV CODE- 250/637: Performed by: NURSE PRACTITIONER

## 2020-07-10 PROCEDURE — 94760 N-INVAS EAR/PLS OXIMETRY 1: CPT

## 2020-07-10 PROCEDURE — 74011000250 HC RX REV CODE- 250: Performed by: INTERNAL MEDICINE

## 2020-07-10 PROCEDURE — 74011250637 HC RX REV CODE- 250/637: Performed by: ANESTHESIOLOGY

## 2020-07-10 PROCEDURE — 74011250636 HC RX REV CODE- 250/636: Performed by: ANESTHESIOLOGY

## 2020-07-10 PROCEDURE — 74011250637 HC RX REV CODE- 250/637: Performed by: INTERNAL MEDICINE

## 2020-07-10 PROCEDURE — 97535 SELF CARE MNGMENT TRAINING: CPT

## 2020-07-10 RX ORDER — GLYCOPYRROLATE 0.2 MG/ML
0.2 INJECTION INTRAMUSCULAR; INTRAVENOUS
Qty: 1 VIAL | Refills: 0 | Status: SHIPPED
Start: 2020-07-10 | End: 2020-08-28 | Stop reason: ALTCHOICE

## 2020-07-10 RX ORDER — HYDRALAZINE HYDROCHLORIDE 100 MG/1
100 TABLET, FILM COATED ORAL 3 TIMES DAILY
Qty: 90 TAB | Refills: 0 | Status: SHIPPED
Start: 2020-07-10 | End: 2020-08-28 | Stop reason: SDUPTHER

## 2020-07-10 RX ORDER — AMLODIPINE BESYLATE 5 MG/1
5 TABLET ORAL DAILY
Qty: 30 TAB | Refills: 0 | Status: SHIPPED
Start: 2020-07-11 | End: 2020-08-28 | Stop reason: DRUGHIGH

## 2020-07-10 RX ORDER — GUAIFENESIN 100 MG/5ML
81 LIQUID (ML) ORAL DAILY
Qty: 30 TAB | Refills: 0 | Status: SHIPPED
Start: 2020-07-11

## 2020-07-10 RX ORDER — LACOSAMIDE 150 MG/1
150 TABLET ORAL EVERY 12 HOURS
Qty: 60 TAB | Refills: 0 | Status: SHIPPED
Start: 2020-07-10 | End: 2020-08-28 | Stop reason: ALTCHOICE

## 2020-07-10 RX ORDER — ALBUTEROL SULFATE 0.83 MG/ML
2.5 SOLUTION RESPIRATORY (INHALATION)
Qty: 30 NEBULE | Refills: 0 | Status: SHIPPED
Start: 2020-07-10 | End: 2020-08-28 | Stop reason: ALTCHOICE

## 2020-07-10 RX ORDER — LISINOPRIL 40 MG/1
40 TABLET ORAL DAILY
Qty: 30 TAB | Refills: 0 | Status: SHIPPED
Start: 2020-07-11 | End: 2020-08-28 | Stop reason: SDUPTHER

## 2020-07-10 RX ORDER — DOCUSATE SODIUM 50 MG/5ML
100 LIQUID ORAL
Qty: 120 ML | Refills: 0 | Status: SHIPPED
Start: 2020-07-10 | End: 2020-08-31 | Stop reason: ALTCHOICE

## 2020-07-10 RX ORDER — BALSAM PERU/CASTOR OIL
OINTMENT (GRAM) TOPICAL 2 TIMES DAILY
Qty: 1 TUBE | Refills: 0 | Status: SHIPPED
Start: 2020-07-10 | End: 2020-08-28 | Stop reason: ALTCHOICE

## 2020-07-10 RX ORDER — FACIAL-BODY WIPES
10 EACH TOPICAL
Qty: 10 SUPPOSITORY | Refills: 0 | Status: SHIPPED
Start: 2020-07-10 | End: 2020-08-28 | Stop reason: ALTCHOICE

## 2020-07-10 RX ORDER — BACITRACIN 500 UNIT/G
1 PACKET (EA) TOPICAL
Status: COMPLETED | OUTPATIENT
Start: 2020-07-10 | End: 2020-07-10

## 2020-07-10 RX ADMIN — LEVETIRACETAM 1000 MG: 100 SOLUTION ORAL at 08:46

## 2020-07-10 RX ADMIN — Medication 1 PACKET: at 08:45

## 2020-07-10 RX ADMIN — LISINOPRIL 40 MG: 20 TABLET ORAL at 08:46

## 2020-07-10 RX ADMIN — AMLODIPINE BESYLATE 5 MG: 5 TABLET ORAL at 08:47

## 2020-07-10 RX ADMIN — LACOSAMIDE 150 MG: 50 TABLET, FILM COATED ORAL at 08:46

## 2020-07-10 RX ADMIN — ASPIRIN 81 MG CHEWABLE TABLET 81 MG: 81 TABLET CHEWABLE at 08:46

## 2020-07-10 RX ADMIN — CASTOR OIL AND BALSAM, PERU: 788; 87 OINTMENT TOPICAL at 08:47

## 2020-07-10 RX ADMIN — CHLORHEXIDINE GLUCONATE 15 ML: 0.12 RINSE ORAL at 08:47

## 2020-07-10 RX ADMIN — Medication 2 DROP: at 13:13

## 2020-07-10 RX ADMIN — ENOXAPARIN SODIUM 40 MG: 40 INJECTION SUBCUTANEOUS at 13:13

## 2020-07-10 RX ADMIN — HYDRALAZINE HYDROCHLORIDE 100 MG: 50 TABLET, FILM COATED ORAL at 08:47

## 2020-07-10 RX ADMIN — POTASSIUM BICARBONATE 40 MEQ: 782 TABLET, EFFERVESCENT ORAL at 08:45

## 2020-07-10 RX ADMIN — BACITRACIN 1 PACKET: 500 OINTMENT TOPICAL at 15:41

## 2020-07-10 NOTE — PROGRESS NOTES
Problem: Self Care Deficits Care Plan (Adult)  Goal: *Acute Goals and Plan of Care (Insert Text)  Description:   FUNCTIONAL STATUS PRIOR TO ADMISSION: Patient unable to provide history at evaluation. Per chart, patient was independent and working full-time at [employer]. HOME SUPPORT: Per chart, patient lived with her 15 y/o son and a friend. Occupational Therapy Goals  Goals reviewed and continued: 6/29/2020, continued 7/6/2020  Initiated 6/22/2020  1. Patient will perform grooming with maximal assistance within 7 day(s). 2.  Patient will follow ~25% simple commands with max multimodal cuing in preparation for functional tasks within 7 days. 3.  Patient will reach for ADL object with each UE with moderate assistance in preparation for functional reach within 7 days. 4.  Patient will perform composite grasp on ADL object with each hand in preparation for functional grasp within 7 days. 5.  Patient will participate in upper extremity therapeutic exercise/activities with maximal assistance for 5 minutes within 7 day(s). Outcome: Progressing Towards Goal   OCCUPATIONAL THERAPY TREATMENT  Patient: Mohsen Vaughn (48 y.o. female)  Date: 7/10/2020  Diagnosis: SA[facility] [I60.9]   <principal problem not specified>       Precautions: Aspiration  Chart, occupational therapy assessment, plan of care, and goals were reviewed. ASSESSMENT  Patient continues with skilled OT services and is progressing towards goals. Patient received in bed and alert. Responds to her name. Lunch tray present. Patient set upright in bed. Lunch tray placed in front of patient on bed side tray. Patient participated in self feeding with min to max assist for hand over hand assist with spoon, mechanical chopped food, and thin liquids in styrofoam cup in patient's left hand. Patient left sitting up right in bed after eating.  Nurse and PCT instructed on hand over hand assist and technique written on whiteboard. Patient assisted with PROM to right hand. Hand roll replaced in patient's right hand to prevent soft tissue shortening secondary to increased tone in right hand. Current Level of Function Impacting Discharge (ADLs): Max to total assist for self care, total assist for toileting, min to max assist for hand over hand technique for self feeding    Other factors to consider for discharge: none         PLAN :  Patient continues to benefit from skilled intervention to address the above impairments. Continue treatment per established plan of care. to address goals. Recommend with staff: encourage participation in self care, command following, and use of hand over hand technique for self feeding. Recommend next OT session: grooming (washing face, brushing teeth); self feeding    Recommendation for discharge: (in order for the patient to meet his/her long term goals)  Therapy up to 5 days/week in SNF setting    This discharge recommendation:  Has been made in collaboration with the attending provider and/or case management    IF patient discharges home will need the following DME: bedside commode, hospital bed, and wheelchair       SUBJECTIVE:   Patient stated Let's go. I want to get out of here.     OBJECTIVE DATA SUMMARY:   Cognitive/Behavioral Status:  Neurologic State: Alert  Orientation Level: Disoriented to place; Disoriented to situation;Disoriented to time  Cognition: Decreased attention/concentration;Decreased command following; Impulsive;Memory loss  Perception: Cues to attend right visual field;Cues to attend to right side of body;Cues to maintain midline in sitting  Perseveration: No perseveration noted  Safety/Judgement: Decreased awareness of environment;Decreased awareness of need for assistance;Decreased insight into deficits    Functional Mobility and Transfers for ADLs:  Mod assist intermittently to move to midline in supported sitting in bed during self feeding secondary to lean to right. Right UE positioned on pillow with fingers extended and hand roll in place    ADL Intervention:  Feeding  Container Management: Total assistance (dependent)  Food to Mouth: Maximum assistance  Drink to Mouth: Minimum assistance  Cues: Physical assistance; Tactile cues provided;Verbal cues provided;Visual cues provided          Cognitive Retraining  Orientation Retraining: Reorienting;Place  Organizing/Sequencing: Breaking task down  Attention to Task: Distractibility  Safety/Judgement: Decreased awareness of environment;Decreased awareness of need for assistance;Decreased insight into deficits  Cues: Tactile cues provided;Verbal cues provided;Visual cues provided      Activity Tolerance:   Fair  Please refer to the flowsheet for vital signs taken during this treatment. After treatment patient left in no apparent distress:   Call bell within reach, Bed / chair alarm activated, and sitting upright in bed    COMMUNICATION/COLLABORATION:   The patients plan of care was discussed with: Registered nurse.      MARTIN Alonso  Time Calculation: 44 mins

## 2020-07-10 NOTE — PROGRESS NOTES
Problem: Falls - Risk of  Goal: *Absence of Falls  Description: Document Donata Carrel Fall Risk and appropriate interventions in the flowsheet. Outcome: Progressing Towards Goal  Note: Fall Risk Interventions:  Mobility Interventions: Bed/chair exit alarm, Communicate number of staff needed for ambulation/transfer, Patient to call before getting OOB, PT Consult for mobility concerns, PT Consult for assist device competence    Mentation Interventions: Adequate sleep, hydration, pain control, Bed/chair exit alarm, Door open when patient unattended, More frequent rounding, Reorient patient, Update white board    Medication Interventions: Teach patient to arise slowly, Patient to call before getting OOB, Bed/chair exit alarm    Elimination Interventions: Call light in reach, Patient to call for help with toileting needs    History of Falls Interventions: Consult care management for discharge planning, Vital signs minimum Q4HRs X 24 hrs (comment for end date), Bed/chair exit alarm         Problem: Pressure Injury - Risk of  Goal: *Prevention of pressure injury  Description: Document Sriram Scale and appropriate interventions in the flowsheet.     Offload heels  Turn approximately every 2 hours  Venelex ointment   Outcome: Progressing Towards Goal  Note: Pressure Injury Interventions:  Sensory Interventions: Assess need for specialty bed, Avoid rigorous massage over bony prominences, Minimize linen layers, Pressure redistribution bed/mattress (bed type), Float heels    Moisture Interventions: Absorbent underpads, Internal/External urinary devices, Maintain skin hydration (lotion/cream)    Activity Interventions: Increase time out of bed, Pressure redistribution bed/mattress(bed type)    Mobility Interventions: Float heels, Pressure redistribution bed/mattress (bed type)    Nutrition Interventions: Document food/fluid/supplement intake    Friction and Shear Interventions: Apply protective barrier, creams and emollients, HOB 30 degrees or less, Lift sheet, Lift team/patient mobility team, Minimize layers                Problem: Hemorrhagic Stroke: Day 5 through Discharge  Goal: Activity/Safety  Outcome: Progressing Towards Goal  Goal: Consults, if ordered  Outcome: Progressing Towards Goal  Goal: Diagnostic Test/Procedures  Outcome: Progressing Towards Goal  Goal: Nutrition/Diet  Outcome: Progressing Towards Goal  Goal: Medications  Outcome: Progressing Towards Goal  Goal: Respiratory  Outcome: Progressing Towards Goal  Goal: Treatments/Interventions/Procedures  Outcome: Progressing Towards Goal  Goal: Psychosocial  Outcome: Progressing Towards Goal  Goal: *Hemodynamically stable  Outcome: Progressing Towards Goal  Goal: *Verbalizes anxiety and depression are reduced or absent  Outcome: Progressing Towards Goal  Goal: *Absence of aspiration  Outcome: Progressing Towards Goal  Goal: *Absence of signs and symptoms of DVT  Outcome: Progressing Towards Goal  Goal: *Optimal pain control at patient's stated goal  Outcome: Progressing Towards Goal  Goal: *Tolerating diet  Outcome: Progressing Towards Goal  Goal: *Progressive mobility and function  Outcome: Progressing Towards Goal  Goal: *Rehabilitation readiness  Outcome: Progressing Towards Goal

## 2020-07-10 NOTE — PROGRESS NOTES
Verbal shift change report given to Danielle Flor (oncoming nurse) by Fermín Oropeza RN (offgoing nurse). Report included the following information SBAR, Kardex, ED Summary, Intake/Output, Accordion, Recent Results, Cardiac Rhythm NSR/SB and Dual Neuro Assessment.

## 2020-07-10 NOTE — PROGRESS NOTES
TRANSITION OF CARE PLAN  RUR 18%    DISPOSITION--Accepted to [facility] pending insurance authorization     Call placed and spoke with Oleksandr Martínez 135 to check status. Insurance authorization still pending. CM to follow.  JUDE Kirkland,CRM

## 2020-07-10 NOTE — PROGRESS NOTES
[facility] [facility] Adult  Hospitalist Group                                                                                          Hospitalist Progress Note  Sanchez Cedeño MD  Answering service: [phone] or [phone] from in house phone        Date of Service:  7/10/2020  NAME:  Claria Hatchet  :  1970  MRN:  [id number]    Admission Summary:     Claria Hatchet is a 51-year-old female with a past medical history of HTN. According to patient's mother, the patient began having a headache on 20 and had been taking BC Powder with aspirin in it for the headache. She recently stopped taking her BP medications. According to her mother she does not smoke, drink alcohol or use recreational drugs. The patient was brought to the [facility] ER via EMS on   after her coworkers found her unresponsive outside of her place of work. 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 On arrival to the ER she was found to be seizing. A stat CT of her head was performed which showed extensive subarachnoid hemorrhage. She was extremely hypertensive in the 240's and was given labetalol and started on a Cardene drip. She was emergently intubated and given Keppra and Ativan as well. Neurosurgery and Neurointerventional Surgery were consulted and the patient was transferred to the [facility] for a higher level of care. 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 Extensive lung infiltrates were also noted. Patient was taken for cerebral angiogram the morning of 20 and coil embolization of the left MCA aneurysm was completed. The right MCA aneurysm had a very wide neck incorporating both M2s. It could not be treated endovascularly without stents.  May need to be clipped by Neurosurgery.      Procedures: 20 and coil embolization of the left MCA aneurysm was completed s/p cerebral angiogram for right PANCHITO A1 angioplasty for vasospasm, transarterial infusion of verapamil on 6/9,  S/p angiogram on 6/10 for left ICA/MCA angioplasty and transarterial infusion of verapamil for vasospasm treatment, s/p angiogram on 6/11 and 6/13 for transarterial infusion of verapamil for vasospasm treatment. Tracheostomy placement  6/16 by Dr. Odilon Coffey in thoracic surgery. PEG placement. Interval history / Subjective: Follow up [address] Stable ;generally avoids exam;      Assessment & Plan:     #. Cerebral Aneurysms. #. SAH due to ruptured cerebral aneurysm   #. Hypertension  -s/p cerebral angiogram with coil embolization of left MCA bifurcation aneurysm on 6/5, patient has 5X3 right MCA bifurcation aneurysm with very wide neck which will need to be treated with stents vs. Clipping  -s/p cardene. Continues norvasc, hydralazine, lisinopril, goal SBP <160  -seizure prophylaxis keppra  -patient open her right eye to voice, non verbal, doesn't follows command, moves lower extremities spontaneously   -improved vasospasm since 6/20- seen by neurosurgeon      #. Bradycardia: monitor, hold carvedilol   #. Seizure: 2nd to above - stable - keppra, VIMPAT-seizure precaution- Neurologist  #. CVAs - aspirin - multiple ischemic infarct -MRI on 6/16- PT/OT-neurology evaluated  #. Respiratory failure secondary to above, pulmonary edema and possible pneumonia- resolved     #Dysphagia secondary to stroke and SAH-s/p PEG tube  -MBS 7/2. Diet initiated.   Holding tube feeds   - RN to watch for intake today ; seems on exam 7/10/2020 to be well hydrated,     #Iron deficiency anemia -noted fibroid  -Hgb trending down, Hgb 7.8, monitor H/H    -iron sucrose 200 mg iv x 3 doses   Lab Results   Component Value Date/Time    Iron 10 (L) 06/06/2020 04:21 AM    TIBC 376 06/06/2020 04:21 AM    Iron % saturation 3 (L) 06/06/2020 04:21 AM    Ferritin 44 06/13/2020 05:43 PM     F/u levels while yet here 7/10/2020     #Peripheral edema multifactorial- low albumin 2.6  -Echo normal cavity size and systolic function (ejection fraction normal). Mild concentric hypertrophy. Estimated left ventricular ejection fraction is 60 - 65%. No regional wall motion abnormality noted.     Obesity -diet control -A1c 5.7    Code status: Full Code   DVT prophylaxis: Lovenox  Care Plan discussed with: Patient/Family and Nurse  Anticipated Disposition: Inpatient rehab     Hospital Problems  Date Reviewed: 6/10/2020          Codes Class Noted POA    [facility] ([facility] ICD-10-CM: J96.90  ICD-9-CM: 518.81  6/15/2020 Yes        Hypoxic ischemic encephalopathy (HIE), unspecified severity ICD-10-CM: P91.60  ICD-9-CM: 768.70  6/15/2020 Yes        SAH (subarachnoid hemorrhage) ([facility] ICD-10-CM: I60.9  ICD-9-CM: 740  6/4/2020 Yes        Subarachnoid hemorrhage from middle cerebral artery aneurysm, left ([facility] ICD-10-CM: A44.29  ICD-9-CM: 408  6/4/2020 Yes        RIGHT middle cerebral artery aneurysm ICD-10-CM: I67.1  ICD-9-CM: 437.3  6/4/2020 Yes        Cerebral vasospasm ICD-10-CM: D58.825  ICD-9-CM: 435.9  6/4/2020 Yes            ROS not obtainable 2n2 pt condition       Vital Signs:    Last 24hrs VS reviewed since prior progress note. Most recent are:  Visit Vitals  /46 (BP 1 Location: Left arm, BP Patient Position: At rest)   Pulse 60   Temp 98.4 °F (36.9 °C)   Resp 18   Ht 5' 4\" (1.626 m)   Wt 91 kg (200 lb 9.6 oz)   SpO2 92%   BMI 34.43 kg/m²         Intake/Output Summary (Last 24 hours) at 7/10/2020 1052  Last data filed at 7/10/2020 4745  Gross per 24 hour   Intake    Output 250 ml   Net -250 ml        Physical Examination:             Constitutional:  No acute distress, responds weakly, seems intensional,    ENT:  Belvia Blanks removed, left eye closed day over day resist eval   Resp:  good AE, no wheezes. Clear bilateral        GI:  Soft, non distended, non tender.  obese    Musculoskeletal:  No edema    Neurologic:   Seems to choose to avoid exam no to look out toward examiner      Skin:  Good turgor, no rashes or ulcers       Data Review:    Review and/or order of clinical lab test  Review and/or order of tests in the radiology section of CPT  Review and/or order of tests in the medicine section of CPT      Labs:     Recent Labs     07/09/20  1200   WBC 5.9   HGB 11.8   HCT 38.6        No results for input(s): NA, K, CL, CO2, BUN, CREA, GLU, CA, MG, PHOS, URICA in the last 72 hours. No results for input(s): ALT, AP, TBIL, TBILI, TP, ALB, GLOB, GGT, AML, LPSE in the last 72 hours. No lab exists for component: SGOT, GPT, AMYP, HLPSE  No results for input(s): INR, PTP, APTT, INREXT, INREXT in the last 72 hours. No results for input(s): FE, TIBC, PSAT, FERR in the last 72 hours. No results found for: FOL, RBCF   No results for input(s): PH, PCO2, PO2 in the last 72 hours. No results for input(s): CPK, CKNDX, TROIQ in the last 72 hours.     No lab exists for component: CPKMB  Lab Results   Component Value Date/Time    Cholesterol, total 90 06/05/2020 03:34 AM    HDL Cholesterol 64 06/05/2020 03:34 AM    LDL, calculated 7.8 06/05/2020 03:34 AM    Triglyceride 91 06/05/2020 03:34 AM    CHOL/HDL Ratio 1.4 06/05/2020 03:34 AM     Lab Results   Component Value Date/Time    Glucose (POC) 107 (H) 07/02/2020 05:42 PM    Glucose (POC) 94 07/02/2020 11:42 AM    Glucose (POC) 157 (H) 07/02/2020 06:58 AM    Glucose (POC) 121 (H) 06/14/2020 11:22 PM    Glucose (POC) 118 (H) 06/14/2020 05:07 PM     Lab Results   Component Value Date/Time    Specific gravity 1.015 06/15/2020 05:45 AM         Medications Reviewed:     Current Facility-Administered Medications   Medication Dose Route Frequency    amLODIPine (NORVASC) tablet 5 mg  5 mg Per NG tube DAILY    [Held by provider] carvediloL (COREG) tablet 25 mg  25 mg Oral BID WITH MEALS    lisinopriL (PRINIVIL, ZESTRIL) tablet 40 mg  40 mg Oral DAILY    [Held by provider] hydroCHLOROthiazide (HYDRODIURIL) tablet 25 mg  25 mg Oral DAILY    albuterol-ipratropium (DUO-NEB) 2.5 MG-0.5 MG/3 ML  3 mL Nebulization BID PRN    guar gum (BENEFIBER) packet 1 Packet  4 g Oral TID    labetaloL (NORMODYNE;TRANDATE) injection 20 mg  20 mg IntraVENous Q4H PRN    0.9% sodium chloride infusion 250 mL  250 mL IntraVENous PRN    hydrALAZINE (APRESOLINE) tablet 100 mg  100 mg Oral TID    fentaNYL citrate (PF) injection 50 mcg  50 mcg IntraVENous Q2H PRN    hydrALAZINE (APRESOLINE) 20 mg/mL injection 10 mg  10 mg IntraVENous Q2H PRN    chlorhexidine (ORAL CARE KIT) 0.12 % mouthwash 15 mL  15 mL Oral Q12H    potassium bicarb-citric acid (EFFER-K) tablet 40 mEq  40 mEq Oral BID    lacosamide (VIMPAT) tablet 150 mg  150 mg Per NG tube Q12H    balsam peru-castor oiL (VENELEX) ointment   Topical BID    aspirin chewable tablet 81 mg  81 mg Per NG tube DAILY    enoxaparin (LOVENOX) injection 40 mg  40 mg SubCUTAneous Q24H    levETIRAcetam (KEPPRA) oral solution 1,000 mg  1,000 mg Per NG tube Q12H    glycopyrrolate (ROBINUL) injection 0.2 mg  0.2 mg IntraMUSCular TID PRN    acetaminophen (TYLENOL) solution 650 mg  650 mg Per NG tube Q4H PRN    glucose chewable tablet 16 g  4 Tab Oral PRN    glucagon (GLUCAGEN) injection 1 mg  1 mg IntraMUSCular PRN    dextrose 10% infusion 0-250 mL  0-250 mL IntraVENous PRN    albuterol (PROVENTIL VENTOLIN) nebulizer solution 2.5 mg  2.5 mg Nebulization Q6H PRN    polyvinyl alcohol-povidone (NATURAL TEARS) 0.5-0.6 % ophthalmic solution 2 Drop  2 Drop Both Eyes Q8H    bisacodyL (DULCOLAX) suppository 10 mg  10 mg Rectal DAILY PRN    ondansetron (ZOFRAN) injection 4 mg  4 mg IntraVENous Q4H PRN    docusate (COLACE) 50 mg/5 mL oral liquid 100 mg  100 mg Oral BID PRN     ______________________________________________________________________  EXPECTED LENGTH OF STAY: 22d 21h  ACTUAL LENGTH OF STAY:          Misael Granda MD

## 2020-07-10 NOTE — PROGRESS NOTES
I have reviewed discharge instructions with the patient and parent. The parent verbalized understanding. PICC and telemetry discontinued. Patient discharged to [facility] via [facility] with belongings. Report called to Hanane Blackmon RN at [place].

## 2020-07-10 NOTE — PROGRESS NOTES
Problem: Dysphagia (Adult)  Goal: *Acute Goals and Plan of Care (Insert Text)  Description: Speech Therapy Goals  Initiated 7/6/2020  1. Patient will tolerate ndd2/thin liquids without adverse effects within 7 days. Initiated 7/2/2020  1. Patient will tolerate pureed diet/thin liquids without adverse effects within 7 days. MET 7/06/2020     Outcome: Progressing Towards Goal     Problem: Patient Education: Go to Patient Education Activity  Goal: Patient/Family Education  Outcome: Progressing Towards Goal   SPEECH LANGUAGE PATHOLOGY DYSPHAGIA TREATMENT  Patient: Nevaeh Gibson (48 y.o. female)  Date: 7/10/2020  Diagnosis: SAH (subarachnoid hemorrhage) ([facility] [I60.9]   <principal problem not specified>       Precautions:  Aspiration    ASSESSMENT:  Patient presents with mild-moderate oral dysphagia and functional pharyngeal phase of swallow. Oral dysphagia characterized by initial impaired bolus acceptance but benefits from maximum verbal cues. Pt without any significant pharyngeal deficits once bolus was accepted. No significant pharyngeal weakness, timely swallow, and no aspiration or penetration. Pt to continue on outlined diet below. SLP will continue to follow. Suspect swallow to improve as neuro status continues to improve. PLAN:  Recommendations and Planned Interventions:  --mechanically altered diet/thin liquids   --Upright  --Small sips/bites  Patient continues to benefit from skilled intervention to address the above impairments. Continue treatment per established plan of care. Discharge Recommendations:  Skilled Nursing Facility     SUBJECTIVE:   Patient was unparticipative and closed eyes as an attempt to ignore speech .     OBJECTIVE:   Cognitive and Communication Status:  Neurologic State: Alert  Orientation Level: Disoriented to situation, Disoriented to time, Oriented to person, Disoriented to place  Cognition: Decreased attention/concentration, Decreased command following  Perception: Cues to attend right visual field, Cues to attend to right side of body, Verbal, Tactile, Visual  Perseveration: No perseveration noted  Safety/Judgement: Decreased awareness of environment  Dysphagia Treatment:  Oral Assessment:  Oral Assessment  Labial: No impairment  Dentition: Intact; Natural  Oral Hygiene: oral mucosa miost and clear of secretions  Lingual: No impairment  Velum: No impairment  Mandible: No impairment  P.O. Trials:  Patient Position: upright in bed  Vocal quality prior to P.O.: Other (comment)(none noted)  Consistency Presented: Thin liquid;Puree  How Presented: SLP-fed/presented;Cup/sip;Spoon     Bolus Acceptance: No impairment(eventually accepted after max cues)  Bolus Formation/Control: Impaired  Type of Impairment: Other (comment); Delayed  Propulsion: No impairment  Oral Residue: None  Initiation of Swallow: Delayed (# of seconds)  Laryngeal Elevation: Decreased  Aspiration Signs/Symptoms: None  Pharyngeal Phase Characteristics: No impairment, issues, or problems   Effective Modifications: Cup/sip  Cues for Modifications: None       Oral Phase Severity: Mild-moderate  Pharyngeal Phase Severity : No impairment    Pain:  Pain Scale 1: Adult Nonverbal Pain Scale  Pain Intensity 1: 0       After treatment:   Patient left in no apparent distress in bed, Call bell within reach, and Nursing notified    COMMUNICATION/EDUCATION:   Patient was educated regarding her deficit(s) of dysphagia as this relates to her diagnosis of stroke. She demonstrated Guarded understanding. The patient's plan of care including recommendations, planned interventions, and recommended diet changes were discussed with: Registered nurse.      Susan Nobles  Time Calculation: 16 mins

## 2020-07-10 NOTE — PROGRESS NOTES
Transition of Care Plan to SNF/Rehab    SNF/Rehab Transition:  Patient has been accepted to [facility]SNF[facility] room 231and meets criteria for admission. Patient will transported by [facility] and expected to leave at 4P. Communication to Patient/Family:  Spoke with patient's mother, Alka Black [phone] and she is in agreement with transition plan. Communication to SNF/Rehab:  Bedside RN, has been notified to update the transition plan to the facility and call report ([phone]). Discharge information has been updated on the AVS.        Nursing Please include all hard scripts for controlled substances, med rec and dc summary, and AVS in packet. Reviewed and confirmed with , Tulio Borges  and they can manage the patient care needs for the following:     Lana Reveles with (X) only those applicable:    Medication:  [x]  Medications will be available at the facility  []  IV Antibiotics   []  Controlled Substance - hard copy to be sent with patient   []  Weekly Labs   Documents:  [] Hard RX  [] MAR  [] Kardex  [] AVS  []Transfer Summary  []Discharge   Equipment:  []  CPAP/BiPAP  []  Wound Vacuum  []  Martinez or Urinary Device  []  PICC/Central Line  []  Nebulizer  []  Ventilator   Treatment:  []Isolation (for MRSA, VRE, etc.)  []Surgical Drain Management  []Tracheostomy Care  []Dressing Changes  []Dialysis with transportation and chair time   []PEG Care  []Oxygen  []Daily Weights for Heart Failure   Dietary:  []Any diet limitations  []Tube Feedings   []Total Parenteral Management (TPN)   Eligible for Medicaid Long Term Services and Supports  Yes:  [] Eligible for medical assistance or will become eligible within 180 days and UAI completed. [] Provider/Patient and/or support system has requested screening. [] UAI copy provided to patient or responsible party. [] UAI unavailable at discharge will send once processed to SNF provider.   [] UAI unavailable at discharged mailed to patient  No:   [] Private pay and is not financially eligible for Medicaid within the next 180 days. [] Reside out-of-state. [] A residents of a state owned/operated facility that is licensed  by [address] [place] [facility] or [facility]  [] Enrollment in [facility] hospice services  [] [address] [place] [address]  [x] Patient /Family declines to have screening completed or provide financial information for screening     Financial Resources:  Medicaid    [] Initiated and application pending   [] Full coverage     Advanced Care Plan:  []Surrogate Decision Maker of Care  []POA  []Communicated Code Status (DDNR\", \"Full\")    Other     Care Management Interventions  PCP Verified by CM: Yes(Dr Mayra Tolbert)  Mode of Transport at Discharge: BLS([facility])  MyChart Signup: No  Discharge Durable Medical Equipment: No  Physical Therapy Consult: No  Occupational Therapy Consult: No  Speech Therapy Consult: No  Current Support Network: Own Home(Mother Dg [phone])  Confirm Follow Up Transport: Family  The Plan for Transition of Care is Related to the Following Treatment Goals : SNF   The Patient and/or Patient Representative was Provided with a Choice of Provider and Agrees with the Discharge Plan?: Yes  Freedom of Choice List was Provided with Basic Dialogue that Supports the Patient's Individualized Plan of Care/Goals, Treatment Preferences and Shares the Quality Data Associated with the Providers?: Yes   Resource Information Provided?: No  Discharge Location  Discharge Placement: Skilled nursing facility([place]/SNF)    CM available in case needs arise.  JUDE Cobb,CRM

## 2020-07-10 NOTE — PROGRESS NOTES
Problem: Falls - Risk of  Goal: *Absence of Falls  Description: Document Isak Avendano Fall Risk and appropriate interventions in the flowsheet.   Outcome: Progressing Towards Goal  Note: Fall Risk Interventions:  Mobility Interventions: Bed/chair exit alarm, Communicate number of staff needed for ambulation/transfer, Patient to call before getting OOB, PT Consult for mobility concerns, PT Consult for assist device competence    Mentation Interventions: Adequate sleep, hydration, pain control, Bed/chair exit alarm, Door open when patient unattended, More frequent rounding, Reorient patient, Update white board    Medication Interventions: Teach patient to arise slowly, Patient to call before getting OOB, Bed/chair exit alarm    Elimination Interventions: Call light in reach, Patient to call for help with toileting needs    History of Falls Interventions: Consult care management for discharge planning, Vital signs minimum Q4HRs X 24 hrs (comment for end date), Bed/chair exit alarm

## 2020-07-10 NOTE — DISCHARGE SUMMARY
Discharge Summary       PATIENT ID: Uday Aguilar  MRN: [id number]   YOB: 1970    DATE OF ADMISSION: 6/4/2020  8:08 PM    DATE OF DISCHARGE: 7/10/2020    PRIMARY CARE PROVIDER: Darío Zaragoza MD     ATTENDING PHYSICIAN: John Cabrera MD   DISCHARGING PROVIDER: John Cabrera MD    To contact this individual call [phone] and ask the  to page. If unavailable ask to be transferred the Adult Hospitalist Department. CONSULTATIONS: IP CONSULT TO NEUROLOGY  IP CONSULT TO THORACIC SURGERY  IP CONSULT TO INTERVENTIONAL RADIOLOGY  IP CONSULT TO PALLIATIVE CARE - PROVIDER    PROCEDURES/SURGERIES: * No surgery found *    [address] COURSE:   Per recent PN:     Admission Summary:      Uday Aguilar is a 51-year-old female with a past medical history of HTN. According to patient's mother, the patient began having a headache on 5/31/20 and had been taking BC Powder with aspirin in it for the headache. She recently stopped taking her BP medications. According to her mother she does not smoke, drink alcohol or use recreational drugs. The patient was brought to the [facility] ER via EMS on 6/4/420  after her coworkers found her unresponsive outside of her place of work. 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 On arrival to the ER she was found to be seizing. A stat CT of her head was performed which showed extensive subarachnoid hemorrhage. She was extremely hypertensive in the 240's and was given labetalol and started on a Cardene drip. She was emergently intubated and given Keppra and Ativan as well. Neurosurgery and Neurointerventional Surgery were consulted and the patient was transferred [facility] the [facility] for a higher level of care. 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  Extensive lung infiltrates were also noted. Patient was taken for cerebral angiogram the morning of 06/05/20 and coil embolization of the left MCA aneurysm was completed. The right MCA aneurysm had a very wide neck incorporating both M2s. It could not be treated endovascularly without stents. May need to be clipped by Neurosurgery.      Procedures: 06/05/20 and coil embolization of the left MCA aneurysm was completed s/p cerebral angiogram for right PANCHITO A1 angioplasty for vasospasm, transarterial infusion of verapamil on 6/9,  S/p angiogram on 6/10 for left ICA/MCA angioplasty and transarterial infusion of verapamil for vasospasm treatment, s/p angiogram on 6/11 and 6/13 for transarterial infusion of verapamil for vasospasm treatment. Tracheostomy placement  6/16 by Dr. Dave Triplett in thoracic surgery. PEG placement.     Interval history / Subjective: Follow up [facility]. Stable ;generally avoids exam;       Assessment & Plan:      #. Cerebral Aneurysms. #. SAH due to ruptured cerebral aneurysm   #. Hypertension  -s/p cerebral angiogram with coil embolization of left MCA bifurcation aneurysm on 6/5, patient has 5X3 right MCA bifurcation aneurysm with very wide neck which will need to be treated with stents vs. Clipping  -s/p cardene. Continues norvasc, hydralazine, lisinopril, goal SBP <160  -seizure prophylaxis keppra  -patient open her right eye to voice, non verbal, doesn't follows command, moves lower extremities spontaneously   -improved vasospasm since 6/20- seen by neurosurgeon      #. Bradycardia: monitor, hold carvedilol   #. Seizure: 2nd to above - stable - keppra, VIMPAT-seizure precaution- Neurologist  #. CVAs - aspirin - multiple ischemic infarct -MRI on 6/16- PT/OT-neurology evaluated  #. Respiratory failure secondary to above, pulmonary edema and possible pneumonia- resolved     #Dysphagia secondary to stroke and SAH-s/p PEG tube  -MBS 7/2. Diet initiated.   Holding tube feeds   - RN to watch for intake today ; seems on exam 7/10/2020 to be well hydrated,      #Iron deficiency anemia -noted fibroid  -Hgb trending down, Hgb 7.8, monitor H/H    -iron sucrose 200 mg iv x 3 doses         Lab Results   Component Value Date/Time     Iron 10 (L) 06/06/2020 04:21 AM     TIBC 376 06/06/2020 04:21 AM     Iron % saturation 3 (L) 06/06/2020 04:21 AM     Ferritin 44 06/13/2020 05:43 PM     F/u levels while yet here 7/10/2020 :   Please f/u on Iron levels, pending results and give iron IV  ( iron sucrose ) as needed. And monitor levels til stable       #Peripheral edema multifactorial- low albumin 2.6  -Echo normal cavity size and systolic function (ejection fraction normal). Mild concentric hypertrophy. Estimated left ventricular ejection fraction is 60 - 65%. No regional wall motion abnormality noted.     Obesity -diet control -A1c 5.7     Code status: Full Code   DVT prophylaxis: Lovenox  Care Plan discussed with: Patient/Family and Nurse  Anticipated              DISCHARGE DIAGNOSES / PLAN:      1.  as above      ADDITIONAL CARE RECOMMENDATIONS:     Please f/u on Iron levels, pending results and give iron IV  ( iron sucrose ) as needed. And monitor levels til stable      PENDING TEST RESULTS:   At the time of discharge the following test results are still pending: iron profile 7/10 pending.      FOLLOW UP APPOINTMENTS:    Follow-up Information     Follow up With Specialties Details Why [address] [place] [address] [address] [place] [address]    Jossy Amanda MD [address]  [phone]               DIET: dysphagia, mech altered NDD2     ACTIVITY: Activity as tolerated    WOUND CARE: as needed     EQUIPMENT needed: na      DISCHARGE MEDICATIONS:  Current Discharge Medication List      START taking these medications    Details   acetaminophen (TYLENOL) 32MG/ML soln solution Take 20.3 mL by mouth every four (4) hours as needed for Fever or Pain. Mazon Shoulders: 147 mL, Refills: 0      albuterol (PROVENTIL VENTOLIN) 2.5 mg /3 mL (0.083 %) nebu 3 mL by Nebulization route every six (6) hours as needed for Wheezing. Qty: 30 Nebule, Refills: 0      amLODIPine (NORVASC) 5 mg tablet 1 Tab by Per NG tube route daily. Qty: 30 Tab, Refills: 0      aspirin 81 mg chewable tablet 1 Tab by Per NG tube route daily. Qty: 30 Tab, Refills: 0      balsam peru-castor oiL (VENELEX) ointment Apply  to affected area two (2) times a day. Qty: 1 Tube, Refills: 0      bisacodyL (DULCOLAX) 10 mg supp Insert 10 mg into rectum daily as needed for Constipation. Qty: 10 Suppository, Refills: 0      docusate (COLACE) 50 mg/5 mL liquid Take 10 mL by mouth two (2) times daily as needed (constipation). Qty: 120 mL, Refills: 0      glycopyrrolate (ROBINUL) 0.2 mg/mL injection 1 mL by IntraMUSCular route three (3) times daily as needed (increased oral secretions). Qty: 1 Vial, Refills: 0      potassium bicarb-citric acid (EFFER-K) 20 mEq tablet Per peg tube: Orange Cream flavored tablet: \"Dissolve tablet in 3 to 4 ounces (85 to 115 mL) of cold or ice water before drinking. \" Unflavored tablet: \"Dissolve tablet in 3 to 4 ounces (85 to 115 mL) of cold juice of choice before drinking. \"  Qty: 120 Tab, Refills: 0      polyvinyl alcohol-povidone (NATURAL TEARS) 0.5-0.6 % ophthalmic solution Administer 2 Drops to both eyes every eight (8) hours. Qty: 15 mL, Refills: 0      lisinopriL (PRINIVIL, ZESTRIL) 40 mg tablet Take 1 Tab by mouth daily. Qty: 30 Tab, Refills: 0      levETIRAcetam (KEPPRA) 100 mg/ml soln oral solution 10 mL by Per NG tube route every twelve (12) hours every twelve (12) hours. Qty: 473 mL, Refills: 1      lacosamide (VIMPAT) 150 mg tab tablet 1 Tab by Per NG tube route every twelve (12) hours. Max Daily Amount: 300 mg.   Qty: 60 Tab, Refills: 0    Associated Diagnoses: SAH (subarachnoid hemorrhage) (HCC)      hydrALAZINE (APRESOLINE) 100 mg tablet Take 1 Tab by mouth three (3) times daily. Qty: 90 Tab, Refills: 0      guar gum (BENEFIBER) packet Take 1 Packet by mouth three (3) times daily for 90 days. Qty: 90 Packet, Refills: 2               NOTIFY YOUR PHYSICIAN FOR ANY OF THE FOLLOWING:   Fever over 101 degrees for 24 hours. Chest pain, shortness of breath, fever, chills, nausea, vomiting, diarrhea, change in mentation, falling, weakness, bleeding. Severe pain or pain not relieved by medications. Or, any other signs or symptoms that you may have questions about. DISPOSITION:    Home With:   OT  PT  HH  RN      x Long term SNF/Inpatient Rehab    Independent/assisted living    Hospice    Other:       PATIENT CONDITION AT DISCHARGE:     Functional status   x Poor     Deconditioned     Independent      Cognition     Lucid     Forgetful    x  encephalpathic      Catheters/lines (plus indication)    Martinez     PICC    x PEG      None      Code status    x Full code     DNR      PHYSICAL EXAMINATION AT DISCHARGE:  Visit Vitals  /46 (BP 1 Location: Left arm, BP Patient Position: At rest)   Pulse 60   Temp 98.4 °F (36.9 °C)   Resp 18   Ht 5' 4\" (1.626 m)   Wt 91 kg (200 lb 9.6 oz)   SpO2 92%   BMI 34.43 kg/m²      GEN:   No apparent distress;     HEENT:  Atraumatic;no nasal discharge; MM moist;non icteric sclarea  PUL:   Un labored breathing; no externally audible wheeze; no retractions  CV:    Rhythm regular; rate controled  ABD:   Non distended; Soft; non tender  EXT:   No gross edema; muscle mass fair;   NEURO:   non verbal will open right eye, avoids exam as she can   PSYCH:  Non agitated; not anxious   SKIN:   Non icteric; dry,       CHRONIC MEDICAL DIAGNOSES:  Problem List as of 7/10/2020 Date Reviewed: 7/10/2020          Codes Class Noted - Resolved    Hypoxic ischemic encephalopathy (HIE), unspecified severity ICD-10-CM: P91.60  ICD-9-CM: 768.70  6/15/2020 - Present        RIGHT middle cerebral artery aneurysm ICD-10-CM: I67.1  ICD-9-CM: 437.3  6/4/2020 - Present        RESOLVED: Respiratory failure (HCC) ICD-10-CM: J96.90  ICD-9-CM: 518.81  6/15/2020 - 7/10/2020        RESOLVED: SAH (subarachnoid hemorrhage) ([facility] [address] ICD-10-CM: I60.9  ICD-9-CM: 430  6/4/2020 - 7/10/2020        RESOLVED: Subarachnoid hemorrhage from middle cerebral artery aneurysm, left (HCC) ICD-10-CM: I60.12  ICD-9-CM: 430  6/4/2020 - 7/10/2020        RESOLVED: Cerebral vasospasm ICD-10-CM: N10.278  ICD-9-CM: 435.9  6/4/2020 - 7/10/2020        RESOLVED: Anterior communicating artery aneurysm ICD-10-CM: I67.1  ICD-9-CM: 437.3  6/4/2020 - 6/12/2020              Greater than  30  minutes were spent with the patient on counseling and coordination of care    Signed:   Elsa Gruber MD  7/10/2020  12:34 PM

## 2020-07-26 LAB
ACID FAST STN SPEC: NEGATIVE
MYCOBACTERIUM SPEC QL CULT: NEGATIVE
SPECIMEN PREPARATION: NORMAL
SPECIMEN SOURCE: NORMAL

## 2020-08-18 ENCOUNTER — TELEPHONE (OUTPATIENT)
Dept: FAMILY MEDICINE CLINIC | Age: 50
End: 2020-08-18

## 2020-08-18 ENCOUNTER — OFFICE VISIT (OUTPATIENT)
Dept: FAMILY MEDICINE CLINIC | Age: 50
End: 2020-08-18
Payer: COMMERCIAL

## 2020-08-18 VITALS
HEIGHT: 65 IN | DIASTOLIC BLOOD PRESSURE: 98 MMHG | RESPIRATION RATE: 18 BRPM | OXYGEN SATURATION: 98 % | SYSTOLIC BLOOD PRESSURE: 171 MMHG | HEART RATE: 72 BPM | BODY MASS INDEX: 29.32 KG/M2 | WEIGHT: 176 LBS

## 2020-08-18 DIAGNOSIS — I10 ESSENTIAL HYPERTENSION: Primary | ICD-10-CM

## 2020-08-18 DIAGNOSIS — Z91.81 AT RISK FOR FALLS: ICD-10-CM

## 2020-08-18 PROBLEM — M19.90 OSTEOARTHRITIS: Status: ACTIVE | Noted: 2020-08-18

## 2020-08-18 PROBLEM — Z12.11 SCREENING FOR MALIGNANT NEOPLASM OF COLON: Status: ACTIVE | Noted: 2020-08-18

## 2020-08-18 PROBLEM — Z86.2 HISTORY OF ANEMIA: Status: ACTIVE | Noted: 2020-08-18

## 2020-08-18 PROBLEM — Z87.42 HISTORY OF ABNORMAL CERVICAL PAP SMEAR: Status: ACTIVE | Noted: 2020-08-18

## 2020-08-18 PROBLEM — G56.00 CARPAL TUNNEL SYNDROME: Status: ACTIVE | Noted: 2020-08-18

## 2020-08-18 PROCEDURE — 99202 OFFICE O/P NEW SF 15 MIN: CPT | Performed by: NURSE PRACTITIONER

## 2020-08-18 NOTE — PROGRESS NOTES
Subjective  Chico Patel is a 48 y.o. female. HPI: Patient was last seen in our office in February 2017 by Dr. Luiz Valles with a diagnosis of hypertension. At the time she was on 3 blood pressure medicines. Unclear if she was seeing another provider for routine follow-ups for hypertension. Accompanied by her mother today who will be her  main caretaker. Mother stated patient was at work on 6/4/2020 when she collapsed and was med flighted to [facility] in [facility]. She apparently was unresponsive after collapsing. Diagnosis was right middle cerebral artery aneurysm and hypoxic ischemic encephalopathy. Had headaches prior to her collapse. She was unconscious for 4 weeks and then started to talk. Had a trach and was removed. Can't see out of L eye and ptosis. Has not had any falls and not currently using a walker or cane. Stayed in hospital until July 10, 2020. Now in rehab @ [facility]. May be d/c'd this week and will be staying w/ her mother. She apparently has made much progress at the rehab facility when it comes to her physical abilities. Unclear what the prognosis is for her cognitive function. Has another aneurysm in her head that is being monitored. Patient was wandering about the exam room during the appointment and unable to answer questions. She could not state her name, birthday, time or place. Did recognize her mother. Sometimes her speech was garbled and difficult to follow. Became a little agitated when mother asked her to sit down. Attention span appeared very limited. Also has a G-tube because she was not taking her medicines or eating enough at the nursing home/rehab facility. Patient kept asking when could the G-tube come out. GT site is sore. Can come out if she eats ok for 2 weeks after she goes home. Mother stated she was trained how to give the tube feedings.   She did not have the answer to many questions about patient's course of stay at the rehab facility because she has not been allowed to visit due to the COVID-19 crisis. Patient's mother is taking time off from work to care for the patient. She is also looking after patient's 15year-old son. I have some concerns about mother's ability to handle patient at home without additional help. Home health services apparently will be coming to home and mother is aware that this is a temporary service. BP in office was high at 171/98. Unclear what is current med list as mother brought a med list dated July 31,2020. Will need to contact rehab office to ask for newer list.  Has a feeding tube- sometimes was not eating or taking her meds. Dr Caretha Councilman is the endovascular surgical neuroradiology (neurinterventional surgery) provider She is still getting insurance coverage through her job but has applied for SS disability. Pt may be d/c'd at end of this week. Mother stated [facility] [email or web address]. Mother stated that pt does not have vision in the L eye after the aneurysm. She was unable to open the L eye for a period of time. Review of Systems   Constitutional: Negative for chills and fever. HENT: Negative for sore throat. Eyes:        No vision in L eye. Respiratory: Negative for cough, shortness of breath and wheezing. Cardiovascular: Negative for chest pain and leg swelling. Gastrointestinal: Negative for abdominal pain, constipation, diarrhea, nausea and vomiting. Musculoskeletal: Negative for joint pain and myalgias. Neurological: Positive for speech change. Negative for dizziness, tingling, sensory change and headaches. Psychiatric/Behavioral: Positive for memory loss. Objective  Physical Exam  Vitals signs reviewed. Constitutional:       Appearance: Normal appearance.    HENT:      Right Ear: External ear normal.      Left Ear: External ear normal.      Nose: Nose normal.   Eyes:      Conjunctiva/sclera: Conjunctivae normal.   Neck: Musculoskeletal: Neck supple. Cardiovascular:      Rate and Rhythm: Normal rate and regular rhythm. Heart sounds: Normal heart sounds. No murmur. Pulmonary:      Effort: Pulmonary effort is normal.      Breath sounds: Normal breath sounds. Musculoskeletal: Normal range of motion. General: No swelling or tenderness. Right lower leg: No edema. Left lower leg: No edema. Skin:     General: Skin is warm and dry. Neurological:      Mental Status: She is alert. She is disoriented. Comments: Ptosis of L eyelid   Psychiatric:      Comments: Agitated and perseverated when speaking- very fixated on removal of GT tube. Wandering around exam room and looking at computer. Assessment & Plan    ICD-10-CM ICD-9-CM    1. Essential hypertension  I10 401.9 AMB SUPPLY ORDER   2. Hypoxic ischemic encephalopathy (HIE), unspecified severity  P91.60 768.70    3.  At risk for falls  Z91.81 V15.88 AMB SUPPLY ORDER     Debarah Moritz, NP

## 2020-08-18 NOTE — TELEPHONE ENCOUNTER
Telephone call to [facility] to discuss discharge status. I spoke to  Kiley Mcclelland and stated I was concerned about pt's mother's ability to manage pt at her home. Ms Ricardo Mina stated I needed to speak to a -  on another line. Since I needed to see a patient, asked her to have  call office and speak to my nurse about a good time to call her back.

## 2020-08-19 ENCOUNTER — TELEPHONE (OUTPATIENT)
Dept: FAMILY MEDICINE CLINIC | Age: 50
End: 2020-08-19

## 2020-08-19 NOTE — TELEPHONE ENCOUNTER
I tried a number of times on 8/18/2020 to reach  staff at [facility] and [address] [place] [address]. One  Kan Porter was out of office and unable to connect [facility] due to seeing pts. I did try to call at end of clinic day but could not reach Ricardo Call. Several messages were relayed regarding my concerns about what is status of discharge planning and ability of mother to manage pt's needs. Called this am-Jossy and Ricardo Call not available yet. I gave my cell phone number. If I am in w/ a pt when call comes in, I will need to call back.

## 2020-08-21 ENCOUNTER — TELEPHONE (OUTPATIENT)
Dept: FAMILY MEDICINE CLINIC | Age: 50
End: 2020-08-21

## 2020-08-23 NOTE — PATIENT INSTRUCTIONS
You should be hearing from [facility] about the equipment in near future. Call office w/ questions or concerns prior to next office appt.

## 2020-08-24 DIAGNOSIS — Z91.81 AT HIGH RISK FOR FALLS: Primary | ICD-10-CM

## 2020-08-24 DIAGNOSIS — I10 ESSENTIAL HYPERTENSION: ICD-10-CM

## 2020-08-24 RX ORDER — ACETAMINOPHEN 500 MG
TABLET ORAL
Qty: 1 KIT | Refills: 0 | Status: SHIPPED | OUTPATIENT
Start: 2020-08-24

## 2020-08-24 NOTE — PROGRESS NOTES
Orders were sent previously to [facility] for shower chair and B/P cuff. Pt's mother who is caretaker called to advise that [facility] would not supply due to pt's insurance. Requested orders be sent to [address] [place] [address], [place]. [facility] would not let me send order for shower chair electronically so order will need to be faxed. Staff will be notified.

## 2020-08-27 ENCOUNTER — TELEPHONE (OUTPATIENT)
Dept: FAMILY MEDICINE CLINIC | Age: 50
End: 2020-08-27

## 2020-08-27 NOTE — TELEPHONE ENCOUNTER
Tisha with [facility] left message. States that she needs us to call her back regarding pt.

## 2020-08-28 ENCOUNTER — OFFICE VISIT (OUTPATIENT)
Dept: FAMILY MEDICINE CLINIC | Age: 50
End: 2020-08-28
Payer: COMMERCIAL

## 2020-08-28 VITALS
DIASTOLIC BLOOD PRESSURE: 95 MMHG | HEART RATE: 66 BPM | OXYGEN SATURATION: 97 % | TEMPERATURE: 98.7 F | HEIGHT: 62 IN | WEIGHT: 172 LBS | SYSTOLIC BLOOD PRESSURE: 165 MMHG | BODY MASS INDEX: 31.65 KG/M2

## 2020-08-28 DIAGNOSIS — Z13.220 SCREENING FOR HYPERLIPIDEMIA: ICD-10-CM

## 2020-08-28 DIAGNOSIS — Z91.81 AT HIGH RISK FOR FALLS: ICD-10-CM

## 2020-08-28 DIAGNOSIS — R56.9 SEIZURE (HCC): ICD-10-CM

## 2020-08-28 DIAGNOSIS — H54.62 VISION LOSS OF LEFT EYE: ICD-10-CM

## 2020-08-28 DIAGNOSIS — I10 ESSENTIAL HYPERTENSION: Primary | ICD-10-CM

## 2020-08-28 DIAGNOSIS — E87.6 HYPOKALEMIA: ICD-10-CM

## 2020-08-28 PROCEDURE — 99213 OFFICE O/P EST LOW 20 MIN: CPT | Performed by: NURSE PRACTITIONER

## 2020-08-28 RX ORDER — HYDRALAZINE HYDROCHLORIDE 100 MG/1
100 TABLET, FILM COATED ORAL 3 TIMES DAILY
Qty: 90 TAB | Refills: 0 | Status: SHIPPED | OUTPATIENT
Start: 2020-08-28 | End: 2020-10-11

## 2020-08-28 RX ORDER — LISINOPRIL 40 MG/1
40 TABLET ORAL DAILY
Qty: 30 TAB | Refills: 0 | Status: SHIPPED | OUTPATIENT
Start: 2020-08-28 | End: 2020-10-14

## 2020-08-28 RX ORDER — POTASSIUM CHLORIDE 20MEQ/15ML
20 LIQUID (ML) ORAL DAILY
Qty: 480 ML | Refills: 5 | Status: SHIPPED | OUTPATIENT
Start: 2020-08-28 | End: 2020-09-29

## 2020-08-28 NOTE — PROGRESS NOTES
Subjective  Queen Cassie is a 48 y.o. female. HPI: Pt presented for continuation of assessment - complex pt and unable to complete at last encounter. Accompanied by mother who contributed 100% of history. Mother stated that all of pt\"s medications needed to be refilled. Pt not oriented to place, or time. Did not know birthday. Some sentences do not make sense. Does not follow directions well. She is ambulatory and able to take a shower but not interested in using shower chair. Has a G tube in and is eating. Pt and mother anticipate the G tube will come out 9/14/2020. Review of her weight indicated that pt lost 4 pounds since appt on 8/18/2020. Mother will need to monitor weight and encourage small, calorie dense meals. G tube should not come out if pt continues to lose weight. Pt was d/c'd from rehab services on 8/24/2020. Mother currently on FMLA. June -Dec 2020. She said she is on intermittent FMLA. Paperwork from neurosurgeon advised she be off from work 7 days a week. Mother has plans in future to return to work and stated that pt will not be left alone. Mother stated that pt did not sleep well @ first because she was constipated. Mother called rehab and was told her to get OTC stool softener and laxative. After a few doses, pt was able to have a BM. B/P high in office@ 165/95. Waiting delivery of B/P machine- delayed due to paperwork issue. This is a priority so mother can monitor B/P and med adjustments made as needed. Pt does not have vision in L eye as a consequence of ruptured aneurysm. Does not have and eye care provider so will make referral.   Also needs routine labs to ascertain any abnormalities. Review of Systems   Constitutional: Positive for weight loss. Negative for chills, fever and malaise/fatigue. HENT: Negative for congestion, ear pain and sore throat. Eyes: Negative. Respiratory: Negative. Cardiovascular: Negative for chest pain, palpitations and leg swelling. Gastrointestinal: Negative. Negative for abdominal pain, constipation, diarrhea, heartburn, nausea and vomiting. Genitourinary: Negative. Musculoskeletal: Negative. Skin: Negative for itching and rash. Neurological: Negative for dizziness, tingling, tremors, sensory change, weakness and headaches. Psychiatric/Behavioral: Positive for memory loss. The patient has insomnia. Objective  Physical Exam  Constitutional:       Appearance: She is obese. HENT:      Head: Normocephalic. Right Ear: External ear normal.      Left Ear: External ear normal.      Nose: Nose normal.   Eyes:      Conjunctiva/sclera: Conjunctivae normal.   Neck:      Musculoskeletal: Neck supple. Cardiovascular:      Rate and Rhythm: Normal rate and regular rhythm. Heart sounds: Normal heart sounds. Pulmonary:      Effort: Pulmonary effort is normal.      Breath sounds: Normal breath sounds. Musculoskeletal: Normal range of motion. Skin:     General: Skin is warm and dry. Neurological:      General: No focal deficit present. Mental Status: She is alert. She is disoriented. Psychiatric:      Comments: Attention span limited, restless in office. Assessment & Plan    ICD-10-CM ICD-9-CM    1. Essential hypertension  I10 401.9 hydrALAZINE (APRESOLINE) 100 mg tablet      lisinopriL (PRINIVIL, ZESTRIL) 40 mg tablet      amLODIPine (NORVASC) 10 mg tablet    Not controlled in office- mother not checking at home -no B/P machine in home. Pending B/P machine from supplier. Dose of amlodipine increased - f/u effect. 2. Hypoxic ischemic encephalopathy (HIE), unspecified severity  P91.60 768.70 RETICULOCYTE COUNT      CBC WITH AUTOMATED DIFF    Home Health services for nurse, OT/PT will be following pt for a time in home. Will monitor B/P also. 3. At high risk for falls  Z91.81 V15.88     Pt refusing to use shower chair that mother obtained.     4. Vision loss of left eye  H54.62 369.8 REFERRAL TO OPHTHALMOLOGY    Mother advised to make an appt w/ eye care provider for pt.   5. Hypokalemia  E87.6 276.8 potassium chloride (KAON 10%) 20 mEq/15 mL solution      METABOLIC PANEL, COMPREHENSIVE    F/U labs   6. Screening for hyperlipidemia  Z13.220 V77.91 LIPID PANEL    F/U labs   7. Seizure ([address]  R56.9 780.39 levETIRAcetam (KEPPRA) 100 mg/ml soln oral solution    Med refill done.      Vicky Kaur NP

## 2020-08-28 NOTE — PATIENT INSTRUCTIONS
Check weight 3 x week. She should not be losing weight every week if she is getting enough nutrition in. Encourage small snacks and protein drinks. Check B/P 2 x day and record.

## 2020-08-28 NOTE — TELEPHONE ENCOUNTER
Tien Almazan, [address] [place] [address] Rd., [address] regarding pt's blood pressure monitor. They stated that they faxed the order off to [facility] because [facility] does not bill for Blood pressure monitor's. She stated that she just wanted to give you the status of what is going on. So I think we to contact Home care Delivered # [phone].

## 2020-08-31 RX ORDER — AMLODIPINE BESYLATE 10 MG/1
10 TABLET ORAL DAILY
Qty: 90 TAB | Refills: 3 | Status: SHIPPED | OUTPATIENT
Start: 2020-08-31 | End: 2021-06-25

## 2020-08-31 RX ORDER — AMOXICILLIN 250 MG
1 CAPSULE ORAL
COMMUNITY
End: 2021-02-19 | Stop reason: ALTCHOICE

## 2020-09-01 ENCOUNTER — TELEPHONE (OUTPATIENT)
Dept: FAMILY MEDICINE CLINIC | Age: 50
End: 2020-09-01

## 2020-09-02 DIAGNOSIS — E78.1 HYPERTRIGLYCERIDEMIA: Primary | ICD-10-CM

## 2020-09-02 LAB
ALBUMIN SERPL-MCNC: 4.3 G/DL (ref 3.8–4.8)
ALBUMIN/GLOB SERPL: 1.5 {RATIO} (ref 1.2–2.2)
ALP SERPL-CCNC: 63 IU/L (ref 39–117)
ALT SERPL-CCNC: 14 IU/L (ref 0–32)
AST SERPL-CCNC: 17 IU/L (ref 0–40)
BASOPHILS # BLD AUTO: 0 X10E3/UL (ref 0–0.2)
BASOPHILS NFR BLD AUTO: 0 %
BILIRUB SERPL-MCNC: 0.3 MG/DL (ref 0–1.2)
BUN SERPL-MCNC: 11 MG/DL (ref 6–24)
BUN/CREAT SERPL: 14 (ref 9–23)
CALCIUM SERPL-MCNC: 10 MG/DL (ref 8.7–10.2)
CHLORIDE SERPL-SCNC: 104 MMOL/L (ref 96–106)
CHOLEST SERPL-MCNC: 157 MG/DL (ref 100–199)
CO2 SERPL-SCNC: 24 MMOL/L (ref 20–29)
COMMENT:: ABNORMAL
CREAT SERPL-MCNC: 0.77 MG/DL (ref 0.57–1)
EOSINOPHIL # BLD AUTO: 0.1 X10E3/UL (ref 0–0.4)
EOSINOPHIL NFR BLD AUTO: 1 %
ERYTHROCYTE [DISTWIDTH] IN BLOOD BY AUTOMATED COUNT: 22.4 % (ref 11.7–15.4)
GLOBULIN SER CALC-MCNC: 2.8 G/DL (ref 1.5–4.5)
GLUCOSE SERPL-MCNC: 96 MG/DL (ref 65–99)
HCT VFR BLD AUTO: 36.5 % (ref 34–46.6)
HDLC SERPL-MCNC: 74 MG/DL
HGB BLD-MCNC: 11.4 G/DL (ref 11.1–15.9)
IMM GRANULOCYTES # BLD AUTO: 0 X10E3/UL (ref 0–0.1)
IMM GRANULOCYTES NFR BLD AUTO: 0 %
LDLC SERPL CALC-MCNC: 54 MG/DL (ref 0–99)
LYMPHOCYTES # BLD AUTO: 1.4 X10E3/UL (ref 0.7–3.1)
LYMPHOCYTES NFR BLD AUTO: 29 %
MCH RBC QN AUTO: 25.4 PG (ref 26.6–33)
MCHC RBC AUTO-ENTMCNC: 31.2 G/DL (ref 31.5–35.7)
MCV RBC AUTO: 82 FL (ref 79–97)
MONOCYTES # BLD AUTO: 0.4 X10E3/UL (ref 0.1–0.9)
MONOCYTES NFR BLD AUTO: 8 %
MORPHOLOGY BLD-IMP: ABNORMAL
NEUTROPHILS # BLD AUTO: 3.1 X10E3/UL (ref 1.4–7)
NEUTROPHILS NFR BLD AUTO: 62 %
PLATELET # BLD AUTO: 317 X10E3/UL (ref 150–450)
POTASSIUM SERPL-SCNC: 4.1 MMOL/L (ref 3.5–5.2)
PROT SERPL-MCNC: 7.1 G/DL (ref 6–8.5)
RBC # BLD AUTO: 4.48 X10E6/UL (ref 3.77–5.28)
RETICS/RBC NFR AUTO: 0.7 % (ref 0.6–2.6)
SODIUM SERPL-SCNC: 140 MMOL/L (ref 134–144)
TRIGL SERPL-MCNC: 181 MG/DL (ref 0–149)
VLDLC SERPL CALC-MCNC: 29 MG/DL (ref 5–40)
WBC # BLD AUTO: 5 X10E3/UL (ref 3.4–10.8)

## 2020-09-10 ENCOUNTER — TELEPHONE (OUTPATIENT)
Dept: NEUROSURGERY | Age: 50
End: 2020-09-10

## 2020-09-17 PROBLEM — Z12.11 SCREENING FOR MALIGNANT NEOPLASM OF COLON: Status: RESOLVED | Noted: 2020-08-18 | Resolved: 2020-09-17

## 2020-09-28 ENCOUNTER — TELEPHONE (OUTPATIENT)
Dept: FAMILY MEDICINE CLINIC | Age: 50
End: 2020-09-28

## 2020-09-29 ENCOUNTER — TELEPHONE (OUTPATIENT)
Dept: FAMILY MEDICINE CLINIC | Age: 50
End: 2020-09-29

## 2020-09-29 ENCOUNTER — OFFICE VISIT (OUTPATIENT)
Dept: FAMILY MEDICINE CLINIC | Age: 50
End: 2020-09-29
Payer: COMMERCIAL

## 2020-09-29 VITALS
BODY MASS INDEX: 29.32 KG/M2 | SYSTOLIC BLOOD PRESSURE: 113 MMHG | HEIGHT: 65 IN | RESPIRATION RATE: 18 BRPM | HEART RATE: 63 BPM | DIASTOLIC BLOOD PRESSURE: 60 MMHG | WEIGHT: 176 LBS | OXYGEN SATURATION: 98 %

## 2020-09-29 DIAGNOSIS — I10 ESSENTIAL HYPERTENSION: ICD-10-CM

## 2020-09-29 DIAGNOSIS — Z87.42 HISTORY OF ABNORMAL CERVICAL PAP SMEAR: ICD-10-CM

## 2020-09-29 DIAGNOSIS — E78.1 HYPERTRIGLYCERIDEMIA: Primary | ICD-10-CM

## 2020-09-29 DIAGNOSIS — Z12.39 SCREENING FOR MALIGNANT NEOPLASM OF BREAST: ICD-10-CM

## 2020-09-29 PROCEDURE — 99214 OFFICE O/P EST MOD 30 MIN: CPT | Performed by: NURSE PRACTITIONER

## 2020-09-29 RX ORDER — ATORVASTATIN CALCIUM 40 MG/1
40 TABLET, FILM COATED ORAL DAILY
Qty: 90 TAB | Refills: 3 | Status: SHIPPED | OUTPATIENT
Start: 2020-09-29 | End: 2021-02-19 | Stop reason: ALTCHOICE

## 2020-09-29 RX ORDER — LINACLOTIDE 72 UG/1
CAPSULE, GELATIN COATED ORAL
COMMUNITY
Start: 2020-09-28 | End: 2020-11-10 | Stop reason: CLARIF

## 2020-09-29 RX ORDER — POTASSIUM CHLORIDE 1500 MG/1
TABLET, FILM COATED, EXTENDED RELEASE ORAL
COMMUNITY
Start: 2020-09-19 | End: 2021-02-19 | Stop reason: ALTCHOICE

## 2020-09-29 NOTE — PATIENT INSTRUCTIONS
You should be hearing from Andrés at [facility] to schedule the mammo. Call Dr. Garrison Landau office for an appt for PAP. Keep up the good work w/ your B/P. Let me know if you need anything via MyChart.

## 2020-09-29 NOTE — PROGRESS NOTES
Chief Complaint   Patient presents with    Hypertension    Follow-up     concerned about not having a menstrual cycle since june     Visit Vitals  /60 (BP 1 Location: Right arm, BP Patient Position: Sitting)   Pulse 63   Resp 18   Ht 5' 5\" (1.651 m)   Wt 176 lb (79.8 kg)   SpO2 98%   BMI 29.29 kg/m²     Dot Long is a 48 y.o. female. HPI: Patient presents today for follow-up. Accompanied by her mother. Mother stated that she is interested in having a  discuss her options for helping her with patient's care. An order for  from home care agency will be submitted. Mother admitted that she had not quite anticipated extent of care that patient would need. During visit, patient was able to remain sitting in a chair and answer questions when asked. In the past, she has been restless in the room and wandering about. She was able to answer most questions appropriately with help from mother and speech was not garbled. She continues to have memory issues. Mother stated that patient will have a follow-up appointment with Dr Dalia Hernandez - endovascular surgical neuroradiology (neurinterventional surgery) provider is monitoring the aneurysm and is still present in her brain. She has been getting physical therapy in the home from [facility] [id number]. Physical therapy services are now completed. Physical therapist told mother that patient could benefit from more speech therapy and Occupational Therapy and these services will be provided. She continues with skilled nursing visits. Patient had her G tube removed by GI provider  2 weeks ago. Site is healing well w/ no drainage. She is much happier not having it. Patient and mother stated that patient is eating but mother is concerned that patient is not eating enough because she is concerned about gaining weight. Her weight today is the same as at last encounter176 pounds. BP control is a major concern.   She continues on amlodipine 10 mg daily and hydralazine 100 mg 3 times a day. BP good in office at 113/60. Tolerating medications well. Patient has history of abnormal Pap smear in the past.  She is due to have the Pap smear repeated. Will refer to GYN provider. Also needs a mammogram and will be referred to [facility] for mammogram.  Lab work done in August 2020 revealed elevated triglyceride level of 181. Started on a statin. 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    Hypoxic ischemic encephalopathy (HIE), unspecified severity P91.60    Essential hypertension I10    History of anemia Z86.2    Carpal tunnel syndrome G56.00    Osteoarthritis M19.90    History of abnormal cervical Pap smear Z87.42    BMI 34.0-34.9,adult Z68.34    At high risk for falls Z91.81     Past Medical History:   Diagnosis Date    Asthma     Cerebral infarction ([address]     Dysphagia     HTN (hypertension)     Seizures (HCC)      Current Outpatient Medications   Medication Instructions    amLODIPine (NORVASC) 10 mg, Oral, DAILY    aspirin 81 mg, Per NG tube, DAILY    Blood Pressure Test Kit-Large kit Check B/P 2 x day after pt has been calm for at least 5 minutes. Record B/P and notify office weekly of B/P results.  hydrALAZINE (APRESOLINE) 100 mg, Oral, 3 TIMES DAILY    levETIRAcetam (KEPPRA) 1,000 mg, Per NG tube, EVERY 12 HOURS    lisinopriL (PRINIVIL, ZESTRIL) 40 mg, Oral, DAILY    potassium chloride (KAON 10%) 20 mEq/15 mL solution 20 mEq, Oral, DAILY    senna-docusate (PERICOLACE) 8.6-50 mg per tablet 1 Tab, Oral, DAILY AS NEEDED    Shower Chair XX jose david Pt w/ recent hx of ruptured cerebral aneurysm. At risk for falls in shower. Elderly mother is caretaker. Pt needs help w/ ADLs. No Known Allergies.   Social History     Tobacco Use    Smoking status: Never Smoker    Smokeless tobacco: Never Used   Substance Use Topics  Alcohol use: Not Currently    Drug use: Never     Family History   Problem Relation Age of Onset    Hypertension Mother      Review of Systems   Constitutional: Negative for chills and fever. HENT: Negative for ear pain. Eyes: Positive for blurred vision. Negative for pain. Respiratory: Positive for shortness of breath. Negative for cough and wheezing. W/ activity- may be deconditioning. Cardiovascular: Negative for chest pain, palpitations and leg swelling. Gastrointestinal: Positive for constipation. Negative for abdominal pain, diarrhea, heartburn, nausea and vomiting. Genitourinary: Negative for dysuria and frequency. Musculoskeletal: Negative for back pain and neck pain. Skin: Negative for itching and rash. Neurological: Positive for sensory change and speech change. Negative for dizziness and headaches. Psychiatric/Behavioral: Negative for depression. The patient is not nervous/anxious. Objective  Physical Exam  Vitals signs reviewed. Constitutional:       Appearance: Normal appearance. HENT:      Head: Normocephalic. Right Ear: External ear normal.      Left Ear: External ear normal.      Nose: Nose normal.   Eyes:      Conjunctiva/sclera: Conjunctivae normal.   Neck:      Musculoskeletal: Neck supple. Cardiovascular:      Rate and Rhythm: Normal rate and regular rhythm. Heart sounds: Normal heart sounds. No murmur. Pulmonary:      Effort: Pulmonary effort is normal.      Breath sounds: Normal breath sounds. Musculoskeletal: Normal range of motion. General: No swelling or tenderness. Skin:     General: Skin is warm and dry. Coloration: Skin is not jaundiced. Neurological:      General: No focal deficit present. Mental Status: She is alert. Psychiatric:         Mood and Affect: Mood normal.         Behavior: Behavior normal.      Comments: Memory loss     Assessment & Plan    ICD-10-CM ICD-9-CM    1.  Hypertriglyceridemia  E78.1 272.1 atorvastatin (LIPITOR) 40 mg tablet    Started on a statin. Repeat labs at a later date. 2. Essential hypertension  I10 401.9     Improved- continues on meds   3. Hypoxic ischemic encephalopathy (HIE), unspecified severity  P91.60 768.70 REFERRAL TO SOCIAL WORK    Continues w/ rehab services in home. Will call in a verbal order to home care services for . Pt has scheduled f/u w/ Dr. Edith Gray. 4. History of abnormal cervical Pap smear  Z87.42 V13.29 REFERRAL TO GYNECOLOGY    F/U results- mother advised to call for an appt. 5. Screening for malignant neoplasm of breast  Z12.39 V76.10 JOS[facility] MIGUEL MAMMO BI SCREENING INCL CAD    Z12.31      F/U results.       Levi Mitchell NP

## 2020-10-03 PROBLEM — E78.1 HYPERTRIGLYCERIDEMIA: Status: ACTIVE | Noted: 2020-10-03

## 2020-10-03 PROBLEM — Z12.39 SCREENING FOR MALIGNANT NEOPLASM OF BREAST: Status: ACTIVE | Noted: 2020-10-03

## 2020-10-06 ENCOUNTER — TELEPHONE (OUTPATIENT)
Dept: FAMILY MEDICINE CLINIC | Age: 50
End: 2020-10-06

## 2020-10-11 DIAGNOSIS — I10 ESSENTIAL HYPERTENSION: ICD-10-CM

## 2020-10-11 RX ORDER — HYDRALAZINE HYDROCHLORIDE 100 MG/1
TABLET, FILM COATED ORAL
Qty: 90 TAB | Refills: 0 | Status: SHIPPED | OUTPATIENT
Start: 2020-10-11 | End: 2020-11-11

## 2020-10-12 ENCOUNTER — OFFICE VISIT (OUTPATIENT)
Dept: NEUROSURGERY | Age: 50
End: 2020-10-12
Payer: COMMERCIAL

## 2020-10-12 VITALS
BODY MASS INDEX: 30.47 KG/M2 | DIASTOLIC BLOOD PRESSURE: 68 MMHG | HEIGHT: 61 IN | SYSTOLIC BLOOD PRESSURE: 118 MMHG | HEART RATE: 58 BPM | OXYGEN SATURATION: 98 % | WEIGHT: 161.4 LBS | TEMPERATURE: 98.1 F

## 2020-10-12 DIAGNOSIS — Z86.79 HISTORY OF SUBARACHNOID HEMORRHAGE: ICD-10-CM

## 2020-10-12 DIAGNOSIS — R56.9 SEIZURE (HCC): ICD-10-CM

## 2020-10-12 DIAGNOSIS — I67.1 MIDDLE CEREBRAL ARTERY ANEURYSM: Primary | ICD-10-CM

## 2020-10-12 DIAGNOSIS — I10 ESSENTIAL HYPERTENSION: ICD-10-CM

## 2020-10-12 PROCEDURE — 99215 OFFICE O/P EST HI 40 MIN: CPT | Performed by: RADIOLOGY

## 2020-10-12 NOTE — PROGRESS NOTES
Neurointerventional Surgery  Ambulatory Progress Note      Patient: Camilla Freed MRN: [id number]  SSN: [id number]    YOB: 1970  Age: 48 y.o. Sex: female      History of Present Illness:      [facility] presents today for clinical follow-up status post left MCA aneurysm rupture and subarachnoid hemorrhage on 6/4/2020. 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 The time down was unknown and diffuse hypoxic ischemic injury was a concern in addition to the subarachnoid hemorrhage. On arrival to [address] [place] [address], she was [employer] grade 4, An grade 3/4, totally unresponsive. Her hospital course was complicated by severe hydrocephalus and cerebral vasospasm related to her aneurysm rupture. An irregular left MCA bifurcation aneurysm was coiled primarily on admission. 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 Since discharge, the patient has made remarkable progress in her recovery. Her G-tube and tracheostomy have been removed. She is walking, feeding herself dressing herself and regaining her speech. She is currently living with her mother and 15year-old son. Most of her neurological progress has occurred over the last 6 to 8 weeks and she continues to improve. Today she describes a subjective sensation that her left side is \"painful, and heavy\". \"I am barely able to see out of the left eye because it is blurry\".           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    Hypoxic ischemic encephalopathy (HIE), unspecified severity P91.60    Essential hypertension I10    History of anemia Z86.2    Carpal tunnel syndrome G56.00    Osteoarthritis M19.90    History of abnormal cervical Pap smear Z87.42    BMI 34.0-34.9,adult Z68.34    At high risk for falls Z91.81    Hypertriglyceridemia E78.1    Screening for malignant neoplasm of breast Z12.39    History of subarachnoid hemorrhage - Left MCA aneurysm rupture 6/4/2020 Z86.79        Review of Systems    A comprehensive ROS was performed and was negative except for as per HPI. Objective:     Current Outpatient Medications   Medication Sig Dispense Refill    hydrALAZINE (APRESOLINE) 100 mg tablet TAKE 1 TABLET BY MOUTH THREE TIMES A DAY 90 Tab 0    Linzess 72 mcg cap capsule       potassium chloride SR (K-TAB) 20 mEq tablet TAKE 1 TABLET BY MOUTH EVERY DAY      amLODIPine (NORVASC) 10 mg tablet Take 1 Tab by mouth daily. 90 Tab 3    levETIRAcetam (KEPPRA) 100 mg/ml soln oral solution 10 mL by Per NG tube route every twelve (12) hours every twelve (12) hours. 473 mL 5    lisinopriL (PRINIVIL, ZESTRIL) 40 mg tablet Take 1 Tab by mouth daily. 27 Tab 0    Shower Chair XX jose david Pt w/ recent hx of ruptured cerebral aneurysm. At risk for falls in shower. Elderly mother is caretaker. Pt needs help w/ ADLs. 1 Each 0    Blood Pressure Test Kit-Large kit Check B/P 2 x day after pt has been calm for at least 5 minutes. Record B/P and notify office weekly of B/P results. 1 Kit 0    aspirin 81 mg chewable tablet 1 Tab by Per NG tube route daily. 30 Tab 0    atorvastatin (LIPITOR) 40 mg tablet Take 1 Tab by mouth daily. 90 Tab 3    senna-docusate (PERICOLACE) 8.6-50 mg per tablet Take 1 Tab by mouth daily as needed. Visit Vitals  /68 (BP 1 Location: Right arm)   Pulse (!) 58   Temp 98.1 °F (36.7 °C)   Ht 5' 1\" (1.549 m)   Wt 161 lb 6.4 oz (73.2 kg)   SpO2 98%   BMI 30.50 kg/m²       No Known Allergies    Current Outpatient Medications   Medication Sig    hydrALAZINE (APRESOLINE) 100 mg tablet TAKE 1 TABLET BY MOUTH THREE TIMES A DAY    Linzess 72 mcg cap capsule     potassium chloride SR (K-TAB) 20 mEq tablet TAKE 1 TABLET BY MOUTH EVERY DAY    amLODIPine (NORVASC) 10 mg tablet Take 1 Tab by mouth daily.     levETIRAcetam (KEPPRA) 100 mg/ml soln oral solution 10 mL by Per NG tube route every twelve (12) hours every twelve (12) hours.  lisinopriL (PRINIVIL, ZESTRIL) 40 mg tablet Take 1 Tab by mouth daily.  Shower Chair XX jose david Pt w/ recent hx of ruptured cerebral aneurysm. At risk for falls in shower. Elderly mother is caretaker. Pt needs help w/ ADLs.  Blood Pressure Test Kit-Large kit Check B/P 2 x day after pt has been calm for at least 5 minutes. Record B/P and notify office weekly of B/P results.  aspirin 81 mg chewable tablet 1 Tab by Per NG tube route daily.  atorvastatin (LIPITOR) 40 mg tablet Take 1 Tab by mouth daily.  senna-docusate (PERICOLACE) 8.6-50 mg per tablet Take 1 Tab by mouth daily as needed. No current facility-administered medications for this visit. Physical Exam:  General: NAD  HEENT:  No carotid bruit or LAD  Lungs: clear to auscultation bilaterally  Heart: regular rate and rhythm, S1, S2 normal, no murmur, click, rub or gallop  Extremities: extremities normal, atraumatic, no cyanosis or edema    Neurologic Exam:  Mental Status:  Alert and oriented x 2. Very emotional, often tearful. Language:    Speech is slow but fluent in conversation. Mild receptive and expressive aphasia, more so expressive. She has difficulty naming specific objects quickly. Cranial Nerves:   Pupils equal, round and reactive to light. Left visual hemifield cut, left eye only, more severe in the left upper quadrant. Slight disconjugate movements of the left eye and far lateral gaze. Extraocular movements intact. Facial sensation intact V1 - V3. Full facial strength, no asymmetry. Hearing intact bilaterally. No dysarthria. Tongue protrudes to midline, palate elevates symmetrically. Shoulder shrug 5/5 bilaterally. Motor:    No pronator drift. Bulk and tone normal.      5/5 power in all extremities proximally and distally. No involuntary movements. Sensation:    Sensation intact throughout to light touch. No neglect. Romberg is negative. Coordination & Gait: Normal gait. Unassisted. Tandem gait not tested. Finger-to-nose and rapid alternating movements are slow but intact. These movements appear slower with the left upper extremity. Accuracy is intact. The patient had trouble understanding some of the instructions related to these activities. Labs:  Lab Results   Component Value Date/Time    Sodium 140 09/01/2020 08:29 AM    Potassium 4.1 09/01/2020 08:29 AM    Chloride 104 09/01/2020 08:29 AM    CO2 24 09/01/2020 08:29 AM    Anion gap 7 07/04/2020 01:27 AM    Glucose 96 09/01/2020 08:29 AM    BUN 11 09/01/2020 08:29 AM    Creatinine 0.77 09/01/2020 08:29 AM    BUN/Creatinine ratio 14 09/01/2020 08:29 AM    GFR est  09/01/2020 08:29 AM    GFR est non-AA 90 09/01/2020 08:29 AM    Calcium 10.0 09/01/2020 08:29 AM     Lab Results   Component Value Date/Time    WBC 5.0 09/01/2020 08:29 AM    HGB 11.4 09/01/2020 08:29 AM    HCT 36.5 09/01/2020 08:29 AM    PLATELET 405 90/66/6103 08:29 AM    MCV 82 09/01/2020 08:29 AM     Lab Results   Component Value Date/Time    MCH 25.4 (L) 09/01/2020 08:29 AM    MCHC 31.2 (L) 09/01/2020 08:29 AM    BASOPHILS 0 09/01/2020 08:29 AM    ABS. LYMPHOCYTES 1.2 07/09/2020 12:00 PM    ABS. MONOCYTES 0.4 09/01/2020 08:29 AM    ABS. EOSINOPHILS 0.1 09/01/2020 08:29 AM    ABS. BASOPHILS 0.0 09/01/2020 08:29 AM    Phosphorus 3.0 06/23/2020 04:23 AM    Magnesium 2.4 06/23/2020 04:23 AM       IMAGING:      Images of the aneurysm rupture and cerebral aneurysm coiling were reviewed in detail with the patient and her mother. Xr Swallow Func Video    Result Date: 7/2/2020  IMPRESSION: No aspiration. Please see separate report of the department speech pathology. Ir Insert Gastrostomy Tube Perc    Result Date: 6/19/2020  IMPRESSION: Uncomplicated 18 [place] of [place] gastrostomy tube placement.     Mri Brain Wo Cont    Result Date: 6/10/2020  IMPRESSION: 1. Multiple areas of cortical acute ischemic injury as well as some subcortical white matter areas and right cerebellar involvement similar to the prior MRI 6/6/20. 2. Subarachnoid hemorrhage and intraventricular blood again demonstrated without hydrocephalus. 3. Increasing fluid paranasal sinuses and right greater than left mastoid/middle ear cavities. Mri Brain Wo Cont    Result Date: 6/6/2020  IMPRESSION: Imaging findings consistent with large left MCA territory ischemia/infarction, left temporal, parietal and frontal lobes, cortically based, no superimposed increased parenchymal hemorrhage or midline shift. Right and left PANCHITO territory cortical diffusion restriction consistent with infarction. Infarction in the right insular cortex and right temporal lobe. Scattered small infarctions right and left corona radiata centrum semiovale, right cerebellum. Allowing for differences in technique likely stable subarachnoid and intraventricular hemorrhage. The findings were called to Dr. Tico Rod on 6/6/2020 at 2:10 PM by Dr. Poornima Rogers. [address]    Result Date: 6/19/2020  IMPRESSION: 1. Persistent subarachnoid hemorrhage in the left sylvian fissure. 2.  Patchy areas of enhancement as described. Some of these are associated with foci of diffusion restriction and likely represent subacute ischemia. The leptomeningeal enhancement predominantly in the left frontal and parietal lobes is likely related to subarachnoid hemorrhage and meningeal irritation. Infection is not entirely excluded but thought less likely. Ct Head Wo Cont    Result Date: 6/18/2020  IMPRESSION: Decreased left subarachnoid hemorrhage. No new hemorrhage. Artifact versus increased conspicuity of nonacute left temporal lobe infarct. Ct Head Wo Cont    Result Date: 6/17/2020  IMPRESSION: 1. Persistence of the previously described subtle areas of subarachnoid hemorrhage. 2. Persistence of the previously described left inferior cerebellar infarct.  3. Presence of small lacunar infarcts bilaterally. 4. No evidence of hydrocephalus. 5. Presence of an aneurysm coil in the region of the left middle cerebral artery. 6. Evidence of sphenoid sinus disease. Ct Head Wo Cont    Result Date: 6/13/2020  IMPRESSION: 1. Questionable left inferior cerebellar infarction, new from 6/10/2020. 2. Multiple, evolving, subacute infarctions throughout the cerebrum. 3. Subacute subarachnoid hemorrhage. Improved hydrocephalus compared to 6/6/2020. The findings were called to Dr. Justina Dela Cruz on 6/13/2020 11:50 AM by Dr. Humphrey Gunderson. CT perfusion and CTA findings were also discussed. [address] [place] [address]    Result Date: 6/9/2020  IMPRESSION: No significant change. Ct Head Wo Cont    Result Date: 6/7/2020  IMPRESSION: 1. No further progression of subarachnoid hemorrhage. The overall degree of subarachnoid hemorrhage is slightly improved 2. Subtle areas of gray-white differentiation loss throughout the cerebral hemispheres correlating with areas of restricted diffusion on previous CT compatible with ischemic changes. Ct Head Wo Cont    Result Date: 6/6/2020  IMPRESSION: Left MCA territory infarction with smaller right MCA territory infarction. Stable subarachnoid and intraventricular hemorrhage. Ct Head Wo Cont    Result Date: 6/6/2020  IMPRESSION: Slightly diminished subarachnoid and intraventricular hemorrhage. Stable ventricular system. Ct Head Wo Cont    Result Date: 6/5/2020  IMPRESSION: Stable acute subarachnoid and intraventricular hemorrhage. Stable ventricular system. Ct Head Wo Cont    Result Date: 6/5/2020  IMPRESSION: 1. Diffuse subarachnoid hemorrhage most concentrated in the basal cisterns and left sylvian fissure. 2.  Interval ventricular enlargement suspicious for developing hydrocephalus. Ct Head Wo Cont    Result Date: 6/4/2020  IMPRESSION: Extensive subarachnoid hemorrhage concerning for leaking/ruptured aneurysm.  NOTE: Known subarachnoid hemorrhage patient transfer from outside hospital who is currently receiving CTA evaluation. Findings were relayed to Dr. Damian Lemus. Cta Head    Result Date: 6/19/2020  IMPRESSION: 1. No acute large vessel occlusion. 2. Recent left MCA bifurcation aneurysm coiling, with interval improvement in now mild vasospasm involving the bilateral anterior, left middle, vertebral basilar and bilateral posterior cerebral arteries as discussed in detail above. 3. Unchanged right MCA bifurcation aneurysm. Cta Head    Result Date: 6/13/2020  IMPRESSION: 1. Left A1 vasospasm. 2. More mild spasm of the right PANCHITO, MCAs, and possibly the distal vertebrals. 3. Right M1 terminus aneurysm. 4. Large infundibula/small aneurysms of the bilateral P-comm origins. Cta Head    Result Date: 6/9/2020  Impression: Progressive vasospasm involving the anterior cerebral arteries with minimal associated decreased perfusion to the frontal lobes. No other significant change. Cta Head    Result Date: 6/6/2020  IMPRESSION: Slightly increased intraventricular and subarachnoid hemorrhage compared to the prior exam. Status post coiling left MCA bifurcation aneurysm. No significant residual aneurysm filling. Cta Chest W Or W Wo Cont    Result Date: 6/7/2020  IMPRESSION: 1. No acute pulmonary embolus 2. Dependent atelectasis with diffuse groundglass opacifications bilaterally    Ct Perf W Cbf    Result Date: 6/13/2020  IMPRESSION: 1. Penumbra surrounding infarctions in most of the superior left MCA territory. 2. Bilateral PANCHITO infarctions. 3. Questionable left cerebellar infarction is inferior to the perfusion scan. Ct Perf W Cbf    Result Date: 6/9/2020  Impression: Progressive vasospasm involving the anterior cerebral arteries with minimal associated decreased perfusion to the frontal lobes. No other significant change.       Xr Chest Port    Result Date: 6/19/2020  IMPRESSION: Vague left lower lobe airspace process, suggesting subsegmental atelectasis or pneumonia. Interval exchange of endotracheal tube for tracheostomy    Xr Chest Port    Result Date: 6/10/2020  IMPRESSION: 1. Satisfactory CVL placement. 2. Residual right lower lung airspace disease. Xr Chest Port    Result Date: 6/6/2020  IMPRESSION: Persistent although increased interstitial and parenchymal opacities particularly in the right perihilar region. Xr Chest Port    Result Date: 6/4/2020  IMPRESSION: Diffuse bilateral airspace disease more suggestive of pulmonary edema than diffuse pneumonia. Clinical correlation needed. Marked gastric distention. NG tube not present. Xr Abd Port  1 V    Result Date: 6/4/2020  IMPRESSION: Markedly distended stomach with NG tube in place     Cta Code Neuro Head And Neck W Cont    Result Date: 6/8/2020  Impression: Mild to moderate vasospasm in the left middle cerebral artery status post bifurcation aneurysm coil embolization. Stable 4 mm right MCA aneurysm. Endotracheal tube terminates just above the matt. 1 cm low-density left thyroid nodule. Mildly enlarged left axillary lymph node measuring 11 mm in short axis. Patchy bilateral airspace disease. Nonspecific asymmetric enhancement of the right superior ophthalmic vein     Cta Code Neuro Head And Neck W Cont    Result Date: 6/5/2020  IMPRESSION: 1. Diffuse subarachnoid hemorrhage in the basilar cisterns and sylvian fissures. 2.  Bilateral MCA bifurcation aneurysms. 3.  Infundibular origins to the posterior communicating arteries bilaterally. 4.  Symmetric moderately severe airspace disease in the upper lobes bilaterally which may represent edema or sequela of aspiration. Ct Code Neuro Head Wo Contrast    Result Date: 6/8/2020  IMPRESSION: No significant change. Ct Code Neuro Perf W Cbf    Result Date: 6/8/2020  IMPRESSION: No significant cerebral perfusion abnormality       Assessment/Plan:       ICD-10-CM ICD-9-CM    1. RIGHT middle cerebral artery aneurysm  I67.1 437.3    2.  Essential hypertension  I10 401.9    3. Hypoxic ischemic encephalopathy (HIE), unspecified severity  P91.60 768.70    4. History of subarachnoid hemorrhage - Left MCA aneurysm rupture 6/4/2020  Z86.79 V12.59        4 months status post left MCA aneurysm rupture and subarachnoid hemorrhage ([employer] grade 5) with superimposed hypoxic ischemic injury from being down in the field for a prolonged period of time. This patient is making a remarkable neurological recovery given her starting point. Unfortunately she still has a 5 mm irregular right MCA bifurcation aneurysm that remains unsecure. This aneurysm incorporates both M2 segments. The aneurysm is amenable to either open microsurgical clipping or why stent assisted endovascular coiling. At this point, my feeling is that this patient is just starting to turn the corner on her neurological recovery and that it is probably too soon to put her through another intracranial procedure. We discussed the possibility of interval rupture but this aneurysm has an approximately 1% annual cumulative risk of rupture so I think the benefit of allowing further neurological recovery outweighs the risk of rupture in the meantime. The patient and her mother understand that I cannot guarantee that the aneurysm will not rupture. I would like to see her back in 2 months to reevaluate her neurological recovery/progress and to have a more in-depth discussion about surgical treatment options for the unruptured right MCA bifurcation aneurysm. Of note, there is also a small 1 mm excrescence from the right A1/A2 which may represent an early anterior communicating artery aneurysm forming. This was also discussed today. I am referring her to neurology today for possible outpatient EEG to make a decision about discontinuation of her Keppra. Maintaining a normal blood pressure (less than 120/80 is critical in preventing future aneurysm growth or rupture.   The patient's blood pressure is currently well controlled in the office today (118/68) on 3 BP medications prescribed by her PCP. The patient's mother has some concerns about billing from the intensive care, disability and ongoing FMLA needs given her status is primary caregiver for this disabled patient. All questions were answered today my office will do whatever is necessary to assist with the social issues. The patient's primary care doctor is also been reengaged in the process recently. Follow-up Disposition:    I have discussed the diagnosis with the patient and the intended plan as seen in the above orders. Patient is in agreement. The patient has received an after-visit summary and questions were answered concerning future plans. I have discussed medication side effects and warnings with the patient as well.       Kevan Arguelles MD

## 2020-10-12 NOTE — PATIENT INSTRUCTIONS
Congratulations on your ongoing recovering. A left MCA brain aneurysm ruptured in June 2020 causing your deficits. You are still early in your recovery. A 5 mm right MCA aneurysm (other side) is not secured and will require surgery in the future to prevent ruptured. Open surgery (Dr. Lien Lin) and endovascular surgery (Dr. Marlena Hernandez) are both good options. At this point, Dr. Marlena Hernandez feels that it is better to allow for more neurological recovery before proceeding with an additional brain procedure. This aneurysm has an ~1% risk of rupture during the next year. Maintaining a blood pressure < 120/80 is important to prevent aneurysm growth and rupture. We will see you back in the office in 2 months to re-evaluate and discuss surgery options. A Healthy Lifestyle: Care Instructions Your Care Instructions A healthy lifestyle can help you feel good, stay at a healthy weight, and have plenty of energy for both work and play. A healthy lifestyle is something you can share with your whole family. A healthy lifestyle also can lower your risk for serious health problems, such as high blood pressure, heart disease, and diabetes. You can follow a few steps listed below to improve your health and the health of your family. Follow-up care is a key part of your treatment and safety. Be sure to make and go to all appointments, and call your doctor if you are having problems. It's also a good idea to know your test results and keep a list of the medicines you take. How can you care for yourself at home? · Do not eat too much sugar, fat, or fast foods. You can still have dessert and treats now and then. The goal is moderation. · Start small to improve your eating habits. Pay attention to portion sizes, drink less juice and soda pop, and eat more fruits and vegetables. ? Eat a healthy amount of food.  A 3-ounce serving of meat, for example, is about the size of a deck of cards. Fill the rest of your plate with vegetables and whole grains. ? Limit the amount of soda and sports drinks you have every day. Drink more water when you are thirsty. ? Eat at least 5 servings of fruits and vegetables every day. It may seem like a lot, but it is not hard to reach this goal. A serving or helping is 1 piece of fruit, 1 cup of vegetables, or 2 cups of leafy, raw vegetables. Have an apple or some carrot sticks as an afternoon snack instead of a candy bar. Try to have fruits and/or vegetables at every meal. 
· Make exercise part of your daily routine. You may want to start with simple activities, such as walking, bicycling, or slow swimming. Try to be active 30 to 60 minutes every day. You do not need to do all 30 to 60 minutes all at once. For example, you can exercise 3 times a day for 10 or 20 minutes. Moderate exercise is safe for most people, but it is always a good idea to talk to your doctor before starting an exercise program. 
· Keep moving. [place] the lawn, work in the garden, or [employer]. Take the stairs instead of the elevator at work. · If you smoke, quit. People who smoke have an increased risk for heart attack, stroke, cancer, and other lung illnesses. Quitting is hard, but there are ways to boost your chance of quitting tobacco for good. ? Use nicotine gum, patches, or lozenges. ? Ask your doctor about stop-smoking programs and medicines. ? Keep trying. In addition to reducing your risk of diseases in the future, you will notice some benefits soon after you stop using tobacco. If you have shortness of breath or asthma symptoms, they will likely get better within a few weeks after you quit. · Limit how much alcohol you drink. Moderate amounts of alcohol (up to 2 drinks a day for men, 1 drink a day for women) are okay. But drinking too much can lead to liver problems, high blood pressure, and other health problems. Family health If you have a family, there are many things you can do together to improve your health. · Eat meals together as a family as often as possible. · Eat healthy foods. This includes fruits, vegetables, lean meats and dairy, and whole grains. · Include your family in your fitness plan. Most people think of activities such as jogging or tennis as the way to fitness, but there are many ways you and your family can be more active. Anything that makes you breathe hard and gets your heart pumping is exercise. Here are some tips: 
? Walk to do errands or to take your child to school or the bus. 
? Go for a family bike ride after dinner instead of watching TV. Where can you learn more? Go to http://www.gray.com/ Enter F307 in the search box to learn more about \"A Healthy Lifestyle: Care Instructions. \" Current as of: January 31, 2020               Content Version: 12.6 © 7319-6510 [employer]. Care instructions adapted under license by [employer] (which disclaims liability or warranty for this information). If you have questions about a medical condition or this instruction, always ask your healthcare professional. [place] [address] any warranty or liability for your use of this information.

## 2020-10-12 NOTE — PROGRESS NOTES
[facility] follow up presenting with Cerebral aneurysm and s/p SAH. Coil embolization in hospital.  Patient reports continued visual impairment in left eye. Cannot make out figures. Reports episodes of dizziness. Reports left side pain and a felling of heaviness in limbs. No acute problems reported.

## 2020-10-22 ENCOUNTER — HOSPITAL ENCOUNTER (OUTPATIENT)
Dept: MAMMOGRAPHY | Age: 50
Discharge: HOME | End: 2020-10-22
Attending: NURSE PRACTITIONER
Payer: COMMERCIAL

## 2020-10-22 DIAGNOSIS — Z12.39 SCREENING FOR MALIGNANT NEOPLASM OF BREAST: ICD-10-CM

## 2020-10-22 PROCEDURE — 77067 SCR MAMMO BI INCL CAD: CPT

## 2020-11-02 PROBLEM — Z12.39 SCREENING FOR MALIGNANT NEOPLASM OF BREAST: Status: RESOLVED | Noted: 2020-10-03 | Resolved: 2020-11-02

## 2020-11-02 NOTE — PROGRESS NOTES
I have reviewed the note dated 8- and agree that I was available for discussion in person, telephonically or virtually. I will follow along with FLAKITA Faustin.     [facility] Class, DO  9-

## 2020-11-10 ENCOUNTER — OFFICE VISIT (OUTPATIENT)
Dept: FAMILY MEDICINE CLINIC | Age: 50
End: 2020-11-10
Payer: COMMERCIAL

## 2020-11-10 VITALS
HEIGHT: 66 IN | RESPIRATION RATE: 18 BRPM | HEART RATE: 64 BPM | SYSTOLIC BLOOD PRESSURE: 126 MMHG | BODY MASS INDEX: 26.52 KG/M2 | DIASTOLIC BLOOD PRESSURE: 72 MMHG | WEIGHT: 165 LBS | TEMPERATURE: 98.1 F | OXYGEN SATURATION: 96 %

## 2020-11-10 DIAGNOSIS — E87.6 HYPOKALEMIA: ICD-10-CM

## 2020-11-10 DIAGNOSIS — I67.1 MIDDLE CEREBRAL ARTERY ANEURYSM: Primary | ICD-10-CM

## 2020-11-10 DIAGNOSIS — R10.9 PAIN IN THE SIDE: ICD-10-CM

## 2020-11-10 DIAGNOSIS — I10 ESSENTIAL HYPERTENSION: ICD-10-CM

## 2020-11-10 PROCEDURE — 99214 OFFICE O/P EST MOD 30 MIN: CPT | Performed by: NURSE PRACTITIONER

## 2020-11-10 RX ORDER — PLECANATIDE 3 MG/1
TABLET ORAL
COMMUNITY
Start: 2020-10-14 | End: 2020-11-10 | Stop reason: CLARIF

## 2020-11-10 NOTE — PATIENT INSTRUCTIONS
Try taking tylenol a few times a day to help w/ pain on L side. Try letting the hot water run on the L side of your body. Keep up the good work w/ your diet and exercise. Continue to check your B/P at least once a day w/ goal < 120/80. Continue to stay well hydrated. Let me know if you need anything.

## 2020-11-10 NOTE — PROGRESS NOTES
Chief Complaint   Patient presents with    Follow-up     hurts on left side of body, from seeing eye specialist     Visit Vitals  /72   Pulse 64   Temp 98.1 °F (36.7 °C) (Oral)   Resp 18   Ht 5' 6\" (1.676 m)   Wt 165 lb (74.8 kg)   SpO2 96%   BMI 26.63 kg/m²     Subjective  Shen Strange is a 48 y.o. female. HPI- Pt presented w/ c/o of pain on entire L side of body. Accompanied by her mother. Could not describe the type of pain. It is tender to touch. The pain is there most of the day. She wakes up w/ it. It is not affecting her ability to ambulate or do other physical activities  Past hx: Pt was found collapsed and unresponsive at work on 6/4/2020. Eventual dx was R middle cerebral artery aneurysm, subarachnoid hemorrhage, and hypoxic ischemic encephalopathy. Had a trach and G- tube and was unconscious for 4 weeks. She was hospitalized until 7/10/2020 and spent about a month in rehab @ [facility]. Hx of HTN which was not well controlled S/T to noncompliance w/ treatment and f/u recommendations. All things considered, she has made a remarkable recovery. G-tube was removed shortly after leaving rehab. She is walking, feeding herself, dressing herself, and regaining her speech. Pt continues to have issues w/ aphasia and word finding. She still has speech therapy by home health coming in. Vision in L eye was affected. Recently seen by opthalmologist Dr. Belinda Nguyen for eval. Now has glasses and she stated she is seeing better out of L eye. Review of record indicated that she had appt w/ Dr. Rob Armas, endovascular surgical neuroradiology (neurinterventional surgery) provider on 9/10/2020. He is amazed at the progress she has made considering the grim possibilities for recovery. 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 Plan currently is to allow pt to continue in her neurological recovery and monitor the remaining aneurysm.  She will return in 2 months for further discussion about treatment options. Strict control of her B/P is imperative and this was emphasized w/ her mother and pt. Mother is supervising pt's medications via a med box to ensure she is taking all her meds. Pt has been taking KCL daily- will recheck labs to determine is she still needs to take it. Mother stated that Dr. Rachel Castellanos office will help update her FMLA paperwork as she will need to take more time off to care for pt than she initially anticipated. 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    Hypoxic ischemic encephalopathy (HIE), unspecified severity P91.60    Essential hypertension I10    History of anemia Z86.2    Carpal tunnel syndrome G56.00    Osteoarthritis M19.90    History of abnormal cervical Pap smear Z87.42    At high risk for falls Z91.81    Hypertriglyceridemia E78.1    History of subarachnoid hemorrhage - Left MCA aneurysm rupture 6/4/2020 Z86.79    Seizure ([facility] [address] R56.9    BMI 26.0-26.9,adult Z68.26    Hypokalemia E87.6     Past Medical History:   Diagnosis Date    Asthma     Cerebral artery occlusion with cerebral infarction ([facility] [address] 06/2020    Cerebral infarction (HCC)     Dysphagia     HTN (hypertension)     Joint pain     Leg swelling     Seizures (HCC)     Snoring     SOB (shortness of breath)     Vision decreased      Past Surgical History:   Procedure Laterality Date    ABDOMEN SURGERY PROC UNLISTED      has peg tube    HX GASTROSTOMY      IR INSERT GASTROSTOMY TUBE [facility]  6/19/2020    NEUROLOGICAL PROCEDURE UNLISTED      Coil Embolization     Current Outpatient Medications   Medication Instructions    amLODIPine (NORVASC) 10 mg, Oral, DAILY    aspirin 81 mg, Per NG tube, DAILY    atorvastatin (LIPITOR) 40 mg, Oral, DAILY    Blood Pressure Test Kit-Large kit Check B/P 2 x day after pt has been calm for at least 5 minutes. Record B/P and notify office weekly of B/P results.     hydrALAZINE (APRESOLINE) 100 mg, Oral, 3 TIMES DAILY    levETIRAcetam (KEPPRA) 1,000 mg, Per NG tube, EVERY 12 HOURS    lisinopriL (PRINIVIL, ZESTRIL) 40 mg tablet TAKE 1 TABLET BY MOUTH EVERY DAY    potassium chloride SR (K-TAB) 20 mEq tablet TAKE 1 TABLET BY MOUTH EVERY DAY    senna-docusate (PERICOLACE) 8.6-50 mg per tablet 1 Tab, Oral, DAILY AS NEEDED    Shower Chair XX jose david Pt w/ recent hx of ruptured cerebral aneurysm. At risk for falls in shower. Elderly mother is caretaker. Pt needs help w/ ADLs. No Known Allergies  Social History     Tobacco Use    Smoking status: Never Smoker    Smokeless tobacco: Never Used   Substance Use Topics    Alcohol use: Not Currently    Drug use: Never     Family History   Problem Relation Age of Onset    Hypertension Mother     Stroke Other     Lung Cancer Father      Review of Systems   Constitutional: Negative for chills and fever. HENT: Negative for ear pain and sore throat. Eyes: Negative for blurred vision and pain. Respiratory: Negative for cough, shortness of breath and wheezing. Cardiovascular: Negative for chest pain, palpitations and leg swelling. Gastrointestinal: Positive for constipation. Negative for abdominal pain, diarrhea, nausea and vomiting. Genitourinary: Negative for dysuria and frequency. Musculoskeletal: Positive for myalgias. Negative for joint pain. Skin: Negative for rash. Neurological: Positive for headaches. Negative for dizziness, tingling and sensory change. Psychiatric/Behavioral: Negative for depression. The patient has insomnia. She always worked nights so having trouble sleeping       Objective  Physical Exam  Vitals signs reviewed. Constitutional:       General: She is not in acute distress. Appearance: Normal appearance. HENT:      Head: Normocephalic.       Right Ear: External ear normal.      Left Ear: External ear normal.      Nose: Nose normal.   Eyes:      Conjunctiva/sclera: Conjunctivae normal. Neck:      Musculoskeletal: Neck supple. Cardiovascular:      Rate and Rhythm: Normal rate and regular rhythm. Heart sounds: Normal heart sounds. No murmur. Pulmonary:      Effort: Pulmonary effort is normal.      Breath sounds: Normal breath sounds. Musculoskeletal: Normal range of motion. General: No swelling or tenderness. Right lower leg: No edema. Left lower leg: No edema. Skin:     General: Skin is warm and dry. Neurological:      Mental Status: She is alert. Mental status is at baseline. Psychiatric:         Behavior: Behavior normal.         Thought Content: Thought content normal.         Judgment: Judgment normal.      Comments: Sometimes tearful         Assessment & Plan    ICD-10-CM ICD-9-CM    1. RIGHT middle cerebral artery aneurysm  I67.1 437.3    2. Essential hypertension  I10 401.9    3. Hypokalemia  F00.2 269.1 METABOLIC PANEL, COMPREHENSIVE   4. Pain in the side  R10.9 789.00      1. RIGHT middle cerebral artery aneurysm  Continues to improve neurologically and will continue w/ speech therapy. Will see neurosurgeon Dr. Rob Armas in Dec 2020 for further discussion of management of remaining aneurysm. 2. Essential hypertension  Continue home B/Ps and medication adherence. 3. Hypokalemia  F/U lab results. Pt will stop K+ at this time and recheck labs in a month or so. - METABOLIC PANEL, COMPREHENSIVE  4. Pain in the side  ? Etiology- not affecting pt's current function. Symptomatic treatment advised w/ tylenol and warm showers. F/U status. I have discussed the diagnosis with the patient/caretaker and the intended plan as seen in the above orders. She/caretaker  expressed understanding. Questions were answered concerning future plans. I have discussed medication side effects and warnings when indicated with the patient/caretaker as well.     Davied Sacks, NP

## 2020-11-12 NOTE — PROGRESS NOTES
I have reviewed the note dated  9- and agree that I was available for discussion in person, telephonically or virtually. I will follow along with FLAKITA Lennon.     Kwadwo Kaufman, DO

## 2020-11-14 PROBLEM — E87.6 HYPOKALEMIA: Status: ACTIVE | Noted: 2020-11-14

## 2020-12-16 LAB
ALBUMIN SERPL-MCNC: 4.1 G/DL (ref 3.8–4.8)
ALBUMIN/GLOB SERPL: 1.8 {RATIO} (ref 1.2–2.2)
ALP SERPL-CCNC: 71 IU/L (ref 39–117)
ALT SERPL-CCNC: 5 IU/L (ref 0–32)
AST SERPL-CCNC: 8 IU/L (ref 0–40)
BILIRUB SERPL-MCNC: 0.3 MG/DL (ref 0–1.2)
BUN SERPL-MCNC: 10 MG/DL (ref 6–24)
BUN/CREAT SERPL: 15 (ref 9–23)
CALCIUM SERPL-MCNC: 9.8 MG/DL (ref 8.7–10.2)
CHLORIDE SERPL-SCNC: 103 MMOL/L (ref 96–106)
CO2 SERPL-SCNC: 24 MMOL/L (ref 20–29)
CREAT SERPL-MCNC: 0.67 MG/DL (ref 0.57–1)
GLOBULIN SER CALC-MCNC: 2.3 G/DL (ref 1.5–4.5)
GLUCOSE SERPL-MCNC: 94 MG/DL (ref 65–99)
POTASSIUM SERPL-SCNC: 3.8 MMOL/L (ref 3.5–5.2)
PROT SERPL-MCNC: 6.4 G/DL (ref 6–8.5)
SODIUM SERPL-SCNC: 140 MMOL/L (ref 134–144)

## 2020-12-21 ENCOUNTER — DOCUMENTATION ONLY (OUTPATIENT)
Dept: NEUROSURGERY | Age: 50
End: 2020-12-21

## 2021-01-03 ENCOUNTER — HOSPITAL ENCOUNTER (OUTPATIENT)
Dept: MRI IMAGING | Age: 51
Discharge: HOME | End: 2021-01-03
Attending: RADIOLOGY
Payer: COMMERCIAL

## 2021-01-03 DIAGNOSIS — I67.1 MIDDLE CEREBRAL ARTERY ANEURYSM: ICD-10-CM

## 2021-01-03 DIAGNOSIS — Z86.79 HISTORY OF SUBARACHNOID HEMORRHAGE: ICD-10-CM

## 2021-01-03 PROCEDURE — 70544 MR ANGIOGRAPHY HEAD W/O DYE: CPT

## 2021-01-04 ENCOUNTER — TELEPHONE (OUTPATIENT)
Dept: FAMILY MEDICINE CLINIC | Age: 51
End: 2021-01-04

## 2021-01-04 DIAGNOSIS — I69.320 CVA, OLD, APHASIA: Primary | ICD-10-CM

## 2021-01-04 NOTE — TELEPHONE ENCOUNTER
Please send a new order for her speech therapy to [facility]. Her  order has .

## 2021-01-12 NOTE — TELEPHONE ENCOUNTER
Patients mother called again regarding order for speech therapy. Order has been completed and in chart since 01/04/2021 just needed to be faxed to [facility]. Faxed order to [phone] on 01/12/2021, scanned confirmation into chart.

## 2021-01-15 ENCOUNTER — VIRTUAL VISIT (OUTPATIENT)
Dept: NEUROSURGERY | Age: 51
End: 2021-01-15

## 2021-01-15 ENCOUNTER — TELEPHONE (OUTPATIENT)
Dept: NEUROSURGERY | Age: 51
End: 2021-01-15

## 2021-01-15 DIAGNOSIS — I67.1 MIDDLE CEREBRAL ARTERY ANEURYSM: Primary | ICD-10-CM

## 2021-01-15 NOTE — TELEPHONE ENCOUNTER
Video failed due to technical limitation in the patients home. I was only able to interview the patient's mother via telephone (see separate note). The patient's mother reports that her daughter's condition is overall stable. She is no longer receiving physical therapy in the home but is still working with speech therapy. She requires a 24-7 sitter due to her ongoing neurological disability. \"Medicaid is providing a sitter now\". Her primary deficit is severe memory impairment. An MRI obtained on 1/3/2021 was reviewed independently. 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 A 5 mm irregular right MCA bifurcation aneurysm remains untreated. 3D images from the diagnostic cerebral angiogram performed during her initial hospitalization demonstrate a complex morphology with M2 branches arising from the neck of the aneurysm. Endovascular treatment would carry a high risk of ischemic stroke due to impingement on the parent vessels. Specifically, her superior division exhibits an early bifurcation that would make stenting difficult. She also appears to have a temporal branch arising from the proximal aneurysm dome directly that would be difficult to protect with endovascular techniques. For these reasons, I am referring her to Dr. Abarca  [facility]surgery) for a discussion about open microsurgical clipping of the right MCA aneurysm. All questions were answered. The patient's mother is expressing a strong desire to proceed with definitive treatment of the right MCA aneurysm. \"I do not want another bleed\".     See key images of the unsecured right middle cerebral artery bifurcation aneurysm below:

## 2021-01-15 NOTE — PROGRESS NOTES
Phone call to patient in preparation for phoneVirtual visit with provider. Name and  verified. Patient unable to obtain vital signs at home. Follow up for Cerebral aneurysm. Mother states patient is doing better. No acute problems reported. No significant changes since last visit.

## 2021-01-15 NOTE — PROGRESS NOTES
Attempts for video virtual visit failed due to technical limitations in the patient's home. I was only able to discuss the patient's progress and condition with her mother, rA Garibay.

## 2021-01-15 NOTE — PATIENT INSTRUCTIONS
A Healthy Lifestyle: Care Instructions Your Care Instructions A healthy lifestyle can help you feel good, stay at a healthy weight, and have plenty of energy for both work and play. A healthy lifestyle is something you can share with your whole family. A healthy lifestyle also can lower your risk for serious health problems, such as high blood pressure, heart disease, and diabetes. You can follow a few steps listed below to improve your health and the health of your family. Follow-up care is a key part of your treatment and safety. Be sure to make and go to all appointments, and call your doctor if you are having problems. It's also a good idea to know your test results and keep a list of the medicines you take. How can you care for yourself at home? · Do not eat too much sugar, fat, or fast foods. You can still have dessert and treats now and then. The goal is moderation. · Start small to improve your eating habits. Pay attention to portion sizes, drink less juice and soda pop, and eat more fruits and vegetables. ? Eat a healthy amount of food. A 3-ounce serving of meat, for example, is about the size of a deck of cards. Fill the rest of your plate with vegetables and whole grains. ? Limit the amount of soda and sports drinks you have every day. Drink more water when you are thirsty. ? Eat at least 5 servings of fruits and vegetables every day. It may seem like a lot, but it is not hard to reach this goal. A serving or helping is 1 piece of fruit, 1 cup of vegetables, or 2 cups of leafy, raw vegetables. Have an apple or some carrot sticks as an afternoon snack instead of a candy bar.  Try to have fruits and/or vegetables at every meal. 
 · Make exercise part of your daily routine. You may want to start with simple activities, such as walking, bicycling, or slow swimming. Try to be active 30 to 60 minutes every day. You do not need to do all 30 to 60 minutes all at once. For example, you can exercise 3 times a day for 10 or 20 minutes. Moderate exercise is safe for most people, but it is always a good idea to talk to your doctor before starting an exercise program. 
· Keep moving. Mow the lawn, work in the garden, or clean your house. Take the stairs instead of the elevator at work. 
· If you smoke, quit. People who smoke have an increased risk for heart attack, stroke, cancer, and other lung illnesses. Quitting is hard, but there are ways to boost your chance of quitting tobacco for good. 
? Use nicotine gum, patches, or lozenges. 
? Ask your doctor about stop-smoking programs and medicines. 
? Keep trying. 
In addition to reducing your risk of diseases in the future, you will notice some benefits soon after you stop using tobacco. If you have shortness of breath or asthma symptoms, they will likely get better within a few weeks after you quit. 
· Limit how much alcohol you drink. Moderate amounts of alcohol (up to 2 drinks a day for men, 1 drink a day for women) are okay. But drinking too much can lead to liver problems, high blood pressure, and other health problems. 
Family health 
If you have a family, there are many things you can do together to improve your health. 
· Eat meals together as a family as often as possible. 
· Eat healthy foods. This includes fruits, vegetables, lean meats and dairy, and whole grains. 
· Include your family in your fitness plan. Most people think of activities such as jogging or tennis as the way to fitness, but there are many ways you and your family can be more active. Anything that makes you breathe hard and gets your heart pumping is exercise. Here are some tips: 
 ? Walk to do errands or to take your child to school or the bus. 
? Go for a family bike ride after dinner instead of watching TV. Where can you learn more? Go to http://www.gray.com/ Enter F521 in the search box to learn more about \"A Healthy Lifestyle: Care Instructions. \" Current as of: January 31, 2020               Content Version: 12.6 © 2006-2020 [employer]. Care instructions adapted under license by [employer] (which disclaims liability or warranty for this information). If you have questions about a medical condition or this instruction, always ask your healthcare professional. [address] any warranty or liability for your use of this information.

## 2021-02-19 ENCOUNTER — OFFICE VISIT (OUTPATIENT)
Dept: FAMILY MEDICINE CLINIC | Age: 51
End: 2021-02-19
Payer: COMMERCIAL

## 2021-02-19 VITALS
HEIGHT: 66 IN | DIASTOLIC BLOOD PRESSURE: 72 MMHG | OXYGEN SATURATION: 99 % | BODY MASS INDEX: 26.36 KG/M2 | HEART RATE: 56 BPM | RESPIRATION RATE: 18 BRPM | SYSTOLIC BLOOD PRESSURE: 128 MMHG | TEMPERATURE: 98 F | WEIGHT: 164 LBS

## 2021-02-19 DIAGNOSIS — Z12.11 SCREENING FOR MALIGNANT NEOPLASM OF COLON: ICD-10-CM

## 2021-02-19 DIAGNOSIS — I10 ESSENTIAL HYPERTENSION: Primary | ICD-10-CM

## 2021-02-19 DIAGNOSIS — I67.1 MIDDLE CEREBRAL ARTERY ANEURYSM: ICD-10-CM

## 2021-02-19 DIAGNOSIS — E78.1 HYPERTRIGLYCERIDEMIA: ICD-10-CM

## 2021-02-19 PROCEDURE — 99213 OFFICE O/P EST LOW 20 MIN: CPT | Performed by: NURSE PRACTITIONER

## 2021-02-19 RX ORDER — ATORVASTATIN CALCIUM 40 MG/1
40 TABLET, FILM COATED ORAL DAILY
Qty: 90 TAB | Refills: 3 | Status: SHIPPED | OUTPATIENT
Start: 2021-02-19 | End: 2022-01-21 | Stop reason: DRUGHIGH

## 2021-02-19 NOTE — PROGRESS NOTES
Chief Complaint   Patient presents with    Follow-up     progress with recovery from aneursym    Hypertension     Visit Vitals  /72 (BP 1 Location: Right arm, BP Patient Position: Sitting, BP Cuff Size: Adult)   Pulse (!) 56   Temp 98 °F (36.7 °C) (Temporal)   Resp 18   Ht 5' 6\" (1.676 m)   Wt 164 lb (74.4 kg)   SpO2 99%   BMI 26.47 kg/m²     Subjective  Erinn Negro is a 48 y.o. female. HPI:  Pt presented for f/u. Accompanied by her mother who is currently her primary caretaker. Pt was found collapsed and unresponsive at work on 6/4/2020. Eventual dx was R middle cerebral artery aneurysm, subarachnoid hemorrhage, and hypoxic ischemic encephalopathy. Had a trach and G- tube and was unconscious for 4 weeks. She was hospitalized until 7/10/2020 and spent about a month in rehab @ [facility]. Hx of HTN which was not well controlled S/T to noncompliance w/ treatment and f/u recommendations. Still has an untreated aneurysm. All things considered, she has made a remarkable recovery. G-tube was removed shortly after leaving rehab. She is walking, feeding herself, dressing herself, and continues w/ speech therapy. However, at this time, her mother does not feel pt could live independently. B/P good in office @ 128/72. Continues on lisinopril, amlodipine, and hydralazine. Stable w/ home B/P checks per mother also. Review of medications indicated that pt has not been taking atorvastatin. Mother stated that she did not know pt was supposed to be on it. Not sure what happened but will reorder medication today. Pt c/o of pain in both arms, she says numbness and tingling are present and also has numbness in back of calves that happens at night. ? If related to CVA.   Pt has not had an initial screening mammogram.     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    Hypoxic ischemic encephalopathy (HIE), unspecified severity P91.60  Essential hypertension I10    History of anemia Z86.2    Carpal tunnel syndrome G56.00    Osteoarthritis M19.90    History of abnormal cervical Pap smear Z87.42    At high risk for falls Z91.81    Hypertriglyceridemia E78.1    History of subarachnoid hemorrhage - Left MCA aneurysm rupture 6/4/2020 Z86.79    Seizure ([facility] [address] R56.9    BMI 26.0-26.9,adult Z68.26    Hypokalemia E87.6     Past Medical History:   Diagnosis Date    Asthma     Cerebral artery occlusion with cerebral infarction ([facility] [address] 06/2020    Cerebral infarction ([facility] [address]     Dysphagia     HTN (hypertension)     Joint pain     Leg swelling     Seizures (HCC)     Snoring     SOB (shortness of breath)     Vision decreased      Past Surgical History:   Procedure Laterality Date    HX GASTROSTOMY      IR INSERT GASTROSTOMY TUBE [facility]  6/19/2020    NEUROLOGICAL PROCEDURE UNLISTED      Coil Embolization    UT ABDOMEN SURGERY PROC UNLISTED      has peg tube     Current Outpatient Medications   Medication Instructions    amLODIPine (NORVASC) 10 mg, Oral, DAILY    aspirin 81 mg, Per NG tube, DAILY    atorvastatin (LIPITOR) 40 mg, Oral, DAILY    Blood Pressure Test Kit-Large kit Check B/P 2 x day after pt has been calm for at least 5 minutes. Record B/P and notify office weekly of B/P results.  hydrALAZINE (APRESOLINE) 100 mg, Oral, 3 TIMES DAILY    linaCLOtide (LINZESS) 72 mcg, Oral, DAILY    lisinopriL (PRINIVIL, ZESTRIL) 40 mg tablet TAKE 1 TABLET BY MOUTH EVERY DAY    Shower Chair XX jose david Pt w/ recent hx of ruptured cerebral aneurysm. At risk for falls in shower. Elderly mother is caretaker. Pt needs help w/ ADLs.      No Known Allergies  Social History     Tobacco Use    Smoking status: Never Smoker    Smokeless tobacco: Never Used   Substance Use Topics    Alcohol use: Not Currently    Drug use: Never     Family History   Problem Relation Age of Onset    Hypertension Mother     Stroke Other     Lung Cancer Father Review of Systems   Constitutional: Negative for chills and fever. HENT: Negative for ear pain and sore throat. Respiratory: Negative for cough, shortness of breath and wheezing. Cardiovascular: Negative for chest pain, palpitations and leg swelling. Gastrointestinal: Negative for abdominal pain, heartburn, nausea and vomiting. Genitourinary: Negative for dysuria and frequency. Musculoskeletal: Positive for myalgias. Negative for back pain, joint pain and neck pain. Neurological: Positive for sensory change. Negative for dizziness and headaches. Psychiatric/Behavioral: The patient does not have insomnia. Objective  Physical Exam  Vitals signs reviewed. Constitutional:       General: She is not in acute distress. Appearance: Normal appearance. Comments: Accompanied by mother who contributed much of the history. HENT:      Head: Normocephalic. Neck:      Musculoskeletal: Neck supple. Vascular: No carotid bruit. Cardiovascular:      Rate and Rhythm: Normal rate and regular rhythm. Heart sounds: Normal heart sounds. No murmur. Pulmonary:      Effort: Pulmonary effort is normal.      Breath sounds: Normal breath sounds. Musculoskeletal: Normal range of motion. General: No swelling or tenderness. Right lower leg: No edema. Left lower leg: No edema. Skin:     General: Skin is warm and dry. Coloration: Skin is not jaundiced. Findings: No lesion or rash. Neurological:      Mental Status: She is alert. Mental status is at baseline. Motor: No weakness. Gait: Gait normal.      Comments: Forgetful. Psychiatric:         Mood and Affect: Mood normal.         Behavior: Behavior normal.     Assessment & Plan      ICD-10-CM ICD-9-CM    1. Essential hypertension  I10 401.9    2. RIGHT middle cerebral artery aneurysm  I67.1 437.3    3. Hypertriglyceridemia  E78.1 272.1 atorvastatin (LIPITOR) 40 mg tablet   4.  Screening for malignant neoplasm of colon  Z12.11 V76.51 REFERRAL TO GASTROENTEROLOGY       1. Essential hypertension  Stable- pt will continue on current med regime. 2. RIGHT middle cerebral artery aneurysm  Mother stated that pt has been referred to another neurosurgeon Dr. Rizwaan Beavers. She still has an untreated aneurysm but can not be corrected by endovascular treatment. 3. Hypertriglyceridemia  Pt will resume atorvastatin. Recheck labs at a future date. - atorvastatin (LIPITOR) 40 mg tablet; Take 1 Tab by mouth daily. Indications: excessive fat in the blood  Dispense: 90 Tab; Refill: 3    4. Screening for malignant neoplasm of colon  Mother advised to call GI for an appt for pt. F/U colonoscopy results.     - REFERRAL TO GASTROENTEROLOGY    I have discussed the diagnosis with the patient and the intended plan as seen in the above orders. Pt/caretaker has expressed understanding. Questions were answered concerning future plans. I have discussed medication side effects and warnings as indicated with the patient as well.     Marilee Silvestre NP

## 2021-03-17 ENCOUNTER — HOSPITAL ENCOUNTER (OUTPATIENT)
Dept: PREADMISSION TESTING | Age: 51
Discharge: HOME | End: 2021-03-17
Payer: COMMERCIAL

## 2021-03-17 ENCOUNTER — HOSPITAL ENCOUNTER (OUTPATIENT)
Dept: GENERAL RADIOLOGY | Age: 51
Discharge: HOME | End: 2021-03-17
Attending: NEUROLOGICAL SURGERY
Payer: COMMERCIAL

## 2021-03-17 VITALS
DIASTOLIC BLOOD PRESSURE: 71 MMHG | HEIGHT: 62 IN | WEIGHT: 164.9 LBS | BODY MASS INDEX: 30.35 KG/M2 | SYSTOLIC BLOOD PRESSURE: 121 MMHG | HEART RATE: 63 BPM | TEMPERATURE: 98.5 F

## 2021-03-17 LAB
ALBUMIN SERPL-MCNC: 3.8 G/DL (ref 3.5–5)
ALBUMIN/GLOB SERPL: 1.2 {RATIO} (ref 1.1–2.2)
ALP SERPL-CCNC: 76 U/L (ref 45–117)
ALT SERPL-CCNC: 14 U/L (ref 12–78)
ANION GAP SERPL CALC-SCNC: 6 MMOL/L (ref 5–15)
AST SERPL-CCNC: 6 U/L (ref 15–37)
ATRIAL RATE: 60 BPM
BASOPHILS # BLD: 0 K/UL (ref 0–0.1)
BASOPHILS NFR BLD: 1 % (ref 0–1)
BILIRUB SERPL-MCNC: 0.3 MG/DL (ref 0.2–1)
BUN SERPL-MCNC: 13 MG/DL (ref 6–20)
BUN/CREAT SERPL: 15 (ref 12–20)
CALCIUM SERPL-MCNC: 9.2 MG/DL (ref 8.5–10.1)
CALCULATED P AXIS, ECG09: 34 DEGREES
CALCULATED R AXIS, ECG10: 2 DEGREES
CALCULATED T AXIS, ECG11: 37 DEGREES
CHLORIDE SERPL-SCNC: 106 MMOL/L (ref 97–108)
CO2 SERPL-SCNC: 28 MMOL/L (ref 21–32)
CREAT SERPL-MCNC: 0.86 MG/DL (ref 0.55–1.02)
DIAGNOSIS, 93000: NORMAL
DIFFERENTIAL METHOD BLD: ABNORMAL
EOSINOPHIL # BLD: 0 K/UL (ref 0–0.4)
EOSINOPHIL NFR BLD: 1 % (ref 0–7)
ERYTHROCYTE [DISTWIDTH] IN BLOOD BY AUTOMATED COUNT: 13.2 % (ref 11.5–14.5)
GLOBULIN SER CALC-MCNC: 3.2 G/DL (ref 2–4)
GLUCOSE SERPL-MCNC: 79 MG/DL (ref 65–100)
HCT VFR BLD AUTO: 35.7 % (ref 35–47)
HGB BLD-MCNC: 11.3 G/DL (ref 11.5–16)
IMM GRANULOCYTES # BLD AUTO: 0 K/UL (ref 0–0.04)
IMM GRANULOCYTES NFR BLD AUTO: 0 % (ref 0–0.5)
LYMPHOCYTES # BLD: 1.7 K/UL (ref 0.8–3.5)
LYMPHOCYTES NFR BLD: 43 % (ref 12–49)
MCH RBC QN AUTO: 28.1 PG (ref 26–34)
MCHC RBC AUTO-ENTMCNC: 31.7 G/DL (ref 30–36.5)
MCV RBC AUTO: 88.8 FL (ref 80–99)
MONOCYTES # BLD: 0.3 K/UL (ref 0–1)
MONOCYTES NFR BLD: 9 % (ref 5–13)
NEUTS SEG # BLD: 1.8 K/UL (ref 1.8–8)
NEUTS SEG NFR BLD: 46 % (ref 32–75)
NRBC # BLD: 0 K/UL (ref 0–0.01)
NRBC BLD-RTO: 0 PER 100 WBC
P-R INTERVAL, ECG05: 176 MS
PLATELET # BLD AUTO: 279 K/UL (ref 150–400)
PMV BLD AUTO: 10.3 FL (ref 8.9–12.9)
POTASSIUM SERPL-SCNC: 4.2 MMOL/L (ref 3.5–5.1)
PROT SERPL-MCNC: 7 G/DL (ref 6.4–8.2)
Q-T INTERVAL, ECG07: 414 MS
QRS DURATION, ECG06: 88 MS
QTC CALCULATION (BEZET), ECG08: 414 MS
RBC # BLD AUTO: 4.02 M/UL (ref 3.8–5.2)
SODIUM SERPL-SCNC: 140 MMOL/L (ref 136–145)
VENTRICULAR RATE, ECG03: 60 BPM
WBC # BLD AUTO: 3.9 K/UL (ref 3.6–11)

## 2021-03-17 PROCEDURE — 86923 COMPATIBILITY TEST ELECTRIC: CPT

## 2021-03-17 PROCEDURE — 86901 BLOOD TYPING SEROLOGIC RH(D): CPT

## 2021-03-17 PROCEDURE — 93005 ELECTROCARDIOGRAM TRACING: CPT

## 2021-03-17 PROCEDURE — 80053 COMPREHEN METABOLIC PANEL: CPT

## 2021-03-17 PROCEDURE — 85025 COMPLETE CBC W/AUTO DIFF WBC: CPT

## 2021-03-17 PROCEDURE — 71046 X-RAY EXAM CHEST 2 VIEWS: CPT

## 2021-03-17 PROCEDURE — 36415 COLL VENOUS BLD VENIPUNCTURE: CPT

## 2021-03-17 NOTE — PERIOP NOTES
PATIENT MADE AWARE OF NEED FOR COVID-19 TESTING WITHIN 96 HOURS OF SURGERY. PATIENT INSTRUCTED TO EXPECT A CALL TO SCHEDULE APPT FOR TEST. PATIENT INSTRUCTED TO SELF QUARANTINE BETWEEN TESTING AND ARRIVAL TIME DAY OF SURGERY. PATIENT GIVEN WRITTEN INSTRUCTIONS TO GO TO THE IMAGING CENTER/CANCER CENTER [address] [place] [address] [address] TO HAVE CHEST XRAY COMPLETED. Patient verbalizes understanding of preoperative instructions:  Given skin prep chlorhexidine wipes-given written and verbal instructions on use. Pre-Operative Instructions    DO NOT EAT OR DRINK ANYTHING AFTER MIDNIGHT THE NIGHT BEFORE SURGERY. Patient given surgical site infection FAQs handout and hand hygiene tips sheet. Pre-operative instructions reviewed and patient verbalizes understanding of instructions. Patient has been given the opportunity to ask additional questions.

## 2021-03-25 ENCOUNTER — HOSPITAL ENCOUNTER (OUTPATIENT)
Dept: PREADMISSION TESTING | Age: 51
Discharge: HOME | End: 2021-03-25
Payer: COMMERCIAL

## 2021-03-25 ENCOUNTER — TRANSCRIBE ORDER (OUTPATIENT)
Dept: REGISTRATION | Age: 51
End: 2021-03-25

## 2021-03-25 DIAGNOSIS — Z01.812 PRE-PROCEDURE LAB EXAM: Primary | ICD-10-CM

## 2021-03-25 DIAGNOSIS — Z01.812 PRE-PROCEDURE LAB EXAM: ICD-10-CM

## 2021-03-25 PROCEDURE — U0003 INFECTIOUS AGENT DETECTION BY NUCLEIC ACID (DNA OR RNA); SEVERE ACUTE RESPIRATORY SYNDROME CORONAVIRUS 2 (SARS-COV-2) (CORONAVIRUS DISEASE [COVID-19]), AMPLIFIED PROBE TECHNIQUE, MAKING USE OF HIGH THROUGHPUT TECHNOLOGIES AS DESCRIBED BY CMS-2020-01-R: HCPCS

## 2021-03-26 ENCOUNTER — ANESTHESIA EVENT (OUTPATIENT)
Dept: SURGERY | Age: 51
DRG: 027 | End: 2021-03-26
Payer: COMMERCIAL

## 2021-03-26 LAB — SARS-COV-2, COV2NT: NOT DETECTED

## 2021-03-29 ENCOUNTER — APPOINTMENT (OUTPATIENT)
Dept: GENERAL RADIOLOGY | Age: 51
DRG: 027 | End: 2021-03-29
Attending: NEUROLOGICAL SURGERY
Payer: COMMERCIAL

## 2021-03-29 ENCOUNTER — APPOINTMENT (OUTPATIENT)
Dept: INTERVENTIONAL RADIOLOGY/VASCULAR | Age: 51
DRG: 027 | End: 2021-03-29
Attending: NEUROLOGICAL SURGERY
Payer: COMMERCIAL

## 2021-03-29 ENCOUNTER — ANESTHESIA (OUTPATIENT)
Dept: SURGERY | Age: 51
DRG: 027 | End: 2021-03-29
Payer: COMMERCIAL

## 2021-03-29 ENCOUNTER — HOSPITAL ENCOUNTER (INPATIENT)
Age: 51
LOS: 3 days | Discharge: HOME | DRG: 027 | End: 2021-04-01
Attending: NEUROLOGICAL SURGERY | Admitting: NEUROLOGICAL SURGERY
Payer: COMMERCIAL

## 2021-03-29 DIAGNOSIS — I67.1 CEREBRAL ANEURYSM: ICD-10-CM

## 2021-03-29 LAB
GLUCOSE BLD STRIP.AUTO-MCNC: 159 MG/DL (ref 65–100)
HCG UR QL: NEGATIVE
HISTORY CHECKED?,CKHIST: NORMAL
SERVICE CMNT-IMP: ABNORMAL

## 2021-03-29 PROCEDURE — 74011000250 HC RX REV CODE- 250: Performed by: STUDENT IN AN ORGANIZED HEALTH CARE EDUCATION/TRAINING PROGRAM

## 2021-03-29 PROCEDURE — 77030008684 HC TU ET CUF COVD -B: Performed by: ANESTHESIOLOGY

## 2021-03-29 PROCEDURE — APPNB15 APP NON BILLABLE TIME 0-15 MINS: Performed by: NURSE PRACTITIONER

## 2021-03-29 PROCEDURE — 74011250636 HC RX REV CODE- 250/636: Performed by: NURSE ANESTHETIST, CERTIFIED REGISTERED

## 2021-03-29 PROCEDURE — 2709999900 HC NON-CHARGEABLE SUPPLY

## 2021-03-29 PROCEDURE — 74011250637 HC RX REV CODE- 250/637: Performed by: NEUROLOGICAL SURGERY

## 2021-03-29 PROCEDURE — 77030021533 HC CATH ANGI DX PRFMA MRTM -A

## 2021-03-29 PROCEDURE — 77030005402 HC CATH RAD ART LN KT TELE -B

## 2021-03-29 PROCEDURE — 74011000250 HC RX REV CODE- 250: Performed by: NEUROLOGICAL SURGERY

## 2021-03-29 PROCEDURE — 77030031139 HC SUT VCRL2 J&J -A: Performed by: NEUROLOGICAL SURGERY

## 2021-03-29 PROCEDURE — C1769 GUIDE WIRE: HCPCS

## 2021-03-29 PROCEDURE — 71045 X-RAY EXAM CHEST 1 VIEW: CPT

## 2021-03-29 PROCEDURE — 76060000041 HC ANESTHESIA 5 TO 5.5 HR: Performed by: NEUROLOGICAL SURGERY

## 2021-03-29 PROCEDURE — C1751 CATH, INF, PER/CENT/MIDLINE: HCPCS

## 2021-03-29 PROCEDURE — 77030018390 HC SPNG HEMSTAT2 J&J -B: Performed by: NEUROLOGICAL SURGERY

## 2021-03-29 PROCEDURE — 74011250636 HC RX REV CODE- 250/636: Performed by: ANESTHESIOLOGY

## 2021-03-29 PROCEDURE — 36224 PLACE CATH CAROTD ART: CPT

## 2021-03-29 PROCEDURE — 03VG0CZ RESTRICTION OF INTRACRANIAL ARTERY WITH EXTRALUMINAL DEVICE, OPEN APPROACH: ICD-10-PCS | Performed by: NEUROLOGICAL SURGERY

## 2021-03-29 PROCEDURE — 77030003029 HC SUT VCRL J&J -B: Performed by: NEUROLOGICAL SURGERY

## 2021-03-29 PROCEDURE — 74011000250 HC RX REV CODE- 250: Performed by: NURSE PRACTITIONER

## 2021-03-29 PROCEDURE — 77030014650 HC SEAL MTRX FLOSEL BAXT -C: Performed by: NEUROLOGICAL SURGERY

## 2021-03-29 PROCEDURE — 77030010813: Performed by: NEUROLOGICAL SURGERY

## 2021-03-29 PROCEDURE — 74011250636 HC RX REV CODE- 250/636

## 2021-03-29 PROCEDURE — 77030002946 HC SUT NRLN J&J -B: Performed by: NEUROLOGICAL SURGERY

## 2021-03-29 PROCEDURE — 77030026438 HC STYL ET INTUB CARD -A: Performed by: ANESTHESIOLOGY

## 2021-03-29 PROCEDURE — 77030004391 HC BUR FLUT MEDT -C: Performed by: NEUROLOGICAL SURGERY

## 2021-03-29 PROCEDURE — 77030012348 HC CLP ANEUR YASAR AESC -E: Performed by: NEUROLOGICAL SURGERY

## 2021-03-29 PROCEDURE — 65610000006 HC RM INTENSIVE CARE

## 2021-03-29 PROCEDURE — 74011000258 HC RX REV CODE- 258: Performed by: NURSE ANESTHETIST, CERTIFIED REGISTERED

## 2021-03-29 PROCEDURE — 2709999900 HC NON-CHARGEABLE SUPPLY: Performed by: NEUROLOGICAL SURGERY

## 2021-03-29 PROCEDURE — 74011000258 HC RX REV CODE- 258: Performed by: NEUROLOGICAL SURGERY

## 2021-03-29 PROCEDURE — 77030004472 HC BUR TAPR MEDT -B: Performed by: NEUROLOGICAL SURGERY

## 2021-03-29 PROCEDURE — 74011250636 HC RX REV CODE- 250/636: Performed by: NURSE PRACTITIONER

## 2021-03-29 PROCEDURE — 77030008584 HC TOOL GDWRE DEV TERU -A

## 2021-03-29 PROCEDURE — 77030038552 HC DRN WND MDII -A: Performed by: NEUROLOGICAL SURGERY

## 2021-03-29 PROCEDURE — 74011250637 HC RX REV CODE- 250/637: Performed by: ANESTHESIOLOGY

## 2021-03-29 PROCEDURE — 77030012890

## 2021-03-29 PROCEDURE — 77030002996 HC SUT SLK J&J -A

## 2021-03-29 PROCEDURE — 82962 GLUCOSE BLOOD TEST: CPT

## 2021-03-29 PROCEDURE — 76010000177 HC OR TIME 5 TO 5.5 HR INTENSV-TIER 1: Performed by: NEUROLOGICAL SURGERY

## 2021-03-29 PROCEDURE — 74011250636 HC RX REV CODE- 250/636: Performed by: NEUROLOGICAL SURGERY

## 2021-03-29 PROCEDURE — 77030014355 HC CVR BUR H TI BIOM -C: Performed by: NEUROLOGICAL SURGERY

## 2021-03-29 PROCEDURE — 76210000000 HC OR PH I REC 2 TO 2.5 HR: Performed by: NEUROLOGICAL SURGERY

## 2021-03-29 PROCEDURE — 77030008683 HC TU ET CUF COVD -A: Performed by: ANESTHESIOLOGY

## 2021-03-29 PROCEDURE — C1821 INTERSPINOUS IMPLANT: HCPCS | Performed by: NEUROLOGICAL SURGERY

## 2021-03-29 PROCEDURE — 77030014008 HC SPNG HEMSTAT J&J -C: Performed by: NEUROLOGICAL SURGERY

## 2021-03-29 PROCEDURE — 81025 URINE PREGNANCY TEST: CPT

## 2021-03-29 PROCEDURE — 77030040922 HC BLNKT HYPOTHRM STRY -A

## 2021-03-29 PROCEDURE — 77030013079 HC BLNKT BAIR HGGR 3M -A: Performed by: ANESTHESIOLOGY

## 2021-03-29 PROCEDURE — 74011000250 HC RX REV CODE- 250: Performed by: NURSE ANESTHETIST, CERTIFIED REGISTERED

## 2021-03-29 PROCEDURE — C1713 ANCHOR/SCREW BN/BN,TIS/BN: HCPCS | Performed by: NEUROLOGICAL SURGERY

## 2021-03-29 PROCEDURE — 77030040361 HC SLV COMPR DVT MDII -B: Performed by: NEUROLOGICAL SURGERY

## 2021-03-29 PROCEDURE — C1760 CLOSURE DEV, VASC: HCPCS

## 2021-03-29 PROCEDURE — 36224 PLACE CATH CAROTD ART: CPT | Performed by: STUDENT IN AN ORGANIZED HEALTH CARE EDUCATION/TRAINING PROGRAM

## 2021-03-29 DEVICE — SCREW BNE L3.5MM DIA1.5MM CORT MAXILLOMANDIBULAR GRN TI: Type: IMPLANTABLE DEVICE | Site: CRANIAL | Status: FUNCTIONAL

## 2021-03-29 DEVICE — COVER BUR H SM DIA13MM THK0.5MM 5 H NEURO TI FOR 1.5MM SCR: Type: IMPLANTABLE DEVICE | Site: CRANIAL | Status: FUNCTIONAL

## 2021-03-29 DEVICE — YASARGIL TI PERM STD-CLIP CVD 6.4MM
Type: IMPLANTABLE DEVICE | Site: CRANIAL | Status: FUNCTIONAL
Brand: AESCULAP

## 2021-03-29 DEVICE — COVER BUR H L DIA18.5MM THK0.5MM 5 H NEURO TI W/O TAB: Type: IMPLANTABLE DEVICE | Site: CRANIAL | Status: FUNCTIONAL

## 2021-03-29 DEVICE — YASARGIL TI PERM MINI-CLIP STR 5MM
Type: IMPLANTABLE DEVICE | Site: CRANIAL | Status: FUNCTIONAL
Brand: AESCULAP

## 2021-03-29 RX ORDER — FENTANYL CITRATE 50 UG/ML
100 INJECTION, SOLUTION INTRAMUSCULAR; INTRAVENOUS
Status: DISCONTINUED | OUTPATIENT
Start: 2021-03-29 | End: 2021-04-01 | Stop reason: HOSPADM

## 2021-03-29 RX ORDER — HYDRALAZINE HYDROCHLORIDE 20 MG/ML
10 INJECTION INTRAMUSCULAR; INTRAVENOUS
Status: COMPLETED | OUTPATIENT
Start: 2021-03-29 | End: 2021-03-30

## 2021-03-29 RX ORDER — FENTANYL CITRATE 50 UG/ML
INJECTION, SOLUTION INTRAMUSCULAR; INTRAVENOUS AS NEEDED
Status: DISCONTINUED | OUTPATIENT
Start: 2021-03-29 | End: 2021-03-29 | Stop reason: HOSPADM

## 2021-03-29 RX ORDER — FENTANYL CITRATE 50 UG/ML
50 INJECTION, SOLUTION INTRAMUSCULAR; INTRAVENOUS AS NEEDED
Status: COMPLETED | OUTPATIENT
Start: 2021-03-29 | End: 2021-03-29

## 2021-03-29 RX ORDER — HYDROMORPHONE HYDROCHLORIDE 2 MG/ML
INJECTION, SOLUTION INTRAMUSCULAR; INTRAVENOUS; SUBCUTANEOUS AS NEEDED
Status: DISCONTINUED | OUTPATIENT
Start: 2021-03-29 | End: 2021-03-29 | Stop reason: HOSPADM

## 2021-03-29 RX ORDER — MIDAZOLAM HYDROCHLORIDE 1 MG/ML
1 INJECTION, SOLUTION INTRAMUSCULAR; INTRAVENOUS AS NEEDED
Status: COMPLETED | OUTPATIENT
Start: 2021-03-29 | End: 2021-03-29

## 2021-03-29 RX ORDER — MANNITOL 20 G/100ML
INJECTION, SOLUTION INTRAVENOUS
Status: DISCONTINUED | OUTPATIENT
Start: 2021-03-29 | End: 2021-03-29 | Stop reason: HOSPADM

## 2021-03-29 RX ORDER — MIDAZOLAM HYDROCHLORIDE 1 MG/ML
0.5 INJECTION, SOLUTION INTRAMUSCULAR; INTRAVENOUS
Status: DISCONTINUED | OUTPATIENT
Start: 2021-03-29 | End: 2021-03-29 | Stop reason: HOSPADM

## 2021-03-29 RX ORDER — SODIUM CHLORIDE 9 MG/ML
25 INJECTION, SOLUTION INTRAVENOUS CONTINUOUS
Status: DISCONTINUED | OUTPATIENT
Start: 2021-03-29 | End: 2021-03-30

## 2021-03-29 RX ORDER — SODIUM CHLORIDE 0.9 % (FLUSH) 0.9 %
5-40 SYRINGE (ML) INJECTION EVERY 8 HOURS
Status: DISCONTINUED | OUTPATIENT
Start: 2021-03-29 | End: 2021-03-29 | Stop reason: HOSPADM

## 2021-03-29 RX ORDER — ONDANSETRON 2 MG/ML
INJECTION INTRAMUSCULAR; INTRAVENOUS AS NEEDED
Status: DISCONTINUED | OUTPATIENT
Start: 2021-03-29 | End: 2021-03-29 | Stop reason: HOSPADM

## 2021-03-29 RX ORDER — NALOXONE HYDROCHLORIDE 0.4 MG/ML
INJECTION, SOLUTION INTRAMUSCULAR; INTRAVENOUS; SUBCUTANEOUS
Status: DISCONTINUED
Start: 2021-03-29 | End: 2021-03-29

## 2021-03-29 RX ORDER — HYDRALAZINE HYDROCHLORIDE 50 MG/1
100 TABLET, FILM COATED ORAL 3 TIMES DAILY
Status: DISCONTINUED | OUTPATIENT
Start: 2021-03-29 | End: 2021-04-01 | Stop reason: HOSPADM

## 2021-03-29 RX ORDER — AMLODIPINE BESYLATE 5 MG/1
10 TABLET ORAL DAILY
Status: DISCONTINUED | OUTPATIENT
Start: 2021-03-30 | End: 2021-04-01 | Stop reason: HOSPADM

## 2021-03-29 RX ORDER — HYDROMORPHONE HYDROCHLORIDE 1 MG/ML
0.5 INJECTION, SOLUTION INTRAMUSCULAR; INTRAVENOUS; SUBCUTANEOUS
Status: DISCONTINUED | OUTPATIENT
Start: 2021-03-29 | End: 2021-04-01 | Stop reason: HOSPADM

## 2021-03-29 RX ORDER — FENTANYL CITRATE 50 UG/ML
INJECTION, SOLUTION INTRAMUSCULAR; INTRAVENOUS
Status: COMPLETED
Start: 2021-03-29 | End: 2021-03-29

## 2021-03-29 RX ORDER — FLUMAZENIL 0.1 MG/ML
INJECTION INTRAVENOUS
Status: DISCONTINUED
Start: 2021-03-29 | End: 2021-03-29

## 2021-03-29 RX ORDER — SODIUM CHLORIDE 0.9 % (FLUSH) 0.9 %
5-40 SYRINGE (ML) INJECTION AS NEEDED
Status: DISCONTINUED | OUTPATIENT
Start: 2021-03-29 | End: 2021-04-01 | Stop reason: HOSPADM

## 2021-03-29 RX ORDER — ONDANSETRON 2 MG/ML
4 INJECTION INTRAMUSCULAR; INTRAVENOUS
Status: DISCONTINUED | OUTPATIENT
Start: 2021-03-29 | End: 2021-04-01 | Stop reason: HOSPADM

## 2021-03-29 RX ORDER — FENTANYL CITRATE 50 UG/ML
25 INJECTION, SOLUTION INTRAMUSCULAR; INTRAVENOUS
Status: DISCONTINUED | OUTPATIENT
Start: 2021-03-29 | End: 2021-03-29 | Stop reason: HOSPADM

## 2021-03-29 RX ORDER — OXYCODONE AND ACETAMINOPHEN 5; 325 MG/1; MG/1
1 TABLET ORAL AS NEEDED
Status: DISCONTINUED | OUTPATIENT
Start: 2021-03-29 | End: 2021-03-29 | Stop reason: HOSPADM

## 2021-03-29 RX ORDER — HYDROMORPHONE HYDROCHLORIDE 1 MG/ML
0.2 INJECTION, SOLUTION INTRAMUSCULAR; INTRAVENOUS; SUBCUTANEOUS
Status: DISCONTINUED | OUTPATIENT
Start: 2021-03-29 | End: 2021-03-29 | Stop reason: HOSPADM

## 2021-03-29 RX ORDER — ACETAMINOPHEN 325 MG/1
650 TABLET ORAL EVERY 8 HOURS
Status: DISCONTINUED | OUTPATIENT
Start: 2021-03-29 | End: 2021-04-01 | Stop reason: HOSPADM

## 2021-03-29 RX ORDER — LIDOCAINE HYDROCHLORIDE AND EPINEPHRINE 10; 10 MG/ML; UG/ML
INJECTION, SOLUTION INFILTRATION; PERINEURAL AS NEEDED
Status: DISCONTINUED | OUTPATIENT
Start: 2021-03-29 | End: 2021-03-29 | Stop reason: HOSPADM

## 2021-03-29 RX ORDER — SODIUM CHLORIDE 0.9 % (FLUSH) 0.9 %
5-40 SYRINGE (ML) INJECTION EVERY 8 HOURS
Status: DISCONTINUED | OUTPATIENT
Start: 2021-03-29 | End: 2021-04-01 | Stop reason: HOSPADM

## 2021-03-29 RX ORDER — MIDAZOLAM HYDROCHLORIDE 5 MG/ML
5 INJECTION INTRAMUSCULAR; INTRAVENOUS
Status: DISCONTINUED | OUTPATIENT
Start: 2021-03-29 | End: 2021-04-01 | Stop reason: HOSPADM

## 2021-03-29 RX ORDER — SODIUM CHLORIDE AND POTASSIUM CHLORIDE .9; .15 G/100ML; G/100ML
SOLUTION INTRAVENOUS
Status: COMPLETED
Start: 2021-03-29 | End: 2021-03-29

## 2021-03-29 RX ORDER — ONDANSETRON 2 MG/ML
4 INJECTION INTRAMUSCULAR; INTRAVENOUS AS NEEDED
Status: DISCONTINUED | OUTPATIENT
Start: 2021-03-29 | End: 2021-03-29 | Stop reason: HOSPADM

## 2021-03-29 RX ORDER — LIDOCAINE HYDROCHLORIDE 20 MG/ML
20 INJECTION, SOLUTION INFILTRATION; PERINEURAL
Status: COMPLETED | OUTPATIENT
Start: 2021-03-29 | End: 2021-03-29

## 2021-03-29 RX ORDER — OXYCODONE AND ACETAMINOPHEN 5; 325 MG/1; MG/1
1 TABLET ORAL
Status: DISCONTINUED | OUTPATIENT
Start: 2021-03-29 | End: 2021-04-01 | Stop reason: HOSPADM

## 2021-03-29 RX ORDER — SODIUM CHLORIDE 9 MG/ML
250 INJECTION, SOLUTION INTRAVENOUS AS NEEDED
Status: DISCONTINUED | OUTPATIENT
Start: 2021-03-29 | End: 2021-03-29 | Stop reason: HOSPADM

## 2021-03-29 RX ORDER — PHENYLEPHRINE HCL IN 0.9% NACL 0.4MG/10ML
SYRINGE (ML) INTRAVENOUS
Status: DISCONTINUED | OUTPATIENT
Start: 2021-03-29 | End: 2021-03-29 | Stop reason: HOSPADM

## 2021-03-29 RX ORDER — MORPHINE SULFATE 2 MG/ML
2 INJECTION, SOLUTION INTRAMUSCULAR; INTRAVENOUS
Status: DISCONTINUED | OUTPATIENT
Start: 2021-03-29 | End: 2021-03-29 | Stop reason: HOSPADM

## 2021-03-29 RX ORDER — SODIUM CHLORIDE AND POTASSIUM CHLORIDE .9; .15 G/100ML; G/100ML
SOLUTION INTRAVENOUS CONTINUOUS
Status: DISCONTINUED | OUTPATIENT
Start: 2021-03-29 | End: 2021-03-30

## 2021-03-29 RX ORDER — DOCUSATE SODIUM 100 MG/1
100 CAPSULE, LIQUID FILLED ORAL 2 TIMES DAILY
Status: DISCONTINUED | OUTPATIENT
Start: 2021-03-29 | End: 2021-04-01 | Stop reason: HOSPADM

## 2021-03-29 RX ORDER — MIDAZOLAM HYDROCHLORIDE 1 MG/ML
INJECTION, SOLUTION INTRAMUSCULAR; INTRAVENOUS
Status: DISCONTINUED
Start: 2021-03-29 | End: 2021-03-29

## 2021-03-29 RX ORDER — PROPOFOL 10 MG/ML
INJECTION, EMULSION INTRAVENOUS
Status: DISCONTINUED | OUTPATIENT
Start: 2021-03-29 | End: 2021-03-29 | Stop reason: HOSPADM

## 2021-03-29 RX ORDER — SODIUM CHLORIDE 0.9 % (FLUSH) 0.9 %
5-40 SYRINGE (ML) INJECTION AS NEEDED
Status: DISCONTINUED | OUTPATIENT
Start: 2021-03-29 | End: 2021-03-29 | Stop reason: HOSPADM

## 2021-03-29 RX ORDER — DIPHENHYDRAMINE HYDROCHLORIDE 50 MG/ML
12.5 INJECTION, SOLUTION INTRAMUSCULAR; INTRAVENOUS AS NEEDED
Status: DISCONTINUED | OUTPATIENT
Start: 2021-03-29 | End: 2021-03-29 | Stop reason: HOSPADM

## 2021-03-29 RX ORDER — SODIUM CHLORIDE, SODIUM LACTATE, POTASSIUM CHLORIDE, CALCIUM CHLORIDE 600; 310; 30; 20 MG/100ML; MG/100ML; MG/100ML; MG/100ML
125 INJECTION, SOLUTION INTRAVENOUS CONTINUOUS
Status: DISCONTINUED | OUTPATIENT
Start: 2021-03-29 | End: 2021-03-29 | Stop reason: HOSPADM

## 2021-03-29 RX ORDER — LABETALOL HYDROCHLORIDE 5 MG/ML
10 INJECTION, SOLUTION INTRAVENOUS
Status: COMPLETED | OUTPATIENT
Start: 2021-03-29 | End: 2021-03-30

## 2021-03-29 RX ORDER — ATORVASTATIN CALCIUM 40 MG/1
40 TABLET, FILM COATED ORAL DAILY
Status: DISCONTINUED | OUTPATIENT
Start: 2021-03-30 | End: 2021-04-01 | Stop reason: HOSPADM

## 2021-03-29 RX ORDER — SODIUM CHLORIDE 9 MG/ML
25 INJECTION, SOLUTION INTRAVENOUS CONTINUOUS
Status: DISCONTINUED | OUTPATIENT
Start: 2021-03-29 | End: 2021-03-29 | Stop reason: HOSPADM

## 2021-03-29 RX ORDER — ADHESIVE BANDAGE
30 BANDAGE TOPICAL DAILY PRN
Status: DISCONTINUED | OUTPATIENT
Start: 2021-03-29 | End: 2021-04-01 | Stop reason: HOSPADM

## 2021-03-29 RX ORDER — POVIDONE-IODINE 10 MG/G
OINTMENT TOPICAL AS NEEDED
Status: DISCONTINUED | OUTPATIENT
Start: 2021-03-29 | End: 2021-03-29 | Stop reason: HOSPADM

## 2021-03-29 RX ORDER — PROPOFOL 10 MG/ML
INJECTION, EMULSION INTRAVENOUS AS NEEDED
Status: DISCONTINUED | OUTPATIENT
Start: 2021-03-29 | End: 2021-03-29 | Stop reason: HOSPADM

## 2021-03-29 RX ORDER — NALOXONE HYDROCHLORIDE 0.4 MG/ML
INJECTION, SOLUTION INTRAMUSCULAR; INTRAVENOUS; SUBCUTANEOUS AS NEEDED
Status: DISCONTINUED | OUTPATIENT
Start: 2021-03-29 | End: 2021-03-29 | Stop reason: HOSPADM

## 2021-03-29 RX ORDER — ROCURONIUM BROMIDE 10 MG/ML
INJECTION, SOLUTION INTRAVENOUS AS NEEDED
Status: DISCONTINUED | OUTPATIENT
Start: 2021-03-29 | End: 2021-03-29 | Stop reason: HOSPADM

## 2021-03-29 RX ORDER — ACETAMINOPHEN 325 MG/1
650 TABLET ORAL
Status: DISCONTINUED | OUTPATIENT
Start: 2021-03-29 | End: 2021-04-01 | Stop reason: HOSPADM

## 2021-03-29 RX ORDER — MINERAL OIL
OIL (ML) MISCELLANEOUS AS NEEDED
Status: DISCONTINUED | OUTPATIENT
Start: 2021-03-29 | End: 2021-03-29 | Stop reason: HOSPADM

## 2021-03-29 RX ORDER — DEXAMETHASONE SODIUM PHOSPHATE 4 MG/ML
4 INJECTION, SOLUTION INTRA-ARTICULAR; INTRALESIONAL; INTRAMUSCULAR; INTRAVENOUS; SOFT TISSUE EVERY 6 HOURS
Status: DISCONTINUED | OUTPATIENT
Start: 2021-03-29 | End: 2021-03-30

## 2021-03-29 RX ORDER — LIDOCAINE HYDROCHLORIDE 20 MG/ML
INJECTION, SOLUTION EPIDURAL; INFILTRATION; INTRACAUDAL; PERINEURAL AS NEEDED
Status: DISCONTINUED | OUTPATIENT
Start: 2021-03-29 | End: 2021-03-29 | Stop reason: HOSPADM

## 2021-03-29 RX ORDER — LIDOCAINE HYDROCHLORIDE 10 MG/ML
0.1 INJECTION, SOLUTION EPIDURAL; INFILTRATION; INTRACAUDAL; PERINEURAL AS NEEDED
Status: DISCONTINUED | OUTPATIENT
Start: 2021-03-29 | End: 2021-03-29 | Stop reason: HOSPADM

## 2021-03-29 RX ORDER — LISINOPRIL 20 MG/1
40 TABLET ORAL DAILY
Status: DISCONTINUED | OUTPATIENT
Start: 2021-03-30 | End: 2021-04-01 | Stop reason: HOSPADM

## 2021-03-29 RX ORDER — ACETAMINOPHEN 325 MG/1
650 TABLET ORAL ONCE
Status: COMPLETED | OUTPATIENT
Start: 2021-03-29 | End: 2021-03-29

## 2021-03-29 RX ORDER — DEXAMETHASONE SODIUM PHOSPHATE 4 MG/ML
INJECTION, SOLUTION INTRA-ARTICULAR; INTRALESIONAL; INTRAMUSCULAR; INTRAVENOUS; SOFT TISSUE AS NEEDED
Status: DISCONTINUED | OUTPATIENT
Start: 2021-03-29 | End: 2021-03-29 | Stop reason: HOSPADM

## 2021-03-29 RX ADMIN — DEXAMETHASONE SODIUM PHOSPHATE 4 MG: 4 INJECTION, SOLUTION INTRA-ARTICULAR; INTRALESIONAL; INTRAMUSCULAR; INTRAVENOUS; SOFT TISSUE at 23:54

## 2021-03-29 RX ADMIN — SODIUM CHLORIDE 5 MG/HR: 9 INJECTION, SOLUTION INTRAVENOUS at 18:17

## 2021-03-29 RX ADMIN — POTASSIUM CHLORIDE AND SODIUM CHLORIDE: 900; 150 INJECTION, SOLUTION INTRAVENOUS at 14:00

## 2021-03-29 RX ADMIN — ROCURONIUM BROMIDE 10 MG: 10 SOLUTION INTRAVENOUS at 10:42

## 2021-03-29 RX ADMIN — FENTANYL CITRATE 50 MCG: 50 INJECTION, SOLUTION INTRAMUSCULAR; INTRAVENOUS at 07:40

## 2021-03-29 RX ADMIN — Medication 20 MCG/MIN: at 09:29

## 2021-03-29 RX ADMIN — ROCURONIUM BROMIDE 10 MG: 10 SOLUTION INTRAVENOUS at 09:30

## 2021-03-29 RX ADMIN — WATER 2 G: 1 INJECTION INTRAMUSCULAR; INTRAVENOUS; SUBCUTANEOUS at 08:45

## 2021-03-29 RX ADMIN — MANNITOL: 20 INJECTION, SOLUTION INTRAVENOUS at 08:40

## 2021-03-29 RX ADMIN — HYDROMORPHONE HYDROCHLORIDE 1 MG: 2 INJECTION, SOLUTION INTRAMUSCULAR; INTRAVENOUS; SUBCUTANEOUS at 09:05

## 2021-03-29 RX ADMIN — Medication 10 ML: at 18:17

## 2021-03-29 RX ADMIN — FENTANYL CITRATE 75 MCG: 50 INJECTION, SOLUTION INTRAMUSCULAR; INTRAVENOUS at 08:50

## 2021-03-29 RX ADMIN — PROPOFOL 200 MG: 10 INJECTION, EMULSION INTRAVENOUS at 08:33

## 2021-03-29 RX ADMIN — ONDANSETRON HYDROCHLORIDE 4 MG: 2 INJECTION, SOLUTION INTRAMUSCULAR; INTRAVENOUS at 18:53

## 2021-03-29 RX ADMIN — LABETALOL HYDROCHLORIDE 10 MG: 5 INJECTION INTRAVENOUS at 18:25

## 2021-03-29 RX ADMIN — NALOXONE HYDROCHLORIDE 0.04 MG: 0.4 INJECTION, SOLUTION INTRAMUSCULAR; INTRAVENOUS; SUBCUTANEOUS at 13:17

## 2021-03-29 RX ADMIN — FENTANYL CITRATE 100 MCG: 50 INJECTION, SOLUTION INTRAMUSCULAR; INTRAVENOUS at 08:15

## 2021-03-29 RX ADMIN — NALOXONE HYDROCHLORIDE 0.04 MG: 0.4 INJECTION, SOLUTION INTRAMUSCULAR; INTRAVENOUS; SUBCUTANEOUS at 13:14

## 2021-03-29 RX ADMIN — DEXTROSE MONOHYDRATE 5 MG/HR: 5 INJECTION, SOLUTION INTRAVENOUS at 13:06

## 2021-03-29 RX ADMIN — HYDROMORPHONE HYDROCHLORIDE 0.5 MG: 2 INJECTION, SOLUTION INTRAMUSCULAR; INTRAVENOUS; SUBCUTANEOUS at 10:42

## 2021-03-29 RX ADMIN — PROPOFOL 75 MCG/KG/MIN: 10 INJECTION, EMULSION INTRAVENOUS at 08:40

## 2021-03-29 RX ADMIN — LEVETIRACETAM 500 MG: 100 INJECTION, SOLUTION INTRAVENOUS at 21:31

## 2021-03-29 RX ADMIN — DEXAMETHASONE SODIUM PHOSPHATE 4 MG: 4 INJECTION, SOLUTION INTRA-ARTICULAR; INTRALESIONAL; INTRAMUSCULAR; INTRAVENOUS; SOFT TISSUE at 18:16

## 2021-03-29 RX ADMIN — HYDROMORPHONE HYDROCHLORIDE 0.5 MG: 2 INJECTION, SOLUTION INTRAMUSCULAR; INTRAVENOUS; SUBCUTANEOUS at 10:48

## 2021-03-29 RX ADMIN — MIDAZOLAM 1 MG: 1 INJECTION INTRAMUSCULAR; INTRAVENOUS at 08:08

## 2021-03-29 RX ADMIN — SODIUM CHLORIDE, POTASSIUM CHLORIDE, SODIUM LACTATE AND CALCIUM CHLORIDE 125 ML/HR: 600; 310; 30; 20 INJECTION, SOLUTION INTRAVENOUS at 06:40

## 2021-03-29 RX ADMIN — SUGAMMADEX 150 MG: 100 INJECTION, SOLUTION INTRAVENOUS at 12:50

## 2021-03-29 RX ADMIN — MIDAZOLAM HYDROCHLORIDE 1 MG: 1 INJECTION, SOLUTION INTRAMUSCULAR; INTRAVENOUS at 08:08

## 2021-03-29 RX ADMIN — NALOXONE HYDROCHLORIDE 0.04 MG: 0.4 INJECTION, SOLUTION INTRAMUSCULAR; INTRAVENOUS; SUBCUTANEOUS at 13:21

## 2021-03-29 RX ADMIN — PHENYLEPHRINE HYDROCHLORIDE 40 MCG: 10 INJECTION INTRAVENOUS at 08:33

## 2021-03-29 RX ADMIN — Medication 10 ML: at 21:38

## 2021-03-29 RX ADMIN — LEVETIRACETAM 1 G: 100 INJECTION, SOLUTION INTRAVENOUS at 08:45

## 2021-03-29 RX ADMIN — LIDOCAINE HYDROCHLORIDE 60 MG: 20 INJECTION, SOLUTION EPIDURAL; INFILTRATION; INTRACAUDAL; PERINEURAL at 08:33

## 2021-03-29 RX ADMIN — ONDANSETRON HYDROCHLORIDE 4 MG: 2 INJECTION, SOLUTION INTRAMUSCULAR; INTRAVENOUS at 12:06

## 2021-03-29 RX ADMIN — PROCHLORPERAZINE EDISYLATE 5 MG: 5 INJECTION INTRAMUSCULAR; INTRAVENOUS at 20:21

## 2021-03-29 RX ADMIN — ROCURONIUM BROMIDE 50 MG: 10 SOLUTION INTRAVENOUS at 08:33

## 2021-03-29 RX ADMIN — LIDOCAINE HYDROCHLORIDE 400 MG: 20 INJECTION, SOLUTION INFILTRATION; PERINEURAL at 13:00

## 2021-03-29 RX ADMIN — SODIUM CHLORIDE 5 MG/HR: 9 INJECTION, SOLUTION INTRAVENOUS at 18:37

## 2021-03-29 RX ADMIN — SODIUM CHLORIDE 25 ML/HR: 900 INJECTION, SOLUTION INTRAVENOUS at 07:00

## 2021-03-29 RX ADMIN — POTASSIUM CHLORIDE AND SODIUM CHLORIDE: 900; 150 INJECTION, SOLUTION INTRAVENOUS at 21:31

## 2021-03-29 RX ADMIN — POTASSIUM CHLORIDE AND SODIUM CHLORIDE: 900; 150 INJECTION, SOLUTION INTRAVENOUS at 13:42

## 2021-03-29 RX ADMIN — LIDOCAINE HYDROCHLORIDE 10 ML: 20 INJECTION, SOLUTION INFILTRATION; PERINEURAL at 07:42

## 2021-03-29 RX ADMIN — DEXAMETHASONE SODIUM PHOSPHATE 10 MG: 4 INJECTION, SOLUTION INTRAMUSCULAR; INTRAVENOUS at 08:45

## 2021-03-29 RX ADMIN — HYDRALAZINE HYDROCHLORIDE 100 MG: 50 TABLET, FILM COATED ORAL at 21:39

## 2021-03-29 RX ADMIN — MIDAZOLAM HYDROCHLORIDE 1 MG: 1 INJECTION, SOLUTION INTRAMUSCULAR; INTRAVENOUS at 07:40

## 2021-03-29 RX ADMIN — CEFAZOLIN SODIUM 2 G: 1 INJECTION, POWDER, FOR SOLUTION INTRAMUSCULAR; INTRAVENOUS at 18:16

## 2021-03-29 RX ADMIN — ACETAMINOPHEN 650 MG: 325 TABLET ORAL at 06:33

## 2021-03-29 RX ADMIN — LABETALOL HYDROCHLORIDE 10 MG: 5 INJECTION INTRAVENOUS at 23:44

## 2021-03-29 RX ADMIN — FENTANYL CITRATE 25 MCG: 50 INJECTION, SOLUTION INTRAMUSCULAR; INTRAVENOUS at 08:33

## 2021-03-29 NOTE — BRIEF OP NOTE
Brief Postoperative Note    Patient: Car Delaney  YOB: 1970  MRN: [id number]    Date of Procedure: 3/29/2021     Pre-Op Diagnosis: RIGHT MIDDLE CEREBRAL ARTERY ANEURYSM    Post-Op Diagnosis: Same as preoperative diagnosis. Procedure(s):  RIGHT FRONTO TEMPORAL CRANIOTOMY WITH CLIPPING OF MIDDLE CEREBRAL ARTERY  ANEURYSM WITH INTRAOPERATIVE ANGIOGRAM    Surgeon(s):  MD Ana Paula Crowe MD Ed Graft, MD    Surgical Assistant: Surg Asst-1: Carey Lim    Anesthesia: General     Estimated Blood Loss (mL): less than 048     Complications: None    Specimens: * No specimens in log *     Implants:   Implant Name Type Inv. Item Serial No.  Lot No. LRB No. Used Action   CLIP ANEUR PERM STD 6.6MML -- YASARGIL - SNA  CLIP ANEUR PERM STD 6.6MML -- YASARGIL NA [employer]_WD NA N/A 1 Implanted   CLIP ANEUR MINI 5X4 MM PERM TI STRL YASRG - SNA  CLIP ANEUR MINI 5X4 MM PERM TI STRL YASRG NA [employer]_WD NA N/A 1 Implanted   SPACER SPNL [id number] 7DEG LORDTC - SNA  SPACER SPNL [id number] 7DEG LORDTC NA ORTHOFIX SPINAL IMPLANTS_WD NA N/A 2 Implanted   COVER BUR H L DIA18.5MM THK0.5MM 5 H NEURO TI W/O TAB - SNA  COVER BUR H L DIA18.5MM THK0.5MM 5 H NEURO TI W/O TAB NA FENG BIOMET MICROFIXATION_WD NA N/A 2 Implanted   SCREW BNE L3.5MM DIA1.5MM SAI MAXILLOMANDIBULAR GRN TI - SNA  SCREW BNE L3.5MM DIA1.5MM SAI MAXILLOMANDIBULAR GRN TI NA FENG BIOMET MICROFIXATION_WD NA N/A 14 Implanted       Drains:   Hemovac Right Head (Active)   Site Assessment Clean, dry, & intact 03/29/21 1208   Dressing Status Clean, dry, & intact 03/29/21 1208   Drainage Description Sanguinous 03/29/21 1208   Status Patent; Charged;Draining 03/29/21 1208       Findings: mca aneurysm    Electronically Signed by Yue Elizabeth MD on 3/29/2021 at 12:18 PM

## 2021-03-29 NOTE — ANESTHESIA PREPROCEDURE EVALUATION
Relevant Problems   NEUROLOGY   (+) Seizure (HCC)      CARDIOVASCULAR   (+) Essential hypertension       Anesthetic History   No history of anesthetic complications            Review of Systems / Medical History  Patient summary reviewed, nursing notes reviewed and pertinent labs reviewed    Pulmonary  Within defined limits                 Neuro/Psych     seizures  CVA      Comments: RIGHT middle cerebral artery aneurysm    Hypoxic ischemic encephalopathy   Cardiovascular  Within defined limits  Hypertension              Exercise tolerance: >4 METS     GI/Hepatic/Renal  Within defined limits              Endo/Other  Within defined limits      Arthritis     Other Findings   Comments:              Physical Exam    Airway  Mallampati: II  TM Distance: > 6 cm  Neck ROM: normal range of motion   Mouth opening: Normal     Cardiovascular  Regular rate and rhythm,  S1 and S2 normal,  no murmur, click, rub, or gallop             Dental    Dentition: Caps/crowns     Pulmonary  Breath sounds clear to auscultation               Abdominal  GI exam deferred       Other Findings            Anesthetic Plan    ASA: 3  Anesthesia type: general    Monitoring Plan: Arterial line      Induction: Intravenous  Anesthetic plan and risks discussed with: Patient

## 2021-03-29 NOTE — ANESTHESIA PROCEDURE NOTES
Arterial Line Placement    Start time: 3/29/2021 6:35 AM  End time: 3/29/2021 6:45 AM  Performed by: Velma Orourke MD  Authorized by: Velma Orourke MD     Pre-Procedure  Indications:  Arterial pressure monitoring and blood sampling  Preanesthetic Checklist: patient identified, risks and benefits discussed, anesthesia consent, site marked, patient being monitored, timeout performed and patient being monitored    Timeout Time: 06:35        Procedure:   Prep:  ChloraPrep and alcohol  Seldinger Technique?: Yes    Orientation:  Right  Location:  Radial artery  Catheter size:  20 G  Number of attempts:  2  Cont Cardiac Output Sensor: No      Assessment:   Post-procedure:  Line secured and sterile dressing applied  Patient Tolerance:  Patient tolerated the procedure well with no immediate complications

## 2021-03-29 NOTE — PROGRESS NOTES
Pt taken to OR holding area with right groin sheath in place; connected to pressure bag. Right arm [place] connected to pressure bag. Called OR holding to inform pt is back to holding area.

## 2021-03-29 NOTE — PERIOP NOTES
1410: Dr. Purnima Aviles at bedside. R groin sheath removed. 1430: Xray at bedside for central line confirmation. TRANSFER - OUT REPORT:    Verbal report given to Saima Pierce RN (name) on Meliza Barrientos  being transferred to ICU (unit) for routine post - op       Report consisted of patients Situation, Background, Assessment and   Recommendations(SBAR). Information from the following report(s) SBAR, OR Summary, Intake/Output, MAR, Recent Results, Med Rec Status, Cardiac Rhythm SR and Alarm Parameters  was reviewed with the receiving nurse. Lines:   Quad Lumen 03/29/21 Right (Active)   Central Line Being Utilized Yes 03/29/21 1334   Criteria for Appropriate Use Hemodynamically unstable, requiring monitoring lines, vasopressors, or volume resuscitation 03/29/21 1334   Site Assessment Clean, dry, & intact 03/29/21 1334   Infiltration Assessment 0 03/29/21 1334   Affected Extremity/Extremities Color distal to insertion site pink (or appropriate for race); Pulses palpable;Range of motion performed 03/29/21 1334   Date of Last Dressing Change 03/29/21 03/29/21 1334   Dressing Status Clean, dry, & intact 03/29/21 1334   Action Taken Open ports on tubing capped 03/29/21 1334   Proximal Hub Color/Line Status White;Flushed;Capped 03/29/21 1334   Positive Blood Return (Medial Site) Yes 03/29/21 1334   Medial 1 Hub Color/Line Status Gray;Flushed; Infusing 03/29/21 1334   Positive Blood Return (Lateral Site) Yes 03/29/21 1334   Medial 2 Hub Color/Line Status Blue;Flushed;Capped 03/29/21 1334   Positive Blood Return (Site #3) Yes 03/29/21 1334   Distal Hub Color/Line Status Brown;Flushed; Infusing 03/29/21 1334   Positive Blood Return (Site #4) Yes 03/29/21 1334   Alcohol Cap Used Yes 03/29/21 1334       Peripheral IV 03/29/21 Left Antecubital (Active)   Site Assessment Clean, dry, & intact 03/29/21 1334   Phlebitis Assessment 0 03/29/21 1334   Infiltration Assessment 0 03/29/21 1334   Dressing Status Clean, dry, & intact 03/29/21 1334   Dressing Type Transparent;Tape 03/29/21 1334   Hub Color/Line Status Pink;Flushed;Capped 03/29/21 1334   Action Taken Open ports on tubing capped 03/29/21 1334   Alcohol Cap Used Yes 03/29/21 1334       Arterial Line 03/29/21 Right Radial artery (Active)   Site Assessment Clean, dry, & intact 03/29/21 1334   Dressing Status Clean, dry, & intact 03/29/21 1334   Dressing Type Disk with Chlorhexadine gluconate (CHG); Transparent 03/29/21 1334   Line Status Intact and in place 03/29/21 1334   Treatment Arm board on;Zeroed or re-zeroed 03/29/21 1334   Affected Extremity/Extremities Color distal to insertion site pink (or appropriate for race); Pulses palpable;Range of motion performed 03/29/21 1334        Opportunity for questions and clarification was provided.       Patient transported with:   Monitor  Registered Nurse  Tech

## 2021-03-29 NOTE — PROGRESS NOTES
TRANSFER - IN REPORT:    1550: Verbal report received from ROSSANA Garvin(name) on Jennifer Pena  being received from [facility]) for routine progression of care      Report consisted of patients Situation, Background, Assessment and   Recommendations(SBAR). Information from the following report(s) SBAR, OR Summary, Procedure Summary, Intake/Output, MAR, Recent Results and Cardiac Rhythm NSR was reviewed with the receiving nurse. Opportunity for questions and clarification was provided. Assessment completed upon patients arrival to unit and care assumed. 1630: Primary Nurse Cheikh Chambers and Basilia Chen RN, RN performed a dual skin assessment on this patient No impairment noted  Sriram score is 17    1800: Pt having emesis X 4 with vasovagal episodes lasting 1-2 minutes each. HR down to 20's. Pt treated per [facility]     1930: Bedside, Verbal and Written shift change report given to  (oncoming nurse) by Tevin Madrigal RN (offgoing nurse). Report included the following information SBAR, Kardex, Intake/Output, MAR, Recent Results, Cardiac Rhythm NSR and Dual Neuro Assessment.

## 2021-03-29 NOTE — PERIOP NOTES
Patient: Harshad Locke MRN: [id number]  SSN: [id number]   YOB: 1970  Age: 48 y.o. Sex: female     Patient is status post Procedure(s):  RIGHT FRONTO TEMPORAL CRANIOTOMY WITH CLIPPING OF MIDDLE CEREBRAL ARTERY  ANEURYSM WITH INTRAOPERATIVE ANGIOGRAM.    Surgeon(s) and Role:     * Barb Araujo MD - Primary     * Kenneth Carrillo MD - Assisting     * Katja Jorgensen MD - Assisting    Local/Dose/Irrigation:  15 ml 1% Lidocaine with epi injected to scalp prior to incision. Quad Lumen 03/29/21 Right (Active)        Peripheral IV 03/29/21 Left Antecubital (Active)      Arterial Line 03/29/21 Right Radial artery (Active)        Hemovac Right Head (Active)   Site Assessment Clean, dry, & intact 03/29/21 1208   Dressing Status Clean, dry, & intact 03/29/21 1208   Drainage Description Sanguinous 03/29/21 1208   Status Patent; Charged;Draining 03/29/21 1208      Airway - Endotracheal Tube 03/29/21 Oral (Active)               Dressing/Packing:  Incision 03/29/21 Head Right-Dressing/Treatment: Gauze dressing/dressing sponge;Non-adherent; Roll gauze;Tape/Soft cloth adhesive tape(Adaptic, 4 x 4s, hypafix tape, kerlix wrap and silk tape. ) (03/29/21 1247)      Other: Pt transferred to PACU with ET tube in place. Pt breathing on own. CRNA and RN present during transfer.

## 2021-03-29 NOTE — ANESTHESIA POSTPROCEDURE EVALUATION
Post-Anesthesia Evaluation and Assessment    Patient: Wendy Yung MRN: [id number]  SSN: [id number]    YOB: 1970  Age: 48 y.o. Sex: female      I have evaluated the patient and they are stable and ready for discharge from the PACU. Cardiovascular Function/Vital Signs  Visit Vitals  /77   Pulse 91   Temp 36.9 °C (98.4 °F)   Resp 14   Ht 5' 2\" (1.575 m)   Wt 74.8 kg (164 lb 14 oz)   SpO2 95%   BMI 30.16 kg/m²       Patient is status post General anesthesia for Procedure(s):  RIGHT FRONTO TEMPORAL CRANIOTOMY WITH CLIPPING OF MIDDLE CEREBRAL ARTERY  ANEURYSM WITH INTRAOPERATIVE ANGIOGRAM.    Nausea/Vomiting: None    Postoperative hydration reviewed and adequate. Pain:  Pain Scale 1: Numeric (0 - 10) (03/29/21 1608)  Pain Intensity 1: 0 (03/29/21 1608)   Managed    Neurological Status:   Neuro (WDL): Exceptions to WDL (03/29/21 1500)  Neuro  Neurologic State: Eyes open to voice;Drowsy (03/29/21 1500)  Orientation Level: Oriented to person;Oriented to place;Oriented to situation;Disoriented to time (03/29/21 1500)  Cognition: Decreased attention/concentration;Decreased command following (03/29/21 1500)  Speech: Clear(whispers) (03/29/21 1500)  Assessment L Pupil: Brisk;Round (03/29/21 1500)  Size L Pupil (mm): 3 (03/29/21 1500)  Assessment R Pupil: Brisk;Round (03/29/21 1500)  Size R Pupil (mm): 3 (03/29/21 1500)  LUE Motor Response: Purposeful;Spontaneous ;Weak (03/29/21 1500)  LLE Motor Response: Purposeful;Spontaneous ;Weak (03/29/21 1500)  RUE Motor Response: Purposeful; unequal L greater than R;Weak (03/29/21 1500)  RLE Motor Response: Purposeful;Spontaneous ;Weak (03/29/21 1500)   At baseline    Mental Status, Level of Consciousness: Alert and  oriented to person, place, and time    Pulmonary Status:   O2 Device: Nasal cannula (03/29/21 1608)   Adequate oxygenation and airway patent    Complications related to anesthesia: None    Post-anesthesia assessment completed.  No concerns    Signed By: Leti Espinoza MD     March 29, 2021              Procedure(s):  RIGHT FRONTO TEMPORAL CRANIOTOMY WITH CLIPPING OF MIDDLE CEREBRAL ARTERY  ANEURYSM WITH INTRAOPERATIVE ANGIOGRAM.    general    <BSHSIANPOST>    INITIAL Post-op Vital signs:   Vitals Value Taken Time   /78 03/29/21 1530   Temp 36.7 °C (98.1 °F) 03/29/21 1500   Pulse 86 03/29/21 1532   Resp 12 03/29/21 1532   SpO2 100 % 03/29/21 1532   Vitals shown include unvalidated device data.

## 2021-03-29 NOTE — PROGRESS NOTES
Patient is s/p right fronto-temporal craniotomy with clipping of middle cerebral artery aneurysm with intraoperative angiogram by Dr. Bailey Branch and Dr. Olivia Lezama. Patient is doing well. Right groin site is clean, dry, and intact. No hematoma, bruising, or bleeding noted. Distal pulses 2+ bilaterally. Skin warm to touch. Further management per Neurosurgery. Please notify [facility] immediately for any groin site issues.      [employer], [facility]  Neurocritical care NP

## 2021-03-29 NOTE — BRIEF OP NOTE
NEUROINTERVENTIONAL SURGERY POST-PROCEDURE NOTE    PROCEDURE:  Intraoperative diagnostic cerebral angiogram    VESSEL(S) STUDIED:  1. Right internal carotid artery  2. Right common carotid artery VESSEL(S) TREATED:  1. None      PRELIMINARY REPORT & DISPOSITION:   S/p aneurysm clipping    EBL:5 cc    COMPLICATIONS:  None    FOLLOW-UP:  Follow up with Neurosurgery DATE OF SERVICE:  3/29/2021 1:18 PM     ATTENDING SURGEON(S):  Juwan Solo MD    ANESTHESIA:   General    MEDICATIONS:   See nursing record    PUNCTURE SITE:  Right common femoral artery. Arteriotomy closed with 6F angioseal. Flat x 2 hours. Right leg straight for 4h.           Juwan Solo MD  [facility] Surgery

## 2021-03-29 NOTE — ANESTHESIA PROCEDURE NOTES
Central Line Placement    Start time: 3/29/2021 7:41 AM  End time: 3/29/2021 7:54 AM  Performed by: Lexie Dao MD  Authorized by: Lexie Dao MD     Indications: vascular access, central pressure monitoring and need for vasopressors  Preanesthetic Checklist: patient identified, risks and benefits discussed, anesthesia consent, site marked, patient being monitored and timeout performed    Timeout Time: 07:41       Pre-procedure: All elements of maximal sterile barrier technique followed?  Yes    2% Chlorhexidine for cutaneous antisepsis, Hand hygiene performed prior to catheter insertion and Ultrasound guidance    Sterile Ultrasound Technique followed?: Yes            Procedure:   Prep:  Chlorhexidine and alcohol  Location: internal jugular  Orientation:  Right  Patient position:  Trendelenburg  Catheter type:  Quad lumen  Catheter size:  8.5 Fr  Catheter length:  16 cm  Number of attempts:  1  Successful placement: Yes      Assessment:   Post-procedure:  Catheter secured and sterile dressing applied  Assessment:  Blood return through all ports, guidewire removal verified and free fluid flow  Insertion:  Uncomplicated  Patient tolerance:  Patient tolerated the procedure well with no immediate complications

## 2021-03-30 ENCOUNTER — APPOINTMENT (OUTPATIENT)
Dept: CT IMAGING | Age: 51
DRG: 027 | End: 2021-03-30
Attending: NEUROLOGICAL SURGERY
Payer: COMMERCIAL

## 2021-03-30 LAB
ANION GAP SERPL CALC-SCNC: 7 MMOL/L (ref 5–15)
BASOPHILS # BLD: 0 K/UL (ref 0–0.1)
BASOPHILS NFR BLD: 0 % (ref 0–1)
BUN SERPL-MCNC: 10 MG/DL (ref 6–20)
BUN/CREAT SERPL: 14 (ref 12–20)
CALCIUM SERPL-MCNC: 8.4 MG/DL (ref 8.5–10.1)
CHLORIDE SERPL-SCNC: 110 MMOL/L (ref 97–108)
CO2 SERPL-SCNC: 24 MMOL/L (ref 21–32)
CREAT SERPL-MCNC: 0.73 MG/DL (ref 0.55–1.02)
DIFFERENTIAL METHOD BLD: ABNORMAL
EOSINOPHIL # BLD: 0 K/UL (ref 0–0.4)
EOSINOPHIL NFR BLD: 0 % (ref 0–7)
ERYTHROCYTE [DISTWIDTH] IN BLOOD BY AUTOMATED COUNT: 13.8 % (ref 11.5–14.5)
GLUCOSE SERPL-MCNC: 136 MG/DL (ref 65–100)
HCT VFR BLD AUTO: 34.7 % (ref 35–47)
HGB BLD-MCNC: 10.8 G/DL (ref 11.5–16)
IMM GRANULOCYTES # BLD AUTO: 0 K/UL
IMM GRANULOCYTES NFR BLD AUTO: 0 %
LYMPHOCYTES # BLD: 0.7 K/UL (ref 0.8–3.5)
LYMPHOCYTES NFR BLD: 6 % (ref 12–49)
MCH RBC QN AUTO: 27.5 PG (ref 26–34)
MCHC RBC AUTO-ENTMCNC: 31.1 G/DL (ref 30–36.5)
MCV RBC AUTO: 88.3 FL (ref 80–99)
MONOCYTES # BLD: 0.3 K/UL (ref 0–1)
MONOCYTES NFR BLD: 3 % (ref 5–13)
NEUTS BAND NFR BLD MANUAL: 5 % (ref 0–6)
NEUTS SEG # BLD: 10.5 K/UL (ref 1.8–8)
NEUTS SEG NFR BLD: 86 % (ref 32–75)
NRBC # BLD: 0 K/UL (ref 0–0.01)
NRBC BLD-RTO: 0 PER 100 WBC
PLATELET # BLD AUTO: 250 K/UL (ref 150–400)
PMV BLD AUTO: 10 FL (ref 8.9–12.9)
POTASSIUM SERPL-SCNC: 3.9 MMOL/L (ref 3.5–5.1)
RBC # BLD AUTO: 3.93 M/UL (ref 3.8–5.2)
RBC MORPH BLD: ABNORMAL
SODIUM SERPL-SCNC: 141 MMOL/L (ref 136–145)
WBC # BLD AUTO: 11.5 K/UL (ref 3.6–11)

## 2021-03-30 PROCEDURE — 74011250637 HC RX REV CODE- 250/637: Performed by: NEUROLOGICAL SURGERY

## 2021-03-30 PROCEDURE — 36415 COLL VENOUS BLD VENIPUNCTURE: CPT

## 2021-03-30 PROCEDURE — 74011000250 HC RX REV CODE- 250: Performed by: NEUROLOGICAL SURGERY

## 2021-03-30 PROCEDURE — 74011250636 HC RX REV CODE- 250/636: Performed by: NEUROLOGICAL SURGERY

## 2021-03-30 PROCEDURE — 65660000000 HC RM CCU STEPDOWN

## 2021-03-30 PROCEDURE — 97535 SELF CARE MNGMENT TRAINING: CPT

## 2021-03-30 PROCEDURE — 70450 CT HEAD/BRAIN W/O DYE: CPT

## 2021-03-30 PROCEDURE — 97116 GAIT TRAINING THERAPY: CPT

## 2021-03-30 PROCEDURE — 97161 PT EVAL LOW COMPLEX 20 MIN: CPT

## 2021-03-30 PROCEDURE — 85025 COMPLETE CBC W/AUTO DIFF WBC: CPT

## 2021-03-30 PROCEDURE — 80048 BASIC METABOLIC PNL TOTAL CA: CPT

## 2021-03-30 PROCEDURE — 97165 OT EVAL LOW COMPLEX 30 MIN: CPT

## 2021-03-30 RX ORDER — HYDRALAZINE HYDROCHLORIDE 20 MG/ML
10 INJECTION INTRAMUSCULAR; INTRAVENOUS
Status: DISCONTINUED | OUTPATIENT
Start: 2021-03-30 | End: 2021-04-01 | Stop reason: HOSPADM

## 2021-03-30 RX ORDER — LEVETIRACETAM 500 MG/1
500 TABLET ORAL 2 TIMES DAILY
Status: DISCONTINUED | OUTPATIENT
Start: 2021-03-30 | End: 2021-04-01 | Stop reason: HOSPADM

## 2021-03-30 RX ORDER — LABETALOL HYDROCHLORIDE 5 MG/ML
10 INJECTION, SOLUTION INTRAVENOUS
Status: DISCONTINUED | OUTPATIENT
Start: 2021-03-30 | End: 2021-04-01 | Stop reason: HOSPADM

## 2021-03-30 RX ADMIN — ACETAMINOPHEN 650 MG: 325 TABLET ORAL at 09:21

## 2021-03-30 RX ADMIN — CEFAZOLIN SODIUM 2 G: 1 INJECTION, POWDER, FOR SOLUTION INTRAMUSCULAR; INTRAVENOUS at 09:28

## 2021-03-30 RX ADMIN — POTASSIUM CHLORIDE AND SODIUM CHLORIDE: 900; 150 INJECTION, SOLUTION INTRAVENOUS at 05:42

## 2021-03-30 RX ADMIN — DEXAMETHASONE SODIUM PHOSPHATE 4 MG: 4 INJECTION, SOLUTION INTRA-ARTICULAR; INTRALESIONAL; INTRAMUSCULAR; INTRAVENOUS; SOFT TISSUE at 05:41

## 2021-03-30 RX ADMIN — DOCUSATE SODIUM 100 MG: 100 CAPSULE, LIQUID FILLED ORAL at 09:21

## 2021-03-30 RX ADMIN — LEVETIRACETAM 500 MG: 500 TABLET ORAL at 17:45

## 2021-03-30 RX ADMIN — DEXAMETHASONE SODIUM PHOSPHATE 4 MG: 4 INJECTION, SOLUTION INTRA-ARTICULAR; INTRALESIONAL; INTRAMUSCULAR; INTRAVENOUS; SOFT TISSUE at 14:33

## 2021-03-30 RX ADMIN — HYDRALAZINE HYDROCHLORIDE 100 MG: 50 TABLET, FILM COATED ORAL at 21:52

## 2021-03-30 RX ADMIN — ONDANSETRON HYDROCHLORIDE 4 MG: 2 INJECTION, SOLUTION INTRAMUSCULAR; INTRAVENOUS at 02:54

## 2021-03-30 RX ADMIN — LISINOPRIL 40 MG: 20 TABLET ORAL at 09:20

## 2021-03-30 RX ADMIN — Medication 10 ML: at 05:43

## 2021-03-30 RX ADMIN — ACETAMINOPHEN 650 MG: 325 TABLET ORAL at 17:45

## 2021-03-30 RX ADMIN — Medication 10 ML: at 21:55

## 2021-03-30 RX ADMIN — LEVETIRACETAM 500 MG: 500 TABLET ORAL at 09:21

## 2021-03-30 RX ADMIN — AMLODIPINE BESYLATE 10 MG: 5 TABLET ORAL at 09:21

## 2021-03-30 RX ADMIN — HYDRALAZINE HYDROCHLORIDE 100 MG: 50 TABLET, FILM COATED ORAL at 09:20

## 2021-03-30 RX ADMIN — HYDRALAZINE HYDROCHLORIDE 10 MG: 20 INJECTION INTRAMUSCULAR; INTRAVENOUS at 02:02

## 2021-03-30 RX ADMIN — HYDRALAZINE HYDROCHLORIDE 100 MG: 50 TABLET, FILM COATED ORAL at 17:45

## 2021-03-30 RX ADMIN — HYDROMORPHONE HYDROCHLORIDE 0.5 MG: 1 INJECTION, SOLUTION INTRAMUSCULAR; INTRAVENOUS; SUBCUTANEOUS at 03:31

## 2021-03-30 RX ADMIN — ATORVASTATIN CALCIUM 40 MG: 40 TABLET, FILM COATED ORAL at 09:21

## 2021-03-30 RX ADMIN — ONDANSETRON HYDROCHLORIDE 4 MG: 2 INJECTION, SOLUTION INTRAMUSCULAR; INTRAVENOUS at 21:53

## 2021-03-30 RX ADMIN — SODIUM CHLORIDE 5 MG/HR: 9 INJECTION, SOLUTION INTRAVENOUS at 04:24

## 2021-03-30 RX ADMIN — DOCUSATE SODIUM 100 MG: 100 CAPSULE, LIQUID FILLED ORAL at 17:45

## 2021-03-30 RX ADMIN — Medication 10 ML: at 14:33

## 2021-03-30 RX ADMIN — CEFAZOLIN SODIUM 2 G: 1 INJECTION, POWDER, FOR SOLUTION INTRAMUSCULAR; INTRAVENOUS at 01:44

## 2021-03-30 NOTE — PROGRESS NOTES
Problem: Mobility Impaired (Adult and Pediatric)  Goal: *Acute Goals and Plan of Care (Insert Text)  Description:   FUNCTIONAL STATUS PRIOR TO ADMISSION: Patient was independent and active without use of DME.    HOME SUPPORT PRIOR TO ADMISSION: The patient lived with her mom but did not require assist.    Physical Therapy Goals  Initiated 3/30/2021  1. Patient will move from supine to sit and sit to supine  and scoot up and down in bed with independence within 7 day(s). 2.  Patient will transfer from bed to chair and chair to bed with modified independence using the least restrictive device within 7 day(s). 3.  Patient will perform sit to stand with modified independence within 7 day(s). 4.  Patient will ambulate with modified independence for 300 feet with the least restrictive device within 7 day(s). 5.  Patient will ascend/descend 4 stairs with handrail(s) with supervision/set-up within 7 day(s). 6.  Patient will improve Garza Balance score by 7 points within 7 days. Outcome: Progressing Towards Goal     PHYSICAL THERAPY EVALUATION- NEURO POPULATION  Patient: Rex Eaton (48 y.o. female)  Date: 3/30/2021  Primary Diagnosis: Cerebral aneurysm [I67.1]  Procedure(s) (LRB):  RIGHT FRONTO TEMPORAL CRANIOTOMY WITH CLIPPING OF MIDDLE CEREBRAL ARTERY  ANEURYSM WITH INTRAOPERATIVE ANGIOGRAM (N/A) 1 Day Post-Op   Precautions:   Fall      ASSESSMENT  Based on the objective data described below, the patient presents with slightly impaired functional mobility as compared to baseline level 2* mild L sided weakness and impaired balance following admission for R frontotemporal crani with clipping of unruptured MCA aneurysm, now POD1. At baseline, pt was living with her mom following a remarkable recovery/rehab efforts after suffering a L MCA aneurysm rupture, embolic infarcts, and trach placement in June 2020. She returned her her indep baseline level with ADLs and mobility, no AD.     Today, she was able to follow all commands and communicate effectively in full conversation. Expressing tearful immense gratitude for therapists and physicians involved in her last admission. She was able to complete sit<>stands from chair with CGA/SBA. Gait training initiated in hallway without AD - CGA progressing to SBA without significant LOB, deviation, or difficulty. Demos good unsupported standing balance while completing multi level reaching tasks. Remained up in recliner at end of session, NAD. Anticipate good progress in acute setting - hopeful for discharge home with family assist and likely no HHPT. Will follow. Current Level of Function Impacting Discharge (mobility/balance): CGA/SBA without AD    Functional Outcome Measure: The patient scored Total: 39/56 on the Garza Balance Assessment which is indicative of moderate fall risk. Other factors to consider for discharge: good family support     Patient will benefit from skilled therapy intervention to address the above noted impairments. PLAN :  Recommendations and Planned Interventions: bed mobility training, transfer training, gait training, therapeutic exercises, neuromuscular re-education, patient and family training/education, and therapeutic activities      Frequency/Duration: Patient will be followed by physical therapy:  5 times a week to address goals. Recommendation for discharge: (in order for the patient to meet his/her long term goals)  To be determined: HHPT vs none    This discharge recommendation:  A follow-up discussion with the attending provider and/or case management is planned    IF patient discharges home will need the following DME: none         SUBJECTIVE:   Patient stated I can't thank you enough, THANK YOU for all that you did for me.     OBJECTIVE DATA SUMMARY:   HISTORY:    Past Medical History:   Diagnosis Date    Aneurysm ([facility] 06/2020    Asthma     Cerebral artery occlusion with cerebral infarction ([facility] 06/2020    Cerebral infarction ([facility]     Dysphagia     HTN (hypertension)     Joint pain     Leg swelling     Seizures ([facility] 06/2020    Snoring     SOB (shortness of breath)     Vision decreased      Past Surgical History:   Procedure Laterality Date    HX CHOLECYSTECTOMY      HX GASTROSTOMY      IR INSERT GASTROSTOMY TUBE PERC  6/19/2020    NEUROLOGICAL PROCEDURE UNLISTED      Coil Embolization    NM ABDOMEN SURGERY PROC UNLISTED      has peg tube AND REMOVED       Personal factors and/or comorbidities impacting plan of care: PMHx    Home Situation  Home Environment: Private residence  # Steps to Enter: 4  Rails to Enter: Yes  One/Two Story Residence: Two story, live on 1st floor  Living Alone: No  Support Systems: Parent  Patient Expects to be Discharged to[de-identified] Private residence  Current DME Used/Available at Home: Shower chair, Grab bars  Tub or Shower Type: Tub/Shower combination    EXAMINATION/PRESENTATION/DECISION MAKING:   Critical Behavior:  Neurologic State: Alert  Orientation Level: Oriented X4  Cognition: Appropriate decision making, Follows commands  Safety/Judgement: Awareness of environment, Insight into deficits    Range Of Motion:  AROM: Within functional limits     Strength:    Strength: (L side slightly weaker than R)     Tone & Sensation:   Tone: Normal  Sensation: Intact      Coordination:  Coordination: Generally decreased, functional  Vision:   Tracking: Able to track stimulus in all quadrants w/o difficulty  Diplopia: No  Acuity: Within Defined Limits  Corrective Lenses: Glasses  Functional Mobility:  Bed Mobility:     Supine to Sit: Stand-by assistance(HOB elevated)     Transfers:  Sit to Stand: Contact guard assistance  Stand to Sit: Supervision     Balance:   Sitting: Intact  Standing: Impaired; Without support  Standing - Static: Good  Standing - Dynamic : Good  Ambulation/Gait Training:  Distance (ft): 150 Feet (ft)  Assistive Device: Gait belt  Ambulation - Level of Assistance: Contact guard assistance;Stand-by assistance  Gait Description (WDL): Exceptions to WDL  Gait Abnormalities: Decreased step clearance;Trunk sway increased  Speed/Martha: Slow    Functional Measure  Garza Balance Test:    Sitting to Standing: 3  Standing Unsupported: 3  Sitting with Back Unsupported: 4  Standing to Sittin  Transfers: 4  Standing Unsupported with Eyes Closed: 3  Standing Unsupported with Feet Together: 2  Reach Forward with Outstretched Arm: 3   Object: 0  Turn to Look Over Shoulders: 3  Turn 360 Degrees: 3  Alternate Foot on Step/Stool: 2  Standing Unsupported One Foot in Front: 3  Stand on One Le  Total: 39/56         56=Maximum possible score;   0-20=High fall risk  21-40=Moderate fall risk   41-56=Low fall risk        Physical Therapy Evaluation Charge Determination   History Examination Presentation Decision-Making   MEDIUM  Complexity : 1-2 comorbidities / personal factors will impact the outcome/ POC  LOW Complexity : 1-2 Standardized tests and measures addressing body structure, function, activity limitation and / or participation in recreation  LOW Complexity : Stable, uncomplicated  MEDIUM Complexity : FOTO score of 26-74      Based on the above components, the patient evaluation is determined to be of the following complexity level: LOW       Activity Tolerance:   Good      After treatment patient left in no apparent distress:   Sitting in chair and Call bell within reach    COMMUNICATION/EDUCATION:   The patients plan of care was discussed with: Occupational therapist and Registered nurse. Fall prevention education was provided and the patient/caregiver indicated understanding., Patient/family have participated as able in goal setting and plan of care. , and Patient/family agree to work toward stated goals and plan of care.     Thank you for this referral.  Ramiro Jackson, PT, DPT   Time Calculation: 21 mins

## 2021-03-30 NOTE — PROGRESS NOTES
Reviewed chart for transitions of care, and discussed in rounds. CM met with patient at bedside to explain role and offer support. Patient is alert and oriented x4, and confirmed demographics. Baseline:   ADLs/IDALS: needs partial assistance  Previous Home Health:   Previous [facility] and rehab in [place]  ER Contact: Mother C: [phone]    Patient is living with her mother win a 2 story home, but patient stays on the first level the house has  4 steps to enter. Patient needs partial assistance with ADL's  Patient uses. [facility] [address] in [place] Patient's family is expected to transport at discharge. Reason for Admission:  Scheduled Craniotomy                      RUR Score:   10%                  Plan for utilizing home health:   TBD     PCP: First and Last name:  Willy Da Silva DO     Name of Practice: Dr Jose F Lugo   Are you a current patient: Yes/No: Yes   Approximate date of last visit:    Can you participate in a virtual visit with your PCP: Yes                    Current Advanced Directive/Advance Care Plan: Not on file, full code    Healthcare Decision Maker:   Click here to complete [place] including selection of the Healthcare Decision Maker Relationship (ie \"Primary\")             Primary Decision Maker: Debbie Peoples - Mother, Legal Guardian - [phone]                  Care manager met with patient to discuss transitions of care. Patient is living with her mother currently and her aunt takes care of her while her mother is at work. Patient  Has [facility] and rehab in the past for short term rehab. Care management will follow for transitions of care. Enma Chamberlain RN,Care Management.   Care Management Interventions  PCP Verified by CM: Yes(Dr Jose F Lugo)  MyChart Signup: No  Discharge Durable Medical Equipment: No  Physical Therapy Consult: Yes  Occupational Therapy Consult: Yes  Speech Therapy Consult: No  Current Support Network: Relative's Home(Mother Dg H: [phone], C: [phone])  Confirm Follow Up Transport: Family

## 2021-03-30 NOTE — ROUTINE PROCESS
Orders received, chart reviewed and patient evaluated by occupational therapy. Pending progression with skilled acute occupational therapy, recommend:  No skilled occupational therapy/ follow up rehabilitation needs identified at this time. Recommend with nursing ADLs with supervision/setup, OOB to chair 3x/day and toileting via functional mobility to and from bathroom with 1 assist. Thank you for completing as able in order to maintain patient strength, endurance and independence. Full evaluation to follow.

## 2021-03-30 NOTE — PROGRESS NOTES
Problem: Self Care Deficits Care Plan (Adult)  Goal: *Acute Goals and Plan of Care (Insert Text)  Description:   FUNCTIONAL STATUS PRIOR TO ADMISSION: Patient was independent and active without use of DME.     HOME SUPPORT: The patient lived with mother but did not require assist.    Occupational Therapy Goals  Initiated 3/30/2021  1. Patient will perform bathing with independence within 7 day(s). 2.  Patient will perform lower body dressing with independence within 7 day(s). 3.  Patient will perform toilet transfers with independence within 7 day(s). 4.  Patient will perform all aspects of toileting with independence within 7 day(s). 5.  Patient will participate in upper extremity therapeutic exercise/activities with independence for 5 minutes within 7 day(s). 6.  Patient will utilize energy conservation techniques during functional activities with verbal cues within 7 day(s). Outcome: Not Met    OCCUPATIONAL THERAPY EVALUATION  Patient: Alexus Onofre (48 y.o. female)  Date: 3/30/2021  Primary Diagnosis: Cerebral aneurysm [I67.1]  Procedure(s) (LRB):  RIGHT FRONTO TEMPORAL CRANIOTOMY WITH CLIPPING OF MIDDLE CEREBRAL ARTERY  ANEURYSM WITH INTRAOPERATIVE ANGIOGRAM (N/A) 1 Day Post-Op   Precautions:  Fall    ASSESSMENT  Based on the objective data described below, the patient presents with limited ADL performance s/p admission for cerebral aneurysm with resulting need for R fronto-temporal craniotomy with aneurysm clipping, now POD #1. Patient ADLs limited by impaired balance, generalized weakness, and decreased functional activity tolerance. Patient reports she was IND with ADLs PTA and living with her mother. Today, patient with SBA to come to sitting EOB and supervision to frances socks in tailor sitting. Patient completed functional mobility to sink and then to chair with CGA. Patient able to stand at sink for oral care/grooming with SBA.  Patient left in chair at end of session with call bell in reach, RN aware and all needs met. Will continue to follow, anticipate patient will not require OT once medically cleared for D/C. Current Level of Function Impacting Discharge (ADLs/self-care): up to [address] for ADLs    Functional Outcome Measure: The patient scored Total: 55/100 on the Barthel Index outcome measure which is indicative of 45% impaired ability to care for basic self needs/dependency on others; inferred 100% dependency on others for instrumental ADLs. Other factors to consider for discharge: was IND PTA     Patient will benefit from skilled therapy intervention to address the above noted impairments. PLAN :  Recommendations and Planned Interventions: self care training, functional mobility training, therapeutic exercise, balance training, therapeutic activities, endurance activities, patient education, home safety training, and family training/education    Frequency/Duration: Patient will be followed by occupational therapy 5 times a week to address goals. Recommendation for discharge: (in order for the patient to meet his/her long term goals)  No skilled occupational therapy/ follow up rehabilitation needs identified at this time. This discharge recommendation:  Has been made in collaboration with the attending provider and/or case management    IF patient discharges home will need the following DME: patient owns DME required for discharge       SUBJECTIVE:   Patient stated Humberto monroy for your help.     OBJECTIVE DATA SUMMARY:   HISTORY:   Past Medical History:   Diagnosis Date    Aneurysm ([facility] [address] 06/2020    Asthma     Cerebral artery occlusion with cerebral infarction ([facility] [address] 06/2020    Cerebral infarction (HCC)     Dysphagia     HTN (hypertension)     Joint pain     Leg swelling     Seizures (HCC) 06/2020    Snoring     SOB (shortness of breath)     Vision decreased      Past Surgical History:   Procedure Laterality Date    HX CHOLECYSTECTOMY      HX GASTROSTOMY      IR INSERT GASTROSTOMY TUBE [facility]  6/19/2020    NEUROLOGICAL PROCEDURE UNLISTED      Coil Embolization    WI ABDOMEN SURGERY PROC UNLISTED      has peg tube AND REMOVED       Expanded or extensive additional review of patient history:     Home Situation  Home Environment: (P) Private residence  # Steps to Enter: (P) 4  Rails to Enter: (P) Yes  One/Two Story Residence: (P) Two story, live on 1st floor  Living Alone: No  Support Systems: Family member(s)(mom)  Current DME Used/Available at Home: Shower chair, Grab bars  Tub or Shower Type: Tub/Shower combination    Hand dominance: Right    EXAMINATION OF PERFORMANCE DEFICITS:  Cognitive/Behavioral Status:  Neurologic State: Alert  Orientation Level: Oriented X4  Cognition: Appropriate decision making; Follows commands  Perception: Appears intact  Perseveration: No perseveration noted  Safety/Judgement: Awareness of environment; Fall prevention    Skin: appears intact, incision site dry and intact    Edema: none noted in BUEs    Hearing:       Vision/Perceptual:    Tracking: Able to track stimulus in all quadrants w/o difficulty                 Diplopia: No    Acuity: Within Defined Limits    Corrective Lenses: Glasses    Range of Motion:  In BUEs  AROM: Within functional limits                         Strength: In BUEs  Strength: Generally decreased, functional                Coordination:  Coordination: Generally decreased, functional  Fine Motor Skills-Upper: Left Intact; Right Intact    Gross Motor Skills-Upper: Left Intact; Right Intact    Tone & Sensation:  In BUEs  Tone: Normal  Sensation: Intact                      Balance:  Sitting: Intact  Standing: Impaired  Standing - Static: Good  Standing - Dynamic : Good;Fair    Functional Mobility and Transfers for ADLs:  Bed Mobility:  Supine to Sit: Stand-by assistance(HOB elevated)    Transfers:  Sit to Stand: Contact guard assistance  Stand to Sit: Contact guard assistance    ADL Assessment:  Feeding: Independent    Oral Facial Hygiene/Grooming: Stand-by assistance(standing )    Bathing: Stand-by assistance(Infer)    Upper Body Dressing: Supervision(Infer)    Lower Body Dressing: Stand-by assistance(supervision to don socks sitting EOB)    Toileting: Stand by assistance(Infer)    ADL Intervention and task modifications:     Grooming  Position Performed: Standing  Washing Face: Stand-by assistance  Washing Hands: Stand-by assistance  Brushing Teeth: Stand-by assistance    Lower Body Dressing Assistance  Socks: Supervision  Leg Crossed Method Used: Yes  Position Performed: Seated edge of bed  Cues: Don    Cognitive Retraining  Safety/Judgement: Awareness of environment; Fall prevention    Functional Measure:  Barthel Index:    Bathin  Bladder: 10  Bowels: 10  Groomin  Dressin  Feeding: 10  Mobility: 5  Stairs: 0  Toilet Use: 5  Transfer (Bed to Chair and Back): 10  Total: 55/100        The Barthel ADL Index: Guidelines  1. The index should be used as a record of what a patient does, not as a record of what a patient could do. 2. The main aim is to establish degree of independence from any help, physical or verbal, however minor and for whatever reason. 3. The need for supervision renders the patient not independent. 4. A patient's performance should be established using the best available evidence. Asking the patient, friends/relatives and nurses are the usual sources, but direct observation and common sense are also important. However direct testing is not needed. 5. Usually the patient's performance over the preceding 24-48 hours is important, but occasionally longer periods will be relevant. 6. Middle categories imply that the patient supplies over 50 per cent of the effort. 7. Use of aids to be independent is allowed. Richard Denise., Barthel, D.W. (). Functional evaluation: the Barthel Index. [address] [facility] (14)2. JASON Cole, America Luo., Lalo Trevino., Jhon, [address] ().  Measuring the change indisability after inpatient rehabilitation; comparison of the responsiveness of the Barthel Index and Functional [place] Measure. Journal of Neurology, Neurosurgery, and Psychiatry, 66(4), 332-137. TRUDY Harden, BARBIE Molina, & Davis Lopez M.A. (2004.) Assessment of post-stroke quality of life in cost-effectiveness studies: The usefulness of the Barthel Index and the EuroQoL-5D. Quality of Life Research, 15, 047-96         Occupational Therapy Evaluation Charge Determination   History Examination Decision-Making   LOW Complexity : Brief history review  LOW Complexity : 1-3 performance deficits relating to physical, cognitive , or psychosocial skils that result in activity limitations and / or participation restrictions  LOW Complexity : No comorbidities that affect functional and no verbal or physical assistance needed to complete eval tasks       Based on the above components, the patient evaluation is determined to be of the following complexity level: LOW   Pain Rating:  Reporting no pain    Activity Tolerance:   Good    After treatment patient left in no apparent distress:    Sitting in chair, Call bell within reach, and RN aware    COMMUNICATION/EDUCATION:   The patients plan of care was discussed with: Physical therapist, Registered nurse, and Case management. Home safety education was provided and the patient/caregiver indicated understanding. and Patient/family have participated as able in goal setting and plan of care. This patients plan of care is appropriate for delegation to [facility].     Thank you for this referral.  Larisa Rinaldi OT  Time Calculation: 22 mins

## 2021-03-30 NOTE — PROGRESS NOTES
Physical Therapy Note  03/30/2021    Orders received, chart reviewed and patient evaluated by physical therapy. Pending progression with skilled acute physical therapy, recommend:  No skilled physical therapy/ follow up rehabilitation needs identified at this time. Recommend with nursing OOB to chair 3x/day and walking daily with SUP assist and gait belt. Thank you for completing as able in order to maintain patient strength, endurance and independence. Full evaluation to follow.      Venancio Po, PT, DPT

## 2021-03-30 NOTE — PROGRESS NOTES
[facility]  Hospitalist Group     ICU Transfer/Accept Summary     This patient is being [place] [address] [place]  DATE OF TRANSFER: 3/30/2021       PATIENT ID: Adrian Hicks  MRN: [id number]   YOB: 1970    PRIMARY CARE PROVIDER: Domingo Martínez DO   DATE OF ADMISSION: 3/29/2021  5:16 AM    ATTENDING PHYSICIAN: Sam Robert MD  CONSULTATIONS:   None    PROCEDURES/SURGERIES:   Procedure(s):  RIGHT FRONTO TEMPORAL CRANIOTOMY WITH CLIPPING OF MIDDLE CEREBRAL ARTERY  ANEURYSM WITH INTRAOPERATIVE ANGIOGRAM    REASON FOR ADMISSION: <principal problem not specified>   Patient is a 51-year-old -American female with POD #1 status post right frontotemporal craniotomy with complex clipping of wide neck right MCA aneurysm with Intra-Op angiogram.  We will ask her to assume care of patient as she is not being transferred out of the ICU to the neuro unit. I have seen and examined patient in the ICU room #10, patient's mother present in the room. Patient denied headache, blurred vision, speech difficulty, numbness or weakness of the extremities. #S/p clipping of unruptured right MCA aneurysm with Intra-Op angiogram  -Management per neurosurgery. Continue amlodipine, hydralazine and lisinopril.  -Seizure prophylaxis with Keppra 500 mg twice daily.  -Tylenol for pain     #Hypertension: Continue amlodipine, hydralazine and lisinopril.                     HOSPITAL PROBLEM LIST:  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    Hypoxic ischemic encephalopathy (HIE), unspecified severity P91.60    Essential hypertension I10    History of anemia Z86.2    Carpal tunnel syndrome G56.00    Osteoarthritis M19.90    History of abnormal cervical Pap smear Z87.42    At high risk for falls Z91.81    Hypertriglyceridemia E78.1    History of subarachnoid hemorrhage - Left MCA aneurysm rupture 6/4/2020 Z86.79    Seizure ([facility] [address] R56.9    BMI 26.0-26.9,adult Z68.26    Hypokalemia E87.6    Cerebral aneurysm I67.1           PHYSICAL EXAMINATION:  Visit Vitals  BP (!) 142/64   Pulse 98   Temp 98.3 °F (36.8 °C)   Resp 14   Ht 5' 2\" (1.575 m)   Wt 74.6 kg (164 lb 7.4 oz)   SpO2 98%   BMI 30.08 kg/m²       General:          Alert, cooperative, no distress  HEENT:            Bandaged. Pink conjunctiva, nonicteric sclera, pupils are equal and reactive. Neck:               Supple, symmetrical,  thyroid: non tender  Lungs:             Clear to auscultation bilaterally. No Wheezing or Rhonchi. No rales. Heart:              Regular  rhythm,  No  murmur   No edema  Abdomen:       Soft, non-tender. Not distended. Bowel sounds normal  Extremities:     No cyanosis. No clubbing,  +2 distal pulses  Skin:                Not pale. Not Jaundiced  No rashes   Psych:             Not anxious or agitated. Neurologic:      Mental status: Alert and oriented x4. Cranial nerves intact. Motor 5/5 throughout. Light touch sensation intact. Labs:     Recent Labs     03/30/21  0426   WBC 11.5*   HGB 10.8*   HCT 34.7*        Recent Labs     03/30/21  0426      K 3.9   *   CO2 24   BUN 10   CREA 0.73   *   CA 8.4*     No results for input(s): ALT, AP, TBIL, TBILI, TP, ALB, GLOB, GGT, AML, LPSE in the last 72 hours. No lab exists for component: SGOT, GPT, AMYP, HLPSE  No results for input(s): INR, PTP, APTT, INREXT in the last 72 hours. No results for input(s): FE, TIBC, PSAT, FERR in the last 72 hours. No results found for: FOL, RBCF   No results for input(s): PH, PCO2, PO2 in the last 72 hours. No results for input(s): CPK, CKNDX, TROIQ in the last 72 hours.     No lab exists for component: CPKMB  Lab Results   Component Value Date/Time    Cholesterol, total 157 09/01/2020 08:29 AM    HDL Cholesterol 74 09/01/2020 08:29 AM    LDL, calculated 54 09/01/2020 08:29 AM    LDL, calculated 7.8 06/05/2020 03:34 AM    Triglyceride 181 (H) 09/01/2020 08:29 AM    CHOL/HDL Ratio 1.4 06/05/2020 03:34 AM     Lab Results   Component Value Date/Time    Glucose (POC) 159 (H) 03/29/2021 05:53 PM    Glucose (POC) 107 (H) 07/02/2020 05:42 PM    Glucose (POC) 94 07/02/2020 11:42 AM    Glucose (POC) 157 (H) 07/02/2020 06:58 AM    Glucose (POC) 121 (H) 06/14/2020 11:22 PM     Lab Results   Component Value Date/Time    Specific gravity 1.015 06/15/2020 05:45 AM           Signed:    Iliana Ramey MD  Date of Service:  3/30/2021  5:30 PM

## 2021-03-30 NOTE — OP NOTES
[address]   OPERATIVE REPORT    Name:  Michael Leroy  MR#:  [id number]  :  1970  ACCOUNT #:  [de-identified]  DATE OF SERVICE:  2021      PREOPERATIVE DIAGNOSIS:  Unruptured right middle cerebral artery aneurysm. POSTOPERATIVE DIAGNOSIS:  Unruptured right middle cerebral artery aneurysm. PROCEDURE PERFORMED:  Right frontotemporal craniotomy with complex clipping of wide neck MCA aneurysm, use of operating microscope, intraoperative angiogram performed by Neurointerventional Radiology. SURGEON:  Seven Valdez MD    ASSISTANT:  Era Arthur MD    ANESTHESIA:  General endotracheal anesthesia. COMPLICATIONS:  None. SPECIMENS REMOVED:  None. IMPLANTS:  Voyat craniofacial plating system, Yasargil 7-mm curved aneurysm clip and Yasargil 5-mm straight mini clip, both titanium, and MRI compatible. ESTIMATED BLOOD LOSS:  100 mL. OPERATIVE INDICATIONS:  This is a 51-year-old female who is about 9 months status post rupture of a left middle cerebral artery aneurysm that was treated endovascularly. She had significant postoperative vasospasm and some neurologic deficits. She had a near aneurysm on the right side that was unruptured. Once she recovered from her illness, she was followed up further with Neurointerventional Radiology. They deemed that the right MCA was not safely coiled above due to the wide neck and morphology. Therefore she was referred for clipping. After discussing the situation and treatment options with the patient and her family in the office, informed consent was obtained. OPERATION IN DETAIL:  Prior to going to the operating room, the patient had a right femoral sheath placed by Department of Radiology for intraoperative angiogram.  We will dictate this. She was then taken to the operating room, placed under general endotracheal anesthesia. All necessary lines and monitors were placed.   She was given appropriate dose of IV antibiotics, SCDs, Martinez, and mannitol. Her head was turned about 45 degrees to the left. She was fixed in the radiolucent Gonzalez three-point head fixation, locked into place. The right frontotemporal region was clipped and draped in a standard sterile fashion. A curvilinear incision was made from anterior tragus just up over the superior temporal line and anterior head behind the hairline. Incision was made with skin knife, carried down with Bovie electrocautery through the scalp. Shanika clips were used for hemostasis. Scalp was elevated. Temporalis muscle and fascia was divided as well. All of this was reflected anteriorly and inferiorly. A pterional craniotomy was then made with a high-speed drill. Bone edges were waxed. Dural vessels were cauterized. No significant injury to the underlying brain or dura. A small temporal craniectomy was performed. I then also used the Tengionick drill bit to drill the lateral sphenoid wing carefully as the aneurysm was right along the sphenoid wing. This was drilled out and carefully rongeured. Dura was then opened and tacked up over the temporalis muscle and scalp. The operating microscope was brought into field. Under the microscope, the sylvian fissure was split from lateral to medially following the MCA branches down to the aneurysm. I opened the arachnoid, cauterized any bridging veins, and identified the arterial anatomy. There was a wide-based aneurysm pointing laterally and some rostrally but more inferiorly coming off a tortuous M1 segment. The M1 segment was running somewhat anteriorly. The aneurysm dome itself was covered in arachnoid and dense and actually adherent to the sphenoid wing. I carefully dissected out the distal branches, the M2 branches, the anterior which was running up over the aneurysm dome and was slightly adherent.   I performed a very small amount of temporal lobectomy in order to get better exposure inferiorly and anteriorly and I freed up the dome of the aneurysm. I then placed temporary clips on the M1 and the posterior M2 and then dissected the dome of the aneurysm further carefully dissecting the anterior branch away from the artery back towards the trifurcation. There was another small branch inferior to the aneurysm that I dissected free. I did not leave the temporary clips on for more than about 2 minutes at a time doing this twice in order to get the neck as best as possible. We did perform temporary clipping. Once the neck of the aneurysm was maximally dissected out, I then placed a Yasargil curved titanium clip across the top of the aneurysm working inferiorly across what appeared to be the neck precluding the aneurysm. I then dissected the dome distally. It was clear that the distal dome of the aneurysm was somewhat adherent to the sphenoid wing and there was probably a little bit of inferior neck left feeding the dome. I then therefore took a small 5-mm straight titanium clip and placed this tandem across the curved clip. The aneurysm dome was completely occluded and collapsed. I then dissected all this free and inspected the parent vessels of afferent and efferent, all of them appeared to be opened. There may have been a very small amount of neck in the inferior aspect of the trifurcation but I was not able to clip this due to the morphology and the width of the dome. The wound was draped out. Dr. Carina Lam of [facility] Neurointerventional Radiology then performed an intraoperative angiogram which I reviewed with him. We did not see any clear residual aneurysm. No major occlusion. The sterile field was reestablished. The wound was copiously  irrigated. Hemostasis was achieved. The Surgicel was placed in the lateral sylvian fissure. The dura was then closed primarily with interrupted 4-0 Nurolon sutures. Central and circumferential epidural tack-up sutures were placed.   The bone flap was reaffixed with the Biomet craniofacial plating system. The temporalis muscle and fascia was closed with 2-0 Vicryl. The galea was closed with inverted 2-0 Vicryl. The skin was closed with running 3-0 Vicryl Rapide. Wounds were cleaned, dried, and dressed with sterile dressing. The patient was then taken out of [facility], extubated and taken to recovery room in stable condition.         MD ALE [email or web address]  D:  03/29/2021 14:15  T:  03/29/2021 23:03  JOB #:  [id number]  CC:  Nav Miller MD

## 2021-03-30 NOTE — PROGRESS NOTES
0730: Bedside shift change report given to Maggie Fuentes RN (oncoming nurse) by Gavin Matt and Ulysses Don RN (offgoing nurse). Report included the following information SBAR, Kardex, ED Summary, OR Summary, MAR, Accordion and Recent Results. 1920: TRANSFER - OUT REPORT:    Verbal report given to Jewels Chandler RN(name) on Tatianna Forbes  being transferred to NSTU (unit) for routine progression of care       Report consisted of patients Situation, Background, Assessment and   Recommendations(SBAR). Information from the following report(s) SBAR, Kardex, Intake/Output, MAR, Accordion, Recent Results and Cardiac Rhythm NSR was reviewed with the receiving nurse. Opportunity for questions and clarification was provided.       Patient transported with:   Registered Nurse

## 2021-03-30 NOTE — PROGRESS NOTES
1930: Bedside, Verbal and Written shift change report given to Bhavani Henson, ROSSANA and Carson Terrell RN (oncoming nurse) by Mickey Thompson RN (offgoing nurse). Report included the following information SBAR, Kardex, OR Summary, Intake/Output, MAR, Recent Results, Cardiac Rhythm NSR, Alarm Parameters  and Dual Neuro Assessment. 0300: traveled to [place] with cardiac monitoring and 2x RN. Traveled without incidence. 0730: Bedside, Verbal and Written shift change report given to Indigo Rosario RN (oncoming nurse) by Bhavani Henson RN and Carson Terrell RN (offgoing nurse). Report included the following information SBAR, Kardex, OR Summary, Intake/Output, MAR, Recent Results, Cardiac Rhythm NSR, Alarm Parameters  and Dual Neuro Assessment. Shift summary: Patient's vitals remained stable throughout night; had 3 episodes of emesis where patient had a vagal response and HR went into the 30s-40s. Meds given for nausea and vomiting. Patient remains intact neurologically and Q1 neuro checks.

## 2021-03-30 NOTE — PROGRESS NOTES
Neurosurgery Progress Note  Tacy Castleman, [facility]          Admit Date: 3/29/2021   LOS: 1 day        Daily Progress Note: 3/30/2021    POD:1 Day Post-Op    S/P: Procedure(s):  RIGHT FRONTO TEMPORAL CRANIOTOMY WITH CLIPPING OF MIDDLE CEREBRAL ARTERY  ANEURYSM WITH INTRAOPERATIVE ANGIOGRAM    Subjective: In 2020, the patient was admitted for left MCA aneurysm rupture and sAH treated with coiling. She had a prolonged hospital stay with embolic infarcts and trach placement and PEG tube placement. She has sufficiently recovered after a stay in inpatient rehab and is back to her independent baseline level with ADLs and mobility. She had a right MCA unruptured aneurysm that required treatment as well. After continued neurologic recovery, the patient was referred to Dr. Bryan Stuart to discuss open surgical clipping of her right MCA aneurysm. After discussing risks, benefits, and alternatives of surgery, she decided to proceed. She underwent a right frontotemporal craniotomy with clipping if unruptured right MCA aneurysm on 21 with Dr. Bryan Stuart. Post-operatively, she transferred to ICU. She has been up walking with the therapy team this morning. Denies numbness, tingling, chest pain, leg pain, nausea, vomiting, difficulty swallowing, and dyspnea.        Objective:     Vital signs  Temp (24hrs), Av.9 °F (36.6 °C), Min:97.3 °F (36.3 °C), Max:98.4 °F (36.9 °C)    07 -  1900  In: -   Out: 260 [Urine:260]  1901 -  0700  In: 4903.8 [I.V.:4903.8]  Out: 4500 [Urine:4435; Drains:15]    Visit Vitals  BP (!) 142/64   Pulse 98   Temp 98.3 °F (36.8 °C)   Resp 14   Ht 5' 2\" (1.575 m)   Wt 74.6 kg (164 lb 7.4 oz)   SpO2 98%   BMI 30.08 kg/m²    O2 Flow Rate (L/min): 2 l/min O2 Device: Room air     Pain control  Pain Assessment  Pain Scale 1: Numeric (0 - 10)  Pain Intensity 1: 0  Pain Location 1: Head  Pain Orientation 1: Anterior  Pain Description 1: Dull  Pain Intervention(s) 1: Medication (see MAR)    PT/OT  Gait     Gait  Speed/Martha: Slow  Gait Abnormalities: Decreased step clearance, Trunk sway increased  Ambulation - Level of Assistance: Contact guard assistance, Stand-by assistance  Distance (ft): 150 Feet (ft)  Assistive Device: Gait belt           Physical Exam:  Gen:NAD. Neuro: A&Ox3. Follows commands. Speech clear. Affect normal.  PERRL. EOMI. Face symmetric. Tongue midline. JOLLY. Strength 5/5 in UE and LE BL. Negative drift. Gait deferred. Skin: Dressing C/D/I    CT head without contrast on 03/30/21 shows postsurgical head with right MCA aneurysm clip. Subdural gas and fluid. No mass effect or hydrocephalus. No CT evidence of acute infarct.     24 hour results:    Recent Results (from the past 24 hour(s))   GLUCOSE, POC    Collection Time: 03/29/21  5:53 PM   Result Value Ref Range    Glucose (POC) 159 (H) 65 - 100 mg/dL    Performed by [employer], BASIC    Collection Time: 03/30/21  4:26 AM   Result Value Ref Range    Sodium 141 136 - 145 mmol/L    Potassium 3.9 3.5 - 5.1 mmol/L    Chloride 110 (H) 97 - 108 mmol/L    CO2 24 21 - 32 mmol/L    Anion gap 7 5 - 15 mmol/L    Glucose 136 (H) 65 - 100 mg/dL    BUN 10 6 - 20 MG/DL    Creatinine 0.73 0.55 - 1.02 MG/DL    BUN/Creatinine ratio 14 12 - 20      GFR est AA >60 >60 ml/min/1.73m2    GFR est non-AA >60 >60 ml/min/1.73m2    Calcium 8.4 (L) 8.5 - 10.1 MG/DL   CBC WITH AUTOMATED DIFF    Collection Time: 03/30/21  4:26 AM   Result Value Ref Range    WBC 11.5 (H) 3.6 - 11.0 K/uL    RBC 3.93 3.80 - 5.20 M/uL    HGB 10.8 (L) 11.5 - 16.0 g/dL    HCT 34.7 (L) 35.0 - 47.0 %    MCV 88.3 80.0 - 99.0 FL    MCH 27.5 26.0 - 34.0 PG    MCHC 31.1 30.0 - 36.5 g/dL    RDW 13.8 11.5 - 14.5 %    PLATELET 689 792 - 180 K/uL    MPV 10.0 8.9 - 12.9 FL    NRBC 0.0 0  WBC    ABSOLUTE NRBC 0.00 0.00 - 0.01 K/uL    NEUTROPHILS 86 (H) 32 - 75 %    BAND NEUTROPHILS 5 0 - 6 %    LYMPHOCYTES 6 (L) 12 - 49 %    MONOCYTES 3 (L) 5 - 13 %    EOSINOPHILS 0 0 - 7 %    BASOPHILS 0 0 - 1 %    IMMATURE GRANULOCYTES 0 %    ABS. NEUTROPHILS 10.5 (H) 1.8 - 8.0 K/UL    ABS. LYMPHOCYTES 0.7 (L) 0.8 - 3.5 K/UL    ABS. MONOCYTES 0.3 0.0 - 1.0 K/UL    ABS. EOSINOPHILS 0.0 0.0 - 0.4 K/UL    ABS. BASOPHILS 0.0 0.0 - 0.1 K/UL    ABS. IMM. GRANS. 0.0 K/UL    DF MANUAL      RBC COMMENTS NORMOCYTIC, NORMOCHROMIC            Assessment:     Active Problems:    Cerebral aneurysm (3/29/2021)        Plan:   1. Right MCA aneurysm, unruptured   - s/p crani and clipping 03/29   - normalize and mobilize   - PT/OT   - neuro checks q4h   - cont HVAC - will take out tomorrow   - cont keppra   - Transfer to NSTU  2.  HTN   - SBP<160   - cont norvasc, hydralazine, lisinopril from home   - Labetalol/hydralazine PRN  3. Leukocytosis   - WBC 11.5   - likely due to stress of surgery and steroids   - cont to monitor    Activity: up with assist  DVT ppx: SCDs  Dispo: tbd    Plan d/w Dr. Aaliyah Mayberry, ICU nurse      Rosey Cummings NP

## 2021-03-31 LAB
ABO + RH BLD: NORMAL
BASOPHILS # BLD: 0 K/UL (ref 0–0.1)
BASOPHILS NFR BLD: 0 % (ref 0–1)
BLD PROD TYP BPU: NORMAL
BLD PROD TYP BPU: NORMAL
BLOOD GROUP ANTIBODIES SERPL: NORMAL
BPU ID: NORMAL
BPU ID: NORMAL
COMMENT, HOLDF: NORMAL
CROSSMATCH RESULT,%XM: NORMAL
CROSSMATCH RESULT,%XM: NORMAL
DIFFERENTIAL METHOD BLD: ABNORMAL
EOSINOPHIL # BLD: 0 K/UL (ref 0–0.4)
EOSINOPHIL NFR BLD: 0 % (ref 0–7)
ERYTHROCYTE [DISTWIDTH] IN BLOOD BY AUTOMATED COUNT: 14.2 % (ref 11.5–14.5)
HCT VFR BLD AUTO: 30 % (ref 35–47)
HGB BLD-MCNC: 9.5 G/DL (ref 11.5–16)
IMM GRANULOCYTES # BLD AUTO: 0.1 K/UL (ref 0–0.04)
IMM GRANULOCYTES NFR BLD AUTO: 1 % (ref 0–0.5)
LYMPHOCYTES # BLD: 0.8 K/UL (ref 0.8–3.5)
LYMPHOCYTES NFR BLD: 6 % (ref 12–49)
MCH RBC QN AUTO: 27.4 PG (ref 26–34)
MCHC RBC AUTO-ENTMCNC: 31.7 G/DL (ref 30–36.5)
MCV RBC AUTO: 86.5 FL (ref 80–99)
MONOCYTES # BLD: 0.8 K/UL (ref 0–1)
MONOCYTES NFR BLD: 7 % (ref 5–13)
NEUTS SEG # BLD: 10.9 K/UL (ref 1.8–8)
NEUTS SEG NFR BLD: 86 % (ref 32–75)
NRBC # BLD: 0 K/UL (ref 0–0.01)
NRBC BLD-RTO: 0 PER 100 WBC
PLATELET # BLD AUTO: 249 K/UL (ref 150–400)
PMV BLD AUTO: 10.4 FL (ref 8.9–12.9)
RBC # BLD AUTO: 3.47 M/UL (ref 3.8–5.2)
SAMPLES BEING HELD,HOLD: NORMAL
SPECIMEN EXP DATE BLD: NORMAL
STATUS OF UNIT,%ST: NORMAL
STATUS OF UNIT,%ST: NORMAL
UNIT DIVISION, %UDIV: 0
UNIT DIVISION, %UDIV: 0
WBC # BLD AUTO: 12.7 K/UL (ref 3.6–11)

## 2021-03-31 PROCEDURE — 74011250637 HC RX REV CODE- 250/637: Performed by: NEUROLOGICAL SURGERY

## 2021-03-31 PROCEDURE — 97116 GAIT TRAINING THERAPY: CPT

## 2021-03-31 PROCEDURE — 85025 COMPLETE CBC W/AUTO DIFF WBC: CPT

## 2021-03-31 PROCEDURE — 36415 COLL VENOUS BLD VENIPUNCTURE: CPT

## 2021-03-31 PROCEDURE — 65660000000 HC RM CCU STEPDOWN

## 2021-03-31 RX ADMIN — Medication 10 ML: at 22:11

## 2021-03-31 RX ADMIN — HYDRALAZINE HYDROCHLORIDE 100 MG: 50 TABLET, FILM COATED ORAL at 22:10

## 2021-03-31 RX ADMIN — LEVETIRACETAM 500 MG: 500 TABLET ORAL at 18:00

## 2021-03-31 RX ADMIN — AMLODIPINE BESYLATE 10 MG: 5 TABLET ORAL at 08:28

## 2021-03-31 RX ADMIN — ACETAMINOPHEN 650 MG: 325 TABLET ORAL at 18:00

## 2021-03-31 RX ADMIN — LISINOPRIL 40 MG: 20 TABLET ORAL at 08:27

## 2021-03-31 RX ADMIN — ACETAMINOPHEN 650 MG: 325 TABLET ORAL at 02:27

## 2021-03-31 RX ADMIN — DOCUSATE SODIUM 100 MG: 100 CAPSULE, LIQUID FILLED ORAL at 08:27

## 2021-03-31 RX ADMIN — LEVETIRACETAM 500 MG: 500 TABLET ORAL at 08:27

## 2021-03-31 RX ADMIN — HYDRALAZINE HYDROCHLORIDE 100 MG: 50 TABLET, FILM COATED ORAL at 08:27

## 2021-03-31 RX ADMIN — ACETAMINOPHEN 650 MG: 325 TABLET ORAL at 10:56

## 2021-03-31 RX ADMIN — Medication 10 ML: at 18:01

## 2021-03-31 RX ADMIN — HYDRALAZINE HYDROCHLORIDE 100 MG: 50 TABLET, FILM COATED ORAL at 16:25

## 2021-03-31 RX ADMIN — DOCUSATE SODIUM 100 MG: 100 CAPSULE, LIQUID FILLED ORAL at 18:00

## 2021-03-31 RX ADMIN — ACETAMINOPHEN 650 MG: 325 TABLET ORAL at 22:10

## 2021-03-31 RX ADMIN — Medication 10 ML: at 05:58

## 2021-03-31 RX ADMIN — ATORVASTATIN CALCIUM 40 MG: 40 TABLET, FILM COATED ORAL at 08:27

## 2021-03-31 NOTE — PROGRESS NOTES
Bedside and Verbal shift change report given to Jose Enrique Mathews RN (oncoming nurse) by Chioma Sanchez RN (offgoing nurse). Report included the following information SBAR, Kardex, MAR, Cardiac Rhythm NSR and Dual Neuro Assessment.

## 2021-03-31 NOTE — PROGRESS NOTES
Neurosurgery Progress Note  Farzana Lennon [facility]          Admit Date: 3/29/2021   LOS: 2 days        Daily Progress Note: 3/31/2021    POD:2 Day Post-Op    S/P: Procedure(s):  RIGHT FRONTO TEMPORAL CRANIOTOMY WITH CLIPPING OF MIDDLE CEREBRAL ARTERY  ANEURYSM WITH INTRAOPERATIVE ANGIOGRAM    HPI: In 2020, the patient was admitted for left MCA aneurysm rupture and sAH treated with coiling. She had a prolonged hospital stay with embolic infarcts and trach placement and PEG tube placement. She has sufficiently recovered after a stay in inpatient rehab and is back to her independent baseline level with ADLs and mobility. She had a right MCA unruptured aneurysm that required treatment as well. After continued neurologic recovery, the patient was referred to Dr. Sherlyn Cameron to discuss open surgical clipping of her right MCA aneurysm. After discussing risks, benefits, and alternatives of surgery, she decided to proceed. She underwent a right frontotemporal craniotomy with clipping if unruptured right MCA aneurysm on 21 with Dr. Vallecillo Gear: The patient transferred to NSTU. She is doing well. In good spirits. Denies numbness, tingling, chest pain, leg pain, nausea, vomiting, difficulty swallowing, and dyspnea. Objective:     Vital signs  Temp (24hrs), Av.2 °F (36.8 °C), Min:97.7 °F (36.5 °C), Max:98.7 °F (37.1 °C)   No intake/output data recorded.  1901 -  0700  In: 4734 [P.O.:500;  I.V.:3535]  Out: 1450 [Urine:1395; Drains:55]    Visit Vitals  /67   Pulse 62   Temp 98.2 °F (36.8 °C)   Resp 18   Ht 5' 2\" (1.575 m)   Wt 73.9 kg (162 lb 14.4 oz)   SpO2 100%   BMI 29.79 kg/m²    O2 Flow Rate (L/min): 2 l/min O2 Device: Room air     Pain control  Pain Assessment  Pain Scale 1: Numeric (0 - 10)  Pain Intensity 1: 0  Pain Location 1: Head  Pain Orientation 1: Anterior  Pain Description 1: Dull  Pain Intervention(s) 1: Medication (see MAR)    PT/OT  Gait Gait  Speed/Martha: Slow  Gait Abnormalities: Decreased step clearance, Trunk sway increased  Ambulation - Level of Assistance: Supervision  Distance (ft): 300 Feet (ft)  Assistive Device: Gait belt           Physical Exam:  Gen:NAD. Neuro: A&Ox3. Follows commands. Speech clear. Affect normal.  PERRL. EOMI. Face symmetric. Tongue midline. JOLLY. Strength 5/5 in UE and LE BL. Negative drift. Gait deferred. Skin: Dressing C/D/I    CT head without contrast on 03/30/21 shows postsurgical head with right MCA aneurysm clip. Subdural gas and fluid. No mass effect or hydrocephalus. No CT evidence of acute infarct. 24 hour results:    Recent Results (from the past 24 hour(s))   CBC WITH AUTOMATED DIFF    Collection Time: 03/31/21  2:34 AM   Result Value Ref Range    WBC 12.7 (H) 3.6 - 11.0 K/uL    RBC 3.47 (L) 3.80 - 5.20 M/uL    HGB 9.5 (L) 11.5 - 16.0 g/dL    HCT 30.0 (L) 35.0 - 47.0 %    MCV 86.5 80.0 - 99.0 FL    MCH 27.4 26.0 - 34.0 PG    MCHC 31.7 30.0 - 36.5 g/dL    RDW 14.2 11.5 - 14.5 %    PLATELET 051 674 - 686 K/uL    MPV 10.4 8.9 - 12.9 FL    NRBC 0.0 0  WBC    ABSOLUTE NRBC 0.00 0.00 - 0.01 K/uL    NEUTROPHILS 86 (H) 32 - 75 %    LYMPHOCYTES 6 (L) 12 - 49 %    MONOCYTES 7 5 - 13 %    EOSINOPHILS 0 0 - 7 %    BASOPHILS 0 0 - 1 %    IMMATURE GRANULOCYTES 1 (H) 0.0 - 0.5 %    ABS. NEUTROPHILS 10.9 (H) 1.8 - 8.0 K/UL    ABS. LYMPHOCYTES 0.8 0.8 - 3.5 K/UL    ABS. MONOCYTES 0.8 0.0 - 1.0 K/UL    ABS. EOSINOPHILS 0.0 0.0 - 0.4 K/UL    ABS. BASOPHILS 0.0 0.0 - 0.1 K/UL    ABS. IMM. GRANS. 0.1 (H) 0.00 - 0.04 K/UL    DF AUTOMATED     SAMPLES BEING HELD    Collection Time: 03/31/21  2:34 AM   Result Value Ref Range    SAMPLES BEING HELD 1PST     COMMENT        Add-on orders for these samples will be processed based on acceptable specimen integrity and analyte stability, which may vary by analyte. Assessment:     Active Problems:    Cerebral aneurysm (3/29/2021)        Plan:   1.  Right MCA aneurysm, unruptured   - s/p crani and clipping 03/29   - normalize and mobilize   - PT/OT   - neuro checks q4h   - d/c HVAC   - cont keppra   - may be able to d/c to home tomorrow if doing well  2.  HTN   - SBP<160   - cont norvasc, hydralazine, lisinopril from home   - Labetalol/hydralazine PRN  3. Leukocytosis   - WBC 11.5   - likely due to stress of surgery and steroids   - cont to monitor    Activity: up with assist  DVT ppx: SCDs  Dispo: home with family    Plan d/w Dr. Adry Queen, NSTU nurse      Adri Mcclelland, NP

## 2021-03-31 NOTE — PROGRESS NOTES
[address] [facility] Adult  Hospitalist Group                                                                                          Hospitalist Progress Note  María Elena Kimble MD  Answering service: [phone] from in house phone        Date of Service:  3/31/2021  NAME:  Lucas Metcalf  :  1970  MRN:  [id number]      Admission Summary:     Patient is a 59-year-old -American female with POD #1 status post right frontotemporal craniotomy with complex clipping of wide neck right MCA aneurysm with Intra-Op angiogram. Hospitalist service involved to assume care of patient as she is not being transferred out of the ICU to the neuro unit. Patient denied headache, blurred vision, speech difficulty, numbness or weakness of the extremities. Interval history / Subjective:     She said she feels better, no headache or blurring of vision     Assessment & Plan:     Right middle cerebral artery aneurysm   -s/p craniotomy and clipping on 3/29  -on keppra, prn tylenol  -conscious and alert, well oriented, moves all extremities   -continue neuro check and supportive care  -PT/OT  -Neurosurgeon on board    HTN  -BP normal, continue on norvasc, hydralazine, lisinopril, keep SBP <140    Leukocytosis   -leukocytosis trending up, afebrile       Code status: Full Code  DVT prophylaxis:SCD    Care Plan discussed with: Patient/Family, Nurse and   Anticipated Disposition: TBD  Anticipated Discharge: Greater than 48 hours     Hospital Problems  Date Reviewed: 3/29/2021          Codes Class Noted POA    Cerebral aneurysm ICD-10-CM: I67.1  ICD-9-CM: 437.3  3/29/2021 Unknown                 Vital Signs:    Last 24hrs VS reviewed since prior progress note.  Most recent are:  Visit Vitals  /72   Pulse 82   Temp 98.5 °F (36.9 °C)   Resp 15   Ht 5' 2\" (1.575 m)   Wt 73.9 kg (162 lb 14.4 oz)   SpO2 98%   BMI 29.79 kg/m²         Intake/Output Summary (Last 24 hours) at 3/31/2021 0802  Last data filed at 3/31/2021 0000  Gross per 24 hour   Intake 1326.67 ml   Output 315 ml   Net 1011.67 ml        Physical Examination:     I had a face to face encounter with this patient and independently examined them on 3/31/2021 as outlined below:          Constitutional:  No acute distress, cooperative, pleasant    ENT:  Head dressed, drainage in place, oral mucosa moist, oropharynx benign. Resp:  CTA bilaterally. No wheezing/rhonchi/rales. No accessory muscle use   CV:  Regular rhythm, normal rate, no murmurs, gallops, rubs    GI:  Soft, non distended, non tender. normoactive bowel sounds, no hepatosplenomegaly     Musculoskeletal:  No edema,     Neurologic:  Moves all extremities. AAOx3, CN II-XII reviewed     Skin:  Good turgor, no rashes or ulcers       Data Review:    Review and/or order of clinical lab test  Review and/or order of tests in the radiology section of CPT  Review and/or order of tests in the medicine section of CPT      Labs:     Recent Labs     03/31/21  0234 03/30/21  0426   WBC 12.7* 11.5*   HGB 9.5* 10.8*   HCT 30.0* 34.7*    250     Recent Labs     03/30/21  0426      K 3.9   *   CO2 24   BUN 10   CREA 0.73   *   CA 8.4*     No results for input(s): ALT, AP, TBIL, TBILI, TP, ALB, GLOB, GGT, AML, LPSE in the last 72 hours. No lab exists for component: SGOT, GPT, AMYP, HLPSE  No results for input(s): INR, PTP, APTT, INREXT in the last 72 hours. No results for input(s): FE, TIBC, PSAT, FERR in the last 72 hours. No results found for: FOL, RBCF   No results for input(s): PH, PCO2, PO2 in the last 72 hours. No results for input(s): CPK, CKNDX, TROIQ in the last 72 hours.     No lab exists for component: CPKMB  Lab Results   Component Value Date/Time    Cholesterol, total 157 09/01/2020 08:29 AM    HDL Cholesterol 74 09/01/2020 08:29 AM    LDL, calculated 54 09/01/2020 08:29 AM    LDL, calculated 7.8 06/05/2020 03:34 AM    Triglyceride 181 (H) 09/01/2020 08:29 AM    CHOL/HDL Ratio 1.4 06/05/2020 03:34 AM     Lab Results   Component Value Date/Time    Glucose (POC) 159 (H) 03/29/2021 05:53 PM    Glucose (POC) 107 (H) 07/02/2020 05:42 PM    Glucose (POC) 94 07/02/2020 11:42 AM    Glucose (POC) 157 (H) 07/02/2020 06:58 AM    Glucose (POC) 121 (H) 06/14/2020 11:22 PM     Lab Results   Component Value Date/Time    Specific gravity 1.015 06/15/2020 05:45 AM         Medications Reviewed:     Current Facility-Administered Medications   Medication Dose Route Frequency    levETIRAcetam (KEPPRA) tablet 500 mg  500 mg Oral BID    hydrALAZINE (APRESOLINE) 20 mg/mL injection 10 mg  10 mg IntraVENous Q4H PRN    labetaloL (NORMODYNE;TRANDATE) injection 10 mg  10 mg IntraVENous Q4H PRN    fentaNYL citrate (PF) injection 100 mcg  100 mcg IntraVENous Multiple    midazolam (PF) (VERSED) injection 5 mg  5 mg IntraVENous Multiple    amLODIPine (NORVASC) tablet 10 mg  10 mg Oral DAILY    atorvastatin (LIPITOR) tablet 40 mg  40 mg Oral DAILY    hydrALAZINE (APRESOLINE) tablet 100 mg  100 mg Oral TID    . PHARMACY TO SUBSTITUTE PER PROTOCOL (Reordered from: linaCLOtide (Linzess) 72 mcg cap capsule)    Per Protocol    lisinopriL (PRINIVIL, ZESTRIL) tablet 40 mg  40 mg Oral DAILY    sodium chloride (NS) flush 5-40 mL  5-40 mL IntraVENous Q8H    sodium chloride (NS) flush 5-40 mL  5-40 mL IntraVENous PRN    acetaminophen (TYLENOL) tablet 650 mg  650 mg Oral Q4H PRN    oxyCODONE-acetaminophen (PERCOCET) 5-325 mg per tablet 1 Tab  1 Tab Oral Q4H PRN    HYDROmorphone (PF) (DILAUDID) injection 0.5 mg  0.5 mg IntraVENous Q2H PRN    ondansetron (ZOFRAN) injection 4 mg  4 mg IntraVENous Q4H PRN    docusate sodium (COLACE) capsule 100 mg  100 mg Oral BID    magnesium hydroxide (MILK OF MAGNESIA) 400 mg/5 mL oral suspension 30 mL  30 mL Oral DAILY PRN    acetaminophen (TYLENOL) tablet 650 mg  650 mg Oral Q8H    prochlorperazine (COMPAZINE) with saline injection 5 mg  5 mg IntraVENous Q4H PRN     ______________________________________________________________________  EXPECTED LENGTH OF STAY: 1d 21h  ACTUAL LENGTH OF STAY:          2                 Royal Adan MD

## 2021-03-31 NOTE — PROGRESS NOTES
Problem: Mobility Impaired (Adult and Pediatric)  Goal: *Acute Goals and Plan of Care (Insert Text)  Description:   FUNCTIONAL STATUS PRIOR TO ADMISSION: Patient was independent and active without use of DME.    HOME SUPPORT PRIOR TO ADMISSION: The patient lived with her mom but did not require assist.    Physical Therapy Goals  Initiated 3/30/2021  1. Patient will move from supine to sit and sit to supine  and scoot up and down in bed with independence within 7 day(s). 2.  Patient will transfer from bed to chair and chair to bed with modified independence using the least restrictive device within 7 day(s). 3.  Patient will perform sit to stand with modified independence within 7 day(s). 4.  Patient will ambulate with modified independence for 300 feet with the least restrictive device within 7 day(s). 5.  Patient will ascend/descend 4 stairs with handrail(s) with supervision/set-up within 7 day(s). 6.  Patient will improve Garza Balance score by 7 points within 7 days. Outcome: Progressing Towards Goal     PHYSICAL THERAPY TREATMENT  Patient: David Rordiguez (48 y.o. female)  Date: 3/31/2021  Diagnosis: Cerebral aneurysm [I67.1] <principal problem not specified>  Procedure(s) (LRB):  RIGHT FRONTO TEMPORAL CRANIOTOMY WITH CLIPPING OF MIDDLE CEREBRAL ARTERY  ANEURYSM WITH INTRAOPERATIVE ANGIOGRAM (N/A) 2 Days Post-Op  Precautions: Fall  Chart, physical therapy assessment, plan of care and goals were reviewed. ASSESSMENT  Patient continues with skilled PT services and is progressing towards goals. Participated in progressive gait training on unit x300ft without AD, overall SUP level. No overt LOB, deviation, or difficulty with integration of head movements, distractions, or dual tasks. Provided education on home safety and falls reduction strategies. Remained up in chair at end of session, NAD. Recommending discharge home with family support once medically cleared. Will follow.       Current Level of Function Impacting Discharge (mobility/balance): CGA/SUP without AD     Other factors to consider for discharge: none          PLAN :  Patient continues to benefit from skilled intervention to address the above impairments. Continue treatment per established plan of care. to address goals. Recommendation for discharge: (in order for the patient to meet his/her long term goals)  No skilled physical therapy/ follow up rehabilitation needs identified at this time. This discharge recommendation:  Has been made in collaboration with the attending provider and/or case management    IF patient discharges home will need the following DME: none       SUBJECTIVE:   Patient stated I just can't thank you enough.     OBJECTIVE DATA SUMMARY:   Critical Behavior:  Neurologic State: Alert, Eyes open spontaneously  Orientation Level: Oriented X4  Cognition: Appropriate decision making, Appropriate safety awareness, Follows commands  Safety/Judgement: Awareness of environment, Insight into deficits  Functional Mobility Training:  Bed Mobility:    Transfers:  Sit to Stand: Supervision  Stand to Sit: Supervision    Balance:  Sitting: Intact  Standing: Impaired  Standing - Static: Good  Standing - Dynamic : Good  Ambulation/Gait Training:  Distance (ft): 300 Feet (ft)  Assistive Device: Gait belt  Ambulation - Level of Assistance: Supervision  Gait Abnormalities: Decreased step clearance;Trunk sway increased    Activity Tolerance:   Good    After treatment patient left in no apparent distress:   Sitting in chair and Call bell within reach    COMMUNICATION/COLLABORATION:   The patients plan of care was discussed with: Registered nurse and Physician.        Ayla Menendez, PT, DPT   Time Calculation: 11 mins

## 2021-04-01 VITALS
OXYGEN SATURATION: 100 % | HEIGHT: 62 IN | TEMPERATURE: 98.1 F | BODY MASS INDEX: 30.18 KG/M2 | DIASTOLIC BLOOD PRESSURE: 58 MMHG | HEART RATE: 74 BPM | SYSTOLIC BLOOD PRESSURE: 114 MMHG | WEIGHT: 164.02 LBS | RESPIRATION RATE: 19 BRPM

## 2021-04-01 LAB
ALBUMIN SERPL-MCNC: 2.9 G/DL (ref 3.5–5)
ALBUMIN/GLOB SERPL: 0.9 {RATIO} (ref 1.1–2.2)
ALP SERPL-CCNC: 68 U/L (ref 45–117)
ALT SERPL-CCNC: 10 U/L (ref 12–78)
ANION GAP SERPL CALC-SCNC: 5 MMOL/L (ref 5–15)
AST SERPL-CCNC: 10 U/L (ref 15–37)
BILIRUB SERPL-MCNC: 0.2 MG/DL (ref 0.2–1)
BUN SERPL-MCNC: 17 MG/DL (ref 6–20)
BUN/CREAT SERPL: 27 (ref 12–20)
CALCIUM SERPL-MCNC: 8.4 MG/DL (ref 8.5–10.1)
CHLORIDE SERPL-SCNC: 109 MMOL/L (ref 97–108)
CO2 SERPL-SCNC: 26 MMOL/L (ref 21–32)
CREAT SERPL-MCNC: 0.63 MG/DL (ref 0.55–1.02)
ERYTHROCYTE [DISTWIDTH] IN BLOOD BY AUTOMATED COUNT: 14.2 % (ref 11.5–14.5)
GLOBULIN SER CALC-MCNC: 3.1 G/DL (ref 2–4)
GLUCOSE SERPL-MCNC: 74 MG/DL (ref 65–100)
HCT VFR BLD AUTO: 27.8 % (ref 35–47)
HGB BLD-MCNC: 9 G/DL (ref 11.5–16)
MCH RBC QN AUTO: 27.8 PG (ref 26–34)
MCHC RBC AUTO-ENTMCNC: 32.4 G/DL (ref 30–36.5)
MCV RBC AUTO: 85.8 FL (ref 80–99)
NRBC # BLD: 0 K/UL (ref 0–0.01)
NRBC BLD-RTO: 0 PER 100 WBC
PLATELET # BLD AUTO: 209 K/UL (ref 150–400)
PMV BLD AUTO: 9.9 FL (ref 8.9–12.9)
POTASSIUM SERPL-SCNC: 3.6 MMOL/L (ref 3.5–5.1)
PROT SERPL-MCNC: 6 G/DL (ref 6.4–8.2)
RBC # BLD AUTO: 3.24 M/UL (ref 3.8–5.2)
SODIUM SERPL-SCNC: 140 MMOL/L (ref 136–145)
WBC # BLD AUTO: 7.4 K/UL (ref 3.6–11)

## 2021-04-01 PROCEDURE — 80053 COMPREHEN METABOLIC PANEL: CPT

## 2021-04-01 PROCEDURE — 74011250637 HC RX REV CODE- 250/637: Performed by: NEUROLOGICAL SURGERY

## 2021-04-01 PROCEDURE — 97535 SELF CARE MNGMENT TRAINING: CPT

## 2021-04-01 PROCEDURE — 85027 COMPLETE CBC AUTOMATED: CPT

## 2021-04-01 PROCEDURE — 36415 COLL VENOUS BLD VENIPUNCTURE: CPT

## 2021-04-01 RX ORDER — LEVETIRACETAM 500 MG/1
500 TABLET ORAL 2 TIMES DAILY
Qty: 60 TAB | Refills: 0 | Status: SHIPPED | OUTPATIENT
Start: 2021-04-01 | End: 2021-04-07

## 2021-04-01 RX ORDER — OXYCODONE AND ACETAMINOPHEN 5; 325 MG/1; MG/1
1 TABLET ORAL
Qty: 9 TAB | Refills: 0 | Status: SHIPPED | OUTPATIENT
Start: 2021-04-01 | End: 2021-04-04

## 2021-04-01 RX ADMIN — DOCUSATE SODIUM 100 MG: 100 CAPSULE, LIQUID FILLED ORAL at 09:25

## 2021-04-01 RX ADMIN — LISINOPRIL 40 MG: 20 TABLET ORAL at 09:25

## 2021-04-01 RX ADMIN — Medication 10 ML: at 07:37

## 2021-04-01 RX ADMIN — HYDRALAZINE HYDROCHLORIDE 100 MG: 50 TABLET, FILM COATED ORAL at 09:25

## 2021-04-01 RX ADMIN — LEVETIRACETAM 500 MG: 500 TABLET ORAL at 09:25

## 2021-04-01 RX ADMIN — AMLODIPINE BESYLATE 10 MG: 5 TABLET ORAL at 09:25

## 2021-04-01 RX ADMIN — ACETAMINOPHEN 650 MG: 325 TABLET ORAL at 03:16

## 2021-04-01 RX ADMIN — ATORVASTATIN CALCIUM 40 MG: 40 TABLET, FILM COATED ORAL at 09:25

## 2021-04-01 RX ADMIN — ACETAMINOPHEN 650 MG: 325 TABLET ORAL at 09:25

## 2021-04-01 NOTE — DISCHARGE INSTRUCTIONS
After Hospital Care Plan:  Discharge Instructions Craniot[facility]  [facility]    Patient Name: Toney Ramsey    Date of procedure: 3/29/2021  Date of discharge: 4/1/2021    Procedure: Procedure(s):  RIGHT FRONTO TEMPORAL CRANIOTOMY WITH CLIPPING OF MIDDLE CEREBRAL ARTERY  ANEURYSM WITH INTRAOPERATIVE ANGIOGRAM  PCP: Damaris Valerio, DO    Follow up appointments  Follow up with your neurosurgeon in 10-14 days. Call [phone] to make an appointment as soon as you get home from the hospital.  Follow-up care is a key part of your treatment and safety. Be sure to make and go to all appointments, and call your doctor if you are having problems. It's also a good idea to know your test results and keep a list of the medicines you take. A craniotomy is surgery to open your skull to fix a problem in your brain. It can be done for many reasons. For example, you may need a craniotomy if your brain or blood vessels are damaged or if you have a tumor or an infection in your brain. You will probably feel very tired for several weeks after surgery. You may also have headaches or problems concentrating. It can take 4 to 8 weeks to recover from surgery. Your cuts (incisions) may be sore for about 5 days after surgery. You may also have numbness and shooting pains near your wound, or swelling and bruising around your eyes. As your wound starts to heal, it may begin to itch. Medicines and ice packs can help with headaches, pain, swelling, and itching. The stitches that hold your incisions together may go away on their own or will be removed in 7 to 10 days. This depends on the type of stitches the doctor uses. Staples are usually removed in 10-14 days. It is common for your scalp to swell with fluid. After the swelling goes down, you may have a dent in your head. Some kinds of plates stay attached to hold the skull flap to your head.  If your head was shaved, you may wear clean hats or scarves on your head until your hair grows back. This care sheet gives you a general idea about how long it will take for you to recover. But each person recovers at a different pace. Follow the steps below to get better as quickly as possible. When to call your Neurosurgeon:   You have trouble thinking clearly.  You are sleeping more than you are awake.  You have a fever with a stiff neck or a severe headache.  You have any sudden vision changes.  Nausea or vomiting, severe headache.  Loss of bowel or bladder function, inability to urinate.  Increased weakness-greater than before your surgery.  Severe pain or pain not relieved by medications.  Signs of a blood clot in your leg-calf pain, tenderness, redness, swelling of lower leg.  Signs of infection-if your incision is red; continues to have drainage; drainage has a foul odor or if you have a persistent fever over 101 degrees for 24 hours.  You fall and hit your head. When to call your Primary Care Physician:   Concerns about medical conditions such as diabetes, high blood pressure, asthma, congestive heart failure.  Call if blood sugars are elevated, persistent headache or dizziness, coughing or congestion, constipation or diarrhea, burning with urination, abnormal heart rate. When to call 911 and go to the nearest emergency room:   Acute onset of chest pain, shortness of breath, difficulty breathing   You passed out (lost consciousness).  It is hard to think, move, speak, or see.  Your body is jerking or shaking. Activity   Rest when you feel tired. It is normal to want to sleep during the day. It is a good idea to plan to take a nap every day. Getting enough sleep will help you recover.  Try not to lie flat when you rest or sleep. You can use a wedge pillow, or you can put a rolled towel or foam padding under your pillow. You can also raise the head of your bed by putting bricks or wooden blocks under the bed legs.    After lying down, bring your head up slowly. This can prevent headaches or dizziness.  Try to walk each day. Start by walking a little more than you did the day before. Bit by bit, increase the amount you walk. Walking boosts blood flow and helps prevent pneumonia and constipation.  Avoid heavy lifting until your doctor says it is okay.  Do not drive for 2 to 3 weeks or until your doctor says it is okay. When you begin driving again, start with short, familiar routes in the daytime.  Ask your doctor if it is safe for you to travel by plane.  Avoid risky activities, such as climbing a ladder, for 3 months after surgery.  Avoid strenuous activities, such as bicycle riding, jogging, weight lifting, or aerobic exercise, for 3 months or until your doctor says it is okay.  Do not play any rough or contact sports for 3 months or until your doctor says it is okay.  You may engage in sexual activity. Diet   Resume usual diet; drink plenty of fluids; eat foods high in fiber   It is important to have regular bowel movements. Pain medications may cause constipation. You may want to take a stool softener (such as Senokot-S or Colace) to prevent constipation.  If constipation occurs, take a laxative (such as Dulcolax tablets, Milk of Magnesia, or a suppository). Laxatives should only be used if the above preventable measures have failed and you still have not had a bowel movement after three days   Try to avoid constipation and straining with bowel movements. Incision Care      You can wash your hair 7 days after your surgery. But do not soak your head or swim for 2 to 3 weeks.  Do not dye or color your hair for 4 weeks after your surgery.  Do not rub or apply any lotions or ointments to your incision site.  Do not scrub your wound    Medicines   If your doctor or nurse practitioner prescribed antibiotics, take them as directed. Do not stop taking them just because you feel better.  You need to take the full course of antibiotics.  If you get medicines to prevent seizures, take them exactly as directed.  If the doctor or nurse practitioner gave you a prescription medicine for pain, take it as prescribed.  If you are not taking a prescription pain medicine, ask your doctor or nurse practitioner if you can take an over-the-counter medicine.    Pain Medication Safety  DO:   Read the Medication Guide    Take your medicine exactly as prescribed    Store your medicine away from children and in a safe place    Call your healthcare provider for medical advice about side effects. You may report side effects to FDA at 4-313-FDA-0122.  Please be aware that many medications contain Tylenol. We do not want you to over medicate so please read the information below as a guide. Do not take more than 4 Grams of Tylenol in a 24 hour period if you are under age 79 or 3 Grams in 24 hours if you are over 79years old. (There are 1000 milligrams in one Gram)  o Percocet contains 325 mg of Tylenol per tablet (do not take more than 12 tablets in 24 hours)  o Lortab contains 500 mg of Tylenol per tablet (do not take more than 8 tablets in 24 hours)  o Norco contains 325 mg of Tylenol per tablet (do not take more than 12 tablets in 24 hours). DO NOT:   Do not give your medicine to others.  Do not take medicine unless it was prescribed for you.  Do not stop taking your medicine without talking to your healthcare provider.  Do not break, chew, crush, dissolve, or inject your medicine. If you cannot swallow your medicine whole, talk to your healthcare provider.  Do not drink alcohol while taking this medicine.  Do not take anti-inflammatory medications or aspirin unless instructed by your physician.          Discharge Instructions       PATIENT ID: Hoda Reynolds  MRN: [id number]   YOB: 1970    DATE OF ADMISSION: 3/29/2021  5:16 AM    DATE OF DISCHARGE: 4/1/2021    PRIMARY CARE PROVIDER: Carlos Avalos DO     ATTENDING PHYSICIAN: Danny Aranda MD  DISCHARGING PROVIDER: Kathie Euceda MD    To contact this individual call [phone] and ask the  to page. If unavailable ask to be transferred the Adult Hospitalist Department. DISCHARGE DIAGNOSES cerebral artery aneurysm     CONSULTATIONS: None    PROCEDURES/SURGERIES: Procedure(s):  RIGHT FRONTO TEMPORAL CRANIOTOMY WITH CLIPPING OF MIDDLE CEREBRAL ARTERY  ANEURYSM WITH INTRAOPERATIVE ANGIOGRAM    FOLLOW UP APPOINTMENTS:   Follow-up Information     Follow up With Specialties Details Why Contact Info    Rahat Milton MD Neurosurgery Schedule an appointment as soon as possible for a visit in 10 days For wound re-check [facility] [id number] [facility]      Carlos [place], [address] In 1 week  [place] [address] [address] [facility]  [phone]             ADDITIONAL CARE RECOMMENDATIONS:   Follow up with PCP in a week  Follow up with neurosurgery as scheduled    DIET: Regular Diet    ACTIVITY: Activity as tolerated      DISCHARGE MEDICATIONS:   See Medication Reconciliation Form    · It is important that you take the medication exactly as they are prescribed. · Keep your medication in the bottles provided by the pharmacist and keep a list of the medication names, dosages, and times to be taken in your wallet. · Do not take other medications without consulting your doctor. NOTIFY YOUR PHYSICIAN FOR ANY OF THE FOLLOWING:   Fever over 101 degrees for 24 hours. Chest pain, shortness of breath, fever, chills, nausea, vomiting, diarrhea, change in mentation, falling, weakness, bleeding. Severe pain or pain not relieved by medications. Or, any other signs or symptoms that you may have questions about.       DISPOSITION:    Home With:   OT  PT  HH  RN       SNF/Inpatient Rehab/LTAC    Independent/assisted living    Hospice    Other:     CDMP Checked:   Yes *** PROBLEM LIST Updated:  Yes ***       Information obtained by :   I understand that if any problems occur once I am at home I am to contact my physician. I understand and acknowledge receipt of the instructions indicated above.                                                                                                                                              Physician's or R.N.'s Signature                                                                  Date/Time                                                                                                                                              Patient or Representative Signature                                                          Date/Time          Signed:   Mark Pena MD  4/1/2021  1:08 PM

## 2021-04-01 NOTE — PROGRESS NOTES
Problem: Self Care Deficits Care Plan (Adult)  Goal: *Acute Goals and Plan of Care (Insert Text)  Description:   FUNCTIONAL STATUS PRIOR TO ADMISSION: Patient was independent and active without use of DME.     HOME SUPPORT: The patient lived with mother but did not require assist.    Occupational Therapy Goals  Initiated 3/30/2021  1. Patient will perform bathing with independence within 7 day(s). 2.  Patient will perform lower body dressing with independence within 7 day(s). 3.  Patient will perform toilet transfers with independence within 7 day(s). 4.  Patient will perform all aspects of toileting with independence within 7 day(s). 5.  Patient will participate in upper extremity therapeutic exercise/activities with independence for 5 minutes within 7 day(s). 6.  Patient will utilize energy conservation techniques during functional activities with verbal cues within 7 day(s). Outcome: Resolved/Met    OCCUPATIONAL THERAPY TREATMENT/DISCHARGE  Patient: Harshad Locke (48 y.o. female)  Date: 4/1/2021  Diagnosis: Cerebral aneurysm [I67.1] <principal problem not specified>  Procedure(s) (LRB):  RIGHT FRONTO TEMPORAL CRANIOTOMY WITH CLIPPING OF MIDDLE CEREBRAL ARTERY  ANEURYSM WITH INTRAOPERATIVE ANGIOGRAM (N/A) 3 Days Post-Op  Precautions: Fall  Chart, occupational therapy assessment, plan of care, and goals were reviewed. ASSESSMENT  Patient continues with skilled OT services and has progressed towards goals. Patient received in chair and able to get up IND, collect all items and complete upper and lower body dressing IND sitting in chair or standing next to it. Patient able to complete self feeding IND sitting in chair. Patient and mother have no concerns regarding safe ADL performance, all questions answered. Will sign off at this time - patient cleared to go home with assist from family.      Current Level of Function (ADLs/self-care): IND    Other factors to consider for discharge: none PLAN :  Rationale for discharge: Goals achieved  Recommend with staff: Recommend with nursing, ADLs with supervision/setup, once Egress Test completed then OOB to chair 3x/day and toileting via functional mobility to and from bathroom with supervision. Thank you for completing as able in order to maintain patient strength, endurance and independence. Recommendation for discharge: (in order for the patient to meet his/her long term goals)  No skilled occupational therapy/ follow up rehabilitation needs identified at this time. This discharge recommendation:  Has been made in collaboration with the attending provider and/or case management    IF patient discharges home will need the following DME:patient owns DME required for discharge       SUBJECTIVE:   Patient stated I feel great.     OBJECTIVE DATA SUMMARY:   Cognitive/Behavioral Status:  Neurologic State: Alert  Orientation Level: Oriented X4  Cognition: Appropriate for age attention/concentration; Follows commands  Perception: Appears intact  Perseveration: No perseveration noted  Safety/Judgement: Awareness of environment;Good awareness of safety precautions    Functional Mobility and Transfers for ADLs:  Bed Mobility:   NT this session    Transfers:  Sit to Stand: Independent    Balance:  Sitting: Intact  Standing: Intact  Standing - Static: Good  Standing - Dynamic : Good    ADL Intervention:  Feeding  Feeding Assistance: Independent  Container Management: Independent  Cutting Food: Independent  Utensil Management: Independent  Food to Mouth: Independent  Drink to Mouth: Independent    Upper Body Dressing Assistance  Bra: Independent  Pullover Shirt: Independent    Lower Body Dressing Assistance  Underpants: Independent  Pants With Elastic Waist: Independent  Socks:  Independent  Leg Crossed Method Used: No  Position Performed: Seated in chair;Standing  Cues: Don    Cognitive Retraining  Safety/Judgement: Awareness of environment;Good awareness of safety precautions    Pain:  No pain reported    Activity Tolerance:   Good and tolerates ADLs without rest breaks    After treatment patient left in no apparent distress:   Sitting in chair, Call bell within reach, Caregiver / family present, and RN aware    COMMUNICATION/COLLABORATION:   The patients plan of care was discussed with: Physical therapist and Registered nurse.      Marychuy Hernandez OT  Time Calculation: 13 mins

## 2021-04-01 NOTE — PROGRESS NOTES
Neurosurgery Progress Note  Jack Atiknson ACNP-BC          Admit Date: 3/29/2021   LOS: 3 days        Daily Progress Note: 2021    POD:3 Day Post-Op    S/P: Procedure(s):  RIGHT FRONTO TEMPORAL CRANIOTOMY WITH CLIPPING OF MIDDLE CEREBRAL ARTERY  ANEURYSM WITH INTRAOPERATIVE ANGIOGRAM    HPI: In 2020, the patient was admitted for left MCA aneurysm rupture and sAH treated with coiling. She had a prolonged hospital stay with embolic infarcts and trach placement and PEG tube placement. She has sufficiently recovered after a stay in inpatient rehab and is back to her independent baseline level with ADLs and mobility. She had a right MCA unruptured aneurysm that required treatment as well. After continued neurologic recovery, the patient was referred to Dr. Levi Rojas to discuss open surgical clipping of her right MCA aneurysm. After discussing risks, benefits, and alternatives of surgery, she decided to proceed. She underwent a right frontotemporal craniotomy with clipping if unruptured right MCA aneurysm on 21 with Dr. Jay Ramos:   Patient remains in good spirits. She is ready to go home today. Denies numbness, tingling, chest pain, leg pain, nausea, vomiting, difficulty swallowing, and dyspnea. Objective:     Vital signs  Temp (24hrs), Av.2 °F (36.8 °C), Min:97.8 °F (36.6 °C), Max:98.7 °F (37.1 °C)   No intake/output data recorded. No intake/output data recorded.     Visit Vitals  BP (!) 114/58 (BP 1 Location: Right arm, BP Patient Position: Sitting)   Pulse 74   Temp 98.1 °F (36.7 °C)   Resp 19   Ht 5' 2\" (1.575 m)   Wt 74.4 kg (164 lb 0.4 oz)   SpO2 100%   BMI 30.00 kg/m²    O2 Flow Rate (L/min): 2 l/min O2 Device: Room air     Pain control  Pain Assessment  Pain Scale 1: Numeric (0 - 10)  Pain Intensity 1: 0  Pain Location 1: Head  Pain Orientation 1: Anterior  Pain Description 1: Dull  Pain Intervention(s) 1: Medication (see MAR)    PT/OT  Gait Gait  Speed/Martha: Slow  Gait Abnormalities: Decreased step clearance, Trunk sway increased  Ambulation - Level of Assistance: Supervision  Distance (ft): 300 Feet (ft)  Assistive Device: Gait belt           Physical Exam:  Gen:NAD. Neuro: A&Ox3. Follows commands. Speech clear. Affect normal.  PERRL. EOMI. Face symmetric. Tongue midline. JOLLY. Strength 5/5 in UE and LE BL. Negative drift. Gait deferred. Skin: Right frontotemporal incision C/D/I with sutures in place. CT head without contrast on 03/30/21 shows postsurgical head with right MCA aneurysm clip. Subdural gas and fluid. No mass effect or hydrocephalus. No CT evidence of acute infarct. 24 hour results:    Recent Results (from the past 24 hour(s))   CBC W/O DIFF    Collection Time: 04/01/21  3:32 AM   Result Value Ref Range    WBC 7.4 3.6 - 11.0 K/uL    RBC 3.24 (L) 3.80 - 5.20 M/uL    HGB 9.0 (L) 11.5 - 16.0 g/dL    HCT 27.8 (L) 35.0 - 47.0 %    MCV 85.8 80.0 - 99.0 FL    MCH 27.8 26.0 - 34.0 PG    MCHC 32.4 30.0 - 36.5 g/dL    RDW 14.2 11.5 - 14.5 %    PLATELET 235 093 - 857 K/uL    MPV 9.9 8.9 - 12.9 FL    NRBC 0.0 0  WBC    ABSOLUTE NRBC 0.00 0.00 - 9.66 K/uL   METABOLIC PANEL, COMPREHENSIVE    Collection Time: 04/01/21  3:32 AM   Result Value Ref Range    Sodium 140 136 - 145 mmol/L    Potassium 3.6 3.5 - 5.1 mmol/L    Chloride 109 (H) 97 - 108 mmol/L    CO2 26 21 - 32 mmol/L    Anion gap 5 5 - 15 mmol/L    Glucose 74 65 - 100 mg/dL    BUN 17 6 - 20 MG/DL    Creatinine 0.63 0.55 - 1.02 MG/DL    BUN/Creatinine ratio 27 (H) 12 - 20      GFR est AA >60 >60 ml/min/1.73m2    GFR est non-AA >60 >60 ml/min/1.73m2    Calcium 8.4 (L) 8.5 - 10.1 MG/DL    Bilirubin, total 0.2 0.2 - 1.0 MG/DL    ALT (SGPT) 10 (L) 12 - 78 U/L    AST (SGOT) 10 (L) 15 - 37 U/L    Alk.  phosphatase 68 45 - 117 U/L    Protein, total 6.0 (L) 6.4 - 8.2 g/dL    Albumin 2.9 (L) 3.5 - 5.0 g/dL    Globulin 3.1 2.0 - 4.0 g/dL    A-G Ratio 0.9 (L) 1.1 - 2.2 Assessment:     Active Problems:    Cerebral aneurysm (3/29/2021)        Plan:   1. Right MCA aneurysm, unruptured   - s/p crani and clipping 03/29   - normalize and mobilize   - PT/OT   - neuro checks q4h   - cont keppra   - d/c to home. Stay on Noel Parody. F/U in office in 10-14 days  2.  HTN   - SBP<160   - cont norvasc, hydralazine, lisinopril from home   - Labetalol/hydralazine PRN  3. Leukocytosis   - WBC 11.5   - likely due to stress of surgery and steroids   - cont to monitor    Activity: up with assist  DVT ppx: SCDs  Dispo: home with family    Plan d/w Dr. Angelic Lion, [facility] nurse      Mera Espana NP

## 2021-04-01 NOTE — PROGRESS NOTES
Bedside and Verbal shift change report given to Luis Kc RN (oncoming nurse) by Joao Carmen RN (offgoing nurse). Report included the following information SBAR, Kardex, Intake/Output, MAR, Recent Results, Cardiac Rhythm NSR and Quality Measures.

## 2021-04-01 NOTE — PROGRESS NOTES
Problem: Falls - Risk of  Goal: *Absence of Falls  Description: Document Ryanparveen Mcintyre Fall Risk and appropriate interventions in the flowsheet. Outcome: Progressing Towards Goal  Note: Fall Risk Interventions:  Mobility Interventions: Communicate number of staff needed for ambulation/transfer, Strengthening exercises (ROM-active/passive)    Medication Interventions: Patient to call before getting OOB, Teach patient to arise slowly    Elimination Interventions: Call light in reach    History of Falls Interventions: Consult care management for discharge planning, Investigate reason for fall    Problem: Pressure Injury - Risk of  Goal: *Prevention of pressure injury  Description: Document Sriram Scale and appropriate interventions in the flowsheet.   Outcome: Progressing Towards Goal  Note: Pressure Injury Interventions:  Sensory Interventions: Keep linens dry and wrinkle-free, Maintain/enhance activity level, Minimize linen layers    Activity Interventions: Increase time out of bed, Pressure redistribution bed/mattress(bed type), PT/OT evaluation    Mobility Interventions: HOB 30 degrees or less, Pressure redistribution bed/mattress (bed type), PT/OT evaluation    Nutrition Interventions: Document food/fluid/supplement intake, Discuss nutritional consult with provider

## 2021-04-01 NOTE — DISCHARGE SUMMARY
Inpatient hospitalist discharge summary                Brief Overview    PATIENT ID: Adrian Hicks    MRN: [id number]     YOB: 1970    Admitting Provider: Cristóbal Lucia MD    Discharging Provider: Brianne Reno MD   To contact this individual call [phone] and ask the  to page. If unavailable ask to be transferred the Adult Hospitalist Department. PCP at discharge: Julia Tang   [id number] [facility] [address]    Admission date: 3/29/2021  Date of Discharge: 04/01/21    Chief complaint: No chief complaint on file. 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    Hypoxic ischemic encephalopathy (HIE), unspecified severity P91.60    Essential hypertension I10    History of anemia Z86.2    Carpal tunnel syndrome G56.00    Osteoarthritis M19.90    History of abnormal cervical Pap smear Z87.42    At high risk for falls Z91.81    Hypertriglyceridemia E78.1    History of subarachnoid hemorrhage - Left MCA aneurysm rupture 6/4/2020 Z86.79    Seizure ([address] R56.9    BMI 26.0-26.9,adult Z68.26    Hypokalemia E87.6    Cerebral aneurysm I67.1         Discharge diagnosis, hospital course/plan:  As per initial admission summary    Patient is a 80-year-old -American female with POD #1 status post right frontotemporal craniotomy with complex clipping of wide neck right MCA aneurysm with Intra-Op angiogram. Hospitalist service involved to assume care of patient as she is not being transferred out of the ICU to the neuro unit. Patient denied headache, blurred vision, speech difficulty, numbness or weakness of the extremities. Right middle cerebral artery aneurysm   -s/p craniotomy and clipping on 3/29  -on keppra,   -conscious and alert, well oriented, moves all extremities   -neurosurgery ok discharging patient  Patient to follow up with neurosurgery as scheduled. Updated patient and family. agreement with plan. Neurosurgery ok starting aspirin      HTN  -home emds      Leukocytosis resolved    On the date of discharge, diagnostic face to face encounter was performed. Patient was hemodynamically stable, offering no new complaints. Denies any shortness of breath at rest, no fevers or chills, no diarrhea or constipation. Patient is agreeable for discharge. Patient understood and verbalized the understanding of the discharge plan. Patient was advised to seek medical help/ care or return to ED, if symptoms recur, worsen or new symptoms develop. Discharge Disposition:  home  Discharge activity:  Activity as tolerated    Code status at discharge:  Full Code     Outpatient follow up:  Please follow up with your PCP in one week  In addition follow up with neurosurgery as schedule      Future appointments-  Future Appointments   Date Time Provider Dalton Nava   6/4/2021  8:00 AM Dustin Smith NP [facility] BS AMB     Follow-up Information     Follow up With Specialties Details Why Contact Info    Emely Burns MD Neurosurgery Schedule an appointment as soon as possible for a visit in 10 days For wound re-check [address] In 1 week  [address]  [phone]            Operative procedures performed:  Procedure(s):  RIGHT FRONTO TEMPORAL CRANIOTOMY WITH CLIPPING OF MIDDLE CEREBRAL ARTERY  ANEURYSM WITH INTRAOPERATIVE ANGIOGRAM    Consults: None    Procedures:   Procedure(s):  RIGHT FRONTO TEMPORAL CRANIOTOMY WITH CLIPPING OF MIDDLE CEREBRAL ARTERY  ANEURYSM WITH INTRAOPERATIVE ANGIOGRAM    Diet:  DIET REGULAR    Pertinent test results:  Xr Chest Pa Lat    Result Date: 3/17/2021  Indication:  PRE OP Exam: PA and lateral views of the chest. Direct comparison is made to prior CXR dated June 2020.  Findings: Cardiomediastinal silhouette is within normal limits. Lungs are clear bilaterally. Pleural spaces are normal. Osseous structures are intact. No acute cardiopulmonary disease. Ir Angio Carotid Crbrl Bi Intnl    Result Date: 3/30/2021  EXAMINATION: 1. Intraoperative diagnostic cerebral angiogram 2. Right common carotid artery, right internal carotid artery injections 3. Ultrasound-guided 5F right femoral artery access 4.  5F Mynx closure device     HISTORY: A 48year old female with history of SAH from ruptured left MCA aneurysm and an incidental finding of unruptured right MCA aneurysm now s/p clipping now presenting for an intraoperative cerebral angiogram. ATTENDING PHYSICIAN(S): Dr. Ellen Henry: General anesthesia DEVICES: 5 Swedish femoral sheath, ultrasound, isovue 300 -27, 5F Segal 2 glide diagnostic catheter, 0.035 180 cm glidewire, 5F Mynx closure device MEDICATIONS:; See Nursing/Anesthesia notes CONTRAST: Isovue 300, 30 mL ESTIMATED BLOOD LOSS: 5 mL COMPLICATIONS: None RADIATION DOSE:   Dose (plane A):  0.24 mGy CONSENT: Standard PROCEDURE:  A \"time out\" was performed to confirm the patient's identity and planned procedure. The procedure was performed using maximal sterile barrier technique, including surgical caps and masks, sterile gowns and gloves, a large sterile drape, hand hygiene, and 2% chlorhexidine scrub for cutaneous antisepsis. The patient was placed supine on the angiography table and then prepared and draped in the standard sterile fashion. General anesthesia was given. The right femoral head was localized by fluoroscopy and axis. Buffered 1% lidocaine was infiltrated into the subcutaneous soft tissues overlying the planned site of vascular access, and time was allotted for good anesthetic effect. The right femoral artery artery was punctured using a 21 gauge micropuncture needle and accessed with ultrasound guidance. Images are uploaded in the system.  A 5 [employer] micropuncture sheath was inserted and connected to a pressurized, regulated infusion of heparinized saline. FINDINGS: Right common carotid artery angiogram: Cervical view Under fluoroscopic guidance, the catheter was advanced into the right common carotid artery. Frontal and lateral projections demonstrate anterograde flow into the right common and internal carotid arteries and the right external carotid artery. There is also anterograde flow into branches of the right external carotid artery, including the superior thyroid, occipital, lingual, and facial arteries. There is no atheromatous disease or hemodynamically significant stenosis. Right internal carotid artery angiogram: Cranial view Under fluoroscopic guidance, the catheter was advanced into the right internal carotid artery. Frontal, lateral, and oblique projections demonstrate anterograde flow into the intracranial segments of the right internal carotid artery as well as the right middle and right anterior cerebral arteries. There is cross flow in the left PANCHITO territory via a patent anterior communicating artery. There is a patent posterior communicating artery. There is no atheromatous disease or hemodynamically significant stenosis. There is no evidence of residual right MCA aneurysm status post clipping. There is no evidence of vasculopathy or vasculitis. The dural venous sinuses are patent. The catheter was then removed. At the conclusion of the procedure, the sheath was removed and hemostasis achieved with a 5F Mynx closure device and manual compression. Diagnostic cerebral angiogram with no evidence of vasculitis, vasculopathy or arteriovenous malformations. There is no evidence of residual right MCA aneurysm status post clipping. Ct Head Wo Cont    Result Date: 3/30/2021  EXAM: CT HEAD WO CONT INDICATION: Surgical treatment of right MCA territory aneurysm. COMPARISON: CT head on 6/18/2020 TECHNIQUE: Noncontrast head CT. Coronal and sagittal reformats. CT dose reduction was achieved through the use of a standardized protocol tailored for this examination and automatic exposure control for dose modulation. FINDINGS: There is no hydrocephalus. Frontal-temporal craniotomy is new. A surgical clip in the region of the right MCA is new. Unchanged coil in the left MCA territory. Gas in the bilateral frontal subdural space is new. Shallow right frontal lobe subdural fluid collection is expected postsurgery. No significant mass effect. No CT evidence of acute infarct. The calvarium is postsurgical. Right scalp drain is new. The visualized paranasal sinuses and mastoid air cells are clear. Postsurgical head with right MCA aneurysm clip. Subdural gas and fluid. No mass effect or hydrocephalus. No CT evidence of acute infarct. Xr Chest Port    Result Date: 3/29/2021  history: Intracranial surgery COMPARISON: 3/17/2021 FINDINGS: Frontal chest radiograph submitted for review. Right central line extends to the distal SVC. Stable heart size. No acute pulmonary process. No pleural effusion or pneumothorax. No acute pulmonary process. Right central line       Recent Results (from the past 168 hour(s))   HCG URINE, QL. - POC    Collection Time: 03/29/21  6:30 AM   Result Value Ref Range    Pregnancy test,urine (POC) Negative NEG     RBC, ALLOCATE    Collection Time: 03/29/21  7:15 AM   Result Value Ref Range    HISTORY CHECKED?  Historical check performed    GLUCOSE, POC    Collection Time: 03/29/21  5:53 PM   Result Value Ref Range    Glucose (POC) 159 (H) 65 - 100 mg/dL    Performed by [facility], BASIC    Collection Time: 03/30/21  4:26 AM   Result Value Ref Range    Sodium 141 136 - 145 mmol/L    Potassium 3.9 3.5 - 5.1 mmol/L    Chloride 110 (H) 97 - 108 mmol/L    CO2 24 21 - 32 mmol/L    Anion gap 7 5 - 15 mmol/L    Glucose 136 (H) 65 - 100 mg/dL    BUN 10 6 - 20 MG/DL    Creatinine 0.73 0.55 - 1.02 MG/DL    BUN/Creatinine ratio 14 12 - 20 GFR est AA >60 >60 ml/min/1.73m2    GFR est non-AA >60 >60 ml/min/1.73m2    Calcium 8.4 (L) 8.5 - 10.1 MG/DL   CBC WITH AUTOMATED DIFF    Collection Time: 03/30/21  4:26 AM   Result Value Ref Range    WBC 11.5 (H) 3.6 - 11.0 K/uL    RBC 3.93 3.80 - 5.20 M/uL    HGB 10.8 (L) 11.5 - 16.0 g/dL    HCT 34.7 (L) 35.0 - 47.0 %    MCV 88.3 80.0 - 99.0 FL    MCH 27.5 26.0 - 34.0 PG    MCHC 31.1 30.0 - 36.5 g/dL    RDW 13.8 11.5 - 14.5 %    PLATELET 706 062 - 604 K/uL    MPV 10.0 8.9 - 12.9 FL    NRBC 0.0 0  WBC    ABSOLUTE NRBC 0.00 0.00 - 0.01 K/uL    NEUTROPHILS 86 (H) 32 - 75 %    BAND NEUTROPHILS 5 0 - 6 %    LYMPHOCYTES 6 (L) 12 - 49 %    MONOCYTES 3 (L) 5 - 13 %    EOSINOPHILS 0 0 - 7 %    BASOPHILS 0 0 - 1 %    IMMATURE GRANULOCYTES 0 %    ABS. NEUTROPHILS 10.5 (H) 1.8 - 8.0 K/UL    ABS. LYMPHOCYTES 0.7 (L) 0.8 - 3.5 K/UL    ABS. MONOCYTES 0.3 0.0 - 1.0 K/UL    ABS. EOSINOPHILS 0.0 0.0 - 0.4 K/UL    ABS. BASOPHILS 0.0 0.0 - 0.1 K/UL    ABS. IMM. GRANS. 0.0 K/UL    DF MANUAL      RBC COMMENTS NORMOCYTIC, NORMOCHROMIC     CBC WITH AUTOMATED DIFF    Collection Time: 03/31/21  2:34 AM   Result Value Ref Range    WBC 12.7 (H) 3.6 - 11.0 K/uL    RBC 3.47 (L) 3.80 - 5.20 M/uL    HGB 9.5 (L) 11.5 - 16.0 g/dL    HCT 30.0 (L) 35.0 - 47.0 %    MCV 86.5 80.0 - 99.0 FL    MCH 27.4 26.0 - 34.0 PG    MCHC 31.7 30.0 - 36.5 g/dL    RDW 14.2 11.5 - 14.5 %    PLATELET 987 125 - 993 K/uL    MPV 10.4 8.9 - 12.9 FL    NRBC 0.0 0  WBC    ABSOLUTE NRBC 0.00 0.00 - 0.01 K/uL    NEUTROPHILS 86 (H) 32 - 75 %    LYMPHOCYTES 6 (L) 12 - 49 %    MONOCYTES 7 5 - 13 %    EOSINOPHILS 0 0 - 7 %    BASOPHILS 0 0 - 1 %    IMMATURE GRANULOCYTES 1 (H) 0.0 - 0.5 %    ABS. NEUTROPHILS 10.9 (H) 1.8 - 8.0 K/UL    ABS. LYMPHOCYTES 0.8 0.8 - 3.5 K/UL    ABS. MONOCYTES 0.8 0.0 - 1.0 K/UL    ABS. EOSINOPHILS 0.0 0.0 - 0.4 K/UL    ABS. BASOPHILS 0.0 0.0 - 0.1 K/UL    ABS. IMM.  GRANS. 0.1 (H) 0.00 - 0.04 K/UL    DF AUTOMATED     SAMPLES BEING HELD Collection Time: 03/31/21  2:34 AM   Result Value Ref Range    SAMPLES BEING HELD 1PST     COMMENT        Add-on orders for these samples will be processed based on acceptable specimen integrity and analyte stability, which may vary by analyte. CBC W/O DIFF    Collection Time: 04/01/21  3:32 AM   Result Value Ref Range    WBC 7.4 3.6 - 11.0 K/uL    RBC 3.24 (L) 3.80 - 5.20 M/uL    HGB 9.0 (L) 11.5 - 16.0 g/dL    HCT 27.8 (L) 35.0 - 47.0 %    MCV 85.8 80.0 - 99.0 FL    MCH 27.8 26.0 - 34.0 PG    MCHC 32.4 30.0 - 36.5 g/dL    RDW 14.2 11.5 - 14.5 %    PLATELET 196 853 - 329 K/uL    MPV 9.9 8.9 - 12.9 FL    NRBC 0.0 0  WBC    ABSOLUTE NRBC 0.00 0.00 - 2.22 K/uL   METABOLIC PANEL, COMPREHENSIVE    Collection Time: 04/01/21  3:32 AM   Result Value Ref Range    Sodium 140 136 - 145 mmol/L    Potassium 3.6 3.5 - 5.1 mmol/L    Chloride 109 (H) 97 - 108 mmol/L    CO2 26 21 - 32 mmol/L    Anion gap 5 5 - 15 mmol/L    Glucose 74 65 - 100 mg/dL    BUN 17 6 - 20 MG/DL    Creatinine 0.63 0.55 - 1.02 MG/DL    BUN/Creatinine ratio 27 (H) 12 - 20      GFR est AA >60 >60 ml/min/1.73m2    GFR est non-AA >60 >60 ml/min/1.73m2    Calcium 8.4 (L) 8.5 - 10.1 MG/DL    Bilirubin, total 0.2 0.2 - 1.0 MG/DL    ALT (SGPT) 10 (L) 12 - 78 U/L    AST (SGOT) 10 (L) 15 - 37 U/L    Alk.  phosphatase 68 45 - 117 U/L    Protein, total 6.0 (L) 6.4 - 8.2 g/dL    Albumin 2.9 (L) 3.5 - 5.0 g/dL    Globulin 3.1 2.0 - 4.0 g/dL    A-G Ratio 0.9 (L) 1.1 - 2.2             Physical Exam on Discharge:    Discharge condition: good    Vital signs:   Patient Vitals for the past 12 hrs:   Temp Pulse Resp BP SpO2   04/01/21 0947 98.1 °F (36.7 °C) 74 19 (!) 114/58 100 %   04/01/21 0615 98.7 °F (37.1 °C) 62 16 118/73 98 %   04/01/21 0315 -- -- -- 129/76 --   04/01/21 0200 98.3 °F (36.8 °C) 63 17 129/76 99 %       Visit Vitals  BP (!) 114/58 (BP 1 Location: Right arm, BP Patient Position: Sitting)   Pulse 74   Temp 98.1 °F (36.7 °C)   Resp 19   Ht 5' 2\" (1.575 m)   Wt 74.4 kg (164 lb 0.4 oz)   SpO2 100%   BMI 30.00 kg/m²     General:  Alert, cooperative, no distress, appears stated age. Head:  Normocephalic, without obvious abnormality, atraumatic. Lungs:   Clear to auscultation bilaterally. Chest wall:  No tenderness or deformity. Heart:  Regular rate and rhythm, S1, S2 normal, no murmur, click, rub or gallop. Abdomen:   Soft, non-tender. Bowel sounds normal. No masses,  No organomegaly. Current Discharge Medication List      START taking these medications    Details   levETIRAcetam (KEPPRA) 500 mg tablet Take 1 Tab by mouth two (2) times a day. Qty: 60 Tab, Refills: 0      oxyCODONE-acetaminophen (PERCOCET) 5-325 mg per tablet Take 1 Tab by mouth every eight (8) hours as needed for Pain for up to 3 days. Max Daily Amount: 3 Tabs. Qty: 9 Tab, Refills: 0    Associated Diagnoses: Cerebral aneurysm         CONTINUE these medications which have NOT CHANGED    Details   linaCLOtide (Linzess) 72 mcg cap capsule Take 72 mcg by mouth daily. atorvastatin (LIPITOR) 40 mg tablet Take 1 Tab by mouth daily. Indications: excessive fat in the blood  Qty: 90 Tab, Refills: 3    Associated Diagnoses: Hypertriglyceridemia      hydrALAZINE (APRESOLINE) 100 mg tablet Take 1 Tab by mouth three (3) times daily. Qty: 270 Tab, Refills: 1    Associated Diagnoses: Essential hypertension      lisinopriL (PRINIVIL, ZESTRIL) 40 mg tablet TAKE 1 TABLET BY MOUTH EVERY DAY  Qty: 90 Tab, Refills: 1    Associated Diagnoses: Essential hypertension      amLODIPine (NORVASC) 10 mg tablet Take 1 Tab by mouth daily. Qty: 90 Tab, Refills: 3    Associated Diagnoses: Essential hypertension      Shower Chair XX jose david Pt w/ recent hx of ruptured cerebral aneurysm. At risk for falls in shower. Elderly mother is caretaker. Pt needs help w/ ADLs. Qty: 1 Each, Refills: 0    Associated Diagnoses:  At high risk for falls      Blood Pressure Test Kit-Large kit Check B/P 2 x day after pt has been calm for at least 5 minutes. Record B/P and notify office weekly of B/P results. Qty: 1 Kit, Refills: 0    Associated Diagnoses: Essential hypertension      aspirin 81 mg chewable tablet 1 Tab by Per NG tube route daily.   Qty: 30 Tab, Refills: 0               Total time spent on discharge planning, counseling and co-ordination of care:   35 minutes    Charli Esquivel MD  04/01/21  1:03 PM

## 2021-04-05 ENCOUNTER — APPOINTMENT (OUTPATIENT)
Dept: CT IMAGING | Age: 51
DRG: 057 | End: 2021-04-05
Attending: STUDENT IN AN ORGANIZED HEALTH CARE EDUCATION/TRAINING PROGRAM
Payer: COMMERCIAL

## 2021-04-05 ENCOUNTER — HOSPITAL ENCOUNTER (INPATIENT)
Age: 51
LOS: 1 days | Discharge: HOME | DRG: 057 | End: 2021-04-07
Attending: STUDENT IN AN ORGANIZED HEALTH CARE EDUCATION/TRAINING PROGRAM | Admitting: INTERNAL MEDICINE
Payer: COMMERCIAL

## 2021-04-05 DIAGNOSIS — R47.1 DYSARTHRIA: ICD-10-CM

## 2021-04-05 DIAGNOSIS — R29.810 FACIAL DROOP: Primary | ICD-10-CM

## 2021-04-05 DIAGNOSIS — I67.1 CEREBRAL ANEURYSM: ICD-10-CM

## 2021-04-05 DIAGNOSIS — R47.01 EXPRESSIVE APHASIA: ICD-10-CM

## 2021-04-05 DIAGNOSIS — R56.9 FOCAL SEIZURE (HCC): ICD-10-CM

## 2021-04-05 LAB
ALBUMIN SERPL-MCNC: 3.5 G/DL (ref 3.5–5)
ALBUMIN/GLOB SERPL: 0.9 {RATIO} (ref 1.1–2.2)
ALP SERPL-CCNC: 71 U/L (ref 45–117)
ALT SERPL-CCNC: 12 U/L (ref 12–78)
ANION GAP SERPL CALC-SCNC: 3 MMOL/L (ref 5–15)
AST SERPL-CCNC: 7 U/L (ref 15–37)
BASOPHILS # BLD: 0 K/UL (ref 0–0.1)
BASOPHILS NFR BLD: 0 % (ref 0–1)
BILIRUB SERPL-MCNC: 0.3 MG/DL (ref 0.2–1)
BUN SERPL-MCNC: 12 MG/DL (ref 6–20)
BUN/CREAT SERPL: 16 (ref 12–20)
CALCIUM SERPL-MCNC: 9.2 MG/DL (ref 8.5–10.1)
CHLORIDE SERPL-SCNC: 105 MMOL/L (ref 97–108)
CO2 SERPL-SCNC: 28 MMOL/L (ref 21–32)
COMMENT, HOLDF: NORMAL
CREAT SERPL-MCNC: 0.75 MG/DL (ref 0.55–1.02)
DIFFERENTIAL METHOD BLD: ABNORMAL
EOSINOPHIL # BLD: 0.1 K/UL (ref 0–0.4)
EOSINOPHIL NFR BLD: 1 % (ref 0–7)
ERYTHROCYTE [DISTWIDTH] IN BLOOD BY AUTOMATED COUNT: 13.6 % (ref 11.5–14.5)
GLOBULIN SER CALC-MCNC: 3.7 G/DL (ref 2–4)
GLUCOSE BLD STRIP.AUTO-MCNC: 118 MG/DL (ref 65–100)
GLUCOSE SERPL-MCNC: 115 MG/DL (ref 65–100)
HCT VFR BLD AUTO: 32.6 % (ref 35–47)
HGB BLD-MCNC: 10.3 G/DL (ref 11.5–16)
IMM GRANULOCYTES # BLD AUTO: 0.1 K/UL (ref 0–0.04)
IMM GRANULOCYTES NFR BLD AUTO: 1 % (ref 0–0.5)
INR PPP: 1 (ref 0.9–1.1)
LYMPHOCYTES # BLD: 1.6 K/UL (ref 0.8–3.5)
LYMPHOCYTES NFR BLD: 26 % (ref 12–49)
MCH RBC QN AUTO: 27.5 PG (ref 26–34)
MCHC RBC AUTO-ENTMCNC: 31.6 G/DL (ref 30–36.5)
MCV RBC AUTO: 87.2 FL (ref 80–99)
MONOCYTES # BLD: 0.5 K/UL (ref 0–1)
MONOCYTES NFR BLD: 9 % (ref 5–13)
NEUTS SEG # BLD: 3.8 K/UL (ref 1.8–8)
NEUTS SEG NFR BLD: 63 % (ref 32–75)
NRBC # BLD: 0 K/UL (ref 0–0.01)
NRBC BLD-RTO: 0 PER 100 WBC
PLATELET # BLD AUTO: 282 K/UL (ref 150–400)
PMV BLD AUTO: 9.8 FL (ref 8.9–12.9)
POTASSIUM SERPL-SCNC: 3.4 MMOL/L (ref 3.5–5.1)
PROT SERPL-MCNC: 7.2 G/DL (ref 6.4–8.2)
PROTHROMBIN TIME: 10.1 SEC (ref 9–11.1)
RBC # BLD AUTO: 3.74 M/UL (ref 3.8–5.2)
SAMPLES BEING HELD,HOLD: NORMAL
SERVICE CMNT-IMP: ABNORMAL
SODIUM SERPL-SCNC: 136 MMOL/L (ref 136–145)
TROPONIN I SERPL-MCNC: <0.05 NG/ML
WBC # BLD AUTO: 6 K/UL (ref 3.6–11)

## 2021-04-05 PROCEDURE — 84484 ASSAY OF TROPONIN QUANT: CPT

## 2021-04-05 PROCEDURE — 0042T CT CODE NEURO PERF W CBF: CPT

## 2021-04-05 PROCEDURE — 94762 N-INVAS EAR/PLS OXIMTRY CONT: CPT

## 2021-04-05 PROCEDURE — 74011000636 HC RX REV CODE- 636: Performed by: RADIOLOGY

## 2021-04-05 PROCEDURE — 70496 CT ANGIOGRAPHY HEAD: CPT

## 2021-04-05 PROCEDURE — 85610 PROTHROMBIN TIME: CPT

## 2021-04-05 PROCEDURE — 82962 GLUCOSE BLOOD TEST: CPT

## 2021-04-05 PROCEDURE — 70450 CT HEAD/BRAIN W/O DYE: CPT

## 2021-04-05 PROCEDURE — 36415 COLL VENOUS BLD VENIPUNCTURE: CPT

## 2021-04-05 PROCEDURE — 99285 EMERGENCY DEPT VISIT HI MDM: CPT

## 2021-04-05 PROCEDURE — APPNB45 APP NON BILLABLE 31-45 MINUTES: Performed by: NURSE PRACTITIONER

## 2021-04-05 PROCEDURE — 85025 COMPLETE CBC W/AUTO DIFF WBC: CPT

## 2021-04-05 PROCEDURE — 80053 COMPREHEN METABOLIC PANEL: CPT

## 2021-04-05 RX ORDER — ONDANSETRON 2 MG/ML
4 INJECTION INTRAMUSCULAR; INTRAVENOUS
Status: COMPLETED | OUTPATIENT
Start: 2021-04-05 | End: 2021-04-06

## 2021-04-05 RX ORDER — LABETALOL HYDROCHLORIDE 5 MG/ML
10 INJECTION, SOLUTION INTRAVENOUS
Status: COMPLETED | OUTPATIENT
Start: 2021-04-05 | End: 2021-04-06

## 2021-04-05 RX ADMIN — IOPAMIDOL 40 ML: 755 INJECTION, SOLUTION INTRAVENOUS at 23:50

## 2021-04-05 RX ADMIN — IOPAMIDOL 100 ML: 755 INJECTION, SOLUTION INTRAVENOUS at 22:53

## 2021-04-05 RX ADMIN — IOPAMIDOL 20 ML: 755 INJECTION, SOLUTION INTRAVENOUS at 21:22

## 2021-04-06 ENCOUNTER — APPOINTMENT (OUTPATIENT)
Dept: MRI IMAGING | Age: 51
DRG: 057 | End: 2021-04-06
Attending: INTERNAL MEDICINE
Payer: COMMERCIAL

## 2021-04-06 PROBLEM — I63.9 ACUTE CVA (CEREBROVASCULAR ACCIDENT) (HCC): Status: ACTIVE | Noted: 2021-04-06

## 2021-04-06 PROBLEM — R56.9 FOCAL SEIZURE (HCC): Status: ACTIVE | Noted: 2021-04-06

## 2021-04-06 LAB
ALBUMIN SERPL-MCNC: 3.2 G/DL (ref 3.5–5)
ALBUMIN/GLOB SERPL: 0.9 {RATIO} (ref 1.1–2.2)
ALP SERPL-CCNC: 65 U/L (ref 45–117)
ALT SERPL-CCNC: 12 U/L (ref 12–78)
ANION GAP SERPL CALC-SCNC: 5 MMOL/L (ref 5–15)
AST SERPL-CCNC: 11 U/L (ref 15–37)
ATRIAL RATE: 59 BPM
BASOPHILS # BLD: 0 K/UL (ref 0–0.1)
BASOPHILS NFR BLD: 0 % (ref 0–1)
BILIRUB SERPL-MCNC: 0.3 MG/DL (ref 0.2–1)
BUN SERPL-MCNC: 8 MG/DL (ref 6–20)
BUN/CREAT SERPL: 12 (ref 12–20)
CALCIUM SERPL-MCNC: 8.6 MG/DL (ref 8.5–10.1)
CALCULATED P AXIS, ECG09: 30 DEGREES
CALCULATED R AXIS, ECG10: -12 DEGREES
CALCULATED T AXIS, ECG11: 29 DEGREES
CHLORIDE SERPL-SCNC: 104 MMOL/L (ref 97–108)
CHOLEST SERPL-MCNC: 101 MG/DL
CO2 SERPL-SCNC: 28 MMOL/L (ref 21–32)
CREAT SERPL-MCNC: 0.66 MG/DL (ref 0.55–1.02)
CRP SERPL-MCNC: 1.84 MG/DL (ref 0–0.6)
DIAGNOSIS, 93000: NORMAL
DIFFERENTIAL METHOD BLD: ABNORMAL
EOSINOPHIL # BLD: 0.1 K/UL (ref 0–0.4)
EOSINOPHIL NFR BLD: 1 % (ref 0–7)
ERYTHROCYTE [DISTWIDTH] IN BLOOD BY AUTOMATED COUNT: 13.8 % (ref 11.5–14.5)
EST. AVERAGE GLUCOSE BLD GHB EST-MCNC: 114 MG/DL
FERRITIN SERPL-MCNC: 53 NG/ML (ref 26–388)
FOLATE SERPL-MCNC: 11.8 NG/ML (ref 5–21)
GLOBULIN SER CALC-MCNC: 3.7 G/DL (ref 2–4)
GLUCOSE SERPL-MCNC: 80 MG/DL (ref 65–100)
HBA1C MFR BLD: 5.6 % (ref 4–5.6)
HCT VFR BLD AUTO: 31.5 % (ref 35–47)
HDLC SERPL-MCNC: 80 MG/DL
HDLC SERPL: 1.3 {RATIO} (ref 0–5)
HGB BLD-MCNC: 10 G/DL (ref 11.5–16)
IMM GRANULOCYTES # BLD AUTO: 0 K/UL (ref 0–0.04)
IMM GRANULOCYTES NFR BLD AUTO: 1 % (ref 0–0.5)
IRON SATN MFR SERPL: 20 % (ref 20–50)
IRON SERPL-MCNC: 52 UG/DL (ref 35–150)
LDLC SERPL CALC-MCNC: 6.2 MG/DL (ref 0–100)
LIPID PROFILE,FLP: NORMAL
LYMPHOCYTES # BLD: 1.5 K/UL (ref 0.8–3.5)
LYMPHOCYTES NFR BLD: 28 % (ref 12–49)
MAGNESIUM SERPL-MCNC: 2.3 MG/DL (ref 1.6–2.4)
MCH RBC QN AUTO: 27.5 PG (ref 26–34)
MCHC RBC AUTO-ENTMCNC: 31.7 G/DL (ref 30–36.5)
MCV RBC AUTO: 86.5 FL (ref 80–99)
MONOCYTES # BLD: 0.6 K/UL (ref 0–1)
MONOCYTES NFR BLD: 11 % (ref 5–13)
NEUTS SEG # BLD: 3 K/UL (ref 1.8–8)
NEUTS SEG NFR BLD: 59 % (ref 32–75)
NRBC # BLD: 0 K/UL (ref 0–0.01)
NRBC BLD-RTO: 0 PER 100 WBC
P-R INTERVAL, ECG05: 172 MS
PHOSPHATE SERPL-MCNC: 3.5 MG/DL (ref 2.6–4.7)
PLATELET # BLD AUTO: 315 K/UL (ref 150–400)
PMV BLD AUTO: 10.6 FL (ref 8.9–12.9)
POTASSIUM SERPL-SCNC: 4.1 MMOL/L (ref 3.5–5.1)
PROT SERPL-MCNC: 6.9 G/DL (ref 6.4–8.2)
Q-T INTERVAL, ECG07: 436 MS
QRS DURATION, ECG06: 88 MS
QTC CALCULATION (BEZET), ECG08: 431 MS
RBC # BLD AUTO: 3.64 M/UL (ref 3.8–5.2)
SODIUM SERPL-SCNC: 137 MMOL/L (ref 136–145)
TIBC SERPL-MCNC: 265 UG/DL (ref 250–450)
TRIGL SERPL-MCNC: 74 MG/DL (ref ?–150)
TROPONIN I SERPL-MCNC: <0.05 NG/ML
TROPONIN I SERPL-MCNC: <0.05 NG/ML
TSH SERPL DL<=0.05 MIU/L-ACNC: 2.47 UIU/ML (ref 0.36–3.74)
VENTRICULAR RATE, ECG03: 59 BPM
VIT B12 SERPL-MCNC: 513 PG/ML (ref 193–986)
VLDLC SERPL CALC-MCNC: 14.8 MG/DL
WBC # BLD AUTO: 5.2 K/UL (ref 3.6–11)

## 2021-04-06 PROCEDURE — 82607 VITAMIN B-12: CPT

## 2021-04-06 PROCEDURE — 74011000250 HC RX REV CODE- 250: Performed by: STUDENT IN AN ORGANIZED HEALTH CARE EDUCATION/TRAINING PROGRAM

## 2021-04-06 PROCEDURE — 74011250637 HC RX REV CODE- 250/637: Performed by: NURSE PRACTITIONER

## 2021-04-06 PROCEDURE — 84100 ASSAY OF PHOSPHORUS: CPT

## 2021-04-06 PROCEDURE — 70551 MRI BRAIN STEM W/O DYE: CPT

## 2021-04-06 PROCEDURE — 96375 TX/PRO/DX INJ NEW DRUG ADDON: CPT

## 2021-04-06 PROCEDURE — 80053 COMPREHEN METABOLIC PANEL: CPT

## 2021-04-06 PROCEDURE — 65660000000 HC RM CCU STEPDOWN

## 2021-04-06 PROCEDURE — 77030020365 HC SOL INJ SOD CL 0.9% 50ML

## 2021-04-06 PROCEDURE — 99223 1ST HOSP IP/OBS HIGH 75: CPT | Performed by: PSYCHIATRY & NEUROLOGY

## 2021-04-06 PROCEDURE — 82746 ASSAY OF FOLIC ACID SERUM: CPT

## 2021-04-06 PROCEDURE — 83550 IRON BINDING TEST: CPT

## 2021-04-06 PROCEDURE — 96374 THER/PROPH/DIAG INJ IV PUSH: CPT

## 2021-04-06 PROCEDURE — 93005 ELECTROCARDIOGRAM TRACING: CPT

## 2021-04-06 PROCEDURE — 95819 EEG AWAKE AND ASLEEP: CPT | Performed by: PSYCHIATRY & NEUROLOGY

## 2021-04-06 PROCEDURE — 77030038269 HC DRN EXT URIN PURWCK BARD -A

## 2021-04-06 PROCEDURE — 36415 COLL VENOUS BLD VENIPUNCTURE: CPT

## 2021-04-06 PROCEDURE — 74011250637 HC RX REV CODE- 250/637: Performed by: INTERNAL MEDICINE

## 2021-04-06 PROCEDURE — 85025 COMPLETE CBC W/AUTO DIFF WBC: CPT

## 2021-04-06 PROCEDURE — 74011250636 HC RX REV CODE- 250/636: Performed by: STUDENT IN AN ORGANIZED HEALTH CARE EDUCATION/TRAINING PROGRAM

## 2021-04-06 PROCEDURE — 82728 ASSAY OF FERRITIN: CPT

## 2021-04-06 PROCEDURE — 65270000029 HC RM PRIVATE

## 2021-04-06 PROCEDURE — 02HV33Z INSERTION OF INFUSION DEVICE INTO SUPERIOR VENA CAVA, PERCUTANEOUS APPROACH: ICD-10-PCS | Performed by: PSYCHIATRY & NEUROLOGY

## 2021-04-06 PROCEDURE — 97535 SELF CARE MNGMENT TRAINING: CPT

## 2021-04-06 PROCEDURE — 83735 ASSAY OF MAGNESIUM: CPT

## 2021-04-06 PROCEDURE — C1751 CATH, INF, PER/CENT/MIDLINE: HCPCS

## 2021-04-06 PROCEDURE — 84484 ASSAY OF TROPONIN QUANT: CPT

## 2021-04-06 PROCEDURE — 86140 C-REACTIVE PROTEIN: CPT

## 2021-04-06 PROCEDURE — 36573 INSJ PICC RS&I 5 YR+: CPT | Performed by: INTERNAL MEDICINE

## 2021-04-06 PROCEDURE — 97530 THERAPEUTIC ACTIVITIES: CPT

## 2021-04-06 PROCEDURE — 97165 OT EVAL LOW COMPLEX 30 MIN: CPT

## 2021-04-06 PROCEDURE — 97161 PT EVAL LOW COMPLEX 20 MIN: CPT

## 2021-04-06 PROCEDURE — 99218 HC RM OBSERVATION: CPT

## 2021-04-06 PROCEDURE — 95816 EEG AWAKE AND DROWSY: CPT | Performed by: NURSE PRACTITIONER

## 2021-04-06 PROCEDURE — 84443 ASSAY THYROID STIM HORMONE: CPT

## 2021-04-06 PROCEDURE — 83036 HEMOGLOBIN GLYCOSYLATED A1C: CPT

## 2021-04-06 PROCEDURE — 76937 US GUIDE VASCULAR ACCESS: CPT

## 2021-04-06 PROCEDURE — 74011250636 HC RX REV CODE- 250/636: Performed by: INTERNAL MEDICINE

## 2021-04-06 PROCEDURE — 80061 LIPID PANEL: CPT

## 2021-04-06 RX ORDER — OXYCODONE AND ACETAMINOPHEN 5; 325 MG/1; MG/1
1 TABLET ORAL
Status: DISCONTINUED | OUTPATIENT
Start: 2021-04-06 | End: 2021-04-07 | Stop reason: HOSPADM

## 2021-04-06 RX ORDER — GUAIFENESIN 100 MG/5ML
81 LIQUID (ML) ORAL DAILY
Status: DISCONTINUED | OUTPATIENT
Start: 2021-04-06 | End: 2021-04-07 | Stop reason: HOSPADM

## 2021-04-06 RX ORDER — HYDRALAZINE HYDROCHLORIDE 20 MG/ML
10 INJECTION INTRAMUSCULAR; INTRAVENOUS
Status: DISCONTINUED | OUTPATIENT
Start: 2021-04-06 | End: 2021-04-07 | Stop reason: HOSPADM

## 2021-04-06 RX ORDER — ACETAMINOPHEN 325 MG/1
650 TABLET ORAL
Status: DISCONTINUED | OUTPATIENT
Start: 2021-04-06 | End: 2021-04-07 | Stop reason: HOSPADM

## 2021-04-06 RX ORDER — ONDANSETRON 2 MG/ML
4 INJECTION INTRAMUSCULAR; INTRAVENOUS
Status: DISCONTINUED | OUTPATIENT
Start: 2021-04-06 | End: 2021-04-07 | Stop reason: HOSPADM

## 2021-04-06 RX ORDER — LEVETIRACETAM 500 MG/1
1000 TABLET ORAL 2 TIMES DAILY
Status: DISCONTINUED | OUTPATIENT
Start: 2021-04-06 | End: 2021-04-07 | Stop reason: HOSPADM

## 2021-04-06 RX ORDER — ACETAMINOPHEN 650 MG/1
650 SUPPOSITORY RECTAL
Status: DISCONTINUED | OUTPATIENT
Start: 2021-04-06 | End: 2021-04-07 | Stop reason: HOSPADM

## 2021-04-06 RX ORDER — SODIUM CHLORIDE, SODIUM LACTATE, POTASSIUM CHLORIDE, CALCIUM CHLORIDE 600; 310; 30; 20 MG/100ML; MG/100ML; MG/100ML; MG/100ML
75 INJECTION, SOLUTION INTRAVENOUS CONTINUOUS
Status: DISCONTINUED | OUTPATIENT
Start: 2021-04-06 | End: 2021-04-07 | Stop reason: HOSPADM

## 2021-04-06 RX ORDER — ATORVASTATIN CALCIUM 40 MG/1
40 TABLET, FILM COATED ORAL DAILY
Status: DISCONTINUED | OUTPATIENT
Start: 2021-04-06 | End: 2021-04-07 | Stop reason: HOSPADM

## 2021-04-06 RX ORDER — AMLODIPINE BESYLATE 5 MG/1
10 TABLET ORAL DAILY
Status: DISCONTINUED | OUTPATIENT
Start: 2021-04-06 | End: 2021-04-07 | Stop reason: HOSPADM

## 2021-04-06 RX ORDER — HYDRALAZINE HYDROCHLORIDE 50 MG/1
100 TABLET, FILM COATED ORAL 3 TIMES DAILY
Status: DISCONTINUED | OUTPATIENT
Start: 2021-04-06 | End: 2021-04-07 | Stop reason: HOSPADM

## 2021-04-06 RX ORDER — LEVETIRACETAM 500 MG/1
500 TABLET ORAL 2 TIMES DAILY
Status: DISCONTINUED | OUTPATIENT
Start: 2021-04-06 | End: 2021-04-06

## 2021-04-06 RX ORDER — LISINOPRIL 20 MG/1
40 TABLET ORAL DAILY
Status: DISCONTINUED | OUTPATIENT
Start: 2021-04-06 | End: 2021-04-07 | Stop reason: HOSPADM

## 2021-04-06 RX ORDER — BISACODYL 5 MG
5 TABLET, DELAYED RELEASE (ENTERIC COATED) ORAL DAILY PRN
Status: DISCONTINUED | OUTPATIENT
Start: 2021-04-06 | End: 2021-04-07 | Stop reason: HOSPADM

## 2021-04-06 RX ADMIN — LABETALOL HYDROCHLORIDE 10 MG: 5 INJECTION INTRAVENOUS at 00:13

## 2021-04-06 RX ADMIN — HYDRALAZINE HYDROCHLORIDE 100 MG: 50 TABLET, FILM COATED ORAL at 17:00

## 2021-04-06 RX ADMIN — AMLODIPINE BESYLATE 10 MG: 5 TABLET ORAL at 10:52

## 2021-04-06 RX ADMIN — LEVETIRACETAM 1000 MG: 500 TABLET ORAL at 17:57

## 2021-04-06 RX ADMIN — LISINOPRIL 40 MG: 20 TABLET ORAL at 10:52

## 2021-04-06 RX ADMIN — HYDRALAZINE HYDROCHLORIDE 100 MG: 50 TABLET, FILM COATED ORAL at 21:34

## 2021-04-06 RX ADMIN — ASPIRIN 81 MG: 81 TABLET, CHEWABLE ORAL at 10:52

## 2021-04-06 RX ADMIN — ONDANSETRON 4 MG: 2 INJECTION INTRAMUSCULAR; INTRAVENOUS at 00:13

## 2021-04-06 RX ADMIN — SODIUM CHLORIDE, POTASSIUM CHLORIDE, SODIUM LACTATE AND CALCIUM CHLORIDE 75 ML/HR: 600; 310; 30; 20 INJECTION, SOLUTION INTRAVENOUS at 10:53

## 2021-04-06 RX ADMIN — HYDRALAZINE HYDROCHLORIDE 100 MG: 50 TABLET, FILM COATED ORAL at 10:52

## 2021-04-06 RX ADMIN — ATORVASTATIN CALCIUM 40 MG: 40 TABLET, FILM COATED ORAL at 10:53

## 2021-04-06 NOTE — ED NOTES
Able to draw labs off venipuncture from R AC, unable to float IV attempts in R or L AC. Labs sent to lab.

## 2021-04-06 NOTE — PROGRESS NOTES
Problem: Self Care Deficits Care Plan (Adult)  Goal: *Acute Goals and Plan of Care (Insert Text)  Description: FUNCTIONAL STATUS PRIOR TO ADMISSION: Patient with recent admission 3/29 - 4/1 for R MCA aneurysm, now s/p craniotomy and clipping. Patient discharged home with family, has been staying with her mom. Patient largely able to complete BADLs, however mom and aunt (mom works during the day) provide supervision - SBA for safety and are available to assist if needed. HOME SUPPORT: The patient lives with her mom and a teenage son. Her aunt comes over during the day while her mom is at work. Occupational Therapy Goals  Initiated 4/6/2021   1. Patient will perform container management during self-feeding / grooming ADLs with supervision within 7 day(s). 2.  Patient will perform upper body dressing and lower body dressing with supervision/set-up within 7 day(s). 3.  Patient will perform toilet transfers with supervision/set-up within 7 day(s). 4.  Patient will perform all aspects of toileting with supervision/set-up within 7 day(s). 5.  Patient will participate in upper extremity therapeutic exercise/activities with supervision/set-up within 7 day(s). 6.  Patient will utilize fall prevention techniques during functional activities with minimal cues within 7 day(s). Outcome: Progressing Towards Goal   OCCUPATIONAL THERAPY EVALUATION  Patient: Lucas Metcafl (48 y.o. female)  Date: 4/6/2021  Primary Diagnosis: Acute CVA (cerebrovascular accident) [facility]) [I63.9]        Precautions:  Fall    ASSESSMENT  Based on the objective data described below, the patient presents with fine motor deficits, generalized weakness, decreased higher level balance and impaired cognition, to include decreased attention, command-following, and motor planning. Patient admitted for CVA work-up s/p mom observed L facial droop and slurred speech at home (now resolved) - patient imaging thus far negative for acute infarct. Patient received in bed, agreeable to session. Patient reporting she believes her speech is back to baseline - while obtaining PLOF, note occasional difficulty with word-finding and several incidents of garbled speech. Patient with fair standing balance and overall ambulating with CGA. However, increased difficulty observed with fine motor coordination and motor planning with mod-max cues required for appropriate command following during coordination and opposition testing. Note patient may benefit from further evaluation of cognition and higher level balance in context of ADL participation next session once in conducive setting (this date limited by ED room set-up and short IV). Pending progress in acute setting, anticipate HH vs no follow-up OT needs. Current Level of Function Impacting Discharge (ADLs/self-care): CGA - min A (mild fluctuations in cognition)    Functional Outcome Measure: The patient scored 60/100 on the Barthel Index. Other factors to consider for discharge: recent baseline? Patient will benefit from skilled therapy intervention to address the above noted impairments. PLAN :  Recommendations and Planned Interventions: self care training, functional mobility training, therapeutic exercise, balance training, therapeutic activities, cognitive retraining, endurance activities, neuromuscular re-education, patient education, and home safety training    Frequency/Duration: Patient will be followed by occupational therapy 3 times a week to address goals. Recommendation for discharge: (in order for the patient to meet his/her long term goals)  To be determined: anticipate HH vs none pending progress    This discharge recommendation:  Has not yet been discussed the attending provider and/or case management    IF patient discharges home will need the following DME: TBD       SUBJECTIVE:   Patient stated my mom didn't know what I was talking about.  Prior to admission, patient with episode of unintelligible speech, however patient unaware. OBJECTIVE DATA SUMMARY:   HISTORY:   Past Medical History:   Diagnosis Date    Aneurysm ([facility] 06/2020    Asthma     Cerebral artery occlusion with cerebral infarction ([facility] 06/2020    Cerebral infarction (HCC)     Dysphagia     HTN (hypertension)     Joint pain     Leg swelling     Seizures ([facility] 06/2020    Snoring     SOB (shortness of breath)     Vision decreased      Past Surgical History:   Procedure Laterality Date    HX CHOLECYSTECTOMY      HX GASTROSTOMY      IR INSERT GASTROSTOMY TUBE [facility]  6/19/2020    NEUROLOGICAL PROCEDURE UNLISTED      Coil Embolization    MN ABDOMEN SURGERY PROC UNLISTED      has peg tube AND REMOVED     Expanded or extensive additional review of patient history:     Home Situation  Home Environment: Private residence  # Steps to Enter: 3  Rails to Enter: Yes  One/Two Story Residence: Two story, live on 1st floor  Living Alone: No  Support Systems: Family member(s)(mother and aunt)  Tub or Shower Type: Tub/Shower combination    Hand dominance: Right    EXAMINATION OF PERFORMANCE DEFICITS:  Cognitive/Behavioral Status:  Neurologic State: Alert  Orientation Level: Oriented X4  Cognition: Decreased attention/concentration;Decreased command following  Perception: Appears intact  Perseveration: No perseveration noted  Safety/Judgement: Awareness of environment; Fall prevention;Decreased insight into deficits    Hearing:   No deficits noted. Vision/Perceptual:    Acuity: (impaired vision at baseline, glasses at home)       Range of Motion:  AROM: Within functional limits  PROM: Within functional limits    Strength:  Strength: Generally decreased, functional    Coordination:  Coordination: Generally decreased, functional  Fine Motor Skills-Upper: Left Impaired;Right Impaired    Gross Motor Skills-Upper: Left Intact; Right Intact    Tone & Sensation:  Tone: Normal  Sensation: Intact    Balance:  Sitting: Impaired  Sitting - Static: Good (unsupported)  Sitting - Dynamic: Good (unsupported); Fair (occasional)  Standing: Impaired  Standing - Static: Good  Standing - Dynamic : Good;Fair    Functional Mobility and Transfers for ADLs:  Bed Mobility:  Scooting: Contact guard assistance    Transfers:  Sit to Stand: Contact guard assistance  Stand to Sit: Contact guard assistance  Bed to Chair: Contact guard assistance  Bathroom Mobility: Contact guard assistance  Toilet Transfer : Contact guard assistance(inferred 2/2 mobility, balance)    ADL Assessment:  Feeding: Minimum assistance    Oral Facial Hygiene/Grooming: Minimum assistance    Upper Body Dressing: Minimum assistance    Lower Body Dressing: Contact guard assistance    Toileting: Contact guard assistance    ADL Intervention and task modifications:  Lower Body Dressing Assistance  Dressing Assistance: Stand-by assistance  Socks: Stand-by assistance  Position Performed: Long sitting on bed  Cues: Verbal cues provided    Cognitive Retraining  Safety/Judgement: Awareness of environment; Fall prevention;Decreased insight into deficits    Functional Measure:    Barthel Index:    Bathin  Bladder: 10  Bowels: 10  Groomin  Dressin  Feedin  Mobility: 10  Stairs: 5  Toilet Use: 5  Transfer (Bed to Chair and Back): 10  Total: 60/100        The Barthel ADL Index: Guidelines  1. The index should be used as a record of what a patient does, not as a record of what a patient could do. 2. The main aim is to establish degree of independence from any help, physical or verbal, however minor and for whatever reason. 3. The need for supervision renders the patient not independent. 4. A patient's performance should be established using the best available evidence. Asking the patient, friends/relatives and nurses are the usual sources, but direct observation and common sense are also important. However direct testing is not needed.   5. Usually the patient's performance over the preceding 24-48 hours is important, but occasionally longer periods will be relevant. 6. Middle categories imply that the patient supplies over 50 per cent of the effort. 7. Use of aids to be independent is allowed. Antoinette Rojas., Barthel, D.W. (0811). Functional evaluation: the Barthel Index. [address] [facility] (14)2. Harman Deion lory JASON Paniagua, Sarah Goodman., Suad Suggs, [place], [facility] (1999). Measuring the change indisability after inpatient rehabilitation; comparison of the responsiveness of the Barthel Index and Functional [place] Measure. Journal of Neurology, Neurosurgery, and Psychiatry, 66(4), 532-287. Kofi Barnard, N.J.A, BARBIE Molina, & Josephine Cardenas M.A. (2004.) Assessment of post-stroke quality of life in cost-effectiveness studies: The usefulness of the Barthel Index and the EuroQoL-5D. Quality of Life Research, 13, 427-43     Fugl-Akers Assessment of Motor Recovery after Stroke:   Deferred 2/2 patient with significant difficulty following fine motor coordination commands, max cues needed for simple opposition assessment. Occupational Therapy Evaluation Charge Determination   History Examination Decision-Making   LOW Complexity : Brief history review  MEDIUM Complexity : 3-5 performance deficits relating to physical, cognitive , or psychosocial skils that result in activity limitations and / or participation restrictions MEDIUM Complexity : Patient may present with comorbidities that affect occupational performnce. Miniml to moderate modification of tasks or assistance (eg, physical or verbal ) with assesment(s) is necessary to enable patient to complete evaluation       Based on the above components, the patient evaluation is determined to be of the following complexity level: LOW   Pain Rating:  No reports.     Activity Tolerance:   Fair    After treatment patient left in no apparent distress:    Supine in bed, Call bell within reach, Caregiver / family present, and Side rails x 3    COMMUNICATION/EDUCATION:   The patients plan of care was discussed with: Physical therapist and Registered nurse. Patient and/or family was verbally educated on the BE FAST acronym for signs/symptoms of CVA and TIA. All questions answered with patient indicating fair-good understanding. Home safety education was provided and the patient/caregiver indicated understanding., Patient/family have participated as able in goal setting and plan of care. , and Patient/family agree to work toward stated goals and plan of care. This patients plan of care is appropriate for delegation to [facility].     Thank you for this referral.  Ashli Perez OT  Time Calculation: 29 mins

## 2021-04-06 NOTE — PROCEDURES
PROCEDURE: ROUTINE INPATIENT EEG  NAME:   Milena Brownlee NUMBER : [de-identified]  MRN:   [id number]  DATE OF SERVICE: 4/6/2021     HISTORY/INDICATION:  Pt is a 49 yo female with complex recent neurological history over the last year, with left MCA ruptured aneurysm with SAH and IVH with left MCA CVA in June, had coiling of right MCA aneurysm in March, today had acute left face droop and dysarthria, then vomited, probable ANITA. EEG to assess for underlying seizures.      MEDICATIONS:   Current Facility-Administered Medications   Medication Dose Route Frequency Provider Last Rate Last Admin    amLODIPine (NORVASC) tablet 10 mg  10 mg Oral DAILY Mohini Vogel MD   10 mg at 04/06/21 1052    atorvastatin (LIPITOR) tablet 40 mg  40 mg Oral DAILY Mohini Vogel MD   40 mg at 04/06/21 1053    hydrALAZINE (APRESOLINE) tablet 100 mg  100 mg Oral TID Mohini Broussard MD   100 mg at 04/06/21 1052    lisinopriL (PRINIVIL, ZESTRIL) tablet 40 mg  40 mg Oral DAILY Mohini Vogel MD   40 mg at 04/06/21 1052    oxyCODONE-acetaminophen (PERCOCET) 5-325 mg per tablet 1 Tab  1 Tab Oral Q6H PRN Miguel Angel Foley MD        aspirin chewable tablet 81 mg  81 mg Per NG tube DAILY Mohini Vogel MD   81 mg at 04/06/21 1052    acetaminophen (TYLENOL) tablet 650 mg  650 mg Oral Q4H PRN Mohini Vogel MD        Or    acetaminophen (TYLENOL) solution 650 mg  650 mg Per NG tube Q4H PRN Mohini Vogel MD        Or    acetaminophen (TYLENOL) suppository 650 mg  650 mg Rectal Q4H PRN Mohini Vogel MD        ondansetron (ZOFRAN) injection 4 mg  4 mg IntraVENous Q6H PRN Mohini Vogel MD        bisacodyL (DULCOLAX) tablet 5 mg  5 mg Oral DAILY PRN Mohini Vogel MD        hydrALAZINE (APRESOLINE) 20 mg/mL injection 10 mg  10 mg IntraVENous Q6H PRN Mohini Vogel MD        lactated Ringers infusion  75 mL/hr IntraVENous CONTINUOUS Mohini Vogel MD 75 mL/hr at 04/06/21 1053 75 mL/hr at 04/06/21 1053    levETIRAcetam (KEPPRA) tablet 1,000 mg  1,000 mg Oral BID Mary Carmen Herrera NP         Current Outpatient Medications   Medication Sig Dispense Refill    levETIRAcetam (KEPPRA) 500 mg tablet Take 1 Tab by mouth two (2) times a day. 60 Tab 0    linaCLOtide (Linzess) 72 mcg cap capsule Take 72 mcg by mouth daily.  atorvastatin (LIPITOR) 40 mg tablet Take 1 Tab by mouth daily. Indications: excessive fat in the blood 90 Tab 3    hydrALAZINE (APRESOLINE) 100 mg tablet Take 1 Tab by mouth three (3) times daily. 270 Tab 1    lisinopriL (PRINIVIL, ZESTRIL) 40 mg tablet TAKE 1 TABLET BY MOUTH EVERY DAY 90 Tab 1    amLODIPine (NORVASC) 10 mg tablet Take 1 Tab by mouth daily. 80 Tab 3    Shower Chair XX jose david Pt w/ recent hx of ruptured cerebral aneurysm. At risk for falls in shower. Elderly mother is caretaker. Pt needs help w/ ADLs. 1 Each 0    Blood Pressure Test Kit-Large kit Check B/P 2 x day after pt has been calm for at least 5 minutes. Record B/P and notify office weekly of B/P results. 1 Kit 0    aspirin 81 mg chewable tablet 1 Tab by Per NG tube route daily. 30 Tab 0       CONDITIONS OF RECORDING: This is a routine 21-channel EEG recording performed in accordance with the international 10-20 system with one channel devoted to limited EKG. This study was done during states of wakefulness and sleep. Photic stimulation was performed as an activating procedure. DESCRIPTION:   Upon maximal arousal the posterior dominant rhythm has a frequency of 9.5Hz with an amplitude of 40uV. This activity is symmetric over the bilateral posterior derivations and attenuates with eye opening. Photic stimulation did not significantly alter the tracing. Normal sleep architecture is seen with stage II sleep recognized by the presence of symmetric vertex waves and sleep spindles. There are no focal abnormalities, epileptiform discharges, or electrographic seizures seen.      INTERPRETATION: Normal awake and asleep EEG    CLINICAL CORRELATION: A normal EEG does not definitively exclude a diagnosis of epilepsy.     Blayne Iraheta MD

## 2021-04-06 NOTE — H&P
[address] [facility]  HISTORY AND PHYSICAL    Name:  Alex Cutler  MR#:  [id number]  :  1970  ACCOUNT #:  [de-identified]  ADMIT DATE:  2021      The patient was seen, evaluated and admitted by me on 2021. PRIMARY CARE PHYSICIAN:  Dr. Abraham Galarza INFORMATION:  The patient and review of ED and old electronic medical records. CHIEF COMPLAINT:  Slurred speech and left facial droop. HISTORY OF PRESENT ILLNESS:  This is a 25-year-old woman with past medical history significant for hypertension; dyslipidemia; seizure disorder; cerebral aneurysm, status post craniotomy with clipping, who was in her usual state of health until the day of her presentation at the emergency room when family member noted that the patient has left facial droop as well as slurred speech. The patient also complained of vomiting. The patient was brought to the emergency room for evaluation of these symptoms. When the patient arrived at the emergency room, code stroke was called. CT of the head was performed. CTA of the head and neck was also performed which did not show any acute pathology. The emergency room physician also consulted neurosurgeon on-call who advised admission to the hospital.  The patient was recently admitted to this hospital from 2021 to 2021. The patient was admitted with right MCA aneurysm and underwent craniotomy with clipping. The patient was discharged to home following the procedure. She was doing relatively well at home until when the patient developed this sudden onset of slurred speech and left facial droop. The patient denies fever, rigors, or chills. No sick contact or contact with any person with COVID-19 virus infection. PAST MEDICAL HISTORY:  1. Hypertension. 2.  Dyslipidemia. 3.  Seizure disorder. 4.  Cerebral aneurysm, status post craniotomy with clipping. ALLERGIES:  NO KNOWN DRUG ALLERGIES.     MEDICATIONS:  1.  Norvasc 10 mg daily. 2.  Aspirin 81 mg daily. 3.  Lipitor 40 mg daily. 4.  Hydralazine 100 mg 3 times daily. 5.  Keppra 500 mg twice daily. 6.  Linzess 72 mcg daily. 7.  Lisinopril 40 mg daily. 8.  Percocet 5/325 one tablet every 8 hours as needed for pain. FAMILY HISTORY:  This was reviewed. Her mother had hypertension and father had lung cancer. PAST SURGICAL HISTORY:  This is significant for:  1. Cholecystectomy. 2.  Craniotomy with clipping of aneurysm. 3.  PEG tube placement and removal.    SOCIAL HISTORY:  No history of alcohol or tobacco abuse. REVIEW OF SYSTEMS:  HEAD, EYES, EARS, NOSE, AND THROAT:  No headache, no dizziness, no blurring of vision, no photophobia. RESPIRATORY SYSTEM:  No cough, no shortness of breath, no hemoptysis. CARDIOVASCULAR SYSTEM:  No chest pain, no orthopnea, no palpitations. GASTROINTESTINAL SYSTEM:  This is positive for nausea or vomiting. No diarrhea, no constipation. GENITOURINARY SYSTEM:  No dysuria, no urgency, no frequency. All other systems are reviewed and they are negative. PHYSICAL EXAMINATION:  GENERAL APPEARANCE:  The patient appeared ill, in moderate distress. VITAL SIGNS:  On arrival at the emergency room; temperature 97.6, pulse 62, respiratory rate 18, blood pressure 156/80, oxygen saturation 99% on room air. HEENT:  Head:  Normocephalic. Surgical scars are noted right of the scalp with intact suture. Eyes:  Normal eye movement. No redness, no drainage, no discharge. Ears:  Normal external ears with no evidence of drainage. Nose:  No deformity, no drainage. Mouth and Throat:  No visible oral lesion. NECK:  Neck is supple. No JVD, no thyromegaly. CHEST:  Clear breath sounds. No wheezing, no crackles. HEART:  Normal S1 and S2, regular. No clinically appreciable murmur. ABDOMEN:  Soft, nontender. Normal bowel sounds. CNS:  Alert and oriented x3. No gross focal neurological deficit. EXTREMITIES:  No edema.   Pulses 2+ bilaterally. MUSCULOSKELETAL SYSTEM:  No obvious joint deformity or swelling. SKIN:  Surgical wound right side of the scalp with intact sutures are noted and present on admission. PSYCHIATRY:  Normal mood and affect. LYMPHATIC SYSTEM:  No cervical lymphadenopathy. DIAGNOSTIC DATA:  EKG shows sinus bradycardia. No significant ST or T-waves abnormalities. CT of the head without contrast shows postsurgical changes including intracranial pneumocephalus and small volume extra-axial fluid. CTA of the head and neck, no significant abnormalities. LABORATORY DATA:  Hematology:  WBC 6.0, hemoglobin 10.3, hematocrit 32.6, platelets 886. Coagulation profile:  INR 1.0, PT 10.1. Cardiac profile:  Troponin level less than 0.05. Chemistry:  Sodium 136, potassium 3.4, chloride 105, CO2 of 28, glucose 115, BUN 12, creatinine 0.75, calcium 9.2, total bilirubin 0.3, ALT 12, AST 7, alkaline phosphatase 71, total protein 7.2, albumin level 3.5, globulin 3.7. ASSESSMENT:  1. Suspected acute cerebrovascular accident. 2.  Anemia. 3.  Hypertension. 4.  Dyslipidemia. 5.  Seizure disorder. PLAN:  1. Suspected acute CVA:  We will admit the patient for further evaluation and treatment. We will obtain MRI of the brain and echocardiogram.  We will check lipid profile. We will also check hemoglobin A1c level. Inpatient Neurology consult will be requested to assist in further evaluation and treatment. We will also await further recommendation from the neurosurgeon who was consulted because the patient recently had craniotomy with clipping done for cerebral aneurysm. 2.  Anemia: This is most likely due to chronic disease. We will carry out anemia workup including checking a stool guaiac to rule out occult GI bleed. 3.  Hypertension: We will resume preadmission medication and monitor the patient's blood pressure closely.   The patient will also be placed on hydralazine as needed to keep the systolic blood pressure below 160 as advised by the neurosurgeon. 4.  Dyslipidemia: We will continue with Lipitor. We will check a lipid profile. 5.  Seizure disorder. We will continue with Keppra. The patient will be placed on seizure precaution. OTHER ISSUES:  Code status: The patient is a full code. We will request SCD for DVT prophylaxis. FUNCTIONAL STATUS PRIOR TO ADMISSION:  The patient came from home. The patient is ambulatory with no assistant or device. COVID PRECAUTION:  The patient was wearing a face mask. I was wearing a face mask and gloves for this patient's encounter.         Alicia Arechiga MD      [id number]  D:  04/06/2021 6:45  T:  04/06/2021 8:30  JOB #:  [id number]  CC:  Dr. Kendra Thomas

## 2021-04-06 NOTE — ROUTINE PROCESS
TRANSFER - OUT REPORT:    Verbal report given to Lubna Lr RN(name) on Edmund Elias  being transferred to [facility](unit) for routine progression of care       Report consisted of patients Situation, Background, Assessment and   Recommendations(SBAR). Information from the following report(s) SBAR, ED Summary, [facility] and Recent Results was reviewed with the receiving nurse. Lines:   PICC Triple Lumen 64/21/71 Right;Basilic (Active)   Central Line Being Utilized Yes 04/06/21 1050   Criteria for Appropriate Use Limited/no vessel suitable for conventional peripheral access 04/06/21 1050   Site Assessment Clean, dry, & intact 04/06/21 1050   Phlebitis Assessment 0 04/06/21 1050   Infiltration Assessment 0 04/06/21 1050   Arm Circumference (cm) 38 cm 04/06/21 1050   Date of Last Dressing Change 04/06/21 04/06/21 1050   Dressing Status Clean, dry, & intact 04/06/21 1050   External Catheter Length (cm) 1 centimeters 04/06/21 1050   Dressing Type Transparent 04/06/21 1050   Hub Color/Line Status White 04/06/21 1050   Positive Blood Return (Site #1) Yes 04/06/21 1050   Hub Color/Line Status Pink 04/06/21 1050   Positive Blood Return (Site #2) Yes 04/06/21 1050   Hub Color/Line Status Blue 04/06/21 1050   Positive Blood Return (Site #3) Yes 04/06/21 1050   Alcohol Cap Used Yes 04/06/21 1050        Opportunity for questions and clarification was provided.       Patient transported with:  Registered nurse

## 2021-04-06 NOTE — ED NOTES
Pt to CT with primary nurse. CT states contrast was not seen on scan and that the line infiltrated. MD made aware.

## 2021-04-06 NOTE — ED NOTES
Two US attempts made by this nurse to start PIV, unsuccessful. Dr. Deja Riley at bedside to start US PIV. 20G PIV started R AC, which she flushed and dressed.  Pt going to CT with primary nurse at this time, per immediate request by Dr. Deja Riley

## 2021-04-06 NOTE — ED NOTES
Patient ultrasounded for lab draw. Patient tolerated well. Patient up to chair while stretcher changed in hospital bed. Gait steady. Patient provided with new linen and wipes to wipe down. Patient back to bed and remains on monitor. Call bell within reach.

## 2021-04-06 NOTE — PROGRESS NOTES
Problem: Mobility Impaired (Adult and Pediatric)  Goal: *Acute Goals and Plan of Care (Insert Text)  Description: FUNCTIONAL STATUS PRIOR TO ADMISSION: Patient was independent and active without use of DME.    HOME SUPPORT PRIOR TO ADMISSION: The patient lived with her mom with her mom and aunt available to assist.    Physical Therapy Goals  Initiated 4/6/2021  1. Patient will transfer from bed to chair and chair to bed with modified independence using the least restrictive device within 7 day(s). 2.  Patient will perform sit to stand with modified independence within 7 day(s). 3.  Patient will ambulate with independence for 300 feet within 7 day(s). 4.  Patient will ascend/descend  3 stairs with  handrail(s) with modified independence within 7 day(s). Outcome: Not Met     PHYSICAL THERAPY EVALUATION  Patient: Aminata Jaffe (48 y.o. female)  Date: 4/6/2021  Primary Diagnosis: Acute CVA (cerebrovascular accident) [facility]) [I63.9]        Precautions:  Fall    ASSESSMENT  Based on the objective data described below, the patient presents functioning near her baseline mobility s/p admission for CVA work up. 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 She presented to the ED w/ sudden onset left facial droop, dysarthria, vomiting. MRI shows \"No evidence of acute infarct\". Patient endorses improvement in her symptoms since admission. Her mother arrived and notes improvement in patient as well. Functionally, patient is at overall CGA for transfers and ambulation. She ambulated without a device with no overt loss of balance or difficulty with integration of head movements. Strength appears functional. There is mild incoordination and occasional word finding diffiuclties. Therapy will follow for higher level balance assessment and training as indicated and gait.   Anticipate no DC therapy DC. Current Level of Function Impacting Discharge (mobility/balance): CGA    Functional Outcome Measure: The patient scored 22/28 on the Tinetti outcome measure which is indicative of moderate fall risk. Other factors to consider for discharge: Supportive family available to assist as needed     Patient will benefit from skilled therapy intervention to address the above noted impairments. PLAN :  Recommendations and Planned Interventions: transfer training, gait training, therapeutic exercises, neuromuscular re-education, patient and family training/education, and therapeutic activities      Frequency/Duration: Patient will be followed by physical therapy:  3 times a week to address goals. Recommendation for discharge: (in order for the patient to meet his/her long term goals)  To be determined: Anticipate no skilled physical therapy/ follow up rehabilitation needs. This discharge recommendation:  Has not yet been discussed the attending provider and/or case management    IF patient discharges home will need the following DME: none anticipated         SUBJECTIVE:   Patient stated My mom said my face was drooping.     OBJECTIVE DATA SUMMARY:   HISTORY:    Past Medical History:   Diagnosis Date    Aneurysm ([facility] [address] 06/2020    Asthma     Cerebral artery occlusion with cerebral infarction ([facility] [address] 06/2020    Cerebral infarction (HCC)     Dysphagia     HTN (hypertension)     Joint pain     Leg swelling     Seizures ([facility] [address] 06/2020    Snoring     SOB (shortness of breath)     Vision decreased      Past Surgical History:   Procedure Laterality Date    HX CHOLECYSTECTOMY      HX GASTROSTOMY      IR INSERT GASTROSTOMY TUBE [facility]  6/19/2020    NEUROLOGICAL PROCEDURE UNLISTED      Coil Embolization    MO ABDOMEN SURGERY PROC UNLISTED      has peg tube AND REMOVED       Personal factors and/or comorbidities impacting plan of care: PMH    Home Situation  Home Environment: Private residence  # Steps to Enter: 3  Rails to Enter: Yes  One/Two Story Residence: Two story, live on 1st floor  Living Alone: No  Support Systems: Family member(s)(mother and aunt)  Tub or Shower Type: Tub/Shower combination    EXAMINATION/PRESENTATION/DECISION MAKING:   Critical Behavior:  Neurologic State: Alert  Orientation Level: Oriented X4  Cognition: Decreased attention/concentration, Decreased command following  Safety/Judgement: Awareness of environment, Fall prevention, Decreased insight into deficits  Hearing:  Intact    Range Of Motion:  AROM: Within functional limits           PROM: Within functional limits           Strength:    Strength: Generally decreased, functional                    Tone & Sensation:   Tone: Normal              Sensation: Intact               Coordination:  Coordination: Generally decreased, functional  Heel to shin with mild incoordination bilaterally (?? Understanding of task vs actual incoordination)  Vision:   Acuity: (impaired vision at baseline, glasses at home)  Functional Mobility:  Bed Mobility:  Supine to Sit: Contact guard assistance  Sit to Supine: Contact guard assistance  Scooting: Contact guard assistance  Transfers:  Sit to Stand: Contact guard assistance  Stand to Sit: Contact guard assistance  Bed to Chair: Contact guard assistance    Balance:   Sitting: Impaired  Sitting - Static: Good (unsupported)  Sitting - Dynamic: Good (unsupported); Fair (occasional)  Standing: Impaired  Standing - Static: Good  Standing - Dynamic : Good;Fair  Ambulation/Gait Training:  Distance (ft): 125 Feet (ft)  Assistive Device: Gait belt  Ambulation - Level of Assistance: Contact guard assistance     Gait Description (WDL): Exceptions to WDL  Gait Abnormalities: Decreased step clearance        Base of Support: Widened                       Functional Measure:  Tinetti test:    Sitting Balance: 1  Arises: 1  Attempts to Rise: 2  Immediate Standing Balance: 2  Standing Balance: 1  Nudged: 2  Eyes Closed: 1(inferred)  Turn 360 Degrees - Continuous/Discontinuous: 1  Turn 360 Degrees - Steady/Unsteady: 1  Sitting Down: 1  Balance Score: 13 Balance total score  Indication of Gait: 1  R Step Length/Height: 1  L Step Length/Height: 1  R Foot Clearance: 1  L Foot Clearance: 1  Step Symmetry: 1  Step Continuity: 1  Path: 1  Trunk: 1  Walking Time: 0  Gait Score: 9 Gait total score  Total Score: 22/28 Overall total score         Tinetti Tool Score Risk of Falls  <19 = High Fall Risk  19-24 = Moderate Fall Risk  25-28 = Low Fall Risk  Tinetti ME. Performance-Oriented Assessment of Mobility Problems in Elderly Patients. [place] 66; [id number]. (Scoring Description: PT Bulletin Feb. 10, 1993)    Older adults: Theresa Iglesias et al, 2009; n = 1000 [facility] elderly evaluated with ABC, ORLANDO, ADL, and IADL)  · Mean ORLANDO score for males aged 69-68 years = 26.21(3.40)  · Mean ORLANDO score for females age 69-68 years = 25.16(4.30)  · Mean ORLANDO score for males over 80 years = 23.29(6.02)  · Mean ORLANDO score for females over 80 years = 17.20(8.32)            Physical Therapy Evaluation Charge Determination   History Examination Presentation Decision-Making   MEDIUM  Complexity : 1-2 comorbidities / personal factors will impact the outcome/ POC  MEDIUM Complexity : 3 Standardized tests and measures addressing body structure, function, activity limitation and / or participation in recreation  MEDIUM Complexity : Evolving with changing characteristics  Other outcome measures Tinetti 22/28        Based on the above components, the patient evaluation is determined to be of the following complexity level: MEDIUM    Pain Rating:  None voiced    Activity Tolerance:   Good and Fair    After treatment patient left in no apparent distress:   Supine in bed, Call bell within reach, Caregiver / family present, and Side rails x 3    COMMUNICATION/EDUCATION:   The patients plan of care was discussed with: Occupational therapist and Registered nurse. Fall prevention education was provided and the patient/caregiver indicated understanding., Patient/family have participated as able in goal setting and plan of care. , and Patient/family agree to work toward stated goals and plan of care.     Thank you for this referral.  Lakshmi Centeno, PT   Time Calculation: 28 mins

## 2021-04-06 NOTE — ED TRIAGE NOTES
TRIAGE NOTE:  Patient arrives accompanied by family member, with c/o vomiting in which patient was unaware of, slurred speech and facial droop. Droop noted to left side of mouth. Patient recental had craniotomy 3/29. Symptoms began 1 hour PTA.

## 2021-04-06 NOTE — CONSULTS
Neuro consult completed. Dictated note to follow. Suspected breakthrough seizure. Exam with mild language deficit and right  weakness. Keppra has been increased to 1000mg bid. EEG ordered.  Likely d/c in AM.

## 2021-04-06 NOTE — PROGRESS NOTES
PICC Placement Note    PRE-PROCEDURE VERIFICATION  Correct Procedure: yes  Correct Site:  yes  Temperature: Temp: 97.6 °F (36.4 °C), Temperature Source: Temp Source: Oral  Recent Labs     04/06/21  0826 04/05/21  2127   BUN 8 12   CREA 0.66 0.75    282   INR  --  1.0   WBC 5.2 6.0     Allergies: Patient has no known allergies. Education materials for PICC Care given: yes. See Patient Education activity for further details. PICC Booklet placed at bedside: yes    PROCEDURE DETAIL  Consent was obtained and all questions were answered related to risks and benefits. A triple lumen PICC line was inserted, as a sterile procedure using ultrasound and modified Seldinger technique for desire for reliable access. The following documentation is in addition to the PICC properties in the lines/airways flowsheet :  Lot #: [id number]  Lidocaine 1% administered intradermally :yes  Internal Catheter Total Length: 34 (cm) 1 cm out  Vein Selection for PICC:right basilic  Central Line Bundle followed yes  Complication Related to Insertion:no    The placement was verified by EKG, MAX P WAVE @ 34 (cm). PER EKG PICC TIP @ C/A junction.      Line is okay to use: yes    Keira Klein RN

## 2021-04-06 NOTE — PROGRESS NOTES
SLP Contact Note    Update: MRI shows no acute infarct. Will sign-off. Consult received and appreciated. Patient chart reviewed. CT negative for acute infarct and pt passed STAND. Will hold for MRI results and will complete evaluation if indicated per MRI.     Thank you,  TEVIN GormanEd, [id number] [facility]  Speech-Language Pathologist

## 2021-04-06 NOTE — ED NOTES
Bedside and Verbal shift change report given to Dannielle Ramos RN (oncoming nurse) by Lisette Morgan RN (offgoing nurse). Report included the following information SBAR, ED Summary, MAR and Recent Results.

## 2021-04-06 NOTE — PROGRESS NOTES
TRANSFER - IN REPORT:    Verbal report received from ED, RN(name) on Mirian Freedman  being received from ED (unit) for routine progression of care      Report consisted of patients Situation, Background, Assessment and   Recommendations(SBAR). Information from the following report(s) SBAR, Kardex, Intake/Output, MAR and Recent Results was reviewed with the receiving nurse. Opportunity for questions and clarification was provided. Assessment completed upon patients arrival to unit and care assumed.

## 2021-04-06 NOTE — PROGRESS NOTES
TRANSITIONS OF CARE PLAN:   1. RUR: 13%  2. DESTINATION: TBD  3. TRANSPORT: TBD - potentially BLS vs mother  4. NEEDS FOR DISCHARGE: TBD  5. ANTICIPATED FOLLOW UPS: PCP, Neurosurgery, Neuro  6. ONGOING INPATIENT NEEDS: Neuro Consult, Neurosurgery Consult, PT/OT/SLP Evals, Possible GI Consult, Brain MRI    Anticipated Discharge is: Likely Greater than 48 Hours    Reason for Readmission:   Acute CVA           RUR Score/Risk Level:     13%; Low    PCP: First and Last name:  Dr. Dill Neither   Name of Practice:    Are you a current patient: Yes/No: Yes   Approximate date of last visit: 1 month - next appt set for 4/8   Can you participate in a virtual visit with your PCP: no    Is a Care Conference indicated: No at this time      Did you attend your follow up appointment (s): If not, why not: No - patient readmitted too quickly         Resources/supports as identified by patient/family:   Good family support       Top Challenges facing patient (as identified by patient/family and CM):     Multiple health issues    Finances/Medication cost?  Has [employer] [facility]; uses [facility] [address] system or lack thereof? Good Family support   Living arrangements? Lives with mother in a 2 story home, first floor living         Self-care/ADLs/Cognition? Assisted in ADLs          Current Advanced Directive/Advance Care Plan:  Full Code - not on file; mother is identified as patient's decision maker           Plan for utilizing home health:  TBD              Transition of Care Plan:    Based on readmission, the patient's previous Plan of Care   has been evaluated and/or modified. The current Transition of Care Plan is:     CM notes CM Consult for Assistance with Discharge Planning. Patient had a recent admission 3/29-4/1 for Cerebral Aneurysm including Planned Craniotomy on 3/29. Patient was discharged to own family home.   Patient has extensive PMH, including an extended hospitalization 6/4-7/10 for a Middle Cerebral Artery Aneurysm that included trach placement on 6/16 and PEG Tube Placement on 6/19. Patient lives with her mother in a 2 story home, 4 exterior steps, first floor living. Patient requires assistance with ADLs, and patient's aunt assists during the day while her mother is at work. Patient can do most ADLs alone, but requires supervision; family transports patient. Patient also has a 13year old that lives with her. Home DME includes: BP Cuff. Patient has hx of Rehab with [facility]. Patient has hx of [facility] with an unknown agency but is not currently open to an agency. [facility] [address] [id number]. Disposition is TBD dependent on progression. Care Management Interventions  PCP Verified by CM: Yes  Palliative Care Criteria Met (RRAT>21 & CHF Dx)?: No  Mode of Transport at Discharge:  Other (see comment)(mother vs bls)  Transition of Care Consult (CM Consult): Discharge Planning  MyChart Signup: Yes  Discharge Durable Medical Equipment: No(has bp cuff)  Health Maintenance Reviewed: Yes(cm met with patient, with patient alert and oriented x3)  Physical Therapy Consult: Yes  Occupational Therapy Consult: Yes  Speech Therapy Consult: No  Current Support Network: Own Home, Family Lives [facility] Provided?: No  Discharge Location  Discharge Placement: Unable to determine at this time(lives with mother and son in a 2 story home, first floor living, 2 exterior steps)     Readmission Assessment  Number of days since last admission?: 1-7 days  Previous disposition: Home with Family  Who is being interviewed?: Caregiver  What was the patient's/caregiver's perception as to why they think they needed to return back to the hospital?: Other (Comment)(new symptoms)  Did you visit your Primary Care Physician after you left the hospital, before you returned this time?: No  Why weren't you able to visit your PCP?: (readmitted too quickly)  Did you see a specialist, such as Cardiac, Pulmonary, Orthopedic Physician, etc. after you left the hospital?: No  Who advised the patient to return to the hospital?: Caregiver  Does the patient report anything that got in the way of taking their medications?: No  In our efforts to provide the best possible care to you and others like you, can you think of anything that we could have done to help you after you left the hospital the first time, so that you might not have needed to return so soon?: Arrange for more help when leaving the hospital, Identify patient's health literacy needs, Improved written discharge instructions, Teaching during hospitalization regarding your illness, Teach back instructions regarding management of illness, Discharge instructions that are concise, clear, and non contradictory, Education on how to continue taking medications upon discharge, Additional Community resources available for illness support  CRM: Dennis Henderson, MPH, [place]; Z: [phone]

## 2021-04-06 NOTE — PROGRESS NOTES
Neurocritical Care Code Stroke Documentation    Symptoms:    Vomiting slurred speech and facial droop   Last Known Well:      Medical hx:  SAH from ruptured LMCA aneurysm s/p endovascular repair in 2020, she is 5 days post craniotomy for RMCA unruptured aneurysm repair by Dr. Bryan Stuart   Anticoagulation:   None   VAN:   Negative   NIHSS:   1a-LOC:0    1b-Month/Age:0    1c-Open/Close Hand:0    2-Best Gaze:0    3-Visual Fields:0    4-Facial Palsy:0    5a-Left Arm:0    5b-Right Arm:0    6a-Left Le    6b-Right Le    7-Limb Ataxia:0    8-Sensory:0    9-Best Language:0    10-Dysarthria:1    11-Extinction/Inattention:0  TOTAL SCORE:1   Imaging:   CT head showed postsurgical changes, including intracranial pneumocephalus and small volume  extra-axial fluid. The pneumocephalus is decreased, but the extra-axial fluid has increased. The small right temporal extra-axial collection has increased in  size and results in mild leftward midline shift of 4 mm    Recommend CTA to assess MCA aneurysms   Plan:   TPA Candidate: NO    Mechanical thrombectomy Candidate: NO    NSGY consult given she is 5 days s/p crani     Discussed with: Dr. Dee Dee Regan    Time spent: 30 minutes.      Keerthi Hernandez NP  Neurocritical Care Nurse Practitioner  [phone]

## 2021-04-06 NOTE — ED PROVIDER NOTES
Chief Complaint   Patient presents with    Facial Droop     This is a 25-year-old female with a history of hypertension, subarachnoid hemorrhage status post coiling of a left MCA aneurysm in June 2020, more recently a right MCA aneurysm was found and clipped (neurosurgery-[place]) on 3/29/21. She went home and was doing well post-operatively however this evening while having dinner at 2000 had an episode of vomiting, and her mother noticed a left sided facial droop. She also developed slurred speech. She denies any headache or neck pain specifically, loss of vision, motor weakness or sensory deficits. No other systemic complaints. Symptoms are moderate in nature without alleviating or exacerbating factors.       Past Medical History:   Diagnosis Date    Aneurysm ([facility] 06/2020    Asthma     Cerebral artery occlusion with cerebral infarction ([facility] [address] [facility] 06/2020    Cerebral infarction (HCC)     Dysphagia     HTN (hypertension)     Joint pain     Leg swelling     Seizures ([facility] [address] 06/2020    Snoring     SOB (shortness of breath)     Vision decreased        Past Surgical History:   Procedure Laterality Date    HX CHOLECYSTECTOMY      HX GASTROSTOMY      IR INSERT GASTROSTOMY TUBE [facility]  6/19/2020    NEUROLOGICAL PROCEDURE UNLISTED      Coil Embolization    PA ABDOMEN SURGERY PROC UNLISTED      has peg tube AND REMOVED         Family History:   Problem Relation Age of Onset    Hypertension Mother     Stroke Other     Lung Cancer Father     No Known Problems Sister     No Known Problems Brother     No Known Problems Son     Anesth Problems Neg Hx        Social History     Socioeconomic History    Marital status:      Spouse name: Not on file    Number of children: Not on file    Years of education: Not on file    Highest education level: Not on file   Occupational History    Not on file   Social Needs    Financial resource strain: Not on file    Food insecurity     Worry: Not on file Inability: Not on file    Transportation needs     Medical: Not on file     Non-medical: Not on file   Tobacco Use    Smoking status: Never Smoker    Smokeless tobacco: Never Used   Substance and Sexual Activity    Alcohol use: Not Currently    Drug use: Never    Sexual activity: Not on file   Lifestyle    Physical activity     Days per week: Not on file     Minutes per session: Not on file    Stress: Not on file   Relationships    Social connections     Talks on phone: Not on file     Gets together: Not on file     Attends Yazidi service: Not on file     Active member of club or organization: Not on file     Attends meetings of clubs or organizations: Not on file     Relationship status: Not on file    Intimate partner violence     Fear of current or ex partner: Not on file     Emotionally abused: Not on file     Physically abused: Not on file     Forced sexual activity: Not on file   Other Topics Concern    Not on file   Social History Narrative    Not on file         ALLERGIES: Patient has no known allergies. Review of Systems   Constitutional: Negative for fever. HENT: Negative for facial swelling. Eyes: Negative for redness. Respiratory: Negative for shortness of breath. Cardiovascular: Negative for chest pain. Gastrointestinal: Negative for abdominal pain and vomiting. Musculoskeletal: Negative for neck pain. Skin: Positive for wound. Neurological: Positive for facial asymmetry and speech difficulty. Negative for headaches. Psychiatric/Behavioral: Negative for confusion.        Vitals:    04/05/21 2109   BP: (!) 156/80   Pulse: 62   Resp: 18   Temp: 97.6 °F (36.4 °C)   SpO2: 99%            Physical Exam  General:  Awake and alert, NAD  HEENT:  +Craniotomy scar across the right parietal region with sutures in place, wound is clean/dry/intact without drainage or erythema, equal pupils, moist mucous membranes  Neck:   Normal inspection, full range of motion  Cardiac:  RRR, no murmurs  Respiratory:  Clear bilaterally, no wheezes, rales, rhonchi  Abdomen:  Soft and nontender, nondistended  Extremities: Warm and well perfused, no peripheral edema  Neuro:  +Left sided facial palsy, mild to moderate dysarthria and expressive aphasia, moving all extremities symmetrically without gross motor deficit, no sensory deficits appreciated  Skin:   No rashes or pallor    RESULTS  Recent Results (from the past 12 hour(s))   GLUCOSE, POC    Collection Time: 04/05/21  9:11 PM   Result Value Ref Range    Glucose (POC) 118 (H) 65 - 100 mg/dL    Performed by KATHY MAYER    CBC WITH AUTOMATED DIFF    Collection Time: 04/05/21  9:27 PM   Result Value Ref Range    WBC 6.0 3.6 - 11.0 K/uL    RBC 3.74 (L) 3.80 - 5.20 M/uL    HGB 10.3 (L) 11.5 - 16.0 g/dL    HCT 32.6 (L) 35.0 - 47.0 %    MCV 87.2 80.0 - 99.0 FL    MCH 27.5 26.0 - 34.0 PG    MCHC 31.6 30.0 - 36.5 g/dL    RDW 13.6 11.5 - 14.5 %    PLATELET 381 654 - 416 K/uL    MPV 9.8 8.9 - 12.9 FL    NRBC 0.0 0  WBC    ABSOLUTE NRBC 0.00 0.00 - 0.01 K/uL    NEUTROPHILS 63 32 - 75 %    LYMPHOCYTES 26 12 - 49 %    MONOCYTES 9 5 - 13 %    EOSINOPHILS 1 0 - 7 %    BASOPHILS 0 0 - 1 %    IMMATURE GRANULOCYTES 1 (H) 0.0 - 0.5 %    ABS. NEUTROPHILS 3.8 1.8 - 8.0 K/UL    ABS. LYMPHOCYTES 1.6 0.8 - 3.5 K/UL    ABS. MONOCYTES 0.5 0.0 - 1.0 K/UL    ABS. EOSINOPHILS 0.1 0.0 - 0.4 K/UL    ABS. BASOPHILS 0.0 0.0 - 0.1 K/UL    ABS. IMM.  GRANS. 0.1 (H) 0.00 - 0.04 K/UL    DF AUTOMATED     METABOLIC PANEL, COMPREHENSIVE    Collection Time: 04/05/21  9:27 PM   Result Value Ref Range    Sodium 136 136 - 145 mmol/L    Potassium 3.4 (L) 3.5 - 5.1 mmol/L    Chloride 105 97 - 108 mmol/L    CO2 28 21 - 32 mmol/L    Anion gap 3 (L) 5 - 15 mmol/L    Glucose 115 (H) 65 - 100 mg/dL    BUN 12 6 - 20 MG/DL    Creatinine 0.75 0.55 - 1.02 MG/DL    BUN/Creatinine ratio 16 12 - 20      GFR est AA >60 >60 ml/min/1.73m2    GFR est non-AA >60 >60 ml/min/1.73m2    Calcium 9.2 8.5 - 10.1 MG/DL Bilirubin, total 0.3 0.2 - 1.0 MG/DL    ALT (SGPT) 12 12 - 78 U/L    AST (SGOT) 7 (L) 15 - 37 U/L    Alk. phosphatase 71 45 - 117 U/L    Protein, total 7.2 6.4 - 8.2 g/dL    Albumin 3.5 3.5 - 5.0 g/dL    Globulin 3.7 2.0 - 4.0 g/dL    A-G Ratio 0.9 (L) 1.1 - 2.2     PROTHROMBIN TIME + INR    Collection Time: 04/05/21  9:27 PM   Result Value Ref Range    INR 1.0 0.9 - 1.1      Prothrombin time 10.1 9.0 - 11.1 sec   TROPONIN I    Collection Time: 04/05/21  9:27 PM   Result Value Ref Range    Troponin-I, Qt. <0.05 <0.05 ng/mL   SAMPLES BEING HELD    Collection Time: 04/05/21  9:27 PM   Result Value Ref Range    SAMPLES BEING HELD 1RED     COMMENT        Add-on orders for these samples will be processed based on acceptable specimen integrity and analyte stability, which may vary by analyte. IMAGING  Ct Code Neuro Head Wo Contrast    Result Date: 4/5/2021  1. Postsurgical changes, including intracranial pneumocephalus and small volume extra-axial fluid. The pneumocephalus is decreased, but the extra-axial fluid has increased. The small right temporal extra-axial collection has increased in size and results in mild leftward midline shift of 4 mm. 2. No acute infarct. Other Procedure    Date/Time: 4/5/2021 11:57 PM  Performed by: Ashok Tabor MD  Authorized by: Ashok Tabor MD     Consent:     Consent obtained:  Verbal  Indications:     Indications:  Multiple failed attempts by nursing to obtain IV access  Anesthesia (see MAR for exact dosages): Anesthesia method:  None  Post-procedure details:     Patient tolerance of procedure: Tolerated well, no immediate complications  Comments: This is a procedure note for IV placement under ultrasound guidance. The indication being that attempts at peripheral IV placement were made by the nursing staff without success. The area was prepped in the usual fashion.   The right basilic vein was cannulated with a 20 gauge angiocath with use of dynamic ultrasound to identify the vein. There was return of venous blood and the line flushed easily. A dressing was applied to the site. The patient tolerated the procedure well and there were no complications. ED course: Code neuro activated at the time of arrival, last known normal time is 2000 this evening however she is not a candidate for systemic thrombolytics in light of her recent craniotomy. Concern for leaking at the site where the aneurysm was clipped versus ischemic pathology. NIHSS 4 at the time of my exam.  Discussed with neurosurgery Kameron Seth) who reviewed her noncontrast CT head, felt that it indicated post-surgical changes but did not recommend any acute neurosurgical intervention, advised a systolic blood pressure goal of 160. The delay in performance of her CTA head/neck was due to issues establishing adequate peripheral access for contrast administration. Nursing was unable to establish IV access after multiple attempts, I then placed a large-bore peripheral IV in the right basilic vein which infiltrated in CT  The charge nurse Carlie Jackson) was able to establish a 22 gauge IV that was appropriate for contrast administration and the results of her CTA head/neck are currently pending. If there is no leak at the site of the aneurysm repair she'll need admission to the hospitalist service for continued management and evaluation by neurology. Patient was signed out to [facility] TRELL Carrion) with the plan as described above. Hospitalist Perfect Serve for Admission  11:59 PM    ED Room Number:   DC98/83  Patient Name and age:  Shannon Watson 48 y.o.  female  Working Diagnosis:     1. Facial droop    2. Dysarthria    3.  Expressive aphasia      COVID suspicion:   no  Code Status:    Full Code  Readmission:    no  Isolation Requirements:  no  Recommended Level of Care: telemetry  Department:    [facility] Adult ED - 21   Other:     R MCA aneurysm status post clipping on 3/29, presenting with vomiting, left sided facial droop and expressive aphasia/dysarthria that started this evening during dinner. Discussed with neurosurgery Nicole Osman), systolic blood pressure goal of <160.

## 2021-04-06 NOTE — PROGRESS NOTES
Neurosurgery Progress Note  Trudi Lira ACNP-BC          Admit Date: 2021   LOS: 0 days        Daily Progress Note: 2021        Subjective: The patient is well-known to us. She underwent a craniotomy for right MCA aneurysm clipping with Dr. Rosa Maria Drew on 21. She discharged from the hospital without complications on /64. She was discharged to home on Keppra 500 mg. She came to the ER for evaluation yesterday due to vomiting and was noted to have a left facial droop and slurred speech. She had a head CT which revealed post-surgical changes and a small increase in the size of a extra-axial fluid collection. Her CTA revealed no significant perfusion defect and no evidence of acute thrombosis or significant intracranial stenosis. A brain MRI was completed today and was negative for acute infarct. EEG is in process. Denies numbness, tingling, chest pain, leg pain, nausea, vomiting, difficulty swallowing, and dyspnea. Objective:     Vital signs  Temp (24hrs), Av.1 °F (36.7 °C), Min:97.6 °F (36.4 °C), Max:98.7 °F (37.1 °C)    07 -  1900  In: -   Out: 650 [Urine:650]  No intake/output data recorded. Visit Vitals  /83 (BP 1 Location: Left arm, BP Patient Position: At rest)   Pulse (!) 58   Temp 98.5 °F (36.9 °C)   Resp 15   SpO2 98%      O2 Device: Room air     Pain control  Pain Assessment  Pain Scale 1: Numeric (0 - 10)  Pain Intensity 1: 0    PT/OT  Gait                 Physical Exam:  Gen:NAD. Neuro: A&Ox3. Follows commands. Speech clear. Affect normal.  PERRL. EOMI. Flattening of the left nasolabial fold improves when activating muscles in the face to smile. Tongue midline. JOLLY spontaneously. Strength 5/5 in UE and LE BL. Negative drift. Gait deferred. Skin: Right pterional incision C/D/I with sutures in place. MRI brain without contrast on 21 shows no evidence of acute infarct.  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 Stable size of bilateral subdural/extra-axial fluid collections, right larger than left, with stable mass effect resulting in mild 4 mm of leftward midline shift. 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 Postprocedural changes of left MCA bifurcation aneurysm coiling noted. 24 hour results:    Recent Results (from the past 24 hour(s))   GLUCOSE, POC    Collection Time: 04/05/21  9:11 PM   Result Value Ref Range    Glucose (POC) 118 (H) 65 - 100 mg/dL    Performed by KATHY MAYER    CBC WITH AUTOMATED DIFF    Collection Time: 04/05/21  9:27 PM   Result Value Ref Range    WBC 6.0 3.6 - 11.0 K/uL    RBC 3.74 (L) 3.80 - 5.20 M/uL    HGB 10.3 (L) 11.5 - 16.0 g/dL    HCT 32.6 (L) 35.0 - 47.0 %    MCV 87.2 80.0 - 99.0 FL    MCH 27.5 26.0 - 34.0 PG    MCHC 31.6 30.0 - 36.5 g/dL    RDW 13.6 11.5 - 14.5 %    PLATELET 162 166 - 228 K/uL    MPV 9.8 8.9 - 12.9 FL    NRBC 0.0 0  WBC    ABSOLUTE NRBC 0.00 0.00 - 0.01 K/uL    NEUTROPHILS 63 32 - 75 %    LYMPHOCYTES 26 12 - 49 %    MONOCYTES 9 5 - 13 %    EOSINOPHILS 1 0 - 7 %    BASOPHILS 0 0 - 1 %    IMMATURE GRANULOCYTES 1 (H) 0.0 - 0.5 %    ABS. NEUTROPHILS 3.8 1.8 - 8.0 K/UL    ABS. LYMPHOCYTES 1.6 0.8 - 3.5 K/UL    ABS. MONOCYTES 0.5 0.0 - 1.0 K/UL    ABS. EOSINOPHILS 0.1 0.0 - 0.4 K/UL    ABS. BASOPHILS 0.0 0.0 - 0.1 K/UL    ABS. IMM.  GRANS. 0.1 (H) 0.00 - 0.04 K/UL    DF AUTOMATED     METABOLIC PANEL, COMPREHENSIVE    Collection Time: 04/05/21  9:27 PM   Result Value Ref Range    Sodium 136 136 - 145 mmol/L    Potassium 3.4 (L) 3.5 - 5.1 mmol/L    Chloride 105 97 - 108 mmol/L    CO2 28 21 - 32 mmol/L    Anion gap 3 (L) 5 - 15 mmol/L    Glucose 115 (H) 65 - 100 mg/dL    BUN 12 6 - 20 MG/DL    Creatinine 0.75 0.55 - 1.02 MG/DL    BUN/Creatinine ratio 16 12 - 20      GFR est AA >60 >60 ml/min/1.73m2    GFR est non-AA >60 >60 ml/min/1.73m2 Calcium 9.2 8.5 - 10.1 MG/DL    Bilirubin, total 0.3 0.2 - 1.0 MG/DL    ALT (SGPT) 12 12 - 78 U/L    AST (SGOT) 7 (L) 15 - 37 U/L    Alk. phosphatase 71 45 - 117 U/L    Protein, total 7.2 6.4 - 8.2 g/dL    Albumin 3.5 3.5 - 5.0 g/dL    Globulin 3.7 2.0 - 4.0 g/dL    A-G Ratio 0.9 (L) 1.1 - 2.2     PROTHROMBIN TIME + INR    Collection Time: 04/05/21  9:27 PM   Result Value Ref Range    INR 1.0 0.9 - 1.1      Prothrombin time 10.1 9.0 - 11.1 sec   TROPONIN I    Collection Time: 04/05/21  9:27 PM   Result Value Ref Range    Troponin-I, Qt. <0.05 <0.05 ng/mL   SAMPLES BEING HELD    Collection Time: 04/05/21  9:27 PM   Result Value Ref Range    SAMPLES BEING HELD 1RED     COMMENT        Add-on orders for these samples will be processed based on acceptable specimen integrity and analyte stability, which may vary by analyte.    EKG, 12 LEAD, INITIAL    Collection Time: 04/06/21 12:36 AM   Result Value Ref Range    Ventricular Rate 59 BPM    Atrial Rate 59 BPM    P-R Interval 172 ms    QRS Duration 88 ms    Q-T Interval 436 ms    QTC Calculation (Bezet) 431 ms    Calculated P Axis 30 degrees    Calculated R Axis -12 degrees    Calculated T Axis 29 degrees    Diagnosis       Sinus bradycardia  When compared with ECG of 17-MAR-2021 14:00,  No significant change was found     LIPID PANEL    Collection Time: 04/06/21  8:26 AM   Result Value Ref Range    LIPID PROFILE          Cholesterol, total 101 <200 MG/DL    Triglyceride 74 <150 MG/DL    HDL Cholesterol 80 MG/DL    LDL, calculated 6.2 0 - 100 MG/DL    VLDL, calculated 14.8 MG/DL    CHOL/HDL Ratio 1.3 0.0 - 5.0     HEMOGLOBIN A1C WITH EAG    Collection Time: 04/06/21  8:26 AM   Result Value Ref Range    Hemoglobin A1c 5.6 4.0 - 5.6 %    Est. average glucose 114 mg/dL   TSH 3RD GENERATION    Collection Time: 04/06/21  8:26 AM   Result Value Ref Range    TSH 2.47 0.36 - 3.74 uIU/mL   MAGNESIUM    Collection Time: 04/06/21  8:26 AM   Result Value Ref Range    Magnesium 2.3 1.6 - 2.4 mg/dL   PHOSPHORUS    Collection Time: 04/06/21  8:26 AM   Result Value Ref Range    Phosphorus 3.5 2.6 - 4.7 MG/DL   TROPONIN I    Collection Time: 04/06/21  8:26 AM   Result Value Ref Range    Troponin-I, Qt. <0.05 <0.05 ng/mL   C REACTIVE PROTEIN, QT    Collection Time: 04/06/21  8:26 AM   Result Value Ref Range    C-Reactive protein 1.84 (H) 0.00 - 0.60 mg/dL   FOLATE    Collection Time: 04/06/21  8:26 AM   Result Value Ref Range    Folate 11.8 5.0 - 21.0 ng/mL   VITAMIN B12    Collection Time: 04/06/21  8:26 AM   Result Value Ref Range    Vitamin B12 513 193 - 986 pg/mL   IRON PROFILE    Collection Time: 04/06/21  8:26 AM   Result Value Ref Range    Iron 52 35 - 150 ug/dL    TIBC 265 250 - 450 ug/dL    Iron % saturation 20 20 - 50 %   FERRITIN    Collection Time: 04/06/21  8:26 AM   Result Value Ref Range    Ferritin 53 26 - 388 NG/ML   CBC WITH AUTOMATED DIFF    Collection Time: 04/06/21  8:26 AM   Result Value Ref Range    WBC 5.2 3.6 - 11.0 K/uL    RBC 3.64 (L) 3.80 - 5.20 M/uL    HGB 10.0 (L) 11.5 - 16.0 g/dL    HCT 31.5 (L) 35.0 - 47.0 %    MCV 86.5 80.0 - 99.0 FL    MCH 27.5 26.0 - 34.0 PG    MCHC 31.7 30.0 - 36.5 g/dL    RDW 13.8 11.5 - 14.5 %    PLATELET 967 023 - 363 K/uL    MPV 10.6 8.9 - 12.9 FL    NRBC 0.0 0  WBC    ABSOLUTE NRBC 0.00 0.00 - 0.01 K/uL    NEUTROPHILS 59 32 - 75 %    LYMPHOCYTES 28 12 - 49 %    MONOCYTES 11 5 - 13 %    EOSINOPHILS 1 0 - 7 %    BASOPHILS 0 0 - 1 %    IMMATURE GRANULOCYTES 1 (H) 0.0 - 0.5 %    ABS. NEUTROPHILS 3.0 1.8 - 8.0 K/UL    ABS. LYMPHOCYTES 1.5 0.8 - 3.5 K/UL    ABS. MONOCYTES 0.6 0.0 - 1.0 K/UL    ABS. EOSINOPHILS 0.1 0.0 - 0.4 K/UL    ABS. BASOPHILS 0.0 0.0 - 0.1 K/UL    ABS. IMM.  GRANS. 0.0 0.00 - 0.04 K/UL    DF AUTOMATED     METABOLIC PANEL, COMPREHENSIVE    Collection Time: 04/06/21  8:26 AM   Result Value Ref Range    Sodium 137 136 - 145 mmol/L    Potassium 4.1 3.5 - 5.1 mmol/L    Chloride 104 97 - 108 mmol/L    CO2 28 21 - 32 mmol/L    Anion gap 5 5 - 15 mmol/L    Glucose 80 65 - 100 mg/dL    BUN 8 6 - 20 MG/DL    Creatinine 0.66 0.55 - 1.02 MG/DL    BUN/Creatinine ratio 12 12 - 20      GFR est AA >60 >60 ml/min/1.73m2    GFR est non-AA >60 >60 ml/min/1.73m2    Calcium 8.6 8.5 - 10.1 MG/DL    Bilirubin, total 0.3 0.2 - 1.0 MG/DL    ALT (SGPT) 12 12 - 78 U/L    AST (SGOT) 11 (L) 15 - 37 U/L    Alk. phosphatase 65 45 - 117 U/L    Protein, total 6.9 6.4 - 8.2 g/dL    Albumin 3.2 (L) 3.5 - 5.0 g/dL    Globulin 3.7 2.0 - 4.0 g/dL    A-G Ratio 0.9 (L) 1.1 - 2.2            Assessment:     Principal Problem:    Focal seizure ([address] (4/6/2021)        Plan:   1. Focal seizure    - Pt's presentation sounds like it could be related to cortical irritation and maybe a focal seizure as MRI is negative for stroke and symptoms correlate to the same side as surgery. - Will get EEG   - Increase Keppra to 1000 mg bid   - neuro checks q4h  2. Hx crani with right MCA aneurysm clipping   - s/p crani 03/29    Activity: up with assist  DVT ppx: SCDs  Dispo: tbd    Plan d/w Dr. Temi Palm. Will follow-up on EEG and see if pt improves with increase in keppra dosing.        Jack Atkinson, FLAKITA

## 2021-04-07 ENCOUNTER — APPOINTMENT (OUTPATIENT)
Dept: NON INVASIVE DIAGNOSTICS | Age: 51
DRG: 057 | End: 2021-04-07
Attending: INTERNAL MEDICINE
Payer: COMMERCIAL

## 2021-04-07 VITALS
RESPIRATION RATE: 20 BRPM | DIASTOLIC BLOOD PRESSURE: 71 MMHG | BODY MASS INDEX: 30.1 KG/M2 | WEIGHT: 163.58 LBS | TEMPERATURE: 97.4 F | SYSTOLIC BLOOD PRESSURE: 149 MMHG | HEIGHT: 62 IN | OXYGEN SATURATION: 99 % | HEART RATE: 72 BPM

## 2021-04-07 PROBLEM — R56.9 FOCAL SEIZURE (HCC): Status: RESOLVED | Noted: 2021-04-06 | Resolved: 2021-04-07

## 2021-04-07 LAB
ECHO AV PEAK GRADIENT: 10.94 MMHG
ECHO AV PEAK VELOCITY: 165.35 CM/S
ECHO EST RA PRESSURE: 5 MMHG
ECHO LA AREA 4C: 23.01 CM2
ECHO LA MAJOR AXIS: 4.04 CM
ECHO LA MINOR AXIS: 2.3 CM
ECHO LA TO AORTIC ROOT RATIO: 0.93
ECHO LA VOL 4C: 73.05 ML (ref 22–52)
ECHO LA VOLUME INDEX A4C: 41.62 ML/M2 (ref 16–28)
ECHO LV E' LATERAL VELOCITY: 7.89 CM/S
ECHO LV E' SEPTAL VELOCITY: 7.79 CM/S
ECHO LV INTERNAL DIMENSION DIASTOLIC: 4.17 CM (ref 3.9–5.3)
ECHO LV INTERNAL DIMENSION SYSTOLIC: 1.95 CM
ECHO LV IVSD: 1.19 CM (ref 0.6–0.9)
ECHO LV MASS 2D: 170.9 G (ref 67–162)
ECHO LV MASS INDEX 2D: 97.4 G/M2 (ref 43–95)
ECHO LV POSTERIOR WALL DIASTOLIC: 1.16 CM (ref 0.6–0.9)
ECHO LVOT PEAK GRADIENT: 8.47 MMHG
ECHO LVOT PEAK VELOCITY: 145.53 CM/S
ECHO MV A VELOCITY: 96.36 CM/S
ECHO MV E VELOCITY: 103.08 CM/S
ECHO MV E/A RATIO: 1.07
ECHO MV E/E' LATERAL: 13.06
ECHO MV E/E' RATIO (AVERAGED): 13.15
ECHO MV E/E' SEPTAL: 13.23
ECHO RIGHT VENTRICULAR SYSTOLIC PRESSURE (RVSP): 22.69 MMHG
ECHO RV INTERNAL DIMENSION: 4.55 CM
ECHO RV TAPSE: 2.51 CM (ref 1.5–2)
ECHO TV REGURGITANT MAX VELOCITY: 210.3 CM/S
ECHO TV REGURGITANT PEAK GRADIENT: 17.69 MMHG

## 2021-04-07 PROCEDURE — 97535 SELF CARE MNGMENT TRAINING: CPT

## 2021-04-07 PROCEDURE — 74011250636 HC RX REV CODE- 250/636: Performed by: INTERNAL MEDICINE

## 2021-04-07 PROCEDURE — 74011250637 HC RX REV CODE- 250/637: Performed by: INTERNAL MEDICINE

## 2021-04-07 PROCEDURE — 99232 SBSQ HOSP IP/OBS MODERATE 35: CPT | Performed by: NURSE PRACTITIONER

## 2021-04-07 PROCEDURE — 99218 HC RM OBSERVATION: CPT

## 2021-04-07 PROCEDURE — 96374 THER/PROPH/DIAG INJ IV PUSH: CPT

## 2021-04-07 PROCEDURE — 74011250637 HC RX REV CODE- 250/637: Performed by: NURSE PRACTITIONER

## 2021-04-07 PROCEDURE — 97116 GAIT TRAINING THERAPY: CPT

## 2021-04-07 RX ORDER — LEVETIRACETAM 1000 MG/1
1000 TABLET ORAL 2 TIMES DAILY
Qty: 60 TAB | Refills: 0 | Status: SHIPPED | OUTPATIENT
Start: 2021-04-07 | End: 2021-06-10 | Stop reason: SDUPTHER

## 2021-04-07 RX ADMIN — LISINOPRIL 40 MG: 20 TABLET ORAL at 08:51

## 2021-04-07 RX ADMIN — SODIUM CHLORIDE, POTASSIUM CHLORIDE, SODIUM LACTATE AND CALCIUM CHLORIDE 75 ML/HR: 600; 310; 30; 20 INJECTION, SOLUTION INTRAVENOUS at 00:17

## 2021-04-07 RX ADMIN — ASPIRIN 81 MG: 81 TABLET, CHEWABLE ORAL at 08:52

## 2021-04-07 RX ADMIN — ATORVASTATIN CALCIUM 40 MG: 40 TABLET, FILM COATED ORAL at 08:52

## 2021-04-07 RX ADMIN — LEVETIRACETAM 1000 MG: 500 TABLET ORAL at 08:52

## 2021-04-07 RX ADMIN — HYDRALAZINE HYDROCHLORIDE 100 MG: 50 TABLET, FILM COATED ORAL at 08:52

## 2021-04-07 RX ADMIN — AMLODIPINE BESYLATE 10 MG: 5 TABLET ORAL at 08:52

## 2021-04-07 NOTE — DISCHARGE INSTRUCTIONS
Discharge Instructions       PATIENT ID: Renetta Beavers  MRN: [id number]   YOB: 1970    DATE OF ADMISSION: 4/5/2021  9:07 PM    DATE OF DISCHARGE: 4/7/2021    PRIMARY CARE PROVIDER: Italo Dunwaay DO     ATTENDING PHYSICIAN: Hoa Beauchamp MD  DISCHARGING PROVIDER: Wen Capellan MD    To contact this individual call [phone] and ask the  to page. If unavailable ask to be transferred the Adult Hospitalist Department. DISCHARGE DIAGNOSES focal seizure     CONSULTATIONS: IP CONSULT TO NEUROSURGERY  IP CONSULT TO NEUROLOGY    PROCEDURES/SURGERIES: * No surgery found *    PENDING TEST RESULTS:   At the time of discharge the following test results are still pending: na    FOLLOW UP APPOINTMENTS:   Follow-up Information     Follow up With Specialties Details Why Contact Howard Elliott NP Nurse Practitioner Go on 4/12/2021 at 9:00 for post-op appointment [address] [facility]  [phone]      Italo Dunaway DO Family Medicine  As needed [address]  [phone]             ADDITIONAL CARE RECOMMENDATIONS: na    DIET: Resume previous diet     ACTIVITY: Activity as tolerated    WOUND CARE: na    EQUIPMENT needed: na      Radiology      DISCHARGE MEDICATIONS:   See Medication Reconciliation Form    · It is important that you take the medication exactly as they are prescribed. · Keep your medication in the bottles provided by the pharmacist and keep a list of the medication names, dosages, and times to be taken in your wallet. · Do not take other medications without consulting your doctor. NOTIFY YOUR PHYSICIAN FOR ANY OF THE FOLLOWING:   Fever over 101 degrees for 24 hours. Chest pain, shortness of breath, fever, chills, nausea, vomiting, diarrhea, change in mentation, falling, weakness, bleeding. Severe pain or pain not relieved by medications.   Or, any other signs or symptoms that you may have questions about.      DISPOSITION:    Home With:   OT  PT  HH  RN       SNF/Inpatient Rehab/LTAC   x Independent/assisted living    Hospice    Other:          Signed:   Dashawn Melendez MD  4/7/2021  12:39 PM

## 2021-04-07 NOTE — PROGRESS NOTES
Problem: Self Care Deficits Care Plan (Adult)  Goal: *Acute Goals and Plan of Care (Insert Text)  Description: FUNCTIONAL STATUS PRIOR TO ADMISSION: Patient with recent admission 3/29 - 4/1 for R MCA aneurysm, now s/p craniotomy and clipping. Patient discharged home with family, has been staying with her mom. Patient largely able to complete BADLs, however mom and aunt (mom works during the day) provide supervision - SBA for safety and are available to assist if needed. HOME SUPPORT: The patient lives with her mom and a teenage son. Her aunt comes over during the day while her mom is at work. Occupational Therapy Goals  Initiated 4/6/2021   1. Patient will perform container management during self-feeding / grooming ADLs with supervision within 7 day(s). 2.  Patient will perform upper body dressing and lower body dressing with supervision/set-up within 7 day(s). 3.  Patient will perform toilet transfers with supervision/set-up within 7 day(s). 4.  Patient will perform all aspects of toileting with supervision/set-up within 7 day(s). 5.  Patient will participate in upper extremity therapeutic exercise/activities with supervision/set-up within 7 day(s). 6.  Patient will utilize fall prevention techniques during functional activities with minimal cues within 7 day(s). Outcome: Progressing Towards Goal       OCCUPATIONAL THERAPY TREATMENT  Patient: Renetta Beavers (19 y.o. female)  Date: 4/7/2021  Diagnosis: Acute CVA (cerebrovascular accident) [facility]) [I63.9] Focal seizure ([facility] [address]       Precautions: Fall  Chart, occupational therapy assessment, plan of care, and goals were reviewed. ASSESSMENT  Patient continues with skilled OT services and is progressing towards goals. Pt greeted in bathroom finishing up with PT. Demonstrated need for occasional verbal cuing to locate items in bathroom, however grossly MOD I. Performed ADL related mobility with supervision.  Pt with observed word finding difficulty, however this appears to be baseline. Mild R sided weakness, however does not appear to interfere with functional activities. Appears to be at functional baseline/PLOF. Anticipate no d/c OT needs at this time. Current Level of Function Impacting Discharge (ADLs): supervision/SBA     Other factors to consider for discharge:          PLAN :  Patient continues to benefit from skilled intervention to address the above impairments. Continue treatment per established plan of care to address goals. Recommendation for discharge: (in order for the patient to meet his/her long term goals)  No skilled occupational therapy/ follow up rehabilitation needs identified at this time. This discharge recommendation:  Has not yet been discussed the attending provider and/or case management    IF patient discharges home will need the following DME: patient owns DME required for discharge and none       SUBJECTIVE:   Patient stated I do that stuff on my own.     OBJECTIVE DATA SUMMARY:   Cognitive/Behavioral Status:  Neurologic State: Alert;Eyes open spontaneously  Orientation Level: Oriented X4  Cognition: Follows commands             Functional Mobility and Transfers for ADLs:  Bed Mobility:  Scooting: Supervision    Transfers:  Sit to Stand: Supervision     Bed to Chair: Supervision    Balance:  Sitting: Intact  Sitting - Static: Good (unsupported)  Sitting - Dynamic: Good (unsupported)  Standing: Intact; Without support(Simultaneous filing.  User may not have seen previous data.)  Standing - Static: Good  Standing - Dynamic : Good    ADL Intervention:       Grooming  Washing Hands: Supervision                        Toileting  Toileting Assistance: Supervision;Modified independent  Clothing Management: Modified independent         Pain:  none    Activity Tolerance:   Good    After treatment patient left in no apparent distress:   Sitting in chair and Call bell within reach    COMMUNICATION/COLLABORATION:   The patients plan of care was discussed with: Physical therapist and Registered nurse.      Ricardo Yarbrough OT  Time Calculation: 15 mins

## 2021-04-07 NOTE — PROGRESS NOTES
I have reviewed, edited, and agree [facility]. Stroke Education documented in Patient Education: YES  Core Measures Documented in [facility]:  Risk Factors: YES  Warning signs of stroke: YES  When to Activate 911: YES  Medication Education for Risk Factors: YES  Smoking cessation if applicable: YES  Written Education Given:  YES    Discharge NIH Completed: YES  Score: 0    BRAINS: YES    Follow Up Appointment Made: YES  Date/Time if applicable:      I have reviewed discharge instructions with the patient and parent. The patient and parent verbalized understanding.

## 2021-04-07 NOTE — PROGRESS NOTES
Problem: Mobility Impaired (Adult and Pediatric)  Goal: *Acute Goals and Plan of Care (Insert Text)  Description: FUNCTIONAL STATUS PRIOR TO ADMISSION: Patient was independent and active without use of DME.    HOME SUPPORT PRIOR TO ADMISSION: The patient lived with her mom with her mom and aunt available to assist.    Physical Therapy Goals  Initiated 4/6/2021  1. Patient will transfer from bed to chair and chair to bed with modified independence using the least restrictive device within 7 day(s). 2.  Patient will perform sit to stand with modified independence within 7 day(s). 3.  Patient will ambulate with independence for 300 feet within 7 day(s). 4.  Patient will ascend/descend  3 stairs with  handrail(s) with modified independence within 7 day(s). Outcome: Progressing Towards Goal  PHYSICAL THERAPY TREATMENT  Patient: Suzy Wallace (50 y.o. female)  Date: 4/7/2021  Diagnosis: Acute CVA (cerebrovascular accident) [facility]) [I63.9] Focal seizure ([facility] [address]       Precautions: Fall  Chart, physical therapy assessment, plan of care and goals were reviewed. ASSESSMENT  Patient continues with skilled PT services and is progressing towards goals. Pt received in bed, pleasant and agreeable to work with therapy. She was supervision level for bed mobility, transfers, and ambulation. Pt reports no mobility questions or concerns and states that her mobility has fully resolved to her baseline function. No further PT needs upon discharge. Current Level of Function Impacting Discharge (mobility/balance): supervision for ambulation     Other factors to consider for discharge: at her functional baseline         PLAN :  Patient continues to benefit from skilled intervention to address the above impairments. Continue treatment per established plan of care. to address goals.     Recommendation for discharge: (in order for the patient to meet his/her long term goals)  No skilled physical therapy/ follow up rehabilitation needs identified at this time. This discharge recommendation:  Has not yet been discussed the attending provider and/or case management    IF patient discharges home will need the following DME: none       SUBJECTIVE:   Patient stated So can I go home today?     OBJECTIVE DATA SUMMARY:   Critical Behavior:  Neurologic State: Alert, Eyes open spontaneously  Orientation Level: Oriented X4  Cognition: Follows commands  Safety/Judgement: Awareness of environment, Fall prevention, Decreased insight into deficits  Functional Mobility Training:  Bed Mobility:  Scooting: Supervision    Transfers:  Sit to Stand: Supervision  Stand to Sit: Supervision  Bed to Chair: Supervision    Balance:  Sitting: Intact  Standing: Intact; Without support  Standing - Static: Good  Standing - Dynamic : Good    Ambulation/Gait Training:  Distance (ft): 300 Feet (ft)  Ambulation - Level of Assistance: Supervision  Base of Support: Widened    Activity Tolerance:   Good    After treatment patient left in no apparent distress:   Sitting in chair, Call bell within reach, and handoff to OT    COMMUNICATION/COLLABORATION:   The patients plan of care was discussed with: Occupational therapist and Registered nurse.      Tom Westbrook PT, DPT   Time Calculation: 13 mins

## 2021-04-07 NOTE — PROGRESS NOTES
Problem: Falls - Risk of  Goal: *Absence of Falls  Description: Document Emanuel Broad Fall Risk and appropriate interventions in the flowsheet. Outcome: Progressing Towards Goal  Note: Fall Risk Interventions:  Mobility Interventions: Bed/chair exit alarm, Patient to call before getting OOB, OT consult for ADLs, Communicate number of staff needed for ambulation/transfer, PT Consult for mobility concerns, PT Consult for assist device competence, Strengthening exercises (ROM-active/passive)         Medication Interventions: Bed/chair exit alarm, Patient to call before getting OOB, Teach patient to arise slowly    Elimination Interventions: Bed/chair exit alarm, Call light in reach, Patient to call for help with toileting needs, Stay With Me (per policy), Toilet paper/wipes in reach, Toileting schedule/hourly rounds              Problem: Patient Education: Go to Patient Education Activity  Goal: Patient/Family Education  Outcome: Progressing Towards Goal     Problem: Seizure Disorder (Adult)  Goal: *STG: Remains free of seizure activity  Outcome: Progressing Towards Goal  Goal: *STG: Maintains lab values within therapeutic range  Outcome: Progressing Towards Goal  Goal: *STG/LTG: Complies with medication therapy  Outcome: Progressing Towards Goal  Goal: *STG: Remains free of injury during seizure activity  Outcome: Progressing Towards Goal  Goal: *STG: Remains safe in hospital  Outcome: Progressing Towards Goal  Goal: Interventions  Outcome: Progressing Towards Goal     Problem: Pressure Injury - Risk of  Goal: *Prevention of pressure injury  Description: Document Sriram Scale and appropriate interventions in the flowsheet.   Outcome: Progressing Towards Goal  Note: Pressure Injury Interventions:       Moisture Interventions: Absorbent underpads, Assess need for specialty bed, Check for incontinence Q2 hours and as needed, Minimize layers, Offer toileting Q_hr, Limit adult briefs, Maintain skin hydration (lotion/cream), Moisture barrier    Activity Interventions: Assess need for specialty bed, Pressure redistribution bed/mattress(bed type), PT/OT evaluation, Increase time out of bed    Mobility Interventions: Assess need for specialty bed, Float heels, HOB 30 degrees or less, Pressure redistribution bed/mattress (bed type), PT/OT evaluation, Turn and reposition approx.  every two hours(pillow and wedges)    Nutrition Interventions: Document food/fluid/supplement intake    Friction and Shear Interventions: Feet elevated on foot rest, HOB 30 degrees or less, Lift sheet, Minimize layers, Transferring/repositioning devices

## 2021-04-07 NOTE — CONSULTS
[address]    Name:  Camron Li  MR#:  [id number]  :  1970  ACCOUNT #:  [de-identified]  DATE OF SERVICE:  2021      NEUROLOGY CONSULTATION    HISTORY OF PRESENT ILLNESS:  This is a 51-year-old right-handed female with recent complex neurological history over the last year. She presented in 2020 with subarachnoid hemorrhage with intraventricular extension, found to have ruptured leaking left MCA aneurysm and underwent coiling by [facility]. She was also found to have a right MCA infarct at that time and was just readmitted on 2021 through 2021 for clipping by Neurosurgery. During her admission in , she was noted to have seizures with left frontal epileptiform discharges at that time and was on Keppra and Vimpat. Subsequently, able to taper down to Keppra 500 mg b.i.d., which she is still on at the current time. The patient is seen with her mother at the bedside who aids in the history. They were eating last night when the patient's mother noted she had slurring of her speech and her face appeared to be twisted to the left. She then vomited. By mom's description, it sounds like she probably had loss of awareness during this time. She put her in the car and brought her to the ED. At some point during their drive from [facility], the patient began speaking again, but still had twisting of her face. According to the triage note, the patient was unaware that she had vomited. In the ED, a Code Stroke was called and NP noted her NIH stroke scale score was 1. She was felt to be not a tPA candidate for multiple reasons. According to the ED note, their initial evaluation was left-sided weakness, dysarthria, and expressive aphasia, but no other motor deficits appreciated. Imaging include a CT of the head, which was negative for any acute change, only postsurgical changes were noted.   Dr. Montanez Overall was contacted and reviewed imaging, did not feel this patient was having any leak from prior clipping. 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 The patient was not hypoglycemic. Her white count was normal.  Hemoglobin just slightly low at 10.3. CMP, potassium slightly low, otherwise unremarkable. The patient is now back to her baseline. Neurosurgery has already seen the patient and increased her Keppra to 1000 mg b.i.d. and ordered an EEG. PAST MEDICAL HISTORY:  1. Hypertension. 2.  Hyperlipidemia. 3.  Right MCA unruptured aneurysm status post clipping on 03/29/2021.  4.  Left MCA ruptured aneurysm in 06//2020, status post coiling with associated strokes at that time and seizure. 5.  Status post cholecystectomy. 6.  Status post stent placement and removal.    REVIEW OF SYSTEMS:  As per past medical history or HPI, otherwise reviewed and negative. MEDICATIONS AT HOME:  1.  Keppra 500 mg b.i.d.  2.  Aspirin 81 mg a day. 3.  Lipitor 40 mg a day. 3.  Linzess. 4.  Lisinopril. 5.  Percocet. 6.  Hydralazine. ALLERGIES:  NONE. SOCIAL HISTORY:  She lives currently in [place] with her mom due to her medical issues, has her own home typically. 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 No tobacco, alcohol, or drug use. FAMILY HISTORY:  Mom with hypertension. Dad with lung cancer. Son is healthy. No history of aneurysms in the family.     PHYSICAL EXAMINATION:  VITAL SIGNS:  Blood pressure 123/67, pulse 59, respiratory rate 13, saturating 97% on room air, temperature is 98.5, BMI of 29.9.  GENERAL:  She is a well-nourished and well-developed, healthy-appearing female, lying in bed, in no distress. CARDIAC:  Her heart has regular rate and rhythm without murmurs, gallops, or rubs. Carotids are 2+. No bruits. EXTREMITIES:  Warm. No edema. She has 2+ radial pulses. NEUROLOGICAL EXAMINATION:  Mental status, she is alert. She is oriented and appropriate. She does have some mild word finding, which I believe is her new baseline since her previous initial presentation in 06/2020. No dysarthria. Cranial nerve exam shows no facial asymmetry or ptosis. Her extraocular eye movements are intact without diplopia or nystagmus. Her visual fields are full. Her pupils are equally round and reactive. Her tongue is midline. Her palate elevates symmetrically. Her strength, sensation, and hearing intact. Trapezius and sternocleidomastoid are 5/5. Motor examination is 5/5 throughout except right  is 4+/5. No pronator drift. No tremor. Sensory exam was intact to light touch and pinprick throughout. Reflexes are symmetric. Toes downgoing. Her coordination was intact to finger-to-nose and rapid alternating movements. Gait not assessed at this time. LABORATORY DATA:  Studies and reports reviewed above in the HPI. ASSESSMENT AND PLAN:  This is a 61-year-old right-handed female with complex recent neurological history, presenting initially in 06/2020 with ruptured left middle cerebral artery aneurysm, undergoing coiling, had associated strokes and seizure, was doing well and maintaining on Keppra 500 mg b.i.d. at home, underwent right middle cerebral artery clipping with Neurosurgery on 03/29 and doing well at home until last night when she had episode of slurred speech and mouth twisting to the left followed by vomiting with loss of awareness to the event. Exam is back to baseline with some mild word finding and right upper extremity distal weakness.   Imaging has been unremarkable except for known chronic findings, does still have bilateral subdural/extra-axial fluid collections with some leftward shift. Suspect this was a breakthrough seizure. Agree with increase in [address] by Neurosurgery. EEG reviewed and is unremarkable. I recommend the patient be monitored overnight and if stable in the morning she could be discharged from my standpoint.       Esthela Sal MD      [id number]  D:  04/07/2021 8:29  T:  04/07/2021 10:05  JOB #:  [id number]

## 2021-04-07 NOTE — PROGRESS NOTES
Problem: Patient Education: Go to Patient Education Activity  Goal: Patient/Family Education  Outcome: Progressing Towards Goal     Problem: Patient Education: Go to Patient Education Activity  Goal: Patient/Family Education  Outcome: Progressing Towards Goal     Problem: Falls - Risk of  Goal: *Absence of Falls  Description: Document Jonh Reece Fall Risk and appropriate interventions in the flowsheet. Outcome: Progressing Towards Goal  Note: Fall Risk Interventions:  Mobility Interventions: Bed/chair exit alarm, Patient to call before getting OOB         Medication Interventions: Assess postural VS orthostatic hypotension, Bed/chair exit alarm, Patient to call before getting OOB    Elimination Interventions: Bed/chair exit alarm, Call light in reach, Elevated toilet seat, Stay With Me (per policy)              Problem: Patient Education: Go to Patient Education Activity  Goal: Patient/Family Education  Outcome: Progressing Towards Goal     Problem: Seizure Disorder (Adult)  Goal: *STG: Remains free of seizure activity  Outcome: Progressing Towards Goal  Goal: *STG: Maintains lab values within therapeutic range  Outcome: Progressing Towards Goal  Goal: *STG/LTG: Complies with medication therapy  Outcome: Progressing Towards Goal  Goal: *STG: Remains free of injury during seizure activity  Outcome: Progressing Towards Goal  Goal: *STG: Remains safe in hospital  Outcome: Progressing Towards Goal  Goal: Interventions  Outcome: Progressing Towards Goal     Problem: Pressure Injury - Risk of  Goal: *Prevention of pressure injury  Description: Document Sriram Scale and appropriate interventions in the flowsheet.   Outcome: Progressing Towards Goal  Note: Pressure Injury Interventions:       Moisture Interventions: Minimize layers, Absorbent underpads    Activity Interventions: PT/OT evaluation, Increase time out of bed         Nutrition Interventions: Document food/fluid/supplement intake, Discuss nutritional consult with provider

## 2021-04-07 NOTE — PROGRESS NOTES
Problem: Patient Education: Go to Patient Education Activity  Goal: Patient/Family Education  Outcome: Resolved/Met     Problem: Patient Education: Go to Patient Education Activity  Goal: Patient/Family Education  Outcome: Resolved/Met     Problem: Falls - Risk of  Goal: *Absence of Falls  Description: Document Dion Varela Fall Risk and appropriate interventions in the flowsheet.   4/7/2021 1440 by Chela Hill  Outcome: Resolved/Met  4/7/2021 1321 by Chela Hill  Outcome: Progressing Towards Goal  Note: Fall Risk Interventions:  Mobility Interventions: Bed/chair exit alarm, Patient to call before getting OOB, OT consult for ADLs, Communicate number of staff needed for ambulation/transfer, PT Consult for mobility concerns, PT Consult for assist device competence, Strengthening exercises (ROM-active/passive)         Medication Interventions: Bed/chair exit alarm, Patient to call before getting OOB, Teach patient to arise slowly    Elimination Interventions: Bed/chair exit alarm, Call light in reach, Patient to call for help with toileting needs, Stay With Me (per policy), Toilet paper/wipes in reach, Toileting schedule/hourly rounds              Problem: Patient Education: Go to Patient Education Activity  Goal: Patient/Family Education  4/7/2021 1440 by Chela Hill  Outcome: Resolved/Met  4/7/2021 1321 by Chela Hill  Outcome: Progressing Towards Goal     Problem: Seizure Disorder (Adult)  Goal: *STG: Remains free of seizure activity  4/7/2021 1440 by Chela Hill  Outcome: Resolved/Met  4/7/2021 1321 by Chela Hill  Outcome: Progressing Towards Goal  Goal: *STG: Maintains lab values within therapeutic range  4/7/2021 1440 by Chela Hill  Outcome: Resolved/Met  4/7/2021 1321 by Chela Hill  Outcome: Progressing Towards Goal  Goal: *STG/LTG: Complies with medication therapy  4/7/2021 1440 by Chela Hill  Outcome: Resolved/Met  4/7/2021 1321 by Chela Hill  Outcome: Progressing Towards Goal  Goal: *STG: Remains free of injury during seizure activity  4/7/2021 1440 by Charlotte Case  Outcome: Resolved/Met  4/7/2021 1321 by Charlotte Case  Outcome: Progressing Towards Goal  Goal: *STG: Remains safe in hospital  4/7/2021 1440 by Charlotte Case  Outcome: Resolved/Met  4/7/2021 1321 by Charlotte Case  Outcome: Progressing Towards Goal  Goal: Interventions  4/7/2021 1440 by Charlotte Case  Outcome: Resolved/Met  4/7/2021 1321 by Charlotte Case  Outcome: Progressing Towards Goal     Problem: Pressure Injury - Risk of  Goal: *Prevention of pressure injury  Description: Document Sriram Scale and appropriate interventions in the flowsheet. 4/7/2021 1440 by Charlotte Case  Outcome: Resolved/Met  4/7/2021 1321 by Charlotte Case  Outcome: Progressing Towards Goal  Note: Pressure Injury Interventions:       Moisture Interventions: Absorbent underpads, Assess need for specialty bed, Check for incontinence Q2 hours and as needed, Minimize layers, Offer toileting Q_hr, Limit adult briefs, Maintain skin hydration (lotion/cream), Moisture barrier    Activity Interventions: Assess need for specialty bed, Pressure redistribution bed/mattress(bed type), PT/OT evaluation, Increase time out of bed    Mobility Interventions: Assess need for specialty bed, Float heels, HOB 30 degrees or less, Pressure redistribution bed/mattress (bed type), PT/OT evaluation, Turn and reposition approx.  every two hours(pillow and wedges)    Nutrition Interventions: Document food/fluid/supplement intake    Friction and Shear Interventions: Feet elevated on foot rest, HOB 30 degrees or less, Lift sheet, Minimize layers, Transferring/repositioning devices

## 2021-04-07 NOTE — PROGRESS NOTES
Neurology Progress Note  Skyler Blunt NP      Date of admission: 4/5/2021    Patient: Mirian Freedman MRN: [id number]  SSN: [id number]    YOB: 1970  Age: 48 y.o. Sex: female        Subjective:     HPI: Mirian Freedman is a 48 y.o. right-handed female with recent complex neurological history over the last year. She presented in 06/2020 with subarachnoid hemorrhage with intraventricular extension, found to have ruptured leaking left MCA aneurysm and underwent coiling by [facility]. She was also found to have a right MCA infarct at that time and was just readmitted on 03/29/2021 through 04/01/2021 for clipping by Neurosurgery. During her admission in June, she was noted to have seizure with left frontal epileptiform discharges at that time and was on Keppra and Vimpat. Subsequently, able to taper down to Keppra 500 mg b.i.d., which she is still on at the current time. On the evening of 4/5, patient's mother noted slurring of her speech and her face appeared to be twisted to the left. She then vomited. By mom's description, she probably had loss of awareness during this time. She brought her to the emergency department. At some point during their drive from [facility], the patient began speaking again, but still had twisting of her face. According to the triage note, the patient was unaware that she had vomited. In the ED, a Code Stroke was called her NIH stroke scale score was 1. She was felt to be not a tPA candidate for multiple reasons. According to the ED note, their initial evaluation was left-sided weakness, dysarthria, and expressive aphasia, but no other motor deficits appreciated. Imaging include a CT of the head, which was negative for any acute change or postsurgical changes were noted. Dr. Adam Powell was contacted and reviewed imaging, did not feel this patient was having any leak from prior clipping.  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 The patient was not hypoglycemic. Her white count was normal. Hemoglobin just slightly low at 10.3. CMP, potassium slightly low, otherwise unremarkable. The patient is now back to her baseline. Neurosurgery has already seen the patient and increased her Keppra to 1000 mg b.i.d. and ordered an EEG. Interval 04/07/21:     EEG performed 4/6 was normal. A normal EEG does not definitively exclude a diagnosis of epilepsy or that a seizure occurred. She has not had any further episodes since admission. Exam at baseline. She feels good.         Review of systems  Review of systems negative except as detailed in the HPI, interval, PMH and A&P    Past Medical History:   Diagnosis Date    Aneurysm ([facility] [address] 06/2020    Asthma     Cerebral artery occlusion with cerebral infarction ([address] 06/2020    Cerebral infarction ([address]     Dysphagia     HTN (hypertension)     Joint pain     Leg swelling     Seizures ([address] 06/2020    Snoring     SOB (shortness of breath)     Vision decreased      Past Surgical History:   Procedure Laterality Date    HX CHOLECYSTECTOMY      HX GASTROSTOMY      IR INSERT GASTROSTOMY TUBE [facility]  6/19/2020    NEUROLOGICAL PROCEDURE UNLISTED      Coil Embolization    MI ABDOMEN SURGERY PROC UNLISTED      has peg tube AND REMOVED      Family History   Problem Relation Age of Onset    Hypertension Mother     Stroke Other     Lung Cancer Father     No Known Problems Sister     No Known Problems Brother     No Known Problems Son     Anesth Problems Neg Hx      Social History     Tobacco Use    Smoking status: Never Smoker    Smokeless tobacco: Never Used   Substance Use Topics    Alcohol use: Not Currently      Prior to Admission medications    Medication Sig Start Date End Date Taking? Authorizing Provider   levETIRAcetam (KEPPRA) 500 mg tablet Take 1 Tab by mouth two (2) times a day. 4/1/21   Deniz Pelayo MD   linaCLOtide (Linzess) 72 mcg cap capsule Take 72 mcg by mouth daily. Provider, Historical   atorvastatin (LIPITOR) 40 mg tablet Take 1 Tab by mouth daily. Indications: excessive fat in the blood 2/19/21   Anam Thompson NP   hydrALAZINE (APRESOLINE) 100 mg tablet Take 1 Tab by mouth three (3) times daily. 11/11/20   Anam Thompson NP   lisinopriL (PRINIVIL, ZESTRIL) 40 mg tablet TAKE 1 TABLET BY MOUTH EVERY DAY 10/14/20   Anam Thompson NP   amLODIPine (NORVASC) 10 mg tablet Take 1 Tab by mouth daily. 8/31/20   Anam Thompson NP   Shower Chair XX jose david Pt w/ recent hx of ruptured cerebral aneurysm. At risk for falls in shower. Elderly mother is caretaker. Pt needs help w/ ADLs. 8/24/20   Anam Thompson NP   Blood Pressure Test Kit-Large kit Check B/P 2 x day after pt has been calm for at least 5 minutes. Record B/P and notify office weekly of B/P results. 8/24/20   Anam Thompson NP   aspirin 81 mg chewable tablet 1 Tab by Per NG tube route daily.  7/11/20   Marli Ballard MD     Current Facility-Administered Medications   Medication Dose Route Frequency Provider Last Rate Last Admin    amLODIPine (NORVASC) tablet 10 mg  10 mg Oral DAILY Mohini Vogel MD   10 mg at 04/07/21 0852    atorvastatin (LIPITOR) tablet 40 mg  40 mg Oral DAILY Mohini Vogel MD   40 mg at 04/07/21 0852    hydrALAZINE (APRESOLINE) tablet 100 mg  100 mg Oral TID Mohini Colón MD   100 mg at 04/07/21 0852    lisinopriL (PRINIVIL, ZESTRIL) tablet 40 mg  40 mg Oral DAILY Mohini Vogel MD   40 mg at 04/07/21 0851    oxyCODONE-acetaminophen (PERCOCET) 5-325 mg per tablet 1 Tab  1 Tab Oral Q6H PRN Tevin Beard MD        aspirin chewable tablet 81 mg  81 mg Per NG tube DAILY Mohini Vogel MD   81 mg at 21 0852    acetaminophen (TYLENOL) tablet 650 mg  650 mg Oral Q4H PRN Roxanne Bajwa MD        Or    acetaminophen (TYLENOL) solution 650 mg  650 mg Per NG tube Q4H PRN Mohini Vogel MD        Or    acetaminophen (TYLENOL) suppository 650 mg  650 mg Rectal Q4H PRN Mohini Vogel MD        ondansetron (ZOFRAN) injection 4 mg  4 mg IntraVENous Q6H PRN Mohini Vogel MD        bisacodyL (DULCOLAX) tablet 5 mg  5 mg Oral DAILY PRN Mohini Quan MD        hydrALAZINE (APRESOLINE) 20 mg/mL injection 10 mg  10 mg IntraVENous Q6H PRN Mohini Vogel MD        lactated Ringers infusion  75 mL/hr IntraVENous CONTINUOUS Mohini Vogel MD 75 mL/hr at 21 0017 75 mL/hr at 21 0017    levETIRAcetam (KEPPRA) tablet 1,000 mg  1,000 mg Oral BID Derinda Brady, NP   1,000 mg at 21 0852        No Known Allergies    Objective:     Vitals:    21 0558 21 0851 21 0933 21 0959   BP: 100/60 (!) 114/59 (!) 114/59 114/75   Pulse: 60 68  78   Resp: 15   14   Temp: 97.6 °F (36.4 °C)   97.8 °F (36.6 °C)   SpO2: 98%   98%        Temp (24hrs), Av.1 °F (36.7 °C), Min:97.6 °F (36.4 °C), Max:99 °F (37.2 °C)        O2 Device: Room air         Intake/Output Summary (Last 24 hours) at 2021 1015  Last data filed at 2021 0400  Gross per 24 hour   Intake 1283.75 ml   Output 900 ml   Net 383.75 ml       General: In NAD. Cardiac: RRR  Lungs: Unlabored breathing. Abdomen: Soft/NT/non-distended. Extremities. No edema. Mental status, she is alert. She is oriented and appropriate. She does have some mild word finding, which I believe is her new baseline since her previous initial presentation in 2020. No dysarthria. Cranial nerve exam shows no facial asymmetry or ptosis. Her extraocular eye movements are intact without diplopia or nystagmus. Her visual fields are full.   Her pupils are equally round and reactive. Her tongue is midline. Her palate elevates symmetrically. Her strength, sensation, and hearing intact. Trapezius and sternocleidomastoid are 5/5. Motor examination is 5/5 throughout except right  is 4+/5. No pronator drift. No tremor. Sensory exam was intact to light touch and pinprick throughout. Reflexes are symmetric. Toes downgoing. Her coordination was intact to finger-to-nose and rapid alternating movements. Gait not assessed at this time. Neurologic Exam:  Mental Status:  Alert and oriented x 4. Language:    Mild word finding difficulty. No dysarthria. Cranial Nerves:   Pupils equal, round and reactive to light. Visual fields intact. Extraocular movements intact w/o nystagmus      Facial sensation intact to LT     Facial activation symmetric. Hearing grossly intact. Motor:    Bulk and tone normal.      5/5 in all extremities except R  4+/5. No pronator drift. No involuntary movements. Sensation:    Sensation intact throughout to light touch. Coordination & Gait: No ataxia with finger to nose. Gait deferred    LABS:  Recent Labs     04/06/21 0826 04/05/21 2127   WBC 5.2 6.0   HGB 10.0* 10.3*   HCT 31.5* 32.6*    282     Recent Labs     04/06/21 0826 04/05/21 2127    136   K 4.1 3.4*    105   CO2 28 28   BUN 8 12   CREA 0.66 0.75   GLU 80 115*   CA 8.6 9.2   MG 2.3  --    PHOS 3.5  --      Recent Labs     04/06/21 0826 04/05/21 2127   ALT 12 12   AP 65 71   TBILI 0.3 0.3   TP 6.9 7.2   ALB 3.2* 3.5   GLOB 3.7 3.7     Recent Labs     04/05/21 2127   INR 1.0   PTP 10.1        No results for input(s): PHI, PCO2I, PO2I, HCO3I in the last 72 hours.     Recent Labs     04/06/21  1435 04/06/21 0826 04/05/21 2127   TROIQ <0.05 <0.05 <0.05     Lab Results   Component Value Date/Time    Cholesterol, total 101 04/06/2021 08:26 AM    HDL Cholesterol 80 04/06/2021 08:26 AM    LDL, calculated 6.2 04/06/2021 08:26 AM    Triglyceride 74 04/06/2021 08:26 AM    CHOL/HDL Ratio 1.3 04/06/2021 08:26 AM     Lab Results   Component Value Date/Time    Glucose (POC) 118 (H) 04/05/2021 09:11 PM    Glucose (POC) 159 (H) 03/29/2021 05:53 PM    Glucose (POC) 107 (H) 07/02/2020 05:42 PM    Glucose (POC) 94 07/02/2020 11:42 AM    Glucose (POC) 157 (H) 07/02/2020 06:58 AM     Lab Results   Component Value Date/Time    Specific gravity 1.015 06/15/2020 05:45 AM       Recent Labs     04/06/21  0826   TIBC 265   PSAT 20   FERR 53      Lab Results   Component Value Date/Time    Folate 11.8 04/06/2021 08:26 AM      No results for input(s): PH, PCO2, PO2 in the last 72 hours. Recent Labs     04/06/21  1435 04/06/21  0826 04/05/21  2127   TROIQ <0.05 <0.05 <0.05       Imaging:  No results found. CT Results:  Results from Hospital Encounter encounter on 04/05/21   CT CODE NEURO PERF W CBF    Narrative *PRELIMINARY REPORT*    No significant perfusion defect. Bilateral MCA aneurysm coils. No evidence of  acute thrombosis or significant intracranial stenosis. Preliminary report was provided by Dr. Dasia Cooper, the on-call radiologist, at 12:06  AM.    Final report to follow. *END PRELIMINARY REPORT*    FINAL REPORT:    CLINICAL HISTORY: Slurred speech, left facial droop    EXAMINATION:  CT ANGIOGRAPHY HEAD AND NECK     COMPARISON: CT head 4/5/2021    TECHNIQUE:  Following the uneventful administration of iodinated contrast  material, axial CT angiography of the head and neck was performed. Delayed axial  images through the head were also obtained. Coronal and sagittal reconstructions  were obtained. Manual postprocessing of images was performed. 3-D  Sagittal  maximal intensity projection images were obtained. 3-D Coronal maximal  intensity projections were obtained. CT dose reduction was achieved through use  of a standardized protocol tailored for this examination and automatic exposure  control for dose modulation.  CT perfusion analysis was performed using CT with  contrast administration, including postprocessing of parametric maps with the  determination of cerebral blood flow, cerebral blood volume, and mean transit  time. This study was analyzed by the [address] ai algorithm    FINDINGS:    Delayed contrast-enhanced head CT:    Right lateral frontal subdural collection with peripheral enhancement is again  noted measuring 7 mm in axial diameter. Bifrontal subdural fluid collections  with pneumocephalus unchanged. 4 mm midline shift to the left is unchanged. Stable size and appearance of the ventricles and cortical sulci. Sequelae of  left MCA aneurysm embolization and right MCA aneurysm clipping again noted. The  basal cisterns are patent. Inferior, bifrontal medial hypoattenuation is  unchanged. Stable sequelae of right sided craniotomy. CTA NECK:    Great vessels: Normal arch anatomy with the origins patent. Right subclavian artery: Patent    Left subclavian artery: Patent    Right common carotid artery: Patent    Left common carotid artery: Patent    Cervical right internal carotid artery: Patent with mild proximal  atherosclerosis causing no significant stenosis by NASCET criteria. Cervical left internal carotid artery: Patent with mild proximal atherosclerosis  causing no significant stenosis by NASCET criteria. Right vertebral artery: Patent    Left vertebral artery: Patent    Mild to moderate multilevel cervical spondylosis    CTA HEAD:    Right cavernous internal carotid artery: Patent    Left cavernous internal carotid artery: Patent    Anterior cerebral arteries: Patent    Anterior communicating artery: Patent    Right middle cerebral artery: Patent with sequelae of MCA bifurcation aneurysm  clipping.  No evidence for residual aneurysm opacification    Left middle cerebral artery: Patent with MCA bifurcation aneurysm coil  embolization    Posterior communicating arteries: Patent with stable left-sided infundibulum  versus aneurysm measuring 3 mm    Posterior cerebral arteries: Patent    Basilar artery: Patent with mild atherosclerosis    Distal vertebral arteries: Patent    CT perfusion brain: Technically suboptimal perfusion study    Measurements use NASCET criteria. Impression No evidence for acute large vessel arterial occlusion or significant stenosis. Technically suboptimal perfusion study. Stable intracranial findings as  described above. CTA CODE NEURO HEAD AND NECK W CONT    Narrative *PRELIMINARY REPORT*    No significant perfusion defect. Bilateral MCA aneurysm coils. No evidence of  acute thrombosis or significant intracranial stenosis. Preliminary report was provided by Dr. Sammi Larry, the on-call radiologist, at 12:06  AM.    Final report to follow. *END PRELIMINARY REPORT*    FINAL REPORT:    CLINICAL HISTORY: Slurred speech, left facial droop    EXAMINATION:  CT ANGIOGRAPHY HEAD AND NECK     COMPARISON: CT head 4/5/2021    TECHNIQUE:  Following the uneventful administration of iodinated contrast  material, axial CT angiography of the head and neck was performed. Delayed axial  images through the head were also obtained. Coronal and sagittal reconstructions  were obtained. Manual postprocessing of images was performed. 3-D  Sagittal  maximal intensity projection images were obtained. 3-D Coronal maximal  intensity projections were obtained. CT dose reduction was achieved through use  of a standardized protocol tailored for this examination and automatic exposure  control for dose modulation. CT perfusion analysis was performed using CT with  contrast administration, including postprocessing of parametric maps with the  determination of cerebral blood flow, cerebral blood volume, and mean transit  time. This study was analyzed by the [address] ai algorithm    FINDINGS:    Delayed contrast-enhanced head CT:    Right lateral frontal subdural collection with peripheral enhancement is again  noted measuring 7 mm in axial diameter. Bifrontal subdural fluid collections  with pneumocephalus unchanged. 4 mm midline shift to the left is unchanged. Stable size and appearance of the ventricles and cortical sulci. Sequelae of  left MCA aneurysm embolization and right MCA aneurysm clipping again noted. The  basal cisterns are patent. Inferior, bifrontal medial hypoattenuation is  unchanged. Stable sequelae of right sided craniotomy. CTA NECK:    Great vessels: Normal arch anatomy with the origins patent. Right subclavian artery: Patent    Left subclavian artery: Patent    Right common carotid artery: Patent    Left common carotid artery: Patent    Cervical right internal carotid artery: Patent with mild proximal  atherosclerosis causing no significant stenosis by NASCET criteria. Cervical left internal carotid artery: Patent with mild proximal atherosclerosis  causing no significant stenosis by NASCET criteria. Right vertebral artery: Patent    Left vertebral artery: Patent    Mild to moderate multilevel cervical spondylosis    CTA HEAD:    Right cavernous internal carotid artery: Patent    Left cavernous internal carotid artery: Patent    Anterior cerebral arteries: Patent    Anterior communicating artery: Patent    Right middle cerebral artery: Patent with sequelae of MCA bifurcation aneurysm  clipping. No evidence for residual aneurysm opacification    Left middle cerebral artery: Patent with MCA bifurcation aneurysm coil  embolization    Posterior communicating arteries: Patent with stable left-sided infundibulum  versus aneurysm measuring 3 mm    Posterior cerebral arteries: Patent    Basilar artery: Patent with mild atherosclerosis    Distal vertebral arteries: Patent    CT perfusion brain: Technically suboptimal perfusion study    Measurements use NASCET criteria. Impression No evidence for acute large vessel arterial occlusion or significant stenosis.   Technically suboptimal perfusion study. Stable intracranial findings as  described above. CT CODE NEURO HEAD WO CONTRAST    Narrative Indication:  slurred speech, left facial droop LKW 8 pm     Comparison: CT March 30, 2021    Findings: 5 mm axial images were obtained from the skull base through the  vertex. CT dose reduction was achieved through the use of a standardized protocol  tailored for this examination and automatic exposure control for dose  modulation. Again demonstrated is intracranial pneumocephalus with low-density extra-axial  collections. The volume of pneumocephalus has decreased in the extra-axial  collections have mildly increased. There is slight leftward leftward shift,  measuring 4 mm, likely due to increase in the right temporal extra-axial  collection, which now measures 8 mm in thickness, previously 4 mm. Right MCA  aneurysm clip is noted, with an additional aneurysm clip in the left middle  cranial fossa. There are postsurgical changes in the skull. The scalp drain is  been removed. Impression 1. Postsurgical changes, including intracranial pneumocephalus and small volume  extra-axial fluid. The pneumocephalus is decreased, but the extra-axial fluid  has increased. The small right temporal extra-axial collection has increased in  size and results in mild leftward midline shift of 4 mm. 2. No acute infarct. MRI Results:  Results from [place] encounter on 04/05/21   MRI BRAIN WO CONT    Narrative EXAM: MRI BRAIN WO CONT    INDICATION: Acute CVA. Recent right MCA aneurysm clipping. COMPARISON: CTA head and neck 4/5/2021, MRI brain 6/19/2020. CONTRAST: None. TECHNIQUE:    Multiplanar multisequence acquisition without contrast of the brain. FINDINGS:  Recent postsurgical changes of right pterional craniotomy and right MCA  bifurcation aneurysm clipping.  Stable size of bilateral subdural/extra-axial  fluid collections, right larger than left, measuring up to 8 mm on the right and  4 mm on the left. There is stable mass effect on the adjacent cerebral  hemispheres, right greater than left, with mild 4 mm of leftward midline shift. Small amount of pneumocephalus is stable. The ventricles are normal in size. No evidence of acute infarct. There is mild  encephalomalacia in the bilateral inferior frontal lobes. There is extensive  superficial siderosis throughout the left cerebral hemisphere, and to a lesser  extent in the right cerebral hemisphere and posterior fossa. Postprocedural  changes of left MCA bifurcation aneurysm coiling are noted. There is no  cerebellar tonsillar herniation. Expected arterial flow-voids are present. Retention cyst in the right maxillary sinus. The mastoid air cells and middle  ears are clear. The orbital contents are within normal limits. Impression 1. No evidence of acute infarct. 2. 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 Stable size of bilateral subdural/extra-axial fluid collections, right larger  than left, with stable mass effect resulting in mild 4 mm of leftward midline  shift. 3. 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 Postprocedural changes of left MCA bifurcation aneurysm coiling noted. Results from [place] encounter on 01/03/21   MRA BRAIN WO CONT    Narrative EXAM: MRA BRAIN WO CONT    INDICATION:   MCA aneurysms    COMPARISON:  CTA head 6/18/2020. CONTRAST:  None. TECHNIQUE:    3-D time-of-flight noncontrast MRA of the brain was performed. Multiplanar and  MIP reconstructions were obtained. FINDINGS:  Postprocedural changes of left MCA bifurcation aneurysm coiling. There is no  evidence of residual aneurysm filling. The distal left MCA branches remain  patent without evidence of flow-limiting stenosis.     Stable anteriorly directed right MCA bifurcation aneurysm measuring 4 x 2.5 x 4  mm (series 2 image 71). Stable 3 mm outpouching at the origin of the left  posterior communicating artery (series 2 image 80). There is no evidence of large vessel occlusion or flow-limiting stenosis of the  intracranial internal carotid, anterior cerebral, and middle cerebral arteries. The anterior communicating artery is patent. There is no evidence of large vessel occlusion or flow-limiting stenosis of the  intracranial vertebral arteries, basilar artery, or posterior cerebral arteries. The posterior communicating arteries are patent. Impression IMPRESSION:    1. Status post left MCA bifurcation aneurysm coiling. No evidence of residual  aneurysm filling. 2. Stable 4 mm right MCA bifurcation aneurysm. 3. Stable 3 mm prominent infundibulum versus aneurysm at the origin of the left  posterior communicating artery. Results from [place] encounter on 06/04/20   MRI BRAIN W WO CONT    Narrative EXAM:  MRI BRAIN W WO CONT    INDICATION:    Neuro changes    COMPARISON:  None. CONTRAST: Intravenous gadolinium. TECHNIQUE:    The ventricles are stable in size. Again noted is subarachnoid hemorrhage in the  left sylvian fissure. No definite intraventricular hemorrhage is noted on this  study. Diffusion-weighted imaging demonstrates small foci of diffusion  restriction in the bilateral centrum semiovales as well as the left parietal  lobe posteriorly. On postcontrast imaging, there are patchy areas of  intraparenchymal enhancement associated with some these areas of diffusion  restriction. In addition, there is leptomeningeal enhancement scattered around  the left frontal and parietal lobes. There is no mass effect or midline shift. Again noted is right sphenoid sinus  disease. Fluid in mastoid air cells is present. Impression IMPRESSION:   1. Persistent subarachnoid hemorrhage in the left sylvian fissure.     2.  Patchy areas of enhancement as described. Some of these are associated with  foci of diffusion restriction and likely represent subacute ischemia. The  leptomeningeal enhancement predominantly in the left frontal and parietal lobes  is likely related to subarachnoid hemorrhage and meningeal irritation. Infection  is not entirely excluded but thought less likely. XR Results   Results from [place] encounter on 03/29/21   XR CHEST PORT    Impression No acute pulmonary process. Right central line     Results from Hospital Encounter encounter on 03/17/21   XR CHEST PA LAT    Impression No acute cardiopulmonary disease. Results from [place] encounter on 06/04/20   XR SWALLOW FUNC VIDEO    Impression IMPRESSION: No aspiration. Please see separate report of the department speech  pathology. VAS/US Results (maximum last 3): No results found for this or any previous visit. TTE  06/04/20   ECHO ADULT COMPLETE 06/06/2020 6/6/2020    Narrative · Normal cavity size and systolic function (ejection fraction normal). Mild concentric hypertrophy. Estimated left ventricular ejection fraction   is 60 - 65%. No regional wall motion abnormality noted. · Mildly dilated left atrium.         Signed by: Madhuri Friend MD         EKG  Results for orders placed or performed during the hospital encounter of 04/05/21   EKG, 12 LEAD, INITIAL   Result Value Ref Range    Ventricular Rate 59 BPM    Atrial Rate 59 BPM    P-R Interval 172 ms    QRS Duration 88 ms    Q-T Interval 436 ms    QTC Calculation (Bezet) 431 ms    Calculated P Axis 30 degrees    Calculated R Axis -12 degrees    Calculated T Axis 29 degrees    Diagnosis       Sinus bradycardia  When compared with ECG of 17-MAR-2021 14:00,  No significant change was found  Confirmed by Torri Silva MD ([id number]) on 4/6/2021 9:44:48 PM         Hospital Problems  Date Reviewed: 4/6/2021          Codes Class Noted POA    * (Principal) Focal seizure ([facility] [address] ICD-10-CM: R56.9  ICD-9-CM: 780.39  4/6/2021 Yes              Assessment/Plan:     Zahra Bailey is a 48 y.o. right-handed female with complex recent neurological history, presenting initially in 06/2020 with ruptured left middle cerebral artery aneurysm, undergoing coiling, had associated strokes and seizure, was doing well and maintaining on Keppra 500 mg b.i.d. at home, underwent right middle cerebral artery clipping with Neurosurgery on 03/29 and doing well at home until last night when she had episode of slurred speech and mouth twisting to the left followed by vomiting with loss of awareness part of this event. Imaging has been unremarkable except for known chronic findings, does still have bilateral subdural/extra-axial fluid collections with some leftward shift. Suspect this was a breakthrough seizure. Agree with increase in [address] by Neurosurgery. Electroencephalogram was unremarkable. Exam now is back to baseline with some mild word finding and right hand weakness. She can be discharged from neurological standpoint  Continue 1000 mg Keppra BID  F/U in neurology clinic in 1 month        Thank you for this consult.     Signed By: José Miguel Gordon NP     April 7, 2021 10:15 AM

## 2021-04-07 NOTE — PROGRESS NOTES
Bedside and Verbal shift change report given to Yana (oncoming nurse) by Katherine Quezada (offgoing nurse). Report included the following information SBAR, Kardex, ED Summary, Procedure Summary, Intake/Output, MAR, Cardiac Rhythm NSR, Quality Measures and Dual Neuro Assessment.

## 2021-04-07 NOTE — DISCHARGE SUMMARY
Discharge Summary       PATIENT ID: Harshad Locke  MRN: [id number]   YOB: 1970    DATE OF ADMISSION: 4/5/2021  9:07 PM    DATE OF DISCHARGE: 4/7/2021    PRIMARY CARE PROVIDER: Flash Ovalles DO     ATTENDING PHYSICIAN: Jose Elias Lucero MD   DISCHARGING PROVIDER: Jose Elias Lucero MD    To contact this individual call [phone] and ask the  to page. If unavailable ask to be transferred the Adult Hospitalist Department. CONSULTATIONS: IP CONSULT TO NEUROSURGERY  IP CONSULT TO NEUROLOGY    PROCEDURES/SURGERIES: * No surgery found *    ADMITTING DIAGNOSES & HOSPITAL COURSE:   Per notes:     Per H and P     CHIEF COMPLAINT:  Slurred speech and left facial droop.     HISTORY OF PRESENT ILLNESS:  This is a 49-year-old woman with past medical history significant for hypertension; dyslipidemia; seizure disorder; cerebral aneurysm, status post craniotomy with clipping, who was in her usual state of health until the day of her presentation at the emergency room when family member noted that the patient has left facial droop as well as slurred speech. The patient also complained of vomiting. The patient was brought to the emergency room for evaluation of these symptoms. When the patient arrived at the emergency room, code stroke was called. CT of the head was performed. CTA of the head and neck was also performed which did not show any acute pathology. The emergency room physician also consulted neurosurgeon on-call who advised admission to the hospital.  The patient was recently admitted to this hospital from 03/29/2021 to 04/01/2021. The patient was admitted with right MCA aneurysm and underwent craniotomy with clipping. The patient was discharged to home following the procedure. She was doing relatively well at home until when the patient developed this sudden onset of slurred speech and left facial droop. The patient denies fever, rigors, or chills.   No sick contact or contact with any person with COVID-19 virus infection.       Per neuro surg;      HPI: The patient is well-known to us. She underwent a craniotomy for right MCA aneurysm clipping with Dr. Kimberly Cruz on 03/29/21. She discharged from the hospital without complications on 28/42/64. She was discharged to home on Keppra 500 mg. She came to the ER for evaluation yesterday due to vomiting and was noted to have a left facial droop and slurred speech. She had a head CT which revealed post-surgical changes and a small increase in the size of a extra-axial fluid collection. Her CTA revealed no significant perfusion defect and no evidence of acute thrombosis or significant intracranial stenosis. A brain MRI was completed today and was negative for acute infarct.      Subjective: The patient's EEG did not show any evidence of epileptiform discharges at the time of exam.      Assessment/Plan:      Chris Espinal is a 48 y.o. right-handed female with complex recent neurological history, presenting initially in 06/2020 with ruptured left middle cerebral artery aneurysm, undergoing coiling, had associated strokes and seizure, was doing well and maintaining on Keppra 500 mg b.i.d. at home, underwent right middle cerebral artery clipping with Neurosurgery on 03/29 and doing well at home until last night when she had episode of slurred speech and mouth twisting to the left followed by vomiting with loss of awareness part of this event. Nataly Santos has been unremarkable except for known chronic findings, does still have bilateral subdural/extra-axial fluid collections with some leftward shift. Suspect this was a breakthrough seizure. Agree with increase in [address] by Neurosurgery. Electroencephalogram was unremarkable.  Exam now is back to baseline with some mild word finding and right hand weakness.     She can be discharged from neurological standpoint  Continue 1000 mg Keppra BID  F/U in neurology clinic in 1 month           Thank you for this consult.     Signed By: Nila Lala NP      April 7, 2021 10:15 AM         Interval history / Subjective:      4/7/2021 :   · No issues over night  · EEG pending   · No new neuor symptoms  · Pt anticipates discharge back to living with her mother , soon to move to her own home   · [place] as below          Assessment & Plan:       1. Focal seizure               - MRI is negative for stroke and symptoms correlate to the same side as surgery.             -  Will get EEG              -  Increase Keppra to 1000 mg bid              -  neuro checks q4h  2. Hx crani with right MCA aneurysm clipping              -  s/p crani 03/29     Activity: up with assist  DVT ppx: SCDs  Dispo:  home on new dose of Keppra           DISCHARGE DIAGNOSES / PLAN:      1. Focal seizure  2. [place] Nan as per above      ADDITIONAL CARE RECOMMENDATIONS:   na     PENDING TEST RESULTS:   At the time of discharge the following test results are still pending: na    FOLLOW UP APPOINTMENTS:    Follow-up Information     Follow up With Specialties Details Why Contact Info    Kenya Cuevas NP Nurse Practitioner Go on 4/12/2021 at 9:00 for post-op appointment [address] [facility] [address] [facility]      Loan Melody, DO Family Medicine  As needed [address]  [phone]               DIET: Resume previous diet       ACTIVITY: Activity as tolerated    WOUND CARE: na    EQUIPMENT needed: na      DISCHARGE MEDICATIONS:  Current Discharge Medication List      CONTINUE these medications which have CHANGED    Details   levETIRAcetam 1,000 mg tablet Take 1 Tab by mouth two (2) times a day. Qty: 60 Tab, Refills: 0         CONTINUE these medications which have NOT CHANGED    Details   linaCLOtide (Linzess) 72 mcg cap capsule Take 72 mcg by mouth daily. atorvastatin (LIPITOR) 40 mg tablet Take 1 Tab by mouth daily.  Indications: excessive fat in the blood  Qty: 90 Tab, Refills: 3    Associated Diagnoses: Hypertriglyceridemia      hydrALAZINE (APRESOLINE) 100 mg tablet Take 1 Tab by mouth three (3) times daily. Qty: 270 Tab, Refills: 1    Associated Diagnoses: Essential hypertension      lisinopriL (PRINIVIL, ZESTRIL) 40 mg tablet TAKE 1 TABLET BY MOUTH EVERY DAY  Qty: 90 Tab, Refills: 1    Associated Diagnoses: Essential hypertension      amLODIPine (NORVASC) 10 mg tablet Take 1 Tab by mouth daily. Qty: 90 Tab, Refills: 3    Associated Diagnoses: Essential hypertension      Shower Chair XX jose david Pt w/ recent hx of ruptured cerebral aneurysm. At risk for falls in shower. Elderly mother is caretaker. Pt needs help w/ ADLs. Qty: 1 Each, Refills: 0    Associated Diagnoses: At high risk for falls      Blood Pressure Test Kit-Large kit Check B/P 2 x day after pt has been calm for at least 5 minutes. Record B/P and notify office weekly of B/P results. Qty: 1 Kit, Refills: 0    Associated Diagnoses: Essential hypertension      aspirin 81 mg chewable tablet 1 Tab by Per NG tube route daily. Qty: 30 Tab, Refills: 0               NOTIFY YOUR PHYSICIAN FOR ANY OF THE FOLLOWING:   Fever over 101 degrees for 24 hours. Chest pain, shortness of breath, fever, chills, nausea, vomiting, diarrhea, change in mentation, falling, weakness, bleeding. Severe pain or pain not relieved by medications. Or, any other signs or symptoms that you may have questions about. DISPOSITION:    Home With:   OT  PT  HH  RN       Long term SNF/Inpatient Rehab   x Independent/assisted living    Hospice    Other:       PATIENT CONDITION AT DISCHARGE:     Functional status    Poor     Deconditioned    x Independent      Cognition    x Lucid     Forgetful     Dementia      Catheters/lines (plus indication)    Martinez     PICC     PEG    x None      Code status   x  Full code     DNR      PHYSICAL EXAMINATION AT DISCHARGE:  General:          Alert, cooperative, no distress, appears stated age.      HEENT:           Atraumatic, anicteric sclerae, pink conjunctivae                          No oral ulcers, mucosa moist, throat clear, dentition fair  Neck:               Supple, symmetrical  Lungs:             Clear to auscultation bilaterally. No Wheezing or Rhonchi. No rales. Chest wall:      No tenderness  No Accessory muscle use. Heart:              Regular  rhythm,  No  murmur   No edema  Abdomen:        Soft, non-tender. Not distended. Bowel sounds normal  Extremities:     No cyanosis. No clubbing,                            Skin turgor normal, Capillary refill normal  Skin:                Not pale. Not Jaundiced  No rashes   Psych:             Not anxious or agitated.   Neurologic:      Alert, moves all extremities, answers questions appropriately and responds to commands       CHRONIC MEDICAL DIAGNOSES:  Problem List as of 4/7/2021 Date Reviewed: 4/7/2021          Codes Class Noted - Resolved    Cerebral aneurysm ICD-10-CM: I67.1  ICD-9-CM: 437.3  3/29/2021 - Present        BMI 26.0-26.9,adult ICD-10-CM: I00.77  ICD-9-CM: V85.22  11/14/2020 - Present        Hypokalemia ICD-10-CM: E87.6  ICD-9-CM: 276.8  11/14/2020 - Present        History of subarachnoid hemorrhage - Left MCA aneurysm rupture 6/4/2020 ICD-10-CM: Z86.79  ICD-9-CM: V12.59  10/12/2020 - Present        Seizure ([address] ICD-10-CM: R56.9  ICD-9-CM: 780.39  10/12/2020 - Present        Hypertriglyceridemia ICD-10-CM: E78.1  ICD-9-CM: 272.1  10/3/2020 - Present        At high risk for falls ICD-10-CM: Z91.81  ICD-9-CM: V15.88  8/24/2020 - Present        Essential hypertension ICD-10-CM: I10  ICD-9-CM: 401.9  8/18/2020 - Present        History of anemia ICD-10-CM: Z86.2  ICD-9-CM: V12.3  8/18/2020 - Present        Carpal tunnel syndrome ICD-10-CM: G56.00  ICD-9-CM: 354.0  8/18/2020 - Present        Osteoarthritis ICD-10-CM: M19.90  ICD-9-CM: 715.90  8/18/2020 - Present        History of abnormal cervical Pap smear ICD-10-CM: Z87.42  ICD-9-CM: V13.29  8/18/2020 - Present    Overview Signed 8/18/2020  8:20 AM by Isai Acevedo NP     [facility]-2/09/2017             Hypoxic ischemic encephalopathy (HIE), unspecified severity ICD-10-CM: P91.60  ICD-9-CM: 768.70  6/15/2020 - Present        RIGHT middle cerebral artery aneurysm ICD-10-CM: I67.1  ICD-9-CM: 437.3  6/4/2020 - Present        * (Principal) RESOLVED: Focal seizure ([facility] ICD-10-CM: R56.9  ICD-9-CM: 780.39  4/6/2021 - 4/7/2021        RESOLVED: Screening for malignant neoplasm of breast ICD-10-CM: Z12.39  ICD-9-CM: V76.10  10/3/2020 - 11/2/2020        RESOLVED: Screening for malignant neoplasm of colon ICD-10-CM: Z12.11  ICD-9-CM: V76.51  8/18/2020 - 9/17/2020        RESOLVED: BMI 34.0-34.9,adult ICD-10-CM: J00.39  ICD-9-CM: V85.34  8/18/2020 - 11/14/2020        RESOLVED: Respiratory failure ([facility] ICD-10-CM: J96.90  ICD-9-CM: 518.81  6/15/2020 - 7/10/2020        RESOLVED: SAH (subarachnoid hemorrhage) ([facility] ICD-10-CM: I60.9  ICD-9-CM: 773  6/4/2020 - 7/10/2020        RESOLVED: Subarachnoid hemorrhage from middle cerebral artery aneurysm, left (HCC) ICD-10-CM: I60.12  ICD-9-CM: 430  6/4/2020 - 7/10/2020        RESOLVED: Cerebral vasospasm ICD-10-CM: A32.244  ICD-9-CM: 435.9  6/4/2020 - 7/10/2020        RESOLVED: Anterior communicating artery aneurysm ICD-10-CM: I67.1  ICD-9-CM: 437.3  6/4/2020 - 6/12/2020              Greater than 20  minutes were spent with the patient on counseling and coordination of care    Signed:   Nadege Harris MD  4/7/2021  12:39 PM

## 2021-04-07 NOTE — PROGRESS NOTES
Neurosurgery Progress Note  Fabby Spain, [facility]          Admit Date: 2021   LOS: 1 day        Daily Progress Note: 2021    HPI: The patient is well-known to us. She underwent a craniotomy for right MCA aneurysm clipping with Dr. Ila Cancino on 21. She discharged from the hospital without complications on . She was discharged to home on Keppra 500 mg. She came to the ER for evaluation yesterday due to vomiting and was noted to have a left facial droop and slurred speech. She had a head CT which revealed post-surgical changes and a small increase in the size of a extra-axial fluid collection. Her CTA revealed no significant perfusion defect and no evidence of acute thrombosis or significant intracranial stenosis. A brain MRI was completed today and was negative for acute infarct. Subjective: The patient's EEG did not show any evidence of epileptiform discharges at the time of exam.     Denies numbness, tingling, chest pain, leg pain, nausea, vomiting, difficulty swallowing, and dyspnea. Objective:     Vital signs  Temp (24hrs), Av.1 °F (36.7 °C), Min:97.6 °F (36.4 °C), Max:99 °F (37.2 °C)   No intake/output data recorded.  1901 -  0700  In: 1283.8 [I.V.:1283.8]  Out: 900 [Urine:900]    Visit Vitals  /75 (BP 1 Location: Left upper arm, BP Patient Position: At rest)   Pulse 78   Temp 97.8 °F (36.6 °C)   Resp 14   Ht 5' 2\" (1.575 m)   Wt 74.2 kg (163 lb 9.3 oz)   SpO2 98%   BMI 29.92 kg/m²      O2 Device: Room air     Pain control  Pain Assessment  Pain Scale 1: Numeric (0 - 10)  Pain Intensity 1: 0    PT/OT  Gait     Gait  Base of Support: Widened  Gait Abnormalities: Decreased step clearance  Ambulation - Level of Assistance: Contact guard assistance  Distance (ft): 125 Feet (ft)  Assistive Device: Gait belt           Physical Exam:  Gen:NAD. Neuro: A&Ox3. Follows commands. Speech clear. Affect normal.  PERRL. EOMI. Face symmetric.  Tongue midline. JOLLY spontaneously. Strength 5/5 in UE and LE BL. Negative drift. Gait deferred. Skin: Right pterional incision C/D/I with sutures in place. MRI brain without contrast on 04/06/21 shows no evidence of acute infarct. 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 Stable size of bilateral subdural/extra-axial fluid collections, right larger than left, with stable mass effect resulting in mild 4 mm of leftward midline shift. 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 Postprocedural changes of left MCA bifurcation aneurysm coiling noted.     24 hour results:    Recent Results (from the past 24 hour(s))   TROPONIN I    Collection Time: 04/06/21  2:35 PM   Result Value Ref Range    Troponin-I, Qt. <0.05 <0.05 ng/mL   ECHO ADULT COMPLETE    Collection Time: 04/07/21  9:29 AM   Result Value Ref Range    IVSd 1.19 (A) 0.60 - 0.90 cm    LVIDd 4.17 3.90 - 5.30 cm    LVIDs 1.95 cm    LVPWd 1.16 (A) 0.60 - 0.90 cm    LVOT Peak Gradient 8.47 mmHg    LVOT Peak Velocity 145.53 cm/s    RVIDd 4.55 cm    RVSP 22.69 mmHg    Left Atrium Major Erie 4.04 cm    LA Area 4C 23.01 cm2    LA Vol 4C 73.05 (A) 22.00 - 52.00 mL    Left Atrium to Aortic Root Ratio 0.93     Est. RA Pressure 5.00 mmHg    AoV PG 10.94 mmHg    Aortic Valve Systolic Peak Velocity 770.11 cm/s    MV A Angel 96.36 cm/s    MV E Angel 103.08 cm/s    LV E' Lateral Velocity 7.89 cm/s    LV E' Septal Velocity 7.79 cm/s    Tapse 2.51 (A) 1.50 - 2.00 cm    Triscuspid Valve Regurgitation Peak Gradient 17.69 mmHg    TR Max Velocity 210.30 cm/s    MV E/A 1.07     LV Mass .9 67.0 - 162.0 g    LV Mass AL Index 97.4 43.0 - 95.0 g/m2    E/E' lateral 13.06     E/E' septal 13.23     E/E' ratio (averaged) 13.15     Left Atrium Minor Axis 2.30 cm    LA Vol Index 41.62 16.00 - 28.00 ml/m2          Assessment:     Principal Problem:    Focal seizure ([address] (4/6/2021)        Plan:   1. Focal seizure    - Pt's presentation sounds like it could be related to cortical irritation and maybe a focal seizure as MRI is negative for stroke and symptoms correlate to the same side as surgery. - EEG negative for seizures. - Cont Keppra to 1000 mg bid   - neuro checks q4h   - ok to d/c to home from NSGY standpoint. Pt has follow-up appt scheduled already with Dr. Dion Beaver NP in the office Kenyon Patton NP.   - she will follow-up with neurology as well  2. Hx crani with right MCA aneurysm clipping   - s/p crani 03/29    Activity: up with assist  DVT ppx: SCDs  Dispo: ok to d/c to home from our standpoint    Will discuss plan with Dr. Olga Bey.  Discussed with neurology      Noelia Fleischer, NP

## 2021-04-07 NOTE — PROGRESS NOTES
[facility] Adult  Hospitalist Group                                                                                          Hospitalist Progress Note  Jason Grijalva MD  Answering service: [phone] OR [phone] from in house phone        Date of Service:  2021  NAME:  Jessie Schmid  :  1970  MRN:  [id number]      Admission Summary:   As so pertinently stated by neurology: \"HISTORY/INDICATION:  Pt is a 47 yo female with complex recent neurological history over the last year, with left MCA ruptured aneurysm with SAH and IVH with left MCA CVA in , had coiling of right MCA aneurysm in March, today had acute left face droop and dysarthria, then vomited, probable ANITA. EEG to assess for underlying seizures. \"    Interval history / Subjective:     2021 :    No issues over night   EEG pending    No new neuor symptoms   Pt anticipates discharge back to living with her mother , soon to move to her own home   [place] as below        Assessment & Plan:      1. Focal seizure               - MRI is negative for stroke and symptoms correlate to the same side as surgery. -  Will get EEG              -  Increase Keppra to 1000 mg bid              -  neuro checks q4h  2. Hx crani with right MCA aneurysm clipping              -  s/p crani      Activity: up with assist  DVT ppx: SCDs  Dispo: tbd     Hospital Problems  Date Reviewed: 2021          Codes Class Noted POA    * (Principal) Focal seizure ([facility] [address] ICD-10-CM: R56.9  ICD-9-CM: 780.39  2021 Yes                  After personally interviewing pt at bedside the following is noted on 2021 :    Review of Systems   Constitutional: Negative. HENT: Negative. Eyes: Negative. Cardiovascular: Negative. Musculoskeletal: Negative. Skin: Negative. Neurological: Negative.                I had a face to face encounter with patient on 2021 at bedside for the following physical exam:     PHYSICAL EXAM:    Visit Vitals  /60   Pulse 60   Temp 97.6 °F (36.4 °C)   Resp 15   Wt 74.2 kg (163 lb 9.3 oz)   SpO2 98%   BMI 29.92 kg/m²          Physical Exam  Constitutional:       Appearance: Normal appearance. HENT:      Head: Normocephalic and atraumatic. Right Ear: External ear normal.      Left Ear: External ear normal.      Nose: Nose normal.      Mouth/Throat:      Mouth: Mucous membranes are moist.      Pharynx: Oropharynx is clear. Eyes:      Extraocular Movements: Extraocular movements intact. Conjunctiva/sclera: Conjunctivae normal.      Pupils: Pupils are equal, round, and reactive to light. Neck:      Musculoskeletal: Normal range of motion. Cardiovascular:      Rate and Rhythm: Normal rate. Pulmonary:      Effort: Pulmonary effort is normal.   Abdominal:      General: Bowel sounds are normal.   Skin:     General: Skin is dry. Neurological:      General: No focal deficit present. Mental Status: Mental status is at baseline. Data Review:    Review and/or order of clinical lab test      Labs:     Recent Labs     04/06/21 0826 04/05/21 2127   WBC 5.2 6.0   HGB 10.0* 10.3*   HCT 31.5* 32.6*    282     Recent Labs     04/06/21 0826 04/05/21 2127    136   K 4.1 3.4*    105   CO2 28 28   BUN 8 12   CREA 0.66 0.75   GLU 80 115*   CA 8.6 9.2   MG 2.3  --    PHOS 3.5  --      Recent Labs     04/06/21 0826 04/05/21 2127   ALT 12 12   AP 65 71   TBILI 0.3 0.3   TP 6.9 7.2   ALB 3.2* 3.5   GLOB 3.7 3.7     Recent Labs     04/05/21 2127   INR 1.0   PTP 10.1      Recent Labs     04/06/21 0826   TIBC 265   PSAT 20   FERR 53      Lab Results   Component Value Date/Time    Folate 11.8 04/06/2021 08:26 AM      No results for input(s): PH, PCO2, PO2 in the last 72 hours.   Recent Labs     04/06/21  1435 04/06/21  0826 04/05/21 2127   TROIQ <0.05 <0.05 <0.05     Lab Results   Component Value Date/Time    Cholesterol, total 101 04/06/2021 08:26 AM HDL Cholesterol 80 04/06/2021 08:26 AM    LDL, calculated 6.2 04/06/2021 08:26 AM    Triglyceride 74 04/06/2021 08:26 AM    CHOL/HDL Ratio 1.3 04/06/2021 08:26 AM     Lab Results   Component Value Date/Time    Glucose (POC) 118 (H) 04/05/2021 09:11 PM    Glucose (POC) 159 (H) 03/29/2021 05:53 PM    Glucose (POC) 107 (H) 07/02/2020 05:42 PM    Glucose (POC) 94 07/02/2020 11:42 AM    Glucose (POC) 157 (H) 07/02/2020 06:58 AM     Lab Results   Component Value Date/Time    Specific gravity 1.015 06/15/2020 05:45 AM         Medications Reviewed:     Current Facility-Administered Medications   Medication Dose Route Frequency    amLODIPine (NORVASC) tablet 10 mg  10 mg Oral DAILY    atorvastatin (LIPITOR) tablet 40 mg  40 mg Oral DAILY    hydrALAZINE (APRESOLINE) tablet 100 mg  100 mg Oral TID    lisinopriL (PRINIVIL, ZESTRIL) tablet 40 mg  40 mg Oral DAILY    oxyCODONE-acetaminophen (PERCOCET) 5-325 mg per tablet 1 Tab  1 Tab Oral Q6H PRN    aspirin chewable tablet 81 mg  81 mg Per NG tube DAILY    acetaminophen (TYLENOL) tablet 650 mg  650 mg Oral Q4H PRN    Or    acetaminophen (TYLENOL) solution 650 mg  650 mg Per NG tube Q4H PRN    Or    acetaminophen (TYLENOL) suppository 650 mg  650 mg Rectal Q4H PRN    ondansetron (ZOFRAN) injection 4 mg  4 mg IntraVENous Q6H PRN    bisacodyL (DULCOLAX) tablet 5 mg  5 mg Oral DAILY PRN    hydrALAZINE (APRESOLINE) 20 mg/mL injection 10 mg  10 mg IntraVENous Q6H PRN    lactated Ringers infusion  75 mL/hr IntraVENous CONTINUOUS    levETIRAcetam (KEPPRA) tablet 1,000 mg  1,000 mg Oral BID     ______________________________________________________________________  EXPECTED LENGTH OF STAY: - - -  ACTUAL LENGTH OF STAY:          1                 Oneida Ramos MD

## 2021-04-07 NOTE — PROGRESS NOTES
Physician Progress Note      Paco Adame  CSN #:                  [id number]  :                       1970  ADMIT DATE:       2021 9:07 PM  [address] [place] [place] DATE:  RESPONDING  PROVIDER #:        Ludmila Llanes MD          QUERY TEXT:    Dear Attending,    Pt admitted with suspected acute CVA per the H&P, subsequently noted to be a focal seizure in Hospitalist progress note. Pt noted to have prior CVA in  with SAH and IVH. Pt was also noted to have a history of a right MCA aneurysm s/p craniotomy with MCA aneurysm clipping on 3/29/21. Documentation noted of suspect this was a breakthrough seizure in the Neurology consult note on 21. Neurosurgery documented that the patient's presentation sounds like it could be related to cortical irritation and maybe a focal seizure, as the MRI is negative for stroke and symptoms correlate to the same side as surgery, per progress note on 21. If possible, please document in progress notes and discharge summary if you are evaluating and/or treating any of the following:     The medical record reflects the following:  Risk Factors: Seizure; PMH of CVA, SAH and IVH; PSH MCA aneurysm clipping  Clinical Indicators:  - Neurosurgery PN  - Focal seizure - Pt's presentation sounds like it could be related to cortical irritation and maybe a focal seizure as MRI is negative for stroke and symptoms correlate to the same side as surgery.  - Neurology consult note - 70-year-old right-handed female with complex recent neurological history, presenting initially in 2020 with ruptured left middle cerebral artery aneurysm, undergoing coiling, had associated strokes and seizure, was doing well and maintaining on Keppra 500 mg b.i.d. at home, underwent right middle cerebral artery clipping with Neurosurgery on  and doing well at home until last night when she had episode of slurred speech and mouth twisting to the left followed by vomiting with loss of awareness part of this event. Exam now is back to baseline with some mild word finding and right upper extremity distal weakness. Imaging has been unremarkable except for known chronic findings, does still have bilateral subdural/extra-axial fluid collections with some leftward shift. Suspect this was a breakthrough seizure. - Electroencephalogram reviewed and is unremarkable. - Hospitalist PN 4/6 - Focal seizure - MRI is negative for stroke and symptoms correlate to the same side as surgery. - Hx crani with right MCA aneurysm clipping - s/p crani 03/29  - MRI 4/6:  1. No evidence of acute infarct. 2. 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 Stable size of bilateral subdural/extra-axial fluid collections, right larger than left, with stable mass effect resulting in mild 4 mm of leftward midline shift. 3. 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 Postprocedural changes of left MCA bifurcation aneurysm coiling noted. - EEG = Normal awake and asleep EEG  Treatment: Neurosurgery consult, Neurology consult, [address], MRI, EEG    Thank-you,  Nyasia Lake RN, Alma Johnss  Clinical   [phone]  Options provided:  -- Seizure is a sequela of prior CVA, acute CVA ruled out after study  -- Seizure due to cortical irritation related to previous MCA clipping surgical procedure on 3/29/21, acute CVA ruled out after study  -- Other - I will add my own diagnosis  -- Disagree - Not applicable / Not valid  -- Disagree - Clinically unable to determine / Unknown  -- Refer to Clinical Documentation Reviewer    PROVIDER RESPONSE TEXT:    Seizure is a sequela of prior CVA, acute CVA was ruled out after study.     Query created by: Geoff Jacobo on 4/7/2021 11:31 AM      Electronically signed by:  Raiza Mijares MD 4/7/2021 12:19 PM

## 2021-04-08 ENCOUNTER — OFFICE VISIT (OUTPATIENT)
Dept: FAMILY MEDICINE CLINIC | Age: 51
End: 2021-04-08
Payer: COMMERCIAL

## 2021-04-08 VITALS
TEMPERATURE: 98.2 F | HEART RATE: 72 BPM | RESPIRATION RATE: 18 BRPM | OXYGEN SATURATION: 98 % | SYSTOLIC BLOOD PRESSURE: 124 MMHG | DIASTOLIC BLOOD PRESSURE: 85 MMHG | WEIGHT: 167 LBS | BODY MASS INDEX: 30.73 KG/M2 | HEIGHT: 62 IN

## 2021-04-08 DIAGNOSIS — Z86.79 HISTORY OF SUBARACHNOID HEMORRHAGE: ICD-10-CM

## 2021-04-08 DIAGNOSIS — K59.09 CHRONIC CONSTIPATION: ICD-10-CM

## 2021-04-08 DIAGNOSIS — R56.9 SEIZURE (HCC): Primary | ICD-10-CM

## 2021-04-08 PROCEDURE — 99214 OFFICE O/P EST MOD 30 MIN: CPT | Performed by: NURSE PRACTITIONER

## 2021-04-08 NOTE — PROGRESS NOTES
Chief Complaint   Patient presents with    Follow-up     follow up from surgery on March 29th and was discharged yesterday/was concerned about cell phone messing up her vision and brain    Seizure     med was increased     Constipation     need something ordered or refill on current med     Visit Vitals  /85 (BP 1 Location: Right arm, BP Patient Position: Sitting, BP Cuff Size: Adult)   Pulse 72   Temp 98.2 °F (36.8 °C)   Resp 18   Ht 5' 2\" (1.575 m)   Wt 167 lb (75.8 kg)   SpO2 98%   BMI 30.54 kg/m²     1. Have you been to the ER, urgent care clinic since your last visit? Hospitalized since your last visit? Yes When: 3/29/2021    2. Have you seen or consulted any other health care providers outside of the [address] [place] [address] since your last visit? Include any pap smears or colon screening.  Yes Where: [facility] for surgery

## 2021-04-08 NOTE — PROGRESS NOTES
Chief Complaint   Patient presents with    Follow-up     follow up from surgery on March 29th and was discharged yesterday/was concerned about cell phone messing up her vision and brain    Seizure     med was increased     Constipation     need something ordered or refill on current med     Visit Vitals  /85 (BP 1 Location: Right arm, BP Patient Position: Sitting, BP Cuff Size: Adult)   Pulse 72   Temp 98.2 °F (36.8 °C)   Resp 18   Ht 5' 2\" (1.575 m)   Wt 167 lb (75.8 kg)   SpO2 98%   BMI 30.54 kg/m²     Subjective  Rex Eaton is a 48 y.o. female. HPI:  Patient presented for follow-up. Accompanied by her mother. Mother provided majority of HPI information as pt has some cognitive deficits. She has hx of ruptured LMCA brain aneurysm and CVA in June 2020. As subsequent treatment, she was admitted to [facility] for brain surgery on 3/29/2021. tor craniotomy w/ clipping of a RMCA aneurysm which was not previously treated. Mer Reid She was d/c'd from hospital on 4/1/2021. Review of ER notes and neurosurgery note revealed that on 4/5/2021 she was at home and was eating when, according to mother, her mouth twisted and she was foaming at the mouth. She had a drooping on the L side of her face and her speech was slurred and mother could not understand her speech. She also vomited. She was taken to [facility] ER for eval and a stroke alert was called. CT and then MRI of brain did not show any new changes. She spent the night in hospital and was sent home. Her dose of Keppra was increased by neurosurgical provider as her symptoms were a seizure. Today mother stated that pt had not picked up the new dose of Keppra yet and mother reported that pt had another seizure on 4/7/2021. The symptoms were increased salivation and she was \"talking funny\" and lasted 5 minutes. Pt was referred to neurology and has a pending appt to establish w/ a neurologist and she will report the 2nd seizure.  Mother assured me that pt will start new dose of Keppra ASAP,  Pt recently finished home ST. Overall, pt has made remarkable progress since her CVA after aneurysm rupture in June 2020. She has lost weight and her B/P is in controlled range. Was 124/85 in office. She continues on hydralazine, lisinopril, and amlodipine. However, she needs help w/ ADLs and her mother is currently on FMLA leave to help care for pt. Pt has a 15 yr old son. Pt and mother very happy that pt has gotten her disability which has helped to ease financial worries. Pt is also having a lot of trouble w/ constipation. She was started on Linzess while in rehab after her CVA. Pt and mother requested that a refill be done. However, her insurance does not cover so will need to substitute a covered medication. Mother is concerned that pt is spending a lot of time on her phone looking at [employer]. According to mother, [address] [place] [address]  told her to have pt pay video games on a phone to help w/ concentration skills. However, pt is not playing the video games.  Pt had limited access to phone in recent past.     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    Hypoxic ischemic encephalopathy (HIE), unspecified severity P91.60    Essential hypertension I10    History of anemia Z86.2    Carpal tunnel syndrome G56.00    Osteoarthritis M19.90    History of abnormal cervical Pap smear Z87.42    At high risk for falls Z91.81    Hypertriglyceridemia E78.1    History of subarachnoid hemorrhage - Left MCA aneurysm rupture 6/4/2020 Z86.79    Seizure ([facility] [address] R56.9    BMI 26.0-26.9,adult Z68.26    Hypokalemia E87.6    Cerebral aneurysm I67.1     Past Medical History:   Diagnosis Date    Aneurysm ([address] 06/2020    Asthma     Cerebral artery occlusion with cerebral infarction ([facility] [address] 06/2020    Cerebral infarction (HCC)     Dysphagia     HTN (hypertension)     Joint pain     Leg swelling     Seizures (HCC) 06/2020    Snoring     SOB (shortness of breath)  Vision decreased      Past Surgical History:   Procedure Laterality Date    HX CHOLECYSTECTOMY      HX GASTROSTOMY      HX HEENT  03/29/2021    aneurysm    IR INSERT GASTROSTOMY TUBE [facility]  6/19/2020    NEUROLOGICAL PROCEDURE UNLISTED      Coil Embolization    CA ABDOMEN SURGERY PROC UNLISTED      has peg tube AND REMOVED     Current Outpatient Medications   Medication Instructions    amLODIPine (NORVASC) 10 mg, Oral, DAILY    aspirin 81 mg, Per NG tube, DAILY    atorvastatin (LIPITOR) 40 mg, Oral, DAILY    Blood Pressure Test Kit-Large kit Check B/P 2 x day after pt has been calm for at least 5 minutes. Record B/P and notify office weekly of B/P results.  hydrALAZINE (APRESOLINE) 100 mg, Oral, 3 TIMES DAILY    lactulose (CHRONULAC) 10 gram/15 mL solution Take 10 grams (15 ml) every 12 hours as needed for constipation. If not effective, can increase to 20 gram (30 ml) every 12 hours. May take 24-48 hours for response.  levETIRAcetam 1,000 mg, Oral, 2 TIMES DAILY    lisinopriL (PRINIVIL, ZESTRIL) 40 mg tablet TAKE 1 TABLET BY MOUTH EVERY DAY    Shower Chair XX jose david Pt w/ recent hx of ruptured cerebral aneurysm. At risk for falls in shower. Elderly mother is caretaker. Pt needs help w/ ADLs. No Known Allergies  Social History     Tobacco Use    Smoking status: Never Smoker    Smokeless tobacco: Never Used   Substance Use Topics    Alcohol use: Not Currently    Drug use: Never     Family History   Problem Relation Age of Onset    Hypertension Mother     Stroke Other     Lung Cancer Father     No Known Problems Sister     No Known Problems Brother     No Known Problems Son     Anesth Problems Neg Hx        Review of Systems   Constitutional: Negative for chills and fever. HENT: Negative for ear pain and sore throat. Eyes: Positive for blurred vision. Negative for double vision. Respiratory: Negative for cough and wheezing. Cardiovascular: Negative for chest pain. Gastrointestinal: Positive for constipation. Negative for abdominal pain, heartburn, nausea and vomiting. Genitourinary: Negative for dysuria. Musculoskeletal: Negative for myalgias. Neurological: Positive for seizures and weakness. Negative for dizziness and headaches. R side   Psychiatric/Behavioral: Positive for memory loss. Negative for depression. The patient does not have insomnia. Objective  Physical Exam  Constitutional:       General: She is not in acute distress. Appearance: Normal appearance. HENT:      Head: Normocephalic. Right Ear: External ear normal.      Left Ear: External ear normal.      Nose: Nose normal.   Eyes:      Extraocular Movements: Extraocular movements intact. Conjunctiva/sclera: Conjunctivae normal.      Comments: Mild ptosis of R eyelid   Neck:      Musculoskeletal: Neck supple. Vascular: No carotid bruit. Cardiovascular:      Rate and Rhythm: Normal rate and regular rhythm. Heart sounds: Normal heart sounds. No murmur. Pulmonary:      Effort: Pulmonary effort is normal. No respiratory distress. Breath sounds: Normal breath sounds. Musculoskeletal: Normal range of motion. General: No swelling. Right lower leg: No edema. Left lower leg: No edema. Skin:     General: Skin is warm and dry. Coloration: Skin is not jaundiced. Findings: No rash. Neurological:      Mental Status: She is alert and oriented to person, place, and time. Mental status is at baseline. Sensory: No sensory deficit. Motor: Weakness present. Comments: R  mildly weaker than L side   Psychiatric:         Mood and Affect: Mood normal.         Behavior: Behavior normal.         Thought Content: Thought content normal.       Assessment & Plan      ICD-10-CM ICD-9-CM    1. Seizure ([facility]  R56.9 780.39    2. History of subarachnoid hemorrhage - Left MCA aneurysm rupture 6/4/2020  Z86.79 V12.59    3.  Chronic constipation K59.09 564.00 DISCONTINUED: linaCLOtide (Linzess) 72 mcg cap capsule       1. Seizure ([address]  Hopefully, new dose of seizure medication will be helpful. She has pending appt in near future with neurologist for medication management. 2. History of subarachnoid hemorrhage - Left MCA aneurysm rupture 6/4/2020  Recovery continues from CVA. Encouraged to work puzzles and write in a journal. Mother also following recommendations from [address] [place] [address]     3. Chronic constipation  Pt will try lactulose which is an approved medication on her insurance formulary. If not effective, will change Rx. Encouraged to stay well hydrated and increase fiber in food. I have discussed the diagnosis with the patient and the intended plan as seen in the above orders. Pt/caretaker has expressed understanding. Questions were answered concerning future plans. I have discussed medication side effects and warnings as indicated with the patient as well.     Louie Love NP

## 2021-04-09 ENCOUNTER — TELEPHONE (OUTPATIENT)
Dept: FAMILY MEDICINE CLINIC | Age: 51
End: 2021-04-09

## 2021-04-09 NOTE — TELEPHONE ENCOUNTER
Patients insurance does not cover linzess, needs an alternative. Insurance suggesting lactulose. I did a prior auth and has not received a response yet.

## 2021-04-13 DIAGNOSIS — K59.09 CHRONIC CONSTIPATION: Primary | ICD-10-CM

## 2021-04-13 RX ORDER — LACTULOSE 10 G/15ML
SOLUTION ORAL; RECTAL
Qty: 480 ML | Refills: 5 | Status: SHIPPED | OUTPATIENT
Start: 2021-04-13 | End: 2021-10-14 | Stop reason: SDUPTHER

## 2021-04-13 NOTE — TELEPHONE ENCOUNTER
Sent pt a [employer] message to notify Alessandro Petit no longer covered by insurance. Ordered lactulose which insurance will cover.

## 2021-04-13 NOTE — PROGRESS NOTES
Linzess not longer covered by [employer]. [employer] message sent to pt to notify. Ordered alternative covered by insurance which is lactulose.

## 2021-04-24 DIAGNOSIS — I10 ESSENTIAL HYPERTENSION: ICD-10-CM

## 2021-04-28 DIAGNOSIS — I10 ESSENTIAL HYPERTENSION: ICD-10-CM

## 2021-04-28 RX ORDER — LISINOPRIL 40 MG/1
TABLET ORAL
Qty: 90 TAB | Refills: 1 | Status: SHIPPED | OUTPATIENT
Start: 2021-04-28 | End: 2021-07-16 | Stop reason: SDUPTHER

## 2021-04-28 RX ORDER — HYDRALAZINE HYDROCHLORIDE 100 MG/1
TABLET, FILM COATED ORAL
Qty: 270 TAB | Refills: 1 | Status: SHIPPED | OUTPATIENT
Start: 2021-04-28 | End: 2021-10-14 | Stop reason: SDUPTHER

## 2021-04-30 ENCOUNTER — TELEPHONE (OUTPATIENT)
Dept: FAMILY MEDICINE CLINIC | Age: 51
End: 2021-04-30

## 2021-04-30 NOTE — TELEPHONE ENCOUNTER
Patients mother called for a refill on her lisinopril medication. States she called the pharmacy and they told her to call the office.  Patient been out of meds for a week

## 2021-05-05 ENCOUNTER — TELEPHONE (OUTPATIENT)
Dept: FAMILY MEDICINE CLINIC | Age: 51
End: 2021-05-05

## 2021-05-05 NOTE — TELEPHONE ENCOUNTER
Patients mother called that she needs a refill on her seizure medication keppra. Said the emergency room increased her dose to 1000 mg.  Please forward to [facility]

## 2021-05-13 NOTE — PROGRESS NOTES
I  do attest that this note was reviewed and I was available for  FLAKITA CALLAHAN in this day of service and will follow along with FLAKITA Harris.      Rosaura Marin, DO

## 2021-05-17 ENCOUNTER — OFFICE VISIT (OUTPATIENT)
Dept: NEUROLOGY | Age: 51
End: 2021-05-17
Payer: MEDICAID

## 2021-05-17 VITALS
OXYGEN SATURATION: 98 % | SYSTOLIC BLOOD PRESSURE: 126 MMHG | DIASTOLIC BLOOD PRESSURE: 78 MMHG | RESPIRATION RATE: 18 BRPM

## 2021-05-17 DIAGNOSIS — R56.9 SEIZURE (HCC): Primary | ICD-10-CM

## 2021-05-17 PROCEDURE — 99203 OFFICE O/P NEW LOW 30 MIN: CPT | Performed by: PSYCHIATRY & NEUROLOGY

## 2021-05-17 NOTE — PROGRESS NOTES
[facility]   NEW PATIENT EVALUATION/CONSULTATION       PATIENT NAME: Renetta Beavers    MRN: [id number]    REASON FOR CONSULTATION: Follow-up for ongoing management of localization-related seizure disorder. 05/17/21    HISTORY OF PRESENT ILLNESS:  Renetta Beavers is a 46 y.o. 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 After her last surgery for clipping, patient went home and then represented with suspicion for localization-related seizures. Was already on Keppra 500 mg that time increased to 1000 mg twice daily after which point she not had any further seizures. Initially when checking in she informed the bedside staff that Jo Ann Valadez was making her sleepy. However on further discussion patient gets tired only at night otherwise does okay during the daytime. Is otherwise tolerating medication without personality changes or other adverse effects. She does endorse occasional headaches which are resolved with Tylenol and has been making gains. Has been cleared by therapy though she is currently is working with speech therapy to determine ongoing needs. Is largely independent in ADLs though does require some help with bathing. Is currently on disability.       PAST MEDICAL HISTORY:  Past Medical History:   Diagnosis Date    Aneurysm ([facility] 06/2020    Asthma     Cerebral artery occlusion with cerebral infarction ([facility] 06/2020    Cerebral infarction (HCC)     Dysphagia     HTN (hypertension)     Joint pain     Leg swelling     Seizures ([facility] 06/2020    Snoring     SOB (shortness of breath)     Vision decreased        PAST SURGICAL HISTORY:  Past Surgical History:   Procedure Laterality Date    HX CHOLECYSTECTOMY      HX GASTROSTOMY      HX HEENT  03/29/2021    aneurysm    IR INSERT GASTROSTOMY TUBE PERC 6/19/2020    NEUROLOGICAL PROCEDURE UNLISTED      Coil Embolization    ID ABDOMEN SURGERY PROC UNLISTED      has peg tube AND REMOVED       FAMILY HISTORY:   Family History   Problem Relation Age of Onset    Hypertension Mother     Stroke Other     Lung Cancer Father     No Known Problems Sister     No Known Problems Brother     No Known Problems Son     Anesth Problems Neg Hx          SOCIAL HISTORY:  Social History     Socioeconomic History    Marital status:      Spouse name: Not on file    Number of children: Not on file    Years of education: Not on file    Highest education level: Not on file   Tobacco Use    Smoking status: Never Smoker    Smokeless tobacco: Never Used   Substance and Sexual Activity    Alcohol use: Not Currently    Drug use: Never         MEDICATIONS:   Current Outpatient Medications   Medication Sig Dispense Refill    lisinopriL (PRINIVIL, ZESTRIL) 40 mg tablet TAKE 1 TABLET BY MOUTH EVERY DAY 90 Tab 1    hydrALAZINE (APRESOLINE) 100 mg tablet TAKE 1 TABLET BY MOUTH THREE TIMES A  Tab 1    lactulose (CHRONULAC) 10 gram/15 mL solution Take 10 grams (15 ml) every 12 hours as needed for constipation. If not effective, can increase to 20 gram (30 ml) every 12 hours. May take 24-48 hours for response. 480 mL 5    levETIRAcetam 1,000 mg tablet Take 1 Tab by mouth two (2) times a day. 60 Tab 0    atorvastatin (LIPITOR) 40 mg tablet Take 1 Tab by mouth daily. Indications: excessive fat in the blood 90 Tab 3    amLODIPine (NORVASC) 10 mg tablet Take 1 Tab by mouth daily. 80 Tab 3    Shower Chair XX jose david Pt w/ recent hx of ruptured cerebral aneurysm. At risk for falls in shower. Elderly mother is caretaker. Pt needs help w/ ADLs. 1 Each 0    Blood Pressure Test Kit-Large kit Check B/P 2 x day after pt has been calm for at least 5 minutes. Record B/P and notify office weekly of B/P results.  1 Kit 0    aspirin 81 mg chewable tablet 1 Tab by Per NG tube route daily. 30 Tab 0         ALLERGIES:  No Known Allergies      REVIEW OF SYSTEMS:  10 point ROS reviewed with patient. Please see scanned document under media. PHYSICAL EXAM:  Vital Signs:   Visit Vitals  /78   Resp 18   SpO2 98%     Physical exam:  Pleasant female resting comfortably in exam room in no distress. HEENT appears grossly intact. Neck appears supple. Cardiovascular demonstrates constant S1/S2 regular rate rhythm. Pulmonary demonstrates a equal air entry bilaterally. Extremities are warm/dry. Neurologically, patient appears alert and oriented attention appears intact. Speech is clear, language is nonfluent, patient does follow basic commands has trouble comprehension at times. Cranial nerves 2 through 12 appear grossly intact. Motorically patient has normal bulk and tone has grossly 4+ out of 5 strength diffusely in upper and lower extremities. Sensation appears grossly intact. Coordination is intact in upper extremities. Primary gait and station appear overall unremarkable. PERTINENT DATA:    CT Results (maximum last 3): MRI Results (maximum last 3): Results from [place] encounter on 04/05/21   MRI BRAIN WO CONT    Narrative EXAM: MRI BRAIN WO CONT    INDICATION: Acute CVA. Recent right MCA aneurysm clipping. COMPARISON: CTA head and neck 4/5/2021, MRI brain 6/19/2020. CONTRAST: None. TECHNIQUE:    Multiplanar multisequence acquisition without contrast of the brain. FINDINGS:  Recent postsurgical changes of right pterional craniotomy and right MCA  bifurcation aneurysm clipping. Stable size of bilateral subdural/extra-axial  fluid collections, right larger than left, measuring up to 8 mm on the right and  4 mm on the left. There is stable mass effect on the adjacent cerebral  hemispheres, right greater than left, with mild 4 mm of leftward midline shift. Small amount of pneumocephalus is stable. The ventricles are normal in size.  No evidence of acute infarct. There is mild  encephalomalacia in the bilateral inferior frontal lobes. There is extensive  superficial siderosis throughout the left cerebral hemisphere, and to a lesser  extent in the right cerebral hemisphere and posterior fossa. Postprocedural  changes of left MCA bifurcation aneurysm coiling are noted. There is no  cerebellar tonsillar herniation. Expected arterial flow-voids are present. Retention cyst in the right maxillary sinus. The mastoid air cells and middle  ears are clear. The orbital contents are within normal limits. Impression 1. No evidence of acute infarct. 2. 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 Stable size of bilateral subdural/extra-axial fluid collections, right larger  than left, with stable mass effect resulting in mild 4 mm of leftward midline  shift. 3. 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 Postprocedural changes of left MCA bifurcation aneurysm coiling noted. Results from [place] encounter on 01/03/21   MRA BRAIN WO CONT    Narrative EXAM: MRA BRAIN WO CONT    INDICATION:   MCA aneurysms    COMPARISON:  CTA head 6/18/2020. CONTRAST:  None. TECHNIQUE:    3-D time-of-flight noncontrast MRA of the brain was performed. Multiplanar and  MIP reconstructions were obtained. FINDINGS:  Postprocedural changes of left MCA bifurcation aneurysm coiling. There is no  evidence of residual aneurysm filling. The distal left MCA branches remain  patent without evidence of flow-limiting stenosis. Stable anteriorly directed right MCA bifurcation aneurysm measuring 4 x 2.5 x 4  mm (series 2 image 71). Stable 3 mm outpouching at the origin of the left  posterior communicating artery (series 2 image 80).     There is no evidence of large vessel occlusion or flow-limiting stenosis of the  intracranial internal carotid, anterior cerebral, and middle cerebral arteries. The anterior communicating artery is patent. There is no evidence of large vessel occlusion or flow-limiting stenosis of the  intracranial vertebral arteries, basilar artery, or posterior cerebral arteries. The posterior communicating arteries are patent. Impression IMPRESSION:    1. Status post left MCA bifurcation aneurysm coiling. No evidence of residual  aneurysm filling. 2. Stable 4 mm right MCA bifurcation aneurysm. 3. Stable 3 mm prominent infundibulum versus aneurysm at the origin of the left  posterior communicating artery. Results from [place] encounter on 06/04/20   MRI BRAIN W WO CONT    Narrative EXAM:  MRI BRAIN W WO CONT    INDICATION:    Neuro changes    COMPARISON:  None. CONTRAST: Intravenous gadolinium. TECHNIQUE:    The ventricles are stable in size. Again noted is subarachnoid hemorrhage in the  left sylvian fissure. No definite intraventricular hemorrhage is noted on this  study. Diffusion-weighted imaging demonstrates small foci of diffusion  restriction in the bilateral centrum semiovales as well as the left parietal  lobe posteriorly. On postcontrast imaging, there are patchy areas of  intraparenchymal enhancement associated with some these areas of diffusion  restriction. In addition, there is leptomeningeal enhancement scattered around  the left frontal and parietal lobes. There is no mass effect or midline shift. Again noted is right sphenoid sinus  disease. Fluid in mastoid air cells is present. Impression IMPRESSION:   1. Persistent subarachnoid hemorrhage in the left sylvian fissure. 2.  Patchy areas of enhancement as described. Some of these are associated with  foci of diffusion restriction and likely represent subacute ischemia.  The  leptomeningeal enhancement predominantly in the left frontal and parietal lobes  is likely related to subarachnoid hemorrhage and meningeal irritation. Infection  is not entirely excluded but thought less likely.        ASSESSMENT:      Keo Barber is a 35-year-old right-hand-dominant female presents to [facility] neurology clinic going management evaluation of localization-related seizure secondary to aneurysmal subarachnoid hemorrhage     PLAN:  Localization-related seizure disorder:  Appears to be tolerating medication well overall, notes sedation but only at night when sleeping otherwise does okay during the daytime  Denies any further episodes concerning for seizure since being on the medication  Discussed plan for likely lifelong anti-epileptic use with no plan to assess this for 2 years  Encouraged family/patient to call with questions concerns in the meantime otherwise we will plan to follow-up in 6 months    Elaine Duncan MD       CC Referring provider:  Jen Martin DO  [address] [facility] [address] Kevin Winters DO

## 2021-06-10 ENCOUNTER — TELEPHONE (OUTPATIENT)
Dept: NEUROLOGY | Age: 51
End: 2021-06-10

## 2021-06-10 RX ORDER — LEVETIRACETAM 1000 MG/1
1000 TABLET ORAL 2 TIMES DAILY
Qty: 60 TABLET | Refills: 5 | Status: SHIPPED | OUTPATIENT
Start: 2021-06-10 | End: 2021-07-10

## 2021-06-10 NOTE — TELEPHONE ENCOUNTER
----- Message from Grafton Mikael sent at 6/9/2021  2:42 PM EDT -----  Regarding: /refill  Medication Refill    Caller (if not patient):      Relationship of caller (if not patient):      Best contact number(s):[phone]      Name of medication and dosage if known:levETIRAcetam 1,000 mg      Is patient out of this medication (yes/no):Yes. Pharmacy name: [facility] on [address] Pharmacy listed in chart? (yes/no):Yes.   Pharmacy phone number:

## 2021-06-14 DIAGNOSIS — I10 ESSENTIAL HYPERTENSION: ICD-10-CM

## 2021-06-25 RX ORDER — AMLODIPINE BESYLATE 10 MG/1
TABLET ORAL
Qty: 90 TABLET | Refills: 3 | Status: SHIPPED | OUTPATIENT
Start: 2021-06-25 | End: 2021-10-14 | Stop reason: SDUPTHER

## 2021-06-28 NOTE — PROGRESS NOTES
Transition of Care Plan   RUR- 15 % Low  Risks    DISPOSITION: TBD pending therapy final recommendation    F/U with PCP/Specialist     Transport: family     Reviewed chart for transitions of care, and discussed in rounds. Noted therapy recommendation of home health vs none pending progress; will await final recommendation. CM continues to follow. 12:46 PM  Noted discharge order, pending therapy recommendation. I have advised primary RN Marlon Roles to make sure therapy sees the patient prior discharging. CM to assist on establishing services should therapy recommend one.     2:09 PM  Noted therapy eval; no skilled therapy recommendation at this time.      JUDE Kathleen Posterior Auricular Interpolation Flap Division And Inset Text: Division and inset of the posterior auricular interpolation flap was performed to achieve optimal aesthetic result, restore normal anatomic appearance and avoid distortion of normal anatomy, expedite and facilitate wound healing, achieve optimal functional result and because linear closure either not possible or would produce suboptimal result. The patient was prepped and draped in the usual manner. The pedicle was infiltrated with local anesthesia. The pedicle was sectioned with a #15 blade. The pedicle was de-bulked and trimmed to match the shape of the defect. Hemostasis was achieved. The flap donor site and free margin of the flap were secured with deep buried sutures and the wound edges were re-approximated.

## 2021-07-13 ENCOUNTER — OFFICE VISIT (OUTPATIENT)
Dept: FAMILY MEDICINE CLINIC | Age: 51
End: 2021-07-13
Payer: COMMERCIAL

## 2021-07-13 VITALS
HEART RATE: 63 BPM | BODY MASS INDEX: 31.83 KG/M2 | OXYGEN SATURATION: 99 % | SYSTOLIC BLOOD PRESSURE: 162 MMHG | RESPIRATION RATE: 18 BRPM | TEMPERATURE: 97.1 F | WEIGHT: 173 LBS | HEIGHT: 62 IN | DIASTOLIC BLOOD PRESSURE: 88 MMHG

## 2021-07-13 DIAGNOSIS — I10 ESSENTIAL HYPERTENSION: Primary | ICD-10-CM

## 2021-07-13 DIAGNOSIS — G47.09 OTHER INSOMNIA: ICD-10-CM

## 2021-07-13 PROCEDURE — 99214 OFFICE O/P EST MOD 30 MIN: CPT | Performed by: NURSE PRACTITIONER

## 2021-07-13 RX ORDER — LEVETIRACETAM 1000 MG/1
1 TABLET ORAL 2 TIMES DAILY
COMMUNITY
End: 2021-08-26 | Stop reason: SDUPTHER

## 2021-07-13 RX ORDER — HYDROXYZINE PAMOATE 50 MG/1
CAPSULE ORAL
Qty: 90 CAPSULE | Refills: 1 | Status: SHIPPED | OUTPATIENT
Start: 2021-07-13 | End: 2021-10-14 | Stop reason: SDUPTHER

## 2021-07-13 NOTE — PROGRESS NOTES
Chief Complaint   Patient presents with    Follow-up     concerned about sleep problems     Visit Vitals  BP (!) 162/88 (BP 1 Location: Left arm, BP Patient Position: Sitting, BP Cuff Size: Adult)   Pulse 63   Temp 97.1 °F (36.2 °C) (Temporal)   Resp 18   Ht 5' 2\" (1.575 m)   Wt 173 lb (78.5 kg)   SpO2 99%   BMI 31.64 kg/m²

## 2021-07-13 NOTE — PATIENT INSTRUCTIONS
Learning About Sleeping Well  What does sleeping well mean? Sleeping well means getting enough sleep. How much sleep is enough varies among people. The number of hours you sleep is not as important as how you feel when you wake up. If you do not feel refreshed, you probably need more sleep. Another sign of not getting enough sleep is feeling tired during the day. The average total nightly sleep time is 7½ to 8 hours. Healthy adults may need a little more or a little less than this. Why is getting enough sleep important? Getting enough quality sleep is a basic part of good health. When your sleep suffers, your mood and your thoughts can suffer too. You may find yourself feeling more grumpy or stressed. Not getting enough sleep also can lead to serious problems, including injury, accidents, anxiety, and depression. What might cause poor sleeping? Many things can cause sleep problems, including:  · Stress. Stress can be caused by fear about a single event, such as giving a speech. Or you may have ongoing stress, such as worry about work or school. · Depression, anxiety, and other mental or emotional conditions. · Changes in your sleep habits or surroundings. This includes changes that happen where you sleep, such as noise, light, or sleeping in a different bed. It also includes changes in your sleep pattern, such as having jet lag or working a late shift. · Health problems, such as pain, breathing problems, and restless legs syndrome. · Lack of regular exercise. How can you help yourself? Here are some tips that may help you sleep more soundly and wake up feeling more refreshed. Your sleeping area   · Use your bedroom only for sleeping and sex. A bit of light reading may help you fall asleep. But if it doesn't, do your reading elsewhere in the house. Don't watch TV in bed. · Be sure your bed is big enough to stretch out comfortably, especially if you have a sleep partner.   · Keep your bedroom quiet, dark, and cool. Use curtains, blinds, or a sleep mask to block out light. To block out noise, use earplugs, soothing music, or a \"white noise\" machine. Your evening and bedtime routine   · Create a relaxing bedtime routine. You might want to take a warm shower or bath, listen to soothing music, or drink a cup of noncaffeinated tea. · Go to bed at the same time every night. And get up at the same time every morning, even if you feel tired. What to avoid   · Limit caffeine (coffee, tea, caffeinated sodas) during the day, and don't have any for at least 4 to 6 hours before bedtime. · Don't drink alcohol before bedtime. Alcohol can cause you to wake up more often during the night. · Don't smoke or use tobacco, especially in the evening. Nicotine can keep you awake. · Don't take naps during the day, especially close to bedtime. · Don't lie in bed awake for too long. If you can't fall asleep, or if you wake up in the middle of the night and can't get back to sleep within 15 minutes or so, get out of bed and go to another room until you feel sleepy. · Don't take medicine right before bed that may keep you awake or make you feel hyper or energized. Your doctor can tell you if your medicine may do this and if you can take it earlier in the day. If you can't sleep   · Imagine yourself in a peaceful, pleasant scene. Focus on the details and feelings of being in a place that is relaxing. · Get up and do a quiet or boring activity until you feel sleepy. · Don't drink any liquids after 6 p.m. if you wake up often because you have to go to the bathroom. Where can you learn more? Go to http://www.gray.com/  Enter P168 in the search box to learn more about \"Learning About Sleeping Well. \"  Current as of: September 23, 2020               Content Version: 12.8  © 9351-4641 [employer]wise, [employer].    Care instructions adapted under license by [employer] (which disclaims liability or warranty for this information). If you have questions about a medical condition or this instruction, always ask your healthcare professional. [address] any warranty or liability for your use of this information. Insomnia: Care Instructions  Your Care Instructions     Insomnia is the inability to sleep well. It is a common problem for most people at some time. Insomnia may make it hard for you to get to sleep, stay asleep, or sleep as long as you need to. This can make you tired and grouchy during the day. It can also make you forgetful, less effective at work, and unhappy. Insomnia can be caused by conditions such as depression or anxiety. Pain can also affect your ability to sleep. When these problems are solved, the insomnia usually clears up. But sometimes bad sleep habits can cause insomnia. If insomnia is affecting your work or your enjoyment of life, you can take steps to improve your sleep. Follow-up care is a key part of your treatment and safety. Be sure to make and go to all appointments, and call your doctor if you are having problems. It's also a good idea to know your test results and keep a list of the medicines you take. How can you care for yourself at home? What to avoid   · Do not have drinks with caffeine, such as coffee or black tea, for 8 hours before bed. · Do not smoke or use other types of tobacco near bedtime. Nicotine is a stimulant and can keep you awake. · Avoid drinking alcohol late in the evening, because it can cause you to wake in the middle of the night. · Do not eat a big meal close to bedtime. If you are hungry, eat a light snack. · Do not drink a lot of water close to bedtime, because the need to urinate may wake you up during the night. · Do not read or watch TV in bed. Use the bed only for sleeping and sexual activity. What to try   · Go to bed at the same time every night, and wake up at the same time every morning.  Do not take naps during the day. · Keep your bedroom quiet, dark, and cool. · Sleep on a comfortable pillow and mattress. · If watching the clock makes you anxious, turn it facing away from you so you cannot see the time. · If you worry when you lie down, start a worry book. Well before bedtime, write down your worries, and then set the book and your concerns aside. · Try meditation or other relaxation techniques before you go to bed. · If you cannot fall asleep, get up and go to another room until you feel sleepy. Do something relaxing. Repeat your bedtime routine before you go to bed again. · Make your house quiet and calm about an hour before bedtime. Turn down the lights, turn off the TV, log off the computer, and turn down the volume on music. This can help you relax after a busy day. When should you call for help? Watch closely for changes in your health, and be sure to contact your doctor if:    · Your efforts to improve your sleep do not work.     · Your insomnia gets worse.     · You have been feeling down, depressed, or hopeless or have lost interest in things that you usually enjoy. Where can you learn more? Go to http://www.gray.com/  Enter P513 in the search box to learn more about \"Insomnia: Care Instructions. \"  Current as of: August 31, 2020               Content Version: 12.8  © 5697-2066 [employer]. Care instructions adapted under license by [employer] (which disclaims liability or warranty for this information). If you have questions about a medical condition or this instruction, always ask your healthcare professional. [place] [address] any warranty or liability for your use of this information.

## 2021-07-13 NOTE — PROGRESS NOTES
Chief Complaint   Patient presents with    Follow-up     concerned about sleep problems     Visit Vitals  BP (!) 162/88 (BP 1 Location: Left arm, BP Patient Position: Sitting, BP Cuff Size: Adult)   Pulse 63   Temp 97.1 °F (36.2 °C) (Temporal)   Resp 18   Ht 5' 2\" (1.575 m)   Wt 173 lb (78.5 kg)   SpO2 99%   BMI 31.64 kg/m²     Subjective Doran Burkitt is a 46 y.o. female. HPI:  Pt presented to office for f/u. Accompanied by her mother. Mother contributed majority of new information for HPI today. Pt has past hx significant for complex neurological history. 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 She under went coiling, had associated strokes and seizure. She was started on Keppra 500 mg b.i.d. at home. Subsequently, she underwent right middle cerebral artery clipping with Neurosurgery on 03/292021 and did well at home until 4/4/2021 when she had an episode of slurred speech and mouth twisting to the left followed by vomiting with loss of awareness of the event. Nathanel Libel was unremarkable except for known chronic findings and she was sent home w/ and increase in her Keppra dose. She has not had any more episodes. Her mother was @ home w/ her for months tending to her needs but now has returned to work. However, pt still needs help w/ ADLs so pt's aunt is now staying w/ her during the day. Pt unable to work due to cognitive deficits since the ruptured aneurysm occurred. Pt and mother very happy that pt has gotten her disability which has helped to ease financial worries. In past, pt had uncontrolled B/P and did not f/u regularly for medical evaluation. Her B/P has been in controlled range < 130/80 @ recent office visits. However, was high in office today @ 162/88. Mother stated that pt does not take B/P medication until 1000 every day. Currently Rxed lisinopril, amlodipine, and hydralazine.   Pt had no answer why she does not take her meds first thing in am. She is not checking her B/P at all now @ home. She had no explanation why. Reiterated need for good B/P control to avoid future adverse CV events so should take medications first thing in am and continue to check B/P at least several times a week w/ goal < 130/80. Per mother, pt is not sleeping well and is up late. Mother is concerned that pt is spending a lot of time on her phone looking at [employer]. According to mother, [address] [place] [address]  told her to have pt play video games on a phone to help w/ concentration skills. However, pt is not playing the video games.      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    Essential hypertension I10    History of anemia Z86.2    Carpal tunnel syndrome G56.00    Osteoarthritis M19.90    History of abnormal cervical Pap smear Z87.42    At high risk for falls Z91.81    Hypertriglyceridemia E78.1    History of subarachnoid hemorrhage - Left MCA aneurysm rupture 6/4/2020 Z86.79    Seizure ([address] R56.9    BMI 26.0-26.9,adult Z68.26    Hypokalemia E87.6     Past Medical History:   Diagnosis Date    Aneurysm ([address] 06/2020    Asthma     Cerebral artery occlusion with cerebral infarction ([address] 06/2020    Cerebral infarction ([address]     Dysphagia     Hypoxic ischemic encephalopathy (HIE), unspecified severity 6/15/2020    Joint pain     Leg swelling     Seizures ([address] 06/2020    Snoring     SOB (shortness of breath)     Vision decreased      Past Surgical History:   Procedure Laterality Date    HX CHOLECYSTECTOMY      HX GASTROSTOMY      HX HEENT  03/29/2021    aneurysm    IR INSERT GASTROSTOMY TUBE [facility]  6/19/2020    NEUROLOGICAL PROCEDURE UNLISTED      Coil Embolization    [place] ABDOMEN SURGERY PROC UNLISTED      has peg tube AND REMOVED     Current Outpatient Medications   Medication Instructions    amLODIPine (NORVASC) 10 mg tablet TAKE 1 TABLET BY MOUTH EVERY DAY    aspirin 81 mg, Per NG tube, DAILY    atorvastatin (LIPITOR) 40 mg, Oral, DAILY    Blood Pressure Test Kit-Large kit Check B/P 2 x day after pt has been calm for at least 5 minutes. Record B/P and notify office weekly of B/P results.  hydrALAZINE (APRESOLINE) 100 mg tablet TAKE 1 TABLET BY MOUTH THREE TIMES A DAY    hydrOXYzine pamoate (VISTARIL) 50 mg capsule Take 1 tab by mouth 30 min to 1 hour prior to bedtime for sleep.  lactulose (CHRONULAC) 10 gram/15 mL solution Take 10 grams (15 ml) every 12 hours as needed for constipation. If not effective, can increase to 20 gram (30 ml) every 12 hours. May take 24-48 hours for response.  levETIRAcetam 1,000 mg tablet 1 Tablet, Oral, 2 TIMES DAILY    lisinopriL (PRINIVIL, ZESTRIL) 40 mg, Oral, DAILY    Shower Chair XX jose david Pt w/ recent hx of ruptured cerebral aneurysm. At risk for falls in shower. Elderly mother is caretaker. Pt needs help w/ ADLs. No Known Allergies  Social History     Tobacco Use    Smoking status: Never Smoker    Smokeless tobacco: Never Used   Vaping Use    Vaping Use: Never used   Substance Use Topics    Alcohol use: Not Currently    Drug use: Never     Family History   Problem Relation Age of Onset    Hypertension Mother     Stroke Other     Lung Cancer Father     No Known Problems Sister     No Known Problems Brother     No Known Problems Son     Anesth Problems Neg Hx      Review of Systems   Constitutional: Negative for chills and fever. HENT: Negative for ear pain and sore throat. Respiratory: Negative for cough, shortness of breath and wheezing. Cardiovascular: Negative for chest pain, palpitations and leg swelling. Gastrointestinal: Positive for constipation. Negative for heartburn, nausea and vomiting. Genitourinary: Negative for dysuria. Musculoskeletal: Negative for back pain and neck pain. Neurological: Positive for dizziness and headaches. Psychiatric/Behavioral: Negative for depression.  The patient has insomnia. The patient is not nervous/anxious. Objective  Physical Exam  Vitals reviewed. Constitutional:       General: She is not in acute distress. Appearance: Normal appearance. HENT:      Head: Normocephalic. Right Ear: External ear normal.      Left Ear: External ear normal.      Nose: Nose normal.   Eyes:      Conjunctiva/sclera: Conjunctivae normal.   Neck:      Vascular: No carotid bruit. Cardiovascular:      Rate and Rhythm: Normal rate and regular rhythm. Heart sounds: Normal heart sounds. No murmur heard. Pulmonary:      Effort: Pulmonary effort is normal. No respiratory distress. Breath sounds: Normal breath sounds. Musculoskeletal:         General: No swelling. Normal range of motion. Cervical back: Neck supple. Right lower leg: No edema. Left lower leg: No edema. Skin:     General: Skin is warm and dry. Coloration: Skin is not jaundiced. Findings: No rash. Neurological:      Mental Status: She is alert and oriented to person, place, and time. Mental status is at baseline. Psychiatric:         Mood and Affect: Mood normal.         Behavior: Behavior normal.         Judgment: Judgment normal.      Comments: Forgetfulness      Assessment & Plan      ICD-10-CM ICD-9-CM    1. Essential hypertension  I10 401.9    2. Other insomnia  G47.09 780.52 hydrOXYzine pamoate (VISTARIL) 50 mg capsule     1. Essential hypertension  Pt strongly advised to take medications and check B/P at home to ensure good control < 130/80.     2. Other insomnia  Pt not a good candidate for medication like ambien. She was agreeable to trying hydroxyzine for sleep. Monitor effectiveness and can adjust dose if needed. - hydrOXYzine pamoate (VISTARIL) 50 mg capsule; Take 1 tab by mouth 30 min to 1 hour prior to bedtime for sleep. Dispense: 90 Capsule;  Refill: 1    I have discussed the diagnosis with the patient and the intended plan as seen in the above orders. Pt/caretaker has expressed understanding. Questions were answered concerning future plans. I have discussed medication side effects and warnings as indicated with the patient as well.     Jacob Torres, NP

## 2021-07-16 DIAGNOSIS — I10 ESSENTIAL HYPERTENSION: ICD-10-CM

## 2021-07-16 RX ORDER — LISINOPRIL 40 MG/1
40 TABLET ORAL DAILY
Qty: 90 TABLET | Refills: 1 | Status: SHIPPED | OUTPATIENT
Start: 2021-07-16 | End: 2021-10-14 | Stop reason: SDUPTHER

## 2021-08-26 RX ORDER — LEVETIRACETAM 1000 MG/1
1000 TABLET ORAL 2 TIMES DAILY
Qty: 180 TABLET | Refills: 0 | Status: SHIPPED | OUTPATIENT
Start: 2021-08-26 | End: 2021-10-14 | Stop reason: SDUPTHER

## 2021-08-26 NOTE — TELEPHONE ENCOUNTER
Requested Prescriptions     Pending Prescriptions Disp Refills    levETIRAcetam 1,000 mg tablet       Sig: Take 1 Tablet by mouth two (2) times a day.

## 2021-10-14 ENCOUNTER — OFFICE VISIT (OUTPATIENT)
Dept: FAMILY MEDICINE CLINIC | Age: 51
End: 2021-10-14
Payer: COMMERCIAL

## 2021-10-14 VITALS
RESPIRATION RATE: 18 BRPM | TEMPERATURE: 97.8 F | BODY MASS INDEX: 32.76 KG/M2 | OXYGEN SATURATION: 97 % | DIASTOLIC BLOOD PRESSURE: 68 MMHG | HEIGHT: 62 IN | WEIGHT: 178 LBS | HEART RATE: 63 BPM | SYSTOLIC BLOOD PRESSURE: 125 MMHG

## 2021-10-14 DIAGNOSIS — Z13.21 ENCOUNTER FOR VITAMIN DEFICIENCY SCREENING: ICD-10-CM

## 2021-10-14 DIAGNOSIS — Z13.1 SCREENING FOR DIABETES MELLITUS: ICD-10-CM

## 2021-10-14 DIAGNOSIS — I10 ESSENTIAL HYPERTENSION: Primary | ICD-10-CM

## 2021-10-14 DIAGNOSIS — Z23 ENCOUNTER FOR IMMUNIZATION: ICD-10-CM

## 2021-10-14 DIAGNOSIS — R56.9 SEIZURE (HCC): ICD-10-CM

## 2021-10-14 DIAGNOSIS — G47.09 OTHER INSOMNIA: ICD-10-CM

## 2021-10-14 DIAGNOSIS — E78.1 HYPERTRIGLYCERIDEMIA: ICD-10-CM

## 2021-10-14 DIAGNOSIS — Z86.2 HISTORY OF ANEMIA: ICD-10-CM

## 2021-10-14 DIAGNOSIS — K59.09 CHRONIC CONSTIPATION: ICD-10-CM

## 2021-10-14 DIAGNOSIS — E87.6 HYPOKALEMIA: ICD-10-CM

## 2021-10-14 DIAGNOSIS — Z11.59 ENCOUNTER FOR HEPATITIS C SCREENING TEST FOR LOW RISK PATIENT: ICD-10-CM

## 2021-10-14 PROCEDURE — 99214 OFFICE O/P EST MOD 30 MIN: CPT | Performed by: NURSE PRACTITIONER

## 2021-10-14 PROCEDURE — 90756 CCIIV4 VACC ABX FREE IM: CPT | Performed by: NURSE PRACTITIONER

## 2021-10-14 RX ORDER — LEVETIRACETAM 1000 MG/1
1000 TABLET ORAL 2 TIMES DAILY
Qty: 180 TABLET | Refills: 3 | Status: SHIPPED | OUTPATIENT
Start: 2021-10-14 | End: 2021-11-29 | Stop reason: SDUPTHER

## 2021-10-14 RX ORDER — AMLODIPINE BESYLATE 10 MG/1
10 TABLET ORAL DAILY
Qty: 90 TABLET | Refills: 3 | Status: SHIPPED | OUTPATIENT
Start: 2021-10-14 | End: 2022-09-26

## 2021-10-14 RX ORDER — HYDRALAZINE HYDROCHLORIDE 100 MG/1
100 TABLET, FILM COATED ORAL 3 TIMES DAILY
Qty: 270 TABLET | Refills: 3 | Status: SHIPPED | OUTPATIENT
Start: 2021-10-14 | End: 2022-09-03

## 2021-10-14 RX ORDER — LISINOPRIL 40 MG/1
40 TABLET ORAL DAILY
Qty: 90 TABLET | Refills: 3 | Status: SHIPPED | OUTPATIENT
Start: 2021-10-14

## 2021-10-14 RX ORDER — HYDROXYZINE PAMOATE 50 MG/1
CAPSULE ORAL
Qty: 90 CAPSULE | Refills: 3 | Status: SHIPPED | OUTPATIENT
Start: 2021-10-14 | End: 2022-07-12

## 2021-10-14 RX ORDER — LACTULOSE 10 G/15ML
SOLUTION ORAL; RECTAL
Qty: 480 ML | Refills: 5 | Status: SHIPPED | OUTPATIENT
Start: 2021-10-14 | End: 2022-01-21 | Stop reason: SDUPTHER

## 2021-10-14 NOTE — PROGRESS NOTES
1. Have you been to the ER, urgent care clinic since your last visit? Hospitalized since your last visit? No    2. Have you seen or consulted any other health care providers outside of the [address] [place] [address] since your last visit? Include any pap smears or colon screening.  No   Chief Complaint   Patient presents with    Medication Refill     need meds refill     Visit Vitals  /68 (BP 1 Location: Right arm, BP Patient Position: Sitting, BP Cuff Size: Adult)   Pulse 63   Temp 97.8 °F (36.6 °C) (Temporal)   Resp 18   Ht 5' 2\" (1.575 m)   Wt 178 lb (80.7 kg)   LMP 10/09/2021   SpO2 97%   BMI 32.56 kg/m²

## 2021-10-14 NOTE — PROGRESS NOTES
Chief Complaint   Patient presents with    Medication Refill     need meds refill     Visit Vitals  /68 (BP 1 Location: Right arm, BP Patient Position: Sitting, BP Cuff Size: Adult)   Pulse 63   Temp 97.8 °F (36.6 °C) (Temporal)   Resp 18   Ht 5' 2\" (1.575 m)   Wt 178 lb (80.7 kg)   LMP 10/09/2021   SpO2 97%   BMI 32.56 kg/m²     Subjective  Didi Jha is a 46 y.o. female. HPI:  Patient presented for follow-up and medication refills. Accompanied by her mother who is her legal guardian. Mother contributed to HPI. Medical history significant for right middle cerebral artery aneurysm w/ rupture and subarachnoid hemorrhage, hypertension, carpal tunnel syndrome, osteoarthritis, anemia, hypertriglyceridemia, , seizure, and hypokalemia. 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 Her prognosis was grim as she was initially unresponsive and surprised her neurosurgery providers w/ the amount of function she got back. She under went coiling, had associated strokes and seizure. She was started on Keppra 500 mg b.i.d. at home. Subsequently, she underwent right middle cerebral artery clipping with Neurosurgery on 03/292021 and did well at home until 4/4/2021 when she had an episode of slurred speech and mouth twisting to the left followed by vomiting with loss of awareness of the event. Imaging was unremarkable except for known chronic findings and she was sent home w/ and increase in her Keppra dose. She has not had any more episodes. Her mother was @ home w/ her for months tending to her needs but now has returned to work. However, pt still needs help w/ ADLs so pt's aunt is now staying w/ her during the day. Pt unable to work due to cognitive deficits since the ruptured aneurysm occur red.  Pt and mother very happy that pt has gotten her disability which has helped to ease financial worries. HTN- B/P good today @ 125/68. Has had some issues in recent past about time she was taking her B/P meds daily and consistent checking of her B/P. This was discussed along w/ the consistent checking of her B/P at her last appt. Per home checks, at goal < 130/80. Continues on lisinopril 40 mg daily, amlodipine 10 mg daily, hydralazine 100 mg 3x day. Her mother helps w/ medication management. Hyperlipidemia- last LDL was good at 74 w/ TGs 74. However, after initial weight loss after neuro crisis she has started to gain some weight. Weight up 12 pounds since April 2021. Worries about increases in her numbers. Currently taking atorvastatin 40 mg daily which will be adjusted if indicated by next lab results. Pt was counseled on maintaining her weight loss for best health outcomes. Seizure status is stable on current dose of Keppra. Insomnia- pt was ordered hydroxyzine at a previous appt which has been helpful for pt. She requested refill and will continue on current dose. Mother is at time frustrated w/ pt because she is not motivated to do anything except sit in house looking at [employer]. Discussed w/ pt trying to walk for 15 min 2 x day around her neighborhood and standing up hourly if watching TV and marching in place, stretching, etc. Hard to gauge if pt will change behavior. Mother certainly will be encouraging her. Pt in need of annual flu shot and will be admin today.      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    Essential hypertension I10    History of anemia Z86.2    Carpal tunnel syndrome G56.00    Osteoarthritis M19.90    History of abnormal cervical Pap smear Z87.42    At high risk for falls Z91.81    Hypertriglyceridemia E78.1    History of subarachnoid hemorrhage - Left MCA aneurysm rupture 6/4/2020 Z86.79    Seizure ([address] R56.9    BMI 26.0-26.9,adult Z68.26    Hypokalemia E87.6     Past Medical History: Diagnosis Date    Aneurysm ([facility] 06/2020    Asthma     Cerebral artery occlusion with cerebral infarction ([facility] 06/2020    Cerebral infarction ([facility])     Dysphagia     Hypoxic ischemic encephalopathy (HIE), unspecified severity 6/15/2020    Joint pain     Leg swelling     Seizures ([facility] 06/2020    Snoring     SOB (shortness of breath)     Vision decreased      Past Surgical History:   Procedure Laterality Date    HX CHOLECYSTECTOMY      HX GASTROSTOMY      HX HEENT  03/29/2021    aneurysm    IR INSERT GASTROSTOMY TUBE [facility]  6/19/2020    NEUROLOGICAL PROCEDURE UNLISTED      Coil Embolization    [place] ABDOMEN SURGERY PROC UNLISTED      has peg tube AND REMOVED     Current Outpatient Medications   Medication Instructions    amLODIPine (NORVASC) 10 mg, Oral, DAILY    aspirin 81 mg, Per NG tube, DAILY    atorvastatin (LIPITOR) 40 mg, Oral, DAILY    Blood Pressure Test Kit-Large kit Check B/P 2 x day after pt has been calm for at least 5 minutes. Record B/P and notify office weekly of B/P results.  hydrALAZINE (APRESOLINE) 100 mg, Oral, 3 TIMES DAILY    hydrOXYzine pamoate (VISTARIL) 50 mg capsule Take 1 tab by mouth 30 min to 1 hour prior to bedtime for sleep.  lactulose (CHRONULAC) 10 gram/15 mL solution Take 10 grams (15 ml) every 12 hours as needed for constipation. If not effective, can increase to 20 gram (30 ml) every 12 hours. May take 24-48 hours for response.  levETIRAcetam 1,000 mg, Oral, 2 TIMES DAILY    lisinopriL (PRINIVIL, ZESTRIL) 40 mg, Oral, DAILY    Shower Chair XX jose david Pt w/ recent hx of ruptured cerebral aneurysm. At risk for falls in shower. Elderly mother is caretaker. Pt needs help w/ ADLs.      No Known Allergies  Social History     Tobacco Use    Smoking status: Never Smoker    Smokeless tobacco: Never Used   Vaping Use    Vaping Use: Never used   Substance Use Topics    Alcohol use: Not Currently    Drug use: Never     Family History   Problem Relation Age of Onset    Hypertension Mother     Stroke Other     Lung Cancer Father     No Known Problems Sister     No Known Problems Brother     No Known Problems Son     Anesth Problems Neg Hx      Review of Systems   Constitutional: Negative for chills and fever. HENT: Negative for ear pain and sore throat. Eyes: Negative for blurred vision. Respiratory: Negative for cough, shortness of breath and wheezing. Cardiovascular: Negative for chest pain, palpitations and leg swelling. Gastrointestinal: Positive for constipation. Negative for abdominal pain, heartburn, nausea and vomiting. Laxative is effective. Genitourinary: Negative for dysuria. Musculoskeletal: Negative for joint pain and myalgias. Neurological: Negative for dizziness, tingling, sensory change and headaches. Psychiatric/Behavioral: The patient does not have insomnia. Objective  Physical Exam  Constitutional:       General: She is not in acute distress. Appearance: Normal appearance. HENT:      Head: Normocephalic. Right Ear: External ear normal.      Left Ear: External ear normal.      Nose: Nose normal.   Eyes:      Conjunctiva/sclera: Conjunctivae normal.   Neck:      Vascular: No carotid bruit. Cardiovascular:      Rate and Rhythm: Normal rate and regular rhythm. Heart sounds: Normal heart sounds. No murmur heard. Pulmonary:      Effort: Pulmonary effort is normal. No respiratory distress. Breath sounds: Normal breath sounds. Musculoskeletal:         General: No swelling or tenderness. Normal range of motion. Cervical back: Neck supple. Right lower leg: No edema. Left lower leg: No edema. Skin:     General: Skin is warm and dry. Coloration: Skin is not jaundiced. Findings: No rash. Neurological:      Mental Status: She is alert and oriented to person, place, and time. Motor: No weakness.       Coordination: Coordination normal.      Gait: Gait normal. Psychiatric:         Mood and Affect: Mood normal.         Behavior: Behavior normal.         Judgment: Judgment normal.      Comments: Mild aphasia and forgetfulness. Assessment & Plan      ICD-10-CM ICD-9-CM    1. Essential hypertension  I10 401.9 lisinopriL (PRINIVIL, ZESTRIL) 40 mg tablet      amLODIPine (NORVASC) 10 mg tablet      hydrALAZINE (APRESOLINE) 100 mg tablet   2. Other insomnia  G47.09 780.52 hydrOXYzine pamoate (VISTARIL) 50 mg capsule   3. Chronic constipation  K59.09 564.00 lactulose (CHRONULAC) 10 gram/15 mL solution   4. Seizure ([address]  R56.9 780.39 levETIRAcetam 1,000 mg tablet   5. History of anemia  Z86.2 V12.3 CBC WITH AUTOMATED DIFF      RETICULOCYTE COUNT   6. Hypertriglyceridemia  E78.1 272.1 LIPID PANEL   7. Hypokalemia  G33.0 235.3 METABOLIC PANEL, COMPREHENSIVE   8. Encounter for hepatitis C screening test for low risk patient  Z11.59 V73.89 HCV AB W/RFLX TO FANTASMA   9. Encounter for vitamin deficiency screening  Z13.21 V77.99 VITAMIN D, 25 HYDROXY   10. Screening for diabetes mellitus  Z13.1 V77.1 HEMOGLOBIN A1C WITH EAG   11. Encounter for immunization  Z23 V03.89 INFLUENZA VACCINE (CCIIV4 VACCINE ANTIBIO FREE 0.5 ML)       1. Essential hypertension  Controlled and stable on current med regime which she will continue. - lisinopriL (PRINIVIL, ZESTRIL) 40 mg tablet; Take 1 Tablet by mouth daily. Dispense: 90 Tablet; Refill: 3  - amLODIPine (NORVASC) 10 mg tablet; Take 1 Tablet by mouth daily. Dispense: 90 Tablet; Refill: 3  - hydrALAZINE (APRESOLINE) 100 mg tablet; Take 1 Tablet by mouth three (3) times daily. Dispense: 270 Tablet; Refill: 3    2. Other insomnia  Effective for sleep- pt will continue on current medication dose. - hydrOXYzine pamoate (VISTARIL) 50 mg capsule; Take 1 tab by mouth 30 min to 1 hour prior to bedtime for sleep. Dispense: 90 Capsule; Refill: 3    3. Chronic constipation  Effective for symptoms.  Pt will adjust frequency of use based on her frequency of BMs.    - lactulose (CHRONULAC) 10 gram/15 mL solution; Take 10 grams (15 ml) every 12 hours as needed for constipation. If not effective, can increase to 20 gram (30 ml) every 12 hours. May take 24-48 hours for response. Dispense: 480 mL; Refill: 5    4. Seizure ([address]  Stable on current med dosage which she will continue. Has regular f/u w/ neurology. - levETIRAcetam 1,000 mg tablet; Take 1 Tablet by mouth two (2) times a day. Dispense: 180 Tablet; Refill: 3    5. History of anemia    - CBC WITH AUTOMATED DIFF  - RETICULOCYTE COUNT    6. Hypertriglyceridemia  Adjust medication if indicated based on pending lab work. - LIPID PANEL    7. Hypokalemia    - METABOLIC PANEL, COMPREHENSIVE    8. Encounter for hepatitis C screening test for low risk patient  One time recommended screening for pt in her age group per new guidelines. - HCV AB W/RFLX TO FANTASMA    9. Encounter for vitamin deficiency screening  Likely she is deficient in Vit D as difficult for some individuals to get enough sunlight or food that is fortified or a natural source of Vit D.     - VITAMIN D, 25 HYDROXY    10. Screening for diabetes mellitus    - HEMOGLOBIN A1C WITH EAG    11. Encounter for immunization  Pt tolerated well w/ no issues. - INFLUENZA VACCINE (CCIIV4 VACCINE ANTIBIO FREE 0.5 ML)    I have discussed the diagnosis with the patient and the intended plan as seen in the above orders. Pt/caretaker has expressed understanding. Questions were answered concerning future plans. I have discussed medication side effects and warnings as indicated with the patient as well.     Sadie Freitas NP

## 2021-11-29 ENCOUNTER — OFFICE VISIT (OUTPATIENT)
Dept: NEUROLOGY | Age: 51
End: 2021-11-29
Payer: MEDICAID

## 2021-11-29 VITALS
HEART RATE: 69 BPM | SYSTOLIC BLOOD PRESSURE: 120 MMHG | DIASTOLIC BLOOD PRESSURE: 70 MMHG | WEIGHT: 182.4 LBS | OXYGEN SATURATION: 100 % | HEIGHT: 62 IN | BODY MASS INDEX: 33.57 KG/M2

## 2021-11-29 DIAGNOSIS — R56.9 SEIZURE (HCC): ICD-10-CM

## 2021-11-29 PROCEDURE — 99214 OFFICE O/P EST MOD 30 MIN: CPT | Performed by: PSYCHIATRY & NEUROLOGY

## 2021-11-29 RX ORDER — LEVETIRACETAM 1000 MG/1
1000 TABLET ORAL 2 TIMES DAILY
Qty: 180 TABLET | Refills: 0 | Status: SHIPPED | OUTPATIENT
Start: 2021-11-29 | End: 2022-02-27

## 2021-11-29 NOTE — LETTER
11/29/2021    Patient: Servando Naik   YOB: 1970   Date of Visit: 11/29/2021     Dylan Ca DO  [address]  [place]    Dear Dylan Ca DO,      Thank you for referring Ms. Michael Renae to [facility] for evaluation. My notes for this consultation are attached. If you have questions, please do not hesitate to call me. I look forward to following your patient along with you.       Sincerely,    Mildred Jacques MD

## 2021-11-29 NOTE — PROGRESS NOTES
Neurology Clinic Follow up Note    Patient ID:  Rahat Kim  [id number]  46 y.o.  1970      Ms. Marge Stout is here for follow up today of  Chief Complaint   Patient presents with    Follow-up    Seizure          Last Appointment With Me:  Visit date not found       Interval History: In the interval from prior evaluation, patient has been doing reasonably well. No breakthrough seizures are noted and she continues to tolerate Keppra without difficulty. Sedation appears less prominent she has had some improvement in sleep with the use of hydroxyzine through primary care. Inquires about obtaining a handicap tag which I mentioned I really was unable to provide due to lack of physical limitation. Otherwise mother again inquires about coming off of seizure medicine we discussed pros and cons in the future. No other questions concerns raised by patient or mother. PMHx/ PSHx/ FHx/ SHx:  Reviewed and unchanged previous visit. ROS:  Comprehensive review of systems negative except for as noted above. Objective:       Meds:  Current Outpatient Medications   Medication Sig Dispense Refill    levETIRAcetam 1,000 mg tablet Take 1 Tablet by mouth two (2) times a day for 90 days. 180 Tablet 0    lactulose (CHRONULAC) 10 gram/15 mL solution Take 10 grams (15 ml) every 12 hours as needed for constipation. If not effective, can increase to 20 gram (30 ml) every 12 hours. May take 24-48 hours for response. 480 mL 5    hydrOXYzine pamoate (VISTARIL) 50 mg capsule Take 1 tab by mouth 30 min to 1 hour prior to bedtime for sleep. 90 Capsule 3    lisinopriL (PRINIVIL, ZESTRIL) 40 mg tablet Take 1 Tablet by mouth daily. 90 Tablet 3    amLODIPine (NORVASC) 10 mg tablet Take 1 Tablet by mouth daily. 90 Tablet 3    hydrALAZINE (APRESOLINE) 100 mg tablet Take 1 Tablet by mouth three (3) times daily. 270 Tablet 3    atorvastatin (LIPITOR) 40 mg tablet Take 1 Tab by mouth daily.  Indications: excessive fat in the blood 90 Tab 3    Blood Pressure Test Kit-Large kit Check B/P 2 x day after pt has been calm for at least 5 minutes. Record B/P and notify office weekly of B/P results. 1 Kit 0    aspirin 81 mg chewable tablet 1 Tab by Per NG tube route daily. 30 Tab 0       Exam:  Visit Vitals  /70   Pulse 69   Ht 5' 2\" (1.575 m)   Wt 182 lb 6.4 oz (82.7 kg)   SpO2 100%   BMI 33.36 kg/m²     Pleasant female resting comfortably in exam room in no distress. HEENT appears grossly unremarkable, neck appears supple. Cardiovascular demonstrates constant S1/S2 regular rhythm. Pulmonary demonstrates equal entry bilaterally. Abdomen is nondistended/nontender. Extremities are warm/dry. Neurologically, patient appears alert and oriented though not directly assessed attention appears intact. Speech is clear, language fluent. Cranial nerves II through XII are grossly unremarkable. Motorically patient demonstrates grossly full strength in upper and lower extremities. Sensation is grossly intact to fine touch. Coordination is intact upper extremities without tremor. Primary gait and station appear grossly unremarkable and watching patient ambulate to room. Remainder examination deferred    LABS  Results for orders placed or performed during the hospital encounter of 04/05/21   CBC WITH AUTOMATED DIFF   Result Value Ref Range    WBC 6.0 3.6 - 11.0 K/uL    RBC 3.74 (L) 3.80 - 5.20 M/uL    HGB 10.3 (L) 11.5 - 16.0 g/dL    HCT 32.6 (L) 35.0 - 47.0 %    MCV 87.2 80.0 - 99.0 FL    MCH 27.5 26.0 - 34.0 PG    MCHC 31.6 30.0 - 36.5 g/dL    RDW 13.6 11.5 - 14.5 %    PLATELET 842 950 - 793 K/uL    MPV 9.8 8.9 - 12.9 FL    NRBC 0.0 0  WBC    ABSOLUTE NRBC 0.00 0.00 - 0.01 K/uL    NEUTROPHILS 63 32 - 75 %    LYMPHOCYTES 26 12 - 49 %    MONOCYTES 9 5 - 13 %    EOSINOPHILS 1 0 - 7 %    BASOPHILS 0 0 - 1 %    IMMATURE GRANULOCYTES 1 (H) 0.0 - 0.5 %    ABS. NEUTROPHILS 3.8 1.8 - 8.0 K/UL    ABS.  LYMPHOCYTES 1.6 0.8 - 3.5 K/UL ABS. MONOCYTES 0.5 0.0 - 1.0 K/UL    ABS. EOSINOPHILS 0.1 0.0 - 0.4 K/UL    ABS. BASOPHILS 0.0 0.0 - 0.1 K/UL    ABS. IMM. GRANS. 0.1 (H) 0.00 - 0.04 K/UL    DF AUTOMATED     METABOLIC PANEL, COMPREHENSIVE   Result Value Ref Range    Sodium 136 136 - 145 mmol/L    Potassium 3.4 (L) 3.5 - 5.1 mmol/L    Chloride 105 97 - 108 mmol/L    CO2 28 21 - 32 mmol/L    Anion gap 3 (L) 5 - 15 mmol/L    Glucose 115 (H) 65 - 100 mg/dL    BUN 12 6 - 20 MG/DL    Creatinine 0.75 0.55 - 1.02 MG/DL    BUN/Creatinine ratio 16 12 - 20      GFR est AA >60 >60 ml/min/1.73m2    GFR est non-AA >60 >60 ml/min/1.73m2    Calcium 9.2 8.5 - 10.1 MG/DL    Bilirubin, total 0.3 0.2 - 1.0 MG/DL    ALT (SGPT) 12 12 - 78 U/L    AST (SGOT) 7 (L) 15 - 37 U/L    Alk. phosphatase 71 45 - 117 U/L    Protein, total 7.2 6.4 - 8.2 g/dL    Albumin 3.5 3.5 - 5.0 g/dL    Globulin 3.7 2.0 - 4.0 g/dL    A-G Ratio 0.9 (L) 1.1 - 2.2     PROTHROMBIN TIME + INR   Result Value Ref Range    INR 1.0 0.9 - 1.1      Prothrombin time 10.1 9.0 - 11.1 sec   TROPONIN I   Result Value Ref Range    Troponin-I, Qt. <0.05 <0.05 ng/mL   SAMPLES BEING HELD   Result Value Ref Range    SAMPLES BEING HELD 1RED     COMMENT        Add-on orders for these samples will be processed based on acceptable specimen integrity and analyte stability, which may vary by analyte.    LIPID PANEL   Result Value Ref Range    LIPID PROFILE          Cholesterol, total 101 <200 MG/DL    Triglyceride 74 <150 MG/DL    HDL Cholesterol 80 MG/DL    LDL, calculated 6.2 0 - 100 MG/DL    VLDL, calculated 14.8 MG/DL    CHOL/HDL Ratio 1.3 0.0 - 5.0     HEMOGLOBIN A1C WITH EAG   Result Value Ref Range    Hemoglobin A1c 5.6 4.0 - 5.6 %    Est. average glucose 114 mg/dL   TSH 3RD GENERATION   Result Value Ref Range    TSH 2.47 0.36 - 3.74 uIU/mL   MAGNESIUM   Result Value Ref Range    Magnesium 2.3 1.6 - 2.4 mg/dL   PHOSPHORUS   Result Value Ref Range    Phosphorus 3.5 2.6 - 4.7 MG/DL   TROPONIN I   Result Value Ref Range    Troponin-I, Qt. <0.05 <0.05 ng/mL   C REACTIVE PROTEIN, QT   Result Value Ref Range    C-Reactive protein 1.84 (H) 0.00 - 0.60 mg/dL   FOLATE   Result Value Ref Range    Folate 11.8 5.0 - 21.0 ng/mL   VITAMIN B12   Result Value Ref Range    Vitamin B12 513 193 - 986 pg/mL   IRON PROFILE   Result Value Ref Range    Iron 52 35 - 150 ug/dL    TIBC 265 250 - 450 ug/dL    Iron % saturation 20 20 - 50 %   FERRITIN   Result Value Ref Range    Ferritin 53 26 - 388 NG/ML   CBC WITH AUTOMATED DIFF   Result Value Ref Range    WBC 5.2 3.6 - 11.0 K/uL    RBC 3.64 (L) 3.80 - 5.20 M/uL    HGB 10.0 (L) 11.5 - 16.0 g/dL    HCT 31.5 (L) 35.0 - 47.0 %    MCV 86.5 80.0 - 99.0 FL    MCH 27.5 26.0 - 34.0 PG    MCHC 31.7 30.0 - 36.5 g/dL    RDW 13.8 11.5 - 14.5 %    PLATELET 273 232 - 014 K/uL    MPV 10.6 8.9 - 12.9 FL    NRBC 0.0 0  WBC    ABSOLUTE NRBC 0.00 0.00 - 0.01 K/uL    NEUTROPHILS 59 32 - 75 %    LYMPHOCYTES 28 12 - 49 %    MONOCYTES 11 5 - 13 %    EOSINOPHILS 1 0 - 7 %    BASOPHILS 0 0 - 1 %    IMMATURE GRANULOCYTES 1 (H) 0.0 - 0.5 %    ABS. NEUTROPHILS 3.0 1.8 - 8.0 K/UL    ABS. LYMPHOCYTES 1.5 0.8 - 3.5 K/UL    ABS. MONOCYTES 0.6 0.0 - 1.0 K/UL    ABS. EOSINOPHILS 0.1 0.0 - 0.4 K/UL    ABS. BASOPHILS 0.0 0.0 - 0.1 K/UL    ABS. IMM. GRANS. 0.0 0.00 - 0.04 K/UL    DF AUTOMATED     METABOLIC PANEL, COMPREHENSIVE   Result Value Ref Range    Sodium 137 136 - 145 mmol/L    Potassium 4.1 3.5 - 5.1 mmol/L    Chloride 104 97 - 108 mmol/L    CO2 28 21 - 32 mmol/L    Anion gap 5 5 - 15 mmol/L    Glucose 80 65 - 100 mg/dL    BUN 8 6 - 20 MG/DL    Creatinine 0.66 0.55 - 1.02 MG/DL    BUN/Creatinine ratio 12 12 - 20      GFR est AA >60 >60 ml/min/1.73m2    GFR est non-AA >60 >60 ml/min/1.73m2    Calcium 8.6 8.5 - 10.1 MG/DL    Bilirubin, total 0.3 0.2 - 1.0 MG/DL    ALT (SGPT) 12 12 - 78 U/L    AST (SGOT) 11 (L) 15 - 37 U/L    Alk.  phosphatase 65 45 - 117 U/L    Protein, total 6.9 6.4 - 8.2 g/dL    Albumin 3.2 (L) 3.5 - 5.0 g/dL    Globulin 3.7 2.0 - 4.0 g/dL    A-G Ratio 0.9 (L) 1.1 - 2.2     TROPONIN I   Result Value Ref Range    Troponin-I, Qt. <0.05 <0.05 ng/mL   GLUCOSE, POC   Result Value Ref Range    Glucose (POC) 118 (H) 65 - 100 mg/dL    Performed by KATHY MAYER    EKG, 12 LEAD, INITIAL   Result Value Ref Range    Ventricular Rate 59 BPM    Atrial Rate 59 BPM    P-R Interval 172 ms    QRS Duration 88 ms    Q-T Interval 436 ms    QTC Calculation (Bezet) 431 ms    Calculated P Axis 30 degrees    Calculated R Axis -12 degrees    Calculated T Axis 29 degrees    Diagnosis       Sinus bradycardia  When compared with ECG of 17-MAR-2021 14:00,  No significant change was found  Confirmed by Abram Topete MD [id number]) on 4/6/2021 9:44:48 PM     ECHO ADULT COMPLETE   Result Value Ref Range    IVSd 1.19 (A) 0.60 - 0.90 cm    LVIDd 4.17 3.90 - 5.30 cm    LVIDs 1.95 cm    LVPWd 1.16 (A) 0.60 - 0.90 cm    LVOT Peak Gradient 8.47 mmHg    LVOT Peak Velocity 145.53 cm/s    RVIDd 4.55 cm    RVSP 22.69 mmHg    Left Atrium Major [place] 4.04 cm    LA Area 4C 23.01 cm2    LA Vol 4C 73.05 (A) 22.00 - 52.00 mL    Left Atrium to Aortic Root Ratio 0.93     Est. RA Pressure 5.00 mmHg    AoV PG 10.94 mmHg    Aortic Valve Systolic Peak Velocity 430.50 cm/s    MV A Angel 96.36 cm/s    MV E Angel 103.08 cm/s    LV E' Lateral Velocity 7.89 cm/s    LV E' Septal Velocity 7.79 cm/s    Tapse 2.51 (A) 1.50 - 2.00 cm    Triscuspid Valve Regurgitation Peak Gradient 17.69 mmHg    TR Max Velocity 210.30 cm/s    MV E/A 1.07     LV Mass .9 67 - 162 g    LV Mass AL Index 97.4 43 - 95 g/m2    E/E' lateral 13.06     E/E' septal 13.23     E/E' ratio (averaged) 13.15     Left Atrium Minor Axis 2.30 cm    LA Vol Index 41.62 16 - 28 ml/m2       IMAGING:  MRI Results (most recent):  Results from Hospital Encounter encounter on 04/05/21    MRI BRAIN WO CONT    Narrative  EXAM: MRI BRAIN WO CONT    INDICATION: Acute CVA. Recent right MCA aneurysm clipping.     COMPARISON: CTA head and neck 4/5/2021, MRI brain 6/19/2020. CONTRAST: None. TECHNIQUE:  Multiplanar multisequence acquisition without contrast of the brain. FINDINGS:  Recent postsurgical changes of right pterional craniotomy and right MCA  bifurcation aneurysm clipping. Stable size of bilateral subdural/extra-axial  fluid collections, right larger than left, measuring up to 8 mm on the right and  4 mm on the left. There is stable mass effect on the adjacent cerebral  hemispheres, right greater than left, with mild 4 mm of leftward midline shift. Small amount of pneumocephalus is stable. The ventricles are normal in size. No evidence of acute infarct. There is mild  encephalomalacia in the bilateral inferior frontal lobes. There is extensive  superficial siderosis throughout the left cerebral hemisphere, and to a lesser  extent in the right cerebral hemisphere and posterior fossa. Postprocedural  changes of left MCA bifurcation aneurysm coiling are noted. There is no  cerebellar tonsillar herniation. Expected arterial flow-voids are present. Retention cyst in the right maxillary sinus. The mastoid air cells and middle  ears are clear. The orbital contents are within normal limits. Impression  1. No evidence of acute infarct. 2. 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 Stable size of bilateral subdural/extra-axial fluid collections, right larger  than left, with stable mass effect resulting in mild 4 mm of leftward midline  shift. 3. 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 Postprocedural changes of left MCA bifurcation aneurysm coiling noted.           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 currently stable mother again inquires about coming off medication mention that really would be recommended for minimum of 2 years and likely would recommend staying on a lifelong as it would be near impossible to predict/state that she would never be at risk for seizure beyond this time point  Is tolerating Keppra without particular difficulty sedation is no longer a major issue is on hydroxyzine for sleep through primary care, mention that if this is not efficacious and primary care is uncomfortable trying other medicines to let us know had previously reviewed seizure action plan with family  Patient inquires about a handicap tag due to not wanting to walk far distances from a parking lot mention that I would not be able to oblige this request and encourage exercise including walking from the car  Encourage patient to stay active physically as well as to continue to go out for day program/volunteer    We will plan to follow-up in 6 months or sooner if needed    Greater than 30 minutes were spent personally by me during this visit and review of data, interview examining patient, counseling as well as with documentation in a 24-hour period    Signed:  Latha Freitas MD  11/29/2021  11:15 AM

## 2022-01-18 LAB
25(OH)D3+25(OH)D2 SERPL-MCNC: 14.9 NG/ML (ref 30–100)
ALBUMIN SERPL-MCNC: 4.2 G/DL (ref 3.8–4.9)
ALBUMIN/GLOB SERPL: 1.5 {RATIO} (ref 1.2–2.2)
ALP SERPL-CCNC: 98 IU/L (ref 44–121)
ALT SERPL-CCNC: 8 IU/L (ref 0–32)
AST SERPL-CCNC: 13 IU/L (ref 0–40)
BASOPHILS # BLD AUTO: 0 X10E3/UL (ref 0–0.2)
BASOPHILS NFR BLD AUTO: 1 %
BILIRUB SERPL-MCNC: 0.3 MG/DL (ref 0–1.2)
BUN SERPL-MCNC: 7 MG/DL (ref 6–24)
BUN/CREAT SERPL: 7 (ref 9–23)
CALCIUM SERPL-MCNC: 9.7 MG/DL (ref 8.7–10.2)
CHLORIDE SERPL-SCNC: 104 MMOL/L (ref 96–106)
CHOLEST SERPL-MCNC: 128 MG/DL (ref 100–199)
CO2 SERPL-SCNC: 22 MMOL/L (ref 20–29)
CREAT SERPL-MCNC: 0.97 MG/DL (ref 0.57–1)
EOSINOPHIL # BLD AUTO: 0.1 X10E3/UL (ref 0–0.4)
EOSINOPHIL NFR BLD AUTO: 2 %
ERYTHROCYTE [DISTWIDTH] IN BLOOD BY AUTOMATED COUNT: 13.5 % (ref 11.7–15.4)
EST. AVERAGE GLUCOSE BLD GHB EST-MCNC: 114 MG/DL
GLOBULIN SER CALC-MCNC: 2.8 G/DL (ref 1.5–4.5)
GLUCOSE SERPL-MCNC: 89 MG/DL (ref 65–99)
HBA1C MFR BLD: 5.6 % (ref 4.8–5.6)
HCT VFR BLD AUTO: 37.1 % (ref 34–46.6)
HCV AB S/CO SERPL IA: <0.1 S/CO RATIO (ref 0–0.9)
HCV AB SERPL QL IA: NORMAL
HDLC SERPL-MCNC: 83 MG/DL
HGB BLD-MCNC: 12.2 G/DL (ref 11.1–15.9)
IMM GRANULOCYTES # BLD AUTO: 0 X10E3/UL (ref 0–0.1)
IMM GRANULOCYTES NFR BLD AUTO: 1 %
LDLC SERPL CALC-MCNC: 27 MG/DL (ref 0–99)
LYMPHOCYTES # BLD AUTO: 1.4 X10E3/UL (ref 0.7–3.1)
LYMPHOCYTES NFR BLD AUTO: 31 %
MCH RBC QN AUTO: 26.1 PG (ref 26.6–33)
MCHC RBC AUTO-ENTMCNC: 32.9 G/DL (ref 31.5–35.7)
MCV RBC AUTO: 79 FL (ref 79–97)
MONOCYTES # BLD AUTO: 0.3 X10E3/UL (ref 0.1–0.9)
MONOCYTES NFR BLD AUTO: 7 %
NEUTROPHILS # BLD AUTO: 2.6 X10E3/UL (ref 1.4–7)
NEUTROPHILS NFR BLD AUTO: 58 %
PLATELET # BLD AUTO: 304 X10E3/UL (ref 150–450)
POTASSIUM SERPL-SCNC: 4 MMOL/L (ref 3.5–5.2)
PROT SERPL-MCNC: 7 G/DL (ref 6–8.5)
RBC # BLD AUTO: 4.68 X10E6/UL (ref 3.77–5.28)
RETICS/RBC NFR AUTO: 1.1 % (ref 0.6–2.6)
SODIUM SERPL-SCNC: 139 MMOL/L (ref 134–144)
TRIGL SERPL-MCNC: 98 MG/DL (ref 0–149)
VLDLC SERPL CALC-MCNC: 18 MG/DL (ref 5–40)
WBC # BLD AUTO: 4.4 X10E3/UL (ref 3.4–10.8)

## 2022-01-21 ENCOUNTER — OFFICE VISIT (OUTPATIENT)
Dept: FAMILY MEDICINE CLINIC | Age: 52
End: 2022-01-21
Payer: MEDICAID

## 2022-01-21 VITALS
BODY MASS INDEX: 34.04 KG/M2 | RESPIRATION RATE: 20 BRPM | WEIGHT: 185 LBS | OXYGEN SATURATION: 96 % | HEART RATE: 75 BPM | TEMPERATURE: 97.3 F | HEIGHT: 62 IN | SYSTOLIC BLOOD PRESSURE: 143 MMHG | DIASTOLIC BLOOD PRESSURE: 81 MMHG

## 2022-01-21 DIAGNOSIS — K59.09 CHRONIC CONSTIPATION: ICD-10-CM

## 2022-01-21 DIAGNOSIS — E78.1 HYPERTRIGLYCERIDEMIA: Primary | ICD-10-CM

## 2022-01-21 DIAGNOSIS — E55.9 VITAMIN D DEFICIENCY: ICD-10-CM

## 2022-01-21 PROCEDURE — 99214 OFFICE O/P EST MOD 30 MIN: CPT | Performed by: NURSE PRACTITIONER

## 2022-01-21 RX ORDER — ERGOCALCIFEROL 1.25 MG/1
50000 CAPSULE ORAL
Qty: 12 CAPSULE | Refills: 0 | Status: SHIPPED | OUTPATIENT
Start: 2022-01-21 | End: 2022-03-20 | Stop reason: DRUGHIGH

## 2022-01-21 RX ORDER — ATORVASTATIN CALCIUM 20 MG/1
20 TABLET, FILM COATED ORAL DAILY
Qty: 90 TABLET | Refills: 3 | Status: SHIPPED | OUTPATIENT
Start: 2022-01-21

## 2022-01-21 RX ORDER — LACTULOSE 10 G/15ML
SOLUTION ORAL; RECTAL
Qty: 480 ML | Refills: 5 | Status: SHIPPED | OUTPATIENT
Start: 2022-01-21 | End: 2022-04-21 | Stop reason: SDUPTHER

## 2022-01-21 NOTE — PROGRESS NOTES
Chief Complaint   Patient presents with    Follow-up    Medication Refill    Hypertension     Visit Vitals  BP (!) 143/81   Pulse 75   Temp 97.3 °F (36.3 °C)   Resp 20   Ht 5' 2\" (1.575 m)   Wt 185 lb (83.9 kg)   LMP 12/01/2021 (Exact Date)   SpO2 96%   BMI 33.84 kg/m²     Subjective  Bailee Workman is a 46 y.o. female. HPI:  Patient presented for follow-up. Accompanied by her mother who contributed to the HPI. Patient was living with her mother but now has moved back to her own place. Patient made a miraculous recovery from a ruptured aneurysm in her brain but still has residuals of forgetfulness. I have concerns about medication compliance. According to mother, patient spends a lot of time on her phone at the expense of staying active or doing other activities. Patient becomes angry when this is discussed. Patient is unable to drive so her mother drives her to her appointments and tries to help with her care. Last saw her neurologist on 11/29/2021. Patient has history of seizures but has not had any for a long time now. Continues on Keppra. Her neurologist is . Review of neuro note indicated that pt's mother would like pt to come off [address] but neurologist does not think so. She has had issues w/ insomnia in past but doing pretty well on hydroxyzine at bedtime. BP in office elevated 143/81. Patient has a BP kit at home and worked formerly in a nursing home but she is not routinely checking her BP. She stated that she checked it several days ago and remembers a systolic of 983 but does not remember the diastolic reading. Pt lost a lot of weight after her CVA but has gained a lot back so may be a factor. According to mother, she also might be eating a lot of salty food. Reiterated the importance of good B/P control in light of her CVA history and encouraged to check B/P at least 3 x week and write down- goal < 130/80.  Should contact office if not routinely at goal. Vitamin D deficiency - labs ordered in Oct 2021 were completed on 1/17/2022. Review[facility] all her [facility] at [facility]. She has a low Vit D level of 14.9 and was agreeable to starting 12 week weekly high dose of Vitamin D. Pt has had high triglyceride levels in past but now stable. Need refill of cholesterol medication. 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    Essential hypertension I10    History of anemia Z86.2    Carpal tunnel syndrome G56.00    Osteoarthritis M19.90    History of abnormal cervical Pap smear Z87.42    At high risk for falls Z91.81    Hypertriglyceridemia E78.1    History of subarachnoid hemorrhage - Left MCA aneurysm rupture 6/4/2020 Z86.79    Seizure ([address] R56.9    BMI 26.0-26.9,adult Z68.26    Hypokalemia E87.6     Past Medical History:   Diagnosis Date    Aneurysm ([address] 06/2020    Asthma     Cerebral artery occlusion with cerebral infarction ([address] 06/2020    Cerebral infarction ([address]     Dysphagia     Hypoxic ischemic encephalopathy (HIE), unspecified severity 6/15/2020    Joint pain     Leg swelling     Seizures ([address] 06/2020    Snoring     SOB (shortness of breath)     Vision decreased      Past Surgical History:   Procedure Laterality Date    HX CHOLECYSTECTOMY      HX GASTROSTOMY      HX HEENT  03/29/2021    aneurysm    IR INSERT GASTROSTOMY TUBE [facility]  6/19/2020    NEUROLOGICAL PROCEDURE UNLISTED      Coil Embolization    SD ABDOMEN SURGERY PROC UNLISTED      has peg tube AND REMOVED     Current Outpatient Medications   Medication Instructions    amLODIPine (NORVASC) 10 mg, Oral, DAILY    aspirin 81 mg, Per NG tube, DAILY    atorvastatin (LIPITOR) 20 mg, Oral, DAILY    Blood Pressure Test Kit-Large kit Check B/P 2 x day after pt has been calm for at least 5 minutes. Record B/P and notify office weekly of B/P results.     ergocalciferol (ERGOCALCIFEROL) 50,000 Units, Oral, EVERY 7 DAYS    hydrALAZINE (APRESOLINE) 100 mg, Oral, 3 TIMES DAILY    hydrOXYzine pamoate (VISTARIL) 50 mg capsule Take 1 tab by mouth 30 min to 1 hour prior to bedtime for sleep.  lactulose (CHRONULAC) 10 gram/15 mL solution Take 10 grams (15 ml) every 12 hours as needed for constipation. If not effective, can increase to 20 gram (30 ml) every 12 hours. May take 24-48 hours for response.  levETIRAcetam 1,000 mg, Oral, 2 TIMES DAILY    lisinopriL (PRINIVIL, ZESTRIL) 40 mg, Oral, DAILY     No Known Allergies  Social History     Tobacco Use    Smoking status: Never Smoker    Smokeless tobacco: Never Used   Vaping Use    Vaping Use: Never used   Substance Use Topics    Alcohol use: Not Currently    Drug use: Never     Family History   Problem Relation Age of Onset    Hypertension Mother     Stroke Other     Lung Cancer Father     No Known Problems Sister     No Known Problems Brother     No Known Problems Son     Anesth Problems Neg Hx        Review of Systems   Constitutional: Negative for chills, fever and malaise/fatigue. HENT: Negative for congestion, ear pain and sore throat. Respiratory: Negative for cough, shortness of breath and wheezing. Cardiovascular: Negative for chest pain, palpitations and leg swelling. Gastrointestinal: Positive for constipation. Negative for abdominal pain, diarrhea, heartburn, nausea and vomiting. Genitourinary: Negative for dysuria. Musculoskeletal: Negative for back pain, falls, joint pain, myalgias and neck pain. Neurological: Negative for dizziness, tingling, sensory change, weakness and headaches. Psychiatric/Behavioral: Positive for memory loss. Negative for depression. The patient has insomnia. The patient is not nervous/anxious. Improved on hydroxyzine. Objective  Physical Exam  Vitals reviewed. Constitutional:       General: She is not in acute distress. Appearance: Normal appearance. HENT:      Head: Normocephalic.       Right Ear: External ear normal.      Left Ear: External ear normal.      Nose: Nose normal.   Eyes:      Conjunctiva/sclera: Conjunctivae normal.   Neck:      Vascular: No carotid bruit. Cardiovascular:      Rate and Rhythm: Normal rate and regular rhythm. Heart sounds: Normal heart sounds. No murmur heard. Pulmonary:      Effort: Pulmonary effort is normal. No respiratory distress. Breath sounds: Normal breath sounds. Musculoskeletal:         General: No swelling or tenderness. Normal range of motion. Cervical back: Neck supple. Right lower leg: No edema. Left lower leg: No edema. Skin:     General: Skin is warm and dry. Coloration: Skin is not jaundiced. Findings: No lesion or rash. Neurological:      Mental Status: She is alert and oriented to person, place, and time. Motor: No weakness. Gait: Gait normal.   Psychiatric:         Behavior: Behavior normal.         Thought Content: Thought content normal.      Comments: Angry towards mother         Assessment & Plan      ICD-10-CM ICD-9-CM    1. Hypertriglyceridemia  E78.1 272.1 atorvastatin (LIPITOR) 20 mg tablet   2. Vitamin D deficiency  E55.9 268.9 ergocalciferol (ERGOCALCIFEROL) 1,250 mcg (50,000 unit) capsule   3. Chronic constipation  K59.09 564.00 lactulose (CHRONULAC) 10 gram/15 mL solution       1. Hypertriglyceridemia  Well controlled on current medication regime. Will continue w/o changes. - atorvastatin (LIPITOR) 20 mg tablet; Take 1 Tablet by mouth daily. Dispense: 90 Tablet; Refill: 3    2. Vitamin D deficiency  Will recheck labs for status at a future date. - ergocalciferol (ERGOCALCIFEROL) 1,250 mcg (50,000 unit) capsule; Take 1 Capsule by mouth every seven (7) days. Indications: vitamin D deficiency (high dose therapy)  Dispense: 12 Capsule; Refill: 0    3. Chronic constipation  Pt advised to use 2 x day until normal bowel regime is established and then can adjust dose to suit her needs.    - lactulose (CHRONULAC) 10 gram/15 mL solution; Take 10 grams (15 ml) every 12 hours as needed for constipation. If not effective, can increase to 20 gram (30 ml) every 12 hours. May take 24-48 hours for response. Dispense: 480 mL; Refill: 5    I have discussed the diagnosis with the patient and the intended plan as seen in the above orders. Pt/caretaker has expressed understanding. Questions were answered concerning future plans. I have discussed medication side effects and warnings as indicated with the patient as well.     Vanessa Reddy, NP

## 2022-01-21 NOTE — PROGRESS NOTES
1. Have you been to the ER, urgent care clinic since your last visit? Hospitalized since your last visit? No    2. Have you seen or consulted any other health care providers outside of the [address] [place] [address] since your last visit? Include any pap smears or colon screening. No     Pt states she is here for a follow up and med refill.     Chief Complaint   Patient presents with    Follow-up    Medication Refill    Hypertension

## 2022-01-21 NOTE — PATIENT INSTRUCTIONS
Constipation: Care Instructions  Your Care Instructions     Constipation means that you have a hard time passing stools (bowel movements). People pass stools from 3 times a day to once every 3 days. What is normal for you may be different. Constipation may occur with pain in the rectum and cramping. The pain may get worse when you try to pass stools. Sometimes there are small amounts of bright red blood on toilet paper or the surface of stools. This is because of enlarged veins near the rectum (hemorrhoids). A few changes in your diet and lifestyle may help you avoid ongoing constipation. Your doctor may also prescribe medicine to help loosen your stool. Some medicines can cause constipation. These include pain medicines and antidepressants. Tell your doctor about all the medicines you take. Your doctor may want to make a medicine change to ease your symptoms. Follow-up care is a key part of your treatment and safety. Be sure to make and go to all appointments, and call your doctor if you are having problems. It's also a good idea to know your test results and keep a list of the medicines you take. How can you care for yourself at home? · Drink plenty of fluids. If you have kidney, heart, or liver disease and have to limit fluids, talk with your doctor before you increase the amount of fluids you drink. · Include high-fiber foods in your diet each day. These include fruits, vegetables, beans, and whole grains. · Get at least 30 minutes of exercise on most days of the week. Walking is a good choice. You also may want to do other activities, such as running, swimming, cycling, or playing tennis or team sports. · Take a fiber supplement, such as Citrucel or Metamucil, every day. Read and follow all instructions on the label. · Schedule time each day for a bowel movement. A daily routine may help. Take your time having your bowel movement.   · Support your feet with a small step stool when you sit on the toilet. This helps flex your hips and places your pelvis in a squatting position. · Your doctor may recommend an over-the-counter laxative to relieve your constipation. Examples are Milk of Magnesia and MiraLax. Read and follow all instructions on the label. Do not use laxatives on a long-term basis. When should you call for help? Call your doctor now or seek immediate medical care if:    · You have new or worse belly pain.     · You have new or worse nausea or vomiting.     · You have blood in your stools. Watch closely for changes in your health, and be sure to contact your doctor if:    · Your constipation is getting worse.     · You do not get better as expected. Where can you learn more? Go to http://www.tom.com/  Enter P343 in the search box to learn more about \"Constipation: Care Instructions. \"  Current as of: July 1, 2021               Content Version: 13.0  © 6371-3706 [employer]. Care instructions adapted under license by [employer] (which disclaims liability or warranty for this information). If you have questions about a medical condition or this instruction, always ask your healthcare professional. [place] [address] any warranty or liability for your use of this information. Heart-Healthy Diet: Care Instructions  Your Care Instructions     A heart-healthy diet has lots of vegetables, fruits, nuts, beans, and whole grains, and is low in salt. It limits foods that are high in saturated fat, such as meats, cheeses, and fried foods. It may be hard to change your diet, but even small changes can lower your risk of heart attack and heart disease. Follow-up care is a key part of your treatment and safety. Be sure to make and go to all appointments, and call your doctor if you are having problems. It's also a good idea to know your test results and keep a list of the medicines you take.   How can you care for yourself at home?  Watch your portions  · Use food labels to learn what the recommended servings are for the foods you eat. · Eat only the number of calories you need to stay at a healthy weight. If you need to lose weight, eat fewer calories than your body burns (through exercise and other physical activity). Eat more fruits and vegetables  · Eat a variety of fruit and vegetables every day. Dark green, deep orange, red, or yellow fruits and vegetables are especially good for you. Examples include spinach, carrots, peaches, and berries. · Keep carrots, celery, and other veggies handy for snacks. Buy fruit that is in season and store it where you can see it so that you will be tempted to eat it. · Cook dishes that have a lot of veggies in them, such as stir-fries and soups. Limit saturated fat  · Read food labels, and try to avoid saturated fats. They increase your risk of heart disease. · Use olive or canola oil when you cook. · Bake, broil, grill, or steam foods instead of frying them. · Choose lean meats instead of high-fat meats such as hot dogs and sausages. Cut off all visible fat when you prepare meat. · Eat fish, skinless poultry, and meat alternatives such as soy products instead of high-fat meats. Soy products, such as tofu, may be especially good for your heart. · Choose low-fat or fat-free milk and dairy products. Eat foods high in fiber  · Eat a variety of grain products every day. Include whole-grain foods that have lots of fiber and nutrients. Examples of whole-grain foods include oats, whole wheat bread, and brown rice. · Buy whole-grain breads and cereals, instead of white bread or pastries. Limit salt and sodium  · Limit how much salt and sodium you eat to help lower your blood pressure. · Taste food before you salt it. Add only a little salt when you think you need it. With time, your taste buds will adjust to less salt. · Eat fewer snack items, fast foods, and other high-salt, processed foods. Check food labels for the amount of sodium in packaged foods. · Choose low-sodium versions of canned goods (such as soups, vegetables, and beans). Limit sugar  · Limit drinks and foods with added sugar. These include candy, desserts, and soda pop. Limit alcohol  · Limit alcohol to no more than 2 drinks a day for men and 1 drink a day for women. Too much alcohol can cause health problems. When should you call for help? Watch closely for changes in your health, and be sure to contact your doctor if:    · You would like help planning heart-healthy meals. Where can you learn more? Go to http://www.tom.com/  Enter V137 in the search box to learn more about \"Heart-Healthy Diet: Care Instructions. \"  Current as of: December 17, 2020               Content Version: 13.0  © 2006-2021 [employer]. Care instructions adapted under license by [employer] (which disclaims liability or warranty for this information). If you have questions about a medical condition or this instruction, always ask your healthcare professional. [place] [address] any warranty or liability for your use of this information. A Healthy Heart: Care Instructions  Your Care Instructions     Coronary artery disease, also called heart disease, occurs when a substance called plaque builds up in the vessels that supply oxygen-rich blood to your heart muscle. This can narrow the blood vessels and reduce blood flow. A heart attack happens when blood flow is completely blocked. A high-fat diet, smoking, and other factors increase the risk of heart disease. Your doctor has found that you have a chance of having heart disease. You can do lots of things to keep your heart healthy. It may not be easy, but you can change your diet, exercise more, and quit smoking. These steps really work to lower your chance of heart disease. Follow-up care is a key part of your treatment and safety.  Be sure to make and go to all appointments, and call your doctor if you are having problems. It's also a good idea to know your test results and keep a list of the medicines you take. How can you care for yourself at home? Diet    · Use less salt when you cook and eat. This helps lower your blood pressure. Taste food before salting. Add only a little salt when you think you need it. With time, your taste buds will adjust to less salt.     · Eat fewer snack items, fast foods, canned soups, and other high-salt, high-fat, processed foods.     · Read food labels and try to avoid saturated and trans fats. They increase your risk of heart disease by raising cholesterol levels.     · Limit the amount of solid fat-butter, margarine, and shortening-you eat. Use olive, peanut, or canola oil when you cook. Bake, broil, and steam foods instead of frying them.     · Eat a variety of fruit and vegetables every day. Dark green, deep orange, red, or yellow fruits and vegetables are especially good for you. Examples include spinach, carrots, peaches, and berries.     · Foods high in fiber can reduce your cholesterol and provide important vitamins and minerals. High-fiber foods include whole-grain cereals and breads, oatmeal, beans, brown rice, citrus fruits, and apples.     · Eat lean proteins. Heart-healthy proteins include seafood, lean meats and poultry, eggs, beans, peas, nuts, seeds, and soy products.     · Limit drinks and foods with added sugar. These include candy, desserts, and soda pop. Lifestyle changes    · If your doctor recommends it, get more exercise. Walking is a good choice. Bit by bit, increase the amount you walk every day. Try for at least 30 minutes on most days of the week. You also may want to swim, bike, or do other activities.     · Do not smoke. If you need help quitting, talk to your doctor about stop-smoking programs and medicines. These can increase your chances of quitting for good.  Quitting smoking may be the most important step you can take to protect your heart. It is never too late to quit.     · Limit alcohol to 2 drinks a day for men and 1 drink a day for women. Too much alcohol can cause health problems.     · Manage other health problems such as diabetes, high blood pressure, and high cholesterol. If you think you may have a problem with alcohol or drug use, talk to your doctor. Medicines    · Take your medicines exactly as prescribed. Call your doctor if you think you are having a problem with your medicine.     · If your doctor recommends aspirin, take the amount directed each day. Make sure you take aspirin and not another kind of pain reliever, such as acetaminophen (Tylenol). When should you call for help? Call 911 if you have symptoms of a heart attack. These may include:    · Chest pain or pressure, or a strange feeling in the chest.     · Sweating.     · Shortness of breath.     · Pain, pressure, or a strange feeling in the back, neck, jaw, or upper belly or in one or both shoulders or arms.     · Lightheadedness or sudden weakness.     · A fast or irregular heartbeat. After you call 911, the  may tell you to chew 1 adult-strength or 2 to 4 low-dose aspirin. Wait for an ambulance. Do not try to drive yourself. Watch closely for changes in your health, and be sure to contact your doctor if you have any problems. Where can you learn more? Go to http://www.tom.com/  Enter F075 in the search box to learn more about \"A Healthy Heart: Care Instructions. \"  Current as of: April 29, 2021               Content Version: 13.0  © 2006-2021 [employer]. Care instructions adapted under license by [email or web address] (which disclaims liability or warranty for this information).  If you have questions about a medical condition or this instruction, always ask your healthcare professional. [place] [address] any warranty or liability for your use of this information. How to Read a Food Label to Limit Sodium: Care Instructions  Overview  Limiting sodium can be an important part of managing some health problems. Processed foods, fast food, and restaurant foods are the major sources of dietary sodium. The most common name for sodium is salt. Most packaged foods have a Nutrition Facts label. This will tell you how much sodium is in one serving of food. Follow-up care is a key part of your treatment and safety. Be sure to make and go to all appointments, and call your doctor if you are having problems. It's also a good idea to know your test results and keep a list of the medicines you take. How can you care for yourself at home? Read ingredient lists on food labels  · Read the list of ingredients on food labels to help you find how much sodium is in a food. The label lists the ingredients in a food in descending order (from the most to the least). If salt or sodium is high on the list, there may be a lot of sodium in the food. · Know that sodium has different names. Sodium is also called monosodium glutamate (MSG, common in [facility] food), sodium citrate, sodium alginate, and sodium phosphate. Read Nutrition Facts labels  · On most foods, there is a Nutrition Facts label. This will tell you how much sodium is in one serving of food. Look at both the serving size and the sodium amount. The serving size is located at the top of the label, usually right under the \"Nutrition Facts\" title. The amount of sodium is given in the list under the title. It is given in milligrams (mg). ? Check the serving size carefully. A single serving is often very small, and you may eat more than one serving. If this is the case, you will eat more sodium than listed on the label. For example, if the serving size for a canned soup is 1 cup and the sodium amount is 470 mg, if you have 2 cups you will eat 940 mg of sodium.   · The nutrition facts for fresh fruits and vegetables are not listed on the food. They may be listed somewhere in the store. These foods usually have no sodium or low sodium. · The Nutrition Facts label also gives you the Percent Daily Value for sodium. This is how much of the recommended amount of sodium a serving contains. The daily value for sodium is less than 2,300 mg. So if the Percent Daily Value says 50%, this means one serving is giving you half of this, or 1,150 mg. Buy low-sodium foods  · Look for foods that are made with less sodium. Watch for the following words on the label. ? \"Unsalted\" means there is no sodium added to the food. But there may be sodium already in the food naturally. ? \"Sodium-free\" means a serving has less than 5 mg of sodium. ? \"Very low sodium\" means a serving has 35 mg or less of sodium. ? \"Low-sodium\" means a serving has 140 mg or less of sodium. · \"Reduced-sodium\" means that there is 25% less sodium than what the food normally has. This is still usually too much sodium. Try not to buy foods with this on the label. · Buy fresh vegetables, or frozen vegetables without added sauces. Buy low-sodium versions of canned vegetables, soups, and other canned goods. Where can you learn more? Go to http://www.gray.com/  Enter B757 in the search box to learn more about \"How to Read a Food Label to Limit Sodium: Care Instructions. \"  Current as of: December 17, 2020               Content Version: 13.0  © 2006-2021 Healthwise, [facility]. Care instructions adapted under license by [employer] (which disclaims liability or warranty for this information). If you have questions about a medical condition or this instruction, always ask your healthcare professional. [place] [address] any warranty or liability for your use of this information. Learning About [address]  What can you learn from Nutrition Facts food labels?      Labels on packaged, canned, and frozen foods can tell you a lot about what's in the food you eat. The Nutrition Facts label lists the amount of certain nutrients in foods. The list of ingredients tells you what is used to make the food. The ingredients are listed in the order of how much is in the product, from the most to the least.  How can you use Nutrition Facts food labels? Read the Nutrition Facts to help you eat a balanced diet that includes the nutrients your body needs. Pay attention to serving size. All of the nutrition information on a food label is based on the serving size listed on the label. If you eat more or less, you'll need to adjust the other numbers. So, for example, a package may contain two servings with 10 grams of protein per serving. If you eat the whole package, you've eaten 20 grams of protein. Check the % Daily Value, or % DV. The % DV helps you know how much of different nutrients the food provides. It's a percentage based on eating 2,000 calories a day. The amount of calories you need depends on your age, sex, and activity level. The % DV is based on how much of a certain nutrient your body needs each day. It shows how much of the nutrient is in that food. For example, the DV for calcium is 1,300 mg. If one serving of a food has 260 mg of calcium, the label would say the food has 20% of the DV for calcium. That means one serving of the food gives you 20% of the calcium you need that day. What nutrients do these labels include? The Nutrition Facts label gives you information about these nutrients:  Calories. A calorie is a measure of energy that a food provides. Everything your body does, from keeping your heart beating to moving and thinking, uses calories. Your body needs a certain number of calories each day to do its work. Fats. Fat is an important source of energy. It also helps your body absorb certain vitamins. Different foods have different types of fatty acids, or fats.  The unsaturated fats are more healthy. Saturated fats and trans fats are not as healthy for your body. Cholesterol. This is a type of waxy substance that your body needs to work well. For example, it helps your body digest fat. Your liver makes some cholesterol. You also get some from the food you eat. Sodium. Your body needs sodium to work as it should. Too much sodium can raise blood pressure in some people. Many processed and packaged foods are very high in sodium. Salt is the most well-known source of sodium. Carbohydrates. Carbs are an important source of energy for your body. Your brain and nervous system depend on carbs for their fuel. Carbs come in two forms: starch and sugar. They both provide fuel for your body. Dietary fiber. Fiber is the part of plant foods--including fruits, vegetables, and grains--that your body can't digest or absorb. It's important for the health of your intestine. It's also helpful for heart health and keeping blood sugar stable. Total sugars. Sugar in food can occur naturally (such as in cow's milk or fruit). Or it may be added to food (such as in candy or soda). Foods that naturally have sugar tend to have more vitamins, minerals, and other nutrients compared to foods with a large amount of added sugar. Protein. Protein gives you energy. Your body also needs protein to make new cells, build muscles, and support your immune system. Vitamin D.    Vitamin D's roles in your body include helping you absorb calcium. If you don't get enough, you could have thin and brittle bones (osteoporosis) in later years. Children who don't get enough may not grow properly. Calcium. Your body needs calcium to make bones and teeth. It also helps your blood to clot and your heart to work well. Iron. Your body uses iron to make hemoglobin for red blood cells. Hemoglobin carries oxygen from the lungs to cells all through your body. Potassium.     This mineral helps keep fluids balanced in your body. And it helps your nerves, muscles, and heart work well. Where can you learn more? Go to http://www.gray.com/  Enter F125 in the search box to learn more about \"Learning About Reading Food Labels. \"  Current as of: December 17, 2020               Content Version: 13.0  © 7740-0100 Healthwise, [employer]. Care instructions adapted under license by [employer] (which disclaims liability or warranty for this information). If you have questions about a medical condition or this instruction, always ask your healthcare professional. [place] [address] any warranty or liability for your use of this information.

## 2022-03-18 PROBLEM — E87.6 HYPOKALEMIA: Status: ACTIVE | Noted: 2020-11-14

## 2022-03-18 PROBLEM — M19.90 OSTEOARTHRITIS: Status: ACTIVE | Noted: 2020-08-18

## 2022-03-18 PROBLEM — Z86.79 HISTORY OF SUBARACHNOID HEMORRHAGE: Status: ACTIVE | Noted: 2020-10-12

## 2022-03-18 PROBLEM — G56.00 CARPAL TUNNEL SYNDROME: Status: ACTIVE | Noted: 2020-08-18

## 2022-03-19 PROBLEM — Z86.2 HISTORY OF ANEMIA: Status: ACTIVE | Noted: 2020-08-18

## 2022-03-19 PROBLEM — I10 ESSENTIAL HYPERTENSION: Status: ACTIVE | Noted: 2020-08-18

## 2022-03-19 PROBLEM — R56.9 SEIZURE (HCC): Status: ACTIVE | Noted: 2020-10-12

## 2022-03-19 PROBLEM — Z87.42 HISTORY OF ABNORMAL CERVICAL PAP SMEAR: Status: ACTIVE | Noted: 2020-08-18

## 2022-03-19 PROBLEM — E78.1 HYPERTRIGLYCERIDEMIA: Status: ACTIVE | Noted: 2020-10-03

## 2022-03-20 DIAGNOSIS — E55.9 VITAMIN D DEFICIENCY: Primary | ICD-10-CM

## 2022-03-20 PROBLEM — Z91.81 AT HIGH RISK FOR FALLS: Status: ACTIVE | Noted: 2020-08-24

## 2022-03-20 PROBLEM — I67.1 MIDDLE CEREBRAL ARTERY ANEURYSM: Status: ACTIVE | Noted: 2020-06-04

## 2022-03-20 RX ORDER — MELATONIN
1000 DAILY
Qty: 90 TABLET | Refills: 3 | Status: SHIPPED | OUTPATIENT
Start: 2022-03-20

## 2022-04-21 ENCOUNTER — OFFICE VISIT (OUTPATIENT)
Dept: FAMILY MEDICINE CLINIC | Age: 52
End: 2022-04-21
Payer: MEDICAID

## 2022-04-21 VITALS
SYSTOLIC BLOOD PRESSURE: 120 MMHG | OXYGEN SATURATION: 98 % | WEIGHT: 182 LBS | DIASTOLIC BLOOD PRESSURE: 67 MMHG | TEMPERATURE: 98.8 F | BODY MASS INDEX: 34.36 KG/M2 | HEIGHT: 61 IN | HEART RATE: 73 BPM | RESPIRATION RATE: 18 BRPM

## 2022-04-21 DIAGNOSIS — E55.9 VITAMIN D DEFICIENCY: ICD-10-CM

## 2022-04-21 DIAGNOSIS — R56.9 SEIZURE (HCC): ICD-10-CM

## 2022-04-21 DIAGNOSIS — K59.09 CHRONIC CONSTIPATION: ICD-10-CM

## 2022-04-21 DIAGNOSIS — I10 ESSENTIAL HYPERTENSION: Primary | ICD-10-CM

## 2022-04-21 DIAGNOSIS — E87.6 HYPOKALEMIA: ICD-10-CM

## 2022-04-21 DIAGNOSIS — E78.1 HYPERTRIGLYCERIDEMIA: ICD-10-CM

## 2022-04-21 PROCEDURE — 99214 OFFICE O/P EST MOD 30 MIN: CPT | Performed by: NURSE PRACTITIONER

## 2022-04-21 RX ORDER — LEVETIRACETAM 1000 MG/1
1000 TABLET ORAL 2 TIMES DAILY
COMMUNITY
End: 2022-05-31 | Stop reason: SDUPTHER

## 2022-04-21 RX ORDER — LACTULOSE 10 G/15ML
SOLUTION ORAL; RECTAL
Qty: 480 ML | Refills: 5 | Status: SHIPPED | OUTPATIENT
Start: 2022-04-21

## 2022-04-21 NOTE — PROGRESS NOTES
Chief Complaint   Patient presents with    Follow-up     6 month follow up      Visit Vitals  /67 (BP 1 Location: Left upper arm, BP Patient Position: Sitting, BP Cuff Size: Adult)   Pulse 73   Temp 98.8 °F (37.1 °C) (Temporal)   Resp 18   Ht 5' 1\" (1.549 m)   Wt 182 lb (82.6 kg)   LMP 04/04/2022 (Exact Date)   SpO2 98%   BMI 34.39 kg/m²     Dot Bullard is a 46 y.o. female. HPI:  Pt presented for f/u. Accompanied by her mother who is her legal guardian. Mother contributed to the HPI. Needs med refill and will order some labs to check status of chronic med conditions. 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 Still has residuals of forgetfulness but is stable. Patient continues to spend a lot of time on her phone at the expense of staying active or doing other activities. Patient is unable to drive so her mother drives her to her appointments and tries to help with her care. Last saw her neurologist on 11/29/2021. Patient has history of seizures but has not had any for a long time now. Continues on Keppra. Her neurologist is . Next appt scheduled for /31/2022. Hypertension- B/P in office good at 120/67. She continues on lisinopril 40 mg daily, amlodipine 10 mg daily, and hydralazine 100 mg 3 x day. Med compliance has been an issue in past but has improved. Pt is aware of importance in checking her B/P regularly at home and keeping her B/P under control.      Patient Active Problem List   Diagnosis Code    Essential hypertension I10    History of anemia Z86.2    Carpal tunnel syndrome G56.00    Osteoarthritis M19.90    History of abnormal cervical Pap smear Z87.42    BMI 33.0-33.9,adult Z68.33    At high risk for falls Z91.81    Hypertriglyceridemia E78.1    History of subarachnoid hemorrhage - Left MCA aneurysm rupture 6/4/2020 Z86.79    Seizure ([address] R56.9    Hypokalemia E87.6     Past Medical History:   Diagnosis Date    Aneurysm ([facility] 06/2020    Asthma     Cerebral artery occlusion with cerebral infarction ([facility] 06/2020    Cerebral infarction ([facility]     Dysphagia     Hypoxic ischemic encephalopathy (HIE), unspecified severity 6/15/2020    Joint pain     Leg swelling     RIGHT middle cerebral artery aneurysm 6/4/2020    Seizures ([facility] 06/2020    Snoring     SOB (shortness of breath)     Vision decreased      Past Surgical History:   Procedure Laterality Date    HX CHOLECYSTECTOMY      HX GASTROSTOMY      HX HEENT  03/29/2021    aneurysm    IR INSERT GASTROSTOMY TUBE PERC  6/19/2020    NEUROLOGICAL PROCEDURE UNLISTED      Coil Embolization    MS ABDOMEN SURGERY PROC UNLISTED      has peg tube AND REMOVED     Current Outpatient Medications   Medication Instructions    amLODIPine (NORVASC) 10 mg, Oral, DAILY    aspirin 81 mg, Per NG tube, DAILY    atorvastatin (LIPITOR) 20 mg, Oral, DAILY    Blood Pressure Test Kit-Large kit Check B/P 2 x day after pt has been calm for at least 5 minutes. Record B/P and notify office weekly of B/P results. cholecalciferol (VITAMIN D3) 1,000 Units, Oral, DAILY    hydrALAZINE (APRESOLINE) 100 mg, Oral, 3 TIMES DAILY    hydrOXYzine pamoate (VISTARIL) 50 mg capsule TAKE 1 TAB BY MOUTH 30 MIN TO 1 HOUR PRIOR TO BEDTIME FOR SLEEP.    lactulose (CHRONULAC) 10 gram/15 mL solution Take 10 grams (15 ml) every 12 hours as needed for constipation. If not effective, can increase to 20 gram (30 ml) every 12 hours. May take 24-48 hours for response.     levETIRAcetam 1,000 mg, Oral, 2 TIMES DAILY    lisinopriL (PRINIVIL, ZESTRIL) 40 mg, Oral, DAILY     No Known Allergies  Social History     Tobacco Use    Smoking status: Never    Smokeless tobacco: Never   Vaping Use    Vaping Use: Never used   Substance Use Topics    Alcohol use: Not Currently    Drug use: Never     Family History   Problem Relation Age of Onset    Hypertension Mother     Stroke Other     Lung Cancer Father     No Known Problems Sister     No Known Problems Brother     No Known Problems Son     Anesth Problems Neg Hx      Review of Systems   Constitutional:  Negative for chills, fever and malaise/fatigue. HENT:  Negative for congestion, ear pain and sore throat. Respiratory:  Negative for cough, shortness of breath and wheezing. Cardiovascular:  Negative for chest pain, palpitations and leg swelling. Gastrointestinal:  Negative for abdominal pain, constipation, diarrhea, heartburn, nausea and vomiting. Genitourinary:  Negative for dysuria. Musculoskeletal:  Negative for back pain, falls, joint pain, myalgias and neck pain. Neurological:  Negative for dizziness, tingling, sensory change, weakness and headaches. Psychiatric/Behavioral:  Negative for depression. The patient is not nervous/anxious and does not have insomnia. Objective  Physical Exam  Vitals reviewed. Constitutional:       General: She is not in acute distress. Appearance: Normal appearance. HENT:      Head: Normocephalic. Right Ear: External ear normal.      Left Ear: External ear normal.      Nose: Nose normal.   Eyes:      Conjunctiva/sclera: Conjunctivae normal.   Neck:      Vascular: No carotid bruit. Cardiovascular:      Rate and Rhythm: Normal rate and regular rhythm. Heart sounds: Normal heart sounds. No murmur heard. Pulmonary:      Effort: Pulmonary effort is normal. No respiratory distress. Breath sounds: Normal breath sounds. Musculoskeletal:         General: No swelling or tenderness. Normal range of motion. Cervical back: Neck supple. Right lower leg: No edema. Left lower leg: No edema. Skin:     General: Skin is warm and dry. Coloration: Skin is not jaundiced. Findings: No lesion or rash. Neurological:      Mental Status: She is alert and oriented to person, place, and time. Mental status is at baseline. Motor: No weakness.       Gait: Gait normal.   Psychiatric:         Behavior: Behavior normal. Thought Content: Thought content normal.         Judgment: Judgment normal.      Comments: Cooperative and pleasant but irritated w/ mother when she added information or corrected something pt said. Assessment & Plan      ICD-10-CM ICD-9-CM    1. Essential hypertension  I10 401.9       2. Seizure ([address]  R56.9 780.39       3. Vitamin D deficiency  E55.9 268.9 VITAMIN D, 25 HYDROXY      4. Hypokalemia  N45.8 080.9 METABOLIC PANEL, COMPREHENSIVE      5. Hypertriglyceridemia  E78.1 272.1 LIPID PANEL      6. Chronic constipation  K59.09 564.00 lactulose (CHRONULAC) 10 gram/15 mL solution        1. Essential hypertension  Currently stable on current med regime which she will continue. 2. Seizure ([address]  Stable. She will continue with regular follow-up with her neurologist and stay on current medication treatment plan. 3. Vitamin D deficiency  Initial vitamin D level was 14.9. She completed a 12-week course of high-dose vitamin D taken weekly and is currently taking vitamin D 1000 units daily. We will adjust the dosage of vitamin D if indicated by her lab work. - VITAMIN D, 25 HYDROXY    4. Hypokalemia    - METABOLIC PANEL, COMPREHENSIVE    5. Hypertriglyceridemia  Currently taking atorvastatin 20 mg daily. Is very important to keep her cholesterol numbers at goal.  If not at goal will increase her medication.    - LIPID PANEL    6. Chronic constipation  Medication is effective when needed. Patient was advised to try to increase fiber in her diet and stay well-hydrated. - lactulose (CHRONULAC) 10 gram/15 mL solution; Take 10 grams (15 ml) every 12 hours as needed for constipation. If not effective, can increase to 20 gram (30 ml) every 12 hours. May take 24-48 hours for response. Dispense: 480 mL; Refill: 5       I have discussed the diagnosis with the patient and the intended plan as seen in the above orders. Pt/caretaker has expressed understanding. Questions were answered concerning future plans. I have discussed medication side effects and warnings as indicated with the patient as well.     Fernando Espinoza NP

## 2022-04-21 NOTE — PROGRESS NOTES
1. \"Have you been to the ER, urgent care clinic since your last visit? Hospitalized since your last visit?  no    2. \"Have you seen or consulted any other health care providers outside of the [address] [place] [address] since your last visit? \" no    3. For patients aged 39-70: Has the patient had a colonoscopy / FIT/ Cologuard? Cant recall     If the patient is female:    4. For patients aged 41-77: Has the patient had a mammogram within the past 2 years? 2021      5. For patients aged 21-65: Has the patient had a pap smear?  2021        Abhilash Dang is a 46 y.o. female is coming in for with the following:     Chief Complaint   Patient presents with    Follow-up     6 month follow up           With the following vitals:    Visit Vitals  /67 (BP 1 Location: Left upper arm, BP Patient Position: Sitting, BP Cuff Size: Adult)   Pulse 73   Temp 98.8 °F (37.1 °C) (Temporal)   Resp 18   Ht 5' 1\" (1.549 m)   Wt 182 lb (82.6 kg)   LMP 04/04/2022 (Exact Date)   SpO2 98%   BMI 34.39 kg/m²

## 2022-04-23 LAB
25(OH)D3+25(OH)D2 SERPL-MCNC: 46.6 NG/ML (ref 30–100)
ALBUMIN SERPL-MCNC: 4.2 G/DL (ref 3.8–4.9)
ALBUMIN/GLOB SERPL: 1.6 {RATIO} (ref 1.2–2.2)
ALP SERPL-CCNC: 85 IU/L (ref 44–121)
ALT SERPL-CCNC: 7 IU/L (ref 0–32)
AST SERPL-CCNC: 8 IU/L (ref 0–40)
BILIRUB SERPL-MCNC: 0.2 MG/DL (ref 0–1.2)
BUN SERPL-MCNC: 10 MG/DL (ref 6–24)
BUN/CREAT SERPL: 11 (ref 9–23)
CALCIUM SERPL-MCNC: 9.6 MG/DL (ref 8.7–10.2)
CHLORIDE SERPL-SCNC: 101 MMOL/L (ref 96–106)
CHOLEST SERPL-MCNC: 134 MG/DL (ref 100–199)
CO2 SERPL-SCNC: 21 MMOL/L (ref 20–29)
CREAT SERPL-MCNC: 0.89 MG/DL (ref 0.57–1)
EGFR: 78 ML/MIN/1.73
GLOBULIN SER CALC-MCNC: 2.6 G/DL (ref 1.5–4.5)
GLUCOSE SERPL-MCNC: 91 MG/DL (ref 65–99)
HDLC SERPL-MCNC: 83 MG/DL
LDLC SERPL CALC-MCNC: 34 MG/DL (ref 0–99)
POTASSIUM SERPL-SCNC: 4.4 MMOL/L (ref 3.5–5.2)
PROT SERPL-MCNC: 6.8 G/DL (ref 6–8.5)
SODIUM SERPL-SCNC: 139 MMOL/L (ref 134–144)
TRIGL SERPL-MCNC: 90 MG/DL (ref 0–149)
VLDLC SERPL CALC-MCNC: 17 MG/DL (ref 5–40)

## 2022-05-31 ENCOUNTER — OFFICE VISIT (OUTPATIENT)
Dept: NEUROLOGY | Age: 52
End: 2022-05-31
Payer: MEDICAID

## 2022-05-31 VITALS
TEMPERATURE: 98.9 F | HEART RATE: 91 BPM | HEIGHT: 61 IN | WEIGHT: 177 LBS | SYSTOLIC BLOOD PRESSURE: 133 MMHG | OXYGEN SATURATION: 97 % | DIASTOLIC BLOOD PRESSURE: 63 MMHG | BODY MASS INDEX: 33.42 KG/M2

## 2022-05-31 DIAGNOSIS — R56.9 SEIZURE (HCC): Primary | ICD-10-CM

## 2022-05-31 PROCEDURE — 99213 OFFICE O/P EST LOW 20 MIN: CPT | Performed by: PSYCHIATRY & NEUROLOGY

## 2022-05-31 RX ORDER — LEVETIRACETAM 1000 MG/1
1000 TABLET ORAL 2 TIMES DAILY
Qty: 180 TABLET | Refills: 0 | Status: SHIPPED | OUTPATIENT
Start: 2022-05-31 | End: 2022-10-24

## 2022-05-31 NOTE — PROGRESS NOTES
Neurology Clinic Follow up Note    Patient ID:  Jad Gibson  [id number]  46 y.o.  1970      Ms. Edilma Jasso is here for follow up today of  Chief Complaint   Patient presents with    Follow-up    Seizure          Last Appointment With Me:  Visit date not found       Interval History:     Interval from last visit no significant changes been noted. Patient remains controlled on Keppra no seizures appreciated. Had a headache this morning in the midst of a behavioral outburst headaches have not been prominent. Has been doing well overall attempting to stay active physically as well as previously volunteering though has not been doing so recently. PMHx/ PSHx/ FHx/ SHx:  Reviewed and unchanged previous visit. ROS:  Comprehensive review of systems negative except for as noted above. Objective:       Meds:  Current Outpatient Medications   Medication Sig Dispense Refill    levETIRAcetam 1,000 mg tablet Take 1 Tablet by mouth two (2) times a day for 90 days. 180 Tablet 0    lactulose (CHRONULAC) 10 gram/15 mL solution Take 10 grams (15 ml) every 12 hours as needed for constipation. If not effective, can increase to 20 gram (30 ml) every 12 hours. May take 24-48 hours for response. 480 mL 5    cholecalciferol (VITAMIN D3) (1000 Units /25 mcg) tablet Take 1 Tablet by mouth daily. Indications: low vitamin D levels 90 Tablet 3    atorvastatin (LIPITOR) 20 mg tablet Take 1 Tablet by mouth daily. 90 Tablet 3    hydrOXYzine pamoate (VISTARIL) 50 mg capsule Take 1 tab by mouth 30 min to 1 hour prior to bedtime for sleep. 90 Capsule 3    lisinopriL (PRINIVIL, ZESTRIL) 40 mg tablet Take 1 Tablet by mouth daily. 90 Tablet 3    amLODIPine (NORVASC) 10 mg tablet Take 1 Tablet by mouth daily. 90 Tablet 3    hydrALAZINE (APRESOLINE) 100 mg tablet Take 1 Tablet by mouth three (3) times daily.  270 Tablet 3    Blood Pressure Test Kit-Large kit Check B/P 2 x day after pt has been calm for at least 5 minutes. Record B/P and notify office weekly of B/P results. 1 Kit 0    aspirin 81 mg chewable tablet 1 Tab by Per NG tube route daily. 30 Tab 0       Exam:  Visit Vitals  /63   Pulse 91   Temp 98.9 °F (37.2 °C) (Oral)   Ht 5' 1\" (1.549 m)   Wt 177 lb (80.3 kg)   SpO2 97%   BMI 33.44 kg/m²     Pleasant female resting comfortably in exam room in no clear distress. Appearing irritated with mother. Neck appears supple. HEENT is unremarkable. Cardiopulmonary exams are unremarkable. Abdomen is nontender/nondistended. Extremities are warm/dry. Neurologically, patient appears alert and oriented attention appears intact. Speech is clear, language is reasonably fluent. Language is not further assessed. Cranial nerves II through XII appear grossly unremarkable. Motorically patient has equal strength in upper and lower extremities. Sensation appears grossly intact. Coordination is intact in upper extremities. Primary gait and station appears unremarkable. LABS  Results for orders placed or performed in visit on 04/21/22   VITAMIN D, 25 HYDROXY   Result Value Ref Range    VITAMIN D, 25-HYDROXY 46.6 30.0 - 100.0 ng/mL   LIPID PANEL   Result Value Ref Range    Cholesterol, total 134 100 - 199 mg/dL    Triglyceride 90 0 - 149 mg/dL    HDL Cholesterol 83 >39 mg/dL    VLDL, calculated 17 5 - 40 mg/dL    LDL, calculated 34 0 - 99 mg/dL   METABOLIC PANEL, COMPREHENSIVE   Result Value Ref Range    Glucose 91 65 - 99 mg/dL    BUN 10 6 - 24 mg/dL    Creatinine 0.89 0.57 - 1.00 mg/dL    eGFR 78 >59 mL/min/1.73    BUN/Creatinine ratio 11 9 - 23    Sodium 139 134 - 144 mmol/L    Potassium 4.4 3.5 - 5.2 mmol/L    Chloride 101 96 - 106 mmol/L    CO2 21 20 - 29 mmol/L    Calcium 9.6 8.7 - 10.2 mg/dL    Protein, total 6.8 6.0 - 8.5 g/dL    Albumin 4.2 3.8 - 4.9 g/dL    GLOBULIN, TOTAL 2.6 1.5 - 4.5 g/dL    A-G Ratio 1.6 1.2 - 2.2    Bilirubin, total 0.2 0.0 - 1.2 mg/dL    Alk.  phosphatase 85 44 - 121 IU/L    AST (SGOT) 8 0 - 40 IU/L    ALT (SGPT) 7 0 - 32 IU/L       IMAGING:  MRI Results (most recent):  Results from Hospital Encounter encounter on 04/05/21    MRI BRAIN WO CONT    Narrative  EXAM: MRI BRAIN WO CONT    INDICATION: Acute CVA. Recent right MCA aneurysm clipping. COMPARISON: CTA head and neck 4/5/2021, MRI brain 6/19/2020. CONTRAST: None. TECHNIQUE:  Multiplanar multisequence acquisition without contrast of the brain. FINDINGS:  Recent postsurgical changes of right pterional craniotomy and right MCA  bifurcation aneurysm clipping. Stable size of bilateral subdural/extra-axial  fluid collections, right larger than left, measuring up to 8 mm on the right and  4 mm on the left. There is stable mass effect on the adjacent cerebral  hemispheres, right greater than left, with mild 4 mm of leftward midline shift. Small amount of pneumocephalus is stable. The ventricles are normal in size. No evidence of acute infarct. There is mild  encephalomalacia in the bilateral inferior frontal lobes. There is extensive  superficial siderosis throughout the left cerebral hemisphere, and to a lesser  extent in the right cerebral hemisphere and posterior fossa. Postprocedural  changes of left MCA bifurcation aneurysm coiling are noted. There is no  cerebellar tonsillar herniation. Expected arterial flow-voids are present. Retention cyst in the right maxillary sinus. The mastoid air cells and middle  ears are clear. The orbital contents are within normal limits. Impression  1. No evidence of acute infarct. 2. 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 Stable size of bilateral subdural/extra-axial fluid collections, right larger  than left, with stable mass effect resulting in mild 4 mm of leftward midline  shift. 3. 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   Postprocedural changes of left MCA bifurcation aneurysm coiling noted.     Assessment:     Vincenzo Sandifer is a 59-year-old right-hand-dominant female who presents to [facility] neurology clinic for ongoing follow-up and management of symptomatic seizure disorder secondary to ruptured right MCA aneurysm    Plan:   Symptomatic seizure disorder:  Remains well controlled without concern for recurrent seizure  Patient expresses desire to remain on medication feeling and has a beneficial mood affect as well  We will continue plan unchanged for the time being, plan to follow-up in 1 year  Encourage patient/mother to call question concerns in the interim      Signed:  Palma Gibson MD  5/31/2022  1:49 PM

## 2022-07-08 DIAGNOSIS — G47.09 OTHER INSOMNIA: ICD-10-CM

## 2022-07-12 RX ORDER — HYDROXYZINE PAMOATE 50 MG/1
CAPSULE ORAL
Qty: 90 CAPSULE | Refills: 1 | Status: SHIPPED | OUTPATIENT
Start: 2022-07-12

## 2022-07-29 PROBLEM — I67.1 MIDDLE CEREBRAL ARTERY ANEURYSM: Status: RESOLVED | Noted: 2020-06-04 | Resolved: 2022-07-29

## 2022-09-07 NOTE — PROGRESS NOTES
[facility]    NAME: Arvin Drummond  AGE: 61 y o  SEX: female  : 1958     DATE: 2022     Assessment and Plan:     Patient was seen for annual physical exam   Overall she is doing fairly well  We reviewed her recent blood work which was unremarkable with the exception of elevated cholesterol above goal though improved from 3 years ago  We discussed low cholesterol diet and weight loss strategies  Discussed addition of fiber to help with more regular bowel habits  Recommended mammogram and colon screening  She declined any additional vaccines though she is up-to-date with tetanus  Problem List Items Addressed This Visit     Familial combined hyperlipidemia    Esophageal reflux    Relevant Medications    famotidine (PEPCID) 10 mg tablet    Elevated fasting glucose    Benign essential hypertension    Relevant Medications    lisinopril-hydrochlorothiazide (PRINZIDE,ZESTORETIC) 10-12 5 MG per tablet    furosemide (LASIX) 20 mg tablet    Anemia      Other Visit Diagnoses     Encounter for screening colonoscopy    -  Primary    Relevant Orders    Ambulatory referral for colonoscopy    Atopic dermatitis, unspecified type        Relevant Medications    triamcinolone (KENALOG) 0 5 % cream    Annual physical exam        Encounter for screening mammogram for malignant neoplasm of breast        Relevant Orders    Mammo screening bilateral w 3d & cad    Essential hypertension        Relevant Medications    lisinopril-hydrochlorothiazide (PRINZIDE,ZESTORETIC) 10-12 5 MG per tablet    furosemide (LASIX) 20 mg tablet          Immunizations and preventive care screenings were discussed with patient today  Appropriate education was printed on patient's after visit summary  Counseling:  Alcohol/drug use: discussed moderation in alcohol intake, the recommendations for healthy alcohol use, and avoidance of illicit drug use    Dental Primary Nurse Tammy Cotto RN and RUDDY Romero performed a dual skin assessment on this patient No impairment noted  Sriram score is 12. Health: discussed importance of regular tooth brushing, flossing, and dental visits  Injury prevention: discussed safety/seat belts, safety helmets, smoke detectors, carbon dioxide detectors, and smoking near bedding or upholstery  Exercise: the importance of regular exercise/physical activity was discussed  Recommend exercise 3-5 times per week for at least 30 minutes  BMI Counseling: Body mass index is 33 18 kg/m²  The BMI is above normal  Nutrition recommendations include decreasing portion sizes, encouraging healthy choices of fruits and vegetables, decreasing fast food intake, limiting drinks that contain sugar, moderation in carbohydrate intake and increasing intake of lean protein  Exercise recommendations include moderate physical activity 150 minutes/week  No pharmacotherapy was ordered  Rationale for BMI follow-up plan is due to patient being overweight or obese  Depression Screening and Follow-up Plan: Patient was screened for depression during today's encounter  They screened negative with a PHQ-2 score of 0  Return in 1 year (on 9/7/2023) for Annual physical      Chief Complaint:     Chief Complaint   Patient presents with    Follow-up     Would like referral to Dermatology    Nail Problem     Toenail fungus    Heartburn    Itching     Itching on back only      History of Present Illness:     Adult Annual Physical   Patient here for a comprehensive physical exam  The patient reports no problems  Diet and Physical Activity  Diet/Nutrition: low carb diet and consuming 3-5 servings of fruits/vegetables daily  Exercise: walking and 1-2 times a week on average  Depression Screening  PHQ-2/9 Depression Screening    Little interest or pleasure in doing things: 0 - not at all  Feeling down, depressed, or hopeless: 0 - not at all  PHQ-2 Score: 0  PHQ-2 Interpretation: Negative depression screen       General Health  Sleep: gets 4-6 hours of sleep on average     Hearing: normal - bilateral   Vision: most recent eye exam >1 year ago and wears contacts  Dental: regular dental visits  /GYN Health  Patient is: postmenopausal  Last menstrual period: n/a  Contraceptive method: n/a  Review of Systems:     Review of Systems   Constitutional: Negative  HENT: Negative  Negative for congestion, ear pain, hearing loss, nosebleeds, sore throat and trouble swallowing  Eyes: Negative  Respiratory: Negative for apnea, cough, chest tightness, shortness of breath and wheezing  Cardiovascular: Negative  Gastrointestinal: Negative for abdominal pain, blood in stool, constipation, diarrhea, nausea and vomiting  Endocrine: Negative  Genitourinary: Negative for difficulty urinating, dysuria, frequency, hematuria and urgency  Musculoskeletal: Negative for arthralgias, joint swelling and myalgias  Skin: Negative for rash  Neurological: Negative for dizziness, syncope, light-headedness, numbness and headaches  Hematological: Negative  Psychiatric/Behavioral: Negative for confusion, dysphoric mood and sleep disturbance  The patient is not nervous/anxious  Past Medical History:     Past Medical History:   Diagnosis Date    Joint pain     [id number]  resolved    Sebaceous cyst     [id number] resolved      Past Surgical History:     History reviewed  No pertinent surgical history     Social History:     Social History     Socioeconomic History    Marital status:      Spouse name: None    Number of children: None    Years of education: None    Highest education level: None   Occupational History    None   Tobacco Use    Smoking status: Never Smoker    Smokeless tobacco: Never Used   Substance and Sexual Activity    Alcohol use: Yes     Comment: social drinker    Drug use: No    Sexual activity: None   Other Topics Concern    None   Social History Narrative    None     Social Determinants of Health     Financial Resource Strain: Not on file   Food Insecurity: Not on file   Transportation Needs: Not on file   Physical Activity: Not on file   Stress: Not on file   Social Connections: Not on file   Intimate Partner Violence: Not on file   Housing Stability: Not on file      Family History:     Family History   Problem Relation Age of Onset    Other Mother         allergic drug reaction    Brain cancer Mother     Other Son         allergic drug reaction    Liver cancer Father     Pulmonary embolism Father       Current Medications:     Current Outpatient Medications   Medication Sig Dispense Refill    famotidine (PEPCID) 10 mg tablet Take 10 mg by mouth as needed for heartburn      furosemide (LASIX) 20 mg tablet Take 1 tablet (20 mg total) by mouth daily 30 tablet 2    lisinopril-hydrochlorothiazide (PRINZIDE,ZESTORETIC) 10-12 5 MG per tablet Take 1 tablet by mouth daily 90 tablet 3    multivitamin (THERAGRAN) TABS Take 1 tablet by mouth daily      Omega-3 Fatty Acids (FISH OIL) 1,000 mg Take 1,000 mg by mouth daily      triamcinolone (KENALOG) 0 5 % cream Apply topically 2 (two) times a day 60 g 2    CALCIUM PO Take 2 tablets by mouth daily (Patient not taking: Reported on 9/7/2022)      Cholecalciferol (VITAMIN D3) 1000 units CAPS Take 1 capsule by mouth daily (Patient not taking: Reported on 9/7/2022)      fluticasone (FLONASE) 50 mcg/act nasal spray 2 sprays into each nostril daily (Patient not taking: Reported on 9/7/2022)      omeprazole (PriLOSEC) 20 mg delayed release capsule Take 1 capsule (20 mg total) by mouth daily (Patient not taking: Reported on 9/7/2022) 90 capsule 1     No current facility-administered medications for this visit        Allergies:     No Known Allergies   Physical Exam:     /86 (BP Location: Right arm, Patient Position: Sitting, Cuff Size: Large)   Pulse 85   Temp 98 8 °F (37 1 °C)   Ht 5' 5" (1 651 m)   Wt 90 4 kg (199 lb 6 4 oz)   SpO2 99%   BMI 33 18 kg/m²     Physical Exam  Vitals and nursing note reviewed  Constitutional:       Appearance: She is well-developed  HENT:      Head: Normocephalic and atraumatic  Eyes:      Pupils: Pupils are equal, round, and reactive to light  Cardiovascular:      Rate and Rhythm: Normal rate and regular rhythm  Heart sounds: Normal heart sounds  Pulmonary:      Effort: Pulmonary effort is normal       Breath sounds: Normal breath sounds  Abdominal:      General: Bowel sounds are normal       Palpations: Abdomen is soft  Musculoskeletal:         General: Normal range of motion  Cervical back: Normal range of motion and neck supple  Skin:     General: Skin is warm and dry  Capillary Refill: Capillary refill takes less than 2 seconds  Neurological:      Mental Status: She is alert and oriented to person, place, and time  Psychiatric:         Behavior: Behavior normal          Thought Content:  Thought content normal          Judgment: Judgment normal           Freida Younger, DO  [address] [facility]

## 2022-09-26 ENCOUNTER — TELEPHONE (OUTPATIENT)
Dept: NEUROLOGY | Age: 52
End: 2022-09-26

## 2022-09-26 NOTE — TELEPHONE ENCOUNTER
Message from Richard 20, 8838 [facility] doctor left and didn't inform any patients she was leaving completely out of blood pressure medication this is needed asap. \"

## 2022-09-26 NOTE — TELEPHONE ENCOUNTER
Patient's mother, on [employer], called back, verified and advised that the physicians do not manage Hypertension and are unable to refill any medication that is outside of their scope of practice. Patient's mother stated \"the other doctor's office already refilled it as a courtesy. \"

## 2022-12-06 ENCOUNTER — OFFICE VISIT (OUTPATIENT)
Dept: INTERNAL MEDICINE CLINIC | Age: 52
End: 2022-12-06
Payer: MEDICARE

## 2022-12-06 VITALS
HEART RATE: 71 BPM | DIASTOLIC BLOOD PRESSURE: 74 MMHG | RESPIRATION RATE: 19 BRPM | HEIGHT: 61 IN | OXYGEN SATURATION: 98 % | SYSTOLIC BLOOD PRESSURE: 110 MMHG | BODY MASS INDEX: 34.97 KG/M2 | WEIGHT: 185.2 LBS

## 2022-12-06 DIAGNOSIS — Z86.79 HISTORY OF SUBARACHNOID HEMORRHAGE: ICD-10-CM

## 2022-12-06 DIAGNOSIS — R56.9 SEIZURE (HCC): ICD-10-CM

## 2022-12-06 DIAGNOSIS — Z12.31 SCREENING MAMMOGRAM FOR BREAST CANCER: ICD-10-CM

## 2022-12-06 DIAGNOSIS — K59.00 CONSTIPATION, UNSPECIFIED CONSTIPATION TYPE: ICD-10-CM

## 2022-12-06 DIAGNOSIS — E55.9 VITAMIN D DEFICIENCY: ICD-10-CM

## 2022-12-06 DIAGNOSIS — I10 ESSENTIAL HYPERTENSION: Primary | ICD-10-CM

## 2022-12-06 DIAGNOSIS — G47.00 INSOMNIA, UNSPECIFIED TYPE: ICD-10-CM

## 2022-12-06 DIAGNOSIS — E78.1 HYPERTRIGLYCERIDEMIA: ICD-10-CM

## 2022-12-06 PROCEDURE — 99214 OFFICE O/P EST MOD 30 MIN: CPT | Performed by: STUDENT IN AN ORGANIZED HEALTH CARE EDUCATION/TRAINING PROGRAM

## 2022-12-06 RX ORDER — ATORVASTATIN CALCIUM 20 MG/1
20 TABLET, FILM COATED ORAL DAILY
Qty: 90 TABLET | Refills: 3 | Status: SHIPPED | OUTPATIENT
Start: 2022-12-06

## 2022-12-06 RX ORDER — HYDRALAZINE HYDROCHLORIDE 100 MG/1
100 TABLET, FILM COATED ORAL 3 TIMES DAILY
Qty: 270 TABLET | Refills: 1 | Status: SHIPPED | OUTPATIENT
Start: 2022-12-06

## 2022-12-06 RX ORDER — AMLODIPINE BESYLATE 10 MG/1
10 TABLET ORAL DAILY
Qty: 90 TABLET | Refills: 1 | Status: SHIPPED | OUTPATIENT
Start: 2022-12-06

## 2022-12-06 RX ORDER — LEVETIRACETAM 1000 MG/1
TABLET ORAL
COMMUNITY
Start: 2022-12-01

## 2022-12-06 RX ORDER — LISINOPRIL 40 MG/1
40 TABLET ORAL DAILY
Qty: 90 TABLET | Refills: 3 | Status: SHIPPED | OUTPATIENT
Start: 2022-12-06

## 2022-12-06 RX ORDER — TRAZODONE HYDROCHLORIDE 50 MG/1
50 TABLET ORAL
Qty: 30 TABLET | Refills: 1 | Status: SHIPPED | OUTPATIENT
Start: 2022-12-06

## 2022-12-06 NOTE — PROGRESS NOTES
Chief Complaint   Patient presents with    Establish Care     1. Have you been to the ER, urgent care clinic since your last visit? Hospitalized since your last visit? No    2. Have you seen or consulted any other health care providers outside of the [address] [place] [address] since your last visit? Include any pap smears or colon screening.  No    Visit Vitals  /74 (BP 1 Location: Right arm, BP Patient Position: Sitting, BP Cuff Size: Adult)   Pulse 71   Resp 19   Ht 5' 1\" (1.549 m)   Wt 185 lb 3.2 oz (84 kg)   SpO2 98%   BMI 34.99 kg/m²

## 2022-12-06 NOTE — PROGRESS NOTES
Bárbara Matias (: 1970) is a 46 y.o. female, established patient, here for evaluation of the following chief complaint(s):  Establish Care (Hx of aneurysm and seizures)    Previous PCP: Deangelo Joseph    ASSESSMENT/PLAN:  Below is the assessment and plan developed based on review of pertinent history, physical exam, labs, studies, and medications. 1. Essential hypertension  -     METABOLIC PANEL, COMPREHENSIVE  -     CBC W/O DIFF  -     amLODIPine (NORVASC) 10 mg tablet; Take 1 Tablet by mouth daily. , Normal, Disp-90 Tablet, R-1  -     hydrALAZINE (APRESOLINE) 100 mg tablet; Take 1 Tablet by mouth three (3) times daily. , Normal, Disp-270 Tablet, R-1  -     lisinopriL (PRINIVIL, ZESTRIL) 40 mg tablet; Take 1 Tablet by mouth daily. , Normal, Disp-90 Tablet, R-3  2. Hypertriglyceridemia  -     atorvastatin (LIPITOR) 20 mg tablet; Take 1 Tablet by mouth daily. , Normal, Disp-90 Tablet, R-3  3. Vitamin D deficiency  -     VITAMIN D, 25 HYDROXY  4. Screening mammogram for breast cancer  -     [facility] MAMMO BI SCREENING INCL CAD; Future  5. Insomnia, unspecified type  -     traZODone (DESYREL) 50 mg tablet; Take 1 Tablet by mouth nightly as needed for Sleep., Normal, Disp-30 Tablet, R-1  6. 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 Seizure ([address]  8. Constipation, unspecified constipation type    Hypertension: Controlled, continue current medications. Refills given. Hyperlipidemia: Reviewed lipid panel done this year, within normal limits. Continue current dose of atorvastatin. Refills given    Insomnia: States hydroxyzine is not helping. Discontinue hydroxyzine    Seizures: Continue Keppra, follow-up with neurology. Vitamin D deficiency: will recheck levels and prescribe accordingly    Patient was told to make an appointment with her OB/UN for Pap smear. Constipation: Recommend over-the-counter MiraLAX, fiber and plenty of water.     Return in about 2 months (around 2023) for follow up, end of Jan .      SUBJECTIVE/OBJECTIVE:  ROLANDO Landeros is a 70-year-old who presents to clinic to establish care. Her mother is her medial power of /legal guardian. She has a previous history of subarachnoid hemorrhage, left MCA ruptured aneurysm on 6/4/2020. As per notes it looks like she made a miraculous recovery. She has residual forgetfulness. Follows with neurology: Dr. Sonny Mejía -has a follow-up next year. She also has a history of seizures, on Keppra. She also has a history of hypertension, hyperlipidemia. She lives with her son, independent with basic ADLs, needs some follow up from time to time. She is able to answer questions. [place] sometime at [place], as per patient and mother it was normal.  Unsure of last pap semar    No Known Allergies  Current Outpatient Medications   Medication Sig    levETIRAcetam 1,000 mg tablet TAKE 1 TABLET BY MOUTH TWO (2) TIMES A DAY FOR 90 DAYS.    amLODIPine (NORVASC) 10 mg tablet Take 1 Tablet by mouth daily. atorvastatin (LIPITOR) 20 mg tablet Take 1 Tablet by mouth daily. hydrALAZINE (APRESOLINE) 100 mg tablet Take 1 Tablet by mouth three (3) times daily. lisinopriL (PRINIVIL, ZESTRIL) 40 mg tablet Take 1 Tablet by mouth daily. traZODone (DESYREL) 50 mg tablet Take 1 Tablet by mouth nightly as needed for Sleep. cholecalciferol (VITAMIN D3) (1000 Units /25 mcg) tablet Take 1 Tablet by mouth daily. Indications: low vitamin D levels    Blood Pressure Test Kit-Large kit Check B/P 2 x day after pt has been calm for at least 5 minutes. Record B/P and notify office weekly of B/P results. aspirin 81 mg chewable tablet 1 Tab by Per NG tube route daily. No current facility-administered medications for this visit.      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    Seizures ([address] 06/2020    Snoring     SOB (shortness of breath)     Vision decreased      Past Surgical History:   Procedure Laterality Date    HX CHOLECYSTECTOMY      HX GASTROSTOMY      HX HEENT  03/29/2021    aneurysm    IR INSERT GASTROSTOMY TUBE [facility]  6/19/2020    NEUROLOGICAL PROCEDURE UNLISTED      Coil Embolization    MN ABDOMEN SURGERY PROC UNLISTED      has peg tube AND REMOVED     Family History   Problem Relation Age of Onset    Hypertension Mother     Stroke Other     Lung Cancer Father     No Known Problems Sister     No Known Problems Brother     No Known Problems Son     Anesth Problems Neg Hx      Social History     Tobacco Use   Smoking Status Never   Smokeless Tobacco Never         Review of Systems   Constitutional:  Negative for fever and malaise/fatigue. HENT:  Negative for congestion, hearing loss and sore throat. Eyes: Negative. Respiratory:  Negative for cough and shortness of breath. Cardiovascular:  Negative for chest pain, palpitations and leg swelling. Gastrointestinal:  Negative for abdominal pain, nausea and vomiting. Genitourinary: Negative. Skin: Negative. Neurological:  Negative for focal weakness, loss of consciousness and headaches. Memory issues   Psychiatric/Behavioral: Negative. Vitals:    12/06/22 1005   BP: 110/74   Pulse: 71   Resp: 19   SpO2: 98%   Weight: 185 lb 3.2 oz (84 kg)   Height: 5' 1\" (1.549 m)      Physical Exam  Constitutional:       General: She is not in acute distress. Appearance: Normal appearance. Comments: Oriented x 4   HENT:      Head: Normocephalic. Nose: Nose normal.      Mouth/Throat:      Mouth: Mucous membranes are moist.   Eyes:      Extraocular Movements: Extraocular movements intact.       Conjunctiva/sclera: Conjunctivae normal. Pupils: Pupils are equal, round, and reactive to light. Cardiovascular:      Rate and Rhythm: Normal rate and regular rhythm. Heart sounds: Normal heart sounds. Pulmonary:      Effort: Pulmonary effort is normal.      Breath sounds: Normal breath sounds. Abdominal:      General: Bowel sounds are normal. There is no distension. Palpations: Abdomen is soft. Tenderness: There is no abdominal tenderness. Musculoskeletal:      Cervical back: Normal range of motion and neck supple. Neurological:      General: No focal deficit present. Mental Status: She is alert. Mental status is at baseline. Motor: No weakness. Psychiatric:         Mood and Affect: Mood normal.         Behavior: Behavior normal.           An electronic signature was used to authenticate this note.   -- Cristel Garcia MD

## 2022-12-07 LAB
25(OH)D3+25(OH)D2 SERPL-MCNC: 42.1 NG/ML (ref 30–100)
ALBUMIN SERPL-MCNC: 4.6 G/DL (ref 3.8–4.9)
ALBUMIN/GLOB SERPL: 1.8 {RATIO} (ref 1.2–2.2)
ALP SERPL-CCNC: 108 IU/L (ref 44–121)
ALT SERPL-CCNC: 10 IU/L (ref 0–32)
AST SERPL-CCNC: 14 IU/L (ref 0–40)
BILIRUB SERPL-MCNC: 0.3 MG/DL (ref 0–1.2)
BUN SERPL-MCNC: 12 MG/DL (ref 6–24)
BUN/CREAT SERPL: 13 (ref 9–23)
CALCIUM SERPL-MCNC: 9.9 MG/DL (ref 8.7–10.2)
CHLORIDE SERPL-SCNC: 103 MMOL/L (ref 96–106)
CO2 SERPL-SCNC: 24 MMOL/L (ref 20–29)
CREAT SERPL-MCNC: 0.92 MG/DL (ref 0.57–1)
EGFR: 75 ML/MIN/1.73
ERYTHROCYTE [DISTWIDTH] IN BLOOD BY AUTOMATED COUNT: 15.7 % (ref 11.7–15.4)
GLOBULIN SER CALC-MCNC: 2.6 G/DL (ref 1.5–4.5)
GLUCOSE SERPL-MCNC: 87 MG/DL (ref 70–99)
HCT VFR BLD AUTO: 37.7 % (ref 34–46.6)
HGB BLD-MCNC: 12 G/DL (ref 11.1–15.9)
MCH RBC QN AUTO: 23.9 PG (ref 26.6–33)
MCHC RBC AUTO-ENTMCNC: 31.8 G/DL (ref 31.5–35.7)
MCV RBC AUTO: 75 FL (ref 79–97)
PLATELET # BLD AUTO: 319 X10E3/UL (ref 150–450)
POTASSIUM SERPL-SCNC: 4.7 MMOL/L (ref 3.5–5.2)
PROT SERPL-MCNC: 7.2 G/DL (ref 6–8.5)
RBC # BLD AUTO: 5.02 X10E6/UL (ref 3.77–5.28)
SODIUM SERPL-SCNC: 141 MMOL/L (ref 134–144)
WBC # BLD AUTO: 5.3 X10E3/UL (ref 3.4–10.8)

## 2022-12-12 ENCOUNTER — TRANSCRIBE ORDER (OUTPATIENT)
Dept: SCHEDULING | Age: 52
End: 2022-12-12

## 2022-12-12 DIAGNOSIS — Z12.31 VISIT FOR SCREENING MAMMOGRAM: Primary | ICD-10-CM

## 2022-12-16 ENCOUNTER — HOSPITAL ENCOUNTER (OUTPATIENT)
Dept: MAMMOGRAPHY | Age: 52
End: 2022-12-16
Attending: STUDENT IN AN ORGANIZED HEALTH CARE EDUCATION/TRAINING PROGRAM
Payer: MEDICARE

## 2022-12-16 DIAGNOSIS — Z12.31 VISIT FOR SCREENING MAMMOGRAM: ICD-10-CM

## 2022-12-16 PROCEDURE — 77063 BREAST TOMOSYNTHESIS BI: CPT

## 2022-12-19 ENCOUNTER — TELEPHONE (OUTPATIENT)
Dept: NEUROLOGY | Age: 52
End: 2022-12-19

## 2022-12-19 RX ORDER — LEVETIRACETAM 1000 MG/1
TABLET ORAL
Qty: 180 TABLET | Refills: 1 | Status: SHIPPED | OUTPATIENT
Start: 2022-12-19

## 2022-12-19 NOTE — TELEPHONE ENCOUNTER
Called patient's mother. She stated that she will need a refill on her levetiracetam 1,000mg tablet. Reports still taking it as \"take 1 tablet by mouth two (2) times a day. \" Reports she will not be seen until May with the NP grabiel. She had asked if the medication can be filled until she is seen by the NP.     LOV: 05/31/22  Last refill: 12/01/22 with 0 refill (I called pharmacy to confirmed it, as it was a 30 day supply that was given).

## 2022-12-19 NOTE — TELEPHONE ENCOUNTER
Patient's Mom stated that her daughter needs her Prescription to send to the Pharmacy that on her file at [facility] because she is in need, and her follow up will be on 05.31.2023 with NP / Wendy Laurent

## 2023-01-30 ENCOUNTER — OFFICE VISIT (OUTPATIENT)
Dept: INTERNAL MEDICINE CLINIC | Age: 53
End: 2023-01-30
Payer: MEDICARE

## 2023-01-30 VITALS
OXYGEN SATURATION: 100 % | DIASTOLIC BLOOD PRESSURE: 81 MMHG | SYSTOLIC BLOOD PRESSURE: 129 MMHG | HEIGHT: 61 IN | HEART RATE: 60 BPM | BODY MASS INDEX: 35.5 KG/M2 | RESPIRATION RATE: 19 BRPM | TEMPERATURE: 97.9 F | WEIGHT: 188 LBS

## 2023-01-30 DIAGNOSIS — I10 ESSENTIAL HYPERTENSION: Primary | ICD-10-CM

## 2023-01-30 DIAGNOSIS — R56.9 SEIZURE (HCC): ICD-10-CM

## 2023-01-30 DIAGNOSIS — G47.00 INSOMNIA, UNSPECIFIED TYPE: ICD-10-CM

## 2023-01-30 DIAGNOSIS — E78.1 HYPERTRIGLYCERIDEMIA: ICD-10-CM

## 2023-01-30 DIAGNOSIS — Z86.79 HISTORY OF SUBARACHNOID HEMORRHAGE: ICD-10-CM

## 2023-01-30 PROCEDURE — G9899 SCRN MAM PERF RSLTS DOC: HCPCS | Performed by: STUDENT IN AN ORGANIZED HEALTH CARE EDUCATION/TRAINING PROGRAM

## 2023-01-30 PROCEDURE — 99214 OFFICE O/P EST MOD 30 MIN: CPT | Performed by: STUDENT IN AN ORGANIZED HEALTH CARE EDUCATION/TRAINING PROGRAM

## 2023-01-30 PROCEDURE — G8417 CALC BMI ABV UP PARAM F/U: HCPCS | Performed by: STUDENT IN AN ORGANIZED HEALTH CARE EDUCATION/TRAINING PROGRAM

## 2023-01-30 PROCEDURE — 3017F COLORECTAL CA SCREEN DOC REV: CPT | Performed by: STUDENT IN AN ORGANIZED HEALTH CARE EDUCATION/TRAINING PROGRAM

## 2023-01-30 PROCEDURE — G8510 SCR DEP NEG, NO PLAN REQD: HCPCS | Performed by: STUDENT IN AN ORGANIZED HEALTH CARE EDUCATION/TRAINING PROGRAM

## 2023-01-30 PROCEDURE — G8427 DOCREV CUR MEDS BY ELIG CLIN: HCPCS | Performed by: STUDENT IN AN ORGANIZED HEALTH CARE EDUCATION/TRAINING PROGRAM

## 2023-01-30 RX ORDER — TRAZODONE HYDROCHLORIDE 50 MG/1
50 TABLET ORAL
Qty: 30 TABLET | Refills: 1 | Status: SHIPPED | OUTPATIENT
Start: 2023-01-30

## 2023-01-30 RX ORDER — AMLODIPINE BESYLATE 10 MG/1
10 TABLET ORAL DAILY
Qty: 90 TABLET | Refills: 1 | Status: SHIPPED | OUTPATIENT
Start: 2023-01-30

## 2023-01-30 RX ORDER — HYDRALAZINE HYDROCHLORIDE 100 MG/1
100 TABLET, FILM COATED ORAL 3 TIMES DAILY
Qty: 270 TABLET | Refills: 1 | Status: SHIPPED | OUTPATIENT
Start: 2023-01-30

## 2023-01-30 NOTE — PROGRESS NOTES
1. \"Have you been to the ER, urgent care clinic since your last visit? Hospitalized since your last visit? \" No    2. \"Have you seen or consulted any other health care providers outside of the [address] [place] [address] since your last visit? \" No     3. For patients aged 39-70: Has the patient had a colonoscopy / FIT/ Cologuard? Yes - Care Gap present. Most recent result on file      If the patient is female:    4. For patients aged 41-77: Has the patient had a mammogram within the past 2 years? Yes - Care Gap present. Most recent result on file      5. For patients aged 21-65: Has the patient had a pap smear? Yes - Care Gap present.  Rooming MA/LPN to request most recent results    Pt states that she did her pap smear at [facility] in [address]     Chief Complaint   Patient presents with    Follow-up    Hypertension     Visit Vitals  /81 (BP 1 Location: Right lower arm, BP Patient Position: Sitting, BP Cuff Size: Adult)   Pulse 60   Temp 97.9 °F (36.6 °C) (Oral)   Resp 19   Ht 5' 1\" (1.549 m)   Wt 188 lb (85.3 kg)   LMP 11/14/2022   SpO2 100%   BMI 35.52 kg/m²

## 2023-01-30 NOTE — PROGRESS NOTES
Criselda Zaman (: 1970) is a 46 y.o. female, established patient, here for evaluation of the following chief complaint(s):  Follow-up and Hypertension       ASSESSMENT/PLAN:  Below is the assessment and plan developed based on review of pertinent history, physical exam, labs, studies, and medications. 1. Essential hypertension  -     amLODIPine (NORVASC) 10 mg tablet; Take 1 Tablet by mouth daily. , Normal, Disp-90 Tablet, R-1  -     hydrALAZINE (APRESOLINE) 100 mg tablet; Take 1 Tablet by mouth three (3) times daily. , Normal, Disp-270 Tablet, R-1  2. Insomnia, unspecified type  -     traZODone (DESYREL) 50 mg tablet; Take 1 Tablet by mouth nightly as needed for Sleep., Normal, Disp-30 Tablet, R-1  3. Hypertriglyceridemia  4. History of subarachnoid hemorrhage - Left MCA aneurysm rupture 2020  5. Seizure ([address]    Hypertension:, Continue current medications. Refills have been provided. Continue monitoring blood pressures    Insomnia: Continue trazodone as needed    Hyperlipidemia: Continue atorvastatin. History of SAH, left MCA aneurysm rupture and seizures: Continue Keppra. Follow-up with neurology, has an appointment coming up. Return in about 6 months (around 2023) for follow up. SUBJECTIVE/OBJECTIVE:  ROLANDO Zaman is a 66-year-old who presents to the clinic for follow-up. She has a history of hypertension, hypertriglyceridemia, history of subarachnoid hemorrhage, with left MCA aneurysm rupture, seizures. She is accompanied by her mother. She is independent of most ADLs. She does get some assistance from her mother for cocaine. She does have some residual memory deficits. She is able to answer questions. Her mother is her medical power of /legal guardian. She had previous history of subarachnoid hemorrhage, left MCA ruptured aneurysm in 2020. She had a miraculous recovery. She does have some residual forgetfulness.   Follows with  Hilary Bell, neurology. She has a history of seizures, on Keppra. Hypertension: Currently on amlodipine, hydralazine and lisinopril. Has been checking blood pressures at home and states they have been in the normal range. She is started on trazodone for insomnia in general last visit, mother states she just started taking it couple days ago. She is currently on 1000 units of vitamin D. She is waiting on the results of the pap smear. No Known Allergies  Current Outpatient Medications   Medication Sig    amLODIPine (NORVASC) 10 mg tablet Take 1 Tablet by mouth daily. hydrALAZINE (APRESOLINE) 100 mg tablet Take 1 Tablet by mouth three (3) times daily. traZODone (DESYREL) 50 mg tablet Take 1 Tablet by mouth nightly as needed for Sleep.    levETIRAcetam 1,000 mg tablet TAKE 1 TABLET BY MOUTH TWO (2) TIMES A DAY    atorvastatin (LIPITOR) 20 mg tablet Take 1 Tablet by mouth daily. lisinopriL (PRINIVIL, ZESTRIL) 40 mg tablet Take 1 Tablet by mouth daily. cholecalciferol (VITAMIN D3) (1000 Units /25 mcg) tablet Take 1 Tablet by mouth daily. Indications: low vitamin D levels    Blood Pressure Test Kit-Large kit Check B/P 2 x day after pt has been calm for at least 5 minutes. Record B/P and notify office weekly of B/P results. aspirin 81 mg chewable tablet 1 Tab by Per NG tube route daily. No current facility-administered medications for this visit.      Past Medical History:   Diagnosis Date    Aneurysm ([facility] 06/2020    Asthma     Cerebral artery occlusion with cerebral infarction ([facility] 06/2020    Cerebral infarction ([facility]     Dysphagia     Hypoxic ischemic encephalopathy (HIE), unspecified severity 6/15/2020    Joint pain     Leg swelling     RIGHT middle cerebral artery aneurysm 6/4/2020    Seizures ([address] 06/2020    Snoring     SOB (shortness of breath)     Vision decreased      Past Surgical History:   Procedure Laterality Date    HX CHOLECYSTECTOMY      HX GASTROSTOMY      HX HEENT 03/29/2021    aneurysm    IR INSERT GASTROSTOMY TUBE PERC  6/19/2020    VT UNLISTED NEUROLOGICAL/NEUROMUSCULAR DX PX      Coil Embolization    VT UNLISTED PROCEDURE ABDOMEN PERITONEUM & OMENTUM      has peg tube AND REMOVED     Family History   Problem Relation Age of Onset    Hypertension Mother     Stroke Other     Lung Cancer Father     No Known Problems Sister     No Known Problems Brother     No Known Problems Son     Anesth Problems Neg Hx      Social History     Tobacco Use   Smoking Status Never   Smokeless Tobacco Never         Review of Systems   Constitutional:  Negative for fever. HENT:  Negative for congestion, hearing loss and sore throat. Eyes: Negative. Respiratory:  Negative for cough and shortness of breath. Cardiovascular:  Negative for chest pain, palpitations and leg swelling. Gastrointestinal:  Negative for abdominal pain, nausea and vomiting. Genitourinary: Negative. Musculoskeletal:  Negative for joint pain. Skin: Negative. Neurological:  Negative for focal weakness, loss of consciousness and headaches. Psychiatric/Behavioral:  The patient has insomnia. Vitals:    01/30/23 1102   BP: 129/81   Pulse: 60   Resp: 19   Temp: 97.9 °F (36.6 °C)   TempSrc: Oral   SpO2: 100%   Weight: 188 lb (85.3 kg)   Height: 5' 1\" (1.549 m)      Physical Exam  Constitutional:       General: She is not in acute distress. Appearance: Normal appearance. HENT:      Head: Normocephalic. Nose: Nose normal.      Mouth/Throat:      Mouth: Mucous membranes are moist.   Eyes:      Extraocular Movements: Extraocular movements intact. Conjunctiva/sclera: Conjunctivae normal.      Pupils: Pupils are equal, round, and reactive to light. Cardiovascular:      Rate and Rhythm: Normal rate and regular rhythm. Heart sounds: Normal heart sounds. Pulmonary:      Effort: Pulmonary effort is normal.      Breath sounds: Normal breath sounds.    Abdominal:      General: Bowel sounds are normal. There is no distension. Palpations: Abdomen is soft. Tenderness: There is no abdominal tenderness. Musculoskeletal:         General: No swelling or tenderness. Cervical back: Normal range of motion and neck supple. Skin:     General: Skin is warm and dry. Neurological:      General: No focal deficit present. Mental Status: She is alert. Mental status is at baseline. Motor: No weakness. Psychiatric:         Mood and Affect: Mood normal.         Behavior: Behavior normal.           An electronic signature was used to authenticate this note.   -- Holly Arriaga MD

## 2023-05-31 ENCOUNTER — OFFICE VISIT (OUTPATIENT)
Age: 53
End: 2023-05-31
Payer: MEDICARE

## 2023-05-31 VITALS — HEART RATE: 70 BPM | DIASTOLIC BLOOD PRESSURE: 84 MMHG | SYSTOLIC BLOOD PRESSURE: 132 MMHG | OXYGEN SATURATION: 99 %

## 2023-05-31 DIAGNOSIS — R56.9 CONVULSIONS, UNSPECIFIED CONVULSION TYPE (HCC): Primary | ICD-10-CM

## 2023-05-31 PROCEDURE — 4004F PT TOBACCO SCREEN RCVD TLK: CPT | Performed by: NURSE PRACTITIONER

## 2023-05-31 PROCEDURE — G8417 CALC BMI ABV UP PARAM F/U: HCPCS | Performed by: NURSE PRACTITIONER

## 2023-05-31 PROCEDURE — 3017F COLORECTAL CA SCREEN DOC REV: CPT | Performed by: NURSE PRACTITIONER

## 2023-05-31 PROCEDURE — G8427 DOCREV CUR MEDS BY ELIG CLIN: HCPCS | Performed by: NURSE PRACTITIONER

## 2023-05-31 PROCEDURE — 3075F SYST BP GE 130 - 139MM HG: CPT | Performed by: NURSE PRACTITIONER

## 2023-05-31 PROCEDURE — 99215 OFFICE O/P EST HI 40 MIN: CPT | Performed by: NURSE PRACTITIONER

## 2023-05-31 PROCEDURE — 3079F DIAST BP 80-89 MM HG: CPT | Performed by: NURSE PRACTITIONER

## 2023-05-31 RX ORDER — LEVETIRACETAM 1000 MG/1
TABLET ORAL
Qty: 180 TABLET | Refills: 3 | Status: SHIPPED | OUTPATIENT
Start: 2023-05-31

## 2023-05-31 NOTE — PROGRESS NOTES
Feng Baez is a 48 y.o. female who presents with the following  No chief complaint on file. HPI    Previous patient at [facility]  She has been maintaining on Keppra 1000 mg twice daily  She does take this without any problems  She has not had a seizure in the past year  Even before that  She takes her medications regularly mother helps to set up her weekly medication reminders but she does take them with out any other direction  She does by herself with her 15-year-old son  She has not tried eating well but is on trazodone we discussed trying some melatonin social try to add that on board to  No falls  No headaches or dizziness  No weakness  She is doing really well and has no concerns today    Please, see her primary care in July  She states that she is cold all the time and can never get warm  We discussed differentials and they can await to see them to get blood work      No Known Allergies    Current Outpatient Medications   Medication Sig Dispense Refill    levETIRAcetam (KEPPRA) 1000 MG tablet TAKE 1 TABLET BY MOUTH TWO (2) TIMES A  tablet 3    amLODIPine (NORVASC) 10 MG tablet Take 1 tablet by mouth daily      aspirin 81 MG chewable tablet 1 tablet by Nasogastric route daily      atorvastatin (LIPITOR) 20 MG tablet Take 1 tablet by mouth daily      vitamin D (CHOLECALCIFEROL) 25 MCG (1000 UT) TABS tablet Take 1 tablet by mouth daily      hydrALAZINE (APRESOLINE) 100 MG tablet Take 1 tablet by mouth 3 times daily      lisinopril (PRINIVIL;ZESTRIL) 40 MG tablet Take 1 tablet by mouth daily      traZODone (DESYREL) 50 MG tablet Take 1 tablet by mouth nightly as needed       No current facility-administered medications for this visit.         Social History     Tobacco Use   Smoking Status Never   Smokeless Tobacco Never       Past Medical History:   Diagnosis Date    Aneurysm ([facility] 06/2020    Asthma     Cerebral artery occlusion with cerebral infarction ([facility] 06/2020    Cerebral

## 2023-06-06 DIAGNOSIS — G47.00 INSOMNIA, UNSPECIFIED: ICD-10-CM

## 2023-06-06 RX ORDER — TRAZODONE HYDROCHLORIDE 50 MG/1
TABLET ORAL
Qty: 30 TABLET | Refills: 2 | Status: SHIPPED | OUTPATIENT
Start: 2023-06-06 | End: 2023-07-31 | Stop reason: SDUPTHER

## 2023-07-31 ENCOUNTER — OFFICE VISIT (OUTPATIENT)
Facility: CLINIC | Age: 53
End: 2023-07-31
Payer: MEDICARE

## 2023-07-31 VITALS
TEMPERATURE: 97.6 F | OXYGEN SATURATION: 96 % | WEIGHT: 176.6 LBS | DIASTOLIC BLOOD PRESSURE: 63 MMHG | BODY MASS INDEX: 33.34 KG/M2 | SYSTOLIC BLOOD PRESSURE: 111 MMHG | HEIGHT: 61 IN | HEART RATE: 64 BPM

## 2023-07-31 DIAGNOSIS — E55.9 VITAMIN D DEFICIENCY, UNSPECIFIED: ICD-10-CM

## 2023-07-31 DIAGNOSIS — I10 ESSENTIAL (PRIMARY) HYPERTENSION: Primary | ICD-10-CM

## 2023-07-31 DIAGNOSIS — I10 ESSENTIAL (PRIMARY) HYPERTENSION: ICD-10-CM

## 2023-07-31 DIAGNOSIS — R56.9 SEIZURE (HCC): ICD-10-CM

## 2023-07-31 DIAGNOSIS — E78.5 HYPERLIPIDEMIA, UNSPECIFIED HYPERLIPIDEMIA TYPE: ICD-10-CM

## 2023-07-31 DIAGNOSIS — G47.00 INSOMNIA, UNSPECIFIED TYPE: ICD-10-CM

## 2023-07-31 DIAGNOSIS — R68.89 COLD INTOLERANCE: ICD-10-CM

## 2023-07-31 DIAGNOSIS — Z86.79 HISTORY OF SUBARACHNOID HEMORRHAGE: ICD-10-CM

## 2023-07-31 PROCEDURE — 99214 OFFICE O/P EST MOD 30 MIN: CPT | Performed by: STUDENT IN AN ORGANIZED HEALTH CARE EDUCATION/TRAINING PROGRAM

## 2023-07-31 PROCEDURE — 3074F SYST BP LT 130 MM HG: CPT | Performed by: STUDENT IN AN ORGANIZED HEALTH CARE EDUCATION/TRAINING PROGRAM

## 2023-07-31 PROCEDURE — 1036F TOBACCO NON-USER: CPT | Performed by: STUDENT IN AN ORGANIZED HEALTH CARE EDUCATION/TRAINING PROGRAM

## 2023-07-31 PROCEDURE — 3017F COLORECTAL CA SCREEN DOC REV: CPT | Performed by: STUDENT IN AN ORGANIZED HEALTH CARE EDUCATION/TRAINING PROGRAM

## 2023-07-31 PROCEDURE — G8427 DOCREV CUR MEDS BY ELIG CLIN: HCPCS | Performed by: STUDENT IN AN ORGANIZED HEALTH CARE EDUCATION/TRAINING PROGRAM

## 2023-07-31 PROCEDURE — 3078F DIAST BP <80 MM HG: CPT | Performed by: STUDENT IN AN ORGANIZED HEALTH CARE EDUCATION/TRAINING PROGRAM

## 2023-07-31 PROCEDURE — G8417 CALC BMI ABV UP PARAM F/U: HCPCS | Performed by: STUDENT IN AN ORGANIZED HEALTH CARE EDUCATION/TRAINING PROGRAM

## 2023-07-31 RX ORDER — TRAZODONE HYDROCHLORIDE 100 MG/1
100 TABLET ORAL DAILY PRN
Qty: 30 TABLET | Refills: 2 | Status: SHIPPED | OUTPATIENT
Start: 2023-07-31

## 2023-07-31 ASSESSMENT — ENCOUNTER SYMPTOMS
SORE THROAT: 0
COUGH: 0
DIARRHEA: 0
SHORTNESS OF BREATH: 0
ABDOMINAL PAIN: 0
CONSTIPATION: 1
FACIAL SWELLING: 0

## 2023-08-02 LAB
ALBUMIN SERPL-MCNC: 4.4 G/DL (ref 3.8–4.9)
ALBUMIN/GLOB SERPL: 1.6 {RATIO} (ref 1.2–2.2)
ALP SERPL-CCNC: 92 IU/L (ref 44–121)
ALT SERPL-CCNC: 6 IU/L (ref 0–32)
AST SERPL-CCNC: 8 IU/L (ref 0–40)
BILIRUB SERPL-MCNC: 0.3 MG/DL (ref 0–1.2)
BUN SERPL-MCNC: 13 MG/DL (ref 6–24)
BUN/CREAT SERPL: 14 (ref 9–23)
CALCIUM SERPL-MCNC: 9.8 MG/DL (ref 8.7–10.2)
CHLORIDE SERPL-SCNC: 103 MMOL/L (ref 96–106)
CHOLEST SERPL-MCNC: 122 MG/DL (ref 100–199)
CO2 SERPL-SCNC: 21 MMOL/L (ref 20–29)
CREAT SERPL-MCNC: 0.92 MG/DL (ref 0.57–1)
EGFRCR SERPLBLD CKD-EPI 2021: 74 ML/MIN/1.73
ERYTHROCYTE [DISTWIDTH] IN BLOOD BY AUTOMATED COUNT: 15.9 % (ref 11.7–15.4)
GLOBULIN SER CALC-MCNC: 2.7 G/DL (ref 1.5–4.5)
GLUCOSE SERPL-MCNC: 95 MG/DL (ref 70–99)
HCT VFR BLD AUTO: 34.3 % (ref 34–46.6)
HDLC SERPL-MCNC: 83 MG/DL
HGB BLD-MCNC: 11.3 G/DL (ref 11.1–15.9)
LDLC SERPL CALC-MCNC: 24 MG/DL (ref 0–99)
MCH RBC QN AUTO: 24.9 PG (ref 26.6–33)
MCHC RBC AUTO-ENTMCNC: 32.9 G/DL (ref 31.5–35.7)
MCV RBC AUTO: 76 FL (ref 79–97)
PLATELET # BLD AUTO: 275 X10E3/UL (ref 150–450)
POTASSIUM SERPL-SCNC: 4.6 MMOL/L (ref 3.5–5.2)
PROT SERPL-MCNC: 7.1 G/DL (ref 6–8.5)
RBC # BLD AUTO: 4.54 X10E6/UL (ref 3.77–5.28)
SODIUM SERPL-SCNC: 139 MMOL/L (ref 134–144)
TRIGL SERPL-MCNC: 78 MG/DL (ref 0–149)
TSH SERPL DL<=0.005 MIU/L-ACNC: 1.87 UIU/ML (ref 0.45–4.5)
VLDLC SERPL CALC-MCNC: 15 MG/DL (ref 5–40)
WBC # BLD AUTO: 3.9 X10E3/UL (ref 3.4–10.8)

## 2023-09-08 NOTE — TELEPHONE ENCOUNTER
Requested Prescriptions     Pending Prescriptions Disp Refills    hydrOXYzine HCl (ATARAX) 50 MG tablet 30 tablet 0     Sig: Take 1 tablet by mouth nightly   '

## 2023-10-08 RX ORDER — HYDROXYZINE 50 MG/1
50 TABLET, FILM COATED ORAL NIGHTLY
Qty: 30 TABLET | Refills: 0 | OUTPATIENT
Start: 2023-10-08 | End: 2023-11-07

## 2023-10-10 DIAGNOSIS — G47.00 INSOMNIA, UNSPECIFIED: ICD-10-CM

## 2023-10-10 DIAGNOSIS — E78.1 PURE HYPERGLYCERIDEMIA: ICD-10-CM

## 2023-10-11 RX ORDER — ATORVASTATIN CALCIUM 20 MG/1
20 TABLET, FILM COATED ORAL DAILY
Qty: 90 TABLET | Refills: 1 | Status: SHIPPED | OUTPATIENT
Start: 2023-10-11

## 2023-10-13 DIAGNOSIS — G47.00 INSOMNIA, UNSPECIFIED TYPE: ICD-10-CM

## 2023-10-13 DIAGNOSIS — E55.9 VITAMIN D DEFICIENCY, UNSPECIFIED: ICD-10-CM

## 2023-10-13 RX ORDER — TRAZODONE HYDROCHLORIDE 100 MG/1
100 TABLET ORAL DAILY PRN
Qty: 30 TABLET | Refills: 2 | Status: CANCELLED | OUTPATIENT
Start: 2023-10-13

## 2023-10-14 DIAGNOSIS — G47.00 INSOMNIA, UNSPECIFIED TYPE: ICD-10-CM

## 2023-10-14 RX ORDER — TRAZODONE HYDROCHLORIDE 50 MG/1
50 TABLET ORAL DAILY PRN
Qty: 90 TABLET | Refills: 1 | OUTPATIENT
Start: 2023-10-14

## 2023-10-14 RX ORDER — TRAZODONE HYDROCHLORIDE 100 MG/1
100 TABLET ORAL DAILY PRN
Qty: 30 TABLET | Refills: 2 | Status: SHIPPED | OUTPATIENT
Start: 2023-10-14

## 2023-10-25 DIAGNOSIS — I10 ESSENTIAL (PRIMARY) HYPERTENSION: ICD-10-CM

## 2023-10-25 RX ORDER — LISINOPRIL 40 MG/1
40 TABLET ORAL DAILY
Qty: 90 TABLET | Refills: 1 | Status: SHIPPED | OUTPATIENT
Start: 2023-10-25

## 2023-11-01 ENCOUNTER — OFFICE VISIT (OUTPATIENT)
Facility: CLINIC | Age: 53
End: 2023-11-01
Payer: MEDICARE

## 2023-11-01 VITALS
WEIGHT: 175 LBS | SYSTOLIC BLOOD PRESSURE: 133 MMHG | TEMPERATURE: 97.9 F | OXYGEN SATURATION: 100 % | RESPIRATION RATE: 18 BRPM | DIASTOLIC BLOOD PRESSURE: 72 MMHG | HEART RATE: 60 BPM | BODY MASS INDEX: 33.04 KG/M2 | HEIGHT: 61 IN

## 2023-11-01 DIAGNOSIS — Z86.79 HISTORY OF SUBARACHNOID HEMORRHAGE: ICD-10-CM

## 2023-11-01 DIAGNOSIS — R68.89 COLD INTOLERANCE: ICD-10-CM

## 2023-11-01 DIAGNOSIS — I10 ESSENTIAL HYPERTENSION: Primary | ICD-10-CM

## 2023-11-01 DIAGNOSIS — G47.00 INSOMNIA, UNSPECIFIED TYPE: ICD-10-CM

## 2023-11-01 DIAGNOSIS — R56.9 SEIZURE (HCC): ICD-10-CM

## 2023-11-01 DIAGNOSIS — Z12.31 ENCOUNTER FOR SCREENING MAMMOGRAM FOR MALIGNANT NEOPLASM OF BREAST: ICD-10-CM

## 2023-11-01 PROCEDURE — 3075F SYST BP GE 130 - 139MM HG: CPT | Performed by: STUDENT IN AN ORGANIZED HEALTH CARE EDUCATION/TRAINING PROGRAM

## 2023-11-01 PROCEDURE — 3078F DIAST BP <80 MM HG: CPT | Performed by: STUDENT IN AN ORGANIZED HEALTH CARE EDUCATION/TRAINING PROGRAM

## 2023-11-01 PROCEDURE — 99214 OFFICE O/P EST MOD 30 MIN: CPT | Performed by: STUDENT IN AN ORGANIZED HEALTH CARE EDUCATION/TRAINING PROGRAM

## 2023-11-01 SDOH — ECONOMIC STABILITY: FOOD INSECURITY: WITHIN THE PAST 12 MONTHS, YOU WORRIED THAT YOUR FOOD WOULD RUN OUT BEFORE YOU GOT MONEY TO BUY MORE.: NEVER TRUE

## 2023-11-01 SDOH — ECONOMIC STABILITY: INCOME INSECURITY: HOW HARD IS IT FOR YOU TO PAY FOR THE VERY BASICS LIKE FOOD, HOUSING, MEDICAL CARE, AND HEATING?: NOT HARD AT ALL

## 2023-11-01 SDOH — ECONOMIC STABILITY: HOUSING INSECURITY
IN THE LAST 12 MONTHS, WAS THERE A TIME WHEN YOU DID NOT HAVE A STEADY PLACE TO SLEEP OR SLEPT IN A SHELTER (INCLUDING NOW)?: NO

## 2023-11-01 SDOH — ECONOMIC STABILITY: FOOD INSECURITY: WITHIN THE PAST 12 MONTHS, THE FOOD YOU BOUGHT JUST DIDN'T LAST AND YOU DIDN'T HAVE MONEY TO GET MORE.: NEVER TRUE

## 2023-11-01 ASSESSMENT — PATIENT HEALTH QUESTIONNAIRE - PHQ9
6. FEELING BAD ABOUT YOURSELF - OR THAT YOU ARE A FAILURE OR HAVE LET YOURSELF OR YOUR FAMILY DOWN: 0
2. FEELING DOWN, DEPRESSED OR HOPELESS: 0
7. TROUBLE CONCENTRATING ON THINGS, SUCH AS READING THE NEWSPAPER OR WATCHING TELEVISION: 0
SUM OF ALL RESPONSES TO PHQ QUESTIONS 1-9: 0
8. MOVING OR SPEAKING SO SLOWLY THAT OTHER PEOPLE COULD HAVE NOTICED. OR THE OPPOSITE, BEING SO FIGETY OR RESTLESS THAT YOU HAVE BEEN MOVING AROUND A LOT MORE THAN USUAL: 0
3. TROUBLE FALLING OR STAYING ASLEEP: 0
SUM OF ALL RESPONSES TO PHQ9 QUESTIONS 1 & 2: 0
4. FEELING TIRED OR HAVING LITTLE ENERGY: 0
SUM OF ALL RESPONSES TO PHQ QUESTIONS 1-9: 0
10. IF YOU CHECKED OFF ANY PROBLEMS, HOW DIFFICULT HAVE THESE PROBLEMS MADE IT FOR YOU TO DO YOUR WORK, TAKE CARE OF THINGS AT HOME, OR GET ALONG WITH OTHER PEOPLE: 0
5. POOR APPETITE OR OVEREATING: 0
1. LITTLE INTEREST OR PLEASURE IN DOING THINGS: 0
SUM OF ALL RESPONSES TO PHQ QUESTIONS 1-9: 0
SUM OF ALL RESPONSES TO PHQ QUESTIONS 1-9: 0
9. THOUGHTS THAT YOU WOULD BE BETTER OFF DEAD, OR OF HURTING YOURSELF: 0

## 2023-11-01 ASSESSMENT — ENCOUNTER SYMPTOMS
ABDOMINAL PAIN: 0
SHORTNESS OF BREATH: 0
CONSTIPATION: 0
COUGH: 0
DIARRHEA: 0

## 2024-01-22 DIAGNOSIS — E55.9 VITAMIN D DEFICIENCY, UNSPECIFIED: ICD-10-CM

## 2024-01-24 RX ORDER — MELATONIN
1 DAILY
Qty: 90 TABLET | Refills: 0 | Status: SHIPPED | OUTPATIENT
Start: 2024-01-24

## 2024-02-07 ENCOUNTER — OFFICE VISIT (OUTPATIENT)
Facility: CLINIC | Age: 54
End: 2024-02-07
Payer: MEDICARE

## 2024-02-07 VITALS
DIASTOLIC BLOOD PRESSURE: 68 MMHG | RESPIRATION RATE: 20 BRPM | BODY MASS INDEX: 33.83 KG/M2 | SYSTOLIC BLOOD PRESSURE: 115 MMHG | WEIGHT: 179.2 LBS | OXYGEN SATURATION: 95 % | HEIGHT: 61 IN | TEMPERATURE: 98.3 F | HEART RATE: 69 BPM

## 2024-02-07 DIAGNOSIS — I10 ESSENTIAL (PRIMARY) HYPERTENSION: ICD-10-CM

## 2024-02-07 DIAGNOSIS — Z86.79 HISTORY OF SUBARACHNOID HEMORRHAGE: ICD-10-CM

## 2024-02-07 DIAGNOSIS — E78.1 PURE HYPERGLYCERIDEMIA: ICD-10-CM

## 2024-02-07 DIAGNOSIS — E55.9 VITAMIN D DEFICIENCY, UNSPECIFIED: ICD-10-CM

## 2024-02-07 DIAGNOSIS — R56.9 SEIZURE (HCC): Primary | ICD-10-CM

## 2024-02-07 DIAGNOSIS — G47.00 INSOMNIA, UNSPECIFIED TYPE: ICD-10-CM

## 2024-02-07 PROCEDURE — 99214 OFFICE O/P EST MOD 30 MIN: CPT | Performed by: STUDENT IN AN ORGANIZED HEALTH CARE EDUCATION/TRAINING PROGRAM

## 2024-02-07 RX ORDER — ASPIRIN 81 MG/1
81 TABLET, CHEWABLE ORAL DAILY
Qty: 90 TABLET | Refills: 1 | Status: SHIPPED | OUTPATIENT
Start: 2024-02-07

## 2024-02-07 RX ORDER — AMLODIPINE BESYLATE 10 MG/1
10 TABLET ORAL DAILY
Qty: 90 TABLET | Refills: 1 | Status: SHIPPED | OUTPATIENT
Start: 2024-02-07

## 2024-02-07 RX ORDER — TRAZODONE HYDROCHLORIDE 100 MG/1
100 TABLET ORAL DAILY PRN
Qty: 90 TABLET | Refills: 1 | Status: SHIPPED | OUTPATIENT
Start: 2024-02-07

## 2024-02-07 RX ORDER — HYDRALAZINE HYDROCHLORIDE 100 MG/1
100 TABLET, FILM COATED ORAL 3 TIMES DAILY
Qty: 270 TABLET | Refills: 1 | Status: SHIPPED | OUTPATIENT
Start: 2024-02-07

## 2024-02-07 RX ORDER — ATORVASTATIN CALCIUM 20 MG/1
20 TABLET, FILM COATED ORAL DAILY
Qty: 90 TABLET | Refills: 1 | Status: SHIPPED | OUTPATIENT
Start: 2024-02-07

## 2024-02-07 RX ORDER — MELATONIN
1 DAILY
Qty: 90 TABLET | Refills: 1 | Status: SHIPPED | OUTPATIENT
Start: 2024-02-07

## 2024-02-07 RX ORDER — LISINOPRIL 40 MG/1
40 TABLET ORAL DAILY
Qty: 90 TABLET | Refills: 1 | Status: SHIPPED | OUTPATIENT
Start: 2024-02-07

## 2024-02-07 ASSESSMENT — ENCOUNTER SYMPTOMS
ABDOMINAL PAIN: 0
CONSTIPATION: 0
COUGH: 0
SHORTNESS OF BREATH: 0
SORE THROAT: 0
FACIAL SWELLING: 0
DIARRHEA: 0

## 2024-02-07 NOTE — PROGRESS NOTES
1. \"Have you been to the ER, urgent care clinic since your last visit?  Hospitalized since your last visit?\" No    2. \"Have you seen or consulted any other health care providers outside of the [facility] since your last visit?\" No     3. For patients aged 45-75: Has the patient had a colonoscopy / FIT/ Cologuard? Yes - Care Gap present. Most recent result on file      If the patient is female:    4. For patients aged 40-74: Has the patient had a mammogram within the past 2 years? Yes - Care Gap present. Most recent result on file      5. For patients aged 21-65: Has the patient had a pap smear? Yes - Care Gap present. Rooming MA/LPN to request most recent results    Pap smear done at [facility] was done around 01.24/2024    Chief Complaint   Patient presents with    Follow-up Chronic Condition     /68 (Site: Right Upper Arm, Position: Sitting, Cuff Size: Large Adult)   Pulse 69   Temp 98.3 °F (36.8 °C) (Oral)   Resp 20   Ht 1.549 m (5' 1\")   Wt 81.3 kg (179 lb 3.2 oz)   SpO2 95%   BMI 33.86 kg/m²

## 2024-02-07 NOTE — PROGRESS NOTES
Unique Ricks (: 1970) is a 53 y.o. female, Established patient patient, here for evaluation of the following chief complaint(s):  Follow-up Chronic Condition       ASSESSMENT/PLAN:  Below is the assessment and plan developed based on review of pertinent history, physical exam, labs, studies, and medications.    1. Seizure (HCC)  2. Pure hyperglyceridemia  -     atorvastatin (LIPITOR) 20 MG tablet; Take 1 tablet by mouth daily, Disp-90 tablet, R-1Normal  -     Lipid Panel; Future  3. Essential (primary) hypertension  -     lisinopril (PRINIVIL;ZESTRIL) 40 MG tablet; Take 1 tablet by mouth daily, Disp-90 tablet, R-1Normal  -     amLODIPine (NORVASC) 10 MG tablet; Take 1 tablet by mouth daily, Disp-90 tablet, R-1Normal  -     aspirin 81 MG chewable tablet; Take 1 tablet by mouth daily, Disp-90 tablet, R-1Normal  -     hydrALAZINE (APRESOLINE) 100 MG tablet; Take 1 tablet by mouth 3 times daily, Disp-270 tablet, R-1Normal  -     CBC; Future  -     Comprehensive Metabolic Panel; Future  4. Insomnia, unspecified type  -     traZODone (DESYREL) 100 MG tablet; Take 1 tablet by mouth daily as needed for Sleep, Disp-90 tablet, R-1Normal  5. Vitamin D deficiency, unspecified  -     vitamin D3 (CHOLECALCIFEROL) 25 MCG (1000 UT) TABS tablet; Take 1 tablet by mouth daily, Disp-90 tablet, R-1DX Code Needed  REFILLS NEEDED.Normal  -     Vitamin D 25 Hydroxy; Future  6. History of subarachnoid hemorrhage    Hypertension: Controlled, continue current medications.  Refills given.  Blood work as above.    Counseled on sleep hygiene measures, education material given.  Continue trazodone for now.    She had a pap smear recently.  Refused tetanus shot.  She is up-to-date with mammogram and colonoscopy.    Return in about 6 months (around 2024) for [facility].         SUBJECTIVE/OBJECTIVE:  RJ Ricks is a 53-year-old who presents to the clinic for follow-up.  She is accompanied by her mom who is her legal

## 2024-02-09 LAB
25(OH)D3+25(OH)D2 SERPL-MCNC: 45.4 NG/ML (ref 30–100)
ALBUMIN SERPL-MCNC: 4.5 G/DL (ref 3.8–4.9)
ALBUMIN/GLOB SERPL: 1.7 {RATIO} (ref 1.2–2.2)
ALP SERPL-CCNC: 90 IU/L (ref 44–121)
ALT SERPL-CCNC: 10 IU/L (ref 0–32)
AST SERPL-CCNC: 13 IU/L (ref 0–40)
BILIRUB SERPL-MCNC: 0.3 MG/DL (ref 0–1.2)
BUN SERPL-MCNC: 19 MG/DL (ref 6–24)
BUN/CREAT SERPL: 21 (ref 9–23)
CALCIUM SERPL-MCNC: 10 MG/DL (ref 8.7–10.2)
CHLORIDE SERPL-SCNC: 103 MMOL/L (ref 96–106)
CHOLEST SERPL-MCNC: 137 MG/DL (ref 100–199)
CO2 SERPL-SCNC: 21 MMOL/L (ref 20–29)
CREAT SERPL-MCNC: 0.92 MG/DL (ref 0.57–1)
EGFRCR SERPLBLD CKD-EPI 2021: 74 ML/MIN/1.73
ERYTHROCYTE [DISTWIDTH] IN BLOOD BY AUTOMATED COUNT: 15.1 % (ref 11.7–15.4)
GLOBULIN SER CALC-MCNC: 2.6 G/DL (ref 1.5–4.5)
GLUCOSE SERPL-MCNC: 83 MG/DL (ref 70–99)
HCT VFR BLD AUTO: 38 % (ref 34–46.6)
HDLC SERPL-MCNC: 88 MG/DL
HGB BLD-MCNC: 11.8 G/DL (ref 11.1–15.9)
LDLC SERPL CALC-MCNC: 31 MG/DL (ref 0–99)
MCH RBC QN AUTO: 24.8 PG (ref 26.6–33)
MCHC RBC AUTO-ENTMCNC: 31.1 G/DL (ref 31.5–35.7)
MCV RBC AUTO: 80 FL (ref 79–97)
PLATELET # BLD AUTO: 274 X10E3/UL (ref 150–450)
POTASSIUM SERPL-SCNC: 4.2 MMOL/L (ref 3.5–5.2)
PROT SERPL-MCNC: 7.1 G/DL (ref 6–8.5)
RBC # BLD AUTO: 4.75 X10E6/UL (ref 3.77–5.28)
SODIUM SERPL-SCNC: 142 MMOL/L (ref 134–144)
TRIGL SERPL-MCNC: 100 MG/DL (ref 0–149)
TSH SERPL DL<=0.005 MIU/L-ACNC: 2.34 UIU/ML (ref 0.45–4.5)
VLDLC SERPL CALC-MCNC: 18 MG/DL (ref 5–40)
WBC # BLD AUTO: 4.9 X10E3/UL (ref 3.4–10.8)

## 2024-02-19 DIAGNOSIS — G47.00 INSOMNIA, UNSPECIFIED TYPE: ICD-10-CM

## 2024-02-19 RX ORDER — TRAZODONE HYDROCHLORIDE 100 MG/1
100 TABLET ORAL DAILY PRN
Qty: 30 TABLET | Refills: 2 | OUTPATIENT
Start: 2024-02-19

## 2024-02-22 NOTE — PROGRESS NOTES
0730: Bedside and Verbal shift change report given to Shaun Hall, RN (oncoming nurse) by Annamarie Singh RN (offgoing nurse). Report included the following information SBAR, Kardex, ED Summary, Procedure Summary, Intake/Output, MAR, Accordion, Recent Results, Med Rec Status, Cardiac Rhythm NSR, Alarm Parameters  and Dual Neuro Assessment. 1130: Pt to angio for verapamil treatment to LICA, ALFONSO, and LVA. 1400: Pt back to unit. Neuro assessment unchanged, see flowsheet data. Verbal order received to keep -120.    1830: Precedex gtt started propofol tritrated down. Shift Summary: pt to angio for verapamil, tolerated well. MAP goals 100-120. On and off Cardene gtt/ labetalol puches and Fabien to maintain these goals. Pt remains euvolemic neuro; assessment unchanged. 1930: Bedside and Verbal shift change report given to Paolo/Onur, RNs (oncoming nurse) by Marie Caldwell RN (offgoing nurse). Report included the following information SBAR, Kardex, ED Summary, Procedure Summary, Intake/Output, MAR, Accordion, Recent Results, Med Rec Status, Cardiac Rhythm NSR, Alarm Parameters  and Dual Neuro Assessment. <--- Click to Launch ICDx for PreOp, PostOp and Procedure

## 2024-04-07 DIAGNOSIS — G47.00 INSOMNIA, UNSPECIFIED: ICD-10-CM

## 2024-04-08 RX ORDER — TRAZODONE HYDROCHLORIDE 100 MG/1
100 TABLET ORAL NIGHTLY PRN
Qty: 90 TABLET | Refills: 1 | Status: SHIPPED | OUTPATIENT
Start: 2024-04-08

## 2024-04-09 RX ORDER — LEVETIRACETAM 1000 MG/1
TABLET ORAL
Qty: 180 TABLET | Refills: 3 | Status: SHIPPED | OUTPATIENT
Start: 2024-04-09

## 2024-04-12 ENCOUNTER — HOSPITAL ENCOUNTER (EMERGENCY)
Facility: HOSPITAL | Age: 54
Discharge: HOME | End: 2024-04-12
Attending: STUDENT IN AN ORGANIZED HEALTH CARE EDUCATION/TRAINING PROGRAM
Payer: MEDICARE

## 2024-04-12 ENCOUNTER — APPOINTMENT (OUTPATIENT)
Facility: HOSPITAL | Age: 54
End: 2024-04-12
Payer: MEDICARE

## 2024-04-12 VITALS
RESPIRATION RATE: 19 BRPM | WEIGHT: 170 LBS | SYSTOLIC BLOOD PRESSURE: 133 MMHG | HEIGHT: 61 IN | BODY MASS INDEX: 32.1 KG/M2 | HEART RATE: 66 BPM | OXYGEN SATURATION: 100 % | DIASTOLIC BLOOD PRESSURE: 78 MMHG | TEMPERATURE: 98.4 F
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
## 2024-04-12 DIAGNOSIS — S80.00XA CONTUSION OF KNEE, UNSPECIFIED LATERALITY, INITIAL ENCOUNTER: Primary | ICD-10-CM

## 2024-04-12 PROCEDURE — 6370000000 HC RX 637 (ALT 250 FOR IP): Performed by: STUDENT IN AN ORGANIZED HEALTH CARE EDUCATION/TRAINING PROGRAM

## 2024-04-12 PROCEDURE — 73562 X-RAY EXAM OF KNEE 3: CPT

## 2024-04-12 PROCEDURE — 99283 EMERGENCY DEPT VISIT LOW MDM: CPT

## 2024-04-12 RX ORDER — ACETAMINOPHEN 500 MG
500 TABLET ORAL 4 TIMES DAILY PRN
Qty: 28 TABLET | Refills: 0 | Status: SHIPPED | OUTPATIENT
Start: 2024-04-12 | End: 2024-04-19

## 2024-04-12 RX ORDER — NAPROXEN 500 MG/1
500 TABLET ORAL ONCE
Status: COMPLETED | OUTPATIENT
Start: 2024-04-12 | End: 2024-04-12

## 2024-04-12 RX ORDER — LIDOCAINE 50 MG/G
1 PATCH TOPICAL DAILY
Qty: 10 PATCH | Refills: 0 | Status: SHIPPED | OUTPATIENT
Start: 2024-04-12

## 2024-04-12 RX ORDER — ACETAMINOPHEN 500 MG
1000 TABLET ORAL
Status: COMPLETED | OUTPATIENT
Start: 2024-04-12 | End: 2024-04-12

## 2024-04-12 RX ADMIN — ACETAMINOPHEN 1000 MG: 500 TABLET ORAL at 20:58

## 2024-04-12 RX ADMIN — NAPROXEN 500 MG: 500 TABLET ORAL at 20:58

## 2024-04-12 ASSESSMENT — PAIN DESCRIPTION - LOCATION: LOCATION: HEAD

## 2024-04-12 ASSESSMENT — LIFESTYLE VARIABLES
HOW OFTEN DO YOU HAVE A DRINK CONTAINING ALCOHOL: NEVER
HOW MANY STANDARD DRINKS CONTAINING ALCOHOL DO YOU HAVE ON A TYPICAL DAY: PATIENT DOES NOT DRINK

## 2024-04-12 ASSESSMENT — PAIN - FUNCTIONAL ASSESSMENT
PAIN_FUNCTIONAL_ASSESSMENT: NONE - DENIES PAIN
PAIN_FUNCTIONAL_ASSESSMENT: 0-10

## 2024-04-12 ASSESSMENT — PAIN SCALES - GENERAL
PAINLEVEL_OUTOF10: 8
PAINLEVEL_OUTOF10: 8
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
## 2024-04-13 NOTE — ED NOTES
Updated patient regarding discharge plan. Take home med instructions given and patient verbalized understanding.     Discharged patient in good condition, alert and oriented. Accompanied by family. Wheelchair offered to patient.

## 2024-04-13 NOTE — ED NOTES
Assumed care for this patient. Report received from Olinda GIANG. Multiple xrays done. Clothing changed to hospital gown. Meds given. Patient is alert and oriented. Family at waiting area.

## 2024-04-16 ENCOUNTER — HOSPITAL ENCOUNTER (OUTPATIENT)
Facility: HOSPITAL | Age: 54
Discharge: HOME | End: 2024-04-19
Payer: MEDICARE

## 2024-04-16 ENCOUNTER — OFFICE VISIT (OUTPATIENT)
Facility: CLINIC | Age: 54
End: 2024-04-16
Payer: MEDICARE

## 2024-04-16 ENCOUNTER — TRANSCRIBE ORDERS (OUTPATIENT)
Facility: HOSPITAL | Age: 54
End: 2024-04-16

## 2024-04-16 VITALS
SYSTOLIC BLOOD PRESSURE: 118 MMHG | HEART RATE: 55 BPM | OXYGEN SATURATION: 99 % | HEIGHT: 61 IN | BODY MASS INDEX: 34.25 KG/M2 | DIASTOLIC BLOOD PRESSURE: 72 MMHG | WEIGHT: 181.4 LBS | RESPIRATION RATE: 18 BRPM | TEMPERATURE: 98.7 F
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
## 2024-04-16 DIAGNOSIS — M25.571 ACUTE RIGHT ANKLE PAIN: ICD-10-CM

## 2024-04-16 DIAGNOSIS — K59.00 CONSTIPATION, UNSPECIFIED CONSTIPATION TYPE: ICD-10-CM

## 2024-04-16 DIAGNOSIS — M54.50 LOW BACK PAIN, UNSPECIFIED BACK PAIN LATERALITY, UNSPECIFIED CHRONICITY, UNSPECIFIED WHETHER SCIATICA PRESENT: ICD-10-CM

## 2024-04-16 DIAGNOSIS — M54.6 THORACIC BACK PAIN, UNSPECIFIED BACK PAIN LATERALITY, UNSPECIFIED CHRONICITY: Primary | ICD-10-CM

## 2024-04-16 PROCEDURE — 99214 OFFICE O/P EST MOD 30 MIN: CPT | Performed by: STUDENT IN AN ORGANIZED HEALTH CARE EDUCATION/TRAINING PROGRAM

## 2024-04-16 PROCEDURE — 72100 X-RAY EXAM L-S SPINE 2/3 VWS: CPT

## 2024-04-16 PROCEDURE — 72072 X-RAY EXAM THORAC SPINE 3VWS: CPT

## 2024-04-16 PROCEDURE — 3078F DIAST BP <80 MM HG: CPT | Performed by: STUDENT IN AN ORGANIZED HEALTH CARE EDUCATION/TRAINING PROGRAM

## 2024-04-16 PROCEDURE — 73610 X-RAY EXAM OF ANKLE: CPT

## 2024-04-16 PROCEDURE — 3074F SYST BP LT 130 MM HG: CPT | Performed by: STUDENT IN AN ORGANIZED HEALTH CARE EDUCATION/TRAINING PROGRAM

## 2024-04-16 RX ORDER — CYCLOBENZAPRINE HCL 10 MG
10 TABLET ORAL 2 TIMES DAILY PRN
Qty: 20 TABLET | Refills: 0 | Status: SHIPPED | OUTPATIENT
Start: 2024-04-16 | End: 2024-04-26

## 2024-04-16 RX ORDER — NAPROXEN 500 MG/1
500 TABLET ORAL 2 TIMES DAILY WITH MEALS
Qty: 30 TABLET | Refills: 0 | Status: SHIPPED | OUTPATIENT
Start: 2024-04-16 | End: 2024-05-01

## 2024-04-16 ASSESSMENT — ENCOUNTER SYMPTOMS
BACK PAIN: 1
SORE THROAT: 0
DIARRHEA: 0
SHORTNESS OF BREATH: 0
ABDOMINAL PAIN: 0
COUGH: 0
CONSTIPATION: 0

## 2024-04-16 ASSESSMENT — PATIENT HEALTH QUESTIONNAIRE - PHQ9
SUM OF ALL RESPONSES TO PHQ QUESTIONS 1-9: 0
1. LITTLE INTEREST OR PLEASURE IN DOING THINGS: NOT AT ALL
SUM OF ALL RESPONSES TO PHQ QUESTIONS 1-9: 0
SUM OF ALL RESPONSES TO PHQ9 QUESTIONS 1 & 2: 0
2. FEELING DOWN, DEPRESSED OR HOPELESS: NOT AT ALL

## 2024-04-16 NOTE — PROGRESS NOTES
Chief Complaint   Patient presents with    Follow-up     Hospital     /72 (Site: Left Upper Arm, Position: Sitting, Cuff Size: Medium Adult)   Pulse 55   Temp 98.7 °F (37.1 °C) (Oral)   Resp 18   Ht 1.549 m (5' 0.98\")   Wt 82.3 kg (181 lb 6.4 oz)   SpO2 99%   BMI 34.29 kg/m²             \"Have you been to the ER, urgent care clinic since your last visit?  Hospitalized since your last visit?\"    5 days ago [facility]  “Have you seen or consulted any other health care providers outside of [facility] since your last visit?”    NO     “Have you had a pap smear?”    NO    No cervical cancer screening on file             Click Here for Release of Records Request  
1 tablet by mouth daily 90 tablet 1    lisinopril (PRINIVIL;ZESTRIL) 40 MG tablet Take 1 tablet by mouth daily 90 tablet 1    vitamin D3 (CHOLECALCIFEROL) 25 MCG (1000 UT) TABS tablet Take 1 tablet by mouth daily 90 tablet 1    amLODIPine (NORVASC) 10 MG tablet Take 1 tablet by mouth daily 90 tablet 1    aspirin 81 MG chewable tablet Take 1 tablet by mouth daily 90 tablet 1    hydrALAZINE (APRESOLINE) 100 MG tablet Take 1 tablet by mouth 3 times daily 270 tablet 1    lidocaine (LIDODERM) 5 % Place 1 patch onto the skin daily 12 hours on, 12 hours off. (Patient not taking: Reported on 4/16/2024) 10 patch 0     No current facility-administered medications for this visit.      Past Medical History:   Diagnosis Date    Aneurysm (HCC) 06/2020    Asthma     Cerebral artery occlusion with cerebral infarction (HCC) 06/2020    Cerebral infarction (HCC)     Dysphagia     Hypoxic ischemic encephalopathy (HIE), unspecified severity 6/15/2020    Joint pain     Leg swelling     Middle cerebral artery aneurysm 6/4/2020    Seizures ([facility]) 06/2020    Snoring     SOB (shortness of breath)     Vision decreased      Past Surgical History:   Procedure Laterality Date    CHOLECYSTECTOMY      GASTROSTOMY      HEENT  03/29/2021    aneurysm    IR INSERT GASTROSTOMY TUBE PERC  6/19/2020    NEUROLOGICAL SURGERY      Coil Embolization    AK UNLISTED PROCEDURE ABDOMEN PERITONEUM & OMENTUM      has peg tube AND REMOVED     Family History   Problem Relation Age of Onset    No Known Problems Sister     Hypertension Mother     Anesth Problems Neg Hx     No Known Problems Son     Stroke Other     Lung Cancer Father     No Known Problems Brother      Social History     Tobacco Use   Smoking Status Never   Smokeless Tobacco Never         Review of Systems   Constitutional:  Negative for fever.   HENT:  Negative for sore throat.    Respiratory:  Negative for cough and shortness of breath.    Cardiovascular:  Negative for chest pain and palpitations.

## 2024-04-22 DIAGNOSIS — M25.571 ACUTE RIGHT ANKLE PAIN: ICD-10-CM

## 2024-04-22 DIAGNOSIS — M54.50 LOW BACK PAIN, UNSPECIFIED BACK PAIN LATERALITY, UNSPECIFIED CHRONICITY, UNSPECIFIED WHETHER SCIATICA PRESENT: ICD-10-CM

## 2024-04-22 DIAGNOSIS — M54.6 THORACIC BACK PAIN, UNSPECIFIED BACK PAIN LATERALITY, UNSPECIFIED CHRONICITY: ICD-10-CM

## 2024-04-22 RX ORDER — NAPROXEN 500 MG/1
500 TABLET ORAL 2 TIMES DAILY WITH MEALS
Qty: 30 TABLET | Refills: 0 | OUTPATIENT
Start: 2024-04-22 | End: 2024-05-07

## 2024-05-20 ENCOUNTER — HOSPITAL ENCOUNTER (OUTPATIENT)
Facility: HOSPITAL | Age: 54
Setting detail: RECURRING SERIES
Discharge: HOME | End: 2024-05-23
Attending: STUDENT IN AN ORGANIZED HEALTH CARE EDUCATION/TRAINING PROGRAM
Payer: MEDICARE

## 2024-05-20 PROCEDURE — 97162 PT EVAL MOD COMPLEX 30 MIN: CPT

## 2024-05-20 NOTE — THERAPY EVALUATION
be able to sit greater than 45-60 minutes without pain so they can attend [employer], watch a movie, etc.      [] Met [] Not met [] Partially met Date:     Pt will be able to stand greater than 45-60 minutes without pain > 2/10 so they can do ADL's like wash dishes and light housework.      [] Met [] Not met [] Partially met Date:     Pt will be able to ambulate >800 feet in a 6 min walk test to show safe gait with community distances without increase of pain or difficulty to get groceries, medicine, etc.      [] Met [] Not met [] Partially met Date:     Pt will be able to go up and down steps with 1 HHA  in normal pattern without difficulty or pain > 2/10 for safe gait.      [] Met [] Not met [] Partially met Date:     Pt will  have trunk flexion WFL to be able to bend down and put on socks/shoes without difficulty or increased pain.      [] Met [] Not met [] Partially met Date:     Pt will report centralization of symptoms/less radicular pain by 50%.      [] Met [] Not met [] Partially met Date:       Pt can SLS on involved side for 10 seconds with no HHA for improved balance in stance phase to promote safe walking with no limp on all sufaces, like walking through the yard.       [] Met [] Not met [] Partially met Date:       Pt will have sufficient ankle ROM and strength to be able to go up and down steps and curbs with 1 HHA  in a normal pattern without pain or difficulty for safe community ambulation.      [] Met [] Not met [] Partially met Date:    Pt will have less swelling by 1/2 cm to improve flexibility and decrease stiffness for ease of activities such as putting on shoes.      [] Met [] Not met [] Partially met Date:     Frequency / Duration: Patient to be seen 2  times per week for  1620 treatments.    Patient/ Caregiver education and instruction: Diagnosis, prognosis, exercises [x]  Plan of care has been reviewed with PTA      Certification Period (MC 90/ AIME 60 DAYS):  5/20/24-8/16/24       Kavya

## 2024-05-29 ENCOUNTER — OFFICE VISIT (OUTPATIENT)
Age: 54
End: 2024-05-29
Payer: MEDICARE

## 2024-05-29 VITALS
DIASTOLIC BLOOD PRESSURE: 80 MMHG | RESPIRATION RATE: 18 BRPM | HEART RATE: 78 BPM | BODY MASS INDEX: 35.43 KG/M2 | TEMPERATURE: 97.8 F | SYSTOLIC BLOOD PRESSURE: 130 MMHG | HEIGHT: 60 IN | OXYGEN SATURATION: 99 %

## 2024-05-29 DIAGNOSIS — R56.9 CONVULSIONS, UNSPECIFIED CONVULSION TYPE (HCC): Primary | ICD-10-CM

## 2024-05-29 PROCEDURE — 3075F SYST BP GE 130 - 139MM HG: CPT | Performed by: NURSE PRACTITIONER

## 2024-05-29 PROCEDURE — 3079F DIAST BP 80-89 MM HG: CPT | Performed by: NURSE PRACTITIONER

## 2024-05-29 PROCEDURE — 99214 OFFICE O/P EST MOD 30 MIN: CPT | Performed by: NURSE PRACTITIONER
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
## 2024-06-01 DIAGNOSIS — M54.6 THORACIC BACK PAIN, UNSPECIFIED BACK PAIN LATERALITY, UNSPECIFIED CHRONICITY: ICD-10-CM

## 2024-06-01 DIAGNOSIS — M25.571 ACUTE RIGHT ANKLE PAIN: ICD-10-CM

## 2024-06-01 DIAGNOSIS — M54.50 LOW BACK PAIN, UNSPECIFIED BACK PAIN LATERALITY, UNSPECIFIED CHRONICITY, UNSPECIFIED WHETHER SCIATICA PRESENT: ICD-10-CM

## 2024-06-04 RX ORDER — NAPROXEN 500 MG/1
500 TABLET ORAL 2 TIMES DAILY PRN
Qty: 30 TABLET | Refills: 0 | Status: SHIPPED | OUTPATIENT
Start: 2024-06-04

## 2024-06-10 ENCOUNTER — HOSPITAL ENCOUNTER (OUTPATIENT)
Facility: HOSPITAL | Age: 54
Setting detail: RECURRING SERIES
Discharge: HOME | End: 2024-06-13
Attending: STUDENT IN AN ORGANIZED HEALTH CARE EDUCATION/TRAINING PROGRAM
Payer: MEDICARE

## 2024-06-10 PROCEDURE — 97110 THERAPEUTIC EXERCISES: CPT
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
## 2024-06-12 ENCOUNTER — HOSPITAL ENCOUNTER (OUTPATIENT)
Facility: HOSPITAL | Age: 54
Setting detail: RECURRING SERIES
Discharge: HOME | End: 2024-06-15
Attending: STUDENT IN AN ORGANIZED HEALTH CARE EDUCATION/TRAINING PROGRAM
Payer: MEDICARE

## 2024-06-12 PROCEDURE — 97110 THERAPEUTIC EXERCISES: CPT

## 2024-06-12 NOTE — PROGRESS NOTES
and proprioception to improve patient's ability to progress to PLOF and address remaining functional goals. (see flow sheet as applicable)     Details if applicable:  see flowsheet    5   36050 Neuromuscular Re-Education (timed):  improve balance, coordination, kinesthetic sense, posture, core stability and proprioception to improve patient's ability to develop conscious control of individual muscles and awareness of position of extremities in order to progress to PLOF and address remaining functional goals. (see flow sheet as applicable)    Details if applicable:  PPT and LTR and BAPS board         Details if applicable:           Details if applicable:            Details if applicable:     47     Total Total         [x]  Patient Education billed concurrently with other procedures   [x] Review HEP    [] Progressed/Changed HEP, detail:    [] Other detail:         Other Objective/Functional Measure   We continue with exercises   Added ankle yellow TB but was only able to do ankle DF/PF   No bands and AA required with R ankle IV/EV due to weakness      Pain Level at end of session (0-10 scale): 5/10 lower back and 0/10 ankle      Assessment   Patient tolerated session well despite high pain level today she did all exercises without much difficulty. She overall presents with general decondition, LE weakness and decreased flexibility. Pt required AA with BAPS board and with PPT max verbal/tactile cues.  Pt encouraged to keep working on her HEP and use heat and ice at home. Pt decline modalities stated she will do it at home. Pt reports feeling better post exercises. Pt will benefit from skilled services to improve performance with core stabilization and posture, spine flexibility and strength, increased awareness of neuromuscular control of abdominal muscle activation and pelvic positioning, and to address impairments in order to meet functional goals.  Patient will continue to benefit from skilled PT / OT services to

## 2024-06-24 ENCOUNTER — HOSPITAL ENCOUNTER (OUTPATIENT)
Facility: HOSPITAL | Age: 54
Setting detail: RECURRING SERIES
Discharge: HOME | End: 2024-06-27
Attending: STUDENT IN AN ORGANIZED HEALTH CARE EDUCATION/TRAINING PROGRAM
Payer: MEDICARE

## 2024-06-24 PROCEDURE — 97110 THERAPEUTIC EXERCISES: CPT
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
## 2024-06-25 DIAGNOSIS — M54.50 LOW BACK PAIN, UNSPECIFIED BACK PAIN LATERALITY, UNSPECIFIED CHRONICITY, UNSPECIFIED WHETHER SCIATICA PRESENT: ICD-10-CM

## 2024-06-25 DIAGNOSIS — I10 ESSENTIAL (PRIMARY) HYPERTENSION: ICD-10-CM

## 2024-06-25 DIAGNOSIS — M25.571 ACUTE RIGHT ANKLE PAIN: ICD-10-CM

## 2024-06-25 DIAGNOSIS — M54.6 THORACIC BACK PAIN, UNSPECIFIED BACK PAIN LATERALITY, UNSPECIFIED CHRONICITY: ICD-10-CM

## 2024-06-26 ENCOUNTER — APPOINTMENT (OUTPATIENT)
Facility: HOSPITAL | Age: 54
End: 2024-06-26
Attending: STUDENT IN AN ORGANIZED HEALTH CARE EDUCATION/TRAINING PROGRAM
Payer: MEDICARE

## 2024-06-26 RX ORDER — NAPROXEN 500 MG/1
500 TABLET ORAL 2 TIMES DAILY
Qty: 30 TABLET | Refills: 0 | Status: SHIPPED | OUTPATIENT
Start: 2024-06-26

## 2024-06-26 RX ORDER — HYDRALAZINE HYDROCHLORIDE 100 MG/1
100 TABLET, FILM COATED ORAL 3 TIMES DAILY
Qty: 270 TABLET | Refills: 1 | Status: SHIPPED | OUTPATIENT
Start: 2024-06-26

## 2024-07-01 ENCOUNTER — APPOINTMENT (OUTPATIENT)
Facility: HOSPITAL | Age: 54
End: 2024-07-01
Attending: STUDENT IN AN ORGANIZED HEALTH CARE EDUCATION/TRAINING PROGRAM
Payer: MEDICARE

## 2024-07-02 ENCOUNTER — HOSPITAL ENCOUNTER (OUTPATIENT)
Facility: HOSPITAL | Age: 54
Setting detail: RECURRING SERIES
Discharge: HOME | End: 2024-07-05
Attending: STUDENT IN AN ORGANIZED HEALTH CARE EDUCATION/TRAINING PROGRAM
Payer: MEDICARE

## 2024-07-02 PROCEDURE — 97110 THERAPEUTIC EXERCISES: CPT
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
## 2024-07-05 ENCOUNTER — HOSPITAL ENCOUNTER (OUTPATIENT)
Facility: HOSPITAL | Age: 54
Setting detail: RECURRING SERIES
Discharge: HOME | End: 2024-07-08
Attending: STUDENT IN AN ORGANIZED HEALTH CARE EDUCATION/TRAINING PROGRAM
Payer: MEDICARE

## 2024-07-05 PROCEDURE — 97110 THERAPEUTIC EXERCISES: CPT

## 2024-07-05 NOTE — PROGRESS NOTES
of pain or difficulty to get groceries, medicine, etc.      [] Met [] Not met [] Partially met Date:      Pt will be able to go up and down steps with 1 HHA  in normal pattern without difficulty or pain > 2/10 for safe gait.      [] Met [] Not met [] Partially met Date:      Pt will  have trunk flexion WFL to be able to bend down and put on socks/shoes without difficulty or increased pain.      [] Met [] Not met [] Partially met Date:      Pt will report centralization of symptoms/less radicular pain by 50%.      [] Met [] Not met [x] Partially met Date: 6/24/24-pt was seeing improvement until she went on the cruise                  Pt can SLS on involved side for 10 seconds with no HHA for improved balance in stance phase to promote safe walking with no limp on all sufaces, like walking through the yard.       [] Met [] Not met [] Partially met Date:       Pt will have sufficient ankle ROM and strength to be able to go up and down steps and curbs with 1 HHA  in a normal pattern without pain or difficulty for safe community ambulation.      [] Met [] Not met [] Partially met Date:     Pt will have less swelling by 1/2 cm to improve flexibility and decrease stiffness for ease of activities such as putting on shoes.      [] Met [] Not met [x] Partially met Date:  6/24/24: see measurements in Progress note     PLAN  Yes  Continue plan of care  Re-Cert Due: 5/20/24-8/16/24  [x]  Upgrade activities as tolerated  []  Discharge due to:  []  Other:      Briana Espinoza, PT       7/5/2024       1:54 PM

## 2024-07-07 DIAGNOSIS — I10 ESSENTIAL (PRIMARY) HYPERTENSION: ICD-10-CM

## 2024-07-08 ENCOUNTER — HOSPITAL ENCOUNTER (OUTPATIENT)
Facility: HOSPITAL | Age: 54
Setting detail: RECURRING SERIES
Discharge: HOME | End: 2024-07-11
Attending: STUDENT IN AN ORGANIZED HEALTH CARE EDUCATION/TRAINING PROGRAM
Payer: MEDICARE

## 2024-07-08 PROCEDURE — 97110 THERAPEUTIC EXERCISES: CPT

## 2024-07-08 RX ORDER — ASPIRIN 81 MG
81 TABLET,CHEWABLE ORAL DAILY
Qty: 90 TABLET | Refills: 1 | Status: SHIPPED | OUTPATIENT
Start: 2024-07-08

## 2024-07-08 RX ORDER — AMLODIPINE BESYLATE 10 MG/1
10 TABLET ORAL DAILY
Qty: 90 TABLET | Refills: 1 | Status: SHIPPED | OUTPATIENT
Start: 2024-07-08

## 2024-07-10 ENCOUNTER — HOSPITAL ENCOUNTER (OUTPATIENT)
Facility: HOSPITAL | Age: 54
Setting detail: RECURRING SERIES
Discharge: HOME | End: 2024-07-13
Attending: STUDENT IN AN ORGANIZED HEALTH CARE EDUCATION/TRAINING PROGRAM
Payer: MEDICARE

## 2024-07-10 PROCEDURE — 97110 THERAPEUTIC EXERCISES: CPT

## 2024-07-10 NOTE — PROGRESS NOTES
met Date: 7/10 37.66%     Pt will be able to sit greater than 45-60 minutes without pain so they can attend [employer], watch a movie, etc.      [] Met [] Not met [x] Partially met Date:  6/24/24: pt can sit about 30 minutes before pain increases and she has to walk   7/10 \"I have pain all the time     Pt will be able to stand greater than 45-60 minutes without pain > 2/10 so they can do ADL's like wash dishes and light housework.      [] Met [] Not met [x] Partially met Date: 6/24/24: Pt can walk/stand for about 30 minutes before pain increases  7/10 \"I can't stand for an hour\"     Pt will be able to ambulate >800 feet in a 6 min walk test to show safe gait with community distances without increase of pain or difficulty to get groceries, medicine, etc.      [x] Met [] Not met [] Partially met Date: 6/24/24 889.2'     Pt will be able to go up and down steps with 1 HHA  in normal pattern without difficulty or pain > 2/10 for safe gait.      [] Met [] Not met [x] Partially met Date: 7/10 normal pattern with 1 HHA but reports increased pain     Pt will  have trunk flexion WFL to be able to bend down and put on socks/shoes without difficulty or increased pain.      [x] Met [] Not met [] Partially met Date:  7/8/24-pt can FF on swiss ball  without pain with functional motion to reach feet     Pt will report centralization of symptoms/less radicular pain by 50%.      [] Met [] Not met [x] Partially met Date: 6/24/24-pt was seeing improvement until she went on the cruise             7/10 reports pain still goes to hips but less frequent     Pt can SLS on involved side for 10 seconds with no HHA for improved balance in stance phase to promote safe walking with no limp on all sufaces, like walking through the yard.       [x] Met [] Not met [] Partially met Date: 7/8/24      Pt will have sufficient ankle ROM and strength to be able to go up and down steps and curbs with 1 HHA  in a normal pattern without pain or difficulty for

## 2024-07-15 ENCOUNTER — HOSPITAL ENCOUNTER (OUTPATIENT)
Facility: HOSPITAL | Age: 54
Setting detail: RECURRING SERIES
Discharge: HOME | End: 2024-07-18
Attending: STUDENT IN AN ORGANIZED HEALTH CARE EDUCATION/TRAINING PROGRAM
Payer: MEDICARE

## 2024-07-15 PROCEDURE — 97110 THERAPEUTIC EXERCISES: CPT
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
## 2024-07-17 ENCOUNTER — HOSPITAL ENCOUNTER (OUTPATIENT)
Facility: HOSPITAL | Age: 54
Setting detail: RECURRING SERIES
Discharge: HOME | End: 2024-07-20
Attending: STUDENT IN AN ORGANIZED HEALTH CARE EDUCATION/TRAINING PROGRAM
Payer: MEDICARE

## 2024-07-17 PROCEDURE — 97110 THERAPEUTIC EXERCISES: CPT
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
## 2024-07-22 ENCOUNTER — TELEPHONE (OUTPATIENT)
Facility: CLINIC | Age: 54
End: 2024-07-22

## 2024-07-22 ENCOUNTER — HOSPITAL ENCOUNTER (OUTPATIENT)
Facility: HOSPITAL | Age: 54
Setting detail: RECURRING SERIES
Discharge: HOME | End: 2024-07-25
Attending: STUDENT IN AN ORGANIZED HEALTH CARE EDUCATION/TRAINING PROGRAM
Payer: MEDICARE

## 2024-07-22 PROCEDURE — 97110 THERAPEUTIC EXERCISES: CPT

## 2024-07-22 NOTE — TELEPHONE ENCOUNTER
----- Message from Annia Clementszahiraalexandrajanneth sent at 7/19/2024  2:59 PM EDT -----  Regarding: ECC Escalation To Practice  ECC Escalation To Practice      Type of Escalation: Acute Care Symptom  --------------------------------------------------------------------------------------------------------------------------    Information for Provider:  Patient is looking for appointment for: Symptom: Cramps Left leg for a month   Reasons for Message: Unable to reach practice     Additional Information: Patient is requesting for an urgent appointment   --------------------------------------------------------------------------------------------------------------------------    Relationship to Patient: Guardian: Mother    Call Back Info: Do not leave any message, patient will call back for answer  Preferred Call Back Number: Phone: [phone]

## 2024-07-22 NOTE — THERAPY DISCHARGE
difficulty or pain > 2/10 for safe gait.      [x] Met [] Not met [] Partially met Date: 7/15 normal pattern with 1 HHA reports no  pain     Pt will  have trunk flexion WFL to be able to bend down and put on socks/shoes without difficulty or increased pain.      [x] Met [] Not met [] Partially met Date:  7/8/24-pt can FF on swiss ball  without pain with functional motion to reach feet     Pt will report centralization of symptoms/less radicular pain by 50%.      [x] Met [] Not met [] Partially met Date: 7/17 reports no radicular symptoms     Pt can SLS on involved side for 10 seconds with no HHA for improved balance in stance phase to promote safe walking with no limp on all sufaces, like walking through the yard.       [x] Met [] Not met [] Partially met Date: 7/8/24      Pt will have sufficient ankle ROM and strength to be able to go up and down steps and curbs with 1 HHA  in a normal pattern without pain or difficulty for safe community ambulation.      [x] Met [] Not met [] Partially met Date:7/8     Pt will have less swelling by 1/2 cm to improve flexibility and decrease stiffness for ease of activities such as putting on shoes.      [x] Met [] Not met [] Partially met Date:  6/24/24: see measurements in Progress note; 7/22-no significant  swelling now    RECOMMENDATIONS  Discontinue therapy. Progressing towards or have reached established goals.        Noy Kimball, PT       7/22/2024       1:20 PM    If you have any questions/comments please contact us directly at [phone].   Thank you for allowing us to assist in the care of your patient.

## 2024-07-22 NOTE — PROGRESS NOTES
remaining functional goals. (see flow sheet as applicable)     Details if applicable:  see flowsheet       30769 Neuromuscular Re-Education (timed):  improve balance, coordination, kinesthetic sense, posture, core stability and proprioception to improve patient's ability to develop conscious control of individual muscles and awareness of position of extremities in order to progress to PLOF and address remaining functional goals. (see flow sheet as applicable)    Details if applicable:          41     Total Total         [x]  Patient Education billed concurrently with other procedures   [x] Review HEP    [] Progressed/Changed HEP, detail:    [] Other detail:         Other Objective/Functional Measure  Prepare for d/c today     Objective/Functional Outcome Measure: basic mobility AM-PAC:  25.62%  ( initial 49.92% )  AM-PAC score = an established functional score where 0% = no disability     Pain Level at end of session (0-10 scale): 0/10     Assessment   Patient has met all goals and now reports no pain.  She is doing her normal ADL's and able to continue with her HEP independently.  Reminded to slow down for these. See d/c summary  Patient will continue to benefit from skilled PT / OT services to modify and progress therapeutic interventions, analyze and address functional mobility deficits, analyze and address ROM deficits, analyze and address strength deficits, analyze and address soft tissue restrictions, analyze and cue for proper movement patterns, and analyze and modify for postural abnormalities to address functional deficits and attain remaining goals.    Progress toward goals / Updated goals:  []  See Progress Note/Recertification    Objective/Functional Outcome Measure: basic mobility AM-PAC: 49.92%;  (6/24) no change overall 49%   (7/10) Improvement 37.66%; 25.6% (7/17)  AM-PAC score = an established functional score where 0% = no disability;   Short Term Goals: To be accomplished in 6-8  treatments.  Pt

## 2024-07-22 NOTE — TELEPHONE ENCOUNTER
Per patient's mom has been having leg cramps, started last week.  Was trying to get a sooner appointment. Advised no available appts. Wants to know if one of the meds could be from one of the medications? Per mom cramps come and go. Wants to know what you advise.

## 2024-07-24 ENCOUNTER — APPOINTMENT (OUTPATIENT)
Facility: HOSPITAL | Age: 54
End: 2024-07-24
Attending: STUDENT IN AN ORGANIZED HEALTH CARE EDUCATION/TRAINING PROGRAM
Payer: MEDICARE

## 2024-07-27 DIAGNOSIS — E78.1 PURE HYPERGLYCERIDEMIA: ICD-10-CM

## 2024-07-29 ENCOUNTER — APPOINTMENT (OUTPATIENT)
Facility: HOSPITAL | Age: 54
End: 2024-07-29
Attending: STUDENT IN AN ORGANIZED HEALTH CARE EDUCATION/TRAINING PROGRAM
Payer: MEDICARE

## 2024-07-29 DIAGNOSIS — M25.571 ACUTE RIGHT ANKLE PAIN: ICD-10-CM

## 2024-07-29 DIAGNOSIS — M54.50 LOW BACK PAIN, UNSPECIFIED BACK PAIN LATERALITY, UNSPECIFIED CHRONICITY, UNSPECIFIED WHETHER SCIATICA PRESENT: ICD-10-CM

## 2024-07-29 DIAGNOSIS — M54.6 THORACIC BACK PAIN, UNSPECIFIED BACK PAIN LATERALITY, UNSPECIFIED CHRONICITY: ICD-10-CM

## 2024-07-29 RX ORDER — NAPROXEN 500 MG/1
500 TABLET ORAL 2 TIMES DAILY
Qty: 30 TABLET | Refills: 2 | Status: SHIPPED | OUTPATIENT
Start: 2024-07-29

## 2024-07-29 RX ORDER — ATORVASTATIN CALCIUM 20 MG/1
20 TABLET, FILM COATED ORAL DAILY
Qty: 90 TABLET | Refills: 1 | Status: SHIPPED | OUTPATIENT
Start: 2024-07-29

## 2024-07-31 ENCOUNTER — APPOINTMENT (OUTPATIENT)
Facility: HOSPITAL | Age: 54
End: 2024-07-31
Attending: STUDENT IN AN ORGANIZED HEALTH CARE EDUCATION/TRAINING PROGRAM
Payer: MEDICARE

## 2024-08-07 ENCOUNTER — OFFICE VISIT (OUTPATIENT)
Facility: CLINIC | Age: 54
End: 2024-08-07
Payer: MEDICARE

## 2024-08-07 VITALS
HEART RATE: 71 BPM | DIASTOLIC BLOOD PRESSURE: 56 MMHG | HEIGHT: 60 IN | TEMPERATURE: 98 F | SYSTOLIC BLOOD PRESSURE: 110 MMHG | OXYGEN SATURATION: 98 % | BODY MASS INDEX: 36.91 KG/M2 | WEIGHT: 188 LBS | RESPIRATION RATE: 18 BRPM

## 2024-08-07 DIAGNOSIS — M54.9 BACK PAIN, UNSPECIFIED BACK LOCATION, UNSPECIFIED BACK PAIN LATERALITY, UNSPECIFIED CHRONICITY: ICD-10-CM

## 2024-08-07 DIAGNOSIS — I10 ESSENTIAL (PRIMARY) HYPERTENSION: ICD-10-CM

## 2024-08-07 DIAGNOSIS — Z00.00 MEDICARE ANNUAL WELLNESS VISIT, SUBSEQUENT: Primary | ICD-10-CM

## 2024-08-07 DIAGNOSIS — Z86.79 HISTORY OF SUBARACHNOID HEMORRHAGE: ICD-10-CM

## 2024-08-07 DIAGNOSIS — R56.9 SEIZURE (HCC): ICD-10-CM

## 2024-08-07 DIAGNOSIS — H91.90 HEARING DIFFICULTY, UNSPECIFIED LATERALITY: ICD-10-CM

## 2024-08-07 DIAGNOSIS — G47.00 INSOMNIA, UNSPECIFIED TYPE: ICD-10-CM

## 2024-08-07 DIAGNOSIS — R25.2 MUSCLE CRAMPS: ICD-10-CM

## 2024-08-07 DIAGNOSIS — E78.1 PURE HYPERGLYCERIDEMIA: ICD-10-CM

## 2024-08-07 PROCEDURE — 3074F SYST BP LT 130 MM HG: CPT | Performed by: STUDENT IN AN ORGANIZED HEALTH CARE EDUCATION/TRAINING PROGRAM

## 2024-08-07 PROCEDURE — 99214 OFFICE O/P EST MOD 30 MIN: CPT | Performed by: STUDENT IN AN ORGANIZED HEALTH CARE EDUCATION/TRAINING PROGRAM

## 2024-08-07 PROCEDURE — G0439 PPPS, SUBSEQ VISIT: HCPCS | Performed by: STUDENT IN AN ORGANIZED HEALTH CARE EDUCATION/TRAINING PROGRAM

## 2024-08-07 PROCEDURE — 3078F DIAST BP <80 MM HG: CPT | Performed by: STUDENT IN AN ORGANIZED HEALTH CARE EDUCATION/TRAINING PROGRAM

## 2024-08-07 RX ORDER — LISINOPRIL 40 MG/1
40 TABLET ORAL DAILY
Qty: 90 TABLET | Refills: 1 | Status: SHIPPED | OUTPATIENT
Start: 2024-08-07

## 2024-08-07 RX ORDER — TRAZODONE HYDROCHLORIDE 100 MG/1
100 TABLET ORAL NIGHTLY PRN
Qty: 90 TABLET | Refills: 1 | Status: SHIPPED | OUTPATIENT
Start: 2024-08-07

## 2024-08-07 RX ORDER — CYCLOBENZAPRINE HCL 5 MG
5 TABLET ORAL 2 TIMES DAILY PRN
Qty: 60 TABLET | Refills: 1 | Status: SHIPPED | OUTPATIENT
Start: 2024-08-07 | End: 2024-10-06

## 2024-08-07 RX ORDER — ATORVASTATIN CALCIUM 20 MG/1
20 TABLET, FILM COATED ORAL DAILY
Qty: 90 TABLET | Refills: 1 | Status: SHIPPED | OUTPATIENT
Start: 2024-08-07

## 2024-08-07 RX ORDER — AMLODIPINE BESYLATE 10 MG/1
10 TABLET ORAL DAILY
Qty: 90 TABLET | Refills: 1 | Status: SHIPPED | OUTPATIENT
Start: 2024-08-07

## 2024-08-07 RX ORDER — HYDRALAZINE HYDROCHLORIDE 50 MG/1
50 TABLET, FILM COATED ORAL 3 TIMES DAILY
Qty: 90 TABLET | Refills: 2 | Status: SHIPPED | OUTPATIENT
Start: 2024-08-07

## 2024-08-07 ASSESSMENT — PATIENT HEALTH QUESTIONNAIRE - PHQ9
2. FEELING DOWN, DEPRESSED OR HOPELESS: NOT AT ALL
SUM OF ALL RESPONSES TO PHQ QUESTIONS 1-9: 0
SUM OF ALL RESPONSES TO PHQ9 QUESTIONS 1 & 2: 0
SUM OF ALL RESPONSES TO PHQ QUESTIONS 1-9: 0
1. LITTLE INTEREST OR PLEASURE IN DOING THINGS: NOT AT ALL

## 2024-08-07 NOTE — PROGRESS NOTES
Chief Complaint   Patient presents with    Medicare AWV     BP (!) 110/56 (Site: Right Upper Arm, Position: Sitting)   Pulse 71   Temp 98 °F (36.7 °C) (Oral)   Resp 18   Ht 1.524 m (5')   Wt 85.3 kg (188 lb)   SpO2 98%   BMI 36.72 kg/m²     \"Have you been to the ER, urgent care clinic since your last visit?  Hospitalized since your last visit?\"    NO    “Have you seen or consulted any other health care providers outside of [facility] since your last visit?”    NO     “Have you had a pap smear?”    NO    No cervical cancer screening on file             Click Here for Release of Records Request

## 2024-08-07 NOTE — PROGRESS NOTES
Medicare Annual Wellness Visit    Unique Ricks is here for Medicare AWV    Assessment & Plan   Medicare annual wellness visit, subsequent  Essential (primary) hypertension  -     hydrALAZINE (APRESOLINE) 50 MG tablet; Take 1 tablet by mouth 3 times daily, Disp-90 tablet, R-2Normal  -     CBC; Future  -     Comprehensive Metabolic Panel; Future  -     lisinopril (PRINIVIL;ZESTRIL) 40 MG tablet; Take 1 tablet by mouth daily, Disp-90 tablet, R-1Normal  -     amLODIPine (NORVASC) 10 MG tablet; Take 1 tablet by mouth daily, Disp-90 tablet, R-1Normal  Hearing difficulty, unspecified laterality  -     [facility] - Blessing Wesley MD, Otolaryngology, [place]  History of subarachnoid hemorrhage  Seizure (HCC)  Insomnia, unspecified  -     traZODone (DESYREL) 100 MG tablet; Take 1 tablet by mouth nightly as needed for Sleep, Disp-90 tablet, R-1Normal  Pure hyperglyceridemia  -     atorvastatin (LIPITOR) 20 MG tablet; Take 1 tablet by mouth daily, Disp-90 tablet, R-1Normal  Back pain, unspecified back location, unspecified back pain laterality, unspecified chronicity  -     cyclobenzaprine (FLEXERIL) 5 MG tablet; Take 1 tablet by mouth 2 times daily as needed for Muscle spasms, Disp-60 tablet, R-1Normal  Muscle cramps     Recommendations for Preventive Services Due: see orders and patient instructions/AVS.  Recommended screening schedule for the next 5-10 years is provided to the patient in written form: see Patient Instructions/AVS.     Diastolic blood pressure on the lower end, decrease hydralazine to 50 mg 3 times daily.  Mother states even at home the diastolic running in the lower end.  No fever cough, signs of UTI.    Muscle cramps of the right leg, recommend to drink plenty of fluids, will check electrolytes.  Did not note any lower extremity swelling.  She does not have the pain right now.  Prescribed Flexeril if needed.  I have given her some exercises.  I told him to stop atorvastatin, but they had held the

## 2024-08-20 LAB
ALBUMIN SERPL-MCNC: 4.3 G/DL (ref 3.8–4.9)
ALP SERPL-CCNC: 94 IU/L (ref 44–121)
ALT SERPL-CCNC: 10 IU/L (ref 0–32)
AST SERPL-CCNC: 13 IU/L (ref 0–40)
BILIRUB SERPL-MCNC: 0.3 MG/DL (ref 0–1.2)
BUN SERPL-MCNC: 15 MG/DL (ref 6–24)
BUN/CREAT SERPL: 16 (ref 9–23)
CALCIUM SERPL-MCNC: 9.4 MG/DL (ref 8.7–10.2)
CHLORIDE SERPL-SCNC: 108 MMOL/L (ref 96–106)
CO2 SERPL-SCNC: 21 MMOL/L (ref 20–29)
CREAT SERPL-MCNC: 0.91 MG/DL (ref 0.57–1)
EGFRCR SERPLBLD CKD-EPI 2021: 75 ML/MIN/1.73
ERYTHROCYTE [DISTWIDTH] IN BLOOD BY AUTOMATED COUNT: 14.9 % (ref 11.7–15.4)
GLOBULIN SER CALC-MCNC: 2.1 G/DL (ref 1.5–4.5)
GLUCOSE SERPL-MCNC: 96 MG/DL (ref 70–99)
HCT VFR BLD AUTO: 34.8 % (ref 34–46.6)
HGB BLD-MCNC: 11.1 G/DL (ref 11.1–15.9)
MCH RBC QN AUTO: 25.1 PG (ref 26.6–33)
MCHC RBC AUTO-ENTMCNC: 31.9 G/DL (ref 31.5–35.7)
MCV RBC AUTO: 79 FL (ref 79–97)
PLATELET # BLD AUTO: 264 X10E3/UL (ref 150–450)
POTASSIUM SERPL-SCNC: 4.3 MMOL/L (ref 3.5–5.2)
PROT SERPL-MCNC: 6.4 G/DL (ref 6–8.5)
RBC # BLD AUTO: 4.42 X10E6/UL (ref 3.77–5.28)
SODIUM SERPL-SCNC: 143 MMOL/L (ref 134–144)
WBC # BLD AUTO: 4.2 X10E3/UL (ref 3.4–10.8)

## 2024-08-23 ENCOUNTER — NURSE ONLY (OUTPATIENT)
Facility: CLINIC | Age: 54
End: 2024-08-23

## 2024-08-23 VITALS — HEART RATE: 67 BPM | SYSTOLIC BLOOD PRESSURE: 131 MMHG | DIASTOLIC BLOOD PRESSURE: 73 MMHG

## 2024-08-28 ENCOUNTER — OFFICE VISIT (OUTPATIENT)
Age: 54
End: 2024-08-28
Payer: MEDICARE

## 2024-08-28 ENCOUNTER — PROCEDURE VISIT (OUTPATIENT)
Age: 54
End: 2024-08-28
Payer: MEDICARE

## 2024-08-28 VITALS
BODY MASS INDEX: 37.69 KG/M2 | DIASTOLIC BLOOD PRESSURE: 84 MMHG | SYSTOLIC BLOOD PRESSURE: 134 MMHG | HEART RATE: 63 BPM | HEIGHT: 60 IN | OXYGEN SATURATION: 98 % | WEIGHT: 192 LBS

## 2024-08-28 DIAGNOSIS — I67.1 BRAIN ANEURYSM: ICD-10-CM

## 2024-08-28 DIAGNOSIS — H90.3 ASNHL (ASYMMETRICAL SENSORINEURAL HEARING LOSS): ICD-10-CM

## 2024-08-28 DIAGNOSIS — H90.41 SENSORINEURAL HEARING LOSS (SNHL) OF RIGHT EAR WITH UNRESTRICTED HEARING OF LEFT EAR: Primary | ICD-10-CM

## 2024-08-28 DIAGNOSIS — H90.3 SENSORINEURAL HEARING LOSS (SNHL) OF BOTH EARS: Primary | ICD-10-CM

## 2024-08-28 PROCEDURE — 3075F SYST BP GE 130 - 139MM HG: CPT | Performed by: STUDENT IN AN ORGANIZED HEALTH CARE EDUCATION/TRAINING PROGRAM

## 2024-08-28 PROCEDURE — 92567 TYMPANOMETRY: CPT

## 2024-08-28 PROCEDURE — 3079F DIAST BP 80-89 MM HG: CPT | Performed by: STUDENT IN AN ORGANIZED HEALTH CARE EDUCATION/TRAINING PROGRAM

## 2024-08-28 PROCEDURE — 99204 OFFICE O/P NEW MOD 45 MIN: CPT | Performed by: STUDENT IN AN ORGANIZED HEALTH CARE EDUCATION/TRAINING PROGRAM

## 2024-08-28 PROCEDURE — 92504 EAR MICROSCOPY EXAMINATION: CPT | Performed by: STUDENT IN AN ORGANIZED HEALTH CARE EDUCATION/TRAINING PROGRAM

## 2024-08-28 PROCEDURE — 92557 COMPREHENSIVE HEARING TEST: CPT

## 2024-08-28 NOTE — PROGRESS NOTES
Unique Ricks   1970, 54 y.o. female   [id number]       Referring Provider: Blessing Wesley MD  Referral Type: In an order in Epic    Reason for Visit: Evaluation of the cause of disorders of hearing, tinnitus, or balance.    ADULT AUDIOLOGIC EVALUATION      Unique Ricks is a 54 y.o. female seen today, 8/28/2024 , for an initial audiologic evaluation.  Patient was seen by Blessing Wesley MD following today's evaluation.    AUDIOLOGIC AND OTHER PERTINENT MEDICAL HISTORY:      Unique Ricks reports her family says she has hearing loss, but she does not notice it in herself. She indicates her family has noticed hearing difficulties for 3 months. She has a history of machinery work for 3 years.     She denied otalgia, aural fullness, otorrhea, tinnitus, dizziness, imbalance, and family history of hearing loss    Date: 8/28/2024     IMPRESSIONS:      Today's results revealed normal hearing 250 through 8000 Hz in the left ear. Normal hearing through 4000 Hz in the right ear, and no hearing at 8000 Hz. Good-excellent speech understanding when in quiet. Tympanometry indicates normal middle ear function. Discussed test results and implications with patient. Recommended consult with ENT physician due to noted history of asymmetric hearing. Discussed noise exposure and the importance of appropriate hearing protection when in hazardous noise environments.    Follow medical recommendations of Blessing Wesley MD.     ASSESSMENT AND FINDINGS:     Otoscopy unremarkable.    RIGHT EAR:  Hearing Sensitivity: Normal hearing through 4000 Hz. Mild hearing loss at 6000 Hz. No hearing at 8000 Hz.  Speech Recognition Threshold: 25 dB HL  Word Recognition: Good 80%, based on NU-6 25-word list at 70 dBHL using recorded speech stimuli.    Tympanometry: Normal peak pressure and compliance, Type A tympanogram, consistent with normal middle ear function.       LEFT EAR:  Hearing Sensitivity: Normal hearing sensitivity

## 2024-08-28 NOTE — PATIENT INSTRUCTIONS
Noise-Induced Hearing Loss  What it is, and what you can do to prevent it      Exposure to loud sounds, in an occupational setting or recreational, can cause permanent hearing loss.  Sound is measured in decibels (dB).  Noise-induced hearing loss is the ONLY type of preventable hearing loss.  Hearing loss related to noise exposure can occur at any age.      There are small sensory cells, called inner and outer hair cells, within the inner ear (cochlea).  These cells process the loudness (intensity) and pitch (frequency) of sound and send the signal to the brain via our auditory nerve (vestibulocochlear nerve, cranial nerve VIII).  When these cells are damaged, they can result in permanent hearing loss and/or tinnitus.  The hair cells responsible for high frequency sounds, like birds chirping, are most likely to be damaged due to loud sounds.  The high frequency sounds are also very important for our clarity and understanding of speech.      OCCUPATIONAL NOISE EXPOSURE RECREATIONAL NOISE EXPOSURE   Some jobs may have exposure to loud sounds in the workplace.  These jobs may include but are not limited to:     Factory settings  Manufacturing  Construction  Welding  Landscaping  Hairdressing/hairstyling  Musicians  Air Traffic   ... And more! Many activities outside of work may cause permanent hearing loss.  These activities may include but are not limited to:  Lawnmowers, leaf blowers  Farming equipment and animals (such as pigs squealing)  Chainsaws and other power tools  Playing musical instruments and/or singing  Listening to music too loudly - at concerts, through stereo, through ear buds or headphones  Attending sporting events  Attending fireworks shows or using fireworks at home  Use of firearms  ... And more!       REDUCE OR PROTECT YOUR EARS FROM NOISE EXPOSURE    To do your best to avoid noise-induced hearing loss, here are some tips:  Limit exposure to loud sounds.  85 dB (decibels) is safe for 8  source.    Exposure to these sounds may cause permanent damage to your hearing.  If you suspect your hearing has changed, it is recommended that you have your hearing tested by your audiologist.           Good Communication Strategies    Communication can be challenging for anyone, but can be especially difficult for those with some degree of hearing loss.  While we may not be able to control every factor that may lead to difficulty with communication, there are Good Communication Strategies that we can all use in our day-to-day lives.  Communication takes both parties working together for it to be successful.    Tips as a Listener:   Control your environment.  It is important to limit the amount of background noise in the room when possible.  You should also consider having a good light source in the room to best see the other person.  Ask for clarification.  Instead of saying \"What?\", you can use parts of what you heard to make a new question.  For example, if you heard the word \"Thursday\" but not the rest of the week, you may ask \"What was that about Thursday?\" or \"What did you want to do Thursday?\".  This shows the person talking that you are listening and will help them better explain what they are saying.  Be an advocate for yourself.  If you are hearing but not understanding, tell the other person \"I can hear you, but I need you to slow down when you speak.\"  Or if someone is facing the other direction, say \"I cannot hear you when you are not looking at me when we talk.\"       Tips as a Talker:   - Sit or stand 3 to 6 feet away to maximize audibility         -- It is unrealistic to believe someone else will fully hear your message if you are speaking from across the room or in a different room in the house   - Stay at eye level to help with visual cues   - Make sure you have the person’s attention before speaking   - Use facial expressions and gestures to accentuate your message   - Raise your voice slightly

## 2024-08-28 NOTE — PROGRESS NOTES
Paola Arenas MD      No Known Allergies    Objective:     /84   Pulse 63   Ht 1.524 m (5')   Wt 87.1 kg (192 lb)   SpO2 98%   BMI 37.50 kg/m²      Physical Exam:   General: Comfortable, pleasant, appears stated age   Voice: Strong, speaking in full sentences, no stridor    Face: No masses or lesions, facial strength symmetric   Ears: External ears unremarkable.   Due to the significance of this patient's ear related medical issues, the decision was made to perform a microscopic ear exam to provide better visualization of the anatomy for appropriate diagnosis of any related pathology.     BILATERAL MICROSCOPY EAR EXAM (CPT 82794)  Using the microscope, both ears were examined.   Right ear: EAC clear of cerumen, no lesions or debris, TM intact without effusion, middle ear space well aerated. No tympanosclerosis. Able to auto insufflate middle ear space  Left ear: EAC clear of cerumen, no lesions or debris, TM intact without effusion, middle ear space well aerated. No tympanosclerosis. Able to auto insufflate middle ear space    Nose: External nose unremarkable. Dorsum midline. Anterior rhinoscopy demonstrates no lesions. Septum midline. Turbinates without hypertrophy.   Oral Cavity / Oropharynx: No trismus. Mucosa pink and moist. No lesions. Tongue is midline and mobile. Palate elevates symmetrically. Uvula midline. Tonsils unremarkable. Floor of mouth soft.   Neck: Supple. No adenopathy. Thyroid unremarkable. Palpable laryngeal landmarks. Full neck range of motion.   Neurologic: CN II - XI intact. Normal gait     Radiology: Imaging studies independently review by me   MRI Brain (2021)  IMPRESSION:     1. No evidence of acute infarct.  2. 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  Stable size of bilateral subdural/extra-axial fluid collections, right larger  than left, with stable mass effect resulting in mild 4 mm of leftward midline  shift.  3. Extensive superficial

## 2024-10-18 DIAGNOSIS — E55.9 VITAMIN D DEFICIENCY, UNSPECIFIED: ICD-10-CM

## 2024-10-21 RX ORDER — CHOLECALCIFEROL (VITAMIN D3) 25 MCG
1 TABLET ORAL DAILY
Qty: 90 TABLET | Refills: 1 | Status: SHIPPED | OUTPATIENT
Start: 2024-10-21

## 2024-10-25 DIAGNOSIS — I10 ESSENTIAL (PRIMARY) HYPERTENSION: ICD-10-CM

## 2024-10-29 RX ORDER — HYDRALAZINE HYDROCHLORIDE 50 MG/1
50 TABLET, FILM COATED ORAL 3 TIMES DAILY
Qty: 270 TABLET | Refills: 1 | Status: SHIPPED | OUTPATIENT
Start: 2024-10-29

## 2024-12-09 ENCOUNTER — OFFICE VISIT (OUTPATIENT)
Facility: CLINIC | Age: 54
End: 2024-12-09
Payer: MEDICARE

## 2024-12-09 VITALS
BODY MASS INDEX: 37.08 KG/M2 | HEIGHT: 61 IN | RESPIRATION RATE: 18 BRPM | TEMPERATURE: 97.5 F | SYSTOLIC BLOOD PRESSURE: 128 MMHG | OXYGEN SATURATION: 97 % | HEART RATE: 66 BPM | DIASTOLIC BLOOD PRESSURE: 68 MMHG | WEIGHT: 196.4 LBS

## 2024-12-09 DIAGNOSIS — M54.50 LOW BACK PAIN WITHOUT SCIATICA, UNSPECIFIED BACK PAIN LATERALITY, UNSPECIFIED CHRONICITY: ICD-10-CM

## 2024-12-09 DIAGNOSIS — Z86.79 HISTORY OF SUBARACHNOID HEMORRHAGE: ICD-10-CM

## 2024-12-09 DIAGNOSIS — E78.1 PURE HYPERGLYCERIDEMIA: ICD-10-CM

## 2024-12-09 DIAGNOSIS — R56.9 SEIZURE (HCC): ICD-10-CM

## 2024-12-09 DIAGNOSIS — G47.00 INSOMNIA, UNSPECIFIED TYPE: ICD-10-CM

## 2024-12-09 DIAGNOSIS — I10 ESSENTIAL (PRIMARY) HYPERTENSION: Primary | ICD-10-CM

## 2024-12-09 PROCEDURE — 99214 OFFICE O/P EST MOD 30 MIN: CPT | Performed by: STUDENT IN AN ORGANIZED HEALTH CARE EDUCATION/TRAINING PROGRAM

## 2024-12-09 PROCEDURE — 3074F SYST BP LT 130 MM HG: CPT | Performed by: STUDENT IN AN ORGANIZED HEALTH CARE EDUCATION/TRAINING PROGRAM

## 2024-12-09 PROCEDURE — 3078F DIAST BP <80 MM HG: CPT | Performed by: STUDENT IN AN ORGANIZED HEALTH CARE EDUCATION/TRAINING PROGRAM

## 2024-12-09 RX ORDER — HYDRALAZINE HYDROCHLORIDE 50 MG/1
50 TABLET, FILM COATED ORAL 3 TIMES DAILY
Qty: 270 TABLET | Refills: 1 | Status: SHIPPED | OUTPATIENT
Start: 2024-12-09

## 2024-12-09 RX ORDER — AMLODIPINE BESYLATE 10 MG/1
10 TABLET ORAL DAILY
Qty: 90 TABLET | Refills: 1 | Status: SHIPPED | OUTPATIENT
Start: 2024-12-09

## 2024-12-09 RX ORDER — LISINOPRIL 40 MG/1
40 TABLET ORAL DAILY
Qty: 90 TABLET | Refills: 1 | Status: SHIPPED | OUTPATIENT
Start: 2024-12-09

## 2024-12-09 RX ORDER — ATORVASTATIN CALCIUM 20 MG/1
20 TABLET, FILM COATED ORAL DAILY
Qty: 90 TABLET | Refills: 1 | Status: SHIPPED | OUTPATIENT
Start: 2024-12-09

## 2024-12-09 RX ORDER — LEVETIRACETAM 1000 MG/1
1000 TABLET ORAL 2 TIMES DAILY
COMMUNITY
Start: 2024-11-18

## 2024-12-09 RX ORDER — TRAZODONE HYDROCHLORIDE 100 MG/1
100 TABLET ORAL NIGHTLY PRN
Qty: 90 TABLET | Refills: 1 | Status: SHIPPED | OUTPATIENT
Start: 2024-12-09

## 2024-12-09 SDOH — ECONOMIC STABILITY: FOOD INSECURITY: WITHIN THE PAST 12 MONTHS, YOU WORRIED THAT YOUR FOOD WOULD RUN OUT BEFORE YOU GOT MONEY TO BUY MORE.: NEVER TRUE

## 2024-12-09 SDOH — ECONOMIC STABILITY: INCOME INSECURITY: HOW HARD IS IT FOR YOU TO PAY FOR THE VERY BASICS LIKE FOOD, HOUSING, MEDICAL CARE, AND HEATING?: NOT VERY HARD

## 2024-12-09 SDOH — ECONOMIC STABILITY: FOOD INSECURITY: WITHIN THE PAST 12 MONTHS, THE FOOD YOU BOUGHT JUST DIDN'T LAST AND YOU DIDN'T HAVE MONEY TO GET MORE.: NEVER TRUE

## 2024-12-09 ASSESSMENT — ENCOUNTER SYMPTOMS
COUGH: 0
SHORTNESS OF BREATH: 0
ABDOMINAL PAIN: 0
BACK PAIN: 1

## 2024-12-09 NOTE — PROGRESS NOTES
Unique Ricks (: 1970) is a 54 y.o. female, Established patient patient, here for evaluation of the following chief complaint(s):  Follow-up Chronic Condition       ASSESSMENT/PLAN:  Below is the assessment and plan developed based on review of pertinent history, physical exam, labs, studies, and medications.    1. Essential (primary) hypertension  -     lisinopril (PRINIVIL;ZESTRIL) 40 MG tablet; Take 1 tablet by mouth daily, Disp-90 tablet, R-1Normal  -     hydrALAZINE (APRESOLINE) 50 MG tablet; Take 1 tablet by mouth 3 times daily, Disp-270 tablet, R-1Normal  -     amLODIPine (NORVASC) 10 MG tablet; Take 1 tablet by mouth daily, Disp-90 tablet, R-1Normal  2. Pure hyperglyceridemia  -     atorvastatin (LIPITOR) 20 MG tablet; Take 1 tablet by mouth daily, Disp-90 tablet, R-1Normal  3. Insomnia, unspecified type  -     traZODone (DESYREL) 100 MG tablet; Take 1 tablet by mouth nightly as needed for Sleep, Disp-90 tablet, R-1Normal  4. History of subarachnoid hemorrhage  5. Seizure (HCC)      Hypertension controlled, continue current medications.  Hyperlipidemia: Continue atorvastatin.    History of subarachnoid hemorrhage/seizures, following with neurology.  Continue Keppra.  Continue trazodone for insomnia.  Back pain: Previous x-ray did show changes of arthritis, recommend Tylenol as needed or lidocaine patches.  She does have naproxen which was prescribed previously, recommend trying the above first.Denies any weakness of the legs or urinary symptoms.  Return in about 3 months (around 3/9/2025) for follow up.         SUBJECTIVE/OBJECTIVE:  RJ Ricks is a 54 yr old who presents for a follow up.   She is accompanied by her mom who is her legal guardian, as well as her medical power of .   She has hypertension, hypertriglyceridemia, history of subarachnoid hemorrhage, seizures.  She follows yearly with neurology. On keppra for seizures.   She is on amlodipine, lisinopril and

## 2024-12-09 NOTE — PROGRESS NOTES
\"Have you been to the ER, urgent care clinic since your last visit?  Hospitalized since your last visit?\"    NO    “Have you seen or consulted any other health care providers outside of [facility] since your last visit?”    NO     “Have you had a pap smear?”    YES - Where: [facility] Nurse/[facility] to request most recent records if not in the chart    No cervical cancer screening on file     Chief Complaint   Patient presents with    Follow-up Chronic Condition     /68 (Site: Right Upper Arm, Position: Sitting, Cuff Size: Medium Adult)   Pulse 66   Temp 97.5 °F (36.4 °C) (Temporal)   Resp 18   Ht 1.549 m (5' 1\")   Wt 89.1 kg (196 lb 6.4 oz)   SpO2 97%   BMI 37.11 kg/m²           Click Here for Release of Records Request

## 2025-01-17 DIAGNOSIS — E55.9 VITAMIN D DEFICIENCY, UNSPECIFIED: ICD-10-CM

## 2025-01-17 RX ORDER — CHOLECALCIFEROL (VITAMIN D3) 25 MCG
1 TABLET ORAL DAILY
Qty: 90 TABLET | Refills: 1 | Status: SHIPPED | OUTPATIENT
Start: 2025-01-17

## 2025-03-06 RX ORDER — LEVETIRACETAM 1000 MG/1
1000 TABLET ORAL 2 TIMES DAILY
Qty: 180 TABLET | Refills: 3 | Status: SHIPPED | OUTPATIENT
Start: 2025-03-06

## 2025-04-16 ENCOUNTER — TELEPHONE (OUTPATIENT)
Facility: CLINIC | Age: 55
End: 2025-04-16

## 2025-04-16 NOTE — TELEPHONE ENCOUNTER
Attempted to contact patient regarding upcoming Medicare wellness appointment and completion of HRA questionnaire. LVM for patient to please return call at [phone].

## 2025-04-17 ENCOUNTER — TELEPHONE (OUTPATIENT)
Facility: CLINIC | Age: 55
End: 2025-04-17

## 2025-04-17 SDOH — HEALTH STABILITY: PHYSICAL HEALTH: ON AVERAGE, HOW MANY DAYS PER WEEK DO YOU ENGAGE IN MODERATE TO STRENUOUS EXERCISE (LIKE A BRISK WALK)?: 0 DAYS

## 2025-04-17 ASSESSMENT — LIFESTYLE VARIABLES
HOW OFTEN DO YOU HAVE A DRINK CONTAINING ALCOHOL: 1
HOW MANY STANDARD DRINKS CONTAINING ALCOHOL DO YOU HAVE ON A TYPICAL DAY: PATIENT DOES NOT DRINK
HOW MANY STANDARD DRINKS CONTAINING ALCOHOL DO YOU HAVE ON A TYPICAL DAY: 0
HOW OFTEN DO YOU HAVE SIX OR MORE DRINKS ON ONE OCCASION: 1
HOW OFTEN DO YOU HAVE A DRINK CONTAINING ALCOHOL: NEVER

## 2025-04-17 ASSESSMENT — PATIENT HEALTH QUESTIONNAIRE - PHQ9
1. LITTLE INTEREST OR PLEASURE IN DOING THINGS: NOT AT ALL
2. FEELING DOWN, DEPRESSED OR HOPELESS: NOT AT ALL
SUM OF ALL RESPONSES TO PHQ QUESTIONS 1-9: 0

## 2025-04-18 ENCOUNTER — OFFICE VISIT (OUTPATIENT)
Facility: CLINIC | Age: 55
End: 2025-04-18
Payer: MEDICARE

## 2025-04-18 VITALS
RESPIRATION RATE: 18 BRPM | HEART RATE: 60 BPM | OXYGEN SATURATION: 96 % | DIASTOLIC BLOOD PRESSURE: 67 MMHG | BODY MASS INDEX: 36.96 KG/M2 | WEIGHT: 195.6 LBS | TEMPERATURE: 97.2 F | SYSTOLIC BLOOD PRESSURE: 129 MMHG

## 2025-04-18 DIAGNOSIS — G47.00 INSOMNIA, UNSPECIFIED TYPE: ICD-10-CM

## 2025-04-18 DIAGNOSIS — Z12.31 ENCOUNTER FOR SCREENING MAMMOGRAM FOR MALIGNANT NEOPLASM OF BREAST: ICD-10-CM

## 2025-04-18 DIAGNOSIS — Z00.00 MEDICARE ANNUAL WELLNESS VISIT, SUBSEQUENT: Primary | ICD-10-CM

## 2025-04-18 DIAGNOSIS — I10 ESSENTIAL HYPERTENSION: ICD-10-CM

## 2025-04-18 DIAGNOSIS — I10 ESSENTIAL (PRIMARY) HYPERTENSION: ICD-10-CM

## 2025-04-18 DIAGNOSIS — M62.838 MUSCLE SPASM: ICD-10-CM

## 2025-04-18 DIAGNOSIS — E78.1 PURE HYPERGLYCERIDEMIA: ICD-10-CM

## 2025-04-18 PROCEDURE — 99214 OFFICE O/P EST MOD 30 MIN: CPT | Performed by: STUDENT IN AN ORGANIZED HEALTH CARE EDUCATION/TRAINING PROGRAM

## 2025-04-18 PROCEDURE — 3078F DIAST BP <80 MM HG: CPT | Performed by: STUDENT IN AN ORGANIZED HEALTH CARE EDUCATION/TRAINING PROGRAM

## 2025-04-18 PROCEDURE — 3074F SYST BP LT 130 MM HG: CPT | Performed by: STUDENT IN AN ORGANIZED HEALTH CARE EDUCATION/TRAINING PROGRAM

## 2025-04-18 PROCEDURE — G0439 PPPS, SUBSEQ VISIT: HCPCS | Performed by: STUDENT IN AN ORGANIZED HEALTH CARE EDUCATION/TRAINING PROGRAM

## 2025-04-18 RX ORDER — CYCLOBENZAPRINE HCL 5 MG
5 TABLET ORAL 2 TIMES DAILY PRN
Qty: 60 TABLET | Refills: 2 | Status: SHIPPED | OUTPATIENT
Start: 2025-04-18

## 2025-04-18 RX ORDER — LISINOPRIL 40 MG/1
40 TABLET ORAL DAILY
Qty: 90 TABLET | Refills: 1 | Status: SHIPPED | OUTPATIENT
Start: 2025-04-18

## 2025-04-18 RX ORDER — ATORVASTATIN CALCIUM 20 MG/1
20 TABLET, FILM COATED ORAL DAILY
Qty: 90 TABLET | Refills: 1 | Status: SHIPPED | OUTPATIENT
Start: 2025-04-18

## 2025-04-18 RX ORDER — TRAZODONE HYDROCHLORIDE 100 MG/1
100 TABLET ORAL NIGHTLY PRN
Qty: 90 TABLET | Refills: 1 | Status: SHIPPED | OUTPATIENT
Start: 2025-04-18

## 2025-04-18 RX ORDER — HYDRALAZINE HYDROCHLORIDE 50 MG/1
50 TABLET, FILM COATED ORAL 3 TIMES DAILY
Qty: 270 TABLET | Refills: 1 | Status: SHIPPED | OUTPATIENT
Start: 2025-04-18

## 2025-04-18 RX ORDER — AMLODIPINE BESYLATE 10 MG/1
10 TABLET ORAL DAILY
Qty: 90 TABLET | Refills: 1 | Status: SHIPPED | OUTPATIENT
Start: 2025-04-18

## 2025-04-18 NOTE — PATIENT INSTRUCTIONS
medical record and screening and assessments performed today your provider may have ordered immunizations, labs, imaging, and/or referrals for you.  A list of these orders (if applicable) as well as your Preventive Care list are included within your After Visit Summary for your review.

## 2025-04-19 LAB
ALBUMIN SERPL-MCNC: 4.3 G/DL (ref 3.8–4.9)
ALP SERPL-CCNC: 107 IU/L (ref 44–121)
ALT SERPL-CCNC: 8 IU/L (ref 0–32)
AST SERPL-CCNC: 15 IU/L (ref 0–40)
BILIRUB SERPL-MCNC: 0.2 MG/DL (ref 0–1.2)
BUN SERPL-MCNC: 17 MG/DL (ref 6–24)
BUN/CREAT SERPL: 18 (ref 9–23)
CALCIUM SERPL-MCNC: 9.7 MG/DL (ref 8.7–10.2)
CHLORIDE SERPL-SCNC: 103 MMOL/L (ref 96–106)
CHOLEST SERPL-MCNC: 117 MG/DL (ref 100–199)
CO2 SERPL-SCNC: 22 MMOL/L (ref 20–29)
CREAT SERPL-MCNC: 0.96 MG/DL (ref 0.57–1)
EGFRCR SERPLBLD CKD-EPI 2021: 70 ML/MIN/1.73
ERYTHROCYTE [DISTWIDTH] IN BLOOD BY AUTOMATED COUNT: 15.5 % (ref 11.7–15.4)
GLOBULIN SER CALC-MCNC: 2.9 G/DL (ref 1.5–4.5)
GLUCOSE SERPL-MCNC: 89 MG/DL (ref 70–99)
HCT VFR BLD AUTO: 38.1 % (ref 34–46.6)
HDLC SERPL-MCNC: 73 MG/DL
HGB BLD-MCNC: 11.9 G/DL (ref 11.1–15.9)
LDLC SERPL CALC-MCNC: 26 MG/DL (ref 0–99)
MCH RBC QN AUTO: 24.5 PG (ref 26.6–33)
MCHC RBC AUTO-ENTMCNC: 31.2 G/DL (ref 31.5–35.7)
MCV RBC AUTO: 79 FL (ref 79–97)
PLATELET # BLD AUTO: 280 X10E3/UL (ref 150–450)
POTASSIUM SERPL-SCNC: 4.5 MMOL/L (ref 3.5–5.2)
PROT SERPL-MCNC: 7.2 G/DL (ref 6–8.5)
RBC # BLD AUTO: 4.85 X10E6/UL (ref 3.77–5.28)
SODIUM SERPL-SCNC: 140 MMOL/L (ref 134–144)
T4 FREE SERPL-MCNC: 1.09 NG/DL (ref 0.82–1.77)
TRIGL SERPL-MCNC: 97 MG/DL (ref 0–149)
TSH SERPL DL<=0.005 MIU/L-ACNC: 1.59 UIU/ML (ref 0.45–4.5)
VLDLC SERPL CALC-MCNC: 18 MG/DL (ref 5–40)
WBC # BLD AUTO: 4.4 X10E3/UL (ref 3.4–10.8)

## 2025-04-21 DIAGNOSIS — E78.1 PURE HYPERGLYCERIDEMIA: ICD-10-CM

## 2025-04-21 DIAGNOSIS — I10 ESSENTIAL (PRIMARY) HYPERTENSION: ICD-10-CM

## 2025-04-22 RX ORDER — ATORVASTATIN CALCIUM 20 MG/1
20 TABLET, FILM COATED ORAL DAILY
Qty: 90 TABLET | Refills: 1 | OUTPATIENT
Start: 2025-04-22

## 2025-04-22 RX ORDER — AMLODIPINE BESYLATE 10 MG/1
10 TABLET ORAL DAILY
Qty: 90 TABLET | Refills: 1 | OUTPATIENT
Start: 2025-04-22

## 2025-04-23 ENCOUNTER — RESULTS FOLLOW-UP (OUTPATIENT)
Facility: CLINIC | Age: 55
End: 2025-04-23

## 2025-04-25 ENCOUNTER — HOSPITAL ENCOUNTER (OUTPATIENT)
Facility: HOSPITAL | Age: 55
Discharge: HOME | End: 2025-04-28
Attending: STUDENT IN AN ORGANIZED HEALTH CARE EDUCATION/TRAINING PROGRAM
Payer: MEDICARE

## 2025-04-25 DIAGNOSIS — Z12.31 ENCOUNTER FOR SCREENING MAMMOGRAM FOR MALIGNANT NEOPLASM OF BREAST: ICD-10-CM

## 2025-04-25 PROCEDURE — 77063 BREAST TOMOSYNTHESIS BI: CPT

## 2025-04-27 ENCOUNTER — RESULTS FOLLOW-UP (OUTPATIENT)
Facility: CLINIC | Age: 55
End: 2025-04-27

## 2025-05-21 ENCOUNTER — OFFICE VISIT (OUTPATIENT)
Age: 55
End: 2025-05-21
Payer: MEDICARE

## 2025-05-21 VITALS — OXYGEN SATURATION: 97 % | TEMPERATURE: 97.9 F | RESPIRATION RATE: 18 BRPM | HEART RATE: 63 BPM

## 2025-05-21 DIAGNOSIS — R56.9 CONVULSIONS, UNSPECIFIED CONVULSION TYPE (HCC): Primary | ICD-10-CM

## 2025-05-21 PROCEDURE — 99214 OFFICE O/P EST MOD 30 MIN: CPT | Performed by: NURSE PRACTITIONER

## 2025-05-21 RX ORDER — LEVETIRACETAM 1000 MG/1
1000 TABLET ORAL 2 TIMES DAILY
Qty: 180 TABLET | Refills: 3 | Status: SHIPPED | OUTPATIENT
Start: 2025-05-21

## 2025-05-21 NOTE — PROGRESS NOTES
contrast  material, axial CT angiography of the head and neck was performed. Delayed axial  images through the head were also obtained. Coronal and sagittal reconstructions  were obtained. Manual postprocessing of images was performed. 3-D  Sagittal  maximal intensity projection images were obtained.  3-D Coronal maximal  intensity projections were obtained.  CT dose reduction was achieved through use  of a standardized protocol tailored for this examination and automatic exposure  control for dose modulation. CT perfusion analysis was performed using CT with  contrast administration, including postprocessing of parametric maps with the  determination of cerebral blood flow, cerebral blood volume, and mean transit  time.    This study was analyzed by the [facility].ai algorithm    FINDINGS:    Delayed contrast-enhanced head CT:    Right lateral frontal subdural collection with peripheral enhancement is again  noted measuring 7 mm in axial diameter. Bifrontal subdural fluid collections  with pneumocephalus unchanged. 4 mm midline shift to the left is unchanged.  Stable size and appearance of the ventricles and cortical sulci. Sequelae of  left MCA aneurysm embolization and right MCA aneurysm clipping again noted. The  basal cisterns are patent. Inferior, bifrontal medial hypoattenuation is  unchanged. Stable sequelae of right sided craniotomy.    CTA NECK:    Great vessels: Normal arch anatomy with the origins patent.    Right subclavian artery: Patent    Left subclavian artery: Patent    Right common carotid artery: Patent    Left common carotid artery: Patent    Cervical right internal carotid artery: Patent with mild proximal  atherosclerosis causing no significant stenosis by NASCET criteria.    Cervical left internal carotid artery: Patent with mild proximal atherosclerosis  causing no significant stenosis by NASCET criteria.    Right vertebral artery: Patent    Left vertebral artery: Patent    Mild to moderate

## 2025-06-19 DIAGNOSIS — E55.9 VITAMIN D DEFICIENCY, UNSPECIFIED: ICD-10-CM

## 2025-06-19 DIAGNOSIS — E78.1 PURE HYPERGLYCERIDEMIA: ICD-10-CM

## 2025-06-19 DIAGNOSIS — M62.838 MUSCLE SPASM: ICD-10-CM

## 2025-06-19 DIAGNOSIS — G47.00 INSOMNIA, UNSPECIFIED TYPE: ICD-10-CM

## 2025-06-19 DIAGNOSIS — I10 ESSENTIAL (PRIMARY) HYPERTENSION: ICD-10-CM

## 2025-07-16 DIAGNOSIS — M62.838 MUSCLE SPASM: ICD-10-CM

## 2025-07-18 RX ORDER — CYCLOBENZAPRINE HCL 5 MG
5 TABLET ORAL 2 TIMES DAILY PRN
Qty: 60 TABLET | Refills: 2 | Status: SHIPPED | OUTPATIENT
Start: 2025-07-18

## 2025-08-06 ENCOUNTER — OFFICE VISIT (OUTPATIENT)
Facility: CLINIC | Age: 55
End: 2025-08-06
Payer: MEDICARE

## 2025-08-06 VITALS
OXYGEN SATURATION: 97 % | SYSTOLIC BLOOD PRESSURE: 122 MMHG | HEIGHT: 64 IN | WEIGHT: 195.2 LBS | DIASTOLIC BLOOD PRESSURE: 82 MMHG | RESPIRATION RATE: 20 BRPM | TEMPERATURE: 97.8 F | BODY MASS INDEX: 33.32 KG/M2 | HEART RATE: 74 BPM

## 2025-08-06 DIAGNOSIS — R68.89 COLD INTOLERANCE: ICD-10-CM

## 2025-08-06 DIAGNOSIS — Z86.79 HISTORY OF SUBARACHNOID HEMORRHAGE: ICD-10-CM

## 2025-08-06 DIAGNOSIS — D64.9 ANEMIA, UNSPECIFIED TYPE: ICD-10-CM

## 2025-08-06 DIAGNOSIS — E78.5 HYPERLIPIDEMIA, UNSPECIFIED HYPERLIPIDEMIA TYPE: ICD-10-CM

## 2025-08-06 DIAGNOSIS — E53.8 B12 DEFICIENCY: ICD-10-CM

## 2025-08-06 DIAGNOSIS — I10 ESSENTIAL (PRIMARY) HYPERTENSION: Primary | ICD-10-CM

## 2025-08-06 DIAGNOSIS — I10 ESSENTIAL (PRIMARY) HYPERTENSION: ICD-10-CM

## 2025-08-06 DIAGNOSIS — R56.9 SEIZURE (HCC): ICD-10-CM

## 2025-08-06 PROCEDURE — 3074F SYST BP LT 130 MM HG: CPT | Performed by: STUDENT IN AN ORGANIZED HEALTH CARE EDUCATION/TRAINING PROGRAM

## 2025-08-06 PROCEDURE — 99214 OFFICE O/P EST MOD 30 MIN: CPT | Performed by: STUDENT IN AN ORGANIZED HEALTH CARE EDUCATION/TRAINING PROGRAM

## 2025-08-06 PROCEDURE — 3079F DIAST BP 80-89 MM HG: CPT | Performed by: STUDENT IN AN ORGANIZED HEALTH CARE EDUCATION/TRAINING PROGRAM

## 2025-08-06 SDOH — ECONOMIC STABILITY: FOOD INSECURITY: WITHIN THE PAST 12 MONTHS, YOU WORRIED THAT YOUR FOOD WOULD RUN OUT BEFORE YOU GOT MONEY TO BUY MORE.: NEVER TRUE

## 2025-08-06 SDOH — ECONOMIC STABILITY: FOOD INSECURITY: WITHIN THE PAST 12 MONTHS, THE FOOD YOU BOUGHT JUST DIDN'T LAST AND YOU DIDN'T HAVE MONEY TO GET MORE.: NEVER TRUE

## 2025-08-06 ASSESSMENT — ENCOUNTER SYMPTOMS
SHORTNESS OF BREATH: 0
ABDOMINAL PAIN: 0

## 2025-08-07 LAB
ALBUMIN SERPL-MCNC: 4.5 G/DL (ref 3.8–4.9)
ALP SERPL-CCNC: 100 IU/L (ref 44–121)
ALT SERPL-CCNC: 13 IU/L (ref 0–32)
AST SERPL-CCNC: 16 IU/L (ref 0–40)
BILIRUB SERPL-MCNC: 0.2 MG/DL (ref 0–1.2)
BUN SERPL-MCNC: 11 MG/DL (ref 6–24)
BUN/CREAT SERPL: 13 (ref 9–23)
CALCIUM SERPL-MCNC: 9.5 MG/DL (ref 8.7–10.2)
CHLORIDE SERPL-SCNC: 105 MMOL/L (ref 96–106)
CHOLEST SERPL-MCNC: 119 MG/DL (ref 100–199)
CO2 SERPL-SCNC: 21 MMOL/L (ref 20–29)
CREAT SERPL-MCNC: 0.86 MG/DL (ref 0.57–1)
EGFRCR SERPLBLD CKD-EPI 2021: 80 ML/MIN/1.73
ERYTHROCYTE [DISTWIDTH] IN BLOOD BY AUTOMATED COUNT: 15.1 % (ref 11.7–15.4)
FERRITIN SERPL-MCNC: 53 NG/ML (ref 15–150)
FOLATE SERPL-MCNC: 4.6 NG/ML
GLOBULIN SER CALC-MCNC: 2.8 G/DL (ref 1.5–4.5)
GLUCOSE SERPL-MCNC: 88 MG/DL (ref 70–99)
HCT VFR BLD AUTO: 38.9 % (ref 34–46.6)
HDLC SERPL-MCNC: 78 MG/DL
HGB BLD-MCNC: 12.1 G/DL (ref 11.1–15.9)
IRON SATN MFR SERPL: 11 % (ref 15–55)
IRON SERPL-MCNC: 32 UG/DL (ref 27–159)
LDLC SERPL CALC-MCNC: 23 MG/DL (ref 0–99)
MCH RBC QN AUTO: 25.4 PG (ref 26.6–33)
MCHC RBC AUTO-ENTMCNC: 31.1 G/DL (ref 31.5–35.7)
MCV RBC AUTO: 82 FL (ref 79–97)
PLATELET # BLD AUTO: 291 X10E3/UL (ref 150–450)
POTASSIUM SERPL-SCNC: 4.5 MMOL/L (ref 3.5–5.2)
PROT SERPL-MCNC: 7.3 G/DL (ref 6–8.5)
RBC # BLD AUTO: 4.77 X10E6/UL (ref 3.77–5.28)
SODIUM SERPL-SCNC: 143 MMOL/L (ref 134–144)
T4 FREE SERPL-MCNC: 1.11 NG/DL (ref 0.82–1.77)
TIBC SERPL-MCNC: 282 UG/DL (ref 250–450)
TRIGL SERPL-MCNC: 101 MG/DL (ref 0–149)
TSH SERPL DL<=0.005 MIU/L-ACNC: 1.86 UIU/ML (ref 0.45–4.5)
UIBC SERPL-MCNC: 250 UG/DL (ref 131–425)
VIT B12 SERPL-MCNC: 515 PG/ML (ref 232–1245)
VLDLC SERPL CALC-MCNC: 18 MG/DL (ref 5–40)
WBC # BLD AUTO: 4.6 X10E3/UL (ref 3.4–10.8)

## 2025-08-29 ENCOUNTER — PROCEDURE VISIT (OUTPATIENT)
Age: 55
End: 2025-08-29
Payer: MEDICARE

## 2025-08-29 ENCOUNTER — OFFICE VISIT (OUTPATIENT)
Age: 55
End: 2025-08-29
Payer: MEDICARE

## 2025-08-29 VITALS — DIASTOLIC BLOOD PRESSURE: 78 MMHG | OXYGEN SATURATION: 94 % | SYSTOLIC BLOOD PRESSURE: 118 MMHG | HEART RATE: 82 BPM

## 2025-08-29 DIAGNOSIS — H90.3 ASNHL (ASYMMETRICAL SENSORINEURAL HEARING LOSS): Primary | ICD-10-CM

## 2025-08-29 DIAGNOSIS — I67.1 BRAIN ANEURYSM: ICD-10-CM

## 2025-08-29 DIAGNOSIS — H90.41 SENSORINEURAL HEARING LOSS (SNHL) OF RIGHT EAR WITH UNRESTRICTED HEARING OF LEFT EAR: Primary | ICD-10-CM

## 2025-08-29 PROCEDURE — 3074F SYST BP LT 130 MM HG: CPT | Performed by: STUDENT IN AN ORGANIZED HEALTH CARE EDUCATION/TRAINING PROGRAM

## 2025-08-29 PROCEDURE — 92567 TYMPANOMETRY: CPT

## 2025-08-29 PROCEDURE — 3078F DIAST BP <80 MM HG: CPT | Performed by: STUDENT IN AN ORGANIZED HEALTH CARE EDUCATION/TRAINING PROGRAM

## 2025-08-29 PROCEDURE — 92557 COMPREHENSIVE HEARING TEST: CPT

## 2025-08-29 PROCEDURE — 99213 OFFICE O/P EST LOW 20 MIN: CPT | Performed by: STUDENT IN AN ORGANIZED HEALTH CARE EDUCATION/TRAINING PROGRAM

## 2025-09-04 ENCOUNTER — OFFICE VISIT (OUTPATIENT)
Facility: CLINIC | Age: 55
End: 2025-09-04
Payer: MEDICARE

## 2025-09-04 VITALS
RESPIRATION RATE: 18 BRPM | BODY MASS INDEX: 33.8 KG/M2 | WEIGHT: 198 LBS | DIASTOLIC BLOOD PRESSURE: 74 MMHG | HEIGHT: 64 IN | HEART RATE: 92 BPM | OXYGEN SATURATION: 98 % | SYSTOLIC BLOOD PRESSURE: 120 MMHG | TEMPERATURE: 97.9 F

## 2025-09-04 DIAGNOSIS — R34 URINE OUTPUT LOW: Primary | ICD-10-CM

## 2025-09-04 DIAGNOSIS — B37.31 VAGINAL CANDIDA: ICD-10-CM

## 2025-09-04 DIAGNOSIS — N30.00 ACUTE CYSTITIS WITHOUT HEMATURIA: ICD-10-CM

## 2025-09-04 LAB
BILIRUBIN, URINE, POC: NEGATIVE
BLOOD URINE, POC: NEGATIVE
GLUCOSE URINE, POC: NEGATIVE
KETONES, URINE, POC: NEGATIVE
LEUKOCYTE ESTERASE, URINE, POC: ABNORMAL
NITRITE, URINE, POC: NEGATIVE
PH, URINE, POC: 6 (ref 4.6–8)
PROTEIN,URINE, POC: NEGATIVE
SPECIFIC GRAVITY, URINE, POC: 1.03 (ref 1–1.03)
URINALYSIS CLARITY, POC: CLEAR
URINALYSIS COLOR, POC: YELLOW
UROBILINOGEN, POC: ABNORMAL MG/DL

## 2025-09-04 PROCEDURE — 81001 URINALYSIS AUTO W/SCOPE: CPT | Performed by: NURSE PRACTITIONER

## 2025-09-04 PROCEDURE — 3074F SYST BP LT 130 MM HG: CPT | Performed by: NURSE PRACTITIONER

## 2025-09-04 PROCEDURE — 3078F DIAST BP <80 MM HG: CPT | Performed by: NURSE PRACTITIONER

## 2025-09-04 PROCEDURE — 99213 OFFICE O/P EST LOW 20 MIN: CPT | Performed by: NURSE PRACTITIONER

## 2025-09-04 RX ORDER — FLUCONAZOLE 100 MG/1
100 TABLET ORAL DAILY
Qty: 3 TABLET | Refills: 0 | Status: SHIPPED | OUTPATIENT
Start: 2025-09-04 | End: 2025-09-07

## 2025-09-04 ASSESSMENT — ENCOUNTER SYMPTOMS
VOMITING: 0
EYE REDNESS: 0
SHORTNESS OF BREATH: 0
TROUBLE SWALLOWING: 0
COLOR CHANGE: 0
EYE DISCHARGE: 0
CHEST TIGHTNESS: 0
SORE THROAT: 0
ABDOMINAL PAIN: 0
WHEEZING: 0
COUGH: 0
EYE PAIN: 0
NAUSEA: 0

## 2025-09-05 LAB
APPEARANCE UR: CLEAR
BACTERIA #/AREA URNS HPF: ABNORMAL /[HPF]
BILIRUB UR QL STRIP: NEGATIVE
CASTS URNS QL MICRO: ABNORMAL /LPF
COLOR UR: YELLOW
EPI CELLS #/AREA URNS HPF: ABNORMAL /HPF (ref 0–10)
GLUCOSE UR QL STRIP: NEGATIVE
HGB UR QL STRIP: NEGATIVE
KETONES UR QL STRIP: ABNORMAL
LEUKOCYTE ESTERASE UR QL STRIP: ABNORMAL
MICRO URNS: ABNORMAL
NITRITE UR QL STRIP: NEGATIVE
PH UR STRIP: 6 [PH] (ref 5–7.5)
PROT UR QL STRIP: ABNORMAL
RBC #/AREA URNS HPF: ABNORMAL /HPF (ref 0–2)
SP GR UR STRIP: 1.03 (ref 1–1.03)
UROBILINOGEN UR STRIP-MCNC: 1 MG/DL (ref 0.2–1)
WBC #/AREA URNS HPF: ABNORMAL /HPF (ref 0–5)

## 2025-09-06 LAB — BACTERIA UR CULT: NORMAL

## (undated) DEVICE — STERILE POLYISOPRENE POWDER-FREE SURGICAL GLOVES WITH EMOLLIENT COATING: Brand: PROTEXIS

## (undated) DEVICE — TOOL 9AC90 LEGEND 9CM 9MM AC: Brand: MIDAS REX

## (undated) DEVICE — SUTURE VCRL RAPIDE SZ 3-0 L18IN ABSRB UD PS-2 L19MM 3/8 CIR VR497

## (undated) DEVICE — SOLUTION IV 250ML 0.9% SOD CHL CLR INJ FLX BG CONT PRT CLSR

## (undated) DEVICE — SUTURE VCRL SZ 0 L18IN ABSRB VLT L36MM CT-1 1/2 CIR J740D

## (undated) DEVICE — KIT EVAC 0.13IN RECT TB DIA10FR 400CC PVC 3 SPR Y CONN DRN

## (undated) DEVICE — SUTURE VCRL SZ 2-0 L18IN ABSRB UD L26MM CP-2 1/2 CIR REV J762D

## (undated) DEVICE — COVER,TABLE,60X90,STERILE: Brand: MEDLINE

## (undated) DEVICE — FLOSEAL HEMOSTATIC MATRIX, 10ML: Brand: FLOSEAL HEMOSTATIC MATRIX

## (undated) DEVICE — TOOL 14MH30 LEGEND 14CM 3MM: Brand: MIDAS REX ™

## (undated) DEVICE — CODMAN® SURGICAL PATTIES 1/2" X 3" (1.27CM X 7.62CM): Brand: CODMAN®

## (undated) DEVICE — GOWN,SIRUS,NONRNF,XLN/2XL,18/CS: Brand: MEDLINE

## (undated) DEVICE — UNIQCOT 1/4" X 1/4": Brand: UNIQCOT

## (undated) DEVICE — SUTURE NRLN SZ 4-0 L18IN NONABSORBABLE BLK L13MM TF 1/2 CIR C584D

## (undated) DEVICE — CANNULA INJ L2.5IN BLNT TIP 3MM CLR BODY W/ 1 W VLV DLP

## (undated) DEVICE — SOL IRR SOD CL 0.9% 1000ML BTL --

## (undated) DEVICE — RUBBERBAND FASTENING W0.25XL3.5IN 5 PER PK

## (undated) DEVICE — GARMENT,MEDLINE,DVT,INT,CALF,MED, GEN2: Brand: MEDLINE

## (undated) DEVICE — DRAPE FLD WRM W44XL66IN C6L FOR INTRATEMP SYS THERMABASIN

## (undated) DEVICE — SURGICAL PROCEDURE KIT CRANIOTOMY

## (undated) DEVICE — DRAPE,LAPAROTOMY,T,PEDI,STERILE: Brand: MEDLINE

## (undated) DEVICE — STRAP,POSITIONING,KNEE/BODY,FOAM,4X60": Brand: MEDLINE

## (undated) DEVICE — CODMAN® BICOL® COLLAGEN SPONGE: Brand: CODMAN® BICOL®

## (undated) DEVICE — 3M™ TEGADERM™ TRANSPARENT FILM DRESSING FRAME STYLE, 1626W, 4 IN X 4-3/4 IN (10 CM X 12 CM), 50/CT 4CT/CASE: Brand: 3M™ TEGADERM™

## (undated) DEVICE — INFECTION CONTROL KIT SYS

## (undated) DEVICE — COVER LT HNDL PLAS RIG 1 PER PK

## (undated) DEVICE — AGENT HEMSTAT W2XL14IN OXIDIZED REGENERATED CELOS ABSRB FOR

## (undated) DEVICE — TOOL 8TA11 LEGEND 8CM 1.1MM TA: Brand: MIDAS REX ™

## (undated) DEVICE — TOOL F2/8TA23 LEGEND 8CM 2.3MM TAPER: Brand: MIDAS REX™

## (undated) DEVICE — DRAPE SURG W41XL74IN CLR FULL SZ C ARM 3 ADH POLY STRP E